# Patient Record
Sex: FEMALE | Race: WHITE | Employment: UNEMPLOYED | URBAN - METROPOLITAN AREA
[De-identification: names, ages, dates, MRNs, and addresses within clinical notes are randomized per-mention and may not be internally consistent; named-entity substitution may affect disease eponyms.]

---

## 2017-04-10 RX ORDER — TIOTROPIUM BROMIDE INHALATION SPRAY 1.56 UG/1
SPRAY, METERED RESPIRATORY (INHALATION)
Qty: 4 G | Refills: 3 | Status: SHIPPED | OUTPATIENT
Start: 2017-04-10 | End: 2017-08-06 | Stop reason: SDUPTHER

## 2017-05-08 DIAGNOSIS — N32.81 OAB (OVERACTIVE BLADDER): ICD-10-CM

## 2017-05-08 RX ORDER — OXYBUTYNIN CHLORIDE 10 MG/1
TABLET, EXTENDED RELEASE ORAL
Qty: 30 TABLET | Refills: 11 | Status: SHIPPED | OUTPATIENT
Start: 2017-05-08

## 2017-08-08 RX ORDER — TIOTROPIUM BROMIDE INHALATION SPRAY 1.56 UG/1
SPRAY, METERED RESPIRATORY (INHALATION)
Qty: 4 G | Refills: 2 | Status: SHIPPED | OUTPATIENT
Start: 2017-08-08

## 2017-09-06 ENCOUNTER — HOSPITAL ENCOUNTER (OUTPATIENT)
Dept: WOMENS IMAGING | Age: 62
Discharge: HOME | End: 2017-09-06
Payer: COMMERCIAL

## 2017-09-06 ENCOUNTER — HOSPITAL ENCOUNTER (OUTPATIENT)
Dept: GENERAL RADIOLOGY | Age: 62
Discharge: HOME | End: 2017-09-06
Payer: COMMERCIAL

## 2017-09-06 DIAGNOSIS — M25.552 LEFT HIP PAIN: ICD-10-CM

## 2017-09-06 DIAGNOSIS — M85.80 OSTEOPENIA, UNSPECIFIED LOCATION: ICD-10-CM

## 2017-09-06 PROCEDURE — 73502 X-RAY EXAM HIP UNI 2-3 VIEWS: CPT

## 2017-09-06 PROCEDURE — 77080 DXA BONE DENSITY AXIAL: CPT

## 2017-12-04 RX ORDER — TIOTROPIUM BROMIDE INHALATION SPRAY 1.56 UG/1
SPRAY, METERED RESPIRATORY (INHALATION)
Qty: 4 G | Refills: 1 | OUTPATIENT
Start: 2017-12-04

## 2018-06-18 ENCOUNTER — HOSPITAL ENCOUNTER (OUTPATIENT)
Dept: LAB | Age: 63
Discharge: HOME | End: 2018-06-18
Payer: COMMERCIAL

## 2018-06-18 LAB
ALBUMIN SERPL-MCNC: 4.3 G/DL (ref 3.9–4.9)
ALP BLD-CCNC: 89 U/L (ref 40–130)
ALT SERPL-CCNC: 16 U/L (ref 0–33)
ANION GAP SERPL CALCULATED.3IONS-SCNC: 17 MEQ/L (ref 7–13)
AST SERPL-CCNC: 14 U/L (ref 0–35)
BASOPHILS ABSOLUTE: 0.1 K/UL (ref 0–0.2)
BASOPHILS RELATIVE PERCENT: 0.4 %
BILIRUB SERPL-MCNC: <0.2 MG/DL (ref 0–1.2)
BUN BLDV-MCNC: 17 MG/DL (ref 8–23)
CALCIUM SERPL-MCNC: 9.1 MG/DL (ref 8.6–10.2)
CHLORIDE BLD-SCNC: 99 MEQ/L (ref 98–107)
CO2: 27 MEQ/L (ref 22–29)
CREAT SERPL-MCNC: 0.66 MG/DL (ref 0.5–0.9)
EOSINOPHILS ABSOLUTE: 0 K/UL (ref 0–0.7)
EOSINOPHILS RELATIVE PERCENT: 0.1 %
GFR AFRICAN AMERICAN: >60
GFR NON-AFRICAN AMERICAN: >60
GLOBULIN: 2.9 G/DL (ref 2.3–3.5)
GLUCOSE BLD-MCNC: 70 MG/DL (ref 74–109)
HCT VFR BLD CALC: 47.6 % (ref 37–47)
HEMOGLOBIN: 16 G/DL (ref 12–16)
LYMPHOCYTES ABSOLUTE: 2.3 K/UL (ref 1–4.8)
LYMPHOCYTES RELATIVE PERCENT: 17.7 %
MCH RBC QN AUTO: 33.8 PG (ref 27–31.3)
MCHC RBC AUTO-ENTMCNC: 33.5 % (ref 33–37)
MCV RBC AUTO: 100.8 FL (ref 82–100)
MONO TEST: NEGATIVE
MONOCYTES ABSOLUTE: 0.6 K/UL (ref 0.2–0.8)
MONOCYTES RELATIVE PERCENT: 5 %
NEUTROPHILS ABSOLUTE: 9.8 K/UL (ref 1.4–6.5)
NEUTROPHILS RELATIVE PERCENT: 76.8 %
PDW BLD-RTO: 13.7 % (ref 11.5–14.5)
PLATELET # BLD: 236 K/UL (ref 130–400)
POTASSIUM SERPL-SCNC: 3.5 MEQ/L (ref 3.5–5.1)
RBC # BLD: 4.72 M/UL (ref 4.2–5.4)
SODIUM BLD-SCNC: 143 MEQ/L (ref 132–144)
TOTAL PROTEIN: 7.2 G/DL (ref 6.4–8.1)
WBC # BLD: 12.8 K/UL (ref 4.8–10.8)

## 2018-06-18 PROCEDURE — 86308 HETEROPHILE ANTIBODY SCREEN: CPT

## 2018-06-18 PROCEDURE — 85025 COMPLETE CBC W/AUTO DIFF WBC: CPT

## 2018-06-18 PROCEDURE — 80053 COMPREHEN METABOLIC PANEL: CPT

## 2018-10-15 ENCOUNTER — HOSPITAL ENCOUNTER (OUTPATIENT)
Dept: LAB | Age: 63
Discharge: HOME | End: 2018-10-15
Payer: MEDICARE

## 2018-10-15 LAB
ALBUMIN SERPL-MCNC: 4 G/DL (ref 3.9–4.9)
ALP BLD-CCNC: 72 U/L (ref 40–130)
ALT SERPL-CCNC: 13 U/L (ref 0–33)
ANION GAP SERPL CALCULATED.3IONS-SCNC: 17 MEQ/L (ref 7–13)
AST SERPL-CCNC: 15 U/L (ref 0–35)
BILIRUB SERPL-MCNC: 0.4 MG/DL (ref 0–1.2)
BUN BLDV-MCNC: 15 MG/DL (ref 8–23)
CALCIUM SERPL-MCNC: 9.5 MG/DL (ref 8.6–10.2)
CHLORIDE BLD-SCNC: 98 MEQ/L (ref 98–107)
CHOLESTEROL, TOTAL: 155 MG/DL (ref 0–199)
CO2: 27 MEQ/L (ref 22–29)
CREAT SERPL-MCNC: 0.77 MG/DL (ref 0.5–0.9)
GFR AFRICAN AMERICAN: >60
GFR NON-AFRICAN AMERICAN: >60
GLOBULIN: 3 G/DL (ref 2.3–3.5)
GLUCOSE BLD-MCNC: 68 MG/DL (ref 74–109)
HDLC SERPL-MCNC: 49 MG/DL (ref 40–59)
LDL CHOLESTEROL CALCULATED: 79 MG/DL (ref 0–129)
MAGNESIUM: 2.3 MG/DL (ref 1.7–2.3)
POTASSIUM SERPL-SCNC: 3.1 MEQ/L (ref 3.5–5.1)
SODIUM BLD-SCNC: 142 MEQ/L (ref 132–144)
TOTAL PROTEIN: 7 G/DL (ref 6.4–8.1)
TRIGL SERPL-MCNC: 137 MG/DL (ref 0–200)
VITAMIN D 25-HYDROXY: 55.6 NG/ML (ref 30–100)

## 2018-10-15 PROCEDURE — 80061 LIPID PANEL: CPT

## 2018-10-15 PROCEDURE — 80053 COMPREHEN METABOLIC PANEL: CPT

## 2018-10-15 PROCEDURE — 83735 ASSAY OF MAGNESIUM: CPT

## 2018-10-15 PROCEDURE — 82306 VITAMIN D 25 HYDROXY: CPT

## 2019-07-31 ENCOUNTER — HOSPITAL ENCOUNTER (OUTPATIENT)
Dept: LAB | Age: 64
Discharge: HOME | End: 2019-07-31
Payer: MEDICAID

## 2019-07-31 LAB
ALBUMIN SERPL-MCNC: 4.1 G/DL (ref 3.5–4.6)
ALP BLD-CCNC: 67 U/L (ref 40–130)
ALT SERPL-CCNC: 16 U/L (ref 0–33)
ANION GAP SERPL CALCULATED.3IONS-SCNC: 19 MEQ/L (ref 9–15)
AST SERPL-CCNC: 16 U/L (ref 0–35)
BILIRUB SERPL-MCNC: 0.4 MG/DL (ref 0.2–0.7)
BUN BLDV-MCNC: 12 MG/DL (ref 8–23)
CALCIUM SERPL-MCNC: 9.4 MG/DL (ref 8.5–9.9)
CHLORIDE BLD-SCNC: 98 MEQ/L (ref 95–107)
CO2: 24 MEQ/L (ref 20–31)
CREAT SERPL-MCNC: 0.69 MG/DL (ref 0.5–0.9)
GFR AFRICAN AMERICAN: >60
GFR NON-AFRICAN AMERICAN: >60
GLOBULIN: 2.6 G/DL (ref 2.3–3.5)
GLUCOSE BLD-MCNC: 74 MG/DL (ref 70–99)
HCT VFR BLD CALC: 43.9 % (ref 37–47)
HEMOGLOBIN: 15.3 G/DL (ref 12–16)
MCH RBC QN AUTO: 35.7 PG (ref 27–31.3)
MCHC RBC AUTO-ENTMCNC: 34.9 % (ref 33–37)
MCV RBC AUTO: 102.1 FL (ref 82–100)
PDW BLD-RTO: 13.7 % (ref 11.5–14.5)
PLATELET # BLD: 214 K/UL (ref 130–400)
POTASSIUM SERPL-SCNC: 3.5 MEQ/L (ref 3.4–4.9)
RBC # BLD: 4.3 M/UL (ref 4.2–5.4)
SODIUM BLD-SCNC: 141 MEQ/L (ref 135–144)
TOTAL PROTEIN: 6.7 G/DL (ref 6.3–8)
TSH SERPL DL<=0.05 MIU/L-ACNC: 1.63 UIU/ML (ref 0.44–3.86)
WBC # BLD: 8.9 K/UL (ref 4.8–10.8)

## 2019-07-31 PROCEDURE — 84443 ASSAY THYROID STIM HORMONE: CPT

## 2019-07-31 PROCEDURE — 85027 COMPLETE CBC AUTOMATED: CPT

## 2019-07-31 PROCEDURE — 80053 COMPREHEN METABOLIC PANEL: CPT

## 2020-01-15 ENCOUNTER — HOSPITAL ENCOUNTER (OUTPATIENT)
Dept: LAB | Age: 65
Discharge: HOME | End: 2020-01-15
Payer: MEDICAID

## 2020-01-15 ENCOUNTER — HOSPITAL ENCOUNTER (OUTPATIENT)
Dept: WOMENS IMAGING | Age: 65
Discharge: HOME | End: 2020-01-17
Payer: MEDICAID

## 2020-01-15 LAB
ALBUMIN SERPL-MCNC: 4.2 G/DL (ref 3.5–4.6)
ALP BLD-CCNC: 63 U/L (ref 40–130)
ALT SERPL-CCNC: 12 U/L (ref 0–33)
ANION GAP SERPL CALCULATED.3IONS-SCNC: 14 MEQ/L (ref 9–15)
AST SERPL-CCNC: 14 U/L (ref 0–35)
BILIRUB SERPL-MCNC: 0.3 MG/DL (ref 0.2–0.7)
BUN BLDV-MCNC: 15 MG/DL (ref 8–23)
CALCIUM SERPL-MCNC: 9.4 MG/DL (ref 8.5–9.9)
CHLORIDE BLD-SCNC: 99 MEQ/L (ref 95–107)
CHOLESTEROL, TOTAL: 134 MG/DL (ref 0–199)
CO2: 25 MEQ/L (ref 20–31)
CREAT SERPL-MCNC: 0.91 MG/DL (ref 0.5–0.9)
GFR AFRICAN AMERICAN: >60
GFR NON-AFRICAN AMERICAN: >60
GLOBULIN: 2.5 G/DL (ref 2.3–3.5)
GLUCOSE BLD-MCNC: 94 MG/DL (ref 70–99)
HCT VFR BLD CALC: 42.7 % (ref 37–47)
HDLC SERPL-MCNC: 45 MG/DL (ref 40–59)
HEMOGLOBIN: 14.5 G/DL (ref 12–16)
LDL CHOLESTEROL CALCULATED: 62 MG/DL (ref 0–129)
MCH RBC QN AUTO: 33.6 PG (ref 27–31.3)
MCHC RBC AUTO-ENTMCNC: 33.9 % (ref 33–37)
MCV RBC AUTO: 98.9 FL (ref 82–100)
PDW BLD-RTO: 12.9 % (ref 11.5–14.5)
PLATELET # BLD: 185 K/UL (ref 130–400)
POTASSIUM SERPL-SCNC: 4 MEQ/L (ref 3.4–4.9)
PRO-BNP: 105 PG/ML
RBC # BLD: 4.32 M/UL (ref 4.2–5.4)
SODIUM BLD-SCNC: 138 MEQ/L (ref 135–144)
T4 TOTAL: 8.7 UG/DL (ref 4.5–11.7)
TOTAL PROTEIN: 6.7 G/DL (ref 6.3–8)
TRIGL SERPL-MCNC: 135 MG/DL (ref 0–150)
TSH SERPL DL<=0.05 MIU/L-ACNC: 2.33 UIU/ML (ref 0.44–3.86)
VITAMIN B-12: 320 PG/ML (ref 232–1245)
VITAMIN D 25-HYDROXY: 44.3 NG/ML (ref 30–100)
WBC # BLD: 5.9 K/UL (ref 4.8–10.8)

## 2020-01-15 PROCEDURE — 83880 ASSAY OF NATRIURETIC PEPTIDE: CPT

## 2020-01-15 PROCEDURE — 82306 VITAMIN D 25 HYDROXY: CPT

## 2020-01-15 PROCEDURE — 82607 VITAMIN B-12: CPT

## 2020-01-15 PROCEDURE — 84436 ASSAY OF TOTAL THYROXINE: CPT

## 2020-01-15 PROCEDURE — 85027 COMPLETE CBC AUTOMATED: CPT

## 2020-01-15 PROCEDURE — 77080 DXA BONE DENSITY AXIAL: CPT

## 2020-01-15 PROCEDURE — 80053 COMPREHEN METABOLIC PANEL: CPT

## 2020-01-15 PROCEDURE — 80061 LIPID PANEL: CPT

## 2020-01-15 PROCEDURE — 84443 ASSAY THYROID STIM HORMONE: CPT

## 2021-03-23 ENCOUNTER — HOSPITAL ENCOUNTER (OUTPATIENT)
Dept: LAB | Age: 66
Discharge: HOME | End: 2021-03-23
Payer: MEDICAID

## 2021-03-23 LAB
ANION GAP SERPL CALCULATED.3IONS-SCNC: 16 MEQ/L (ref 9–15)
BUN BLDV-MCNC: 15 MG/DL (ref 8–23)
CALCIUM SERPL-MCNC: 9.9 MG/DL (ref 8.5–9.9)
CHLORIDE BLD-SCNC: 99 MEQ/L (ref 95–107)
CO2: 27 MEQ/L (ref 20–31)
CREAT SERPL-MCNC: 0.81 MG/DL (ref 0.5–0.9)
GFR AFRICAN AMERICAN: >60
GFR NON-AFRICAN AMERICAN: >60
GLUCOSE BLD-MCNC: 63 MG/DL (ref 70–99)
POTASSIUM SERPL-SCNC: 3.1 MEQ/L (ref 3.4–4.9)
SODIUM BLD-SCNC: 142 MEQ/L (ref 135–144)

## 2021-03-23 PROCEDURE — 80048 BASIC METABOLIC PNL TOTAL CA: CPT

## 2021-04-05 ENCOUNTER — HOSPITAL ENCOUNTER (OUTPATIENT)
Dept: LAB | Age: 66
Discharge: HOME | End: 2021-04-05
Payer: MEDICAID

## 2021-04-05 LAB
ANION GAP SERPL CALCULATED.3IONS-SCNC: 11 MEQ/L (ref 9–15)
BUN BLDV-MCNC: 16 MG/DL (ref 8–23)
CALCIUM SERPL-MCNC: 9.9 MG/DL (ref 8.5–9.9)
CHLORIDE BLD-SCNC: 99 MEQ/L (ref 95–107)
CO2: 27 MEQ/L (ref 20–31)
CREAT SERPL-MCNC: 0.83 MG/DL (ref 0.5–0.9)
GFR AFRICAN AMERICAN: >60
GFR NON-AFRICAN AMERICAN: >60
GLUCOSE BLD-MCNC: 105 MG/DL (ref 70–99)
POTASSIUM SERPL-SCNC: 3.6 MEQ/L (ref 3.4–4.9)
SODIUM BLD-SCNC: 137 MEQ/L (ref 135–144)

## 2021-04-05 PROCEDURE — 80048 BASIC METABOLIC PNL TOTAL CA: CPT

## 2022-03-22 ENCOUNTER — HOSPITAL ENCOUNTER (OUTPATIENT)
Dept: LAB | Age: 67
Discharge: HOME | End: 2022-03-22
Payer: MEDICAID

## 2022-03-22 LAB
ALBUMIN SERPL-MCNC: 4.3 G/DL (ref 3.5–4.6)
ALP BLD-CCNC: 71 U/L (ref 40–130)
ALT SERPL-CCNC: 17 U/L (ref 0–33)
ANION GAP SERPL CALCULATED.3IONS-SCNC: 12 MEQ/L (ref 9–15)
AST SERPL-CCNC: 24 U/L (ref 0–35)
BILIRUB SERPL-MCNC: 0.3 MG/DL (ref 0.2–0.7)
BUN BLDV-MCNC: 14 MG/DL (ref 8–23)
CALCIUM SERPL-MCNC: 9.5 MG/DL (ref 8.5–9.9)
CHLORIDE BLD-SCNC: 100 MEQ/L (ref 95–107)
CO2: 27 MEQ/L (ref 20–31)
CREAT SERPL-MCNC: 0.89 MG/DL (ref 0.5–0.9)
GFR AFRICAN AMERICAN: >60
GFR NON-AFRICAN AMERICAN: >60
GLOBULIN: 2.3 G/DL (ref 2.3–3.5)
GLUCOSE BLD-MCNC: 104 MG/DL (ref 70–99)
HCT VFR BLD CALC: 43.8 % (ref 37–47)
HEMOGLOBIN: 14.7 G/DL (ref 12–16)
MCH RBC QN AUTO: 33.1 PG (ref 27–31.3)
MCHC RBC AUTO-ENTMCNC: 33.4 % (ref 33–37)
MCV RBC AUTO: 99.2 FL (ref 82–100)
MYOGLOBIN: 46 NG/ML (ref 25–58)
PDW BLD-RTO: 14.6 % (ref 11.5–14.5)
PLATELET # BLD: 194 K/UL (ref 130–400)
POTASSIUM SERPL-SCNC: 4.2 MEQ/L (ref 3.4–4.9)
RBC # BLD: 4.42 M/UL (ref 4.2–5.4)
SODIUM BLD-SCNC: 139 MEQ/L (ref 135–144)
T4 FREE: 0.96 NG/DL (ref 0.84–1.68)
TOTAL CK: 106 U/L (ref 0–170)
TOTAL PROTEIN: 6.6 G/DL (ref 6.3–8)
TSH SERPL DL<=0.05 MIU/L-ACNC: 1.39 UIU/ML (ref 0.44–3.86)
WBC # BLD: 6.8 K/UL (ref 4.8–10.8)

## 2022-03-22 PROCEDURE — 80053 COMPREHEN METABOLIC PANEL: CPT

## 2022-03-22 PROCEDURE — 85027 COMPLETE CBC AUTOMATED: CPT

## 2022-03-22 PROCEDURE — 83874 ASSAY OF MYOGLOBIN: CPT

## 2022-03-22 PROCEDURE — 82607 VITAMIN B-12: CPT

## 2022-03-22 PROCEDURE — 82550 ASSAY OF CK (CPK): CPT

## 2022-03-22 PROCEDURE — 82746 ASSAY OF FOLIC ACID SERUM: CPT

## 2022-03-22 PROCEDURE — 84439 ASSAY OF FREE THYROXINE: CPT

## 2022-03-22 PROCEDURE — 82306 VITAMIN D 25 HYDROXY: CPT

## 2022-03-22 PROCEDURE — 84443 ASSAY THYROID STIM HORMONE: CPT

## 2022-03-23 LAB
FOLATE: 9.1 NG/ML
VITAMIN B-12: 299 PG/ML (ref 232–1245)
VITAMIN D 25-HYDROXY: 46.7 NG/ML

## 2023-04-25 ENCOUNTER — HOSPITAL ENCOUNTER (OUTPATIENT)
Dept: LAB | Age: 68
Discharge: HOME | End: 2023-04-25
Payer: MEDICAID

## 2023-04-25 ENCOUNTER — HOSPITAL ENCOUNTER (OUTPATIENT)
Dept: WOMENS IMAGING | Age: 68
Discharge: HOME | End: 2023-04-27
Payer: MEDICAID

## 2023-04-25 ENCOUNTER — HOSPITAL ENCOUNTER (OUTPATIENT)
Dept: ULTRASOUND IMAGING | Age: 68
Discharge: HOME | End: 2023-04-27
Payer: MEDICAID

## 2023-04-25 DIAGNOSIS — R06.89 ACUTE RESPIRATORY INSUFFICIENCY: ICD-10-CM

## 2023-04-25 DIAGNOSIS — Z78.0 MENOPAUSE: ICD-10-CM

## 2023-04-25 LAB
ALBUMIN SERPL-MCNC: 4.3 G/DL (ref 3.5–4.6)
ALP SERPL-CCNC: 74 U/L (ref 40–130)
ALT SERPL-CCNC: 17 U/L (ref 0–33)
ANION GAP SERPL CALCULATED.3IONS-SCNC: 11 MEQ/L (ref 9–15)
AST SERPL-CCNC: 20 U/L (ref 0–35)
BASOPHILS # BLD: 0 K/UL (ref 0–0.2)
BASOPHILS NFR BLD: 0.2 %
BILIRUB SERPL-MCNC: 0.5 MG/DL (ref 0.2–0.7)
BUN SERPL-MCNC: 18 MG/DL (ref 8–23)
CALCIUM SERPL-MCNC: 9.8 MG/DL (ref 8.5–9.9)
CHLORIDE SERPL-SCNC: 101 MEQ/L (ref 95–107)
CHOLEST SERPL-MCNC: 155 MG/DL (ref 0–199)
CO2 SERPL-SCNC: 25 MEQ/L (ref 20–31)
CREAT SERPL-MCNC: 0.84 MG/DL (ref 0.5–0.9)
EOSINOPHIL # BLD: 0.2 K/UL (ref 0–0.7)
EOSINOPHIL NFR BLD: 2.9 %
ERYTHROCYTE [DISTWIDTH] IN BLOOD BY AUTOMATED COUNT: 13.8 % (ref 11.5–14.5)
GLOBULIN SER CALC-MCNC: 2.7 G/DL (ref 2.3–3.5)
GLUCOSE SERPL-MCNC: 108 MG/DL (ref 70–99)
HCT VFR BLD AUTO: 49.1 % (ref 37–47)
HDLC SERPL-MCNC: 49 MG/DL (ref 40–59)
HGB BLD-MCNC: 16.7 G/DL (ref 12–16)
LDL CHOLESTEROL CALCULATED: 83 MG/DL (ref 0–129)
LYMPHOCYTES # BLD: 1.8 K/UL (ref 1–4.8)
LYMPHOCYTES NFR BLD: 28.1 %
MAGNESIUM SERPL-MCNC: 2.4 MG/DL (ref 1.7–2.4)
MCH RBC QN AUTO: 33.5 PG (ref 27–31.3)
MCHC RBC AUTO-ENTMCNC: 33.9 % (ref 33–37)
MCV RBC AUTO: 98.8 FL (ref 79.4–94.8)
MONOCYTES # BLD: 0.6 K/UL (ref 0.2–0.8)
MONOCYTES NFR BLD: 8.7 %
NEUTROPHILS # BLD: 3.9 K/UL (ref 1.4–6.5)
NEUTS SEG NFR BLD: 60.1 %
PLATELET # BLD AUTO: 189 K/UL (ref 130–400)
POTASSIUM SERPL-SCNC: 4 MEQ/L (ref 3.4–4.9)
PROT SERPL-MCNC: 7 G/DL (ref 6.3–8)
RBC # BLD AUTO: 4.97 M/UL (ref 4.2–5.4)
SODIUM SERPL-SCNC: 137 MEQ/L (ref 135–144)
TRIGLYCERIDE, FASTING: 113 MG/DL (ref 0–150)
WBC # BLD AUTO: 6.5 K/UL (ref 4.8–10.8)

## 2023-04-25 PROCEDURE — 82306 VITAMIN D 25 HYDROXY: CPT

## 2023-04-25 PROCEDURE — 36415 COLL VENOUS BLD VENIPUNCTURE: CPT

## 2023-04-25 PROCEDURE — 83735 ASSAY OF MAGNESIUM: CPT

## 2023-04-25 PROCEDURE — 85025 COMPLETE CBC W/AUTO DIFF WBC: CPT

## 2023-04-25 PROCEDURE — 80061 LIPID PANEL: CPT

## 2023-04-25 PROCEDURE — 80053 COMPREHEN METABOLIC PANEL: CPT

## 2023-04-25 PROCEDURE — 77080 DXA BONE DENSITY AXIAL: CPT

## 2023-04-25 PROCEDURE — 93880 EXTRACRANIAL BILAT STUDY: CPT

## 2023-04-26 LAB — VITAMIN D 25-HYDROXY: 43.2 NG/ML

## 2023-11-02 PROBLEM — J44.89 CHRONIC ASTHMATIC BRONCHITIS (MULTI): Status: ACTIVE | Noted: 2023-11-02

## 2023-11-02 PROBLEM — M65.30 TRIGGER FINGER: Status: ACTIVE | Noted: 2023-11-02

## 2023-11-02 PROBLEM — I42.9 CARDIOMYOPATHY (MULTI): Status: ACTIVE | Noted: 2023-11-02

## 2023-11-02 PROBLEM — N39.0 URINARY TRACT INFECTION WITHOUT HEMATURIA: Status: ACTIVE | Noted: 2023-11-02

## 2023-11-02 PROBLEM — R06.89 DIFFICULTY BREATHING: Status: ACTIVE | Noted: 2023-11-02

## 2023-11-02 PROBLEM — I25.10 CAD IN NATIVE ARTERY: Status: ACTIVE | Noted: 2023-11-02

## 2023-11-02 PROBLEM — I77.1 CELIAC ARTERY STENOSIS (CMS-HCC): Status: ACTIVE | Noted: 2023-11-02

## 2023-11-02 PROBLEM — R94.31 ABNORMAL EKG: Status: ACTIVE | Noted: 2023-11-02

## 2023-11-02 PROBLEM — I73.9 INTERMITTENT CLAUDICATION (CMS-HCC): Status: ACTIVE | Noted: 2023-11-02

## 2023-11-02 PROBLEM — E87.6 HYPOKALEMIA: Status: ACTIVE | Noted: 2023-11-02

## 2023-11-02 PROBLEM — R22.40 LOCALIZED SWELLING, MASS, OR LUMP OF LOWER EXTREMITY: Status: ACTIVE | Noted: 2023-11-02

## 2023-11-02 PROBLEM — I73.9 PVD (PERIPHERAL VASCULAR DISEASE) (CMS-HCC): Status: ACTIVE | Noted: 2023-11-02

## 2023-11-02 PROBLEM — M25.569 KNEE PAIN: Status: ACTIVE | Noted: 2023-11-02

## 2023-11-02 PROBLEM — R60.9 SWELLING: Status: ACTIVE | Noted: 2023-11-02

## 2023-11-02 PROBLEM — I71.40 ABDOMINAL AORTIC ANEURYSM (CMS-HCC): Status: ACTIVE | Noted: 2023-11-02

## 2023-11-02 PROBLEM — E66.9 OBESE: Status: ACTIVE | Noted: 2023-11-02

## 2023-11-02 PROBLEM — E78.2 MIXED HYPERLIPIDEMIA: Status: ACTIVE | Noted: 2023-11-02

## 2023-11-02 PROBLEM — I65.23 BILATERAL CAROTID ARTERY STENOSIS: Status: ACTIVE | Noted: 2023-11-02

## 2023-11-02 PROBLEM — I10 ESSENTIAL HYPERTENSION: Status: ACTIVE | Noted: 2023-11-02

## 2023-11-02 PROBLEM — Z96.659 HISTORY OF TOTAL KNEE REPLACEMENT: Status: ACTIVE | Noted: 2023-11-02

## 2023-11-02 PROBLEM — R07.9 CHEST PAIN: Status: ACTIVE | Noted: 2023-11-02

## 2023-11-02 PROBLEM — R09.89 BRUIT OF RIGHT CAROTID ARTERY: Status: ACTIVE | Noted: 2023-11-02

## 2023-11-02 PROBLEM — M79.671 RIGHT FOOT PAIN: Status: ACTIVE | Noted: 2023-11-02

## 2023-11-02 PROBLEM — K55.1 MESENTERIC ARTERY STENOSIS (MULTI): Status: ACTIVE | Noted: 2023-11-02

## 2023-11-02 PROBLEM — M17.9 KNEE OSTEOARTHRITIS: Status: ACTIVE | Noted: 2023-11-02

## 2023-11-02 PROBLEM — I77.4 CELIAC ARTERY STENOSIS: Status: ACTIVE | Noted: 2023-11-02

## 2023-11-02 PROBLEM — S92.353A CLOSED DISPLACED FRACTURE OF FIFTH METATARSAL BONE: Status: ACTIVE | Noted: 2023-11-02

## 2023-11-02 PROBLEM — F17.200 CURRENT EVERY DAY SMOKER: Status: ACTIVE | Noted: 2023-11-02

## 2023-11-02 PROBLEM — M79.609 LIMB PAIN: Status: ACTIVE | Noted: 2023-11-02

## 2023-11-02 RX ORDER — ATENOLOL 50 MG/1
50 TABLET ORAL DAILY
COMMUNITY
End: 2023-11-03 | Stop reason: ALTCHOICE

## 2023-11-02 RX ORDER — PREGABALIN 75 MG/1
75 CAPSULE ORAL 2 TIMES DAILY
COMMUNITY
End: 2023-11-03 | Stop reason: ALTCHOICE

## 2023-11-02 RX ORDER — ALBUTEROL SULFATE 90 UG/1
2 AEROSOL, METERED RESPIRATORY (INHALATION)
COMMUNITY

## 2023-11-02 RX ORDER — POTASSIUM CHLORIDE 750 MG/1
2 CAPSULE, EXTENDED RELEASE ORAL DAILY
COMMUNITY
Start: 2022-03-15 | End: 2023-11-03 | Stop reason: ALTCHOICE

## 2023-11-02 RX ORDER — CLOPIDOGREL BISULFATE 75 MG/1
1 TABLET ORAL DAILY
COMMUNITY
End: 2023-11-03 | Stop reason: ALTCHOICE

## 2023-11-02 RX ORDER — ACETAMINOPHEN 500 MG
1 TABLET ORAL DAILY
COMMUNITY

## 2023-11-02 RX ORDER — ISOSORBIDE MONONITRATE 60 MG/1
60 TABLET, EXTENDED RELEASE ORAL DAILY
COMMUNITY
End: 2023-11-03 | Stop reason: ALTCHOICE

## 2023-11-02 RX ORDER — TIZANIDINE 4 MG/1
4 TABLET ORAL NIGHTLY
COMMUNITY
End: 2023-11-03 | Stop reason: ALTCHOICE

## 2023-11-02 RX ORDER — MIRTAZAPINE 15 MG/1
1 TABLET, FILM COATED ORAL NIGHTLY
COMMUNITY

## 2023-11-02 RX ORDER — NITROGLYCERIN 0.4 MG/1
1 TABLET SUBLINGUAL EVERY 5 MIN PRN
COMMUNITY

## 2023-11-02 RX ORDER — EZETIMIBE 10 MG/1
10 TABLET ORAL NIGHTLY
COMMUNITY
End: 2023-11-03 | Stop reason: ALTCHOICE

## 2023-11-02 RX ORDER — BRIMONIDINE TARTRATE 2 MG/ML
SOLUTION/ DROPS OPHTHALMIC
COMMUNITY
End: 2023-11-03 | Stop reason: ALTCHOICE

## 2023-11-02 RX ORDER — TOLTERODINE TARTRATE 1 MG/1
1 TABLET, EXTENDED RELEASE ORAL NIGHTLY
COMMUNITY
End: 2024-04-04 | Stop reason: WASHOUT

## 2023-11-02 RX ORDER — NAPROXEN SODIUM 220 MG/1
81 TABLET, FILM COATED ORAL
COMMUNITY
End: 2023-11-03 | Stop reason: ALTCHOICE

## 2023-11-02 RX ORDER — CELECOXIB 200 MG/1
200 CAPSULE ORAL DAILY
COMMUNITY
Start: 2022-05-18 | End: 2023-11-03 | Stop reason: ALTCHOICE

## 2023-11-02 RX ORDER — BUSPIRONE HYDROCHLORIDE 10 MG/1
10 TABLET ORAL NIGHTLY
COMMUNITY
End: 2023-11-03 | Stop reason: ALTCHOICE

## 2023-11-02 RX ORDER — SULFAMETHOXAZOLE AND TRIMETHOPRIM 800; 160 MG/1; MG/1
1 TABLET ORAL 2 TIMES DAILY
COMMUNITY
Start: 2022-06-16 | End: 2023-11-16 | Stop reason: SDUPTHER

## 2023-11-02 RX ORDER — PILOCARPINE HYDROCHLORIDE 5 MG/1
1 TABLET, FILM COATED ORAL
COMMUNITY

## 2023-11-02 RX ORDER — HYDRALAZINE HYDROCHLORIDE 50 MG/1
1 TABLET, FILM COATED ORAL 2 TIMES DAILY
COMMUNITY

## 2023-11-02 RX ORDER — ISOSORBIDE MONONITRATE 30 MG/1
30 TABLET, EXTENDED RELEASE ORAL DAILY
COMMUNITY
End: 2023-11-03 | Stop reason: ALTCHOICE

## 2023-11-02 RX ORDER — FUROSEMIDE 20 MG/1
1 TABLET ORAL DAILY
COMMUNITY
End: 2023-11-03 | Stop reason: ALTCHOICE

## 2023-11-02 RX ORDER — RALOXIFENE HYDROCHLORIDE 60 MG/1
1 TABLET, FILM COATED ORAL DAILY
COMMUNITY
End: 2023-12-18 | Stop reason: HOSPADM

## 2023-11-02 RX ORDER — VIT C/E/ZN/COPPR/LUTEIN/ZEAXAN 250MG-90MG
1 CAPSULE ORAL 2 TIMES DAILY
COMMUNITY

## 2023-11-02 RX ORDER — LORATADINE 10 MG/1
1 TABLET ORAL DAILY
COMMUNITY

## 2023-11-02 RX ORDER — SIMVASTATIN 40 MG/1
40 TABLET, FILM COATED ORAL NIGHTLY
COMMUNITY
End: 2024-01-18 | Stop reason: HOSPADM

## 2023-11-02 RX ORDER — MECLIZINE HYDROCHLORIDE 25 MG/1
25 TABLET ORAL EVERY 6 HOURS PRN
COMMUNITY
End: 2023-11-03 | Stop reason: ALTCHOICE

## 2023-11-02 RX ORDER — TRIAMTERENE AND HYDROCHLOROTHIAZIDE 37.5; 25 MG/1; MG/1
1 CAPSULE ORAL DAILY
COMMUNITY
End: 2023-11-03 | Stop reason: ALTCHOICE

## 2023-11-02 RX ORDER — POTASSIUM CHLORIDE 20 MEQ/1
1 TABLET, EXTENDED RELEASE ORAL DAILY
COMMUNITY
End: 2023-11-03 | Stop reason: ALTCHOICE

## 2023-11-02 RX ORDER — IPRATROPIUM BROMIDE 17 UG/1
2 AEROSOL, METERED RESPIRATORY (INHALATION) 2 TIMES DAILY PRN
COMMUNITY
End: 2023-11-03 | Stop reason: ALTCHOICE

## 2023-11-03 ENCOUNTER — PRE-ADMISSION TESTING (OUTPATIENT)
Dept: PREADMISSION TESTING | Facility: HOSPITAL | Age: 68
End: 2023-11-03
Payer: COMMERCIAL

## 2023-11-03 VITALS
SYSTOLIC BLOOD PRESSURE: 108 MMHG | HEART RATE: 75 BPM | DIASTOLIC BLOOD PRESSURE: 58 MMHG | BODY MASS INDEX: 38.53 KG/M2 | OXYGEN SATURATION: 95 % | RESPIRATION RATE: 18 BRPM | WEIGHT: 178.57 LBS | HEIGHT: 57 IN

## 2023-11-03 DIAGNOSIS — Z01.818 PREOPERATIVE TESTING: Primary | ICD-10-CM

## 2023-11-03 DIAGNOSIS — M48.061 SPINAL STENOSIS, LUMBAR REGION WITHOUT NEUROGENIC CLAUDICATION: ICD-10-CM

## 2023-11-03 DIAGNOSIS — Z01.818 PREOPERATIVE TESTING: ICD-10-CM

## 2023-11-03 LAB
ALBUMIN SERPL BCP-MCNC: 4.1 G/DL (ref 3.4–5)
ALP SERPL-CCNC: 73 U/L (ref 33–136)
ALT SERPL W P-5'-P-CCNC: 18 U/L (ref 7–45)
AMORPH CRY #/AREA UR COMP ASSIST: ABNORMAL /HPF
ANION GAP SERPL CALC-SCNC: 11 MMOL/L (ref 10–20)
APPEARANCE UR: ABNORMAL
AST SERPL W P-5'-P-CCNC: 17 U/L (ref 9–39)
BACTERIA #/AREA URNS AUTO: ABNORMAL /HPF
BASOPHILS # BLD AUTO: 0.02 X10*3/UL (ref 0–0.1)
BASOPHILS NFR BLD AUTO: 0.2 %
BILIRUB SERPL-MCNC: 0.5 MG/DL (ref 0–1.2)
BILIRUB UR STRIP.AUTO-MCNC: NEGATIVE MG/DL
BUN SERPL-MCNC: 19 MG/DL (ref 6–23)
CALCIUM SERPL-MCNC: 9.7 MG/DL (ref 8.6–10.3)
CHLORIDE SERPL-SCNC: 103 MMOL/L (ref 98–107)
CO2 SERPL-SCNC: 30 MMOL/L (ref 21–32)
COLOR UR: YELLOW
CREAT SERPL-MCNC: 1.22 MG/DL (ref 0.5–1.05)
EOSINOPHIL # BLD AUTO: 0.22 X10*3/UL (ref 0–0.7)
EOSINOPHIL NFR BLD AUTO: 2.3 %
ERYTHROCYTE [DISTWIDTH] IN BLOOD BY AUTOMATED COUNT: 13.2 % (ref 11.5–14.5)
GFR SERPL CREATININE-BSD FRML MDRD: 48 ML/MIN/1.73M*2
GLUCOSE SERPL-MCNC: 103 MG/DL (ref 74–99)
GLUCOSE UR STRIP.AUTO-MCNC: NEGATIVE MG/DL
HCT VFR BLD AUTO: 42.9 % (ref 36–46)
HGB BLD-MCNC: 14.2 G/DL (ref 12–16)
IMM GRANULOCYTES # BLD AUTO: 0.03 X10*3/UL (ref 0–0.7)
IMM GRANULOCYTES NFR BLD AUTO: 0.3 % (ref 0–0.9)
INR PPP: 0.9 (ref 0.9–1.1)
KETONES UR STRIP.AUTO-MCNC: NEGATIVE MG/DL
LEUKOCYTE ESTERASE UR QL STRIP.AUTO: ABNORMAL
LYMPHOCYTES # BLD AUTO: 2.21 X10*3/UL (ref 1.2–4.8)
LYMPHOCYTES NFR BLD AUTO: 23.1 %
MCH RBC QN AUTO: 32.8 PG (ref 26–34)
MCHC RBC AUTO-ENTMCNC: 33.1 G/DL (ref 32–36)
MCV RBC AUTO: 99 FL (ref 80–100)
MONOCYTES # BLD AUTO: 0.77 X10*3/UL (ref 0.1–1)
MONOCYTES NFR BLD AUTO: 8.1 %
MUCOUS THREADS #/AREA URNS AUTO: ABNORMAL /LPF
NEUTROPHILS # BLD AUTO: 6.31 X10*3/UL (ref 1.2–7.7)
NEUTROPHILS NFR BLD AUTO: 66 %
NITRITE UR QL STRIP.AUTO: POSITIVE
NRBC BLD-RTO: 0 /100 WBCS (ref 0–0)
PH UR STRIP.AUTO: 6 [PH]
PLATELET # BLD AUTO: 200 X10*3/UL (ref 150–450)
POTASSIUM SERPL-SCNC: 4.4 MMOL/L (ref 3.5–5.3)
PROT SERPL-MCNC: 6.5 G/DL (ref 6.4–8.2)
PROT UR STRIP.AUTO-MCNC: NEGATIVE MG/DL
PROTHROMBIN TIME: 10.6 SECONDS (ref 9.8–12.8)
RBC # BLD AUTO: 4.33 X10*6/UL (ref 4–5.2)
RBC # UR STRIP.AUTO: NEGATIVE /UL
RBC #/AREA URNS AUTO: ABNORMAL /HPF
SODIUM SERPL-SCNC: 140 MMOL/L (ref 136–145)
SP GR UR STRIP.AUTO: 1.01
SQUAMOUS #/AREA URNS AUTO: ABNORMAL /HPF
UROBILINOGEN UR STRIP.AUTO-MCNC: <2 MG/DL
WBC # BLD AUTO: 9.6 X10*3/UL (ref 4.4–11.3)
WBC #/AREA URNS AUTO: >50 /HPF

## 2023-11-03 PROCEDURE — 85610 PROTHROMBIN TIME: CPT

## 2023-11-03 PROCEDURE — 80053 COMPREHEN METABOLIC PANEL: CPT

## 2023-11-03 PROCEDURE — 81001 URINALYSIS AUTO W/SCOPE: CPT

## 2023-11-03 PROCEDURE — 87081 CULTURE SCREEN ONLY: CPT | Mod: ELYLAB

## 2023-11-03 PROCEDURE — 36415 COLL VENOUS BLD VENIPUNCTURE: CPT

## 2023-11-03 PROCEDURE — 85025 COMPLETE CBC W/AUTO DIFF WBC: CPT

## 2023-11-03 RX ORDER — ISOSORBIDE MONONITRATE 30 MG/1
30 TABLET, EXTENDED RELEASE ORAL DAILY
COMMUNITY
Start: 2022-06-06 | End: 2023-12-18 | Stop reason: HOSPADM

## 2023-11-03 RX ORDER — PREGABALIN 75 MG/1
1 CAPSULE ORAL 2 TIMES DAILY
COMMUNITY
Start: 2022-06-06

## 2023-11-03 RX ORDER — ASPIRIN 81 MG/1
1 TABLET ORAL DAILY
COMMUNITY

## 2023-11-03 RX ORDER — TRIAMTERENE AND HYDROCHLOROTHIAZIDE 37.5; 25 MG/1; MG/1
1 CAPSULE ORAL DAILY
COMMUNITY
Start: 2022-06-27 | End: 2023-12-18 | Stop reason: HOSPADM

## 2023-11-03 RX ORDER — IPRATROPIUM BROMIDE 17 UG/1
2 AEROSOL, METERED RESPIRATORY (INHALATION)
COMMUNITY
Start: 2022-06-24

## 2023-11-03 RX ORDER — IBANDRONATE SODIUM 150 MG/1
1 TABLET, FILM COATED ORAL
COMMUNITY
Start: 2023-08-22

## 2023-11-03 RX ORDER — TIZANIDINE 4 MG/1
4 TABLET ORAL EVERY 8 HOURS PRN
COMMUNITY
Start: 2022-06-06 | End: 2023-12-18 | Stop reason: HOSPADM

## 2023-11-03 RX ORDER — MECLIZINE HYDROCHLORIDE 25 MG/1
1 TABLET ORAL EVERY 8 HOURS PRN
COMMUNITY
Start: 2022-06-06

## 2023-11-03 RX ORDER — FUROSEMIDE 20 MG/1
1 TABLET ORAL DAILY
COMMUNITY
Start: 2022-06-06

## 2023-11-03 RX ORDER — ATENOLOL 50 MG/1
1 TABLET ORAL EVERY MORNING
COMMUNITY
Start: 2022-06-06

## 2023-11-03 RX ORDER — EZETIMIBE 10 MG/1
1 TABLET ORAL DAILY
COMMUNITY
Start: 2022-06-26

## 2023-11-03 RX ORDER — POTASSIUM CHLORIDE 750 MG/1
10 CAPSULE, EXTENDED RELEASE ORAL DAILY
COMMUNITY
Start: 2022-06-14 | End: 2024-03-28 | Stop reason: SDUPTHER

## 2023-11-03 RX ORDER — ISOSORBIDE MONONITRATE 60 MG/1
60 TABLET, EXTENDED RELEASE ORAL DAILY
COMMUNITY
Start: 2022-06-06 | End: 2023-12-18 | Stop reason: HOSPADM

## 2023-11-03 RX ORDER — BRIMONIDINE TARTRATE 2 MG/ML
1 SOLUTION/ DROPS OPHTHALMIC 2 TIMES DAILY
COMMUNITY
Start: 2022-06-02

## 2023-11-03 RX ORDER — BUSPIRONE HYDROCHLORIDE 10 MG/1
1 TABLET ORAL DAILY
COMMUNITY
Start: 2022-06-06

## 2023-11-03 RX ORDER — CELECOXIB 200 MG/1
200 CAPSULE ORAL DAILY PRN
COMMUNITY
Start: 2022-06-06 | End: 2023-12-18 | Stop reason: HOSPADM

## 2023-11-03 RX ORDER — CLOPIDOGREL BISULFATE 75 MG/1
75 TABLET ORAL DAILY
COMMUNITY
Start: 2022-06-04 | End: 2023-12-18 | Stop reason: HOSPADM

## 2023-11-03 RX ORDER — SIMVASTATIN 40 MG/1
40 TABLET, FILM COATED ORAL 2 TIMES DAILY
COMMUNITY
Start: 2022-06-26 | End: 2023-12-18 | Stop reason: HOSPADM

## 2023-11-07 ENCOUNTER — OFFICE VISIT (OUTPATIENT)
Dept: ORTHOPEDIC SURGERY | Facility: CLINIC | Age: 68
End: 2023-11-07
Payer: COMMERCIAL

## 2023-11-07 VITALS — HEIGHT: 57 IN | WEIGHT: 170 LBS | BODY MASS INDEX: 36.68 KG/M2

## 2023-11-07 DIAGNOSIS — M54.16 LUMBAR RADICULOPATHY: Primary | ICD-10-CM

## 2023-11-07 PROCEDURE — L0650 LSO SC R ANT/POS PNL PRE OTS: HCPCS | Performed by: ORTHOPAEDIC SURGERY

## 2023-11-07 ASSESSMENT — PAIN - FUNCTIONAL ASSESSMENT: PAIN_FUNCTIONAL_ASSESSMENT: 0-10

## 2023-11-07 ASSESSMENT — PAIN SCALES - GENERAL: PAINLEVEL_OUTOF10: 0 - NO PAIN

## 2023-11-07 NOTE — PROGRESS NOTES
Tara Tierney is a 68 y.o. female who presents for Follow-up of the Lower Back (Pre op sx 11/20/23 lumbar).    HPI:  68-year-old female here for preop visit.  She is scheduled to undergo an L3-S1 laminectomy with L4-5, L5-S1 TLIF.  She denies any fever chills nausea vomiting night sweats she has no bowel or bladder complaints.    Physical exam:  Well-nourished, well kept.  No lymphangitis or lymphadenopathy in the examined extremities.  Patient can rise from a seated position, can sit from a standing position. Can get up heels and toes.  Patient is mildly tender in the paraspinal musculature in the lumbar spine.  Her range of motion is intact no significant deficit no weakness no instability good muscle strength.  Examination of the lower extremities reveals no point tenderness, swelling, or deformity.  Range of motion of the hips, knees, and ankles are full without crepitance, instability, or exacerbation of pain. Strength is 5/5 throughout.  No redness, abrasions, or lesions on the lower extremities bilaterally.   Affect normal.  Alert and oriented X 3.  Coordination normal.    Assessment:  68-year-old female here for preop visit.  She is scheduled to undergo an L3-S1 laminectomy with L4-5, L5-S1 TLIF.  Pre and postoperative information instructions were given to the patient.  The brace was fitted today brace instructions were given.  Lifting twisting bending restrictions were discussed.  Wound care was discussed.  Risk and benefits were discussed with Dr. Coombs August 25, 2023.  All questions answered.  Patient ready to proceed with surgery.    Plan:  We will see her back in the office 2 weeks after her surgery.  This is going to be a two-level TLIF with instrumentation we may want to get AP lateral x-rays due to the size of the surgery.

## 2023-11-10 LAB — STAPHYLOCOCCUS SPEC CULT: ABNORMAL

## 2023-11-13 ENCOUNTER — HOSPITAL ENCOUNTER (OUTPATIENT)
Dept: RADIOLOGY | Facility: HOSPITAL | Age: 68
Discharge: HOME | End: 2023-11-13
Payer: COMMERCIAL

## 2023-11-13 DIAGNOSIS — Z12.31 ENCOUNTER FOR SCREENING MAMMOGRAM FOR MALIGNANT NEOPLASM OF BREAST: ICD-10-CM

## 2023-11-13 PROCEDURE — 77067 SCR MAMMO BI INCL CAD: CPT | Mod: BILATERAL PROCEDURE | Performed by: RADIOLOGY

## 2023-11-13 PROCEDURE — 77067 SCR MAMMO BI INCL CAD: CPT

## 2023-11-13 PROCEDURE — 77063 BREAST TOMOSYNTHESIS BI: CPT | Mod: BILATERAL PROCEDURE | Performed by: RADIOLOGY

## 2023-11-15 ENCOUNTER — TELEPHONE (OUTPATIENT)
Dept: ORTHOPEDIC SURGERY | Facility: CLINIC | Age: 68
End: 2023-11-15
Payer: COMMERCIAL

## 2023-11-15 NOTE — TELEPHONE ENCOUNTER
Pt lvm I returned call she stated had concerns as got letter for part of surgery denied.  I told pt no worries as we will appeal or proceed forward if part of surgery not covered and appeal later.  Pt will hear from us if it truly denied.

## 2023-11-16 DIAGNOSIS — N39.0 URINARY TRACT INFECTION WITHOUT HEMATURIA, SITE UNSPECIFIED: Primary | ICD-10-CM

## 2023-11-16 PROBLEM — M54.40 BACK PAIN OF LUMBAR REGION WITH SCIATICA: Status: ACTIVE | Noted: 2023-11-16

## 2023-11-16 RX ORDER — CIPROFLOXACIN 500 MG/1
500 TABLET ORAL 2 TIMES DAILY
Qty: 10 TABLET | Refills: 0 | Status: SHIPPED | OUTPATIENT
Start: 2023-11-16 | End: 2023-11-24 | Stop reason: HOSPADM

## 2023-11-16 RX ORDER — SULFAMETHOXAZOLE AND TRIMETHOPRIM 800; 160 MG/1; MG/1
1 TABLET ORAL 2 TIMES DAILY
Qty: 10 TABLET | Refills: 0 | Status: SHIPPED | OUTPATIENT
Start: 2023-11-16 | End: 2023-11-16 | Stop reason: SINTOL

## 2023-11-20 ENCOUNTER — HOSPITAL ENCOUNTER (OUTPATIENT)
Facility: HOSPITAL | Age: 68
Setting detail: OBSERVATION
Discharge: HOME | DRG: 455 | End: 2023-11-24
Attending: ORTHOPAEDIC SURGERY | Admitting: ORTHOPAEDIC SURGERY
Payer: COMMERCIAL

## 2023-11-20 ENCOUNTER — APPOINTMENT (OUTPATIENT)
Dept: CARDIOLOGY | Facility: HOSPITAL | Age: 68
DRG: 455 | End: 2023-11-20
Payer: COMMERCIAL

## 2023-11-20 ENCOUNTER — ANESTHESIA (OUTPATIENT)
Dept: OPERATING ROOM | Facility: HOSPITAL | Age: 68
DRG: 455 | End: 2023-11-20
Payer: COMMERCIAL

## 2023-11-20 ENCOUNTER — APPOINTMENT (OUTPATIENT)
Dept: RADIOLOGY | Facility: HOSPITAL | Age: 68
DRG: 455 | End: 2023-11-20
Payer: COMMERCIAL

## 2023-11-20 ENCOUNTER — ANESTHESIA EVENT (OUTPATIENT)
Dept: OPERATING ROOM | Facility: HOSPITAL | Age: 68
DRG: 455 | End: 2023-11-20
Payer: COMMERCIAL

## 2023-11-20 DIAGNOSIS — M54.40 BACK PAIN OF LUMBAR REGION WITH SCIATICA: Primary | ICD-10-CM

## 2023-11-20 PROBLEM — M54.10 BACK PAIN WITH RADICULOPATHY: Status: ACTIVE | Noted: 2023-11-20

## 2023-11-20 LAB
ALBUMIN SERPL BCP-MCNC: 3.2 G/DL (ref 3.4–5)
ALP SERPL-CCNC: 60 U/L (ref 33–136)
ALT SERPL W P-5'-P-CCNC: 28 U/L (ref 7–45)
ANION GAP SERPL CALC-SCNC: 10 MMOL/L (ref 10–20)
AST SERPL W P-5'-P-CCNC: 34 U/L (ref 9–39)
BASOPHILS # BLD AUTO: 0.02 X10*3/UL (ref 0–0.1)
BASOPHILS NFR BLD AUTO: 0.2 %
BILIRUB SERPL-MCNC: 0.4 MG/DL (ref 0–1.2)
BUN SERPL-MCNC: 17 MG/DL (ref 6–23)
CALCIUM SERPL-MCNC: 7.6 MG/DL (ref 8.6–10.3)
CHLORIDE SERPL-SCNC: 105 MMOL/L (ref 98–107)
CO2 SERPL-SCNC: 23 MMOL/L (ref 21–32)
CREAT SERPL-MCNC: 0.98 MG/DL (ref 0.5–1.05)
EOSINOPHIL # BLD AUTO: 0.01 X10*3/UL (ref 0–0.7)
EOSINOPHIL NFR BLD AUTO: 0.1 %
ERYTHROCYTE [DISTWIDTH] IN BLOOD BY AUTOMATED COUNT: 13.2 % (ref 11.5–14.5)
GFR SERPL CREATININE-BSD FRML MDRD: 62 ML/MIN/1.73M*2
GLUCOSE SERPL-MCNC: 149 MG/DL (ref 74–99)
HCT VFR BLD AUTO: 36.4 % (ref 36–46)
HGB BLD-MCNC: 12.2 G/DL (ref 12–16)
IMM GRANULOCYTES # BLD AUTO: 0.06 X10*3/UL (ref 0–0.7)
IMM GRANULOCYTES NFR BLD AUTO: 0.5 % (ref 0–0.9)
LYMPHOCYTES # BLD AUTO: 0.69 X10*3/UL (ref 1.2–4.8)
LYMPHOCYTES NFR BLD AUTO: 5.6 %
MAGNESIUM SERPL-MCNC: 1.66 MG/DL (ref 1.6–2.4)
MCH RBC QN AUTO: 33.2 PG (ref 26–34)
MCHC RBC AUTO-ENTMCNC: 33.5 G/DL (ref 32–36)
MCV RBC AUTO: 99 FL (ref 80–100)
MONOCYTES # BLD AUTO: 0.34 X10*3/UL (ref 0.1–1)
MONOCYTES NFR BLD AUTO: 2.8 %
NEUTROPHILS # BLD AUTO: 11.11 X10*3/UL (ref 1.2–7.7)
NEUTROPHILS NFR BLD AUTO: 90.8 %
NRBC BLD-RTO: 0 /100 WBCS (ref 0–0)
PHOSPHATE SERPL-MCNC: 3.2 MG/DL (ref 2.5–4.9)
PLATELET # BLD AUTO: 171 X10*3/UL (ref 150–450)
POTASSIUM SERPL-SCNC: 3.7 MMOL/L (ref 3.5–5.3)
PROT SERPL-MCNC: 5.3 G/DL (ref 6.4–8.2)
RBC # BLD AUTO: 3.68 X10*6/UL (ref 4–5.2)
SODIUM SERPL-SCNC: 134 MMOL/L (ref 136–145)
WBC # BLD AUTO: 12.2 X10*3/UL (ref 4.4–11.3)

## 2023-11-20 PROCEDURE — 3600000018 HC OR TIME - INITIAL BASE CHARGE - PROCEDURE LEVEL SIX: Performed by: ORTHOPAEDIC SURGERY

## 2023-11-20 PROCEDURE — 96376 TX/PRO/DX INJ SAME DRUG ADON: CPT | Mod: 59

## 2023-11-20 PROCEDURE — 64636 DESTROY L/S FACET JNT ADDL: CPT | Performed by: ORTHOPAEDIC SURGERY

## 2023-11-20 PROCEDURE — 63047 LAM FACETEC & FORAMOT LUMBAR: CPT | Performed by: ORTHOPAEDIC SURGERY

## 2023-11-20 PROCEDURE — 7100000001 HC RECOVERY ROOM TIME - INITIAL BASE CHARGE: Performed by: ORTHOPAEDIC SURGERY

## 2023-11-20 PROCEDURE — C1713 ANCHOR/SCREW BN/BN,TIS/BN: HCPCS | Performed by: ORTHOPAEDIC SURGERY

## 2023-11-20 PROCEDURE — 2500000005 HC RX 250 GENERAL PHARMACY W/O HCPCS: Performed by: ANESTHESIOLOGY

## 2023-11-20 PROCEDURE — 96365 THER/PROPH/DIAG IV INF INIT: CPT | Mod: 59

## 2023-11-20 PROCEDURE — 99291 CRITICAL CARE FIRST HOUR: CPT

## 2023-11-20 PROCEDURE — 93010 ELECTROCARDIOGRAM REPORT: CPT | Performed by: INTERNAL MEDICINE

## 2023-11-20 PROCEDURE — 3600000017 HC OR TIME - EACH INCREMENTAL 1 MINUTE - PROCEDURE LEVEL SIX: Performed by: ORTHOPAEDIC SURGERY

## 2023-11-20 PROCEDURE — 96375 TX/PRO/DX INJ NEW DRUG ADDON: CPT | Mod: 59

## 2023-11-20 PROCEDURE — A4217 STERILE WATER/SALINE, 500 ML: HCPCS | Mod: MUE | Performed by: ORTHOPAEDIC SURGERY

## 2023-11-20 PROCEDURE — 3700000002 HC GENERAL ANESTHESIA TIME - EACH INCREMENTAL 1 MINUTE: Performed by: ORTHOPAEDIC SURGERY

## 2023-11-20 PROCEDURE — 2500000005 HC RX 250 GENERAL PHARMACY W/O HCPCS: Performed by: ORTHOPAEDIC SURGERY

## 2023-11-20 PROCEDURE — 22630 ARTHRD PST TQ 1NTRSPC LUM: CPT | Performed by: ORTHOPAEDIC SURGERY

## 2023-11-20 PROCEDURE — 63053 LAM FACTC/FRMT ARTHRD LUM EA: CPT | Performed by: PHYSICIAN ASSISTANT

## 2023-11-20 PROCEDURE — 22842 INSERT SPINE FIXATION DEVICE: CPT | Performed by: PHYSICIAN ASSISTANT

## 2023-11-20 PROCEDURE — 2500000004 HC RX 250 GENERAL PHARMACY W/ HCPCS (ALT 636 FOR OP/ED)

## 2023-11-20 PROCEDURE — 63053 LAM FACTC/FRMT ARTHRD LUM EA: CPT | Performed by: ORTHOPAEDIC SURGERY

## 2023-11-20 PROCEDURE — 22632 ARTHRD PST TQ 1NTRSPC LM EA: CPT | Performed by: ORTHOPAEDIC SURGERY

## 2023-11-20 PROCEDURE — 2500000004 HC RX 250 GENERAL PHARMACY W/ HCPCS (ALT 636 FOR OP/ED): Mod: MUE | Performed by: ORTHOPAEDIC SURGERY

## 2023-11-20 PROCEDURE — 63052 LAM FACETC/FRMT ARTHRD LUM 1: CPT | Performed by: ORTHOPAEDIC SURGERY

## 2023-11-20 PROCEDURE — 80053 COMPREHEN METABOLIC PANEL: CPT

## 2023-11-20 PROCEDURE — 37799 UNLISTED PX VASCULAR SURGERY: CPT

## 2023-11-20 PROCEDURE — 93005 ELECTROCARDIOGRAM TRACING: CPT | Mod: 59

## 2023-11-20 PROCEDURE — 7100000002 HC RECOVERY ROOM TIME - EACH INCREMENTAL 1 MINUTE: Performed by: ORTHOPAEDIC SURGERY

## 2023-11-20 PROCEDURE — 22842 INSERT SPINE FIXATION DEVICE: CPT | Performed by: ORTHOPAEDIC SURGERY

## 2023-11-20 PROCEDURE — 2780000003 HC OR 278 NO HCPCS: Performed by: ORTHOPAEDIC SURGERY

## 2023-11-20 PROCEDURE — 22612 ARTHRD PST TQ 1NTRSPC LUMBAR: CPT | Performed by: PHYSICIAN ASSISTANT

## 2023-11-20 PROCEDURE — 2720000007 HC OR 272 NO HCPCS: Performed by: ORTHOPAEDIC SURGERY

## 2023-11-20 PROCEDURE — 2500000001 HC RX 250 WO HCPCS SELF ADMINISTERED DRUGS (ALT 637 FOR MEDICARE OP): Performed by: PHYSICIAN ASSISTANT

## 2023-11-20 PROCEDURE — 22630 ARTHRD PST TQ 1NTRSPC LUM: CPT | Performed by: PHYSICIAN ASSISTANT

## 2023-11-20 PROCEDURE — 83735 ASSAY OF MAGNESIUM: CPT

## 2023-11-20 PROCEDURE — 64635 DESTROY LUMB/SAC FACET JNT: CPT | Performed by: ORTHOPAEDIC SURGERY

## 2023-11-20 PROCEDURE — 2500000004 HC RX 250 GENERAL PHARMACY W/ HCPCS (ALT 636 FOR OP/ED): Performed by: ANESTHESIOLOGY

## 2023-11-20 PROCEDURE — 22612 ARTHRD PST TQ 1NTRSPC LUMBAR: CPT | Performed by: ORTHOPAEDIC SURGERY

## 2023-11-20 PROCEDURE — C1889 IMPLANT/INSERT DEVICE, NOC: HCPCS | Performed by: ORTHOPAEDIC SURGERY

## 2023-11-20 PROCEDURE — 2500000004 HC RX 250 GENERAL PHARMACY W/ HCPCS (ALT 636 FOR OP/ED): Performed by: PHYSICIAN ASSISTANT

## 2023-11-20 PROCEDURE — 2020000001 HC ICU ROOM DAILY

## 2023-11-20 PROCEDURE — 22853 INSJ BIOMECHANICAL DEVICE: CPT | Performed by: PHYSICIAN ASSISTANT

## 2023-11-20 PROCEDURE — 84100 ASSAY OF PHOSPHORUS: CPT

## 2023-11-20 PROCEDURE — 76000 FLUOROSCOPY <1 HR PHYS/QHP: CPT

## 2023-11-20 PROCEDURE — G0378 HOSPITAL OBSERVATION PER HR: HCPCS

## 2023-11-20 PROCEDURE — 3700000001 HC GENERAL ANESTHESIA TIME - INITIAL BASE CHARGE: Performed by: ORTHOPAEDIC SURGERY

## 2023-11-20 PROCEDURE — 63047 LAM FACETEC & FORAMOT LUMBAR: CPT | Performed by: PHYSICIAN ASSISTANT

## 2023-11-20 PROCEDURE — 63052 LAM FACETC/FRMT ARTHRD LUM 1: CPT | Performed by: PHYSICIAN ASSISTANT

## 2023-11-20 PROCEDURE — 2500000002 HC RX 250 W HCPCS SELF ADMINISTERED DRUGS (ALT 637 FOR MEDICARE OP, ALT 636 FOR OP/ED): Mod: MUE

## 2023-11-20 PROCEDURE — 22632 ARTHRD PST TQ 1NTRSPC LM EA: CPT | Performed by: PHYSICIAN ASSISTANT

## 2023-11-20 PROCEDURE — 94640 AIRWAY INHALATION TREATMENT: CPT

## 2023-11-20 PROCEDURE — 85025 COMPLETE CBC W/AUTO DIFF WBC: CPT

## 2023-11-20 PROCEDURE — 2500000001 HC RX 250 WO HCPCS SELF ADMINISTERED DRUGS (ALT 637 FOR MEDICARE OP)

## 2023-11-20 PROCEDURE — 22853 INSJ BIOMECHANICAL DEVICE: CPT | Performed by: ORTHOPAEDIC SURGERY

## 2023-11-20 DEVICE — IMPLANTABLE DEVICE: Type: IMPLANTABLE DEVICE | Site: SPINE LUMBAR | Status: FUNCTIONAL

## 2023-11-20 DEVICE — SCREW, MAS 6.5 X 45 CC SOLERA: Type: IMPLANTABLE DEVICE | Site: SPINE LUMBAR | Status: FUNCTIONAL

## 2023-11-20 DEVICE — SET SCREW, 5.5, TI NS BRK OFF: Type: IMPLANTABLE DEVICE | Site: SPINE LUMBAR | Status: FUNCTIONAL

## 2023-11-20 DEVICE — SPACER, CATALYFT PL, 7MM, LONG: Type: IMPLANTABLE DEVICE | Site: SPINE LUMBAR | Status: FUNCTIONAL

## 2023-11-20 DEVICE — ROD 55MM: Type: IMPLANTABLE DEVICE | Site: SPINE LUMBAR | Status: FUNCTIONAL

## 2023-11-20 DEVICE — BONE GRAFT SUBSTITUTE, ACTIFUSE MIS KIT, 7.5ML: Type: IMPLANTABLE DEVICE | Site: SPINE LUMBAR | Status: FUNCTIONAL

## 2023-11-20 DEVICE — SCREW, MAS 6.5 X 50 CC: Type: IMPLANTABLE DEVICE | Site: SPINE LUMBAR | Status: FUNCTIONAL

## 2023-11-20 DEVICE — ROD, 5.5 X 60 CVD TI SOLERA: Type: IMPLANTABLE DEVICE | Site: SPINE LUMBAR | Status: FUNCTIONAL

## 2023-11-20 RX ORDER — PHENYLEPHRINE HCL IN 0.9% NACL 0.4MG/10ML
SYRINGE (ML) INTRAVENOUS AS NEEDED
Status: DISCONTINUED | OUTPATIENT
Start: 2023-11-20 | End: 2023-11-20

## 2023-11-20 RX ORDER — PREGABALIN 25 MG/1
75 CAPSULE ORAL 2 TIMES DAILY
Status: DISCONTINUED | OUTPATIENT
Start: 2023-11-20 | End: 2023-11-24 | Stop reason: HOSPADM

## 2023-11-20 RX ORDER — POTASSIUM CHLORIDE 20 MEQ/1
40 TABLET, EXTENDED RELEASE ORAL ONCE
Status: COMPLETED | OUTPATIENT
Start: 2023-11-21 | End: 2023-11-21

## 2023-11-20 RX ORDER — ISOSORBIDE MONONITRATE 60 MG/1
60 TABLET, EXTENDED RELEASE ORAL DAILY
Status: DISCONTINUED | OUTPATIENT
Start: 2023-11-20 | End: 2023-11-24 | Stop reason: HOSPADM

## 2023-11-20 RX ORDER — PHENYLEPHRINE 10 MG/250 ML(40 MCG/ML)IN 0.9 % SOD.CHLORIDE INTRAVENOUS
CONTINUOUS PRN
Status: DISCONTINUED | OUTPATIENT
Start: 2023-11-20 | End: 2023-11-20

## 2023-11-20 RX ORDER — MIDAZOLAM HYDROCHLORIDE 1 MG/ML
1 INJECTION, SOLUTION INTRAMUSCULAR; INTRAVENOUS ONCE AS NEEDED
Status: DISCONTINUED | OUTPATIENT
Start: 2023-11-20 | End: 2023-11-20 | Stop reason: HOSPADM

## 2023-11-20 RX ORDER — ASPIRIN 81 MG/1
81 TABLET ORAL DAILY
Status: DISCONTINUED | OUTPATIENT
Start: 2023-11-20 | End: 2023-11-21

## 2023-11-20 RX ORDER — ALBUTEROL SULFATE 0.83 MG/ML
2.5 SOLUTION RESPIRATORY (INHALATION) ONCE
Status: DISCONTINUED | OUTPATIENT
Start: 2023-11-20 | End: 2023-11-20 | Stop reason: HOSPADM

## 2023-11-20 RX ORDER — ONDANSETRON HYDROCHLORIDE 2 MG/ML
INJECTION, SOLUTION INTRAVENOUS AS NEEDED
Status: DISCONTINUED | OUTPATIENT
Start: 2023-11-20 | End: 2023-11-20

## 2023-11-20 RX ORDER — VANCOMYCIN HYDROCHLORIDE 1 G/20ML
1 INJECTION, POWDER, LYOPHILIZED, FOR SOLUTION INTRAVENOUS ONCE
Status: DISCONTINUED | OUTPATIENT
Start: 2023-11-20 | End: 2023-11-20

## 2023-11-20 RX ORDER — LORATADINE 10 MG/1
10 TABLET ORAL DAILY
Status: DISCONTINUED | OUTPATIENT
Start: 2023-11-20 | End: 2023-11-24 | Stop reason: HOSPADM

## 2023-11-20 RX ORDER — MIRTAZAPINE 15 MG/1
15 TABLET, FILM COATED ORAL NIGHTLY
Status: DISCONTINUED | OUTPATIENT
Start: 2023-11-20 | End: 2023-11-24 | Stop reason: HOSPADM

## 2023-11-20 RX ORDER — PANTOPRAZOLE SODIUM 40 MG/1
40 TABLET, DELAYED RELEASE ORAL
Status: DISCONTINUED | OUTPATIENT
Start: 2023-11-21 | End: 2023-11-24 | Stop reason: HOSPADM

## 2023-11-20 RX ORDER — RALOXIFENE HYDROCHLORIDE 60 MG/1
60 TABLET, FILM COATED ORAL DAILY
Status: DISCONTINUED | OUTPATIENT
Start: 2023-11-20 | End: 2023-11-24 | Stop reason: HOSPADM

## 2023-11-20 RX ORDER — MORPHINE SULFATE 2 MG/ML
2 INJECTION, SOLUTION INTRAMUSCULAR; INTRAVENOUS EVERY 2 HOUR PRN
Status: DISCONTINUED | OUTPATIENT
Start: 2023-11-20 | End: 2023-11-24 | Stop reason: HOSPADM

## 2023-11-20 RX ORDER — SODIUM CHLORIDE 9 MG/ML
100 INJECTION, SOLUTION INTRAVENOUS CONTINUOUS
Status: DISCONTINUED | OUTPATIENT
Start: 2023-11-20 | End: 2023-11-24 | Stop reason: HOSPADM

## 2023-11-20 RX ORDER — PROPOFOL 10 MG/ML
INJECTION, EMULSION INTRAVENOUS CONTINUOUS PRN
Status: DISCONTINUED | OUTPATIENT
Start: 2023-11-20 | End: 2023-11-20

## 2023-11-20 RX ORDER — ENOXAPARIN SODIUM 100 MG/ML
40 INJECTION SUBCUTANEOUS EVERY 24 HOURS
Status: DISCONTINUED | OUTPATIENT
Start: 2023-11-20 | End: 2023-11-20

## 2023-11-20 RX ORDER — OXYCODONE HYDROCHLORIDE 5 MG/1
5 TABLET ORAL EVERY 4 HOURS PRN
Status: DISCONTINUED | OUTPATIENT
Start: 2023-11-20 | End: 2023-11-24 | Stop reason: HOSPADM

## 2023-11-20 RX ORDER — CEFAZOLIN 1 G/1
INJECTION, POWDER, FOR SOLUTION INTRAVENOUS AS NEEDED
Status: DISCONTINUED | OUTPATIENT
Start: 2023-11-20 | End: 2023-11-20

## 2023-11-20 RX ORDER — TRANEXAMIC ACID 650 MG/1
1950 TABLET ORAL ONCE
Status: COMPLETED | OUTPATIENT
Start: 2023-11-20 | End: 2023-11-20

## 2023-11-20 RX ORDER — CHOLECALCIFEROL (VITAMIN D3) 25 MCG
2000 TABLET ORAL DAILY
Status: DISCONTINUED | OUTPATIENT
Start: 2023-11-20 | End: 2023-11-24 | Stop reason: HOSPADM

## 2023-11-20 RX ORDER — FUROSEMIDE 40 MG/1
20 TABLET ORAL DAILY
Status: DISCONTINUED | OUTPATIENT
Start: 2023-11-20 | End: 2023-11-24 | Stop reason: HOSPADM

## 2023-11-20 RX ORDER — LIDOCAINE HYDROCHLORIDE 10 MG/ML
0.1 INJECTION, SOLUTION EPIDURAL; INFILTRATION; INTRACAUDAL; PERINEURAL ONCE
Status: DISCONTINUED | OUTPATIENT
Start: 2023-11-20 | End: 2023-11-20 | Stop reason: HOSPADM

## 2023-11-20 RX ORDER — NALOXONE HYDROCHLORIDE 1 MG/ML
0.2 INJECTION INTRAMUSCULAR; INTRAVENOUS; SUBCUTANEOUS EVERY 5 MIN PRN
Status: DISCONTINUED | OUTPATIENT
Start: 2023-11-20 | End: 2023-11-24 | Stop reason: HOSPADM

## 2023-11-20 RX ORDER — BUPIVACAINE HCL/EPINEPHRINE 0.5-1:200K
VIAL (ML) INJECTION AS NEEDED
Status: DISCONTINUED | OUTPATIENT
Start: 2023-11-20 | End: 2023-11-20 | Stop reason: HOSPADM

## 2023-11-20 RX ORDER — SODIUM CHLORIDE, SODIUM LACTATE, POTASSIUM CHLORIDE, CALCIUM CHLORIDE 600; 310; 30; 20 MG/100ML; MG/100ML; MG/100ML; MG/100ML
100 INJECTION, SOLUTION INTRAVENOUS CONTINUOUS
Status: DISCONTINUED | OUTPATIENT
Start: 2023-11-20 | End: 2023-11-20 | Stop reason: HOSPADM

## 2023-11-20 RX ORDER — FENTANYL 25 UG/1
1 PATCH TRANSDERMAL
Status: DISCONTINUED | OUTPATIENT
Start: 2023-11-20 | End: 2023-11-24 | Stop reason: HOSPADM

## 2023-11-20 RX ORDER — HYDRALAZINE HYDROCHLORIDE 50 MG/1
50 TABLET, FILM COATED ORAL 2 TIMES DAILY
Status: DISCONTINUED | OUTPATIENT
Start: 2023-11-20 | End: 2023-11-24 | Stop reason: HOSPADM

## 2023-11-20 RX ORDER — ISOSORBIDE MONONITRATE 30 MG/1
30 TABLET, EXTENDED RELEASE ORAL DAILY
Status: DISCONTINUED | OUTPATIENT
Start: 2023-11-20 | End: 2023-11-24 | Stop reason: HOSPADM

## 2023-11-20 RX ORDER — TIZANIDINE 4 MG/1
4 TABLET ORAL EVERY 8 HOURS PRN
Status: DISCONTINUED | OUTPATIENT
Start: 2023-11-20 | End: 2023-11-20

## 2023-11-20 RX ORDER — ATENOLOL 50 MG/1
50 TABLET ORAL EVERY MORNING
Status: DISCONTINUED | OUTPATIENT
Start: 2023-11-21 | End: 2023-11-24 | Stop reason: HOSPADM

## 2023-11-20 RX ORDER — FENTANYL CITRATE 50 UG/ML
INJECTION, SOLUTION INTRAMUSCULAR; INTRAVENOUS AS NEEDED
Status: DISCONTINUED | OUTPATIENT
Start: 2023-11-20 | End: 2023-11-20

## 2023-11-20 RX ORDER — CIPROFLOXACIN 250 MG/1
500 TABLET, FILM COATED ORAL 2 TIMES DAILY
Status: DISCONTINUED | OUTPATIENT
Start: 2023-11-20 | End: 2023-11-21

## 2023-11-20 RX ORDER — CELECOXIB 200 MG/1
200 CAPSULE ORAL ONCE
Status: COMPLETED | OUTPATIENT
Start: 2023-11-20 | End: 2023-11-20

## 2023-11-20 RX ORDER — MEPERIDINE HYDROCHLORIDE 25 MG/ML
12.5 INJECTION INTRAMUSCULAR; INTRAVENOUS; SUBCUTANEOUS EVERY 10 MIN PRN
Status: DISCONTINUED | OUTPATIENT
Start: 2023-11-20 | End: 2023-11-20 | Stop reason: HOSPADM

## 2023-11-20 RX ORDER — CLOPIDOGREL BISULFATE 75 MG/1
75 TABLET ORAL DAILY
Status: DISCONTINUED | OUTPATIENT
Start: 2023-11-20 | End: 2023-11-21

## 2023-11-20 RX ORDER — CEFAZOLIN SODIUM 2 G/100ML
2 INJECTION, SOLUTION INTRAVENOUS EVERY 8 HOURS
Status: COMPLETED | OUTPATIENT
Start: 2023-11-20 | End: 2023-11-20

## 2023-11-20 RX ORDER — PANTOPRAZOLE SODIUM 40 MG/1
1 TABLET, DELAYED RELEASE ORAL
COMMUNITY

## 2023-11-20 RX ORDER — ROCURONIUM BROMIDE 10 MG/ML
INJECTION, SOLUTION INTRAVENOUS AS NEEDED
Status: DISCONTINUED | OUTPATIENT
Start: 2023-11-20 | End: 2023-11-20

## 2023-11-20 RX ORDER — LABETALOL HYDROCHLORIDE 5 MG/ML
5 INJECTION, SOLUTION INTRAVENOUS ONCE AS NEEDED
Status: DISCONTINUED | OUTPATIENT
Start: 2023-11-20 | End: 2023-11-20 | Stop reason: HOSPADM

## 2023-11-20 RX ORDER — BUSPIRONE HYDROCHLORIDE 5 MG/1
10 TABLET ORAL DAILY
Status: DISCONTINUED | OUTPATIENT
Start: 2023-11-20 | End: 2023-11-24 | Stop reason: HOSPADM

## 2023-11-20 RX ORDER — TRANEXAMIC ACID 650 MG/1
1950 TABLET ORAL ONCE
Status: DISCONTINUED | OUTPATIENT
Start: 2023-11-20 | End: 2023-11-24 | Stop reason: HOSPADM

## 2023-11-20 RX ORDER — ETOMIDATE 2 MG/ML
INJECTION INTRAVENOUS AS NEEDED
Status: DISCONTINUED | OUTPATIENT
Start: 2023-11-20 | End: 2023-11-20

## 2023-11-20 RX ORDER — OXYCODONE HYDROCHLORIDE 5 MG/1
2.5 TABLET ORAL EVERY 4 HOURS PRN
Status: DISCONTINUED | OUTPATIENT
Start: 2023-11-20 | End: 2023-11-24 | Stop reason: HOSPADM

## 2023-11-20 RX ORDER — OXYCODONE HYDROCHLORIDE 5 MG/1
10 TABLET ORAL EVERY 4 HOURS PRN
Status: DISCONTINUED | OUTPATIENT
Start: 2023-11-20 | End: 2023-11-24 | Stop reason: HOSPADM

## 2023-11-20 RX ORDER — ACETAMINOPHEN 325 MG/1
650 TABLET ORAL ONCE
Status: COMPLETED | OUTPATIENT
Start: 2023-11-20 | End: 2023-11-20

## 2023-11-20 RX ORDER — PROPOFOL 10 MG/ML
INJECTION, EMULSION INTRAVENOUS AS NEEDED
Status: DISCONTINUED | OUTPATIENT
Start: 2023-11-20 | End: 2023-11-20

## 2023-11-20 RX ORDER — ACETAMINOPHEN 325 MG/1
650 TABLET ORAL EVERY 6 HOURS
Status: DISCONTINUED | OUTPATIENT
Start: 2023-11-20 | End: 2023-11-24 | Stop reason: HOSPADM

## 2023-11-20 RX ORDER — TRIAMTERENE/HYDROCHLOROTHIAZID 37.5-25 MG
1 TABLET ORAL DAILY
Status: DISCONTINUED | OUTPATIENT
Start: 2023-11-20 | End: 2023-11-24 | Stop reason: HOSPADM

## 2023-11-20 RX ORDER — OXYBUTYNIN CHLORIDE 5 MG/1
5 TABLET ORAL 2 TIMES DAILY
Status: DISCONTINUED | OUTPATIENT
Start: 2023-11-20 | End: 2023-11-24 | Stop reason: HOSPADM

## 2023-11-20 RX ORDER — GLYCOPYRROLATE 0.2 MG/ML
INJECTION INTRAMUSCULAR; INTRAVENOUS AS NEEDED
Status: DISCONTINUED | OUTPATIENT
Start: 2023-11-20 | End: 2023-11-20

## 2023-11-20 RX ORDER — VANCOMYCIN HYDROCHLORIDE 1 G/200ML
1 INJECTION, SOLUTION INTRAVENOUS ONCE
Status: COMPLETED | OUTPATIENT
Start: 2023-11-20 | End: 2023-11-20

## 2023-11-20 RX ORDER — CEFAZOLIN SODIUM 2 G/50ML
2 SOLUTION INTRAVENOUS EVERY 8 HOURS
Status: DISCONTINUED | OUTPATIENT
Start: 2023-11-20 | End: 2023-11-20

## 2023-11-20 RX ORDER — CEFAZOLIN SODIUM 2 G/100ML
2 INJECTION, SOLUTION INTRAVENOUS ONCE
Status: DISCONTINUED | OUTPATIENT
Start: 2023-11-20 | End: 2023-11-20 | Stop reason: HOSPADM

## 2023-11-20 RX ORDER — MIDAZOLAM HYDROCHLORIDE 1 MG/ML
INJECTION, SOLUTION INTRAMUSCULAR; INTRAVENOUS AS NEEDED
Status: DISCONTINUED | OUTPATIENT
Start: 2023-11-20 | End: 2023-11-20

## 2023-11-20 RX ORDER — POLYETHYLENE GLYCOL 3350 17 G/17G
17 POWDER, FOR SOLUTION ORAL DAILY
Status: DISCONTINUED | OUTPATIENT
Start: 2023-11-20 | End: 2023-11-24 | Stop reason: HOSPADM

## 2023-11-20 RX ORDER — DROPERIDOL 2.5 MG/ML
0.62 INJECTION, SOLUTION INTRAMUSCULAR; INTRAVENOUS ONCE AS NEEDED
Status: DISCONTINUED | OUTPATIENT
Start: 2023-11-20 | End: 2023-11-20 | Stop reason: HOSPADM

## 2023-11-20 RX ORDER — POTASSIUM CHLORIDE 750 MG/1
10 TABLET, FILM COATED, EXTENDED RELEASE ORAL DAILY
Status: DISCONTINUED | OUTPATIENT
Start: 2023-11-20 | End: 2023-11-24 | Stop reason: HOSPADM

## 2023-11-20 RX ORDER — CYCLOBENZAPRINE HCL 10 MG
10 TABLET ORAL 3 TIMES DAILY PRN
Status: DISCONTINUED | OUTPATIENT
Start: 2023-11-20 | End: 2023-11-24 | Stop reason: HOSPADM

## 2023-11-20 RX ORDER — ALBUTEROL SULFATE 90 UG/1
2 AEROSOL, METERED RESPIRATORY (INHALATION) EVERY 4 HOURS PRN
Status: DISCONTINUED | OUTPATIENT
Start: 2023-11-20 | End: 2023-11-24 | Stop reason: HOSPADM

## 2023-11-20 RX ORDER — EZETIMIBE 10 MG/1
10 TABLET ORAL DAILY
Status: DISCONTINUED | OUTPATIENT
Start: 2023-11-20 | End: 2023-11-24 | Stop reason: HOSPADM

## 2023-11-20 RX ORDER — SODIUM CHLORIDE, SODIUM LACTATE, POTASSIUM CHLORIDE, CALCIUM CHLORIDE 600; 310; 30; 20 MG/100ML; MG/100ML; MG/100ML; MG/100ML
100 INJECTION, SOLUTION INTRAVENOUS CONTINUOUS
Status: DISCONTINUED | OUTPATIENT
Start: 2023-11-21 | End: 2023-11-20

## 2023-11-20 RX ORDER — SIMVASTATIN 40 MG/1
40 TABLET, FILM COATED ORAL NIGHTLY
Status: DISCONTINUED | OUTPATIENT
Start: 2023-11-20 | End: 2023-11-24 | Stop reason: HOSPADM

## 2023-11-20 RX ORDER — SODIUM CHLORIDE 0.9 G/100ML
IRRIGANT IRRIGATION AS NEEDED
Status: DISCONTINUED | OUTPATIENT
Start: 2023-11-20 | End: 2023-11-20 | Stop reason: HOSPADM

## 2023-11-20 RX ORDER — MECLIZINE HCL 12.5 MG 12.5 MG/1
25 TABLET ORAL 3 TIMES DAILY PRN
Status: DISCONTINUED | OUTPATIENT
Start: 2023-11-20 | End: 2023-11-24 | Stop reason: HOSPADM

## 2023-11-20 RX ORDER — BRIMONIDINE TARTRATE 2 MG/ML
1 SOLUTION/ DROPS OPHTHALMIC 2 TIMES DAILY
Status: DISCONTINUED | OUTPATIENT
Start: 2023-11-20 | End: 2023-11-24 | Stop reason: HOSPADM

## 2023-11-20 RX ORDER — ONDANSETRON 4 MG/1
4 TABLET, FILM COATED ORAL EVERY 8 HOURS PRN
Status: DISCONTINUED | OUTPATIENT
Start: 2023-11-20 | End: 2023-11-24 | Stop reason: HOSPADM

## 2023-11-20 RX ORDER — DEXAMETHASONE SODIUM PHOSPHATE 4 MG/ML
INJECTION, SOLUTION INTRA-ARTICULAR; INTRALESIONAL; INTRAMUSCULAR; INTRAVENOUS; SOFT TISSUE AS NEEDED
Status: DISCONTINUED | OUTPATIENT
Start: 2023-11-20 | End: 2023-11-20

## 2023-11-20 RX ORDER — ONDANSETRON HYDROCHLORIDE 2 MG/ML
4 INJECTION, SOLUTION INTRAVENOUS EVERY 8 HOURS PRN
Status: DISCONTINUED | OUTPATIENT
Start: 2023-11-20 | End: 2023-11-24 | Stop reason: HOSPADM

## 2023-11-20 RX ORDER — MAGNESIUM SULFATE HEPTAHYDRATE 40 MG/ML
2 INJECTION, SOLUTION INTRAVENOUS ONCE
Status: COMPLETED | OUTPATIENT
Start: 2023-11-21 | End: 2023-11-21

## 2023-11-20 RX ADMIN — ROCURONIUM BROMIDE 40 MG: 10 SOLUTION INTRAVENOUS at 07:36

## 2023-11-20 RX ADMIN — HYDROMORPHONE HYDROCHLORIDE 0.5 MG: 1 INJECTION, SOLUTION INTRAMUSCULAR; INTRAVENOUS; SUBCUTANEOUS at 12:47

## 2023-11-20 RX ADMIN — Medication 0.3 MCG/KG/MIN: at 09:00

## 2023-11-20 RX ADMIN — PROPOFOL 40 MG: 10 INJECTION, EMULSION INTRAVENOUS at 11:25

## 2023-11-20 RX ADMIN — CEFAZOLIN SODIUM 2 G: 2 INJECTION, SOLUTION INTRAVENOUS at 15:25

## 2023-11-20 RX ADMIN — Medication 100 MCG: at 08:20

## 2023-11-20 RX ADMIN — ROCURONIUM BROMIDE 10 MG: 10 SOLUTION INTRAVENOUS at 08:20

## 2023-11-20 RX ADMIN — Medication 100 MCG: at 08:17

## 2023-11-20 RX ADMIN — ACETAMINOPHEN 650 MG: 325 TABLET ORAL at 06:17

## 2023-11-20 RX ADMIN — LORATADINE 10 MG: 10 TABLET ORAL at 15:25

## 2023-11-20 RX ADMIN — HYDROMORPHONE HYDROCHLORIDE 0.5 MG: 1 INJECTION, SOLUTION INTRAMUSCULAR; INTRAVENOUS; SUBCUTANEOUS at 13:22

## 2023-11-20 RX ADMIN — GLYCOPYRROLATE 0.2 MG: 0.2 INJECTION, SOLUTION INTRAMUSCULAR; INTRAVENOUS at 08:55

## 2023-11-20 RX ADMIN — VANCOMYCIN HYDROCHLORIDE 1 G: 1 INJECTION, SOLUTION INTRAVENOUS at 06:18

## 2023-11-20 RX ADMIN — PREGABALIN 75 MG: 25 CAPSULE ORAL at 20:07

## 2023-11-20 RX ADMIN — CEFAZOLIN SODIUM 2 G: 2 INJECTION, SOLUTION INTRAVENOUS at 23:05

## 2023-11-20 RX ADMIN — HYDROMORPHONE HYDROCHLORIDE 0.5 MG: 1 INJECTION, SOLUTION INTRAMUSCULAR; INTRAVENOUS; SUBCUTANEOUS at 12:14

## 2023-11-20 RX ADMIN — MIDAZOLAM 2 MG: 1 INJECTION INTRAMUSCULAR; INTRAVENOUS at 07:28

## 2023-11-20 RX ADMIN — REMIFENTANIL HYDROCHLORIDE 0.2 MCG/KG/MIN: 1 INJECTION, POWDER, LYOPHILIZED, FOR SOLUTION INTRAVENOUS at 08:32

## 2023-11-20 RX ADMIN — POLYETHYLENE GLYCOL 3350 17 G: 17 POWDER, FOR SOLUTION ORAL at 14:17

## 2023-11-20 RX ADMIN — ETOMIDATE 20 MG: 2 INJECTION INTRAVENOUS at 07:36

## 2023-11-20 RX ADMIN — MIDAZOLAM 2 MG: 1 INJECTION INTRAMUSCULAR; INTRAVENOUS at 08:51

## 2023-11-20 RX ADMIN — Medication 100 MCG: at 08:34

## 2023-11-20 RX ADMIN — Medication 100 MCG: at 08:39

## 2023-11-20 RX ADMIN — REMIFENTANIL HYDROCHLORIDE 0.4 MCG/KG/MIN: 1 INJECTION, POWDER, LYOPHILIZED, FOR SOLUTION INTRAVENOUS at 08:20

## 2023-11-20 RX ADMIN — PROPOFOL 50 MG: 10 INJECTION, EMULSION INTRAVENOUS at 09:40

## 2023-11-20 RX ADMIN — ACETAMINOPHEN 650 MG: 325 TABLET ORAL at 14:17

## 2023-11-20 RX ADMIN — ROCURONIUM BROMIDE 20 MG: 10 SOLUTION INTRAVENOUS at 09:14

## 2023-11-20 RX ADMIN — SODIUM CHLORIDE, POTASSIUM CHLORIDE, SODIUM LACTATE AND CALCIUM CHLORIDE 100 ML/HR: 600; 310; 30; 20 INJECTION, SOLUTION INTRAVENOUS at 05:57

## 2023-11-20 RX ADMIN — PROPOFOL 50 MCG/KG/MIN: 10 INJECTION, EMULSION INTRAVENOUS at 08:20

## 2023-11-20 RX ADMIN — PROPOFOL 30 MG: 10 INJECTION, EMULSION INTRAVENOUS at 09:34

## 2023-11-20 RX ADMIN — OXYBUTYNIN CHLORIDE 5 MG: 5 TABLET ORAL at 20:07

## 2023-11-20 RX ADMIN — ROCURONIUM BROMIDE 10 MG: 10 SOLUTION INTRAVENOUS at 10:50

## 2023-11-20 RX ADMIN — CIPROFLOXACIN 500 MG: 250 TABLET, COATED ORAL at 20:07

## 2023-11-20 RX ADMIN — BRIMONIDINE TARTRATE 1 DROP: 2 SOLUTION OPHTHALMIC at 20:08

## 2023-11-20 RX ADMIN — SUGAMMADEX 200 MG: 100 INJECTION, SOLUTION INTRAVENOUS at 11:42

## 2023-11-20 RX ADMIN — Medication 2000 UNITS: at 15:25

## 2023-11-20 RX ADMIN — SIMVASTATIN 40 MG: 40 TABLET, FILM COATED ORAL at 20:08

## 2023-11-20 RX ADMIN — TRANEXAMIC ACID 1950 MG: 650 TABLET ORAL at 06:17

## 2023-11-20 RX ADMIN — OXYCODONE HYDROCHLORIDE 10 MG: 5 TABLET ORAL at 14:52

## 2023-11-20 RX ADMIN — HYDROMORPHONE HYDROCHLORIDE 0.5 MG: 1 INJECTION, SOLUTION INTRAMUSCULAR; INTRAVENOUS; SUBCUTANEOUS at 12:19

## 2023-11-20 RX ADMIN — Medication 100 MCG: at 10:25

## 2023-11-20 RX ADMIN — HYDROMORPHONE HYDROCHLORIDE 0.5 MG: 1 INJECTION, SOLUTION INTRAMUSCULAR; INTRAVENOUS; SUBCUTANEOUS at 12:05

## 2023-11-20 RX ADMIN — HYDROMORPHONE HYDROCHLORIDE 0.5 MG: 1 INJECTION, SOLUTION INTRAMUSCULAR; INTRAVENOUS; SUBCUTANEOUS at 12:26

## 2023-11-20 RX ADMIN — HYDROMORPHONE HYDROCHLORIDE 0.5 MG: 1 INJECTION, SOLUTION INTRAMUSCULAR; INTRAVENOUS; SUBCUTANEOUS at 12:35

## 2023-11-20 RX ADMIN — ACETAMINOPHEN 650 MG: 325 TABLET ORAL at 20:07

## 2023-11-20 RX ADMIN — Medication 100 MCG: at 10:07

## 2023-11-20 RX ADMIN — DEXAMETHASONE SODIUM PHOSPHATE 8 MG: 4 INJECTION, SOLUTION INTRAMUSCULAR; INTRAVENOUS at 09:59

## 2023-11-20 RX ADMIN — ONDANSETRON 4 MG: 2 INJECTION INTRAMUSCULAR; INTRAVENOUS at 09:59

## 2023-11-20 RX ADMIN — PROMETHAZINE HYDROCHLORIDE 6.25 MG: 25 INJECTION INTRAMUSCULAR; INTRAVENOUS at 12:36

## 2023-11-20 RX ADMIN — Medication 100 MCG: at 08:55

## 2023-11-20 RX ADMIN — Medication 100 MCG: at 08:54

## 2023-11-20 RX ADMIN — PROPOFOL 50 MG: 10 INJECTION, EMULSION INTRAVENOUS at 11:31

## 2023-11-20 RX ADMIN — CYCLOBENZAPRINE HYDROCHLORIDE 10 MG: 10 TABLET, FILM COATED ORAL at 15:25

## 2023-11-20 RX ADMIN — MIRTAZAPINE 15 MG: 15 TABLET, FILM COATED ORAL at 20:07

## 2023-11-20 RX ADMIN — BUSPIRONE HYDROCHLORIDE 10 MG: 5 TABLET ORAL at 15:25

## 2023-11-20 RX ADMIN — CELECOXIB 200 MG: 200 CAPSULE ORAL at 06:17

## 2023-11-20 RX ADMIN — CEFAZOLIN SODIUM 2 G: 1 POWDER, FOR SOLUTION INTRAMUSCULAR; INTRAVENOUS at 08:10

## 2023-11-20 RX ADMIN — FENTANYL CITRATE 100 MCG: 50 INJECTION, SOLUTION INTRAMUSCULAR; INTRAVENOUS at 07:36

## 2023-11-20 RX ADMIN — EZETIMIBE 10 MG: 10 TABLET ORAL at 15:25

## 2023-11-20 RX ADMIN — FENTANYL CITRATE 100 MCG: 50 INJECTION, SOLUTION INTRAMUSCULAR; INTRAVENOUS at 09:35

## 2023-11-20 RX ADMIN — SODIUM CHLORIDE 100 ML/HR: 9 INJECTION, SOLUTION INTRAVENOUS at 14:07

## 2023-11-20 SDOH — SOCIAL STABILITY: SOCIAL INSECURITY: ARE THERE ANY APPARENT SIGNS OF INJURIES/BEHAVIORS THAT COULD BE RELATED TO ABUSE/NEGLECT?: NO

## 2023-11-20 SDOH — SOCIAL STABILITY: SOCIAL INSECURITY: HAS ANYONE EVER THREATENED TO HURT YOUR FAMILY OR YOUR PETS?: NO

## 2023-11-20 SDOH — SOCIAL STABILITY: SOCIAL INSECURITY: ARE YOU OR HAVE YOU BEEN THREATENED OR ABUSED PHYSICALLY, EMOTIONALLY, OR SEXUALLY BY ANYONE?: NO

## 2023-11-20 SDOH — SOCIAL STABILITY: SOCIAL INSECURITY: DOES ANYONE TRY TO KEEP YOU FROM HAVING/CONTACTING OTHER FRIENDS OR DOING THINGS OUTSIDE YOUR HOME?: NO

## 2023-11-20 SDOH — SOCIAL STABILITY: SOCIAL INSECURITY: DO YOU FEEL UNSAFE GOING BACK TO THE PLACE WHERE YOU ARE LIVING?: NO

## 2023-11-20 SDOH — SOCIAL STABILITY: SOCIAL INSECURITY: ABUSE: ADULT

## 2023-11-20 SDOH — SOCIAL STABILITY: SOCIAL INSECURITY: DO YOU FEEL ANYONE HAS EXPLOITED OR TAKEN ADVANTAGE OF YOU FINANCIALLY OR OF YOUR PERSONAL PROPERTY?: NO

## 2023-11-20 SDOH — SOCIAL STABILITY: SOCIAL INSECURITY: HAVE YOU HAD THOUGHTS OF HARMING ANYONE ELSE?: NO

## 2023-11-20 SDOH — SOCIAL STABILITY: SOCIAL INSECURITY: WERE YOU ABLE TO COMPLETE ALL THE BEHAVIORAL HEALTH SCREENINGS?: YES

## 2023-11-20 SDOH — HEALTH STABILITY: MENTAL HEALTH: CURRENT SMOKER: 0

## 2023-11-20 ASSESSMENT — COGNITIVE AND FUNCTIONAL STATUS - GENERAL
MOBILITY SCORE: 16
DAILY ACTIVITIY SCORE: 19
MOBILITY SCORE: 16
WALKING IN HOSPITAL ROOM: A LOT
TURNING FROM BACK TO SIDE WHILE IN FLAT BAD: A LITTLE
PATIENT BASELINE BEDBOUND: NO
MOVING TO AND FROM BED TO CHAIR: A LITTLE
DRESSING REGULAR UPPER BODY CLOTHING: A LITTLE
PERSONAL GROOMING: A LITTLE
DRESSING REGULAR UPPER BODY CLOTHING: A LITTLE
MOVING FROM LYING ON BACK TO SITTING ON SIDE OF FLAT BED WITH BEDRAILS: A LITTLE
STANDING UP FROM CHAIR USING ARMS: A LITTLE
CLIMB 3 TO 5 STEPS WITH RAILING: A LOT
MOVING TO AND FROM BED TO CHAIR: A LITTLE
HELP NEEDED FOR BATHING: A LITTLE
TOILETING: A LITTLE
DRESSING REGULAR LOWER BODY CLOTHING: A LITTLE
TOILETING: A LITTLE
CLIMB 3 TO 5 STEPS WITH RAILING: A LOT
TURNING FROM BACK TO SIDE WHILE IN FLAT BAD: A LITTLE
WALKING IN HOSPITAL ROOM: A LOT
DRESSING REGULAR LOWER BODY CLOTHING: A LITTLE
DAILY ACTIVITIY SCORE: 19
PERSONAL GROOMING: A LITTLE
HELP NEEDED FOR BATHING: A LITTLE
STANDING UP FROM CHAIR USING ARMS: A LITTLE
MOVING FROM LYING ON BACK TO SITTING ON SIDE OF FLAT BED WITH BEDRAILS: A LITTLE

## 2023-11-20 ASSESSMENT — LIFESTYLE VARIABLES
PRESCIPTION_ABUSE_PAST_12_MONTHS: NO
AUDIT-C TOTAL SCORE: 2
HOW MANY STANDARD DRINKS CONTAINING ALCOHOL DO YOU HAVE ON A TYPICAL DAY: PATIENT DOES NOT DRINK
HOW OFTEN DO YOU HAVE 6 OR MORE DRINKS ON ONE OCCASION: LESS THAN MONTHLY
SUBSTANCE_ABUSE_PAST_12_MONTHS: NO
SUBSTANCE_ABUSE_PAST_12_MONTHS: NO
HOW OFTEN DO YOU HAVE A DRINK CONTAINING ALCOHOL: MONTHLY OR LESS
AUDIT-C TOTAL SCORE: 2
SKIP TO QUESTIONS 9-10: 0
HOW MANY STANDARD DRINKS CONTAINING ALCOHOL DO YOU HAVE ON A TYPICAL DAY: PATIENT DOES NOT DRINK
SKIP TO QUESTIONS 9-10: 0
AUDIT-C TOTAL SCORE: 2
HOW OFTEN DO YOU HAVE 6 OR MORE DRINKS ON ONE OCCASION: LESS THAN MONTHLY
AUDIT-C TOTAL SCORE: 2
HOW OFTEN DO YOU HAVE A DRINK CONTAINING ALCOHOL: MONTHLY OR LESS
PRESCIPTION_ABUSE_PAST_12_MONTHS: NO

## 2023-11-20 ASSESSMENT — PAIN SCALES - GENERAL
PAINLEVEL_OUTOF10: 10 - WORST POSSIBLE PAIN
PAINLEVEL_OUTOF10: 10 - WORST POSSIBLE PAIN
PAINLEVEL_OUTOF10: 5 - MODERATE PAIN
PAINLEVEL_OUTOF10: 10 - WORST POSSIBLE PAIN
PAINLEVEL_OUTOF10: 0 - NO PAIN
PAINLEVEL_OUTOF10: 10 - WORST POSSIBLE PAIN
PAINLEVEL_OUTOF10: 7
PAINLEVEL_OUTOF10: 10 - WORST POSSIBLE PAIN
PAINLEVEL_OUTOF10: 10 - WORST POSSIBLE PAIN
PAIN_LEVEL: 1
PAINLEVEL_OUTOF10: 10 - WORST POSSIBLE PAIN
PAINLEVEL_OUTOF10: 0 - NO PAIN
PAINLEVEL_OUTOF10: 10 - WORST POSSIBLE PAIN

## 2023-11-20 ASSESSMENT — ACTIVITIES OF DAILY LIVING (ADL)
JUDGMENT_ADEQUATE_SAFELY_COMPLETE_DAILY_ACTIVITIES: YES
ADEQUATE_TO_COMPLETE_ADL: YES
WALKS IN HOME: INDEPENDENT
DRESSING YOURSELF: INDEPENDENT
GROOMING: INDEPENDENT
PATIENT'S MEMORY ADEQUATE TO SAFELY COMPLETE DAILY ACTIVITIES?: YES
HEARING - LEFT EAR: FUNCTIONAL
FEEDING YOURSELF: INDEPENDENT
BATHING: INDEPENDENT
TOILETING: INDEPENDENT
HEARING - RIGHT EAR: FUNCTIONAL
LACK_OF_TRANSPORTATION: NO

## 2023-11-20 ASSESSMENT — PATIENT HEALTH QUESTIONNAIRE - PHQ9
2. FEELING DOWN, DEPRESSED OR HOPELESS: NOT AT ALL
SUM OF ALL RESPONSES TO PHQ9 QUESTIONS 1 & 2: 0
2. FEELING DOWN, DEPRESSED OR HOPELESS: NOT AT ALL
SUM OF ALL RESPONSES TO PHQ9 QUESTIONS 1 & 2: 0
1. LITTLE INTEREST OR PLEASURE IN DOING THINGS: NOT AT ALL
1. LITTLE INTEREST OR PLEASURE IN DOING THINGS: NOT AT ALL

## 2023-11-20 ASSESSMENT — PAIN - FUNCTIONAL ASSESSMENT
PAIN_FUNCTIONAL_ASSESSMENT: 0-10

## 2023-11-20 ASSESSMENT — COLUMBIA-SUICIDE SEVERITY RATING SCALE - C-SSRS
2. HAVE YOU ACTUALLY HAD ANY THOUGHTS OF KILLING YOURSELF?: NO
1. IN THE PAST MONTH, HAVE YOU WISHED YOU WERE DEAD OR WISHED YOU COULD GO TO SLEEP AND NOT WAKE UP?: NO
6. HAVE YOU EVER DONE ANYTHING, STARTED TO DO ANYTHING, OR PREPARED TO DO ANYTHING TO END YOUR LIFE?: NO

## 2023-11-20 ASSESSMENT — PAIN DESCRIPTION - DESCRIPTORS: DESCRIPTORS: ACHING

## 2023-11-20 ASSESSMENT — PAIN DESCRIPTION - LOCATION: LOCATION: BACK

## 2023-11-20 NOTE — ANESTHESIA PROCEDURE NOTES
Arterial Line:    Date/Time: 11/20/2023 7:45 AM    Staffing  Performed: attending   Authorized by: Zoltan Escobar MD    Performed by: Zoltan Escobar MD    An arterial line was placed. Procedure performed using ultrasound guidance.in the OR for the following indication(s): continuous blood pressure monitoring and blood sampling needed.    A 20 gauge (size), 1 and 3/4 inch (length), Angiocath (type) catheter was placed into the Right radial artery, secured by Tegaderm,   Seldinger technique used.  Events:  patient tolerated procedure well with no complications.

## 2023-11-20 NOTE — OP NOTE
Preoperative diagnosis: L3-4, L4-5 and L5-S1 stenosis.  L5-S1 dynamic spondylolisthesis.  Lumbar spondylosis.    Postoperative diagnosis: L3-4, L4-5 and L5-S1 stenosis.  L5-S1 dynamic spondylolisthesis that was worsened with laminectomy.  L4-5 iatrogenic instability from laminectomy.  Lumbar spondylosis.    Procedure: L3-4, L4-5 and L5-S1 laminectomy decompressing the L3, 4, 5 and S1 vertebral segments.  L4-5 and L5-S1 posterior lumbar interbody fusion.  Instrumentation L4-5 and L5-S1.  Use of interbody cage L4-5 and L5-S1.  Posterolateral fusion L3-4.  Thermal ablation of the medial nerve branch supplying the facets bilaterally at L3-4, L4-5 and L5-S1.    Surgeon: Gerson Coombs M.D.    Asst.: Hussein ALEXThe physician assistant was present through the entire case.  Given the nature of the disease process and the procedure to be performed a skilled surgical assistant was necessary during the case.  The assistant was necessary in order to hold retractors and directly assist in the operation.  A certified scrub tech was at the back table managing instruments and supplies for the surgical case.    Anesthesia: General    Blood Loss: 300 cc    Complications: None    HPI: The patient had leg symptoms from the above described condition.  There were scheduled electively for the above-described surgery.  All of the risks, benefits, and potential complications for operative and nonoperative treatment were discussed at length with the patient.  Risks of operative intervention include, but are not limited to: Anesthesia complications, excessive blood loss, infection, damaged to uninjured structures including, but not limited to, the dural sac and nerve roots, blood clots, and lack of improvement or worsening of symptoms.  These complications could result in death, permanent disability, or need for reoperation.  The patient completely understood those risks and wished to proceed with operative intervention.    Operative  implants: Medtronic Catalyft PL cage, Medtronic screws and Actifuse gun and putty bone graft.      Operative procedure: The patient was wheeled from the preanesthesia care unit to the theater.  The patient was placed in supine position and all bony prominences were well-padded.  For the entire procedure anesthesia maintainined control of the patient's head neck.  General anesthesia was induced.  The patient was then placed prone on the Faisal table.  All bony prominences were well-padded.  The head and neck were in neutral position.  The back was cleansed with alcohol and spinal needles were inserted.  X-rays were taken to confirm appropriate levels.  The back was then marked and prepped and draped in normal sterile fashion.    Timeout was performed.  Site verification marking was verified.  Appropriate antibiotic administration was confirmed.  Next, a longitudinal incision in the midline was performed over the operative levels.  Dissection was carried down with a knife through the skin.  A Bovie was then used to dissect down to identify the spinous process of the operative levels.  Bovie was used to move the soft tissues off the spinous process lamina and facet bilaterally.  Clamps were then placed in the spinous processes and x-rays were taken confirm the appropriate levels.  Bovie and FloSeal was used to maintain hemostasis.  Bovie was then used to expose the lateral gutters bilaterally from the cephalad transverse process to the caudal level.  This dissection exposed the lateral facets as well as the transverse process.The Bovie was used to perform thermal ablation of the medial nerve branch at each operative level facet bilaterally using direct visualization in the anatomic area of the location of the medial nerve branch.   Ray-Zabrina's were tucked in the lateral gutters to be removed later.    A rongeur was then used to remove the spinous processes and debulk the lamina of the operative levels.  Microscope was  then brought in for use. Next, the bur was used to debulk the lamina at the operative levels. This exposed ligamentum flavum along the midline. 60 curved curette was then used to reach underneath the lamina in the most cephalad portion of the dissection. 2 and 3 Kerrisons were used to perform midline laminectomy heading from cephalad to caudal to the most caudal level. At this point there was complete central decompression lifting off the central lamina and ligamentum flavum. The ultrasonic scalpel was then used to widen the laminectomy, first on the patient's left side and then on the patient's right side. Bone was lifted off after the ultrasonic scalpel bone cuts were made. 2 and 3 Kerrisons were then used to clean up the lateral gutters and performed foraminotomies at all levels.  At this time is complete decompression centrally as well as bilateral foraminally from the L3 nerve root to the S1 nerve root bilaterally.  Hemostasis was maintained using bipolar cautery and FloSeal.  Laminectomy at the L5-S1 level showed significant fluid in the facets at L5-S1 and after partial facetectomy was performed bilaterally for the decompression that level was stressed and found to be significantly unstable with significant spondylolisthesis exposed visualized upon direct visualization and on x-ray.  It was decided interbody cage would be necessary at L5-S1 given the increased instability from a pre-existing spondylolisthesis after the laminectomy.    Next, the L5-S1 disc space was prepared for interbody cage placement.  A bur was used to widen the laminectomy on the patient's right side.  This provided nice exposure of the exiting cephalad nerve root and traversing caudal nerve root and the disc space.  Bipolar cautery was used to remove epidural veins to nicely exposed the disc.  A nerve root retractor was then placed to protect the exiting nerve root and traversing dural sac and nerve root.  The disc was box cut with a  knife.  Annulus was removed with the pituitary rongeur.  Pituitaries were then used to remove disc from the disc space.  Rotating darren were used until the appropriate amount of friction between the shaver and endplates was obtained.  Pituitary rongeurs were used again to remove disc.  Next curved curettes were used to completely remove disc off of the endplates down to the annulus anteriorly and across the contralateral side.  Up-biting and backbiting and straight biting pituitaries were used as well.  At this point there was complete removal of disc from the disc space.  Actifuse gun was then placed in the disc space and filled the entire disc space with bone graft.  Next the cage was inserted into the disc space.  AP and lateral x-rays were taken to confirm appropriate positioning of the cage.  The cage was then expanded to appropriate tension was obtained.  Final AP lateral x-rays of the construct confirmed appropriate cage positioning and disc height restoration.    Facetectomy on the right side at L5-S1 resulted in some instability of the L4-5 facet.  This level was an adjacent level to a fusion and had instability as visualized now on x-ray and direct stress intra operatively.  Therefore, the identical procedure that was performed above was performed at the L4-5 level coming in from the right side.  Disc preparation and interbody cage placement was performed as scribed above.  At this point the L3-4 level was stable without any evidence of instability.    Next pedicle screws were placed in the L4, L5 and S1 pedicles.  This was done by feeling the pedicle with a long ball, using a bur to to create a starting hole, using an awl to enter the pedicle, using the feeler probe to identify appropriate position of the awl, using Peddiguard to enter into the vertebral body through the center of the pedicle, using a feeler again, measuring the length of the screw off the feeler probe, tapping, using the feeler probe  to ensure there were medial lateral cephalad caudal and ventral walls, and finally placing the screw.  This was done the same way for all screws.  [facility]  After all screws were placed spinal cord monitoring was used to measure all screws obtaining appropriate readings.  X-rays in AP and lateral projections were taken confirm appropriate screw positioning as well.  Next, locking rods were placed bilaterally traversing the tulips of the pedicle screws.  They were locked into position using the locking caps.  Final x-rays taken confirm the appropriate positioning of the cage screws and rods.    Next, the Ray-Zabrina sponges in the lateral gutters were removed and Actifuse putty bone graft was placed from the L3 transverse process to the L4 transverse process in the lateral gutter bilaterally with bone graft contacting the lateral facets and transverse process at all levels.  Given the patient's predisposition for instability a noninstrumented fusion at L3-4 was necessary as it was the adjacent level to 2 levels of instrumented fusion.  Locally prepared autograft bone from the remove lamina was also placed in the lateral gutter to augment the fusion.    The entire wound was irrigated with copious amounts of normal saline.  All saline was suctioned dry.  Valsalva was performed and there was no evidence of any dural leak or excessive bleeding.  Retractors were removed.  The deep fascia was closed over a drain that was not resting on the neural elements.  The fascia was closed with a running #1 Stratafix PDS suture.  The fatty layer was closed with 0 Vicryl suture.  The skin was closed with 2-0 Vicryl and staples.  A sterile dressing was applied.  There were no abnormal spinal cord monitor readings for the entire case.  The patient tolerated the procedure well and had pink and warm toes at the conclusion of the case.        This dictation was created using voice  recognition software.  If there are any questions about inaccuracies please do not hesitate to contact me.

## 2023-11-20 NOTE — H&P
[facility] Critical Care Medicine       Date:  11/20/2023  Patient:  Tara Tierney  YOB: 1955  MRN:  [id number]   Admit Date:  11/20/2023  ========================================================================================================    No chief complaint on file.        History of Present Illness:  Tara Tierney is a 68 y.o. year old female patient with Past Medical History of  COPD, HTN, HLD, peripheral vascular disease, macular degeneration, osteoporosis, overactive bladder, and L3-4, L4-5 and L5-S1 stenosis with L5-S1 dynamic spondylolisthesis and lumbar spondylosis presented post elective laminectomy of L3-4, L4-5 and L5-S1. No abnormal spinal cord monitor readings were reported throughout the case and toes were perfusing following the conclusion. Pt was noted to have fluctuating BP readings throughout the OR and post op ranging from a SBP of .  An arterial line was placed in the OR by anesthesia.  Pt was closely monitored and is to be admitted to the ICU for BP monitoring at this time.       Interval ICU Events:  11/20: Pt was admitted to the ICU post laminectomy for fluctuating blood pressures requiring close monitoring. Pt has a line that was placed in the OR.     Medical History:  Past Medical History:   Diagnosis Date    Arthritis     Back pain     COPD (chronic obstructive pulmonary disease) (CMS/HCC)     COVID-19 vaccine administered     Eczema     History of COVID-19     2020    Hyperlipidemia     Hypertension     Hypoesthesia of skin     Hypesthesia    Macular degeneration     Meniere's disease     Osteoporosis     Overactive bladder     Pain in unspecified finger(s)     Finger pain    Pain in unspecified wrist     Pain in wrist joint    Peripheral vascular disease (CMS/HCC)     Personal history of other diseases of the circulatory system     History of coronary artery disease    Personal history of other diseases of the musculoskeletal system and connective  tissue     History of arthritis    Personal history of other specified conditions     History of chest pain    Personal history of other specified conditions     History of balance disorder    Personal history of other specified conditions     History of numbness    Postmenopausal     Seasonal allergies     Unspecified visual loss     Vision problems     Past Surgical History:   Procedure Laterality Date    ADENOIDECTOMY      AORTA - BILATERAL FEMORAL ARTERY BYPASS GRAFT      BLEPHAROPTOSIS REPAIR      CARDIAC CATHETERIZATION      with stent and balloon    CHOLECYSTECTOMY      COLONOSCOPY      CT ANGIO NECK  05/18/2022    CT NECK ANGIO W AND WO IV CONTRAST 5/18/2022 [facility]    CT AORTA AND BILATERAL ILIOFEMORAL RUNOFF ANGIOGRAM W AND/OR WO IV CONTRAST  03/10/2020    CT AORTA AND BILATERAL ILIOFEMORAL RUNOFF ANGIOGRAM W AND/OR WO IV CONTRAST 3/10/2020 [facility]    CT AORTA AND BILATERAL ILIOFEMORAL RUNOFF ANGIOGRAM W AND/OR WO IV CONTRAST  07/21/2023    CT AORTA AND BILATERAL ILIOFEMORAL RUNOFF ANGIOGRAM W AND/OR WO IV CONTRAST 7/21/2023 [facility]    FL TRANSLUMINAL ANGIO PERCUTANEOUS BHARAT      TONSILLECTOMY      Tonsillectomy    TOTAL KNEE ARTHROPLASTY Bilateral     TUBAL LIGATION       Medications Prior to Admission   Medication Sig Dispense Refill Last Dose    albuterol (ProAir HFA) 90 mcg/actuation inhaler Inhale 2 puffs.   11/19/2023 at 2200    aspirin 81 mg EC tablet Take 1 tablet (81 mg) by mouth once daily.   Past Month    atenolol (Tenormin) 50 mg tablet Take 1 tablet (50 mg) by mouth once daily in the morning.   11/20/2023 at 0400    brimonidine (AlphaGAN) 0.2 % ophthalmic solution Administer 1 drop into both eyes 2 times a day.   11/20/2023 at 0400    busPIRone (Buspar) 10 mg tablet Take 1 tablet (10 mg) by mouth once daily.   11/19/2023 at 2200    celecoxib (CeleBREX) 200 mg capsule Take 1 capsule (200 mg) by mouth once daily as needed.   Past Month    cholecalciferol (Vitamin D-3)  50 mcg (2,000 unit) capsule Take 1 capsule (50 mcg) by mouth once daily.   Past Month    ciprofloxacin (Cipro) 500 mg tablet Take 1 tablet (500 mg) by mouth 2 times a day for 5 days. 10 tablet 0 11/20/2023 at 0400    clopidogrel (Plavix) 75 mg tablet Take 1 tablet (75 mg) by mouth once daily.   Past Month    ezetimibe (Zetia) 10 mg tablet Take 1 tablet (10 mg) by mouth once daily.   11/19/2023 at 2200    furosemide (Lasix) 20 mg tablet Take 1 tablet (20 mg) by mouth once daily.   11/19/2023 at 0800    hydrALAZINE (Apresoline) 50 mg tablet Take 1 tablet (50 mg) by mouth 2 times a day.   11/19/2023 at 2200    ibandronate (Boniva) 150 mg tablet Take 1 tablet (150 mg) by mouth every 30 (thirty) days.   Past Month    ipratropium (Atrovent HFA) 17 mcg/actuation inhaler Inhale 2 puffs 2 times a day.   11/19/2023 at 2200    isosorbide mononitrate ER (Imdur) 30 mg 24 hr tablet Take 1 tablet (30 mg) by mouth once daily. With the 60 mg tablet   11/19/2023 at 0800    isosorbide mononitrate ER (Imdur) 60 mg 24 hr tablet Take 1 tablet (60 mg) by mouth once daily. With the 30mg tablet   11/19/2023 at 0800    loratadine (Claritin) 10 mg tablet Take 1 tablet (10 mg) by mouth once daily.   11/19/2023 at 0800    LUTEIN ORAL Take 20 mg by mouth once daily.   11/19/2023 at 0800    meclizine (Antivert) 25 mg tablet Take 1 tablet (25 mg) by mouth 3 times a day as needed for dizziness.   11/19/2023 at 2200    mirtazapine (Remeron) 15 mg tablet Take 1 tablet (15 mg) by mouth once daily at bedtime.   11/19/2023 at 2200    pantoprazole (ProtoNix) 40 mg EC tablet Take 1 tablet (40 mg) by mouth once daily in the morning. Take before meals. Do not crush, chew, or split.   11/19/2023 at 0800    pilocarpine (Salagen, pilocarpine,) 5 mg tablet Take 1 tablet (5 mg) by mouth once daily.   11/19/2023 at 0800    potassium chloride ER (Micro-K) 10 mEq ER capsule Take 1 capsule (10 mEq) by mouth once daily.   11/19/2023 at 0800    pregabalin (Lyrica) 75  mg capsule Take 1 capsule (75 mg) by mouth 2 times a day.   2023 at 0800    simvastatin (Zocor) 40 mg tablet Take 1 tablet (40 mg) by mouth 2 times a day.   2023 at 2200    tiZANidine (Zanaflex) 4 mg tablet Take 1 tablet (4 mg) by mouth every 8 hours if needed.   2023 at 2200    tolterodine (Detrol) 1 mg tablet Take 1 tablet (1 mg) by mouth once daily at bedtime.   2023 at 2200    triamterene-hydrochlorothiazid (Dyazide) 37.5-25 mg capsule Take 1 capsule by mouth once daily.   2023 at 0800    vit C,I-Vn-wfpje-lutein-zeaxan (PreserVision AREDS-2) 250-90-40-1 mg capsule Take 1 capsule by mouth twice a day.   2023 at 0800    nitroglycerin (Nitrostat) 0.4 mg SL tablet Place 1 tablet (0.4 mg) under the tongue every 5 minutes if needed for chest pain.  PLACE 1 TABLET UNDER THE TONGUE EVERY 5 MINUTES UP TO 3 DOSES AS NEEDED FOR CHEST PAIN.       raloxifene (Evista) 60 mg tablet Take 1 tablet (60 mg) by mouth once daily.   Not Taking    simvastatin (Zocor) 40 mg tablet Take 1 tablet (40 mg) by mouth once daily at bedtime.   Not Taking     Neomycin and Neomycin-polymyxin b-dexameth  Social History     Tobacco Use    Smoking status: Former     Packs/day: 0.50     Years: 45.00     Additional pack years: 0.00     Total pack years: 22.50     Types: Cigarettes     Quit date: 2023     Years since quittin.2   Vaping Use    Vaping Use: Never used   Substance Use Topics    Alcohol use: Never    Drug use: Never     Family History   Problem Relation Name Age of Onset    Breast cancer Mother      Other (arteriosclerotic cardiovascular disease) Father      Cancer Father         Hospital Medications:    [START ON 2023] lactated Ringer's, 100 mL/hr, Last Rate: 650 mL/hr (23 0845)  lactated Ringer's, 100 mL/hr          Current Facility-Administered Medications:     albuterol 2.5 mg /3 mL (0.083 %) nebulizer solution 2.5 mg, 2.5 mg, nebulization, Once, Zoltan Escobar MD    ceFAZolin in  "dextrose (iso-os) (Ancef) IVPB 2 g, 2 g, intravenous, Once, Rene Rivas PA-C    droperidol (Inapsine) injection 0.625 mg, 0.625 mg, intravenous, Once PRN, Zoltan Escobar MD    fentaNYL (Duragesic) 25 mcg/hr patch 1 patch, 1 patch, transdermal, q72h, Cesar Powers APRN-CNP    HYDROmorphone (Dilaudid) injection 0.5 mg, 0.5 mg, intravenous, q5 min PRN, Zoltan Escobar MD, 0.5 mg at 11/20/23 1247    labetaloL (Normodyne,Trandate) injection 5 mg, 5 mg, intravenous, Once PRN, Zoltan Escobar MD    [START ON 11/21/2023] lactated Ringer's infusion, 100 mL/hr, intravenous, Continuous, Rene Rivas PA-C, Last Rate: 650 mL/hr at 11/20/23 0845, Rate Change at 11/20/23 0845    lactated Ringer's infusion, 100 mL/hr, intravenous, Continuous, Zoltan Escobar MD    lidocaine PF (Xylocaine) 10 mg/mL (1 %) injection 1 mg, 0.1 mL, subcutaneous, Once, Zoltan Escobar MD    meperidine PF (Demerol) injection 12.5 mg, 12.5 mg, intravenous, q10 min PRN, Zoltan Escobar MD    midazolam (Versed) injection 1 mg, 1 mg, intravenous, Once PRN, Zoltan Escobar MD    tranexamic acid (Lysteda) tablet 1,950 mg, 1,950 mg, oral, Once, Hussein Monique PA-C    Review of Systems:  14 point review of systems was completed and negative except for those specially mention in my HPI    Physical Exam:    Heart Rate:  [66-78]   Temp:  [36 °C (96.8 °F)-36.3 °C (97.3 °F)]   Resp:  [11-32]   BP: (120-135)/(58-62)   Height:  [144.8 cm (4' 9\")]   Weight:  [79.1 kg (174 lb 6.1 oz)]   SpO2:  [86 %-100 %]     Physical Exam  Constitutional:       Appearance: Normal appearance.   HENT:      Head: Normocephalic and atraumatic.      Mouth/Throat:      Mouth: Mucous membranes are moist.      Pharynx: Oropharynx is clear.   Eyes:      Extraocular Movements: Extraocular movements intact.      Conjunctiva/sclera: Conjunctivae normal.      Pupils: Pupils are equal, round, and reactive to light.   Cardiovascular:      Rate and Rhythm: Normal rate and regular rhythm.      Pulses: " Normal pulses.      Heart sounds: Normal heart sounds.   Pulmonary:      Effort: Pulmonary effort is normal.      Breath sounds: Normal breath sounds.   Abdominal:      General: Abdomen is flat. Bowel sounds are normal.      Palpations: Abdomen is soft.      Tenderness: There is no abdominal tenderness.   Musculoskeletal:         General: Normal range of motion.      Cervical back: Normal range of motion.   Skin:     General: Skin is warm and dry.      Capillary Refill: Capillary refill takes less than 2 seconds.      Comments: Bilateral JOSE drains   Neurological:      General: No focal deficit present.      Mental Status: She is alert and oriented to person, place, and time. Mental status is at baseline.   Psychiatric:         Mood and Affect: Mood normal.         Behavior: Behavior normal.         Objective:    I have reviewed all medications, laboratory results, and imaging pertinent for today's encounter.           Intake/Output Summary (Last 24 hours) at 11/20/2023 1314  Last data filed at 11/20/2023 1300  Gross per 24 hour   Intake --   Output 500 ml   Net -500 ml         Assessment/Plan:  COPD, HTN, HLD, peripheral vascular disease, macular degeneration, osteoporosis, overactive bladder, and L3-4, L4-5 and L5-S1 stenosis with L5-S1 dynamic spondylolisthesis and lumbar spondylosis    I am currently managing this critically ill patient for the following problems:  #L5-S1 dynamic spondylolisthesis and lumbar spondylosis post laminectomy   #Hx of HTN  #Hx of HLD   #Hx of peripheral vascular disease   #Hx of COPD  #Hx of macular degeneration   #Hx of osteoporosis   #Hx of overactive bladder     Neuro/Psych/Pain Ctrl/Sedation:  #L5-S1 dynamic spondylolisthesis and lumbar spondylosis post laminectomy   -CAM ICU score q-shift  -Sleep/wake cycle hygiene  -Delirium precaution  -Pain control - tylenol and prn flexeril, morphine, oxycodone  -Continue home buspar, remeron, lyrica,     Respiratory:  #Hx of COPD  -Continue  home Spiriva, albuterol  -Maintain spo2 >92%  -Continuous pulse ox monitoring   -Oral Care per ICU Protocol    HEENT:  - continue home claritin, phenergan  #Hx of macular degeneration   -Continue home brimonidine     Cardiovascular:  #Hx of HTN  #Hx of HLD   #Hx of peripheral vascular disease   -Hold home antihypertensive meds, restart as tolerated   -Hold home ASA and plavix per ortho   -Continuous BP monitoring   -Continue home ezetimibe     GI:  -Pt on regular diet   -Bowel regiment     Renal/Volume Status (Intra & Extravascular):  -Daily BMP  -replace electrolytes per protocol   -Continue home potassium chloride   #Hx of overactive bladder   -continue home oxybutynin     Endocrine  -glucose monitoring per ICU protocol   -SSI if indicated     Infectious Disease:  -surgical prophylaxis ancef, vanc   -cipro for post-surgical prophylaxis    Heme/Onc:  -daily CBC monitoring   -trend hemoglobin   -transfuse for hemoglobin <7 or symptomatic     OBGYN/MSK:  #L5-S1 dynamic spondylolisthesis and lumbar spondylosis post laminectomy   #Hx of osteoporosis   -pain control per above   -continue home raloxifene   -PT/OT eval     Ethics/Code Status:  Full Code     :  DVT Prophylaxis: mechanical SCD, hold anticoagulation per ortho   GI Prophylaxis: protonix   Bowel Regimen: miralax   Diet: regular   CVC: n/a  [place]: left radial   [place]: post-surgical   Restraints: n/a  Dispo: Admit to the ICU    Critical Care Time:  40 minutes spent in preparing to see patient (I.e. review of medical records), evaluation of diagnostics (I.e. labs, imaging, etc.), documentation, discussing plan of care with patient/ family/ caregiver, and/ or coordination of care with multidisciplinary team. Time does not include completion of procedure time.        Denis Hopson PA-C  [facility] - Critical Care

## 2023-11-20 NOTE — ANESTHESIA PREPROCEDURE EVALUATION
Patient: Tara Tierney    Procedure Information       Date/Time: 11/20/23 0730    Procedure: L3-4 L4-5 L5-S1 LAMINECTOMYH L4-5 L5-S1 INSTRUMENTATION POST INTERBODY FUSION L3-4 POST LATERAL FUSION (Spine Lumbar) - AXIS TABLE ACTIFUSE CELL SAVER MICROSCOPE C-ARM MISONIX BONE SCALPEL PNEUMATIC KERRISONS SPINAL CORD MONITORING LUMBAR BRACE MEDTRONIC EQUIP OPEN TLIF    Location: [facility] / [facility]    Surgeons: Gerson Coombs MD            Relevant Problems   Anesthesia (within normal limits)      Cardiovascular   (+) Abdominal aortic aneurysm (CMS/HCC)   (+) Abnormal EKG   (+) Bilateral carotid artery stenosis   (+) CAD in native artery   (+) Chest pain   (+) Essential hypertension   (+) Mesenteric artery stenosis (CMS/HCC)   (+) Mixed hyperlipidemia   (+) PVD (peripheral vascular disease) (CMS/HCC)      Endocrine   (+) Obese      GI (within normal limits)      /Renal   (+) Urinary tract infection without hematuria      Neuro/Psych   (+) Back pain of lumbar region with sciatica   (+) Bilateral carotid artery stenosis      Pulmonary   (+) Chronic asthmatic bronchitis      GI/Hepatic (within normal limits)      Hematology (within normal limits)      Musculoskeletal   (+) Knee osteoarthritis      Eyes, Ears, Nose, and Throat (within normal limits)      Infectious Disease   (+) Urinary tract infection without hematuria       Clinical information reviewed:   Tobacco  Allergies  Meds   Med Hx  Surg Hx   Fam Hx  Soc Hx        NPO Detail:  NPO/Void Status  Carbonhydrate Drink Given Prior to Surgery? : N  Date of Last Liquid: 11/19/23  Time of Last Liquid: 2100  Date of Last Solid: 11/19/23  Time of Last Solid: 2100  Last Intake Type: Clear fluids  Time of Last Void: 0400         Physical Exam    Airway  Mallampati: III     Cardiovascular - normal exam     Dental   Comments: Partial lower.   Pulmonary    Abdominal            Anesthesia Plan    ASA 3     general     The patient is not a current  smoker.  Patient was not previously instructed to abstain from smoking on day of procedure.    intravenous induction   Anesthetic plan and risks discussed with patient.

## 2023-11-20 NOTE — H&P
Interval History and Physical    I have interviewed and examined the patient and reviewed the recent History and Physical.  There have been no changes to the recent H&P documentation.    The patient understands the planned operation and its associated risks and benefits and agrees to proceed.    The surgical consent form has been signed.    Visit Vitals  /58 Comment: right upper arm   Pulse 74   Temp 36.3 °C (97.3 °F) (Temporal)   Resp 18        Gerson Coombs MD

## 2023-11-20 NOTE — ANESTHESIA PROCEDURE NOTES
Airway  Date/Time: 11/20/2023 7:40 AM  Urgency: elective    Airway not difficult    Staffing  Performed: attending   Authorized by: Zoltan Escobar MD    Performed by: Zoltan Escobar MD  Patient location during procedure: OR    Indications and Patient Condition  Indications for airway management: anesthesia  Spontaneous ventilation: present  Sedation level: deep  Preoxygenated: yes  Mask difficulty assessment: 0 - not attempted    Final Airway Details  Final airway type: endotracheal airway      Successful airway: ETT  Cuffed: yes   Successful intubation technique: direct laryngoscopy  Facilitating devices/methods: intubating stylet and cricoid pressure  Endotracheal tube insertion site: oral  Blade: Abdon  Blade size: #3  ETT size (mm): 7.0  Cormack-Lehane Classification: grade IIa - partial view of glottis  Placement verified by: capnometry and palpation of cuff   Measured from: gums  ETT to gums (cm): 20  Number of attempts at approach: 1  Number of other approaches attempted: 0

## 2023-11-20 NOTE — SIGNIFICANT EVENT
MEDICINE TO SIGN OFF    Patient is in ICU. [facility] Medicine to sign off.   Re-consult when patient is transferred out of ICU. Thank you       11/20/23 at 2:08 PM - DARRYL Sahni-CNP

## 2023-11-20 NOTE — ANESTHESIA POSTPROCEDURE EVALUATION
Patient: Tara Tierney    Procedure Summary       Date: 11/20/23 Room / Location: [facility] / [facility]    Anesthesia Start: 0728 Anesthesia Stop: 1150    Procedure: L3-4 L4-5 L5-S1 LAMINECTOMYH L4-5 L5-S1 INSTRUMENTATION POST INTERBODY FUSION L3-4 POST LATERAL FUSION (Spine Lumbar) Diagnosis: (m48.061  m48.07   m43.17)    Surgeons: Gerson Coombs MD Responsible Provider: Zoltan Escobar MD    Anesthesia Type: general ASA Status: 3            Anesthesia Type: general    Vitals Value Taken Time   /62 11/20/23 1150   Temp 36 °C (96.8 °F) 11/20/23 1150   Pulse 72 11/20/23 1337   Resp 24 11/20/23 1337   SpO2 99 % 11/20/23 1337   Vitals shown include unvalidated device data.    Anesthesia Post Evaluation    Patient location during evaluation: PACU  Patient participation: complete - patient participated  Level of consciousness: awake and alert  Pain score: 1  Pain management: satisfactory to patient  Airway patency: patent  Cardiovascular status: stable  Respiratory status: acceptable  Hydration status: acceptable  Postoperative Nausea and Vomiting: none        No notable events documented.

## 2023-11-21 LAB
ANION GAP SERPL CALC-SCNC: 9 MMOL/L (ref 10–20)
BUN SERPL-MCNC: 16 MG/DL (ref 6–23)
CALCIUM SERPL-MCNC: 7.9 MG/DL (ref 8.6–10.3)
CHLORIDE SERPL-SCNC: 103 MMOL/L (ref 98–107)
CO2 SERPL-SCNC: 26 MMOL/L (ref 21–32)
CREAT SERPL-MCNC: 1.19 MG/DL (ref 0.5–1.05)
ERYTHROCYTE [DISTWIDTH] IN BLOOD BY AUTOMATED COUNT: 13.2 % (ref 11.5–14.5)
GFR SERPL CREATININE-BSD FRML MDRD: 49 ML/MIN/1.73M*2
GLUCOSE SERPL-MCNC: 144 MG/DL (ref 74–99)
HCT VFR BLD AUTO: 32 % (ref 36–46)
HGB BLD-MCNC: 10.7 G/DL (ref 12–16)
MAGNESIUM SERPL-MCNC: 2.37 MG/DL (ref 1.6–2.4)
MCH RBC QN AUTO: 32.9 PG (ref 26–34)
MCHC RBC AUTO-ENTMCNC: 33.4 G/DL (ref 32–36)
MCV RBC AUTO: 99 FL (ref 80–100)
NRBC BLD-RTO: 0 /100 WBCS (ref 0–0)
PHOSPHATE SERPL-MCNC: 2.4 MG/DL (ref 2.5–4.9)
PLATELET # BLD AUTO: 165 X10*3/UL (ref 150–450)
POTASSIUM SERPL-SCNC: 3.9 MMOL/L (ref 3.5–5.3)
RBC # BLD AUTO: 3.25 X10*6/UL (ref 4–5.2)
SODIUM SERPL-SCNC: 134 MMOL/L (ref 136–145)
WBC # BLD AUTO: 11.6 X10*3/UL (ref 4.4–11.3)

## 2023-11-21 PROCEDURE — 1200000002 HC GENERAL ROOM WITH TELEMETRY DAILY

## 2023-11-21 PROCEDURE — 96367 TX/PROPH/DG ADDL SEQ IV INF: CPT

## 2023-11-21 PROCEDURE — 2500000004 HC RX 250 GENERAL PHARMACY W/ HCPCS (ALT 636 FOR OP/ED)

## 2023-11-21 PROCEDURE — G0378 HOSPITAL OBSERVATION PER HR: HCPCS

## 2023-11-21 PROCEDURE — 97165 OT EVAL LOW COMPLEX 30 MIN: CPT | Mod: GO

## 2023-11-21 PROCEDURE — 2500000002 HC RX 250 W HCPCS SELF ADMINISTERED DRUGS (ALT 637 FOR MEDICARE OP, ALT 636 FOR OP/ED): Mod: MUE

## 2023-11-21 PROCEDURE — 96376 TX/PRO/DX INJ SAME DRUG ADON: CPT

## 2023-11-21 PROCEDURE — 84100 ASSAY OF PHOSPHORUS: CPT

## 2023-11-21 PROCEDURE — 99291 CRITICAL CARE FIRST HOUR: CPT

## 2023-11-21 PROCEDURE — 85027 COMPLETE CBC AUTOMATED: CPT

## 2023-11-21 PROCEDURE — 2500000001 HC RX 250 WO HCPCS SELF ADMINISTERED DRUGS (ALT 637 FOR MEDICARE OP)

## 2023-11-21 PROCEDURE — 2500000004 HC RX 250 GENERAL PHARMACY W/ HCPCS (ALT 636 FOR OP/ED): Performed by: PHYSICIAN ASSISTANT

## 2023-11-21 PROCEDURE — 83735 ASSAY OF MAGNESIUM: CPT

## 2023-11-21 PROCEDURE — 97161 PT EVAL LOW COMPLEX 20 MIN: CPT | Mod: GP | Performed by: PHYSICAL THERAPIST

## 2023-11-21 PROCEDURE — 97116 GAIT TRAINING THERAPY: CPT | Mod: GP,CQ

## 2023-11-21 PROCEDURE — 37799 UNLISTED PX VASCULAR SURGERY: CPT

## 2023-11-21 PROCEDURE — 2500000005 HC RX 250 GENERAL PHARMACY W/O HCPCS

## 2023-11-21 PROCEDURE — 80048 BASIC METABOLIC PNL TOTAL CA: CPT

## 2023-11-21 PROCEDURE — 97530 THERAPEUTIC ACTIVITIES: CPT | Mod: GP,CQ

## 2023-11-21 PROCEDURE — 2500000004 HC RX 250 GENERAL PHARMACY W/ HCPCS (ALT 636 FOR OP/ED): Mod: JZ

## 2023-11-21 RX ORDER — CEFAZOLIN SODIUM 1 G/50ML
1 SOLUTION INTRAVENOUS EVERY 8 HOURS
Status: DISCONTINUED | OUTPATIENT
Start: 2023-11-21 | End: 2023-11-22

## 2023-11-21 RX ORDER — CALCIUM GLUCONATE 20 MG/ML
2 INJECTION, SOLUTION INTRAVENOUS ONCE
Status: COMPLETED | OUTPATIENT
Start: 2023-11-21 | End: 2023-11-21

## 2023-11-21 RX ADMIN — TIOTROPIUM BROMIDE INHALATION SPRAY 2 PUFF: 3.12 SPRAY, METERED RESPIRATORY (INHALATION) at 06:12

## 2023-11-21 RX ADMIN — LORATADINE 10 MG: 10 TABLET ORAL at 09:31

## 2023-11-21 RX ADMIN — Medication 2000 UNITS: at 09:34

## 2023-11-21 RX ADMIN — ACETAMINOPHEN 650 MG: 325 TABLET ORAL at 14:00

## 2023-11-21 RX ADMIN — ACETAMINOPHEN 650 MG: 325 TABLET ORAL at 01:18

## 2023-11-21 RX ADMIN — POTASSIUM CHLORIDE 40 MEQ: 1500 TABLET, EXTENDED RELEASE ORAL at 00:09

## 2023-11-21 RX ADMIN — Medication 2 L/MIN: at 08:00

## 2023-11-21 RX ADMIN — OXYCODONE HYDROCHLORIDE 2.5 MG: 5 TABLET ORAL at 17:36

## 2023-11-21 RX ADMIN — POTASSIUM CHLORIDE 10 MEQ: 750 TABLET, EXTENDED RELEASE ORAL at 09:34

## 2023-11-21 RX ADMIN — SIMVASTATIN 40 MG: 40 TABLET, FILM COATED ORAL at 21:44

## 2023-11-21 RX ADMIN — OXYCODONE HYDROCHLORIDE 10 MG: 5 TABLET ORAL at 21:46

## 2023-11-21 RX ADMIN — BUSPIRONE HYDROCHLORIDE 10 MG: 5 TABLET ORAL at 09:29

## 2023-11-21 RX ADMIN — OXYCODONE HYDROCHLORIDE 5 MG: 5 TABLET ORAL at 05:51

## 2023-11-21 RX ADMIN — EZETIMIBE 10 MG: 10 TABLET ORAL at 09:33

## 2023-11-21 RX ADMIN — CIPROFLOXACIN 500 MG: 250 TABLET, COATED ORAL at 09:33

## 2023-11-21 RX ADMIN — PREGABALIN 75 MG: 25 CAPSULE ORAL at 09:35

## 2023-11-21 RX ADMIN — SODIUM CHLORIDE 100 ML/HR: 9 INJECTION, SOLUTION INTRAVENOUS at 02:54

## 2023-11-21 RX ADMIN — CEFAZOLIN SODIUM 1 G: 1 INJECTION, SOLUTION INTRAVENOUS at 13:14

## 2023-11-21 RX ADMIN — OXYBUTYNIN CHLORIDE 5 MG: 5 TABLET ORAL at 21:43

## 2023-11-21 RX ADMIN — MAGNESIUM SULFATE HEPTAHYDRATE 2 G: 40 INJECTION, SOLUTION INTRAVENOUS at 00:10

## 2023-11-21 RX ADMIN — ACETAMINOPHEN 650 MG: 325 TABLET ORAL at 09:28

## 2023-11-21 RX ADMIN — ACETAMINOPHEN 650 MG: 325 TABLET ORAL at 21:43

## 2023-11-21 RX ADMIN — BRIMONIDINE TARTRATE 1 DROP: 2 SOLUTION OPHTHALMIC at 09:30

## 2023-11-21 RX ADMIN — MIRTAZAPINE 15 MG: 15 TABLET, FILM COATED ORAL at 21:44

## 2023-11-21 RX ADMIN — CALCIUM GLUCONATE 2 G: 20 INJECTION, SOLUTION INTRAVENOUS at 06:41

## 2023-11-21 RX ADMIN — PREGABALIN 75 MG: 25 CAPSULE ORAL at 21:43

## 2023-11-21 RX ADMIN — CEFAZOLIN SODIUM 1 G: 1 INJECTION, SOLUTION INTRAVENOUS at 21:44

## 2023-11-21 RX ADMIN — POTASSIUM PHOSPHATE, MONOBASIC 500 MG: 500 TABLET, SOLUBLE ORAL at 09:30

## 2023-11-21 RX ADMIN — BRIMONIDINE TARTRATE 1 DROP: 2 SOLUTION OPHTHALMIC at 21:48

## 2023-11-21 RX ADMIN — OXYCODONE HYDROCHLORIDE 5 MG: 5 TABLET ORAL at 09:35

## 2023-11-21 RX ADMIN — OXYBUTYNIN CHLORIDE 5 MG: 5 TABLET ORAL at 09:34

## 2023-11-21 RX ADMIN — CYCLOBENZAPRINE HYDROCHLORIDE 10 MG: 10 TABLET, FILM COATED ORAL at 21:43

## 2023-11-21 RX ADMIN — POLYETHYLENE GLYCOL 3350 17 G: 17 POWDER, FOR SOLUTION ORAL at 09:29

## 2023-11-21 RX ADMIN — PANTOPRAZOLE SODIUM 40 MG: 40 TABLET, DELAYED RELEASE ORAL at 05:51

## 2023-11-21 ASSESSMENT — PAIN - FUNCTIONAL ASSESSMENT
PAIN_FUNCTIONAL_ASSESSMENT: 0-10

## 2023-11-21 ASSESSMENT — PAIN SCALES - GENERAL
PAINLEVEL_OUTOF10: 0 - NO PAIN
PAINLEVEL_OUTOF10: 3
PAINLEVEL_OUTOF10: 7
PAINLEVEL_OUTOF10: 8
PAINLEVEL_OUTOF10: 7
PAINLEVEL_OUTOF10: 0 - NO PAIN
PAINLEVEL_OUTOF10: 0 - NO PAIN
PAINLEVEL_OUTOF10: 10 - WORST POSSIBLE PAIN
PAINLEVEL_OUTOF10: 9
PAINLEVEL_OUTOF10: 10 - WORST POSSIBLE PAIN

## 2023-11-21 ASSESSMENT — COGNITIVE AND FUNCTIONAL STATUS - GENERAL
STANDING UP FROM CHAIR USING ARMS: A LITTLE
STANDING UP FROM CHAIR USING ARMS: A LITTLE
HELP NEEDED FOR BATHING: A LOT
DRESSING REGULAR LOWER BODY CLOTHING: A LOT
CLIMB 3 TO 5 STEPS WITH RAILING: A LITTLE
MOBILITY SCORE: 17
MOVING TO AND FROM BED TO CHAIR: A LITTLE
MOVING TO AND FROM BED TO CHAIR: A LITTLE
TURNING FROM BACK TO SIDE WHILE IN FLAT BAD: A LITTLE
WALKING IN HOSPITAL ROOM: A LITTLE
TURNING FROM BACK TO SIDE WHILE IN FLAT BAD: A LITTLE
TOILETING: A LITTLE
DAILY ACTIVITIY SCORE: 17
DRESSING REGULAR UPPER BODY CLOTHING: A LITTLE
MOBILITY SCORE: 18
WALKING IN HOSPITAL ROOM: A LITTLE
MOVING FROM LYING ON BACK TO SITTING ON SIDE OF FLAT BED WITH BEDRAILS: A LITTLE
CLIMB 3 TO 5 STEPS WITH RAILING: A LOT
MOVING FROM LYING ON BACK TO SITTING ON SIDE OF FLAT BED WITH BEDRAILS: A LITTLE
PERSONAL GROOMING: A LITTLE

## 2023-11-21 ASSESSMENT — PAIN DESCRIPTION - LOCATION
LOCATION: BACK
LOCATION: BACK

## 2023-11-21 ASSESSMENT — ACTIVITIES OF DAILY LIVING (ADL): BATHING_ASSISTANCE: MODERATE

## 2023-11-21 ASSESSMENT — PAIN DESCRIPTION - ORIENTATION
ORIENTATION: MID;LOWER
ORIENTATION: MID

## 2023-11-21 ASSESSMENT — PAIN DESCRIPTION - DESCRIPTORS: DESCRIPTORS: BURNING

## 2023-11-21 NOTE — PROGRESS NOTES
PROGRESS NOTE    Subjective   Patient sitting comfortably in chair with family at bedside. Patient states she is in pain as she just came from using the bathroom. Patient states she worked with physical therapy and felt she did well, and is hoping to go home tomorrow.      Objective   Patient is POD 1 of laminectomy of L3-4, L4-5 and L5-S1. After procedure patient was transferred to the ICU for fluctuating BP that required close monitoring. Overnight BP was stable so patient was transferred to ortho floor today. Patient currently has pain. Dressing dry clean and intact. Right JOSE drain 25 ml and left JOSE drain 25 ml.      Last Recorded Vitals    Visit Vitals  /57   Pulse 77   Temp 36.3 °C (97.3 °F)   Resp 18        3 Day Weight Change: Unable to Calculate       Intake/Output Summary (Last 24 hours) at 11/21/2023 1525  Last data filed at 11/21/2023 1500  Gross per 24 hour   Intake 1888.33 ml   Output 2912 ml   Net -1023.67 ml        Physical Exam  HENT:      Head: Normocephalic.      Nose: Nose normal.      Mouth/Throat:      Mouth: Mucous membranes are moist.   Eyes:      Conjunctiva/sclera: Conjunctivae normal.      Pupils: Pupils are equal, round, and reactive to light.   Cardiovascular:      Rate and Rhythm: Normal rate.   Pulmonary:      Effort: Pulmonary effort is normal.   Abdominal:      General: Bowel sounds are normal.   Musculoskeletal:         General: Normal range of motion.      Cervical back: Normal range of motion.      Lumbar back: Tenderness present.      Comments: Dressing dry clean intact. Right and Left JOSE drains   Skin:     General: Skin is warm.   Neurological:      General: No focal deficit present.      Mental Status: She is alert and oriented to person, place, and time.   Psychiatric:         Mood and Affect: Mood normal.         Behavior: Behavior normal.         Judgment: Judgment normal.          Scheduled medications  acetaminophen, 650 mg, oral, q6h  [Held by provider] atenolol, 50  mg, oral, q AM  brimonidine, 1 drop, Both Eyes, BID  busPIRone, 10 mg, oral, Daily  ceFAZolin, 1 g, intravenous, q8h  cholecalciferol, 2,000 Units, oral, Daily  ezetimibe, 10 mg, oral, Daily  fentaNYL, 1 patch, transdermal, q72h  [Held by provider] furosemide, 20 mg, oral, Daily  [Held by provider] hydrALAZINE, 50 mg, oral, BID  [Held by provider] isosorbide mononitrate ER, 30 mg, oral, Daily  [Held by provider] isosorbide mononitrate ER, 60 mg, oral, Daily  loratadine, 10 mg, oral, Daily  mirtazapine, 15 mg, oral, Nightly  oxybutynin, 5 mg, oral, BID  oxygen, , inhalation, Continuous - 02/gases  pantoprazole, 40 mg, oral, Daily before breakfast  polyethylene glycol, 17 g, oral, Daily  potassium chloride CR, 10 mEq, oral, Daily  pregabalin, 75 mg, oral, BID  raloxifene, 60 mg, oral, Daily  simvastatin, 40 mg, oral, Nightly  tiotropium, 2 Inhalation, inhalation, Daily  tranexamic acid, 1,950 mg, oral, Once  [Held by provider] triamterene-hydrochlorothiazid, 1 tablet, oral, Daily      Continuous medications  sodium chloride 0.9%, 100 mL/hr, Last Rate: 100 mL/hr (11/21/23 1358)      PRN medications  PRN medications: albuterol, cyclobenzaprine, meclizine, morphine, naloxone, ondansetron **OR** ondansetron, oxyCODONE, oxyCODONE, oxyCODONE, oxyCODONE       Relevant Results    Results for orders placed or performed during the hospital encounter of 11/20/23 (from the past 96 hour(s))   CBC and Auto Differential   Result Value Ref Range    WBC 12.2 (H) 4.4 - 11.3 x10*3/uL    nRBC 0.0 0.0 - 0.0 /100 WBCs    RBC 3.68 (L) 4.00 - 5.20 x10*6/uL    Hemoglobin 12.2 12.0 - 16.0 g/dL    Hematocrit 36.4 36.0 - 46.0 %    MCV 99 80 - 100 fL    MCH 33.2 26.0 - 34.0 pg    MCHC 33.5 32.0 - 36.0 g/dL    RDW 13.2 11.5 - 14.5 %    Platelets 171 150 - 450 x10*3/uL    Neutrophils % 90.8 40.0 - 80.0 %    Immature Granulocytes %, Automated 0.5 0.0 - 0.9 %    Lymphocytes % 5.6 13.0 - 44.0 %    Monocytes % 2.8 2.0 - 10.0 %    Eosinophils % 0.1 0.0  - 6.0 %    Basophils % 0.2 0.0 - 2.0 %    Neutrophils Absolute 11.11 (H) 1.20 - 7.70 x10*3/uL    Immature Granulocytes Absolute, Automated 0.06 0.00 - 0.70 x10*3/uL    Lymphocytes Absolute 0.69 (L) 1.20 - 4.80 x10*3/uL    Monocytes Absolute 0.34 0.10 - 1.00 x10*3/uL    Eosinophils Absolute 0.01 0.00 - 0.70 x10*3/uL    Basophils Absolute 0.02 0.00 - 0.10 x10*3/uL   Comprehensive metabolic panel   Result Value Ref Range    Glucose 149 (H) 74 - 99 mg/dL    Sodium 134 (L) 136 - 145 mmol/L    Potassium 3.7 3.5 - 5.3 mmol/L    Chloride 105 98 - 107 mmol/L    Bicarbonate 23 21 - 32 mmol/L    Anion Gap 10 10 - 20 mmol/L    Urea Nitrogen 17 6 - 23 mg/dL    Creatinine 0.98 0.50 - 1.05 mg/dL    eGFR 62 >60 mL/min/1.73m*2    Calcium 7.6 (L) 8.6 - 10.3 mg/dL    Albumin 3.2 (L) 3.4 - 5.0 g/dL    Alkaline Phosphatase 60 33 - 136 U/L    Total Protein 5.3 (L) 6.4 - 8.2 g/dL    AST 34 9 - 39 U/L    Bilirubin, Total 0.4 0.0 - 1.2 mg/dL    ALT 28 7 - 45 U/L   Magnesium   Result Value Ref Range    Magnesium 1.66 1.60 - 2.40 mg/dL   Phosphorus   Result Value Ref Range    Phosphorus 3.2 2.5 - 4.9 mg/dL   CBC   Result Value Ref Range    WBC 11.6 (H) 4.4 - 11.3 x10*3/uL    nRBC 0.0 0.0 - 0.0 /100 WBCs    RBC 3.25 (L) 4.00 - 5.20 x10*6/uL    Hemoglobin 10.7 (L) 12.0 - 16.0 g/dL    Hematocrit 32.0 (L) 36.0 - 46.0 %    MCV 99 80 - 100 fL    MCH 32.9 26.0 - 34.0 pg    MCHC 33.4 32.0 - 36.0 g/dL    RDW 13.2 11.5 - 14.5 %    Platelets 165 150 - 450 x10*3/uL   Basic metabolic panel   Result Value Ref Range    Glucose 144 (H) 74 - 99 mg/dL    Sodium 134 (L) 136 - 145 mmol/L    Potassium 3.9 3.5 - 5.3 mmol/L    Chloride 103 98 - 107 mmol/L    Bicarbonate 26 21 - 32 mmol/L    Anion Gap 9 (L) 10 - 20 mmol/L    Urea Nitrogen 16 6 - 23 mg/dL    Creatinine 1.19 (H) 0.50 - 1.05 mg/dL    eGFR 49 (L) >60 mL/min/1.73m*2    Calcium 7.9 (L) 8.6 - 10.3 mg/dL   Phosphorus   Result Value Ref Range    Phosphorus 2.4 (L) 2.5 - 4.9 mg/dL   Magnesium   Result Value  Ref Range    Magnesium 2.37 1.60 - 2.40 mg/dL      Assessment/Plan   Patient is POD 1 of laminectomy of L3-4, L4-5 and L5-S1.    Admit to Orthopedics Team   Hospitalist team Consulted   DVTp and Pain management per Ortho   PT/OT evaluation  and treatment   CBC/BMP as appropriate, review results  Pulmonary Toileting   Follow up as needed outpatient with Orthopedics    HTN/HLD  Tele monitoring for arrhythmia  ICU held atenolol, lasix, Imdur, hydralazine, Maxzide in setting of labile Bps, restart as tolerated.   Continue statin    COPD not in exacerbation   Continue home Spiriva, albuterol  Maintain spo2 >92%  Encourage ambulation   Pulmonary Toileting       DVT Prophylaxis  Mechanical, hold anticoagulation per ortho     Principal Problem:    Back pain of lumbar region with sciatica  Active Problems:    Back pain with radiculopathy     Time spent  56  minutes obtaining labs, imaging, recommendations, interview, assessment, examination, medication review/ordering, and EMR review.    Plan of care was discussed extensively with patient. Patient verbalized understanding through teach back method. All questions and concerns addressed upon examination.     Of note, this documentation is completed using the Dragon Dictation system (voice recognition software). There may be spelling and/or grammatical errors that were not corrected prior to final submission.

## 2023-11-21 NOTE — PROGRESS NOTES
Physical Therapy    Physical Therapy Evaluation    Patient Name: Tara Tierney  MRN: [id number]  Today's Date: 11/21/2023   Time Calculation  Start Time: 1108  Stop Time: 1120  Time Calculation (min): 12 min    Assessment/Plan   PT Assessment  PT Assessment Results: Impaired balance, Decreased mobility, Decreased safety awareness  Rehab Prognosis: Good  Evaluation/Treatment Tolerance: Patient tolerated treatment well  End of Session Communication: Bedside nurse  Assessment Comment: Patient will benfit from additional PT to reveiw spinal precautions, use of ww and TLSO  End of Session Patient Position: Up in chair, Alarm off, not on at start of session  IP OR SWING BED PT PLAN  Inpatient or Swing Bed: Inpatient  PT Plan  Treatment/Interventions: Transfer training, Bed mobility, Gait training, Therapeutic activity  PT Frequency: BID  PT Discharge Recommendations: No PT needed after discharge  PT Recommended Transfer Status: Assist x1  PT - OK to Discharge: once deemed medically appropriate)      Subjective   General Visit Information:  General  Reason for Referral: s/p L3,4, L4,5, L5S1 laminectomy; L3,4, PLIF, L3,4, posterior lateral fusion 11/20//23 [facility]  Referred By: PT/OT 11/2/0/23 [facility]  Past Medical History Relevant to Rehab: COPD, HLD, HTN, Arthritis, Macular degeneration, Chasidy, PCI, obese, PVD, osteoporosis, AAA, ICM, TKA, CAD, C19  Patient Position Received: Bed, 2 rail up, Alarm off, not on at start of session  General Comment: Elective surgery. Noted variable BPs during surgery. Discharged to ICU for A-line and closer BP monitorring.  Home Living:  Home Living  Home Living Comments: Per patient report resides [facility] spouse (works) 1 story 1 step to enter. Independent in mobility, ADLs and IADLS without AD. Denies any falls past 3months. Drives. Tub shower  can get seat, no grab bar. Hi-toilet. Owns ww  and cane.     Precautions:  Precautions  Medical Precautions: Fall precautions  Post-Surgical  Precautions: Spinal precautions  Prosthesis/Orthosis Used:  (TLSO when up ambulating)  Vital Signs:  Vital Signs  Heart Rate:  (Pre activity 86 Post activity 77)  SpO2:  (Pre actiivty 96%; Post activity 98% on RA)    Objective   Pain:  Pain Assessment  Pain Score: 10 - Worst possible pain  Pain Type: Surgical pain  Pain Location: Back  Cognition:  Cognition  Overall Cognitive Status: Within Functional Limits  Orientation Level: Oriented X4    General Assessments:  General Observation  General Observation: Patient lying in bed. Telemetry, James Joy exernal catheter discontinued. Agreeabel to PT/OT.   Activity Tolerance  Endurance: Endurance does not limit participation in activity    Static Sitting Balance  Static Sitting-Comment/Number of Minutes: Good  Dynamic Sitting Balance  Dynamic Sitting-Comments: Good    Static Standing Balance  Static Standing-Comment/Number of Minutes: Fair + with ww  Dynamic Standing Balance  Dynamic Standing-Comments: Fair + with ww  Functional Assessments:  Bed Mobility  Supine > .side lying > sit + 1 minimal assist. Increased pain with mobility and change in position. C/o increased pain with loading spine     Transfers  Sit > stand at bed level + 1 minimal assist, vc for hand placement. Increased pain with standing. TLSO donned. Patient has difficulty due to limited hand function    Ambulation/Gait Training  Patient ambulated with ww TLSO donned 3' + 1 minimal assist. Limited by A line.   Extremity/Trunk Assessments:  RUE   RUE : Within Functional Limits (trigger thumb makes manipulating TLSO challenging)  LUE   LUE: Within Functional Limits  RLE   RLE :  (AROM; Hip/knee/ankle WFL; MMT knee/ankle 4/5, hip > 3/5 no resistance attempted due to recent back surgery)  LLE   LLE :  (AROM; Hip/knee/ankle WFL; MMT knee/ankle 4/5, hip > 3/5 no resistance attempted due to recent back surgery)  Outcome Measures:  [facility] Basic Mobility  Turning from your back to your side while in a flat bed  without using bedrails: A little  Moving from lying on your back to sitting on the side of a flat bed without using bedrails: A little  Moving to and from bed to chair (including a wheelchair): A little  Standing up from a chair using your arms (e.g. wheelchair or bedside chair): A little  To walk in hospital room: A little  Climbing 3-5 steps with railing: A little  Basic Mobility - Total Score: 18    Encounter Problems       Encounter Problems (Active)       PT Problem       Bed mobility supine <> side lying <> sit modified independent  (Progressing)       Start:  11/21/23    Expected End:  12/05/23            Transfers sit <.> stand with ww modified independent  (Progressing)       Start:  11/21/23    Expected End:  12/05/23            Patient to ambulate with ww 50' modified independent  (Progressing)       Start:  11/21/23    Expected End:  12/05/23                   Education Documentation  Mobility Training, taught by Connie Mercado, PT at 11/21/2023 12:32 PM.  Learner: Patient  Readiness: Acceptance  Method: Explanation, Demonstration  Response: Needs Reinforcement, Demonstrated Understanding  Comment: Educated in spinal precautions and use of TLSO    Education Comments  No comments found.

## 2023-11-21 NOTE — PROGRESS NOTES
[facility] Critical Care Medicine       Date:  11/21/2023  Patient:  Tara Tierney  YOB: 1955  MRN:  [id number]   Admit Date:  11/20/2023  ========================================================================================================    Chief Complaint         History of Present Illness:  Tara Tierney is a 68 y.o. year old female patient with Past Medical History of  COPD, HTN, HLD, peripheral vascular disease, macular degeneration, osteoporosis, overactive bladder, and L3-4, L4-5 and L5-S1 stenosis with L5-S1 dynamic spondylolisthesis and lumbar spondylosis presented post elective laminectomy of L3-4, L4-5 and L5-S1. No abnormal spinal cord monitor readings were reported throughout the case and toes were perfusing following the conclusion. Pt was noted to have fluctuating BP readings throughout the OR and post op ranging from a SBP of .  An arterial line was placed in the OR by anesthesia.  Pt was closely monitored and is to be admitted to the ICU for BP monitoring at this time.       Interval ICU Events:  11/20: Pt was admitted to the ICU post laminectomy for fluctuating blood pressures requiring close monitoring. Pt has a line that was placed in the OR.    11/21: Pt had stable BP overnight. Continue hold on home antihypertensive medications. Continue pt on ancef q8h while JOSE drains are in place per ortho. Pt is stable to transfer to the floor.     Medical History:  Past Medical History:   Diagnosis Date    Arthritis     Back pain     COPD (chronic obstructive pulmonary disease) (CMS/HCC)     COVID-19 vaccine administered     Eczema     History of COVID-19     2020    Hyperlipidemia     Hypertension     Hypoesthesia of skin     Hypesthesia    Macular degeneration     Meniere's disease     Osteoporosis     Overactive bladder     Pain in unspecified finger(s)     Finger pain    Pain in unspecified wrist     Pain in wrist joint    Peripheral vascular disease (CMS/HCC)      Personal history of other diseases of the circulatory system     History of coronary artery disease    Personal history of other diseases of the musculoskeletal system and connective tissue     History of arthritis    Personal history of other specified conditions     History of chest pain    Personal history of other specified conditions     History of balance disorder    Personal history of other specified conditions     History of numbness    Postmenopausal     Seasonal allergies     Unspecified visual loss     Vision problems     Past Surgical History:   Procedure Laterality Date    ADENOIDECTOMY      AORTA - BILATERAL FEMORAL ARTERY BYPASS GRAFT      BLEPHAROPTOSIS REPAIR      CARDIAC CATHETERIZATION      with stent and balloon    CHOLECYSTECTOMY      COLONOSCOPY      CT ANGIO NECK  05/18/2022    CT NECK ANGIO W AND WO IV CONTRAST 5/18/2022 [facility]    CT AORTA AND BILATERAL ILIOFEMORAL RUNOFF ANGIOGRAM W AND/OR WO IV CONTRAST  03/10/2020    CT AORTA AND BILATERAL ILIOFEMORAL RUNOFF ANGIOGRAM W AND/OR WO IV CONTRAST 3/10/2020 [facility]    CT AORTA AND BILATERAL ILIOFEMORAL RUNOFF ANGIOGRAM W AND/OR WO IV CONTRAST  07/21/2023    CT AORTA AND BILATERAL ILIOFEMORAL RUNOFF ANGIOGRAM W AND/OR WO IV CONTRAST 7/21/2023 [facility]    FL TRANSLUMINAL ANGIO PERCUTANEOUS BHARAT      TONSILLECTOMY      Tonsillectomy    TOTAL KNEE ARTHROPLASTY Bilateral     TUBAL LIGATION       Medications Prior to Admission   Medication Sig Dispense Refill Last Dose    albuterol (ProAir HFA) 90 mcg/actuation inhaler Inhale 2 puffs.   11/19/2023 at 2200    aspirin 81 mg EC tablet Take 1 tablet (81 mg) by mouth once daily.   Past Month    atenolol (Tenormin) 50 mg tablet Take 1 tablet (50 mg) by mouth once daily in the morning.   11/20/2023 at 0400    brimonidine (AlphaGAN) 0.2 % ophthalmic solution Administer 1 drop into both eyes 2 times a day.   11/20/2023 at 0400    busPIRone (Buspar) 10 mg tablet Take 1 tablet (10 mg)  by mouth once daily.   11/19/2023 at 2200    celecoxib (CeleBREX) 200 mg capsule Take 1 capsule (200 mg) by mouth once daily as needed.   Past Month    cholecalciferol (Vitamin D-3) 50 mcg (2,000 unit) capsule Take 1 capsule (50 mcg) by mouth once daily.   Past Month    ciprofloxacin (Cipro) 500 mg tablet Take 1 tablet (500 mg) by mouth 2 times a day for 5 days. 10 tablet 0 11/20/2023 at 0400    clopidogrel (Plavix) 75 mg tablet Take 1 tablet (75 mg) by mouth once daily.   Past Month    ezetimibe (Zetia) 10 mg tablet Take 1 tablet (10 mg) by mouth once daily.   11/19/2023 at 2200    furosemide (Lasix) 20 mg tablet Take 1 tablet (20 mg) by mouth once daily.   11/19/2023 at 0800    hydrALAZINE (Apresoline) 50 mg tablet Take 1 tablet (50 mg) by mouth 2 times a day.   11/19/2023 at 2200    ibandronate (Boniva) 150 mg tablet Take 1 tablet (150 mg) by mouth every 30 (thirty) days.   Past Month    ipratropium (Atrovent HFA) 17 mcg/actuation inhaler Inhale 2 puffs 2 times a day.   11/19/2023 at 2200    isosorbide mononitrate ER (Imdur) 30 mg 24 hr tablet Take 1 tablet (30 mg) by mouth once daily. With the 60 mg tablet   11/19/2023 at 0800    isosorbide mononitrate ER (Imdur) 60 mg 24 hr tablet Take 1 tablet (60 mg) by mouth once daily. With the 30mg tablet   11/19/2023 at 0800    loratadine (Claritin) 10 mg tablet Take 1 tablet (10 mg) by mouth once daily.   11/19/2023 at 0800    LUTEIN ORAL Take 20 mg by mouth once daily.   11/19/2023 at 0800    meclizine (Antivert) 25 mg tablet Take 1 tablet (25 mg) by mouth 3 times a day as needed for dizziness.   11/19/2023 at 2200    mirtazapine (Remeron) 15 mg tablet Take 1 tablet (15 mg) by mouth once daily at bedtime.   11/19/2023 at 2200    pantoprazole (ProtoNix) 40 mg EC tablet Take 1 tablet (40 mg) by mouth once daily in the morning. Take before meals. Do not crush, chew, or split.   11/19/2023 at 0800    pilocarpine (Salagen, pilocarpine,) 5 mg tablet Take 1 tablet (5 mg) by  mouth once daily.   2023 at 0800    potassium chloride ER (Micro-K) 10 mEq ER capsule Take 1 capsule (10 mEq) by mouth once daily.   2023 at 0800    pregabalin (Lyrica) 75 mg capsule Take 1 capsule (75 mg) by mouth 2 times a day.   2023 at 0800    simvastatin (Zocor) 40 mg tablet Take 1 tablet (40 mg) by mouth 2 times a day.   2023 at 2200    tiZANidine (Zanaflex) 4 mg tablet Take 1 tablet (4 mg) by mouth every 8 hours if needed.   2023 at 2200    tolterodine (Detrol) 1 mg tablet Take 1 tablet (1 mg) by mouth once daily at bedtime.   2023 at 2200    triamterene-hydrochlorothiazid (Dyazide) 37.5-25 mg capsule Take 1 capsule by mouth once daily.   2023 at 0800    vit C,Y-Us-yncsl-lutein-zeaxan (PreserVision AREDS-2) 250-90-40-1 mg capsule Take 1 capsule by mouth twice a day.   2023 at 0800    nitroglycerin (Nitrostat) 0.4 mg SL tablet Place 1 tablet (0.4 mg) under the tongue every 5 minutes if needed for chest pain.  PLACE 1 TABLET UNDER THE TONGUE EVERY 5 MINUTES UP TO 3 DOSES AS NEEDED FOR CHEST PAIN.       raloxifene (Evista) 60 mg tablet Take 1 tablet (60 mg) by mouth once daily.   Not Taking    simvastatin (Zocor) 40 mg tablet Take 1 tablet (40 mg) by mouth once daily at bedtime.   Not Taking     Neomycin and Neomycin-polymyxin b-dexameth  Social History     Tobacco Use    Smoking status: Former     Packs/day: 0.50     Years: 45.00     Additional pack years: 0.00     Total pack years: 22.50     Types: Cigarettes     Quit date: 2023     Years since quittin.2   Vaping Use    Vaping Use: Never used   Substance Use Topics    Alcohol use: Never    Drug use: Never     Family History   Problem Relation Name Age of Onset    Breast cancer Mother      Other (arteriosclerotic cardiovascular disease) Father      Cancer Father         Hospital Medications:    sodium chloride 0.9%, 100 mL/hr, Last Rate: 100 mL/hr (23 1134)          Current Facility-Administered  Medications:     acetaminophen (Tylenol) tablet 650 mg, 650 mg, oral, q6h, Denis E Hipps, PA-C, 650 mg at 11/21/23 0928    albuterol 90 mcg/actuation inhaler 2 puff, 2 puff, inhalation, q4h PRN, Denis E Hipps, PA-C    [Held by provider] atenolol (Tenormin) tablet 50 mg, 50 mg, oral, q AM, Denis E Hipps, PA-C    brimonidine (AlphaGAN) 0.2 % ophthalmic solution 1 drop, 1 drop, Both Eyes, BID, Denis E Hipps, PA-C, 1 drop at 11/21/23 0930    busPIRone (Buspar) tablet 10 mg, 10 mg, oral, Daily, Denis E Hipps, PA-C, 10 mg at 11/21/23 0929    cholecalciferol (Vitamin D-3) tablet 2,000 Units, 2,000 Units, oral, Daily, Denis E Hipps, PA-C, 2,000 Units at 11/21/23 0934    ciprofloxacin (Cipro) tablet 500 mg, 500 mg, oral, BID, Denis E Hipps, PA-C, 500 mg at 11/21/23 0933    cyclobenzaprine (Flexeril) tablet 10 mg, 10 mg, oral, TID PRN, Denis E Hipps, PA-C, 10 mg at 11/20/23 1525    ezetimibe (Zetia) tablet 10 mg, 10 mg, oral, Daily, Denis E Hipps, PA-C, 10 mg at 11/21/23 0933    fentaNYL (Duragesic) 25 mcg/hr patch 1 patch, 1 patch, transdermal, q72h, Denis E Hipps, PA-C    [Held by provider] furosemide (Lasix) tablet 20 mg, 20 mg, oral, Daily, Denis E Hipps, PA-C    [Held by provider] hydrALAZINE (Apresoline) tablet 50 mg, 50 mg, oral, BID, Denis E Hipps, PA-C    [Held by provider] isosorbide mononitrate ER (Imdur) 24 hr tablet 30 mg, 30 mg, oral, Daily, Denis E Hipps, PA-C    [Held by provider] isosorbide mononitrate ER (Imdur) 24 hr tablet 60 mg, 60 mg, oral, Daily, Denis E Hipps, PA-C    loratadine (Claritin) tablet 10 mg, 10 mg, oral, Daily, Denis E Hipps, PA-C, 10 mg at 11/21/23 0931    meclizine (Antivert) tablet 25 mg, 25 mg, oral, TID PRN, Denis E Hipps, PA-C    mirtazapine (Remeron) tablet 15 mg, 15 mg, oral, Nightly, Denis E Hipps, PA-C, 15 mg at 11/20/23 2007    morphine injection 2 mg, 2 mg, intravenous, q2h PRN, Denis E Hipps, PA-C    naloxone (Narcan) injection 0.2 mg, 0.2 mg, intravenous, q5 min PRN, Denis E Hipps, PA-C     ondansetron (Zofran) tablet 4 mg, 4 mg, oral, q8h PRN **OR** ondansetron (Zofran) injection 4 mg, 4 mg, intravenous, q8h PRN, Denis E Hipps, PA-C    oxybutynin (Ditropan) tablet 5 mg, 5 mg, oral, BID, Denis E Hipps, PA-C, 5 mg at 11/21/23 0934    oxyCODONE (Roxicodone) immediate release tablet 10 mg, 10 mg, oral, q4h PRN, Denis E Hipps, PA-C, 10 mg at 11/20/23 1452    oxyCODONE (Roxicodone) immediate release tablet 2.5 mg, 2.5 mg, oral, q4h PRN, Denis E Hipps, PA-C    oxyCODONE (Roxicodone) immediate release tablet 5 mg, 5 mg, oral, q4h PRN, Denis E Hipps, PA-C    oxyCODONE (Roxicodone) immediate release tablet 5 mg, 5 mg, oral, q4h PRN, Denis E Hipps, PA-C, 5 mg at 11/21/23 0935    oxygen (O2) therapy, , inhalation, Continuous - 02/gases, Denis E Hipps, PA-C    pantoprazole (ProtoNix) EC tablet 40 mg, 40 mg, oral, Daily before breakfast, Denis E Hipps, PA-C, 40 mg at 11/21/23 0551    polyethylene glycol (Glycolax, Miralax) packet 17 g, 17 g, oral, Daily, Denis E Hipps, PA-C, 17 g at 11/21/23 0929    potassium chloride CR (Klor-Con) ER tablet 10 mEq, 10 mEq, oral, Daily, Denis E Hipps, PA-C, 10 mEq at 11/21/23 0934    pregabalin (Lyrica) capsule 75 mg, 75 mg, oral, BID, Denis E Hipps, PA-C, 75 mg at 11/21/23 0935    raloxifene (Evista) tablet 60 mg, 60 mg, oral, Daily, Denis E Hipps, PA-C    simvastatin (Zocor) tablet 40 mg, 40 mg, oral, Nightly, Denis E Hipps, PA-PRASHANTH, 40 mg at 11/20/23 2008    sodium chloride 0.9% infusion, 100 mL/hr, intravenous, Continuous, YASMANY Moise-C, Last Rate: 100 mL/hr at 11/21/23 0254, 100 mL/hr at 11/21/23 0254    tiotropium (Spiriva Respimat) 2.5 mcg/actuation inhaler 2 puff, 2 Inhalation, inhalation, Daily, YASMANY Moise-PRASHANTH, 2 puff at 11/21/23 0612    tranexamic acid (Lysteda) tablet 1,950 mg, 1,950 mg, oral, Once, Denis Hopson PA-C    [Held by provider] triamterene-hydrochlorothiazid (Maxzide-25) 37.5-25 mg per tablet 1 tablet, 1 tablet, oral, Daily, Denis Hopson PA-C    Review of  Systems:  14 point review of systems was completed and negative except for those specially mention in my HPI    Physical Exam:    Heart Rate:  [62-90]   Temp:  [36 °C (96.8 °F)-36.8 °C (98.2 °F)]   Resp:  [7-38]   BP: (135)/(62)   Weight:  [68.4 kg (150 lb 12.7 oz)]   SpO2:  [86 %-100 %]     Physical Exam  Constitutional:       Appearance: Normal appearance.   HENT:      Head: Normocephalic and atraumatic.      Mouth/Throat:      Mouth: Mucous membranes are moist.      Pharynx: Oropharynx is clear.   Eyes:      Extraocular Movements: Extraocular movements intact.      Conjunctiva/sclera: Conjunctivae normal.      Pupils: Pupils are equal, round, and reactive to light.   Cardiovascular:      Rate and Rhythm: Normal rate and regular rhythm.      Pulses: Normal pulses.      Heart sounds: Normal heart sounds.   Pulmonary:      Effort: Pulmonary effort is normal.      Breath sounds: Normal breath sounds.   Abdominal:      General: Bowel sounds are normal.      Palpations: Abdomen is soft.   Musculoskeletal:         General: Normal range of motion.      Cervical back: Normal range of motion.   Skin:     General: Skin is warm and dry.      Capillary Refill: Capillary refill takes less than 2 seconds.      Comments: Bilateral JOSE drains with sanguinous fluid    Neurological:      General: No focal deficit present.      Mental Status: She is alert and oriented to person, place, and time. Mental status is at baseline.   Psychiatric:         Mood and Affect: Mood normal.         Behavior: Behavior normal.         Objective:    I have reviewed all medications, laboratory results, and imaging pertinent for today's encounter.           Intake/Output Summary (Last 24 hours) at 11/21/2023 1052  Last data filed at 11/21/2023 1000  Gross per 24 hour   Intake 1738.33 ml   Output 2800 ml   Net -1061.67 ml         Assessment/Plan:    I am currently managing this critically ill patient for the following problems:  #L5-S1 dynamic  spondylolisthesis and lumbar spondylosis post laminectomy   #Hx of HTN  #Hx of HLD   #Hx of peripheral vascular disease   #Hx of COPD  #Hx of macular degeneration   #Hx of osteoporosis   #Hx of overactive bladder     Neuro/Psych/Pain Ctrl/Sedation:  #L5-S1 dynamic spondylolisthesis and lumbar spondylosis post laminectomy   -CAM ICU score q-shift  -Sleep/wake cycle hygiene  -Delirium precaution  -Pain control - tylenol and prn flexeril, morphine, oxycodone  -Continue home buspar, remeron, lyrica    Respiratory/ENT:  #Hx of COPD  -Continue home Spiriva, albuterol  -Maintain spo2 >92%  -Continuous pulse ox monitoring   -Oral Care per ICU Protocol    HEENT:  - continue home claritin, phenergan  #Hx of macular degeneration   -Continue home brimonidine    Cardiovascular:  #Hx of HTN  #Hx of HLD   #Hx of peripheral vascular disease   -Hold home antihypertensive meds in the setting of labile BPs, restart as tolerated   -Hold home ASA and plavix per ortho   -BP monitoring per ICU protocol   -Continue home ezetimibe     GI:  -Pt on regular diet   -Bowel regiment   -Continue home protonix    Renal/Volume Status (Intra & Extravascular):  -Daily BMP  -replace electrolytes per protocol   -Continue home potassium chloride   #Hx of overactive bladder   -continue home oxybutynin     Endocrine  -glucose monitoring per ICU protocol   -SSI if indicated     Infectious Disease:  -surgical prophylaxis ancef, vanc   -discontinue cipro  -start ancef q8h while JOSE drains are in place per ortho     Heme/Onc:  -daily CBC monitoring   -trend hemoglobin   -transfuse for hemoglobin <7 or symptomatic     OBGYN/MSK:  #L5-S1 dynamic spondylolisthesis and lumbar spondylosis post laminectomy   #Hx of osteoporosis   -pain control per above   -continue home raloxifene   -PT/OT eval     Ethics/Code Status:  Full code     :  DVT Prophylaxis: mechanical, hold anticoagulation per ortho   GI Prophylaxis: home ppi  Bowel Regimen: miralax  Diet:  regular  CVC: n/a  Benita: removed 11/21  Stauffer: removed 11/21  Restraints: n/a  Dispo: transfer to floor     Critical Care Time:  30 minutes spent in preparing to see patient (I.e. review of medical records), evaluation of diagnostics (I.e. labs, imaging, etc.), documentation, discussing plan of care with patient/ family/ caregiver, and/ or coordination of care with multidisciplinary team. Time does not include completion of procedure time.        Pt discussed with Dr. Emilia Hopson PA-C   [facility] - Critical Care

## 2023-11-21 NOTE — CARE PLAN
Problem: Skin  Goal: Decreased wound size/increased tissue granulation at next dressing change  Flowsheets (Taken 11/21/2023 1330)  Decreased wound size/increased tissue granulation at next dressing change: Promote sleep for wound healing

## 2023-11-21 NOTE — PROGRESS NOTES
Occupational Therapy    Evaluation    Patient Name: Tara Tierney  MRN: [id number]  Today's Date: 11/21/2023  Time Calculation  Start Time: 1107  Stop Time: 1125  Time Calculation (min): 18 min      Assessment:  End of Session Communication: Bedside nurse  End of Session Patient Position: Up in chair, Alarm off, not on at start of session  OT Assessment Results: Decreased ADL status, Decreased upper extremity range of motion, Decreased upper extremity strength, Decreased endurance, Decreased fine motor control, Decreased functional mobility, Decreased IADLs  Plan:  Treatment Interventions: ADL retraining, Functional transfer training, UE strengthening/ROM, Equipment evaluation/education, Compensatory technique education, Patient/family training  OT Frequency: 2 times per week  OT Discharge Recommendations: Low intensity level of continued care  OT - OK to Discharge:  (when deemed medically appropriate)    Subjective   Current Problem:  1. Back pain of lumbar region with sciatica          General:  General  Reason for Referral: Decline in self care performance; s/p L3,4, L4,5, L5S1 laminectomy; L3,4, PLIF, L3,4, posterior lateral fusion 11/20/23 [facility]  Referred By: PT/OT 11/2/0/23 Hipps PAC  Past Medical History Relevant to Rehab: COPD, HLD, HTN, Arthritis, Macular degeneration, Chasidy, PCI, obese, PVD, osteoporosis, AAA, ICM, TKA, CAD, C19  Patient Position Received: Bed, 2 rail up, Alarm off, not on at start of session  General Comment: Elective surgery. Noted variable BPs during surgery. Discharged to ICU for A-line and closer BP monitorring.    Precautions:  Medical Precautions: Fall precautions  Post-Surgical Precautions: Spinal precautions  Prosthesis/Orthosis Used: Other (comment)  Precautions Comment: TLSO when up ambulating  Vital Signs:  Heart Rate:  (Pre activity 86 Post activity 77)  SpO2:  (Pre actiivty 96%; Post activity 98% on RA)  BP: 130/54  Pain:  Pain Assessment  Pain Assessment: 0-10  Pain  Score: 7  Pain Type: Surgical pain, Acute pain  Pain Location: Back    Objective   Cognition:  Overall Cognitive Status: Within Functional Limits  Orientation Level: Oriented X4, Appropriate for developmental age  Attention: Within Functional Limits         Home Living:  Home Living Comments: Per patient report resides wtih spouse (works at night) 1 story 1 step to enter.  Tub shower,  can get seat from family, no grab bar; elevated commode.  Prior Function:  Hand Dominance: Right  Prior Function Comments: Independent in mobility, ADLs and shared IADLS without AD. Pt does report spouse will occ assist with donning socks. Has reacher at home and denies other AE use. Denies any falls past 3 months. Has cane and walker. Drives.     ADL:  Eating Assistance: Independent  Grooming Assistance: Minimal  Grooming Deficit: Standing with assistive device  Bathing Assistance: Moderate (sponge)  UE Dressing Assistance: Minimal (Min-mod A to don TLSO; pt reported difficulty managing at home due to decreased hand function.)  LE Dressing Assistance: Moderate  Toileting Assistance with Device: Minimal  Functional Deficit: Setup, Steadying, Verbal cueing, Increased time to complete, Supervision/safety  ADL Comments: Pt educated in spinal precations with v/c to apply.  Activity Tolerance:  Activity Tolerance Comments: increased time  Bed Mobility/Transfers: Bed Mobility  Bed Mobility: Yes  Bed Mobility 1  Bed Mobility 1: Supine to sitting  Level of Assistance 1: Minimum assistance, Minimal verbal cues  Bed Mobility Comments 1: edu in log roll technique;  cues to apply    Transfers  Transfer: Yes  Transfer 1  Transfer From 1: Bed to, Sit to, Stand to, Toilet to  Transfer Device 1: Walker  Transfer Level of Assistance 1: Minimum assistance, Minimal verbal cues, Minimal tactile cues    Sitting Balance:   Fair+/Fair  Standing Balance:    Fair/ Fair- stand supported FWW    Hand Function:  Gross Grasp: Impaired  Coordination:  Impaired  Extremities: RUE   RUE : Exceptions to WFL  RUE AROM Comment: R hand trigger finger/thumb with diminished  strength 3-/5 MMT; R shoulder, elbow, wrist WFL, MMT 4/5.   LUE: Within Functional Limits, MMT 4/5    Outcome Measures:  [facility] Daily Activity  Putting on and taking off regular lower body clothing: A lot  Bathing (including washing, rinsing, drying): A lot  Putting on and taking off regular upper body clothing: A little  Toileting, which includes using toilet, bedpan or urinal: A little  Taking care of personal grooming such as brushing teeth: A little  Eating Meals: None  Daily Activity - Total Score: 17    Education Documentation  Body Mechanics, taught by Tara Kirkpatrick OT at 11/21/2023  1:16 PM.  Learner: Patient  Readiness: Acceptance  Method: Explanation, Demonstration  Response: Needs Reinforcement    Precautions, taught by Tara Kirkpatrick OT at 11/21/2023  1:16 PM.  Learner: Patient  Readiness: Acceptance  Method: Explanation, Demonstration  Response: Needs Reinforcement    ADL Training, taught by Tara Kirkpatrick OT at 11/21/2023  1:16 PM.  Learner: Patient  Readiness: Acceptance  Method: Explanation, Demonstration  Response: Needs Reinforcement      Goals:  Encounter Problems       Encounter Problems (Active)       OT Goals       Pt demo LB dress /bathe with SBA (Progressing)       Start:  11/21/23    Expected End:  12/05/23            Pt demo UB dress / bathe and TLSO brace management SBA (Progressing)       Start:  11/21/23    Expected End:  12/05/23            Pt demo safe ADL transfers with SBA (Progressing)       Start:  11/21/23    Expected End:  12/05/23            Pt follow surgical precautions 100% tx session without cues for increased safety with ADLs and functional transfers.  (Progressing)       Start:  11/21/23    Expected End:  12/05/23            Pt demo F+/F stand balance for increased independence with toileting, hygiene, and self care tasks.  (Progressing)        Start:  11/21/23    Expected End:  12/05/23

## 2023-11-21 NOTE — PROGRESS NOTES
"Tara Tierney is a 68 y.o. female on day 1 of admission presenting with Back pain of lumbar region with sciatica.      Subjective   Patient is currently in ICU she is awake and alert she denies chest pain or shortness of breath she is well informed of plan of care no questions or concerns       Objective   Vital signs stable.  Moving all extremities.  JOSE drain in place    Last Recorded Vitals  Blood pressure 135/62, pulse 84, temperature 36.4 °C (97.5 °F), temperature source Temporal, resp. rate (!) 38, height 1.448 m (4' 9\"), weight 68.4 kg (150 lb 12.7 oz), SpO2 99 %.    Physical Exam  Constitutional:       Appearance: Normal appearance.   Cardiovascular:      Rate and Rhythm: Normal rate.   Pulmonary:      Effort: Pulmonary effort is normal.   Skin:     General: Skin is warm and dry.      Capillary Refill: Capillary refill takes less than 2 seconds.   Neurological:      Mental Status: She is alert.   Psychiatric:         Attention and Perception: Attention normal.         Behavior: Behavior is cooperative.     Back dressings clean and dry.  JOSE drain in place    Current Medications:  acetaminophen, 650 mg, oral, q6h  [Held by provider] atenolol, 50 mg, oral, q AM  brimonidine, 1 drop, Both Eyes, BID  busPIRone, 10 mg, oral, Daily  cholecalciferol, 2,000 Units, oral, Daily  ciprofloxacin, 500 mg, oral, BID  ezetimibe, 10 mg, oral, Daily  fentaNYL, 1 patch, transdermal, q72h  [Held by provider] furosemide, 20 mg, oral, Daily  [Held by provider] hydrALAZINE, 50 mg, oral, BID  [Held by provider] isosorbide mononitrate ER, 30 mg, oral, Daily  [Held by provider] isosorbide mononitrate ER, 60 mg, oral, Daily  loratadine, 10 mg, oral, Daily  mirtazapine, 15 mg, oral, Nightly  oxybutynin, 5 mg, oral, BID  oxygen, , inhalation, Continuous - 02/gases  pantoprazole, 40 mg, oral, Daily before breakfast  polyethylene glycol, 17 g, oral, Daily  potassium chloride CR, 10 mEq, oral, Daily  potassium phosphate (monobasic), 500 " mg, oral, Once  pregabalin, 75 mg, oral, BID  raloxifene, 60 mg, oral, Daily  simvastatin, 40 mg, oral, Nightly  tiotropium, 2 Inhalation, inhalation, Daily  tranexamic acid, 1,950 mg, oral, Once  [Held by provider] triamterene-hydrochlorothiazid, 1 tablet, oral, Daily           CBC:   Results from last 7 days   Lab Units 11/21/23  0323 11/20/23  1536   WBC AUTO x10*3/uL 11.6* 12.2*   RBC AUTO x10*6/uL 3.25* 3.68*   HEMOGLOBIN g/dL 10.7* 12.2   HEMATOCRIT % 32.0* 36.4   MCV fL 99 99   MCH pg 32.9 33.2   MCHC g/dL 33.4 33.5   RDW % 13.2 13.2   PLATELETS AUTO x10*3/uL 165 171     CMP:    Results from last 7 days   Lab Units 11/21/23  0323 11/20/23  1536   SODIUM mmol/L 134* 134*   POTASSIUM mmol/L 3.9 3.7   CHLORIDE mmol/L 103 105   CO2 mmol/L 26 23   BUN mg/dL 16 17   CREATININE mg/dL 1.19* 0.98   GLUCOSE mg/dL 144* 149*   PROTEIN TOTAL g/dL  --  5.3*   CALCIUM mg/dL 7.9* 7.6*   BILIRUBIN TOTAL mg/dL  --  0.4   ALK PHOS U/L  --  60   AST U/L  --  34   ALT U/L  --  28     BMP:    Results from last 7 days   Lab Units 11/21/23  0323 11/20/23  1536   SODIUM mmol/L 134* 134*   POTASSIUM mmol/L 3.9 3.7   CHLORIDE mmol/L 103 105   CO2 mmol/L 26 23   BUN mg/dL 16 17   CREATININE mg/dL 1.19* 0.98   CALCIUM mg/dL 7.9* 7.6*   GLUCOSE mg/dL 144* 149*                     Assessment/Plan    Status postlaminectomy fusion postop day 1    Plan  -Okay to transfer out of ICU from orthopedic standpoint  -Weightbearing as tolerated  -PT/OT evaluation and treatment         Cesar Powers, APRN-CNP

## 2023-11-22 LAB
ANION GAP SERPL CALC-SCNC: 7 MMOL/L (ref 10–20)
BUN SERPL-MCNC: 16 MG/DL (ref 6–23)
CALCIUM SERPL-MCNC: 8.2 MG/DL (ref 8.6–10.3)
CHLORIDE SERPL-SCNC: 106 MMOL/L (ref 98–107)
CO2 SERPL-SCNC: 29 MMOL/L (ref 21–32)
CREAT SERPL-MCNC: 0.94 MG/DL (ref 0.5–1.05)
ERYTHROCYTE [DISTWIDTH] IN BLOOD BY AUTOMATED COUNT: 13.9 % (ref 11.5–14.5)
GFR SERPL CREATININE-BSD FRML MDRD: 65 ML/MIN/1.73M*2
GLUCOSE SERPL-MCNC: 130 MG/DL (ref 74–99)
HCT VFR BLD AUTO: 32.1 % (ref 36–46)
HGB BLD-MCNC: 10.5 G/DL (ref 12–16)
HOLD SPECIMEN: NORMAL
MAGNESIUM SERPL-MCNC: 1.88 MG/DL (ref 1.6–2.4)
MCH RBC QN AUTO: 33.5 PG (ref 26–34)
MCHC RBC AUTO-ENTMCNC: 32.7 G/DL (ref 32–36)
MCV RBC AUTO: 103 FL (ref 80–100)
NRBC BLD-RTO: 0 /100 WBCS (ref 0–0)
PHOSPHATE SERPL-MCNC: 1.8 MG/DL (ref 2.5–4.9)
PLATELET # BLD AUTO: 160 X10*3/UL (ref 150–450)
POTASSIUM SERPL-SCNC: 4.3 MMOL/L (ref 3.5–5.3)
RBC # BLD AUTO: 3.13 X10*6/UL (ref 4–5.2)
SODIUM SERPL-SCNC: 138 MMOL/L (ref 136–145)
WBC # BLD AUTO: 9.6 X10*3/UL (ref 4.4–11.3)

## 2023-11-22 PROCEDURE — 2500000001 HC RX 250 WO HCPCS SELF ADMINISTERED DRUGS (ALT 637 FOR MEDICARE OP)

## 2023-11-22 PROCEDURE — 2500000002 HC RX 250 W HCPCS SELF ADMINISTERED DRUGS (ALT 637 FOR MEDICARE OP, ALT 636 FOR OP/ED)

## 2023-11-22 PROCEDURE — 97530 THERAPEUTIC ACTIVITIES: CPT | Mod: GO,CO

## 2023-11-22 PROCEDURE — 2500000004 HC RX 250 GENERAL PHARMACY W/ HCPCS (ALT 636 FOR OP/ED)

## 2023-11-22 PROCEDURE — 85027 COMPLETE CBC AUTOMATED: CPT

## 2023-11-22 PROCEDURE — 2500000005 HC RX 250 GENERAL PHARMACY W/O HCPCS: Performed by: REGISTERED NURSE

## 2023-11-22 PROCEDURE — 97535 SELF CARE MNGMENT TRAINING: CPT | Mod: GO,CO

## 2023-11-22 PROCEDURE — 83735 ASSAY OF MAGNESIUM: CPT

## 2023-11-22 PROCEDURE — 36415 COLL VENOUS BLD VENIPUNCTURE: CPT

## 2023-11-22 PROCEDURE — 96368 THER/DIAG CONCURRENT INF: CPT

## 2023-11-22 PROCEDURE — 96376 TX/PRO/DX INJ SAME DRUG ADON: CPT

## 2023-11-22 PROCEDURE — 99232 SBSQ HOSP IP/OBS MODERATE 35: CPT | Performed by: REGISTERED NURSE

## 2023-11-22 PROCEDURE — 80048 BASIC METABOLIC PNL TOTAL CA: CPT

## 2023-11-22 PROCEDURE — G0378 HOSPITAL OBSERVATION PER HR: HCPCS

## 2023-11-22 PROCEDURE — 1200000002 HC GENERAL ROOM WITH TELEMETRY DAILY

## 2023-11-22 PROCEDURE — 97116 GAIT TRAINING THERAPY: CPT | Mod: GP,CQ | Performed by: PHYSICAL THERAPY ASSISTANT

## 2023-11-22 PROCEDURE — 97530 THERAPEUTIC ACTIVITIES: CPT | Mod: GP,CQ | Performed by: PHYSICAL THERAPY ASSISTANT

## 2023-11-22 PROCEDURE — 84100 ASSAY OF PHOSPHORUS: CPT

## 2023-11-22 PROCEDURE — 96366 THER/PROPH/DIAG IV INF ADDON: CPT

## 2023-11-22 PROCEDURE — 2500000004 HC RX 250 GENERAL PHARMACY W/ HCPCS (ALT 636 FOR OP/ED): Performed by: REGISTERED NURSE

## 2023-11-22 RX ORDER — MAGNESIUM SULFATE HEPTAHYDRATE 40 MG/ML
2 INJECTION, SOLUTION INTRAVENOUS ONCE
Status: COMPLETED | OUTPATIENT
Start: 2023-11-22 | End: 2023-11-22

## 2023-11-22 RX ADMIN — OXYCODONE HYDROCHLORIDE 10 MG: 5 TABLET ORAL at 04:12

## 2023-11-22 RX ADMIN — SIMVASTATIN 40 MG: 40 TABLET, FILM COATED ORAL at 20:06

## 2023-11-22 RX ADMIN — ACETAMINOPHEN 650 MG: 325 TABLET ORAL at 10:11

## 2023-11-22 RX ADMIN — OXYBUTYNIN CHLORIDE 5 MG: 5 TABLET ORAL at 20:06

## 2023-11-22 RX ADMIN — OXYCODONE HYDROCHLORIDE 10 MG: 5 TABLET ORAL at 20:07

## 2023-11-22 RX ADMIN — PREGABALIN 75 MG: 25 CAPSULE ORAL at 20:07

## 2023-11-22 RX ADMIN — BRIMONIDINE TARTRATE 1 DROP: 2 SOLUTION OPHTHALMIC at 10:03

## 2023-11-22 RX ADMIN — OXYCODONE HYDROCHLORIDE 10 MG: 5 TABLET ORAL at 10:10

## 2023-11-22 RX ADMIN — PANTOPRAZOLE SODIUM 40 MG: 40 TABLET, DELAYED RELEASE ORAL at 06:23

## 2023-11-22 RX ADMIN — ACETAMINOPHEN 650 MG: 325 TABLET ORAL at 20:06

## 2023-11-22 RX ADMIN — OXYBUTYNIN CHLORIDE 5 MG: 5 TABLET ORAL at 10:01

## 2023-11-22 RX ADMIN — MAGNESIUM SULFATE HEPTAHYDRATE 2 G: 40 INJECTION, SOLUTION INTRAVENOUS at 10:13

## 2023-11-22 RX ADMIN — PREGABALIN 75 MG: 25 CAPSULE ORAL at 10:00

## 2023-11-22 RX ADMIN — POLYETHYLENE GLYCOL 3350 17 G: 17 POWDER, FOR SOLUTION ORAL at 10:00

## 2023-11-22 RX ADMIN — TIOTROPIUM BROMIDE INHALATION SPRAY 2 PUFF: 3.12 SPRAY, METERED RESPIRATORY (INHALATION) at 10:15

## 2023-11-22 RX ADMIN — MIRTAZAPINE 15 MG: 15 TABLET, FILM COATED ORAL at 20:06

## 2023-11-22 RX ADMIN — LORATADINE 10 MG: 10 TABLET ORAL at 10:00

## 2023-11-22 RX ADMIN — SODIUM PHOSPHATE, MONOBASIC, MONOHYDRATE AND SODIUM PHOSPHATE, DIBASIC, ANHYDROUS 21 MMOL: 142; 276 INJECTION, SOLUTION INTRAVENOUS at 13:39

## 2023-11-22 RX ADMIN — Medication 2000 UNITS: at 10:01

## 2023-11-22 RX ADMIN — EZETIMIBE 10 MG: 10 TABLET ORAL at 10:00

## 2023-11-22 RX ADMIN — POTASSIUM CHLORIDE 10 MEQ: 750 TABLET, EXTENDED RELEASE ORAL at 10:04

## 2023-11-22 RX ADMIN — CEFAZOLIN SODIUM 1 G: 1 INJECTION, SOLUTION INTRAVENOUS at 04:08

## 2023-11-22 RX ADMIN — ACETAMINOPHEN 650 MG: 325 TABLET ORAL at 13:52

## 2023-11-22 RX ADMIN — BUSPIRONE HYDROCHLORIDE 10 MG: 5 TABLET ORAL at 10:00

## 2023-11-22 RX ADMIN — CYCLOBENZAPRINE HYDROCHLORIDE 10 MG: 10 TABLET, FILM COATED ORAL at 20:08

## 2023-11-22 ASSESSMENT — PAIN - FUNCTIONAL ASSESSMENT
PAIN_FUNCTIONAL_ASSESSMENT: 0-10

## 2023-11-22 ASSESSMENT — COGNITIVE AND FUNCTIONAL STATUS - GENERAL
STANDING UP FROM CHAIR USING ARMS: A LITTLE
CLIMB 3 TO 5 STEPS WITH RAILING: A LOT
WALKING IN HOSPITAL ROOM: A LITTLE
DRESSING REGULAR LOWER BODY CLOTHING: A LOT
PERSONAL GROOMING: A LITTLE
MOVING FROM LYING ON BACK TO SITTING ON SIDE OF FLAT BED WITH BEDRAILS: A LOT
DRESSING REGULAR UPPER BODY CLOTHING: A LITTLE
EATING MEALS: A LITTLE
TOILETING: A LITTLE
HELP NEEDED FOR BATHING: A LITTLE
MOBILITY SCORE: 16
TURNING FROM BACK TO SIDE WHILE IN FLAT BAD: A LITTLE
DAILY ACTIVITIY SCORE: 17
MOVING TO AND FROM BED TO CHAIR: A LITTLE

## 2023-11-22 ASSESSMENT — PAIN SCALES - GENERAL
PAINLEVEL_OUTOF10: 8
PAINLEVEL_OUTOF10: 8
PAINLEVEL_OUTOF10: 5 - MODERATE PAIN
PAINLEVEL_OUTOF10: 7
PAINLEVEL_OUTOF10: 0 - NO PAIN

## 2023-11-22 ASSESSMENT — ACTIVITIES OF DAILY LIVING (ADL)
LACK_OF_TRANSPORTATION: NO
HOME_MANAGEMENT_TIME_ENTRY: 18

## 2023-11-22 NOTE — PROGRESS NOTES
"Physical Therapy    Physical Therapy Treatment    Patient Name: Tara Tierney  MRN: [id number]  Today's Date: 11/22/2023  Time Calculation  Start Time: 1040  Stop Time: 1113  Time Calculation (min): 33 min       Assessment/Plan   PT Assessment  PT Assessment Results: Decreased mobility, Orthopedic restrictions, Decreased range of motion  Evaluation/Treatment Tolerance: Patient limited by pain  Medical Staff Made Aware: Yes  End of Session Communication: Bedside nurse, Care Coordinator  End of Session Patient Position: Bed, 3 rail up      General Visit Information:   PT  Visit  PT Received On: 11/22/23  General  Prior to Session Communication: Bedside nurse  General Comment:  (pt. up in chair, sisterinlaw present, pt. stating \"I was sitting too long, I'm stiff\")      Precautions:  Precautions  Medical Precautions: Spinal precautions (good recall of spinal precautions but required verbal and tactile education to maintain secondary to observed twisting in sit position.)  Post-Surgical Precautions: Other (comment) (bilateral drain tubes intact at low back, iv in left arm.)  Braces Applied:  (Pt. wearing TLSO which was removed prior to obtaining supine position.)       Objective   Pain:  Pain Assessment  Pain Assessment: 0-10  Pain Score: 8  Pain Interventions: Other (Comment) (pt. positioned supine to unload spine for comfort with hob slightly elevated per request.)    Treatments:  Bed Mobility  Bed Mobility: Yes  Bed Mobility 1  Bed Mobility 1:  (cg/min for bridging and scooting to position in bed.)    Ambulation/Gait Training  Ambulation/Gait Training Performed: Yes (gait training with fww slow step to guarded taking standing increments with c/o back spasms/pain performed 12-28' trials with min x 1 to assist with negotiating of device with turning.)  Transfers  Transfer: Yes (sit to stand with cg and hand placement instructions, w/c to bed with walker, sit to supine with min x 1 pt. with c/o right hip pain during " transfer)    Outcome Measures:  [facility] Basic Mobility  Turning from your back to your side while in a flat bed without using bedrails: A lot  Moving from lying on your back to sitting on the side of a flat bed without using bedrails: A little  Moving to and from bed to chair (including a wheelchair): A little  Standing up from a chair using your arms (e.g. wheelchair or bedside chair): A little  To walk in hospital room: A little  Climbing 3-5 steps with railing: A lot  Basic Mobility - Total Score: 16        Encounter Problems       Encounter Problems (Active)       PT Problem       Bed mobility supine <> side lying <> sit modified independent  (Progressing)       Start:  11/21/23    Expected End:  11/23/23            Transfers sit <.> stand with ww modified independent  (Progressing)       Start:  11/21/23    Expected End:  11/23/23            Patient to ambulate with ww 50' modified independent  (Progressing)       Start:  11/21/23    Expected End:  11/23/23

## 2023-11-22 NOTE — PROGRESS NOTES
"Occupational Therapy    OT Treatment    Patient Name: Tara Tierney  MRN: [id number]  Today's Date: 11/22/2023  Time Calculation  Start Time: 1352  Stop Time: 1424  Time Calculation (min): 32 min         Assessment:  Prognosis: Good  End of Session Communication: Bedside nurse  End of Session Patient Position: Up in chair, Alarm on (Pt agreeable to sit up until 3:30 then return to bed with nsg assist.  Pt also agreeable to sit in chair for dinner.  RN and PCT aware of bed<>chair transfer times and assist level needed.)  OT Assessment Results: Decreased ADL status, Decreased endurance, Decreased functional mobility, Decreased safe judgment during ADL  Prognosis: Good  Plan:  Treatment Interventions: ADL retraining, Functional transfer training, UE strengthening/ROM, Equipment evaluation/education, Compensatory technique education, Patient/family training  OT Frequency: 2 times per week  OT Discharge Recommendations: Low intensity level of continued care    Treatment Interventions: ADL retraining, Functional transfer training, UE strengthening/ROM, Equipment evaluation/education, Compensatory technique education, Patient/family training    Subjective   Previous Visit Info:  OT Last Visit  OT Received On: 11/22/23  General:  General  Prior to Session Communication: Bedside nurse (pt cleared by RN for OT tx)  Patient Position Received: Up in bathroom  General Comment: Pt pleasant and cooperative, agreeable to OT.  Pt reports limited by R sided pain (drains removed prior to start of OT tx)  Precautions:  Medical Precautions: Spinal precautions, Fall precautions  Braces Applied: TLSO brace  Precautions Comment: Pt required min verbal cues for recall of spinal precautions.  NP also educated pt on brace wear schedule d/t pt having questions on when brace needs to be on.  Pt stating \" I was told that I should not sleep in it, don't need it in the chair, I don't need to wear it if I go to the kitchen to get coffee\"  Pt " verbalized understanding of wearing brace after discussing with NP  Vital Signs:     Pain:  Pain Assessment  Pain Assessment: 0-10  Pain Score: 5 - Moderate pain  Pain Type: Surgical pain, Acute pain  Pain Location: Back  Pain Orientation: Mid, Lower  Pain Radiating Towards: right side/flank pain    Objective    Cognition:  Cognition  Overall Cognitive Status: Within Functional Limits  Impulsive: Moderately  Coordination:     Activities of Daily Living:      Grooming  Grooming Level of Assistance: Contact guard  Grooming Where Assessed: Standing sinkside  Grooming Comments: Pt demo'd fair balance in stance with completion of G/H tasks        UE Dressing  UE Dressing Comments: Pt educated on doffing/donning TLSO brace. Pt required min assist and min verbal cues to complete    LE Dressing  LE Dressing: Yes  LE Dressing Adaptive Equipment: Reacher  Pants Level of Assistance: Minimum assistance  LE Dressing Where Assessed: Chair  LE Dressing Comments: Pt educated on use of reacher and adherence to surgical precauations with completion of LB dressing.  Pt preethio'cleve fair understanding via teachback method.  Pt demo'd fair balance in stance when pulling clothing over hips    Toileting  Toileting Level of Assistance: Contact guard  Where Assessed: Toilet  Toileting Comments: CGA with toilet hygiene and clothing management  Functional Standing Tolerance:  Functional Standing Tolerance Comments: Stand tolerance x 3 min with functional adl tasks  with fair balance.  R LE instability however no LOB  Bed Mobility/Transfers:      Transfers  Transfer: Yes  Transfer 1  Technique 1: Sit to stand, Stand to sit  Transfer Device 1:  (FWW)  Transfer Level of Assistance 1: Contact guard  Trials/Comments 1: Verbal cues for safety and hand placement         Sitting Balance:  Dynamic Sitting Balance  Dynamic Sitting-Comments: F+  Standing Balance:  Static Standing Balance  Static Standing-Comment/Number of Minutes: F+  Dynamic Standing  Balance  Dynamic Standing-Comments: F      Outcome Measures:[facility] Daily Activity  Putting on and taking off regular lower body clothing: A lot  Bathing (including washing, rinsing, drying): A little  Putting on and taking off regular upper body clothing: A little  Toileting, which includes using toilet, bedpan or urinal: A little  Taking care of personal grooming such as brushing teeth: A little  Eating Meals: A little  Daily Activity - Total Score: 17        Education Documentation  Precautions, taught by Jennifer Felty, OTA at 11/22/2023  2:44 PM.  Learner: Patient  Readiness: Acceptance  Method: Explanation, Teach-back  Response: Verbalizes Understanding, Needs Reinforcement  Comment: Pt educated on spinal/surgical precautions    Education Comments  No comments found.        OP EDUCATION:  Education  Individual(s) Educated: Patient  Education Provided: Ergonomics and postural realignment, Joint protection and energy conservation, Fall precautons, Risk and benefits of OT discussed with patient or other (spinal precautions)  Patient Response to Education: Patient/Caregiver Verbalized Understanding of Information    Goals:  Encounter Problems       Encounter Problems (Active)       OT Goals       Pt demo LB dress /bathe with SBA (Progressing)       Start:  11/21/23    Expected End:  11/23/23            Pt demo UB dress / bathe and TLSO brace management SBA (Progressing)       Start:  11/21/23    Expected End:  11/23/23            Pt demo safe ADL transfers with SBA (Progressing)       Start:  11/21/23    Expected End:  11/23/23            Pt follow surgical precautions 100% tx session without cues for increased safety with ADLs and functional transfers.  (Progressing)       Start:  11/21/23    Expected End:  11/23/23            Pt demo F+/F stand balance for increased independence with toileting, hygiene, and self care tasks.  (Progressing)       Start:  11/21/23    Expected End:  11/23/23

## 2023-11-22 NOTE — PROGRESS NOTES
"Tara Tierney is a 68 y.o. female on day 2 of admission presenting with Back pain of lumbar region with sciatica.      Subjective   Patient is currently in ICU she is awake and alert she denies chest pain or shortness of breath she is well informed of plan of care no questions or concerns     11/22/23  Patient sitting up in chair she states she feels really good that she is out of the ICU she does want to go home later today no questions or concerns    Objective   Vital signs stable.  Moving all extremities.  JOSE drain in place    Last Recorded Vitals  Blood pressure 106/52, pulse 94, temperature 36.7 °C (98.1 °F), resp. rate 14, height 1.448 m (4' 9\"), weight 68.4 kg (150 lb 12.7 oz), SpO2 93 %.    Physical Exam  Constitutional:       Appearance: Normal appearance.   Cardiovascular:      Rate and Rhythm: Normal rate.   Pulmonary:      Effort: Pulmonary effort is normal.   Skin:     General: Skin is warm and dry.      Capillary Refill: Capillary refill takes less than 2 seconds.   Neurological:      Mental Status: She is alert.   Psychiatric:         Attention and Perception: Attention normal.         Behavior: Behavior is cooperative.     Back dressings clean and dry.  JOSE drain in place    Current Medications:  acetaminophen, 650 mg, oral, q6h  [Held by provider] atenolol, 50 mg, oral, q AM  brimonidine, 1 drop, Both Eyes, BID  busPIRone, 10 mg, oral, Daily  ceFAZolin, 1 g, intravenous, q8h  cholecalciferol, 2,000 Units, oral, Daily  ezetimibe, 10 mg, oral, Daily  fentaNYL, 1 patch, transdermal, q72h  [Held by provider] furosemide, 20 mg, oral, Daily  [Held by provider] hydrALAZINE, 50 mg, oral, BID  [Held by provider] isosorbide mononitrate ER, 30 mg, oral, Daily  [Held by provider] isosorbide mononitrate ER, 60 mg, oral, Daily  loratadine, 10 mg, oral, Daily  magnesium sulfate, 2 g, intravenous, Once  mirtazapine, 15 mg, oral, Nightly  oxybutynin, 5 mg, oral, BID  oxygen, , inhalation, Continuous - " 02/gases  pantoprazole, 40 mg, oral, Daily before breakfast  polyethylene glycol, 17 g, oral, Daily  potassium chloride CR, 10 mEq, oral, Daily  pregabalin, 75 mg, oral, BID  raloxifene, 60 mg, oral, Daily  simvastatin, 40 mg, oral, Nightly  sodium phosphate, 21 mmol, intravenous, Once  tiotropium, 2 Inhalation, inhalation, Daily  tranexamic acid, 1,950 mg, oral, Once  [Held by provider] triamterene-hydrochlorothiazid, 1 tablet, oral, Daily           CBC:   Results from last 7 days   Lab Units 11/22/23 0446 11/21/23 0323 11/20/23  1536   WBC AUTO x10*3/uL 9.6 11.6* 12.2*   RBC AUTO x10*6/uL 3.13* 3.25* 3.68*   HEMOGLOBIN g/dL 10.5* 10.7* 12.2   HEMATOCRIT % 32.1* 32.0* 36.4   MCV fL 103* 99 99   MCH pg 33.5 32.9 33.2   MCHC g/dL 32.7 33.4 33.5   RDW % 13.9 13.2 13.2   PLATELETS AUTO x10*3/uL 160 165 171       CMP:    Results from last 7 days   Lab Units 11/22/23 0446 11/21/23 0323 11/20/23  1536   SODIUM mmol/L 138 134* 134*   POTASSIUM mmol/L 4.3 3.9 3.7   CHLORIDE mmol/L 106 103 105   CO2 mmol/L 29 26 23   BUN mg/dL 16 16 17   CREATININE mg/dL 0.94 1.19* 0.98   GLUCOSE mg/dL 130* 144* 149*   PROTEIN TOTAL g/dL  --   --  5.3*   CALCIUM mg/dL 8.2* 7.9* 7.6*   BILIRUBIN TOTAL mg/dL  --   --  0.4   ALK PHOS U/L  --   --  60   AST U/L  --   --  34   ALT U/L  --   --  28       BMP:    Results from last 7 days   Lab Units 11/22/23 0446 11/21/23 0323 11/20/23  1536   SODIUM mmol/L 138 134* 134*   POTASSIUM mmol/L 4.3 3.9 3.7   CHLORIDE mmol/L 106 103 105   CO2 mmol/L 29 26 23   BUN mg/dL 16 16 17   CREATININE mg/dL 0.94 1.19* 0.98   CALCIUM mg/dL 8.2* 7.9* 7.6*   GLUCOSE mg/dL 130* 144* 149*                       Assessment/Plan    Status postlaminectomy fusion postop day 2    Plan  -Weightbearing as tolerated  -PT/OT evaluation and treatment   -TLSO brace when ambulating  -remove both JOSE drains  -Possible discharge home later today      Cesar Powers, APRN-CNP

## 2023-11-22 NOTE — PROGRESS NOTES
PROGRESS NOTE    Subjective   Patient sitting comfortably in chair. Patient wearing back brace. Patient denies chest pain, SOB. States she is passing gas, no BM since Mon 11/20/2023.        Objective   Patient is POD 2 of laminectomy of L3-4, L4-5 and L5-S1. After procedure patient was transferred to the ICU for fluctuating BP that required close monitoring. BP has remained stable since transferring to Ortho floor. Patient currently has pain but is tolerable. Dressing dry clean and intact. Right JOSE drain 5 ml and left JOSE drain 25 ml. Sensations intact, 2+ pedal pulses.       Last Recorded Vitals    Visit Vitals  /52 (Patient Position: Sitting)   Pulse 94   Temp 36.7 °C (98.1 °F) (Temporal)   Resp 14        3 Day Weight Change: Unable to Calculate       Intake/Output Summary (Last 24 hours) at 11/22/2023 1003  Last data filed at 11/22/2023 0409  Gross per 24 hour   Intake 761.67 ml   Output 965 ml   Net -203.33 ml          Physical Exam  HENT:      Head: Normocephalic.      Nose: Nose normal.      Mouth/Throat:      Mouth: Mucous membranes are moist.   Eyes:      Conjunctiva/sclera: Conjunctivae normal.      Pupils: Pupils are equal, round, and reactive to light.   Cardiovascular:      Rate and Rhythm: Normal rate.   Pulmonary:      Effort: Pulmonary effort is normal.   Abdominal:      General: Bowel sounds are normal.   Musculoskeletal:         General: Normal range of motion.      Cervical back: Normal range of motion.      Lumbar back: Tenderness present.      Comments: Dressing dry clean intact. Right and Left JOSE drains   Skin:     General: Skin is warm.   Neurological:      General: No focal deficit present.      Mental Status: She is alert and oriented to person, place, and time.   Psychiatric:         Mood and Affect: Mood normal.         Behavior: Behavior normal.         Judgment: Judgment normal.          Scheduled medications  acetaminophen, 650 mg, oral, q6h  [Held by provider] atenolol, 50 mg,  oral, q AM  brimonidine, 1 drop, Both Eyes, BID  busPIRone, 10 mg, oral, Daily  ceFAZolin, 1 g, intravenous, q8h  cholecalciferol, 2,000 Units, oral, Daily  ezetimibe, 10 mg, oral, Daily  fentaNYL, 1 patch, transdermal, q72h  [Held by provider] furosemide, 20 mg, oral, Daily  [Held by provider] hydrALAZINE, 50 mg, oral, BID  [Held by provider] isosorbide mononitrate ER, 30 mg, oral, Daily  [Held by provider] isosorbide mononitrate ER, 60 mg, oral, Daily  loratadine, 10 mg, oral, Daily  magnesium sulfate, 2 g, intravenous, Once  mirtazapine, 15 mg, oral, Nightly  oxybutynin, 5 mg, oral, BID  oxygen, , inhalation, Continuous - 02/gases  pantoprazole, 40 mg, oral, Daily before breakfast  polyethylene glycol, 17 g, oral, Daily  potassium chloride CR, 10 mEq, oral, Daily  pregabalin, 75 mg, oral, BID  raloxifene, 60 mg, oral, Daily  simvastatin, 40 mg, oral, Nightly  sodium phosphate, 21 mmol, intravenous, Once  tiotropium, 2 Inhalation, inhalation, Daily  tranexamic acid, 1,950 mg, oral, Once  [Held by provider] triamterene-hydrochlorothiazid, 1 tablet, oral, Daily      Continuous medications  sodium chloride 0.9%, 100 mL/hr, Last Rate: 100 mL/hr (11/22/23 0133)      PRN medications  PRN medications: albuterol, cyclobenzaprine, meclizine, morphine, naloxone, ondansetron **OR** ondansetron, oxyCODONE, oxyCODONE, oxyCODONE, oxyCODONE       Relevant Results    Results for orders placed or performed during the hospital encounter of 11/20/23 (from the past 96 hour(s))   CBC and Auto Differential   Result Value Ref Range    WBC 12.2 (H) 4.4 - 11.3 x10*3/uL    nRBC 0.0 0.0 - 0.0 /100 WBCs    RBC 3.68 (L) 4.00 - 5.20 x10*6/uL    Hemoglobin 12.2 12.0 - 16.0 g/dL    Hematocrit 36.4 36.0 - 46.0 %    MCV 99 80 - 100 fL    MCH 33.2 26.0 - 34.0 pg    MCHC 33.5 32.0 - 36.0 g/dL    RDW 13.2 11.5 - 14.5 %    Platelets 171 150 - 450 x10*3/uL    Neutrophils % 90.8 40.0 - 80.0 %    Immature Granulocytes %, Automated 0.5 0.0 - 0.9 %     Lymphocytes % 5.6 13.0 - 44.0 %    Monocytes % 2.8 2.0 - 10.0 %    Eosinophils % 0.1 0.0 - 6.0 %    Basophils % 0.2 0.0 - 2.0 %    Neutrophils Absolute 11.11 (H) 1.20 - 7.70 x10*3/uL    Immature Granulocytes Absolute, Automated 0.06 0.00 - 0.70 x10*3/uL    Lymphocytes Absolute 0.69 (L) 1.20 - 4.80 x10*3/uL    Monocytes Absolute 0.34 0.10 - 1.00 x10*3/uL    Eosinophils Absolute 0.01 0.00 - 0.70 x10*3/uL    Basophils Absolute 0.02 0.00 - 0.10 x10*3/uL   Comprehensive metabolic panel   Result Value Ref Range    Glucose 149 (H) 74 - 99 mg/dL    Sodium 134 (L) 136 - 145 mmol/L    Potassium 3.7 3.5 - 5.3 mmol/L    Chloride 105 98 - 107 mmol/L    Bicarbonate 23 21 - 32 mmol/L    Anion Gap 10 10 - 20 mmol/L    Urea Nitrogen 17 6 - 23 mg/dL    Creatinine 0.98 0.50 - 1.05 mg/dL    eGFR 62 >60 mL/min/1.73m*2    Calcium 7.6 (L) 8.6 - 10.3 mg/dL    Albumin 3.2 (L) 3.4 - 5.0 g/dL    Alkaline Phosphatase 60 33 - 136 U/L    Total Protein 5.3 (L) 6.4 - 8.2 g/dL    AST 34 9 - 39 U/L    Bilirubin, Total 0.4 0.0 - 1.2 mg/dL    ALT 28 7 - 45 U/L   Magnesium   Result Value Ref Range    Magnesium 1.66 1.60 - 2.40 mg/dL   Phosphorus   Result Value Ref Range    Phosphorus 3.2 2.5 - 4.9 mg/dL   CBC   Result Value Ref Range    WBC 11.6 (H) 4.4 - 11.3 x10*3/uL    nRBC 0.0 0.0 - 0.0 /100 WBCs    RBC 3.25 (L) 4.00 - 5.20 x10*6/uL    Hemoglobin 10.7 (L) 12.0 - 16.0 g/dL    Hematocrit 32.0 (L) 36.0 - 46.0 %    MCV 99 80 - 100 fL    MCH 32.9 26.0 - 34.0 pg    MCHC 33.4 32.0 - 36.0 g/dL    RDW 13.2 11.5 - 14.5 %    Platelets 165 150 - 450 x10*3/uL   Basic metabolic panel   Result Value Ref Range    Glucose 144 (H) 74 - 99 mg/dL    Sodium 134 (L) 136 - 145 mmol/L    Potassium 3.9 3.5 - 5.3 mmol/L    Chloride 103 98 - 107 mmol/L    Bicarbonate 26 21 - 32 mmol/L    Anion Gap 9 (L) 10 - 20 mmol/L    Urea Nitrogen 16 6 - 23 mg/dL    Creatinine 1.19 (H) 0.50 - 1.05 mg/dL    eGFR 49 (L) >60 mL/min/1.73m*2    Calcium 7.9 (L) 8.6 - 10.3 mg/dL   Phosphorus    Result Value Ref Range    Phosphorus 2.4 (L) 2.5 - 4.9 mg/dL   Magnesium   Result Value Ref Range    Magnesium 2.37 1.60 - 2.40 mg/dL   CBC   Result Value Ref Range    WBC 9.6 4.4 - 11.3 x10*3/uL    nRBC 0.0 0.0 - 0.0 /100 WBCs    RBC 3.13 (L) 4.00 - 5.20 x10*6/uL    Hemoglobin 10.5 (L) 12.0 - 16.0 g/dL    Hematocrit 32.1 (L) 36.0 - 46.0 %     (H) 80 - 100 fL    MCH 33.5 26.0 - 34.0 pg    MCHC 32.7 32.0 - 36.0 g/dL    RDW 13.9 11.5 - 14.5 %    Platelets 160 150 - 450 x10*3/uL   Basic metabolic panel   Result Value Ref Range    Glucose 130 (H) 74 - 99 mg/dL    Sodium 138 136 - 145 mmol/L    Potassium 4.3 3.5 - 5.3 mmol/L    Chloride 106 98 - 107 mmol/L    Bicarbonate 29 21 - 32 mmol/L    Anion Gap 7 (L) 10 - 20 mmol/L    Urea Nitrogen 16 6 - 23 mg/dL    Creatinine 0.94 0.50 - 1.05 mg/dL    eGFR 65 >60 mL/min/1.73m*2    Calcium 8.2 (L) 8.6 - 10.3 mg/dL   Phosphorus   Result Value Ref Range    Phosphorus 1.8 (L) 2.5 - 4.9 mg/dL   Magnesium   Result Value Ref Range    Magnesium 1.88 1.60 - 2.40 mg/dL   SST TOP   Result Value Ref Range    Extra Tube Hold for add-ons.       Assessment/Plan   Patient is POD 1 of laminectomy of L3-4, L4-5 and L5-S1.    Admit to Orthopedics Team   Hospitalist team Consulted   DVTp and Pain management per Ortho   PT/OT evaluation  and treatment   CBC/BMP as appropriate, review results  Pulmonary Toileting   2 JOSE drains- per ortho remove drains  Weigh bearing as tolerated  TLSO brace when ambulating  Follow up as needed outpatient with Orthopedics    HTN/HLD  Tele monitoring for arrhythmia  ICU held atenolol, lasix, Imdur, hydralazine, Maxzide in setting of labile Bps, restart as tolerated.   Continue statin    COPD not in exacerbation   Continue home Spiriva, albuterol  Maintain spo2 >92%  Encourage ambulation   Pulmonary Toileting      Electrolyte replacement  Mag 1.88 replaced with 2g IV  Phos 1.8 replaced with 21 mmol     DVT Prophylaxis  Mechanical, hold anticoagulation per  ortho    Dispo  Per Ortho NP discharge Friday     Patient is hemodynamically stable no post op complications. Hospital medicine to sign off. Please reach back out if needed.      Principal Problem:    Back pain of lumbar region with sciatica  Active Problems:    Back pain with radiculopathy     Time spent  36  minutes obtaining labs, imaging, recommendations, interview, assessment, examination, medication review/ordering, and EMR review.    Plan of care was discussed extensively with patient. Patient verbalized understanding through teach back method. All questions and concerns addressed upon examination.     Of note, this documentation is completed using the Dragon Dictation system (voice recognition software). There may be spelling and/or grammatical errors that were not corrected prior to final submission.

## 2023-11-22 NOTE — PROGRESS NOTES
11/22/23 1409   Discharge Planning   Living Arrangements Spouse/significant other   Support Systems Spouse/significant other   Assistance Needed Ind ADLS and IADLS no AD, no falls   Type of Residence Private residence  (1 level home)   Number of Stairs to Enter Residence 1   Number of Stairs Within Residence 0   Do you have animals or pets at home? Yes   Type of Animals or Pets 1 dog   Home or Post Acute Services Post acute facilities (Rehab/SNF/etc)   Type of Post Acute Facility Services Rehab;Skilled nursing   Patient expects to be discharged to: SNF   Does the patient need discharge transport arranged? Yes   RoundTrip coordination needed? Yes   Financial Resource Strain   How hard is it for you to pay for the very basics like food, housing, medical care, and heating? Not hard   Housing Stability   In the last 12 months, was there a time when you were not able to pay the mortgage or rent on time? N   In the last 12 months, how many places have you lived? 1   In the last 12 months, was there a time when you did not have a steady place to sleep or slept in a shelter (including now)? N   Transportation Needs   In the past 12 months, has lack of transportation kept you from medical appointments or from getting medications? no   In the past 12 months, has lack of transportation kept you from meetings, work, or from getting things needed for daily living? No   Patient Choice   Provider Choice list and CMS website (https://medicare.gov/care-compare#search) for post-acute Quality and Resource Measure Data were provided and reviewed with: Patient   Patient / Family choosing to utilize agency / facility established prior to hospitalization Yes  ([facility])     Pt is s/p L3-4, L4-5, L5-S1 lami fusion on 11/20 with Dr. Gerson Coombs, has GUSTAVOO brace.  PT/OT now notifying [facility] that pt may need SNF upon DC. AMPAC scores currently PT 17 OT 17. Pt also states feels could benefit from additional therapy and skilled services  upon DC and FOC is [facility]. Referral sent to [facility], awaiting acceptance.

## 2023-11-23 LAB
ANION GAP SERPL CALC-SCNC: 9 MMOL/L (ref 10–20)
BUN SERPL-MCNC: 16 MG/DL (ref 6–23)
CALCIUM SERPL-MCNC: 8.6 MG/DL (ref 8.6–10.3)
CHLORIDE SERPL-SCNC: 103 MMOL/L (ref 98–107)
CO2 SERPL-SCNC: 27 MMOL/L (ref 21–32)
CREAT SERPL-MCNC: 0.72 MG/DL (ref 0.5–1.05)
ERYTHROCYTE [DISTWIDTH] IN BLOOD BY AUTOMATED COUNT: 13.8 % (ref 11.5–14.5)
GFR SERPL CREATININE-BSD FRML MDRD: 90 ML/MIN/1.73M*2
GLUCOSE SERPL-MCNC: 133 MG/DL (ref 74–99)
HCT VFR BLD AUTO: 33.5 % (ref 36–46)
HGB BLD-MCNC: 11 G/DL (ref 12–16)
HOLD SPECIMEN: NORMAL
MAGNESIUM SERPL-MCNC: 2.02 MG/DL (ref 1.6–2.4)
MCH RBC QN AUTO: 33.1 PG (ref 26–34)
MCHC RBC AUTO-ENTMCNC: 32.8 G/DL (ref 32–36)
MCV RBC AUTO: 101 FL (ref 80–100)
NRBC BLD-RTO: 0 /100 WBCS (ref 0–0)
PHOSPHATE SERPL-MCNC: 2.4 MG/DL (ref 2.5–4.9)
PLATELET # BLD AUTO: 177 X10*3/UL (ref 150–450)
POTASSIUM SERPL-SCNC: 4.2 MMOL/L (ref 3.5–5.3)
RBC # BLD AUTO: 3.32 X10*6/UL (ref 4–5.2)
SODIUM SERPL-SCNC: 135 MMOL/L (ref 136–145)
WBC # BLD AUTO: 9.5 X10*3/UL (ref 4.4–11.3)

## 2023-11-23 PROCEDURE — 80048 BASIC METABOLIC PNL TOTAL CA: CPT | Performed by: NURSE PRACTITIONER

## 2023-11-23 PROCEDURE — 84100 ASSAY OF PHOSPHORUS: CPT | Performed by: NURSE PRACTITIONER

## 2023-11-23 PROCEDURE — 97116 GAIT TRAINING THERAPY: CPT | Mod: GP,CQ | Performed by: PHYSICAL THERAPY ASSISTANT

## 2023-11-23 PROCEDURE — 2500000004 HC RX 250 GENERAL PHARMACY W/ HCPCS (ALT 636 FOR OP/ED)

## 2023-11-23 PROCEDURE — 36415 COLL VENOUS BLD VENIPUNCTURE: CPT | Performed by: NURSE PRACTITIONER

## 2023-11-23 PROCEDURE — 1200000002 HC GENERAL ROOM WITH TELEMETRY DAILY

## 2023-11-23 PROCEDURE — G0378 HOSPITAL OBSERVATION PER HR: HCPCS

## 2023-11-23 PROCEDURE — 83735 ASSAY OF MAGNESIUM: CPT | Performed by: NURSE PRACTITIONER

## 2023-11-23 PROCEDURE — 97530 THERAPEUTIC ACTIVITIES: CPT | Mod: GP,CQ | Performed by: PHYSICAL THERAPY ASSISTANT

## 2023-11-23 PROCEDURE — 85027 COMPLETE CBC AUTOMATED: CPT | Performed by: NURSE PRACTITIONER

## 2023-11-23 PROCEDURE — 2500000002 HC RX 250 W HCPCS SELF ADMINISTERED DRUGS (ALT 637 FOR MEDICARE OP, ALT 636 FOR OP/ED)

## 2023-11-23 PROCEDURE — 2500000001 HC RX 250 WO HCPCS SELF ADMINISTERED DRUGS (ALT 637 FOR MEDICARE OP)

## 2023-11-23 RX ADMIN — BUSPIRONE HYDROCHLORIDE 10 MG: 5 TABLET ORAL at 09:23

## 2023-11-23 RX ADMIN — CYCLOBENZAPRINE HYDROCHLORIDE 10 MG: 10 TABLET, FILM COATED ORAL at 20:15

## 2023-11-23 RX ADMIN — ACETAMINOPHEN 650 MG: 325 TABLET ORAL at 20:14

## 2023-11-23 RX ADMIN — Medication 2000 UNITS: at 09:22

## 2023-11-23 RX ADMIN — OXYCODONE HYDROCHLORIDE 10 MG: 5 TABLET ORAL at 20:17

## 2023-11-23 RX ADMIN — SIMVASTATIN 40 MG: 40 TABLET, FILM COATED ORAL at 20:19

## 2023-11-23 RX ADMIN — PREGABALIN 75 MG: 25 CAPSULE ORAL at 20:16

## 2023-11-23 RX ADMIN — PANTOPRAZOLE SODIUM 40 MG: 40 TABLET, DELAYED RELEASE ORAL at 05:56

## 2023-11-23 RX ADMIN — POTASSIUM CHLORIDE 10 MEQ: 750 TABLET, EXTENDED RELEASE ORAL at 09:22

## 2023-11-23 RX ADMIN — LORATADINE 10 MG: 10 TABLET ORAL at 09:23

## 2023-11-23 RX ADMIN — ACETAMINOPHEN 650 MG: 325 TABLET ORAL at 09:22

## 2023-11-23 RX ADMIN — OXYBUTYNIN CHLORIDE 5 MG: 5 TABLET ORAL at 20:18

## 2023-11-23 RX ADMIN — PREGABALIN 75 MG: 25 CAPSULE ORAL at 09:23

## 2023-11-23 RX ADMIN — MIRTAZAPINE 15 MG: 15 TABLET, FILM COATED ORAL at 20:15

## 2023-11-23 RX ADMIN — BRIMONIDINE TARTRATE 1 DROP: 2 SOLUTION OPHTHALMIC at 09:24

## 2023-11-23 RX ADMIN — OXYCODONE HYDROCHLORIDE 10 MG: 5 TABLET ORAL at 05:56

## 2023-11-23 RX ADMIN — EZETIMIBE 10 MG: 10 TABLET ORAL at 09:23

## 2023-11-23 RX ADMIN — FENTANYL 1 PATCH: 25 PATCH TRANSDERMAL at 13:53

## 2023-11-23 RX ADMIN — TIOTROPIUM BROMIDE INHALATION SPRAY 2 PUFF: 3.12 SPRAY, METERED RESPIRATORY (INHALATION) at 09:20

## 2023-11-23 RX ADMIN — ACETAMINOPHEN 650 MG: 325 TABLET ORAL at 13:53

## 2023-11-23 RX ADMIN — OXYCODONE HYDROCHLORIDE 10 MG: 5 TABLET ORAL at 12:42

## 2023-11-23 RX ADMIN — OXYBUTYNIN CHLORIDE 5 MG: 5 TABLET ORAL at 09:22

## 2023-11-23 ASSESSMENT — PAIN SCALES - GENERAL
PAINLEVEL_OUTOF10: 7

## 2023-11-23 ASSESSMENT — COGNITIVE AND FUNCTIONAL STATUS - GENERAL
MOVING TO AND FROM BED TO CHAIR: A LITTLE
HELP NEEDED FOR BATHING: A LITTLE
STANDING UP FROM CHAIR USING ARMS: A LOT
CLIMB 3 TO 5 STEPS WITH RAILING: A LOT
MOVING FROM LYING ON BACK TO SITTING ON SIDE OF FLAT BED WITH BEDRAILS: A LITTLE
DRESSING REGULAR UPPER BODY CLOTHING: A LITTLE
MOBILITY SCORE: 18
TURNING FROM BACK TO SIDE WHILE IN FLAT BAD: A LITTLE
DRESSING REGULAR LOWER BODY CLOTHING: A LOT
TURNING FROM BACK TO SIDE WHILE IN FLAT BAD: A LOT
TOILETING: A LITTLE
TURNING FROM BACK TO SIDE WHILE IN FLAT BAD: A LITTLE
WALKING IN HOSPITAL ROOM: A LITTLE
MOVING TO AND FROM BED TO CHAIR: A LITTLE
STANDING UP FROM CHAIR USING ARMS: A LITTLE
WALKING IN HOSPITAL ROOM: A LITTLE
MOBILITY SCORE: 16
STANDING UP FROM CHAIR USING ARMS: A LITTLE
DAILY ACTIVITIY SCORE: 18
PERSONAL GROOMING: A LITTLE
MOVING TO AND FROM BED TO CHAIR: A LITTLE
CLIMB 3 TO 5 STEPS WITH RAILING: A LITTLE
WALKING IN HOSPITAL ROOM: A LOT
MOVING FROM LYING ON BACK TO SITTING ON SIDE OF FLAT BED WITH BEDRAILS: A LITTLE
MOBILITY SCORE: 16
CLIMB 3 TO 5 STEPS WITH RAILING: A LOT

## 2023-11-23 ASSESSMENT — PAIN - FUNCTIONAL ASSESSMENT
PAIN_FUNCTIONAL_ASSESSMENT: 0-10
PAIN_FUNCTIONAL_ASSESSMENT: 0-10

## 2023-11-23 NOTE — PROGRESS NOTES
Physical Therapy    Physical Therapy Treatment    Patient Name: Tara Tierney  MRN: [id number]  Today's Date: 11/23/2023  Time Calculation  Start Time: 0843  Stop Time: 0910  Time Calculation (min): 27 min       Assessment/Plan   PT Assessment  Medical Staff Made Aware: Yes  End of Session Communication: Bedside nurse  Assessment Comment:  (continues to display difficulty and discomfort with transfer transitions from bed and bed mobility.)  End of Session Patient Position: Up in chair     General Visit Information:   PT  Visit  PT Received On: 11/23/23  General  Prior to Session Communication: Bedside nurse  Patient Position Received: Bed, 3 rail up  Preferred Learning Style:  (pt. with c/o right hip pain when attempting hip flexion in supine to mobilize in bed and left lower abdominal area ache, RN was informed.)  General Comment:  (pt. with c/o right hip pain when attempting hip flexion in supine to mobilize in bed and left lower abdominal area ache, RN was informed.)      Precautions:  Precautions  Medical Precautions:  (spinal precautions, falls)  Braces Applied:  (tlso on when up and ambulating)  Precautions Comment:  (falls, wbat tlso on with gait)      Objective   Pain:  Pain Assessment  Pain Assessment: 0-10  Pain Score: 7  Pain Type: Surgical pain    Treatments:    Bed Mobility  Bed Mobility: Yes (log rolling with pillow between knee's educated with cg and use of rails, c/o burning sensation left lower abdomen with activity. educated to bridge, scoot and position.)  Bed Mobility 1  Bed Mobility 1:  (cg/min for bridging and scooting to position in bed.)    Ambulation/Gait Training  Ambulation/Gait Training Performed: Yes (gait training trials with fww and close cg performing slow guarded step to pattern progressing to increase in stride distances of 40-55' today.)  Transfers  Transfer:  (supine to sit with min x 1, sit to stand trials with cg, bed to chair with walker and cg)    Outcome Measures:  [facility]  Basic Mobility  Turning from your back to your side while in a flat bed without using bedrails: A little  Moving from lying on your back to sitting on the side of a flat bed without using bedrails: A little  Moving to and from bed to chair (including a wheelchair): A little  Standing up from a chair using your arms (e.g. wheelchair or bedside chair): A little  To walk in hospital room: A little  Climbing 3-5 steps with railing: A little  Basic Mobility - Total Score: 18      Encounter Problems       Encounter Problems (Active)       PT Problem       Bed mobility supine <> side lying <> sit modified independent  (Progressing)       Start:  11/21/23    Expected End:  11/23/23            Transfers sit <.> stand with ww modified independent  (Progressing)       Start:  11/21/23    Expected End:  11/23/23            Patient to ambulate with ww 50' modified independent  (Progressing)       Start:  11/21/23    Expected End:  11/23/23

## 2023-11-23 NOTE — PROGRESS NOTES
DAILY PROGRESS NOTE      Hospital Day: 3    Patient doing fairly well  Visit Vitals  /57   Pulse 104   Temp 36.7 °C (98.1 °F)   Resp 16      Temp (24hrs), Av.7 °C (98 °F), Min:36.4 °C (97.5 °F), Max:37 °C (98.6 °F)       Pain Control: Adequate  No chest pain or shortness of breath.  No calf pain    Exam:   Patient up in chair  Extremity shows neuro vascular status intact. Flexion and extension intact on extremity.  Calves soft and non-tender without evidence of DVT.        Labs reviewed:  CBC: Status: Final result       Visible to patient: No (inaccessible in [facility])    0 Result Notes            Component  Ref Range & Units 06:19 1 d ago 2 d ago 3 d ago 2 wk ago 4 mo ago 1 yr ago   WBC  4.4 - 11.3 x10*3/uL 9.5 9.6 11.6 High  12.2 High  9.6 7.8 R 15.5 High  R   nRBC  0.0 - 0.0 /100 WBCs 0.0 0.0 0.0 0.0 0.0     RBC  4.00 - 5.20 x10*6/uL 3.32 Low  3.13 Low  3.25 Low  3.68 Low  4.33 4.81 R 3.47 Low  R   Hemoglobin  12.0 - 16.0 g/dL 11.0 Low  10.5 Low  10.7 Low  12.2 14.2 15.9 11.2 Low    Hematocrit  36.0 - 46.0 % 33.5 Low  32.1 Low  32.0 Low  36.4 42.9 46.7 High  34.7 Low    MCV  80 - 100 fL 101 High  103 High  99 99 99 97 100   MCH  26.0 - 34.0 pg 33.1 33.5 32.9 33.2 32.8     MCHC  32.0 - 36.0 g/dL 32.8 32.7 33.4 33.5 33.1 34.0 32.3   RDW  11.5 - 14.5 % 13.8 13.9 13.2 13.2 13.2 13.3 13.1   Platelets  150 - 450 x10*3/uL 177 160 165 171 200 197 R 182 R   [facility]            I&O  I/O last 3 completed shifts:  In: 611.7 (7.7 mL/kg) [P.O.:300; I.V.:311.7 (3.9 mL/kg)]  Out: 1330 (16.8 mL/kg) [Urine:1200 (0.4 mL/kg/hr); Drains:130]  Dosing Weight: 79.1 kg       Assessment: Status post lumbar laminectomy and fusion    Plan: Discharge planning  Continue current pain medication regimen  Mobilize

## 2023-11-23 NOTE — PROGRESS NOTES
Physical Therapy    Physical Therapy Treatment    Patient Name: Tara Tierney  MRN: [id number]  Today's Date: 11/23/2023  Time Calculation  Start Time: 0205  Stop Time: 0231  Time Calculation (min): 26 min       Assessment/Plan   PT Assessment  Medical Staff Made Aware: Yes  End of Session Communication: Bedside nurse  Assessment Comment:  (continues to display difficulty and discomfort with transfer transitions from bed and bed mobility.)  End of Session Patient Position: Up in chair     PT Plan  Treatment/Interventions: Bed mobility, Transfer training, Gait training, Therapeutic activity  PT Frequency: BID  PT Discharge Recommendations: No PT needed after discharge  PT Recommended Transfer Status: Assist x1, Assistive device  PT - OK to Discharge: Yes (once deemed medically appropriate)    General Visit Information:   PT  Visit  PT Received On: 11/23/23  General  Family/Caregiver Present:  (Pt. daughter present observing tx.)  Prior to Session Communication: Bedside nurse  Patient Position Received: Bed, 3 rail up  Preferred Learning Style:  (pt. with c/o right hip pain when attempting hip flexion in supine to mobilize in bed and left lower abdominal area ache, RN was informed.)  General Comment:  (pt. with c/o right hip pain when attempting hip flexion in supine to mobilize in bed and left lower abdominal area ache, RN was informed.)    Precautions:  Precautions  Medical Precautions:  (spinal precautions, falls)  Post-Surgical Precautions:  (spinal precautions with TLSO on when up ambulating)  Braces Applied:  (tlso on when up and ambulating)  Precautions Comment:  (falls, wbat tlso on with gait)      Objective   Pain:  Pain Assessment  Pain Assessment: 0-10  Pain Score: 7  Pain Type: Surgical pain  Pain Interventions:  (nursing medicated)    Treatments:    Bed Mobility  Bed Mobility: Yes (log rolling with pillow between knee's educated with cg and use of rails, c/o burning sensation left lower abdomen with  activity. educated to bridge, scoot and position.)  Bed Mobility 1  Bed Mobility 1:  (cg/min for bridging and scooting to position in bed.)    Ambulation/Gait Training  Ambulation/Gait Training Performed: Yes (gait training trials with fww and close cg performing slow guarded step to pattern progressing to increase in stride distances of 40-55' today.)  Transfers  Transfer:  (supine to sit with min x 1, sit to stand trials with cg, bed to chair with walker and cg)    Outcome Measures:  [facility] Basic Mobility  Turning from your back to your side while in a flat bed without using bedrails: A little  Moving from lying on your back to sitting on the side of a flat bed without using bedrails: A little  Moving to and from bed to chair (including a wheelchair): A little  Standing up from a chair using your arms (e.g. wheelchair or bedside chair): A little  To walk in hospital room: A little  Climbing 3-5 steps with railing: A little  Basic Mobility - Total Score: 18      Outpatient Education  Individual(s) Educated: Patient  Education Provided: Body Mechanics, Fall Risk, Post-Op Precautions, Posture  Diagnosis and Precautions:  (spinal precuautions with assurance during functional activities via verbal instructions.)  Patient/Caregiver Demonstrated Understanding: yes  Patient Response to Education: Patient/Caregiver Verbalized Understanding of Information, Patient/Caregiver Performed Return Demonstration of Exercises/Activities  Education Comment:  (daughter voicing to pt. that she would feel more comfortable with pt. going to snf secondary to does not have help at home and appears to require assist. pt. hesitant to accept.)    Encounter Problems       Encounter Problems (Active)       PT Problem       Bed mobility supine <> side lying <> sit modified independent  (Progressing)       Start:  11/21/23    Expected End:  11/23/23            Transfers sit <.> stand with ww modified independent  (Progressing)       Start:   11/21/23    Expected End:  11/23/23            Patient to ambulate with ww 50' modified independent  (Progressing)       Start:  11/21/23    Expected End:  11/23/23

## 2023-11-24 ENCOUNTER — PHARMACY VISIT (OUTPATIENT)
Dept: PHARMACY | Facility: CLINIC | Age: 68
End: 2023-11-24
Payer: COMMERCIAL

## 2023-11-24 VITALS
DIASTOLIC BLOOD PRESSURE: 56 MMHG | BODY MASS INDEX: 32.53 KG/M2 | SYSTOLIC BLOOD PRESSURE: 118 MMHG | OXYGEN SATURATION: 93 % | RESPIRATION RATE: 16 BRPM | HEIGHT: 57 IN | HEART RATE: 111 BPM | WEIGHT: 150.79 LBS | TEMPERATURE: 99.3 F

## 2023-11-24 LAB
ANION GAP SERPL CALC-SCNC: 8 MMOL/L (ref 10–20)
BUN SERPL-MCNC: 12 MG/DL (ref 6–23)
CALCIUM SERPL-MCNC: 8.6 MG/DL (ref 8.6–10.3)
CHLORIDE SERPL-SCNC: 101 MMOL/L (ref 98–107)
CO2 SERPL-SCNC: 29 MMOL/L (ref 21–32)
CREAT SERPL-MCNC: 0.85 MG/DL (ref 0.5–1.05)
ERYTHROCYTE [DISTWIDTH] IN BLOOD BY AUTOMATED COUNT: 13.8 % (ref 11.5–14.5)
GFR SERPL CREATININE-BSD FRML MDRD: 74 ML/MIN/1.73M*2
GLUCOSE SERPL-MCNC: 121 MG/DL (ref 74–99)
HCT VFR BLD AUTO: 32.9 % (ref 36–46)
HGB BLD-MCNC: 10.8 G/DL (ref 12–16)
MAGNESIUM SERPL-MCNC: 1.92 MG/DL (ref 1.6–2.4)
MCH RBC QN AUTO: 33.4 PG (ref 26–34)
MCHC RBC AUTO-ENTMCNC: 32.8 G/DL (ref 32–36)
MCV RBC AUTO: 102 FL (ref 80–100)
NRBC BLD-RTO: 0 /100 WBCS (ref 0–0)
PHOSPHATE SERPL-MCNC: 3.1 MG/DL (ref 2.5–4.9)
PLATELET # BLD AUTO: 193 X10*3/UL (ref 150–450)
POTASSIUM SERPL-SCNC: 4.1 MMOL/L (ref 3.5–5.3)
RBC # BLD AUTO: 3.23 X10*6/UL (ref 4–5.2)
SODIUM SERPL-SCNC: 134 MMOL/L (ref 136–145)
WBC # BLD AUTO: 8.3 X10*3/UL (ref 4.4–11.3)

## 2023-11-24 PROCEDURE — G0378 HOSPITAL OBSERVATION PER HR: HCPCS

## 2023-11-24 PROCEDURE — 97530 THERAPEUTIC ACTIVITIES: CPT | Mod: GP,CQ

## 2023-11-24 PROCEDURE — RXMED WILLOW AMBULATORY MEDICATION CHARGE

## 2023-11-24 PROCEDURE — 36415 COLL VENOUS BLD VENIPUNCTURE: CPT | Performed by: NURSE PRACTITIONER

## 2023-11-24 PROCEDURE — 84100 ASSAY OF PHOSPHORUS: CPT | Performed by: NURSE PRACTITIONER

## 2023-11-24 PROCEDURE — 83735 ASSAY OF MAGNESIUM: CPT | Performed by: NURSE PRACTITIONER

## 2023-11-24 PROCEDURE — 97530 THERAPEUTIC ACTIVITIES: CPT | Mod: GO,CO

## 2023-11-24 PROCEDURE — 2500000002 HC RX 250 W HCPCS SELF ADMINISTERED DRUGS (ALT 637 FOR MEDICARE OP, ALT 636 FOR OP/ED)

## 2023-11-24 PROCEDURE — 2500000004 HC RX 250 GENERAL PHARMACY W/ HCPCS (ALT 636 FOR OP/ED)

## 2023-11-24 PROCEDURE — 80048 BASIC METABOLIC PNL TOTAL CA: CPT | Performed by: NURSE PRACTITIONER

## 2023-11-24 PROCEDURE — 97116 GAIT TRAINING THERAPY: CPT | Mod: GP,CQ

## 2023-11-24 PROCEDURE — 2500000001 HC RX 250 WO HCPCS SELF ADMINISTERED DRUGS (ALT 637 FOR MEDICARE OP)

## 2023-11-24 PROCEDURE — 85027 COMPLETE CBC AUTOMATED: CPT | Performed by: NURSE PRACTITIONER

## 2023-11-24 PROCEDURE — 97535 SELF CARE MNGMENT TRAINING: CPT | Mod: GO,CO

## 2023-11-24 RX ORDER — DOCUSATE SODIUM 100 MG/1
100 CAPSULE, LIQUID FILLED ORAL 2 TIMES DAILY
Qty: 60 CAPSULE | Refills: 0 | Status: SHIPPED | OUTPATIENT
Start: 2023-11-24 | End: 2024-01-18 | Stop reason: HOSPADM

## 2023-11-24 RX ORDER — ONDANSETRON 4 MG/1
4 TABLET, FILM COATED ORAL EVERY 8 HOURS PRN
Qty: 20 TABLET | Refills: 0 | Status: SHIPPED | OUTPATIENT
Start: 2023-11-24 | End: 2023-11-27

## 2023-11-24 RX ORDER — CYCLOBENZAPRINE HCL 5 MG
5 TABLET ORAL 3 TIMES DAILY PRN
Qty: 21 TABLET | Refills: 0 | Status: SHIPPED | OUTPATIENT
Start: 2023-11-24

## 2023-11-24 RX ORDER — OXYCODONE HYDROCHLORIDE 5 MG/1
5 TABLET ORAL EVERY 4 HOURS PRN
Qty: 42 TABLET | Refills: 0 | Status: ON HOLD | OUTPATIENT
Start: 2023-11-24 | End: 2023-12-08

## 2023-11-24 RX ADMIN — Medication 2000 UNITS: at 09:05

## 2023-11-24 RX ADMIN — POTASSIUM CHLORIDE 10 MEQ: 750 TABLET, EXTENDED RELEASE ORAL at 09:05

## 2023-11-24 RX ADMIN — LORATADINE 10 MG: 10 TABLET ORAL at 09:05

## 2023-11-24 RX ADMIN — OXYCODONE HYDROCHLORIDE 10 MG: 5 TABLET ORAL at 04:03

## 2023-11-24 RX ADMIN — PREGABALIN 75 MG: 25 CAPSULE ORAL at 09:04

## 2023-11-24 RX ADMIN — BUSPIRONE HYDROCHLORIDE 10 MG: 5 TABLET ORAL at 09:05

## 2023-11-24 RX ADMIN — BRIMONIDINE TARTRATE 1 DROP: 2 SOLUTION OPHTHALMIC at 09:06

## 2023-11-24 RX ADMIN — EZETIMIBE 10 MG: 10 TABLET ORAL at 09:05

## 2023-11-24 RX ADMIN — ACETAMINOPHEN 650 MG: 325 TABLET ORAL at 09:05

## 2023-11-24 RX ADMIN — OXYBUTYNIN CHLORIDE 5 MG: 5 TABLET ORAL at 09:05

## 2023-11-24 RX ADMIN — TIOTROPIUM BROMIDE INHALATION SPRAY 2 PUFF: 3.12 SPRAY, METERED RESPIRATORY (INHALATION) at 09:06

## 2023-11-24 RX ADMIN — OXYCODONE HYDROCHLORIDE 10 MG: 5 TABLET ORAL at 12:03

## 2023-11-24 ASSESSMENT — COGNITIVE AND FUNCTIONAL STATUS - GENERAL
MOVING FROM LYING ON BACK TO SITTING ON SIDE OF FLAT BED WITH BEDRAILS: A LITTLE
STANDING UP FROM CHAIR USING ARMS: A LITTLE
MOVING FROM LYING ON BACK TO SITTING ON SIDE OF FLAT BED WITH BEDRAILS: A LITTLE
DAILY ACTIVITIY SCORE: 20
DRESSING REGULAR UPPER BODY CLOTHING: A LITTLE
STANDING UP FROM CHAIR USING ARMS: A LITTLE
WALKING IN HOSPITAL ROOM: A LITTLE
MOBILITY SCORE: 17
TOILETING: A LITTLE
MOVING TO AND FROM BED TO CHAIR: A LITTLE
TOILETING: A LITTLE
HELP NEEDED FOR BATHING: A LITTLE
MOBILITY SCORE: 18
WALKING IN HOSPITAL ROOM: A LITTLE
CLIMB 3 TO 5 STEPS WITH RAILING: A LOT
DRESSING REGULAR LOWER BODY CLOTHING: A LITTLE
TURNING FROM BACK TO SIDE WHILE IN FLAT BAD: A LITTLE
MOVING TO AND FROM BED TO CHAIR: A LITTLE
DRESSING REGULAR UPPER BODY CLOTHING: A LITTLE
CLIMB 3 TO 5 STEPS WITH RAILING: A LOT
DRESSING REGULAR LOWER BODY CLOTHING: A LITTLE
HELP NEEDED FOR BATHING: A LITTLE
DAILY ACTIVITIY SCORE: 20

## 2023-11-24 ASSESSMENT — PAIN - FUNCTIONAL ASSESSMENT
PAIN_FUNCTIONAL_ASSESSMENT: 0-10
PAIN_FUNCTIONAL_ASSESSMENT: 0-10

## 2023-11-24 ASSESSMENT — ACTIVITIES OF DAILY LIVING (ADL): HOME_MANAGEMENT_TIME_ENTRY: 15

## 2023-11-24 ASSESSMENT — PAIN SCALES - GENERAL
PAINLEVEL_OUTOF10: 7
PAINLEVEL_OUTOF10: 4
PAINLEVEL_OUTOF10: 4
PAINLEVEL_OUTOF10: 5 - MODERATE PAIN

## 2023-11-24 NOTE — NURSING NOTE
Follow up appointment noted. Reviewed limitations and brace use. Will continue IS use as instructed. Instructed incision care and monitoring for signs of infection.

## 2023-11-24 NOTE — PROGRESS NOTES
Orthopedics progress Note  Patient: Tara Tierney  Unit/Bed: 605/605-A  YOB: 1955  MRN: [id number]  Acct: [id number]   Admitting Diagnosis: Spinal stenosis, lumbar region without neurogenic claudication [M48.061]  Spinal stenosis, lumbosacral region [M48.07]  Spondylolisthesis, lumbosacral region [M43.17]  Back pain with radiculopathy [M54.10]  Back pain of lumbar region with sciatica [M54.40]  Date:  11/20/2023  Hospital Day: 4  Attending: Gerson Coombs MD     No chief complaint on file.       SUBJECTIVE    Patient seen and examined this morning.  No acute events overnight.        VITALS      Vitals:    11/23/23 0730 11/23/23 1435 11/23/23 1912 11/24/23 0403   BP: 147/57 124/71 132/59 139/65   BP Location:   Right arm    Patient Position:   Sitting    Pulse: 104 104 98 99   Resp: 16 16 16    Temp: 36.7 °C (98.1 °F) 36.8 °C (98.2 °F) 37 °C (98.6 °F) 37 °C (98.6 °F)   TempSrc:       SpO2: 93%  93% 92%   Weight:       Height:           Intake/Output Summary (Last 24 hours) at 11/24/2023 0818  Last data filed at 11/23/2023 1822  Gross per 24 hour   Intake 200 ml   Output 1900 ml   Net -1700 ml      Wt Readings from Last 4 Encounters:   11/21/23 68.4 kg (150 lb 12.7 oz)   11/07/23 77.1 kg (170 lb)   11/03/23 81 kg (178 lb 9.2 oz)   12/15/22 76.2 kg (167 lb 14.4 oz)        Allergies:  Allergies   Allergen Reactions    Neomycin Other     swelling, redness, burning post eye surgery    Neomycin-Polymyxin B-Dexameth Other and Rash     blisters, eye swelling, burning        PHYSICAL EXAM   Physical Exam  HENT:      Head: Normocephalic and atraumatic.      Mouth/Throat:      Mouth: Mucous membranes are dry.      Pharynx: Oropharynx is clear.   Eyes:      Extraocular Movements: Extraocular movements intact.      Pupils: Pupils are equal, round, and reactive to light.   Cardiovascular:      Rate and Rhythm: Normal rate and regular rhythm.      Pulses: Normal pulses.      Heart sounds: S1 normal and  S2 normal.   Pulmonary:      Effort: Pulmonary effort is normal.   Musculoskeletal:         General: No swelling. Normal range of motion.      Cervical back: Normal range of motion.      Lumbar back: Tenderness present.      Comments: Incision to the lumbar region is clean, dry, and intact.  Sensations are intact and bilateral pedal pulses are palpable.   Skin:     General: Skin is warm and dry.      Capillary Refill: Capillary refill takes less than 2 seconds.   Neurological:      General: No focal deficit present.      Mental Status: She is alert and oriented to person, place, and time.   Psychiatric:         Attention and Perception: Attention normal.         Mood and Affect: Mood normal.         Speech: Speech normal.         Behavior: Behavior normal. Behavior is cooperative.           LABS   CBC:   Results from last 7 days   Lab Units 11/24/23  0401 11/23/23  0619 11/22/23  0446   WBC AUTO x10*3/uL 8.3 9.5 9.6   RBC AUTO x10*6/uL 3.23* 3.32* 3.13*   HEMOGLOBIN g/dL 10.8* 11.0* 10.5*   HEMATOCRIT % 32.9* 33.5* 32.1*   MCV fL 102* 101* 103*   MCH pg 33.4 33.1 33.5   MCHC g/dL 32.8 32.8 32.7   RDW % 13.8 13.8 13.9   PLATELETS AUTO x10*3/uL 193 177 160     CMP:    Results from last 7 days   Lab Units 11/24/23  0401 11/23/23  0619 11/22/23  0446 11/21/23  0323 11/20/23  1536   SODIUM mmol/L 134* 135* 138   < > 134*   POTASSIUM mmol/L 4.1 4.2 4.3   < > 3.7   CHLORIDE mmol/L 101 103 106   < > 105   CO2 mmol/L 29 27 29   < > 23   BUN mg/dL 12 16 16   < > 17   CREATININE mg/dL 0.85 0.72 0.94   < > 0.98   GLUCOSE mg/dL 121* 133* 130*   < > 149*   PROTEIN TOTAL g/dL  --   --   --   --  5.3*   CALCIUM mg/dL 8.6 8.6 8.2*   < > 7.6*   BILIRUBIN TOTAL mg/dL  --   --   --   --  0.4   ALK PHOS U/L  --   --   --   --  60   AST U/L  --   --   --   --  34   ALT U/L  --   --   --   --  28    < > = values in this interval not displayed.     BMP:    Results from last 7 days   Lab Units 11/24/23  0401 11/23/23  0619 11/22/23  0446    SODIUM mmol/L 134* 135* 138   POTASSIUM mmol/L 4.1 4.2 4.3   CHLORIDE mmol/L 101 103 106   CO2 mmol/L 29 27 29   BUN mg/dL 12 16 16   CREATININE mg/dL 0.85 0.72 0.94   CALCIUM mg/dL 8.6 8.6 8.2*   GLUCOSE mg/dL 121* 133* 130*     Magnesium:  Results from last 7 days   Lab Units 11/24/23  0401 11/23/23  0619 11/22/23  0446   MAGNESIUM mg/dL 1.92 2.02 1.88       acetaminophen, 650 mg, oral, q6h  [Held by provider] atenolol, 50 mg, oral, q AM  brimonidine, 1 drop, Both Eyes, BID  busPIRone, 10 mg, oral, Daily  cholecalciferol, 2,000 Units, oral, Daily  ezetimibe, 10 mg, oral, Daily  fentaNYL, 1 patch, transdermal, q72h  [Held by provider] furosemide, 20 mg, oral, Daily  [Held by provider] hydrALAZINE, 50 mg, oral, BID  [Held by provider] isosorbide mononitrate ER, 30 mg, oral, Daily  [Held by provider] isosorbide mononitrate ER, 60 mg, oral, Daily  loratadine, 10 mg, oral, Daily  mirtazapine, 15 mg, oral, Nightly  oxybutynin, 5 mg, oral, BID  oxygen, , inhalation, Continuous - 02/gases  pantoprazole, 40 mg, oral, Daily before breakfast  polyethylene glycol, 17 g, oral, Daily  potassium chloride CR, 10 mEq, oral, Daily  pregabalin, 75 mg, oral, BID  raloxifene, 60 mg, oral, Daily  simvastatin, 40 mg, oral, Nightly  tiotropium, 2 Inhalation, inhalation, Daily  tranexamic acid, 1,950 mg, oral, Once  [Held by provider] triamterene-hydrochlorothiazid, 1 tablet, oral, Daily       sodium chloride 0.9%, 100 mL/hr, Last Rate: 100 mL/hr (11/22/23 0133)       Current Outpatient Medications   Medication Instructions    albuterol (ProAir HFA) 90 mcg/actuation inhaler 2 puffs, inhalation    aspirin 81 mg, oral, Daily    atenolol (TENORMIN) 50 mg, oral, Every morning    brimonidine (AlphaGAN) 0.2 % ophthalmic solution 1 drop, Both Eyes, 2 times daily    busPIRone (BUSPAR) 10 mg, oral, Daily    celecoxib (CELEBREX) 200 mg, oral, Daily PRN    cholecalciferol (Vitamin D-3) 50 mcg (2,000 unit) capsule 1 capsule, oral, Daily     clopidogrel (PLAVIX) 75 mg, oral, Daily    ezetimibe (ZETIA) 10 mg, oral, Daily    furosemide (LASIX) 20 mg, oral, Daily    hydrALAZINE (APRESOLINE) 50 mg, oral, 2 times daily    ibandronate (BONIVA) 150 mg, oral, Every 30 days    ipratropium (Atrovent HFA) 17 mcg/actuation inhaler 2 puffs, inhalation, 2 times daily RT    isosorbide mononitrate ER (IMDUR) 30 mg, oral, Daily, With the 60 mg tablet    isosorbide mononitrate ER (IMDUR) 60 mg, oral, Daily, With the 30mg tablet    loratadine (Claritin) 10 mg tablet 1 tablet, oral, Daily    LUTEIN ORAL 20 mg, oral, Daily RT    meclizine (ANTIVERT) 25 mg, oral, 3 times daily PRN    mirtazapine (Remeron) 15 mg tablet 1 tablet, oral, Nightly    nitroglycerin (NITROSTAT) 0.4 mg, sublingual, Every 5 min PRN,  PLACE 1 TABLET UNDER THE TONGUE EVERY 5 MINUTES UP TO 3 DOSES AS NEEDED FOR CHEST PAIN.<BR>    pantoprazole (PROTONIX) 40 mg, oral, Daily before breakfast, Do not crush, chew, or split.    pilocarpine (SALAGEN (PILOCARPINE)) 5 mg, oral, Daily RT    potassium chloride ER (Micro-K) 10 mEq ER capsule 10 mEq, oral, Daily    pregabalin (LYRICA) 75 mg, oral, 2 times daily    raloxifene (Evista) 60 mg tablet 1 tablet, oral, Daily    simvastatin (ZOCOR) 40 mg, oral, Nightly    simvastatin (ZOCOR) 40 mg, oral, 2 times daily    tiZANidine (ZANAFLEX) 4 mg, oral, Every 8 hours PRN    tolterodine (Detrol) 1 mg tablet 1 tablet, oral, Nightly    triamterene-hydrochlorothiazid (Dyazide) 37.5-25 mg capsule 1 capsule, oral, Daily    vit C,S-Sz-wxnzh-lutein-zeaxan (PreserVision AREDS-2) 250-90-40-1 mg capsule 1 capsule, oral, 2 times daily        FL more than 1 hour    Result Date: 11/20/2023  These images are not reportable by radiology and will not be interpreted by  Radiologists.    Assessment     Patient Active Problem List   Diagnosis    Abdominal aortic aneurysm (CMS/HCC)    Abnormal EKG    Bilateral carotid artery stenosis    Bruit of right carotid artery    CAD in native artery     Cardiomyopathy (CMS/HCC)    Chest pain    Chronic asthmatic bronchitis    Closed displaced fracture of fifth metatarsal bone    Current every day smoker    Difficulty breathing    Essential hypertension    History of total knee replacement    Hypokalemia    Intermittent claudication (CMS/HCC)    Knee osteoarthritis    Knee pain    Limb pain    Localized swelling, mass, or lump of lower extremity    Celiac artery stenosis (CMS/HCC)    Mesenteric artery stenosis (CMS/HCC)    Mixed hyperlipidemia    Obese    PVD (peripheral vascular disease) (CMS/HCC)    Right foot pain    Swelling    Trigger finger    Urinary tract infection without hematuria    Back pain of lumbar region with sciatica    Back pain with radiculopathy      Subjective  Patient resting comfortably in bed.  She reports expected postoperative pain.  On examination, sensations are intact to bilateral lower extremities and pedal pulses are palpable.  Plan is for her to work with therapy today, she has improved with her mobility significantly.  Plan is for her to go home.    Impression  POD 4 laminectomy and fusion    Plan:  Pain control  Nausea control  Weightbearing as tolerated bilateral lower extremities  PT/OT  Discharge planning to home          Electronically signed by ORLANDO Gutierrez on 11/24/2023 at 8:18 AM

## 2023-11-24 NOTE — PROGRESS NOTES
Physical Therapy    Physical Therapy    Physical Therapy Treatment    Patient Name: Tara Tierney  MRN: [id number]  Today's Date: 11/24/2023  Time Calculation  Start Time: 0934  Stop Time: 1000  Time Calculation (min): 26 min          PT Plan  Treatment/Interventions: Bed mobility, Transfer training, Gait training, Therapeutic activity  PT Frequency: BID  PT Discharge Recommendations: No PT needed after discharge  PT Recommended Transfer Status: Assist x1, Assistive device  PT - OK to Discharge: Yes (once deemed medically appropriate)    Current Problem:  1. Back pain of lumbar region with sciatica  oxyCODONE (Roxicodone) 5 mg immediate release tablet    docusate sodium (Colace) 100 mg capsule    ondansetron (Zofran) 4 mg tablet    cyclobenzaprine (Flexeril) 5 mg tablet          General Visit Information:   PT  Visit  PT Received On: 11/24/23  General  Reason for Referral: s/p L3,4, L4,5, L5S1 laminectomy; L3,4, PLIF, L3,4, posterior lateral fusion 11/20//23 [facility]  Prior to Session Communication: Bedside nurse  Patient Position Received: Up in chair, Alarm on  Preferred Learning Style: verbal  Subjective     Precautions:  Precautions  Medical Precautions:  (spinal precautions, falls)  Post-Surgical Precautions: Spinal precautions  Braces Applied: TLSO brace  Prosthesis/Orthosis Used:  (TLSO when up ambulating)  Precautions Comment:  (falls, wbat tlso on with gait)         Objective     Pain:  Pain Assessment  Pain Assessment: 0-10  Pain Score: 5 - Moderate pain  Pain Type: Surgical pain  Pain Location: Back  Pain Interventions: Cold applied                        Treatments:           Bed Mobility  Bed Mobility: Yes  Bed Mobility 1  Bed Mobility 1: Log roll  Level of Assistance 1: Distant supervision  Bed Mobility Comments 1:  (Pt log roll with bed  mobility with supervision.)  Ambulation/Gait Training  Ambulation/Gait Training Performed: Yes  Ambulation/Gait Training 1  Surface 1: Level tile  Device 1:  Rolling walker  Gait Support Devices:  (TLSO back brace)  Assistance 1: Close supervision  Quality of Gait 1: Inconsistent stride length  Comments/Distance (ft) 1:  (Pt ambulates with WW with TLSO back brace on with slow uneven strides with shoes on  daisy 50 ftx2 then 15 ftx2 with supervision.)  Transfers  Transfer: Yes  Transfer 1  Technique 1: Sit to stand, Stand to sit  Transfer Device 1: Walker  Transfer Level of Assistance 1: Close supervision  Trials/Comments 1:  (Pt require v/c for proper hand placement technique- transfers from bed and then recliner.)          Outcome Measures:  [facility] Basic Mobility  Turning from your back to your side while in a flat bed without using bedrails: A little  Moving from lying on your back to sitting on the side of a flat bed without using bedrails: None  Moving to and from bed to chair (including a wheelchair): A little  Standing up from a chair using your arms (e.g. wheelchair or bedside chair): A little  To walk in hospital room: A little  Climbing 3-5 steps with railing: A lot  Basic Mobility - Total Score: 18  Education Documentation  No documentation found.  Education Comments  No comments found.        EDUCATION:  Outpatient Education  Individual(s) Educated: Patient  Education Provided: Home Exercise Program, Post-Op Precautions  Diagnosis and Precautions:  (spinal precuautions with assurance during functional activities via verbal instructions.)  Patient/Caregiver Demonstrated Understanding: yes  Patient Response to Education: Patient/Caregiver Verbalized Understanding of Information, Patient/Caregiver Performed Return Demonstration of Exercises/Activities, Patient/Caregiver Asked Appropriate Questions  Education Comment:  (Reviewed/ discuss spinal precautions.)  Encounter Problems       Encounter Problems (Active)       PT Problem       Bed mobility supine <> side lying <> sit modified independent  (Progressing)       Start:  11/21/23    Expected End:  11/23/23             Transfers sit <.> stand with ww modified independent  (Progressing)       Start:  11/21/23    Expected End:  11/23/23            Patient to ambulate with ww 50' modified independent  (Progressing)       Start:  11/21/23    Expected End:  11/23/23

## 2023-11-24 NOTE — DISCHARGE SUMMARY
Discharge Diagnosis  Back pain of lumbar region with sciatica    Issues Requiring Follow-Up  Follow-up with Dr. Coombs in 2 weeks    Test Results Pending At Discharge  Pending Labs       No current pending labs.            Hospital Course   68-year-old female presents to the clinic with back pain.  After risks versus benefits were discussed it was decided to proceed with a laminectomy and fusion.  Patient's procedure and hospitalization were without complication.  She required a few more days of therapy.  She did well with therapy.  Her pain was well controlled.  She will go home in stable condition.      Home Medications     Medication List      START taking these medications     cyclobenzaprine 5 mg tablet; Commonly known as: Flexeril; Take 1 tablet   (5 mg) by mouth 3 times a day as needed for muscle spasms.   docusate sodium 100 mg capsule; Commonly known as: Colace; Take 1   capsule (100 mg) by mouth 2 times a day.   ondansetron ODT 4 mg disintegrating tablet; Commonly known as:   Zofran-ODT; Take 1 tablet (4 mg) by mouth every 8 hours if needed for   nausea or vomiting.   oxyCODONE 5 mg immediate release tablet; Commonly known as: Roxicodone;   Take 1 tablet (5 mg) by mouth every 4 hours if needed for moderate pain (4   - 6) for up to 7 days.     CONTINUE taking these medications     aspirin 81 mg EC tablet   atenolol 50 mg tablet; Commonly known as: Tenormin   Atrovent HFA 17 mcg/actuation inhaler; Generic drug: ipratropium   brimonidine 0.2 % ophthalmic solution; Commonly known as: AlphaGAN   busPIRone 10 mg tablet; Commonly known as: Buspar   celecoxib 200 mg capsule; Commonly known as: CeleBREX   cholecalciferol 50 mcg (2,000 unit) capsule; Commonly known as: Vitamin   D-3   Claritin 10 mg tablet; Generic drug: loratadine   clopidogrel 75 mg tablet; Commonly known as: Plavix   Evista 60 mg tablet; Generic drug: raloxifene   ezetimibe 10 mg tablet; Commonly known as: Zetia   furosemide 20 mg tablet;  Commonly known as: Lasix   hydrALAZINE 50 mg tablet; Commonly known as: Apresoline   ibandronate 150 mg tablet; Commonly known as: Boniva   * isosorbide mononitrate ER 30 mg 24 hr tablet; Commonly known as: Imdur   * isosorbide mononitrate ER 60 mg 24 hr tablet; Commonly known as: Imdur   LUTEIN ORAL   meclizine 25 mg tablet; Commonly known as: Antivert   nitroglycerin 0.4 mg SL tablet; Commonly known as: Nitrostat   pantoprazole 40 mg EC tablet; Commonly known as: ProtoNix   potassium chloride ER 10 mEq ER capsule; Commonly known as: Micro-K   pregabalin 75 mg capsule; Commonly known as: Lyrica   PreserVision AREDS-2 250-90-40-1 mg capsule; Generic drug: vit   C,R-Pc-hnxdv-lutein-zeaxan   ProAir HFA 90 mcg/actuation inhaler; Generic drug: albuterol   Remeron 15 mg tablet; Generic drug: mirtazapine   Salagen (pilocarpine) 5 mg tablet; Generic drug: pilocarpine   * simvastatin 40 mg tablet; Commonly known as: Zocor   * simvastatin 40 mg tablet; Commonly known as: Zocor   tiZANidine 4 mg tablet; Commonly known as: Zanaflex   tolterodine 1 mg tablet; Commonly known as: Detrol   triamterene-hydrochlorothiazid 37.5-25 mg capsule; Commonly known as:   Dyazide  * This list has 4 medication(s) that are the same as other medications   prescribed for you. Read the directions carefully, and ask your doctor or   other care provider to review them with you.     STOP taking these medications     ciprofloxacin 500 mg tablet; Commonly known as: Cipro       Outpatient Follow-Up  Future Appointments   Date Time Provider Department Center   12/5/2023  9:30 AM Gerson Coombs MD [id number] [place]   3/14/2024  9:30 AM Hugo Barron MD [id number] [place]       DARRYL Gutierrez-CNP

## 2023-11-24 NOTE — DISCHARGE INSTRUCTIONS
No heavy lifting or straining until patient is seen in the office  Ok to remove dressing in 48 hours and get wound wet in th shower. Do no scrub. Cover wound with dry dressing after shower. No baths, hot tubs, or pools.   Encourage ambulation at least 3 times per day.   No formal physical therapy until ordered by Dr. Coombs  Follow up in the office in two weeks. Appointment made prior to surgery  You must be accompanied by a responsible adult upon discharge and for 24 hours after surgery. Do no drive a motor vehicle, operate machinery, power tools or appliances, drink alcoholic beverages, or make critical decisions for 24 hours and while on narcotic pain medication.  Be aware of dizziness, which may cause a fall. Change positions slowly.   You may resume regular diet, but do so slowly.   Use your pain medication as prescribed by your physician/nurse practitioner/physician assistant. If possible, take your medication with food, and drink plenty of fluids.   Contact surgeon if you have questions, temperature of 101 degree or greater, increased bleeding, swelling, or pain, drainage from the incision or increased redness around the incision   Call [phone] with any questions or concerns

## 2023-11-24 NOTE — PROGRESS NOTES
Occupational Therapy    OT Treatment    Patient Name: Tara Tierney  MRN: [id number]  Today's Date: 11/24/2023  Time Calculation  Start Time: 0745  Stop Time: 0810  Time Calculation (min): 25 min         Assessment:  Prognosis: Good  Evaluation/Treatment Tolerance: Patient tolerated treatment well  Medical Staff Made Aware: Yes  End of Session Communication: Bedside nurse  End of Session Patient Position: Up in chair, Alarm on  OT Assessment Results: Decreased endurance, Decreased ADL status, Decreased functional mobility, Decreased IADLs  Prognosis: Good  Evaluation/Treatment Tolerance: Patient tolerated treatment well  Medical Staff Made Aware: Yes  Plan:  Treatment Interventions: ADL retraining, Functional transfer training, UE strengthening/ROM, Equipment evaluation/education, Compensatory technique education, Patient/family training  OT Frequency: 2 times per week  OT Discharge Recommendations: Low intensity level of continued care    Treatment Interventions: ADL retraining, Functional transfer training, UE strengthening/ROM, Equipment evaluation/education, Compensatory technique education, Patient/family training    Subjective   Previous Visit Info:  OT Last Visit  OT Received On: 11/24/23  General:  General  Reason for Referral: s/p L3,4, L4,5, L5S1 laminectomy; L3,4, PLIF, L3,4, posterior lateral fusion 11/20//23 [place]  Prior to Session Communication: Bedside nurse (Pt cleared by nsg for participation)  Patient Position Received: Bed, 3 rail up, Alarm on  General Comment: Pt pleasant and cooperative, agreeable to OT.  Pt reports feeling much better today.  Precautions:  Medical Precautions: Fall precautions  Post-Surgical Precautions: Spinal precautions  Braces Applied: TLSO brace  Vital Signs:     Pain:  Pain Assessment  Pain Assessment: 0-10  Pain Score: 4  Pain Type: Surgical pain, Acute pain  Pain Location: Back  Pain Interventions: Repositioned    Objective    Cognition:  Cognition  Orientation  Level: Oriented X4  Impulsive: Moderately  Coordination:     Activities of Daily Living:      Grooming  Grooming Level of Assistance:  (SBA)  Grooming Where Assessed: Standing sinkside  Grooming Comments: Pt gema fair+ balance with G/H tasks x 3 min              UE Dressing  UE Dressing Level of Assistance:  (SBA to hannah TLSO brace and UB clothing)  UE Dressing Where Assessed: Edge of bed  UE Dressing Comments: Pt educated on doffing/donning TLSO brace. Pt required min assist and min verbal cues to complete    LE Dressing  LE Dressing: Yes  LE Dressing Adaptive Equipment: Reacher  Pants Level of Assistance:  (SBA)  LE Dressing Where Assessed: Edge of bed  LE Dressing Comments: Pt gema good understanding of use of reacher from previous education.  Continued education provided on fall prevention and safety with LB adl's    Toileting  Toileting Level of Assistance: Contact guard  Where Assessed: Toilet  Toileting Comments: CGA with toilet hygiene and clothing management  Functional Standing Tolerance:     Bed Mobility/Transfers: Bed Mobility  Bed Mobility: Yes  Bed Mobility 1  Bed Mobility 1: Log roll (HOB ~15 degrees-pt reports she is ordering a wedge for elevation in bed at home)  Level of Assistance 1: Close supervision  Bed Mobility Comments 1: Min verbal cues for technique    Transfers  Transfer: Yes  Transfer 1  Technique 1: Sit to stand, Stand to sit  Transfer Device 1:  (FWW)  Transfer Level of Assistance 1:  (SBA)  Trials/Comments 1: Verbal cues for safety and hand placement.  Pt impulsive at times, receptive to education for fall prevention  Transfers 2  Transfer From 2: Bed to  Transfer to 2: Chair with arms  Transfer Device 2:  (FWW)  Transfer Level of Assistance 2:  (SBA)    Toilet Transfers  Toilet Transfer From: Chair  Toilet Transfer Type: To and from  Toilet Transfer to: Raised toilet seat with rails (simulated home s/u)  Toilet Transfers:  (SBA)    Ambulation/Gait Training:  Ambulation/Gait  Training  Ambulation/Gait Training Performed: Yes (Functional/ADL ambulation in room with FWW between ADL tasks (ambulation between bed, chair, toilet, sink) with SBA)  Sitting Balance:  Static Sitting Balance  Static Sitting-Comment/Number of Minutes: Good  Dynamic Sitting Balance  Dynamic Sitting-Comments: Good- with seated adl's  Standing Balance:  Static Standing Balance  Static Standing-Comment/Number of Minutes: Fair+  Dynamic Standing Balance  Dynamic Standing-Comments: Fair+ with standing adl tasks         Outcome Measures:[facility] Daily Activity  Putting on and taking off regular lower body clothing: A little  Bathing (including washing, rinsing, drying): A little  Putting on and taking off regular upper body clothing: A little  Toileting, which includes using toilet, bedpan or urinal: A little  Taking care of personal grooming such as brushing teeth: None  Eating Meals: None  Daily Activity - Total Score: 20        Education Documentation  Body Mechanics, taught by Jennifer Felty, OTA at 11/24/2023 11:53 AM.  Learner: Patient  Readiness: Acceptance  Method: Explanation, Demonstration, Teach-back  Response: Verbalizes Understanding  Comment: Education provided on safety with adl's and functional transfers, spinal precautions, and fall prevention    Precautions, taught by Jennifer Felty, OTA at 11/24/2023 11:53 AM.  Learner: Patient  Readiness: Acceptance  Method: Explanation, Demonstration, Teach-back  Response: Verbalizes Understanding  Comment: Education provided on safety with adl's and functional transfers, spinal precautions, and fall prevention    ADL Training, taught by Jennifer Felty, OTA at 11/24/2023 11:53 AM.  Learner: Patient  Readiness: Acceptance  Method: Explanation, Demonstration, Teach-back  Response: Verbalizes Understanding  Comment: Education provided on safety with adl's and functional transfers, spinal precautions, and fall prevention    Education Comments  No comments found.        OP  EDUCATION:  Education  Individual(s) Educated: Patient  Education Provided: Ergonomics and postural realignment, Joint protection and energy conservation, Fall precautons, Risk and benefits of OT discussed with patient or other (Spinal precautions and adherence with functional activities)  Patient/Caregiver Demonstrated Understanding: yes  Patient Response to Education: Patient/Caregiver Verbalized Understanding of Information, Patient/Caregiver Asked Appropriate Questions    Goals:  Encounter Problems       Encounter Problems (Active)       OT Goals       Pt demo LB dress /bathe with SBA (Progressing)       Start:  11/21/23    Expected End:  11/25/23            Pt demo UB dress / bathe and TLSO brace management SBA (Progressing)       Start:  11/21/23    Expected End:  11/25/23            Pt demo safe ADL transfers with SBA (Progressing)       Start:  11/21/23    Expected End:  11/25/23            Pt follow surgical precautions 100% tx session without cues for increased safety with ADLs and functional transfers.  (Progressing)       Start:  11/21/23    Expected End:  11/25/23            Pt demo F+/F stand balance for increased independence with toileting, hygiene, and self care tasks.  (Progressing)       Start:  11/21/23    Expected End:  11/25/23

## 2023-11-24 NOTE — PROGRESS NOTES
Rounded with NP,  spoke with therapy and patient.  DC plan has been changed to home.  Pt will have transportation by family and support of spouse at home.  SNF has been notified of change in plan.  Pt to be discharged home today.       Kala Greenwood RN

## 2023-12-01 ENCOUNTER — TELEPHONE (OUTPATIENT)
Dept: ORTHOPEDIC SURGERY | Facility: CLINIC | Age: 68
End: 2023-12-01
Payer: COMMERCIAL

## 2023-12-01 DIAGNOSIS — M54.16 LUMBAR RADICULOPATHY: ICD-10-CM

## 2023-12-01 LAB
ATRIAL RATE: 68 BPM
P AXIS: 53 DEGREES
P OFFSET: 191 MS
P ONSET: 139 MS
PR INTERVAL: 140 MS
Q ONSET: 209 MS
QRS COUNT: 12 BEATS
QRS DURATION: 140 MS
QT INTERVAL: 474 MS
QTC CALCULATION(BAZETT): 504 MS
QTC FREDERICIA: 494 MS
R AXIS: 0 DEGREES
T AXIS: -38 DEGREES
T OFFSET: 446 MS
VENTRICULAR RATE: 68 BPM

## 2023-12-01 RX ORDER — HYDROCODONE BITARTRATE AND ACETAMINOPHEN 5; 325 MG/1; MG/1
1 TABLET ORAL EVERY 6 HOURS PRN
Qty: 28 TABLET | Refills: 0 | Status: CANCELLED | OUTPATIENT
Start: 2023-12-01 | End: 2023-12-08

## 2023-12-01 NOTE — TELEPHONE ENCOUNTER
Pt called requesting some other med besides oxycodone because she says she cannot breathe with it.

## 2023-12-05 ENCOUNTER — APPOINTMENT (OUTPATIENT)
Dept: RADIOLOGY | Facility: HOSPITAL | Age: 68
DRG: 856 | End: 2023-12-05
Payer: COMMERCIAL

## 2023-12-05 ENCOUNTER — HOSPITAL ENCOUNTER (INPATIENT)
Facility: HOSPITAL | Age: 68
LOS: 11 days | Discharge: NURSING, REHAB OR LONG-TERM CARE | DRG: 856 | End: 2023-12-18
Attending: STUDENT IN AN ORGANIZED HEALTH CARE EDUCATION/TRAINING PROGRAM | Admitting: ORTHOPAEDIC SURGERY
Payer: COMMERCIAL

## 2023-12-05 ENCOUNTER — APPOINTMENT (OUTPATIENT)
Dept: ORTHOPEDIC SURGERY | Facility: CLINIC | Age: 68
End: 2023-12-05
Payer: COMMERCIAL

## 2023-12-05 ENCOUNTER — TELEPHONE (OUTPATIENT)
Dept: ORTHOPEDIC SURGERY | Facility: CLINIC | Age: 68
End: 2023-12-05
Payer: COMMERCIAL

## 2023-12-05 DIAGNOSIS — T81.89XA LUMBAR SURGICAL WOUND FLUID COLLECTION, INITIAL ENCOUNTER: ICD-10-CM

## 2023-12-05 DIAGNOSIS — E87.6 HYPOKALEMIA: ICD-10-CM

## 2023-12-05 DIAGNOSIS — I65.23 BILATERAL CAROTID ARTERY STENOSIS: ICD-10-CM

## 2023-12-05 DIAGNOSIS — A49.02 MRSA INFECTION: ICD-10-CM

## 2023-12-05 DIAGNOSIS — M54.9 INTRACTABLE BACK PAIN: ICD-10-CM

## 2023-12-05 DIAGNOSIS — I73.9 PVD (PERIPHERAL VASCULAR DISEASE) (CMS-HCC): ICD-10-CM

## 2023-12-05 DIAGNOSIS — I26.99 ACUTE PULMONARY EMBOLISM WITHOUT ACUTE COR PULMONALE, UNSPECIFIED PULMONARY EMBOLISM TYPE (MULTI): ICD-10-CM

## 2023-12-05 DIAGNOSIS — J44.89 CHRONIC ASTHMATIC BRONCHITIS (MULTI): ICD-10-CM

## 2023-12-05 DIAGNOSIS — L02.212 BACK ABSCESS: Primary | ICD-10-CM

## 2023-12-05 DIAGNOSIS — G96.198 PSEUDOMENINGOCELE: ICD-10-CM

## 2023-12-05 DIAGNOSIS — F17.200 CURRENT EVERY DAY SMOKER: ICD-10-CM

## 2023-12-05 DIAGNOSIS — I42.9 CARDIOMYOPATHY, UNSPECIFIED TYPE (MULTI): ICD-10-CM

## 2023-12-05 DIAGNOSIS — R60.9 SWELLING: ICD-10-CM

## 2023-12-05 DIAGNOSIS — M54.40 BACK PAIN OF LUMBAR REGION WITH SCIATICA: ICD-10-CM

## 2023-12-05 DIAGNOSIS — Z86.711 PERSONAL HISTORY OF PULMONARY EMBOLISM: ICD-10-CM

## 2023-12-05 DIAGNOSIS — I25.10 CAD IN NATIVE ARTERY: ICD-10-CM

## 2023-12-05 DIAGNOSIS — M54.10 BACK PAIN WITH RADICULOPATHY: ICD-10-CM

## 2023-12-05 DIAGNOSIS — I73.9 INTERMITTENT CLAUDICATION (CMS-HCC): ICD-10-CM

## 2023-12-05 DIAGNOSIS — E78.2 MIXED HYPERLIPIDEMIA: ICD-10-CM

## 2023-12-05 DIAGNOSIS — R94.31 ABNORMAL EKG: ICD-10-CM

## 2023-12-05 DIAGNOSIS — I10 ESSENTIAL HYPERTENSION: ICD-10-CM

## 2023-12-05 DIAGNOSIS — A49.02 MRSA (METHICILLIN RESISTANT STAPHYLOCOCCUS AUREUS) INFECTION: ICD-10-CM

## 2023-12-05 DIAGNOSIS — Z22.322 MRSA (METHICILLIN RESISTANT STAPH AUREUS) CULTURE POSITIVE: ICD-10-CM

## 2023-12-05 DIAGNOSIS — N39.0 URINARY TRACT INFECTION WITHOUT HEMATURIA, SITE UNSPECIFIED: ICD-10-CM

## 2023-12-05 DIAGNOSIS — M79.604 PAIN OF RIGHT LOWER EXTREMITY: ICD-10-CM

## 2023-12-05 DIAGNOSIS — R06.89 DIFFICULTY BREATHING: ICD-10-CM

## 2023-12-05 DIAGNOSIS — K55.1 MESENTERIC ARTERY STENOSIS (MULTI): ICD-10-CM

## 2023-12-05 DIAGNOSIS — I77.1 CELIAC ARTERY STENOSIS (CMS-HCC): ICD-10-CM

## 2023-12-05 DIAGNOSIS — M79.671 RIGHT FOOT PAIN: ICD-10-CM

## 2023-12-05 DIAGNOSIS — R09.89 BRUIT OF RIGHT CAROTID ARTERY: ICD-10-CM

## 2023-12-05 DIAGNOSIS — T79.2XXA: ICD-10-CM

## 2023-12-05 LAB
ALBUMIN SERPL BCP-MCNC: 4 G/DL (ref 3.4–5)
ALP SERPL-CCNC: 79 U/L (ref 33–136)
ALT SERPL W P-5'-P-CCNC: 12 U/L (ref 7–45)
ANION GAP SERPL CALC-SCNC: 16 MMOL/L (ref 10–20)
APPEARANCE UR: ABNORMAL
AST SERPL W P-5'-P-CCNC: 13 U/L (ref 9–39)
BACTERIA #/AREA URNS AUTO: ABNORMAL /HPF
BASOPHILS # BLD AUTO: 0.03 X10*3/UL (ref 0–0.1)
BASOPHILS NFR BLD AUTO: 0.3 %
BILIRUB SERPL-MCNC: 0.4 MG/DL (ref 0–1.2)
BILIRUB UR STRIP.AUTO-MCNC: NEGATIVE MG/DL
BUN SERPL-MCNC: 17 MG/DL (ref 6–23)
CALCIUM SERPL-MCNC: 9.3 MG/DL (ref 8.6–10.3)
CHLORIDE SERPL-SCNC: 101 MMOL/L (ref 98–107)
CO2 SERPL-SCNC: 22 MMOL/L (ref 21–32)
COLOR UR: YELLOW
CREAT SERPL-MCNC: 0.87 MG/DL (ref 0.5–1.05)
CRP SERPL-MCNC: 1.01 MG/DL
EOSINOPHIL # BLD AUTO: 0.21 X10*3/UL (ref 0–0.7)
EOSINOPHIL NFR BLD AUTO: 2.4 %
ERYTHROCYTE [DISTWIDTH] IN BLOOD BY AUTOMATED COUNT: 13.2 % (ref 11.5–14.5)
ERYTHROCYTE [SEDIMENTATION RATE] IN BLOOD BY WESTERGREN METHOD: 13 MM/H (ref 0–30)
GFR SERPL CREATININE-BSD FRML MDRD: 72 ML/MIN/1.73M*2
GLUCOSE SERPL-MCNC: 143 MG/DL (ref 74–99)
GLUCOSE UR STRIP.AUTO-MCNC: NEGATIVE MG/DL
HCT VFR BLD AUTO: 34.7 % (ref 36–46)
HGB BLD-MCNC: 11.7 G/DL (ref 12–16)
HOLD SPECIMEN: NORMAL
IMM GRANULOCYTES # BLD AUTO: 0.04 X10*3/UL (ref 0–0.7)
IMM GRANULOCYTES NFR BLD AUTO: 0.5 % (ref 0–0.9)
KETONES UR STRIP.AUTO-MCNC: NEGATIVE MG/DL
LACTATE SERPL-SCNC: 0.9 MMOL/L (ref 0.4–2)
LEUKOCYTE ESTERASE UR QL STRIP.AUTO: ABNORMAL
LYMPHOCYTES # BLD AUTO: 1.21 X10*3/UL (ref 1.2–4.8)
LYMPHOCYTES NFR BLD AUTO: 13.8 %
MCH RBC QN AUTO: 32.4 PG (ref 26–34)
MCHC RBC AUTO-ENTMCNC: 33.7 G/DL (ref 32–36)
MCV RBC AUTO: 96 FL (ref 80–100)
MONOCYTES # BLD AUTO: 0.5 X10*3/UL (ref 0.1–1)
MONOCYTES NFR BLD AUTO: 5.7 %
NEUTROPHILS # BLD AUTO: 6.77 X10*3/UL (ref 1.2–7.7)
NEUTROPHILS NFR BLD AUTO: 77.3 %
NITRITE UR QL STRIP.AUTO: POSITIVE
NRBC BLD-RTO: 0 /100 WBCS (ref 0–0)
PH UR STRIP.AUTO: 6 [PH]
PLATELET # BLD AUTO: 321 X10*3/UL (ref 150–450)
POTASSIUM SERPL-SCNC: 3.6 MMOL/L (ref 3.5–5.3)
PROT SERPL-MCNC: 6.6 G/DL (ref 6.4–8.2)
PROT UR STRIP.AUTO-MCNC: NEGATIVE MG/DL
RBC # BLD AUTO: 3.61 X10*6/UL (ref 4–5.2)
RBC # UR STRIP.AUTO: NEGATIVE /UL
RBC #/AREA URNS AUTO: ABNORMAL /HPF
SODIUM SERPL-SCNC: 135 MMOL/L (ref 136–145)
SP GR UR STRIP.AUTO: 1.01
UROBILINOGEN UR STRIP.AUTO-MCNC: <2 MG/DL
WBC # BLD AUTO: 8.8 X10*3/UL (ref 4.4–11.3)
WBC #/AREA URNS AUTO: ABNORMAL /HPF

## 2023-12-05 PROCEDURE — G0378 HOSPITAL OBSERVATION PER HR: HCPCS

## 2023-12-05 PROCEDURE — 72158 MRI LUMBAR SPINE W/O & W/DYE: CPT

## 2023-12-05 PROCEDURE — 2500000004 HC RX 250 GENERAL PHARMACY W/ HCPCS (ALT 636 FOR OP/ED): Performed by: PHYSICIAN ASSISTANT

## 2023-12-05 PROCEDURE — 2500000004 HC RX 250 GENERAL PHARMACY W/ HCPCS (ALT 636 FOR OP/ED)

## 2023-12-05 PROCEDURE — 99223 1ST HOSP IP/OBS HIGH 75: CPT | Performed by: STUDENT IN AN ORGANIZED HEALTH CARE EDUCATION/TRAINING PROGRAM

## 2023-12-05 PROCEDURE — 96375 TX/PRO/DX INJ NEW DRUG ADDON: CPT

## 2023-12-05 PROCEDURE — 71275 CT ANGIOGRAPHY CHEST: CPT

## 2023-12-05 PROCEDURE — 99285 EMERGENCY DEPT VISIT HI MDM: CPT | Performed by: STUDENT IN AN ORGANIZED HEALTH CARE EDUCATION/TRAINING PROGRAM

## 2023-12-05 PROCEDURE — 87086 URINE CULTURE/COLONY COUNT: CPT | Mod: ELYLAB | Performed by: PHYSICIAN ASSISTANT

## 2023-12-05 PROCEDURE — 2500000004 HC RX 250 GENERAL PHARMACY W/ HCPCS (ALT 636 FOR OP/ED): Performed by: STUDENT IN AN ORGANIZED HEALTH CARE EDUCATION/TRAINING PROGRAM

## 2023-12-05 PROCEDURE — 86140 C-REACTIVE PROTEIN: CPT | Performed by: PHYSICIAN ASSISTANT

## 2023-12-05 PROCEDURE — 96376 TX/PRO/DX INJ SAME DRUG ADON: CPT

## 2023-12-05 PROCEDURE — 2500000001 HC RX 250 WO HCPCS SELF ADMINISTERED DRUGS (ALT 637 FOR MEDICARE OP): Performed by: STUDENT IN AN ORGANIZED HEALTH CARE EDUCATION/TRAINING PROGRAM

## 2023-12-05 PROCEDURE — 72158 MRI LUMBAR SPINE W/O & W/DYE: CPT | Performed by: RADIOLOGY

## 2023-12-05 PROCEDURE — 2500000004 HC RX 250 GENERAL PHARMACY W/ HCPCS (ALT 636 FOR OP/ED): Performed by: NURSE PRACTITIONER

## 2023-12-05 PROCEDURE — 83605 ASSAY OF LACTIC ACID: CPT | Performed by: PHYSICIAN ASSISTANT

## 2023-12-05 PROCEDURE — 85652 RBC SED RATE AUTOMATED: CPT | Performed by: PHYSICIAN ASSISTANT

## 2023-12-05 PROCEDURE — 71275 CT ANGIOGRAPHY CHEST: CPT | Performed by: RADIOLOGY

## 2023-12-05 PROCEDURE — 81001 URINALYSIS AUTO W/SCOPE: CPT | Performed by: PHYSICIAN ASSISTANT

## 2023-12-05 PROCEDURE — 72131 CT LUMBAR SPINE W/O DYE: CPT

## 2023-12-05 PROCEDURE — 36415 COLL VENOUS BLD VENIPUNCTURE: CPT | Performed by: PHYSICIAN ASSISTANT

## 2023-12-05 PROCEDURE — 72131 CT LUMBAR SPINE W/O DYE: CPT | Performed by: RADIOLOGY

## 2023-12-05 PROCEDURE — 80053 COMPREHEN METABOLIC PANEL: CPT | Performed by: PHYSICIAN ASSISTANT

## 2023-12-05 PROCEDURE — 96365 THER/PROPH/DIAG IV INF INIT: CPT | Mod: 59

## 2023-12-05 PROCEDURE — 2550000001 HC RX 255 CONTRASTS: Performed by: STUDENT IN AN ORGANIZED HEALTH CARE EDUCATION/TRAINING PROGRAM

## 2023-12-05 PROCEDURE — 2500000002 HC RX 250 W HCPCS SELF ADMINISTERED DRUGS (ALT 637 FOR MEDICARE OP, ALT 636 FOR OP/ED): Performed by: STUDENT IN AN ORGANIZED HEALTH CARE EDUCATION/TRAINING PROGRAM

## 2023-12-05 PROCEDURE — 2550000001 HC RX 255 CONTRASTS: Performed by: PHYSICIAN ASSISTANT

## 2023-12-05 PROCEDURE — 2500000001 HC RX 250 WO HCPCS SELF ADMINISTERED DRUGS (ALT 637 FOR MEDICARE OP): Performed by: PHYSICIAN ASSISTANT

## 2023-12-05 PROCEDURE — 74174 CTA ABD&PLVS W/CONTRAST: CPT | Performed by: RADIOLOGY

## 2023-12-05 PROCEDURE — 96372 THER/PROPH/DIAG INJ SC/IM: CPT | Performed by: STUDENT IN AN ORGANIZED HEALTH CARE EDUCATION/TRAINING PROGRAM

## 2023-12-05 PROCEDURE — 85025 COMPLETE CBC W/AUTO DIFF WBC: CPT | Performed by: PHYSICIAN ASSISTANT

## 2023-12-05 PROCEDURE — 87040 BLOOD CULTURE FOR BACTERIA: CPT | Mod: ELYLAB | Performed by: PHYSICIAN ASSISTANT

## 2023-12-05 PROCEDURE — A9575 INJ GADOTERATE MEGLUMI 0.1ML: HCPCS | Performed by: STUDENT IN AN ORGANIZED HEALTH CARE EDUCATION/TRAINING PROGRAM

## 2023-12-05 RX ORDER — ONDANSETRON HYDROCHLORIDE 2 MG/ML
4 INJECTION, SOLUTION INTRAVENOUS EVERY 8 HOURS PRN
Status: DISCONTINUED | OUTPATIENT
Start: 2023-12-05 | End: 2023-12-06

## 2023-12-05 RX ORDER — ISOSORBIDE MONONITRATE 30 MG/1
30 TABLET, EXTENDED RELEASE ORAL DAILY
Status: DISCONTINUED | OUTPATIENT
Start: 2023-12-05 | End: 2023-12-18

## 2023-12-05 RX ORDER — ACETAMINOPHEN 650 MG/1
650 SUPPOSITORY RECTAL EVERY 4 HOURS PRN
Status: DISCONTINUED | OUTPATIENT
Start: 2023-12-05 | End: 2023-12-18 | Stop reason: HOSPADM

## 2023-12-05 RX ORDER — CYCLOBENZAPRINE HCL 10 MG
5 TABLET ORAL 3 TIMES DAILY PRN
Status: DISCONTINUED | OUTPATIENT
Start: 2023-12-05 | End: 2023-12-18 | Stop reason: HOSPADM

## 2023-12-05 RX ORDER — ACETAMINOPHEN 160 MG/5ML
650 SOLUTION ORAL EVERY 4 HOURS PRN
Status: DISCONTINUED | OUTPATIENT
Start: 2023-12-05 | End: 2023-12-18 | Stop reason: HOSPADM

## 2023-12-05 RX ORDER — DEXAMETHASONE 4 MG/1
4 TABLET ORAL EVERY 8 HOURS SCHEDULED
Status: DISCONTINUED | OUTPATIENT
Start: 2023-12-05 | End: 2023-12-07

## 2023-12-05 RX ORDER — SIMVASTATIN 40 MG/1
40 TABLET, FILM COATED ORAL 2 TIMES DAILY
Status: DISCONTINUED | OUTPATIENT
Start: 2023-12-05 | End: 2023-12-18 | Stop reason: HOSPADM

## 2023-12-05 RX ORDER — NITROGLYCERIN 0.4 MG/1
0.4 TABLET SUBLINGUAL EVERY 5 MIN PRN
Status: DISCONTINUED | OUTPATIENT
Start: 2023-12-05 | End: 2023-12-18 | Stop reason: HOSPADM

## 2023-12-05 RX ORDER — MIRTAZAPINE 15 MG/1
15 TABLET, FILM COATED ORAL NIGHTLY
Status: DISCONTINUED | OUTPATIENT
Start: 2023-12-05 | End: 2023-12-18 | Stop reason: HOSPADM

## 2023-12-05 RX ORDER — PANTOPRAZOLE SODIUM 40 MG/1
40 TABLET, DELAYED RELEASE ORAL
Status: DISCONTINUED | OUTPATIENT
Start: 2023-12-06 | End: 2023-12-18 | Stop reason: HOSPADM

## 2023-12-05 RX ORDER — GADOTERATE MEGLUMINE 376.9 MG/ML
0.2 INJECTION INTRAVENOUS
Status: COMPLETED | OUTPATIENT
Start: 2023-12-05 | End: 2023-12-05

## 2023-12-05 RX ORDER — DOCUSATE SODIUM 100 MG/1
100 CAPSULE, LIQUID FILLED ORAL 2 TIMES DAILY
Status: DISCONTINUED | OUTPATIENT
Start: 2023-12-05 | End: 2023-12-18 | Stop reason: HOSPADM

## 2023-12-05 RX ORDER — SIMVASTATIN 40 MG/1
40 TABLET, FILM COATED ORAL NIGHTLY
Status: DISCONTINUED | OUTPATIENT
Start: 2023-12-05 | End: 2023-12-05 | Stop reason: SDUPTHER

## 2023-12-05 RX ORDER — HYDRALAZINE HYDROCHLORIDE 50 MG/1
50 TABLET, FILM COATED ORAL 2 TIMES DAILY
Status: DISCONTINUED | OUTPATIENT
Start: 2023-12-05 | End: 2023-12-18 | Stop reason: HOSPADM

## 2023-12-05 RX ORDER — ONDANSETRON HYDROCHLORIDE 2 MG/ML
4 INJECTION, SOLUTION INTRAVENOUS ONCE
Status: COMPLETED | OUTPATIENT
Start: 2023-12-05 | End: 2023-12-05

## 2023-12-05 RX ORDER — ISOSORBIDE MONONITRATE 60 MG/1
1 TABLET, EXTENDED RELEASE ORAL
COMMUNITY

## 2023-12-05 RX ORDER — OXYCODONE AND ACETAMINOPHEN 5; 325 MG/1; MG/1
1 TABLET ORAL ONCE
Status: COMPLETED | OUTPATIENT
Start: 2023-12-05 | End: 2023-12-05

## 2023-12-05 RX ORDER — TRIAMTERENE/HYDROCHLOROTHIAZID 37.5-25 MG
1 TABLET ORAL DAILY
Status: DISCONTINUED | OUTPATIENT
Start: 2023-12-05 | End: 2023-12-17

## 2023-12-05 RX ORDER — DIAZEPAM 5 MG/ML
5 INJECTION, SOLUTION INTRAMUSCULAR; INTRAVENOUS ONCE
Status: COMPLETED | OUTPATIENT
Start: 2023-12-05 | End: 2023-12-05

## 2023-12-05 RX ORDER — ASPIRIN 81 MG
100 TABLET, DELAYED RELEASE (ENTERIC COATED) ORAL DAILY
COMMUNITY
Start: 2022-06-30 | End: 2023-12-18 | Stop reason: HOSPADM

## 2023-12-05 RX ORDER — ACETAMINOPHEN 325 MG/1
650 TABLET ORAL EVERY 4 HOURS PRN
Status: DISCONTINUED | OUTPATIENT
Start: 2023-12-05 | End: 2023-12-18 | Stop reason: HOSPADM

## 2023-12-05 RX ORDER — EZETIMIBE 10 MG/1
10 TABLET ORAL DAILY
Status: DISCONTINUED | OUTPATIENT
Start: 2023-12-05 | End: 2023-12-18 | Stop reason: HOSPADM

## 2023-12-05 RX ORDER — GABAPENTIN 300 MG/1
300 CAPSULE ORAL ONCE
Status: COMPLETED | OUTPATIENT
Start: 2023-12-05 | End: 2023-12-05

## 2023-12-05 RX ORDER — MORPHINE SULFATE 2 MG/ML
2 INJECTION, SOLUTION INTRAMUSCULAR; INTRAVENOUS ONCE
Status: COMPLETED | OUTPATIENT
Start: 2023-12-05 | End: 2023-12-05

## 2023-12-05 RX ORDER — POLYETHYLENE GLYCOL 3350 17 G/17G
17 POWDER, FOR SOLUTION ORAL DAILY
Status: DISCONTINUED | OUTPATIENT
Start: 2023-12-05 | End: 2023-12-18 | Stop reason: HOSPADM

## 2023-12-05 RX ORDER — BUSPIRONE HYDROCHLORIDE 5 MG/1
10 TABLET ORAL DAILY
Status: DISCONTINUED | OUTPATIENT
Start: 2023-12-05 | End: 2023-12-18 | Stop reason: HOSPADM

## 2023-12-05 RX ORDER — ONDANSETRON 4 MG/1
4 TABLET, FILM COATED ORAL EVERY 8 HOURS PRN
Status: DISCONTINUED | OUTPATIENT
Start: 2023-12-05 | End: 2023-12-06

## 2023-12-05 RX ORDER — CEFTRIAXONE 1 G/50ML
1 INJECTION, SOLUTION INTRAVENOUS EVERY 24 HOURS
Status: DISCONTINUED | OUTPATIENT
Start: 2023-12-06 | End: 2023-12-07

## 2023-12-05 RX ORDER — BRIMONIDINE TARTRATE 2 MG/ML
1 SOLUTION/ DROPS OPHTHALMIC 2 TIMES DAILY
Status: DISCONTINUED | OUTPATIENT
Start: 2023-12-05 | End: 2023-12-18 | Stop reason: HOSPADM

## 2023-12-05 RX ORDER — HYDROMORPHONE HYDROCHLORIDE 1 MG/ML
1 INJECTION, SOLUTION INTRAMUSCULAR; INTRAVENOUS; SUBCUTANEOUS ONCE
Status: COMPLETED | OUTPATIENT
Start: 2023-12-05 | End: 2023-12-05

## 2023-12-05 RX ORDER — ATENOLOL 50 MG/1
50 TABLET ORAL EVERY MORNING
Status: DISCONTINUED | OUTPATIENT
Start: 2023-12-06 | End: 2023-12-18 | Stop reason: HOSPADM

## 2023-12-05 RX ORDER — ASPIRIN 81 MG/1
81 TABLET ORAL DAILY
Status: DISCONTINUED | OUTPATIENT
Start: 2023-12-05 | End: 2023-12-18 | Stop reason: HOSPADM

## 2023-12-05 RX ORDER — OXYCODONE HYDROCHLORIDE 5 MG/1
10 TABLET ORAL EVERY 6 HOURS PRN
Status: DISCONTINUED | OUTPATIENT
Start: 2023-12-05 | End: 2023-12-18 | Stop reason: HOSPADM

## 2023-12-05 RX ORDER — LORATADINE 10 MG/1
10 TABLET ORAL DAILY
Status: DISCONTINUED | OUTPATIENT
Start: 2023-12-05 | End: 2023-12-18 | Stop reason: HOSPADM

## 2023-12-05 RX ORDER — ISOSORBIDE MONONITRATE 60 MG/1
60 TABLET, EXTENDED RELEASE ORAL DAILY
Status: DISCONTINUED | OUTPATIENT
Start: 2023-12-05 | End: 2023-12-18 | Stop reason: HOSPADM

## 2023-12-05 RX ORDER — HYDROMORPHONE HYDROCHLORIDE 1 MG/ML
INJECTION, SOLUTION INTRAMUSCULAR; INTRAVENOUS; SUBCUTANEOUS
Status: COMPLETED
Start: 2023-12-05 | End: 2023-12-05

## 2023-12-05 RX ORDER — OXYCODONE HYDROCHLORIDE 5 MG/1
5 TABLET ORAL EVERY 6 HOURS PRN
Status: DISCONTINUED | OUTPATIENT
Start: 2023-12-05 | End: 2023-12-10

## 2023-12-05 RX ORDER — HEPARIN SODIUM 5000 [USP'U]/ML
5000 INJECTION, SOLUTION INTRAVENOUS; SUBCUTANEOUS EVERY 8 HOURS
Status: DISCONTINUED | OUTPATIENT
Start: 2023-12-05 | End: 2023-12-17

## 2023-12-05 RX ORDER — ASPIRIN 81 MG/1
81 TABLET ORAL DAILY
COMMUNITY
Start: 2022-06-30 | End: 2023-12-18 | Stop reason: HOSPADM

## 2023-12-05 RX ORDER — CHOLECALCIFEROL (VITAMIN D3) 25 MCG
2000 TABLET ORAL DAILY
Status: DISCONTINUED | OUTPATIENT
Start: 2023-12-05 | End: 2023-12-18 | Stop reason: HOSPADM

## 2023-12-05 RX ADMIN — DEXAMETHASONE 4 MG: 4 TABLET ORAL at 22:34

## 2023-12-05 RX ADMIN — HYDROMORPHONE HYDROCHLORIDE 0.5 MG: 1 INJECTION, SOLUTION INTRAMUSCULAR; INTRAVENOUS; SUBCUTANEOUS at 08:03

## 2023-12-05 RX ADMIN — ISOSORBIDE MONONITRATE 60 MG: 60 TABLET, EXTENDED RELEASE ORAL at 22:37

## 2023-12-05 RX ADMIN — HYDROMORPHONE HYDROCHLORIDE 1 MG: 1 INJECTION, SOLUTION INTRAMUSCULAR; INTRAVENOUS; SUBCUTANEOUS at 15:55

## 2023-12-05 RX ADMIN — PREGABALIN 75 MG: 50 CAPSULE ORAL at 22:34

## 2023-12-05 RX ADMIN — EZETIMIBE 10 MG: 10 TABLET ORAL at 22:35

## 2023-12-05 RX ADMIN — ACETAMINOPHEN 650 MG: 325 TABLET ORAL at 20:21

## 2023-12-05 RX ADMIN — GADOTERATE MEGLUMINE 15 ML: 376.9 INJECTION INTRAVENOUS at 16:28

## 2023-12-05 RX ADMIN — GABAPENTIN 300 MG: 300 CAPSULE ORAL at 13:44

## 2023-12-05 RX ADMIN — IOHEXOL 100 ML: 350 INJECTION, SOLUTION INTRAVENOUS at 14:49

## 2023-12-05 RX ADMIN — BRIMONIDINE TARTRATE 1 DROP: 2 SOLUTION/ DROPS OPHTHALMIC at 22:38

## 2023-12-05 RX ADMIN — TRIAMTERENE AND HYDROCHLOROTHIAZIDE 1 TABLET: 37.5; 25 TABLET ORAL at 22:35

## 2023-12-05 RX ADMIN — OXYCODONE HYDROCHLORIDE 10 MG: 5 TABLET ORAL at 20:20

## 2023-12-05 RX ADMIN — MORPHINE SULFATE 2 MG: 2 INJECTION, SOLUTION INTRAMUSCULAR; INTRAVENOUS at 22:52

## 2023-12-05 RX ADMIN — MIRTAZAPINE 15 MG: 15 TABLET, FILM COATED ORAL at 22:34

## 2023-12-05 RX ADMIN — HEPARIN SODIUM 5000 UNITS: 5000 INJECTION INTRAVENOUS; SUBCUTANEOUS at 22:36

## 2023-12-05 RX ADMIN — LORATADINE 10 MG: 10 TABLET ORAL at 22:34

## 2023-12-05 RX ADMIN — ONDANSETRON 4 MG: 2 INJECTION INTRAMUSCULAR; INTRAVENOUS at 08:03

## 2023-12-05 RX ADMIN — OXYCODONE HYDROCHLORIDE AND ACETAMINOPHEN 1 TABLET: 5; 325 TABLET ORAL at 11:03

## 2023-12-05 RX ADMIN — DOCUSATE SODIUM 100 MG: 100 CAPSULE, LIQUID FILLED ORAL at 22:33

## 2023-12-05 RX ADMIN — Medication 2000 UNITS: at 22:33

## 2023-12-05 RX ADMIN — DIAZEPAM 5 MG: 10 INJECTION, SOLUTION INTRAMUSCULAR; INTRAVENOUS at 13:43

## 2023-12-05 RX ADMIN — HYDROMORPHONE HYDROCHLORIDE 1 MG: 1 INJECTION, SOLUTION INTRAMUSCULAR; INTRAVENOUS; SUBCUTANEOUS at 09:42

## 2023-12-05 RX ADMIN — HYDRALAZINE HYDROCHLORIDE 50 MG: 50 TABLET ORAL at 22:34

## 2023-12-05 RX ADMIN — CEFTRIAXONE 2 G: 2 INJECTION, POWDER, FOR SOLUTION INTRAMUSCULAR; INTRAVENOUS at 17:35

## 2023-12-05 RX ADMIN — ISOSORBIDE MONONITRATE 30 MG: 30 TABLET, EXTENDED RELEASE ORAL at 22:33

## 2023-12-05 RX ADMIN — HYDROMORPHONE HYDROCHLORIDE 1 MG: 1 INJECTION, SOLUTION INTRAMUSCULAR; INTRAVENOUS; SUBCUTANEOUS at 14:43

## 2023-12-05 RX ADMIN — SIMVASTATIN 40 MG: 40 TABLET, FILM COATED ORAL at 22:35

## 2023-12-05 RX ADMIN — ASPIRIN 81 MG: 81 TABLET, COATED ORAL at 22:32

## 2023-12-05 RX ADMIN — BUSPIRONE HYDROCHLORIDE 10 MG: 5 TABLET ORAL at 22:33

## 2023-12-05 SDOH — SOCIAL STABILITY: SOCIAL INSECURITY: DO YOU FEEL ANYONE HAS EXPLOITED OR TAKEN ADVANTAGE OF YOU FINANCIALLY OR OF YOUR PERSONAL PROPERTY?: NO

## 2023-12-05 SDOH — SOCIAL STABILITY: SOCIAL INSECURITY: ARE YOU OR HAVE YOU BEEN THREATENED OR ABUSED PHYSICALLY, EMOTIONALLY, OR SEXUALLY BY ANYONE?: NO

## 2023-12-05 SDOH — SOCIAL STABILITY: SOCIAL INSECURITY: WERE YOU ABLE TO COMPLETE ALL THE BEHAVIORAL HEALTH SCREENINGS?: YES

## 2023-12-05 SDOH — SOCIAL STABILITY: SOCIAL INSECURITY: DO YOU FEEL UNSAFE GOING BACK TO THE PLACE WHERE YOU ARE LIVING?: NO

## 2023-12-05 SDOH — SOCIAL STABILITY: SOCIAL INSECURITY: HAS ANYONE EVER THREATENED TO HURT YOUR FAMILY OR YOUR PETS?: NO

## 2023-12-05 SDOH — SOCIAL STABILITY: SOCIAL INSECURITY: HAVE YOU HAD THOUGHTS OF HARMING ANYONE ELSE?: NO

## 2023-12-05 SDOH — SOCIAL STABILITY: SOCIAL INSECURITY: DOES ANYONE TRY TO KEEP YOU FROM HAVING/CONTACTING OTHER FRIENDS OR DOING THINGS OUTSIDE YOUR HOME?: NO

## 2023-12-05 SDOH — SOCIAL STABILITY: SOCIAL INSECURITY: ABUSE: ADULT

## 2023-12-05 SDOH — SOCIAL STABILITY: SOCIAL INSECURITY: ARE THERE ANY APPARENT SIGNS OF INJURIES/BEHAVIORS THAT COULD BE RELATED TO ABUSE/NEGLECT?: NO

## 2023-12-05 ASSESSMENT — ACTIVITIES OF DAILY LIVING (ADL)
TOILETING: INDEPENDENT
GROOMING: INDEPENDENT
JUDGMENT_ADEQUATE_SAFELY_COMPLETE_DAILY_ACTIVITIES: YES
LACK_OF_TRANSPORTATION: NO
FEEDING YOURSELF: INDEPENDENT
BATHING: INDEPENDENT
DRESSING YOURSELF: INDEPENDENT
HEARING - RIGHT EAR: FUNCTIONAL
ADEQUATE_TO_COMPLETE_ADL: YES
PATIENT'S MEMORY ADEQUATE TO SAFELY COMPLETE DAILY ACTIVITIES?: YES
WALKS IN HOME: INDEPENDENT
ASSISTIVE_DEVICE: WALKER
HEARING - LEFT EAR: FUNCTIONAL

## 2023-12-05 ASSESSMENT — PAIN DESCRIPTION - ONSET: ONSET: ONGOING

## 2023-12-05 ASSESSMENT — PAIN DESCRIPTION - PAIN TYPE
TYPE: SURGICAL PAIN;ACUTE PAIN
TYPE: ACUTE PAIN

## 2023-12-05 ASSESSMENT — PAIN SCALES - GENERAL
PAINLEVEL_OUTOF10: 10 - WORST POSSIBLE PAIN
PAINLEVEL_OUTOF10: 9
PAINLEVEL_OUTOF10: 0 - NO PAIN
PAINLEVEL_OUTOF10: 10 - WORST POSSIBLE PAIN

## 2023-12-05 ASSESSMENT — LIFESTYLE VARIABLES
SKIP TO QUESTIONS 9-10: 1
HOW OFTEN DO YOU HAVE A DRINK CONTAINING ALCOHOL: NEVER
EVER FELT BAD OR GUILTY ABOUT YOUR DRINKING: NO
AUDIT-C TOTAL SCORE: 0
REASON UNABLE TO ASSESS: NO
HAVE PEOPLE ANNOYED YOU BY CRITICIZING YOUR DRINKING: NO
HAVE YOU EVER FELT YOU SHOULD CUT DOWN ON YOUR DRINKING: NO
PRESCIPTION_ABUSE_PAST_12_MONTHS: NO
HOW OFTEN DO YOU HAVE 6 OR MORE DRINKS ON ONE OCCASION: NEVER
HOW MANY STANDARD DRINKS CONTAINING ALCOHOL DO YOU HAVE ON A TYPICAL DAY: PATIENT DOES NOT DRINK
SUBSTANCE_ABUSE_PAST_12_MONTHS: NO
AUDIT-C TOTAL SCORE: 0
EVER HAD A DRINK FIRST THING IN THE MORNING TO STEADY YOUR NERVES TO GET RID OF A HANGOVER: NO

## 2023-12-05 ASSESSMENT — PAIN DESCRIPTION - ORIENTATION
ORIENTATION: LOWER
ORIENTATION: RIGHT

## 2023-12-05 ASSESSMENT — PAIN DESCRIPTION - LOCATION
LOCATION: ABDOMEN
LOCATION: BACK
LOCATION: BACK
LOCATION: ABDOMEN
LOCATION: BACK
LOCATION: BACK

## 2023-12-05 ASSESSMENT — PATIENT HEALTH QUESTIONNAIRE - PHQ9
1. LITTLE INTEREST OR PLEASURE IN DOING THINGS: NOT AT ALL
2. FEELING DOWN, DEPRESSED OR HOPELESS: NOT AT ALL
SUM OF ALL RESPONSES TO PHQ9 QUESTIONS 1 & 2: 0

## 2023-12-05 ASSESSMENT — PAIN - FUNCTIONAL ASSESSMENT
PAIN_FUNCTIONAL_ASSESSMENT: 0-10

## 2023-12-05 ASSESSMENT — COLUMBIA-SUICIDE SEVERITY RATING SCALE - C-SSRS
2. HAVE YOU ACTUALLY HAD ANY THOUGHTS OF KILLING YOURSELF?: NO
1. IN THE PAST MONTH, HAVE YOU WISHED YOU WERE DEAD OR WISHED YOU COULD GO TO SLEEP AND NOT WAKE UP?: NO
1. IN THE PAST MONTH, HAVE YOU WISHED YOU WERE DEAD OR WISHED YOU COULD GO TO SLEEP AND NOT WAKE UP?: NO
2. HAVE YOU ACTUALLY HAD ANY THOUGHTS OF KILLING YOURSELF?: NO
6. HAVE YOU EVER DONE ANYTHING, STARTED TO DO ANYTHING, OR PREPARED TO DO ANYTHING TO END YOUR LIFE?: NO
6. HAVE YOU EVER DONE ANYTHING, STARTED TO DO ANYTHING, OR PREPARED TO DO ANYTHING TO END YOUR LIFE?: NO

## 2023-12-05 ASSESSMENT — PAIN DESCRIPTION - FREQUENCY: FREQUENCY: CONSTANT/CONTINUOUS

## 2023-12-05 ASSESSMENT — PAIN DESCRIPTION - DESCRIPTORS: DESCRIPTORS: ACHING;SHARP;STABBING

## 2023-12-05 NOTE — TELEPHONE ENCOUNTER
Pt's spouse lvm stating pt will not make appt today 12/5 as pt is in the hospital.  He went on to state she was in so much pain never seen her like that before had to call 911.

## 2023-12-05 NOTE — ED PROVIDER NOTES
HPI   Chief Complaint   Patient presents with    Back Pain     Patient had back surgery 2 weeks ago. Patient states she is having increased lower back pain.        A 68-year-old female patient comes in the emergency department today by EMS secondary to back pain.  Describes she had a back surgery done approximately 2 weeks ago by Dr. Coombs.  States she was doing well up until roughly 2 nights ago.  She started have progressive pain in the low back that radiates to her pelvic region and down the front of her thighs.  She describes it as a sharp pain.  Rates it a 10 out of 10 on the pain scale.  Denies any associated saddle anesthesia, loss of bowel control, urinary retention.  Denies any associated fevers, chills, nausea, vomiting.  The pain is worse with movement of her legs.  Otherwise no other modifying factors.  This purpose she comes in the emergency department today for further evaluation.                          Lawler Coma Scale Score: 15                  Patient History   Past Medical History:   Diagnosis Date    Arthritis     Back pain     COPD (chronic obstructive pulmonary disease) (CMS/HCC)     COVID-19 vaccine administered     Eczema     History of COVID-19     2020    Hyperlipidemia     Hypertension     Hypoesthesia of skin     Hypesthesia    Macular degeneration     Meniere's disease     Osteoporosis     Overactive bladder     Pain in unspecified finger(s)     Finger pain    Pain in unspecified wrist     Pain in wrist joint    Peripheral vascular disease (CMS/HCC)     Personal history of other diseases of the circulatory system     History of coronary artery disease    Personal history of other diseases of the musculoskeletal system and connective tissue     History of arthritis    Personal history of other specified conditions     History of chest pain    Personal history of other specified conditions     History of balance disorder    Personal history of other specified conditions     History  of numbness    Postmenopausal     Seasonal allergies     Unspecified visual loss     Vision problems     Past Surgical History:   Procedure Laterality Date    ADENOIDECTOMY      AORTA - BILATERAL FEMORAL ARTERY BYPASS GRAFT      BLEPHAROPTOSIS REPAIR      CARDIAC CATHETERIZATION      with stent and balloon    CHOLECYSTECTOMY      COLONOSCOPY      CT ANGIO NECK  2022    CT NECK ANGIO W AND WO IV CONTRAST 2022 [facility]    CT AORTA AND BILATERAL ILIOFEMORAL RUNOFF ANGIOGRAM W AND/OR WO IV CONTRAST  03/10/2020    CT AORTA AND BILATERAL ILIOFEMORAL RUNOFF ANGIOGRAM W AND/OR WO IV CONTRAST 3/10/2020 [facility]    CT AORTA AND BILATERAL ILIOFEMORAL RUNOFF ANGIOGRAM W AND/OR WO IV CONTRAST  2023    CT AORTA AND BILATERAL ILIOFEMORAL RUNOFF ANGIOGRAM W AND/OR WO IV CONTRAST 2023 [facility]    FL TRANSLUMINAL ANGIO PERCUTANEOUS BHARAT      TONSILLECTOMY      Tonsillectomy    TOTAL KNEE ARTHROPLASTY Bilateral     TUBAL LIGATION       Family History   Problem Relation Name Age of Onset    Breast cancer Mother      Other (arteriosclerotic cardiovascular disease) Father      Cancer Father       Social History     Tobacco Use    Smoking status: Former     Packs/day: 0.50     Years: 45.00     Additional pack years: 0.00     Total pack years: 22.50     Types: Cigarettes     Quit date: 2023     Years since quittin.2    Smokeless tobacco: Not on file   Vaping Use    Vaping Use: Never used   Substance Use Topics    Alcohol use: Never    Drug use: Never       Physical Exam   ED Triage Vitals [23 0745]   Temp Heart Rate Resp BP   36.8 °C (98.2 °F) 80 22 151/51      SpO2 Temp src Heart Rate Source Patient Position   95 % -- Monitor --      BP Location FiO2 (%)     -- --       Physical Exam  Constitutional:       General: She is in acute distress (Moderate distress).      Appearance: Normal appearance. She is not ill-appearing or diaphoretic.   HENT:      Head: Normocephalic and  atraumatic.      Nose: Nose normal.   Eyes:      Extraocular Movements: Extraocular movements intact.      Conjunctiva/sclera: Conjunctivae normal.   Cardiovascular:      Rate and Rhythm: Normal rate and regular rhythm.   Pulmonary:      Effort: Pulmonary effort is normal. No respiratory distress.      Breath sounds: Normal breath sounds. No stridor. No wheezing.   Abdominal:      General: Abdomen is flat.      Palpations: Abdomen is soft.      Tenderness: There is no abdominal tenderness.   Musculoskeletal:         General: Normal range of motion.      Cervical back: Normal range of motion.      Comments: Equal strength bilaterally lower extremities   Skin:     General: Skin is warm and dry.   Neurological:      General: No focal deficit present.      Mental Status: She is alert and oriented to person, place, and time. Mental status is at baseline.   Psychiatric:         Mood and Affect: Mood normal.       ED Course & MDM   Diagnoses as of 12/05/23 1734   Intractable back pain       Medical Decision Making  A 68-year-old female patient comes in the emergency department today by EMS secondary to back pain.  Describes she had a back surgery done approximately 2 weeks ago by Dr. Coombs.  States she was doing well up until roughly 2 nights ago.  She started have progressive pain in the low back that radiates to her pelvic region and down the front of her thighs.  She describes it as a sharp pain.  Rates it a 10 out of 10 on the pain scale.  Denies any associated saddle anesthesia, loss of bowel control, urinary retention.  Denies any associated fevers, chills, nausea, vomiting.  The pain is worse with movement of her legs.  Otherwise no other modifying factors.  This purpose she comes in the emergency department today for further evaluation.    Urinalysis, laboratory studies ordered as well as inflammatory markers.  Ordered IV Dilaudid IV Zofran for pain and potential nausea.  Rule out UTI, significant leukocytosis  or electrolyte abnormalities.    Patient informed the nurse that she fell approximate 1 week ago and her back is progressively gotten worse since that point in time.  CT of the lumbar spine ordered to rule out any acute fracture or other bony abnormality secondary to this fall.    Patient has continued pain.  P.o. Percocet ordered in attempt to provide longer lasting relief    Patient has negative CT study of the lumbar spine.  I called and spoke to Dr. Gerson Navarrete with orthopedics who is recommending to get an MRI with and without contrast.  MRI of the lumbar spine ordered.    Patient continued to have chest pain rated 10 out of 10 on the pain scale.  IV Valium, p.o. gabapentin ordered    Patient continues to have out of proportion pain.  IV Dilaudid ordered again and CTA of the chest abdomen pelvis to rule out any acute dissection.    CTA of the chest abdomen pelvis is negative for any aortic aneurysm or dissection.  Consistent findings with recent lumbar procedure.    Patient given IV Dilaudid prior to MRI    Patient's urinalysis positive for UTI IV Rocephin ordered as well as blood cultures.    Handoff to Chivo John PA-C pending MRI results, discussion with orthopedics and admission to the hospital        Procedure  Procedures     Escobar Tineo PA-C  12/05/23 5103

## 2023-12-05 NOTE — H&P
History Of Present Illness  Tara Tierney is a 68 y.o. female presenting with back pain..  She recently had a back surgery about 2 weeks ago was doing well up until Friday when she had a fall, patient denies hitting her back and states that she did not injure herself.  Started having pain in her back, lower front abdomen radiating down to the leg both back and front since Sunday.  The pain is constant and is getting worse.  She denies any fevers, chills, urinary issues, shortness of breath does complain of some nausea with the pain.  She also says that her legs are getting weak.  Denies any saddle anesthesia, urinary or fecal incontinence.  CBC did not show any leukocytosis, CRP was only 1.01.  UA did test positive for nitrates. MRI lumbar spine showed postoperative changes and a large dorsal extraspinal fluid collection extending from the laminectomy bed to the deep muscular fascia into the subcutaneous fat that extends from the right lateral recess at the level of L4-5 and L5-S1 into the spinal canal and deforms the thecal sac resulting in moderate narrowing at L4-5 and to lesser degree at L3-4.     Past Medical History  Past Medical History:   Diagnosis Date    Arthritis     Back pain     COPD (chronic obstructive pulmonary disease) (CMS/HCC)     COVID-19 vaccine administered     Eczema     History of COVID-19     2020    Hyperlipidemia     Hypertension     Hypoesthesia of skin     Hypesthesia    Macular degeneration     Meniere's disease     Osteoporosis     Overactive bladder     Pain in unspecified finger(s)     Finger pain    Pain in unspecified wrist     Pain in wrist joint    Peripheral vascular disease (CMS/HCC)     Personal history of other diseases of the circulatory system     History of coronary artery disease    Personal history of other diseases of the musculoskeletal system and connective tissue     History of arthritis    Personal history of other specified conditions     History of chest pain     Personal history of other specified conditions     History of balance disorder    Personal history of other specified conditions     History of numbness    Postmenopausal     Seasonal allergies     Unspecified visual loss     Vision problems       Surgical History  Past Surgical History:   Procedure Laterality Date    ADENOIDECTOMY      AORTA - BILATERAL FEMORAL ARTERY BYPASS GRAFT      BLEPHAROPTOSIS REPAIR      CARDIAC CATHETERIZATION      with stent and balloon    CHOLECYSTECTOMY      COLONOSCOPY      CT ANGIO NECK  05/18/2022    CT NECK ANGIO W AND WO IV CONTRAST 5/18/2022 [facility]    CT AORTA AND BILATERAL ILIOFEMORAL RUNOFF ANGIOGRAM W AND/OR WO IV CONTRAST  03/10/2020    CT AORTA AND BILATERAL ILIOFEMORAL RUNOFF ANGIOGRAM W AND/OR WO IV CONTRAST 3/10/2020 [facility]    CT AORTA AND BILATERAL ILIOFEMORAL RUNOFF ANGIOGRAM W AND/OR WO IV CONTRAST  07/21/2023    CT AORTA AND BILATERAL ILIOFEMORAL RUNOFF ANGIOGRAM W AND/OR WO IV CONTRAST 7/21/2023 [facility]    FL TRANSLUMINAL ANGIO PERCUTANEOUS BHARAT      TONSILLECTOMY      Tonsillectomy    TOTAL KNEE ARTHROPLASTY Bilateral     TUBAL LIGATION          Social History  Continues to smoke about half a pack a, no regular alcohol use    Family History  Family History   Problem Relation Name Age of Onset    Breast cancer Mother      Other (arteriosclerotic cardiovascular disease) Father      Cancer Father          Allergies  Neomycin and Neomycin-polymyxin b-dexameth    Review of Systems  As per HPI, comprehensive review of systems performed    Physical Exam  Constitutional:       General: She is in acute distress.      Appearance: She is obese.   HENT:      Head: Normocephalic and atraumatic.   Eyes:      Extraocular Movements: Extraocular movements intact.      Conjunctiva/sclera: Conjunctivae normal.   Cardiovascular:      Rate and Rhythm: Normal rate and regular rhythm.      Heart sounds: No murmur heard.  Pulmonary:      Effort: Pulmonary  "effort is normal. No respiratory distress.   Abdominal:      Palpations: Abdomen is soft.      Tenderness: There is no abdominal tenderness.   Musculoskeletal:      Cervical back: Neck supple.      Comments: Complains of pain in the back, unable to lie in 1 position, power is 4/5 in bilateral lower limbs   Skin:     General: Skin is warm and dry.   Neurological:      Mental Status: She is alert. Mental status is at baseline.          Last Recorded Vitals  Blood pressure (!) 195/94, pulse 94, temperature 36.8 °C (98.2 °F), temperature source Temporal, resp. rate 20, height 1.448 m (4' 9\"), weight 77.1 kg (170 lb), SpO2 98 %.    Relevant Results          Assessment/Plan   Principal Problem:    Intractable back pain      68-year-old female with past medical history of hypertension, hyperlipidemia, TIA, anxiety admitted with intractable back pain.    I reviewed her MRI findings, I believe that fluid collection might be causing neuropathic pain  Will continue Lyrica, I will add Decadron to try to reduce edema/swelling  Consult orthopedics  Consult PT OT  Oxycodone 5 mg for moderate pain, 10 mg for severe pain  Supportive care, symptomatic treatment  Continue regular home meds  Start ceftriaxone for possible UTI, urine cultures were sent in the ER  Does not have  signs of active cord compression including saddle anesthesia, urinary or fecal incontinence   DVT prophylaxis  Was counseled regarding smoking, she refused a nicotine patch             Nitesh Ruiz MD    "

## 2023-12-05 NOTE — PROGRESS NOTES
Emergency Medicine Transition of Care Note.      I received Tara Tierney in signout from  physician assistant Escobar Tineo.  Please see the previous ED provider note for all HPI, PE and MDM up to the time of signout at  5 PM. This is in addition to the primary record. In brief Tara Tierney is an 68 y.o. female presenting for   Chief Complaint   Patient presents with    Back Pain     Patient had back surgery 2 weeks ago. Patient states she is having increased lower back pain.     .  At the time of signout we were awaiting:  results of MRI and admission.      Diagnoses as of 12/05/23 1825   Intractable back pain   Pseudomeningocele   Urinary tract infection without hematuria, site unspecified     Labs Reviewed   CBC WITH AUTO DIFFERENTIAL - Abnormal       Result Value    WBC 8.8      nRBC 0.0      RBC 3.61 (*)     Hemoglobin 11.7 (*)     Hematocrit 34.7 (*)     MCV 96      MCH 32.4      MCHC 33.7      RDW 13.2      Platelets 321      Neutrophils % 77.3      Immature Granulocytes %, Automated 0.5      Lymphocytes % 13.8      Monocytes % 5.7      Eosinophils % 2.4      Basophils % 0.3      Neutrophils Absolute 6.77      Immature Granulocytes Absolute, Automated 0.04      Lymphocytes Absolute 1.21      Monocytes Absolute 0.50      Eosinophils Absolute 0.21      Basophils Absolute 0.03     COMPREHENSIVE METABOLIC PANEL - Abnormal    Glucose 143 (*)     Sodium 135 (*)     Potassium 3.6      Chloride 101      Bicarbonate 22      Anion Gap 16      Urea Nitrogen 17      Creatinine 0.87      eGFR 72      Calcium 9.3      Albumin 4.0      Alkaline Phosphatase 79      Total Protein 6.6      AST 13      Bilirubin, Total 0.4      ALT 12     C-REACTIVE PROTEIN - Abnormal    C-Reactive Protein 1.01 (*)    URINALYSIS WITH REFLEX MICROSCOPIC AND CULTURE - Abnormal    Color, Urine Yellow      Appearance, Urine Hazy (*)     Specific Gravity, Urine 1.012      pH, Urine 6.0      Protein, Urine NEGATIVE      Glucose, Urine  NEGATIVE      Blood, Urine NEGATIVE      Ketones, Urine NEGATIVE      Bilirubin, Urine NEGATIVE      Urobilinogen, Urine <2.0      Nitrite, Urine POSITIVE (*)     Leukocyte Esterase, Urine TRACE (*)    MICROSCOPIC ONLY, URINE - Abnormal    WBC, Urine 6-10 (*)     RBC, Urine 1-2      Bacteria, Urine 1+ (*)    SEDIMENTATION RATE, AUTOMATED - Normal    Sedimentation Rate 13     LACTATE - Normal    Lactate 0.9      Narrative:     Venipuncture immediately after or during the administration of Metamizole may lead to falsely low results. Testing should be performed immediately  prior to Metamizole dosing.   URINE CULTURE   BLOOD CULTURE   BLOOD CULTURE   URINALYSIS WITH REFLEX MICROSCOPIC AND CULTURE    Narrative:     The following orders were created for panel order Urinalysis with Reflex Microscopic and Culture.  Procedure                               Abnormality         Status                     ---------                               -----------         ------                     Urinalysis with Reflex M...[[id number]]  Abnormal            Final result               Extra Urine Gray Tube[[id number]]                            Final result                 Please view results for these tests on the individual orders.   EXTRA URINE GRAY TUBE    Extra Tube Hold for add-ons.       MR lumbar spine w and wo IV contrast   Final Result   1.  Postoperative changes as above. A large dorsal extra-spinal fluid   collection is noted extending from the laminectomy bed through the   deep muscular fascia into the subcutaneous fat. This collection   extends from the right lateral recesses at the level of L4-5 as well   as L5-S1 into the spinal canal and deforms the thecal sac resulting   in moderate narrowing at L4-5 and to a lesser degree at L3-4. A focal   defect is noted within the thecal sac at the level of L5-S1. 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 No significant peripheral   enhancement is identified to suggest  abscess although the sterility   of this collection can not be ascertained on MRI alone.   2. Multilevel degenerative changes again noted with persistent marked   bilateral foraminal narrowing at L3-4 and at L5-S1 on the left.             I personally reviewed the images/study and I agree with the findings   as stated. This study was interpreted at [place], [place].        MACRO:   None        Signed by: Jh Benoit 12/5/2023 5:24 PM   Dictation workstation:   [id number]      CT angio chest abdomen pelvis   Final Result   No thoracic or abdominal aortic aneurysm or dissection.        Stable pre-existing vascular findings as reported.        Findings compatible with recent lumbar spine surgery with prominent   fluid collection overlying the operated site extending through the   superficial subcutaneous layer. The canal is not reliably assessed at   the operated site due to artifact. Recommend further characterization   with MRI lumbar spine with without contrast.        No acute abnormality of the chest, abdomen or pelvis.             Signed by: Samuel Brannon 12/5/2023 3:32 PM   Dictation workstation:   [id number]      CT lumbar spine wo IV contrast   Final Result   1. No acute fracture identified.   2. Postoperative and degenerative changes, as described above.        MACRO:   None.        Signed by: Jony Monae 12/5/2023 11:45 AM   Dictation workstation:   [id number]          Medical Decision Making  MDM:  I received patient from physician bakari Tineo at 5 PM.  On handoff it was discussed that patient should be admitted for intractable back pain.  Patient MRI was pending at this time.  I was to follow-up with orthopedics given the patient's recent back surgery prior to admission.  MRI resulted under my care.  Results of the MRI suggest findings concerning for pseudomeningocele but no signs of obvious abscess.  Degenerative changes also noted.  I discussed  these findings with patient and family which understand.  Also reassessed the patient which she was still endorsing pain.  Patient has received multiple dosages of Dilaudid, Percocet and gabapentin while in the ED for pain control.  Paged Dr. Navarrete and discussed the laboratory results and diagnostic imaging with him in which she advised the patient be admitted under general medicine with  orthopedics on consult.  Send at this time no obvious signs of infectious process and hold antibiotics in regards to concerns for infection in her back.  I paged hospitalist and spoke with Dr. Joseph Stratton regarding the symptoms of diagnostic work-up.  Dr. Kandace Stratton is excepted the patient for admission.  Patient will be admitted with a diagnosis of intractable back pain, pseudomeningocele, UTI without hematuria.    Diagnosis:  Intractable back pain, pseudomeningocele, UTI        Dictation Disclaimer: Due to voice recognition software, sound alike and misspelled words may be contained in documentation. If you have any questions please contact me.        Final diagnoses:   [M54.9] Intractable back pain   [G96.198] Pseudomeningocele   [N39.0] Urinary tract infection without hematuria, site unspecified         Procedure  Procedures        Chivo John PA-C  Emergency Medicine , PGY-2

## 2023-12-06 ENCOUNTER — ANESTHESIA (OUTPATIENT)
Dept: OPERATING ROOM | Facility: HOSPITAL | Age: 68
DRG: 856 | End: 2023-12-06
Payer: COMMERCIAL

## 2023-12-06 ENCOUNTER — ANESTHESIA EVENT (OUTPATIENT)
Dept: OPERATING ROOM | Facility: HOSPITAL | Age: 68
DRG: 856 | End: 2023-12-06
Payer: COMMERCIAL

## 2023-12-06 LAB
ANION GAP SERPL CALC-SCNC: 11 MMOL/L (ref 10–20)
BASOPHILS # BLD AUTO: 0.02 X10*3/UL (ref 0–0.1)
BASOPHILS NFR BLD AUTO: 0.3 %
BUN SERPL-MCNC: 9 MG/DL (ref 6–23)
CALCIUM SERPL-MCNC: 9.7 MG/DL (ref 8.6–10.3)
CHLORIDE SERPL-SCNC: 99 MMOL/L (ref 98–107)
CO2 SERPL-SCNC: 28 MMOL/L (ref 21–32)
CREAT SERPL-MCNC: 0.83 MG/DL (ref 0.5–1.05)
EOSINOPHIL # BLD AUTO: 0.01 X10*3/UL (ref 0–0.7)
EOSINOPHIL NFR BLD AUTO: 0.1 %
ERYTHROCYTE [DISTWIDTH] IN BLOOD BY AUTOMATED COUNT: 13.5 % (ref 11.5–14.5)
GFR SERPL CREATININE-BSD FRML MDRD: 76 ML/MIN/1.73M*2
GLUCOSE SERPL-MCNC: 149 MG/DL (ref 74–99)
HCT VFR BLD AUTO: 36.1 % (ref 36–46)
HGB BLD-MCNC: 11.9 G/DL (ref 12–16)
IMM GRANULOCYTES # BLD AUTO: 0.03 X10*3/UL (ref 0–0.7)
IMM GRANULOCYTES NFR BLD AUTO: 0.4 % (ref 0–0.9)
LYMPHOCYTES # BLD AUTO: 0.87 X10*3/UL (ref 1.2–4.8)
LYMPHOCYTES NFR BLD AUTO: 11.4 %
MCH RBC QN AUTO: 31.8 PG (ref 26–34)
MCHC RBC AUTO-ENTMCNC: 33 G/DL (ref 32–36)
MCV RBC AUTO: 97 FL (ref 80–100)
MONOCYTES # BLD AUTO: 0.32 X10*3/UL (ref 0.1–1)
MONOCYTES NFR BLD AUTO: 4.2 %
NEUTROPHILS # BLD AUTO: 6.4 X10*3/UL (ref 1.2–7.7)
NEUTROPHILS NFR BLD AUTO: 83.6 %
NRBC BLD-RTO: 0 /100 WBCS (ref 0–0)
PLATELET # BLD AUTO: 319 X10*3/UL (ref 150–450)
POTASSIUM SERPL-SCNC: 3.7 MMOL/L (ref 3.5–5.3)
RBC # BLD AUTO: 3.74 X10*6/UL (ref 4–5.2)
SODIUM SERPL-SCNC: 134 MMOL/L (ref 136–145)
WBC # BLD AUTO: 7.7 X10*3/UL (ref 4.4–11.3)

## 2023-12-06 PROCEDURE — 87070 CULTURE OTHR SPECIMN AEROBIC: CPT | Mod: ELYLAB | Performed by: ORTHOPAEDIC SURGERY

## 2023-12-06 PROCEDURE — 2500000004 HC RX 250 GENERAL PHARMACY W/ HCPCS (ALT 636 FOR OP/ED): Performed by: NURSE ANESTHETIST, CERTIFIED REGISTERED

## 2023-12-06 PROCEDURE — 2500000005 HC RX 250 GENERAL PHARMACY W/O HCPCS: Performed by: STUDENT IN AN ORGANIZED HEALTH CARE EDUCATION/TRAINING PROGRAM

## 2023-12-06 PROCEDURE — 2500000005 HC RX 250 GENERAL PHARMACY W/O HCPCS: Performed by: ORTHOPAEDIC SURGERY

## 2023-12-06 PROCEDURE — 05HY33Z INSERTION OF INFUSION DEVICE INTO UPPER VEIN, PERCUTANEOUS APPROACH: ICD-10-PCS | Performed by: STUDENT IN AN ORGANIZED HEALTH CARE EDUCATION/TRAINING PROGRAM

## 2023-12-06 PROCEDURE — 3700000002 HC GENERAL ANESTHESIA TIME - EACH INCREMENTAL 1 MINUTE: Performed by: ORTHOPAEDIC SURGERY

## 2023-12-06 PROCEDURE — 99232 SBSQ HOSP IP/OBS MODERATE 35: CPT | Performed by: STUDENT IN AN ORGANIZED HEALTH CARE EDUCATION/TRAINING PROGRAM

## 2023-12-06 PROCEDURE — 2500000004 HC RX 250 GENERAL PHARMACY W/ HCPCS (ALT 636 FOR OP/ED): Performed by: ORTHOPAEDIC SURGERY

## 2023-12-06 PROCEDURE — 22830 EXPLORATION OF SPINAL FUSION: CPT | Performed by: ORTHOPAEDIC SURGERY

## 2023-12-06 PROCEDURE — 2500000004 HC RX 250 GENERAL PHARMACY W/ HCPCS (ALT 636 FOR OP/ED): Performed by: STUDENT IN AN ORGANIZED HEALTH CARE EDUCATION/TRAINING PROGRAM

## 2023-12-06 PROCEDURE — 22015 I&D ABSCESS P-SPINE L/S/LS: CPT | Performed by: ORTHOPAEDIC SURGERY

## 2023-12-06 PROCEDURE — 87075 CULTR BACTERIA EXCEPT BLOOD: CPT | Mod: ELYLAB | Performed by: ORTHOPAEDIC SURGERY

## 2023-12-06 PROCEDURE — 99024 POSTOP FOLLOW-UP VISIT: CPT | Performed by: ORTHOPAEDIC SURGERY

## 2023-12-06 PROCEDURE — 7100000001 HC RECOVERY ROOM TIME - INITIAL BASE CHARGE: Performed by: ORTHOPAEDIC SURGERY

## 2023-12-06 PROCEDURE — 2780000003 HC OR 278 NO HCPCS: Performed by: ORTHOPAEDIC SURGERY

## 2023-12-06 PROCEDURE — A6213 FOAM DRG >16<=48 SQ IN W/BDR: HCPCS | Performed by: ORTHOPAEDIC SURGERY

## 2023-12-06 PROCEDURE — 3700000001 HC GENERAL ANESTHESIA TIME - INITIAL BASE CHARGE: Performed by: ORTHOPAEDIC SURGERY

## 2023-12-06 PROCEDURE — 2500000004 HC RX 250 GENERAL PHARMACY W/ HCPCS (ALT 636 FOR OP/ED): Performed by: FAMILY MEDICINE

## 2023-12-06 PROCEDURE — 2720000007 HC OR 272 NO HCPCS: Performed by: ORTHOPAEDIC SURGERY

## 2023-12-06 PROCEDURE — 96372 THER/PROPH/DIAG INJ SC/IM: CPT | Performed by: STUDENT IN AN ORGANIZED HEALTH CARE EDUCATION/TRAINING PROGRAM

## 2023-12-06 PROCEDURE — 2500000001 HC RX 250 WO HCPCS SELF ADMINISTERED DRUGS (ALT 637 FOR MEDICARE OP): Performed by: STUDENT IN AN ORGANIZED HEALTH CARE EDUCATION/TRAINING PROGRAM

## 2023-12-06 PROCEDURE — 36415 COLL VENOUS BLD VENIPUNCTURE: CPT | Performed by: STUDENT IN AN ORGANIZED HEALTH CARE EDUCATION/TRAINING PROGRAM

## 2023-12-06 PROCEDURE — 85025 COMPLETE CBC W/AUTO DIFF WBC: CPT | Performed by: STUDENT IN AN ORGANIZED HEALTH CARE EDUCATION/TRAINING PROGRAM

## 2023-12-06 PROCEDURE — 3600000003 HC OR TIME - INITIAL BASE CHARGE - PROCEDURE LEVEL THREE: Performed by: ORTHOPAEDIC SURGERY

## 2023-12-06 PROCEDURE — A4217 STERILE WATER/SALINE, 500 ML: HCPCS | Performed by: ORTHOPAEDIC SURGERY

## 2023-12-06 PROCEDURE — 22830 EXPLORATION OF SPINAL FUSION: CPT | Performed by: PHYSICIAN ASSISTANT

## 2023-12-06 PROCEDURE — 2500000002 HC RX 250 W HCPCS SELF ADMINISTERED DRUGS (ALT 637 FOR MEDICARE OP, ALT 636 FOR OP/ED): Performed by: STUDENT IN AN ORGANIZED HEALTH CARE EDUCATION/TRAINING PROGRAM

## 2023-12-06 PROCEDURE — 3600000008 HC OR TIME - EACH INCREMENTAL 1 MINUTE - PROCEDURE LEVEL THREE: Performed by: ORTHOPAEDIC SURGERY

## 2023-12-06 PROCEDURE — 00D20ZZ EXTRACTION OF DURA MATER, OPEN APPROACH: ICD-10-PCS | Performed by: ORTHOPAEDIC SURGERY

## 2023-12-06 PROCEDURE — 80048 BASIC METABOLIC PNL TOTAL CA: CPT | Performed by: STUDENT IN AN ORGANIZED HEALTH CARE EDUCATION/TRAINING PROGRAM

## 2023-12-06 PROCEDURE — 7100000002 HC RECOVERY ROOM TIME - EACH INCREMENTAL 1 MINUTE: Performed by: ORTHOPAEDIC SURGERY

## 2023-12-06 PROCEDURE — G0378 HOSPITAL OBSERVATION PER HR: HCPCS

## 2023-12-06 RX ORDER — OXYCODONE HYDROCHLORIDE 5 MG/1
5 TABLET ORAL EVERY 4 HOURS PRN
Status: DISCONTINUED | OUTPATIENT
Start: 2023-12-06 | End: 2023-12-06

## 2023-12-06 RX ORDER — ONDANSETRON HYDROCHLORIDE 2 MG/ML
4 INJECTION, SOLUTION INTRAVENOUS ONCE AS NEEDED
Status: DISCONTINUED | OUTPATIENT
Start: 2023-12-06 | End: 2023-12-06 | Stop reason: HOSPADM

## 2023-12-06 RX ORDER — POLYETHYLENE GLYCOL 3350 17 G/17G
17 POWDER, FOR SOLUTION ORAL DAILY
Status: CANCELLED | OUTPATIENT
Start: 2023-12-06

## 2023-12-06 RX ORDER — ACETAMINOPHEN 325 MG/1
650 TABLET ORAL EVERY 6 HOURS
Status: CANCELLED | OUTPATIENT
Start: 2023-12-06

## 2023-12-06 RX ORDER — FENTANYL CITRATE 50 UG/ML
25 INJECTION, SOLUTION INTRAMUSCULAR; INTRAVENOUS EVERY 5 MIN PRN
Status: DISCONTINUED | OUTPATIENT
Start: 2023-12-06 | End: 2023-12-06 | Stop reason: HOSPADM

## 2023-12-06 RX ORDER — LABETALOL HYDROCHLORIDE 5 MG/ML
5 INJECTION, SOLUTION INTRAVENOUS ONCE AS NEEDED
Status: DISCONTINUED | OUTPATIENT
Start: 2023-12-06 | End: 2023-12-06 | Stop reason: HOSPADM

## 2023-12-06 RX ORDER — SODIUM CHLORIDE, SODIUM LACTATE, POTASSIUM CHLORIDE, CALCIUM CHLORIDE 600; 310; 30; 20 MG/100ML; MG/100ML; MG/100ML; MG/100ML
100 INJECTION, SOLUTION INTRAVENOUS CONTINUOUS
Status: DISCONTINUED | OUTPATIENT
Start: 2023-12-06 | End: 2023-12-06 | Stop reason: HOSPADM

## 2023-12-06 RX ORDER — TRANEXAMIC ACID 650 MG/1
1950 TABLET ORAL ONCE
Status: CANCELLED | OUTPATIENT
Start: 2023-12-06 | End: 2023-12-06

## 2023-12-06 RX ORDER — DIAZEPAM 5 MG/1
5 TABLET ORAL EVERY 6 HOURS PRN
Status: CANCELLED | OUTPATIENT
Start: 2023-12-06

## 2023-12-06 RX ORDER — FENTANYL CITRATE 50 UG/ML
INJECTION, SOLUTION INTRAMUSCULAR; INTRAVENOUS AS NEEDED
Status: DISCONTINUED | OUTPATIENT
Start: 2023-12-06 | End: 2023-12-06

## 2023-12-06 RX ORDER — CEFAZOLIN SODIUM 2 G/100ML
2 INJECTION, SOLUTION INTRAVENOUS EVERY 8 HOURS
Status: DISCONTINUED | OUTPATIENT
Start: 2023-12-06 | End: 2023-12-07

## 2023-12-06 RX ORDER — OXYCODONE HYDROCHLORIDE 5 MG/1
2.5 TABLET ORAL EVERY 4 HOURS PRN
Status: CANCELLED | OUTPATIENT
Start: 2023-12-06

## 2023-12-06 RX ORDER — PROPOFOL 10 MG/ML
INJECTION, EMULSION INTRAVENOUS AS NEEDED
Status: DISCONTINUED | OUTPATIENT
Start: 2023-12-06 | End: 2023-12-06

## 2023-12-06 RX ORDER — SODIUM CHLORIDE 0.9 G/100ML
IRRIGANT IRRIGATION AS NEEDED
Status: DISCONTINUED | OUTPATIENT
Start: 2023-12-06 | End: 2023-12-06 | Stop reason: HOSPADM

## 2023-12-06 RX ORDER — ROCURONIUM BROMIDE 10 MG/ML
INJECTION, SOLUTION INTRAVENOUS AS NEEDED
Status: DISCONTINUED | OUTPATIENT
Start: 2023-12-06 | End: 2023-12-06

## 2023-12-06 RX ORDER — OXYCODONE HYDROCHLORIDE 5 MG/1
5 TABLET ORAL EVERY 4 HOURS PRN
Status: DISCONTINUED | OUTPATIENT
Start: 2023-12-06 | End: 2023-12-18 | Stop reason: HOSPADM

## 2023-12-06 RX ORDER — NALOXONE HYDROCHLORIDE 1 MG/ML
0.2 INJECTION INTRAMUSCULAR; INTRAVENOUS; SUBCUTANEOUS EVERY 5 MIN PRN
Status: CANCELLED | OUTPATIENT
Start: 2023-12-06

## 2023-12-06 RX ORDER — LIDOCAINE HYDROCHLORIDE 20 MG/ML
INJECTION, SOLUTION INFILTRATION; PERINEURAL AS NEEDED
Status: DISCONTINUED | OUTPATIENT
Start: 2023-12-06 | End: 2023-12-06

## 2023-12-06 RX ORDER — ONDANSETRON 4 MG/1
4 TABLET, FILM COATED ORAL EVERY 8 HOURS PRN
Status: CANCELLED | OUTPATIENT
Start: 2023-12-06

## 2023-12-06 RX ORDER — CEFAZOLIN SODIUM 2 G/50ML
2 SOLUTION INTRAVENOUS EVERY 8 HOURS
Status: CANCELLED | OUTPATIENT
Start: 2023-12-06 | End: 2023-12-07

## 2023-12-06 RX ORDER — BUPIVACAINE HCL/EPINEPHRINE 0.5-1:200K
VIAL (ML) INJECTION AS NEEDED
Status: DISCONTINUED | OUTPATIENT
Start: 2023-12-06 | End: 2023-12-06 | Stop reason: HOSPADM

## 2023-12-06 RX ORDER — PHENYLEPHRINE HCL IN 0.9% NACL 1 MG/10 ML
SYRINGE (ML) INTRAVENOUS AS NEEDED
Status: DISCONTINUED | OUTPATIENT
Start: 2023-12-06 | End: 2023-12-06

## 2023-12-06 RX ORDER — MIDAZOLAM HYDROCHLORIDE 1 MG/ML
INJECTION, SOLUTION INTRAMUSCULAR; INTRAVENOUS AS NEEDED
Status: DISCONTINUED | OUTPATIENT
Start: 2023-12-06 | End: 2023-12-06

## 2023-12-06 RX ORDER — SODIUM CHLORIDE 9 MG/ML
100 INJECTION, SOLUTION INTRAVENOUS CONTINUOUS
Status: CANCELLED | OUTPATIENT
Start: 2023-12-06

## 2023-12-06 RX ORDER — HYDROMORPHONE HYDROCHLORIDE 1 MG/ML
0.6 INJECTION, SOLUTION INTRAMUSCULAR; INTRAVENOUS; SUBCUTANEOUS ONCE
Status: COMPLETED | OUTPATIENT
Start: 2023-12-06 | End: 2023-12-06

## 2023-12-06 RX ORDER — ONDANSETRON HYDROCHLORIDE 2 MG/ML
4 INJECTION, SOLUTION INTRAVENOUS EVERY 8 HOURS PRN
Status: CANCELLED | OUTPATIENT
Start: 2023-12-06

## 2023-12-06 RX ADMIN — HYDROMORPHONE HYDROCHLORIDE 0.6 MG: 1 INJECTION, SOLUTION INTRAMUSCULAR; INTRAVENOUS; SUBCUTANEOUS at 20:46

## 2023-12-06 RX ADMIN — SIMVASTATIN 40 MG: 40 TABLET, FILM COATED ORAL at 08:59

## 2023-12-06 RX ADMIN — Medication 100 MCG: at 17:20

## 2023-12-06 RX ADMIN — BRIMONIDINE TARTRATE 1 DROP: 2 SOLUTION/ DROPS OPHTHALMIC at 08:13

## 2023-12-06 RX ADMIN — EZETIMIBE 10 MG: 10 TABLET ORAL at 08:58

## 2023-12-06 RX ADMIN — HYDROMORPHONE HYDROCHLORIDE 0.5 MG: 1 INJECTION, SOLUTION INTRAMUSCULAR; INTRAVENOUS; SUBCUTANEOUS at 18:11

## 2023-12-06 RX ADMIN — SIMVASTATIN 40 MG: 40 TABLET, FILM COATED ORAL at 20:46

## 2023-12-06 RX ADMIN — PREGABALIN 75 MG: 50 CAPSULE ORAL at 08:56

## 2023-12-06 RX ADMIN — FENTANYL CITRATE 100 MCG: 50 INJECTION, SOLUTION INTRAMUSCULAR; INTRAVENOUS at 15:53

## 2023-12-06 RX ADMIN — Medication 2000 UNITS: at 10:23

## 2023-12-06 RX ADMIN — SUGAMMADEX 200 MG: 100 INJECTION, SOLUTION INTRAVENOUS at 17:34

## 2023-12-06 RX ADMIN — PROPOFOL 200 MG: 10 INJECTION, EMULSION INTRAVENOUS at 15:53

## 2023-12-06 RX ADMIN — ONDANSETRON 4 MG: 2 INJECTION INTRAMUSCULAR; INTRAVENOUS at 16:53

## 2023-12-06 RX ADMIN — BUSPIRONE HYDROCHLORIDE 10 MG: 5 TABLET ORAL at 08:57

## 2023-12-06 RX ADMIN — CEFAZOLIN SODIUM 2 G: 2 INJECTION, SOLUTION INTRAVENOUS at 16:44

## 2023-12-06 RX ADMIN — ISOSORBIDE MONONITRATE 60 MG: 60 TABLET, EXTENDED RELEASE ORAL at 08:58

## 2023-12-06 RX ADMIN — SODIUM CHLORIDE, POTASSIUM CHLORIDE, SODIUM LACTATE AND CALCIUM CHLORIDE: 600; 310; 30; 20 INJECTION, SOLUTION INTRAVENOUS at 15:51

## 2023-12-06 RX ADMIN — TRIAMTERENE AND HYDROCHLOROTHIAZIDE 1 TABLET: 37.5; 25 TABLET ORAL at 08:57

## 2023-12-06 RX ADMIN — ATENOLOL 50 MG: 50 TABLET ORAL at 08:59

## 2023-12-06 RX ADMIN — DEXAMETHASONE 4 MG: 4 TABLET ORAL at 06:22

## 2023-12-06 RX ADMIN — MIRTAZAPINE 15 MG: 15 TABLET, FILM COATED ORAL at 20:46

## 2023-12-06 RX ADMIN — DEXAMETHASONE 4 MG: 4 TABLET ORAL at 21:25

## 2023-12-06 RX ADMIN — OXYCODONE HYDROCHLORIDE 10 MG: 5 TABLET ORAL at 08:56

## 2023-12-06 RX ADMIN — CEFTRIAXONE SODIUM 1 G: 1 INJECTION, SOLUTION INTRAVENOUS at 21:22

## 2023-12-06 RX ADMIN — BRIMONIDINE TARTRATE 1 DROP: 2 SOLUTION/ DROPS OPHTHALMIC at 20:45

## 2023-12-06 RX ADMIN — OXYCODONE HYDROCHLORIDE 5 MG: 5 TABLET ORAL at 18:35

## 2023-12-06 RX ADMIN — HEPARIN SODIUM 5000 UNITS: 5000 INJECTION INTRAVENOUS; SUBCUTANEOUS at 04:12

## 2023-12-06 RX ADMIN — PREGABALIN 75 MG: 50 CAPSULE ORAL at 20:46

## 2023-12-06 RX ADMIN — MIDAZOLAM 2 MG: 1 INJECTION INTRAMUSCULAR; INTRAVENOUS at 15:53

## 2023-12-06 RX ADMIN — ROCURONIUM BROMIDE 50 MG: 10 SOLUTION INTRAVENOUS at 15:53

## 2023-12-06 RX ADMIN — HEPARIN SODIUM 5000 UNITS: 5000 INJECTION INTRAVENOUS; SUBCUTANEOUS at 20:45

## 2023-12-06 RX ADMIN — ISOSORBIDE MONONITRATE 30 MG: 30 TABLET, EXTENDED RELEASE ORAL at 09:00

## 2023-12-06 RX ADMIN — PANTOPRAZOLE SODIUM 40 MG: 40 TABLET, DELAYED RELEASE ORAL at 06:23

## 2023-12-06 RX ADMIN — DOCUSATE SODIUM 100 MG: 100 CAPSULE, LIQUID FILLED ORAL at 20:46

## 2023-12-06 RX ADMIN — HYDRALAZINE HYDROCHLORIDE 50 MG: 50 TABLET ORAL at 20:46

## 2023-12-06 RX ADMIN — LORATADINE 10 MG: 10 TABLET ORAL at 08:57

## 2023-12-06 RX ADMIN — LIDOCAINE HYDROCHLORIDE 80 ML: 20 INJECTION, SOLUTION INFILTRATION; PERINEURAL at 15:53

## 2023-12-06 RX ADMIN — HYDRALAZINE HYDROCHLORIDE 50 MG: 50 TABLET ORAL at 08:57

## 2023-12-06 SDOH — HEALTH STABILITY: MENTAL HEALTH: CURRENT SMOKER: 1

## 2023-12-06 ASSESSMENT — COGNITIVE AND FUNCTIONAL STATUS - GENERAL
TURNING FROM BACK TO SIDE WHILE IN FLAT BAD: A LOT
PATIENT BASELINE BEDBOUND: NO
HELP NEEDED FOR BATHING: A LITTLE
WALKING IN HOSPITAL ROOM: A LITTLE
TOILETING: A LITTLE
TURNING FROM BACK TO SIDE WHILE IN FLAT BAD: A LITTLE
MOBILITY SCORE: 15
STANDING UP FROM CHAIR USING ARMS: A LITTLE
TOILETING: A LITTLE
DAILY ACTIVITIY SCORE: 19
MOVING FROM LYING ON BACK TO SITTING ON SIDE OF FLAT BED WITH BEDRAILS: A LITTLE
DRESSING REGULAR UPPER BODY CLOTHING: A LITTLE
CLIMB 3 TO 5 STEPS WITH RAILING: A LITTLE
CLIMB 3 TO 5 STEPS WITH RAILING: A LOT
DRESSING REGULAR LOWER BODY CLOTHING: A LOT
MOVING TO AND FROM BED TO CHAIR: A LITTLE
TOILETING: A LOT
MOVING FROM LYING ON BACK TO SITTING ON SIDE OF FLAT BED WITH BEDRAILS: A LITTLE
DAILY ACTIVITIY SCORE: 16
PERSONAL GROOMING: A LITTLE
DRESSING REGULAR UPPER BODY CLOTHING: A LITTLE
TOILETING: A LITTLE
EATING MEALS: A LOT
WALKING IN HOSPITAL ROOM: A LOT
MOBILITY SCORE: 18
CLIMB 3 TO 5 STEPS WITH RAILING: TOTAL
MOVING FROM LYING ON BACK TO SITTING ON SIDE OF FLAT BED WITH BEDRAILS: A LITTLE
MOVING TO AND FROM BED TO CHAIR: A LITTLE
DRESSING REGULAR UPPER BODY CLOTHING: A LITTLE
DRESSING REGULAR LOWER BODY CLOTHING: A LITTLE
MOBILITY SCORE: 17
DRESSING REGULAR LOWER BODY CLOTHING: A LOT
DAILY ACTIVITIY SCORE: 19
PERSONAL GROOMING: A LITTLE
TURNING FROM BACK TO SIDE WHILE IN FLAT BAD: A LITTLE
WALKING IN HOSPITAL ROOM: A LITTLE
PERSONAL GROOMING: A LITTLE
MOVING FROM LYING ON BACK TO SITTING ON SIDE OF FLAT BED WITH BEDRAILS: A LOT
CLIMB 3 TO 5 STEPS WITH RAILING: A LOT
TURNING FROM BACK TO SIDE WHILE IN FLAT BAD: A LITTLE
STANDING UP FROM CHAIR USING ARMS: A LITTLE
DRESSING REGULAR LOWER BODY CLOTHING: A LITTLE
HELP NEEDED FOR BATHING: A LITTLE
MOVING TO AND FROM BED TO CHAIR: A LITTLE
DAILY ACTIVITIY SCORE: 17
MOBILITY SCORE: 15
HELP NEEDED FOR BATHING: A LITTLE
HELP NEEDED FOR BATHING: A LOT
MOVING TO AND FROM BED TO CHAIR: A LITTLE
DRESSING REGULAR UPPER BODY CLOTHING: A LITTLE
WALKING IN HOSPITAL ROOM: A LITTLE
STANDING UP FROM CHAIR USING ARMS: A LITTLE
STANDING UP FROM CHAIR USING ARMS: A LITTLE

## 2023-12-06 ASSESSMENT — PAIN SCALES - GENERAL
PAINLEVEL_OUTOF10: 4
PAINLEVEL_OUTOF10: 0 - NO PAIN
PAINLEVEL_OUTOF10: 5 - MODERATE PAIN
PAINLEVEL_OUTOF10: 7
PAINLEVEL_OUTOF10: 5 - MODERATE PAIN
PAINLEVEL_OUTOF10: 6
PAINLEVEL_OUTOF10: 7
PAINLEVEL_OUTOF10: 5 - MODERATE PAIN
PAINLEVEL_OUTOF10: 0 - NO PAIN
PAINLEVEL_OUTOF10: 7
PAINLEVEL_OUTOF10: 5 - MODERATE PAIN
PAINLEVEL_OUTOF10: 0 - NO PAIN

## 2023-12-06 ASSESSMENT — PAIN - FUNCTIONAL ASSESSMENT
PAIN_FUNCTIONAL_ASSESSMENT: 0-10

## 2023-12-06 ASSESSMENT — ENCOUNTER SYMPTOMS
PSYCHIATRIC NEGATIVE: 1
ALLERGIC/IMMUNOLOGIC NEGATIVE: 1
BACK PAIN: 1
ACTIVITY CHANGE: 0
FEVER: 0
EYES NEGATIVE: 1
GASTROINTESTINAL NEGATIVE: 1
HEMATOLOGIC/LYMPHATIC NEGATIVE: 1
CHEST TIGHTNESS: 0
CARDIOVASCULAR NEGATIVE: 1
ENDOCRINE NEGATIVE: 1
NEUROLOGICAL NEGATIVE: 1
SHORTNESS OF BREATH: 0
CONSTITUTIONAL NEGATIVE: 1

## 2023-12-06 ASSESSMENT — PAIN DESCRIPTION - DESCRIPTORS: DESCRIPTORS: BURNING

## 2023-12-06 ASSESSMENT — PAIN DESCRIPTION - LOCATION: LOCATION: BACK

## 2023-12-06 NOTE — ANESTHESIA POSTPROCEDURE EVALUATION
Patient: Tara Tierney    Procedure Summary       Date: 12/06/23 Room / Location: [facility] / [facility]    Anesthesia Start: 1550 Anesthesia Stop: 1741    Procedure: Spine Lumbar I  and  D (Spine Lumbar) Diagnosis:     Surgeons: Gerson Coombs MD Responsible Provider: Yoav Arizmendi DO    Anesthesia Type: general ASA Status: 3            Anesthesia Type: general    Vitals Value Taken Time   /49 12/06/23 1742   Temp 36.6 °C (97.9 °F) 12/06/23 1741   Pulse 82 12/06/23 1750   Resp 25 12/06/23 1750   SpO2 97 % 12/06/23 1750   Vitals shown include unvalidated device data.    Anesthesia Post Evaluation    Patient location during evaluation: PACU  Patient participation: complete - patient participated  Level of consciousness: awake and alert  Pain management: adequate  Multimodal analgesia pain management approach  Airway patency: patent  Cardiovascular status: acceptable  Respiratory status: acceptable  Hydration status: acceptable  Postoperative Nausea and Vomiting: none        No notable events documented.

## 2023-12-06 NOTE — PROGRESS NOTES
Physical Therapy                 Therapy Communication Note    Patient Name: Tara Tierney  MRN: [id number]  Today's Date: 12/6/2023     Discipline: Physical Therapy  Patient to undergo I + D procedure. Will need new order post op

## 2023-12-06 NOTE — PROGRESS NOTES
"Tara Tierney is a 68 y.o. female on day 0 of admission presenting with Intractable back pain.    Subjective   Patient seen and examined.  She reports improvement in back pain says that it is much better.  No fevers or chills, no incontinence.        Objective     Physical Exam  Constitutional:       Appearance: She is obese.   HENT:      Head: Normocephalic.   Eyes:      Extraocular Movements: Extraocular movements intact.      Conjunctiva/sclera: Conjunctivae normal.   Cardiovascular:      Rate and Rhythm: Normal rate and regular rhythm.   Pulmonary:      Effort: Respiratory distress present.   Abdominal:      General: There is no distension.      Palpations: Abdomen is soft.   Skin:     General: Skin is warm.   Neurological:      Mental Status: She is alert. Mental status is at baseline.         Last Recorded Vitals  Blood pressure 132/64, pulse 102, temperature 37 °C (98.6 °F), resp. rate 15, height 1.448 m (4' 9\"), weight 84.7 kg (186 lb 11.7 oz), SpO2 92 %.  Intake/Output last 3 Shifts:  I/O last 3 completed shifts:  In: - (0 mL/kg)   Out: 1300 (15.3 mL/kg) [Urine:1300 (0.4 mL/kg/hr)]  Weight: 84.7 kg     Relevant Results                             Assessment/Plan   Principal Problem:    Intractable back pain    Patient was admitted yesterday for postop pain after spinal fusion surgery  I reviewed her MRI findings, fluid collection might be causing neuropathic pain  She was started on Decadron  Reports significant improvement in back pain  Will continue oxycodone 5 and 10 mg for moderate and severe pain  Continue Lyrica  Orthopedics has been consulted, possibly taking her to the OR later today  PT OT has been consulted  Continue supportive care  Continue regular home meds  On ceftriaxone for possible UTI, follow-up on urine cultures  No signs of active cord compression  DVT prophylaxis  Was counseled regarding smoking, refused nicotine patch           Nitesh Ruiz MD      "

## 2023-12-06 NOTE — OP NOTE
Preoperative diagnosis: Lumbar postoperative epidural seroma.  Prior lumbar fusion.    Postoperative diagnosis: Above    Procedure: Irrigation and debridement subfascial lumbar spine.  Exploration of spinal fusion lumbar spine.    Surgeon: Gerson Coombs M.D.    Asst.: Rene ALEXThe physician assistant was present through the entire case.  Given the nature of the disease process and the procedure to be performed a skilled surgical assistant was necessary during the case.  The assistant was necessary in order to hold retractors and directly assist in the operation.  A certified scrub tech was at the back table managing instruments and supplies for the surgical case.    Anesthesia: General    Blood Loss: 100 cc    Complications: None    HPI: 2 weeks out from an L3-S1 midline laminectomy with a TLIF at L4-5 and L5-S1.  Patient was doing very well up until 2 days ago when she fell.  She was admitted to the hospital last night after an MRI showed a large epidural fluid collection that extended superficially.  She was having weakness in her thighs and pain in her thighs and back and abdominal area.  All of the risks, benefits, and potential complications for operative and nonoperative treatment were discussed at length with the patient.  The patient understands that surgery is elective.  The patient understands that I do not have to do surgery.  The patient understands that surgery comes with more risks than not doing surgery.  Risks of operative intervention include, but are not limited to: Anesthesia complications, excessive blood loss, infection, damaged to uninjured structures including, but not limited to, the dural sac and nerve roots, blood clots, and lack of improvement or worsening of symptoms.  These complications could result in death, permanent disability, or need for reoperation.  The patient completely understood those risks and wished to proceed with operative intervention.    Operative implants:  None    Operative procedure: The patient was wheeled from the preanesthesia care unit to the theater.  The patient was placed in supine position and all bony prominences were well-padded.  For the entire procedure anesthesia maintainined control of the patient's head neck.  General anesthesia was induced.  The patient was then placed prone on the Faisal table.  All bony prominences were well-padded.  The head and neck were in neutral position.  The back was cleansed with alcohol and and a Betadine prep was performed.    Timeout was performed.  Site verification marking was verified.  Appropriate antibiotic administration was confirmed.  Next, a longitudinal incision in the midline was performed over the operative levels through the old incision.  Trino colored cerumen was immediately encountered.  Some was suctioned up into a syringe and sent to pathology.  Tissue samples in the epidural space were sent to pathology as well.  The suprafascial space was irrigated with copious months normal saline after all sutures were removed from the skin.  This was done with 3 L of pulse Avage irrigation and the fascia was also scraped with a curette.  At this point there was a completely clean wound bed and the suprafascial space.  Next, the STRATAFIX suture was removed from the fascia and the fascia was opened up to expose the deep epidural space.  Culture swabs were taken.  Same fluid collection that was superficial was seen deep.  There was a connection through the cephalad portion of the fascia.  A mild amount of fibrinous material was removed with the suction and the epidural space was irrigated with 3 L of Pulsavac irrigation without direct placement of irrigation out of the dural sac.  The dural sac was inspected and there was no evidence of any dural leak and had nice tension in the dural sac.  There was some bleeding that was maintained with the Bovie in the facet and muscle area.  At this time the wound was clean and  there was no evidence of any bleeding.  The fluid that was irrigated superficially and deep did not appear to be infected it appeared to be bloody seroma.  There was some bone graft that had spilled out into the epidural space.  An exploration of spinal fusion was performed in the lateral gutters and there appeared to be adequate bone graft still present so no more augmentation of posterolateral fusion was needed.      Retractors were removed.  The deep fascia was closed over a drain, exiting to the patient's right, that was not resting on the neural elements.  The fascia was closed with a running #1 Stratafix PDS suture.  A superficial drain was placed exiting to the left and the skin was closed with 2-0 Prolene sutures.  A sterile dressing was applied.  The patient tolerated the procedure well and had pink and warm toes at the conclusion of the case.        This dictation was created using voice recognition software.  If there are any questions about inaccuracies please do not hesitate to contact me.

## 2023-12-06 NOTE — PROGRESS NOTES
Met w/ pt and family. Assessment continued in their presence w/ pt's permission.  Pt to have I&D this afternoon. Pt Id's need for a FWW if indicated post -op. TCC follow post op for needs.

## 2023-12-06 NOTE — CONSULTS
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 Ruiz  Complaint:  Intractable low back pain    History of Present Illness:  Tara Tierney is a 68-year-old female patient for chart review with history of arthritis, back pain, COPD, hyperlipidemia, hypertension, ocular degeneration, peripheral vascular disease who presented to emergency room yesterday with back pain.  She recently underwent surgery 2 weeks ago  And had L3-4, L4-5, L5-S1 laminectomy with Dr. Coombs.  She reports that she was doing well until a few days ago when she had a fall.  She notes since the fall she has had severe low back pain.  She denies any saddle anesthesia or loss of bowel or bladder.  Denies any numbness or tingling to the lower extremities.  MAR I obtained showing fluid collection extending from the laminectomy bed to the deep muscular fascia in which orthopedics was consulted.    Patient notes gradual onset, moderate to severe constant.  As noted above denies any numbness or tingling to lower extremities.  She is not incontinent of bowel or bladder.  She denies any chest pain, shortness of breath, nausea, vomiting, diarrhea or abdominal.  PMHx:  Past Medical History:   Diagnosis Date    Arthritis     Back pain     COPD (chronic obstructive pulmonary disease) (CMS/[facility])     COVID-19 vaccine administered     Eczema     History of COVID-19     2020    Hyperlipidemia     Hypertension     Hypoesthesia of skin     Hypesthesia    Macular degeneration     Meniere's disease     Osteoporosis     Overactive bladder     Pain in unspecified finger(s)     Finger pain    Pain in unspecified wrist     Pain in wrist joint    Peripheral vascular disease  (CMS/[facility])     Personal history of other diseases of the circulatory system     History of coronary artery disease    Personal history of other diseases of the musculoskeletal system and connective tissue     History of arthritis    Personal history of other specified conditions     History of chest pain    Personal history of other specified conditions     History of balance disorder    Personal history of other specified conditions     History of numbness    Postmenopausal     Seasonal allergies     Unspecified visual loss     Vision problems       PSHx:  Past Surgical History:   Procedure Laterality Date    ADENOIDECTOMY      AORTA - BILATERAL FEMORAL ARTERY BYPASS GRAFT      BLEPHAROPTOSIS REPAIR      CARDIAC CATHETERIZATION      with stent and balloon    CHOLECYSTECTOMY      COLONOSCOPY      CT ANGIO NECK  2022    CT NECK ANGIO W AND WO IV CONTRAST 2022 [facility]    CT AORTA AND BILATERAL ILIOFEMORAL RUNOFF ANGIOGRAM W AND/OR WO IV CONTRAST  03/10/2020    CT AORTA AND BILATERAL ILIOFEMORAL RUNOFF ANGIOGRAM W AND/OR WO IV CONTRAST 3/10/2020 [facility]    CT AORTA AND BILATERAL ILIOFEMORAL RUNOFF ANGIOGRAM W AND/OR WO IV CONTRAST  2023    CT AORTA AND BILATERAL ILIOFEMORAL RUNOFF ANGIOGRAM W AND/OR WO IV CONTRAST 2023 [facility]    FL TRANSLUMINAL ANGIO PERCUTANEOUS BHARAT      TONSILLECTOMY      Tonsillectomy    TOTAL KNEE ARTHROPLASTY Bilateral     TUBAL LIGATION         Social Hx:  Social History     Socioeconomic History    Marital status:      Spouse name: Not on file    Number of children: Not on file    Years of education: Not on file    Highest education level: Not on file   Occupational History    Not on file   Tobacco Use    Smoking status: Former     Packs/day: 0.50     Years: 45.00     Additional pack years: 0.00     Total pack years: 22.50     Types: Cigarettes     Quit date: 2023     Years since quittin.2    Smokeless tobacco: Not on file   Vaping  Use    Vaping Use: Never used   Substance and Sexual Activity    Alcohol use: Never    Drug use: Never    Sexual activity: Defer   Other Topics Concern    Not on file   Social History Narrative    Not on file     Social Determinants of Health     Financial Resource Strain: Low Risk  (12/5/2023)    Overall Financial Resource Strain (CARDIA)     Difficulty of Paying Living Expenses: Not hard at all   Food Insecurity: Not on file   Transportation Needs: No Transportation Needs (12/5/2023)    PRAPARE - Transportation     Lack of Transportation (Medical): No     Lack of Transportation (Non-Medical): No   Physical Activity: Not on file   Stress: Not on file   Social Connections: Not on file   Intimate Partner Violence: Not on file   Housing Stability: Low Risk  (12/5/2023)    Housing Stability Vital Sign     Unable to Pay for Housing in the Last Year: No     Number of Places Lived in the Last Year: 1     Unstable Housing in the Last Year: No       Family Hx:  Family History   Problem Relation Name Age of Onset    Breast cancer Mother      Other (arteriosclerotic cardiovascular disease) Father      Cancer Father         Review of Systems:   Review of Systems   Constitutional: Negative.  Negative for activity change and fever.   HENT: Negative.     Eyes: Negative.    Respiratory:  Negative for chest tightness and shortness of breath.    Cardiovascular: Negative.    Gastrointestinal: Negative.    Endocrine: Negative.    Genitourinary: Negative.    Musculoskeletal:  Positive for back pain. Negative for gait problem.   Skin: Negative.    Allergic/Immunologic: Negative.    Neurological: Negative.    Hematological: Negative.    Psychiatric/Behavioral: Negative.     All other systems reviewed and are negative.        Physical Examination:    Visit Vitals  /64 (Patient Position: Sitting)   Pulse 102   Temp 37 °C (98.6 °F)   Resp 15      Physical Exam  Vitals and nursing note reviewed.   Constitutional:       General: She is  not in acute distress.     Appearance: Normal appearance.   HENT:      Head: Normocephalic.      Nose: Nose normal.      Mouth/Throat:      Mouth: Mucous membranes are moist.   Eyes:      Extraocular Movements: Extraocular movements intact.      Pupils: Pupils are equal, round, and reactive to light.   Cardiovascular:      Rate and Rhythm: Normal rate and regular rhythm.      Pulses: Normal pulses.      Heart sounds: Normal heart sounds.   Pulmonary:      Effort: Pulmonary effort is normal.      Breath sounds: Normal breath sounds.   Abdominal:      General: Bowel sounds are normal.      Palpations: Abdomen is soft.   Musculoskeletal:      Cervical back: Normal range of motion. No rigidity or tenderness.      Comments: Negative midline spinal process tenderness or step-off.   Skin:     General: Skin is warm.      Capillary Refill: Capillary refill takes less than 2 seconds.   Neurological:      General: No focal deficit present.      Mental Status: She is alert and oriented to person, place, and time.   Psychiatric:         Mood and Affect: Mood normal.         LABS:  CBC:   Lab Results   Component Value Date    WBC 7.7 12/06/2023    RBC 3.74 (L) 12/06/2023    HGB 11.9 (L) 12/06/2023    HCT 36.1 12/06/2023    MCV 97 12/06/2023    MCH 31.8 12/06/2023    MCHC 33.0 12/06/2023    RDW 13.5 12/06/2023     12/06/2023     CBC with Differential:    Lab Results   Component Value Date    WBC 7.7 12/06/2023    RBC 3.74 (L) 12/06/2023    HGB 11.9 (L) 12/06/2023    HCT 36.1 12/06/2023     12/06/2023    MCV 97 12/06/2023    MCH 31.8 12/06/2023    MCHC 33.0 12/06/2023    RDW 13.5 12/06/2023    NRBC 0.0 12/06/2023    LYMPHOPCT 11.4 12/06/2023    MONOPCT 4.2 12/06/2023    EOSPCT 0.1 12/06/2023    BASOPCT 0.3 12/06/2023    MONOSABS 0.32 12/06/2023    LYMPHSABS 0.87 (L) 12/06/2023    EOSABS 0.01 12/06/2023    BASOSABS 0.02 12/06/2023     CMP:    Lab Results   Component Value Date     (L) 12/06/2023    K 3.7  "12/06/2023    CL 99 12/06/2023    CO2 28 12/06/2023    BUN 9 12/06/2023    CREATININE 0.83 12/06/2023    GLUCOSE 149 (H) 12/06/2023    PROT 6.6 12/05/2023    CALCIUM 9.7 12/06/2023    BILITOT 0.4 12/05/2023    ALKPHOS 79 12/05/2023    AST 13 12/05/2023    ALT 12 12/05/2023     BMP:    Lab Results   Component Value Date     (L) 12/06/2023    K 3.7 12/06/2023    CL 99 12/06/2023    CO2 28 12/06/2023    BUN 9 12/06/2023    CREATININE 0.83 12/06/2023    CALCIUM 9.7 12/06/2023    GLUCOSE 149 (H) 12/06/2023     Magnesium:No results found for: \"MG\"  Troponin:  No results found for: \"TROPONINI\"        Assessment:    [unfilled]  68-year-old female patient presented to the emergency room yesterday and admitted to the hospital for severe intractable low back pain.  She underwent laminectomy 2 weeks ago with Dr. Coombs.  She was doing well until she had a fall about a week ago.  Reports gradual onset of severe pain that is constant with no relieving factors.  She has not had any fever or chills.  MRI was reviewed showing a fluid collection which most likely is a seroma.         Plan:  Incision and drainage lumbar spine with intraoperative cultures pending.  Consent  Hold all antibiotics until postop  Continue pain control            Electronically signed by ORLANDO Kline on 12/6/2023 at 11:20 AMInpatient consult to Orthopaedic Surgery  Consult performed by: ORLANDO Kline  Consult ordered by: Nitesh Ruiz MD      In a face-to-face encounter, I performed a history and physical examination, discussed pertinent diagnostic studies if indicated, and discussed diagnosis and management strategies with both the patient and the midlevel provider.  I reviewed the midlevel's note and agree with the documented findings and plan of care.    2 weeks out from an L3-S1 midline laminectomy with a TLIF at L4-5 and L5-S1.  Patient was doing great.  She fell a couple days ago and now she had acute onset of " leg heaviness and weakness in her thighs abdominal pain and back pain.  MRI in the ER shows a large fluid collection.  There is some pressure on the neural elements but not severe.  Only moderate.  She is having no bowel or bladder changes.  On exam she has no weakness in her ankles but a little bit of weakness with hip flexion and knee extension bilaterally.  Back incision is well-healed.  No redness warmth swelling puffiness or signs of infection.  Her inflammatory markers are low without any evidence of infection with a normal white count, normal CRP and normal sed rate.    Assessment/plan: Patient with heaviness and Jell-O feeling legs and an MRI described above.  This is likely hematoma versus seroma and possibly infection but less likely.  I explained to her that is probably best to go in and wash this out given the symptoms she is having.  This all came on after a fall a couple days ago.    All of the risks benefits and potential complications of operative versus nonoperative treatment were discussed with the patient.  Operative risks discussed included but were not limited to anesthesia complications, infections, excessive bleeding, damaged to uninjured healthy structures, and failure of surgery requiring the need for reoperation resulting in chronic pain and disability.  The patient completely understands those risks and wished to proceed with operative intervention.    The patient is severely inhibited with bodily function.  We are going to perform an irrigation and debridement of the lumbar spine and address any other issues that we identified intraoperatively.

## 2023-12-06 NOTE — PROGRESS NOTES
Occupational Therapy                 Therapy Communication Note    Patient Name: Tara Tierney  MRN: [id number]  Today's Date: 12/6/2023     Discipline: Occupational Therapy    Missed Visit Reason:  Pt to have I and D of L spine. OT lawrence held this date, will need new orders post op

## 2023-12-06 NOTE — ANESTHESIA PREPROCEDURE EVALUATION
Tara Tierney is a 68 y.o. female here for:    Spine Lumbar I  and  D  With Gerson Coombs MD  * No Diagnosis Codes entered *    Relevant Problems   Cardiovascular   (+) Abdominal aortic aneurysm (CMS/HCC)   (+) Bilateral carotid artery stenosis   (+) CAD in native artery   (+) Essential hypertension   (+) Mesenteric artery stenosis (CMS/HCC)   (+) Mixed hyperlipidemia   (+) PVD (peripheral vascular disease) (CMS/HCC)      Endocrine   (+) Obese      Neuro/Psych   (+) Bilateral carotid artery stenosis      Pulmonary   (+) Chronic asthmatic bronchitis      Circulatory   (+) Cardiomyopathy (CMS/HCC)      Tobacco   (+) Current every day smoker      Musculoskeletal and Injuries   (+) Intractable back pain       Lab Results   Component Value Date    HGB 11.9 (L) 2023    HCT 36.1 2023    WBC 7.7 2023     2023     (L) 2023    K 3.7 2023    CL 99 2023    CREATININE 0.83 2023    BUN 9 2023     EKG 2023:  Normal sinus rhythm  Left bundle branch block  Abnormal ECG  When compared with ECG of 18-MAY-2022 14:15,  No significant change was found    NM Stress :  IMPRESSION:  Negative Lexiscan Myoview cardiac perfusion stress test.  No evidence of ischemia or myocardial infarction by perfusion imaging.  Normal left ventricular systolic function, ejection fraction 48%.  Abnormal resting electrocardiogram.      Social History     Tobacco Use   Smoking Status Former   • Packs/day: 0.50   • Years: 45.00   • Additional pack years: 0.00   • Total pack years: 22.50   • Types: Cigarettes   • Quit date: 2023   • Years since quittin.2   Smokeless Tobacco Not on file       Allergies   Allergen Reactions   • Neomycin Other     swelling, redness, burning post eye surgery   • Neomycin-Polymyxin B-Dexameth Other and Rash     blisters, eye swelling, burning       Current Outpatient Medications   Medication Instructions   • albuterol (ProAir HFA) 90  mcg/actuation inhaler 2 puffs, inhalation   • aspirin (ADULT LOW DOSE ASPIRIN) 81 mg, oral, Daily   • aspirin 81 mg, oral, Daily   • atenolol (TENORMIN) 50 mg, oral, Every morning   • ATENOLOL ORAL 50 mg, oral, Daily RT   • brimonidine (AlphaGAN) 0.2 % ophthalmic solution 1 drop, Both Eyes, 2 times daily   • busPIRone (BUSPAR) 10 mg, oral, Daily   • celecoxib (CELEBREX) 200 mg, oral, Daily PRN   • cholecalciferol (Vitamin D-3) 50 mcg (2,000 unit) capsule 1 capsule, oral, Daily   • clopidogrel (PLAVIX) 75 mg, oral, Daily   • cyclobenzaprine (FLEXERIL) 5 mg, oral, 3 times daily PRN   • docusate sodium (COLACE) 100 mg, oral, 2 times daily   • docusate sodium (COLACE) 100 mg, oral, Daily   • ezetimibe (ZETIA) 10 mg, oral, Daily   • furosemide (LASIX) 20 mg, oral, Daily   • hydrALAZINE (APRESOLINE) 50 mg, oral, 2 times daily   • ibandronate (BONIVA) 150 mg, oral, Every 30 days   • ipratropium (Atrovent HFA) 17 mcg/actuation inhaler 2 puffs, inhalation, 2 times daily RT   • isosorbide mononitrate ER (IMDUR) 30 mg, oral, Daily, With the 60 mg tablet   • isosorbide mononitrate ER (IMDUR) 60 mg, oral, Daily, With the 30mg tablet   • ISOSORBIDE MONONITRATE ORAL 30 mg, oral, Daily RT   • ISOSORBIDE MONONITRATE ORAL 60 mg, oral, Daily RT   • loratadine (Claritin) 10 mg tablet 1 tablet, oral, Daily   • LUTEIN ORAL 20 mg, oral, Daily RT   • meclizine (ANTIVERT) 25 mg, oral, 3 times daily PRN   • mirtazapine (Remeron) 15 mg tablet 1 tablet, oral, Nightly   • nitroglycerin (NITROSTAT) 0.4 mg, sublingual, Every 5 min PRN,  PLACE 1 TABLET UNDER THE TONGUE EVERY 5 MINUTES UP TO 3 DOSES AS NEEDED FOR CHEST PAIN.<BR>   • pantoprazole (PROTONIX) 40 mg, oral, Daily before breakfast, Do not crush, chew, or split.   • pilocarpine (SALAGEN (PILOCARPINE)) 5 mg, oral, Daily RT   • potassium chloride ER (Micro-K) 10 mEq ER capsule 10 mEq, oral, Daily   • pregabalin (LYRICA) 75 mg, oral, 2 times daily   • raloxifene (Evista) 60 mg tablet 1  tablet, oral, Daily   • simvastatin (ZOCOR) 40 mg, oral, Nightly   • simvastatin (ZOCOR) 40 mg, oral, 2 times daily   • tiZANidine (ZANAFLEX) 4 mg, oral, Every 8 hours PRN   • tolterodine (Detrol) 1 mg tablet 1 tablet, oral, Nightly   • triamterene-hydrochlorothiazid (Dyazide) 37.5-25 mg capsule 1 capsule, oral, Daily   • vit C,X-Si-wzhjr-lutein-zeaxan (PreserVision AREDS-2) 250-90-40-1 mg capsule 1 capsule, oral, 2 times daily       Past Surgical History:   Procedure Laterality Date   • ADENOIDECTOMY     • AORTA - BILATERAL FEMORAL ARTERY BYPASS GRAFT     • BLEPHAROPTOSIS REPAIR     • CARDIAC CATHETERIZATION      with stent and balloon   • CHOLECYSTECTOMY     • COLONOSCOPY     • CT ANGIO NECK  05/18/2022    CT NECK ANGIO W AND WO IV CONTRAST [facility]   • CT AORTA AND BILATERAL ILIOFEMORAL RUNOFF ANGIOGRAM W AND/OR WO IV CONTRAST  03/10/2020    CT AORTA AND BILATERAL ILIOFEMORAL RUNOFF ANGIOGRAM W AND/OR WO IV CONTRAST 3/10/2020 [facility]   • CT AORTA AND BILATERAL ILIOFEMORAL RUNOFF ANGIOGRAM W AND/OR WO IV CONTRAST  07/21/2023    CT AORTA AND BILATERAL ILIOFEMORAL RUNOFF ANGIOGRAM W AND/OR WO IV CONTRAST 7/21/2023 [facility]   • FL TRANSLUMINAL ANGIO PERCUTANEOUS BHARAT     • TONSILLECTOMY      Tonsillectomy   • TOTAL KNEE ARTHROPLASTY Bilateral    • TUBAL LIGATION         Family History   Problem Relation Name Age of Onset   • Breast cancer Mother     • Other (arteriosclerotic cardiovascular disease) Father     • Cancer Father         NPO Details:  No data recorded    Physical Exam    Airway  Mallampati: III  TM distance: >3 FB  Neck ROM: full     Cardiovascular    Dental        Pulmonary    Abdominal          Anesthesia Plan    ASA 3     general     The patient is a current smoker.    intravenous induction   Postoperative administration of opioids is intended.  Trial extubation is planned.  Anesthetic plan and risks discussed with patient.    Plan discussed with CRNA.

## 2023-12-06 NOTE — CARE PLAN
The patient's goals for the shift include      The clinical goals for the shift include decreased pain

## 2023-12-06 NOTE — ANESTHESIA PROCEDURE NOTES
Airway  Date/Time: 12/6/2023 3:58 PM  Urgency: elective    Airway not difficult    Staffing  Performed: attending   Authorized by: Yoav Arizmendi DO    Performed by: Yoav Arizmendi DO  Patient location during procedure: OR    Indications and Patient Condition  Indications for airway management: anesthesia  Sedation level: deep  Preoxygenated: yes      Final Airway Details  Final airway type: endotracheal airway      Successful airway: ETT     Successful intubation technique: direct laryngoscopy  Blade: Abdon  Blade size: #3  ETT size (mm): 7.0  Cormack-Lehane Classification: grade I - full view of glottis  Measured from: lips  ETT to lips (cm): 22  Number of attempts at approach: 1

## 2023-12-07 ENCOUNTER — APPOINTMENT (OUTPATIENT)
Dept: RADIOLOGY | Facility: HOSPITAL | Age: 68
DRG: 856 | End: 2023-12-07
Payer: COMMERCIAL

## 2023-12-07 LAB
ANION GAP SERPL CALC-SCNC: 10 MMOL/L (ref 10–20)
BACTERIA UR CULT: ABNORMAL
BASOPHILS # BLD AUTO: 0.02 X10*3/UL (ref 0–0.1)
BASOPHILS NFR BLD AUTO: 0.2 %
BUN SERPL-MCNC: 23 MG/DL (ref 6–23)
CALCIUM SERPL-MCNC: 9 MG/DL (ref 8.6–10.3)
CHLORIDE SERPL-SCNC: 100 MMOL/L (ref 98–107)
CO2 SERPL-SCNC: 28 MMOL/L (ref 21–32)
CREAT SERPL-MCNC: 1.31 MG/DL (ref 0.5–1.05)
EOSINOPHIL # BLD AUTO: 0.01 X10*3/UL (ref 0–0.7)
EOSINOPHIL NFR BLD AUTO: 0.1 %
ERYTHROCYTE [DISTWIDTH] IN BLOOD BY AUTOMATED COUNT: 13.4 % (ref 11.5–14.5)
GFR SERPL CREATININE-BSD FRML MDRD: 44 ML/MIN/1.73M*2
GLUCOSE SERPL-MCNC: 158 MG/DL (ref 74–99)
HCT VFR BLD AUTO: 28 % (ref 36–46)
HGB BLD-MCNC: 9.1 G/DL (ref 12–16)
HOLD SPECIMEN: NORMAL
IMM GRANULOCYTES # BLD AUTO: 0.05 X10*3/UL (ref 0–0.7)
IMM GRANULOCYTES NFR BLD AUTO: 0.5 % (ref 0–0.9)
LYMPHOCYTES # BLD AUTO: 0.95 X10*3/UL (ref 1.2–4.8)
LYMPHOCYTES NFR BLD AUTO: 9.2 %
MCH RBC QN AUTO: 32.3 PG (ref 26–34)
MCHC RBC AUTO-ENTMCNC: 32.5 G/DL (ref 32–36)
MCV RBC AUTO: 99 FL (ref 80–100)
MONOCYTES # BLD AUTO: 1.16 X10*3/UL (ref 0.1–1)
MONOCYTES NFR BLD AUTO: 11.3 %
NEUTROPHILS # BLD AUTO: 8.12 X10*3/UL (ref 1.2–7.7)
NEUTROPHILS NFR BLD AUTO: 78.7 %
NRBC BLD-RTO: 0 /100 WBCS (ref 0–0)
PLATELET # BLD AUTO: 271 X10*3/UL (ref 150–450)
POTASSIUM SERPL-SCNC: 3.4 MMOL/L (ref 3.5–5.3)
RBC # BLD AUTO: 2.82 X10*6/UL (ref 4–5.2)
SODIUM SERPL-SCNC: 135 MMOL/L (ref 136–145)
WBC # BLD AUTO: 10.3 X10*3/UL (ref 4.4–11.3)

## 2023-12-07 PROCEDURE — 2500000004 HC RX 250 GENERAL PHARMACY W/ HCPCS (ALT 636 FOR OP/ED): Performed by: ORTHOPAEDIC SURGERY

## 2023-12-07 PROCEDURE — 36415 COLL VENOUS BLD VENIPUNCTURE: CPT | Performed by: STUDENT IN AN ORGANIZED HEALTH CARE EDUCATION/TRAINING PROGRAM

## 2023-12-07 PROCEDURE — 2500000004 HC RX 250 GENERAL PHARMACY W/ HCPCS (ALT 636 FOR OP/ED): Performed by: STUDENT IN AN ORGANIZED HEALTH CARE EDUCATION/TRAINING PROGRAM

## 2023-12-07 PROCEDURE — 97165 OT EVAL LOW COMPLEX 30 MIN: CPT | Mod: GO

## 2023-12-07 PROCEDURE — C1751 CATH, INF, PER/CENT/MIDLINE: HCPCS

## 2023-12-07 PROCEDURE — 96372 THER/PROPH/DIAG INJ SC/IM: CPT | Performed by: STUDENT IN AN ORGANIZED HEALTH CARE EDUCATION/TRAINING PROGRAM

## 2023-12-07 PROCEDURE — 2720000007 HC OR 272 NO HCPCS

## 2023-12-07 PROCEDURE — 2500000005 HC RX 250 GENERAL PHARMACY W/O HCPCS: Performed by: NURSE PRACTITIONER

## 2023-12-07 PROCEDURE — 97161 PT EVAL LOW COMPLEX 20 MIN: CPT | Mod: GP

## 2023-12-07 PROCEDURE — 99232 SBSQ HOSP IP/OBS MODERATE 35: CPT | Performed by: STUDENT IN AN ORGANIZED HEALTH CARE EDUCATION/TRAINING PROGRAM

## 2023-12-07 PROCEDURE — 2500000001 HC RX 250 WO HCPCS SELF ADMINISTERED DRUGS (ALT 637 FOR MEDICARE OP): Performed by: STUDENT IN AN ORGANIZED HEALTH CARE EDUCATION/TRAINING PROGRAM

## 2023-12-07 PROCEDURE — 85025 COMPLETE CBC W/AUTO DIFF WBC: CPT | Performed by: STUDENT IN AN ORGANIZED HEALTH CARE EDUCATION/TRAINING PROGRAM

## 2023-12-07 PROCEDURE — 1210000001 HC SEMI-PRIVATE ROOM DAILY

## 2023-12-07 PROCEDURE — 36569 INSJ PICC 5 YR+ W/O IMAGING: CPT

## 2023-12-07 PROCEDURE — 2500000004 HC RX 250 GENERAL PHARMACY W/ HCPCS (ALT 636 FOR OP/ED): Performed by: NURSE PRACTITIONER

## 2023-12-07 PROCEDURE — 2500000002 HC RX 250 W HCPCS SELF ADMINISTERED DRUGS (ALT 637 FOR MEDICARE OP, ALT 636 FOR OP/ED): Performed by: STUDENT IN AN ORGANIZED HEALTH CARE EDUCATION/TRAINING PROGRAM

## 2023-12-07 PROCEDURE — 80048 BASIC METABOLIC PNL TOTAL CA: CPT | Performed by: STUDENT IN AN ORGANIZED HEALTH CARE EDUCATION/TRAINING PROGRAM

## 2023-12-07 RX ORDER — LIDOCAINE HYDROCHLORIDE 10 MG/ML
5 INJECTION INFILTRATION; PERINEURAL ONCE
Status: COMPLETED | OUTPATIENT
Start: 2023-12-07 | End: 2023-12-07

## 2023-12-07 RX ORDER — VANCOMYCIN HYDROCHLORIDE 1 G/20ML
INJECTION, POWDER, LYOPHILIZED, FOR SOLUTION INTRAVENOUS DAILY PRN
Status: DISCONTINUED | OUTPATIENT
Start: 2023-12-07 | End: 2023-12-08

## 2023-12-07 RX ADMIN — Medication 2000 UNITS: at 09:27

## 2023-12-07 RX ADMIN — SIMVASTATIN 40 MG: 40 TABLET, FILM COATED ORAL at 20:43

## 2023-12-07 RX ADMIN — DEXAMETHASONE 4 MG: 4 TABLET ORAL at 05:05

## 2023-12-07 RX ADMIN — HEPARIN SODIUM 5000 UNITS: 5000 INJECTION INTRAVENOUS; SUBCUTANEOUS at 20:44

## 2023-12-07 RX ADMIN — ATENOLOL 50 MG: 50 TABLET ORAL at 09:18

## 2023-12-07 RX ADMIN — DOCUSATE SODIUM 100 MG: 100 CAPSULE, LIQUID FILLED ORAL at 09:18

## 2023-12-07 RX ADMIN — CEFAZOLIN SODIUM 2 G: 2 INJECTION, SOLUTION INTRAVENOUS at 00:05

## 2023-12-07 RX ADMIN — ISOSORBIDE MONONITRATE 60 MG: 60 TABLET, EXTENDED RELEASE ORAL at 09:21

## 2023-12-07 RX ADMIN — DOCUSATE SODIUM 100 MG: 100 CAPSULE, LIQUID FILLED ORAL at 20:43

## 2023-12-07 RX ADMIN — PANTOPRAZOLE SODIUM 40 MG: 40 TABLET, DELAYED RELEASE ORAL at 06:00

## 2023-12-07 RX ADMIN — HEPARIN SODIUM 5000 UNITS: 5000 INJECTION INTRAVENOUS; SUBCUTANEOUS at 05:05

## 2023-12-07 RX ADMIN — PREGABALIN 75 MG: 50 CAPSULE ORAL at 20:43

## 2023-12-07 RX ADMIN — EZETIMIBE 10 MG: 10 TABLET ORAL at 09:19

## 2023-12-07 RX ADMIN — MIRTAZAPINE 15 MG: 15 TABLET, FILM COATED ORAL at 20:43

## 2023-12-07 RX ADMIN — LORATADINE 10 MG: 10 TABLET ORAL at 09:19

## 2023-12-07 RX ADMIN — CEFAZOLIN SODIUM 2 G: 2 INJECTION, SOLUTION INTRAVENOUS at 11:51

## 2023-12-07 RX ADMIN — VANCOMYCIN HYDROCHLORIDE 1250 MG: 1.25 INJECTION, POWDER, LYOPHILIZED, FOR SOLUTION INTRAVENOUS at 13:59

## 2023-12-07 RX ADMIN — CYCLOBENZAPRINE HYDROCHLORIDE 5 MG: 10 TABLET, FILM COATED ORAL at 13:59

## 2023-12-07 RX ADMIN — BRIMONIDINE TARTRATE 1 DROP: 2 SOLUTION/ DROPS OPHTHALMIC at 20:44

## 2023-12-07 RX ADMIN — BRIMONIDINE TARTRATE 1 DROP: 2 SOLUTION/ DROPS OPHTHALMIC at 09:27

## 2023-12-07 RX ADMIN — HYDRALAZINE HYDROCHLORIDE 50 MG: 50 TABLET ORAL at 09:20

## 2023-12-07 RX ADMIN — LIDOCAINE HYDROCHLORIDE 40 MG: 10 INJECTION, SOLUTION INFILTRATION; PERINEURAL at 14:15

## 2023-12-07 RX ADMIN — BUSPIRONE HYDROCHLORIDE 10 MG: 5 TABLET ORAL at 09:19

## 2023-12-07 RX ADMIN — TRIAMTERENE AND HYDROCHLOROTHIAZIDE 1 TABLET: 37.5; 25 TABLET ORAL at 09:27

## 2023-12-07 RX ADMIN — PREGABALIN 75 MG: 50 CAPSULE ORAL at 09:19

## 2023-12-07 RX ADMIN — SIMVASTATIN 40 MG: 40 TABLET, FILM COATED ORAL at 09:18

## 2023-12-07 RX ADMIN — OXYCODONE HYDROCHLORIDE 10 MG: 5 TABLET ORAL at 15:56

## 2023-12-07 RX ADMIN — HEPARIN SODIUM 5000 UNITS: 5000 INJECTION INTRAVENOUS; SUBCUTANEOUS at 14:06

## 2023-12-07 RX ADMIN — OXYCODONE HYDROCHLORIDE 5 MG: 5 TABLET ORAL at 09:17

## 2023-12-07 RX ADMIN — ISOSORBIDE MONONITRATE 30 MG: 30 TABLET, EXTENDED RELEASE ORAL at 09:18

## 2023-12-07 ASSESSMENT — COGNITIVE AND FUNCTIONAL STATUS - GENERAL
WALKING IN HOSPITAL ROOM: A LITTLE
DRESSING REGULAR LOWER BODY CLOTHING: A LOT
MOVING TO AND FROM BED TO CHAIR: A LITTLE
STANDING UP FROM CHAIR USING ARMS: A LITTLE
TURNING FROM BACK TO SIDE WHILE IN FLAT BAD: A LITTLE
DRESSING REGULAR LOWER BODY CLOTHING: A LITTLE
STANDING UP FROM CHAIR USING ARMS: A LITTLE
CLIMB 3 TO 5 STEPS WITH RAILING: TOTAL
DRESSING REGULAR UPPER BODY CLOTHING: A LITTLE
DRESSING REGULAR UPPER BODY CLOTHING: A LITTLE
MOBILITY SCORE: 19
TOILETING: A LOT
HELP NEEDED FOR BATHING: A LITTLE
TURNING FROM BACK TO SIDE WHILE IN FLAT BAD: A LITTLE
MOVING TO AND FROM BED TO CHAIR: A LITTLE
MOVING FROM LYING ON BACK TO SITTING ON SIDE OF FLAT BED WITH BEDRAILS: A LITTLE
DAILY ACTIVITIY SCORE: 18
TOILETING: A LITTLE
HELP NEEDED FOR BATHING: A LOT
MOBILITY SCORE: 16
MOVING FROM LYING ON BACK TO SITTING ON SIDE OF FLAT BED WITH BEDRAILS: A LITTLE
WALKING IN HOSPITAL ROOM: A LITTLE
PERSONAL GROOMING: A LITTLE
PERSONAL GROOMING: A LITTLE
DAILY ACTIVITIY SCORE: 16
EATING MEALS: A LITTLE

## 2023-12-07 ASSESSMENT — PAIN - FUNCTIONAL ASSESSMENT
PAIN_FUNCTIONAL_ASSESSMENT: 0-10

## 2023-12-07 ASSESSMENT — PAIN DESCRIPTION - LOCATION: LOCATION: BACK

## 2023-12-07 ASSESSMENT — PAIN DESCRIPTION - DESCRIPTORS: DESCRIPTORS: BURNING

## 2023-12-07 ASSESSMENT — PAIN SCALES - GENERAL
PAINLEVEL_OUTOF10: 6
PAINLEVEL_OUTOF10: 6
PAINLEVEL_OUTOF10: 7
PAINLEVEL_OUTOF10: 9
PAINLEVEL_OUTOF10: 7

## 2023-12-07 ASSESSMENT — ACTIVITIES OF DAILY LIVING (ADL)
ADL_ASSISTANCE: INDEPENDENT
ADL_ASSISTANCE: INDEPENDENT
BATHING_ASSISTANCE: MODERATE

## 2023-12-07 NOTE — NURSING NOTE
"Spoke with Edi Olguin CNP about the PICC line ordered and she is aware we need to know final antibiotic pt will dc on, length of treatment and if it was OK that we place a central line on a pt that doesn't have final blood culture reports back at time of placing PICC.  Per Dr. Reyna \"She asked that patient have picc line placed now and she will make final antibiotic choice once final culture reports are back. But wants a picc and not midline \". I instructed Edi how to order a PICC line  "

## 2023-12-07 NOTE — PROGRESS NOTES
Occupational Therapy    Evaluation    Patient Name: Tara Tierney  MRN: [id number]  Today's Date: 12/7/2023  Time Calculation  Start Time: 1254  Stop Time: 1317  Time Calculation (min): 23 min        Assessment:  End of Session Communication: Bedside nurse  End of Session Patient Position: Alarm on, Up in chair  OT Assessment Results: Decreased ADL status, Decreased endurance  Plan:  Treatment Interventions: ADL retraining, Functional transfer training, Endurance training  OT Frequency: 2 times per week  OT Discharge Recommendations: Moderate intensity level of continued care, High intensity level of continued care  OT - OK to Discharge: Yes (when medically stable to mod/high intensity OT)    Subjective   Current Problem:  1. Intractable back pain        2. Pseudomeningocele        3. Urinary tract infection without hematuria, site unspecified        4. Seroma, post-traumatic (CMS/HCC)  Tissue/Wound Culture/Smear    Tissue/Wound Culture/Smear    Tissue/Wound Culture/Smear    Tissue/Wound Culture/Smear    Tissue/Wound Culture/Smear    Tissue/Wound Culture/Smear        General:  General  Reason for Referral: ADL impairment  Referred By: Born PT/OT  Past Medical History Relevant to Rehab: COPD, HLD, HTN, PVD, arthritis, smoker  Family/Caregiver Present: No  Caregiver Feedback: \  Prior to Session Communication: Bedside nurse  Patient Position Received: Bed, 3 rail up, Alarm on  General Comment: Pt to ED 12/5 with complaints of increasing back pain. Recent L3-4, 4-5, and 5-S1 laminectomy with Dr. Coombs. Pt reported a fall on 12/1. 12/5 MRI L spine large dorsal extra spinal fluid, deforms thecal sac; CT abdomen/pelvis prominent fluid collection at surgical site. Underwent I&D 12/6,  Precautions:  Post-Surgical Precautions: Spinal precautions  Braces Applied: TLSO brace when OOB  Vital Signs:   93-93% on RA throughout evaluation. 2L 02 removed.     Pain:  Pain Assessment  Pain Assessment: 0-10  Pain Score: 7  Pain  Type: Surgical pain  Pain Location: Back    Objective   Cognition:  Overall Cognitive Status: Within Functional Limits           Home Living:  Type of Home: House  Lives With: Spouse  Home Adaptive Equipment: Walker rolling or standard, Cane  Home Layout: One level  Home Access: Stairs to enter without rails  Entrance Stairs-Rails: None  Entrance Stairs-Number of Steps: 1  Bathroom Shower/Tub: Tub/shower unit  Bathroom Toilet: Handicapped height  Bathroom Equipment:  (no shower chair, but states she can get one)  Home Living Comments:  works nights  Prior Function:  Level of [place]: Independent with ADLs and functional transfers, Independent with homemaking with ambulation  ADL Assistance: Independent  Homemaking Assistance: Independent  Ambulatory Assistance: Independent (been using WW since back sx)  Prior Function Comments: 1 fall PTA; reports knees buckled and pt. went down. Drives       ADL:  Eating Assistance: Independent  Grooming Assistance: Stand by  Bathing Assistance: Moderate  UE Dressing Assistance: Minimal  LE Dressing Assistance: Moderate  Toileting Assistance with Device: Moderate  Activity Tolerance:  Endurance: Decreased tolerance for upright activites  Activity Tolerance Comments: pain limiting  Bed Mobility/Transfers: Bed Mobility  Bed Mobility: Yes  Bed Mobility 1  Bed Mobility 1: Supine to sitting, Log roll  Level of Assistance 1: Minimum assistance  Bed Mobility Comments 1: VCs for log roll and sequencing, Min A    Transfers  Transfer: Yes  Transfer 1  Technique 1: Sit to stand, Stand to sit  Transfer Device 1: Walker  Transfer Level of Assistance 1: Minimum assistance  Trials/Comments 1: Assist to lift and steady; TLSO donned when standing due to fitting  Transfers 2  Technique 2: Stand pivot  Transfer Device 2: Walker  Transfer Level of Assistance 2: Contact guard  Trials/Comments 2: CGA for safety      Ambulation/Gait Training:  Ambulation/Gait Training  Ambulation/Gait  Training Performed:  (~50 feet with WW; CGA for safety; TLSO donned)       Standing Balance:  Dynamic Standing Balance  Dynamic Standing-Comments: Fair to Fair +     Strength:  Strength Comments: BHASKAR 4/5 MMT        Hand Function:  Gross Grasp: Functional  Extremities: RUE   RUE : Within Functional Limits and LUE   LUE: Within Functional Limits      Outcome Measures:[facility] Daily Activity  Putting on and taking off regular lower body clothing: A lot  Bathing (including washing, rinsing, drying): A lot  Putting on and taking off regular upper body clothing: A little  Toileting, which includes using toilet, bedpan or urinal: A lot  Taking care of personal grooming such as brushing teeth: A little  Eating Meals: None  Daily Activity - Total Score: 16        Education Documentation  Precautions, taught by Johanne Saunders OT at 12/7/2023  3:19 PM.  Learner: Patient  Readiness: Acceptance  Method: Explanation  Response: Verbalizes Understanding, Needs Reinforcement    ADL Training, taught by Johanne Saunders OT at 12/7/2023  3:19 PM.  Learner: Patient  Readiness: Acceptance  Method: Explanation  Response: Verbalizes Understanding, Needs Reinforcement    Education Comments  No comments found.        IP EDUCATION:  Education  Individual(s) Educated: Patient  Education Provided: POC discussed and agreed upon, Risk and benefits of OT discussed with patient or other, Fall precautons, Diagnosis & Precautions  Education Comment: educated on rec for discharge    Goals:  Encounter Problems       Encounter Problems (Active)       OT Goals       Mod I for all functional transfers  (Progressing)       Start:  12/07/23    Expected End:  12/21/23            Mod I LB dressing with reacher/sock aid  (Progressing)       Start:  12/07/23    Expected End:  12/21/23            Good dyn standing balance during ADLs, standing sinkside grooming, and functional tasks  (Progressing)       Start:  12/07/23    Expected End:   12/21/23

## 2023-12-07 NOTE — NURSING NOTE
Pre-Procedure Checklist:  Emergent Line Insertion: No  Type of Line to be Placed: PICC  Consent Obtained: Yes  Emergency Medication Necessary: No  Patient Identified with 2 Independent Identifiers: Yes  Review of Allergies, Anticoagulation, Relevant Labs, ECG/Telemetry: Yes  Risks/Benefits/Alternatives Discussed with Patient/POA/Legal Representative: Yes  Stop Sign on Door: Yes  Time Out Performed: Yes  Catheter Exchange: No    Positioning Checklist:  All People, Including Patient, in the Room with Cap and Mask: Yes   Fluoroscopy Used to Identify Vessel and Guide Insertion: Yes   Sterile Cover Used: Yes   Full Barrier Precautions Followed (Mask, Cap, Gown, Gloves): Yes   Hands Washed: Yes   Monitors Attached with Sound Alarms On: Yes  Full Body Sterile Drape (Head-to-Toe) Used to Cover Patient: Yes   Trendelenburg Position (For IJ and Subclavian): No   CHG Skin Prep Used and Allowed to Air Dry to Skin Procedure: Yes     Procedure Checklist:  Blood Aspirated From All Lumens, All Ports Subsequently Flushed: Yes   Catheter Caps Placed on All Lumens; Lumens Clamped: Yes   Maintain Guidewire Control Throughout, Ensuring Guidewire Removal: Yes   Maintain Sterile Field Throughout Insertion: Yes   Catheter Secured: Yes   Confirmatory Test of Venous Placement: Non-Pulsatile Blood     Post Procedure Checklist:  Date and Time Written on Dressing: Yes   Sharp and Wire Count and Safe Disposal of all Sharps/Wires: Yes   Sterile Dressing Applied Per Protocol: Yes   X-ray Ordered or ECG Image: Yes     PICC Insertion Details:  Size (Fr): 4 Fr  Lumen Type: single  Catheter to Vein Ratio Less Than 50%: Yes   Total Length (cm): 42  External Length (cm): 1  Orientation: right  Location: basilic  Site Prep: Chlorohexidine; Usual sterile procedure followed  Local Anesthetic: Injectable/Subcutaneous  Indication: iv  antibiotics  Insertion Team Members in the Room: Nurse, Tech   Initial Extremity Circumference (cm): 34  Insertion  Attempts: 3  Patient Tolerance: Tolerated Well, Age Appropriate  Comfort Measures: Subcutaneous anesthetic; Verbal  Procedure Location: IR   Safety Measures: Patient specific safety measures addressed with RN  Estimated Blood Loss (mL): 1  Vessel Fully Compressible Proximally and Distally to Insertion Site: Yes  Brisk Blood Return Obtained and Line Draws Easily: Yes  Tip Location: caj  Line Confirmation: 3CG and Sherlock   Lot #: [id number]  : Bard  PICC Line Exp Date: 1/31/25  Securement: Stat Lock  Post Procedure Checklist: Bed at lowest level and wheels locked; Line discharge information at bedside.  Additional Details: Line was inserted using Modified Seldinger's Technique.   Placed by: Virginie Hensley, RN  PICC ready for immediate use.

## 2023-12-07 NOTE — PROGRESS NOTES
Physical Therapy    Physical Therapy Evaluation    Patient Name: Tara Tierney  MRN: [id number]  Today's Date: 12/7/2023   Time Calculation  Start Time: 1253  Stop Time: 1317  Time Calculation (min): 24 min    Assessment/Plan   PT Assessment  PT Assessment Results: Decreased strength, Decreased range of motion, Decreased endurance, Impaired balance, Decreased mobility, Orthopedic restrictions, Pain  Rehab Prognosis: Good  End of Session Communication: Bedside nurse  Assessment Comment: Pt displays decreased functional mobility following lumbar surgery and fall as displayed by decreased balance, weakness, pain, and decreased trunk control.  End of Session Patient Position: Up in chair, Alarm on (feet down, call light in reach)  IP OR SWING BED PT PLAN  Inpatient or Swing Bed: Inpatient  PT Plan  Treatment/Interventions: Bed mobility, Transfer training, Gait training, Stair training, Balance training, Neuromuscular re-education, Strengthening, Endurance training, Range of motion, Therapeutic exercise, Therapeutic activity, Home exercise program  PT Plan: Skilled PT  PT Frequency: Daily  PT Discharge Recommendations: Moderate intensity level of continued care, High intensity level of continued care  PT Recommended Transfer Status: Assist x1  PT - OK to Discharge: Yes (PT eval/POC complete, ok to d/c to next level of care pending medical clearance.)    Subjective     Current Problem:  Patient Active Problem List   Diagnosis    Abdominal aortic aneurysm (CMS/HCC)    Abnormal EKG    Bilateral carotid artery stenosis    Bruit of right carotid artery    CAD in native artery    Cardiomyopathy (CMS/HCC)    Chest pain    Chronic asthmatic bronchitis    Closed displaced fracture of fifth metatarsal bone    Current every day smoker    Difficulty breathing    Essential hypertension    History of total knee replacement    Hypokalemia    Intermittent claudication (CMS/HCC)    Knee osteoarthritis    Knee pain    Limb pain     Localized swelling, mass, or lump of lower extremity    Celiac artery stenosis (CMS/HCC)    Mesenteric artery stenosis (CMS/HCC)    Mixed hyperlipidemia    Obese    PVD (peripheral vascular disease) (CMS/HCC)    Right foot pain    Swelling    Trigger finger    Urinary tract infection without hematuria    Back pain of lumbar region with sciatica    Back pain with radiculopathy    Intractable back pain       General Visit Information:  General  Reason for Referral: impaired mobility  Referred By: Born PT/OT  Past Medical History Relevant to Rehab: COPD, HLD, HTN, PVD, arthritis, smoker  Family/Caregiver Present: No  Co-Treatment: OT  Co-Treatment Reason: Maximize pt safety while assessing discipline specific goals  Prior to Session Communication: Bedside nurse  Patient Position Received: Bed, 3 rail up, Alarm on  General Comment: Pt to ED 12/5 with complaints of increasing back pain. Recent L3-4, 4-5, and 5-S1 laminectomy with Dr. Coombs. Pt reported a fall on 12/1. 12/5 MRI L spine large dorsal extra spinal fluid, deforms thecal sac; CT abdomen/pelvis prominent fluid collection at surgical site. Underwent I&D 12/6,    Home Living:  Home Living  Type of Home: House  Lives With: Spouse (dog)  Home Adaptive Equipment: Walker rolling or standard, Cane  Home Layout: One level  Home Access: Stairs to enter without rails  Entrance Stairs-Rails: None  Entrance Stairs-Number of Steps: 1  Bathroom Shower/Tub: Tub/shower unit  Bathroom Toilet: Handicapped height  Bathroom Equipment:  (does not have shower chair but is able to get one)  Home Living Comments:  works nights    Prior Level of Function:  Prior Function Per Pt/Caregiver Report  Level of [place]: Independent with ADLs and functional transfers, Independent with homemaking with ambulation  ADL Assistance: Independent  Homemaking Assistance: Independent  Ambulatory Assistance: Independent (using ww since lumbar surgery)  Prior Function Comments: Denies  other falls. Drives.    Precautions:  Precautions  Post-Surgical Precautions: Spinal precautions  Braces Applied: TLSO brace  Precautions Comment: brace when ambulating    Vital Signs:     Objective     Pain:  Pain Assessment  Pain Assessment: 0-10  Pain Score: 7  Pain Type: Surgical pain, Acute pain  Pain Location: Back    Cognition:  Cognition  Overall Cognitive Status: Within Functional Limits    General Assessments:  General Observation  General Observation: 2 JOSE drolivia   Activity Tolerance  Endurance: Decreased tolerance for upright activites (limited 2/2 pain)     Strength  Strength Comments: BLE MMT 4/5 overall        Postural Control  Postural Control: Within Functional Limits  Static Sitting Balance  Static Sitting-Balance Support: Bilateral upper extremity supported, Feet supported  Static Sitting-Level of Assistance: Distant supervision  Dynamic Sitting Balance  Dynamic Sitting-Balance Support: Bilateral upper extremity supported  Dynamic Sitting-Balance: Trunk control activities  Dynamic Sitting-Comments: supervision  Static Standing Balance  Static Standing-Balance Support: Bilateral upper extremity supported  Static Standing-Level of Assistance: Contact guard  Dynamic Standing Balance  Dynamic Standing-Balance Support: Bilateral upper extremity supported  Dynamic Standing-Balance: Turning  Dynamic Standing-Comments: CGA    Functional Assessments:     Bed Mobility  Bed Mobility: Yes  Bed Mobility 1  Bed Mobility 1: Supine to sitting, Log roll  Level of Assistance 1: Minimum assistance  Bed Mobility Comments 1: assist for maintaining precautions; for trunk control, HOB flat to use log roll technique  Transfers  Transfer: Yes  Transfer 1  Transfer From 1: Bed to  Transfer to 1: Chair with arms  Technique 1: Sit to stand, Stand to sit  Transfer Device 1: Walker  Transfer Level of Assistance 1: Minimum assistance  Trials/Comments 1: brace donned upon stand; doffed upon sit; demos correct hand placement with  ww; assist for balance  Ambulation/Gait Training  Ambulation/Gait Training Performed: Yes  Ambulation/Gait Training 1  Surface 1: Level tile  Device 1: Rolling walker  Gait Support Devices:  (brace donned)  Assistance 1: Contact guard  Quality of Gait 1: Decreased step length  Comments/Distance (ft) 1: Pt ambulates 50'          Extremity/Trunk Assessments:        RLE   RLE :  (ROM WFL)  LLE   LLE :  (ROM WFL)    Outcome Measures:  [facility] Basic Mobility  Turning from your back to your side while in a flat bed without using bedrails: A little  Moving from lying on your back to sitting on the side of a flat bed without using bedrails: A little  Moving to and from bed to chair (including a wheelchair): A little  Standing up from a chair using your arms (e.g. wheelchair or bedside chair): A little  To walk in hospital room: A little  Climbing 3-5 steps with railing: Total  Basic Mobility - Total Score: 16                            Goals:  Encounter Problems       Encounter Problems (Active)       PT Problem       Pt will demonstrate sup > sit and sit > sup bed mobility mod I with log roll technique (Progressing)       Start:  12/07/23    Expected End:  12/21/23            Pt will demo sit > stand and stand > sit transfer with ww and mod I  (Progressing)       Start:  12/07/23    Expected End:  12/21/23            Pt will ambulate 100' with ww and mod I, without LOB  (Progressing)       Start:  12/07/23    Expected End:  12/21/23            Pt will demo up/down 1 steps with handrail mod I (Progressing)       Start:  12/07/23    Expected End:  12/21/23               Pain - Adult            Education Documentation  Precautions, taught by Rosanna Maolne PT at 12/7/2023  2:18 PM.  Learner: Patient  Readiness: Acceptance  Method: Explanation  Response: Verbalizes Understanding    Body Mechanics, taught by Rosanna Malone PT at 12/7/2023  2:18 PM.  Learner: Patient  Readiness: Acceptance  Method: Explanation  Response:  Verbalizes Understanding    Mobility Training, taught by Rosanna Malone PT at 12/7/2023  2:18 PM.  Learner: Patient  Readiness: Acceptance  Method: Explanation  Response: Verbalizes Understanding    Education Comments  No comments found.

## 2023-12-07 NOTE — PROGRESS NOTES
Per david pt. Will need snf placement and is requesting [facility]. [facility]. [facility]. [facility]  referral sent to all three, awaiting response.  Pt. With reinier zuñiga dual.  Will require ins. Precert.    [facility] will need [id number] in order to obtain ins. Precert.  Physician and david aware signed and completed gold form needed.

## 2023-12-07 NOTE — PROGRESS NOTES
Pt/ot evaluated pt. evaluation is indicative of need for skilled tx . Pt give [facility] list fr ins. Prefers 1. [facility] 2. [facility]  3. [facility].  Pt requires a precert. Marlin asked to place referral. Pt will need iv atb for 4-6 weeks per id note. Pt to have a picc line today .  ROBIN Ruiz were updated on plan.

## 2023-12-07 NOTE — PROGRESS NOTES
DAILY PROGRESS NOTE      Hospital Day: 1  POD1 incision and drainage lumbar spine       Patient doing well  Visit Vitals  /52 (Patient Position: Sitting)   Pulse 81   Temp 37 °C (98.6 °F)   Resp 18      Temp (24hrs), Av.7 °C (98.1 °F), Min:36.4 °C (97.5 °F), Max:37 °C (98.6 °F)       Pain Control fair however has significantly improved postoperatively   No chest pain or shortness of breath.  No calf pain    Exam:   Denies any numbness or tingling to lower extremities   Extremity shows neuro vascular status intact. Flexion and extension intact on extremity.  Calves soft and non-tender without evidence of DVT.        Labs reviewed:  CBC: @LABRCNT(WBC:3,HGB:3,PLT:3)@  BMP:  @LABRCNT(Na:3,K:3,CL:3,CO2:3,BUN:3,Creatinine:3,Glucose:3)@    I&O  I/O last 3 completed shifts:  In: 2050 (24.2 mL/kg) [P.O.:200; I.V.:800 (9.4 mL/kg); IV Piggyback:1050]  Out: 2945 (34.8 mL/kg) [Urine:2700 (0.9 mL/kg/hr); Drains:245]  Weight: 84.7 kg       Assessment:    Doing well postoperatively. Patient may require rehab at discharge for strength training. PT/OT evaluation pending       Plan:    PT/OT evaluation pending  ID consult ordered   Patient will need picc line placement   Continue pain control  Continue drains until otherwise directed to remove by surgeon or NP    ORLANDO Kline   2023 12:16 PM

## 2023-12-07 NOTE — CONSULTS
Consults  Referred by FADIA Olguin CNP    Primary MD: John Justice MD    Reason For Consult  Antibiotics management for postop back infection with past medical history of COPD coronary artery disease hypertension hyperlipidemia macular degeneration peripheral vascular disease degenerative disc disease status post L3-S1 laminectomy done by Dr. Coombs 2 weeks ago.  Patient fell 1 week ago on her knees and afterwards she started having severe low back pain radiating to bilateral legs.  Patient denies any fevers or chills.  No numbness or weakness.  No drainage from the incision.  No wound dehiscence reported.  Upon further evaluation patient was found to have deep fluid collection for which she had I&D of subcu fascia lumbar spine done yesterday.  Gram stain with gram-positive cocci.  Culture is pending.   Patient is currently in severe pain and is unable to get comfortable.  She has a JOSE drain draining bloody drainage.   Patient is on chronic anticoagulation and takes aspirin and Plavix on a daily basis.  Patient denies any history of infection.  It was noted in the chart that patient had positive nares swab for MRSA  History Of Present Illness  Tara Tierney is a 68 y.o. female .     Past Medical History  She has a past medical history of Arthritis, Back pain, COPD (chronic obstructive pulmonary disease) (CMS/HCC), COVID-19 vaccine administered, Eczema, History of COVID-19, Hyperlipidemia, Hypertension, Hypoesthesia of skin, Macular degeneration, Meniere's disease, Osteoporosis, Overactive bladder, Pain in unspecified finger(s), Pain in unspecified wrist, Peripheral vascular disease (CMS/HCC), Personal history of other diseases of the circulatory system, Personal history of other diseases of the musculoskeletal system and connective tissue, Personal history of other specified conditions, Personal history of other specified conditions, Personal history of other specified conditions, Postmenopausal, Seasonal  allergies, and Unspecified visual loss.    Surgical History  She has a past surgical history that includes Tonsillectomy; Cardiac catheterization; Colonoscopy; FL transluminal angio percutaneous venus; Total knee arthroplasty (Bilateral); Cholecystectomy; Aorta - bilateral femoral artery bypass graft; CT angio aorta and bilateral iliofemoral runoff w and or wo IV contrast (03/10/2020); CT angio neck (2022); CT angio aorta and bilateral iliofemoral runoff w and or wo IV contrast (2023); Adenoidectomy; Tubal ligation; and Blepharoptosis repair.     Social History     Occupational History    Not on file   Tobacco Use    Smoking status: Former     Packs/day: 0.50     Years: 45.00     Additional pack years: 0.00     Total pack years: 22.50     Types: Cigarettes     Quit date: 2023     Years since quittin.2    Smokeless tobacco: Not on file   Vaping Use    Vaping Use: Never used   Substance and Sexual Activity    Alcohol use: Never    Drug use: Never    Sexual activity: Defer     Travel History   Travel since 23    No documented travel since 23          Family History  Family History   Problem Relation Name Age of Onset    Breast cancer Mother      Other (arteriosclerotic cardiovascular disease) Father      Cancer Father       Allergies  Neomycin and Neomycin-polymyxin b-dexameth     Immunization History   Administered Date(s) Administered    Moderna SARS-CoV-2 Vaccination 2021, 2021     Medications  Home medications:  Medications Prior to Admission   Medication Sig Dispense Refill Last Dose    aspirin (Adult Low Dose Aspirin) 81 mg EC tablet Take 1 tablet (81 mg) by mouth once daily.       docusate sodium (Colace) 100 mg tablet Take 1 tablet (100 mg) by mouth once daily.       albuterol (ProAir HFA) 90 mcg/actuation inhaler Inhale 2 puffs.       aspirin 81 mg EC tablet Take 1 tablet (81 mg) by mouth once daily.       atenolol (Tenormin) 50 mg tablet Take 1 tablet (50 mg) by  mouth once daily in the morning.       ATENOLOL ORAL Take 50 mg by mouth once daily.       brimonidine (AlphaGAN) 0.2 % ophthalmic solution Administer 1 drop into both eyes 2 times a day.       busPIRone (Buspar) 10 mg tablet Take 1 tablet (10 mg) by mouth once daily.       celecoxib (CeleBREX) 200 mg capsule Take 1 capsule (200 mg) by mouth once daily as needed.       cholecalciferol (Vitamin D-3) 50 mcg (2,000 unit) capsule Take 1 capsule (50 mcg) by mouth once daily.       clopidogrel (Plavix) 75 mg tablet Take 1 tablet (75 mg) by mouth once daily.       cyclobenzaprine (Flexeril) 5 mg tablet Take 1 tablet (5 mg) by mouth 3 times a day as needed for muscle spasms. 21 tablet 0     docusate sodium (Colace) 100 mg capsule Take 1 capsule (100 mg) by mouth 2 times a day. 60 capsule 0     ezetimibe (Zetia) 10 mg tablet Take 1 tablet (10 mg) by mouth once daily.       furosemide (Lasix) 20 mg tablet Take 1 tablet (20 mg) by mouth once daily.       hydrALAZINE (Apresoline) 50 mg tablet Take 1 tablet (50 mg) by mouth 2 times a day.       ibandronate (Boniva) 150 mg tablet Take 1 tablet (150 mg) by mouth every 30 (thirty) days.       ipratropium (Atrovent HFA) 17 mcg/actuation inhaler Inhale 2 puffs 2 times a day.       isosorbide mononitrate ER (Imdur) 30 mg 24 hr tablet Take 1 tablet (30 mg) by mouth once daily. With the 60 mg tablet       isosorbide mononitrate ER (Imdur) 60 mg 24 hr tablet Take 1 tablet (60 mg) by mouth once daily. With the 30mg tablet       ISOSORBIDE MONONITRATE ORAL Take 30 mg by mouth once daily.       ISOSORBIDE MONONITRATE ORAL Take 60 mg by mouth once daily.       loratadine (Claritin) 10 mg tablet Take 1 tablet (10 mg) by mouth once daily.       LUTEIN ORAL Take 20 mg by mouth once daily.       meclizine (Antivert) 25 mg tablet Take 1 tablet (25 mg) by mouth 3 times a day as needed for dizziness.       mirtazapine (Remeron) 15 mg tablet Take 1 tablet (15 mg) by mouth once daily at bedtime.        nitroglycerin (Nitrostat) 0.4 mg SL tablet Place 1 tablet (0.4 mg) under the tongue every 5 minutes if needed for chest pain.  PLACE 1 TABLET UNDER THE TONGUE EVERY 5 MINUTES UP TO 3 DOSES AS NEEDED FOR CHEST PAIN.       [] oxyCODONE (Roxicodone) 5 mg immediate release tablet Take 1 tablet (5 mg) by mouth every 4 hours if needed for moderate pain (4 - 6) for up to 7 days. 42 tablet 0     pantoprazole (ProtoNix) 40 mg EC tablet Take 1 tablet (40 mg) by mouth once daily in the morning. Take before meals. Do not crush, chew, or split.       pilocarpine (Salagen, pilocarpine,) 5 mg tablet Take 1 tablet (5 mg) by mouth once daily.       potassium chloride ER (Micro-K) 10 mEq ER capsule Take 1 capsule (10 mEq) by mouth once daily.       pregabalin (Lyrica) 75 mg capsule Take 1 capsule (75 mg) by mouth 2 times a day.       raloxifene (Evista) 60 mg tablet Take 1 tablet (60 mg) by mouth once daily.       simvastatin (Zocor) 40 mg tablet Take 1 tablet (40 mg) by mouth once daily at bedtime.       simvastatin (Zocor) 40 mg tablet Take 1 tablet (40 mg) by mouth 2 times a day.       tiZANidine (Zanaflex) 4 mg tablet Take 1 tablet (4 mg) by mouth every 8 hours if needed.       tolterodine (Detrol) 1 mg tablet Take 1 tablet (1 mg) by mouth once daily at bedtime.       triamterene-hydrochlorothiazid (Dyazide) 37.5-25 mg capsule Take 1 capsule by mouth once daily.       vit C,V-Am-rgejn-lutein-zeaxan (PreserVision AREDS-2) 250-90-40-1 mg capsule Take 1 capsule by mouth twice a day.        Current medications:  Scheduled medications  [Held by provider] aspirin, 81 mg, oral, Daily  atenolol, 50 mg, oral, q AM  brimonidine, 1 drop, Both Eyes, BID  busPIRone, 10 mg, oral, Daily  ceFAZolin in dextrose (iso-os), 2 g, intravenous, q8h  cefTRIAXone, 1 g, intravenous, q24h  cholecalciferol, 2,000 Units, oral, Daily  docusate sodium, 100 mg, oral, BID  ezetimibe, 10 mg, oral, Daily  heparin (porcine), 5,000 Units, subcutaneous,  q8h  hydrALAZINE, 50 mg, oral, BID  isosorbide mononitrate ER, 30 mg, oral, Daily  isosorbide mononitrate ER, 60 mg, oral, Daily  loratadine, 10 mg, oral, Daily  mirtazapine, 15 mg, oral, Nightly  pantoprazole, 40 mg, oral, Daily before breakfast  polyethylene glycol, 17 g, oral, Daily  pregabalin, 75 mg, oral, BID  simvastatin, 40 mg, oral, BID  [Held by provider] triamterene-hydrochlorothiazid, 1 tablet, oral, Daily  vancomycin, 1,250 mg, intravenous, Once      Continuous medications     PRN medications  PRN medications: acetaminophen **OR** acetaminophen **OR** acetaminophen, acetaminophen **OR** acetaminophen **OR** acetaminophen, benzocaine-menthol, cyclobenzaprine, nitroglycerin, oxyCODONE, oxyCODONE, oxyCODONE, vancomycin    Review of Systems  14 system review otherwise is negative  Objective  Range of Vitals (last 24 hours)  Heart Rate:  [76-90]   Temp:  [36.4 °C (97.5 °F)-37 °C (98.6 °F)]   Resp:  [15-26]   BP: ()/(37-55)   SpO2:  [91 %-100 %]   Daily Weight  12/05/23 : 84.7 kg (186 lb 11.7 oz)    Body mass index is 40.41 kg/m².     Physical Exam  Vitals:    12/06/23 1922 12/06/23 2108 12/07/23 0039 12/07/23 0808   BP: 104/55 (!) 90/46 109/55 108/52   Patient Position:    Sitting   Pulse: 85 82 90 81   Resp: 16 16 17 18   Temp: 36.9 °C (98.4 °F) 36.6 °C (97.9 °F) 37 °C (98.6 °F) 37 °C (98.6 °F)   TempSrc:       SpO2: 97% 95% 93% 94%   Weight:       Height:         General Appearance: alert and oriented to person, place and time, well-developed and well-nourished, in no acute distress  On nasal cannula  Skin: warm and dry, no rash.   Head: normocephalic and atraumatic  Eyes: anicteric sclerae  ENT:  normal mucous membranes. No oral thrush  Lungs: normal respiratory effort, clear lungs  Heart normal S1-S2 distant heart sounds  Abdomen: soft, no tenderness  Trace leg edema  No erythema, no tenderness  Back incision with intact dressing and JOSE drain   Labs  Results from last 72 hours   Lab Units  "12/07/23 0623 12/06/23  0645 12/05/23  0759   WBC AUTO x10*3/uL 10.3 7.7 8.8   HEMOGLOBIN g/dL 9.1* 11.9* 11.7*   HEMATOCRIT % 28.0* 36.1 34.7*   PLATELETS AUTO x10*3/uL 271 319 321   NEUTROS PCT AUTO % 78.7 83.6 77.3   LYMPHS PCT AUTO % 9.2 11.4 13.8   MONOS PCT AUTO % 11.3 4.2 5.7   EOS PCT AUTO % 0.1 0.1 2.4     Results from last 72 hours   Lab Units 12/07/23 0623 12/06/23  0645 12/05/23  0759   SODIUM mmol/L 135* 134* 135*   POTASSIUM mmol/L 3.4* 3.7 3.6   CHLORIDE mmol/L 100 99 101   CO2 mmol/L 28 28 22   BUN mg/dL 23 9 17   CREATININE mg/dL 1.31* 0.83 0.87   GLUCOSE mg/dL 158* 149* 143*   CALCIUM mg/dL 9.0 9.7 9.3   ANION GAP mmol/L 10 11 16   EGFR mL/min/1.73m*2 44* 76 72     Results from last 72 hours   Lab Units 12/05/23  0759   ALK PHOS U/L 79   BILIRUBIN TOTAL mg/dL 0.4   PROTEIN TOTAL g/dL 6.6   ALT U/L 12   AST U/L 13   ALBUMIN g/dL 4.0     Estimated Creatinine Clearance: 34.8 mL/min (A) (by C-G formula based on SCr of 1.31 mg/dL (H)).  C-Reactive Protein   Date Value Ref Range Status   12/05/2023 1.01 (H) <1.00 mg/dL Final     Sedimentation Rate   Date Value Ref Range Status   12/05/2023 13 0 - 30 mm/h Final     No results found for: \"HIV1X2\", \"HIVCONF\", \"WXSGWT4BB\"  No results found for: \"HEPCABINIT\", \"HEPCAB\", \"HCVPCRQUANT\"  Microbiology  Susceptibility data from last 90 days.  Collected Specimen Info Organism   12/05/23 Urine from Clean Catch/Voided Gram Negative Bacilli   11/03/23 Swab from Nares/Axilla/Groin Methicillin Resistant Staphylococcus aureus (MRSA)   Imaging  MR lumbar spine w and wo IV contrast    Result Date: 12/5/2023  Interpreted By:  Jh Benoit  and Dev Hogue STUDY: MR LUMBAR SPINE W AND WO IV CONTRAST;  12/5/2023 4:39 pm   INDICATION: Signs/Symptoms:Recent laminectomy with fusion, intractable back pain radiates legs.   COMPARISON: MRI 08/20/2023, CT 12/05/2023   ACCESSION NUMBER(S): [id number]   ORDERING CLINICIAN: SHAWN REDDY   TECHNIQUE: Sagittal T1, T2, STIR, axial " T1 and T2 weighted images of the lumbar spine were acquired without and following administration of 15 cc Dotarem gadolinium based IV contrast.   FINDINGS: Motion degraded examination.   Alignment: The vertebral alignment is maintained.   Vertebrae/Intervertebral Discs: Postsurgical changes of L4 through S1 posterior fusion noted with bilateral transpedicular screws and interbody graft placement at L4-5 and L5-S1. There has been bilateral laminectomies at L3-4, L4-5, and L5-S1. The vertebral bodies demonstrate expected height. There is mild osseous edema involving L5 and S1 vertebral bodies. The marrow signal is within normal limits. Multilevel intervertebral disc height loss and disc desiccation noted.   A large dorsal extra-spinal fluid collection is noted extending from the laminectomy bed through the deep muscular fascia into the subcutaneous fat. This collection extends from the right lateral recess at the level of L4-5 as well as L5-S1 into the spinal canal and deforms the thecal sac. A focal defect is noted within the thecal sac at the level of L5-S1 (series 9, image 20). 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 The collections do not demonstrate significant peripheral enhancement to suggest abscess.   Conus: The lower thoracic cord appears unremarkable. The conus terminates at L1.   T12-L1: Disc bulge. There is no significant central canal or neural foraminal stenosis.   L1-2: Disc bulge, ligamentum flavum thickening and facet hypertrophy contribute to minimal central canal narrowing.   L2-3: Disc bulge and mild prominence of the posterior epidural fat resulting in minimal narrowing of the central canal. There is moderate right and minimal left foraminal narrowing due to intraforaminal disc herniation and facet hypertrophy, similar to preoperative examination.   L3-4: Laminectomies. There is moderate narrowing of the thecal sac due to disc bulge and dorsal spinal collection as detailed above.  There is unchanged marked bilateral foraminal narrowing due to intraforaminal disc herniation and facet hypertrophic changes with possible impingement of bilateral exiting L3 nerve roots.   L4-5: Bilateral laminectomies. There is moderate narrowing of the central canal with indentation of the thecal sac posteriorly due to the dorsal spinal fluid collection as detailed above. Moderate right and mild left foraminal narrowing is overall similar to MRI dated 08/20/2020 3D to intraforaminal disc herniations and facet hypertrophic changes.   L5-S1: Bilateral laminectomies. The dorsal spinal fluid collection indents the thecal sac as detailed above. Evaluation of the foramina is limited due to susceptibility artifact from bilateral transpedicular screws.  There is persistent marked left and moderate right foraminal narrowing due to intraforaminal disc herniation and facet hypertrophy with possible impingement of the exiting left L5 nerve root.       1.  Postoperative changes as above. A large dorsal extra-spinal fluid collection is noted extending from the laminectomy bed through the deep muscular fascia into the subcutaneous fat. This collection extends from the right lateral recesses at the level of L4-5 as well as L5-S1 into the spinal canal and deforms the thecal sac resulting in moderate narrowing at L4-5 and to a lesser degree at L3-4. A focal defect is noted within the thecal sac at the level of L5-S1. 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 No significant peripheral enhancement is identified to suggest abscess although the sterility of this collection can not be ascertained on MRI alone. 2. Multilevel degenerative changes again noted with persistent marked bilateral foraminal narrowing at L3-4 and at L5-S1 on the left.     I personally reviewed the images/study and I agree with the findings as stated. This study was interpreted at [facility], [place], [place].   MACRO: None    Signed by: Jh Benoit 12/5/2023 5:24 PM Dictation workstation:   [id number]    CT angio chest abdomen pelvis    Result Date: 12/5/2023  Interpreted By:  Samuel Brannon, STUDY: CT ANGIO CHEST ABDOMEN PELVIS;  12/5/2023 2:48 pm   INDICATION: Signs/Symptoms:Severe lower abdomen pain, groin pain, radiates to back, intractable pain.   COMPARISON: May 18, 2022 CTA chest abdomen and pelvis. July 21, 2023 CTA abdomen and pelvis with runoff. August 20, 2023 MRI lumbar spine and December 5, 2023 CT lumbar spine   ACCESSION NUMBER(S): [id number]   ORDERING CLINICIAN: SHAWN REDDY   TECHNIQUE: Axial non-contrast images of the chest, abdomen, and pelvis  with coronal and sagittal reformatted images. Axial CT images of the chest, abdomen and pelvis after the intravenous administration of 100 mL Omnipaque 350 contrast using CT angiographic technique with coronal and sagittal reformatted images.  MIP images were provided and reviewed. 3D reconstructions were performed on a separate independent workstation.   FINDINGS: VASCULAR:   PULMONARY ARTERIES:   No acute pulmonary embolism.   THORACIC AORTA:  Non-contrast images show no evidence of acute intramural hematoma. No thoracic aortic aneurysm or dissection. Moderate scattered atherosclerosis thoracic aorta and branch vessels.   ABDOMINAL AORTA: No abdominal aortic aneurysm or dissection. There is scattered atherosclerosis. 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   CHEST:   MEDIASTINUM AND LYMPH NODES: No enlarged intrathoracic or axillary lymph nodes.   HEART: Normal size.  Moderate coronary artery calcifications. No pericardial effusion.   LUNG, PLEURA, LARGE AIRWAYS: No consolidation, pulmonary edema, pleural effusion, or pneumothorax.   OSSEOUS  STRUCTURES/CHEST WALL:    No acute osseous abnormality.     ABDOMEN/PELVIS:   Arterial phase imaging limits evaluation of the solid organs.   ABDOMINAL WALL: Within normal limits.   LIVER: Stable without detected mass. BILE DUCTS: No significant abnormality. GALLBLADDER: Absent   PANCREAS: No significant abnormality.   SPLEEN: No significant abnormality.   ADRENALS: No significant abnormality.   KIDNEYS, URETERS, BLADDER: No significant abnormality.   VESSELS: See above.  No additional significant abnormality. LYMPH NODES: No enlarged lymph nodes. RETROPERITONEUM:  No significant abnormality.   BOWEL: The stomach and bowel are normal caliber without evident inflammatory change.   PERITONEUM:   No significant ascites, free air, or fluid collection.   REPRODUCTIVE ORGANS: No significant abnormality.   OSSEOUS STRUCTURES:  Refer to same day CT scan lumbar spine report for details regarding that portion. No separate acute osseous abnormalities are identified. There are skin staples dorsal lumbar region. The canal is not reliably evaluated at the operated levels due to artifact from metallic hardware. There is minimal gas within the soft tissues at the operated levels and there is lobulated fluid density extending from vertebral to overlapping paravertebral region to the level of the superficial subcutaneous layer including portion sagittal series 506, image 109 and axial series 502, image 114 measuring 14 x 6 x 6 cm  with internal density measurement of 6 compatible with simple fluid density       No thoracic or abdominal aortic aneurysm or dissection.   Stable pre-existing vascular findings as reported.   Findings compatible with recent lumbar spine surgery with prominent fluid collection overlying the operated site extending through the superficial subcutaneous layer. The canal is not reliably assessed at the operated site due to artifact. Recommend further characterization with MRI lumbar spine with without contrast.    No acute abnormality of the chest, abdomen or pelvis.     Signed by: Samuel Brannon 12/5/2023 3:32 PM Dictation workstation:   [id number]    CT lumbar spine wo IV contrast    Result Date: 12/5/2023  Interpreted By:  Jony Monae, STUDY: CT LUMBAR SPINE WO IV CONTRAST; 12/5/2023 11:28 am   INDICATION: Signs/Symptoms:Low back pain, recent laminectomy and fusion, fall 1 week ago.   COMPARISON: None.   ACCESSION NUMBER(S): [id number]   ORDERING CLINICIAN: SHAWN REDDY   TECHNIQUE: Contiguous axial CT images were obtained through the lumbar spine at 2 mm slice thickness without contrast administration. The images were then reconstructed in the coronal and sagittal planes.   FINDINGS: OSSEOUS STRUCTURES: The patient is status post L3 through L5 laminectomy, L4 through S1 pedicle screw and fabrizio fusion and L4-5 and L5-S1 disc space implant placement. There is no evidence of acute fracture identified. The vertebral bodies are well aligned without evidence of subluxation.   Mild discogenic degenerative changes are seen at the remaining disc space levels. Mild-to-moderate facet degenerative changes are seen throughout the lumbar spine.   LOWER THORACIC SPINE: No gross central canal or neural foraminal narrowing is seen.   T12-L1: No gross central canal or neural foraminal narrowing is seen.   L1-2: No gross central canal or neural foraminal narrowing is seen.   L2-3: No gross central canal or neural foraminal narrowing is seen.   L3-4: Mild right-sided neural foraminal narrowing is seen. No significant left foraminal or central canal narrowing is seen.   L4-5: No gross central canal or neural foraminal narrowing is seen.   L5-S1: Mild left-sided neural foraminal narrowing is seen. No significant right foraminal or central canal narrowing is seen.   ASSOCIATED STRUCTURES: Evaluation of the visualized soft tissues of the abdomen is limited by the lack of intravenous contrast. Within this limitation, no gross mass or  lymphadenopathy is identified.  A fluid collection is seen along the posterior midportion the lower back, likely postoperative in nature.       1. No acute fracture identified. 2. Postoperative and degenerative changes, as described above.   MACRO: None.   Signed by: Jony Monae 12/5/2023 11:45 AM Dictation workstation:   [id number]       IMPRESSION:    Postop deep incisional wound infection of lumbar spine with probable infected hematoma  Gram-positive cocci infection, possible MRSA in a patient with positive preop nares screen  Status post lumbar laminectomy    PLAN:  Redose IV vancomycin  DC Ancef and Rocephin  Check IntraOp cultures and accordingly adjust antibiotic therapy  Recommend PICC line and 4-6 weeks of IV antibiotics on discharge  Consult pharmacy for Vanco dosing  Follow-up CBC BMP  Local wound care per orthopedics  Evaluate for inpatient rehab versus home health care on discharge  Case was discussed with patient and A Sharif, CNP/Ortho team  Gem Reyna MD

## 2023-12-07 NOTE — PROGRESS NOTES
"Tara Tierney is a 68 y.o. female on day 0 of admission presenting with Intractable back pain.    Subjective   Patient seen and examined.  She reports improvement in back pain but says that she has some tenderness.  Able to move her legs and toes.  No fevers or chills, no shortness of breath.  She is status post irrigation and debridement of subfascial lumbar spine and exploration of spinal fusion lumbar spine, postop day 1.       Objective     Physical Exam  Constitutional:       Appearance: She is obese.   HENT:      Head: Normocephalic.   Eyes:      Extraocular Movements: Extraocular movements intact.      Conjunctiva/sclera: Conjunctivae normal.   Cardiovascular:      Rate and Rhythm: Normal rate and regular rhythm.   Pulmonary:      Effort: No dyspnea, no distress present.   Abdominal:      General: There is no distension.      Palpations: Abdomen is soft.   Last Recorded Vitals  Blood pressure 108/52, pulse 81, temperature 37 °C (98.6 °F), resp. rate 18, height 1.448 m (4' 9\"), weight 84.7 kg (186 lb 11.7 oz), SpO2 94 %.  Intake/Output last 3 Shifts:  I/O last 3 completed shifts:  In: 2050 (24.2 mL/kg) [P.O.:200; I.V.:800 (9.4 mL/kg); IV Piggyback:1050]  Out: 2945 (34.8 mL/kg) [Urine:2700 (0.9 mL/kg/hr); Drains:245]  Weight: 84.7 kg     Relevant Results                             Assessment/Plan   Principal Problem:    Intractable back pain    She is status post irrigation and debridement of spinal fusion surgery  Pain has improved  Will discontinue Decadron  Continue pain control  PT OT  Mild drop in hemoglobin, continue to monitor, no indication for transfusion  Mild elevation in creatinine is most likely secondary to surgical stress  Will hold HCTZ  Patient will need rehab placement, TCC updated  Continue rest of the medical management as she is  DVT prophylaxis   Repeat labs tomorrow  She most likely had infection postop, cultures have been sent, showing gram-positive cocci, ID has been consulted " will need PICC line for 6 weeks of IV antibiotics, Ancef and Rocephin has been discontinued.  Patient was started on Rocephin      Nitesh Ruiz MD

## 2023-12-08 LAB
ANION GAP SERPL CALC-SCNC: 10 MMOL/L (ref 10–20)
BASOPHILS # BLD AUTO: 0.03 X10*3/UL (ref 0–0.1)
BASOPHILS NFR BLD AUTO: 0.4 %
BUN SERPL-MCNC: 22 MG/DL (ref 6–23)
CALCIUM SERPL-MCNC: 8.9 MG/DL (ref 8.6–10.3)
CHLORIDE SERPL-SCNC: 100 MMOL/L (ref 98–107)
CO2 SERPL-SCNC: 28 MMOL/L (ref 21–32)
CREAT SERPL-MCNC: 0.99 MG/DL (ref 0.5–1.05)
EOSINOPHIL # BLD AUTO: 0.3 X10*3/UL (ref 0–0.7)
EOSINOPHIL NFR BLD AUTO: 3.5 %
ERYTHROCYTE [DISTWIDTH] IN BLOOD BY AUTOMATED COUNT: 13.4 % (ref 11.5–14.5)
GFR SERPL CREATININE-BSD FRML MDRD: 61 ML/MIN/1.73M*2
GLUCOSE SERPL-MCNC: 103 MG/DL (ref 74–99)
HCT VFR BLD AUTO: 25.6 % (ref 36–46)
HGB BLD-MCNC: 8.4 G/DL (ref 12–16)
HOLD SPECIMEN: NORMAL
HOLD SPECIMEN: NORMAL
IMM GRANULOCYTES # BLD AUTO: 0.02 X10*3/UL (ref 0–0.7)
IMM GRANULOCYTES NFR BLD AUTO: 0.2 % (ref 0–0.9)
LYMPHOCYTES # BLD AUTO: 2.03 X10*3/UL (ref 1.2–4.8)
LYMPHOCYTES NFR BLD AUTO: 23.9 %
MCH RBC QN AUTO: 32.2 PG (ref 26–34)
MCHC RBC AUTO-ENTMCNC: 32.8 G/DL (ref 32–36)
MCV RBC AUTO: 98 FL (ref 80–100)
MONOCYTES # BLD AUTO: 0.82 X10*3/UL (ref 0.1–1)
MONOCYTES NFR BLD AUTO: 9.7 %
NEUTROPHILS # BLD AUTO: 5.29 X10*3/UL (ref 1.2–7.7)
NEUTROPHILS NFR BLD AUTO: 62.3 %
NRBC BLD-RTO: 0 /100 WBCS (ref 0–0)
PLATELET # BLD AUTO: 228 X10*3/UL (ref 150–450)
POTASSIUM SERPL-SCNC: 3.2 MMOL/L (ref 3.5–5.3)
POTASSIUM SERPL-SCNC: 3.3 MMOL/L (ref 3.5–5.3)
RBC # BLD AUTO: 2.61 X10*6/UL (ref 4–5.2)
SODIUM SERPL-SCNC: 135 MMOL/L (ref 136–145)
VANCOMYCIN SERPL-MCNC: 6.5 UG/ML (ref 5–20)
WBC # BLD AUTO: 8.5 X10*3/UL (ref 4.4–11.3)

## 2023-12-08 PROCEDURE — 99239 HOSP IP/OBS DSCHRG MGMT >30: CPT | Performed by: STUDENT IN AN ORGANIZED HEALTH CARE EDUCATION/TRAINING PROGRAM

## 2023-12-08 PROCEDURE — 2500000001 HC RX 250 WO HCPCS SELF ADMINISTERED DRUGS (ALT 637 FOR MEDICARE OP): Performed by: STUDENT IN AN ORGANIZED HEALTH CARE EDUCATION/TRAINING PROGRAM

## 2023-12-08 PROCEDURE — 80202 ASSAY OF VANCOMYCIN: CPT | Performed by: NURSE PRACTITIONER

## 2023-12-08 PROCEDURE — 1210000001 HC SEMI-PRIVATE ROOM DAILY

## 2023-12-08 PROCEDURE — 2500000004 HC RX 250 GENERAL PHARMACY W/ HCPCS (ALT 636 FOR OP/ED): Performed by: STUDENT IN AN ORGANIZED HEALTH CARE EDUCATION/TRAINING PROGRAM

## 2023-12-08 PROCEDURE — 84132 ASSAY OF SERUM POTASSIUM: CPT | Performed by: STUDENT IN AN ORGANIZED HEALTH CARE EDUCATION/TRAINING PROGRAM

## 2023-12-08 PROCEDURE — 2500000001 HC RX 250 WO HCPCS SELF ADMINISTERED DRUGS (ALT 637 FOR MEDICARE OP): Performed by: FAMILY MEDICINE

## 2023-12-08 PROCEDURE — 2500000004 HC RX 250 GENERAL PHARMACY W/ HCPCS (ALT 636 FOR OP/ED): Performed by: NURSE PRACTITIONER

## 2023-12-08 PROCEDURE — 97116 GAIT TRAINING THERAPY: CPT | Mod: GP,CQ

## 2023-12-08 PROCEDURE — 2500000002 HC RX 250 W HCPCS SELF ADMINISTERED DRUGS (ALT 637 FOR MEDICARE OP, ALT 636 FOR OP/ED): Performed by: STUDENT IN AN ORGANIZED HEALTH CARE EDUCATION/TRAINING PROGRAM

## 2023-12-08 PROCEDURE — 96372 THER/PROPH/DIAG INJ SC/IM: CPT | Performed by: STUDENT IN AN ORGANIZED HEALTH CARE EDUCATION/TRAINING PROGRAM

## 2023-12-08 PROCEDURE — 80048 BASIC METABOLIC PNL TOTAL CA: CPT | Performed by: STUDENT IN AN ORGANIZED HEALTH CARE EDUCATION/TRAINING PROGRAM

## 2023-12-08 PROCEDURE — 85025 COMPLETE CBC W/AUTO DIFF WBC: CPT | Performed by: STUDENT IN AN ORGANIZED HEALTH CARE EDUCATION/TRAINING PROGRAM

## 2023-12-08 RX ORDER — POTASSIUM CHLORIDE 20 MEQ/1
40 TABLET, EXTENDED RELEASE ORAL ONCE
Status: COMPLETED | OUTPATIENT
Start: 2023-12-08 | End: 2023-12-08

## 2023-12-08 RX ORDER — PHENAZOPYRIDINE HYDROCHLORIDE 100 MG/1
95 TABLET, FILM COATED ORAL
Status: DISCONTINUED | OUTPATIENT
Start: 2023-12-08 | End: 2023-12-17

## 2023-12-08 RX ORDER — NITROFURANTOIN 25; 75 MG/1; MG/1
100 CAPSULE ORAL 2 TIMES DAILY
Status: COMPLETED | OUTPATIENT
Start: 2023-12-08 | End: 2023-12-14

## 2023-12-08 RX ORDER — OXYCODONE HYDROCHLORIDE 5 MG/1
5 TABLET ORAL EVERY 4 HOURS PRN
Qty: 42 TABLET | Refills: 0 | Status: SHIPPED | OUTPATIENT
Start: 2023-12-08 | End: 2023-12-18 | Stop reason: HOSPADM

## 2023-12-08 RX ORDER — VANCOMYCIN HYDROCHLORIDE 1 G/200ML
1000 INJECTION, SOLUTION INTRAVENOUS EVERY 24 HOURS
Status: DISCONTINUED | OUTPATIENT
Start: 2023-12-08 | End: 2023-12-11

## 2023-12-08 RX ADMIN — PREGABALIN 75 MG: 50 CAPSULE ORAL at 21:21

## 2023-12-08 RX ADMIN — ISOSORBIDE MONONITRATE 30 MG: 30 TABLET, EXTENDED RELEASE ORAL at 09:35

## 2023-12-08 RX ADMIN — VANCOMYCIN HYDROCHLORIDE 1000 MG: 1 INJECTION, SOLUTION INTRAVENOUS at 14:42

## 2023-12-08 RX ADMIN — SIMVASTATIN 40 MG: 40 TABLET, FILM COATED ORAL at 21:21

## 2023-12-08 RX ADMIN — POTASSIUM CHLORIDE 40 MEQ: 1500 TABLET, EXTENDED RELEASE ORAL at 09:40

## 2023-12-08 RX ADMIN — BRIMONIDINE TARTRATE 1 DROP: 2 SOLUTION/ DROPS OPHTHALMIC at 09:00

## 2023-12-08 RX ADMIN — NITROFURANTOIN (MONOHYDRATE/MACROCRYSTALS) 100 MG: 75; 25 CAPSULE ORAL at 21:21

## 2023-12-08 RX ADMIN — PHENAZOPYRIDINE HYDROCHLORIDE 100 MG: 100 TABLET ORAL at 17:39

## 2023-12-08 RX ADMIN — ATENOLOL 50 MG: 50 TABLET ORAL at 09:35

## 2023-12-08 RX ADMIN — HYDRALAZINE HYDROCHLORIDE 50 MG: 50 TABLET ORAL at 21:21

## 2023-12-08 RX ADMIN — HEPARIN SODIUM 5000 UNITS: 5000 INJECTION INTRAVENOUS; SUBCUTANEOUS at 21:21

## 2023-12-08 RX ADMIN — EZETIMIBE 10 MG: 10 TABLET ORAL at 09:36

## 2023-12-08 RX ADMIN — PREGABALIN 75 MG: 50 CAPSULE ORAL at 09:34

## 2023-12-08 RX ADMIN — SIMVASTATIN 40 MG: 40 TABLET, FILM COATED ORAL at 09:35

## 2023-12-08 RX ADMIN — PANTOPRAZOLE SODIUM 40 MG: 40 TABLET, DELAYED RELEASE ORAL at 06:24

## 2023-12-08 RX ADMIN — DOCUSATE SODIUM 100 MG: 100 CAPSULE, LIQUID FILLED ORAL at 09:35

## 2023-12-08 RX ADMIN — BUSPIRONE HYDROCHLORIDE 10 MG: 5 TABLET ORAL at 09:35

## 2023-12-08 RX ADMIN — OXYCODONE HYDROCHLORIDE 10 MG: 5 TABLET ORAL at 21:21

## 2023-12-08 RX ADMIN — LORATADINE 10 MG: 10 TABLET ORAL at 09:36

## 2023-12-08 RX ADMIN — HEPARIN SODIUM 5000 UNITS: 5000 INJECTION INTRAVENOUS; SUBCUTANEOUS at 14:05

## 2023-12-08 RX ADMIN — MIRTAZAPINE 15 MG: 15 TABLET, FILM COATED ORAL at 21:21

## 2023-12-08 RX ADMIN — ISOSORBIDE MONONITRATE 60 MG: 60 TABLET, EXTENDED RELEASE ORAL at 09:38

## 2023-12-08 RX ADMIN — HEPARIN SODIUM 5000 UNITS: 5000 INJECTION INTRAVENOUS; SUBCUTANEOUS at 03:56

## 2023-12-08 RX ADMIN — Medication 2000 UNITS: at 09:35

## 2023-12-08 RX ADMIN — BRIMONIDINE TARTRATE 1 DROP: 2 SOLUTION/ DROPS OPHTHALMIC at 21:20

## 2023-12-08 RX ADMIN — OXYCODONE HYDROCHLORIDE 5 MG: 5 TABLET ORAL at 09:37

## 2023-12-08 ASSESSMENT — COGNITIVE AND FUNCTIONAL STATUS - GENERAL
DRESSING REGULAR LOWER BODY CLOTHING: A LITTLE
TURNING FROM BACK TO SIDE WHILE IN FLAT BAD: A LITTLE
MOVING FROM LYING ON BACK TO SITTING ON SIDE OF FLAT BED WITH BEDRAILS: A LITTLE
STANDING UP FROM CHAIR USING ARMS: A LITTLE
WALKING IN HOSPITAL ROOM: A LITTLE
MOVING TO AND FROM BED TO CHAIR: A LITTLE
MOBILITY SCORE: 18
MOVING TO AND FROM BED TO CHAIR: A LITTLE
MOBILITY SCORE: 16
HELP NEEDED FOR BATHING: A LITTLE
DRESSING REGULAR UPPER BODY CLOTHING: A LITTLE
TOILETING: A LITTLE
WALKING IN HOSPITAL ROOM: A LITTLE
DAILY ACTIVITIY SCORE: 18
HELP NEEDED FOR BATHING: A LITTLE
EATING MEALS: A LITTLE
CLIMB 3 TO 5 STEPS WITH RAILING: A LITTLE
TURNING FROM BACK TO SIDE WHILE IN FLAT BAD: A LITTLE
DRESSING REGULAR LOWER BODY CLOTHING: A LITTLE
TURNING FROM BACK TO SIDE WHILE IN FLAT BAD: A LITTLE
DRESSING REGULAR UPPER BODY CLOTHING: A LITTLE
EATING MEALS: A LITTLE
CLIMB 3 TO 5 STEPS WITH RAILING: TOTAL
STANDING UP FROM CHAIR USING ARMS: A LITTLE
PERSONAL GROOMING: A LITTLE
MOVING FROM LYING ON BACK TO SITTING ON SIDE OF FLAT BED WITH BEDRAILS: A LITTLE
STANDING UP FROM CHAIR USING ARMS: A LITTLE
PERSONAL GROOMING: A LITTLE
WALKING IN HOSPITAL ROOM: A LITTLE
MOVING TO AND FROM BED TO CHAIR: A LITTLE
MOVING FROM LYING ON BACK TO SITTING ON SIDE OF FLAT BED WITH BEDRAILS: A LITTLE
DAILY ACTIVITIY SCORE: 18
TOILETING: A LITTLE
MOBILITY SCORE: 19

## 2023-12-08 ASSESSMENT — PAIN SCALES - GENERAL
PAINLEVEL_OUTOF10: 9
PAINLEVEL_OUTOF10: 0 - NO PAIN
PAINLEVEL_OUTOF10: 6
PAINLEVEL_OUTOF10: 9
PAINLEVEL_OUTOF10: 6

## 2023-12-08 ASSESSMENT — PAIN - FUNCTIONAL ASSESSMENT
PAIN_FUNCTIONAL_ASSESSMENT: 0-10

## 2023-12-08 ASSESSMENT — PAIN DESCRIPTION - DESCRIPTORS: DESCRIPTORS: BURNING

## 2023-12-08 NOTE — PROGRESS NOTES
"Vancomycin Dosing by Pharmacy- FOLLOW UP    Tara Tierney is a 68 y.o. year old female who Pharmacy has been consulted for vancomycin dosing for osteomyelitis/septic arthritis. Based on the patient's indication and renal status this patient is being dosed based on a goal trough/random level of 15-20.     Renal function is currently improving.    Current vancomycin dose: 1250mg once time dose 12/7 mg.    Most recent trough level: 6.5 mcg/mL    Visit Vitals  /66 (Patient Position: Sitting)   Pulse 91   Temp 36.9 °C (98.4 °F)   Resp 16        Lab Results   Component Value Date    CREATININE 0.99 12/08/2023    CREATININE 1.31 (H) 12/07/2023    CREATININE 0.83 12/06/2023    CREATININE 0.87 12/05/2023        Patient weight is No results found for: \"PTWEIGHT\"    No results found for: \"CULTURE\"     I/O last 3 completed shifts:  In: 700 (8.3 mL/kg) [P.O.:200; IV Piggyback:500]  Out: 3110 (36.7 mL/kg) [Urine:2850 (0.9 mL/kg/hr); Drains:260]  Weight: 84.7 kg   [unfilled]    No results found for: \"PATIENTTEMP\"     Assessment/Plan    Below goal random/trough level. Orders placed for new vancomycin regimen of 1000mg every 24 hours to begin at 12/8 14:00 .    This dosing regimen is predicted by [employer] to result in the following pharmacokinetic parameters:  <<<<<PASTE [employer] DATA HERE>>>>>  Loading dose: N/A  Regimen: 1000 mg IV every 24 hours.  Start time: 13:18 on 12/08/2023  Exposure target: AUC24 (range)400-600 mg/L.hr   AUC24,ss: 460 mg/L.hr  Probability of AUC24 > 400: 74 %  Ctrough,ss: 16.1 mg/L  Probability of Ctrough,ss > 20: 22 %  Probability of nephrotoxicity (Lodise ASHLEY 2009): 11 %    The next level will be obtained on 12/10 at 13:00. May be obtained sooner if clinically indicated.   Will continue to monitor renal function daily while on vancomycin and order serum creatinine at least every 48 hours if not already ordered.  Follow for continued vancomycin needs, clinical response, and signs/symptoms " of toxicity.       Christin Bahena, [facility]

## 2023-12-08 NOTE — DISCHARGE SUMMARY
Discharge Diagnosis  Intractable back pain    Issues Requiring Follow-Up      Test Results Pending At Discharge  Pending Labs       Order Current Status    Extra Tubes In process    Lavender Top In process    SST TOP In process    Blood Culture Preliminary result    Blood Culture Preliminary result    Extra Tubes Preliminary result    SST TOP Preliminary result    Tissue/Wound Culture/Smear Preliminary result    Tissue/Wound Culture/Smear Preliminary result    Tissue/Wound Culture/Smear Preliminary result            Hospital Course   68-year-old female with spine surgery with postop abscess status post irrigation and debridement.  A PICC line was placed and she will need long-term antibiotics 4 to 6 weeks of vancomycin.  Afebrile, no fevers or chills, back pain has improved.  She has been working with physical therapy.  Drop in hemoglobin is postsurgical, continue to monitor.  SHAHIDA has resolved.  Potassium has been repleted.  She will be discharged to rehab facility where she will continue antibiotics.  Follow-up with primary care, ID and orthopedic surgery.  She will get weekly CBC and BMP while she is on antibiotics.    Pertinent Physical Exam At Time of Discharge  Physical Exam  Constitutional:       Appearance: She is obese.   HENT:      Head: Normocephalic.   Eyes:      Extraocular Movements: Extraocular movements intact.      Conjunctiva/sclera: Conjunctivae normal.   Cardiovascular:      Rate and Rhythm: Normal rate and regular rhythm.   Pulmonary:      Effort: No dyspnea, no distress present.   Abdominal:      General: There is no distension.      Palpations: Abdomen is soft.   Home Medications     Medication List      ASK your doctor about these medications     * aspirin 81 mg EC tablet   * Adult Low Dose Aspirin 81 mg EC tablet; Generic drug: aspirin   * ATENOLOL ORAL   * atenolol 50 mg tablet; Commonly known as: Tenormin   Atrovent HFA 17 mcg/actuation inhaler; Generic drug: ipratropium   brimonidine 0.2 %  ophthalmic solution; Commonly known as: AlphaGAN   busPIRone 10 mg tablet; Commonly known as: Buspar   celecoxib 200 mg capsule; Commonly known as: CeleBREX   cholecalciferol 50 mcg (2,000 unit) capsule; Commonly known as: Vitamin   D-3   Claritin 10 mg tablet; Generic drug: loratadine   clopidogrel 75 mg tablet; Commonly known as: Plavix   cyclobenzaprine 5 mg tablet; Commonly known as: Flexeril; Take 1 tablet   (5 mg) by mouth 3 times a day as needed for muscle spasms.   * docusate sodium 100 mg tablet; Commonly known as: Colace   * docusate sodium 100 mg capsule; Commonly known as: Colace; Take 1   capsule (100 mg) by mouth 2 times a day.   Evista 60 mg tablet; Generic drug: raloxifene   ezetimibe 10 mg tablet; Commonly known as: Zetia   furosemide 20 mg tablet; Commonly known as: Lasix   hydrALAZINE 50 mg tablet; Commonly known as: Apresoline   ibandronate 150 mg tablet; Commonly known as: Boniva   * ISOSORBIDE MONONITRATE ORAL   * ISOSORBIDE MONONITRATE ORAL   * isosorbide mononitrate ER 30 mg 24 hr tablet; Commonly known as: Imdur   * isosorbide mononitrate ER 60 mg 24 hr tablet; Commonly known as: Imdur   LUTEIN ORAL   meclizine 25 mg tablet; Commonly known as: Antivert   nitroglycerin 0.4 mg SL tablet; Commonly known as: Nitrostat   oxyCODONE 5 mg immediate release tablet; Commonly known as: Roxicodone;   Take 1 tablet (5 mg) by mouth every 4 hours if needed for moderate pain (4   - 6) for up to 7 days.; Ask about: Should I take this medication?   pantoprazole 40 mg EC tablet; Commonly known as: ProtoNix   potassium chloride ER 10 mEq ER capsule; Commonly known as: Micro-K   pregabalin 75 mg capsule; Commonly known as: Lyrica   PreserVision AREDS-2 250-90-40-1 mg capsule; Generic drug: vit   C,V-Eu-dvljk-lutein-zeaxan   ProAir HFA 90 mcg/actuation inhaler; Generic drug: albuterol   Remeron 15 mg tablet; Generic drug: mirtazapine   Salagen (pilocarpine) 5 mg tablet; Generic drug: pilocarpine   * simvastatin  40 mg tablet; Commonly known as: Zocor   * simvastatin 40 mg tablet; Commonly known as: Zocor   tiZANidine 4 mg tablet; Commonly known as: Zanaflex   tolterodine 1 mg tablet; Commonly known as: Detrol   triamterene-hydrochlorothiazid 37.5-25 mg capsule; Commonly known as:   Dyazide  * This list has 12 medication(s) that are the same as other medications   prescribed for you. Read the directions carefully, and ask your doctor or   other care provider to review them with you.       Outpatient Follow-Up  Future Appointments   Date Time Provider Department Center   3/14/2024  9:30 AM Hugo Barron MD [id number] [place]       Nitesh Ruiz MD

## 2023-12-08 NOTE — DISCHARGE INSTRUCTIONS
No heavy lifting or straining until patient is seen in the office  Ok to remove dressing in 48 hours and get wound wet in th shower. Do no scrub. Cover wound with dry dressing after shower. No baths, hot tubs, or pools.   Encourage ambulation at least 3 times per day.   No formal physical therapy until ordered by Dr. Coombs  Follow up in the office in two weeks. Appointment made prior to surgery  You must be accompanied by a responsible adult upon discharge and for 24 hours after surgery. Do no drive a motor vehicle, operate machinery, power tools or appliances, drink alcoholic beverages, or make critical decisions for 24 hours and while on narcotic pain medication.  Be aware of dizziness, which may cause a fall. Change positions slowly.   You may resume regular diet, but do so slowly.   Use your pain medication as prescribed by your physician/nurse practitioner/physician assistant. If possible, take your medication with food, and drink plenty of fluids.   Contact surgeon if you have questions, temperature of 101 degree or greater, increased bleeding, swelling, or pain, drainage from the incision or increased redness around the incision   Call [phone] with any questions or concerns

## 2023-12-08 NOTE — PROGRESS NOTES
Occupational Therapy                 Therapy Communication Note    Patient Name: Tara Tierney  MRN: [id number]  Today's Date: 12/8/2023     Discipline: Occupational Therapy    Missed Visit Reason: Missed Visit Reason: Patient refused (1420: patient declined, reports that she is scheduled for  around 330p for SNF. daughter present)

## 2023-12-08 NOTE — PROGRESS NOTES
Physical Therapy    Physical Therapy Treatment    Patient Name: Tara Tierney  MRN: [id number]  Today's Date: 12/8/2023  Time Calculation  Start Time: 1008  Stop Time: 1025  Time Calculation (min): 17 min       Assessment/Plan   End of Session Patient Position:  Patient up in chair after treatment.  TLSO off. Call light and tray in reach.    PT Plan  Inpatient/Swing Bed or Outpatient: Inpatient  Treatment/Interventions: Bed mobility, Transfer training, Gait training, Stair training, Balance training, Neuromuscular re-education, Strengthening, Endurance training, Range of motion, Therapeutic exercise, Therapeutic activity, Home exercise program  PT Plan: Skilled PT  PT Frequency: Daily  PT Discharge Recommendations: Moderate intensity level of continued care, High intensity level of continued care    PT Recommended Transfer Status: Assist x1    General Visit Information:   PT  Visit  PT Received On: 12/08/23    General  Prior to Session Communication:  (Cleared by RN for PT)  Patient Position Received:  (Patient presents sitting in chair, agreeable to PT)  General Comment: Pt to ED 12/5 with complaints of increasing back pain. Recent L3-4, 4-5, and 5-S1 laminectomy with Dr. Coombs. Pt reported a fall on 12/1. 12/5 MRI L spine large dorsal extra spinal fluid, deforms thecal sac; CT abdomen/pelvis prominent fluid collection at surgical site. Underwent I&D 12/6,    General Observations:   General Observation:  (JOSE drain, IV, alarm)    Precautions:  Precautions  Medical Precautions: Fall precautions, Spinal precautions  Post-Surgical Precautions: Spinal precautions  Braces Applied:  (TLSO brace on when amb)    Pain:  Pain Assessment: 0-10  Pain Score: 9  Pain Location:  (low back)  Pain Descriptors: Burning    Cognition:  Cognition  Overall Cognitive Status: Within Functional Limits  Orientation Level: Oriented X4      Ambulation/Gait Training  Ambulation/Gait Training Performed:  TLSO donned prior to ambulation.   "Brace appears small, does not fully fasten in the front.     She amb 80' with ww and CGAx1. Step through gait, fair cathi.    Transfers  Transfer:  sit-->stand: CGAx1  One stand performed from chair level.  Effortful for patient to stand.  She reports significant back pain.      Outcome Measures:  [facility] Basic Mobility  Turning from your back to your side while in a flat bed without using bedrails: A little  Moving from lying on your back to sitting on the side of a flat bed without using bedrails: A little  Moving to and from bed to chair (including a wheelchair): A little  Standing up from a chair using your arms (e.g. wheelchair or bedside chair): A little  To walk in hospital room: A little  Climbing 3-5 steps with railing: Total  Basic Mobility - Total Score: 16    Education Documentation  Precautions, taught by Tessie Merritt PTA at 12/8/2023  1:19 PM.  Learner: Patient  Readiness: Acceptance  Method: Explanation  Response: Verbalizes Understanding, Demonstrated Understanding    Body Mechanics, taught by Tessie Merritt PTA at 12/8/2023  1:19 PM.  Learner: Patient  Readiness: Acceptance  Method: Explanation  Response: Verbalizes Understanding, Demonstrated Understanding    Mobility Training, taught by Tessie Merritt PTA at 12/8/2023  1:19 PM.  Learner: Patient  Readiness: Acceptance  Method: Explanation  Response: Verbalizes Understanding, Demonstrated Understanding    Education Comments  Education Provided:  Patient able to recall spinal precautions using acyronm \"BLT\"    Encounter Problems       Encounter Problems (Active)       PT Problem       Pt will demonstrate sup > sit and sit > sup bed mobility mod I with log roll technique (Progressing)       Start:  12/07/23    Expected End:  12/21/23            Pt will demo sit > stand and stand > sit transfer with ww and mod I  (Progressing)       Start:  12/07/23    Expected End:  12/21/23            Pt will ambulate 100' with ww and mod I, without LOB  " (Progressing)       Start:  12/07/23    Expected End:  12/21/23            Pt will demo up/down 1 steps with handrail mod I (Not Progressing)       Start:  12/07/23    Expected End:  12/21/23

## 2023-12-08 NOTE — PROGRESS NOTES
Pas-r complete, [employer] aware and has started ins. Precert/[employer] mycare.    Update:  [facility] has obtained tcc-denis aware, ortho has not cleared pt., awaiting final orders

## 2023-12-08 NOTE — PROGRESS NOTES
Pt has auth rec'd. Discharge order was placed. RN contacted ortho re: drain status and ortho does not want the pt discharged today. Transport cancelled by unit secDuane clements notified to cancel transfer. She will notify the facility.

## 2023-12-08 NOTE — PROGRESS NOTES
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  Patient persist to have severe back pain.   Low Vanco dosing and level as well as improving renal function were discussed with the pharmacy department.     Current Medications:    [Held by provider] aspirin, 81 mg, oral, Daily  atenolol, 50 mg, oral, q AM  brimonidine, 1 drop, Both Eyes, BID  busPIRone, 10 mg, oral, Daily  cholecalciferol, 2,000 Units, oral, Daily  docusate sodium, 100 mg, oral, BID  ezetimibe, 10 mg, oral, Daily  heparin (porcine), 5,000 Units, subcutaneous, q8h  hydrALAZINE, 50 mg, oral, BID  isosorbide mononitrate ER, 30 mg, oral, Daily  isosorbide mononitrate ER, 60 mg, oral, Daily  loratadine, 10 mg, oral, Daily  mirtazapine, 15 mg, oral, Nightly  pantoprazole, 40 mg, oral, Daily before breakfast  polyethylene glycol, 17 g, oral, Daily  pregabalin, 75 mg, oral, BID  simvastatin, 40 mg, oral, BID  [Held by provider] triamterene-hydrochlorothiazid, 1 tablet, oral, Daily  vancomycin, 1,000 mg, intravenous, q24h        Allergies:  Neomycin and Neomycin-polymyxin b-dexameth      Review of Systems  14 system review is negative other than HPI  Severe low back pain  No leg weakness or numbness  No rash or diarrhea  No shortness of breath  Physical Exam    Heart Rate:  [82-91]   Temp:  [36.9 °C (98.4 °F)-37.7 °C (99.9 °F)]   Resp:  [16-18]   BP: ()/(40-66)   SpO2:  [83 %-94 %]    Vitals:    12/07/23 2034 12/07/23 2053 12/08/23 0043 12/08/23 0917   BP: (!) 86/40 111/54 121/56 114/66   Patient Position:    Sitting   Pulse: 83 82 90 91   Resp: 18  18 16   Temp: 37.5 °C (99.5 °F)  37.7 °C (99.9 °F) 36.9 °C (98.4 °F)   TempSrc:       SpO2: 92%  (!) 83% 94%   Weight:       Height:         General Appearance: alert and oriented to person, place  "and time, well-developed and well-nourished, in no acute distress  Skin: warm and dry, no rash.   Head: normocephalic and atraumatic  Eyes: anicteric sclerae  ENT:  normal mucous membranes. No oral thrush  Lungs: normal respiratory effort, clear lungs  Heart normal S1-S2 no murmur  Abdomen: soft, no tenderness  No leg edema  Back incision with intact dressing and JOSE drain with bloody drainage  No erythema, no tenderness    DATA:    Lab Results   Component Value Date    WBC 8.5 12/08/2023    HGB 8.4 (L) 12/08/2023    HCT 25.6 (L) 12/08/2023    MCV 98 12/08/2023     12/08/2023     Lab Results   Component Value Date    CREATININE 0.99 12/08/2023    BUN 22 12/08/2023     (L) 12/08/2023    K 3.3 (L) 12/08/2023     12/08/2023    CO2 28 12/08/2023       Hepatic Function Panel:No results found for: \"ALKPHOS\", \"ALT\", \"AST\", \"PROT\", \"BILITOT\", \"BILIDIR\"    Microbiology:   Susceptibility data from last 90 days.  Collected Specimen Info Organism Amoxicillin/Clavulanate Ampicillin Ampicillin/Sulbactam Aztreonam Cefazolin Cefazolin (uncomplicated UTIs only) Cefepime Cefotaxime Ceftazidime Ceftriaxone Ciprofloxacin Ertapenem   12/06/23 Swab from SPINE Staphylococcus aureus               12/06/23 Tissue from SPINE Staphylococcus aureus               12/06/23 Tissue from SPINE Staphylococcus aureus               12/05/23 Urine from Clean Catch/Voided Escherichia coli S R S R R R R R R R R S   11/03/23 Swab from Nares/Axilla/Groin Methicillin Resistant Staphylococcus aureus (MRSA)                 Collected Specimen Info Organism Gentamicin Meropenem Nitrofurantoin Piperacillin/Tazobactam Tobramycin Trimethoprim/Sulfamethoxazole   12/06/23 Swab from SPINE Staphylococcus aureus         12/06/23 Tissue from SPINE Staphylococcus aureus         12/06/23 Tissue from SPINE Staphylococcus aureus         12/05/23 Urine from Clean Catch/Voided Escherichia coli R S S S I S   11/03/23 Swab from Nares/Axilla/Groin Methicillin " "Resistant Staphylococcus aureus (MRSA)            No lab exists for component: \"BC\"  No lab exists for component: \"BLOODCULT2\"  No lab exists for component: \"LABURIN\"  No lab exists for component: \"WNDABS\"  No lab exists for component: \"CULTRESP\"      Imaging:   MR lumbar spine w and wo IV contrast    Result Date: 12/5/2023  Interpreted By:  Jh Benoit,  and Dev Hogue STUDY: MR LUMBAR SPINE W AND WO IV CONTRAST;  12/5/2023 4:39 pm   INDICATION: Signs/Symptoms:Recent laminectomy with fusion, intractable back pain radiates legs.   COMPARISON: MRI 08/20/2023, CT 12/05/2023   ACCESSION NUMBER(S): [id number]   ORDERING CLINICIAN: SHAWN REDDY   TECHNIQUE: Sagittal T1, T2, STIR, axial T1 and T2 weighted images of the lumbar spine were acquired without and following administration of 15 cc Dotarem gadolinium based IV contrast.   FINDINGS: Motion degraded examination.   Alignment: The vertebral alignment is maintained.   Vertebrae/Intervertebral Discs: Postsurgical changes of L4 through S1 posterior fusion noted with bilateral transpedicular screws and interbody graft placement at L4-5 and L5-S1. There has been bilateral laminectomies at L3-4, L4-5, and L5-S1. The vertebral bodies demonstrate expected height. There is mild osseous edema involving L5 and S1 vertebral bodies. The marrow signal is within normal limits. Multilevel intervertebral disc height loss and disc desiccation noted.   A large dorsal extra-spinal fluid collection is noted extending from the laminectomy bed through the deep muscular fascia into the subcutaneous fat. This collection extends from the right lateral recess at the level of L4-5 as well as L5-S1 into the spinal canal and deforms the thecal sac. A focal defect is noted within the thecal sac at the level of L5-S1 (series 9, image 20). 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 The collections do not demonstrate significant peripheral enhancement to suggest abscess.   Conus: The " lower thoracic cord appears unremarkable. The conus terminates at L1.   T12-L1: Disc bulge. There is no significant central canal or neural foraminal stenosis.   L1-2: Disc bulge, ligamentum flavum thickening and facet hypertrophy contribute to minimal central canal narrowing.   L2-3: Disc bulge and mild prominence of the posterior epidural fat resulting in minimal narrowing of the central canal. There is moderate right and minimal left foraminal narrowing due to intraforaminal disc herniation and facet hypertrophy, similar to preoperative examination.   L3-4: Laminectomies. There is moderate narrowing of the thecal sac due to disc bulge and dorsal spinal collection as detailed above. There is unchanged marked bilateral foraminal narrowing due to intraforaminal disc herniation and facet hypertrophic changes with possible impingement of bilateral exiting L3 nerve roots.   L4-5: Bilateral laminectomies. There is moderate narrowing of the central canal with indentation of the thecal sac posteriorly due to the dorsal spinal fluid collection as detailed above. Moderate right and mild left foraminal narrowing is overall similar to MRI dated 08/20/2020 3D to intraforaminal disc herniations and facet hypertrophic changes.   L5-S1: Bilateral laminectomies. The dorsal spinal fluid collection indents the thecal sac as detailed above. Evaluation of the foramina is limited due to susceptibility artifact from bilateral transpedicular screws.  There is persistent marked left and moderate right foraminal narrowing due to intraforaminal disc herniation and facet hypertrophy with possible impingement of the exiting left L5 nerve root.       1.  Postoperative changes as above. A large dorsal extra-spinal fluid collection is noted extending from the laminectomy bed through the deep muscular fascia into the subcutaneous fat. This collection extends from the right lateral recesses at the level of L4-5 as well as L5-S1 into the spinal  canal and deforms the thecal sac resulting in moderate narrowing at L4-5 and to a lesser degree at L3-4. A focal defect is noted within the thecal sac at the level of L5-S1. 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 No significant peripheral enhancement is identified to suggest abscess although the sterility of this collection can not be ascertained on MRI alone. 2. Multilevel degenerative changes again noted with persistent marked bilateral foraminal narrowing at L3-4 and at L5-S1 on the left.     I personally reviewed the images/study and I agree with the findings as stated. This study was interpreted at [place], [place].   MACRO: None   Signed by: hJ Benoit 12/5/2023 5:24 PM Dictation workstation:   [id number]    CT angio chest abdomen pelvis    Result Date: 12/5/2023  Interpreted By:  Samuel Brannon, STUDY: CT ANGIO CHEST ABDOMEN PELVIS;  12/5/2023 2:48 pm   INDICATION: Signs/Symptoms:Severe lower abdomen pain, groin pain, radiates to back, intractable pain.   COMPARISON: May 18, 2022 CTA chest abdomen and pelvis. July 21, 2023 CTA abdomen and pelvis with runoff. August 20, 2023 MRI lumbar spine and December 5, 2023 CT lumbar spine   ACCESSION NUMBER(S): [id number]   ORDERING CLINICIAN: SHAWN REDDY   TECHNIQUE: Axial non-contrast images of the chest, abdomen, and pelvis  with coronal and sagittal reformatted images. Axial CT images of the chest, abdomen and pelvis after the intravenous administration of 100 mL Omnipaque 350 contrast using CT angiographic technique with coronal and sagittal reformatted images.  MIP images were provided and reviewed. 3D reconstructions were performed on a separate independent workstation.   FINDINGS: VASCULAR:   PULMONARY ARTERIES:   No acute pulmonary embolism.   THORACIC AORTA:  Non-contrast images show no evidence of acute intramural hematoma. No thoracic aortic aneurysm or dissection. Moderate scattered atherosclerosis  thoracic aorta and branch vessels.   ABDOMINAL AORTA: No abdominal aortic aneurysm or dissection. There is scattered atherosclerosis. 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   CHEST:   MEDIASTINUM AND LYMPH NODES: No enlarged intrathoracic or axillary lymph nodes.   HEART: Normal size.  Moderate coronary artery calcifications. No pericardial effusion.   LUNG, PLEURA, LARGE AIRWAYS: No consolidation, pulmonary edema, pleural effusion, or pneumothorax.   OSSEOUS STRUCTURES/CHEST WALL:    No acute osseous abnormality.     ABDOMEN/PELVIS:   Arterial phase imaging limits evaluation of the solid organs.   ABDOMINAL WALL: Within normal limits.   LIVER: Stable without detected mass. BILE DUCTS: No significant abnormality. GALLBLADDER: Absent   PANCREAS: No significant abnormality.   SPLEEN: No significant abnormality.   ADRENALS: No significant abnormality.   KIDNEYS, URETERS, BLADDER: No significant abnormality.   VESSELS: See above.  No additional significant abnormality. LYMPH NODES: No enlarged lymph nodes. RETROPERITONEUM:  No significant abnormality.   BOWEL: The stomach and bowel are normal caliber without evident inflammatory change.   PERITONEUM:   No significant ascites, free air, or fluid collection.   REPRODUCTIVE ORGANS: No significant abnormality.   OSSEOUS STRUCTURES:  Refer to same day CT scan lumbar spine report for details regarding that portion. No separate acute osseous abnormalities are identified. There are skin staples dorsal lumbar region. The canal is not reliably evaluated at the operated levels due to artifact from metallic hardware. There is minimal gas within the soft tissues at the operated levels and there is lobulated fluid density extending from vertebral to  overlapping paravertebral region to the level of the superficial subcutaneous layer including portion sagittal series 506, image 109 and axial series 502, image 114 measuring 14 x 6 x 6 cm  with internal density measurement of 6 compatible with simple fluid density       No thoracic or abdominal aortic aneurysm or dissection.   Stable pre-existing vascular findings as reported.   Findings compatible with recent lumbar spine surgery with prominent fluid collection overlying the operated site extending through the superficial subcutaneous layer. The canal is not reliably assessed at the operated site due to artifact. Recommend further characterization with MRI lumbar spine with without contrast.   No acute abnormality of the chest, abdomen or pelvis.     Signed by: Samuel Brannon 12/5/2023 3:32 PM Dictation workstation:   [id number]    CT lumbar spine wo IV contrast    Result Date: 12/5/2023  Interpreted By:  Jony Monae, STUDY: CT LUMBAR SPINE WO IV CONTRAST; 12/5/2023 11:28 am   INDICATION: Signs/Symptoms:Low back pain, recent laminectomy and fusion, fall 1 week ago.   COMPARISON: None.   ACCESSION NUMBER(S): [id number]   ORDERING CLINICIAN: SHAWN REDDY   TECHNIQUE: Contiguous axial CT images were obtained through the lumbar spine at 2 mm slice thickness without contrast administration. The images were then reconstructed in the coronal and sagittal planes.   FINDINGS: OSSEOUS STRUCTURES: The patient is status post L3 through L5 laminectomy, L4 through S1 pedicle screw and fabrizio fusion and L4-5 and L5-S1 disc space implant placement. There is no evidence of acute fracture identified. The vertebral bodies are well aligned without evidence of subluxation.   Mild discogenic degenerative changes are seen at the remaining disc space levels. Mild-to-moderate facet degenerative changes are seen throughout the lumbar spine.   LOWER THORACIC SPINE: No gross central canal or neural foraminal narrowing is seen.   T12-L1:  No gross central canal or neural foraminal narrowing is seen.   L1-2: No gross central canal or neural foraminal narrowing is seen.   L2-3: No gross central canal or neural foraminal narrowing is seen.   L3-4: Mild right-sided neural foraminal narrowing is seen. No significant left foraminal or central canal narrowing is seen.   L4-5: No gross central canal or neural foraminal narrowing is seen.   L5-S1: Mild left-sided neural foraminal narrowing is seen. No significant right foraminal or central canal narrowing is seen.   ASSOCIATED STRUCTURES: Evaluation of the visualized soft tissues of the abdomen is limited by the lack of intravenous contrast. Within this limitation, no gross mass or lymphadenopathy is identified.  A fluid collection is seen along the posterior midportion the lower back, likely postoperative in nature.       1. No acute fracture identified. 2. Postoperative and degenerative changes, as described above.   MACRO: None.   Signed by: Jony Monae 12/5/2023 11:45 AM Dictation workstation:   [id number]       IMPRESSION:    Postop deep incisional wound infection of lumbar spine with probable infected hematoma/abscess  Staphylococcus aureus infection, probable MRSA in a patient with positive preop nares screen  Status post lumbar laminectomy    Patient Active Problem List   Diagnosis    Abdominal aortic aneurysm (CMS/HCC)    Abnormal EKG    Bilateral carotid artery stenosis    Bruit of right carotid artery    CAD in native artery    Cardiomyopathy (CMS/HCC)    Chest pain    Chronic asthmatic bronchitis    Closed displaced fracture of fifth metatarsal bone    Current every day smoker    Difficulty breathing    Essential hypertension    History of total knee replacement    Hypokalemia    Intermittent claudication (CMS/HCC)    Knee osteoarthritis    Knee pain    Limb pain    Localized swelling, mass, or lump of lower extremity    Celiac artery stenosis (CMS/HCC)    Mesenteric artery stenosis (CMS/HCC)     Mixed hyperlipidemia    Obese    PVD (peripheral vascular disease) (CMS/HCC)    Right foot pain    Swelling    Trigger finger    Urinary tract infection without hematuria    Back pain of lumbar region with sciatica    Back pain with radiculopathy    Intractable back pain       PLAN:  Continue IV vancomycin, aim at a trough level of 15-20 as discussed with pharmacy who is adjusting the dose  Check wound cultures  Hold discharge until final Vanco dose is available and wound culture results are available  Patient will require 4 to 6 weeks of IV antibiotics postdischarge    Discussed with patient and other family and pharmacist    Gem Reyna MD

## 2023-12-08 NOTE — PROGRESS NOTES
DAILY PROGRESS NOTE      Hospital Day: 2  POD2 incision and drainage lumbar spine       Patient doing well  Visit Vitals  /66 (Patient Position: Sitting)   Pulse 91   Temp 36.9 °C (98.4 °F)   Resp 16      Temp (24hrs), Av.3 °C (99.2 °F), Min:36.9 °C (98.4 °F), Max:37.7 °C (99.9 °F)       Pain continues to improve  No chest pain or shortness of breath.  No calf pain    Exam:   Denies any numbness or tingling to lower extremities   Extremity shows neuro vascular status intact. Flexion and extension intact on extremity.  Calves soft and non-tender without evidence of DVT.        Labs reviewed:  CBC: @LABRCNT(WBC:3,HGB:3,PLT:3)@  BMP:  @LABRCNT(Na:3,K:3,CL:3,CO2:3,BUN:3,Creatinine:3,Glucose:3)@    I&O  I/O last 3 completed shifts:  In: 700 (8.3 mL/kg) [P.O.:200; IV Piggyback:500]  Out: 3110 (36.7 mL/kg) [Urine:2850 (0.9 mL/kg/hr); Drains:260]  Weight: 84.7 kg       Assessment:    Doing well postoperatively. Patient may require rehab at discharge for strength training. PT/OT evaluation pending   Dressing CDI  Two JOSE drains remain in place: drainage has significantly decreased. Keep drains to suction and remove once advised by surgeon or ortho NP.       Plan:    PT/OT   Appreciate ID recommendations on antibiotics   PICC line to right upper extremity   Continue pain control  Continue drains until otherwise directed to remove by surgeon or NP      DARRYL Kline-CNP   2023 10:17 AM

## 2023-12-09 LAB
BACTERIA BLD CULT: NORMAL
BACTERIA BLD CULT: NORMAL
BACTERIA SPEC CULT: ABNORMAL
BACTERIA SPEC CULT: ABNORMAL
GRAM STN SPEC: ABNORMAL

## 2023-12-09 PROCEDURE — 2500000001 HC RX 250 WO HCPCS SELF ADMINISTERED DRUGS (ALT 637 FOR MEDICARE OP): Performed by: FAMILY MEDICINE

## 2023-12-09 PROCEDURE — 2500000004 HC RX 250 GENERAL PHARMACY W/ HCPCS (ALT 636 FOR OP/ED): Performed by: FAMILY MEDICINE

## 2023-12-09 PROCEDURE — 2500000001 HC RX 250 WO HCPCS SELF ADMINISTERED DRUGS (ALT 637 FOR MEDICARE OP): Performed by: STUDENT IN AN ORGANIZED HEALTH CARE EDUCATION/TRAINING PROGRAM

## 2023-12-09 PROCEDURE — 97116 GAIT TRAINING THERAPY: CPT | Mod: GP,CQ

## 2023-12-09 PROCEDURE — 2500000002 HC RX 250 W HCPCS SELF ADMINISTERED DRUGS (ALT 637 FOR MEDICARE OP, ALT 636 FOR OP/ED): Performed by: STUDENT IN AN ORGANIZED HEALTH CARE EDUCATION/TRAINING PROGRAM

## 2023-12-09 PROCEDURE — 96372 THER/PROPH/DIAG INJ SC/IM: CPT | Performed by: STUDENT IN AN ORGANIZED HEALTH CARE EDUCATION/TRAINING PROGRAM

## 2023-12-09 PROCEDURE — 1210000001 HC SEMI-PRIVATE ROOM DAILY

## 2023-12-09 PROCEDURE — 99232 SBSQ HOSP IP/OBS MODERATE 35: CPT | Performed by: STUDENT IN AN ORGANIZED HEALTH CARE EDUCATION/TRAINING PROGRAM

## 2023-12-09 PROCEDURE — 2500000004 HC RX 250 GENERAL PHARMACY W/ HCPCS (ALT 636 FOR OP/ED): Performed by: NURSE PRACTITIONER

## 2023-12-09 PROCEDURE — 2500000004 HC RX 250 GENERAL PHARMACY W/ HCPCS (ALT 636 FOR OP/ED): Performed by: STUDENT IN AN ORGANIZED HEALTH CARE EDUCATION/TRAINING PROGRAM

## 2023-12-09 RX ADMIN — PANTOPRAZOLE SODIUM 40 MG: 40 TABLET, DELAYED RELEASE ORAL at 05:49

## 2023-12-09 RX ADMIN — HYDRALAZINE HYDROCHLORIDE 50 MG: 50 TABLET ORAL at 21:43

## 2023-12-09 RX ADMIN — NITROFURANTOIN (MONOHYDRATE/MACROCRYSTALS) 100 MG: 75; 25 CAPSULE ORAL at 21:42

## 2023-12-09 RX ADMIN — EZETIMIBE 10 MG: 10 TABLET ORAL at 08:34

## 2023-12-09 RX ADMIN — VANCOMYCIN HYDROCHLORIDE 1000 MG: 1 INJECTION, SOLUTION INTRAVENOUS at 14:00

## 2023-12-09 RX ADMIN — BUSPIRONE HYDROCHLORIDE 10 MG: 5 TABLET ORAL at 08:33

## 2023-12-09 RX ADMIN — HEPARIN SODIUM 5000 UNITS: 5000 INJECTION INTRAVENOUS; SUBCUTANEOUS at 12:51

## 2023-12-09 RX ADMIN — ATENOLOL 50 MG: 50 TABLET ORAL at 08:33

## 2023-12-09 RX ADMIN — LORATADINE 10 MG: 10 TABLET ORAL at 08:33

## 2023-12-09 RX ADMIN — MIRTAZAPINE 15 MG: 15 TABLET, FILM COATED ORAL at 21:43

## 2023-12-09 RX ADMIN — HYDROMORPHONE HYDROCHLORIDE 0.4 MG: 0.5 INJECTION, SOLUTION INTRAMUSCULAR; INTRAVENOUS; SUBCUTANEOUS at 22:56

## 2023-12-09 RX ADMIN — Medication 2000 UNITS: at 08:34

## 2023-12-09 RX ADMIN — PREGABALIN 75 MG: 50 CAPSULE ORAL at 21:43

## 2023-12-09 RX ADMIN — OXYCODONE HYDROCHLORIDE 5 MG: 5 TABLET ORAL at 21:43

## 2023-12-09 RX ADMIN — OXYCODONE HYDROCHLORIDE 5 MG: 5 TABLET ORAL at 12:51

## 2023-12-09 RX ADMIN — PHENAZOPYRIDINE HYDROCHLORIDE 100 MG: 100 TABLET ORAL at 12:51

## 2023-12-09 RX ADMIN — SIMVASTATIN 40 MG: 40 TABLET, FILM COATED ORAL at 21:43

## 2023-12-09 RX ADMIN — ISOSORBIDE MONONITRATE 60 MG: 60 TABLET, EXTENDED RELEASE ORAL at 08:35

## 2023-12-09 RX ADMIN — NITROFURANTOIN (MONOHYDRATE/MACROCRYSTALS) 100 MG: 75; 25 CAPSULE ORAL at 08:33

## 2023-12-09 RX ADMIN — ISOSORBIDE MONONITRATE 30 MG: 30 TABLET, EXTENDED RELEASE ORAL at 08:34

## 2023-12-09 RX ADMIN — PREGABALIN 75 MG: 50 CAPSULE ORAL at 08:33

## 2023-12-09 RX ADMIN — PHENAZOPYRIDINE HYDROCHLORIDE 100 MG: 100 TABLET ORAL at 17:33

## 2023-12-09 RX ADMIN — BRIMONIDINE TARTRATE 1 DROP: 2 SOLUTION/ DROPS OPHTHALMIC at 09:00

## 2023-12-09 RX ADMIN — HEPARIN SODIUM 5000 UNITS: 5000 INJECTION INTRAVENOUS; SUBCUTANEOUS at 03:52

## 2023-12-09 RX ADMIN — SIMVASTATIN 40 MG: 40 TABLET, FILM COATED ORAL at 08:33

## 2023-12-09 RX ADMIN — OXYCODONE HYDROCHLORIDE 10 MG: 5 TABLET ORAL at 08:43

## 2023-12-09 RX ADMIN — PHENAZOPYRIDINE HYDROCHLORIDE 100 MG: 100 TABLET ORAL at 08:33

## 2023-12-09 RX ADMIN — DOCUSATE SODIUM 100 MG: 100 CAPSULE, LIQUID FILLED ORAL at 21:43

## 2023-12-09 ASSESSMENT — COGNITIVE AND FUNCTIONAL STATUS - GENERAL
MOVING FROM LYING ON BACK TO SITTING ON SIDE OF FLAT BED WITH BEDRAILS: A LITTLE
CLIMB 3 TO 5 STEPS WITH RAILING: TOTAL
STANDING UP FROM CHAIR USING ARMS: A LITTLE
TURNING FROM BACK TO SIDE WHILE IN FLAT BAD: A LITTLE
DRESSING REGULAR UPPER BODY CLOTHING: A LITTLE
PERSONAL GROOMING: A LITTLE
MOBILITY SCORE: 16
TOILETING: A LITTLE
WALKING IN HOSPITAL ROOM: A LITTLE
EATING MEALS: A LITTLE
MOVING TO AND FROM BED TO CHAIR: A LITTLE
CLIMB 3 TO 5 STEPS WITH RAILING: TOTAL
DRESSING REGULAR LOWER BODY CLOTHING: A LITTLE
MOVING FROM LYING ON BACK TO SITTING ON SIDE OF FLAT BED WITH BEDRAILS: A LITTLE
DAILY ACTIVITIY SCORE: 18
STANDING UP FROM CHAIR USING ARMS: A LITTLE
MOVING TO AND FROM BED TO CHAIR: A LITTLE
MOBILITY SCORE: 16
WALKING IN HOSPITAL ROOM: A LITTLE
HELP NEEDED FOR BATHING: A LITTLE
TURNING FROM BACK TO SIDE WHILE IN FLAT BAD: A LITTLE

## 2023-12-09 ASSESSMENT — PAIN DESCRIPTION - ORIENTATION: ORIENTATION: LOWER

## 2023-12-09 ASSESSMENT — PAIN SCALES - GENERAL
PAINLEVEL_OUTOF10: 6
PAINLEVEL_OUTOF10: 10 - WORST POSSIBLE PAIN
PAINLEVEL_OUTOF10: 6
PAINLEVEL_OUTOF10: 10 - WORST POSSIBLE PAIN

## 2023-12-09 ASSESSMENT — PAIN - FUNCTIONAL ASSESSMENT
PAIN_FUNCTIONAL_ASSESSMENT: 0-10

## 2023-12-09 ASSESSMENT — PAIN DESCRIPTION - LOCATION
LOCATION: BACK
LOCATION: BACK

## 2023-12-09 NOTE — PROGRESS NOTES
Physical Therapy    Physical Therapy Treatment    Patient Name: Tara Tierney  MRN: [id number]  Today's Date: 12/9/2023  Time Calculation  Start Time: 1006  Stop Time: 1016  Time Calculation (min): 10 min       Assessment/Plan   PT Assessment  PT Assessment Results: Decreased strength, Decreased range of motion, Decreased endurance, Impaired balance, Decreased mobility, Orthopedic restrictions, Pain  Rehab Prognosis: Good  End of Session Communication: Bedside nurse  Assessment Comment: Pt limited by pain but agreeable to ambulation trial as she states she knows the importance of movement. Nursing in room upon return for dressing change.  End of Session Patient Position:  (Standing at WW with nursing and PCA for dressing change.)  PT Plan  Inpatient/Swing Bed or Outpatient: Inpatient  PT Plan  Treatment/Interventions: Transfer training, Gait training  PT Plan: Skilled PT  PT Frequency: Daily  PT Discharge Recommendations: Moderate intensity level of continued care, High intensity level of continued care  Equipment Recommended upon Discharge: Wheeled walker  PT Recommended Transfer Status: Assist x1    General Visit Information:   PT  Visit  PT Received On: 12/09/23  General  Reason for Referral: Impaired mobility  Referred By: Born PT/OT  Past Medical History Relevant to Rehab: COPD, HLD, HTN, PVD, arthritis, smoker  Prior to Session Communication: Bedside nurse  Patient Position Received: Up in chair, Alarm on  General Comment: Pt is seated in bedside chair and c/o increased pain this session but agreeable to ambulation. Per nursing drains to remain and pt will not be DC until Monday. Inc drainage at dressing site nursing aware and entering to change at end of session. (Pt has 2 JOSE drains and purewick)    Subjective   Precautions:  Precautions  Medical Precautions: Fall precautions, Spinal precautions  Post-Surgical Precautions: Spinal precautions  Braces Applied: TLSO brace when OOB  Precautions Comment: brace  with ambulation but appears to be to small barely able to overlap to secure. Ortho team made aware. Cue throughout to maintain spinal precautions.  Vital Signs:       Objective   Pain:  Pain Assessment  Pain Assessment: 0-10  Pain Score: 10 - Worst possible pain  Pain Type: Surgical pain  Pain Location: Back  Cognition:  Cognition  Orientation Level: Oriented X4  Postural Control:     Extremity/Trunk Assessments:     Activity Tolerance:  Activity Tolerance  Endurance: Decreased tolerance for upright activites  Treatments:  Ambulation/Gait Training  Ambulation/Gait Training Performed: Yes (Pt ambulating with a slow, step through gait pattern with fair step height/length. Mild FF posture with cues for correction.)  Transfers  Transfer: Yes (Sit<>stand transfers with CGA and WW.)    Outcome Measures:  [facility] Basic Mobility  Turning from your back to your side while in a flat bed without using bedrails: A little  Moving from lying on your back to sitting on the side of a flat bed without using bedrails: A little  Moving to and from bed to chair (including a wheelchair): A little  Standing up from a chair using your arms (e.g. wheelchair or bedside chair): A little  To walk in hospital room: A little  Climbing 3-5 steps with railing: Total  Basic Mobility - Total Score: 16    Education Documentation  Precautions, taught by Chanel Ledesma PTA at 12/9/2023  1:28 PM.  Learner: Patient  Readiness: Acceptance  Method: Explanation  Response: Verbalizes Understanding, Needs Reinforcement    Body Mechanics, taught by Chanel Ledesma PTA at 12/9/2023  1:28 PM.  Learner: Patient  Readiness: Acceptance  Method: Explanation  Response: Verbalizes Understanding, Needs Reinforcement    Mobility Training, taught by Chanel Ledesma PTA at 12/9/2023  1:28 PM.  Learner: Patient  Readiness: Acceptance  Method: Explanation  Response: Verbalizes Understanding, Needs Reinforcement    Education Comments  No comments  found.        EDUCATION:  Outpatient Education  Individual(s) Educated: Patient  Education Provided: Body Mechanics, Home Safety, POC  Patient/Caregiver Demonstrated Understanding: yes  Plan of Care Discussed and Agreed Upon: yes  Patient Response to Education: Patient/Caregiver Verbalized Understanding of Information  Education Comment: Pt educated in gait and transfers. Pt needing reinforcement of spinal precautions as she is noted to reach and twist.    Encounter Problems       Encounter Problems (Active)       PT Problem       Pt will demonstrate sup > sit and sit > sup bed mobility mod I with log roll technique (Progressing)       Start:  12/07/23    Expected End:  12/21/23            Pt will demo sit > stand and stand > sit transfer with ww and mod I  (Progressing)       Start:  12/07/23    Expected End:  12/21/23            Pt will ambulate 100' with ww and mod I, without LOB  (Progressing)       Start:  12/07/23    Expected End:  12/21/23            Pt will demo up/down 1 steps with handrail mod I (Progressing)       Start:  12/07/23    Expected End:  12/21/23               Pain - Adult

## 2023-12-09 NOTE — PROGRESS NOTES
"Tara Tierney is a 68 y.o. female on day 2 of admission presenting with Intractable back pain.    Subjective   Patient seen and examined.  No fevers, chills, shortness of breath but continues to complain of back pain, says that it is much worse today compared to yesterday.       Objective     Physical Exam  Constitutional:       Appearance: She is obese.   HENT:      Head: Normocephalic.   Eyes:      Extraocular Movements: Extraocular movements intact.      Conjunctiva/sclera: Conjunctivae normal.   Cardiovascular:      Rate and Rhythm: Normal rate and regular rhythm.   Pulmonary:      Effort: No dyspnea, no distress present.   Abdominal:      General: There is no distension.      Palpations: Abdomen is soft.   Home Medications  Last Recorded Vitals  Blood pressure 114/72, pulse 97, temperature 36.2 °C (97.2 °F), resp. rate 18, height 1.448 m (4' 9\"), weight 84.7 kg (186 lb 11.7 oz), SpO2 95 %.  Intake/Output last 3 Shifts:  I/O last 3 completed shifts:  In: 200 (2.4 mL/kg) [IV Piggyback:200]  Out: 1664 (19.6 mL/kg) [Urine:1600 (0.5 mL/kg/hr); Drains:62; Stool:2]  Weight: 84.7 kg     Relevant Results              This patient has a central line   Reason for the central line remaining today? Parenteral medication                 Assessment/Plan   Principal Problem:    Intractable back pain    Patient was discharged yesterday by ID and orthopedics wants to keep her  Today her pain is worse  Drains only drained about 5 mL overnight  If her pain continues to worsen will order repeat MRI to make sure fluid is not collected again and drain is in the right place  On IV vancomycin  Wound cultures are growing MRSA  She will need 4 to 6 weeks of IV antibiotic, pharmacy to decide dose  Continue pain control  Continue PT OT  Creatinine has normalized  Will repeat labs tomorrow  Plan to discharge on Monday as per Ortho  Continue Macrobid for urinary issues, not complaining of burning anymore  Continue rest of medical " management as she is on  DVT prophylaxis           Nitesh Ruiz MD

## 2023-12-09 NOTE — PROGRESS NOTES
DAILY PROGRESS NOTE      POD: 3    Patient doingfairly well    Visit Vitals  /72   Pulse 97   Temp 36.2 °C (97.2 °F)   Resp 18        Pain Control   moderate    No chest pain or shortness of breath.  No calf pain    Exam:   Incision(s):  Mepilex is in place clean and dry both drains are sutured in place slight bloody drainage around the drain insertion site  Dressing mild sanguinous soilage  Operative extremity shows neuro vascular status intact. Flexion and extension intact on operative extremity  Calves soft and non-tender without evidence of DVT.    I&O  I/O last 3 completed shifts:  In: 235 [P.O.:230; I.V.:5]  Out: 1620 [Urine:1620]  Both drains put out 5 cc/shift    Assessment:  Good Post Operative Course.    Plan: Discussed with Dr. Coombs  Cultures growing MRSA.  Infectious diseases on the case.  Patient with some increased pain today.  Plan is to continue to mobilize with therapy continue to monitor if her symptoms get worse we will consider repeat MRI.  Continue to titrate antibiotic dosages per infectious disease.  Likely discharge to rehab on Monday if no changes.    This note was prepared using voice recognition software.  The details of this note are correct and have been reviewed, and corrected to the best of my ability.  Some grammatical areas may persist related to the Dragon software    Chivo Richard MD  Senior Attending Physician  [facility]    [phone]

## 2023-12-09 NOTE — PROGRESS NOTES
Ortho plans to discharge Monday to SNF if remains stable and no further changes. Care Transition team to continue to monitor progression and address needs/ concerns as identified.  ISHMAEL Cedillo

## 2023-12-10 ENCOUNTER — OUTSIDE SERVICES (OUTPATIENT)
Dept: INFECTIOUS DISEASES | Age: 68
End: 2023-12-10

## 2023-12-10 ENCOUNTER — APPOINTMENT (OUTPATIENT)
Dept: RADIOLOGY | Facility: HOSPITAL | Age: 68
DRG: 856 | End: 2023-12-10
Payer: COMMERCIAL

## 2023-12-10 DIAGNOSIS — T81.42XA DEEP INCISIONAL SURGICAL SITE INFECTION: Primary | ICD-10-CM

## 2023-12-10 DIAGNOSIS — M54.50 LUMBAR SPINE PAIN: ICD-10-CM

## 2023-12-10 DIAGNOSIS — L08.9 INFECTED HEMATOMA: ICD-10-CM

## 2023-12-10 DIAGNOSIS — M46.20 SPINAL ABSCESS (HCC): ICD-10-CM

## 2023-12-10 DIAGNOSIS — T14.8XXA INFECTED HEMATOMA: ICD-10-CM

## 2023-12-10 DIAGNOSIS — A49.01 STAPHYLOCOCCUS AUREUS INFECTION: ICD-10-CM

## 2023-12-10 LAB
ANION GAP SERPL CALC-SCNC: 11 MMOL/L (ref 10–20)
BUN SERPL-MCNC: 13 MG/DL (ref 6–23)
CALCIUM SERPL-MCNC: 9 MG/DL (ref 8.6–10.3)
CHLORIDE SERPL-SCNC: 99 MMOL/L (ref 98–107)
CO2 SERPL-SCNC: 27 MMOL/L (ref 21–32)
CREAT SERPL-MCNC: 0.76 MG/DL (ref 0.5–1.05)
ERYTHROCYTE [DISTWIDTH] IN BLOOD BY AUTOMATED COUNT: 13.4 % (ref 11.5–14.5)
GFR SERPL CREATININE-BSD FRML MDRD: 84 ML/MIN/1.73M*2
GLUCOSE SERPL-MCNC: 129 MG/DL (ref 74–99)
HCT VFR BLD AUTO: 24.8 % (ref 36–46)
HGB BLD-MCNC: 8 G/DL (ref 12–16)
MCH RBC QN AUTO: 31.6 PG (ref 26–34)
MCHC RBC AUTO-ENTMCNC: 32.3 G/DL (ref 32–36)
MCV RBC AUTO: 98 FL (ref 80–100)
NRBC BLD-RTO: 0 /100 WBCS (ref 0–0)
PLATELET # BLD AUTO: 229 X10*3/UL (ref 150–450)
POTASSIUM SERPL-SCNC: 3.6 MMOL/L (ref 3.5–5.3)
RBC # BLD AUTO: 2.53 X10*6/UL (ref 4–5.2)
SODIUM SERPL-SCNC: 133 MMOL/L (ref 136–145)
VANCOMYCIN TROUGH SERPL-MCNC: 34 UG/ML (ref 5–20)
WBC # BLD AUTO: 8.7 X10*3/UL (ref 4.4–11.3)

## 2023-12-10 PROCEDURE — 72158 MRI LUMBAR SPINE W/O & W/DYE: CPT

## 2023-12-10 PROCEDURE — 2500000004 HC RX 250 GENERAL PHARMACY W/ HCPCS (ALT 636 FOR OP/ED): Performed by: STUDENT IN AN ORGANIZED HEALTH CARE EDUCATION/TRAINING PROGRAM

## 2023-12-10 PROCEDURE — 85027 COMPLETE CBC AUTOMATED: CPT | Performed by: STUDENT IN AN ORGANIZED HEALTH CARE EDUCATION/TRAINING PROGRAM

## 2023-12-10 PROCEDURE — 2500000001 HC RX 250 WO HCPCS SELF ADMINISTERED DRUGS (ALT 637 FOR MEDICARE OP): Performed by: STUDENT IN AN ORGANIZED HEALTH CARE EDUCATION/TRAINING PROGRAM

## 2023-12-10 PROCEDURE — 2550000001 HC RX 255 CONTRASTS: Performed by: STUDENT IN AN ORGANIZED HEALTH CARE EDUCATION/TRAINING PROGRAM

## 2023-12-10 PROCEDURE — 2500000001 HC RX 250 WO HCPCS SELF ADMINISTERED DRUGS (ALT 637 FOR MEDICARE OP): Performed by: FAMILY MEDICINE

## 2023-12-10 PROCEDURE — 80048 BASIC METABOLIC PNL TOTAL CA: CPT | Performed by: STUDENT IN AN ORGANIZED HEALTH CARE EDUCATION/TRAINING PROGRAM

## 2023-12-10 PROCEDURE — 72148 MRI LUMBAR SPINE W/O DYE: CPT | Performed by: RADIOLOGY

## 2023-12-10 PROCEDURE — 1210000001 HC SEMI-PRIVATE ROOM DAILY

## 2023-12-10 PROCEDURE — 99232 SBSQ HOSP IP/OBS MODERATE 35: CPT | Performed by: STUDENT IN AN ORGANIZED HEALTH CARE EDUCATION/TRAINING PROGRAM

## 2023-12-10 PROCEDURE — 80202 ASSAY OF VANCOMYCIN: CPT | Performed by: NURSE PRACTITIONER

## 2023-12-10 PROCEDURE — 97530 THERAPEUTIC ACTIVITIES: CPT | Mod: GP,CQ

## 2023-12-10 PROCEDURE — 96372 THER/PROPH/DIAG INJ SC/IM: CPT | Performed by: STUDENT IN AN ORGANIZED HEALTH CARE EDUCATION/TRAINING PROGRAM

## 2023-12-10 PROCEDURE — 2500000002 HC RX 250 W HCPCS SELF ADMINISTERED DRUGS (ALT 637 FOR MEDICARE OP, ALT 636 FOR OP/ED): Performed by: STUDENT IN AN ORGANIZED HEALTH CARE EDUCATION/TRAINING PROGRAM

## 2023-12-10 PROCEDURE — 2500000004 HC RX 250 GENERAL PHARMACY W/ HCPCS (ALT 636 FOR OP/ED): Performed by: NURSE PRACTITIONER

## 2023-12-10 PROCEDURE — A9575 INJ GADOTERATE MEGLUMI 0.1ML: HCPCS | Performed by: STUDENT IN AN ORGANIZED HEALTH CARE EDUCATION/TRAINING PROGRAM

## 2023-12-10 RX ORDER — GADOTERATE MEGLUMINE 376.9 MG/ML
0.2 INJECTION INTRAVENOUS
Status: COMPLETED | OUTPATIENT
Start: 2023-12-10 | End: 2023-12-10

## 2023-12-10 RX ADMIN — HEPARIN SODIUM 5000 UNITS: 5000 INJECTION INTRAVENOUS; SUBCUTANEOUS at 11:24

## 2023-12-10 RX ADMIN — HYDRALAZINE HYDROCHLORIDE 50 MG: 50 TABLET ORAL at 21:15

## 2023-12-10 RX ADMIN — OXYCODONE HYDROCHLORIDE 10 MG: 5 TABLET ORAL at 11:24

## 2023-12-10 RX ADMIN — BRIMONIDINE TARTRATE 1 DROP: 2 SOLUTION/ DROPS OPHTHALMIC at 08:52

## 2023-12-10 RX ADMIN — SIMVASTATIN 40 MG: 40 TABLET, FILM COATED ORAL at 21:15

## 2023-12-10 RX ADMIN — MIRTAZAPINE 15 MG: 15 TABLET, FILM COATED ORAL at 21:16

## 2023-12-10 RX ADMIN — PHENAZOPYRIDINE HYDROCHLORIDE 100 MG: 100 TABLET ORAL at 17:00

## 2023-12-10 RX ADMIN — HEPARIN SODIUM 5000 UNITS: 5000 INJECTION INTRAVENOUS; SUBCUTANEOUS at 04:34

## 2023-12-10 RX ADMIN — ISOSORBIDE MONONITRATE 30 MG: 30 TABLET, EXTENDED RELEASE ORAL at 08:49

## 2023-12-10 RX ADMIN — PANTOPRAZOLE SODIUM 40 MG: 40 TABLET, DELAYED RELEASE ORAL at 06:19

## 2023-12-10 RX ADMIN — ATENOLOL 50 MG: 50 TABLET ORAL at 08:51

## 2023-12-10 RX ADMIN — OXYCODONE HYDROCHLORIDE 10 MG: 5 TABLET ORAL at 06:19

## 2023-12-10 RX ADMIN — NITROFURANTOIN (MONOHYDRATE/MACROCRYSTALS) 100 MG: 75; 25 CAPSULE ORAL at 08:49

## 2023-12-10 RX ADMIN — VANCOMYCIN HYDROCHLORIDE 1000 MG: 1 INJECTION, SOLUTION INTRAVENOUS at 14:31

## 2023-12-10 RX ADMIN — PREGABALIN 75 MG: 50 CAPSULE ORAL at 21:15

## 2023-12-10 RX ADMIN — BUSPIRONE HYDROCHLORIDE 10 MG: 5 TABLET ORAL at 08:50

## 2023-12-10 RX ADMIN — ISOSORBIDE MONONITRATE 60 MG: 60 TABLET, EXTENDED RELEASE ORAL at 08:51

## 2023-12-10 RX ADMIN — PHENAZOPYRIDINE HYDROCHLORIDE 100 MG: 100 TABLET ORAL at 11:24

## 2023-12-10 RX ADMIN — SIMVASTATIN 40 MG: 40 TABLET, FILM COATED ORAL at 08:51

## 2023-12-10 RX ADMIN — PHENAZOPYRIDINE HYDROCHLORIDE 100 MG: 100 TABLET ORAL at 08:50

## 2023-12-10 RX ADMIN — BRIMONIDINE TARTRATE 1 DROP: 2 SOLUTION/ DROPS OPHTHALMIC at 21:15

## 2023-12-10 RX ADMIN — HEPARIN SODIUM 5000 UNITS: 5000 INJECTION INTRAVENOUS; SUBCUTANEOUS at 19:43

## 2023-12-10 RX ADMIN — OXYCODONE HYDROCHLORIDE 10 MG: 5 TABLET ORAL at 19:42

## 2023-12-10 RX ADMIN — EZETIMIBE 10 MG: 10 TABLET ORAL at 08:50

## 2023-12-10 RX ADMIN — Medication 2000 UNITS: at 08:50

## 2023-12-10 RX ADMIN — NITROFURANTOIN (MONOHYDRATE/MACROCRYSTALS) 100 MG: 75; 25 CAPSULE ORAL at 21:16

## 2023-12-10 RX ADMIN — GADOTERATE MEGLUMINE 17 ML: 376.9 INJECTION INTRAVENOUS at 13:52

## 2023-12-10 RX ADMIN — LORATADINE 10 MG: 10 TABLET ORAL at 08:50

## 2023-12-10 RX ADMIN — PREGABALIN 75 MG: 50 CAPSULE ORAL at 08:50

## 2023-12-10 ASSESSMENT — COGNITIVE AND FUNCTIONAL STATUS - GENERAL
CLIMB 3 TO 5 STEPS WITH RAILING: A LOT
MOBILITY SCORE: 15
STANDING UP FROM CHAIR USING ARMS: A LITTLE
PERSONAL GROOMING: A LITTLE
DRESSING REGULAR UPPER BODY CLOTHING: A LITTLE
WALKING IN HOSPITAL ROOM: A LITTLE
MOVING TO AND FROM BED TO CHAIR: A LITTLE
MOVING FROM LYING ON BACK TO SITTING ON SIDE OF FLAT BED WITH BEDRAILS: A LOT
DAILY ACTIVITIY SCORE: 19
HELP NEEDED FOR BATHING: A LITTLE
TOILETING: A LITTLE
TURNING FROM BACK TO SIDE WHILE IN FLAT BAD: A LOT
MOVING TO AND FROM BED TO CHAIR: A LITTLE
DRESSING REGULAR LOWER BODY CLOTHING: A LITTLE
TURNING FROM BACK TO SIDE WHILE IN FLAT BAD: A LOT
WALKING IN HOSPITAL ROOM: A LITTLE
MOBILITY SCORE: 15
MOVING FROM LYING ON BACK TO SITTING ON SIDE OF FLAT BED WITH BEDRAILS: A LITTLE
STANDING UP FROM CHAIR USING ARMS: A LITTLE
CLIMB 3 TO 5 STEPS WITH RAILING: TOTAL

## 2023-12-10 ASSESSMENT — PAIN SCALES - GENERAL
PAINLEVEL_OUTOF10: 7
PAINLEVEL_OUTOF10: 7
PAINLEVEL_OUTOF10: 8

## 2023-12-10 ASSESSMENT — PAIN DESCRIPTION - LOCATION: LOCATION: BACK

## 2023-12-10 ASSESSMENT — PAIN - FUNCTIONAL ASSESSMENT
PAIN_FUNCTIONAL_ASSESSMENT: 0-10

## 2023-12-10 NOTE — PROGRESS NOTES
Physical Therapy    Physical Therapy Treatment    Patient Name: Tara Tierney  MRN: [id number]  Today's Date: 12/10/2023  Time Calculation  Start Time: 1046  Stop Time: 1111  Time Calculation (min): 25 min       Assessment/Plan   End of Session Communication: Bedside nurse, PCT/NA/CTA  Assessment Comment: Call-light, phone, and traytable within reach.  End of Session Patient Position: Up in chair, Alarm on (Set up with lunch that arrived during session.)  PT Plan  Inpatient/Swing Bed or Outpatient: Inpatient  Treatment/Interventions: Transfer training, Gait training  PT Plan: Skilled PT  PT Frequency: Daily  PT Discharge Recommendations: Moderate intensity level of continued care, High intensity level of continued care  Equipment Recommended upon Discharge: Wheeled walker   PT Recommended Transfer Status: Assist x1    General Visit Information:   PT  Visit  PT Received On: 12/10/23  General  Family/Caregiver Present: No  Prior to Session Communication: Bedside nurse  Patient Position Received: Bed, 3 rail up, Alarm on  General Comment: Patient reports rough time with pain last night. Agreeable to participate in therapy.    General Observations:   General Observation: x2 JOSE drains; Purewick(removed for mobilization); TLSO brace donned(in standing 2° unable to secure it while sitting).    Subjective     Precautions:  Precautions  Medical Precautions: Fall precautions  Post-Surgical Precautions: Spinal precautions  Braces Applied: TLSO brace when OOB  Precautions Comment: Recent L3-4, L4-5, L5-S1 Laminectomy([facility]). Had I&D (12/06/23)    Objective     Pain:  Pain Assessment  Pain Assessment: 0-10  Pain Score:  (5-6/10 at start of session. Increased to 7-8/10 at end of session. Nursing informed. Ice pack at end of session.)    Treatments:           Bed Mobility  Bed Mobility: Yes  Bed Mobility 1  Bed Mobility 1: Supine to sitting  Level of Assistance 1: Moderate assistance  Bed Mobility Comments 1: HOB ~45°. Hand  over hand (A) to reach across body to use the bed rail for support. v/c and (A) for moving LEs over EOB. Educated on spinal precautions with bed mobility/transfers. Slow with transition to EOB.  Ambulation/Gait Training  Ambulation/Gait Training Performed: Yes  Ambulation/Gait Training 1  Surface 1: Level tile  Device 1: Rolling walker  Assistance 1:  (CGA/Min(A))  Comments/Distance (ft) 1: ~50ft x2. TLSO fastened securely prior to amb. NBOS. Slow cathi. Slightly forward flexed posture. Step through gait pattern. Decreased step height/length B. v/c and (A) for safer maneuvering of ww with 90/180° turns to avoid twisting. x1 episode of slight knee buckling.  Transfers  Transfer: Yes  Transfer 1  Technique 1: Sit to stand, Stand to sit  Transfer Device 1:  (ww)  Transfer Level of Assistance 1: Minimum assistance  Trials/Comments 1: (3x). v/c for safe hand placement and technique. Slow transition of hands to/from ww. Benefits from elevated surfaces. No able to don TLSO brace while sitting; may need an extender piece/panel.          Outcome Measures:  [facility] Basic Mobility  Turning from your back to your side while in a flat bed without using bedrails: A lot  Moving from lying on your back to sitting on the side of a flat bed without using bedrails: A lot  Moving to and from bed to chair (including a wheelchair): A little  Standing up from a chair using your arms (e.g. wheelchair or bedside chair): A little  To walk in hospital room: A little  Climbing 3-5 steps with railing: A lot  Basic Mobility - Total Score: 15    Education Documentation  Precautions, taught by Jeanine Bryan PTA at 12/10/2023 12:51 PM.  Learner: Patient  Readiness: Acceptance  Method: Explanation, Demonstration  Response: Needs Reinforcement, Verbalizes Understanding  Comment: See therapy note.    Body Mechanics, taught by Jeanine Bryan PTA at 12/10/2023 12:51 PM.  Learner: Patient  Readiness: Acceptance  Method: Explanation,  Demonstration  Response: Needs Reinforcement, Verbalizes Understanding  Comment: See therapy note.    Mobility Training, taught by Jeanine Bryan PTA at 12/10/2023 12:51 PM.  Learner: Patient  Readiness: Acceptance  Method: Explanation, Demonstration  Response: Needs Reinforcement, Verbalizes Understanding  Comment: See therapy note.      EDUCATION:  Individual(s) Educated: Patient  Education Provided: Body Mechanics, Fall Risk, POC, Post-Op Precautions, Posture (Safety with bed mobility/transfers/amb while keeping aware of spinal precautions; Donning/doffing TLSO brace for OOB activities.)      Encounter Problems       Encounter Problems (Active)       PT Problem       Pt will demonstrate sup > sit and sit > sup bed mobility mod I with log roll technique (Progressing)       Start:  12/07/23    Expected End:  12/21/23            Pt will demo sit > stand and stand > sit transfer with ww and mod I  (Progressing)       Start:  12/07/23    Expected End:  12/21/23            Pt will ambulate 100' with ww and mod I, without LOB  (Progressing)       Start:  12/07/23    Expected End:  12/21/23            Pt will demo up/down 1 steps with handrail mod I (Progressing)       Start:  12/07/23    Expected End:  12/21/23

## 2023-12-10 NOTE — PROGRESS NOTES
DAILY PROGRESS NOTE      POD: 4    Patient doing complaining of more pain today than yesterday denies any numbness or tingling just feeling pretty miserable      Visit Vitals  /65   Pulse 88   Temp 37.2 °C (99 °F)   Resp 18        Pain Control   moderate    No chest pain or shortness of breath.  No calf pain    Exam:   Incision(s): CDI  Dressing mild sanguinous soilage  Operative extremity shows neuro vascular status intact. Flexion and extension intact on operative extremity  Calves soft and non-tender without evidence of DVT.  Some drainage around drain sites bloody  I&O  I/O last 3 completed shifts:  In: 235 [P.O.:230; I.V.:5]  Out: 1620 [Urine:1620]  Drain output not well-documented appears to be 10 cc in the last shift    Assessment:  Good Post Operative Course.  With increasing pain and decreasing drainage  Plan: Will discuss with Dr. Herzog likely repeat MRI today.

## 2023-12-10 NOTE — NURSING NOTE
Patient complaining of severe pain in back. States oxy prescribed is not effective. Dr Chandler notified. New orders received. Patient medicated now in bed resting comfortably.

## 2023-12-10 NOTE — PROGRESS NOTES
"Tara Tierney is a 68 y.o. female on day 3 of admission presenting with Intractable back pain.    Subjective   Patient seen and examined.  No fevers, chills, nausea, vomiting, continues to have back pain she told me it is slightly better than yesterday.       Objective     Physical Exam  Constitutional:       Appearance: She is obese.   HENT:      Head: Normocephalic.   Eyes:      Extraocular Movements: Extraocular movements intact.      Conjunctiva/sclera: Conjunctivae normal.   Cardiovascular:      Rate and Rhythm: Normal rate and regular rhythm.   Pulmonary:      Effort: No dyspnea, no distress present.   Abdominal:      General: There is no distension.      Palpations: Abdomen is soft.   Last Recorded Vitals  Blood pressure 114/52, pulse 86, temperature 37.1 °C (98.8 °F), resp. rate 18, height 1.448 m (4' 9\"), weight 84.7 kg (186 lb 11.7 oz), SpO2 94 %.  Intake/Output last 3 Shifts:  I/O last 3 completed shifts:  In: 450 (5.3 mL/kg) [P.O.:450]  Out: 2520 (29.8 mL/kg) [Urine:2500 (0.8 mL/kg/hr); Drains:20]  Weight: 84.7 kg     Relevant Results                             Assessment/Plan   Principal Problem:    Intractable back pain    Continues to report pain although she told me it slightly better than yesterday  She is not moving a lot due to the pain  Orthopedics following  We can consider repeating an MRI if pain does not get better by tomorrow  Drains are not draining too much  Continue IV vancomycin  Wound culture growing MRSA  4 to 6 weeks of IV antibiotics as per ID  Continue pain control  Continue rehab   Sodium is slightly low at 133, continue to monitor  Potassium has normalized  Hemoglobin is 8 today, will repeat tomorrow  On Macrobid for urinary burning  Continue rest of the medical management as she is on  DVT prophylaxis        Nitesh Ruiz MD      "

## 2023-12-10 NOTE — PROGRESS NOTES
"Vancomycin Dosing by Pharmacy- FOLLOW UP    Tara Tierney is a 68 y.o. year old female who Pharmacy has been consulted for vancomycin dosing for cellulitis, skin and soft tissue. Based on the patient's indication and renal status this patient is being dosed based on a goal trough/random level of 15-20.     Renal function is currently improving.    Current vancomycin dose: 1000 mg given every 24 hours    Visit Vitals  /59   Pulse 87   Temp 37.5 °C (99.5 °F)   Resp 18        Lab Results   Component Value Date    CREATININE 0.76 12/10/2023    CREATININE 0.99 12/08/2023    CREATININE 1.31 (H) 12/07/2023    CREATININE 0.83 12/06/2023        Patient weight is No results found for: \"PTWEIGHT\"    No results found for: \"CULTURE\"     I/O last 3 completed shifts:  In: 450 (5.3 mL/kg) [P.O.:450]  Out: 2520 (29.8 mL/kg) [Urine:2500 (0.8 mL/kg/hr); Drains:20]  Weight: 84.7 kg   [unfilled]    No results found for: \"PATIENTTEMP\"     Assessment/Plan    Recent lab = 34, Bag  hung on 12/10 at 1431, lab draw was at 1501, unfortunate timing, will redraw        The next level will be obtained on 12/11 at 1200. May be obtained sooner if clinically indicated.   Will continue to monitor renal function daily while on vancomycin and order serum creatinine at least every 48 hours if not already ordered.  Follow for continued vancomycin needs, clinical response, and signs/symptoms of toxicity.       Jeanine Walker, PharmD           "

## 2023-12-11 ENCOUNTER — ANESTHESIA EVENT (OUTPATIENT)
Dept: OPERATING ROOM | Facility: HOSPITAL | Age: 68
DRG: 856 | End: 2023-12-11
Payer: COMMERCIAL

## 2023-12-11 ENCOUNTER — ANESTHESIA (OUTPATIENT)
Dept: OPERATING ROOM | Facility: HOSPITAL | Age: 68
DRG: 856 | End: 2023-12-11
Payer: COMMERCIAL

## 2023-12-11 PROBLEM — T81.89XA LUMBAR SURGICAL WOUND FLUID COLLECTION: Status: ACTIVE | Noted: 2023-12-05

## 2023-12-11 PROBLEM — T79.2XXA SEROMA, POST-TRAUMATIC (CMS-HCC): Status: ACTIVE | Noted: 2023-12-05

## 2023-12-11 LAB
ANION GAP SERPL CALC-SCNC: 8 MMOL/L (ref 10–20)
BUN SERPL-MCNC: 13 MG/DL (ref 6–23)
CALCIUM SERPL-MCNC: 8.9 MG/DL (ref 8.6–10.3)
CHLORIDE SERPL-SCNC: 100 MMOL/L (ref 98–107)
CO2 SERPL-SCNC: 28 MMOL/L (ref 21–32)
CREAT SERPL-MCNC: 0.8 MG/DL (ref 0.5–1.05)
ERYTHROCYTE [DISTWIDTH] IN BLOOD BY AUTOMATED COUNT: 13.4 % (ref 11.5–14.5)
GFR SERPL CREATININE-BSD FRML MDRD: 79 ML/MIN/1.73M*2
GLUCOSE SERPL-MCNC: 122 MG/DL (ref 74–99)
HCT VFR BLD AUTO: 24.7 % (ref 36–46)
HGB BLD-MCNC: 8 G/DL (ref 12–16)
MCH RBC QN AUTO: 32.1 PG (ref 26–34)
MCHC RBC AUTO-ENTMCNC: 32.4 G/DL (ref 32–36)
MCV RBC AUTO: 99 FL (ref 80–100)
NRBC BLD-RTO: 0 /100 WBCS (ref 0–0)
PLATELET # BLD AUTO: 213 X10*3/UL (ref 150–450)
POTASSIUM SERPL-SCNC: 3.8 MMOL/L (ref 3.5–5.3)
RBC # BLD AUTO: 2.49 X10*6/UL (ref 4–5.2)
SODIUM SERPL-SCNC: 132 MMOL/L (ref 136–145)
VANCOMYCIN TROUGH SERPL-MCNC: 6.4 UG/ML (ref 5–20)
WBC # BLD AUTO: 6.9 X10*3/UL (ref 4.4–11.3)

## 2023-12-11 PROCEDURE — 80048 BASIC METABOLIC PNL TOTAL CA: CPT | Performed by: STUDENT IN AN ORGANIZED HEALTH CARE EDUCATION/TRAINING PROGRAM

## 2023-12-11 PROCEDURE — 00D20ZZ EXTRACTION OF DURA MATER, OPEN APPROACH: ICD-10-PCS | Performed by: ORTHOPAEDIC SURGERY

## 2023-12-11 PROCEDURE — 3700000001 HC GENERAL ANESTHESIA TIME - INITIAL BASE CHARGE: Performed by: ORTHOPAEDIC SURGERY

## 2023-12-11 PROCEDURE — 1210000001 HC SEMI-PRIVATE ROOM DAILY

## 2023-12-11 PROCEDURE — 96372 THER/PROPH/DIAG INJ SC/IM: CPT | Performed by: STUDENT IN AN ORGANIZED HEALTH CARE EDUCATION/TRAINING PROGRAM

## 2023-12-11 PROCEDURE — 2500000004 HC RX 250 GENERAL PHARMACY W/ HCPCS (ALT 636 FOR OP/ED): Performed by: NURSE ANESTHETIST, CERTIFIED REGISTERED

## 2023-12-11 PROCEDURE — 22830 EXPLORATION OF SPINAL FUSION: CPT | Performed by: PHYSICIAN ASSISTANT

## 2023-12-11 PROCEDURE — 2500000004 HC RX 250 GENERAL PHARMACY W/ HCPCS (ALT 636 FOR OP/ED): Performed by: FAMILY MEDICINE

## 2023-12-11 PROCEDURE — 7100000001 HC RECOVERY ROOM TIME - INITIAL BASE CHARGE: Performed by: ORTHOPAEDIC SURGERY

## 2023-12-11 PROCEDURE — 87070 CULTURE OTHR SPECIMN AEROBIC: CPT | Mod: ELYLAB | Performed by: NURSE PRACTITIONER

## 2023-12-11 PROCEDURE — 99232 SBSQ HOSP IP/OBS MODERATE 35: CPT | Performed by: STUDENT IN AN ORGANIZED HEALTH CARE EDUCATION/TRAINING PROGRAM

## 2023-12-11 PROCEDURE — 2500000004 HC RX 250 GENERAL PHARMACY W/ HCPCS (ALT 636 FOR OP/ED): Performed by: STUDENT IN AN ORGANIZED HEALTH CARE EDUCATION/TRAINING PROGRAM

## 2023-12-11 PROCEDURE — 3600000003 HC OR TIME - INITIAL BASE CHARGE - PROCEDURE LEVEL THREE: Performed by: ORTHOPAEDIC SURGERY

## 2023-12-11 PROCEDURE — 2500000002 HC RX 250 W HCPCS SELF ADMINISTERED DRUGS (ALT 637 FOR MEDICARE OP, ALT 636 FOR OP/ED): Performed by: STUDENT IN AN ORGANIZED HEALTH CARE EDUCATION/TRAINING PROGRAM

## 2023-12-11 PROCEDURE — 2500000001 HC RX 250 WO HCPCS SELF ADMINISTERED DRUGS (ALT 637 FOR MEDICARE OP): Performed by: FAMILY MEDICINE

## 2023-12-11 PROCEDURE — 22015 I&D ABSCESS P-SPINE L/S/LS: CPT | Performed by: PHYSICIAN ASSISTANT

## 2023-12-11 PROCEDURE — 3700000002 HC GENERAL ANESTHESIA TIME - EACH INCREMENTAL 1 MINUTE: Performed by: ORTHOPAEDIC SURGERY

## 2023-12-11 PROCEDURE — 2500000004 HC RX 250 GENERAL PHARMACY W/ HCPCS (ALT 636 FOR OP/ED): Performed by: NURSE PRACTITIONER

## 2023-12-11 PROCEDURE — 2720000007 HC OR 272 NO HCPCS: Performed by: ORTHOPAEDIC SURGERY

## 2023-12-11 PROCEDURE — 80202 ASSAY OF VANCOMYCIN: CPT | Performed by: NURSE PRACTITIONER

## 2023-12-11 PROCEDURE — 7100000002 HC RECOVERY ROOM TIME - EACH INCREMENTAL 1 MINUTE: Performed by: ORTHOPAEDIC SURGERY

## 2023-12-11 PROCEDURE — 85027 COMPLETE CBC AUTOMATED: CPT | Performed by: STUDENT IN AN ORGANIZED HEALTH CARE EDUCATION/TRAINING PROGRAM

## 2023-12-11 PROCEDURE — 3600000008 HC OR TIME - EACH INCREMENTAL 1 MINUTE - PROCEDURE LEVEL THREE: Performed by: ORTHOPAEDIC SURGERY

## 2023-12-11 PROCEDURE — G0378 HOSPITAL OBSERVATION PER HR: HCPCS

## 2023-12-11 PROCEDURE — 2500000001 HC RX 250 WO HCPCS SELF ADMINISTERED DRUGS (ALT 637 FOR MEDICARE OP): Performed by: STUDENT IN AN ORGANIZED HEALTH CARE EDUCATION/TRAINING PROGRAM

## 2023-12-11 PROCEDURE — 87075 CULTR BACTERIA EXCEPT BLOOD: CPT | Mod: ELYLAB | Performed by: NURSE PRACTITIONER

## 2023-12-11 PROCEDURE — 2500000005 HC RX 250 GENERAL PHARMACY W/O HCPCS: Performed by: NURSE ANESTHETIST, CERTIFIED REGISTERED

## 2023-12-11 RX ORDER — FENTANYL CITRATE 50 UG/ML
INJECTION, SOLUTION INTRAMUSCULAR; INTRAVENOUS AS NEEDED
Status: DISCONTINUED | OUTPATIENT
Start: 2023-12-11 | End: 2023-12-11

## 2023-12-11 RX ORDER — ONDANSETRON HYDROCHLORIDE 2 MG/ML
4 INJECTION, SOLUTION INTRAVENOUS EVERY 8 HOURS PRN
Status: CANCELLED | OUTPATIENT
Start: 2023-12-11

## 2023-12-11 RX ORDER — SODIUM CHLORIDE, SODIUM LACTATE, POTASSIUM CHLORIDE, CALCIUM CHLORIDE 600; 310; 30; 20 MG/100ML; MG/100ML; MG/100ML; MG/100ML
100 INJECTION, SOLUTION INTRAVENOUS CONTINUOUS
Status: DISCONTINUED | OUTPATIENT
Start: 2023-12-11 | End: 2023-12-11 | Stop reason: HOSPADM

## 2023-12-11 RX ORDER — OXYCODONE HYDROCHLORIDE 5 MG/1
10 TABLET ORAL EVERY 4 HOURS PRN
Status: CANCELLED | OUTPATIENT
Start: 2023-12-11

## 2023-12-11 RX ORDER — LABETALOL HYDROCHLORIDE 5 MG/ML
5 INJECTION, SOLUTION INTRAVENOUS ONCE AS NEEDED
Status: DISCONTINUED | OUTPATIENT
Start: 2023-12-11 | End: 2023-12-11 | Stop reason: HOSPADM

## 2023-12-11 RX ORDER — FENTANYL CITRATE 50 UG/ML
25 INJECTION, SOLUTION INTRAMUSCULAR; INTRAVENOUS EVERY 5 MIN PRN
Status: DISCONTINUED | OUTPATIENT
Start: 2023-12-11 | End: 2023-12-11 | Stop reason: HOSPADM

## 2023-12-11 RX ORDER — ONDANSETRON 4 MG/1
4 TABLET, FILM COATED ORAL EVERY 8 HOURS PRN
Status: CANCELLED | OUTPATIENT
Start: 2023-12-11

## 2023-12-11 RX ORDER — MORPHINE SULFATE 2 MG/ML
2 INJECTION, SOLUTION INTRAMUSCULAR; INTRAVENOUS EVERY 2 HOUR PRN
Status: CANCELLED | OUTPATIENT
Start: 2023-12-11

## 2023-12-11 RX ORDER — CEFAZOLIN SODIUM 2 G/50ML
SOLUTION INTRAVENOUS AS NEEDED
Status: DISCONTINUED | OUTPATIENT
Start: 2023-12-11 | End: 2023-12-11

## 2023-12-11 RX ORDER — BUPIVACAINE HCL/EPINEPHRINE 0.5-1:200K
VIAL (ML) INJECTION AS NEEDED
Status: DISCONTINUED | OUTPATIENT
Start: 2023-12-11 | End: 2023-12-11 | Stop reason: HOSPADM

## 2023-12-11 RX ORDER — ONDANSETRON HYDROCHLORIDE 2 MG/ML
4 INJECTION, SOLUTION INTRAVENOUS ONCE AS NEEDED
Status: DISCONTINUED | OUTPATIENT
Start: 2023-12-11 | End: 2023-12-11 | Stop reason: HOSPADM

## 2023-12-11 RX ORDER — LIDOCAINE HYDROCHLORIDE 10 MG/ML
0.1 INJECTION, SOLUTION EPIDURAL; INFILTRATION; INTRACAUDAL; PERINEURAL ONCE
Status: DISCONTINUED | OUTPATIENT
Start: 2023-12-11 | End: 2023-12-11 | Stop reason: HOSPADM

## 2023-12-11 RX ORDER — TRANEXAMIC ACID 650 MG/1
1950 TABLET ORAL ONCE
Status: DISCONTINUED | OUTPATIENT
Start: 2023-12-11 | End: 2023-12-17

## 2023-12-11 RX ORDER — ALBUTEROL SULFATE 0.83 MG/ML
2.5 SOLUTION RESPIRATORY (INHALATION) ONCE
Status: DISCONTINUED | OUTPATIENT
Start: 2023-12-11 | End: 2023-12-11 | Stop reason: HOSPADM

## 2023-12-11 RX ORDER — ACETAMINOPHEN 325 MG/1
650 TABLET ORAL EVERY 6 HOURS
Status: CANCELLED | OUTPATIENT
Start: 2023-12-11

## 2023-12-11 RX ORDER — ROCURONIUM BROMIDE 10 MG/ML
INJECTION, SOLUTION INTRAVENOUS AS NEEDED
Status: DISCONTINUED | OUTPATIENT
Start: 2023-12-11 | End: 2023-12-11

## 2023-12-11 RX ORDER — CYCLOBENZAPRINE HCL 10 MG
10 TABLET ORAL 3 TIMES DAILY PRN
Status: CANCELLED | OUTPATIENT
Start: 2023-12-11

## 2023-12-11 RX ORDER — OXYCODONE HYDROCHLORIDE 5 MG/1
5 TABLET ORAL EVERY 4 HOURS PRN
Status: CANCELLED | OUTPATIENT
Start: 2023-12-11

## 2023-12-11 RX ORDER — DROPERIDOL 2.5 MG/ML
0.62 INJECTION, SOLUTION INTRAMUSCULAR; INTRAVENOUS ONCE AS NEEDED
Status: DISCONTINUED | OUTPATIENT
Start: 2023-12-11 | End: 2023-12-11 | Stop reason: HOSPADM

## 2023-12-11 RX ORDER — MEPERIDINE HYDROCHLORIDE 25 MG/ML
12.5 INJECTION INTRAMUSCULAR; INTRAVENOUS; SUBCUTANEOUS EVERY 10 MIN PRN
Status: DISCONTINUED | OUTPATIENT
Start: 2023-12-11 | End: 2023-12-11 | Stop reason: HOSPADM

## 2023-12-11 RX ORDER — SODIUM CHLORIDE 9 MG/ML
100 INJECTION, SOLUTION INTRAVENOUS CONTINUOUS
Status: CANCELLED | OUTPATIENT
Start: 2023-12-11

## 2023-12-11 RX ORDER — ONDANSETRON HYDROCHLORIDE 2 MG/ML
INJECTION, SOLUTION INTRAVENOUS AS NEEDED
Status: DISCONTINUED | OUTPATIENT
Start: 2023-12-11 | End: 2023-12-11

## 2023-12-11 RX ORDER — NALOXONE HYDROCHLORIDE 1 MG/ML
0.2 INJECTION INTRAMUSCULAR; INTRAVENOUS; SUBCUTANEOUS EVERY 5 MIN PRN
Status: CANCELLED | OUTPATIENT
Start: 2023-12-11

## 2023-12-11 RX ORDER — CEFAZOLIN SODIUM 2 G/50ML
2 SOLUTION INTRAVENOUS EVERY 8 HOURS
Status: CANCELLED | OUTPATIENT
Start: 2023-12-11 | End: 2023-12-12

## 2023-12-11 RX ORDER — OXYCODONE HYDROCHLORIDE 5 MG/1
2.5 TABLET ORAL EVERY 4 HOURS PRN
Status: CANCELLED | OUTPATIENT
Start: 2023-12-11

## 2023-12-11 RX ORDER — PROPOFOL 10 MG/ML
INJECTION, EMULSION INTRAVENOUS AS NEEDED
Status: DISCONTINUED | OUTPATIENT
Start: 2023-12-11 | End: 2023-12-11

## 2023-12-11 RX ORDER — MIDAZOLAM HYDROCHLORIDE 1 MG/ML
1 INJECTION, SOLUTION INTRAMUSCULAR; INTRAVENOUS ONCE AS NEEDED
Status: DISCONTINUED | OUTPATIENT
Start: 2023-12-11 | End: 2023-12-11 | Stop reason: HOSPADM

## 2023-12-11 RX ORDER — POLYETHYLENE GLYCOL 3350 17 G/17G
17 POWDER, FOR SOLUTION ORAL DAILY
Status: CANCELLED | OUTPATIENT
Start: 2023-12-11

## 2023-12-11 RX ADMIN — ROCURONIUM BROMIDE 100 MG: 10 SOLUTION INTRAVENOUS at 14:34

## 2023-12-11 RX ADMIN — HEPARIN SODIUM 5000 UNITS: 5000 INJECTION INTRAVENOUS; SUBCUTANEOUS at 22:41

## 2023-12-11 RX ADMIN — VANCOMYCIN HYDROCHLORIDE 1.25 G: 1.25 INJECTION, POWDER, LYOPHILIZED, FOR SOLUTION INTRAVENOUS at 14:31

## 2023-12-11 RX ADMIN — PANTOPRAZOLE SODIUM 40 MG: 40 TABLET, DELAYED RELEASE ORAL at 06:27

## 2023-12-11 RX ADMIN — PREGABALIN 75 MG: 50 CAPSULE ORAL at 09:24

## 2023-12-11 RX ADMIN — BRIMONIDINE TARTRATE 1 DROP: 2 SOLUTION/ DROPS OPHTHALMIC at 22:43

## 2023-12-11 RX ADMIN — NITROFURANTOIN (MONOHYDRATE/MACROCRYSTALS) 100 MG: 75; 25 CAPSULE ORAL at 22:42

## 2023-12-11 RX ADMIN — NITROFURANTOIN (MONOHYDRATE/MACROCRYSTALS) 100 MG: 75; 25 CAPSULE ORAL at 09:13

## 2023-12-11 RX ADMIN — BUSPIRONE HYDROCHLORIDE 10 MG: 5 TABLET ORAL at 09:14

## 2023-12-11 RX ADMIN — FENTANYL CITRATE 50 MCG: 50 INJECTION, SOLUTION INTRAMUSCULAR; INTRAVENOUS at 15:59

## 2023-12-11 RX ADMIN — ISOSORBIDE MONONITRATE 30 MG: 30 TABLET, EXTENDED RELEASE ORAL at 09:11

## 2023-12-11 RX ADMIN — HYDROMORPHONE HYDROCHLORIDE 0.5 MG: 1 INJECTION, SOLUTION INTRAMUSCULAR; INTRAVENOUS; SUBCUTANEOUS at 16:51

## 2023-12-11 RX ADMIN — HYDROMORPHONE HYDROCHLORIDE 0.5 MG: 1 INJECTION, SOLUTION INTRAMUSCULAR; INTRAVENOUS; SUBCUTANEOUS at 16:37

## 2023-12-11 RX ADMIN — ACETAMINOPHEN 650 MG: 325 TABLET ORAL at 01:47

## 2023-12-11 RX ADMIN — PREGABALIN 75 MG: 50 CAPSULE ORAL at 22:43

## 2023-12-11 RX ADMIN — ATENOLOL 50 MG: 50 TABLET ORAL at 09:14

## 2023-12-11 RX ADMIN — HYDROMORPHONE HYDROCHLORIDE 0.5 MG: 1 INJECTION, SOLUTION INTRAMUSCULAR; INTRAVENOUS; SUBCUTANEOUS at 16:24

## 2023-12-11 RX ADMIN — ONDANSETRON 4 MG: 2 INJECTION, SOLUTION INTRAMUSCULAR; INTRAVENOUS at 15:47

## 2023-12-11 RX ADMIN — PROPOFOL 200 MG: 10 INJECTION, EMULSION INTRAVENOUS at 14:34

## 2023-12-11 RX ADMIN — PHENAZOPYRIDINE HYDROCHLORIDE 100 MG: 100 TABLET ORAL at 18:47

## 2023-12-11 RX ADMIN — SODIUM CHLORIDE, SODIUM LACTATE, POTASSIUM CHLORIDE, AND CALCIUM CHLORIDE: .6; .31; .03; .02 INJECTION, SOLUTION INTRAVENOUS at 14:31

## 2023-12-11 RX ADMIN — SUGAMMADEX 200 MG: 100 INJECTION, SOLUTION INTRAVENOUS at 15:55

## 2023-12-11 RX ADMIN — SIMVASTATIN 40 MG: 40 TABLET, FILM COATED ORAL at 09:14

## 2023-12-11 RX ADMIN — HYDRALAZINE HYDROCHLORIDE 50 MG: 50 TABLET ORAL at 22:42

## 2023-12-11 RX ADMIN — OXYCODONE HYDROCHLORIDE 10 MG: 5 TABLET ORAL at 09:11

## 2023-12-11 RX ADMIN — LORATADINE 10 MG: 10 TABLET ORAL at 09:14

## 2023-12-11 RX ADMIN — HYDROMORPHONE HYDROCHLORIDE 0.4 MG: 0.5 INJECTION, SOLUTION INTRAMUSCULAR; INTRAVENOUS; SUBCUTANEOUS at 18:49

## 2023-12-11 RX ADMIN — FENTANYL CITRATE 50 MCG: 50 INJECTION, SOLUTION INTRAMUSCULAR; INTRAVENOUS at 16:04

## 2023-12-11 RX ADMIN — PHENAZOPYRIDINE HYDROCHLORIDE 100 MG: 100 TABLET ORAL at 09:12

## 2023-12-11 RX ADMIN — EZETIMIBE 10 MG: 10 TABLET ORAL at 09:11

## 2023-12-11 RX ADMIN — FENTANYL CITRATE 50 MCG: 50 INJECTION, SOLUTION INTRAMUSCULAR; INTRAVENOUS at 15:32

## 2023-12-11 RX ADMIN — HYDRALAZINE HYDROCHLORIDE 50 MG: 50 TABLET ORAL at 09:14

## 2023-12-11 RX ADMIN — Medication 2000 UNITS: at 09:14

## 2023-12-11 RX ADMIN — HYDROMORPHONE HYDROCHLORIDE 0.4 MG: 0.5 INJECTION, SOLUTION INTRAMUSCULAR; INTRAVENOUS; SUBCUTANEOUS at 11:16

## 2023-12-11 RX ADMIN — CEFAZOLIN SODIUM 2 G: 2 SOLUTION INTRAVENOUS at 15:15

## 2023-12-11 RX ADMIN — SIMVASTATIN 40 MG: 40 TABLET, FILM COATED ORAL at 22:42

## 2023-12-11 RX ADMIN — FENTANYL CITRATE 50 MCG: 50 INJECTION, SOLUTION INTRAMUSCULAR; INTRAVENOUS at 15:06

## 2023-12-11 RX ADMIN — BRIMONIDINE TARTRATE 1 DROP: 2 SOLUTION/ DROPS OPHTHALMIC at 09:16

## 2023-12-11 RX ADMIN — ACETAMINOPHEN 650 MG: 325 TABLET ORAL at 22:47

## 2023-12-11 RX ADMIN — MIRTAZAPINE 15 MG: 15 TABLET, FILM COATED ORAL at 22:43

## 2023-12-11 RX ADMIN — DOCUSATE SODIUM 100 MG: 100 CAPSULE, LIQUID FILLED ORAL at 22:42

## 2023-12-11 RX ADMIN — FENTANYL CITRATE 50 MCG: 50 INJECTION, SOLUTION INTRAMUSCULAR; INTRAVENOUS at 14:34

## 2023-12-11 RX ADMIN — ISOSORBIDE MONONITRATE 60 MG: 60 TABLET, EXTENDED RELEASE ORAL at 09:00

## 2023-12-11 RX ADMIN — HEPARIN SODIUM 5000 UNITS: 5000 INJECTION INTRAVENOUS; SUBCUTANEOUS at 04:58

## 2023-12-11 SDOH — HEALTH STABILITY: MENTAL HEALTH: CURRENT SMOKER: 1

## 2023-12-11 ASSESSMENT — COGNITIVE AND FUNCTIONAL STATUS - GENERAL
WALKING IN HOSPITAL ROOM: A LITTLE
PERSONAL GROOMING: A LITTLE
MOBILITY SCORE: 15
STANDING UP FROM CHAIR USING ARMS: A LITTLE
WALKING IN HOSPITAL ROOM: A LOT
DAILY ACTIVITIY SCORE: 19
CLIMB 3 TO 5 STEPS WITH RAILING: TOTAL
MOVING FROM LYING ON BACK TO SITTING ON SIDE OF FLAT BED WITH BEDRAILS: A LITTLE
PERSONAL GROOMING: A LITTLE
DRESSING REGULAR LOWER BODY CLOTHING: A LITTLE
STANDING UP FROM CHAIR USING ARMS: A LITTLE
MOVING TO AND FROM BED TO CHAIR: A LITTLE
HELP NEEDED FOR BATHING: A LITTLE
MOBILITY SCORE: 14
CLIMB 3 TO 5 STEPS WITH RAILING: TOTAL
TOILETING: A LITTLE
DRESSING REGULAR UPPER BODY CLOTHING: A LITTLE
DRESSING REGULAR LOWER BODY CLOTHING: A LITTLE
DRESSING REGULAR UPPER BODY CLOTHING: A LITTLE
TURNING FROM BACK TO SIDE WHILE IN FLAT BAD: A LOT
DAILY ACTIVITIY SCORE: 19
TOILETING: A LITTLE
HELP NEEDED FOR BATHING: A LITTLE
MOVING FROM LYING ON BACK TO SITTING ON SIDE OF FLAT BED WITH BEDRAILS: A LITTLE
MOVING TO AND FROM BED TO CHAIR: A LITTLE
TURNING FROM BACK TO SIDE WHILE IN FLAT BAD: A LOT

## 2023-12-11 ASSESSMENT — PAIN DESCRIPTION - DESCRIPTORS
DESCRIPTORS: BURNING;SORE
DESCRIPTORS: BURNING;SORE
DESCRIPTORS: SORE
DESCRIPTORS: ACHING
DESCRIPTORS: SORE;BURNING
DESCRIPTORS: SORE
DESCRIPTORS: BURNING;SORE

## 2023-12-11 ASSESSMENT — PAIN SCALES - GENERAL
PAINLEVEL_OUTOF10: 10 - WORST POSSIBLE PAIN
PAINLEVEL_OUTOF10: 8
PAINLEVEL_OUTOF10: 10 - WORST POSSIBLE PAIN
PAINLEVEL_OUTOF10: 10 - WORST POSSIBLE PAIN
PAINLEVEL_OUTOF10: 8
PAINLEVEL_OUTOF10: 3
PAINLEVEL_OUTOF10: 10 - WORST POSSIBLE PAIN
PAINLEVEL_OUTOF10: 8
PAINLEVEL_OUTOF10: 6
PAINLEVEL_OUTOF10: 6

## 2023-12-11 ASSESSMENT — PAIN - FUNCTIONAL ASSESSMENT
PAIN_FUNCTIONAL_ASSESSMENT: 0-10

## 2023-12-11 ASSESSMENT — PAIN DESCRIPTION - ORIENTATION
ORIENTATION: LOWER
ORIENTATION: LOWER

## 2023-12-11 ASSESSMENT — PAIN DESCRIPTION - LOCATION
LOCATION: BACK
LOCATION: BACK
LOCATION: OTHER (COMMENT)
LOCATION: BACK

## 2023-12-11 NOTE — ANESTHESIA PROCEDURE NOTES
Airway  Date/Time: 12/11/2023 2:36 PM  Urgency: elective    Airway not difficult    Staffing  Performed: CRNA   Authorized by: Zoltan Escobar MD    Performed by: DARRYL Morrison-ADIS  Patient location during procedure: OR    Indications and Patient Condition  Indications for airway management: anesthesia  Spontaneous ventilation: present  Sedation level: deep  Preoxygenated: yes  Patient position: sniffing  MILS maintained throughout  Mask difficulty assessment: 0 - not attempted    Final Airway Details  Final airway type: endotracheal airway      Successful airway: ETT  Cuffed: yes   Successful intubation technique: video laryngoscopy  Facilitating devices/methods: intubating stylet  Endotracheal tube insertion site: oral  Blade: Abdon  Blade size: #3  ETT size (mm): 7.0  Cormack-Lehane Classification: grade I - full view of glottis  Placement verified by: capnometry   Measured from: lips  ETT to lips (cm): 21  Number of attempts at approach: 1

## 2023-12-11 NOTE — PROGRESS NOTES
"Vancomycin Dosing by Pharmacy- FOLLOW UP    Tara Tierney is a 68 y.o. year old female who Pharmacy has been consulted for vancomycin dosing for osteomyelitis/septic arthritis. Based on the patient's indication and renal status this patient is being dosed based on a goal AUC of 400-600.     Renal function is currently improving.    Current vancomycin dose: 1000 mg given every 24 hours    Estimated vancomycin AUC on current dose: 281 mg/L.hr     Visit Vitals  BP (!) 115/47   Pulse 80   Temp 37.1 °C (98.8 °F) (Temporal)   Resp 18        Lab Results   Component Value Date    CREATININE 0.80 12/11/2023    CREATININE 0.76 12/10/2023    CREATININE 0.99 12/08/2023    CREATININE 1.31 (H) 12/07/2023        Patient weight is No results found for: \"PTWEIGHT\"    No results found for: \"CULTURE\"     I/O last 3 completed shifts:  In: - (0 mL/kg)   Out: 3235 (38.2 mL/kg) [Urine:3200 (1 mL/kg/hr); Drains:35]  Weight: 84.7 kg   [unfilled]    No results found for: \"PATIENTTEMP\"     Assessment/Plan    Below goal AUC. Orders placed for new vancomcyin regimen of 1250 mg every 12 hours to begin at 1400 today 12/11/2023.     This dosing regimen is predicted by [employer] to result in the following pharmacokinetic parameters:  <<Regimen: 1000 mg IV every 12 hours.  Start time: 14:00 on 12/11/2023  Exposure target: AUC24 (range)400-600 mg/L.hr   AUC24,ss: 558 mg/L.hr  Probability of AUC24 > 400: 100 %  Ctrough,ss: 15.4 mg/L  Probability of Ctrough,ss > 20: 0 %  Probability of nephrotoxicity (Lodise ASHLEY 2009): 11 %      The next level will be obtained on 12/12/2023 at 1000. May be obtained sooner if clinically indicated.   Will continue to monitor renal function daily while on vancomycin and order serum creatinine at least every 48 hours if not already ordered.  Follow for continued vancomycin needs, clinical response, and signs/symptoms of toxicity.       ELAN Payne Ph.            "

## 2023-12-11 NOTE — OP NOTE
Preoperative diagnosis: Lumbar postoperative epidural abscess.  Prior lumbar fusion.     Postoperative diagnosis: Above     Procedure: Irrigation and debridement subfascial lumbar spine.  Exploration of spinal fusion lumbar spine.     Surgeon: Gerson Coombs M.D.     Asst.: Hussein ALEXThe physician assistant was present through the entire case.  Given the nature of the disease process and the procedure to be performed a skilled surgical assistant was necessary during the case.  The assistant was necessary in order to hold retractors and directly assist in the operation.  A certified scrub tech was at the back table managing instruments and supplies for the surgical case.     Anesthesia: General     Blood Loss: 50 cc     Complications: None     HPI: A bit more than 2 weeks out from an L3-S1 midline laminectomy with a TLIF at L4-5 and L5-S1.  Patient's leg symptoms had improved but the back pain started to worsen.  Repeat MRI done yesterday showed some fluid still in the epidural space and blood in the suprafascial space.  Drains were not putting much out.  It appears as if the deep drain was clotted off.  She was growing MRSA.  For those reasons a repeat washout was recommended and accepted by the patient.  Risk benefits and potential complications of operative versus nonoperative treatment were discussed at length with the patient.      Operative implants: None     Operative procedure: The patient was wheeled from the preanesthesia care unit to the theater.  The patient was placed in supine position and all bony prominences were well-padded.  For the entire procedure anesthesia maintainined control of the patient's head neck.  General anesthesia was induced.  The patient was then placed prone on the Neumann frame.  All bony prominences were well-padded.  The head and neck were in neutral position.  The back was cleansed with alcohol and and a Betadine prep was performed.     Timeout was performed.  Site  verification marking was verified.  Appropriate antibiotic administration was confirmed.  Next, a longitudinal incision in the midline was performed over the operative levels through the old incision.  Organized hematoma was identified that was voluminous in the suprafascial space.  Some was placed into a specimen cup and sent to pathology.  Culture swabs were also taken in the suprafascial space.  The suprafascial space was irrigated with copious months normal saline after all sutures were removed from the skin.  This was done with 3 L of pulse Avage irrigation and the fascia was also scraped with a curette.  At this point there was a completely clean wound bed and the suprafascial space.  Next, the STRATAFIX suture was removed from the fascia and the fascia was opened up to expose the deep epidural space.  Dark red-colored liquid that had a cloudy appearance was encountered and was sucked with a syringe to be sent as a deep fluid culture.  Culture swabs were also taken.   A mild amount of fibrinous material was removed with the suction and the epidural space was irrigated with 4 L of Pulsavac irrigation without direct placement of irrigation out of the dural sac.  The dural sac was inspected and there was no evidence of any dural leak and had nice tension in the dural sac.  There was some bleeding that was maintained with the Bovie in the facet and muscle area.  At this time the wound was clean and there was no evidence of any bleeding.   There was some bone graft that had spilled out into the epidural space.  An exploration of spinal fusion was performed in the lateral gutters and there still appeared to be adequate bone graft still present so no more augmentation of posterolateral fusion was needed.       Retractors were removed.  The deep fascia was closed over a drain, exiting to the patient's left, that was not resting on the neural elements.  The fascia was closed with a running #1 Stratafix PDS suture.  A  superficial drain was placed exiting to the right and the skin was closed with 2-0 Prolene sutures.  A sterile dressing was applied.  The patient tolerated the procedure well and had pink and warm toes at the conclusion of the case.           This dictation was created using voice recognition software.  If there are any questions about inaccuracies please do not hesitate to contact me.

## 2023-12-11 NOTE — CARE PLAN
The patient's goals for the shift include  pain control    The clinical goals for the shift include Pt will have drains pulled in am- anticipated discharge    Over the shift, the patient did make progress toward the following goals. Barriers to progression include none. Recommendations to address these barriers include none.

## 2023-12-11 NOTE — PROGRESS NOTES
"Tara Tierney is a 68 y.o. female on day 4 of admission presenting with Intractable back pain.    Subjective   Patient seen and examined.  Continues to have significant back pain, no fevers, chills, nausea, vomiting.  She is unable to move much because of persistent back pain.       Objective     Physical Exam  Constitutional:       Appearance: She is obese.   HENT:      Head: Normocephalic.   Eyes:      Extraocular Movements: Extraocular movements intact.      Conjunctiva/sclera: Conjunctivae normal.   Cardiovascular:      Rate and Rhythm: Normal rate and regular rhythm.   Pulmonary:      Effort: No dyspnea, no distress present.   Abdominal:      General: There is no distension.      Palpations: Abdomen is soft.   Last Recorded Vitals  Blood pressure 119/53, pulse 92, temperature 37.1 °C (98.8 °F), temperature source Temporal, resp. rate 18, height 1.448 m (4' 9\"), weight 84.7 kg (186 lb 11.7 oz), SpO2 93 %.  Intake/Output last 3 Shifts:  I/O last 3 completed shifts:  In: - (0 mL/kg)   Out: 3235 (38.2 mL/kg) [Urine:3200 (1 mL/kg/hr); Drains:35]  Weight: 84.7 kg     Relevant Results              This patient has a central line   Reason for the central line remaining today? Parenteral medication                 Assessment/Plan   Principal Problem:    Intractable back pain  Active Problems:    Seroma, post-traumatic (CMS/HCC)    Lumbar surgical wound fluid collection    She continues to have pain  Repeat MRI showed fluid collection deep had significantly decreased superficial collection was replaced with more blood products.  Her drains were not draining too much.  Plan for second washout later today, patient is agreeable  We will continue IV vancomycin   She will need right for 6 weeks postdischarge  Will need weekly CBC and BMP  Pharmacy to come up with a dose  Continue pain control, supportive care  Continue PT OT  Sodium has been stable  Hemoglobin remained elevated as well  On Macrobid for urinary burning, " today is day 3, can be stopped tomorrow  Continue rest of the medical management as she is  DVT prophylaxis          Nitesh Ruiz MD

## 2023-12-11 NOTE — PERIOPERATIVE NURSING NOTE
Patient able to close her eyes and rest quietly. When she awakens she states her pain is still bad but getting better.

## 2023-12-11 NOTE — PROGRESS NOTES
Infectious Disease Progress Note       12/10/2023      IMPRESSION:    Postop deep incisional wound infection of lumbar spine with probable infected hematoma/abscess  Staphylococcus aureus infection, probable MRSA in a patient with positive preop nares screen  Status post lumbar laminectomy      Subjectively having a lot of pain  Final cultures MRSA    Shifting around in the bed, trying to get comfortable  Neck supple  Awake alert interactive with me  Heart S1-S2  Lungs bilaterally clear with poor respiratory effort since it hurts her back  Abdomen soft nondistended  Extremities no pain  No new rashes  Cranial nerves appear to be okay    Lab Results   Component Value Date    WBC 8.7 12/10/2023    HGB 8.0 (L) 12/10/2023    HCT 24.8 (L) 12/10/2023    MCV 98 12/10/2023     12/10/2023     Lab Results   Component Value Date    GLUCOSE 129 (H) 12/10/2023    CALCIUM 9.0 12/10/2023     (L) 12/10/2023    K 3.6 12/10/2023    CO2 27 12/10/2023    CL 99 12/10/2023    BUN 13 12/10/2023    CREATININE 0.76 12/10/2023       WBC trends are being monitored. Antibiotic doses are being adjusted per most recent renal labs.     Vitals:    12/10/23 1934   BP: 141/65   Pulse: 95   Resp:    Temp: 37.7 °C (99.9 °F)   SpO2: 91%           Patient Active Problem List   Diagnosis    Abdominal aortic aneurysm (CMS/HCC)    Abnormal EKG    Bilateral carotid artery stenosis    Bruit of right carotid artery    CAD in native artery    Cardiomyopathy (CMS/HCC)    Chest pain    Chronic asthmatic bronchitis    Closed displaced fracture of fifth metatarsal bone    Current every day smoker    Difficulty breathing    Essential hypertension    History of total knee replacement    Hypokalemia    Intermittent claudication (CMS/HCC)    Knee osteoarthritis    Knee pain    Limb pain    Localized swelling, mass, or lump of lower extremity    Celiac artery stenosis (CMS/HCC)    Mesenteric artery stenosis (CMS/HCC)    Mixed hyperlipidemia    Obese    PVD  (peripheral vascular disease) (CMS/HCC)    Right foot pain    Swelling    Trigger finger    Urinary tract infection without hematuria    Back pain of lumbar region with sciatica    Back pain with radiculopathy    Intractable back pain       PLAN:  IV vancomycin  Keep Vanco trough level between 15 and 20 due to MRSA  Planning on 4 to 6 weeks of IV antibiotics  Baseline CRP, then weekly labs including Vanco trough CBC BMP CRP    Imaging and labs were reviewed per medical records and any ID pertinent labs were also addressed        Amy Celestin DO

## 2023-12-11 NOTE — PROGRESS NOTES
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  Patient persists to have severe back pain radiating to bilateral thighs.   Patient is going for surgery for additional washout today.  Repeat MRI was decreased fluid collection and inflammation.   Patient is tired and she reports that she will not be able to handle any additional surgeries.   No bowel movements for 2 days.  Has been refusing Colace  Currently on nitrofurantoin that was started on December 8 for E. coli UTI  No urinary symptoms  Current Medications:    [Held by provider] aspirin, 81 mg, oral, Daily  atenolol, 50 mg, oral, q AM  brimonidine, 1 drop, Both Eyes, BID  busPIRone, 10 mg, oral, Daily  cholecalciferol, 2,000 Units, oral, Daily  docusate sodium, 100 mg, oral, BID  ezetimibe, 10 mg, oral, Daily  heparin (porcine), 5,000 Units, subcutaneous, q8h  hydrALAZINE, 50 mg, oral, BID  isosorbide mononitrate ER, 30 mg, oral, Daily  isosorbide mononitrate ER, 60 mg, oral, Daily  loratadine, 10 mg, oral, Daily  mirtazapine, 15 mg, oral, Nightly  nitrofurantoin (macrocrystal-monohydrate), 100 mg, oral, BID  pantoprazole, 40 mg, oral, Daily before breakfast  phenazopyridine, 100 mg, oral, TID with meals  polyethylene glycol, 17 g, oral, Daily  pregabalin, 75 mg, oral, BID  simvastatin, 40 mg, oral, BID  [Held by provider] triamterene-hydrochlorothiazid, 1 tablet, oral, Daily  vancomycin, 1,000 mg, intravenous, q24h        Allergies:  Neomycin and Neomycin-polymyxin b-dexameth      Review of Systems  14 system review is negative other than HPI  Severe low back pain  +ve B leg weakness   no numbness  No rash or diarrhea  No shortness of breath  Reports poor appetite  Physical Exam    Heart Rate:  [87-98]   Temp:  [37.1 °C (98.8 °F)-38.2 °C (100.8 °F)]   Resp:  [18]  "  BP: (108-141)/(51-65)   SpO2:  [91 %-93 %]    Vitals:    12/10/23 1457 12/10/23 1934 12/11/23 0125 12/11/23 0738   BP: 122/59 141/65 108/51 119/53   BP Location:    Left arm   Patient Position:    Lying   Pulse: 87 95 98 92   Resp:   18 18   Temp: 37.5 °C (99.5 °F) 37.7 °C (99.9 °F) 38.2 °C (100.8 °F) 37.1 °C (98.8 °F)   TempSrc:    Temporal   SpO2: 92% 91% 93% 93%   Weight:       Height:         General Appearance: alert and oriented to person, place and time, well-developed and well-nourished, in no acute distress  Skin: warm and dry, no rash.   Head: normocephalic and atraumatic  Eyes: anicteric sclerae  ENT:  normal mucous membranes. No oral thrush  Lungs: normal respiratory effort, clear lungs  Heart normal S1-S2 no murmur  Abdomen: soft, no tenderness  No leg edema  Back incision with intact dressing and JOSE drain with bloody drainage  No erythema, no tenderness  Positive pure wick with yellow urine  DATA:    Lab Results   Component Value Date    WBC 6.9 12/11/2023    HGB 8.0 (L) 12/11/2023    HCT 24.7 (L) 12/11/2023    MCV 99 12/11/2023     12/11/2023     Lab Results   Component Value Date    CREATININE 0.80 12/11/2023    BUN 13 12/11/2023     (L) 12/11/2023    K 3.8 12/11/2023     12/11/2023    CO2 28 12/11/2023       Hepatic Function Panel:No results found for: \"ALKPHOS\", \"ALT\", \"AST\", \"PROT\", \"BILITOT\", \"BILIDIR\"    Microbiology:   Susceptibility data from last 90 days.  Collected Specimen Info Organism Amoxicillin/Clavulanate Ampicillin Ampicillin/Sulbactam Aztreonam Cefazolin Cefazolin (uncomplicated UTIs only) Cefepime Cefotaxime Ceftazidime Ceftriaxone Ciprofloxacin Ertapenem   12/06/23 Swab from SPINE Methicillin Resistant Staphylococcus aureus (MRSA)               12/06/23 Tissue from SPINE Staphylococcus aureus               12/06/23 Tissue from SPINE Staphylococcus aureus               12/05/23 Urine from Clean Catch/Voided Escherichia coli S R S R R R R R R R R S   11/03/23 " "Swab from Nares/Axilla/Groin Methicillin Resistant Staphylococcus aureus (MRSA)                 Collected Specimen Info Organism Gentamicin Meropenem Nitrofurantoin Piperacillin/Tazobactam Tobramycin Trimethoprim/Sulfamethoxazole   12/06/23 Swab from SPINE Methicillin Resistant Staphylococcus aureus (MRSA)         12/06/23 Tissue from SPINE Staphylococcus aureus         12/06/23 Tissue from SPINE Staphylococcus aureus         12/05/23 Urine from Clean Catch/Voided Escherichia coli R S S S I S   11/03/23 Swab from Nares/Axilla/Groin Methicillin Resistant Staphylococcus aureus (MRSA)              No lab exists for component: \"BC\"  No lab exists for component: \"BLOODCULT2\"  No lab exists for component: \"LABURIN\"  No lab exists for component: \"WNDABS\"  No lab exists for component: \"CULTRESP\"      Imaging:   MR lumbar spine w and wo IV contrast    Result Date: 12/5/2023  Interpreted By:  Jh Benoit,  and Dev Hogue STUDY: MR LUMBAR SPINE W AND WO IV CONTRAST;  12/5/2023 4:39 pm   INDICATION: Signs/Symptoms:Recent laminectomy with fusion, intractable back pain radiates legs.   COMPARISON: MRI 08/20/2023, CT 12/05/2023   ACCESSION NUMBER(S): [id number]   ORDERING CLINICIAN: SHAWN REDDY   TECHNIQUE: Sagittal T1, T2, STIR, axial T1 and T2 weighted images of the lumbar spine were acquired without and following administration of 15 cc Dotarem gadolinium based IV contrast.   FINDINGS: Motion degraded examination.   Alignment: The vertebral alignment is maintained.   Vertebrae/Intervertebral Discs: Postsurgical changes of L4 through S1 posterior fusion noted with bilateral transpedicular screws and interbody graft placement at L4-5 and L5-S1. There has been bilateral laminectomies at L3-4, L4-5, and L5-S1. The vertebral bodies demonstrate expected height. There is mild osseous edema involving L5 and S1 vertebral bodies. The marrow signal is within normal limits. Multilevel intervertebral disc height loss and disc " desiccation noted.   A large dorsal extra-spinal fluid collection is noted extending from the laminectomy bed through the deep muscular fascia into the subcutaneous fat. This collection extends from the right lateral recess at the level of L4-5 as well as L5-S1 into the spinal canal and deforms the thecal sac. A focal defect is noted within the thecal sac at the level of L5-S1 (series 9, image 20). 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 The collections do not demonstrate significant peripheral enhancement to suggest abscess.   Conus: The lower thoracic cord appears unremarkable. The conus terminates at L1.   T12-L1: Disc bulge. There is no significant central canal or neural foraminal stenosis.   L1-2: Disc bulge, ligamentum flavum thickening and facet hypertrophy contribute to minimal central canal narrowing.   L2-3: Disc bulge and mild prominence of the posterior epidural fat resulting in minimal narrowing of the central canal. There is moderate right and minimal left foraminal narrowing due to intraforaminal disc herniation and facet hypertrophy, similar to preoperative examination.   L3-4: Laminectomies. There is moderate narrowing of the thecal sac due to disc bulge and dorsal spinal collection as detailed above. There is unchanged marked bilateral foraminal narrowing due to intraforaminal disc herniation and facet hypertrophic changes with possible impingement of bilateral exiting L3 nerve roots.   L4-5: Bilateral laminectomies. There is moderate narrowing of the central canal with indentation of the thecal sac posteriorly due to the dorsal spinal fluid collection as detailed above. Moderate right and mild left foraminal narrowing is overall similar to MRI dated 08/20/2020 3D to intraforaminal disc herniations and facet hypertrophic changes.   L5-S1: Bilateral laminectomies. The dorsal spinal fluid collection indents the thecal sac as detailed above. Evaluation of the foramina is limited due to  susceptibility artifact from bilateral transpedicular screws.  There is persistent marked left and moderate right foraminal narrowing due to intraforaminal disc herniation and facet hypertrophy with possible impingement of the exiting left L5 nerve root.       1.  Postoperative changes as above. A large dorsal extra-spinal fluid collection is noted extending from the laminectomy bed through the deep muscular fascia into the subcutaneous fat. This collection extends from the right lateral recesses at the level of L4-5 as well as L5-S1 into the spinal canal and deforms the thecal sac resulting in moderate narrowing at L4-5 and to a lesser degree at L3-4. A focal defect is noted within the thecal sac at the level of L5-S1. 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 No significant peripheral enhancement is identified to suggest abscess although the sterility of this collection can not be ascertained on MRI alone. 2. Multilevel degenerative changes again noted with persistent marked bilateral foraminal narrowing at L3-4 and at L5-S1 on the left.     I personally reviewed the images/study and I agree with the findings as stated. This study was interpreted at [place], [place].   MACRO: None   Signed by: Jh Benoit 12/5/2023 5:24 PM Dictation workstation:   [id number]    CT angio chest abdomen pelvis    Result Date: 12/5/2023  Interpreted By:  Samuel Brannon, STUDY: CT ANGIO CHEST ABDOMEN PELVIS;  12/5/2023 2:48 pm   INDICATION: Signs/Symptoms:Severe lower abdomen pain, groin pain, radiates to back, intractable pain.   COMPARISON: May 18, 2022 CTA chest abdomen and pelvis. July 21, 2023 CTA abdomen and pelvis with runoff. August 20, 2023 MRI lumbar spine and December 5, 2023 CT lumbar spine   ACCESSION NUMBER(S): [id number]   ORDERING CLINICIAN: SHAWN REDDY   TECHNIQUE: Axial non-contrast images of the chest, abdomen, and pelvis  with coronal and sagittal reformatted  images. Axial CT images of the chest, abdomen and pelvis after the intravenous administration of 100 mL Omnipaque 350 contrast using CT angiographic technique with coronal and sagittal reformatted images.  MIP images were provided and reviewed. 3D reconstructions were performed on a separate independent workstation.   FINDINGS: VASCULAR:   PULMONARY ARTERIES:   No acute pulmonary embolism.   THORACIC AORTA:  Non-contrast images show no evidence of acute intramural hematoma. No thoracic aortic aneurysm or dissection. Moderate scattered atherosclerosis thoracic aorta and branch vessels.   ABDOMINAL AORTA: No abdominal aortic aneurysm or dissection. There is scattered atherosclerosis. 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   CHEST:   MEDIASTINUM AND LYMPH NODES: No enlarged intrathoracic or axillary lymph nodes.   HEART: Normal size.  Moderate coronary artery calcifications. No pericardial effusion.   LUNG, PLEURA, LARGE AIRWAYS: No consolidation, pulmonary edema, pleural effusion, or pneumothorax.   OSSEOUS STRUCTURES/CHEST WALL:    No acute osseous abnormality.     ABDOMEN/PELVIS:   Arterial phase imaging limits evaluation of the solid organs.   ABDOMINAL WALL: Within normal limits.   LIVER: Stable without detected mass. BILE DUCTS: No significant abnormality. GALLBLADDER: Absent   PANCREAS: No significant abnormality.   SPLEEN: No significant abnormality.   ADRENALS: No significant abnormality.   KIDNEYS, URETERS, BLADDER: No significant abnormality.   VESSELS: See above.  No additional significant abnormality. LYMPH NODES: No enlarged lymph nodes. RETROPERITONEUM:  No significant abnormality.   BOWEL: The stomach and bowel are normal caliber without evident inflammatory change.    PERITONEUM:   No significant ascites, free air, or fluid collection.   REPRODUCTIVE ORGANS: No significant abnormality.   OSSEOUS STRUCTURES:  Refer to same day CT scan lumbar spine report for details regarding that portion. No separate acute osseous abnormalities are identified. There are skin staples dorsal lumbar region. The canal is not reliably evaluated at the operated levels due to artifact from metallic hardware. There is minimal gas within the soft tissues at the operated levels and there is lobulated fluid density extending from vertebral to overlapping paravertebral region to the level of the superficial subcutaneous layer including portion sagittal series 506, image 109 and axial series 502, image 114 measuring 14 x 6 x 6 cm  with internal density measurement of 6 compatible with simple fluid density       No thoracic or abdominal aortic aneurysm or dissection.   Stable pre-existing vascular findings as reported.   Findings compatible with recent lumbar spine surgery with prominent fluid collection overlying the operated site extending through the superficial subcutaneous layer. The canal is not reliably assessed at the operated site due to artifact. Recommend further characterization with MRI lumbar spine with without contrast.   No acute abnormality of the chest, abdomen or pelvis.     Signed by: Samuel Brannon 12/5/2023 3:32 PM Dictation workstation:   [id number]    CT lumbar spine wo IV contrast    Result Date: 12/5/2023  Interpreted By:  Jony Monae, STUDY: CT LUMBAR SPINE WO IV CONTRAST; 12/5/2023 11:28 am   INDICATION: Signs/Symptoms:Low back pain, recent laminectomy and fusion, fall 1 week ago.   COMPARISON: None.   ACCESSION NUMBER(S): [id number]   ORDERING CLINICIAN: SHAWN REDDY   TECHNIQUE: Contiguous axial CT images were obtained through the lumbar spine at 2 mm slice thickness without contrast administration. The images were then reconstructed in the coronal and sagittal planes.    FINDINGS: OSSEOUS STRUCTURES: The patient is status post L3 through L5 laminectomy, L4 through S1 pedicle screw and fabrizio fusion and L4-5 and L5-S1 disc space implant placement. There is no evidence of acute fracture identified. The vertebral bodies are well aligned without evidence of subluxation.   Mild discogenic degenerative changes are seen at the remaining disc space levels. Mild-to-moderate facet degenerative changes are seen throughout the lumbar spine.   LOWER THORACIC SPINE: No gross central canal or neural foraminal narrowing is seen.   T12-L1: No gross central canal or neural foraminal narrowing is seen.   L1-2: No gross central canal or neural foraminal narrowing is seen.   L2-3: No gross central canal or neural foraminal narrowing is seen.   L3-4: Mild right-sided neural foraminal narrowing is seen. No significant left foraminal or central canal narrowing is seen.   L4-5: No gross central canal or neural foraminal narrowing is seen.   L5-S1: Mild left-sided neural foraminal narrowing is seen. No significant right foraminal or central canal narrowing is seen.   ASSOCIATED STRUCTURES: Evaluation of the visualized soft tissues of the abdomen is limited by the lack of intravenous contrast. Within this limitation, no gross mass or lymphadenopathy is identified.  A fluid collection is seen along the posterior midportion the lower back, likely postoperative in nature.       1. No acute fracture identified. 2. Postoperative and degenerative changes, as described above.   MACRO: None.   Signed by: Jony Monae 12/5/2023 11:45 AM Dictation workstation:   [id number]       IMPRESSION:    Postop deep incisional wound infection of lumbar spine with probable infected hematoma/abscess.  Who is going for a second washout today  MRSA infection in a patient who is a chronic carrier  E. coli UTI  Status post lumbar laminectomy    Patient Active Problem List   Diagnosis    Abdominal aortic aneurysm (CMS/HCC)    Abnormal  EKG    Bilateral carotid artery stenosis    Bruit of right carotid artery    CAD in native artery    Cardiomyopathy (CMS/HCC)    Chest pain    Chronic asthmatic bronchitis    Closed displaced fracture of fifth metatarsal bone    Current every day smoker    Difficulty breathing    Essential hypertension    History of total knee replacement    Hypokalemia    Intermittent claudication (CMS/HCC)    Knee osteoarthritis    Knee pain    Limb pain    Localized swelling, mass, or lump of lower extremity    Celiac artery stenosis (CMS/HCC)    Mesenteric artery stenosis (CMS/HCC)    Mixed hyperlipidemia    Obese    PVD (peripheral vascular disease) (CMS/HCC)    Right foot pain    Swelling    Trigger finger    Urinary tract infection without hematuria    Back pain of lumbar region with sciatica    Back pain with radiculopathy    Intractable back pain    Seroma, post-traumatic (CMS/HCC)    Lumbar surgical wound fluid collection       PLAN:  I had a lengthy discussion with the patient explaining MRSA infection and colonization and necessary precautions   continue IV vancomycin, aim at a trough level of 15-20 as discussed with pharmacy who is adjusting the dose  Arrange for 6 weeks of IV vancomycin postdischarge  Follow-up CBC BMP and Vanco levels    Discussed with patient and family     Gem Reyna MD

## 2023-12-11 NOTE — PROGRESS NOTES
Chief complaint: Back pain    HPI: Status post irrigation and debridement on December 6.  Patient had relief afterwards and now she is having increased back pain.  Her legs still feel pretty much better compared to before surgery she is just weak because she has been bedridden.  Her deep drain has clotted off.  Her superficial drain is still putting out.  She has no fevers chills nausea vomiting.  The cultures show MRSA.  There is no fevers chills nausea vomiting.  No bowel or bladder changes.  All in all she feels much better compared to before the first washout but is taking a little bit of a turn for the worse.    Physical exam: Dressing and wound are clean dry and intact.  No redness warmth no swelling.  No puffiness or active drainage at this time.  Legs have normal sensation and 5/5 motor.    MRI shows that fluid collection deep has significantly decreased.  Superficial collection appears to have decreased to be replaced with more blood product.  However there is still fluid deep in the epidural space.    Assessment/plan: Patient improved after her washout.  However, she is growing MRSA which is an aggressive bug and I think a second washout would not be a bad idea.  She has fluid in the epidural space and the deep drain has clotted off.  I told her we could sit and watch this and see how it evolves her disc and 1 for a second washout which I would prefer to do.  She is amenable to another washout.  All of the risks benefits and potential complications of operative versus nonoperative treatment were discussed with the patient.  Operative risks discussed included but were not limited to anesthesia complications, infections, excessive bleeding, damaged to uninjured healthy structures, and failure of surgery requiring the need for reoperation resulting in chronic pain and disability.  The patient completely understands those risks and wished to proceed with operative intervention.    We are going to perform a  lumbar irrigation and debridement.

## 2023-12-11 NOTE — PROGRESS NOTES
Pt had an I&D  today. Following for discharge planning of [facility]. Pt has a PICC line and will need 6 wks of IV Vancomycin, currently on 1000 mg daily. Pt has been accepted at [facility] and aut is good thru 12/7-12/18.

## 2023-12-11 NOTE — ANESTHESIA PREPROCEDURE EVALUATION
Tara Tierney is a 68 y.o. female here for:    Debridement Back  With Gerson Coombs MD  Pre-Op Diagnosis Codes:     * Intractable back pain [M54.9]     * Fluid collection following injury [T79.2XXA]     * Lumbar surgical wound fluid collection, initial encounter [T81.89XA]    Relevant Problems   Anesthesia (within normal limits)      Cardiovascular   (+) Abdominal aortic aneurysm (CMS/HCC)   (+) Abnormal EKG   (+) Bilateral carotid artery stenosis   (+) CAD in native artery   (+) Chest pain   (+) Essential hypertension   (+) Mesenteric artery stenosis (CMS/HCC)   (+) Mixed hyperlipidemia   (+) PVD (peripheral vascular disease) (CMS/HCC)      Endocrine   (+) Obese      GI (within normal limits)      /Renal   (+) Urinary tract infection without hematuria      Neuro/Psych   (+) Back pain of lumbar region with sciatica   (+) Bilateral carotid artery stenosis      Pulmonary   (+) Chronic asthmatic bronchitis      GI/Hepatic (within normal limits)      Hematology (within normal limits)      Musculoskeletal   (+) Knee osteoarthritis      Eyes, Ears, Nose, and Throat (within normal limits)      Infectious Disease   (+) Urinary tract infection without hematuria       Lab Results   Component Value Date    HGB 8.0 (L) 12/11/2023    HCT 24.7 (L) 12/11/2023    WBC 6.9 12/11/2023     12/11/2023     (L) 12/11/2023    K 3.8 12/11/2023     12/11/2023    CREATININE 0.80 12/11/2023    BUN 13 12/11/2023     EKG 11/2023:  Normal sinus rhythm  Left bundle branch block  Abnormal ECG  When compared with ECG of 18-MAY-2022 14:15,  No significant change was found    NM Stress 2022:  IMPRESSION:  Negative Lexiscan Myoview cardiac perfusion stress test.  No evidence of ischemia or myocardial infarction by perfusion imaging.  Normal left ventricular systolic function, ejection fraction 48%.  Abnormal resting electrocardiogram.      Social History     Tobacco Use   Smoking Status Former    Packs/day: 0.50     Years: 45.00    Additional pack years: 0.00    Total pack years: 22.50    Types: Cigarettes    Quit date: 2023    Years since quittin.2   Smokeless Tobacco Not on file       Allergies   Allergen Reactions    Neomycin Other     swelling, redness, burning post eye surgery    Neomycin-Polymyxin B-Dexameth Other and Rash     blisters, eye swelling, burning       Current Outpatient Medications   Medication Instructions    albuterol (ProAir HFA) 90 mcg/actuation inhaler 2 puffs, inhalation    aspirin (ADULT LOW DOSE ASPIRIN) 81 mg, oral, Daily    aspirin 81 mg, oral, Daily    atenolol (TENORMIN) 50 mg, oral, Every morning    ATENOLOL ORAL 50 mg, oral, Daily RT    brimonidine (AlphaGAN) 0.2 % ophthalmic solution 1 drop, Both Eyes, 2 times daily    busPIRone (BUSPAR) 10 mg, oral, Daily    celecoxib (CELEBREX) 200 mg, oral, Daily PRN    cholecalciferol (Vitamin D-3) 50 mcg (2,000 unit) capsule 1 capsule, oral, Daily    clopidogrel (PLAVIX) 75 mg, oral, Daily    cyclobenzaprine (FLEXERIL) 5 mg, oral, 3 times daily PRN    docusate sodium (COLACE) 100 mg, oral, 2 times daily    docusate sodium (COLACE) 100 mg, oral, Daily    ezetimibe (ZETIA) 10 mg, oral, Daily    furosemide (LASIX) 20 mg, oral, Daily    hydrALAZINE (APRESOLINE) 50 mg, oral, 2 times daily    ibandronate (BONIVA) 150 mg, oral, Every 30 days    ipratropium (Atrovent HFA) 17 mcg/actuation inhaler 2 puffs, inhalation, 2 times daily RT    isosorbide mononitrate ER (IMDUR) 30 mg, oral, Daily, With the 60 mg tablet    isosorbide mononitrate ER (IMDUR) 60 mg, oral, Daily, With the 30mg tablet    ISOSORBIDE MONONITRATE ORAL 30 mg, oral, Daily RT    ISOSORBIDE MONONITRATE ORAL 60 mg, oral, Daily RT    loratadine (Claritin) 10 mg tablet 1 tablet, oral, Daily    LUTEIN ORAL 20 mg, oral, Daily RT    meclizine (ANTIVERT) 25 mg, oral, 3 times daily PRN    mirtazapine (Remeron) 15 mg tablet 1 tablet, oral, Nightly    nitroglycerin (NITROSTAT) 0.4 mg, sublingual, Every  5 min PRN,  PLACE 1 TABLET UNDER THE TONGUE EVERY 5 MINUTES UP TO 3 DOSES AS NEEDED FOR CHEST PAIN.<BR>    oxyCODONE (ROXICODONE) 5 mg, oral, Every 4 hours PRN    pantoprazole (PROTONIX) 40 mg, oral, Daily before breakfast, Do not crush, chew, or split.    pilocarpine (SALAGEN (PILOCARPINE)) 5 mg, oral, Daily RT    potassium chloride ER (Micro-K) 10 mEq ER capsule 10 mEq, oral, Daily    pregabalin (LYRICA) 75 mg, oral, 2 times daily    raloxifene (Evista) 60 mg tablet 1 tablet, oral, Daily    simvastatin (ZOCOR) 40 mg, oral, Nightly    simvastatin (ZOCOR) 40 mg, oral, 2 times daily    tiZANidine (ZANAFLEX) 4 mg, oral, Every 8 hours PRN    tolterodine (Detrol) 1 mg tablet 1 tablet, oral, Nightly    triamterene-hydrochlorothiazid (Dyazide) 37.5-25 mg capsule 1 capsule, oral, Daily    vit C,G-Ph-fzflt-lutein-zeaxan (PreserVision AREDS-2) 250-90-40-1 mg capsule 1 capsule, oral, 2 times daily       Past Surgical History:   Procedure Laterality Date    ADENOIDECTOMY      AORTA - BILATERAL FEMORAL ARTERY BYPASS GRAFT      BLEPHAROPTOSIS REPAIR      CARDIAC CATHETERIZATION      with stent and balloon    CHOLECYSTECTOMY      COLONOSCOPY      CT ANGIO NECK  05/18/2022    CT NECK ANGIO W AND WO IV CONTRAST 5/18/2022 [facility]    CT AORTA AND BILATERAL ILIOFEMORAL RUNOFF ANGIOGRAM W AND/OR WO IV CONTRAST  03/10/2020    CT AORTA AND BILATERAL ILIOFEMORAL RUNOFF ANGIOGRAM W AND/OR WO IV CONTRAST 3/10/2020 [facility]    CT AORTA AND BILATERAL ILIOFEMORAL RUNOFF ANGIOGRAM W AND/OR WO IV CONTRAST  07/21/2023    CT AORTA AND BILATERAL ILIOFEMORAL RUNOFF ANGIOGRAM W AND/OR WO IV CONTRAST 7/21/2023 [facility]    FL TRANSLUMINAL ANGIO PERCUTANEOUS BHARAT      TONSILLECTOMY      Tonsillectomy    TOTAL KNEE ARTHROPLASTY Bilateral     TUBAL LIGATION         Family History   Problem Relation Name Age of Onset    Breast cancer Mother      Other (arteriosclerotic cardiovascular disease) Father      Cancer Father         NPO  Details:  No data recorded    Physical Exam    Airway  Mallampati: III  TM distance: >3 FB  Neck ROM: full     Cardiovascular - normal exam     Dental        Pulmonary - normal exam     Abdominal            Anesthesia Plan    ASA 3     general     The patient is a current smoker.    intravenous induction   Postoperative administration of opioids is intended.  Trial extubation is planned.  Anesthetic plan and risks discussed with patient.    Plan discussed with CRNA.

## 2023-12-11 NOTE — PROGRESS NOTES
Occupational Therapy                 Therapy Communication Note    Patient Name: Tara Tierney  MRN: [id number]  Today's Date: 12/11/2023     Discipline: Occupational Therapy    Missed Visit Reason: Missed Visit Reason: Patient in a medical procedure    Missed Time: Attempt    Comment: Pt. Going for lumbar I&D. Will hold therapy treatment this date.

## 2023-12-11 NOTE — PROGRESS NOTES
Physical Therapy                 Therapy Communication Note    Patient Name: Tara Tierney  MRN: [id number]  Today's Date: 12/11/2023     Discipline: Physical Therapy    Missed Visit Reason: Held PT treatment 2° patient going for a second Lumbar I&D procedure at noon(per nursing).     Missed Time: 11:05

## 2023-12-11 NOTE — ANESTHESIA POSTPROCEDURE EVALUATION
Patient: Traa Tierney    Procedure Summary       Date: 12/11/23 Room / Location: [facility] 12 / [facility]    Anesthesia Start: 1431 Anesthesia Stop: 1605    Procedure: Debridement Back (Spine Lumbar) Diagnosis:       Intractable back pain      Seroma, post-traumatic (CMS/HCC)      Lumbar surgical wound fluid collection, initial encounter      (Intractable back pain [M54.9])      (Seroma, post-traumatic (CMS/HCC) [T79.2XXA])      (Lumbar surgical wound fluid collection, initial encounter [T81.89XA])    Surgeons: Gerson Coombs MD Responsible Provider: Ja Devine MD    Anesthesia Type: general ASA Status: 3            Anesthesia Type: general    Vitals Value Taken Time   /57 12/11/23 1602   Temp 36.3 12/11/23 1606   Pulse 88 12/11/23 1604   Resp 13 12/11/23 1604   SpO2 99 % 12/11/23 1604   Vitals shown include unvalidated device data.    Anesthesia Post Evaluation    Patient location during evaluation: PACU  Patient participation: complete - patient participated  Level of consciousness: awake and alert  Pain management: adequate  Airway patency: patent  Cardiovascular status: acceptable  Respiratory status: acceptable  Hydration status: acceptable  Postoperative Nausea and Vomiting: none        No notable events documented.

## 2023-12-12 LAB
ANION GAP SERPL CALC-SCNC: 9 MMOL/L (ref 10–20)
BUN SERPL-MCNC: 12 MG/DL (ref 6–23)
CALCIUM SERPL-MCNC: 8.5 MG/DL (ref 8.6–10.3)
CHLORIDE SERPL-SCNC: 99 MMOL/L (ref 98–107)
CO2 SERPL-SCNC: 28 MMOL/L (ref 21–32)
CREAT SERPL-MCNC: 0.82 MG/DL (ref 0.5–1.05)
ERYTHROCYTE [DISTWIDTH] IN BLOOD BY AUTOMATED COUNT: 13.5 % (ref 11.5–14.5)
GFR SERPL CREATININE-BSD FRML MDRD: 77 ML/MIN/1.73M*2
GLUCOSE SERPL-MCNC: 116 MG/DL (ref 74–99)
HCT VFR BLD AUTO: 23 % (ref 36–46)
HGB BLD-MCNC: 7.4 G/DL (ref 12–16)
MCH RBC QN AUTO: 32 PG (ref 26–34)
MCHC RBC AUTO-ENTMCNC: 32.2 G/DL (ref 32–36)
MCV RBC AUTO: 100 FL (ref 80–100)
NRBC BLD-RTO: 0 /100 WBCS (ref 0–0)
PLATELET # BLD AUTO: 194 X10*3/UL (ref 150–450)
POTASSIUM SERPL-SCNC: 3.6 MMOL/L (ref 3.5–5.3)
RBC # BLD AUTO: 2.31 X10*6/UL (ref 4–5.2)
SODIUM SERPL-SCNC: 132 MMOL/L (ref 136–145)
VANCOMYCIN SERPL-MCNC: 17.5 UG/ML (ref 5–20)
WBC # BLD AUTO: 7.1 X10*3/UL (ref 4.4–11.3)

## 2023-12-12 PROCEDURE — 2500000001 HC RX 250 WO HCPCS SELF ADMINISTERED DRUGS (ALT 637 FOR MEDICARE OP): Performed by: STUDENT IN AN ORGANIZED HEALTH CARE EDUCATION/TRAINING PROGRAM

## 2023-12-12 PROCEDURE — 97530 THERAPEUTIC ACTIVITIES: CPT | Mod: GP,CQ

## 2023-12-12 PROCEDURE — 2500000002 HC RX 250 W HCPCS SELF ADMINISTERED DRUGS (ALT 637 FOR MEDICARE OP, ALT 636 FOR OP/ED): Performed by: STUDENT IN AN ORGANIZED HEALTH CARE EDUCATION/TRAINING PROGRAM

## 2023-12-12 PROCEDURE — 2500000001 HC RX 250 WO HCPCS SELF ADMINISTERED DRUGS (ALT 637 FOR MEDICARE OP): Performed by: FAMILY MEDICINE

## 2023-12-12 PROCEDURE — 2500000004 HC RX 250 GENERAL PHARMACY W/ HCPCS (ALT 636 FOR OP/ED): Performed by: FAMILY MEDICINE

## 2023-12-12 PROCEDURE — 99233 SBSQ HOSP IP/OBS HIGH 50: CPT | Performed by: INTERNAL MEDICINE

## 2023-12-12 PROCEDURE — 80202 ASSAY OF VANCOMYCIN: CPT | Performed by: NURSE PRACTITIONER

## 2023-12-12 PROCEDURE — 85027 COMPLETE CBC AUTOMATED: CPT | Performed by: STUDENT IN AN ORGANIZED HEALTH CARE EDUCATION/TRAINING PROGRAM

## 2023-12-12 PROCEDURE — 2500000004 HC RX 250 GENERAL PHARMACY W/ HCPCS (ALT 636 FOR OP/ED): Performed by: STUDENT IN AN ORGANIZED HEALTH CARE EDUCATION/TRAINING PROGRAM

## 2023-12-12 PROCEDURE — 80048 BASIC METABOLIC PNL TOTAL CA: CPT | Performed by: STUDENT IN AN ORGANIZED HEALTH CARE EDUCATION/TRAINING PROGRAM

## 2023-12-12 PROCEDURE — 1210000001 HC SEMI-PRIVATE ROOM DAILY

## 2023-12-12 PROCEDURE — 2500000004 HC RX 250 GENERAL PHARMACY W/ HCPCS (ALT 636 FOR OP/ED): Performed by: INTERNAL MEDICINE

## 2023-12-12 PROCEDURE — 2500000001 HC RX 250 WO HCPCS SELF ADMINISTERED DRUGS (ALT 637 FOR MEDICARE OP): Performed by: NURSE PRACTITIONER

## 2023-12-12 PROCEDURE — 96372 THER/PROPH/DIAG INJ SC/IM: CPT | Performed by: STUDENT IN AN ORGANIZED HEALTH CARE EDUCATION/TRAINING PROGRAM

## 2023-12-12 PROCEDURE — 2500000004 HC RX 250 GENERAL PHARMACY W/ HCPCS (ALT 636 FOR OP/ED): Performed by: NURSE PRACTITIONER

## 2023-12-12 RX ORDER — VANCOMYCIN HYDROCHLORIDE 1 G/200ML
1 INJECTION, SOLUTION INTRAVENOUS EVERY 12 HOURS
Qty: 12000 ML | Refills: 1
Start: 2023-12-12 | End: 2024-02-07 | Stop reason: HOSPADM

## 2023-12-12 RX ORDER — FENTANYL 12.5 UG/1
1 PATCH TRANSDERMAL
Status: DISCONTINUED | OUTPATIENT
Start: 2023-12-12 | End: 2023-12-13

## 2023-12-12 RX ORDER — VANCOMYCIN HYDROCHLORIDE 1 G/200ML
1000 INJECTION, SOLUTION INTRAVENOUS EVERY 12 HOURS
Status: DISCONTINUED | OUTPATIENT
Start: 2023-12-12 | End: 2023-12-16

## 2023-12-12 RX ADMIN — PANTOPRAZOLE SODIUM 40 MG: 40 TABLET, DELAYED RELEASE ORAL at 06:11

## 2023-12-12 RX ADMIN — DOCUSATE SODIUM 100 MG: 100 CAPSULE, LIQUID FILLED ORAL at 22:29

## 2023-12-12 RX ADMIN — OXYCODONE HYDROCHLORIDE 10 MG: 5 TABLET ORAL at 18:50

## 2023-12-12 RX ADMIN — ISOSORBIDE MONONITRATE 60 MG: 60 TABLET, EXTENDED RELEASE ORAL at 09:19

## 2023-12-12 RX ADMIN — ATENOLOL 50 MG: 50 TABLET ORAL at 09:19

## 2023-12-12 RX ADMIN — NITROFURANTOIN (MONOHYDRATE/MACROCRYSTALS) 100 MG: 75; 25 CAPSULE ORAL at 09:19

## 2023-12-12 RX ADMIN — OXYCODONE HYDROCHLORIDE 10 MG: 5 TABLET ORAL at 09:22

## 2023-12-12 RX ADMIN — ISOSORBIDE MONONITRATE 30 MG: 30 TABLET, EXTENDED RELEASE ORAL at 09:00

## 2023-12-12 RX ADMIN — DOCUSATE SODIUM 100 MG: 100 CAPSULE, LIQUID FILLED ORAL at 09:18

## 2023-12-12 RX ADMIN — HYDROMORPHONE HYDROCHLORIDE 0.4 MG: 0.5 INJECTION, SOLUTION INTRAMUSCULAR; INTRAVENOUS; SUBCUTANEOUS at 22:20

## 2023-12-12 RX ADMIN — NITROFURANTOIN (MONOHYDRATE/MACROCRYSTALS) 100 MG: 75; 25 CAPSULE ORAL at 22:29

## 2023-12-12 RX ADMIN — HEPARIN SODIUM 5000 UNITS: 5000 INJECTION INTRAVENOUS; SUBCUTANEOUS at 22:28

## 2023-12-12 RX ADMIN — VANCOMYCIN HYDROCHLORIDE 1250 MG: 1.25 INJECTION, POWDER, LYOPHILIZED, FOR SOLUTION INTRAVENOUS at 02:19

## 2023-12-12 RX ADMIN — SIMVASTATIN 40 MG: 40 TABLET, FILM COATED ORAL at 22:28

## 2023-12-12 RX ADMIN — PHENAZOPYRIDINE HYDROCHLORIDE 100 MG: 100 TABLET ORAL at 18:48

## 2023-12-12 RX ADMIN — PHENAZOPYRIDINE HYDROCHLORIDE 100 MG: 100 TABLET ORAL at 09:19

## 2023-12-12 RX ADMIN — BRIMONIDINE TARTRATE 1 DROP: 2 SOLUTION/ DROPS OPHTHALMIC at 09:23

## 2023-12-12 RX ADMIN — Medication 2000 UNITS: at 09:19

## 2023-12-12 RX ADMIN — LORATADINE 10 MG: 10 TABLET ORAL at 09:18

## 2023-12-12 RX ADMIN — PREGABALIN 75 MG: 50 CAPSULE ORAL at 22:29

## 2023-12-12 RX ADMIN — HEPARIN SODIUM 5000 UNITS: 5000 INJECTION INTRAVENOUS; SUBCUTANEOUS at 12:00

## 2023-12-12 RX ADMIN — EZETIMIBE 10 MG: 10 TABLET ORAL at 09:18

## 2023-12-12 RX ADMIN — PHENAZOPYRIDINE HYDROCHLORIDE 100 MG: 100 TABLET ORAL at 13:30

## 2023-12-12 RX ADMIN — BUSPIRONE HYDROCHLORIDE 10 MG: 5 TABLET ORAL at 09:25

## 2023-12-12 RX ADMIN — HEPARIN SODIUM 5000 UNITS: 5000 INJECTION INTRAVENOUS; SUBCUTANEOUS at 04:04

## 2023-12-12 RX ADMIN — HYDRALAZINE HYDROCHLORIDE 50 MG: 50 TABLET ORAL at 09:19

## 2023-12-12 RX ADMIN — MIRTAZAPINE 15 MG: 15 TABLET, FILM COATED ORAL at 22:28

## 2023-12-12 RX ADMIN — SIMVASTATIN 40 MG: 40 TABLET, FILM COATED ORAL at 09:19

## 2023-12-12 RX ADMIN — PREGABALIN 75 MG: 50 CAPSULE ORAL at 09:19

## 2023-12-12 RX ADMIN — FENTANYL 1 PATCH: 12.5 PATCH TRANSDERMAL at 10:52

## 2023-12-12 RX ADMIN — BRIMONIDINE TARTRATE 1 DROP: 2 SOLUTION/ DROPS OPHTHALMIC at 22:32

## 2023-12-12 RX ADMIN — VANCOMYCIN HYDROCHLORIDE 1000 MG: 1 INJECTION, SOLUTION INTRAVENOUS at 14:15

## 2023-12-12 RX ADMIN — HYDRALAZINE HYDROCHLORIDE 50 MG: 50 TABLET ORAL at 22:29

## 2023-12-12 ASSESSMENT — COGNITIVE AND FUNCTIONAL STATUS - GENERAL
MOVING FROM LYING ON BACK TO SITTING ON SIDE OF FLAT BED WITH BEDRAILS: A LITTLE
DAILY ACTIVITIY SCORE: 19
WALKING IN HOSPITAL ROOM: A LITTLE
MOBILITY SCORE: 15
TURNING FROM BACK TO SIDE WHILE IN FLAT BAD: A LITTLE
DRESSING REGULAR LOWER BODY CLOTHING: A LITTLE
DRESSING REGULAR LOWER BODY CLOTHING: A LITTLE
STANDING UP FROM CHAIR USING ARMS: A LITTLE
STANDING UP FROM CHAIR USING ARMS: A LITTLE
MOVING TO AND FROM BED TO CHAIR: A LITTLE
WALKING IN HOSPITAL ROOM: A LITTLE
CLIMB 3 TO 5 STEPS WITH RAILING: A LOT
TOILETING: A LITTLE
MOVING TO AND FROM BED TO CHAIR: A LITTLE
DRESSING REGULAR UPPER BODY CLOTHING: A LITTLE
TURNING FROM BACK TO SIDE WHILE IN FLAT BAD: A LITTLE
MOVING FROM LYING ON BACK TO SITTING ON SIDE OF FLAT BED WITH BEDRAILS: A LITTLE
PERSONAL GROOMING: A LITTLE
TOILETING: A LITTLE
DAILY ACTIVITIY SCORE: 19
TURNING FROM BACK TO SIDE WHILE IN FLAT BAD: A LOT
STANDING UP FROM CHAIR USING ARMS: A LITTLE
HELP NEEDED FOR BATHING: A LITTLE
MOVING TO AND FROM BED TO CHAIR: A LITTLE
MOBILITY SCORE: 18
CLIMB 3 TO 5 STEPS WITH RAILING: A LITTLE
HELP NEEDED FOR BATHING: A LITTLE
CLIMB 3 TO 5 STEPS WITH RAILING: A LITTLE
PERSONAL GROOMING: A LITTLE
WALKING IN HOSPITAL ROOM: A LITTLE
DRESSING REGULAR UPPER BODY CLOTHING: A LITTLE
MOVING FROM LYING ON BACK TO SITTING ON SIDE OF FLAT BED WITH BEDRAILS: A LOT
MOBILITY SCORE: 18

## 2023-12-12 ASSESSMENT — PAIN DESCRIPTION - LOCATION
LOCATION: BACK

## 2023-12-12 ASSESSMENT — PAIN DESCRIPTION - ORIENTATION
ORIENTATION: LOWER

## 2023-12-12 ASSESSMENT — PAIN SCALES - GENERAL
PAINLEVEL_OUTOF10: 8
PAINLEVEL_OUTOF10: 8
PAINLEVEL_OUTOF10: 9
PAINLEVEL_OUTOF10: 8

## 2023-12-12 ASSESSMENT — PAIN - FUNCTIONAL ASSESSMENT
PAIN_FUNCTIONAL_ASSESSMENT: 0-10

## 2023-12-12 NOTE — PROGRESS NOTES
DAILY PROGRESS NOTE      POD 1 irrigation and debridement superficial lumbar spine. Exploration of spinal fusion lumbar spine.     Patient doing well  Visit Vitals  /55 (Patient Position: Sitting)   Pulse 89   Temp 36.4 °C (97.5 °F)   Resp 18      Temp (24hrs), Av.8 °C (98.2 °F), Min:36.2 °C (97.2 °F), Max:37.6 °C (99.7 °F)       Pain Control Good  No chest pain or shortness of breath.  No calf pain    Exam:   Extremity shows neuro vascular status intact. Flexion and extension intact on extremity.  Calves soft and non-tender without evidence of DVT.  Two JOSE drains in place to posterior back. 10cc of outout from the drain on the right and 15 ml of output from the drain on the left. Continue drains until directed to remove by surgeon or NP.         Labs reviewed:  CBC: @LABRCNT(WBC:3,HGB:3,PLT:3)@  BMP:  @LABRCNT(Na:3,K:3,CL:3,CO2:3,BUN:3,Creatinine:3,Glucose:3)@  Preliminary tissue/wound culture from yesterday shows no organisms.   Pr  I&O  I/O last 3 completed shifts:  In: 1250 (14.8 mL/kg) [I.V.:500 (5.9 mL/kg); IV Piggyback:750]  Out: 2525 (29.8 mL/kg) [Urine:2275 (0.7 mL/kg/hr); Drains:250]  Weight: 84.7 kg       Assessment:    Patient doing good postoperatively. She continues to have some pain issues will add 12.5 mcg fentanyl patch to help  optimize pain control.       Plan:    Continue PT/OT  Continue drains until directed to remove  Continue IV atb per ID  Continue pain control will add fentanyl patch to help optimize pain control     Edi Olguin, APRN-CNP   2023 10:18 AM

## 2023-12-12 NOTE — PROGRESS NOTES
"Vancomycin Dosing by Pharmacy- FOLLOW UP    Tara Tierney is a 68 y.o. year old female who Pharmacy has been consulted for vancomycin dosing for osteomyelitis/septic arthritis. Based on the patient's indication and renal status this patient is being dosed based on a goal AUC of 500-600.     Renal function is currently stable.    Current vancomycin dose: 1250 mg given every 12 hours    Estimated vancomycin AUC on current dose: 690 mg/L.hr     Visit Vitals  /55 (Patient Position: Sitting)   Pulse 89   Temp 36.4 °C (97.5 °F)   Resp 18        Lab Results   Component Value Date    CREATININE 0.82 12/12/2023    CREATININE 0.80 12/11/2023    CREATININE 0.76 12/10/2023    CREATININE 0.99 12/08/2023        Patient weight is 84.7 kg    No results found for: \"CULTURE\"     I/O last 3 completed shifts:  In: 1250 (14.8 mL/kg) [I.V.:500 (5.9 mL/kg); IV Piggyback:750]  Out: 2525 (29.8 mL/kg) [Urine:2275 (0.7 mL/kg/hr); Drains:250]  Weight: 84.7 kg   [unfilled]    No results found for: \"PATIENTTEMP\"     Assessment/Plan    Above goal AUC. Orders placed for new vancomcyin regimen of 1000 every 12 hours to begin at 1400 on 12/12/23.    This dosing regimen is predicted by [employer] to result in the following pharmacokinetic parameters:    Regimen: 1000 mg IV every 12 hours.  Start time: 14:00 on 12/12/2023  Exposure target: AUC24 (range)400-600 mg/L.hr   AUC24,ss: 553 mg/L.hr  Probability of AUC24 > 400: 100 %  Ctrough,ss: 15.6 mg/L  Probability of Ctrough,ss > 20: 0 %  Probability of nephrotoxicity (Lodise ASHLEY 2009): 11 %    The next level will be obtained on 12/13/23 at 10 am. May be obtained sooner if clinically indicated.   Will continue to monitor renal function daily while on vancomycin and order serum creatinine at least every 48 hours if not already ordered.  Follow for continued vancomycin needs, clinical response, and signs/symptoms of toxicity.       Kristine E Steinert, [facility]           "

## 2023-12-12 NOTE — PROGRESS NOTES
"Tara Tierney is a 68 y.o. female on day 4 of admission presenting with Intractable back pain.    Subjective   Patient seen and examined.  Continues to have significant back pain, no fevers, chills, nausea, vomiting.  She is unable to move much because of persistent back pain.       Objective     Physical Exam  Constitutional:       Appearance: She is obese.   HENT:      Head: Normocephalic.   Eyes:      Extraocular Movements: Extraocular movements intact.      Conjunctiva/sclera: Conjunctivae normal.   Cardiovascular:      Rate and Rhythm: Normal rate and regular rhythm.   Pulmonary:      Effort: No dyspnea, no distress present.   Abdominal:      General: There is no distension.      Palpations: Abdomen is soft.   Last Recorded Vitals  Blood pressure 121/55, pulse 89, temperature 36.4 °C (97.5 °F), resp. rate 18, height 1.448 m (4' 9\"), weight 84.7 kg (186 lb 11.7 oz), SpO2 92 %.  Intake/Output last 3 Shifts:  I/O last 3 completed shifts:  In: 1250 (14.8 mL/kg) [I.V.:500 (5.9 mL/kg); IV Piggyback:750]  Out: 2525 (29.8 mL/kg) [Urine:2275 (0.7 mL/kg/hr); Drains:250]  Weight: 84.7 kg     Relevant Results              This patient has a central line   Reason for the central line remaining today? Parenteral medication                 Assessment/Plan   Principal Problem:    Intractable back pain  Active Problems:    Back pain with radiculopathy    Seroma, post-traumatic (CMS/HCC)    Lumbar surgical wound fluid collection    She continues to have pain  Repeat MRI showed fluid collection deep had significantly decreased superficial collection was replaced with more blood products.  S/p second washout 12/11/2023  We will continue IV vancomycin  She will need right for 6 weeks postdischarge  Will need weekly CBC and BMP  Pharmacy to come up with a dose  Continue pain control, supportive care  Continue PT OT  Sodium has been stable  On Macrobid for UTI  Continue rest of the medical management as she is  DVT " prophylaxis          Osvaldo Diaz MD

## 2023-12-12 NOTE — PROGRESS NOTES
Plan is for [facility], precert obtained and is good through 12/18.  Will await final discharge orders.

## 2023-12-12 NOTE — PROGRESS NOTES
Met w/ pt. No PD today per ortho. Need final iv orders and labs, drsg/wound care tx for narcotics are needed, plus alvarenga fabrizio prior to discharge. Pt would qualify for ambulance transfer at discharge d/t dx, back surgery. Pt updated on ins approval for snf and [facility]. Spoke w/ dr pérez.

## 2023-12-12 NOTE — PROGRESS NOTES
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    Patient went  for additional washout yesterday.  Repeat IntraOp cultures are pending  Repeat MRI was decreased fluid collection and inflammation.   Patient has controlled pain.  Has weakness in bilateral legs.   Complaining of right thigh pain.   Positive constipation  Decreased appetite  No bowel movements for 3 days.  Has been refusing Colace  Currently on nitrofurantoin that was started on December 8 for E. coli UTI  No urinary symptoms  No fevers or chills  No rash  Current Medications:    [Held by provider] aspirin, 81 mg, oral, Daily  atenolol, 50 mg, oral, q AM  brimonidine, 1 drop, Both Eyes, BID  busPIRone, 10 mg, oral, Daily  cholecalciferol, 2,000 Units, oral, Daily  docusate sodium, 100 mg, oral, BID  ezetimibe, 10 mg, oral, Daily  fentaNYL, 1 patch, transdermal, q72h  heparin (porcine), 5,000 Units, subcutaneous, q8h  hydrALAZINE, 50 mg, oral, BID  isosorbide mononitrate ER, 30 mg, oral, Daily  isosorbide mononitrate ER, 60 mg, oral, Daily  loratadine, 10 mg, oral, Daily  mirtazapine, 15 mg, oral, Nightly  nitrofurantoin (macrocrystal-monohydrate), 100 mg, oral, BID  pantoprazole, 40 mg, oral, Daily before breakfast  phenazopyridine, 100 mg, oral, TID with meals  polyethylene glycol, 17 g, oral, Daily  pregabalin, 75 mg, oral, BID  simvastatin, 40 mg, oral, BID  tranexamic acid, 1,950 mg, oral, Once  [Held by provider] triamterene-hydrochlorothiazid, 1 tablet, oral, Daily  vancomycin, 1,250 mg, intravenous, q12h        Allergies:  Neomycin and Neomycin-polymyxin b-dexameth      Review of Systems  14 system review is negative other than HPI  Severe low back pain  +ve B leg weakness   no numbness  No rash or diarrhea  No shortness of breath  Reports poor  "appetite  Physical Exam    Heart Rate:  [78-90]   Temp:  [36.2 °C (97.2 °F)-37.6 °C (99.7 °F)]   Resp:  [11-26]   BP: (106-159)/(47-79)   SpO2:  [91 %-100 %]    Vitals:    12/11/23 2002 12/11/23 2238 12/12/23 0026 12/12/23 0821   BP: 112/53 133/64 123/54 121/55   BP Location:       Patient Position:    Sitting   Pulse: 84 84 87 89   Resp: 16 18 12 18   Temp: 37.4 °C (99.3 °F) 37.6 °C (99.7 °F) 36.6 °C (97.9 °F) 36.4 °C (97.5 °F)   TempSrc:       SpO2: 91% 96% 91% 92%   Weight:       Height:         General Appearance: alert and oriented to person, place and time, well-developed and well-nourished, in no acute distress  Skin: warm and dry, no rash.   Head: normocephalic and atraumatic  Eyes: anicteric sclerae  ENT:  normal mucous membranes. No oral thrush  Lungs: normal respiratory effort, clear lungs  Heart normal S1-S2 no murmur  Abdomen: soft, no tenderness  No leg edema  Back incision with intact dressing and JOSE drain with bloody drainage  No erythema, no tenderness  Intact right upper extremity PICC line    DATA:    Lab Results   Component Value Date    WBC 7.1 12/12/2023    HGB 7.4 (L) 12/12/2023    HCT 23.0 (L) 12/12/2023     12/12/2023     12/12/2023     Lab Results   Component Value Date    CREATININE 0.82 12/12/2023    BUN 12 12/12/2023     (L) 12/12/2023    K 3.6 12/12/2023    CL 99 12/12/2023    CO2 28 12/12/2023       Hepatic Function Panel:No results found for: \"ALKPHOS\", \"ALT\", \"AST\", \"PROT\", \"BILITOT\", \"BILIDIR\"    Microbiology:   Susceptibility data from last 90 days.  Collected Specimen Info Organism Amoxicillin/Clavulanate Ampicillin Ampicillin/Sulbactam Aztreonam Cefazolin Cefazolin (uncomplicated UTIs only) Cefepime Cefotaxime Ceftazidime Ceftriaxone Ciprofloxacin Ertapenem   12/06/23 Swab from SPINE Methicillin Resistant Staphylococcus aureus (MRSA)               12/06/23 Tissue from SPINE Staphylococcus aureus               12/06/23 Tissue from SPINE Staphylococcus aureus    " "           12/05/23 Urine from Clean Catch/Voided Escherichia coli S R S R R R R R R R R S   11/03/23 Swab from Nares/Axilla/Groin Methicillin Resistant Staphylococcus aureus (MRSA)                 Collected Specimen Info Organism Gentamicin Meropenem Nitrofurantoin Piperacillin/Tazobactam Tobramycin Trimethoprim/Sulfamethoxazole   12/06/23 Swab from SPINE Methicillin Resistant Staphylococcus aureus (MRSA)         12/06/23 Tissue from SPINE Staphylococcus aureus         12/06/23 Tissue from SPINE Staphylococcus aureus         12/05/23 Urine from Clean Catch/Voided Escherichia coli R S S S I S   11/03/23 Swab from Nares/Axilla/Groin Methicillin Resistant Staphylococcus aureus (MRSA)              No lab exists for component: \"BC\"  No lab exists for component: \"BLOODCULT2\"  No lab exists for component: \"LABURIN\"  No lab exists for component: \"WNDABS\"  No lab exists for component: \"CULTRESP\"      Imaging:   MR lumbar spine w and wo IV contrast    Result Date: 12/5/2023  Interpreted By:  Jh Benoit,  and Dev Hogue STUDY: MR LUMBAR SPINE W AND WO IV CONTRAST;  12/5/2023 4:39 pm   INDICATION: Signs/Symptoms:Recent laminectomy with fusion, intractable back pain radiates legs.   COMPARISON: MRI 08/20/2023, CT 12/05/2023   ACCESSION NUMBER(S): [id number]   ORDERING CLINICIAN: SHAWN REDDY   TECHNIQUE: Sagittal T1, T2, STIR, axial T1 and T2 weighted images of the lumbar spine were acquired without and following administration of 15 cc Dotarem gadolinium based IV contrast.   FINDINGS: Motion degraded examination.   Alignment: The vertebral alignment is maintained.   Vertebrae/Intervertebral Discs: Postsurgical changes of L4 through S1 posterior fusion noted with bilateral transpedicular screws and interbody graft placement at L4-5 and L5-S1. There has been bilateral laminectomies at L3-4, L4-5, and L5-S1. The vertebral bodies demonstrate expected height. There is mild osseous edema involving L5 and S1 vertebral bodies. " The marrow signal is within normal limits. Multilevel intervertebral disc height loss and disc desiccation noted.   A large dorsal extra-spinal fluid collection is noted extending from the laminectomy bed through the deep muscular fascia into the subcutaneous fat. This collection extends from the right lateral recess at the level of L4-5 as well as L5-S1 into the spinal canal and deforms the thecal sac. A focal defect is noted within the thecal sac at the level of L5-S1 (series 9, image 20). 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 The collections do not demonstrate significant peripheral enhancement to suggest abscess.   Conus: The lower thoracic cord appears unremarkable. The conus terminates at L1.   T12-L1: Disc bulge. There is no significant central canal or neural foraminal stenosis.   L1-2: Disc bulge, ligamentum flavum thickening and facet hypertrophy contribute to minimal central canal narrowing.   L2-3: Disc bulge and mild prominence of the posterior epidural fat resulting in minimal narrowing of the central canal. There is moderate right and minimal left foraminal narrowing due to intraforaminal disc herniation and facet hypertrophy, similar to preoperative examination.   L3-4: Laminectomies. There is moderate narrowing of the thecal sac due to disc bulge and dorsal spinal collection as detailed above. There is unchanged marked bilateral foraminal narrowing due to intraforaminal disc herniation and facet hypertrophic changes with possible impingement of bilateral exiting L3 nerve roots.   L4-5: Bilateral laminectomies. There is moderate narrowing of the central canal with indentation of the thecal sac posteriorly due to the dorsal spinal fluid collection as detailed above. Moderate right and mild left foraminal narrowing is overall similar to MRI dated 08/20/2020 3D to intraforaminal disc herniations and facet hypertrophic changes.   L5-S1: Bilateral laminectomies. The dorsal spinal fluid  collection indents the thecal sac as detailed above. Evaluation of the foramina is limited due to susceptibility artifact from bilateral transpedicular screws.  There is persistent marked left and moderate right foraminal narrowing due to intraforaminal disc herniation and facet hypertrophy with possible impingement of the exiting left L5 nerve root.       1.  Postoperative changes as above. A large dorsal extra-spinal fluid collection is noted extending from the laminectomy bed through the deep muscular fascia into the subcutaneous fat. This collection extends from the right lateral recesses at the level of L4-5 as well as L5-S1 into the spinal canal and deforms the thecal sac resulting in moderate narrowing at L4-5 and to a lesser degree at L3-4. A focal defect is noted within the thecal sac at the level of L5-S1. 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 No significant peripheral enhancement is identified to suggest abscess although the sterility of this collection can not be ascertained on MRI alone. 2. Multilevel degenerative changes again noted with persistent marked bilateral foraminal narrowing at L3-4 and at L5-S1 on the left.     I personally reviewed the images/study and I agree with the findings as stated. This study was interpreted at [place], [place].   MACRO: None   Signed by: Jh Benoit 12/5/2023 5:24 PM Dictation workstation:   [id number]    CT angio chest abdomen pelvis    Result Date: 12/5/2023  Interpreted By:  Samuel Brannon, STUDY: CT ANGIO CHEST ABDOMEN PELVIS;  12/5/2023 2:48 pm   INDICATION: Signs/Symptoms:Severe lower abdomen pain, groin pain, radiates to back, intractable pain.   COMPARISON: May 18, 2022 CTA chest abdomen and pelvis. July 21, 2023 CTA abdomen and pelvis with runoff. August 20, 2023 MRI lumbar spine and December 5, 2023 CT lumbar spine   ACCESSION NUMBER(S): [id number]   ORDERING CLINICIAN: SHAWN REDDY   TECHNIQUE:  Axial non-contrast images of the chest, abdomen, and pelvis  with coronal and sagittal reformatted images. Axial CT images of the chest, abdomen and pelvis after the intravenous administration of 100 mL Omnipaque 350 contrast using CT angiographic technique with coronal and sagittal reformatted images.  MIP images were provided and reviewed. 3D reconstructions were performed on a separate independent workstation.   FINDINGS: VASCULAR:   PULMONARY ARTERIES:   No acute pulmonary embolism.   THORACIC AORTA:  Non-contrast images show no evidence of acute intramural hematoma. No thoracic aortic aneurysm or dissection. Moderate scattered atherosclerosis thoracic aorta and branch vessels.   ABDOMINAL AORTA: No abdominal aortic aneurysm or dissection. There is scattered atherosclerosis. 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   CHEST:   MEDIASTINUM AND LYMPH NODES: No enlarged intrathoracic or axillary lymph nodes.   HEART: Normal size.  Moderate coronary artery calcifications. No pericardial effusion.   LUNG, PLEURA, LARGE AIRWAYS: No consolidation, pulmonary edema, pleural effusion, or pneumothorax.   OSSEOUS STRUCTURES/CHEST WALL:    No acute osseous abnormality.     ABDOMEN/PELVIS:   Arterial phase imaging limits evaluation of the solid organs.   ABDOMINAL WALL: Within normal limits.   LIVER: Stable without detected mass. BILE DUCTS: No significant abnormality. GALLBLADDER: Absent   PANCREAS: No significant abnormality.   SPLEEN: No significant abnormality.   ADRENALS: No significant abnormality.   KIDNEYS, URETERS, BLADDER: No significant abnormality.   VESSELS: See above.  No additional significant abnormality. LYMPH NODES: No enlarged lymph nodes. RETROPERITONEUM:  No significant  abnormality.   BOWEL: The stomach and bowel are normal caliber without evident inflammatory change.   PERITONEUM:   No significant ascites, free air, or fluid collection.   REPRODUCTIVE ORGANS: No significant abnormality.   OSSEOUS STRUCTURES:  Refer to same day CT scan lumbar spine report for details regarding that portion. No separate acute osseous abnormalities are identified. There are skin staples dorsal lumbar region. The canal is not reliably evaluated at the operated levels due to artifact from metallic hardware. There is minimal gas within the soft tissues at the operated levels and there is lobulated fluid density extending from vertebral to overlapping paravertebral region to the level of the superficial subcutaneous layer including portion sagittal series 506, image 109 and axial series 502, image 114 measuring 14 x 6 x 6 cm  with internal density measurement of 6 compatible with simple fluid density       No thoracic or abdominal aortic aneurysm or dissection.   Stable pre-existing vascular findings as reported.   Findings compatible with recent lumbar spine surgery with prominent fluid collection overlying the operated site extending through the superficial subcutaneous layer. The canal is not reliably assessed at the operated site due to artifact. Recommend further characterization with MRI lumbar spine with without contrast.   No acute abnormality of the chest, abdomen or pelvis.     Signed by: Samuel Brannon 12/5/2023 3:32 PM Dictation workstation:   [id number]    CT lumbar spine wo IV contrast    Result Date: 12/5/2023  Interpreted By:  Jony Monae, STUDY: CT LUMBAR SPINE WO IV CONTRAST; 12/5/2023 11:28 am   INDICATION: Signs/Symptoms:Low back pain, recent laminectomy and fusion, fall 1 week ago.   COMPARISON: None.   ACCESSION NUMBER(S): [id number]   ORDERING CLINICIAN: SHAWN REDDY   TECHNIQUE: Contiguous axial CT images were obtained through the lumbar spine at 2 mm slice thickness  without contrast administration. The images were then reconstructed in the coronal and sagittal planes.   FINDINGS: OSSEOUS STRUCTURES: The patient is status post L3 through L5 laminectomy, L4 through S1 pedicle screw and fabrizio fusion and L4-5 and L5-S1 disc space implant placement. There is no evidence of acute fracture identified. The vertebral bodies are well aligned without evidence of subluxation.   Mild discogenic degenerative changes are seen at the remaining disc space levels. Mild-to-moderate facet degenerative changes are seen throughout the lumbar spine.   LOWER THORACIC SPINE: No gross central canal or neural foraminal narrowing is seen.   T12-L1: No gross central canal or neural foraminal narrowing is seen.   L1-2: No gross central canal or neural foraminal narrowing is seen.   L2-3: No gross central canal or neural foraminal narrowing is seen.   L3-4: Mild right-sided neural foraminal narrowing is seen. No significant left foraminal or central canal narrowing is seen.   L4-5: No gross central canal or neural foraminal narrowing is seen.   L5-S1: Mild left-sided neural foraminal narrowing is seen. No significant right foraminal or central canal narrowing is seen.   ASSOCIATED STRUCTURES: Evaluation of the visualized soft tissues of the abdomen is limited by the lack of intravenous contrast. Within this limitation, no gross mass or lymphadenopathy is identified.  A fluid collection is seen along the posterior midportion the lower back, likely postoperative in nature.       1. No acute fracture identified. 2. Postoperative and degenerative changes, as described above.   MACRO: None.   Signed by: Jony Monae 12/5/2023 11:45 AM Dictation workstation:   [id number]       IMPRESSION:    Postop deep incisional wound infection of lumbar spine with probable infected hematoma/abscess.  Who is going for a second washout today  MRSA infection in a patient who is a chronic carrier  E. coli UTI  Status post lumbar  laminectomy    Patient Active Problem List   Diagnosis    Abdominal aortic aneurysm (CMS/HCC)    Abnormal EKG    Bilateral carotid artery stenosis    Bruit of right carotid artery    CAD in native artery    Cardiomyopathy (CMS/HCC)    Chest pain    Chronic asthmatic bronchitis    Closed displaced fracture of fifth metatarsal bone    Current every day smoker    Difficulty breathing    Essential hypertension    History of total knee replacement    Hypokalemia    Intermittent claudication (CMS/HCC)    Knee osteoarthritis    Knee pain    Limb pain    Localized swelling, mass, or lump of lower extremity    Celiac artery stenosis (CMS/HCC)    Mesenteric artery stenosis (CMS/HCC)    Mixed hyperlipidemia    Obese    PVD (peripheral vascular disease) (CMS/HCC)    Right foot pain    Swelling    Trigger finger    Urinary tract infection without hematuria    Back pain of lumbar region with sciatica    Back pain with radiculopathy    Intractable back pain    Seroma, post-traumatic (CMS/HCC)    Lumbar surgical wound fluid collection       PLAN:  2 more days of nitrofurantoin to finish 1 week course for UTI  continue IV vancomycin, aim at a trough level of 15-20 as discussed with pharmacy who is adjusting the dose  Arrange for 6 weeks of IV vancomycin postdischarge  Follow-up CBC BMP and Vanco levels  I instructed the patient that she will need weekly blood work postdischarge and follow-up in my office in 4 weeks.  She is agreeable to current plan    Discussed with patient      Gem Reyna MD

## 2023-12-13 LAB
ANION GAP SERPL CALC-SCNC: 9 MMOL/L (ref 10–20)
BUN SERPL-MCNC: 14 MG/DL (ref 6–23)
CALCIUM SERPL-MCNC: 8.6 MG/DL (ref 8.6–10.3)
CHLORIDE SERPL-SCNC: 98 MMOL/L (ref 98–107)
CO2 SERPL-SCNC: 28 MMOL/L (ref 21–32)
CREAT SERPL-MCNC: 0.8 MG/DL (ref 0.5–1.05)
ERYTHROCYTE [DISTWIDTH] IN BLOOD BY AUTOMATED COUNT: 13.5 % (ref 11.5–14.5)
GFR SERPL CREATININE-BSD FRML MDRD: 79 ML/MIN/1.73M*2
GLUCOSE SERPL-MCNC: 113 MG/DL (ref 74–99)
HCT VFR BLD AUTO: 21.7 % (ref 36–46)
HGB BLD-MCNC: 7.1 G/DL (ref 12–16)
HOLD SPECIMEN: NORMAL
MCH RBC QN AUTO: 32 PG (ref 26–34)
MCHC RBC AUTO-ENTMCNC: 32.7 G/DL (ref 32–36)
MCV RBC AUTO: 98 FL (ref 80–100)
NRBC BLD-RTO: 0 /100 WBCS (ref 0–0)
PLATELET # BLD AUTO: 201 X10*3/UL (ref 150–450)
POTASSIUM SERPL-SCNC: 3.5 MMOL/L (ref 3.5–5.3)
RBC # BLD AUTO: 2.22 X10*6/UL (ref 4–5.2)
SODIUM SERPL-SCNC: 131 MMOL/L (ref 136–145)
VANCOMYCIN SERPL-MCNC: 18.8 UG/ML (ref 5–20)
WBC # BLD AUTO: 6.2 X10*3/UL (ref 4.4–11.3)

## 2023-12-13 PROCEDURE — 82374 ASSAY BLOOD CARBON DIOXIDE: CPT | Performed by: INTERNAL MEDICINE

## 2023-12-13 PROCEDURE — 99233 SBSQ HOSP IP/OBS HIGH 50: CPT | Performed by: INTERNAL MEDICINE

## 2023-12-13 PROCEDURE — 97530 THERAPEUTIC ACTIVITIES: CPT | Mod: GP,CQ

## 2023-12-13 PROCEDURE — 2500000004 HC RX 250 GENERAL PHARMACY W/ HCPCS (ALT 636 FOR OP/ED): Performed by: INTERNAL MEDICINE

## 2023-12-13 PROCEDURE — 2500000004 HC RX 250 GENERAL PHARMACY W/ HCPCS (ALT 636 FOR OP/ED): Performed by: FAMILY MEDICINE

## 2023-12-13 PROCEDURE — 2500000004 HC RX 250 GENERAL PHARMACY W/ HCPCS (ALT 636 FOR OP/ED): Performed by: STUDENT IN AN ORGANIZED HEALTH CARE EDUCATION/TRAINING PROGRAM

## 2023-12-13 PROCEDURE — 2500000001 HC RX 250 WO HCPCS SELF ADMINISTERED DRUGS (ALT 637 FOR MEDICARE OP): Performed by: STUDENT IN AN ORGANIZED HEALTH CARE EDUCATION/TRAINING PROGRAM

## 2023-12-13 PROCEDURE — 97530 THERAPEUTIC ACTIVITIES: CPT | Mod: GO,CO

## 2023-12-13 PROCEDURE — 85027 COMPLETE CBC AUTOMATED: CPT | Performed by: INTERNAL MEDICINE

## 2023-12-13 PROCEDURE — 96372 THER/PROPH/DIAG INJ SC/IM: CPT | Performed by: STUDENT IN AN ORGANIZED HEALTH CARE EDUCATION/TRAINING PROGRAM

## 2023-12-13 PROCEDURE — 2500000002 HC RX 250 W HCPCS SELF ADMINISTERED DRUGS (ALT 637 FOR MEDICARE OP, ALT 636 FOR OP/ED): Performed by: STUDENT IN AN ORGANIZED HEALTH CARE EDUCATION/TRAINING PROGRAM

## 2023-12-13 PROCEDURE — 2500000001 HC RX 250 WO HCPCS SELF ADMINISTERED DRUGS (ALT 637 FOR MEDICARE OP): Performed by: ANESTHESIOLOGY

## 2023-12-13 PROCEDURE — 1210000001 HC SEMI-PRIVATE ROOM DAILY

## 2023-12-13 PROCEDURE — 2500000001 HC RX 250 WO HCPCS SELF ADMINISTERED DRUGS (ALT 637 FOR MEDICARE OP): Performed by: FAMILY MEDICINE

## 2023-12-13 PROCEDURE — 97535 SELF CARE MNGMENT TRAINING: CPT | Mod: GO,CO

## 2023-12-13 PROCEDURE — 80202 ASSAY OF VANCOMYCIN: CPT | Performed by: INTERNAL MEDICINE

## 2023-12-13 RX ORDER — MORPHINE SULFATE 15 MG/1
15 TABLET, FILM COATED, EXTENDED RELEASE ORAL EVERY 12 HOURS SCHEDULED
Status: DISCONTINUED | OUTPATIENT
Start: 2023-12-13 | End: 2023-12-18

## 2023-12-13 RX ORDER — OXYCODONE HYDROCHLORIDE 5 MG/1
5 TABLET ORAL EVERY 4 HOURS PRN
Qty: 28 TABLET | Refills: 0 | Status: SHIPPED | OUTPATIENT
Start: 2023-12-13 | End: 2023-12-20

## 2023-12-13 RX ORDER — NITROFURANTOIN 25; 75 MG/1; MG/1
100 CAPSULE ORAL 2 TIMES DAILY
Qty: 14 CAPSULE | Refills: 0
Start: 2023-12-13 | End: 2023-12-14 | Stop reason: HOSPADM

## 2023-12-13 RX ORDER — PHENAZOPYRIDINE HYDROCHLORIDE 95 MG/1
95 TABLET ORAL
Qty: 9 TABLET | Refills: 0
Start: 2023-12-13 | End: 2023-12-22 | Stop reason: HOSPADM

## 2023-12-13 RX ADMIN — BRIMONIDINE TARTRATE 1 DROP: 2 SOLUTION/ DROPS OPHTHALMIC at 08:24

## 2023-12-13 RX ADMIN — DOCUSATE SODIUM 100 MG: 100 CAPSULE, LIQUID FILLED ORAL at 08:24

## 2023-12-13 RX ADMIN — HEPARIN SODIUM 5000 UNITS: 5000 INJECTION INTRAVENOUS; SUBCUTANEOUS at 04:39

## 2023-12-13 RX ADMIN — NITROFURANTOIN (MONOHYDRATE/MACROCRYSTALS) 100 MG: 75; 25 CAPSULE ORAL at 21:12

## 2023-12-13 RX ADMIN — PANTOPRAZOLE SODIUM 40 MG: 40 TABLET, DELAYED RELEASE ORAL at 06:02

## 2023-12-13 RX ADMIN — PREGABALIN 75 MG: 50 CAPSULE ORAL at 21:12

## 2023-12-13 RX ADMIN — HYDRALAZINE HYDROCHLORIDE 50 MG: 50 TABLET ORAL at 21:12

## 2023-12-13 RX ADMIN — HEPARIN SODIUM 5000 UNITS: 5000 INJECTION INTRAVENOUS; SUBCUTANEOUS at 21:12

## 2023-12-13 RX ADMIN — ATENOLOL 50 MG: 50 TABLET ORAL at 08:23

## 2023-12-13 RX ADMIN — PHENAZOPYRIDINE HYDROCHLORIDE 100 MG: 100 TABLET ORAL at 17:00

## 2023-12-13 RX ADMIN — PHENAZOPYRIDINE HYDROCHLORIDE 100 MG: 100 TABLET ORAL at 08:24

## 2023-12-13 RX ADMIN — Medication 2000 UNITS: at 08:24

## 2023-12-13 RX ADMIN — CYCLOBENZAPRINE HYDROCHLORIDE 5 MG: 10 TABLET, FILM COATED ORAL at 08:24

## 2023-12-13 RX ADMIN — PHENAZOPYRIDINE HYDROCHLORIDE 100 MG: 100 TABLET ORAL at 12:24

## 2023-12-13 RX ADMIN — ISOSORBIDE MONONITRATE 60 MG: 60 TABLET, EXTENDED RELEASE ORAL at 09:00

## 2023-12-13 RX ADMIN — HYDRALAZINE HYDROCHLORIDE 50 MG: 50 TABLET ORAL at 08:23

## 2023-12-13 RX ADMIN — ISOSORBIDE MONONITRATE 30 MG: 30 TABLET, EXTENDED RELEASE ORAL at 08:23

## 2023-12-13 RX ADMIN — LORATADINE 10 MG: 10 TABLET ORAL at 08:24

## 2023-12-13 RX ADMIN — MORPHINE SULFATE 15 MG: 15 TABLET, EXTENDED RELEASE ORAL at 13:49

## 2023-12-13 RX ADMIN — BUSPIRONE HYDROCHLORIDE 10 MG: 5 TABLET ORAL at 08:23

## 2023-12-13 RX ADMIN — PREGABALIN 75 MG: 50 CAPSULE ORAL at 08:23

## 2023-12-13 RX ADMIN — OXYCODONE HYDROCHLORIDE 10 MG: 5 TABLET ORAL at 08:25

## 2023-12-13 RX ADMIN — NITROFURANTOIN (MONOHYDRATE/MACROCRYSTALS) 100 MG: 75; 25 CAPSULE ORAL at 08:23

## 2023-12-13 RX ADMIN — HYDROMORPHONE HYDROCHLORIDE 0.4 MG: 0.5 INJECTION, SOLUTION INTRAMUSCULAR; INTRAVENOUS; SUBCUTANEOUS at 21:21

## 2023-12-13 RX ADMIN — MIRTAZAPINE 15 MG: 15 TABLET, FILM COATED ORAL at 21:12

## 2023-12-13 RX ADMIN — VANCOMYCIN HYDROCHLORIDE 1000 MG: 1 INJECTION, SOLUTION INTRAVENOUS at 15:45

## 2023-12-13 RX ADMIN — SIMVASTATIN 40 MG: 40 TABLET, FILM COATED ORAL at 08:23

## 2023-12-13 RX ADMIN — OXYCODONE HYDROCHLORIDE 10 MG: 5 TABLET ORAL at 15:51

## 2023-12-13 RX ADMIN — DOCUSATE SODIUM 100 MG: 100 CAPSULE, LIQUID FILLED ORAL at 21:12

## 2023-12-13 RX ADMIN — SIMVASTATIN 40 MG: 40 TABLET, FILM COATED ORAL at 21:12

## 2023-12-13 RX ADMIN — EZETIMIBE 10 MG: 10 TABLET ORAL at 08:24

## 2023-12-13 RX ADMIN — VANCOMYCIN HYDROCHLORIDE 1000 MG: 1 INJECTION, SOLUTION INTRAVENOUS at 02:40

## 2023-12-13 RX ADMIN — BRIMONIDINE TARTRATE 1 DROP: 2 SOLUTION/ DROPS OPHTHALMIC at 21:39

## 2023-12-13 ASSESSMENT — COGNITIVE AND FUNCTIONAL STATUS - GENERAL
MOBILITY SCORE: 15
MOVING TO AND FROM BED TO CHAIR: A LITTLE
MOVING FROM LYING ON BACK TO SITTING ON SIDE OF FLAT BED WITH BEDRAILS: A LITTLE
DAILY ACTIVITIY SCORE: 19
HELP NEEDED FOR BATHING: A LITTLE
MOVING TO AND FROM BED TO CHAIR: A LITTLE
WALKING IN HOSPITAL ROOM: A LITTLE
DRESSING REGULAR LOWER BODY CLOTHING: A LITTLE
TURNING FROM BACK TO SIDE WHILE IN FLAT BAD: A LITTLE
DRESSING REGULAR UPPER BODY CLOTHING: A LITTLE
EATING MEALS: A LITTLE
WALKING IN HOSPITAL ROOM: A LITTLE
PERSONAL GROOMING: A LITTLE
STANDING UP FROM CHAIR USING ARMS: A LITTLE
TOILETING: A LITTLE
DRESSING REGULAR UPPER BODY CLOTHING: A LITTLE
DAILY ACTIVITIY SCORE: 16
MOBILITY SCORE: 17
CLIMB 3 TO 5 STEPS WITH RAILING: A LOT
CLIMB 3 TO 5 STEPS WITH RAILING: A LOT
DRESSING REGULAR LOWER BODY CLOTHING: A LOT
MOVING FROM LYING ON BACK TO SITTING ON SIDE OF FLAT BED WITH BEDRAILS: A LOT
PERSONAL GROOMING: A LITTLE
TURNING FROM BACK TO SIDE WHILE IN FLAT BAD: A LOT
STANDING UP FROM CHAIR USING ARMS: A LITTLE
HELP NEEDED FOR BATHING: A LOT
TOILETING: A LITTLE

## 2023-12-13 ASSESSMENT — PAIN - FUNCTIONAL ASSESSMENT
PAIN_FUNCTIONAL_ASSESSMENT: 0-10

## 2023-12-13 ASSESSMENT — PAIN SCALES - GENERAL
PAINLEVEL_OUTOF10: 7
PAINLEVEL_OUTOF10: 8
PAINLEVEL_OUTOF10: 3
PAINLEVEL_OUTOF10: 10 - WORST POSSIBLE PAIN
PAINLEVEL_OUTOF10: 8
PAINLEVEL_OUTOF10: 7

## 2023-12-13 ASSESSMENT — PAIN DESCRIPTION - ORIENTATION
ORIENTATION: LOWER
ORIENTATION: LOWER
ORIENTATION: RIGHT;LOWER

## 2023-12-13 ASSESSMENT — ACTIVITIES OF DAILY LIVING (ADL): HOME_MANAGEMENT_TIME_ENTRY: 12

## 2023-12-13 ASSESSMENT — PAIN DESCRIPTION - LOCATION
LOCATION: BACK
LOCATION: GROIN
LOCATION: OTHER (COMMENT)

## 2023-12-13 ASSESSMENT — PAIN DESCRIPTION - DESCRIPTORS: DESCRIPTORS: SHARP

## 2023-12-13 NOTE — PROGRESS NOTES
Physical Therapy    Physical Therapy Treatment    Patient Name: Tara Tierney  MRN: [id number]  Today's Date: 12/13/2023  Time Calculation  Start Time: 1048  Stop Time: 1112  Time Calculation (min): 24 min       Assessment/Plan   End of Session Communication: Bedside nurse, PCT/NA/CTA  Assessment Comment: Call-light, phone, and traytable within reach.  End of Session Patient Position: Up in chair, Alarm off, not on at start of session  PT Plan  Inpatient/Swing Bed or Outpatient: Inpatient  Treatment/Interventions: Transfer training, Gait training  PT Plan: Skilled PT  PT Frequency: Daily  PT Discharge Recommendations: Moderate intensity level of continued care, High intensity level of continued care  Equipment Recommended upon Discharge: Wheeled walker   PT Recommended Transfer Status: Assist x1    General Visit Information:   PT  Visit  PT Received On: 12/13/23  Family/Caregiver Present: No  Prior to Session Communication: Bedside nurse  Patient Position Received: Up in chair (No alarm.)  General Comment: Patient states she doesn't feel ready to go to rehab; still dealing with a lot of discomfort in back and LEs. Nursing aware.    General Observations:   General Observation: x2 JOSE drains; TLSO brace donned(in standing 2° unable to secure it while sitting).    Subjective     Precautions:  Precautions  Medical Precautions: Fall precautions  Post-Surgical Precautions: Spinal precautions  Braces Applied: TLSO brace when OOB  Precautions Comment: Recent L3-4, L4-5, L5-S1 Laminectomy([facility]). Had I&D (12/06/23) and second I&D(12/11/23)    Objective     Pain:  Pain Assessment  Pain Assessment: 0-10 (c/o back pain and (B)groin pain that goes down to her knees(R worse than L). Patient did not rate pain numerically. Ice packs provided at end of session. Nursing aware.)    Treatments:           Ambulation/Gait Training  Ambulation/Gait Training Performed: Yes  Ambulation/Gait Training 1  Surface 1: Level tile  Device 1:  Rolling walker  Assistance 1: Minimum assistance  Comments/Distance (ft) 1: ~50ft x2. Slow cathi. Step through gait pattern. Decreased step height/length B. v/c and (A) for safer maneuvering of ww with 90/180° turns. v/c to avoid twisting with directional changes. No LOB. No buckling of knees. c/o (B)groin pain(unrated) that goes down to knees; (R) worse than (L).  Transfers  Transfer: Yes  Transfer 1  Technique 1: Sit to stand, Stand to sit  Transfer Device 1:  (ww)  Transfer Level of Assistance 1: Contact guard  Trials/Comments 1: (2x). v/c for safe hand placement and technique. Slow transition of hands to/from ww. TLSO brace donned/doffed in standing position 2° unable to fasten it while sitting.          Outcome Measures:  [facility] Basic Mobility  Turning from your back to your side while in a flat bed without using bedrails: A lot  Moving from lying on your back to sitting on the side of a flat bed without using bedrails: A lot  Moving to and from bed to chair (including a wheelchair): A little  Standing up from a chair using your arms (e.g. wheelchair or bedside chair): A little  To walk in hospital room: A little  Climbing 3-5 steps with railing: A lot  Basic Mobility - Total Score: 15    Education Documentation  Precautions, taught by Jeanine Bryan PTA at 12/13/2023 12:32 PM.  Learner: Patient  Readiness: Acceptance  Method: Explanation, Demonstration  Response: Verbalizes Understanding, Needs Reinforcement  Comment: See therapy note.    Body Mechanics, taught by Jeanine Bryan PTA at 12/13/2023 12:32 PM.  Learner: Patient  Readiness: Acceptance  Method: Explanation, Demonstration  Response: Verbalizes Understanding, Needs Reinforcement  Comment: See therapy note.    Mobility Training, taught by Jeanine Bryan PTA at 12/13/2023 12:32 PM.  Learner: Patient  Readiness: Acceptance  Method: Explanation, Demonstration  Response: Verbalizes Understanding, Needs Reinforcement  Comment: See therapy  note.    EDUCATION:  Individual(s) Educated: Patient  Education Provided: Body Mechanics, Fall Risk, Home Safety, POC, Post-Op Precautions, Posture      Encounter Problems       Encounter Problems (Active)       PT Problem       Pt will demonstrate sup > sit and sit > sup bed mobility mod I with log roll technique (Progressing)       Start:  12/07/23    Expected End:  12/21/23            Pt will demo sit > stand and stand > sit transfer with ww and mod I  (Progressing)       Start:  12/07/23    Expected End:  12/21/23            Pt will ambulate 100' with ww and mod I, without LOB  (Progressing)       Start:  12/07/23    Expected End:  12/21/23            Pt will demo up/down 1 steps with handrail mod I (Progressing)       Start:  12/07/23    Expected End:  12/21/23

## 2023-12-13 NOTE — PROGRESS NOTES
DAILY PROGRESS NOTE      POD 2 irrigation and debridement superficial lumbar spine. Exploration of spinal fusion lumbar spine.     Patient doing well  Visit Vitals  /53 (BP Location: Left arm, Patient Position: Lying)   Pulse 85   Temp 36.8 °C (98.2 °F) (Temporal)   Resp 17      Temp (24hrs), Av.7 °C (98.1 °F), Min:36.4 °C (97.5 °F), Max:36.9 °C (98.4 °F)       Pain Control Good  No chest pain or shortness of breath.  No calf pain    Exam:   Extremity shows neuro vascular status intact. Flexion and extension intact on extremity.  Calves soft and non-tender without evidence of DVT.  Two JOSE drains in place to posterior back. 85 total output from drains. Continue these as directed     Labs reviewed:  CBC: @LABRCNT(WBC:3,HGB:3,PLT:3)@  BMP:  @LABRCNT(Na:3,K:3,CL:3,CO2:3,BUN:3,Creatinine:3,Glucose:3)@  Preliminary tissue/wound culture from yesterday shows no organisms.   Pr  I&O  I/O last 3 completed shifts:  In: 850 (10 mL/kg) [IV Piggyback:850]  Out: 2100 (24.8 mL/kg) [Urine:1800 (0.6 mL/kg/hr); Drains:300]  Weight: 84.7 kg       Assessment:    Patient doing good postoperatively. She continues to have some pain issues will add 12.5 mcg fentanyl patch to help  optimize pain control. Continues to have significant postopeative pain with addition of fentanyl patch. Will consult pain management.       Plan:    Continue PT/OT  Continue drains until directed to remove  Continue IV atb per ID  Continue pain control will add fentanyl patch to help optimize pain control   Consult pain management.     Edi Olguin, APRN-CNP   2023 10:07 AM

## 2023-12-13 NOTE — PROGRESS NOTES
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    Patient went  for additional washout yesterday.  Repeat IntraOp cultures are +ve for MRSA  Patient has persistent severe pain of B thighs.  Has weakness in bilateral legs.   Complaining of right thigh pain.   Positive constipation  Decreased appetite  No bowel movements for 3 days.  Has been refusing Colace  Currently on nitrofurantoin that was started on December 8 for E. coli UTI  No urinary symptoms  No fevers or chills  No rash  Current Medications:    [Held by provider] aspirin, 81 mg, oral, Daily  atenolol, 50 mg, oral, q AM  brimonidine, 1 drop, Both Eyes, BID  busPIRone, 10 mg, oral, Daily  cholecalciferol, 2,000 Units, oral, Daily  docusate sodium, 100 mg, oral, BID  ezetimibe, 10 mg, oral, Daily  fentaNYL, 1 patch, transdermal, q72h  heparin (porcine), 5,000 Units, subcutaneous, q8h  hydrALAZINE, 50 mg, oral, BID  isosorbide mononitrate ER, 30 mg, oral, Daily  isosorbide mononitrate ER, 60 mg, oral, Daily  loratadine, 10 mg, oral, Daily  mirtazapine, 15 mg, oral, Nightly  nitrofurantoin (macrocrystal-monohydrate), 100 mg, oral, BID  pantoprazole, 40 mg, oral, Daily before breakfast  phenazopyridine, 100 mg, oral, TID with meals  polyethylene glycol, 17 g, oral, Daily  pregabalin, 75 mg, oral, BID  simvastatin, 40 mg, oral, BID  tranexamic acid, 1,950 mg, oral, Once  [Held by provider] triamterene-hydrochlorothiazid, 1 tablet, oral, Daily  vancomycin, 1,000 mg, intravenous, q12h        Allergies:  Neomycin and Neomycin-polymyxin b-dexameth      Review of Systems  14 system review is negative other than HPI  Severe low back pain  +ve B leg weakness   no numbness  No rash or diarrhea  No shortness of breath  Reports poor appetite  Physical Exam    Heart Rate:   "[80-89]   Temp:  [36.4 °C (97.5 °F)-36.9 °C (98.4 °F)]   Resp:  [17-18]   BP: (114-129)/(53-58)   SpO2:  [90 %-94 %]    Vitals:    12/12/23 1448 12/1953 12/12/23 2355 12/13/23 0757   BP: 116/55 129/58 115/55 114/53   BP Location:    Left arm   Patient Position: Sitting   Lying   Pulse: 80 89 81 85   Resp: 18   17   Temp: 36.4 °C (97.5 °F) 36.7 °C (98.1 °F) 36.9 °C (98.4 °F) 36.8 °C (98.2 °F)   TempSrc:    Temporal   SpO2: 94% 93% 90% 94%   Weight:       Height:         General Appearance: alert and oriented to person, place and time, well-developed and well-nourished, in no acute distress  Skin: warm and dry, no rash.   Head: normocephalic and atraumatic  Eyes: anicteric sclerae  ENT:  normal mucous membranes. No oral thrush  Lungs: normal respiratory effort, clear lungs  Heart normal S1-S2 no murmur  Abdomen: soft, no tenderness  No leg edema  Back incision with intact dressing and JOSE drain with bloody drainage  No erythema, no tenderness  Intact right upper extremity PICC line    DATA:    Lab Results   Component Value Date    WBC 6.2 12/13/2023    HGB 7.1 (L) 12/13/2023    HCT 21.7 (L) 12/13/2023    MCV 98 12/13/2023     12/13/2023     Lab Results   Component Value Date    CREATININE 0.80 12/13/2023    BUN 14 12/13/2023     (L) 12/13/2023    K 3.5 12/13/2023    CL 98 12/13/2023    CO2 28 12/13/2023       Hepatic Function Panel:No results found for: \"ALKPHOS\", \"ALT\", \"AST\", \"PROT\", \"BILITOT\", \"BILIDIR\"    Microbiology:   Susceptibility data from last 90 days.  Collected Specimen Info Organism Amoxicillin/Clavulanate Ampicillin Ampicillin/Sulbactam Aztreonam Cefazolin Cefazolin (uncomplicated UTIs only) Cefepime Cefotaxime Ceftazidime Ceftriaxone Ciprofloxacin Clindamycin   12/11/23 Fluid from ABSCESS Methicillin Resistant Staphylococcus aureus (MRSA)            S   12/06/23 Swab from SPINE Methicillin Resistant Staphylococcus aureus (MRSA)               12/06/23 Tissue from SPINE Staphylococcus " "aureus               12/06/23 Tissue from SPINE Staphylococcus aureus               12/05/23 Urine from Clean Catch/Voided Escherichia coli S R S R R R R R R R R    11/03/23 Swab from Nares/Axilla/Groin Methicillin Resistant Staphylococcus aureus (MRSA)                 Collected Specimen Info Organism Ertapenem Erythromycin Gentamicin Meropenem Nitrofurantoin Oxacillin Piperacillin/Tazobactam Tetracycline Tobramycin Trimethoprim/Sulfamethoxazole Vancomycin   12/11/23 Fluid from ABSCESS Methicillin Resistant Staphylococcus aureus (MRSA)  R    R  S  S S   12/06/23 Swab from SPINE Methicillin Resistant Staphylococcus aureus (MRSA)              12/06/23 Tissue from SPINE Staphylococcus aureus              12/06/23 Tissue from SPINE Staphylococcus aureus              12/05/23 Urine from Clean Catch/Voided Escherichia coli S  R S S  S  I S    11/03/23 Swab from Nares/Axilla/Groin Methicillin Resistant Staphylococcus aureus (MRSA)                   No lab exists for component: \"BC\"  No lab exists for component: \"BLOODCULT2\"  No lab exists for component: \"LABURIN\"  No lab exists for component: \"WNDABS\"  No lab exists for component: \"CULTRESP\"      Imaging:   MR lumbar spine w and wo IV contrast    Result Date: 12/5/2023  Interpreted By:  Jh Benoit,  and Dev Hogue STUDY: MR LUMBAR SPINE W AND WO IV CONTRAST;  12/5/2023 4:39 pm   INDICATION: Signs/Symptoms:Recent laminectomy with fusion, intractable back pain radiates legs.   COMPARISON: MRI 08/20/2023, CT 12/05/2023   ACCESSION NUMBER(S): [id number]   ORDERING CLINICIAN: SHAWN REDDY   TECHNIQUE: Sagittal T1, T2, STIR, axial T1 and T2 weighted images of the lumbar spine were acquired without and following administration of 15 cc Dotarem gadolinium based IV contrast.   FINDINGS: Motion degraded examination.   Alignment: The vertebral alignment is maintained.   Vertebrae/Intervertebral Discs: Postsurgical changes of L4 through S1 posterior fusion noted with bilateral " transpedicular screws and interbody graft placement at L4-5 and L5-S1. There has been bilateral laminectomies at L3-4, L4-5, and L5-S1. The vertebral bodies demonstrate expected height. There is mild osseous edema involving L5 and S1 vertebral bodies. The marrow signal is within normal limits. Multilevel intervertebral disc height loss and disc desiccation noted.   A large dorsal extra-spinal fluid collection is noted extending from the laminectomy bed through the deep muscular fascia into the subcutaneous fat. This collection extends from the right lateral recess at the level of L4-5 as well as L5-S1 into the spinal canal and deforms the thecal sac. A focal defect is noted within the thecal sac at the level of L5-S1 (series 9, image 20). 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 The collections do not demonstrate significant peripheral enhancement to suggest abscess.   Conus: The lower thoracic cord appears unremarkable. The conus terminates at L1.   T12-L1: Disc bulge. There is no significant central canal or neural foraminal stenosis.   L1-2: Disc bulge, ligamentum flavum thickening and facet hypertrophy contribute to minimal central canal narrowing.   L2-3: Disc bulge and mild prominence of the posterior epidural fat resulting in minimal narrowing of the central canal. There is moderate right and minimal left foraminal narrowing due to intraforaminal disc herniation and facet hypertrophy, similar to preoperative examination.   L3-4: Laminectomies. There is moderate narrowing of the thecal sac due to disc bulge and dorsal spinal collection as detailed above. There is unchanged marked bilateral foraminal narrowing due to intraforaminal disc herniation and facet hypertrophic changes with possible impingement of bilateral exiting L3 nerve roots.   L4-5: Bilateral laminectomies. There is moderate narrowing of the central canal with indentation of the thecal sac posteriorly due to the dorsal spinal fluid  collection as detailed above. Moderate right and mild left foraminal narrowing is overall similar to MRI dated 08/20/2020 3D to intraforaminal disc herniations and facet hypertrophic changes.   L5-S1: Bilateral laminectomies. The dorsal spinal fluid collection indents the thecal sac as detailed above. Evaluation of the foramina is limited due to susceptibility artifact from bilateral transpedicular screws.  There is persistent marked left and moderate right foraminal narrowing due to intraforaminal disc herniation and facet hypertrophy with possible impingement of the exiting left L5 nerve root.       1.  Postoperative changes as above. A large dorsal extra-spinal fluid collection is noted extending from the laminectomy bed through the deep muscular fascia into the subcutaneous fat. This collection extends from the right lateral recesses at the level of L4-5 as well as L5-S1 into the spinal canal and deforms the thecal sac resulting in moderate narrowing at L4-5 and to a lesser degree at L3-4. A focal defect is noted within the thecal sac at the level of L5-S1. 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 No significant peripheral enhancement is identified to suggest abscess although the sterility of this collection can not be ascertained on MRI alone. 2. Multilevel degenerative changes again noted with persistent marked bilateral foraminal narrowing at L3-4 and at L5-S1 on the left.     I personally reviewed the images/study and I agree with the findings as stated. This study was interpreted at [place], [place].   MACRO: None   Signed by: Jh Benoit 12/5/2023 5:24 PM Dictation workstation:   [id number]    CT angio chest abdomen pelvis    Result Date: 12/5/2023  Interpreted By:  Samuel Brannon, STUDY: CT ANGIO CHEST ABDOMEN PELVIS;  12/5/2023 2:48 pm   INDICATION: Signs/Symptoms:Severe lower abdomen pain, groin pain, radiates to back, intractable pain.    COMPARISON: May 18, 2022 CTA chest abdomen and pelvis. July 21, 2023 CTA abdomen and pelvis with runoff. August 20, 2023 MRI lumbar spine and December 5, 2023 CT lumbar spine   ACCESSION NUMBER(S): [id number]   ORDERING CLINICIAN: SHAWN REDDY   TECHNIQUE: Axial non-contrast images of the chest, abdomen, and pelvis  with coronal and sagittal reformatted images. Axial CT images of the chest, abdomen and pelvis after the intravenous administration of 100 mL Omnipaque 350 contrast using CT angiographic technique with coronal and sagittal reformatted images.  MIP images were provided and reviewed. 3D reconstructions were performed on a separate independent workstation.   FINDINGS: VASCULAR:   PULMONARY ARTERIES:   No acute pulmonary embolism.   THORACIC AORTA:  Non-contrast images show no evidence of acute intramural hematoma. No thoracic aortic aneurysm or dissection. Moderate scattered atherosclerosis thoracic aorta and branch vessels.   ABDOMINAL AORTA: No abdominal aortic aneurysm or dissection. There is scattered atherosclerosis. 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   CHEST:   MEDIASTINUM AND LYMPH NODES: No enlarged intrathoracic or axillary lymph nodes.   HEART: Normal size.  Moderate coronary artery calcifications. No pericardial effusion.   LUNG, PLEURA, LARGE AIRWAYS: No consolidation, pulmonary edema, pleural effusion, or pneumothorax.   OSSEOUS STRUCTURES/CHEST WALL:    No acute osseous abnormality.     ABDOMEN/PELVIS:   Arterial phase imaging limits evaluation of the solid organs.   ABDOMINAL WALL: Within normal limits.   LIVER: Stable without detected mass. BILE DUCTS: No significant abnormality. GALLBLADDER: Absent   PANCREAS: No significant abnormality.   SPLEEN: No  significant abnormality.   ADRENALS: No significant abnormality.   KIDNEYS, URETERS, BLADDER: No significant abnormality.   VESSELS: See above.  No additional significant abnormality. LYMPH NODES: No enlarged lymph nodes. RETROPERITONEUM:  No significant abnormality.   BOWEL: The stomach and bowel are normal caliber without evident inflammatory change.   PERITONEUM:   No significant ascites, free air, or fluid collection.   REPRODUCTIVE ORGANS: No significant abnormality.   OSSEOUS STRUCTURES:  Refer to same day CT scan lumbar spine report for details regarding that portion. No separate acute osseous abnormalities are identified. There are skin staples dorsal lumbar region. The canal is not reliably evaluated at the operated levels due to artifact from metallic hardware. There is minimal gas within the soft tissues at the operated levels and there is lobulated fluid density extending from vertebral to overlapping paravertebral region to the level of the superficial subcutaneous layer including portion sagittal series 506, image 109 and axial series 502, image 114 measuring 14 x 6 x 6 cm  with internal density measurement of 6 compatible with simple fluid density       No thoracic or abdominal aortic aneurysm or dissection.   Stable pre-existing vascular findings as reported.   Findings compatible with recent lumbar spine surgery with prominent fluid collection overlying the operated site extending through the superficial subcutaneous layer. The canal is not reliably assessed at the operated site due to artifact. Recommend further characterization with MRI lumbar spine with without contrast.   No acute abnormality of the chest, abdomen or pelvis.     Signed by: Samuel Brannon 12/5/2023 3:32 PM Dictation workstation:   [id number]    CT lumbar spine wo IV contrast    Result Date: 12/5/2023  Interpreted By:  Jony Monae, STUDY: CT LUMBAR SPINE WO IV CONTRAST; 12/5/2023 11:28 am   INDICATION: Signs/Symptoms:Low back  pain, recent laminectomy and fusion, fall 1 week ago.   COMPARISON: None.   ACCESSION NUMBER(S): [id number]   ORDERING CLINICIAN: SHAWN REDDY   TECHNIQUE: Contiguous axial CT images were obtained through the lumbar spine at 2 mm slice thickness without contrast administration. The images were then reconstructed in the coronal and sagittal planes.   FINDINGS: OSSEOUS STRUCTURES: The patient is status post L3 through L5 laminectomy, L4 through S1 pedicle screw and fabrizio fusion and L4-5 and L5-S1 disc space implant placement. There is no evidence of acute fracture identified. The vertebral bodies are well aligned without evidence of subluxation.   Mild discogenic degenerative changes are seen at the remaining disc space levels. Mild-to-moderate facet degenerative changes are seen throughout the lumbar spine.   LOWER THORACIC SPINE: No gross central canal or neural foraminal narrowing is seen.   T12-L1: No gross central canal or neural foraminal narrowing is seen.   L1-2: No gross central canal or neural foraminal narrowing is seen.   L2-3: No gross central canal or neural foraminal narrowing is seen.   L3-4: Mild right-sided neural foraminal narrowing is seen. No significant left foraminal or central canal narrowing is seen.   L4-5: No gross central canal or neural foraminal narrowing is seen.   L5-S1: Mild left-sided neural foraminal narrowing is seen. No significant right foraminal or central canal narrowing is seen.   ASSOCIATED STRUCTURES: Evaluation of the visualized soft tissues of the abdomen is limited by the lack of intravenous contrast. Within this limitation, no gross mass or lymphadenopathy is identified.  A fluid collection is seen along the posterior midportion the lower back, likely postoperative in nature.       1. No acute fracture identified. 2. Postoperative and degenerative changes, as described above.   MACRO: None.   Signed by: Jony Monae 12/5/2023 11:45 AM Dictation workstation:    [id number]       IMPRESSION:    Postop deep incisional wound infection of lumbar spine with probable infected hematoma/abscess. Persistent, stable    MRSA infection in a patient who is a chronic carrier  E. coli UTI, resolved  Status post lumbar laminectomy  Anemia 2ry to blood loss    Patient Active Problem List   Diagnosis    Abdominal aortic aneurysm (CMS/HCC)    Abnormal EKG    Bilateral carotid artery stenosis    Bruit of right carotid artery    CAD in native artery    Cardiomyopathy (CMS/HCC)    Chest pain    Chronic asthmatic bronchitis    Closed displaced fracture of fifth metatarsal bone    Current every day smoker    Difficulty breathing    Essential hypertension    History of total knee replacement    Hypokalemia    Intermittent claudication (CMS/HCC)    Knee osteoarthritis    Knee pain    Limb pain    Localized swelling, mass, or lump of lower extremity    Celiac artery stenosis (CMS/HCC)    Mesenteric artery stenosis (CMS/HCC)    Mixed hyperlipidemia    Obese    PVD (peripheral vascular disease) (CMS/HCC)    Right foot pain    Swelling    Trigger finger    Urinary tract infection without hematuria    Back pain of lumbar region with sciatica    Back pain with radiculopathy    Intractable back pain    Seroma, post-traumatic (CMS/HCC)    Lumbar surgical wound fluid collection       PLAN:  1 more day of nitrofurantoin to finish 1 week course for UTI  continue IV vancomycin, aim at a trough level of 15-20 as discussed with pharmacy who is adjusting the dose  Arrange for 6 weeks of IV vancomycin postdischarge  Follow-up CBC BMP and Vanco levels  local wound care per orthopedics      Discussed with patient and     Gem Reyna MD

## 2023-12-13 NOTE — PROGRESS NOTES
Occupational Therapy    OT Treatment    Patient Name: Tara Tierney  MRN: [id number]  Today's Date: 12/13/2023  Time Calculation  Start Time: 1410  Stop Time: 1433  Time Calculation (min): 23 min         Assessment:  Medical Staff Made Aware: Yes  End of Session Communication: Bedside nurse  End of Session Patient Position: Up in chair, Alarm off, not on at start of session  Medical Staff Made Aware: Yes  Plan:  Treatment Interventions: ADL retraining, Functional transfer training, Endurance training  OT Frequency: 2 times per week  Treatment Interventions: ADL retraining, Functional transfer training, Endurance training    Subjective   Previous Visit Info:  OT Last Visit  OT Received On: 12/13/23  General:  General  Past Medical History Relevant to Rehab: 12/11 additional I&D performed  Prior to Session Communication: Bedside nurse  Patient Position Received: Up in chair, Alarm off, not on at start of session  General Comment: patient reports doing okay frustrated with pain and LOS  Precautions:  Medical Precautions: Fall precautions  Post-Surgical Precautions: Spinal precautions  Braces Applied: TLSO brace when OOB  Precautions Comment: Recent L3-4, L4-5, L5-S1 Laminectomy([facility]). Had I&D (12/06/23) and second I&D(12/11/23)     Pain:  Pain Assessment  Pain Assessment: 0-10  Pain Score: 7  Pain Type: Surgical pain  Pain Location: Back    Objective    Cognition:  Cognition  Overall Cognitive Status: Within Functional Limits     Activities of Daily Living: Grooming  Grooming Level of Assistance:  (oral care and washed face after set up and CGA in stance at sink.  patient used rinse free shampoo cap and brushed hair seated in recliner with SBA)    UE Dressing  UE Dressing Level of Assistance:  (donned brace with min shannon rico- CGA)  UE Dressing Where Assessed: Recliner  Functional Standing Tolerance:  Functional Standing Tolerance Comments: patient stood for a total of 4 mins this date with fair balance with  2-0 ue support as needed during functional ambulation/transfers and ADL tasks  Bed Mobility/Transfers: Transfers  Transfer: Yes  Transfer 1  Technique 1: Sit to stand  Transfer Device 1: Walker  Transfer Level of Assistance 1: Contact guard  Transfers 2  Transfer Device 2: Walker  Transfer Level of Assistance 2: Contact guard  Trials/Comments 2: functional ambulation    Sitting Balance:  Dynamic Sitting Balance  Dynamic Sitting-Balance:  (fair+)  Standing Balance:  Dynamic Standing Balance  Dynamic Standing-Balance:  (fair balance)    Outcome Measures:[facility] Daily Activity  Putting on and taking off regular lower body clothing: A lot  Bathing (including washing, rinsing, drying): A lot  Putting on and taking off regular upper body clothing: A little  Toileting, which includes using toilet, bedpan or urinal: A little  Taking care of personal grooming such as brushing teeth: A little  Eating Meals: A little  Daily Activity - Total Score: 16    Education Documentation  Precautions, taught by CHRISTOPHER Hill at 12/13/2023  3:49 PM.  Learner: Patient  Readiness: Acceptance  Method: Explanation  Response: Needs Reinforcement    ADL Training, taught by CHRISTOPHER Hill at 12/13/2023  3:49 PM.  Learner: Patient  Readiness: Acceptance  Method: Explanation  Response: Needs Reinforcement    Education Comments  No comments found.    IP EDUCATION:  Education  Individual(s) Educated: Patient  Education Provided: POC discussed and agreed upon, Risk and benefits of OT discussed with patient or other, Fall precautons, Diagnosis & Precautions, Symptom management, Joint protection and energy conservation, Ergonomics and postural realignment  Equipment:  (AE, home DME and EC tech)  Patient/Caregiver Demonstrated Understanding: yes  Plan of Care Discussed and Agreed Upon: yes  Patient Response to Education: Patient/Caregiver Verbalized Understanding of Information    Goals:  Encounter Problems       Encounter Problems (Active)        OT Goals       Mod I for all functional transfers  (Progressing)       Start:  12/07/23    Expected End:  12/21/23            Mod I LB dressing with reacher/sock aid  (Progressing)       Start:  12/07/23    Expected End:  12/21/23            Good dyn standing balance during ADLs, standing sinkside grooming, and functional tasks  (Progressing)       Start:  12/07/23    Expected End:  12/21/23

## 2023-12-13 NOTE — CONSULTS
Consults    Reason For Consult  Severe back pain and bilateral lower extremity pain.    History Of Present Illness  Tara Tierney is a 68 y.o. female status post back surgery x 3.  The first surgery the patient had L3-4 laminectomy with L4-5 TLIF.  The patient developed some collection and the patient went back for washout and the leave of growing MRSA of the fluid collected.  The patient described her pain to be severe 9 out of 10 on the numeric scale increased with movement minimally decreased with rest and the pain medications.  Patient denied any paresthesia, tingling or numbness sensation and she denied any urinary or bowel changes.  No fever.     Past Medical History  She  has a past medical history of Arthritis, Back pain, COPD (chronic obstructive pulmonary disease) (CMS/[facility]), COVID-19 vaccine administered, Eczema, History of COVID-19, Hyperlipidemia, Hypertension, Hypoesthesia of skin, Macular degeneration, Meniere's disease, Osteoporosis, Overactive bladder, Pain in unspecified finger(s), Pain in unspecified wrist, Peripheral vascular disease (CMS/[facility]), Personal history of other diseases of the circulatory system, Personal history of other diseases of the musculoskeletal system and connective tissue, Personal history of other specified conditions, Personal history of other specified conditions, Personal history of other specified conditions, Postmenopausal, Seasonal allergies, and Unspecified visual loss.    Surgical History  She has a past surgical history that includes Tonsillectomy; Cardiac catheterization; Colonoscopy; FL transluminal angio percutaneous venus; Total knee arthroplasty (Bilateral); Cholecystectomy; Aorta - bilateral femoral artery bypass graft; CT angio aorta and bilateral iliofemoral runoff w and or wo IV contrast (03/10/2020); CT angio neck (05/18/2022); CT angio aorta and bilateral iliofemoral runoff w and or wo IV contrast (07/21/2023); Adenoidectomy; Tubal ligation; and  Blepharoptosis repair.     Social History  She reports that she quit smoking about 3 months ago. Her smoking use included cigarettes. She has a 22.50 pack-year smoking history. She does not have any smokeless tobacco history on file. She reports that she does not drink alcohol and does not use drugs.    Family History  Family History   Problem Relation Name Age of Onset    Breast cancer Mother      Other (arteriosclerotic cardiovascular disease) Father      Cancer Father          Allergies  Neomycin and Neomycin-polymyxin b-dexameth    Review of Systems  14 systems symptoms were inquired about and what was positive was placed in the history of present illness.     Physical Exam  Patient is alert conscious oriented x 3.  HEENT and within normal limit.  Bilateral breath sounds  Heart S1-S2 no S3  Abdominal exam normal  Neurological examination cranial nerves II through XII within normal limit  Motor and sensory of upper and lower extremity within normal limits       Last Recorded Vitals  /53 (BP Location: Left arm, Patient Position: Lying)   Pulse 85   Temp 36.8 °C (98.2 °F) (Temporal)   Resp 17   Wt 84.7 kg (186 lb 11.7 oz)   SpO2 94%     Relevant Results  Imaging:   MR lumbar spine w and wo IV contrast     Result Date: 12/5/2023  Interpreted By:  Jh Benoit and Tavana Shahrzad STUDY: MR LUMBAR SPINE W AND WO IV CONTRAST;  12/5/2023 4:39 pm   INDICATION: Signs/Symptoms:Recent laminectomy with fusion, intractable back pain radiates legs.   COMPARISON: MRI 08/20/2023, CT 12/05/2023   ACCESSION NUMBER(S): [id number]   ORDERING CLINICIAN: SHAWN REDDY   TECHNIQUE: Sagittal T1, T2, STIR, axial T1 and T2 weighted images of the lumbar spine were acquired without and following administration of 15 cc Dotarem gadolinium based IV contrast.   FINDINGS: Motion degraded examination.   Alignment: The vertebral alignment is maintained.   Vertebrae/Intervertebral Discs: Postsurgical changes of L4 through S1 posterior  fusion noted with bilateral transpedicular screws and interbody graft placement at L4-5 and L5-S1. There has been bilateral laminectomies at L3-4, L4-5, and L5-S1. The vertebral bodies demonstrate expected height. There is mild osseous edema involving L5 and S1 vertebral bodies. The marrow signal is within normal limits. Multilevel intervertebral disc height loss and disc desiccation noted.   A large dorsal extra-spinal fluid collection is noted extending from the laminectomy bed through the deep muscular fascia into the subcutaneous fat. This collection extends from the right lateral recess at the level of L4-5 as well as L5-S1 into the spinal canal and deforms the thecal sac. A focal defect is noted within the thecal sac at the level of L5-S1 (series 9, image 20). 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 The collections do not demonstrate significant peripheral enhancement to suggest abscess.   Conus: The lower thoracic cord appears unremarkable. The conus terminates at L1.   T12-L1: Disc bulge. There is no significant central canal or neural foraminal stenosis.   L1-2: Disc bulge, ligamentum flavum thickening and facet hypertrophy contribute to minimal central canal narrowing.   L2-3: Disc bulge and mild prominence of the posterior epidural fat resulting in minimal narrowing of the central canal. There is moderate right and minimal left foraminal narrowing due to intraforaminal disc herniation and facet hypertrophy, similar to preoperative examination.   L3-4: Laminectomies. There is moderate narrowing of the thecal sac due to disc bulge and dorsal spinal collection as detailed above. There is unchanged marked bilateral foraminal narrowing due to intraforaminal disc herniation and facet hypertrophic changes with possible impingement of bilateral exiting L3 nerve roots.   L4-5: Bilateral laminectomies. There is moderate narrowing of the central canal with indentation of the thecal sac posteriorly due to  the dorsal spinal fluid collection as detailed above. Moderate right and mild left foraminal narrowing is overall similar to MRI dated 08/20/2020 3D to intraforaminal disc herniations and facet hypertrophic changes.   L5-S1: Bilateral laminectomies. The dorsal spinal fluid collection indents the thecal sac as detailed above. Evaluation of the foramina is limited due to susceptibility artifact from bilateral transpedicular screws.  There is persistent marked left and moderate right foraminal narrowing due to intraforaminal disc herniation and facet hypertrophy with possible impingement of the exiting left L5 nerve root.        1.  Postoperative changes as above. A large dorsal extra-spinal fluid collection is noted extending from the laminectomy bed through the deep muscular fascia into the subcutaneous fat. This collection extends from the right lateral recesses at the level of L4-5 as well as L5-S1 into the spinal canal and deforms the thecal sac resulting in moderate narrowing at L4-5 and to a lesser degree at L3-4. A focal defect is noted within the thecal sac at the level of L5-S1. 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 No significant peripheral enhancement is identified to suggest abscess although the sterility of this collection can not be ascertained on MRI alone. 2. Multilevel degenerative changes again noted with persistent marked bilateral foraminal narrowing at L3-4 and at L5-S1 on the left.     I personally reviewed the images/study and I agree with the findings as stated. This study was interpreted at [facility], [place], [place].   MACRO: None   Signed by: Jh Benoit 12/5/2023 5:24 PM Dictation workstation:   [id number]        Assessment/Plan     68 years old lady status post laminectomy status post washout x 3.  Patient has severe back pain with bilateral lower extremity pain as well.  Its constant and increasing with movement.  It appears that fentanyl  patch did not help her so I would like to replace it with MS Contin 15 mg twice daily with the immediate release oxycodone for breakthrough pain.    Panchito Barnes MD

## 2023-12-13 NOTE — PROGRESS NOTES
"Vancomycin Dosing by Pharmacy- FOLLOW UP    Tara Tierney is a 68 y.o. year old female who Pharmacy has been consulted for vancomycin dosing for osteomyelitis/septic arthritis. Based on the patient's indication and renal status this patient is being dosed based on a goal AUC of 400-600.     Renal function is currently stable.    Current vancomycin dose: 1000 mg given every 12 hours    Estimated vancomycin AUC on current dose: 545 mg/L.hr     Visit Vitals  /53 (BP Location: Left arm, Patient Position: Lying)   Pulse 85   Temp 36.8 °C (98.2 °F) (Temporal)   Resp 17        Lab Results   Component Value Date    CREATININE 0.80 12/13/2023    CREATININE 0.82 12/12/2023    CREATININE 0.80 12/11/2023    CREATININE 0.76 12/10/2023        Patient weight is No results found for: \"PTWEIGHT\"    No results found for: \"CULTURE\"     I/O last 3 completed shifts:  In: 850 (10 mL/kg) [IV Piggyback:850]  Out: 2100 (24.8 mL/kg) [Urine:1800 (0.6 mL/kg/hr); Drains:300]  Weight: 84.7 kg   [unfilled]    No results found for: \"PATIENTTEMP\"     Assessment/Plan    Within goal AUC range. Continue current vancomycin regimen.  This dosing regimen is predicted by [employer] to result in the following pharmacokinetic parameters:  <<Regimen: 1000 mg IV every 12 hours.  Start time: 14:00 on 12/13/2023  Exposure target: AUC24 (range)400-600 mg/L.hr   AUC24,ss: 545 mg/L.hr  Probability of AUC24 > 400: 100 %  Ctrough,ss: 15.3 mg/L  Probability of Ctrough,ss > 20: 0 %  Probability of nephrotoxicity (Lodise ASHLEY 2009): 11 %  The next level will be obtained on 12/15/2023 at 1300. May be obtained sooner if clinically indicated.   Will continue to monitor renal function daily while on vancomycin and order serum creatinine at least every 48 hours if not already ordered.  Follow for continued vancomycin needs, clinical response, and signs/symptoms of toxicity.       ELAN Payne Ph.            "

## 2023-12-13 NOTE — PROGRESS NOTES
"Tara Tierney is a 68 y.o. female on day 5 of admission presenting with Intractable back pain.    Subjective   Patient seen and examined.  Continues to have significant back pain, no fevers, chills, nausea, vomiting.  She is unable to move much because of persistent back pain.       Objective     Physical Exam  Constitutional:       Appearance: She is obese.   HENT:      Head: Normocephalic.   Eyes:      Extraocular Movements: Extraocular movements intact.      Conjunctiva/sclera: Conjunctivae normal.   Cardiovascular:      Rate and Rhythm: Normal rate and regular rhythm.   Pulmonary:      Effort: No dyspnea, no distress present.   Abdominal:      General: There is no distension.      Palpations: Abdomen is soft.   Last Recorded Vitals  Blood pressure 114/53, pulse 85, temperature 36.8 °C (98.2 °F), temperature source Temporal, resp. rate 17, height 1.448 m (4' 9\"), weight 84.7 kg (186 lb 11.7 oz), SpO2 94 %.  Intake/Output last 3 Shifts:  I/O last 3 completed shifts:  In: 850 (10 mL/kg) [IV Piggyback:850]  Out: 2100 (24.8 mL/kg) [Urine:1800 (0.6 mL/kg/hr); Drains:300]  Weight: 84.7 kg     Relevant Results              This patient has a central line   Reason for the central line remaining today? Parenteral medication                 Assessment/Plan   Principal Problem:    Intractable back pain  Active Problems:    Back pain with radiculopathy    Seroma, post-traumatic (CMS/HCC)    Lumbar surgical wound fluid collection    She continues to have pain  Repeat MRI showed fluid collection deep had significantly decreased superficial collection was replaced with more blood products.  S/p second washout 12/11/2023  We will continue IV vancomycin  She will need right for 6 weeks postdischarge  Will need weekly CBC and BMP  Pharmacy to come up with a dose  Continue pain control, supportive care  Continue PT OT  Sodium has been stable  On Macrobid for UTI  Pain management consulted  Hb 7.1; will transfuse if drops below " 7  DVT prophylaxis          Osvaldo Diaz MD

## 2023-12-14 LAB
ANION GAP SERPL CALC-SCNC: 12 MMOL/L (ref 10–20)
BACTERIA FLD CULT: ABNORMAL
BACTERIA SPEC CULT: ABNORMAL
BUN SERPL-MCNC: 13 MG/DL (ref 6–23)
CALCIUM SERPL-MCNC: 8.6 MG/DL (ref 8.6–10.3)
CHLORIDE SERPL-SCNC: 98 MMOL/L (ref 98–107)
CO2 SERPL-SCNC: 26 MMOL/L (ref 21–32)
CREAT SERPL-MCNC: 0.74 MG/DL (ref 0.5–1.05)
ERYTHROCYTE [DISTWIDTH] IN BLOOD BY AUTOMATED COUNT: 13.3 % (ref 11.5–14.5)
GFR SERPL CREATININE-BSD FRML MDRD: 87 ML/MIN/1.73M*2
GLUCOSE SERPL-MCNC: 125 MG/DL (ref 74–99)
GRAM STN SPEC: ABNORMAL
HCT VFR BLD AUTO: 22.9 % (ref 36–46)
HGB BLD-MCNC: 7.5 G/DL (ref 12–16)
MCH RBC QN AUTO: 31.8 PG (ref 26–34)
MCHC RBC AUTO-ENTMCNC: 32.8 G/DL (ref 32–36)
MCV RBC AUTO: 97 FL (ref 80–100)
NRBC BLD-RTO: 0 /100 WBCS (ref 0–0)
PLATELET # BLD AUTO: 217 X10*3/UL (ref 150–450)
POTASSIUM SERPL-SCNC: 3.8 MMOL/L (ref 3.5–5.3)
RBC # BLD AUTO: 2.36 X10*6/UL (ref 4–5.2)
SODIUM SERPL-SCNC: 132 MMOL/L (ref 136–145)
WBC # BLD AUTO: 7.4 X10*3/UL (ref 4.4–11.3)

## 2023-12-14 PROCEDURE — 2500000001 HC RX 250 WO HCPCS SELF ADMINISTERED DRUGS (ALT 637 FOR MEDICARE OP): Performed by: FAMILY MEDICINE

## 2023-12-14 PROCEDURE — 1210000001 HC SEMI-PRIVATE ROOM DAILY

## 2023-12-14 PROCEDURE — 2500000004 HC RX 250 GENERAL PHARMACY W/ HCPCS (ALT 636 FOR OP/ED): Performed by: INTERNAL MEDICINE

## 2023-12-14 PROCEDURE — 82374 ASSAY BLOOD CARBON DIOXIDE: CPT | Performed by: INTERNAL MEDICINE

## 2023-12-14 PROCEDURE — 2500000001 HC RX 250 WO HCPCS SELF ADMINISTERED DRUGS (ALT 637 FOR MEDICARE OP): Performed by: ANESTHESIOLOGY

## 2023-12-14 PROCEDURE — 99232 SBSQ HOSP IP/OBS MODERATE 35: CPT | Performed by: INTERNAL MEDICINE

## 2023-12-14 PROCEDURE — 2500000001 HC RX 250 WO HCPCS SELF ADMINISTERED DRUGS (ALT 637 FOR MEDICARE OP): Performed by: STUDENT IN AN ORGANIZED HEALTH CARE EDUCATION/TRAINING PROGRAM

## 2023-12-14 PROCEDURE — 2500000004 HC RX 250 GENERAL PHARMACY W/ HCPCS (ALT 636 FOR OP/ED): Performed by: STUDENT IN AN ORGANIZED HEALTH CARE EDUCATION/TRAINING PROGRAM

## 2023-12-14 PROCEDURE — 96372 THER/PROPH/DIAG INJ SC/IM: CPT | Performed by: STUDENT IN AN ORGANIZED HEALTH CARE EDUCATION/TRAINING PROGRAM

## 2023-12-14 PROCEDURE — 85027 COMPLETE CBC AUTOMATED: CPT | Performed by: INTERNAL MEDICINE

## 2023-12-14 PROCEDURE — 97530 THERAPEUTIC ACTIVITIES: CPT | Mod: GP,CQ

## 2023-12-14 PROCEDURE — 2500000002 HC RX 250 W HCPCS SELF ADMINISTERED DRUGS (ALT 637 FOR MEDICARE OP, ALT 636 FOR OP/ED): Performed by: STUDENT IN AN ORGANIZED HEALTH CARE EDUCATION/TRAINING PROGRAM

## 2023-12-14 RX ADMIN — PREGABALIN 75 MG: 50 CAPSULE ORAL at 21:52

## 2023-12-14 RX ADMIN — MORPHINE SULFATE 15 MG: 15 TABLET, EXTENDED RELEASE ORAL at 00:30

## 2023-12-14 RX ADMIN — DOCUSATE SODIUM 100 MG: 100 CAPSULE, LIQUID FILLED ORAL at 08:31

## 2023-12-14 RX ADMIN — PHENAZOPYRIDINE HYDROCHLORIDE 100 MG: 100 TABLET ORAL at 17:26

## 2023-12-14 RX ADMIN — HEPARIN SODIUM 5000 UNITS: 5000 INJECTION INTRAVENOUS; SUBCUTANEOUS at 11:59

## 2023-12-14 RX ADMIN — PHENAZOPYRIDINE HYDROCHLORIDE 100 MG: 100 TABLET ORAL at 11:56

## 2023-12-14 RX ADMIN — ISOSORBIDE MONONITRATE 60 MG: 60 TABLET, EXTENDED RELEASE ORAL at 08:43

## 2023-12-14 RX ADMIN — MORPHINE SULFATE 15 MG: 15 TABLET, EXTENDED RELEASE ORAL at 08:35

## 2023-12-14 RX ADMIN — PHENAZOPYRIDINE HYDROCHLORIDE 100 MG: 100 TABLET ORAL at 08:36

## 2023-12-14 RX ADMIN — BRIMONIDINE TARTRATE 1 DROP: 2 SOLUTION/ DROPS OPHTHALMIC at 08:43

## 2023-12-14 RX ADMIN — Medication 2000 UNITS: at 08:42

## 2023-12-14 RX ADMIN — OXYCODONE HYDROCHLORIDE 5 MG: 5 TABLET ORAL at 18:59

## 2023-12-14 RX ADMIN — LORATADINE 10 MG: 10 TABLET ORAL at 08:32

## 2023-12-14 RX ADMIN — HEPARIN SODIUM 5000 UNITS: 5000 INJECTION INTRAVENOUS; SUBCUTANEOUS at 21:52

## 2023-12-14 RX ADMIN — CYCLOBENZAPRINE HYDROCHLORIDE 5 MG: 10 TABLET, FILM COATED ORAL at 13:44

## 2023-12-14 RX ADMIN — SIMVASTATIN 40 MG: 40 TABLET, FILM COATED ORAL at 08:34

## 2023-12-14 RX ADMIN — HEPARIN SODIUM 5000 UNITS: 5000 INJECTION INTRAVENOUS; SUBCUTANEOUS at 04:20

## 2023-12-14 RX ADMIN — OXYCODONE HYDROCHLORIDE 10 MG: 5 TABLET ORAL at 11:56

## 2023-12-14 RX ADMIN — EZETIMIBE 10 MG: 10 TABLET ORAL at 08:38

## 2023-12-14 RX ADMIN — MIRTAZAPINE 15 MG: 15 TABLET, FILM COATED ORAL at 21:53

## 2023-12-14 RX ADMIN — MORPHINE SULFATE 15 MG: 15 TABLET, EXTENDED RELEASE ORAL at 21:52

## 2023-12-14 RX ADMIN — VANCOMYCIN HYDROCHLORIDE 1000 MG: 1 INJECTION, SOLUTION INTRAVENOUS at 05:42

## 2023-12-14 RX ADMIN — VANCOMYCIN HYDROCHLORIDE 1000 MG: 1 INJECTION, SOLUTION INTRAVENOUS at 17:26

## 2023-12-14 RX ADMIN — HYDRALAZINE HYDROCHLORIDE 50 MG: 50 TABLET ORAL at 08:36

## 2023-12-14 RX ADMIN — SIMVASTATIN 40 MG: 40 TABLET, FILM COATED ORAL at 21:52

## 2023-12-14 RX ADMIN — BRIMONIDINE TARTRATE 1 DROP: 2 SOLUTION/ DROPS OPHTHALMIC at 21:53

## 2023-12-14 RX ADMIN — ISOSORBIDE MONONITRATE 30 MG: 30 TABLET, EXTENDED RELEASE ORAL at 08:33

## 2023-12-14 RX ADMIN — PREGABALIN 75 MG: 50 CAPSULE ORAL at 08:37

## 2023-12-14 RX ADMIN — PANTOPRAZOLE SODIUM 40 MG: 40 TABLET, DELAYED RELEASE ORAL at 06:52

## 2023-12-14 RX ADMIN — NITROFURANTOIN (MONOHYDRATE/MACROCRYSTALS) 100 MG: 75; 25 CAPSULE ORAL at 21:52

## 2023-12-14 RX ADMIN — HYDRALAZINE HYDROCHLORIDE 50 MG: 50 TABLET ORAL at 21:52

## 2023-12-14 RX ADMIN — NITROFURANTOIN (MONOHYDRATE/MACROCRYSTALS) 100 MG: 75; 25 CAPSULE ORAL at 08:37

## 2023-12-14 RX ADMIN — ATENOLOL 50 MG: 50 TABLET ORAL at 08:35

## 2023-12-14 RX ADMIN — DOCUSATE SODIUM 100 MG: 100 CAPSULE, LIQUID FILLED ORAL at 21:52

## 2023-12-14 RX ADMIN — BUSPIRONE HYDROCHLORIDE 10 MG: 5 TABLET ORAL at 08:32

## 2023-12-14 ASSESSMENT — PAIN - FUNCTIONAL ASSESSMENT
PAIN_FUNCTIONAL_ASSESSMENT: 0-10

## 2023-12-14 ASSESSMENT — COGNITIVE AND FUNCTIONAL STATUS - GENERAL
MOVING FROM LYING ON BACK TO SITTING ON SIDE OF FLAT BED WITH BEDRAILS: A LOT
CLIMB 3 TO 5 STEPS WITH RAILING: A LOT
WALKING IN HOSPITAL ROOM: A LITTLE
MOVING TO AND FROM BED TO CHAIR: A LITTLE
TURNING FROM BACK TO SIDE WHILE IN FLAT BAD: A LOT
STANDING UP FROM CHAIR USING ARMS: A LITTLE
MOBILITY SCORE: 15

## 2023-12-14 ASSESSMENT — PAIN DESCRIPTION - DESCRIPTORS
DESCRIPTORS: SHARP
DESCRIPTORS: SHARP

## 2023-12-14 ASSESSMENT — PAIN SCALES - GENERAL
PAINLEVEL_OUTOF10: 5 - MODERATE PAIN
PAINLEVEL_OUTOF10: 6
PAINLEVEL_OUTOF10: 7
PAINLEVEL_OUTOF10: 3

## 2023-12-14 NOTE — PROGRESS NOTES
ADOD tomorrow w/ IV Vancomycin 1 gm every 12 hrs /PICC line to [facility]. Auth good thru 12/18.  I returned the call to Sabina Sevilla , reaching her  Vm. Left my contact info.

## 2023-12-14 NOTE — PROGRESS NOTES
"Tara Tierney is a 68 y.o. female on day 6 of admission presenting with Intractable back pain.    Subjective   Patient seen and examined.  Continues to have significant back pain, no fevers, chills, nausea, vomiting.  She is unable to move much because of persistent back pain.       Objective     Physical Exam  Constitutional:       Appearance: She is obese.   HENT:      Head: Normocephalic.   Eyes:      Extraocular Movements: Extraocular movements intact.      Conjunctiva/sclera: Conjunctivae normal.   Cardiovascular:      Rate and Rhythm: Normal rate and regular rhythm.   Pulmonary:      Effort: No dyspnea, no distress present.   Abdominal:      General: There is no distension.      Palpations: Abdomen is soft.   Last Recorded Vitals  Blood pressure 127/56, pulse 84, temperature 36.9 °C (98.4 °F), resp. rate 17, height 1.448 m (4' 9\"), weight 84.7 kg (186 lb 11.7 oz), SpO2 99 %.  Intake/Output last 3 Shifts:  I/O last 3 completed shifts:  In: 600 (7.1 mL/kg) [IV Piggyback:600]  Out: 2120 (25 mL/kg) [Urine:1950 (0.6 mL/kg/hr); Drains:170]  Weight: 84.7 kg     Relevant Results              This patient has a central line   Reason for the central line remaining today? Parenteral medication                 Assessment/Plan   Principal Problem:    Intractable back pain  Active Problems:    Back pain with radiculopathy    Seroma, post-traumatic (CMS/HCC)    Lumbar surgical wound fluid collection    She continues to have pain  Repeat MRI showed fluid collection deep had significantly decreased superficial collection was replaced with more blood products.  S/p second washout 12/11/2023  We will continue IV vancomycin  She will need right for 6 weeks postdischarge  Will need weekly CBC and BMP  Pharmacy to come up with a dose  Continue pain control, supportive care  Continue PT OT  Sodium has been stable  On Macrobid for UTI  Pain management consulted  Hb 7.5; will transfuse if drops below 7  DVT " prophylaxis          Osvaldo Diaz MD

## 2023-12-14 NOTE — PROGRESS NOTES
DAILY PROGRESS NOTE      POD 3 irrigation and debridement superficial lumbar spine. Exploration of spinal fusion lumbar spine.     Patient doing well  Visit Vitals  /56   Pulse 84   Temp 36.9 °C (98.4 °F)   Resp 17      Temp (24hrs), Av.9 °C (98.5 °F), Min:36.9 °C (98.4 °F), Max:37.1 °C (98.8 °F)       Pain Control Good  No chest pain or shortness of breath.  No calf pain    Exam:   Extremity shows neuro vascular status intact. Flexion and extension intact on extremity.  Calves soft and non-tender without evidence of DVT.  Two JOSE drains in place working appropriately.     Labs reviewed:  CBC: @LABRCNT(WBC:3,HGB:3,PLT:3)@  BMP:  @LABRCNT(Na:3,K:3,CL:3,CO2:3,BUN:3,Creatinine:3,Glucose:3)@  Preliminary tissue/wound culture from yesterday shows no organisms.   Pr  I&O  I/O last 3 completed shifts:  In: 600 (7.1 mL/kg) [IV Piggyback:600]  Out: 2120 (25 mL/kg) [Urine:1950 (0.6 mL/kg/hr); Drains:170]  Weight: 84.7 kg       Assessment:    Patient doing good postoperatively. She continues to have some pain issues will add 12.5 mcg fentanyl patch to help  optimize pain control. Continues to have significant postopeative pain with addition of fentanyl patch. Will consult pain management.     Appreciate pain management recommendations to help optimize patient's pain control.   She continues to have lower extremity pain right greater than left.       Plan:    Continue PT/OT  Continue drains until directed to remove  Continue IV atb per ID  Continue pain control per pain management recommendations     Edi Olguin, DARRYL-CNP   2023 10:44 AM

## 2023-12-14 NOTE — PROGRESS NOTES
Physical Therapy    Physical Therapy Treatment    Patient Name: Tara Tierney  MRN: [id number]  Today's Date: 12/14/2023  Time Calculation  Start Time: 1057  Stop Time: 1125  Time Calculation (min): 28 min       Assessment/Plan   End of Session Communication: Bedside nurse, PCT/NA/CTA  Assessment Comment: Call-light, phone, and traytable within reach.  End of Session Patient Position: Up in chair, Alarm off, not on at start of session (Ice packs provided at end of session; nursing aware.)  PT Plan  Inpatient/Swing Bed or Outpatient: Inpatient  Treatment/Interventions: Transfer training, Gait training  PT Plan: Skilled PT  PT Frequency: Daily  PT Discharge Recommendations: Moderate intensity level of continued care, High intensity level of continued care  Equipment Recommended upon Discharge: Wheeled walker   PT Recommended Transfer Status: Assist x1    General Visit Information:   PT  Visit  PT Received On: 12/14/23  General  Missed Visit: Yes  Missed Visit Reason: Other (Comment)  Family/Caregiver Present: No  Prior to Session Communication: Bedside nurse  Patient Position Received: Bed, 3 rail up, Alarm on  General Comment: Pleasant and cooperative. Feeling frustrated with situation, but motivated to participate in therapy.    General Observations:   General Observation: x2 JOSE drains; TLSO brace donned(in standing 2° unable to secure it while sitting).    Subjective     Precautions:  Precautions  Medical Precautions: Fall precautions  Post-Surgical Precautions: Spinal precautions  Braces Applied: TLSO brace when OOB  Precautions Comment: Recent L3-4, L4-5, L5-S1 Laminectomy([facility]). Had I&D (12/06/23) and second I&D(12/11/23)    Objective     Pain:  Pain Assessment  Pain Assessment: 0-10 (8/10 pain back and (B)groins(that goes down thighs and beyond knees today; (R)LE continues to be worse than (L)LE per patient). Patient states ice does help to ease pain a little in combination with pain medicine. Nursing  aware.)    Treatments:        Balance/Neuromuscular Re-Education  Balance/Neuromuscular Re-Education Activity Performed: Yes  Balance/Neuromuscular Re-Education Activity 1: Tolerated standing trials ~2-3min(2x). Dynamic reaching within arms length and x0-1UE support to maintain balance. v/c to keep knees extended with wt shifting to prevent the risk of buckling.  Bed Mobility  Bed Mobility: Yes  Bed Mobility 1  Bed Mobility 1: Supine to sitting  Level of Assistance 1: Minimum assistance  Bed Mobility Comments 1: HOB ~35degrees. (A) to move LEs over the EOB. Slow with transition to EOB. v/c for logroll technique to avoid twisting. Grimacing, but state it felt good once LEs were over the EOB.  Ambulation/Gait Training  Ambulation/Gait Training Performed: Yes  Ambulation/Gait Training 1  Surface 1: Level tile  Device 1: Rolling walker  Assistance 1: Minimum assistance  Comments/Distance (ft) 1: ~50ft x2. NBOS. Slow cathi. Step through gait pattern. Decreased step height/length B. v/c and (A) for safer maneuvering of ww with 90/180° turns to avoid twisting. No LOB.  Transfers  Transfer: Yes  Transfer 1  Technique 1: Sit to stand, Stand to sit  Transfer Device 1:  (ww)  Transfer Level of Assistance 1: Minimum assistance  Trials/Comments 1: (3x). v/c for safe hand placement and technique. Slow transition of hands to/from ww.          Outcome Measures:  [facility] Basic Mobility  Turning from your back to your side while in a flat bed without using bedrails: A lot  Moving from lying on your back to sitting on the side of a flat bed without using bedrails: A lot  Moving to and from bed to chair (including a wheelchair): A little  Standing up from a chair using your arms (e.g. wheelchair or bedside chair): A little  To walk in hospital room: A little  Climbing 3-5 steps with railing: A lot  Basic Mobility - Total Score: 15    Education Documentation  Precautions, taught by Jeanine Bryan PTA at 12/14/2023  1:32 PM.  Learner:  Patient  Readiness: Acceptance  Method: Explanation, Demonstration  Response: Verbalizes Understanding, Needs Reinforcement  Comment: See therapy note.    Body Mechanics, taught by Jeanine Bryan PTA at 12/14/2023  1:32 PM.  Learner: Patient  Readiness: Acceptance  Method: Explanation, Demonstration  Response: Verbalizes Understanding, Needs Reinforcement  Comment: See therapy note.    Mobility Training, taught by Jeanine Bryan PTA at 12/14/2023  1:32 PM.  Learner: Patient  Readiness: Acceptance  Method: Explanation, Demonstration  Response: Verbalizes Understanding, Needs Reinforcement  Comment: See therapy note.    EDUCATION:  Individual(s) Educated: Patient  Education Provided: Body Mechanics, Fall Risk, POC, Post-Op Precautions (Safety with bed mobility/transfers/amb with awareness of spinal precautions.)      Encounter Problems       Encounter Problems (Active)       PT Problem       Pt will demonstrate sup > sit and sit > sup bed mobility mod I with log roll technique (Progressing)       Start:  12/07/23    Expected End:  12/21/23            Pt will demo sit > stand and stand > sit transfer with ww and mod I  (Progressing)       Start:  12/07/23    Expected End:  12/21/23            Pt will ambulate 100' with ww and mod I, without LOB  (Progressing)       Start:  12/07/23    Expected End:  12/21/23            Pt will demo up/down 1 steps with handrail mod I (Progressing)       Start:  12/07/23    Expected End:  12/21/23

## 2023-12-14 NOTE — PROGRESS NOTES
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    Patient went  for additional washout yesterday.  Repeat IntraOp cultures are +ve for MRSA  Patient has persistent severe pain of B thighs.  Has weakness in bilateral legs.   Positive constipation  Decreased appetite  No bowel movements   Currently on nitrofurantoin that was started on December 8 for E. coli UTI  No urinary symptoms  No fevers or chills  No rash  Current Medications:    [Held by provider] aspirin, 81 mg, oral, Daily  atenolol, 50 mg, oral, q AM  brimonidine, 1 drop, Both Eyes, BID  busPIRone, 10 mg, oral, Daily  cholecalciferol, 2,000 Units, oral, Daily  docusate sodium, 100 mg, oral, BID  ezetimibe, 10 mg, oral, Daily  heparin (porcine), 5,000 Units, subcutaneous, q8h  hydrALAZINE, 50 mg, oral, BID  isosorbide mononitrate ER, 30 mg, oral, Daily  isosorbide mononitrate ER, 60 mg, oral, Daily  loratadine, 10 mg, oral, Daily  mirtazapine, 15 mg, oral, Nightly  morphine CR, 15 mg, oral, q12h EDE  nitrofurantoin (macrocrystal-monohydrate), 100 mg, oral, BID  pantoprazole, 40 mg, oral, Daily before breakfast  phenazopyridine, 100 mg, oral, TID with meals  polyethylene glycol, 17 g, oral, Daily  pregabalin, 75 mg, oral, BID  simvastatin, 40 mg, oral, BID  tranexamic acid, 1,950 mg, oral, Once  [Held by provider] triamterene-hydrochlorothiazid, 1 tablet, oral, Daily  vancomycin, 1,000 mg, intravenous, q12h        Allergies:  Neomycin and Neomycin-polymyxin b-dexameth      Review of Systems  14 system review is negative other than HPI  Severe bilateral thigh pain  +ve B leg weakness   Positive constipation  no numbness  No rash or diarrhea  No shortness of breath  Reports poor appetite  Physical Exam    Heart Rate:  [80-88]   Temp:  [36.9 °C (98.4 °F)-37.1 °C (98.8  "°F)]   BP: (116-127)/(53-62)   SpO2:  [89 %-99 %]    Vitals:    12/13/23 0757 12/13/23 1915 12/14/23 0020 12/14/23 0753   BP: 114/53 116/53 123/62 127/56   BP Location: Left arm      Patient Position: Lying      Pulse: 85 80 88 84   Resp: 17      Temp: 36.8 °C (98.2 °F) 37.1 °C (98.8 °F) 36.9 °C (98.4 °F) 36.9 °C (98.4 °F)   TempSrc: Temporal      SpO2: 94% 93% (!) 89% 99%   Weight:       Height:         General Appearance: alert and oriented to person, place and time, well-developed and well-nourished, in no acute distress  Skin: warm and dry, no rash.   Head: normocephalic and atraumatic  Eyes: anicteric sclerae  ENT:  normal mucous membranes. No oral thrush  Lungs: normal respiratory effort, clear lungs  Heart normal S1-S2 no murmur  Abdomen: soft, no tenderness  No leg edema  Back incision with intact dressing and JOSE drain with decreased drainage  No erythema, no tenderness  Intact right upper extremity PICC line    DATA:    Lab Results   Component Value Date    WBC 7.4 12/14/2023    HGB 7.5 (L) 12/14/2023    HCT 22.9 (L) 12/14/2023    MCV 97 12/14/2023     12/14/2023     Lab Results   Component Value Date    CREATININE 0.74 12/14/2023    BUN 13 12/14/2023     (L) 12/14/2023    K 3.8 12/14/2023    CL 98 12/14/2023    CO2 26 12/14/2023       Hepatic Function Panel:No results found for: \"ALKPHOS\", \"ALT\", \"AST\", \"PROT\", \"BILITOT\", \"BILIDIR\"    Microbiology:   Susceptibility data from last 90 days.  Collected Specimen Info Organism Amoxicillin/Clavulanate Ampicillin Ampicillin/Sulbactam Aztreonam Cefazolin Cefazolin (uncomplicated UTIs only) Cefepime Cefotaxime Ceftazidime Ceftriaxone Ciprofloxacin Clindamycin   12/11/23 Fluid from ABSCESS Methicillin Resistant Staphylococcus aureus (MRSA)            S   12/11/23 Tissue from ABSCESS Staphylococcus aureus               12/11/23 Swab from ABSCESS Methicillin Resistant Staphylococcus aureus (MRSA)            S   12/06/23 Swab from SPINE Methicillin " "Resistant Staphylococcus aureus (MRSA)               12/06/23 Tissue from SPINE Staphylococcus aureus               12/06/23 Tissue from SPINE Staphylococcus aureus               12/05/23 Urine from Clean Catch/Voided Escherichia coli S R S R R R R R R R R    11/03/23 Swab from Nares/Axilla/Groin Methicillin Resistant Staphylococcus aureus (MRSA)                 Collected Specimen Info Organism Ertapenem Erythromycin Gentamicin Meropenem Nitrofurantoin Oxacillin Piperacillin/Tazobactam Tetracycline Tobramycin Trimethoprim/Sulfamethoxazole Vancomycin   12/11/23 Fluid from ABSCESS Methicillin Resistant Staphylococcus aureus (MRSA)  R    R  S  S S   12/11/23 Tissue from ABSCESS Staphylococcus aureus              12/11/23 Swab from ABSCESS Methicillin Resistant Staphylococcus aureus (MRSA)  R    R  S  S S   12/06/23 Swab from SPINE Methicillin Resistant Staphylococcus aureus (MRSA)              12/06/23 Tissue from SPINE Staphylococcus aureus              12/06/23 Tissue from SPINE Staphylococcus aureus              12/05/23 Urine from Clean Catch/Voided Escherichia coli S  R S S  S  I S    11/03/23 Swab from Nares/Axilla/Groin Methicillin Resistant Staphylococcus aureus (MRSA)                   No lab exists for component: \"BC\"  No lab exists for component: \"BLOODCULT2\"  No lab exists for component: \"LABURIN\"  No lab exists for component: \"WNDABS\"  No lab exists for component: \"CULTRESP\"      Imaging:   MR lumbar spine w and wo IV contrast    Result Date: 12/5/2023  Interpreted By:  Jh Benoit,  and Dev Hogue STUDY: MR LUMBAR SPINE W AND WO IV CONTRAST;  12/5/2023 4:39 pm   INDICATION: Signs/Symptoms:Recent laminectomy with fusion, intractable back pain radiates legs.   COMPARISON: MRI 08/20/2023, CT 12/05/2023   ACCESSION NUMBER(S): [id number]   ORDERING CLINICIAN: SHAWN REDDY   TECHNIQUE: Sagittal T1, T2, STIR, axial T1 and T2 weighted images of the lumbar spine were acquired without and following " administration of 15 cc Dotarem gadolinium based IV contrast.   FINDINGS: Motion degraded examination.   Alignment: The vertebral alignment is maintained.   Vertebrae/Intervertebral Discs: Postsurgical changes of L4 through S1 posterior fusion noted with bilateral transpedicular screws and interbody graft placement at L4-5 and L5-S1. There has been bilateral laminectomies at L3-4, L4-5, and L5-S1. The vertebral bodies demonstrate expected height. There is mild osseous edema involving L5 and S1 vertebral bodies. The marrow signal is within normal limits. Multilevel intervertebral disc height loss and disc desiccation noted.   A large dorsal extra-spinal fluid collection is noted extending from the laminectomy bed through the deep muscular fascia into the subcutaneous fat. This collection extends from the right lateral recess at the level of L4-5 as well as L5-S1 into the spinal canal and deforms the thecal sac. A focal defect is noted within the thecal sac at the level of L5-S1 (series 9, image 20). 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 The collections do not demonstrate significant peripheral enhancement to suggest abscess.   Conus: The lower thoracic cord appears unremarkable. The conus terminates at L1.   T12-L1: Disc bulge. There is no significant central canal or neural foraminal stenosis.   L1-2: Disc bulge, ligamentum flavum thickening and facet hypertrophy contribute to minimal central canal narrowing.   L2-3: Disc bulge and mild prominence of the posterior epidural fat resulting in minimal narrowing of the central canal. There is moderate right and minimal left foraminal narrowing due to intraforaminal disc herniation and facet hypertrophy, similar to preoperative examination.   L3-4: Laminectomies. There is moderate narrowing of the thecal sac due to disc bulge and dorsal spinal collection as detailed above. There is unchanged marked bilateral foraminal narrowing due to intraforaminal disc  herniation and facet hypertrophic changes with possible impingement of bilateral exiting L3 nerve roots.   L4-5: Bilateral laminectomies. There is moderate narrowing of the central canal with indentation of the thecal sac posteriorly due to the dorsal spinal fluid collection as detailed above. Moderate right and mild left foraminal narrowing is overall similar to MRI dated 08/20/2020 3D to intraforaminal disc herniations and facet hypertrophic changes.   L5-S1: Bilateral laminectomies. The dorsal spinal fluid collection indents the thecal sac as detailed above. Evaluation of the foramina is limited due to susceptibility artifact from bilateral transpedicular screws.  There is persistent marked left and moderate right foraminal narrowing due to intraforaminal disc herniation and facet hypertrophy with possible impingement of the exiting left L5 nerve root.       1.  Postoperative changes as above. A large dorsal extra-spinal fluid collection is noted extending from the laminectomy bed through the deep muscular fascia into the subcutaneous fat. This collection extends from the right lateral recesses at the level of L4-5 as well as L5-S1 into the spinal canal and deforms the thecal sac resulting in moderate narrowing at L4-5 and to a lesser degree at L3-4. A focal defect is noted within the thecal sac at the level of L5-S1. 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 No significant peripheral enhancement is identified to suggest abscess although the sterility of this collection can not be ascertained on MRI alone. 2. Multilevel degenerative changes again noted with persistent marked bilateral foraminal narrowing at L3-4 and at L5-S1 on the left.     I personally reviewed the images/study and I agree with the findings as stated. This study was interpreted at [facility], [place], [place].   MACRO: None   Signed by: Jh Benoit 12/5/2023 5:24 PM Dictation workstation:    [id number]    CT angio chest abdomen pelvis    Result Date: 12/5/2023  Interpreted By:  Samuel Brannon, STUDY: CT ANGIO CHEST ABDOMEN PELVIS;  12/5/2023 2:48 pm   INDICATION: Signs/Symptoms:Severe lower abdomen pain, groin pain, radiates to back, intractable pain.   COMPARISON: May 18, 2022 CTA chest abdomen and pelvis. July 21, 2023 CTA abdomen and pelvis with runoff. August 20, 2023 MRI lumbar spine and December 5, 2023 CT lumbar spine   ACCESSION NUMBER(S): [id number]   ORDERING CLINICIAN: SHAWN REDDY   TECHNIQUE: Axial non-contrast images of the chest, abdomen, and pelvis  with coronal and sagittal reformatted images. Axial CT images of the chest, abdomen and pelvis after the intravenous administration of 100 mL Omnipaque 350 contrast using CT angiographic technique with coronal and sagittal reformatted images.  MIP images were provided and reviewed. 3D reconstructions were performed on a separate independent workstation.   FINDINGS: VASCULAR:   PULMONARY ARTERIES:   No acute pulmonary embolism.   THORACIC AORTA:  Non-contrast images show no evidence of acute intramural hematoma. No thoracic aortic aneurysm or dissection. Moderate scattered atherosclerosis thoracic aorta and branch vessels.   ABDOMINAL AORTA: No abdominal aortic aneurysm or dissection. There is scattered atherosclerosis. 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   CHEST:   MEDIASTINUM AND LYMPH NODES: No enlarged intrathoracic or axillary lymph nodes.   HEART: Normal size.  Moderate coronary artery calcifications. No pericardial effusion.   LUNG, PLEURA, LARGE AIRWAYS: No consolidation, pulmonary edema, pleural effusion, or pneumothorax.   OSSEOUS STRUCTURES/CHEST WALL:    No acute osseous abnormality.      ABDOMEN/PELVIS:   Arterial phase imaging limits evaluation of the solid organs.   ABDOMINAL WALL: Within normal limits.   LIVER: Stable without detected mass. BILE DUCTS: No significant abnormality. GALLBLADDER: Absent   PANCREAS: No significant abnormality.   SPLEEN: No significant abnormality.   ADRENALS: No significant abnormality.   KIDNEYS, URETERS, BLADDER: No significant abnormality.   VESSELS: See above.  No additional significant abnormality. LYMPH NODES: No enlarged lymph nodes. RETROPERITONEUM:  No significant abnormality.   BOWEL: The stomach and bowel are normal caliber without evident inflammatory change.   PERITONEUM:   No significant ascites, free air, or fluid collection.   REPRODUCTIVE ORGANS: No significant abnormality.   OSSEOUS STRUCTURES:  Refer to same day CT scan lumbar spine report for details regarding that portion. No separate acute osseous abnormalities are identified. There are skin staples dorsal lumbar region. The canal is not reliably evaluated at the operated levels due to artifact from metallic hardware. There is minimal gas within the soft tissues at the operated levels and there is lobulated fluid density extending from vertebral to overlapping paravertebral region to the level of the superficial subcutaneous layer including portion sagittal series 506, image 109 and axial series 502, image 114 measuring 14 x 6 x 6 cm  with internal density measurement of 6 compatible with simple fluid density       No thoracic or abdominal aortic aneurysm or dissection.   Stable pre-existing vascular findings as reported.   Findings compatible with recent lumbar spine surgery with prominent fluid collection overlying the operated site extending through the superficial subcutaneous layer. The canal is not reliably assessed at the operated site due to artifact. Recommend further characterization with MRI lumbar spine with without contrast.   No acute abnormality of the chest, abdomen or pelvis.      Signed by: Samuel Brannon 12/5/2023 3:32 PM Dictation workstation:   [id number]    CT lumbar spine wo IV contrast    Result Date: 12/5/2023  Interpreted By:  Jony Monae, STUDY: CT LUMBAR SPINE WO IV CONTRAST; 12/5/2023 11:28 am   INDICATION: Signs/Symptoms:Low back pain, recent laminectomy and fusion, fall 1 week ago.   COMPARISON: None.   ACCESSION NUMBER(S): [id number]   ORDERING CLINICIAN: SHAWN REDDY   TECHNIQUE: Contiguous axial CT images were obtained through the lumbar spine at 2 mm slice thickness without contrast administration. The images were then reconstructed in the coronal and sagittal planes.   FINDINGS: OSSEOUS STRUCTURES: The patient is status post L3 through L5 laminectomy, L4 through S1 pedicle screw and fabrizio fusion and L4-5 and L5-S1 disc space implant placement. There is no evidence of acute fracture identified. The vertebral bodies are well aligned without evidence of subluxation.   Mild discogenic degenerative changes are seen at the remaining disc space levels. Mild-to-moderate facet degenerative changes are seen throughout the lumbar spine.   LOWER THORACIC SPINE: No gross central canal or neural foraminal narrowing is seen.   T12-L1: No gross central canal or neural foraminal narrowing is seen.   L1-2: No gross central canal or neural foraminal narrowing is seen.   L2-3: No gross central canal or neural foraminal narrowing is seen.   L3-4: Mild right-sided neural foraminal narrowing is seen. No significant left foraminal or central canal narrowing is seen.   L4-5: No gross central canal or neural foraminal narrowing is seen.   L5-S1: Mild left-sided neural foraminal narrowing is seen. No significant right foraminal or central canal narrowing is seen.   ASSOCIATED STRUCTURES: Evaluation of the visualized soft tissues of the abdomen is limited by the lack of intravenous contrast. Within this limitation, no gross mass or lymphadenopathy is identified.  A fluid collection is seen along  the posterior midportion the lower back, likely postoperative in nature.       1. No acute fracture identified. 2. Postoperative and degenerative changes, as described above.   MACRO: None.   Signed by: Jony Monae 12/5/2023 11:45 AM Dictation workstation:   [id number]       IMPRESSION:    Postop deep incisional wound infection of lumbar spine with probable infected hematoma/abscess.  stable    MRSA infection in a patient who is a chronic carrier  E. coli UTI, resolved  Status post lumbar laminectomy  Anemia 2ry to blood loss    Patient Active Problem List   Diagnosis    Abdominal aortic aneurysm (CMS/HCC)    Abnormal EKG    Bilateral carotid artery stenosis    Bruit of right carotid artery    CAD in native artery    Cardiomyopathy (CMS/HCC)    Chest pain    Chronic asthmatic bronchitis    Closed displaced fracture of fifth metatarsal bone    Current every day smoker    Difficulty breathing    Essential hypertension    History of total knee replacement    Hypokalemia    Intermittent claudication (CMS/HCC)    Knee osteoarthritis    Knee pain    Limb pain    Localized swelling, mass, or lump of lower extremity    Celiac artery stenosis (CMS/HCC)    Mesenteric artery stenosis (CMS/HCC)    Mixed hyperlipidemia    Obese    PVD (peripheral vascular disease) (CMS/HCC)    Right foot pain    Swelling    Trigger finger    Urinary tract infection without hematuria    Back pain of lumbar region with sciatica    Back pain with radiculopathy    Intractable back pain    Seroma, post-traumatic (CMS/HCC)    Lumbar surgical wound fluid collection       PLAN:  DC nitrofurantoin   May discharge on 6 weeks of IV vancomycin   Follow-up CBC BMP and Vanco levels weekly  Follow-up in 4 weeks  local wound care per orthopedics      Discussed with patient and     Gem Reyna MD

## 2023-12-15 ENCOUNTER — APPOINTMENT (OUTPATIENT)
Dept: RADIOLOGY | Facility: HOSPITAL | Age: 68
DRG: 856 | End: 2023-12-15
Payer: COMMERCIAL

## 2023-12-15 LAB
ABO GROUP (TYPE) IN BLOOD: NORMAL
ABO GROUP (TYPE) IN BLOOD: NORMAL
ANION GAP SERPL CALC-SCNC: 11 MMOL/L (ref 10–20)
ANTIBODY SCREEN: NORMAL
APTT PPP: 26 SECONDS (ref 27–38)
BACTERIA SPEC CULT: ABNORMAL
BLOOD EXPIRATION DATE: NORMAL
BUN SERPL-MCNC: 12 MG/DL (ref 6–23)
CALCIUM SERPL-MCNC: 8.6 MG/DL (ref 8.6–10.3)
CHLORIDE SERPL-SCNC: 97 MMOL/L (ref 98–107)
CO2 SERPL-SCNC: 28 MMOL/L (ref 21–32)
CREAT SERPL-MCNC: 0.76 MG/DL (ref 0.5–1.05)
DISPENSE STATUS: NORMAL
ERYTHROCYTE [DISTWIDTH] IN BLOOD BY AUTOMATED COUNT: 13.7 % (ref 11.5–14.5)
GFR SERPL CREATININE-BSD FRML MDRD: 84 ML/MIN/1.73M*2
GLUCOSE SERPL-MCNC: 99 MG/DL (ref 74–99)
GRAM STN SPEC: ABNORMAL
GRAM STN SPEC: ABNORMAL
HCT VFR BLD AUTO: 21.8 % (ref 36–46)
HGB BLD-MCNC: 7 G/DL (ref 12–16)
INR PPP: 1.2 (ref 0.9–1.1)
MCH RBC QN AUTO: 31.3 PG (ref 26–34)
MCHC RBC AUTO-ENTMCNC: 32.1 G/DL (ref 32–36)
MCV RBC AUTO: 97 FL (ref 80–100)
NRBC BLD-RTO: 0.5 /100 WBCS (ref 0–0)
PLATELET # BLD AUTO: 228 X10*3/UL (ref 150–450)
POTASSIUM SERPL-SCNC: 4.1 MMOL/L (ref 3.5–5.3)
PRODUCT BLOOD TYPE: 6200
PRODUCT CODE: NORMAL
PROTHROMBIN TIME: 13 SECONDS (ref 9.8–12.8)
RBC # BLD AUTO: 2.24 X10*6/UL (ref 4–5.2)
RH FACTOR (ANTIGEN D): NORMAL
RH FACTOR (ANTIGEN D): NORMAL
SODIUM SERPL-SCNC: 132 MMOL/L (ref 136–145)
UNIT ABO: NORMAL
UNIT NUMBER: NORMAL
UNIT RH: NORMAL
UNIT VOLUME: 350
WBC # BLD AUTO: 8.4 X10*3/UL (ref 4.4–11.3)
XM INTEP: NORMAL

## 2023-12-15 PROCEDURE — 2500000004 HC RX 250 GENERAL PHARMACY W/ HCPCS (ALT 636 FOR OP/ED): Performed by: INTERNAL MEDICINE

## 2023-12-15 PROCEDURE — 1210000001 HC SEMI-PRIVATE ROOM DAILY

## 2023-12-15 PROCEDURE — 97530 THERAPEUTIC ACTIVITIES: CPT | Mod: GP

## 2023-12-15 PROCEDURE — 86901 BLOOD TYPING SEROLOGIC RH(D): CPT | Performed by: INTERNAL MEDICINE

## 2023-12-15 PROCEDURE — 85610 PROTHROMBIN TIME: CPT | Performed by: INTERNAL MEDICINE

## 2023-12-15 PROCEDURE — 74177 CT ABD & PELVIS W/CONTRAST: CPT

## 2023-12-15 PROCEDURE — 99233 SBSQ HOSP IP/OBS HIGH 50: CPT | Performed by: INTERNAL MEDICINE

## 2023-12-15 PROCEDURE — 2500000001 HC RX 250 WO HCPCS SELF ADMINISTERED DRUGS (ALT 637 FOR MEDICARE OP): Performed by: STUDENT IN AN ORGANIZED HEALTH CARE EDUCATION/TRAINING PROGRAM

## 2023-12-15 PROCEDURE — 97535 SELF CARE MNGMENT TRAINING: CPT | Mod: GO,CO

## 2023-12-15 PROCEDURE — 85027 COMPLETE CBC AUTOMATED: CPT | Performed by: INTERNAL MEDICINE

## 2023-12-15 PROCEDURE — 74177 CT ABD & PELVIS W/CONTRAST: CPT | Performed by: RADIOLOGY

## 2023-12-15 PROCEDURE — 85730 THROMBOPLASTIN TIME PARTIAL: CPT | Performed by: INTERNAL MEDICINE

## 2023-12-15 PROCEDURE — 36430 TRANSFUSION BLD/BLD COMPNT: CPT

## 2023-12-15 PROCEDURE — 80048 BASIC METABOLIC PNL TOTAL CA: CPT | Performed by: INTERNAL MEDICINE

## 2023-12-15 PROCEDURE — 93970 EXTREMITY STUDY: CPT

## 2023-12-15 PROCEDURE — 2500000004 HC RX 250 GENERAL PHARMACY W/ HCPCS (ALT 636 FOR OP/ED): Performed by: FAMILY MEDICINE

## 2023-12-15 PROCEDURE — 97116 GAIT TRAINING THERAPY: CPT | Mod: GP

## 2023-12-15 PROCEDURE — P9016 RBC LEUKOCYTES REDUCED: HCPCS

## 2023-12-15 PROCEDURE — 71275 CT ANGIOGRAPHY CHEST: CPT

## 2023-12-15 PROCEDURE — 86920 COMPATIBILITY TEST SPIN: CPT

## 2023-12-15 PROCEDURE — 2500000001 HC RX 250 WO HCPCS SELF ADMINISTERED DRUGS (ALT 637 FOR MEDICARE OP): Performed by: FAMILY MEDICINE

## 2023-12-15 PROCEDURE — 2550000001 HC RX 255 CONTRASTS: Performed by: INTERNAL MEDICINE

## 2023-12-15 PROCEDURE — 36591 DRAW BLOOD OFF VENOUS DEVICE: CPT | Performed by: INTERNAL MEDICINE

## 2023-12-15 PROCEDURE — 96372 THER/PROPH/DIAG INJ SC/IM: CPT | Performed by: STUDENT IN AN ORGANIZED HEALTH CARE EDUCATION/TRAINING PROGRAM

## 2023-12-15 PROCEDURE — 93971 EXTREMITY STUDY: CPT | Performed by: RADIOLOGY

## 2023-12-15 PROCEDURE — 2500000002 HC RX 250 W HCPCS SELF ADMINISTERED DRUGS (ALT 637 FOR MEDICARE OP, ALT 636 FOR OP/ED): Performed by: STUDENT IN AN ORGANIZED HEALTH CARE EDUCATION/TRAINING PROGRAM

## 2023-12-15 PROCEDURE — 2500000001 HC RX 250 WO HCPCS SELF ADMINISTERED DRUGS (ALT 637 FOR MEDICARE OP): Performed by: ANESTHESIOLOGY

## 2023-12-15 PROCEDURE — 2500000004 HC RX 250 GENERAL PHARMACY W/ HCPCS (ALT 636 FOR OP/ED): Performed by: STUDENT IN AN ORGANIZED HEALTH CARE EDUCATION/TRAINING PROGRAM

## 2023-12-15 RX ORDER — HEPARIN SODIUM 5000 [USP'U]/ML
80 INJECTION, SOLUTION INTRAVENOUS; SUBCUTANEOUS ONCE
Status: COMPLETED | OUTPATIENT
Start: 2023-12-15 | End: 2023-12-15

## 2023-12-15 RX ORDER — HEPARIN SODIUM 5000 [USP'U]/ML
3000-6000 INJECTION, SOLUTION INTRAVENOUS; SUBCUTANEOUS EVERY 4 HOURS PRN
Status: DISCONTINUED | OUTPATIENT
Start: 2023-12-15 | End: 2023-12-18

## 2023-12-15 RX ORDER — HEPARIN SODIUM 10000 [USP'U]/100ML
0-4500 INJECTION, SOLUTION INTRAVENOUS CONTINUOUS
Status: DISCONTINUED | OUTPATIENT
Start: 2023-12-15 | End: 2023-12-18

## 2023-12-15 RX ADMIN — PHENAZOPYRIDINE HYDROCHLORIDE 100 MG: 100 TABLET ORAL at 10:04

## 2023-12-15 RX ADMIN — PHENAZOPYRIDINE HYDROCHLORIDE 100 MG: 100 TABLET ORAL at 12:18

## 2023-12-15 RX ADMIN — IOHEXOL 75 ML: 350 INJECTION, SOLUTION INTRAVENOUS at 19:43

## 2023-12-15 RX ADMIN — OXYCODONE HYDROCHLORIDE 10 MG: 5 TABLET ORAL at 10:58

## 2023-12-15 RX ADMIN — HYDROMORPHONE HYDROCHLORIDE 0.4 MG: 0.5 INJECTION, SOLUTION INTRAMUSCULAR; INTRAVENOUS; SUBCUTANEOUS at 11:22

## 2023-12-15 RX ADMIN — LORATADINE 10 MG: 10 TABLET ORAL at 09:59

## 2023-12-15 RX ADMIN — ATENOLOL 50 MG: 50 TABLET ORAL at 09:59

## 2023-12-15 RX ADMIN — HEPARIN SODIUM 5000 UNITS: 5000 INJECTION INTRAVENOUS; SUBCUTANEOUS at 04:57

## 2023-12-15 RX ADMIN — DOCUSATE SODIUM 100 MG: 100 CAPSULE, LIQUID FILLED ORAL at 09:59

## 2023-12-15 RX ADMIN — BUSPIRONE HYDROCHLORIDE 10 MG: 5 TABLET ORAL at 10:00

## 2023-12-15 RX ADMIN — BRIMONIDINE TARTRATE 1 DROP: 2 SOLUTION/ DROPS OPHTHALMIC at 10:07

## 2023-12-15 RX ADMIN — ISOSORBIDE MONONITRATE 60 MG: 60 TABLET, EXTENDED RELEASE ORAL at 10:05

## 2023-12-15 RX ADMIN — EZETIMIBE 10 MG: 10 TABLET ORAL at 09:59

## 2023-12-15 RX ADMIN — MIRTAZAPINE 15 MG: 15 TABLET, FILM COATED ORAL at 20:37

## 2023-12-15 RX ADMIN — BRIMONIDINE TARTRATE 1 DROP: 2 SOLUTION/ DROPS OPHTHALMIC at 20:39

## 2023-12-15 RX ADMIN — HEPARIN SODIUM 1486.8 UNITS/HR: 10000 INJECTION, SOLUTION INTRAVENOUS at 23:28

## 2023-12-15 RX ADMIN — PHENAZOPYRIDINE HYDROCHLORIDE 100 MG: 100 TABLET ORAL at 16:20

## 2023-12-15 RX ADMIN — ISOSORBIDE MONONITRATE 30 MG: 30 TABLET, EXTENDED RELEASE ORAL at 10:00

## 2023-12-15 RX ADMIN — SIMVASTATIN 40 MG: 40 TABLET, FILM COATED ORAL at 09:59

## 2023-12-15 RX ADMIN — HEPARIN SODIUM 6750 UNITS: 5000 INJECTION INTRAVENOUS; SUBCUTANEOUS at 23:25

## 2023-12-15 RX ADMIN — HYDROMORPHONE HYDROCHLORIDE 0.4 MG: 0.5 INJECTION, SOLUTION INTRAMUSCULAR; INTRAVENOUS; SUBCUTANEOUS at 21:34

## 2023-12-15 RX ADMIN — POLYETHYLENE GLYCOL 3350 17 G: 17 POWDER, FOR SOLUTION ORAL at 09:58

## 2023-12-15 RX ADMIN — PREGABALIN 75 MG: 50 CAPSULE ORAL at 20:37

## 2023-12-15 RX ADMIN — MORPHINE SULFATE 15 MG: 15 TABLET, EXTENDED RELEASE ORAL at 20:37

## 2023-12-15 RX ADMIN — IOHEXOL 75 ML: 350 INJECTION, SOLUTION INTRAVENOUS at 14:25

## 2023-12-15 RX ADMIN — MORPHINE SULFATE 15 MG: 15 TABLET, EXTENDED RELEASE ORAL at 09:59

## 2023-12-15 RX ADMIN — HYDRALAZINE HYDROCHLORIDE 50 MG: 50 TABLET ORAL at 09:59

## 2023-12-15 RX ADMIN — HYDROMORPHONE HYDROCHLORIDE 0.4 MG: 0.5 INJECTION, SOLUTION INTRAMUSCULAR; INTRAVENOUS; SUBCUTANEOUS at 16:21

## 2023-12-15 RX ADMIN — PREGABALIN 75 MG: 50 CAPSULE ORAL at 10:00

## 2023-12-15 RX ADMIN — Medication 2000 UNITS: at 09:59

## 2023-12-15 RX ADMIN — DOCUSATE SODIUM 100 MG: 100 CAPSULE, LIQUID FILLED ORAL at 20:37

## 2023-12-15 RX ADMIN — HYDRALAZINE HYDROCHLORIDE 50 MG: 50 TABLET ORAL at 20:37

## 2023-12-15 RX ADMIN — VANCOMYCIN HYDROCHLORIDE 1000 MG: 1 INJECTION, SOLUTION INTRAVENOUS at 04:58

## 2023-12-15 RX ADMIN — SIMVASTATIN 40 MG: 40 TABLET, FILM COATED ORAL at 20:37

## 2023-12-15 RX ADMIN — VANCOMYCIN HYDROCHLORIDE 1000 MG: 1 INJECTION, SOLUTION INTRAVENOUS at 18:17

## 2023-12-15 RX ADMIN — PANTOPRAZOLE SODIUM 40 MG: 40 TABLET, DELAYED RELEASE ORAL at 06:49

## 2023-12-15 ASSESSMENT — COGNITIVE AND FUNCTIONAL STATUS - GENERAL
EATING MEALS: A LITTLE
TURNING FROM BACK TO SIDE WHILE IN FLAT BAD: A LITTLE
HELP NEEDED FOR BATHING: A LOT
STANDING UP FROM CHAIR USING ARMS: A LITTLE
DRESSING REGULAR UPPER BODY CLOTHING: A LITTLE
PERSONAL GROOMING: A LITTLE
CLIMB 3 TO 5 STEPS WITH RAILING: A LOT
WALKING IN HOSPITAL ROOM: A LITTLE
DAILY ACTIVITIY SCORE: 16
DRESSING REGULAR LOWER BODY CLOTHING: A LOT
DAILY ACTIVITIY SCORE: 16
TOILETING: A LITTLE
CLIMB 3 TO 5 STEPS WITH RAILING: TOTAL
MOVING FROM LYING ON BACK TO SITTING ON SIDE OF FLAT BED WITH BEDRAILS: A LOT
WALKING IN HOSPITAL ROOM: A LITTLE
DRESSING REGULAR LOWER BODY CLOTHING: A LOT
MOBILITY SCORE: 16
MOVING TO AND FROM BED TO CHAIR: A LITTLE
TURNING FROM BACK TO SIDE WHILE IN FLAT BAD: A LOT
HELP NEEDED FOR BATHING: A LOT
MOVING FROM LYING ON BACK TO SITTING ON SIDE OF FLAT BED WITH BEDRAILS: A LITTLE
MOBILITY SCORE: 15
MOVING TO AND FROM BED TO CHAIR: A LITTLE
TOILETING: A LITTLE
STANDING UP FROM CHAIR USING ARMS: A LITTLE
PERSONAL GROOMING: A LITTLE
DRESSING REGULAR UPPER BODY CLOTHING: A LITTLE
EATING MEALS: A LITTLE

## 2023-12-15 ASSESSMENT — PAIN DESCRIPTION - DESCRIPTORS: DESCRIPTORS: SHARP

## 2023-12-15 ASSESSMENT — PAIN SCALES - GENERAL
PAINLEVEL_OUTOF10: 4
PAINLEVEL_OUTOF10: 9
PAINLEVEL_OUTOF10: 0 - NO PAIN
PAINLEVEL_OUTOF10: 9

## 2023-12-15 ASSESSMENT — PAIN - FUNCTIONAL ASSESSMENT
PAIN_FUNCTIONAL_ASSESSMENT: 0-10

## 2023-12-15 ASSESSMENT — ACTIVITIES OF DAILY LIVING (ADL): HOME_MANAGEMENT_TIME_ENTRY: 15

## 2023-12-15 NOTE — PROGRESS NOTES
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    Patient went  for additional washout yesterday.  Repeat IntraOp cultures are +ve for MRSA  Patient has persistent severe pain of B thighs.  Has weakness in bilateral legs.   Positive constipation  Decreased appetite  No bowel movements   no urinary symptoms  No fevers or chills  No rash  Current Medications:    [Held by provider] aspirin, 81 mg, oral, Daily  atenolol, 50 mg, oral, q AM  brimonidine, 1 drop, Both Eyes, BID  busPIRone, 10 mg, oral, Daily  cholecalciferol, 2,000 Units, oral, Daily  docusate sodium, 100 mg, oral, BID  ezetimibe, 10 mg, oral, Daily  [Held by provider] heparin (porcine), 5,000 Units, subcutaneous, q8h  hydrALAZINE, 50 mg, oral, BID  isosorbide mononitrate ER, 30 mg, oral, Daily  isosorbide mononitrate ER, 60 mg, oral, Daily  loratadine, 10 mg, oral, Daily  mirtazapine, 15 mg, oral, Nightly  morphine CR, 15 mg, oral, q12h EDE  pantoprazole, 40 mg, oral, Daily before breakfast  phenazopyridine, 100 mg, oral, TID with meals  polyethylene glycol, 17 g, oral, Daily  pregabalin, 75 mg, oral, BID  simvastatin, 40 mg, oral, BID  tranexamic acid, 1,950 mg, oral, Once  [Held by provider] triamterene-hydrochlorothiazid, 1 tablet, oral, Daily  vancomycin, 1,000 mg, intravenous, q12h        Allergies:  Neomycin and Neomycin-polymyxin b-dexameth      Review of Systems  14 system review is negative other than HPI  Severe bilateral thigh pain  +ve B leg weakness   Positive constipation  no numbness  No rash or diarrhea  No shortness of breath  Reports poor appetite  Physical Exam    Heart Rate:  [78-93]   Temp:  [36.5 °C (97.7 °F)-37.5 °C (99.5 °F)]   Resp:  [12-17]   BP: (103-164)/(51-72)   SpO2:  [90 %-96 %]    Vitals:    12/15/23 1505 12/15/23 1510 12/15/23 1513  "12/15/23 1626   BP: 116/56 116/59 121/60 103/51   Pulse: 81 83 78 78   Resp: 17 17 17 17   Temp: 36.5 °C (97.7 °F) 36.7 °C (98.1 °F) 36.7 °C (98.1 °F) 36.7 °C (98.1 °F)   TempSrc: Temporal Temporal Temporal    SpO2: 96% 96% 95% 95%   Weight:       Height:         General Appearance: alert and oriented to person, place and time, well-developed and well-nourished, in no acute distress  Skin: warm and dry, no rash.   Head: normocephalic and atraumatic  Eyes: anicteric sclerae  ENT:  normal mucous membranes. No oral thrush  Lungs: normal respiratory effort  Abdomen: soft, no tenderness  No leg edema  Back incision with intact dressing and JOSE drain with decreased drainage  No erythema, no tenderness  Intact right upper extremity PICC line  Mild bilateral thigh swelling  DATA:    Lab Results   Component Value Date    WBC 8.4 12/15/2023    HGB 7.0 (L) 12/15/2023    HCT 21.8 (L) 12/15/2023    MCV 97 12/15/2023     12/15/2023     Lab Results   Component Value Date    CREATININE 0.76 12/15/2023    BUN 12 12/15/2023     (L) 12/15/2023    K 4.1 12/15/2023    CL 97 (L) 12/15/2023    CO2 28 12/15/2023       Hepatic Function Panel:No results found for: \"ALKPHOS\", \"ALT\", \"AST\", \"PROT\", \"BILITOT\", \"BILIDIR\"    Microbiology:   Susceptibility data from last 90 days.  Collected Specimen Info Organism Amoxicillin/Clavulanate Ampicillin Ampicillin/Sulbactam Aztreonam Cefazolin Cefazolin (uncomplicated UTIs only) Cefepime Cefotaxime Ceftazidime Ceftriaxone Ciprofloxacin Clindamycin   12/11/23 Fluid from ABSCESS Methicillin Resistant Staphylococcus aureus (MRSA)            S   12/11/23 Tissue from ABSCESS Staphylococcus aureus               12/11/23 Swab from ABSCESS Methicillin Resistant Staphylococcus aureus (MRSA)            S   12/06/23 Swab from SPINE Methicillin Resistant Staphylococcus aureus (MRSA)               12/06/23 Tissue from SPINE Staphylococcus aureus               12/06/23 Tissue from SPINE Staphylococcus " "aureus               12/05/23 Urine from Clean Catch/Voided Escherichia coli S R S R R R R R R R R    11/03/23 Swab from Nares/Axilla/Groin Methicillin Resistant Staphylococcus aureus (MRSA)                 Collected Specimen Info Organism Ertapenem Erythromycin Gentamicin Meropenem Nitrofurantoin Oxacillin Piperacillin/Tazobactam Tetracycline Tobramycin Trimethoprim/Sulfamethoxazole Vancomycin   12/11/23 Fluid from ABSCESS Methicillin Resistant Staphylococcus aureus (MRSA)  R    R  S  S S   12/11/23 Tissue from ABSCESS Staphylococcus aureus              12/11/23 Swab from ABSCESS Methicillin Resistant Staphylococcus aureus (MRSA)  R    R  S  S S   12/06/23 Swab from SPINE Methicillin Resistant Staphylococcus aureus (MRSA)              12/06/23 Tissue from SPINE Staphylococcus aureus              12/06/23 Tissue from SPINE Staphylococcus aureus              12/05/23 Urine from Clean Catch/Voided Escherichia coli S  R S S  S  I S    11/03/23 Swab from Nares/Axilla/Groin Methicillin Resistant Staphylococcus aureus (MRSA)                   No lab exists for component: \"BC\"  No lab exists for component: \"BLOODCULT2\"  No lab exists for component: \"LABURIN\"  No lab exists for component: \"WNDABS\"  No lab exists for component: \"CULTRESP\"      Imaging:   MR lumbar spine w and wo IV contrast    Result Date: 12/5/2023  Interpreted By:  Jh Benoit,  and Dev Hogue STUDY: MR LUMBAR SPINE W AND WO IV CONTRAST;  12/5/2023 4:39 pm   INDICATION: Signs/Symptoms:Recent laminectomy with fusion, intractable back pain radiates legs.   COMPARISON: MRI 08/20/2023, CT 12/05/2023   ACCESSION NUMBER(S): [id number]   ORDERING CLINICIAN: SHAWN REDDY   TECHNIQUE: Sagittal T1, T2, STIR, axial T1 and T2 weighted images of the lumbar spine were acquired without and following administration of 15 cc Dotarem gadolinium based IV contrast.   FINDINGS: Motion degraded examination.   Alignment: The vertebral alignment is maintained.   " Vertebrae/Intervertebral Discs: Postsurgical changes of L4 through S1 posterior fusion noted with bilateral transpedicular screws and interbody graft placement at L4-5 and L5-S1. There has been bilateral laminectomies at L3-4, L4-5, and L5-S1. The vertebral bodies demonstrate expected height. There is mild osseous edema involving L5 and S1 vertebral bodies. The marrow signal is within normal limits. Multilevel intervertebral disc height loss and disc desiccation noted.   A large dorsal extra-spinal fluid collection is noted extending from the laminectomy bed through the deep muscular fascia into the subcutaneous fat. This collection extends from the right lateral recess at the level of L4-5 as well as L5-S1 into the spinal canal and deforms the thecal sac. A focal defect is noted within the thecal sac at the level of L5-S1 (series 9, image 20). 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 The collections do not demonstrate significant peripheral enhancement to suggest abscess.   Conus: The lower thoracic cord appears unremarkable. The conus terminates at L1.   T12-L1: Disc bulge. There is no significant central canal or neural foraminal stenosis.   L1-2: Disc bulge, ligamentum flavum thickening and facet hypertrophy contribute to minimal central canal narrowing.   L2-3: Disc bulge and mild prominence of the posterior epidural fat resulting in minimal narrowing of the central canal. There is moderate right and minimal left foraminal narrowing due to intraforaminal disc herniation and facet hypertrophy, similar to preoperative examination.   L3-4: Laminectomies. There is moderate narrowing of the thecal sac due to disc bulge and dorsal spinal collection as detailed above. There is unchanged marked bilateral foraminal narrowing due to intraforaminal disc herniation and facet hypertrophic changes with possible impingement of bilateral exiting L3 nerve roots.   L4-5: Bilateral laminectomies. There is moderate  narrowing of the central canal with indentation of the thecal sac posteriorly due to the dorsal spinal fluid collection as detailed above. Moderate right and mild left foraminal narrowing is overall similar to MRI dated 08/20/2020 3D to intraforaminal disc herniations and facet hypertrophic changes.   L5-S1: Bilateral laminectomies. The dorsal spinal fluid collection indents the thecal sac as detailed above. Evaluation of the foramina is limited due to susceptibility artifact from bilateral transpedicular screws.  There is persistent marked left and moderate right foraminal narrowing due to intraforaminal disc herniation and facet hypertrophy with possible impingement of the exiting left L5 nerve root.       1.  Postoperative changes as above. A large dorsal extra-spinal fluid collection is noted extending from the laminectomy bed through the deep muscular fascia into the subcutaneous fat. This collection extends from the right lateral recesses at the level of L4-5 as well as L5-S1 into the spinal canal and deforms the thecal sac resulting in moderate narrowing at L4-5 and to a lesser degree at L3-4. A focal defect is noted within the thecal sac at the level of L5-S1. 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 No significant peripheral enhancement is identified to suggest abscess although the sterility of this collection can not be ascertained on MRI alone. 2. Multilevel degenerative changes again noted with persistent marked bilateral foraminal narrowing at L3-4 and at L5-S1 on the left.     I personally reviewed the images/study and I agree with the findings as stated. This study was interpreted at [place], [place].   MACRO: None   Signed by: Jh Benoit 12/5/2023 5:24 PM Dictation workstation:   [id number]    CT angio chest abdomen pelvis    Result Date: 12/5/2023  Interpreted By:  Samuel Brannon, STUDY: CT ANGIO CHEST ABDOMEN PELVIS;  12/5/2023 2:48 pm    INDICATION: Signs/Symptoms:Severe lower abdomen pain, groin pain, radiates to back, intractable pain.   COMPARISON: May 18, 2022 CTA chest abdomen and pelvis. July 21, 2023 CTA abdomen and pelvis with runoff. August 20, 2023 MRI lumbar spine and December 5, 2023 CT lumbar spine   ACCESSION NUMBER(S): [id number]   ORDERING CLINICIAN: SHAWN REDDY   TECHNIQUE: Axial non-contrast images of the chest, abdomen, and pelvis  with coronal and sagittal reformatted images. Axial CT images of the chest, abdomen and pelvis after the intravenous administration of 100 mL Omnipaque 350 contrast using CT angiographic technique with coronal and sagittal reformatted images.  MIP images were provided and reviewed. 3D reconstructions were performed on a separate independent workstation.   FINDINGS: VASCULAR:   PULMONARY ARTERIES:   No acute pulmonary embolism.   THORACIC AORTA:  Non-contrast images show no evidence of acute intramural hematoma. No thoracic aortic aneurysm or dissection. Moderate scattered atherosclerosis thoracic aorta and branch vessels.   ABDOMINAL AORTA: No abdominal aortic aneurysm or dissection. There is scattered atherosclerosis. 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   CHEST:   MEDIASTINUM AND LYMPH NODES: No enlarged intrathoracic or axillary lymph nodes.   HEART: Normal size.  Moderate coronary artery calcifications. No pericardial effusion.   LUNG, PLEURA, LARGE AIRWAYS: No consolidation, pulmonary edema, pleural effusion, or pneumothorax.   OSSEOUS STRUCTURES/CHEST WALL:    No acute osseous abnormality.     ABDOMEN/PELVIS:   Arterial phase imaging limits evaluation of the solid organs.   ABDOMINAL WALL: Within normal limits.   LIVER: Stable without detected mass. BILE DUCTS:  No significant abnormality. GALLBLADDER: Absent   PANCREAS: No significant abnormality.   SPLEEN: No significant abnormality.   ADRENALS: No significant abnormality.   KIDNEYS, URETERS, BLADDER: No significant abnormality.   VESSELS: See above.  No additional significant abnormality. LYMPH NODES: No enlarged lymph nodes. RETROPERITONEUM:  No significant abnormality.   BOWEL: The stomach and bowel are normal caliber without evident inflammatory change.   PERITONEUM:   No significant ascites, free air, or fluid collection.   REPRODUCTIVE ORGANS: No significant abnormality.   OSSEOUS STRUCTURES:  Refer to same day CT scan lumbar spine report for details regarding that portion. No separate acute osseous abnormalities are identified. There are skin staples dorsal lumbar region. The canal is not reliably evaluated at the operated levels due to artifact from metallic hardware. There is minimal gas within the soft tissues at the operated levels and there is lobulated fluid density extending from vertebral to overlapping paravertebral region to the level of the superficial subcutaneous layer including portion sagittal series 506, image 109 and axial series 502, image 114 measuring 14 x 6 x 6 cm  with internal density measurement of 6 compatible with simple fluid density       No thoracic or abdominal aortic aneurysm or dissection.   Stable pre-existing vascular findings as reported.   Findings compatible with recent lumbar spine surgery with prominent fluid collection overlying the operated site extending through the superficial subcutaneous layer. The canal is not reliably assessed at the operated site due to artifact. Recommend further characterization with MRI lumbar spine with without contrast.   No acute abnormality of the chest, abdomen or pelvis.     Signed by: Samuel Brannon 12/5/2023 3:32 PM Dictation workstation:   [id number]    CT lumbar spine wo IV contrast    Result Date: 12/5/2023  Interpreted By:  Dov  Jony, STUDY: CT LUMBAR SPINE WO IV CONTRAST; 12/5/2023 11:28 am   INDICATION: Signs/Symptoms:Low back pain, recent laminectomy and fusion, fall 1 week ago.   COMPARISON: None.   ACCESSION NUMBER(S): [id number]   ORDERING CLINICIAN: SHAWN REDDY   TECHNIQUE: Contiguous axial CT images were obtained through the lumbar spine at 2 mm slice thickness without contrast administration. The images were then reconstructed in the coronal and sagittal planes.   FINDINGS: OSSEOUS STRUCTURES: The patient is status post L3 through L5 laminectomy, L4 through S1 pedicle screw and fabrizio fusion and L4-5 and L5-S1 disc space implant placement. There is no evidence of acute fracture identified. The vertebral bodies are well aligned without evidence of subluxation.   Mild discogenic degenerative changes are seen at the remaining disc space levels. Mild-to-moderate facet degenerative changes are seen throughout the lumbar spine.   LOWER THORACIC SPINE: No gross central canal or neural foraminal narrowing is seen.   T12-L1: No gross central canal or neural foraminal narrowing is seen.   L1-2: No gross central canal or neural foraminal narrowing is seen.   L2-3: No gross central canal or neural foraminal narrowing is seen.   L3-4: Mild right-sided neural foraminal narrowing is seen. No significant left foraminal or central canal narrowing is seen.   L4-5: No gross central canal or neural foraminal narrowing is seen.   L5-S1: Mild left-sided neural foraminal narrowing is seen. No significant right foraminal or central canal narrowing is seen.   ASSOCIATED STRUCTURES: Evaluation of the visualized soft tissues of the abdomen is limited by the lack of intravenous contrast. Within this limitation, no gross mass or lymphadenopathy is identified.  A fluid collection is seen along the posterior midportion the lower back, likely postoperative in nature.       1. No acute fracture identified. 2. Postoperative and degenerative changes, as described  above.   MACRO: None.   Signed by: Jony Monae 12/5/2023 11:45 AM Dictation workstation:   [id number]       IMPRESSION:    Postop deep incisional wound infection of lumbar spine with probable infected hematoma/abscess.  stable    MRSA infection in a patient who is a chronic carrier  E. coli UTI, resolved  Status post lumbar laminectomy  Anemia 2ry to blood loss    Patient Active Problem List   Diagnosis    Abdominal aortic aneurysm (CMS/HCC)    Abnormal EKG    Bilateral carotid artery stenosis    Bruit of right carotid artery    CAD in native artery    Cardiomyopathy (CMS/HCC)    Chest pain    Chronic asthmatic bronchitis    Closed displaced fracture of fifth metatarsal bone    Current every day smoker    Difficulty breathing    Essential hypertension    History of total knee replacement    Hypokalemia    Intermittent claudication (CMS/HCC)    Knee osteoarthritis    Knee pain    Limb pain    Localized swelling, mass, or lump of lower extremity    Celiac artery stenosis (CMS/HCC)    Mesenteric artery stenosis (CMS/HCC)    Mixed hyperlipidemia    Obese    PVD (peripheral vascular disease) (CMS/HCC)    Right foot pain    Swelling    Trigger finger    Urinary tract infection without hematuria    Back pain of lumbar region with sciatica    Back pain with radiculopathy    Intractable back pain    Seroma, post-traumatic (CMS/HCC)    Lumbar surgical wound fluid collection       PLAN:  Recheck lower extremity venous duplex and CT angio of the chest  Hold discharge   6 weeks of IV vancomycin   Follow-up CBC BMP and Vanco levels weekly  Follow-up in 4 weeks  local wound care per orthopedics      Discussed with patient and     Gem Reyna MD

## 2023-12-15 NOTE — PROGRESS NOTES
DAILY PROGRESS NOTE      POD 4 irrigation and debridement superficial lumbar spine. Exploration of spinal fusion lumbar spine.     Patient doing well  Visit Vitals  /53   Pulse 90   Temp 36.8 °C (98.2 °F)   Resp 15      Temp (24hrs), Av.9 °C (98.5 °F), Min:36.8 °C (98.2 °F), Max:37.1 °C (98.8 °F)       Pain Control Good  No chest pain or shortness of breath.  No calf pain    Exam:   Extremity shows neuro vascular status intact. Flexion and extension intact on extremity.  Calves soft and non-tender without evidence of DVT.  Two JOSE drains in place working appropriately.     Labs reviewed:  CBC: @LABRCNT(WBC:3,HGB:3,PLT:3)@  BMP:  @LABRCNT(Na:3,K:3,CL:3,CO2:3,BUN:3,Creatinine:3,Glucose:3)@  Preliminary tissue/wound culture from yesterday shows no organisms.   Pr  I&O  I/O last 3 completed shifts:  In: 400 (4.7 mL/kg) [IV Piggyback:400]  Out: 4310 (50.9 mL/kg) [Urine:4250 (1.4 mL/kg/hr); Drains:60]  Weight: 84.7 kg       Assessment:    Patient doing well postoperatively.   Reports improved pain to bilateral lower extremities.   JOSE drain with 60 total of output.     Plan:    Continue PT/OT  Continue drains until directed to remove  Continue IV atb per ID  Continue pain control per pain management recommendations     Edi Olguin, DARRYL-CNP   12/15/2023 8:22 AM

## 2023-12-15 NOTE — PROGRESS NOTES
Physical Therapy    Physical Therapy Treatment    Patient Name: Tara Tierney  MRN: [id number]  Today's Date: 12/15/2023  Time Calculation  Start Time: 0949  Stop Time: 1012  Time Calculation (min): 23 min       Assessment/Plan   PT Assessment  PT Assessment Results: Decreased strength, Decreased range of motion, Decreased endurance, Impaired balance, Decreased mobility, Orthopedic restrictions, Pain  Rehab Prognosis: Good  End of Session Communication: Bedside nurse  Assessment Comment: Call-light, phone, and traytable within reach. Pt tolerates increasing ambulation however demos fatigue afterwards with complaints of feeling SOB. Pt reports mild increase in pain with transfers.  End of Session Patient Position: Up in chair, Alarm off, caregiver present  PT Plan  Inpatient/Swing Bed or Outpatient: Inpatient  PT Plan  Treatment/Interventions: Transfer training, Gait training  PT Plan: Skilled PT  PT Frequency: Daily  PT Discharge Recommendations: Moderate intensity level of continued care, High intensity level of continued care  Equipment Recommended upon Discharge: Wheeled walker  PT Recommended Transfer Status: Assist x1  PT - OK to Discharge: Yes (PT eval/POC complete, ok to d/c to next level of care pending medical clearance.)    Current Problem:  Patient Active Problem List   Diagnosis    Abdominal aortic aneurysm (CMS/HCC)    Abnormal EKG    Bilateral carotid artery stenosis    Bruit of right carotid artery    CAD in native artery    Cardiomyopathy (CMS/HCC)    Chest pain    Chronic asthmatic bronchitis    Closed displaced fracture of fifth metatarsal bone    Current every day smoker    Difficulty breathing    Essential hypertension    History of total knee replacement    Hypokalemia    Intermittent claudication (CMS/HCC)    Knee osteoarthritis    Knee pain    Limb pain    Localized swelling, mass, or lump of lower extremity    Celiac artery stenosis (CMS/HCC)    Mesenteric artery stenosis (CMS/HCC)     Mixed hyperlipidemia    Obese    PVD (peripheral vascular disease) (CMS/HCC)    Right foot pain    Swelling    Trigger finger    Urinary tract infection without hematuria    Back pain of lumbar region with sciatica    Back pain with radiculopathy    Intractable back pain    Seroma, post-traumatic (CMS/HCC)    Lumbar surgical wound fluid collection       General Visit Information:   PT  Visit  PT Received On: 12/15/23  General  Reason for Referral: impaired mobility  Referred By: Born PT/OT  Past Medical History Relevant to Rehab: COPD, HLD, HTN, PVD, arthritis, smoker  Patient Position Received: Bed, 3 rail up, Alarm off, not on at start of session (family present)  General Comment: Pt pleasant and cooperative, stating she believes she is leaving today.  Subjective     Precautions:  Precautions  Medical Precautions: Fall precautions  Post-Surgical Precautions: Spinal precautions  Braces Applied: TLSO brace when OOB  Precautions Comment: Recent L3-4, L4-5, L5-S1 Laminectomy([facility]). Had I&D (12/06/23) and second I&D(12/11/23)    Vital Signs:     Objective     Pain:  Pain Assessment  Pain Assessment: 0-10  Pain Score: 0 - No pain  Pain Type: Surgical pain, Acute pain  Pain Location: Back    Cognition:  Cognition  Overall Cognitive Status: Within Functional Limits    Postural Control:  Postural Control  Postural Control: Within Functional Limits    Extremity/Trunk Assessments:        RLE   RLE :  (ROM WFL)  LLE   LLE :  (ROM WFL)    Activity Tolerance:  Activity Tolerance  Endurance: Tolerates 10 - 20 min exercise with multiple rests    Treatments:           Bed Mobility  Bed Mobility: Yes  Bed Mobility 1  Bed Mobility 1: Supine to sitting  Level of Assistance 1:  (SBA/cues for spinal precautions)  Bed Mobility Comments 1: HOB elevated, uses grab bars to assist with scooting towards EOB  Ambulation/Gait Training  Ambulation/Gait Training Performed: Yes  Ambulation/Gait Training 1  Surface 1: Level tile  Device 1:  Rolling walker  Gait Support Devices:  (TLSO)  Assistance 1: Contact guard  Quality of Gait 1: Forward flexed posture  Comments/Distance (ft) 1: Pt ambulates ~10' bed > toilet, followed by 150' toilet > chair with arms, followed by 50' chair with arms > recliner.  Transfers  Transfer: Yes  Transfer 1  Transfer From 1: Bed to  Transfer to 1: Toilet  Technique 1: Sit to stand, Stand to sit  Transfer Device 1: Walker  Transfer Level of Assistance 1: Contact guard  Trials/Comments 1: TLSO donned upon stand  Transfers 2  Transfer From 2: Toilet to  Transfer to 2: Chair with arms  Technique 2: Sit to stand, Stand to sit  Transfer Device 2: Walker  Transfer Level of Assistance 2: Contact guard  Trials/Comments 2: Decreased eccentric control with lowering; correct hand placement with ww without cues  Transfers 3  Transfer From 3: Chair with arms to  Transfer to 3: Chair with arms  Technique 3: Sit to stand, Stand to sit  Transfer Device 3: Walker  Transfer Level of Assistance 3: Contact guard     Other Activity  Other Activity Performed: Yes  Other Activity 1: Pt stands at sink [facility] for handwashing, washes face, and brushes teeth. Assist for maintaining balance.    Outcome Measures:  [facility] Basic Mobility  Turning from your back to your side while in a flat bed without using bedrails: A little  Moving from lying on your back to sitting on the side of a flat bed without using bedrails: A little  Moving to and from bed to chair (including a wheelchair): A little  Standing up from a chair using your arms (e.g. wheelchair or bedside chair): A little  To walk in hospital room: A little  Climbing 3-5 steps with railing: Total  Basic Mobility - Total Score: 16  Education Documentation  Precautions, taught by Rosanna Malone PT at 12/15/2023 11:36 AM.  Learner: Patient  Readiness: Acceptance  Method: Explanation  Response: Verbalizes Understanding    Body Mechanics, taught by Rosanna Malone PT at 12/15/2023 11:36 AM.  Learner:  Patient  Readiness: Acceptance  Method: Explanation  Response: Verbalizes Understanding    Mobility Training, taught by Rosanna Malone, PT at 12/15/2023 11:36 AM.  Learner: Patient  Readiness: Acceptance  Method: Explanation  Response: Verbalizes Understanding    Education Comments  No comments found.        EDUCATION:  Outpatient Education  Individual(s) Educated: Patient  Education Provided: Body Mechanics, Fall Risk, POC, Post-Op Precautions (energy conservation)  Patient/Caregiver Demonstrated Understanding: yes  Plan of Care Discussed and Agreed Upon: yes  Patient Response to Education: Patient/Caregiver Verbalized Understanding of Information  Education Comment: Discussed energy conservation techniques and pursed lip breathing. Reinforced spinal precautions as pt observed twisting to flush toilet.  Encounter Problems       Encounter Problems (Active)       PT Problem       Pt will demonstrate sup > sit and sit > sup bed mobility mod I with log roll technique (Progressing)       Start:  12/07/23    Expected End:  12/21/23            Pt will demo sit > stand and stand > sit transfer with ww and mod I  (Progressing)       Start:  12/07/23    Expected End:  12/21/23            Pt will ambulate 100' with ww and mod I, without LOB  (Progressing)       Start:  12/07/23    Expected End:  12/21/23            Pt will demo up/down 1 steps with handrail mod I (Progressing)       Start:  12/07/23    Expected End:  12/21/23               Pain - Adult

## 2023-12-15 NOTE — PROGRESS NOTES
Occupational Therapy    OT Treatment    Patient Name: Tara Tierney  MRN: [id number]  Today's Date: 12/15/2023  Time Calculation  Start Time: 1020  Stop Time: 1035  Time Calculation (min): 15 min       Assessment:  End of Session Communication: Bedside nurse  End of Session Patient Position: Up in chair, Alarm off, not on at start of session     Plan:  Treatment Interventions: ADL retraining, Functional transfer training, Endurance training  OT Frequency: 2 times per week  Treatment Interventions: ADL retraining, Functional transfer training, Endurance training    Subjective   Previous Visit Info:  OT Last Visit  OT Received On: 12/15/23  General:  General  Past Medical History Relevant to Rehab: 12/11 additional I&D performed  Prior to Session Communication: Bedside nurse  Patient Position Received: Up in chair, Alarm off, not on at start of session  General Comment: patient reports that she just returned to chair, nursing cleared for therapy.  will be getting unit of blood this date  Precautions:  Medical Precautions: Fall precautions  Post-Surgical Precautions: Spinal precautions  Braces Applied: TLSO brace when OOB     Pain:  Pain Assessment  Pain Assessment: 0-10  Pain Score:  (not rated)  Pain Type: Surgical pain  Pain Location:  (bak and bilateral thighs)    Objective    Cognition:  Cognition  Overall Cognitive Status: Within Functional Limits  Orientation Level: Oriented X4     Activities of Daily Living: LE Dressing  LE Dressing: Yes  Adult Briefs Level of Assistance:  (doffed underwear with comp tech and CGA.  donned with ae and min a.  pants mgmt while in stance with CGA)  LE Dressing Where Assessed: Recliner     Functional Standing Tolerance:  Functional Standing Tolerance Comments: patient stood for a total of 1 mins this date with fair balance with 2-0 ue support as needed during funtional transfers and ADL tasks  Bed Mobility/Transfers: Transfer 1  Technique 1: Sit to stand, Stand to sit  Transfer  Device 1: Walker  Transfer Level of Assistance 1: Contact guard    Sitting Balance:  Dynamic Sitting Balance  Dynamic Sitting-Balance:  (good/good-)  Standing Balance:  Dynamic Standing Balance  Dynamic Standing-Balance:  (fair)    Outcome Measures:[facility] Daily Activity  Putting on and taking off regular lower body clothing: A lot  Bathing (including washing, rinsing, drying): A lot  Putting on and taking off regular upper body clothing: A little  Toileting, which includes using toilet, bedpan or urinal: A little  Taking care of personal grooming such as brushing teeth: A little  Eating Meals: A little  Daily Activity - Total Score: 16    Education Documentation  Precautions, taught by CHRISTOPHER Hill at 12/15/2023  1:18 PM.  Learner: Patient  Readiness: Acceptance  Method: Explanation  Response: Needs Reinforcement    ADL Training, taught by CHRISTOPHER Hill at 12/15/2023  1:18 PM.  Learner: Patient  Readiness: Acceptance  Method: Explanation  Response: Needs Reinforcement    Education Comments  No comments found.      OP EDUCATION:  Education  Individual(s) Educated: Patient  Education Provided: POC discussed and agreed upon, Risk and benefits of OT discussed with patient or other, Fall precautons, Diagnosis & Precautions, Symptom management, Joint protection and energy conservation, Ergonomics and postural realignment  Diagnosis and Precautions: spinal precautions  Equipment:  (AE, home DME and EC tech)  Patient/Caregiver Demonstrated Understanding: yes  Plan of Care Discussed and Agreed Upon: yes  Patient Response to Education: Patient/Caregiver Verbalized Understanding of Information, Patient/Caregiver Performed Return Demonstration of Exercises/Activities, Patient/Caregiver Asked Appropriate Questions    Goals:  Encounter Problems       Encounter Problems (Active)       OT Goals       Mod I for all functional transfers  (Progressing)       Start:  12/07/23    Expected End:  12/21/23            Mod I LB  dressing with reacher/sock aid  (Progressing)       Start:  12/07/23    Expected End:  12/21/23            Good dyn standing balance during ADLs, standing sinkside grooming, and functional tasks  (Progressing)       Start:  12/07/23    Expected End:  12/21/23

## 2023-12-15 NOTE — CARE PLAN
The patient's goals for the shift include Good night's sleep    The clinical goals for the shift include Comfort and rest    Over the shift, the patient did not make progress toward the following goals. Barriers to progression include post op complications. Recommendations to address these barriers include pain management  and quiet environment.

## 2023-12-15 NOTE — PROGRESS NOTES
"Tara Tierney is a 68 y.o. female on day 7 of admission presenting with Intractable back pain.    Subjective   Patient seen and examined.  Continues to have significant back pain, no fevers, chills, nausea, vomiting.  She is unable to move much because of persistent back pain.       Objective     Physical Exam  Constitutional:       Appearance: She is obese.   HENT:      Head: Normocephalic.   Eyes:      Extraocular Movements: Extraocular movements intact.      Conjunctiva/sclera: Conjunctivae normal.   Cardiovascular:      Rate and Rhythm: Normal rate and regular rhythm.   Pulmonary:      Effort: No dyspnea, no distress present.   Abdominal:      General: There is no distension.      Palpations: Abdomen is soft.   Last Recorded Vitals  Blood pressure 121/53, pulse 90, temperature 36.8 °C (98.2 °F), resp. rate 15, height 1.448 m (4' 9\"), weight 84.7 kg (186 lb 11.7 oz), SpO2 93 %.  Intake/Output last 3 Shifts:  I/O last 3 completed shifts:  In: 400 (4.7 mL/kg) [IV Piggyback:400]  Out: 4310 (50.9 mL/kg) [Urine:4250 (1.4 mL/kg/hr); Drains:60]  Weight: 84.7 kg     Relevant Results              This patient has a central line   Reason for the central line remaining today? Parenteral medication                 Assessment/Plan   Principal Problem:    Intractable back pain  Active Problems:    Back pain with radiculopathy    Seroma, post-traumatic (CMS/HCC)    Lumbar surgical wound fluid collection    She continues to have pain  Repeat MRI showed fluid collection deep had significantly decreased superficial collection was replaced with more blood products.  S/p second washout 12/11/2023  We will continue IV vancomycin  She will need right for 6 weeks postdischarge  Will need weekly CBC and BMP  Continue pain control, supportive care  Continue PT OT  Sodium has been stable  S/p Macrobid for UTI  Pain management consulted  Hb 7.0; will transfuse 1 unit blood today  c/o abd pain; check CT abd/pel  DVT prophylaxis on hold " due to borderline Hb.          Osvaldo Diaz MD

## 2023-12-15 NOTE — CONSULTS
"Nutrition Initial Assessment:   Nutrition Assessment    Reason for Assessment: Length of stay    Patient is a 68 y.o. female presenting with: intractable back pain. Family at bedside.       Nutrition History:  Energy Intake: Poor < 50 %  Food and Nutrient History: Pt reports appetite has been decreased since admission. Reports it is a combination of not liking the food and just not feeling hungry. States she is eating <50% of her meals. Does not regularly drink any ONS at home but used to drink [place] instant breakfast. Agreeable to trying CIB until appetite improves. Denies N/V/D but reports constipation - states she ahs been getting colace the last several days but still not BM. Denies issues chewing/swallowing.  Vitamin/Herbal Supplement Use: vitamin D3, preservision per home med list  Food Allergies/Intolerances:  None  GI Symptoms: Constipation  Oral Problems: None       Anthropometrics:  Height: 144.8 cm (4' 9\")   Weight: 84.7 kg (186 lb 11.7 oz)   BMI (Calculated): 40.4  IBW/kg (Dietitian Calculated): 38.6 kg          Weight History:     Weight Change %:  Weight History / % Weight Change: Pt reports UBW is 165#. States she has had some fluid wt gain. Wt Readings from Last 10 Encounters:  12/05/23 84.7 kg (186 lb 11.7 oz)  11/21/23 68.4 kg (150 lb 12.7 oz)  11/07/23 77.1 kg (170 lb)  11/03/23 81 kg (178 lb 9.2 oz)  12/15/22 76.2 kg (167 lb 14.4 oz). Pt with large fluctuations in wt measures ranging from 68-85 kg x 1 year. Unable to assess actual wt changes due to changes in fluid status.  Significant Weight Gain: Fluid related    Nutrition Focused Physical Exam Findings:    Subcutaneous Fat Loss:   Orbital Fat Pads: Well nourshed (slightly bulging fat pads)  Buccal Fat Pads: Well nourished (full, rounded cheeks)  Triceps: Well nourished (ample fat tissue)  Muscle Wasting:  Temporalis: Well nourished (well-defined muscle)  Pectoralis (Clavicular Region): Well nourished (clavicle not " visible)  Deltoid/Trapezius: Well nourished (rounded appearance at arm, shoulder, neck)  Interosseous: Well nourished (muscle bulges)  Edema:  Edema: +1 trace  Edema Location: BLE per nursing assessment 12/15  Physical Findings:  Mouth: Negative  Nails: Negative  Skin: Positive (lower back incision per nursing assessment)    Nutrition Significant Labs:    Reviewed   Nutrition Specific Medications:  Reviewed     I/O:   Last BM Date: 12/09/23;          Dietary Orders (From admission, onward)       Start     Ordered    12/15/23 1503  Oral nutritional supplements  Until discontinued        Comments: chocolate   Question Answer Comment   Deliver with Breakfast    Deliver with Dinner    Select supplement: [place] Breakfast Essentials        12/15/23 1502    12/11/23 1838  Adult diet Regular  Diet effective now        Question:  Diet type  Answer:  Regular    12/11/23 1837                     Estimated Needs:   Total Energy Estimated Needs (kCal): 1694 kCal  Method for Estimating Needs: 20 kcal/kg ABW  Total Protein Estimated Needs (g): 68 g  Method for Estimating Needs: 0.8 g/kg ABW  Total Fluid Estimated Needs (mL): 1694 mL  Method for Estimating Needs: 20 ml/kg ABW        Nutrition Diagnosis   Malnutrition Diagnosis  Patient has Malnutrition Diagnosis: No    Nutrition Diagnosis  Patient has Nutrition Diagnosis: Yes  Diagnosis Status (1): New  Nutrition Diagnosis 1: Inadequate oral intake  Related to (1): decreased appetite  As Evidenced by (1): reports of eating an average of 50% of meals during this admission       Nutrition Interventions/Recommendations         Nutrition Prescription:  Individualized Nutrition Prescription Provided for : Regular diet        Nutrition Interventions:   Food and/or Nutrient Delivery Interventions  Interventions: Meals and snacks, Medical food supplement  Meals and Snacks: General healthful diet  Goal: Consumes 3 meals per day  Medical Food Supplement: Commercial beverage  Goal:  [place] instant breakfast in milk BID (provides 130 kcal and 5 g protein per packet + calories and protein from milk) chocolate         Nutrition Education:   Discussed ONS options for pt to consume until appetite improves.        Nutrition Monitoring and Evaluation   Food/Nutrient Related History Monitoring  Monitoring and Evaluation Plan: Energy intake, Amount of food  Energy Intake: Estimated energy intake  Criteria: Pt meets >75% of estimated energy needs  Amount of Food: Estimated amout of food, Medical food intake  Criteria: Pt consumes >75% of meals and supplements    Body Composition/Growth/Weight History  Monitoring and Evaluation Plan: Weight  Weight: Measured weight  Criteria: Reduces wt from edema    Biochemical Data, Medical Tests and Procedures  Monitoring and Evaluation Plan: Electrolyte/renal panel  Electrolyte and Renal Panel: Sodium  Criteria: Sodium WNL    Nutrition Focused Physical Findings  Monitoring and Evaluation Plan: Digestive System  Digestive System: Constipation  Criteria: Resolution and prevention of constipation       Time Spent/Follow-up Reminder:   Time Spent (min): 60 minutes  Last Date of Nutrition Visit: 12/15/23  Nutrition Follow-Up Needed?: 3-5 days  Follow up Comment: [place] instant breakfast BID

## 2023-12-16 ENCOUNTER — APPOINTMENT (OUTPATIENT)
Dept: CARDIOLOGY | Facility: HOSPITAL | Age: 68
DRG: 856 | End: 2023-12-16
Payer: COMMERCIAL

## 2023-12-16 LAB
ANION GAP SERPL CALC-SCNC: 8 MMOL/L (ref 10–20)
AORTIC VALVE MEAN GRADIENT: 8
AORTIC VALVE PEAK VELOCITY: 1.84
AV PEAK GRADIENT: 13.5
AVA (PEAK VEL): 2.13
AVA (VTI): 2.06
BUN SERPL-MCNC: 13 MG/DL (ref 6–23)
CALCIUM SERPL-MCNC: 8.7 MG/DL (ref 8.6–10.3)
CHLORIDE SERPL-SCNC: 96 MMOL/L (ref 98–107)
CO2 SERPL-SCNC: 28 MMOL/L (ref 21–32)
CREAT SERPL-MCNC: 0.9 MG/DL (ref 0.5–1.05)
EJECTION FRACTION APICAL 4 CHAMBER: 50.3
EJECTION FRACTION: 55
ERYTHROCYTE [DISTWIDTH] IN BLOOD BY AUTOMATED COUNT: 14.7 % (ref 11.5–14.5)
GFR SERPL CREATININE-BSD FRML MDRD: 69 ML/MIN/1.73M*2
GLUCOSE SERPL-MCNC: 120 MG/DL (ref 74–99)
HCT VFR BLD AUTO: 23.9 % (ref 36–46)
HGB BLD-MCNC: 7.9 G/DL (ref 12–16)
HOLD SPECIMEN: NORMAL
LEFT ATRIUM VOLUME AREA LENGTH INDEX BSA: 21.5
LEFT VENTRICLE INTERNAL DIMENSION DIASTOLE: 4.11 (ref 3.5–6)
LEFT VENTRICULAR OUTFLOW TRACT DIAMETER: 1.8
MCH RBC QN AUTO: 31.1 PG (ref 26–34)
MCHC RBC AUTO-ENTMCNC: 33.1 G/DL (ref 32–36)
MCV RBC AUTO: 94 FL (ref 80–100)
MITRAL VALVE E/A RATIO: 0.93
MITRAL VALVE E/E' RATIO: 9.33
NRBC BLD-RTO: 0.4 /100 WBCS (ref 0–0)
PLATELET # BLD AUTO: 218 X10*3/UL (ref 150–450)
POTASSIUM SERPL-SCNC: 3.8 MMOL/L (ref 3.5–5.3)
RBC # BLD AUTO: 2.54 X10*6/UL (ref 4–5.2)
RIGHT VENTRICLE FREE WALL PEAK S': 13.3
RIGHT VENTRICLE PEAK SYSTOLIC PRESSURE: 40.9
SODIUM SERPL-SCNC: 128 MMOL/L (ref 136–145)
TRICUSPID ANNULAR PLANE SYSTOLIC EXCURSION: 2.8
UFH PPP CHRO-ACNC: 0.4 IU/ML
UFH PPP CHRO-ACNC: 0.7 IU/ML
UFH PPP CHRO-ACNC: 0.8 IU/ML
VANCOMYCIN SERPL-MCNC: 20.9 UG/ML (ref 5–20)
WBC # BLD AUTO: 10.1 X10*3/UL (ref 4.4–11.3)

## 2023-12-16 PROCEDURE — 2500000001 HC RX 250 WO HCPCS SELF ADMINISTERED DRUGS (ALT 637 FOR MEDICARE OP): Performed by: STUDENT IN AN ORGANIZED HEALTH CARE EDUCATION/TRAINING PROGRAM

## 2023-12-16 PROCEDURE — 2500000004 HC RX 250 GENERAL PHARMACY W/ HCPCS (ALT 636 FOR OP/ED): Performed by: INTERNAL MEDICINE

## 2023-12-16 PROCEDURE — 85027 COMPLETE CBC AUTOMATED: CPT | Performed by: INTERNAL MEDICINE

## 2023-12-16 PROCEDURE — 2500000004 HC RX 250 GENERAL PHARMACY W/ HCPCS (ALT 636 FOR OP/ED): Performed by: STUDENT IN AN ORGANIZED HEALTH CARE EDUCATION/TRAINING PROGRAM

## 2023-12-16 PROCEDURE — 2500000001 HC RX 250 WO HCPCS SELF ADMINISTERED DRUGS (ALT 637 FOR MEDICARE OP): Performed by: ANESTHESIOLOGY

## 2023-12-16 PROCEDURE — 1200000002 HC GENERAL ROOM WITH TELEMETRY DAILY

## 2023-12-16 PROCEDURE — 85520 HEPARIN ASSAY: CPT | Performed by: INTERNAL MEDICINE

## 2023-12-16 PROCEDURE — 80048 BASIC METABOLIC PNL TOTAL CA: CPT | Performed by: INTERNAL MEDICINE

## 2023-12-16 PROCEDURE — 99223 1ST HOSP IP/OBS HIGH 75: CPT

## 2023-12-16 PROCEDURE — 2500000002 HC RX 250 W HCPCS SELF ADMINISTERED DRUGS (ALT 637 FOR MEDICARE OP, ALT 636 FOR OP/ED): Performed by: STUDENT IN AN ORGANIZED HEALTH CARE EDUCATION/TRAINING PROGRAM

## 2023-12-16 PROCEDURE — 2500000004 HC RX 250 GENERAL PHARMACY W/ HCPCS (ALT 636 FOR OP/ED): Performed by: FAMILY MEDICINE

## 2023-12-16 PROCEDURE — 97116 GAIT TRAINING THERAPY: CPT | Mod: GP,CQ | Performed by: PHYSICAL THERAPY ASSISTANT

## 2023-12-16 PROCEDURE — 99233 SBSQ HOSP IP/OBS HIGH 50: CPT | Performed by: INTERNAL MEDICINE

## 2023-12-16 PROCEDURE — 2500000004 HC RX 250 GENERAL PHARMACY W/ HCPCS (ALT 636 FOR OP/ED): Performed by: NURSE PRACTITIONER

## 2023-12-16 PROCEDURE — 93306 TTE W/DOPPLER COMPLETE: CPT | Performed by: INTERNAL MEDICINE

## 2023-12-16 PROCEDURE — 2500000001 HC RX 250 WO HCPCS SELF ADMINISTERED DRUGS (ALT 637 FOR MEDICARE OP): Performed by: FAMILY MEDICINE

## 2023-12-16 PROCEDURE — 93306 TTE W/DOPPLER COMPLETE: CPT

## 2023-12-16 PROCEDURE — 36415 COLL VENOUS BLD VENIPUNCTURE: CPT | Performed by: INTERNAL MEDICINE

## 2023-12-16 PROCEDURE — 80202 ASSAY OF VANCOMYCIN: CPT | Performed by: NURSE PRACTITIONER

## 2023-12-16 RX ORDER — VANCOMYCIN HYDROCHLORIDE 750 MG/150ML
750 INJECTION, SOLUTION INTRAVENOUS EVERY 12 HOURS
Status: DISCONTINUED | OUTPATIENT
Start: 2023-12-16 | End: 2023-12-17 | Stop reason: DRUGHIGH

## 2023-12-16 RX ADMIN — EZETIMIBE 10 MG: 10 TABLET ORAL at 08:55

## 2023-12-16 RX ADMIN — VANCOMYCIN HYDROCHLORIDE 750 MG: 750 INJECTION, SOLUTION INTRAVENOUS at 20:48

## 2023-12-16 RX ADMIN — IRON SUCROSE 200 MG: 20 INJECTION, SOLUTION INTRAVENOUS at 23:41

## 2023-12-16 RX ADMIN — LORATADINE 10 MG: 10 TABLET ORAL at 08:55

## 2023-12-16 RX ADMIN — PANTOPRAZOLE SODIUM 40 MG: 40 TABLET, DELAYED RELEASE ORAL at 06:40

## 2023-12-16 RX ADMIN — ISOSORBIDE MONONITRATE 30 MG: 30 TABLET, EXTENDED RELEASE ORAL at 08:59

## 2023-12-16 RX ADMIN — PHENAZOPYRIDINE HYDROCHLORIDE 100 MG: 100 TABLET ORAL at 12:16

## 2023-12-16 RX ADMIN — PHENAZOPYRIDINE HYDROCHLORIDE 100 MG: 100 TABLET ORAL at 08:52

## 2023-12-16 RX ADMIN — MIRTAZAPINE 15 MG: 15 TABLET, FILM COATED ORAL at 20:48

## 2023-12-16 RX ADMIN — Medication 2000 UNITS: at 08:54

## 2023-12-16 RX ADMIN — PHENAZOPYRIDINE HYDROCHLORIDE 100 MG: 100 TABLET ORAL at 16:27

## 2023-12-16 RX ADMIN — DOCUSATE SODIUM 100 MG: 100 CAPSULE, LIQUID FILLED ORAL at 20:48

## 2023-12-16 RX ADMIN — SIMVASTATIN 40 MG: 40 TABLET, FILM COATED ORAL at 08:55

## 2023-12-16 RX ADMIN — MORPHINE SULFATE 15 MG: 15 TABLET, EXTENDED RELEASE ORAL at 08:54

## 2023-12-16 RX ADMIN — BRIMONIDINE TARTRATE 1 DROP: 2 SOLUTION/ DROPS OPHTHALMIC at 09:07

## 2023-12-16 RX ADMIN — HYDROMORPHONE HYDROCHLORIDE 0.4 MG: 0.5 INJECTION, SOLUTION INTRAMUSCULAR; INTRAVENOUS; SUBCUTANEOUS at 19:09

## 2023-12-16 RX ADMIN — PREGABALIN 75 MG: 50 CAPSULE ORAL at 08:56

## 2023-12-16 RX ADMIN — OXYCODONE HYDROCHLORIDE 10 MG: 5 TABLET ORAL at 17:49

## 2023-12-16 RX ADMIN — HEPARIN SODIUM 12.9 UNITS/HR: 10000 INJECTION, SOLUTION INTRAVENOUS at 17:46

## 2023-12-16 RX ADMIN — OXYCODONE HYDROCHLORIDE 5 MG: 5 TABLET ORAL at 10:04

## 2023-12-16 RX ADMIN — PREGABALIN 75 MG: 50 CAPSULE ORAL at 20:48

## 2023-12-16 RX ADMIN — DOCUSATE SODIUM 100 MG: 100 CAPSULE, LIQUID FILLED ORAL at 08:53

## 2023-12-16 RX ADMIN — BRIMONIDINE TARTRATE 1 DROP: 2 SOLUTION/ DROPS OPHTHALMIC at 20:51

## 2023-12-16 RX ADMIN — PERFLUTREN 10 ML OF DILUTION: 6.52 INJECTION, SUSPENSION INTRAVENOUS at 09:57

## 2023-12-16 RX ADMIN — ISOSORBIDE MONONITRATE 60 MG: 60 TABLET, EXTENDED RELEASE ORAL at 08:53

## 2023-12-16 RX ADMIN — VANCOMYCIN HYDROCHLORIDE 750 MG: 750 INJECTION, SOLUTION INTRAVENOUS at 08:51

## 2023-12-16 RX ADMIN — HYDRALAZINE HYDROCHLORIDE 50 MG: 50 TABLET ORAL at 20:48

## 2023-12-16 RX ADMIN — ATENOLOL 50 MG: 50 TABLET ORAL at 08:54

## 2023-12-16 RX ADMIN — HYDRALAZINE HYDROCHLORIDE 50 MG: 50 TABLET ORAL at 08:55

## 2023-12-16 RX ADMIN — BUSPIRONE HYDROCHLORIDE 10 MG: 5 TABLET ORAL at 08:54

## 2023-12-16 RX ADMIN — MORPHINE SULFATE 15 MG: 15 TABLET, EXTENDED RELEASE ORAL at 20:48

## 2023-12-16 RX ADMIN — SIMVASTATIN 40 MG: 40 TABLET, FILM COATED ORAL at 20:48

## 2023-12-16 ASSESSMENT — PAIN SCALES - GENERAL
PAINLEVEL_OUTOF10: 10 - WORST POSSIBLE PAIN
PAINLEVEL_OUTOF10: 8
PAINLEVEL_OUTOF10: 6
PAINLEVEL_OUTOF10: 9

## 2023-12-16 ASSESSMENT — COGNITIVE AND FUNCTIONAL STATUS - GENERAL
MOVING FROM LYING ON BACK TO SITTING ON SIDE OF FLAT BED WITH BEDRAILS: A LOT
STANDING UP FROM CHAIR USING ARMS: A LITTLE
CLIMB 3 TO 5 STEPS WITH RAILING: A LOT
MOVING TO AND FROM BED TO CHAIR: A LITTLE
WALKING IN HOSPITAL ROOM: A LITTLE
MOBILITY SCORE: 15
TURNING FROM BACK TO SIDE WHILE IN FLAT BAD: A LOT

## 2023-12-16 NOTE — PROGRESS NOTES
"Physical Therapy    Physical Therapy Treatment    Patient Name: Tara Tierney  MRN: [id number]  Today's Date: 12/16/2023  Time Calculation  Start Time: 1107  Stop Time: 1128  Time Calculation (min): 21 min       Assessment/Plan   PT Assessment  End of Session Communication: Bedside nurse  End of Session Patient Position: Up in chair  PT Plan  Inpatient/Swing Bed or Outpatient: Inpatient  PT Plan  Treatment/Interventions: Transfer training, Gait training  PT Plan: Skilled PT  PT Frequency: Daily    General Visit Information:   PT  Visit  PT Received On: 12/16/23  General  Prior to Session Communication: Bedside nurse  Patient Position Received: Up in chair  General Comment:  (RN stating pt. with PE and DVT therapeutic at 9  on heparin drip OK to see for therapy for gait.)    Precautions:  Precautions  Precautions Comment:  (Fall, spinal precautions with TLSO when up, riht side drainage tubes intact.)  Vital Signs:  Vital Signs  Heart Rate: 81  SpO2: 94 %  Patient Position: Sitting    Ambulation/Gait Training  Ambulation/Gait Training Performed:  (gait training trials with fww and  min/cg x 1 with gait belt, TLSO intact 60-85' trials slow decreased proximity to ad with education on safe use. seated rest breaks as needed)  Transfers  Transfer:  (sit to stand trials with hand placement and positioning education with emphasis on gait training and pt. with c/o \"I'm so weak\")    Outcome Measures:  [facility] Basic Mobility  Turning from your back to your side while in a flat bed without using bedrails: A lot  Moving from lying on your back to sitting on the side of a flat bed without using bedrails: A lot  Moving to and from bed to chair (including a wheelchair): A little  Standing up from a chair using your arms (e.g. wheelchair or bedside chair): A little  To walk in hospital room: A little  Climbing 3-5 steps with railing: A lot  Basic Mobility - Total Score: 15    EDUCATION:  Outpatient Education  Individual(s) Educated: " Patient  Education Provided: Body Mechanics, Fall Risk, Posture  Patient Response to Education: Patient/Caregiver Verbalized Understanding of Information, Patient/Caregiver Performed Return Demonstration of Exercises/Activities    Encounter Problems       Encounter Problems (Active)       PT Problem       Pt will demonstrate sup > sit and sit > sup bed mobility mod I with log roll technique (Progressing)       Start:  12/07/23    Expected End:  12/21/23            Pt will demo sit > stand and stand > sit transfer with ww and mod I  (Progressing)       Start:  12/07/23    Expected End:  12/21/23            Pt will ambulate 100' with ww and mod I, without LOB  (Progressing)       Start:  12/07/23    Expected End:  12/21/23            Pt will demo up/down 1 steps with handrail mod I (Progressing)       Start:  12/07/23    Expected End:  12/21/23               Pain - Adult

## 2023-12-16 NOTE — PROGRESS NOTES
ADMISSION DATE: 12/5/2023  HOSPITAL DAY: 8    SUBJECTIVE:  Patient was seen at bedside.    Uneventful night.    OBJECTIVE:  Visit Vitals  /53 (BP Location: Left arm, Patient Position: Lying)   Pulse 81   Temp 37 °C (98.6 °F) (Temporal)   Resp 18        Intake/Output Summary (Last 24 hours) at 12/16/2023 1454  Last data filed at 12/16/2023 1149  Gross per 24 hour   Intake 550 ml   Output 1285 ml   Net -735 ml        PHYSICAL EXAM:  HEENT:  Atraumatic, PERRL.  Conjunctivae clear.   Moist nasal mucous membranes. oropharynx non erythematous,   Neck:  Supple without thyromegaly or lymphadenopathy.  Lungs:  Clear to auscultation without rales, rhonchi, or rub.  Heart:  RRR, S1, S2, without M.  Abdomen:  Soft, non tender, no organ enlargement, bruit. Bowel sounds present . No CVA tenderness.  Extremities:  No edema. No calf swelling or tenderness.    Skin:  No rash, ecchymosis or erythema.    CURRENT ACTIVE MEDS:  [Held by provider] aspirin, 81 mg, oral, Daily  atenolol, 50 mg, oral, q AM  brimonidine, 1 drop, Both Eyes, BID  busPIRone, 10 mg, oral, Daily  cholecalciferol, 2,000 Units, oral, Daily  docusate sodium, 100 mg, oral, BID  ezetimibe, 10 mg, oral, Daily  [Held by provider] heparin (porcine), 5,000 Units, subcutaneous, q8h  hydrALAZINE, 50 mg, oral, BID  isosorbide mononitrate ER, 30 mg, oral, Daily  isosorbide mononitrate ER, 60 mg, oral, Daily  loratadine, 10 mg, oral, Daily  mirtazapine, 15 mg, oral, Nightly  morphine CR, 15 mg, oral, q12h EDE  pantoprazole, 40 mg, oral, Daily before breakfast  perflutren protein A microsphere, 0.5 mL, intravenous, Once in imaging  phenazopyridine, 100 mg, oral, TID with meals  polyethylene glycol, 17 g, oral, Daily  pregabalin, 75 mg, oral, BID  simvastatin, 40 mg, oral, BID  sulfur hexafluoride microsphr, 2 mL, intravenous, Once in imaging  tranexamic acid, 1,950 mg, oral, Once  [Held by provider] triamterene-hydrochlorothiazid, 1 tablet, oral, Daily  vancomycin, 750  mg, intravenous, q12h          LAB RESULTS:   CBC:   Results from last 7 days   Lab Units 12/16/23  0414 12/15/23  0451 12/14/23  0729   WBC AUTO x10*3/uL 10.1 8.4 7.4   RBC AUTO x10*6/uL 2.54* 2.24* 2.36*   HEMOGLOBIN g/dL 7.9* 7.0* 7.5*   HEMATOCRIT % 23.9* 21.8* 22.9*   MCV fL 94 97 97   MCH pg 31.1 31.3 31.8   MCHC g/dL 33.1 32.1 32.8   RDW % 14.7* 13.7 13.3   PLATELETS AUTO x10*3/uL 218 228 217     CMP:    Results from last 7 days   Lab Units 12/16/23  0414 12/15/23  0451 12/14/23  0729   SODIUM mmol/L 128* 132* 132*   POTASSIUM mmol/L 3.8 4.1 3.8   CHLORIDE mmol/L 96* 97* 98   CO2 mmol/L 28 28 26   BUN mg/dL 13 12 13   CREATININE mg/dL 0.90 0.76 0.74   GLUCOSE mg/dL 120* 99 125*   CALCIUM mg/dL 8.7 8.6 8.6     BMP:    Results from last 7 days   Lab Units 12/16/23  0414 12/15/23  0451 12/14/23  0729   SODIUM mmol/L 128* 132* 132*   POTASSIUM mmol/L 3.8 4.1 3.8   CHLORIDE mmol/L 96* 97* 98   CO2 mmol/L 28 28 26   BUN mg/dL 13 12 13   CREATININE mg/dL 0.90 0.76 0.74   CALCIUM mg/dL 8.7 8.6 8.6   GLUCOSE mg/dL 120* 99 125*          IMAGING STUDIES:  MR lumbar spine w and wo IV contrast  Result Date: 12/5/2023    1.  Postoperative changes as above. A large dorsal extra-spinal fluid collection is noted extending from the laminectomy bed through the deep muscular fascia into the subcutaneous fat. This collection extends from the right lateral recesses at the level of L4-5 as well as L5-S1 into the spinal canal and deforms the thecal sac resulting in moderate narrowing at L4-5 and to a lesser degree at L3-4. A focal defect is noted within the thecal sac at the level of L5-S1. 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 No significant peripheral enhancement is identified to suggest abscess although the sterility of this collection can not be ascertained on MRI alone.     2. Multilevel degenerative changes again noted with persistent marked bilateral foraminal narrowing at L3-4 and at L5-S1 on the left.           CT  angio chest abdomen pelvis  Result Date: 12/5/2023  No thoracic or abdominal aortic aneurysm or dissection.   Stable pre-existing vascular findings as reported.   Findings compatible with recent lumbar spine surgery with prominent fluid collection overlying the operated site extending through the superficial subcutaneous layer. The canal is not reliably assessed at the operated site due to artifact.     CT lumbar spine wo IV contrast  Result Date: 12/5/2023  1. No acute fracture identified.   2.. Postoperative and degenerative changes.        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  She is also walking in the hallway.  Her heparin drip is running but is not therapeutic yet.  She may be converted to oral anticoagulant.  She had echocardiogram to assess the right heart strain.  We are waiting for the result.  Meanwhile we will continue with her heparin drip and IV antibiotic.  Will keep a closer eye on her H&H.  She received 1 unit of PRBC yesterday and hemoglobin incremented somewhat appropriately.  She may benefit from IV Venofer.

## 2023-12-16 NOTE — SIGNIFICANT EVENT
CT scan did show acute lobar and segmental pulmonary emboli in the right lower lobe and middle lobe.  RV to LV ratio is 1:1 concerning for right heart strain.  -Will obtain echocardiogram  -Patient will be on heparin drip.  -Continuous telemonitoring  -Will monitor hemodynamic status closely.  So far no tachycardia, and blood pressure is a stable and O2 saturation is very well-maintained.

## 2023-12-16 NOTE — NURSING NOTE
Johnny Oliver called from lab regarding a cancelled Vancomycin trough. I spoke with Jeanine Walker in pharmacy who already placed a vancomycin lab order for tomorrow morning before patient receives morning vancomycin dose.

## 2023-12-16 NOTE — PROGRESS NOTES
"Vancomycin Dosing by Pharmacy- FOLLOW UP    Tara Tierney is a 68 y.o. year old female who Pharmacy has been consulted for vancomycin dosing for osteomyelitis/septic arthritis. Based on the patient's indication and renal status this patient is being dosed based on a goal trough/random level of 15-20.     Renal function is currently declining.    Current vancomycin dose: 1000 mg given every 12 hours    Most recent trough level: 20.9 mcg/mL    Visit Vitals  /62   Pulse 83   Temp 37.4 °C (99.3 °F)   Resp 18        Lab Results   Component Value Date    CREATININE 0.90 12/16/2023    CREATININE 0.76 12/15/2023    CREATININE 0.74 12/14/2023    CREATININE 0.80 12/13/2023        Patient weight is No results found for: \"PTWEIGHT\"    No results found for: \"CULTURE\"     I/O last 3 completed shifts:  In: 550 (6.7 mL/kg) [Blood:350; IV Piggyback:200]  Out: 4950 (59.9 mL/kg) [Urine:3600 (1.2 mL/kg/hr); Drains:1350]  Weight: 82.6 kg   [unfilled]    No results found for: \"PATIENTTEMP\"     Assessment/Plan    Above goal random/trough level. Orders placed for new vancomycin regimen of 750 mg every 12 hours to begin at 12-16-23 0800 .    This dosing regimen is predicted by [facility] to result in the following pharmacokinetic parameters:  <<<<<PASTE [employer] DATA HERE>>>>>    The next level will be obtained on 12-17-23 at 0500. May be obtained sooner if clinically indicated.   Will continue to monitor renal function daily while on vancomycin and order serum creatinine at least every 48 hours if not already ordered.  Follow for continued vancomycin needs, clinical response, and signs/symptoms of toxicity.       Jose Dawn, PharmD           "

## 2023-12-16 NOTE — NURSING NOTE
Discussed patient's order for weight based heparin bolus with Dr. Lange. Per armand Hedrick to administer ordered bolus.

## 2023-12-16 NOTE — PROGRESS NOTES
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 Patient states she is having increased lower back pain.         SUBJECTIVE    Patient seen and examined this morning. No acute events overnight.      VITALS      Vitals:    12/15/23 2027 12/15/23 2310 12/15/23 2345 12/16/23 0849   BP: 154/63  128/62 125/53   BP Location:    Left arm   Patient Position:    Lying   Pulse: 88  83 82   Resp:   18 18   Temp: 37.4 °C (99.3 °F)  37.4 °C (99.3 °F) 37 °C (98.6 °F)   TempSrc:    Temporal   SpO2: 94%  93% 94%   Weight:  82.6 kg (182 lb 1.6 oz)     Height:           Intake/Output Summary (Last 24 hours) at 12/16/2023 0932  Last data filed at 12/16/2023 0438  Gross per 24 hour   Intake 550 ml   Output 2150 ml   Net -1600 ml      Wt Readings from Last 4 Encounters:   12/15/23 82.6 kg (182 lb 1.6 oz)   11/21/23 68.4 kg (150 lb 12.7 oz)   11/07/23 77.1 kg (170 lb)   11/03/23 81 kg (178 lb 9.2 oz)        Allergies:  Allergies   Allergen Reactions    Neomycin Other     swelling, redness, burning post eye surgery    Neomycin-Polymyxin B-Dexameth Other and Rash     blisters, eye swelling, burning        PHYSICAL EXAM   Physical Exam  HENT:      Mouth/Throat:      Mouth: Mucous membranes are moist.      Pharynx: Oropharynx is clear.   Cardiovascular:      Rate and Rhythm: Normal rate.      Pulses: Normal pulses.      Heart sounds: S1 normal and S2 normal.   Pulmonary:      Effort: Pulmonary effort is normal.   Musculoskeletal:         General: No swelling. Normal range of motion.      Cervical  back: Normal range of motion.      Lumbar back: Tenderness present.      Comments: JOSE drains with approximately 20 mL of bright red output. Dressing to the lumbar region is clean, dry, and intact.   Skin:     General: Skin is warm and dry.      Capillary Refill: Capillary refill takes less than 2 seconds.   Neurological:      General: No focal deficit present.      Mental Status: She is alert and oriented to person, place, and time.   Psychiatric:         Attention and Perception: Attention normal.         Mood and Affect: Mood normal.         Speech: Speech normal.         Behavior: Behavior normal. Behavior is cooperative.         LABS   CBC:   Results from last 7 days   Lab Units 12/16/23  0414 12/15/23  0451 12/14/23  0729   WBC AUTO x10*3/uL 10.1 8.4 7.4   RBC AUTO x10*6/uL 2.54* 2.24* 2.36*   HEMOGLOBIN g/dL 7.9* 7.0* 7.5*   HEMATOCRIT % 23.9* 21.8* 22.9*   MCV fL 94 97 97   MCH pg 31.1 31.3 31.8   MCHC g/dL 33.1 32.1 32.8   RDW % 14.7* 13.7 13.3   PLATELETS AUTO x10*3/uL 218 228 217     CMP:    Results from last 7 days   Lab Units 12/16/23  0414 12/15/23  0451 12/14/23  0729   SODIUM mmol/L 128* 132* 132*   POTASSIUM mmol/L 3.8 4.1 3.8   CHLORIDE mmol/L 96* 97* 98   CO2 mmol/L 28 28 26   BUN mg/dL 13 12 13   CREATININE mg/dL 0.90 0.76 0.74   GLUCOSE mg/dL 120* 99 125*   CALCIUM mg/dL 8.7 8.6 8.6     BMP:    Results from last 7 days   Lab Units 12/16/23  0414 12/15/23  0451 12/14/23  0729   SODIUM mmol/L 128* 132* 132*   POTASSIUM mmol/L 3.8 4.1 3.8   CHLORIDE mmol/L 96* 97* 98   CO2 mmol/L 28 28 26   BUN mg/dL 13 12 13   CREATININE mg/dL 0.90 0.76 0.74   CALCIUM mg/dL 8.7 8.6 8.6   GLUCOSE mg/dL 120* 99 125*       [Held by provider] aspirin, 81 mg, oral, Daily  atenolol, 50 mg, oral, q AM  brimonidine, 1 drop, Both Eyes, BID  busPIRone, 10 mg, oral, Daily  cholecalciferol, 2,000 Units, oral, Daily  docusate sodium, 100 mg, oral, BID  ezetimibe, 10 mg, oral, Daily  [Held by provider] heparin (porcine), 5,000  Units, subcutaneous, q8h  hydrALAZINE, 50 mg, oral, BID  isosorbide mononitrate ER, 30 mg, oral, Daily  isosorbide mononitrate ER, 60 mg, oral, Daily  loratadine, 10 mg, oral, Daily  mirtazapine, 15 mg, oral, Nightly  morphine CR, 15 mg, oral, q12h EDE  pantoprazole, 40 mg, oral, Daily before breakfast  phenazopyridine, 100 mg, oral, TID with meals  polyethylene glycol, 17 g, oral, Daily  pregabalin, 75 mg, oral, BID  simvastatin, 40 mg, oral, BID  tranexamic acid, 1,950 mg, oral, Once  [Held by provider] triamterene-hydrochlorothiazid, 1 tablet, oral, Daily  vancomycin, 750 mg, intravenous, q12h       heparin, 0-4,500 Units/hr, Last Rate: 1,286.8 Units/hr (12/16/23 0502)       Current Outpatient Medications   Medication Instructions    albuterol (ProAir HFA) 90 mcg/actuation inhaler 2 puffs, inhalation    aspirin (ADULT LOW DOSE ASPIRIN) 81 mg, oral, Daily    aspirin 81 mg, oral, Daily    atenolol (TENORMIN) 50 mg, oral, Every morning    ATENOLOL ORAL 50 mg, oral, Daily RT    brimonidine (AlphaGAN) 0.2 % ophthalmic solution 1 drop, Both Eyes, 2 times daily    busPIRone (BUSPAR) 10 mg, oral, Daily    celecoxib (CELEBREX) 200 mg, oral, Daily PRN    cholecalciferol (Vitamin D-3) 50 mcg (2,000 unit) capsule 1 capsule, oral, Daily    clopidogrel (PLAVIX) 75 mg, oral, Daily    cyclobenzaprine (FLEXERIL) 5 mg, oral, 3 times daily PRN    docusate sodium (COLACE) 100 mg, oral, 2 times daily    docusate sodium (COLACE) 100 mg, oral, Daily    ezetimibe (ZETIA) 10 mg, oral, Daily    furosemide (LASIX) 20 mg, oral, Daily    hydrALAZINE (APRESOLINE) 50 mg, oral, 2 times daily    ibandronate (BONIVA) 150 mg, oral, Every 30 days    ipratropium (Atrovent HFA) 17 mcg/actuation inhaler 2 puffs, inhalation, 2 times daily RT    isosorbide mononitrate ER (IMDUR) 30 mg, oral, Daily, With the 60 mg tablet    isosorbide mononitrate ER (IMDUR) 60 mg, oral, Daily, With the 30mg tablet    ISOSORBIDE MONONITRATE ORAL 30 mg, oral, Daily RT     ISOSORBIDE MONONITRATE ORAL 60 mg, oral, Daily RT    loratadine (Claritin) 10 mg tablet 1 tablet, oral, Daily    LUTEIN ORAL 20 mg, oral, Daily RT    meclizine (ANTIVERT) 25 mg, oral, 3 times daily PRN    mirtazapine (Remeron) 15 mg tablet 1 tablet, oral, Nightly    nitroglycerin (NITROSTAT) 0.4 mg, sublingual, Every 5 min PRN,  PLACE 1 TABLET UNDER THE TONGUE EVERY 5 MINUTES UP TO 3 DOSES AS NEEDED FOR CHEST PAIN.<BR>    oxyCODONE (ROXICODONE) 5 mg, oral, Every 4 hours PRN    oxyCODONE (ROXICODONE) 5 mg, oral, Every 4 hours PRN    pantoprazole (PROTONIX) 40 mg, oral, Daily before breakfast, Do not crush, chew, or split.    phenazopyridine (URINARY PAIN RELIEF) 95 mg, oral, 3 times daily with meals    pilocarpine (SALAGEN (PILOCARPINE)) 5 mg, oral, Daily RT    potassium chloride ER (Micro-K) 10 mEq ER capsule 10 mEq, oral, Daily    pregabalin (LYRICA) 75 mg, oral, 2 times daily    raloxifene (Evista) 60 mg tablet 1 tablet, oral, Daily    simvastatin (ZOCOR) 40 mg, oral, Nightly    simvastatin (ZOCOR) 40 mg, oral, 2 times daily    tiZANidine (ZANAFLEX) 4 mg, oral, Every 8 hours PRN    tolterodine (Detrol) 1 mg tablet 1 tablet, oral, Nightly    triamterene-hydrochlorothiazid (Dyazide) 37.5-25 mg capsule 1 capsule, oral, Daily    vancomycin in dextrose 5 % (Vancocin) IVPB 1 g, intravenous, Every 12 hours, CBC BMP weekly<BR>Vanco level weekly<BR>CRP sed rate in 3 and 6 weeks<BR>Fax results to [id number]    vit C,W-Gg-mcxlt-lutein-zeaxan (PreserVision AREDS-2) 250-90-40-1 mg capsule 1 capsule, oral, 2 times daily        MR lumbar spine w and wo IV contrast    Result Date: 12/5/2023  Interpreted By:  Jh Benoit,  and Dev Hogue STUDY: MR LUMBAR SPINE W AND WO IV CONTRAST;  12/5/2023 4:39 pm   INDICATION: Signs/Symptoms:Recent laminectomy with fusion, intractable back pain radiates legs.   COMPARISON: MRI 08/20/2023, CT 12/05/2023   ACCESSION NUMBER(S): [id number]   ORDERING CLINICIAN: SHANW REDDY   TECHNIQUE:  Sagittal T1, T2, STIR, axial T1 and T2 weighted images of the lumbar spine were acquired without and following administration of 15 cc Dotarem gadolinium based IV contrast.   FINDINGS: Motion degraded examination.   Alignment: The vertebral alignment is maintained.   Vertebrae/Intervertebral Discs: Postsurgical changes of L4 through S1 posterior fusion noted with bilateral transpedicular screws and interbody graft placement at L4-5 and L5-S1. There has been bilateral laminectomies at L3-4, L4-5, and L5-S1. The vertebral bodies demonstrate expected height. There is mild osseous edema involving L5 and S1 vertebral bodies. The marrow signal is within normal limits. Multilevel intervertebral disc height loss and disc desiccation noted.   A large dorsal extra-spinal fluid collection is noted extending from the laminectomy bed through the deep muscular fascia into the subcutaneous fat. This collection extends from the right lateral recess at the level of L4-5 as well as L5-S1 into the spinal canal and deforms the thecal sac. A focal defect is noted within the thecal sac at the level of L5-S1 (series 9, image 20). 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 The collections do not demonstrate significant peripheral enhancement to suggest abscess.   Conus: The lower thoracic cord appears unremarkable. The conus terminates at L1.   T12-L1: Disc bulge. There is no significant central canal or neural foraminal stenosis.   L1-2: Disc bulge, ligamentum flavum thickening and facet hypertrophy contribute to minimal central canal narrowing.   L2-3: Disc bulge and mild prominence of the posterior epidural fat resulting in minimal narrowing of the central canal. There is moderate right and minimal left foraminal narrowing due to intraforaminal disc herniation and facet hypertrophy, similar to preoperative examination.   L3-4: Laminectomies. There is moderate narrowing of the thecal sac due to disc bulge and dorsal spinal  collection as detailed above. There is unchanged marked bilateral foraminal narrowing due to intraforaminal disc herniation and facet hypertrophic changes with possible impingement of bilateral exiting L3 nerve roots.   L4-5: Bilateral laminectomies. There is moderate narrowing of the central canal with indentation of the thecal sac posteriorly due to the dorsal spinal fluid collection as detailed above. Moderate right and mild left foraminal narrowing is overall similar to MRI dated 08/20/2020 3D to intraforaminal disc herniations and facet hypertrophic changes.   L5-S1: Bilateral laminectomies. The dorsal spinal fluid collection indents the thecal sac as detailed above. Evaluation of the foramina is limited due to susceptibility artifact from bilateral transpedicular screws.  There is persistent marked left and moderate right foraminal narrowing due to intraforaminal disc herniation and facet hypertrophy with possible impingement of the exiting left L5 nerve root.       1.  Postoperative changes as above. A large dorsal extra-spinal fluid collection is noted extending from the laminectomy bed through the deep muscular fascia into the subcutaneous fat. This collection extends from the right lateral recesses at the level of L4-5 as well as L5-S1 into the spinal canal and deforms the thecal sac resulting in moderate narrowing at L4-5 and to a lesser degree at L3-4. A focal defect is noted within the thecal sac at the level of L5-S1. 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 No significant peripheral enhancement is identified to suggest abscess although the sterility of this collection can not be ascertained on MRI alone. 2. Multilevel degenerative changes again noted with persistent marked bilateral foraminal narrowing at L3-4 and at L5-S1 on the left.     I personally reviewed the images/study and I agree with the findings as stated. This study was interpreted at [facility],  [place], [place].   MACRO: None   Signed by: Jh Benoit 12/5/2023 5:24 PM Dictation workstation:   [id number]    CT angio chest abdomen pelvis    Result Date: 12/5/2023  Interpreted By:  Samuel Brannon, STUDY: CT ANGIO CHEST ABDOMEN PELVIS;  12/5/2023 2:48 pm   INDICATION: Signs/Symptoms:Severe lower abdomen pain, groin pain, radiates to back, intractable pain.   COMPARISON: May 18, 2022 CTA chest abdomen and pelvis. July 21, 2023 CTA abdomen and pelvis with runoff. August 20, 2023 MRI lumbar spine and December 5, 2023 CT lumbar spine   ACCESSION NUMBER(S): [id number]   ORDERING CLINICIAN: SHAWN REDDY   TECHNIQUE: Axial non-contrast images of the chest, abdomen, and pelvis  with coronal and sagittal reformatted images. Axial CT images of the chest, abdomen and pelvis after the intravenous administration of 100 mL Omnipaque 350 contrast using CT angiographic technique with coronal and sagittal reformatted images.  MIP images were provided and reviewed. 3D reconstructions were performed on a separate independent workstation.   FINDINGS: VASCULAR:   PULMONARY ARTERIES:   No acute pulmonary embolism.   THORACIC AORTA:  Non-contrast images show no evidence of acute intramural hematoma. No thoracic aortic aneurysm or dissection. Moderate scattered atherosclerosis thoracic aorta and branch vessels.   ABDOMINAL AORTA: No abdominal aortic aneurysm or dissection. There is scattered atherosclerosis. 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   CHEST:   MEDIASTINUM AND LYMPH NODES: No enlarged intrathoracic or axillary lymph nodes.   HEART: Normal size.  Moderate coronary artery calcifications. No pericardial effusion.   LUNG, PLEURA, LARGE AIRWAYS: No consolidation, pulmonary edema, pleural  effusion, or pneumothorax.   OSSEOUS STRUCTURES/CHEST WALL:    No acute osseous abnormality.     ABDOMEN/PELVIS:   Arterial phase imaging limits evaluation of the solid organs.   ABDOMINAL WALL: Within normal limits.   LIVER: Stable without detected mass. BILE DUCTS: No significant abnormality. GALLBLADDER: Absent   PANCREAS: No significant abnormality.   SPLEEN: No significant abnormality.   ADRENALS: No significant abnormality.   KIDNEYS, URETERS, BLADDER: No significant abnormality.   VESSELS: See above.  No additional significant abnormality. LYMPH NODES: No enlarged lymph nodes. RETROPERITONEUM:  No significant abnormality.   BOWEL: The stomach and bowel are normal caliber without evident inflammatory change.   PERITONEUM:   No significant ascites, free air, or fluid collection.   REPRODUCTIVE ORGANS: No significant abnormality.   OSSEOUS STRUCTURES:  Refer to same day CT scan lumbar spine report for details regarding that portion. No separate acute osseous abnormalities are identified. There are skin staples dorsal lumbar region. The canal is not reliably evaluated at the operated levels due to artifact from metallic hardware. There is minimal gas within the soft tissues at the operated levels and there is lobulated fluid density extending from vertebral to overlapping paravertebral region to the level of the superficial subcutaneous layer including portion sagittal series 506, image 109 and axial series 502, image 114 measuring 14 x 6 x 6 cm  with internal density measurement of 6 compatible with simple fluid density       No thoracic or abdominal aortic aneurysm or dissection.   Stable pre-existing vascular findings as reported.   Findings compatible with recent lumbar spine surgery with prominent fluid collection overlying the operated site extending through the superficial subcutaneous layer. The canal is not reliably assessed at the operated site due to artifact. Recommend further characterization with  MRI lumbar spine with without contrast.   No acute abnormality of the chest, abdomen or pelvis.     Signed by: Samuel Brannon 12/5/2023 3:32 PM Dictation workstation:   [id number]    CT lumbar spine wo IV contrast    Result Date: 12/5/2023  Interpreted By:  Jony Monae, STUDY: CT LUMBAR SPINE WO IV CONTRAST; 12/5/2023 11:28 am   INDICATION: Signs/Symptoms:Low back pain, recent laminectomy and fusion, fall 1 week ago.   COMPARISON: None.   ACCESSION NUMBER(S): [id number]   ORDERING CLINICIAN: SHAWN REDDY   TECHNIQUE: Contiguous axial CT images were obtained through the lumbar spine at 2 mm slice thickness without contrast administration. The images were then reconstructed in the coronal and sagittal planes.   FINDINGS: OSSEOUS STRUCTURES: The patient is status post L3 through L5 laminectomy, L4 through S1 pedicle screw and fabrizio fusion and L4-5 and L5-S1 disc space implant placement. There is no evidence of acute fracture identified. The vertebral bodies are well aligned without evidence of subluxation.   Mild discogenic degenerative changes are seen at the remaining disc space levels. Mild-to-moderate facet degenerative changes are seen throughout the lumbar spine.   LOWER THORACIC SPINE: No gross central canal or neural foraminal narrowing is seen.   T12-L1: No gross central canal or neural foraminal narrowing is seen.   L1-2: No gross central canal or neural foraminal narrowing is seen.   L2-3: No gross central canal or neural foraminal narrowing is seen.   L3-4: Mild right-sided neural foraminal narrowing is seen. No significant left foraminal or central canal narrowing is seen.   L4-5: No gross central canal or neural foraminal narrowing is seen.   L5-S1: Mild left-sided neural foraminal narrowing is seen. No significant right foraminal or central canal narrowing is seen.   ASSOCIATED STRUCTURES: Evaluation of the visualized soft tissues of the abdomen is limited by the lack of intravenous contrast. Within  this limitation, no gross mass or lymphadenopathy is identified.  A fluid collection is seen along the posterior midportion the lower back, likely postoperative in nature.       1. No acute fracture identified. 2. Postoperative and degenerative changes, as described above.   MACRO: None.   Signed by: Jony Monae 12/5/2023 11:45 AM Dictation workstation:   [id number]      Assessment     Patient Active Problem List   Diagnosis    Abdominal aortic aneurysm (CMS/HCC)    Abnormal EKG    Bilateral carotid artery stenosis    Bruit of right carotid artery    CAD in native artery    Cardiomyopathy (CMS/HCC)    Chest pain    Chronic asthmatic bronchitis    Closed displaced fracture of fifth metatarsal bone    Current every day smoker    Difficulty breathing    Essential hypertension    History of total knee replacement    Hypokalemia    Intermittent claudication (CMS/HCC)    Knee osteoarthritis    Knee pain    Limb pain    Localized swelling, mass, or lump of lower extremity    Celiac artery stenosis (CMS/HCC)    Mesenteric artery stenosis (CMS/HCC)    Mixed hyperlipidemia    Obese    PVD (peripheral vascular disease) (CMS/HCC)    Right foot pain    Swelling    Trigger finger    Urinary tract infection without hematuria    Back pain of lumbar region with sciatica    Back pain with radiculopathy    Intractable back pain    Seroma, post-traumatic (CMS/HCC)    Lumbar surgical wound fluid collection      Subjective  Patient resting comfortably in bed and is in no acute distress. She reports her pain is well controlled. On examination, the dressing to her lumbar region is clean, dry, and intact, her sensations to her legs are intact, and her pedal pulses are palpable. JOSE drains had approximately 20 mL of bright red output. She had a CT scan of her chest yesterday that showed pulmonary embolisms and she is currently on anticoagulation. Orthopedics will continue to follow.    Impression  POD 5 I&D lumbar     Plan:  Continue pain  control  Continue drains until directed to remove  Appreciate recommendations from ID - IV Vanco for 6 wks  PT/OT       Electronically signed by ORLANDO Gutierrez on 12/16/2023 at 9:32 AM

## 2023-12-17 LAB
ALBUMIN SERPL BCP-MCNC: 2.7 G/DL (ref 3.4–5)
ALP SERPL-CCNC: 103 U/L (ref 33–136)
ALT SERPL W P-5'-P-CCNC: 28 U/L (ref 7–45)
ANION GAP SERPL CALC-SCNC: 9 MMOL/L (ref 10–20)
AST SERPL W P-5'-P-CCNC: 17 U/L (ref 9–39)
BASOPHILS # BLD AUTO: 0.02 X10*3/UL (ref 0–0.1)
BASOPHILS NFR BLD AUTO: 0.2 %
BILIRUB SERPL-MCNC: 0.5 MG/DL (ref 0–1.2)
BUN SERPL-MCNC: 13 MG/DL (ref 6–23)
CALCIUM SERPL-MCNC: 8.5 MG/DL (ref 8.6–10.3)
CHLORIDE SERPL-SCNC: 97 MMOL/L (ref 98–107)
CO2 SERPL-SCNC: 27 MMOL/L (ref 21–32)
CREAT SERPL-MCNC: 0.94 MG/DL (ref 0.5–1.05)
EOSINOPHIL # BLD AUTO: 0.4 X10*3/UL (ref 0–0.7)
EOSINOPHIL NFR BLD AUTO: 3.5 %
ERYTHROCYTE [DISTWIDTH] IN BLOOD BY AUTOMATED COUNT: 14.6 % (ref 11.5–14.5)
ERYTHROCYTE [DISTWIDTH] IN BLOOD BY AUTOMATED COUNT: 14.8 % (ref 11.5–14.5)
GFR SERPL CREATININE-BSD FRML MDRD: 65 ML/MIN/1.73M*2
GLUCOSE SERPL-MCNC: 133 MG/DL (ref 74–99)
HCT VFR BLD AUTO: 22.9 % (ref 36–46)
HCT VFR BLD AUTO: 24.5 % (ref 36–46)
HGB BLD-MCNC: 7.5 G/DL (ref 12–16)
HGB BLD-MCNC: 7.8 G/DL (ref 12–16)
IMM GRANULOCYTES # BLD AUTO: 0.47 X10*3/UL (ref 0–0.7)
IMM GRANULOCYTES NFR BLD AUTO: 4.2 % (ref 0–0.9)
LYMPHOCYTES # BLD AUTO: 1.65 X10*3/UL (ref 1.2–4.8)
LYMPHOCYTES NFR BLD AUTO: 14.6 %
MCH RBC QN AUTO: 30.1 PG (ref 26–34)
MCH RBC QN AUTO: 30.7 PG (ref 26–34)
MCHC RBC AUTO-ENTMCNC: 31.8 G/DL (ref 32–36)
MCHC RBC AUTO-ENTMCNC: 32.8 G/DL (ref 32–36)
MCV RBC AUTO: 94 FL (ref 80–100)
MCV RBC AUTO: 95 FL (ref 80–100)
MONOCYTES # BLD AUTO: 0.99 X10*3/UL (ref 0.1–1)
MONOCYTES NFR BLD AUTO: 8.8 %
NEUTROPHILS # BLD AUTO: 7.75 X10*3/UL (ref 1.2–7.7)
NEUTROPHILS NFR BLD AUTO: 68.7 %
NRBC BLD-RTO: 0.3 /100 WBCS (ref 0–0)
NRBC BLD-RTO: 0.3 /100 WBCS (ref 0–0)
PLATELET # BLD AUTO: 244 X10*3/UL (ref 150–450)
PLATELET # BLD AUTO: 245 X10*3/UL (ref 150–450)
POTASSIUM SERPL-SCNC: 3.6 MMOL/L (ref 3.5–5.3)
PROT SERPL-MCNC: 5.5 G/DL (ref 6.4–8.2)
RBC # BLD AUTO: 2.44 X10*6/UL (ref 4–5.2)
RBC # BLD AUTO: 2.59 X10*6/UL (ref 4–5.2)
SODIUM SERPL-SCNC: 129 MMOL/L (ref 136–145)
UFH PPP CHRO-ACNC: 0.3 IU/ML
UFH PPP CHRO-ACNC: 0.3 IU/ML
VANCOMYCIN SERPL-MCNC: 20.6 UG/ML (ref 5–20)
WBC # BLD AUTO: 11.3 X10*3/UL (ref 4.4–11.3)
WBC # BLD AUTO: 13.1 X10*3/UL (ref 4.4–11.3)

## 2023-12-17 PROCEDURE — 80053 COMPREHEN METABOLIC PANEL: CPT | Performed by: INTERNAL MEDICINE

## 2023-12-17 PROCEDURE — 2500000002 HC RX 250 W HCPCS SELF ADMINISTERED DRUGS (ALT 637 FOR MEDICARE OP, ALT 636 FOR OP/ED): Performed by: STUDENT IN AN ORGANIZED HEALTH CARE EDUCATION/TRAINING PROGRAM

## 2023-12-17 PROCEDURE — 85025 COMPLETE CBC W/AUTO DIFF WBC: CPT | Performed by: INTERNAL MEDICINE

## 2023-12-17 PROCEDURE — 80202 ASSAY OF VANCOMYCIN: CPT | Performed by: NURSE PRACTITIONER

## 2023-12-17 PROCEDURE — 2500000004 HC RX 250 GENERAL PHARMACY W/ HCPCS (ALT 636 FOR OP/ED): Performed by: INTERNAL MEDICINE

## 2023-12-17 PROCEDURE — 2500000004 HC RX 250 GENERAL PHARMACY W/ HCPCS (ALT 636 FOR OP/ED): Performed by: STUDENT IN AN ORGANIZED HEALTH CARE EDUCATION/TRAINING PROGRAM

## 2023-12-17 PROCEDURE — 2500000004 HC RX 250 GENERAL PHARMACY W/ HCPCS (ALT 636 FOR OP/ED): Performed by: NURSE PRACTITIONER

## 2023-12-17 PROCEDURE — 2500000001 HC RX 250 WO HCPCS SELF ADMINISTERED DRUGS (ALT 637 FOR MEDICARE OP): Performed by: ANESTHESIOLOGY

## 2023-12-17 PROCEDURE — 2500000001 HC RX 250 WO HCPCS SELF ADMINISTERED DRUGS (ALT 637 FOR MEDICARE OP): Performed by: STUDENT IN AN ORGANIZED HEALTH CARE EDUCATION/TRAINING PROGRAM

## 2023-12-17 PROCEDURE — 97530 THERAPEUTIC ACTIVITIES: CPT | Mod: GP,CQ

## 2023-12-17 PROCEDURE — 97116 GAIT TRAINING THERAPY: CPT | Mod: GP,CQ

## 2023-12-17 PROCEDURE — 1200000002 HC GENERAL ROOM WITH TELEMETRY DAILY

## 2023-12-17 PROCEDURE — 85520 HEPARIN ASSAY: CPT | Performed by: INTERNAL MEDICINE

## 2023-12-17 PROCEDURE — 99233 SBSQ HOSP IP/OBS HIGH 50: CPT | Performed by: INTERNAL MEDICINE

## 2023-12-17 PROCEDURE — 85027 COMPLETE CBC AUTOMATED: CPT | Performed by: INTERNAL MEDICINE

## 2023-12-17 PROCEDURE — 85520 HEPARIN ASSAY: CPT | Performed by: STUDENT IN AN ORGANIZED HEALTH CARE EDUCATION/TRAINING PROGRAM

## 2023-12-17 RX ADMIN — MORPHINE SULFATE 15 MG: 15 TABLET, EXTENDED RELEASE ORAL at 20:10

## 2023-12-17 RX ADMIN — LORATADINE 10 MG: 10 TABLET ORAL at 08:10

## 2023-12-17 RX ADMIN — Medication 2000 UNITS: at 08:10

## 2023-12-17 RX ADMIN — EZETIMIBE 10 MG: 10 TABLET ORAL at 08:09

## 2023-12-17 RX ADMIN — ISOSORBIDE MONONITRATE 60 MG: 60 TABLET, EXTENDED RELEASE ORAL at 08:17

## 2023-12-17 RX ADMIN — PHENAZOPYRIDINE HYDROCHLORIDE 100 MG: 100 TABLET ORAL at 08:10

## 2023-12-17 RX ADMIN — PHENAZOPYRIDINE HYDROCHLORIDE 100 MG: 100 TABLET ORAL at 17:32

## 2023-12-17 RX ADMIN — OXYCODONE HYDROCHLORIDE 10 MG: 5 TABLET ORAL at 07:08

## 2023-12-17 RX ADMIN — OXYCODONE HYDROCHLORIDE 10 MG: 5 TABLET ORAL at 16:03

## 2023-12-17 RX ADMIN — HEPARIN SODIUM 1290 UNITS/HR: 10000 INJECTION, SOLUTION INTRAVENOUS at 14:16

## 2023-12-17 RX ADMIN — DOCUSATE SODIUM 100 MG: 100 CAPSULE, LIQUID FILLED ORAL at 08:16

## 2023-12-17 RX ADMIN — IRON SUCROSE 200 MG: 20 INJECTION, SOLUTION INTRAVENOUS at 20:13

## 2023-12-17 RX ADMIN — PANTOPRAZOLE SODIUM 40 MG: 40 TABLET, DELAYED RELEASE ORAL at 06:27

## 2023-12-17 RX ADMIN — VANCOMYCIN HYDROCHLORIDE 750 MG: 750 INJECTION, SOLUTION INTRAVENOUS at 09:08

## 2023-12-17 RX ADMIN — BUSPIRONE HYDROCHLORIDE 10 MG: 5 TABLET ORAL at 08:10

## 2023-12-17 RX ADMIN — MIRTAZAPINE 15 MG: 15 TABLET, FILM COATED ORAL at 20:11

## 2023-12-17 RX ADMIN — ISOSORBIDE MONONITRATE 30 MG: 30 TABLET, EXTENDED RELEASE ORAL at 08:11

## 2023-12-17 RX ADMIN — DOCUSATE SODIUM 100 MG: 100 CAPSULE, LIQUID FILLED ORAL at 20:11

## 2023-12-17 RX ADMIN — SIMVASTATIN 40 MG: 40 TABLET, FILM COATED ORAL at 20:11

## 2023-12-17 RX ADMIN — PREGABALIN 75 MG: 50 CAPSULE ORAL at 20:10

## 2023-12-17 RX ADMIN — ATENOLOL 50 MG: 50 TABLET ORAL at 08:16

## 2023-12-17 RX ADMIN — MORPHINE SULFATE 15 MG: 15 TABLET, EXTENDED RELEASE ORAL at 08:16

## 2023-12-17 RX ADMIN — PREGABALIN 75 MG: 50 CAPSULE ORAL at 08:16

## 2023-12-17 RX ADMIN — SIMVASTATIN 40 MG: 40 TABLET, FILM COATED ORAL at 08:16

## 2023-12-17 RX ADMIN — BRIMONIDINE TARTRATE 1 DROP: 2 SOLUTION/ DROPS OPHTHALMIC at 08:19

## 2023-12-17 RX ADMIN — PHENAZOPYRIDINE HYDROCHLORIDE 100 MG: 100 TABLET ORAL at 12:18

## 2023-12-17 ASSESSMENT — COGNITIVE AND FUNCTIONAL STATUS - GENERAL
TURNING FROM BACK TO SIDE WHILE IN FLAT BAD: A LITTLE
TURNING FROM BACK TO SIDE WHILE IN FLAT BAD: A LITTLE
STANDING UP FROM CHAIR USING ARMS: A LITTLE
STANDING UP FROM CHAIR USING ARMS: A LITTLE
DRESSING REGULAR UPPER BODY CLOTHING: A LITTLE
MOBILITY SCORE: 17
CLIMB 3 TO 5 STEPS WITH RAILING: A LOT
MOVING TO AND FROM BED TO CHAIR: A LITTLE
TOILETING: A LITTLE
MOVING FROM LYING ON BACK TO SITTING ON SIDE OF FLAT BED WITH BEDRAILS: A LITTLE
MOBILITY SCORE: 17
DAILY ACTIVITIY SCORE: 19
MOVING FROM LYING ON BACK TO SITTING ON SIDE OF FLAT BED WITH BEDRAILS: A LITTLE
WALKING IN HOSPITAL ROOM: A LITTLE
PERSONAL GROOMING: A LITTLE
WALKING IN HOSPITAL ROOM: A LITTLE
CLIMB 3 TO 5 STEPS WITH RAILING: A LOT
HELP NEEDED FOR BATHING: A LITTLE
MOVING TO AND FROM BED TO CHAIR: A LITTLE
DRESSING REGULAR LOWER BODY CLOTHING: A LITTLE

## 2023-12-17 ASSESSMENT — PAIN - FUNCTIONAL ASSESSMENT
PAIN_FUNCTIONAL_ASSESSMENT: 0-10
PAIN_FUNCTIONAL_ASSESSMENT: 0-10

## 2023-12-17 ASSESSMENT — PAIN SCALES - GENERAL
PAINLEVEL_OUTOF10: 8
PAINLEVEL_OUTOF10: 0 - NO PAIN
PAINLEVEL_OUTOF10: 7
PAINLEVEL_OUTOF10: 7

## 2023-12-17 NOTE — PROGRESS NOTES
"Vancomycin Dosing by Pharmacy- FOLLOW UP    Tara Tierney is a 68 y.o. year old female who Pharmacy has been consulted for vancomycin dosing for osteomyelitis/septic arthritis. Based on the patient's indication and renal status this patient is being dosed based on a goal AUC of 400-600.     Patient's estimated creatinine clearance is 47.8 mL/min    Current vancomycin dose: 750 mg given every 12 hours    Estimated vancomycin AUC on current dose: 519 mg/L.hr ; toxicity: 13%    Visit Vitals  /56 (BP Location: Left arm, Patient Position: Sitting)   Pulse 85   Temp 37.4 °C (99.3 °F) (Temporal)   Resp 18        Lab Results   Component Value Date    CREATININE 0.94 12/17/2023    CREATININE 0.90 12/16/2023    CREATININE 0.76 12/15/2023    CREATININE 0.74 12/14/2023        Patient weight is 82.6 kg    No results found for: \"CULTURE\"     I/O last 3 completed shifts:  In: 596.6 (7.2 mL/kg) [I.V.:246.6 (3 mL/kg); IV Piggyback:350]  Out: 3485 (42.2 mL/kg) [Urine:2200 (0.7 mL/kg/hr); Drains:1285]  Weight: 82.6 kg   [unfilled]    No results found for: \"PATIENTTEMP\"     Assessment/Plan    Above goal toxicity. Dose changed to 1250 mg IV every 24 hours to begin on 12/18/23 at 0300.    This dosing regimen is predicted by [employer] to result in the following pharmacokinetic parameters:    Regimen: 1250 mg IV every 24 hours.  Start time: 0300 on 12/18/2023  Exposure target: AUC24 (range)400-600 mg/L.hr   AUC24,ss: 444 mg/L.hr  Probability of AUC24 > 400: 84 %  Ctrough,ss: 12.1 mg/L  Probability of Ctrough,ss > 20: 0 %  Probability of nephrotoxicity (Lodise ASHLEY 2009): 7 %    The next level will be obtained on 12/18/23 at 11am. May be obtained sooner if clinically indicated.   Will continue to monitor renal function daily while on vancomycin and order serum creatinine at least every 48 hours if not already ordered.  Follow for continued vancomycin needs, clinical response, and signs/symptoms of toxicity.       Lindsey HESS" [place], [facility]

## 2023-12-17 NOTE — PROGRESS NOTES
Physical Therapy    Physical Therapy Treatment    Patient Name: Tara Tierney  MRN: [id number]  Today's Date: 12/17/2023  Time Calculation  Start Time: 0832  Stop Time: 0855  Time Calculation (min): 23 min       Assessment/Plan   PT Assessment  PT Assessment Results: Decreased strength, Decreased range of motion, Decreased endurance, Impaired balance, Decreased mobility, Orthopedic restrictions, Pain  Rehab Prognosis: Good  End of Session Communication: Bedside nurse  Assessment Comment: Pt tolerates session well but c/o increased fatigue at end of session. Pt often needing reminders to maintain spinal precautions primarily to avoid fwd flexion.  End of Session Patient Position: Up in chair  PT Plan  Inpatient/Swing Bed or Outpatient: Inpatient  PT Plan  Treatment/Interventions: Bed mobility, Transfer training, Gait training  PT Plan: Skilled PT  PT Frequency: Daily  PT Discharge Recommendations: Moderate intensity level of continued care, High intensity level of continued care  Equipment Recommended upon Discharge: Wheeled walker  PT Recommended Transfer Status: Assist x1    General Visit Information:   PT  Visit  PT Received On: 12/17/23  General  Reason for Referral: Impaired mobility  Referred By: Born PT/OT  Prior to Session Communication: Bedside nurse  Patient Position Received: Bed, 3 rail up, Alarm off, not on at start of session  General Comment: Pt states increased pain but agreeable to treatment.    Subjective   Precautions:  Precautions  Medical Precautions: Fall precautions  Post-Surgical Precautions: Spinal precautions (Pt needs continued reminder to maintain spinal precautions.)  Vital Signs:       Objective   Pain:  Pain Assessment  Pain Assessment: 0-10  Pain Score: 7  Pain Type: Surgical pain  Pain Location: Back  Pain Interventions:  (Per pt meds taken)  Cognition:  Cognition  Orientation Level: Oriented X4  Postural Control:     Extremity/Trunk Assessments:     Activity Tolerance:  Activity  Tolerance  Endurance: Endurance does not limit participation in activity  Treatments:       Ambulation/Gait Training  Ambulation/Gait Training Performed: Yes (Pt ambulating with a slow, recip gait with WW and min/CGA. Pt demos a FF posture and requires cues to maintain precautions. Pt ambulating 10' and 80'. Pt wear TLSO for ambulation.)  Transfers  Transfer: Yes (Pt continues to require cues for log roll technique and for hand placement with sit<>stand.)    Outcome Measures:  [facility] Basic Mobility  Turning from your back to your side while in a flat bed without using bedrails: A little  Moving from lying on your back to sitting on the side of a flat bed without using bedrails: A little  Moving to and from bed to chair (including a wheelchair): A little  Standing up from a chair using your arms (e.g. wheelchair or bedside chair): A little  To walk in hospital room: A little  Climbing 3-5 steps with railing: A lot  Basic Mobility - Total Score: 17    Education Documentation  Precautions, taught by Chanel Ledesma PTA at 12/17/2023 12:36 PM.  Learner: Patient  Readiness: Acceptance  Method: Explanation  Response: Verbalizes Understanding, Needs Reinforcement    Body Mechanics, taught by Chanel Ledesma PTA at 12/17/2023 12:36 PM.  Learner: Patient  Readiness: Acceptance  Method: Explanation  Response: Verbalizes Understanding, Needs Reinforcement    Mobility Training, taught by Chanel Ledesma PTA at 12/17/2023 12:36 PM.  Learner: Patient  Readiness: Acceptance  Method: Explanation  Response: Verbalizes Understanding, Needs Reinforcement    Education Comments  No comments found.        EDUCATION:  Outpatient Education  Individual(s) Educated: Patient  Education Provided: Body Mechanics, Fall Risk, Posture  Patient/Caregiver Demonstrated Understanding: yes  Plan of Care Discussed and Agreed Upon: yes  Patient Response to Education: Patient/Caregiver Verbalized Understanding of Information, Patient/Caregiver Performed  Return Demonstration of Exercises/Activities  Education Comment: Pt educated in gait, transfers, ther ex and spinal precautions.    Encounter Problems       Encounter Problems (Active)       PT Problem       Pt will demonstrate sup > sit and sit > sup bed mobility mod I with log roll technique (Progressing)       Start:  12/07/23    Expected End:  12/21/23            Pt will demo sit > stand and stand > sit transfer with ww and mod I  (Progressing)       Start:  12/07/23    Expected End:  12/21/23            Pt will ambulate 100' with ww and mod I, without LOB  (Progressing)       Start:  12/07/23    Expected End:  12/21/23            Pt will demo up/down 1 steps with handrail mod I (Progressing)       Start:  12/07/23    Expected End:  12/21/23               Pain - Adult

## 2023-12-17 NOTE — PROGRESS NOTES
"Tara Tierney is a 68 y.o. female on day 9 of admission presenting with Intractable back pain.    Subjective   Patient doing much better now on anticoagulation heparin drip for pulmonary embolus.  Patient still has her drains and from her washout of her lumbar spine.  We are leaving the drains and to monitor output to make sure that she does not get a hematoma    Drain output is down considerably to 20 cc    She is on antibiotics will continue to monitor drain output and then assess for possible drain removal over the next 3 days we anticipate her being converted from the heparin to Eliquis       Objective     Physical Exam    Last Recorded Vitals  Blood pressure 116/56, pulse 85, temperature 37.4 °C (99.3 °F), temperature source Temporal, resp. rate 18, height 1.448 m (4' 9\"), weight 82.6 kg (182 lb 1.6 oz), SpO2 93 %.  Intake/Output last 3 Shifts:  I/O last 3 completed shifts:  In: 596.6 (7.2 mL/kg) [I.V.:246.6 (3 mL/kg); IV Piggyback:350]  Out: 3485 (42.2 mL/kg) [Urine:2200 (0.7 mL/kg/hr); Drains:1285]  Weight: 82.6 kg     Relevant Results      Scheduled medications  [Held by provider] aspirin, 81 mg, oral, Daily  atenolol, 50 mg, oral, q AM  brimonidine, 1 drop, Both Eyes, BID  busPIRone, 10 mg, oral, Daily  cholecalciferol, 2,000 Units, oral, Daily  docusate sodium, 100 mg, oral, BID  ezetimibe, 10 mg, oral, Daily  [Held by provider] heparin (porcine), 5,000 Units, subcutaneous, q8h  hydrALAZINE, 50 mg, oral, BID  iron sucrose, 200 mg, intravenous, Daily  isosorbide mononitrate ER, 30 mg, oral, Daily  isosorbide mononitrate ER, 60 mg, oral, Daily  loratadine, 10 mg, oral, Daily  mirtazapine, 15 mg, oral, Nightly  morphine CR, 15 mg, oral, q12h EDE  pantoprazole, 40 mg, oral, Daily before breakfast  perflutren protein A microsphere, 0.5 mL, intravenous, Once in imaging  phenazopyridine, 100 mg, oral, TID with meals  polyethylene glycol, 17 g, oral, Daily  pregabalin, 75 mg, oral, BID  simvastatin, 40 mg, " oral, BID  sulfur hexafluoride microsphr, 2 mL, intravenous, Once in imaging  tranexamic acid, 1,950 mg, oral, Once  [Held by provider] triamterene-hydrochlorothiazid, 1 tablet, oral, Daily  [START ON 12/18/2023] vancomycin, 1,250 mg, intravenous, q24h      Continuous medications  heparin, 0-4,500 Units/hr, Last Rate: 12.9 Units/hr (12/16/23 1746)      PRN medications  PRN medications: acetaminophen **OR** acetaminophen **OR** acetaminophen, acetaminophen **OR** acetaminophen **OR** acetaminophen, alteplase, benzocaine-menthol, cyclobenzaprine, heparin, HYDROmorphone, nitroglycerin, oxyCODONE, oxyCODONE  Results for orders placed or performed during the hospital encounter of 12/05/23 (from the past 24 hour(s))   Heparin Assay, UFH   Result Value Ref Range    Heparin Unfractionated 0.4 See Comment Below for Therapeutic Ranges IU/mL   CBC   Result Value Ref Range    WBC 13.1 (H) 4.4 - 11.3 x10*3/uL    nRBC 0.3 (H) 0.0 - 0.0 /100 WBCs    RBC 2.59 (L) 4.00 - 5.20 x10*6/uL    Hemoglobin 7.8 (L) 12.0 - 16.0 g/dL    Hematocrit 24.5 (L) 36.0 - 46.0 %    MCV 95 80 - 100 fL    MCH 30.1 26.0 - 34.0 pg    MCHC 31.8 (L) 32.0 - 36.0 g/dL    RDW 14.8 (H) 11.5 - 14.5 %    Platelets 244 150 - 450 x10*3/uL   Heparin Assay, UFH   Result Value Ref Range    Heparin Unfractionated 0.3 See Comment Below for Therapeutic Ranges IU/mL   Vancomycin   Result Value Ref Range    Vancomycin 20.6 (H) 5.0 - 20.0 ug/mL   CBC and Auto Differential   Result Value Ref Range    WBC 11.3 4.4 - 11.3 x10*3/uL    nRBC 0.3 (H) 0.0 - 0.0 /100 WBCs    RBC 2.44 (L) 4.00 - 5.20 x10*6/uL    Hemoglobin 7.5 (L) 12.0 - 16.0 g/dL    Hematocrit 22.9 (L) 36.0 - 46.0 %    MCV 94 80 - 100 fL    MCH 30.7 26.0 - 34.0 pg    MCHC 32.8 32.0 - 36.0 g/dL    RDW 14.6 (H) 11.5 - 14.5 %    Platelets 245 150 - 450 x10*3/uL    Neutrophils % 68.7 40.0 - 80.0 %    Immature Granulocytes %, Automated 4.2 (H) 0.0 - 0.9 %    Lymphocytes % 14.6 13.0 - 44.0 %    Monocytes % 8.8 2.0 - 10.0 %     Eosinophils % 3.5 0.0 - 6.0 %    Basophils % 0.2 0.0 - 2.0 %    Neutrophils Absolute 7.75 (H) 1.20 - 7.70 x10*3/uL    Immature Granulocytes Absolute, Automated 0.47 0.00 - 0.70 x10*3/uL    Lymphocytes Absolute 1.65 1.20 - 4.80 x10*3/uL    Monocytes Absolute 0.99 0.10 - 1.00 x10*3/uL    Eosinophils Absolute 0.40 0.00 - 0.70 x10*3/uL    Basophils Absolute 0.02 0.00 - 0.10 x10*3/uL   Comprehensive Metabolic Panel   Result Value Ref Range    Glucose 133 (H) 74 - 99 mg/dL    Sodium 129 (L) 136 - 145 mmol/L    Potassium 3.6 3.5 - 5.3 mmol/L    Chloride 97 (L) 98 - 107 mmol/L    Bicarbonate 27 21 - 32 mmol/L    Anion Gap 9 (L) 10 - 20 mmol/L    Urea Nitrogen 13 6 - 23 mg/dL    Creatinine 0.94 0.50 - 1.05 mg/dL    eGFR 65 >60 mL/min/1.73m*2    Calcium 8.5 (L) 8.6 - 10.3 mg/dL    Albumin 2.7 (L) 3.4 - 5.0 g/dL    Alkaline Phosphatase 103 33 - 136 U/L    Total Protein 5.5 (L) 6.4 - 8.2 g/dL    AST 17 9 - 39 U/L    Bilirubin, Total 0.5 0.0 - 1.2 mg/dL    ALT 28 7 - 45 U/L   Heparin Assay, UFH   Result Value Ref Range    Heparin Unfractionated 0.3 See Comment Below for Therapeutic Ranges IU/mL                         Assessment/Plan   Principal Problem:    Intractable back pain  Active Problems:    Back pain with radiculopathy    Seroma, post-traumatic (CMS/HCC)    Lumbar surgical wound fluid collection    Continue to monitor drain output     Gerson Navarrete MD

## 2023-12-18 VITALS
SYSTOLIC BLOOD PRESSURE: 117 MMHG | HEIGHT: 57 IN | OXYGEN SATURATION: 92 % | BODY MASS INDEX: 39.29 KG/M2 | DIASTOLIC BLOOD PRESSURE: 53 MMHG | HEART RATE: 84 BPM | TEMPERATURE: 98.8 F | WEIGHT: 182.1 LBS | RESPIRATION RATE: 16 BRPM

## 2023-12-18 LAB
ALBUMIN SERPL BCP-MCNC: 2.8 G/DL (ref 3.4–5)
ALP SERPL-CCNC: 126 U/L (ref 33–136)
ALT SERPL W P-5'-P-CCNC: 30 U/L (ref 7–45)
ANION GAP SERPL CALC-SCNC: 10 MMOL/L (ref 10–20)
AST SERPL W P-5'-P-CCNC: 23 U/L (ref 9–39)
BASOPHILS # BLD MANUAL: 0 X10*3/UL (ref 0–0.1)
BASOPHILS NFR BLD MANUAL: 0 %
BILIRUB SERPL-MCNC: 0.4 MG/DL (ref 0–1.2)
BUN SERPL-MCNC: 12 MG/DL (ref 6–23)
CALCIUM SERPL-MCNC: 8.7 MG/DL (ref 8.6–10.3)
CHLORIDE SERPL-SCNC: 98 MMOL/L (ref 98–107)
CO2 SERPL-SCNC: 28 MMOL/L (ref 21–32)
CREAT SERPL-MCNC: 0.95 MG/DL (ref 0.5–1.05)
EOSINOPHIL # BLD MANUAL: 0.13 X10*3/UL (ref 0–0.7)
EOSINOPHIL NFR BLD MANUAL: 1 %
ERYTHROCYTE [DISTWIDTH] IN BLOOD BY AUTOMATED COUNT: 14.4 % (ref 11.5–14.5)
GFR SERPL CREATININE-BSD FRML MDRD: 65 ML/MIN/1.73M*2
GLUCOSE SERPL-MCNC: 111 MG/DL (ref 74–99)
HCT VFR BLD AUTO: 25 % (ref 36–46)
HGB BLD-MCNC: 8.1 G/DL (ref 12–16)
IMM GRANULOCYTES # BLD AUTO: 0.71 X10*3/UL (ref 0–0.7)
IMM GRANULOCYTES NFR BLD AUTO: 5.5 % (ref 0–0.9)
LYMPHOCYTES # BLD MANUAL: 1.29 X10*3/UL (ref 1.2–4.8)
LYMPHOCYTES NFR BLD MANUAL: 10 %
MAGNESIUM SERPL-MCNC: 1.88 MG/DL (ref 1.6–2.4)
MCH RBC QN AUTO: 31 PG (ref 26–34)
MCHC RBC AUTO-ENTMCNC: 32.4 G/DL (ref 32–36)
MCV RBC AUTO: 96 FL (ref 80–100)
METAMYELOCYTES # BLD MANUAL: 0.13 X10*3/UL
METAMYELOCYTES NFR BLD MANUAL: 1 %
MONOCYTES # BLD MANUAL: 0.77 X10*3/UL (ref 0.1–1)
MONOCYTES NFR BLD MANUAL: 6 %
NEUTROPHILS # BLD MANUAL: 10.58 X10*3/UL (ref 1.2–7.7)
NEUTS BAND # BLD MANUAL: 0.26 X10*3/UL (ref 0–0.7)
NEUTS BAND NFR BLD MANUAL: 2 %
NEUTS SEG # BLD MANUAL: 10.32 X10*3/UL (ref 1.2–7)
NEUTS SEG NFR BLD MANUAL: 80 %
NRBC BLD-RTO: 0.4 /100 WBCS (ref 0–0)
PLATELET # BLD AUTO: 282 X10*3/UL (ref 150–450)
POTASSIUM SERPL-SCNC: 4 MMOL/L (ref 3.5–5.3)
PROT SERPL-MCNC: 5.9 G/DL (ref 6.4–8.2)
RBC # BLD AUTO: 2.61 X10*6/UL (ref 4–5.2)
RBC MORPH BLD: ABNORMAL
SODIUM SERPL-SCNC: 132 MMOL/L (ref 136–145)
TOTAL CELLS COUNTED BLD: 100
WBC # BLD AUTO: 12.9 X10*3/UL (ref 4.4–11.3)

## 2023-12-18 PROCEDURE — 2500000004 HC RX 250 GENERAL PHARMACY W/ HCPCS (ALT 636 FOR OP/ED): Performed by: INTERNAL MEDICINE

## 2023-12-18 PROCEDURE — 83735 ASSAY OF MAGNESIUM: CPT | Performed by: INTERNAL MEDICINE

## 2023-12-18 PROCEDURE — 2500000002 HC RX 250 W HCPCS SELF ADMINISTERED DRUGS (ALT 637 FOR MEDICARE OP, ALT 636 FOR OP/ED): Performed by: STUDENT IN AN ORGANIZED HEALTH CARE EDUCATION/TRAINING PROGRAM

## 2023-12-18 PROCEDURE — 2500000001 HC RX 250 WO HCPCS SELF ADMINISTERED DRUGS (ALT 637 FOR MEDICARE OP): Performed by: INTERNAL MEDICINE

## 2023-12-18 PROCEDURE — 99239 HOSP IP/OBS DSCHRG MGMT >30: CPT | Performed by: INTERNAL MEDICINE

## 2023-12-18 PROCEDURE — 2500000001 HC RX 250 WO HCPCS SELF ADMINISTERED DRUGS (ALT 637 FOR MEDICARE OP): Performed by: FAMILY MEDICINE

## 2023-12-18 PROCEDURE — 85007 BL SMEAR W/DIFF WBC COUNT: CPT | Performed by: INTERNAL MEDICINE

## 2023-12-18 PROCEDURE — 2500000001 HC RX 250 WO HCPCS SELF ADMINISTERED DRUGS (ALT 637 FOR MEDICARE OP): Performed by: STUDENT IN AN ORGANIZED HEALTH CARE EDUCATION/TRAINING PROGRAM

## 2023-12-18 PROCEDURE — 84075 ASSAY ALKALINE PHOSPHATASE: CPT | Performed by: INTERNAL MEDICINE

## 2023-12-18 PROCEDURE — 2500000004 HC RX 250 GENERAL PHARMACY W/ HCPCS (ALT 636 FOR OP/ED): Performed by: STUDENT IN AN ORGANIZED HEALTH CARE EDUCATION/TRAINING PROGRAM

## 2023-12-18 PROCEDURE — 2500000001 HC RX 250 WO HCPCS SELF ADMINISTERED DRUGS (ALT 637 FOR MEDICARE OP): Performed by: ANESTHESIOLOGY

## 2023-12-18 PROCEDURE — 97530 THERAPEUTIC ACTIVITIES: CPT | Mod: GP,CQ

## 2023-12-18 PROCEDURE — 85027 COMPLETE CBC AUTOMATED: CPT | Performed by: INTERNAL MEDICINE

## 2023-12-18 RX ADMIN — LORATADINE 10 MG: 10 TABLET ORAL at 09:59

## 2023-12-18 RX ADMIN — VANCOMYCIN HYDROCHLORIDE 1250 MG: 1.25 INJECTION, POWDER, LYOPHILIZED, FOR SOLUTION INTRAVENOUS at 02:37

## 2023-12-18 RX ADMIN — MORPHINE SULFATE 15 MG: 15 TABLET, EXTENDED RELEASE ORAL at 09:59

## 2023-12-18 RX ADMIN — PREGABALIN 75 MG: 50 CAPSULE ORAL at 09:58

## 2023-12-18 RX ADMIN — ATENOLOL 50 MG: 50 TABLET ORAL at 09:58

## 2023-12-18 RX ADMIN — APIXABAN 10 MG: 5 TABLET, FILM COATED ORAL at 09:59

## 2023-12-18 RX ADMIN — ASPIRIN 81 MG: 81 TABLET, COATED ORAL at 09:58

## 2023-12-18 RX ADMIN — BUSPIRONE HYDROCHLORIDE 10 MG: 5 TABLET ORAL at 09:59

## 2023-12-18 RX ADMIN — SIMVASTATIN 40 MG: 40 TABLET, FILM COATED ORAL at 09:58

## 2023-12-18 RX ADMIN — OXYCODONE HYDROCHLORIDE 10 MG: 5 TABLET ORAL at 14:36

## 2023-12-18 RX ADMIN — DOCUSATE SODIUM 100 MG: 100 CAPSULE, LIQUID FILLED ORAL at 09:58

## 2023-12-18 RX ADMIN — ISOSORBIDE MONONITRATE 90 MG: 30 TABLET, EXTENDED RELEASE ORAL at 09:59

## 2023-12-18 RX ADMIN — OXYCODONE HYDROCHLORIDE 10 MG: 5 TABLET ORAL at 06:23

## 2023-12-18 RX ADMIN — ISOSORBIDE MONONITRATE 60 MG: 60 TABLET, EXTENDED RELEASE ORAL at 09:00

## 2023-12-18 RX ADMIN — Medication 2000 UNITS: at 09:58

## 2023-12-18 RX ADMIN — PANTOPRAZOLE SODIUM 40 MG: 40 TABLET, DELAYED RELEASE ORAL at 06:26

## 2023-12-18 RX ADMIN — EZETIMIBE 10 MG: 10 TABLET ORAL at 09:59

## 2023-12-18 RX ADMIN — HYDRALAZINE HYDROCHLORIDE 50 MG: 50 TABLET ORAL at 09:58

## 2023-12-18 RX ADMIN — BRIMONIDINE TARTRATE 1 DROP: 2 SOLUTION/ DROPS OPHTHALMIC at 09:00

## 2023-12-18 ASSESSMENT — PAIN - FUNCTIONAL ASSESSMENT
PAIN_FUNCTIONAL_ASSESSMENT: 0-10
PAIN_FUNCTIONAL_ASSESSMENT: 0-10

## 2023-12-18 ASSESSMENT — COGNITIVE AND FUNCTIONAL STATUS - GENERAL
HELP NEEDED FOR BATHING: A LITTLE
DRESSING REGULAR LOWER BODY CLOTHING: A LITTLE
TOILETING: A LITTLE
MOVING TO AND FROM BED TO CHAIR: A LITTLE
DAILY ACTIVITIY SCORE: 19
PERSONAL GROOMING: A LITTLE
MOBILITY SCORE: 16
TURNING FROM BACK TO SIDE WHILE IN FLAT BAD: A LOT
STANDING UP FROM CHAIR USING ARMS: A LITTLE
MOVING FROM LYING ON BACK TO SITTING ON SIDE OF FLAT BED WITH BEDRAILS: A LOT
DRESSING REGULAR UPPER BODY CLOTHING: A LITTLE
CLIMB 3 TO 5 STEPS WITH RAILING: A LITTLE
WALKING IN HOSPITAL ROOM: A LITTLE

## 2023-12-18 ASSESSMENT — PAIN DESCRIPTION - LOCATION
LOCATION: BACK
LOCATION: BACK

## 2023-12-18 ASSESSMENT — PAIN SCALES - PAIN ASSESSMENT IN ADVANCED DEMENTIA (PAINAD)
CONSOLABILITY: DISTRACTED OR REASSURED BY VOICE/TOUCH
BODYLANGUAGE: TENSE, DISTRESSED PACING, FIDGETING
TOTALSCORE: 3
BREATHING: NORMAL
FACIALEXPRESSION: SAD, FRIGHTENED, FROWN

## 2023-12-18 ASSESSMENT — PAIN DESCRIPTION - ORIENTATION: ORIENTATION: MID;LOWER

## 2023-12-18 ASSESSMENT — PAIN SCALES - GENERAL
PAINLEVEL_OUTOF10: 9
PAINLEVEL_OUTOF10: 7

## 2023-12-18 ASSESSMENT — PAIN SCALES - WONG BAKER: WONGBAKER_NUMERICALRESPONSE: HURTS EVEN MORE

## 2023-12-18 NOTE — PROGRESS NOTES
Social Work Note    SW notified that pt is medically cleared for discharge today. SW spoke with pt, pt remains in agreement with plan to discharge to [facility]. Facility updated on medical clearance. Transportation requested via roundtrip. Pt nurse aware. SW offered to contact pt family and update, pt declined and states she will update family on her own.     1:20pm- Roundtrip confirmed transport for 2:45 pickup. Pt, nurse, and facility updated on pickup time.

## 2023-12-18 NOTE — PROGRESS NOTES
DAILY PROGRESS NOTE      POD 7 irrigation and debridement superficial lumbar spine. Exploration of spinal fusion lumbar spine.     Patient doing well  Visit Vitals  /60   Pulse 86   Temp 37.2 °C (99 °F)   Resp 16      Temp (24hrs), Av.3 °C (99.1 °F), Min:36.9 °C (98.4 °F), Max:37.5 °C (99.5 °F)       Pain Control Good  No chest pain or shortness of breath.  No calf pain    Exam:   Extremity shows neuro vascular status intact. Flexion and extension intact on extremity.  Calves soft and non-tender without evidence of DVT.  Two JOSE drains in place with no output. Will remove those toady.     Labs reviewed:  CBC: @LABRCNT(WBC:3,HGB:3,PLT:3)@  BMP:  @LABRCNT(Na:3,K:3,CL:3,CO2:3,BUN:3,Creatinine:3,Glucose:3)@  Preliminary tissue/wound culture from yesterday shows no organisms.   Pr  I&O  I/O last 3 completed shifts:  In: 646.6 (7.8 mL/kg) [I.V.:496.6 (6 mL/kg); IV Piggyback:150]  Out: 2225 (26.9 mL/kg) [Urine:2200 (0.7 mL/kg/hr); Drains:25]  Weight: 82.6 kg       Assessment:    Patient doing well postoperatively.   Reports improved pain to bilateral lower extremities.       Plan:    Continue PT/OT  Continue drains until directed to remove  Continue IV atb per ID  Continue pain control per pain management recommendations   Patient can discharge from orthopedic standpoint today.     Edi Olguin, DARRYL-CNP   2023 6:59 AM

## 2023-12-18 NOTE — CARE PLAN
The patient's goals for the shift include Good night's sleep    The clinical goals for the shift include Pt will have reduction in abdominal pain    Over the shift, the patient did make progress toward the following goals. Barriers to progression include none. Recommendations to address these barriers include none.

## 2023-12-18 NOTE — DISCHARGE SUMMARY
Discharge summary  This patient Tara Tierney was admitted to the hospital on 12/5/2023  after undergoing Procedure(s) (LRB):  Debridement Back (N/A) without complications that morning.    During the postoperative period,while in hospital, patient was medically managed by the hospitalist. Please see medial notes and H&P for patients additional diagnoses.  Ortho agrees with all medical diagnoses and treatments while patient in hospital.  No significant or unexpected findings or abnormal ortho imaging were noted during the hospital stay    Hospital course      Patient tolerated surgical procedure well and there was no complications. Patient progressed adequately through their recovery during hospital stay including PT and rehabilitation.    Patient was then D/C on  to skilled nursing facility  in stable condition.  Patient was instructed on the use of pain medications, the signs and symptoms of infection, and was given our number to call should they have any questions or concerns following discharge.    Based on my clinical judgment, the patient was provided with a 7-day prescription for opioid medication at 30 MED, indicated for treatment of acute pain in the setting of recent surgery. OARRS report was run and has demonstrated an appropriate time course.  The patient has been provided with counseling pertaining to safe use of opioid medication.        Aquacel dressing to be removed pod7 and incision left open to air

## 2023-12-18 NOTE — PROGRESS NOTES
ADMISSION DATE: 12/5/2023  HOSPITAL DAY: 9    SUBJECTIVE:  Patient was seen at bedside.    Uneventful night.    OBJECTIVE:  Visit Vitals  /60   Pulse 78   Temp 37.5 °C (99.5 °F)   Resp 16        Intake/Output Summary (Last 24 hours) at 12/17/2023 2240  Last data filed at 12/17/2023 1416  Gross per 24 hour   Intake 250 ml   Output 1400 ml   Net -1150 ml        PHYSICAL EXAM:  HEENT:  Atraumatic, PERRL.  Conjunctivae clear.   Moist nasal mucous membranes. oropharynx non erythematous,   Neck:  Supple without thyromegaly or lymphadenopathy.  Lungs:  Clear to auscultation without rales, rhonchi, or rub.  Heart:  RRR, S1, S2, without M.  Abdomen:  Soft, non tender, no organ enlargement, bruit. Bowel sounds present . No CVA tenderness.  Extremities:  No edema. No calf swelling or tenderness.    Skin:  No rash, ecchymosis or erythema.    CURRENT ACTIVE MEDS:  [Held by provider] aspirin, 81 mg, oral, Daily  atenolol, 50 mg, oral, q AM  brimonidine, 1 drop, Both Eyes, BID  busPIRone, 10 mg, oral, Daily  cholecalciferol, 2,000 Units, oral, Daily  docusate sodium, 100 mg, oral, BID  ezetimibe, 10 mg, oral, Daily  hydrALAZINE, 50 mg, oral, BID  isosorbide mononitrate ER, 30 mg, oral, Daily  isosorbide mononitrate ER, 60 mg, oral, Daily  loratadine, 10 mg, oral, Daily  mirtazapine, 15 mg, oral, Nightly  morphine CR, 15 mg, oral, q12h EDE  pantoprazole, 40 mg, oral, Daily before breakfast  polyethylene glycol, 17 g, oral, Daily  pregabalin, 75 mg, oral, BID  simvastatin, 40 mg, oral, BID  [START ON 12/18/2023] vancomycin, 1,250 mg, intravenous, q24h          LAB RESULTS:   CBC:   Results from last 7 days   Lab Units 12/17/23  0552 12/17/23  0013 12/16/23  0414   WBC AUTO x10*3/uL 11.3 13.1* 10.1   RBC AUTO x10*6/uL 2.44* 2.59* 2.54*   HEMOGLOBIN g/dL 7.5* 7.8* 7.9*   HEMATOCRIT % 22.9* 24.5* 23.9*   MCV fL 94 95 94   MCH pg 30.7 30.1 31.1   MCHC g/dL 32.8 31.8* 33.1   RDW % 14.6* 14.8* 14.7*   PLATELETS AUTO x10*3/uL 245 244  218     CMP:    Results from last 7 days   Lab Units 12/17/23  0552 12/16/23 0414 12/15/23  0451   SODIUM mmol/L 129* 128* 132*   POTASSIUM mmol/L 3.6 3.8 4.1   CHLORIDE mmol/L 97* 96* 97*   CO2 mmol/L 27 28 28   BUN mg/dL 13 13 12   CREATININE mg/dL 0.94 0.90 0.76   GLUCOSE mg/dL 133* 120* 99   PROTEIN TOTAL g/dL 5.5*  --   --    CALCIUM mg/dL 8.5* 8.7 8.6   BILIRUBIN TOTAL mg/dL 0.5  --   --    ALK PHOS U/L 103  --   --    AST U/L 17  --   --    ALT U/L 28  --   --      BMP:    Results from last 7 days   Lab Units 12/17/23  0552 12/16/23 0414 12/15/23  0451   SODIUM mmol/L 129* 128* 132*   POTASSIUM mmol/L 3.6 3.8 4.1   CHLORIDE mmol/L 97* 96* 97*   CO2 mmol/L 27 28 28   BUN mg/dL 13 13 12   CREATININE mg/dL 0.94 0.90 0.76   CALCIUM mg/dL 8.5* 8.7 8.6   GLUCOSE mg/dL 133* 120* 99          IMAGING STUDIES:  MR lumbar spine w and wo IV contrast  Result Date: 12/5/2023    1.  Postoperative changes as above. A large dorsal extra-spinal fluid collection is noted extending from the laminectomy bed through the deep muscular fascia into the subcutaneous fat. This collection extends from the right lateral recesses at the level of L4-5 as well as L5-S1 into the spinal canal and deforms the thecal sac resulting in moderate narrowing at L4-5 and to a lesser degree at L3-4. A focal defect is noted within the thecal sac at the level of L5-S1. 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 No significant peripheral enhancement is identified to suggest abscess although the sterility of this collection can not be ascertained on MRI alone.     2. Multilevel degenerative changes again noted with persistent marked bilateral foraminal narrowing at L3-4 and at L5-S1 on the left.           CT angio chest abdomen pelvis  Result Date: 12/5/2023  No thoracic or abdominal aortic aneurysm or dissection.   Stable pre-existing vascular findings as reported.   Findings compatible with recent lumbar spine surgery with prominent fluid  collection overlying the operated site extending through the superficial subcutaneous layer. The canal is not reliably assessed at the operated site due to artifact.     CT lumbar spine wo IV contrast  Result Date: 12/5/2023  1. No acute fracture identified.   2.. Postoperative and degenerative changes.        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  She is on heparin drip now for multilobar PE.  She will go to skilled nursing facility with long time antibiotic for spinal abscess.  She would need switching her heparin possibly to Eliquis since it is therapeutic right now.  She will be able to be transferred to skilled nursing facility tomorrow with antibiotics set up and orders.Otherwise we will continue with current medical therapy and plan for today..         PS: This note was completed using Dragon voice recognition technology and may include unintended errors with respect to translation of words, typographical errors or grammar errors which may not have been identified while finalizing the chart..

## 2023-12-18 NOTE — DISCHARGE SUMMARY
DISCHARGE DIAGNOSIS     Acute RLE peroneal vein DVT and acute lobar and segmental PE in RLL/RML (dx this admission (12/15), no RV strain on TTE, likely provoked  Post operative epidural abscess d/t MRSA after lumbosacral laminectomy and spinal fusion s/p I&D (12/6) and repeat washout (12/11)   Hyponatremia  Acute blood loss anemia, stable  E. Coli UTI s/p x5d Macrobid  CAD  HTN  DLD  COPD, not in acute exacerbation    HOSPITAL COURSE AND DETAILS     - p/w back pain, imaging showed epidural spinal fluid collection, ortho following, s/p 2 washout as above, okay to dc from their standpoint, Rx for pain meds written by them, fu as outpt  - ID following, cx from epidural abscess were growing MRSA, on IV vancomycin, plan for x6w total course via PICC with labs as ordered, fu as outpt  - change hep gtt to Eliquis for AC for DVT/PE diagnosed this admission as above, likely provoked after post op and prolonged hospitalization, will need at least 3 months AC, fu with PCP, will dc raloxifene on discharge  - was on DAPT PTA for hx of remote PCI, will dc Plavix, cont ASA 81 for antiplatelet monotherapy with initiation of Eliquis as above  - Hgb 8.1 today, s/p 1u pRBC this admission, stable now, getting weekly labs as outpt  - Na 132 today, improved, dc hydrochlorothiazide on discharge  - cont other home meds  - PT/OT evaluated, dispo is SNF  - stable for dc today, total time spent on discharge services 41 minutes.     DISCHARGE PHYSICAL EXAM     Last Recorded Vitals:  Vitals:    12/17/23 1514 12/17/23 1943 12/18/23 0019 12/18/23 0806   BP: 143/64 118/60 144/60 117/53   BP Location:    Left arm   Patient Position: Sitting   Lying   Pulse: 85 78 86 84   Resp: 18 16 16 16   Temp: 36.9 °C (98.4 °F) 37.5 °C (99.5 °F) 37.2 °C (99 °F) 37.1 °C (98.8 °F)   TempSrc:    Temporal   SpO2: 93% 92% 93% 92%   Weight:       Height:           Physical Exam:  GEN: appears stated age, NAD  CV: RRR, no m/r/g, no LE edema  LUNGS: CTAB, no  w/r/c  ABD: soft, NT  SKIN: back dressing in place  NEURO: A+Ox3, no FND  PSYCH: appropriate mood, affect      PERTINENT LABS AND IMAGING     Results for orders placed or performed during the hospital encounter of 12/05/23 (from the past 96 hour(s))   CBC   Result Value Ref Range    WBC 8.4 4.4 - 11.3 x10*3/uL    nRBC 0.5 (H) 0.0 - 0.0 /100 WBCs    RBC 2.24 (L) 4.00 - 5.20 x10*6/uL    Hemoglobin 7.0 (L) 12.0 - 16.0 g/dL    Hematocrit 21.8 (L) 36.0 - 46.0 %    MCV 97 80 - 100 fL    MCH 31.3 26.0 - 34.0 pg    MCHC 32.1 32.0 - 36.0 g/dL    RDW 13.7 11.5 - 14.5 %    Platelets 228 150 - 450 x10*3/uL   Basic Metabolic Panel   Result Value Ref Range    Glucose 99 74 - 99 mg/dL    Sodium 132 (L) 136 - 145 mmol/L    Potassium 4.1 3.5 - 5.3 mmol/L    Chloride 97 (L) 98 - 107 mmol/L    Bicarbonate 28 21 - 32 mmol/L    Anion Gap 11 10 - 20 mmol/L    Urea Nitrogen 12 6 - 23 mg/dL    Creatinine 0.76 0.50 - 1.05 mg/dL    eGFR 84 >60 mL/min/1.73m*2    Calcium 8.6 8.6 - 10.3 mg/dL   Prepare RBC: 1 Units   Result Value Ref Range    PRODUCT CODE F1926T56     Unit Number [id number]     Unit ABO A     Unit RH POS     XM INTEP COMP     Dispense Status TR     Blood Expiration Date December 28, 2023 23:59 EST     PRODUCT BLOOD TYPE 6200     UNIT VOLUME 350    Type and screen   Result Value Ref Range    ABO TYPE A     Rh TYPE POS     ANTIBODY SCREEN NEG    VERIFY ABO/Rh Group Test   Result Value Ref Range    ABO TYPE A     Rh TYPE POS    aPTT - baseline   Result Value Ref Range    aPTT 26 (L) 27 - 38 seconds   Protime-INR   Result Value Ref Range    Protime 13.0 (H) 9.8 - 12.8 seconds    INR 1.2 (H) 0.9 - 1.1   CBC   Result Value Ref Range    WBC 10.1 4.4 - 11.3 x10*3/uL    nRBC 0.4 (H) 0.0 - 0.0 /100 WBCs    RBC 2.54 (L) 4.00 - 5.20 x10*6/uL    Hemoglobin 7.9 (L) 12.0 - 16.0 g/dL    Hematocrit 23.9 (L) 36.0 - 46.0 %    MCV 94 80 - 100 fL    MCH 31.1 26.0 - 34.0 pg    MCHC 33.1 32.0 - 36.0 g/dL    RDW 14.7 (H) 11.5 - 14.5 %     Platelets 218 150 - 450 x10*3/uL   Basic Metabolic Panel   Result Value Ref Range    Glucose 120 (H) 74 - 99 mg/dL    Sodium 128 (L) 136 - 145 mmol/L    Potassium 3.8 3.5 - 5.3 mmol/L    Chloride 96 (L) 98 - 107 mmol/L    Bicarbonate 28 21 - 32 mmol/L    Anion Gap 8 (L) 10 - 20 mmol/L    Urea Nitrogen 13 6 - 23 mg/dL    Creatinine 0.90 0.50 - 1.05 mg/dL    eGFR 69 >60 mL/min/1.73m*2    Calcium 8.7 8.6 - 10.3 mg/dL   Vancomycin   Result Value Ref Range    Vancomycin 20.9 (H) 5.0 - 20.0 ug/mL   Heparin Assay, UFH   Result Value Ref Range    Heparin Unfractionated 0.8 See Comment Below for Therapeutic Ranges IU/mL   Heparin Assay, UFH   Result Value Ref Range    Heparin Unfractionated 0.7 See Comment Below for Therapeutic Ranges IU/mL   PST Top   Result Value Ref Range    Extra Tube Hold for add-ons.    Transthoracic Echo (TTE) Complete   Result Value Ref Range    AV mn grad 8.0     AV pk sola 1.84     LV biplane EF 55     LVOT diam 1.80     MV E/A ratio 0.93     Tricuspid annular plane systolic excursion 2.8     MV avg E/e' ratio 9.33     LA vol index A/L 21.5     RV free wall pk S' 13.30     RVSP 40.9     LVIDd 4.11     Aortic Valve Area by Continuity of Peak Velocity 2.13     AV pk grad 13.5     Aortic Valve Area by Continuity of VTI 2.06     LV A4C EF 50.3    Heparin Assay, UFH   Result Value Ref Range    Heparin Unfractionated 0.4 See Comment Below for Therapeutic Ranges IU/mL   CBC   Result Value Ref Range    WBC 13.1 (H) 4.4 - 11.3 x10*3/uL    nRBC 0.3 (H) 0.0 - 0.0 /100 WBCs    RBC 2.59 (L) 4.00 - 5.20 x10*6/uL    Hemoglobin 7.8 (L) 12.0 - 16.0 g/dL    Hematocrit 24.5 (L) 36.0 - 46.0 %    MCV 95 80 - 100 fL    MCH 30.1 26.0 - 34.0 pg    MCHC 31.8 (L) 32.0 - 36.0 g/dL    RDW 14.8 (H) 11.5 - 14.5 %    Platelets 244 150 - 450 x10*3/uL   Heparin Assay, UFH   Result Value Ref Range    Heparin Unfractionated 0.3 See Comment Below for Therapeutic Ranges IU/mL   Vancomycin   Result Value Ref Range    Vancomycin 20.6 (H)  5.0 - 20.0 ug/mL   CBC and Auto Differential   Result Value Ref Range    WBC 11.3 4.4 - 11.3 x10*3/uL    nRBC 0.3 (H) 0.0 - 0.0 /100 WBCs    RBC 2.44 (L) 4.00 - 5.20 x10*6/uL    Hemoglobin 7.5 (L) 12.0 - 16.0 g/dL    Hematocrit 22.9 (L) 36.0 - 46.0 %    MCV 94 80 - 100 fL    MCH 30.7 26.0 - 34.0 pg    MCHC 32.8 32.0 - 36.0 g/dL    RDW 14.6 (H) 11.5 - 14.5 %    Platelets 245 150 - 450 x10*3/uL    Neutrophils % 68.7 40.0 - 80.0 %    Immature Granulocytes %, Automated 4.2 (H) 0.0 - 0.9 %    Lymphocytes % 14.6 13.0 - 44.0 %    Monocytes % 8.8 2.0 - 10.0 %    Eosinophils % 3.5 0.0 - 6.0 %    Basophils % 0.2 0.0 - 2.0 %    Neutrophils Absolute 7.75 (H) 1.20 - 7.70 x10*3/uL    Immature Granulocytes Absolute, Automated 0.47 0.00 - 0.70 x10*3/uL    Lymphocytes Absolute 1.65 1.20 - 4.80 x10*3/uL    Monocytes Absolute 0.99 0.10 - 1.00 x10*3/uL    Eosinophils Absolute 0.40 0.00 - 0.70 x10*3/uL    Basophils Absolute 0.02 0.00 - 0.10 x10*3/uL   Comprehensive Metabolic Panel   Result Value Ref Range    Glucose 133 (H) 74 - 99 mg/dL    Sodium 129 (L) 136 - 145 mmol/L    Potassium 3.6 3.5 - 5.3 mmol/L    Chloride 97 (L) 98 - 107 mmol/L    Bicarbonate 27 21 - 32 mmol/L    Anion Gap 9 (L) 10 - 20 mmol/L    Urea Nitrogen 13 6 - 23 mg/dL    Creatinine 0.94 0.50 - 1.05 mg/dL    eGFR 65 >60 mL/min/1.73m*2    Calcium 8.5 (L) 8.6 - 10.3 mg/dL    Albumin 2.7 (L) 3.4 - 5.0 g/dL    Alkaline Phosphatase 103 33 - 136 U/L    Total Protein 5.5 (L) 6.4 - 8.2 g/dL    AST 17 9 - 39 U/L    Bilirubin, Total 0.5 0.0 - 1.2 mg/dL    ALT 28 7 - 45 U/L   Heparin Assay, UFH   Result Value Ref Range    Heparin Unfractionated 0.3 See Comment Below for Therapeutic Ranges IU/mL   CBC and Auto Differential   Result Value Ref Range    WBC 12.9 (H) 4.4 - 11.3 x10*3/uL    nRBC 0.4 (H) 0.0 - 0.0 /100 WBCs    RBC 2.61 (L) 4.00 - 5.20 x10*6/uL    Hemoglobin 8.1 (L) 12.0 - 16.0 g/dL    Hematocrit 25.0 (L) 36.0 - 46.0 %    MCV 96 80 - 100 fL    MCH 31.0 26.0 - 34.0 pg     MCHC 32.4 32.0 - 36.0 g/dL    RDW 14.4 11.5 - 14.5 %    Platelets 282 150 - 450 x10*3/uL    Immature Granulocytes %, Automated 5.5 (H) 0.0 - 0.9 %    Immature Granulocytes Absolute, Automated 0.71 (H) 0.00 - 0.70 x10*3/uL   Comprehensive Metabolic Panel   Result Value Ref Range    Glucose 111 (H) 74 - 99 mg/dL    Sodium 132 (L) 136 - 145 mmol/L    Potassium 4.0 3.5 - 5.3 mmol/L    Chloride 98 98 - 107 mmol/L    Bicarbonate 28 21 - 32 mmol/L    Anion Gap 10 10 - 20 mmol/L    Urea Nitrogen 12 6 - 23 mg/dL    Creatinine 0.95 0.50 - 1.05 mg/dL    eGFR 65 >60 mL/min/1.73m*2    Calcium 8.7 8.6 - 10.3 mg/dL    Albumin 2.8 (L) 3.4 - 5.0 g/dL    Alkaline Phosphatase 126 33 - 136 U/L    Total Protein 5.9 (L) 6.4 - 8.2 g/dL    AST 23 9 - 39 U/L    Bilirubin, Total 0.4 0.0 - 1.2 mg/dL    ALT 30 7 - 45 U/L   Magnesium   Result Value Ref Range    Magnesium 1.88 1.60 - 2.40 mg/dL   Manual Differential   Result Value Ref Range    Neutrophils %, Manual 80.0 40.0 - 80.0 %    Bands %, Manual 2.0 0.0 - 5.0 %    Lymphocytes %, Manual 10.0 13.0 - 44.0 %    Monocytes %, Manual 6.0 2.0 - 10.0 %    Eosinophils %, Manual 1.0 0.0 - 6.0 %    Basophils %, Manual 0.0 0.0 - 2.0 %    Metamyelocytes %, Manual 1.0 0.0 - 0.0 %    Seg Neutrophils Absolute, Manual 10.32 (H) 1.20 - 7.00 x10*3/uL    Bands Absolute, Manual 0.26 0.00 - 0.70 x10*3/uL    Lymphocytes Absolute, Manual 1.29 1.20 - 4.80 x10*3/uL    Monocytes Absolute, Manual 0.77 0.10 - 1.00 x10*3/uL    Eosinophils Absolute, Manual 0.13 0.00 - 0.70 x10*3/uL    Basophils Absolute, Manual 0.00 0.00 - 0.10 x10*3/uL    Metamyelocytes Absolute, Manual 0.13 0.00 - 0.00 x10*3/uL    Total Cells Counted 100     Neutrophils Absolute, Manual 10.58 (H) 1.20 - 7.70 x10*3/uL    RBC Morphology No significant RBC morphology present         Transthoracic Echo (TTE) Complete   Final Result      Lower extremity venous duplex bilateral   Final Result   Thrombus of right peroneal veins.        MACRO:        Keagan  Tommy discussed the significance and urgency of this critical   finding by telephone with  LILI AVILES on 12/15/2023 at 8:41 pm.   (**-RCF-**) Findings:  See findings.             Signed by: Keagan Sanders 12/15/2023 8:41 PM   Dictation workstation:   [id number]      CT angio chest for pulmonary embolism   Final Result   Acute lobar and segmental pulmonary emboli in the right lower lobe   and middle lobe. RV to LV ratio 1.1; please correlate for component   of right heart strain        MACRO:   Keagan Sanders discussed the significance and urgency of this critical   finding by telephone with  LILI AVILES on 12/15/2023 at 8:41 pm.   (**-RCF-**) Findings:  See findings.        Signed by: Keagan Sanders 12/15/2023 8:41 PM   Dictation workstation:   [id number]      CT abdomen pelvis w IV contrast   Final Result   1.  Somewhat subtle but possible acute pulmonary emboli in the right   lower lobe. Recommend further evaluation with CT pulmonary angiogram.   2. Successful drainage of fluid collections in the lumbar operative   site when compared to the previous exam.        The possibility of an acute pulmonary embolus and recommendation for   CT a chest was communicated using the secure messaging system through   the medical record to the referring physician at the time of this   exam.        Signed by: Joseph Schoenberger 12/15/2023 3:09 PM   Dictation workstation:   [id number]      MR lumbar spine w and wo IV contrast   Final Result   The epidural and subcutaneous fluid collections have decreased in   size as noted above following placement of a drain in the   subcutaneous tissue.        The overall degree of thecal sac stenosis has improved as well.        I personally reviewed the images/study and I agree with the findings   as stated. This study was interpreted at [facility], [place], [place].        MACRO:   None        Signed by: Cyrus Disla 12/10/2023 2:52 PM    Dictation workstation:   [id number]      Bedside PICC Imaging   Final Result      MR lumbar spine w and wo IV contrast   Final Result   1.  Postoperative changes as above. A large dorsal extra-spinal fluid   collection is noted extending from the laminectomy bed through the   deep muscular fascia into the subcutaneous fat. This collection   extends from the right lateral recesses at the level of L4-5 as well   as L5-S1 into the spinal canal and deforms the thecal sac resulting   in moderate narrowing at L4-5 and to a lesser degree at L3-4. A focal   defect is noted within the thecal sac at the level of L5-S1. 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 No significant peripheral   enhancement is identified to suggest abscess although the sterility   of this collection can not be ascertained on MRI alone.   2. Multilevel degenerative changes again noted with persistent marked   bilateral foraminal narrowing at L3-4 and at L5-S1 on the left.             I personally reviewed the images/study and I agree with the findings   as stated. This study was interpreted at [place], [place].        MACRO:   None        Signed by: Jh Benoit 12/5/2023 5:24 PM   Dictation workstation:   [id number]      CT angio chest abdomen pelvis   Final Result   No thoracic or abdominal aortic aneurysm or dissection.        Stable pre-existing vascular findings as reported.        Findings compatible with recent lumbar spine surgery with prominent   fluid collection overlying the operated site extending through the   superficial subcutaneous layer. The canal is not reliably assessed at   the operated site due to artifact. Recommend further characterization   with MRI lumbar spine with without contrast.        No acute abnormality of the chest, abdomen or pelvis.             Signed by: Samuel Brannon 12/5/2023 3:32 PM   Dictation workstation:   [id number]      CT lumbar spine wo IV  contrast   Final Result   1. No acute fracture identified.   2. Postoperative and degenerative changes, as described above.        MACRO:   None.        Signed by: Jony Monae 12/5/2023 11:45 AM   Dictation workstation:   [id number]          Transthoracic Echo (TTE) Complete    Result Date: 12/16/2023          [place] [address]  Tel [phone] Fax [phone] TRANSTHORACIC ECHOCARDIOGRAM REPORT  Patient Name:      KILEY LOJA    Reading Physician:    [id number] Yoav Neville DO Study Date:        12/16/2023            Ordering Provider:    [id number] FOREIGN HUBER MRN/PID:           [id number]              Fellow: Accession#:        [id number]          Nurse: Date of Birth/Age: 1955 / 68 years Sonographer:          Gertrudis Squires                                                                RDCS Gender:            F                     Additional Staff: Height:            144.78 cm             Admit Date:           12/5/2023 Weight:            82.56 kg              Admission Status:     Inpatient -                                                                Routine BSA:               1.73 m2               Department Location:  [address] [place] [address] Blood Pressure: 128 /62 mmHg Study Type:    TRANSTHORACIC ECHO (TTE) COMPLETE Diagnosis/ICD: Personal history of pulmonary embolism-Z86.711 Indication:    PE CPT Codes:     Echo Complete w Full Doppler-[id number] Patient History: Smoker:            Current. Pertinent History: HTN, Hyperlipidemia, Dyspnea, PVD, CAD and AAA. Study Detail: The following Echo studies were performed: 2D, M-Mode, Doppler and               color flow. Definity used as a contrast agent for endocardial               border definition. Total contrast used for this procedure was 2 mL               via IV push. The patient was awake.   PHYSICIAN INTERPRETATION: Left Ventricle: Left ventricular systolic function is normal, with an estimated ejection fraction of 55%. There are no regional wall motion abnormalities. The left ventricular cavity size is normal. The left ventricular septal wall thickness is mildly increased. Spectral Doppler shows an impaired relaxation pattern of left ventricular diastolic filling. LV Wall Scoring: All segments are normal. Left Atrium: The left atrium is upper limits of normal in size. Right Ventricle: The right ventricle is normal in size. There is normal right ventricular global systolic function. Right Atrium: The right atrium is normal in size. Aortic Valve: The aortic valve appears structurally normal. The aortic valve appears tricuspid. There is mild aortic valve cusp calcification. There is no evidence of aortic valve stenosis. There is no evidence of aortic valve regurgitation. The peak instantaneous gradient of the aortic valve is 13.5 mmHg. The mean gradient of the aortic valve is 8.0 mmHg. Mitral Valve: The mitral valve is normal in structure. There is no evidence of mitral valve stenosis. There is normal mitral valve leaflet mobility. There is trace mitral valve regurgitation. Tricuspid Valve: The tricuspid valve is structurally normal. There is normal tricuspid valve leaflet mobility. There is trace tricuspid regurgitation. Pulmonic Valve: The pulmonic valve is structurally normal. There is no indication of pulmonic valve regurgitation. Pericardium: There is no pericardial effusion noted. Aorta: The aortic root is normal. Pulmonary Artery: The tricuspid regurgitant velocity is 3.08 m/s, and with an estimated right atrial pressure of 3 mmHg, the estimated pulmonary artery pressure is moderately elevated with the RVSP at 40.9 mmHg. Systemic Veins: The inferior vena cava appears to be of normal size. In comparison to the previous echocardiogram(s): Prior examinations are available and were reviewed for  comparison purposes. Study relatively unchanged from my study done here in June 2022.  CONCLUSIONS:  1. Left ventricular systolic function is normal with a 55% estimated ejection fraction.  2. Spectral Doppler shows an impaired relaxation pattern of left ventricular diastolic filling.  3. There is no evidence of mitral valve stenosis.  4. Trace mitral valve regurgitation.  5. Trace tricuspid regurgitation is visualized.  6. Aortic valve stenosis is not present.  7. Moderately elevated pulmonary artery pressure. QUANTITATIVE DATA SUMMARY: 2D MEASUREMENTS:                          Normal Ranges: Ao Root d:     2.30 cm   (2.0-3.7cm) LAs:           3.90 cm   (2.7-4.0cm) IVSd:          1.23 cm   (0.6-1.1cm) LVPWd:         1.00 cm   (0.6-1.1cm) LVIDd:         4.11 cm   (3.9-5.9cm) LVIDs:         2.97 cm LV Mass Index: 89.5 g/m2 LV % FS        27.7 % LA VOLUME:                               Normal Ranges: LA Vol A4C:        36.7 ml    (22+/-6mL/m2) LA Vol A2C:        35.2 ml LA Vol BP:         37.2 ml LA Vol Index A4C:  21.2ml/m2 LA Vol Index A2C:  20.4 ml/m2 LA Vol Index BP:   21.5 ml/m2 LA Area A4C:       15.3 cm2 LA Area A2C:       14.5 cm2 LA Major Axis A4C: 5.4 cm LA Major Axis A2C: 5.1 cm LA Volume Index:   20.6 ml/m2 RA VOLUME BY A/L METHOD:                               Normal Ranges: RA Vol A4C:        46.8 ml    (8.3-19.5ml) RA Vol Index A4C:  27.1 ml/m2 RA Area A4C:       17.4 cm2 RA Major Axis A4C: 5.5 cm AORTA MEASUREMENTS:                    Normal Ranges: Asc Ao, d: 2.20 cm (2.1-3.4cm) LV SYSTOLIC FUNCTION BY 2D PLANIMETRY (MOD):                     Normal Ranges: EF-A4C View: 50.3 % (>=55%) EF-A2C View: 56.0 % EF-Biplane:  55.4 % LV DIASTOLIC FUNCTION:                        Normal Ranges: MV Peak E:    1.12 m/s (0.7-1.2 m/s) MV Peak A:    1.21 m/s (0.42-0.7 m/s) E/A Ratio:    0.93     (1.0-2.2) MV e'         0.12 m/s (>8.0) MV lateral e' 0.12 m/s MV medial e'  0.12 m/s E/e' Ratio:   9.33     (<8.0) MITRAL  VALVE:                 Normal Ranges: MV DT: 250 msec (150-240msec) AORTIC VALVE:                                    Normal Ranges: AoV Vmax:                1.84 m/s  (<=1.7m/s) AoV Peak P.5 mmHg (<20mmHg) AoV Mean P.0 mmHg  (1.7-11.5mmHg) LVOT Max Anam:            1.54 m/s  (<=1.1m/s) AoV VTI:                 44.90 cm  (18-25cm) LVOT VTI:                36.40 cm LVOT Diameter:           1.80 cm   (1.8-2.4cm) AoV Area, VTI:           2.06 cm2  (2.5-5.5cm2) AoV Area,Vmax:           2.13 cm2  (2.5-4.5cm2) AoV Dimensionless Index: 0.81  RIGHT VENTRICLE: RV Basal 3.57 cm RV Mid   2.77 cm RV Major 6.9 cm TAPSE:   28.2 mm RV s'    0.13 m/s TRICUSPID VALVE/RVSP:                             Normal Ranges: Peak TR Velocity: 3.08 m/s RV Syst Pressure: 40.9 mmHg (< 30mmHg) IVC Diam:         1.79 cm PULMONIC VALVE:                         Normal Ranges: PV Accel Time: 85 msec  (>120ms) PV Max Anam:    1.3 m/s  (0.6-0.9m/s) PV Max P.0 mmHg  Sari Neville DO Electronically signed on 2023 at 12:29:49 PM  Wall Scoring  ** Final **      DISCHARGE MEDICATIONS        Your medication list        START taking these medications        Instructions Last Dose Given Next Dose Due   apixaban 5 mg tablet  Commonly known as: Eliquis      Take 2 tablets (10 mg) by mouth 2 times a day for 13 doses.       apixaban 5 mg tablet  Commonly known as: Eliquis  Start taking on: 2023      Take 1 tablet (5 mg) by mouth 2 times a day. Do not start before 2023.       oxyCODONE 5 mg immediate release tablet  Commonly known as: Roxicodone      Take 1 tablet (5 mg) by mouth every 4 hours if needed for moderate pain (4 - 6) or severe pain (7 - 10) for up to 7 days.       vancomycin IVPB  Commonly known as: Vancocin      Infuse 200 mL (1 g) at 200 mL/hr over 60 minutes into a venous catheter every 12 hours. CBC BMP weekly  Vanco level weekly  CRP sed rate in 3 and 6 weeks  Fax results  to [phone]              CONTINUE taking these medications        Instructions Last Dose Given Next Dose Due   aspirin 81 mg EC tablet           atenolol 50 mg tablet  Commonly known as: Tenormin           Atrovent HFA 17 mcg/actuation inhaler  Generic drug: ipratropium           brimonidine 0.2 % ophthalmic solution  Commonly known as: AlphaGAN           busPIRone 10 mg tablet  Commonly known as: Buspar           cholecalciferol 50 mcg (2,000 unit) capsule  Commonly known as: Vitamin D-3           Claritin 10 mg tablet  Generic drug: loratadine           cyclobenzaprine 5 mg tablet  Commonly known as: Flexeril      Take 1 tablet (5 mg) by mouth 3 times a day as needed for muscle spasms.       docusate sodium 100 mg capsule  Commonly known as: Colace      Take 1 capsule (100 mg) by mouth 2 times a day.       ezetimibe 10 mg tablet  Commonly known as: Zetia           furosemide 20 mg tablet  Commonly known as: Lasix           hydrALAZINE 50 mg tablet  Commonly known as: Apresoline           ibandronate 150 mg tablet  Commonly known as: Boniva           ISOSORBIDE MONONITRATE ORAL           LUTEIN ORAL           meclizine 25 mg tablet  Commonly known as: Antivert           nitroglycerin 0.4 mg SL tablet  Commonly known as: Nitrostat           pantoprazole 40 mg EC tablet  Commonly known as: ProtoNix           potassium chloride ER 10 mEq ER capsule  Commonly known as: Micro-K           pregabalin 75 mg capsule  Commonly known as: Lyrica           PreserVision AREDS-2 250-90-40-1 mg capsule  Generic drug: vit C,P-Zs-cbdyn-lutein-zeaxan           ProAir HFA 90 mcg/actuation inhaler  Generic drug: albuterol           Remeron 15 mg tablet  Generic drug: mirtazapine           Salagen (pilocarpine) 5 mg tablet  Generic drug: pilocarpine           simvastatin 40 mg tablet  Commonly known as: Zocor           tolterodine 1 mg tablet  Commonly known as: Detrol                  STOP taking these medications      celecoxib 200  mg capsule  Commonly known as: CeleBREX        clopidogrel 75 mg tablet  Commonly known as: Plavix        Evista 60 mg tablet  Generic drug: raloxifene        tiZANidine 4 mg tablet  Commonly known as: Zanaflex        triamterene-hydrochlorothiazid 37.5-25 mg capsule  Commonly known as: Dyazide               ASK your doctor about these medications        Instructions Last Dose Given Next Dose Due   phenazopyridine 95 mg tablet  Commonly known as: Urinary Pain Relief  Ask about: Should I take this medication?      Take 1 tablet (95 mg) by mouth 3 times a day with meals for 3 days.                 Where to Get Your Medications        You can get these medications from any pharmacy    Bring a paper prescription for each of these medications  oxyCODONE 5 mg immediate release tablet       Information about where to get these medications is not yet available    Ask your nurse or doctor about these medications  apixaban 5 mg tablet  apixaban 5 mg tablet  phenazopyridine 95 mg tablet  vancomycin IVPB         OUTPATIENT FOLLOW-UP     Future Appointments   Date Time Provider Department Center   3/14/2024  9:30 AM Hugo Barron MD [id number] [place]

## 2023-12-18 NOTE — PROGRESS NOTES
Physical Therapy    Physical Therapy Treatment    Patient Name: Tara Tierney  MRN: [id number]  Today's Date: 12/18/2023  Time Calculation  Start Time: 0821  Stop Time: 0847  Time Calculation (min): 26 min       Assessment/Plan   End of Session Communication: Bedside nurse, PCT/NA/CTA  Assessment Comment: Call-light, phone, and traytable within reach.  End of Session Patient Position: Up in chair, Alarm off, not on at start of session (Ice pack on back. Nursing aware.)  PT Plan  Inpatient/Swing Bed or Outpatient: Inpatient  Treatment/Interventions: Bed mobility, Transfer training, Gait training  PT Plan: Skilled PT  PT Frequency: Daily  PT Discharge Recommendations: Moderate intensity level of continued care, High intensity level of continued care  Equipment Recommended upon Discharge: Wheeled walker   PT Recommended Transfer Status: Assist x1    General Visit Information:   PT  Visit  PT Received On: 12/18/23  General  Family/Caregiver Present: No  Prior to Session Communication: Bedside nurse  Patient Position Received: Bed, 3 rail up, Alarm on  General Comment: Drains removed this morning.    General Observations:   General Observation: IV; Purewick(removed for mobility).    Subjective     Precautions:  Precautions  Medical Precautions: Fall precautions  Post-Surgical Precautions: Spinal precautions  Braces Applied: TLSO brace when OOB  Precautions Comment: Recent L3-4, L4-5, L5-S1 Laminectomy([facility]). Had I&D (12/06/23) and second I&D(12/11/23)    Vital Signs:  Vital Signs  Heart Rate:  (116bpm with activities.)  SpO2:  (Patient on RA. SPO2 92% with activities. (+)CORNELL. SPO2 94% after ~2-3min seated rest break.)    Objective     Pain:  Pain Assessment: 0-10 (5/10 LBP and itchy skin. Ice pack for back at end of session. Nursing informed and aware.)    Treatments:        Bed Mobility  Bed Mobility: Yes  Bed Mobility 1  Bed Mobility 1: Supine to sitting  Level of Assistance 1: Minimum assistance  Bed Mobility  Comments 1: HOB ~40°. Patient uses bed rail well for support. No c/o dizziness with positional changes.  Ambulation/Gait Training  Ambulation/Gait Training Performed: Yes  Ambulation/Gait Training 1  Surface 1: Level tile  Device 1: Rolling walker  Assistance 1: Minimum assistance  Comments/Distance (ft) 1: ~100ft x2. (A) to don TLSO brace in standing position prior to amb. Slow cathi. Step through gait pattern. Decreased step height/length B. v/c and (A) for safer maneuvering of ww with 90/180° turns. No LOB. (+)CORNELL, but SPO2 well maintained.  Transfers  Transfer: Yes  Transfer 1  Technique 1: Sit to stand, Stand to sit  Transfer Device 1:  (ww)  Transfer Level of Assistance 1: Contact guard  Trials/Comments 1: (2x). v/c for safe hand placement and technique. Slow transition of hands to/from ww.          Outcome Measures:  [facility] Basic Mobility  Turning from your back to your side while in a flat bed without using bedrails: A lot  Moving from lying on your back to sitting on the side of a flat bed without using bedrails: A lot  Moving to and from bed to chair (including a wheelchair): A little  Standing up from a chair using your arms (e.g. wheelchair or bedside chair): A little  To walk in hospital room: A little  Climbing 3-5 steps with railing: A little  Basic Mobility - Total Score: 16    Education Documentation  Precautions, taught by Jeanine Bryan PTA at 12/18/2023  9:36 AM.  Learner: Patient  Readiness: Acceptance  Method: Explanation, Demonstration  Response: Verbalizes Understanding, Needs Reinforcement  Comment: See therapy note.    Body Mechanics, taught by Jeanine Bryan PTA at 12/18/2023  9:36 AM.  Learner: Patient  Readiness: Acceptance  Method: Explanation, Demonstration  Response: Verbalizes Understanding, Needs Reinforcement  Comment: See therapy note.    Mobility Training, taught by Jeanine Bryan PTA at 12/18/2023  9:36 AM.  Learner: Patient  Readiness: Acceptance  Method: Explanation,  Demonstration  Response: Verbalizes Understanding, Needs Reinforcement  Comment: See therapy note.    EDUCATION:  Individual(s) Educated: Patient  Education Provided: Body Mechanics, Fall Risk, POC, Post-Op Precautions (Safety with bed mobility/transfers/amb while keeping aware of spinal precautions.)      Encounter Problems       Encounter Problems (Active)       PT Problem       Pt will demonstrate sup > sit and sit > sup bed mobility mod I with log roll technique (Progressing)       Start:  12/07/23    Expected End:  12/21/23            Pt will demo sit > stand and stand > sit transfer with ww and mod I  (Progressing)       Start:  12/07/23    Expected End:  12/21/23            Pt will ambulate 100' with ww and mod I, without LOB  (Progressing)       Start:  12/07/23    Expected End:  12/21/23            Pt will demo up/down 1 steps with handrail mod I (Progressing)       Start:  12/07/23    Expected End:  12/21/23

## 2023-12-19 ENCOUNTER — HOSPITAL ENCOUNTER (OUTPATIENT)
Facility: HOSPITAL | Age: 68
Setting detail: OBSERVATION
Discharge: HOME HEALTH | End: 2023-12-22
Attending: EMERGENCY MEDICINE | Admitting: STUDENT IN AN ORGANIZED HEALTH CARE EDUCATION/TRAINING PROGRAM
Payer: COMMERCIAL

## 2023-12-19 ENCOUNTER — APPOINTMENT (OUTPATIENT)
Dept: CARDIOLOGY | Facility: HOSPITAL | Age: 68
End: 2023-12-19
Payer: COMMERCIAL

## 2023-12-19 ENCOUNTER — APPOINTMENT (OUTPATIENT)
Dept: RADIOLOGY | Facility: HOSPITAL | Age: 68
End: 2023-12-19
Payer: COMMERCIAL

## 2023-12-19 DIAGNOSIS — N30.00 ACUTE CYSTITIS WITHOUT HEMATURIA: ICD-10-CM

## 2023-12-19 DIAGNOSIS — R53.1 GENERALIZED WEAKNESS: ICD-10-CM

## 2023-12-19 DIAGNOSIS — T81.89XS LUMBAR SURGICAL WOUND FLUID COLLECTION, SEQUELA: ICD-10-CM

## 2023-12-19 DIAGNOSIS — M46.20 SPINAL ABSCESS (MULTI): ICD-10-CM

## 2023-12-19 DIAGNOSIS — M54.50 ACUTE LOW BACK PAIN WITHOUT SCIATICA, UNSPECIFIED BACK PAIN LATERALITY: Primary | ICD-10-CM

## 2023-12-19 LAB
ANION GAP SERPL CALC-SCNC: 14 MMOL/L (ref 10–20)
APPEARANCE UR: CLEAR
BACTERIA #/AREA URNS AUTO: ABNORMAL /HPF
BASOPHILS # BLD AUTO: 0.03 X10*3/UL (ref 0–0.1)
BASOPHILS NFR BLD AUTO: 0.2 %
BILIRUB UR STRIP.AUTO-MCNC: NEGATIVE MG/DL
BUN SERPL-MCNC: 12 MG/DL (ref 6–23)
CALCIUM SERPL-MCNC: 8.8 MG/DL (ref 8.6–10.3)
CHLORIDE SERPL-SCNC: 95 MMOL/L (ref 98–107)
CO2 SERPL-SCNC: 23 MMOL/L (ref 21–32)
COLOR UR: YELLOW
CREAT SERPL-MCNC: 0.98 MG/DL (ref 0.5–1.05)
CRP SERPL-MCNC: 17.58 MG/DL
EOSINOPHIL # BLD AUTO: 0.4 X10*3/UL (ref 0–0.7)
EOSINOPHIL NFR BLD AUTO: 2.9 %
ERYTHROCYTE [DISTWIDTH] IN BLOOD BY AUTOMATED COUNT: 14.6 % (ref 11.5–14.5)
ERYTHROCYTE [SEDIMENTATION RATE] IN BLOOD BY WESTERGREN METHOD: 23 MM/H (ref 0–30)
GFR SERPL CREATININE-BSD FRML MDRD: 62 ML/MIN/1.73M*2
GLUCOSE SERPL-MCNC: 128 MG/DL (ref 74–99)
GLUCOSE UR STRIP.AUTO-MCNC: NEGATIVE MG/DL
HCT VFR BLD AUTO: 26.1 % (ref 36–46)
HGB BLD-MCNC: 8.3 G/DL (ref 12–16)
HOLD SPECIMEN: NORMAL
IMM GRANULOCYTES # BLD AUTO: 0.69 X10*3/UL (ref 0–0.7)
IMM GRANULOCYTES NFR BLD AUTO: 5 % (ref 0–0.9)
KETONES UR STRIP.AUTO-MCNC: NEGATIVE MG/DL
LEUKOCYTE ESTERASE UR QL STRIP.AUTO: NEGATIVE
LYMPHOCYTES # BLD AUTO: 1.68 X10*3/UL (ref 1.2–4.8)
LYMPHOCYTES NFR BLD AUTO: 12.2 %
MCH RBC QN AUTO: 31 PG (ref 26–34)
MCHC RBC AUTO-ENTMCNC: 31.8 G/DL (ref 32–36)
MCV RBC AUTO: 97 FL (ref 80–100)
MONOCYTES # BLD AUTO: 1.15 X10*3/UL (ref 0.1–1)
MONOCYTES NFR BLD AUTO: 8.3 %
NEUTROPHILS # BLD AUTO: 9.86 X10*3/UL (ref 1.2–7.7)
NEUTROPHILS NFR BLD AUTO: 71.4 %
NITRITE UR QL STRIP.AUTO: NEGATIVE
NRBC BLD-RTO: 0.2 /100 WBCS (ref 0–0)
PH UR STRIP.AUTO: 7 [PH]
PLATELET # BLD AUTO: 289 X10*3/UL (ref 150–450)
POTASSIUM SERPL-SCNC: 4.3 MMOL/L (ref 3.5–5.3)
PROT UR STRIP.AUTO-MCNC: ABNORMAL MG/DL
RBC # BLD AUTO: 2.68 X10*6/UL (ref 4–5.2)
RBC # UR STRIP.AUTO: ABNORMAL /UL
RBC #/AREA URNS AUTO: ABNORMAL /HPF
SODIUM SERPL-SCNC: 128 MMOL/L (ref 136–145)
SP GR UR STRIP.AUTO: 1.01
SQUAMOUS #/AREA URNS AUTO: ABNORMAL /HPF
UROBILINOGEN UR STRIP.AUTO-MCNC: <2 MG/DL
VANCOMYCIN SERPL-MCNC: 28.9 UG/ML (ref 5–20)
VANCOMYCIN SERPL-MCNC: 36 UG/ML (ref 5–20)
WBC # BLD AUTO: 13.8 X10*3/UL (ref 4.4–11.3)
WBC #/AREA URNS AUTO: ABNORMAL /HPF

## 2023-12-19 PROCEDURE — 2500000001 HC RX 250 WO HCPCS SELF ADMINISTERED DRUGS (ALT 637 FOR MEDICARE OP): Performed by: NURSE PRACTITIONER

## 2023-12-19 PROCEDURE — 2500000002 HC RX 250 W HCPCS SELF ADMINISTERED DRUGS (ALT 637 FOR MEDICARE OP, ALT 636 FOR OP/ED): Mod: MUE | Performed by: NURSE PRACTITIONER

## 2023-12-19 PROCEDURE — 85025 COMPLETE CBC W/AUTO DIFF WBC: CPT | Performed by: EMERGENCY MEDICINE

## 2023-12-19 PROCEDURE — 2500000004 HC RX 250 GENERAL PHARMACY W/ HCPCS (ALT 636 FOR OP/ED): Performed by: EMERGENCY MEDICINE

## 2023-12-19 PROCEDURE — G0378 HOSPITAL OBSERVATION PER HR: HCPCS

## 2023-12-19 PROCEDURE — 2500000004 HC RX 250 GENERAL PHARMACY W/ HCPCS (ALT 636 FOR OP/ED): Performed by: NURSE PRACTITIONER

## 2023-12-19 PROCEDURE — 96375 TX/PRO/DX INJ NEW DRUG ADDON: CPT

## 2023-12-19 PROCEDURE — 93005 ELECTROCARDIOGRAM TRACING: CPT

## 2023-12-19 PROCEDURE — 99285 EMERGENCY DEPT VISIT HI MDM: CPT | Mod: 25 | Performed by: EMERGENCY MEDICINE

## 2023-12-19 PROCEDURE — 80202 ASSAY OF VANCOMYCIN: CPT | Performed by: NURSE PRACTITIONER

## 2023-12-19 PROCEDURE — 80048 BASIC METABOLIC PNL TOTAL CA: CPT | Performed by: EMERGENCY MEDICINE

## 2023-12-19 PROCEDURE — 72100 X-RAY EXAM L-S SPINE 2/3 VWS: CPT | Performed by: RADIOLOGY

## 2023-12-19 PROCEDURE — 96376 TX/PRO/DX INJ SAME DRUG ADON: CPT

## 2023-12-19 PROCEDURE — 72131 CT LUMBAR SPINE W/O DYE: CPT | Performed by: RADIOLOGY

## 2023-12-19 PROCEDURE — 2500000002 HC RX 250 W HCPCS SELF ADMINISTERED DRUGS (ALT 637 FOR MEDICARE OP, ALT 636 FOR OP/ED): Performed by: EMERGENCY MEDICINE

## 2023-12-19 PROCEDURE — 86140 C-REACTIVE PROTEIN: CPT | Performed by: EMERGENCY MEDICINE

## 2023-12-19 PROCEDURE — 96365 THER/PROPH/DIAG IV INF INIT: CPT | Mod: 59

## 2023-12-19 PROCEDURE — 72131 CT LUMBAR SPINE W/O DYE: CPT

## 2023-12-19 PROCEDURE — 94640 AIRWAY INHALATION TREATMENT: CPT

## 2023-12-19 PROCEDURE — 36415 COLL VENOUS BLD VENIPUNCTURE: CPT | Performed by: NURSE PRACTITIONER

## 2023-12-19 PROCEDURE — 74177 CT ABD & PELVIS W/CONTRAST: CPT

## 2023-12-19 PROCEDURE — 85652 RBC SED RATE AUTOMATED: CPT | Performed by: EMERGENCY MEDICINE

## 2023-12-19 PROCEDURE — 99223 1ST HOSP IP/OBS HIGH 75: CPT | Performed by: NURSE PRACTITIONER

## 2023-12-19 PROCEDURE — 72100 X-RAY EXAM L-S SPINE 2/3 VWS: CPT

## 2023-12-19 PROCEDURE — 81001 URINALYSIS AUTO W/SCOPE: CPT | Performed by: EMERGENCY MEDICINE

## 2023-12-19 PROCEDURE — 2500000001 HC RX 250 WO HCPCS SELF ADMINISTERED DRUGS (ALT 637 FOR MEDICARE OP): Performed by: ANESTHESIOLOGY

## 2023-12-19 PROCEDURE — 74177 CT ABD & PELVIS W/CONTRAST: CPT | Mod: FOREIGN READ | Performed by: RADIOLOGY

## 2023-12-19 PROCEDURE — 2550000001 HC RX 255 CONTRASTS: Performed by: NURSE PRACTITIONER

## 2023-12-19 RX ORDER — PANTOPRAZOLE SODIUM 40 MG/1
40 TABLET, DELAYED RELEASE ORAL
Status: DISCONTINUED | OUTPATIENT
Start: 2023-12-20 | End: 2023-12-22 | Stop reason: HOSPADM

## 2023-12-19 RX ORDER — FUROSEMIDE 40 MG/1
20 TABLET ORAL DAILY
Status: DISCONTINUED | OUTPATIENT
Start: 2023-12-19 | End: 2023-12-22 | Stop reason: HOSPADM

## 2023-12-19 RX ORDER — IPRATROPIUM BROMIDE AND ALBUTEROL SULFATE 2.5; .5 MG/3ML; MG/3ML
3 SOLUTION RESPIRATORY (INHALATION) EVERY 6 HOURS PRN
Status: DISCONTINUED | OUTPATIENT
Start: 2023-12-19 | End: 2023-12-22 | Stop reason: HOSPADM

## 2023-12-19 RX ORDER — L. ACIDOPHILUS/L.BULGARICUS 1MM CELL
1 TABLET ORAL DAILY
Status: DISCONTINUED | OUTPATIENT
Start: 2023-12-19 | End: 2023-12-22 | Stop reason: HOSPADM

## 2023-12-19 RX ORDER — HYDROMORPHONE HYDROCHLORIDE 1 MG/ML
1 INJECTION, SOLUTION INTRAMUSCULAR; INTRAVENOUS; SUBCUTANEOUS ONCE
Status: COMPLETED | OUTPATIENT
Start: 2023-12-19 | End: 2023-12-19

## 2023-12-19 RX ORDER — OXYCODONE AND ACETAMINOPHEN 5; 325 MG/1; MG/1
1 TABLET ORAL EVERY 6 HOURS PRN
Status: DISCONTINUED | OUTPATIENT
Start: 2023-12-19 | End: 2023-12-22 | Stop reason: HOSPADM

## 2023-12-19 RX ORDER — SODIUM CHLORIDE 0.9 % (FLUSH) 0.9 %
10 SYRINGE (ML) INJECTION EVERY 12 HOURS
Status: DISCONTINUED | OUTPATIENT
Start: 2023-12-19 | End: 2023-12-22 | Stop reason: HOSPADM

## 2023-12-19 RX ORDER — OXYCODONE AND ACETAMINOPHEN 5; 325 MG/1; MG/1
1 TABLET ORAL EVERY 6 HOURS PRN
Status: DISCONTINUED | OUTPATIENT
Start: 2023-12-19 | End: 2023-12-19

## 2023-12-19 RX ORDER — BUSPIRONE HYDROCHLORIDE 5 MG/1
10 TABLET ORAL DAILY
Status: DISCONTINUED | OUTPATIENT
Start: 2023-12-19 | End: 2023-12-22 | Stop reason: HOSPADM

## 2023-12-19 RX ORDER — TALC
3 POWDER (GRAM) TOPICAL NIGHTLY PRN
Status: DISCONTINUED | OUTPATIENT
Start: 2023-12-19 | End: 2023-12-22 | Stop reason: HOSPADM

## 2023-12-19 RX ORDER — POLYETHYLENE GLYCOL 3350 17 G/17G
17 POWDER, FOR SOLUTION ORAL DAILY
Status: DISCONTINUED | OUTPATIENT
Start: 2023-12-19 | End: 2023-12-19

## 2023-12-19 RX ORDER — ACETAMINOPHEN 325 MG/1
650 TABLET ORAL EVERY 4 HOURS PRN
Status: DISCONTINUED | OUTPATIENT
Start: 2023-12-19 | End: 2023-12-22 | Stop reason: HOSPADM

## 2023-12-19 RX ORDER — PROMETHAZINE HYDROCHLORIDE 25 MG/1
25 TABLET ORAL EVERY 6 HOURS PRN
Status: DISCONTINUED | OUTPATIENT
Start: 2023-12-19 | End: 2023-12-22 | Stop reason: HOSPADM

## 2023-12-19 RX ORDER — ACETAMINOPHEN 160 MG/5ML
650 SOLUTION ORAL EVERY 4 HOURS PRN
Status: DISCONTINUED | OUTPATIENT
Start: 2023-12-19 | End: 2023-12-22 | Stop reason: HOSPADM

## 2023-12-19 RX ORDER — NITROGLYCERIN 0.4 MG/1
0.4 TABLET SUBLINGUAL EVERY 5 MIN PRN
Status: DISCONTINUED | OUTPATIENT
Start: 2023-12-19 | End: 2023-12-22 | Stop reason: HOSPADM

## 2023-12-19 RX ORDER — MORPHINE SULFATE 15 MG/1
15 TABLET, FILM COATED, EXTENDED RELEASE ORAL EVERY 12 HOURS SCHEDULED
Status: DISCONTINUED | OUTPATIENT
Start: 2023-12-19 | End: 2023-12-22 | Stop reason: HOSPADM

## 2023-12-19 RX ORDER — PROMETHAZINE HYDROCHLORIDE 25 MG/1
25 SUPPOSITORY RECTAL EVERY 12 HOURS PRN
Status: DISCONTINUED | OUTPATIENT
Start: 2023-12-19 | End: 2023-12-22 | Stop reason: HOSPADM

## 2023-12-19 RX ORDER — CYCLOBENZAPRINE HCL 10 MG
5 TABLET ORAL 3 TIMES DAILY PRN
Status: DISCONTINUED | OUTPATIENT
Start: 2023-12-19 | End: 2023-12-22 | Stop reason: HOSPADM

## 2023-12-19 RX ORDER — MECLIZINE HCL 12.5 MG 12.5 MG/1
25 TABLET ORAL 3 TIMES DAILY PRN
Status: DISCONTINUED | OUTPATIENT
Start: 2023-12-19 | End: 2023-12-22 | Stop reason: HOSPADM

## 2023-12-19 RX ORDER — CALCIUM CARBONATE 200(500)MG
500 TABLET,CHEWABLE ORAL 4 TIMES DAILY PRN
Status: DISCONTINUED | OUTPATIENT
Start: 2023-12-19 | End: 2023-12-22 | Stop reason: HOSPADM

## 2023-12-19 RX ORDER — SODIUM CHLORIDE 0.9 % (FLUSH) 0.9 %
10 SYRINGE (ML) INJECTION AS NEEDED
Status: DISCONTINUED | OUTPATIENT
Start: 2023-12-19 | End: 2023-12-22 | Stop reason: HOSPADM

## 2023-12-19 RX ORDER — CHOLECALCIFEROL (VITAMIN D3) 25 MCG
2000 TABLET ORAL DAILY
Status: DISCONTINUED | OUTPATIENT
Start: 2023-12-19 | End: 2023-12-22 | Stop reason: HOSPADM

## 2023-12-19 RX ORDER — GUAIFENESIN/DEXTROMETHORPHAN 100-10MG/5
5 SYRUP ORAL EVERY 4 HOURS PRN
Status: DISCONTINUED | OUTPATIENT
Start: 2023-12-19 | End: 2023-12-22 | Stop reason: HOSPADM

## 2023-12-19 RX ORDER — ACETAMINOPHEN 650 MG/1
650 SUPPOSITORY RECTAL EVERY 4 HOURS PRN
Status: DISCONTINUED | OUTPATIENT
Start: 2023-12-19 | End: 2023-12-22 | Stop reason: HOSPADM

## 2023-12-19 RX ORDER — ATENOLOL 50 MG/1
50 TABLET ORAL EVERY MORNING
Status: DISCONTINUED | OUTPATIENT
Start: 2023-12-19 | End: 2023-12-22 | Stop reason: HOSPADM

## 2023-12-19 RX ORDER — LORATADINE 10 MG/1
10 TABLET ORAL DAILY
Status: DISCONTINUED | OUTPATIENT
Start: 2023-12-19 | End: 2023-12-22 | Stop reason: HOSPADM

## 2023-12-19 RX ORDER — PANTOPRAZOLE SODIUM 40 MG/10ML
40 INJECTION, POWDER, LYOPHILIZED, FOR SOLUTION INTRAVENOUS
Status: DISCONTINUED | OUTPATIENT
Start: 2023-12-20 | End: 2023-12-22 | Stop reason: HOSPADM

## 2023-12-19 RX ORDER — PILOCARPINE HYDROCHLORIDE 5 MG/1
5 TABLET, FILM COATED ORAL
Status: DISCONTINUED | OUTPATIENT
Start: 2023-12-19 | End: 2023-12-22 | Stop reason: HOSPADM

## 2023-12-19 RX ORDER — GUAIFENESIN 600 MG/1
600 TABLET, EXTENDED RELEASE ORAL EVERY 12 HOURS PRN
Status: DISCONTINUED | OUTPATIENT
Start: 2023-12-19 | End: 2023-12-22 | Stop reason: HOSPADM

## 2023-12-19 RX ORDER — DIAZEPAM 5 MG/1
10 TABLET ORAL ONCE
Status: COMPLETED | OUTPATIENT
Start: 2023-12-19 | End: 2023-12-19

## 2023-12-19 RX ORDER — OXYBUTYNIN CHLORIDE 5 MG/1
5 TABLET ORAL 2 TIMES DAILY
Status: DISCONTINUED | OUTPATIENT
Start: 2023-12-19 | End: 2023-12-22 | Stop reason: HOSPADM

## 2023-12-19 RX ORDER — POLYETHYLENE GLYCOL 3350 17 G/17G
17 POWDER, FOR SOLUTION ORAL 2 TIMES DAILY
Status: DISCONTINUED | OUTPATIENT
Start: 2023-12-19 | End: 2023-12-20

## 2023-12-19 RX ORDER — BRIMONIDINE TARTRATE 2 MG/ML
1 SOLUTION/ DROPS OPHTHALMIC 2 TIMES DAILY
Status: DISCONTINUED | OUTPATIENT
Start: 2023-12-19 | End: 2023-12-22 | Stop reason: HOSPADM

## 2023-12-19 RX ORDER — EZETIMIBE 10 MG/1
10 TABLET ORAL DAILY
Status: DISCONTINUED | OUTPATIENT
Start: 2023-12-19 | End: 2023-12-22 | Stop reason: HOSPADM

## 2023-12-19 RX ORDER — HYDRALAZINE HYDROCHLORIDE 20 MG/ML
10 INJECTION INTRAMUSCULAR; INTRAVENOUS EVERY 8 HOURS PRN
Status: DISCONTINUED | OUTPATIENT
Start: 2023-12-19 | End: 2023-12-22 | Stop reason: HOSPADM

## 2023-12-19 RX ORDER — BISACODYL 10 MG/1
10 SUPPOSITORY RECTAL DAILY
Status: DISCONTINUED | OUTPATIENT
Start: 2023-12-19 | End: 2023-12-20

## 2023-12-19 RX ORDER — SIMVASTATIN 40 MG/1
40 TABLET, FILM COATED ORAL NIGHTLY
Status: DISCONTINUED | OUTPATIENT
Start: 2023-12-19 | End: 2023-12-22 | Stop reason: HOSPADM

## 2023-12-19 RX ORDER — KETOROLAC TROMETHAMINE 30 MG/ML
15 INJECTION, SOLUTION INTRAMUSCULAR; INTRAVENOUS ONCE
Status: COMPLETED | OUTPATIENT
Start: 2023-12-19 | End: 2023-12-19

## 2023-12-19 RX ORDER — MIRTAZAPINE 15 MG/1
15 TABLET, FILM COATED ORAL NIGHTLY
Status: DISCONTINUED | OUTPATIENT
Start: 2023-12-19 | End: 2023-12-22 | Stop reason: HOSPADM

## 2023-12-19 RX ORDER — BISACODYL 5 MG
10 TABLET, DELAYED RELEASE (ENTERIC COATED) ORAL DAILY PRN
Status: DISCONTINUED | OUTPATIENT
Start: 2023-12-19 | End: 2023-12-22 | Stop reason: HOSPADM

## 2023-12-19 RX ORDER — ISOSORBIDE MONONITRATE 60 MG/1
60 TABLET, EXTENDED RELEASE ORAL
Status: DISCONTINUED | OUTPATIENT
Start: 2023-12-19 | End: 2023-12-22 | Stop reason: HOSPADM

## 2023-12-19 RX ORDER — POTASSIUM CHLORIDE 750 MG/1
10 TABLET, FILM COATED, EXTENDED RELEASE ORAL DAILY
Status: DISCONTINUED | OUTPATIENT
Start: 2023-12-19 | End: 2023-12-22 | Stop reason: HOSPADM

## 2023-12-19 RX ADMIN — VANCOMYCIN HYDROCHLORIDE 1500 MG: 1.5 INJECTION, POWDER, LYOPHILIZED, FOR SOLUTION INTRAVENOUS at 08:15

## 2023-12-19 RX ADMIN — FUROSEMIDE 20 MG: 40 TABLET ORAL at 13:53

## 2023-12-19 RX ADMIN — BRIMONIDINE TARTRATE 1 DROP: 2 SOLUTION/ DROPS OPHTHALMIC at 21:16

## 2023-12-19 RX ADMIN — MIRTAZAPINE 15 MG: 15 TABLET, FILM COATED ORAL at 21:16

## 2023-12-19 RX ADMIN — APIXABAN 10 MG: 5 TABLET, FILM COATED ORAL at 21:16

## 2023-12-19 RX ADMIN — OXYBUTYNIN CHLORIDE 5 MG: 5 TABLET ORAL at 21:16

## 2023-12-19 RX ADMIN — OXYCODONE HYDROCHLORIDE AND ACETAMINOPHEN 1 TABLET: 5; 325 TABLET ORAL at 12:55

## 2023-12-19 RX ADMIN — EZETIMIBE 10 MG: 10 TABLET ORAL at 13:53

## 2023-12-19 RX ADMIN — CYCLOBENZAPRINE HYDROCHLORIDE 5 MG: 10 TABLET, FILM COATED ORAL at 12:54

## 2023-12-19 RX ADMIN — HYDROMORPHONE HYDROCHLORIDE 0.2 MG: 0.2 INJECTION, SOLUTION INTRAMUSCULAR; INTRAVENOUS; SUBCUTANEOUS at 16:56

## 2023-12-19 RX ADMIN — HYDROMORPHONE HYDROCHLORIDE 1 MG: 1 INJECTION, SOLUTION INTRAMUSCULAR; INTRAVENOUS; SUBCUTANEOUS at 02:53

## 2023-12-19 RX ADMIN — LORATADINE 10 MG: 10 TABLET ORAL at 13:53

## 2023-12-19 RX ADMIN — Medication 2000 UNITS: at 13:52

## 2023-12-19 RX ADMIN — BUSPIRONE HYDROCHLORIDE 10 MG: 5 TABLET ORAL at 13:52

## 2023-12-19 RX ADMIN — PREGABALIN 75 MG: 50 CAPSULE ORAL at 13:52

## 2023-12-19 RX ADMIN — HYDROMORPHONE HYDROCHLORIDE 1 MG: 1 INJECTION, SOLUTION INTRAMUSCULAR; INTRAVENOUS; SUBCUTANEOUS at 07:55

## 2023-12-19 RX ADMIN — POLYETHYLENE GLYCOL 3350 17 G: 17 POWDER, FOR SOLUTION ORAL at 13:52

## 2023-12-19 RX ADMIN — ATENOLOL 50 MG: 50 TABLET ORAL at 13:53

## 2023-12-19 RX ADMIN — DIAZEPAM 10 MG: 5 TABLET ORAL at 02:53

## 2023-12-19 RX ADMIN — POTASSIUM CHLORIDE 10 MEQ: 750 TABLET, EXTENDED RELEASE ORAL at 13:52

## 2023-12-19 RX ADMIN — IOHEXOL 75 ML: 350 INJECTION, SOLUTION INTRAVENOUS at 15:55

## 2023-12-19 RX ADMIN — MORPHINE SULFATE 15 MG: 15 TABLET, EXTENDED RELEASE ORAL at 21:16

## 2023-12-19 RX ADMIN — PREGABALIN 75 MG: 50 CAPSULE ORAL at 21:15

## 2023-12-19 RX ADMIN — Medication 1 TABLET: at 13:53

## 2023-12-19 RX ADMIN — HYDROMORPHONE HYDROCHLORIDE 1 MG: 1 INJECTION, SOLUTION INTRAMUSCULAR; INTRAVENOUS; SUBCUTANEOUS at 04:58

## 2023-12-19 RX ADMIN — SIMVASTATIN 40 MG: 40 TABLET, FILM COATED ORAL at 21:15

## 2023-12-19 RX ADMIN — KETOROLAC TROMETHAMINE 15 MG: 30 INJECTION, SOLUTION INTRAMUSCULAR at 02:52

## 2023-12-19 RX ADMIN — ISOSORBIDE MONONITRATE 60 MG: 60 TABLET, EXTENDED RELEASE ORAL at 13:53

## 2023-12-19 RX ADMIN — OXYBUTYNIN CHLORIDE 5 MG: 5 TABLET ORAL at 13:52

## 2023-12-19 SDOH — SOCIAL STABILITY: SOCIAL INSECURITY: WERE YOU ABLE TO COMPLETE ALL THE BEHAVIORAL HEALTH SCREENINGS?: NO

## 2023-12-19 SDOH — SOCIAL STABILITY: SOCIAL INSECURITY: ARE YOU OR HAVE YOU BEEN THREATENED OR ABUSED PHYSICALLY, EMOTIONALLY, OR SEXUALLY BY ANYONE?: NO

## 2023-12-19 SDOH — SOCIAL STABILITY: SOCIAL INSECURITY: HAVE YOU HAD THOUGHTS OF HARMING ANYONE ELSE?: NO

## 2023-12-19 SDOH — ECONOMIC STABILITY: INCOME INSECURITY: HOW HARD IS IT FOR YOU TO PAY FOR THE VERY BASICS LIKE FOOD, HOUSING, MEDICAL CARE, AND HEATING?: PATIENT DECLINED

## 2023-12-19 SDOH — ECONOMIC STABILITY: TRANSPORTATION INSECURITY
IN THE PAST 12 MONTHS, HAS THE LACK OF TRANSPORTATION KEPT YOU FROM MEDICAL APPOINTMENTS OR FROM GETTING MEDICATIONS?: PATIENT DECLINED

## 2023-12-19 SDOH — SOCIAL STABILITY: SOCIAL INSECURITY: DO YOU FEEL UNSAFE GOING BACK TO THE PLACE WHERE YOU ARE LIVING?: NO

## 2023-12-19 SDOH — ECONOMIC STABILITY: HOUSING INSECURITY
IN THE LAST 12 MONTHS, WAS THERE A TIME WHEN YOU DID NOT HAVE A STEADY PLACE TO SLEEP OR SLEPT IN A SHELTER (INCLUDING NOW)?: PATIENT DECLINED

## 2023-12-19 SDOH — SOCIAL STABILITY: SOCIAL INSECURITY: DO YOU FEEL ANYONE HAS EXPLOITED OR TAKEN ADVANTAGE OF YOU FINANCIALLY OR OF YOUR PERSONAL PROPERTY?: NO

## 2023-12-19 SDOH — SOCIAL STABILITY: SOCIAL INSECURITY: ARE THERE ANY APPARENT SIGNS OF INJURIES/BEHAVIORS THAT COULD BE RELATED TO ABUSE/NEGLECT?: NO

## 2023-12-19 SDOH — SOCIAL STABILITY: SOCIAL INSECURITY: ABUSE: ADULT

## 2023-12-19 SDOH — HEALTH STABILITY: PHYSICAL HEALTH: ON AVERAGE, HOW MANY DAYS PER WEEK DO YOU ENGAGE IN MODERATE TO STRENUOUS EXERCISE (LIKE A BRISK WALK)?: 0 DAYS

## 2023-12-19 SDOH — ECONOMIC STABILITY: TRANSPORTATION INSECURITY
IN THE PAST 12 MONTHS, HAS LACK OF TRANSPORTATION KEPT YOU FROM MEETINGS, WORK, OR FROM GETTING THINGS NEEDED FOR DAILY LIVING?: PATIENT DECLINED

## 2023-12-19 SDOH — HEALTH STABILITY: PHYSICAL HEALTH: ON AVERAGE, HOW MANY MINUTES DO YOU ENGAGE IN EXERCISE AT THIS LEVEL?: 0 MIN

## 2023-12-19 SDOH — ECONOMIC STABILITY: INCOME INSECURITY: IN THE LAST 12 MONTHS, WAS THERE A TIME WHEN YOU WERE NOT ABLE TO PAY THE MORTGAGE OR RENT ON TIME?: PATIENT DECLINED

## 2023-12-19 SDOH — SOCIAL STABILITY: SOCIAL INSECURITY: HAS ANYONE EVER THREATENED TO HURT YOUR FAMILY OR YOUR PETS?: NO

## 2023-12-19 SDOH — SOCIAL STABILITY: SOCIAL INSECURITY: DOES ANYONE TRY TO KEEP YOU FROM HAVING/CONTACTING OTHER FRIENDS OR DOING THINGS OUTSIDE YOUR HOME?: NO

## 2023-12-19 ASSESSMENT — COGNITIVE AND FUNCTIONAL STATUS - GENERAL
DRESSING REGULAR LOWER BODY CLOTHING: A LOT
HELP NEEDED FOR BATHING: A LOT
EATING MEALS: A LITTLE
PERSONAL GROOMING: A LOT
DAILY ACTIVITIY SCORE: 13
STANDING UP FROM CHAIR USING ARMS: TOTAL
DRESSING REGULAR UPPER BODY CLOTHING: A LOT
PATIENT BASELINE BEDBOUND: NO
MOBILITY SCORE: 11
TURNING FROM BACK TO SIDE WHILE IN FLAT BAD: A LITTLE
WALKING IN HOSPITAL ROOM: TOTAL
CLIMB 3 TO 5 STEPS WITH RAILING: TOTAL
MOVING FROM LYING ON BACK TO SITTING ON SIDE OF FLAT BED WITH BEDRAILS: A LITTLE
TOILETING: A LOT
MOVING TO AND FROM BED TO CHAIR: A LOT

## 2023-12-19 ASSESSMENT — LIFESTYLE VARIABLES
AUDIT-C TOTAL SCORE: 0
HOW OFTEN DO YOU HAVE A DRINK CONTAINING ALCOHOL: NEVER
PRESCIPTION_ABUSE_PAST_12_MONTHS: NO
SKIP TO QUESTIONS 9-10: 1
HOW MANY STANDARD DRINKS CONTAINING ALCOHOL DO YOU HAVE ON A TYPICAL DAY: PATIENT DOES NOT DRINK
HAVE YOU EVER FELT YOU SHOULD CUT DOWN ON YOUR DRINKING: NO
EVER HAD A DRINK FIRST THING IN THE MORNING TO STEADY YOUR NERVES TO GET RID OF A HANGOVER: NO
HOW OFTEN DO YOU HAVE 6 OR MORE DRINKS ON ONE OCCASION: NEVER
HAVE PEOPLE ANNOYED YOU BY CRITICIZING YOUR DRINKING: NO
AUDIT-C TOTAL SCORE: 0
SUBSTANCE_ABUSE_PAST_12_MONTHS: NO
REASON UNABLE TO ASSESS: NO
EVER FELT BAD OR GUILTY ABOUT YOUR DRINKING: NO

## 2023-12-19 ASSESSMENT — PAIN DESCRIPTION - ORIENTATION: ORIENTATION: LEFT

## 2023-12-19 ASSESSMENT — PATIENT HEALTH QUESTIONNAIRE - PHQ9
SUM OF ALL RESPONSES TO PHQ9 QUESTIONS 1 & 2: 0
1. LITTLE INTEREST OR PLEASURE IN DOING THINGS: NOT AT ALL
2. FEELING DOWN, DEPRESSED OR HOPELESS: NOT AT ALL

## 2023-12-19 ASSESSMENT — ENCOUNTER SYMPTOMS
NEUROLOGICAL NEGATIVE: 1
PSYCHIATRIC NEGATIVE: 1
GASTROINTESTINAL NEGATIVE: 1
ACTIVITY CHANGE: 0
FEVER: 1
CARDIOVASCULAR NEGATIVE: 1
CHEST TIGHTNESS: 0
HEMATOLOGIC/LYMPHATIC NEGATIVE: 1
SHORTNESS OF BREATH: 0
EYES NEGATIVE: 1
ALLERGIC/IMMUNOLOGIC NEGATIVE: 1
BACK PAIN: 1
FEVER: 0
ENDOCRINE NEGATIVE: 1
CONSTITUTIONAL NEGATIVE: 1

## 2023-12-19 ASSESSMENT — COLUMBIA-SUICIDE SEVERITY RATING SCALE - C-SSRS
1. IN THE PAST MONTH, HAVE YOU WISHED YOU WERE DEAD OR WISHED YOU COULD GO TO SLEEP AND NOT WAKE UP?: NO
6. HAVE YOU EVER DONE ANYTHING, STARTED TO DO ANYTHING, OR PREPARED TO DO ANYTHING TO END YOUR LIFE?: NO
6. HAVE YOU EVER DONE ANYTHING, STARTED TO DO ANYTHING, OR PREPARED TO DO ANYTHING TO END YOUR LIFE?: NO
2. HAVE YOU ACTUALLY HAD ANY THOUGHTS OF KILLING YOURSELF?: NO
2. HAVE YOU ACTUALLY HAD ANY THOUGHTS OF KILLING YOURSELF?: NO
1. IN THE PAST MONTH, HAVE YOU WISHED YOU WERE DEAD OR WISHED YOU COULD GO TO SLEEP AND NOT WAKE UP?: NO

## 2023-12-19 ASSESSMENT — PAIN SCALES - GENERAL
PAINLEVEL_OUTOF10: 8
PAINLEVEL_OUTOF10: 10 - WORST POSSIBLE PAIN
PAINLEVEL_OUTOF10: 0 - NO PAIN
PAINLEVEL_OUTOF10: 1

## 2023-12-19 ASSESSMENT — PAIN SCALES - PAIN ASSESSMENT IN ADVANCED DEMENTIA (PAINAD): NEGVOCALIZATION: OCCASIONAL MOAN/GROAN, LOW SPEECH, NEGATIVE/DISAPPROVING QUALITY

## 2023-12-19 ASSESSMENT — ACTIVITIES OF DAILY LIVING (ADL)
ADEQUATE_TO_COMPLETE_ADL: YES
HEARING - RIGHT EAR: FUNCTIONAL
FEEDING YOURSELF: INDEPENDENT
GROOMING: NEEDS ASSISTANCE
PATIENT'S MEMORY ADEQUATE TO SAFELY COMPLETE DAILY ACTIVITIES?: YES
JUDGMENT_ADEQUATE_SAFELY_COMPLETE_DAILY_ACTIVITIES: YES
HEARING - LEFT EAR: FUNCTIONAL
DRESSING YOURSELF: NEEDS ASSISTANCE
BATHING: NEEDS ASSISTANCE
LACK_OF_TRANSPORTATION: PATIENT DECLINED
WALKS IN HOME: NEEDS ASSISTANCE
ASSISTIVE_DEVICE: WALKER
TOILETING: NEEDS ASSISTANCE

## 2023-12-19 ASSESSMENT — PAIN DESCRIPTION - PAIN TYPE
TYPE: ACUTE PAIN
TYPE: ACUTE PAIN

## 2023-12-19 ASSESSMENT — PAIN - FUNCTIONAL ASSESSMENT
PAIN_FUNCTIONAL_ASSESSMENT: 0-10

## 2023-12-19 ASSESSMENT — PAIN DESCRIPTION - LOCATION
LOCATION: BACK
LOCATION: LEG

## 2023-12-19 NOTE — H&P
History Of Present Illness    68 year old female presenting from [facility] with reported intractable back pain and left lower extremity pain.  On exam patient states that what she feels is a stabbing pain that originates from the left lower quadrant and left suprapubic region.  This pain was elicited during examination on palpation with guarding rated 10 out of 10 radiating down the left thigh.  Patient without any pain with range of motion of the left lower extremity.  She denies any dysuria or hematuria.  Denies any constipation symptoms.  She denies any fevers or chills.  No nausea or emesis.  She states that the pain started yesterday over at the rehab facility. Patient was discharged to SNF yesterday from [facility]. 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 She is currently on Vancomycin via PICC for 6 weeks per infectious disease recommendations. During the recent admission she was also found to have PE and DVT likely provoked by severely reduced mobility during illness with spinal abscess with pain and UTI. She is currently on Eliquis dosing for PE/DVT protocol. She also is noted to have hyponatremia which was present on recent admission also.     Orthopedic surgery evaluated patient in the ED and recommended continued PT/OT with IV antibiotic therapy managed by infectious disease.     Review of systems: 10 system were reviewed and were negative except what was mentioned in history of present illness    Past Medical History  Past Medical History:   Diagnosis Date    Arthritis     Back pain     COPD (chronic obstructive pulmonary disease) (CMS/[facility])     COVID-19 vaccine administered     Eczema     History of COVID-19     2020    Hyperlipidemia     Hypertension     Hypoesthesia of skin     Hypesthesia    Macular degeneration     Meniere's disease     Osteoporosis     Overactive bladder     Pain in unspecified finger(s)     Finger pain    Pain in  unspecified wrist     Pain in wrist joint    Peripheral vascular disease (CMS/HCC)     Personal history of other diseases of the circulatory system     History of coronary artery disease    Personal history of other diseases of the musculoskeletal system and connective tissue     History of arthritis    Personal history of other specified conditions     History of chest pain    Personal history of other specified conditions     History of balance disorder    Personal history of other specified conditions     History of numbness    Postmenopausal     Seasonal allergies     Unspecified visual loss     Vision problems       Surgical History  Past Surgical History:   Procedure Laterality Date    ADENOIDECTOMY      AORTA - BILATERAL FEMORAL ARTERY BYPASS GRAFT      BLEPHAROPTOSIS REPAIR      CARDIAC CATHETERIZATION      with stent and balloon    CHOLECYSTECTOMY      COLONOSCOPY      CT ANGIO NECK  05/18/2022    CT NECK ANGIO W AND WO IV CONTRAST 5/18/2022 [facility]    CT AORTA AND BILATERAL ILIOFEMORAL RUNOFF ANGIOGRAM W AND/OR WO IV CONTRAST  03/10/2020    CT AORTA AND BILATERAL ILIOFEMORAL RUNOFF ANGIOGRAM W AND/OR WO IV CONTRAST 3/10/2020 [facility]    CT AORTA AND BILATERAL ILIOFEMORAL RUNOFF ANGIOGRAM W AND/OR WO IV CONTRAST  07/21/2023    CT AORTA AND BILATERAL ILIOFEMORAL RUNOFF ANGIOGRAM W AND/OR WO IV CONTRAST 7/21/2023 [facility] CT    FL TRANSLUMINAL ANGIO PERCUTANEOUS BHARAT      TONSILLECTOMY      Tonsillectomy    TOTAL KNEE ARTHROPLASTY Bilateral     TUBAL LIGATION           Social History  Social History     Socioeconomic History    Marital status:      Spouse name: Not on file    Number of children: Not on file    Years of education: Not on file    Highest education level: Not on file   Occupational History    Not on file   Tobacco Use    Smoking status: Former     Packs/day: 0.50     Years: 45.00     Additional pack years: 0.00     Total pack years: 22.50     Types: Cigarettes      Quit date: 2023     Years since quittin.2    Smokeless tobacco: Not on file   Vaping Use    Vaping Use: Never used   Substance and Sexual Activity    Alcohol use: Never    Drug use: Never    Sexual activity: Defer   Other Topics Concern    Not on file   Social History Narrative    Not on file     Social Determinants of Health     Financial Resource Strain: Low Risk  (2023)    Overall Financial Resource Strain (CARDIA)     Difficulty of Paying Living Expenses: Not hard at all   Food Insecurity: Not on file   Transportation Needs: No Transportation Needs (2023)    PRAPARE - Transportation     Lack of Transportation (Medical): No     Lack of Transportation (Non-Medical): No   Physical Activity: Not on file   Stress: Not on file   Social Connections: Not on file   Intimate Partner Violence: Not on file   Housing Stability: Low Risk  (2023)    Housing Stability Vital Sign     Unable to Pay for Housing in the Last Year: No     Number of Places Lived in the Last Year: 1     Unstable Housing in the Last Year: No        Family History  Reviewed and not pertinent to patient presentation      Allergies  Allergies   Allergen Reactions    Neomycin Other     swelling, redness, burning post eye surgery    Neomycin-Polymyxin B-Dexameth Other and Rash     blisters, eye swelling, burning          Physical Exam  Physical Exam  Constitutional:       Appearance: Normal appearance.   HENT:      Head: Normocephalic and atraumatic.      Mouth/Throat:      Mouth: Mucous membranes are moist.   Eyes:      Extraocular Movements: Extraocular movements intact.      Pupils: Pupils are equal, round, and reactive to light.   Cardiovascular:      Rate and Rhythm: Normal rate and regular rhythm.      Pulses: Normal pulses.      Heart sounds: Normal heart sounds. No murmur heard.     No gallop.   Pulmonary:      Effort: Pulmonary effort is normal. No respiratory distress.      Breath sounds: Normal breath sounds. No wheezing,  rhonchi or rales.   Abdominal:      General: Abdomen is flat. Bowel sounds are normal. There is no distension.      Palpations: Abdomen is soft. There is no mass.      Tenderness: There is no abdominal tenderness. There is no rebound.      Hernia: No hernia is present.      Comments: Left lower quadrant and left suprapubic pain 10 out of 10 elicited by palpation with radiation down the left thigh.  Patient with guarding with palpation.  States that is the origin of pain she is complaining about.   Musculoskeletal:         General: No swelling, tenderness or signs of injury. Normal range of motion.      Cervical back: Normal range of motion and neck supple.      Right lower leg: No edema.      Left lower leg: No edema.      Comments: Back pain and Left thigh pain originating from LLQ and Left suprapubic region. No pain with ROM    Skin:     General: Skin is warm and dry.      Capillary Refill: Capillary refill takes less than 2 seconds.      Findings: No bruising, erythema or rash.   Neurological:      General: No focal deficit present.      Mental Status: She is alert and oriented to person, place, and time.      Cranial Nerves: No cranial nerve deficit.      Sensory: No sensory deficit.      Motor: No weakness.   Psychiatric:         Mood and Affect: Mood normal.         Behavior: Behavior normal.        Last Recorded Vitals  Visit Vitals  /76   Pulse 78   Temp 36.7 °C (98.1 °F) (Temporal)   Resp 17        Scheduled medications  vancomycin, 1,500 mg, intravenous, Once      Continuous medications     PRN medications       Relevant Results  Results for orders placed or performed during the hospital encounter of 12/19/23 (from the past 96 hour(s))   CBC and Auto Differential   Result Value Ref Range    WBC 13.8 (H) 4.4 - 11.3 x10*3/uL    nRBC 0.2 (H) 0.0 - 0.0 /100 WBCs    RBC 2.68 (L) 4.00 - 5.20 x10*6/uL    Hemoglobin 8.3 (L) 12.0 - 16.0 g/dL    Hematocrit 26.1 (L) 36.0 - 46.0 %    MCV 97 80 - 100 fL    MCH  31.0 26.0 - 34.0 pg    MCHC 31.8 (L) 32.0 - 36.0 g/dL    RDW 14.6 (H) 11.5 - 14.5 %    Platelets 289 150 - 450 x10*3/uL    Neutrophils % 71.4 40.0 - 80.0 %    Immature Granulocytes %, Automated 5.0 (H) 0.0 - 0.9 %    Lymphocytes % 12.2 13.0 - 44.0 %    Monocytes % 8.3 2.0 - 10.0 %    Eosinophils % 2.9 0.0 - 6.0 %    Basophils % 0.2 0.0 - 2.0 %    Neutrophils Absolute 9.86 (H) 1.20 - 7.70 x10*3/uL    Immature Granulocytes Absolute, Automated 0.69 0.00 - 0.70 x10*3/uL    Lymphocytes Absolute 1.68 1.20 - 4.80 x10*3/uL    Monocytes Absolute 1.15 (H) 0.10 - 1.00 x10*3/uL    Eosinophils Absolute 0.40 0.00 - 0.70 x10*3/uL    Basophils Absolute 0.03 0.00 - 0.10 x10*3/uL   Basic metabolic panel   Result Value Ref Range    Glucose 128 (H) 74 - 99 mg/dL    Sodium 128 (L) 136 - 145 mmol/L    Potassium 4.3 3.5 - 5.3 mmol/L    Chloride 95 (L) 98 - 107 mmol/L    Bicarbonate 23 21 - 32 mmol/L    Anion Gap 14 10 - 20 mmol/L    Urea Nitrogen 12 6 - 23 mg/dL    Creatinine 0.98 0.50 - 1.05 mg/dL    eGFR 62 >60 mL/min/1.73m*2    Calcium 8.8 8.6 - 10.3 mg/dL   Urinalysis with Reflex Microscopic and Culture   Result Value Ref Range    Color, Urine Yellow Straw, Yellow    Appearance, Urine Clear Clear    Specific Gravity, Urine 1.006 1.005 - 1.035    pH, Urine 7.0 5.0, 5.5, 6.0, 6.5, 7.0, 7.5, 8.0    Protein, Urine 30 (1+) (N) NEGATIVE mg/dL    Glucose, Urine NEGATIVE NEGATIVE mg/dL    Blood, Urine SMALL (1+) (A) NEGATIVE    Ketones, Urine NEGATIVE NEGATIVE mg/dL    Bilirubin, Urine NEGATIVE NEGATIVE    Urobilinogen, Urine <2.0 <2.0 mg/dL    Nitrite, Urine NEGATIVE NEGATIVE    Leukocyte Esterase, Urine NEGATIVE NEGATIVE   Urinalysis Microscopic   Result Value Ref Range    WBC, Urine 1-5 1-5, NONE /HPF    RBC, Urine NONE NONE, 1-2, 3-5 /HPF    Squamous Epithelial Cells, Urine 1-9 (SPARSE) Reference range not established. /HPF    Bacteria, Urine 1+ (A) NONE SEEN /HPF   Sedimentation Rate   Result Value Ref Range    Sedimentation Rate 23 0 -  30 mm/h   C-Reactive Protein   Result Value Ref Range    C-Reactive Protein 17.58 (H) <1.00 mg/dL   ECG 12 lead   Result Value Ref Range    Ventricular Rate 86 BPM    Atrial Rate 86 BPM    AR Interval 92 ms    QRS Duration 138 ms    QT Interval 400 ms    QTC Calculation(Bazett) 478 ms    P Axis 65 degrees    R Axis 10 degrees    T Axis 69 degrees    QRS Count 15 beats    Q Onset 211 ms    P Onset 165 ms    P Offset 201 ms    T Offset 411 ms    QTC Fredericia 451 ms        ECG 12 lead    Result Date: 12/19/2023  Sinus rhythm with short AR Left bundle branch block Abnormal ECG When compared with ECG of 18-DEC-2023 12:36, (unconfirmed) AR interval has decreased Left bundle branch block has replaced Incomplete right bundle branch block    CT lumbar spine wo IV contrast    Result Date: 12/19/2023  Interpreted By:  Finkelstein, Evan, STUDY: CT LUMBAR SPINE WO IV CONTRAST;  12/19/2023 4:15 am   INDICATION: Signs/Symptoms:Status postlaminectomy history of epidural abscess.   COMPARISON: None.   ACCESSION NUMBER(S): [id number]   ORDERING CLINICIAN: HEATHER ARRINGTON   TECHNIQUE: Axial noncontrast CT images of the lumbar spine with coronal and sagittal reconstructed images.   FINDINGS: Postsurgical changes of posterior instrumented fusion L4 through S1. There are bilateral transpedicular screws with vertical connecting rods and interbody spacers at L4/L5 and L5/S1. ALIGNMENT: No traumatic malalignment VERTEBRAE: No acute loss of vertebral body height. DISC SPACES: Interbody spacers at L4/L5 and L5/S1. Otherwise, the disc spaces are preserved without significant narrowing. SPINAL CANAL: No critical spinal canal stenosis above the level of surgery. There has been posterior decompression of L3 through L5, however, the canal at this level is limited due to extensive streak artifact from hardware.. PARAVERTEBRAL SOFT TISSUES: Within the superficial back soft tissues, there is a collection of fluid measuring up to approximately 3.4  x 3.4 x 12.8 cm (maximum AP by transverse by craniocaudal dimension), extending from the level of L1 through L5. There are several punctate lucencies within this collection of fluid. Deep to the superficial collection, there is also fluid and/or soft tissue thickening near the canal at the level of prior surgery L4 through S1 which extends posteriorly surrounding the posterior elements. Evaluation, however, is severely limited due to streak artifact from lumbar spine hardware.         Postsurgical changes of posterior instrumented fusion L4 through S1 as above with posterior decompression L3 through L5. 3.4 x 3.4 x 12.8 cm collection of fluid in the superficial posterior back soft tissues extending from the level of L1 through L5. There are punctate lucencies within the collection concerning for infection given the timing of surgery greater than 1 week ago. There is also fluid and/or soft tissue thickening deep to the superficial collection extending from the level of the spinal canal into the paraspinal soft tissues and surrounding the posterior elements. No definite fat plane is seen between this superficial collection and the deeper situated fluid. Evaluation of this fluid and/or soft tissue thickening, however, is severely limited due to extensive streak artifact from surgical hardware.     MACRO: None.   Signed by: Evan Finkelstein 12/19/2023 4:27 AM Dictation workstation:   [id number]    XR lumbar spine 2-3 views    Result Date: 12/19/2023  Interpreted By:  Paul Melvin, STUDY: XR LUMBAR SPINE 2-3 VIEWS; ;  12/19/2023 2:34 am   INDICATION: Signs/Symptoms:Status post L3 S1 laminectomy, L4 L5-S1 TLIF.   COMPARISON: 08/23/2023   ACCESSION NUMBER(S): [id number]   ORDERING CLINICIAN: HEATHER ARRINGTON   FINDINGS: AP, lateral, and L5-S1 spot views of the lumbar spine: Status post L4-S1 posterior spinal fusion and laminectomy as well as L4-L5 and L5-S1 interbody spacer placement. Hardware is intact. There  is mild grade 1 anterolisthesis of L5 on S1. There is mild multilevel endplate osteophyte formation. Mild disc space narrowing, most pronounced at L3-L4. Right upper quadrant surgical clips. Moderate colonic stool volume. Vascular calcifications.       1. No acute osseous abnormality. 2. Postsurgical changes from L4-S1 with intact hardware. 3. Mild multilevel spinal degenerative change. 4. Moderate colonic stool volume.   Signed by: Paul Melvin 12/19/2023 2:45 AM Dictation workstation:   [id number]    Transthoracic Echo (TTE) Complete    Result Date: 12/16/2023          [place] [id number]  Tel [phone] Fax [phone] TRANSTHORACIC ECHOCARDIOGRAM REPORT  Patient Name:      KILEY Garcia Physician:    [id number] Yoav Neville DO Study Date:        12/16/2023            Ordering Provider:    [id number] FOREIGN HUBER MRN/PID:           [id number]              Fellow: Accession#:        [id number]          Nurse: Date of Birth/Age: 1955 / 68 years Sonographer:          Gertrudis Squires                                                                [facility] Gender:            F                     Additional Staff: Height:            144.78 cm             Admit Date:           12/5/2023 Weight:            82.56 kg              Admission Status:     Inpatient -                                                                Routine BSA:               1.73 m2               Department Location:  [address] [place] [address] Blood Pressure: 128 /62 mmHg Study Type:    TRANSTHORACIC ECHO (TTE) COMPLETE Diagnosis/ICD: Personal history of pulmonary embolism-Z86.711 Indication:    PE CPT Codes:     Echo Complete w Full Doppler-[id number] Patient History: Smoker:            Current. Pertinent History: HTN, Hyperlipidemia, Dyspnea, PVD, CAD and AAA. Study Detail: The  following Echo studies were performed: 2D, M-Mode, Doppler and               color flow. Definity used as a contrast agent for endocardial               border definition. Total contrast used for this procedure was 2 mL               via IV push. The patient was awake.  PHYSICIAN INTERPRETATION: Left Ventricle: Left ventricular systolic function is normal, with an estimated ejection fraction of 55%. There are no regional wall motion abnormalities. The left ventricular cavity size is normal. The left ventricular septal wall thickness is mildly increased. Spectral Doppler shows an impaired relaxation pattern of left ventricular diastolic filling. LV Wall Scoring: All segments are normal. Left Atrium: The left atrium is upper limits of normal in size. Right Ventricle: The right ventricle is normal in size. There is normal right ventricular global systolic function. Right Atrium: The right atrium is normal in size. Aortic Valve: The aortic valve appears structurally normal. The aortic valve appears tricuspid. There is mild aortic valve cusp calcification. There is no evidence of aortic valve stenosis. There is no evidence of aortic valve regurgitation. The peak instantaneous gradient of the aortic valve is 13.5 mmHg. The mean gradient of the aortic valve is 8.0 mmHg. Mitral Valve: The mitral valve is normal in structure. There is no evidence of mitral valve stenosis. There is normal mitral valve leaflet mobility. There is trace mitral valve regurgitation. Tricuspid Valve: The tricuspid valve is structurally normal. There is normal tricuspid valve leaflet mobility. There is trace tricuspid regurgitation. Pulmonic Valve: The pulmonic valve is structurally normal. There is no indication of pulmonic valve regurgitation. Pericardium: There is no pericardial effusion noted. Aorta: The aortic root is normal. Pulmonary Artery: The tricuspid regurgitant velocity is 3.08 m/s, and with an estimated right atrial pressure of 3 mmHg,  the estimated pulmonary artery pressure is moderately elevated with the RVSP at 40.9 mmHg. Systemic Veins: The inferior vena cava appears to be of normal size. In comparison to the previous echocardiogram(s): Prior examinations are available and were reviewed for comparison purposes. Study relatively unchanged from my study done here in June 2022.  CONCLUSIONS:  1. Left ventricular systolic function is normal with a 55% estimated ejection fraction.  2. Spectral Doppler shows an impaired relaxation pattern of left ventricular diastolic filling.  3. There is no evidence of mitral valve stenosis.  4. Trace mitral valve regurgitation.  5. Trace tricuspid regurgitation is visualized.  6. Aortic valve stenosis is not present.  7. Moderately elevated pulmonary artery pressure. QUANTITATIVE DATA SUMMARY: 2D MEASUREMENTS:                          Normal Ranges: Ao Root d:     2.30 cm   (2.0-3.7cm) LAs:           3.90 cm   (2.7-4.0cm) IVSd:          1.23 cm   (0.6-1.1cm) LVPWd:         1.00 cm   (0.6-1.1cm) LVIDd:         4.11 cm   (3.9-5.9cm) LVIDs:         2.97 cm LV Mass Index: 89.5 g/m2 LV % FS        27.7 % LA VOLUME:                               Normal Ranges: LA Vol A4C:        36.7 ml    (22+/-6mL/m2) LA Vol A2C:        35.2 ml LA Vol BP:         37.2 ml LA Vol Index A4C:  21.2ml/m2 LA Vol Index A2C:  20.4 ml/m2 LA Vol Index BP:   21.5 ml/m2 LA Area A4C:       15.3 cm2 LA Area A2C:       14.5 cm2 LA Major Axis A4C: 5.4 cm LA Major Axis A2C: 5.1 cm LA Volume Index:   20.6 ml/m2 RA VOLUME BY A/L METHOD:                               Normal Ranges: RA Vol A4C:        46.8 ml    (8.3-19.5ml) RA Vol Index A4C:  27.1 ml/m2 RA Area A4C:       17.4 cm2 RA Major Axis A4C: 5.5 cm AORTA MEASUREMENTS:                    Normal Ranges: Asc Ao, d: 2.20 cm (2.1-3.4cm) LV SYSTOLIC FUNCTION BY 2D PLANIMETRY (MOD):                     Normal Ranges: EF-A4C View: 50.3 % (>=55%) EF-A2C View: 56.0 % EF-Biplane:  55.4 % LV DIASTOLIC  FUNCTION:                        Normal Ranges: MV Peak E:    1.12 m/s (0.7-1.2 m/s) MV Peak A:    1.21 m/s (0.42-0.7 m/s) E/A Ratio:    0.93     (1.0-2.2) MV e'         0.12 m/s (>8.0) MV lateral e' 0.12 m/s MV medial e'  0.12 m/s E/e' Ratio:   9.33     (<8.0) MITRAL VALVE:                 Normal Ranges: MV DT: 250 msec (150-240msec) AORTIC VALVE:                                    Normal Ranges: AoV Vmax:                1.84 m/s  (<=1.7m/s) AoV Peak P.5 mmHg (<20mmHg) AoV Mean P.0 mmHg  (1.7-11.5mmHg) LVOT Max Anam:            1.54 m/s  (<=1.1m/s) AoV VTI:                 44.90 cm  (18-25cm) LVOT VTI:                36.40 cm LVOT Diameter:           1.80 cm   (1.8-2.4cm) AoV Area, VTI:           2.06 cm2  (2.5-5.5cm2) AoV Area,Vmax:           2.13 cm2  (2.5-4.5cm2) AoV Dimensionless Index: 0.81  RIGHT VENTRICLE: RV Basal 3.57 cm RV Mid   2.77 cm RV Major 6.9 cm TAPSE:   28.2 mm RV s'    0.13 m/s TRICUSPID VALVE/RVSP:                             Normal Ranges: Peak TR Velocity: 3.08 m/s RV Syst Pressure: 40.9 mmHg (< 30mmHg) IVC Diam:         1.79 cm PULMONIC VALVE:                         Normal Ranges: PV Accel Time: 85 msec  (>120ms) PV Max Anam:    1.3 m/s  (0.6-0.9m/s) PV Max P.0 mmHg  [id number] Neville DO Electronically signed on 2023 at 12:29:49 PM  Wall Scoring  ** Final **     Lower extremity venous duplex bilateral    Result Date: 12/15/2023  Interpreted By:  Keagan Sanders, STUDY: [facility] LOWER EXTREMITY VENOUS DUPLEX BILATERAL;  12/15/2023 8:14 pm   INDICATION: Signs/Symptoms:DVT.   COMPARISON: None.   ACCESSION NUMBER(S): [id number]   ORDERING CLINICIAN: LILI AVILES   TECHNIQUE: Vascular ultrasound of the bilateral lower extremities was performed. Real-time compression views as well as Gray scale, color Doppler and spectral Doppler waveform analysis was performed.   FINDINGS: Evaluation of the visualized portions of the bilateral common femoral  vein, proximal, mid, and distal femoral vein, and popliteal vein were performed.  Evaluation of the visualized portions of the  posterior tibial and peroneal veins were also performed.   The right common femoral vein, femoral vein, popliteal vein and posterior tibial veins are patent. There is thrombus in the right peroneal veins.   The left common femoral vein, femoral vein, popliteal vein and the left posterior tibial and peroneal veins are patent.       Thrombus of right peroneal veins.   MACRO:   Keagan Sanders discussed the significance and urgency of this critical finding by telephone with  LILI AVILES on 12/15/2023 at 8:41 pm. (**-RCF-**) Findings:  See findings.     Signed by: Keagan Sanders 12/15/2023 8:41 PM Dictation workstation:   [id number]    CT angio chest for pulmonary embolism    Result Date: 12/15/2023  Interpreted By:  Keagan Sanders, STUDY: CT ANGIO CHEST FOR PULMONARY EMBOLISM;  12/15/2023 7:39 pm   INDICATION: Signs/Symptoms:PE?.   COMPARISON: None.   ACCESSION NUMBER(S): [id number]   ORDERING CLINICIAN: LILI AVILES   TECHNIQUE: Contiguous axial images of the chest, abdomen and pelvis were obtained after the intravenous administration of  contrast. Coronal and sagittal reformatted images were obtained from the axial images. MIPS and 3D reformatted images were also performed and reviewed.   FINDINGS: No axillary, mediastinal, or hilar lymphadenopathy.   The heart is normal in size. Coronary artery atherosclerotic calcifications. RV to LV ratio 1.1. No significant pericardial effusion. There are acute pulmonary emboli in lobar and segmental branches in the right lower lobe and middle lobe.   Mild bibasilar subsegment atelectasis. No significant pleural effusion. No pneumothorax.   Limited evaluation of the upper abdomen.   Multilevel degenerative change of the thoracic spine.       Acute lobar and segmental pulmonary emboli in the right lower lobe and middle lobe. RV to LV ratio 1.1;  please correlate for component of right heart strain   MACRO: Keagan Sanders discussed the significance and urgency of this critical finding by telephone with  LILI AVILES on 12/15/2023 at 8:41 pm. (**-RCF-**) Findings:  See findings.   Signed by: Keagan Sanders 12/15/2023 8:41 PM Dictation workstation:   [id number]    CT abdomen pelvis w IV contrast    Result Date: 12/15/2023  Interpreted By:  Schoenberger, Joseph, STUDY: CT ABDOMEN PELVIS W IV CONTRAST;  12/15/2023 2:23 pm   INDICATION: Signs/Symptoms:abd pain.   COMPARISON: 12/05/2023   ACCESSION NUMBER(S): [id number]   ORDERING CLINICIAN: LILI AVILES   TECHNIQUE: CT of the abdomen and pelvis was performed.  Standard contiguous axial images were obtained at 3 mm slice thickness through the abdomen and pelvis. Coronal and sagittal reconstructions at 3 mm slice thickness were performed.   75 ml of contrast Omnipaque 350 were administered intravenously without immediate complication.   FINDINGS: LOWER CHEST: In a left lower lobe vessel there appears to be a vascular structures that bifurcates and likely represents an artery. The possibility of a filling defect is a consideration. Rec the possibility of an acute pulmonary embolus should be considered. Reference coronal reconstructions images numbered 70 through 73 of 130 series 202. It is recommended this patient undergo a CT pulmonary angiogram to evaluate further. There are areas platelike atelectasis in both lung bases. Otherwise unremarkable.   ABDOMEN:   LIVER: Within normal limits.   BILE DUCTS: Normal caliber.   GALLBLADDER: Surgically absent   PANCREAS: Within normal limits.   SPLEEN: Within normal limits.   ADRENAL GLANDS: Bilateral adrenal glands appear normal.   KIDNEYS AND URETERS: The kidneys are normal in size and enhance symmetrically.  No hydroureteronephrosis or nephroureterolithiasis is identified.   PELVIS:   BLADDER: Within normal limits.   REPRODUCTIVE ORGANS: 1 unremarkable   BOWEL:  The stomach is unremarkable. The small and large bowel are normal in caliber and demonstrate no wall thickening.   Normal appendix.   VESSELS: There is no aneurysmal dilatation of the abdominal aorta. The IVC appears normal.   PERITONEUM/RETROPERITONEUM/LYMPH NODES: No ascites or free air, no fluid collection.  No abdominopelvic lymphadenopathy is present.   BONES AND ABDOMINAL WALL: No suspicious osseous lesions are identified. Degenerative discogenic disease is noted in the lower thoracic and lumbar spine.   In the interval since the prior exam 2 drains have been placed in the spinal operative site. 1 from the left enters at the mid sacral level traveling cranially to the epidural region. The 2nd enters more superiorly to the right traveling cranially in the subcutaneous is tissues. These appear to have drained the fluid collections successfully. Again postoperative findings are unchanged from the prior with extensive laminectomy and posterior fusion hardware placement.       1.  Somewhat subtle but possible acute pulmonary emboli in the right lower lobe. Recommend further evaluation with CT pulmonary angiogram. 2. Successful drainage of fluid collections in the lumbar operative site when compared to the previous exam.   The possibility of an acute pulmonary embolus and recommendation for CT a chest was communicated using the secure messaging system through the medical record to the referring physician at the time of this exam.   Signed by: Joseph Schoenberger 12/15/2023 3:09 PM Dictation workstation:   [id number]    MR lumbar spine w and wo IV contrast    Result Date: 12/10/2023  Interpreted By:  Cyrus Disla, STUDY: MRI of the lumbar spine without IV contrast;  12/10/2023 2:07 pm   INDICATION: Signs/Symptoms:increasing back pain wiht previous fluid collection. Wound culture growing MRSA intractable back pain although slightly better than yesterday.   COMPARISON: 12/05/2023 MR   ACCESSION NUMBER(S):  [id number]   ORDERING CLINICIAN: ALMITA THOMPSON   TECHNIQUE: Sagittal and axial STIR and T1-weighted MRI images of the lumbar spine were acquired using a spondylolysis protocol.  Enhanced images were obtained using 17 mL gadoterate (Dotarem).   FINDINGS: There are 5 lumbar type vertebrae.   The previously noted epidural collection along the posterior thecal sac in the region of the L3-4 to L5 laminectomy has decreased in size to 1.6 x 3 x 5.8 cm (previously 3 x 3.9 x 5.6 cm. The fluid has a small amount layering dependent hemorrhage. The overall compression of the thecal sac has decreased.   A drainage catheter has been inserted into the previously noted subcutaneous fluid collection extending from L1-L5. The catheter enters the fluid collection from the left at S1, coursing superiorly along the right side of the collection then curving to the left at L2 with inferior extension to L4-5.   The overall size of the collection has decreased to 2.9 x 4.6 cm transverse dimension, 13.4 cm length (previously 3.9 x 6.9 x 13.1 cm).   Bilateral pedicle screw/fabrizio fusion with interbody spacing devices is again noted from L4-S1.   L5 has grade 1 anterolisthesis on S1.   The thecal sac is mildly to moderately stenosed at L3-4, less so than noted on the prior exam secondary to the fluid collection along the posterior thecal sac.   The thecal sac is also mildly to moderately stenosed at L4, improved from the prior exam.   No other significant stenoses of the spinal canal are noted.   The midline posterior musculature in the region of the laminectomy has edema and enhancement similar to the prior exam. No localized collections within the muscles themselves are noted.       The epidural and subcutaneous fluid collections have decreased in size as noted above following placement of a drain in the subcutaneous tissue.   The overall degree of thecal sac stenosis has improved as well.   I personally reviewed the images/study and I  agree with the findings as stated. This study was interpreted at [facility], [place], [place].   MACRO: None   Signed by: Cyrus Disla 12/10/2023 2:52 PM Dictation workstation:   [id number]    Bedside PICC Imaging    Result Date: 12/7/2023  These images are not reportable by radiology and will not be interpreted by  Radiologists.    MR lumbar spine w and wo IV contrast    Result Date: 12/5/2023  Interpreted By:  Jh Benoit,  and Dev Hogue STUDY: MR LUMBAR SPINE W AND WO IV CONTRAST;  12/5/2023 4:39 pm   INDICATION: Signs/Symptoms:Recent laminectomy with fusion, intractable back pain radiates legs.   COMPARISON: MRI 08/20/2023, CT 12/05/2023   ACCESSION NUMBER(S): [id number]   ORDERING CLINICIAN: SHAWN REDDY   TECHNIQUE: Sagittal T1, T2, STIR, axial T1 and T2 weighted images of the lumbar spine were acquired without and following administration of 15 cc Dotarem gadolinium based IV contrast.   FINDINGS: Motion degraded examination.   Alignment: The vertebral alignment is maintained.   Vertebrae/Intervertebral Discs: Postsurgical changes of L4 through S1 posterior fusion noted with bilateral transpedicular screws and interbody graft placement at L4-5 and L5-S1. There has been bilateral laminectomies at L3-4, L4-5, and L5-S1. The vertebral bodies demonstrate expected height. There is mild osseous edema involving L5 and S1 vertebral bodies. The marrow signal is within normal limits. Multilevel intervertebral disc height loss and disc desiccation noted.   A large dorsal extra-spinal fluid collection is noted extending from the laminectomy bed through the deep muscular fascia into the subcutaneous fat. This collection extends from the right lateral recess at the level of L4-5 as well as L5-S1 into the spinal canal and deforms the thecal sac. A focal defect is noted within the thecal sac at the level of L5-S1 (series 9, image 20). 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 The collections do not demonstrate significant peripheral enhancement to suggest abscess.   Conus: The lower thoracic cord appears unremarkable. The conus terminates at L1.   T12-L1: Disc bulge. There is no significant central canal or neural foraminal stenosis.   L1-2: Disc bulge, ligamentum flavum thickening and facet hypertrophy contribute to minimal central canal narrowing.   L2-3: Disc bulge and mild prominence of the posterior epidural fat resulting in minimal narrowing of the central canal. There is moderate right and minimal left foraminal narrowing due to intraforaminal disc herniation and facet hypertrophy, similar to preoperative examination.   L3-4: Laminectomies. There is moderate narrowing of the thecal sac due to disc bulge and dorsal spinal collection as detailed above. There is unchanged marked bilateral foraminal narrowing due to intraforaminal disc herniation and facet hypertrophic changes with possible impingement of bilateral exiting L3 nerve roots.   L4-5: Bilateral laminectomies. There is moderate narrowing of the central canal with indentation of the thecal sac posteriorly due to the dorsal spinal fluid collection as detailed above. Moderate right and mild left foraminal narrowing is overall similar to MRI dated 08/20/2020 3D to intraforaminal disc herniations and facet hypertrophic changes.   L5-S1: Bilateral laminectomies. The dorsal spinal fluid collection indents the thecal sac as detailed above. Evaluation of the foramina is limited due to susceptibility artifact from bilateral transpedicular screws.  There is persistent marked left and moderate right foraminal narrowing due to intraforaminal disc herniation and facet hypertrophy with possible impingement of the exiting left L5 nerve root.       1.  Postoperative changes as above. A large dorsal extra-spinal fluid collection is noted extending from the laminectomy bed through the deep muscular fascia into the  subcutaneous fat. This collection extends from the right lateral recesses at the level of L4-5 as well as L5-S1 into the spinal canal and deforms the thecal sac resulting in moderate narrowing at L4-5 and to a lesser degree at L3-4. A focal defect is noted within the thecal sac at the level of L5-S1. 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 No significant peripheral enhancement is identified to suggest abscess although the sterility of this collection can not be ascertained on MRI alone. 2. Multilevel degenerative changes again noted with persistent marked bilateral foraminal narrowing at L3-4 and at L5-S1 on the left.     I personally reviewed the images/study and I agree with the findings as stated. This study was interpreted at [place], [place].   MACRO: None   Signed by: Jh Benoit 12/5/2023 5:24 PM Dictation workstation:   [id number]    CT angio chest abdomen pelvis    Result Date: 12/5/2023  Interpreted By:  Samuel Brannon, STUDY: CT ANGIO CHEST ABDOMEN PELVIS;  12/5/2023 2:48 pm   INDICATION: Signs/Symptoms:Severe lower abdomen pain, groin pain, radiates to back, intractable pain.   COMPARISON: May 18, 2022 CTA chest abdomen and pelvis. July 21, 2023 CTA abdomen and pelvis with runoff. August 20, 2023 MRI lumbar spine and December 5, 2023 CT lumbar spine   ACCESSION NUMBER(S): [id number]   ORDERING CLINICIAN: SHAWN REDDY   TECHNIQUE: Axial non-contrast images of the chest, abdomen, and pelvis  with coronal and sagittal reformatted images. Axial CT images of the chest, abdomen and pelvis after the intravenous administration of 100 mL Omnipaque 350 contrast using CT angiographic technique with coronal and sagittal reformatted images.  MIP images were provided and reviewed. 3D reconstructions were performed on a separate independent workstation.   FINDINGS: VASCULAR:   PULMONARY ARTERIES:   No acute pulmonary embolism.   THORACIC AORTA:  Non-contrast  images show no evidence of acute intramural hematoma. No thoracic aortic aneurysm or dissection. Moderate scattered atherosclerosis thoracic aorta and branch vessels.   ABDOMINAL AORTA: No abdominal aortic aneurysm or dissection. There is scattered atherosclerosis. 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   CHEST:   MEDIASTINUM AND LYMPH NODES: No enlarged intrathoracic or axillary lymph nodes.   HEART: Normal size.  Moderate coronary artery calcifications. No pericardial effusion.   LUNG, PLEURA, LARGE AIRWAYS: No consolidation, pulmonary edema, pleural effusion, or pneumothorax.   OSSEOUS STRUCTURES/CHEST WALL:    No acute osseous abnormality.     ABDOMEN/PELVIS:   Arterial phase imaging limits evaluation of the solid organs.   ABDOMINAL WALL: Within normal limits.   LIVER: Stable without detected mass. BILE DUCTS: No significant abnormality. GALLBLADDER: Absent   PANCREAS: No significant abnormality.   SPLEEN: No significant abnormality.   ADRENALS: No significant abnormality.   KIDNEYS, URETERS, BLADDER: No significant abnormality.   VESSELS: See above.  No additional significant abnormality. LYMPH NODES: No enlarged lymph nodes. RETROPERITONEUM:  No significant abnormality.   BOWEL: The stomach and bowel are normal caliber without evident inflammatory change.   PERITONEUM:   No significant ascites, free air, or fluid collection.   REPRODUCTIVE ORGANS: No significant abnormality.   OSSEOUS STRUCTURES:  Refer to same day CT scan lumbar spine report for details regarding that portion. No separate acute osseous abnormalities are identified. There are skin staples dorsal lumbar region. The canal is not reliably evaluated at the operated levels due to artifact from metallic hardware. There  is minimal gas within the soft tissues at the operated levels and there is lobulated fluid density extending from vertebral to overlapping paravertebral region to the level of the superficial subcutaneous layer including portion sagittal series 506, image 109 and axial series 502, image 114 measuring 14 x 6 x 6 cm  with internal density measurement of 6 compatible with simple fluid density       No thoracic or abdominal aortic aneurysm or dissection.   Stable pre-existing vascular findings as reported.   Findings compatible with recent lumbar spine surgery with prominent fluid collection overlying the operated site extending through the superficial subcutaneous layer. The canal is not reliably assessed at the operated site due to artifact. Recommend further characterization with MRI lumbar spine with without contrast.   No acute abnormality of the chest, abdomen or pelvis.     Signed by: Samuel Brannon 12/5/2023 3:32 PM Dictation workstation:   [id number]    CT lumbar spine wo IV contrast    Result Date: 12/5/2023  Interpreted By:  Jony Monae, STUDY: CT LUMBAR SPINE WO IV CONTRAST; 12/5/2023 11:28 am   INDICATION: Signs/Symptoms:Low back pain, recent laminectomy and fusion, fall 1 week ago.   COMPARISON: None.   ACCESSION NUMBER(S): [id number]   ORDERING CLINICIAN: SHAWN REDDY   TECHNIQUE: Contiguous axial CT images were obtained through the lumbar spine at 2 mm slice thickness without contrast administration. The images were then reconstructed in the coronal and sagittal planes.   FINDINGS: OSSEOUS STRUCTURES: The patient is status post L3 through L5 laminectomy, L4 through S1 pedicle screw and fabrizio fusion and L4-5 and L5-S1 disc space implant placement. There is no evidence of acute fracture identified. The vertebral bodies are well aligned without evidence of subluxation.   Mild discogenic degenerative changes are seen at the remaining disc space levels. Mild-to-moderate facet degenerative changes are  seen throughout the lumbar spine.   LOWER THORACIC SPINE: No gross central canal or neural foraminal narrowing is seen.   T12-L1: No gross central canal or neural foraminal narrowing is seen.   L1-2: No gross central canal or neural foraminal narrowing is seen.   L2-3: No gross central canal or neural foraminal narrowing is seen.   L3-4: Mild right-sided neural foraminal narrowing is seen. No significant left foraminal or central canal narrowing is seen.   L4-5: No gross central canal or neural foraminal narrowing is seen.   L5-S1: Mild left-sided neural foraminal narrowing is seen. No significant right foraminal or central canal narrowing is seen.   ASSOCIATED STRUCTURES: Evaluation of the visualized soft tissues of the abdomen is limited by the lack of intravenous contrast. Within this limitation, no gross mass or lymphadenopathy is identified.  A fluid collection is seen along the posterior midportion the lower back, likely postoperative in nature.       1. No acute fracture identified. 2. Postoperative and degenerative changes, as described above.   MACRO: None.   Signed by: Jony Monae 12/5/2023 11:45 AM Dictation workstation:   [id number]    ECG 12 Lead    Result Date: 12/1/2023  Normal sinus rhythm Left bundle branch block Abnormal ECG When compared with ECG of 18-MAY-2022 14:15, No significant change was found Confirmed by Tigre Soto ([id number]) on 12/1/2023 7:00:23 AM    FL more than 1 hour    Result Date: 11/20/2023  These images are not reportable by radiology and will not be interpreted by  Radiologists.        Assessment and Plan    Principal Problem:    Generalized weakness     Left upper quadrant/left suprapubic pain  Radiating down the left upper thigh  Intractable back pain  Post operative epidural abscess d/t MRSA   after lumbosacral laminectomy and spinal fusion s/p I&D (12/6) and repeat washout (12/11)   Acute RLE peroneal vein DVT and acute lobar and segmental PE in RLL/RML   (dx (12/15),  no RV strain on TTE  Hyponatremia  E. Coli UTI s/p x5d Macrobid   CAD  hx of remote PCI   COPD  HTN  DLD    PLAN  Admit patient  Labs, prior records and radiological studies reviewed and noted  CBC and BMP in the AM.  Monitor and replace electrolytes per protocol  Monitor on telemetry for arrhythmia  Fall precautions.  PT and OT evaluation  As needed pain control.  Bowel regimen  Due to hyponatremia we will trend sodium levels daily.  Fluid restrictions for now.  Okay to use PICC line for lab draws and medication administration  Resume patient home medications as appropriate  Resume Eliquis at 10 mg then taper to 5 mg per Dosepak recommendations  Resume vancomycin with pharmacy to dose.  Patient was recommended to receive 6 weeks of IV vancomycin from infectious diseases standpoint status post I&D of spinal abscess  Obtain UA with reflex C&S and CT of abdomen and pelvis with contrast to evaluate left lower quadrant and left suprapubic pain  Hold Eliquis for now until CT abd/P done to evaluate pain. Resume if no findings of bleeding noted   Consult orthopedic surgery to follow patient postoperatively status post laminectomy with epidural abscess  Consults infectious disease for antibiotic management  TCC for discharge planning  Anticipated length of stay 48 to 72 hours pending consultant recommendations and diagnostic study findings.      On discussion regarding discharge planning patient states that she does not want to go to skilled rehab.  Reviewed with patient the recommendations from therapy team for skilled rehab on her recent stay and that with home health PT/OT frequency of therapy is reduced as compared to what she would receive in the SNF setting.  She states clear understanding and states that once she is medically optimized she still would like to go home with home health care.    A total of 80 minutes was spent on this visit     Plan of care was discussed extensively with patient. Patient verbalized  understanding through teach back method. All questions and concerns addressed upon examination.     Of note, this documentation is completed using the Dragon Dictation system (voice recognition software). There may be spelling and/or grammatical errors that were not corrected prior to final submission

## 2023-12-19 NOTE — PROGRESS NOTES
"Vancomycin Dosing by Pharmacy- INITIAL    Tara Tierney is a 68 y.o. year old female who Pharmacy has been consulted for vancomycin dosing for cellulitis, skin and soft tissue. Based on the patient's indication and renal status this patient will be dosed based on a goal AUC of 400-600.     Renal function is currently stable.    Visit Vitals  /63 (BP Location: Left arm, Patient Position: Lying)   Pulse 77   Temp 36.7 °C (98.1 °F) (Temporal)   Resp 16        Lab Results   Component Value Date    CREATININE 0.98 12/19/2023    CREATININE 0.95 12/18/2023    CREATININE 0.94 12/17/2023    CREATININE 0.90 12/16/2023        Patient weight is No results found for: \"PTWEIGHT\"    No results found for: \"CULTURE\"     No intake/output data recorded.  [unfilled]    No results found for: \"PATIENTTEMP\"       Assessment/Plan     Patient has already been given a loading dose of 1500 mg.  Will initiate vancomycin maintenance,  1250 mg every 24 hours.    This dosing regimen is predicted by [employer] to result in the following pharmacokinetic parameters:  Loading dose: 1500 mg  Regimen: 1250 mg IV every 24 hours.  Start time: 08:15 on 12/20/2023  Exposure target: AUC24 (range)400-600 mg/L.hr   AUC24,ss: 505 mg/L.hr  Probability of AUC24 > 400: 99 %  Ctrough,ss: 14 mg/L  Probability of Ctrough,ss > 20: 1 %  Probability of nephrotoxicity (Lodise ASHLEY 2009): 9 %      Follow-up level will be ordered on 12/19  at 1300 and 1700 unless clinically indicated sooner.  Will continue to monitor renal function daily while on vancomycin and order serum creatinine at least every 48 hours if not already ordered.  Follow for continued vancomycin needs, clinical response, and signs/symptoms of toxicity.       Monae Jay, PharmD       "

## 2023-12-19 NOTE — CONSULTS
Consults      Consult Note  Patient: Tara Tierney  Unit/Bed: AC05/AC05  YOB: 1955  MRN: [id number]  Acct: [id number]   Admitting Diagnosis: No admission diagnoses are documented for this encounter.  Date:  12/19/2023  Hospital Day: 0  Requesting Physician: Dr. Manley  Complaint:  Intractable back pain concerned for infection     History of Present Illness:  Tara Tierney is a 68 year old female patient per chart review with history of arthritis, back pain, COPD, current daily smoker, hyperlipidemia, HTN, macular degeneration, PVD who presented to  emergency room via EMS from [facility] with complaints of intractable back pain. She recently had L3-S1 laminectomy and L4-L5 TLIP. She was recently discharged from this hospital yesterday to skilled nursing. She experienced intractable back pain which led her to return to the emergency room in which orthopedics was consulted.     Patient reports that she was going extremely well with pain control and had significant improvement in her bilateral lower extremity pain until after some time at the SNF. She reports no right lower extremity pain at this time but is having some left lower extremity pain with movement that radiates into her groin. She denies any numbness or tingling to these extremities. She denies any chest pain, shortness of breath, N/V/D.     PMHx:  Past Medical History:   Diagnosis Date    Arthritis     Back pain     COPD (chronic obstructive pulmonary disease) (CMS/HCC)     COVID-19 vaccine administered     Eczema     History of COVID-19     2020    Hyperlipidemia     Hypertension     Hypoesthesia of skin     Hypesthesia    Macular degeneration     Meniere's disease     Osteoporosis     Overactive bladder     Pain in unspecified finger(s)     Finger pain    Pain in unspecified wrist     Pain in wrist joint    Peripheral vascular disease (CMS/HCC)     Personal history of other diseases of the circulatory system     History of coronary  artery disease    Personal history of other diseases of the musculoskeletal system and connective tissue     History of arthritis    Personal history of other specified conditions     History of chest pain    Personal history of other specified conditions     History of balance disorder    Personal history of other specified conditions     History of numbness    Postmenopausal     Seasonal allergies     Unspecified visual loss     Vision problems       PSHx:  Past Surgical History:   Procedure Laterality Date    ADENOIDECTOMY      AORTA - BILATERAL FEMORAL ARTERY BYPASS GRAFT      BLEPHAROPTOSIS REPAIR      CARDIAC CATHETERIZATION      with stent and balloon    CHOLECYSTECTOMY      COLONOSCOPY      CT ANGIO NECK  2022    CT NECK ANGIO W AND WO IV CONTRAST 2022 [facility]    CT AORTA AND BILATERAL ILIOFEMORAL RUNOFF ANGIOGRAM W AND/OR WO IV CONTRAST  03/10/2020    CT AORTA AND BILATERAL ILIOFEMORAL RUNOFF ANGIOGRAM W AND/OR WO IV CONTRAST 3/10/2020 [facility]    CT AORTA AND BILATERAL ILIOFEMORAL RUNOFF ANGIOGRAM W AND/OR WO IV CONTRAST  2023    CT AORTA AND BILATERAL ILIOFEMORAL RUNOFF ANGIOGRAM W AND/OR WO IV CONTRAST 2023 [facility]    FL TRANSLUMINAL ANGIO PERCUTANEOUS BHARAT      TONSILLECTOMY      Tonsillectomy    TOTAL KNEE ARTHROPLASTY Bilateral     TUBAL LIGATION         Social Hx:  Social History     Socioeconomic History    Marital status:      Spouse name: None    Number of children: None    Years of education: None    Highest education level: None   Occupational History    None   Tobacco Use    Smoking status: Former     Packs/day: 0.50     Years: 45.00     Additional pack years: 0.00     Total pack years: 22.50     Types: Cigarettes     Quit date: 2023     Years since quittin.2    Smokeless tobacco: None   Vaping Use    Vaping Use: Never used   Substance and Sexual Activity    Alcohol use: Never    Drug use: Never    Sexual activity: Defer   Other  Topics Concern    None   Social History Narrative    None     Social Determinants of Health     Financial Resource Strain: Low Risk  (12/5/2023)    Overall Financial Resource Strain (CARDIA)     Difficulty of Paying Living Expenses: Not hard at all   Food Insecurity: Not on file   Transportation Needs: No Transportation Needs (12/5/2023)    PRAPARE - Transportation     Lack of Transportation (Medical): No     Lack of Transportation (Non-Medical): No   Physical Activity: Not on file   Stress: Not on file   Social Connections: Not on file   Intimate Partner Violence: Not on file   Housing Stability: Low Risk  (12/5/2023)    Housing Stability Vital Sign     Unable to Pay for Housing in the Last Year: No     Number of Places Lived in the Last Year: 1     Unstable Housing in the Last Year: No       Family Hx:  Family History   Problem Relation Name Age of Onset    Breast cancer Mother      Other (arteriosclerotic cardiovascular disease) Father      Cancer Father         Review of Systems:   Review of Systems   Constitutional: Negative.  Negative for activity change and fever.   HENT: Negative.     Eyes: Negative.    Respiratory:  Negative for chest tightness and shortness of breath.    Cardiovascular: Negative.    Gastrointestinal: Negative.    Endocrine: Negative.    Genitourinary: Negative.    Musculoskeletal:  Positive for back pain.   Skin: Negative.    Allergic/Immunologic: Negative.    Neurological: Negative.    Hematological: Negative.    Psychiatric/Behavioral: Negative.     All other systems reviewed and are negative.        Physical Examination:    Visit Vitals  /76   Pulse 78   Temp 36.7 °C (98.1 °F) (Temporal)   Resp 17      Physical Exam  Vitals and nursing note reviewed.   Constitutional:       General: She is not in acute distress.     Appearance: Normal appearance.   HENT:      Head: Normocephalic.      Nose: Nose normal.      Mouth/Throat:      Mouth: Mucous membranes are moist.   Eyes:       Extraocular Movements: Extraocular movements intact.      Pupils: Pupils are equal, round, and reactive to light.   Cardiovascular:      Rate and Rhythm: Normal rate and regular rhythm.      Pulses: Normal pulses.      Heart sounds: Normal heart sounds.   Pulmonary:      Effort: Pulmonary effort is normal.      Breath sounds: Normal breath sounds.   Abdominal:      General: Bowel sounds are normal.      Palpations: Abdomen is soft.   Musculoskeletal:      Cervical back: Normal range of motion. No rigidity or tenderness.   Skin:     General: Skin is warm.      Capillary Refill: Capillary refill takes less than 2 seconds.          Neurological:      General: No focal deficit present.      Mental Status: She is alert and oriented to person, place, and time.   Psychiatric:         Mood and Affect: Mood normal.         LABS:  CBC:   Lab Results   Component Value Date    WBC 13.8 (H) 12/19/2023    RBC 2.68 (L) 12/19/2023    HGB 8.3 (L) 12/19/2023    HCT 26.1 (L) 12/19/2023    MCV 97 12/19/2023    MCH 31.0 12/19/2023    MCHC 31.8 (L) 12/19/2023    RDW 14.6 (H) 12/19/2023     12/19/2023     CBC with Differential:    Lab Results   Component Value Date    WBC 13.8 (H) 12/19/2023    RBC 2.68 (L) 12/19/2023    HGB 8.3 (L) 12/19/2023    HCT 26.1 (L) 12/19/2023     12/19/2023    MCV 97 12/19/2023    MCH 31.0 12/19/2023    MCHC 31.8 (L) 12/19/2023    RDW 14.6 (H) 12/19/2023    NRBC 0.2 (H) 12/19/2023    LYMPHOPCT 12.2 12/19/2023    MONOPCT 8.3 12/19/2023    EOSPCT 2.9 12/19/2023    BASOPCT 0.2 12/19/2023    MONOSABS 1.15 (H) 12/19/2023    LYMPHSABS 1.68 12/19/2023    EOSABS 0.40 12/19/2023    BASOSABS 0.03 12/19/2023     CMP:    Lab Results   Component Value Date     (L) 12/19/2023    K 4.3 12/19/2023    CL 95 (L) 12/19/2023    CO2 23 12/19/2023    BUN 12 12/19/2023    CREATININE 0.98 12/19/2023    GLUCOSE 128 (H) 12/19/2023    CALCIUM 8.8 12/19/2023     BMP:    Lab Results   Component Value Date     (L)  "12/19/2023    K 4.3 12/19/2023    CL 95 (L) 12/19/2023    CO2 23 12/19/2023    BUN 12 12/19/2023    CREATININE 0.98 12/19/2023    CALCIUM 8.8 12/19/2023    GLUCOSE 128 (H) 12/19/2023     Magnesium:No results found for: \"MG\"  Troponin:  No results found for: \"TROPONINI\"        Assessment:    [unfilled]  68 year old female patient presented to emergency room with complaints of intractable back pain from SNF. Patient reports that she was only receiving oxycodone at the SNF which was not providing her with   much pain relief. She was taking MS contin and oxycodone during hospitalization priro to going to SNF. Images were reviewed and show normal postoperative changes. There is no indication of reinfection at this time. She is neurologically intact.     Recommend admission to hospital medicine with consults to pain management, PT/OT, social work for placement, and ID to manage antibiotics.     Than you for this consultation and allowing us to be a part of this patient's care.     Plan:  PT/OT  Recommend pain management consult   SW and TCC consultation for discharge planning   Consult ID for antibiotic management      In a face to face encounter, I evaluated the patient and performed a physical examination, discussed pertinent diagnostic studies if indicated and discussed diagnosis and management strategies with both the patient and physician assistant / nurse practitioner.  I reviewed the PA/NP's note and agree with the documented findings and plan of care.     Patient seen and evaluated this morning.  Patient with complex postoperative course status post L3-S1 laminectomy.  She has required 2 irrigation debridements.  Currently on IV antibiotics per infectious disease.  Was just discharged yesterday and presents this morning to emergency department for repeat evaluation.  CT was collected which did show postoperative fluid collection.  Patient has no change in neurovascular symptoms however does have low back " pain.  This does seem to be emanating as patient was not given her OxyContin which was providing her significant relief while she was inpatient per pain medicine team.  As patient has no changes in neurovascular status and has already had 2 irrigation debridement I do think his postsurgical and likely benign in nature.  She is currently on blood thinners due to which is likely leading to hematoma finding.  I had a close discussion with patient and family as to signs and symptoms to monitor for as she has no new neurologic changes no bowel or bladder changes and no worsening shooting pains down her either leg recommend that we continue with conservative treatment.  Patient also presents today as she was not happy with her SNF.  I do think this is also a component here.  Will consult social work for appropriate care and treatment.  Will continue to monitor while in house.      Hakan Queen III, MD          Electronically signed by ORLANDO Kline on 12/19/2023 at 8:23 AM

## 2023-12-19 NOTE — CONSULTS
Consults    Reason For Consult  Severe back pain    History Of Present Illness  Tara Tierney is a 68 y.o.  femalepresenting with severe back pain radiating to both thighs left more than right.  The patient is status post back surgery x 3 with the latest status post wash out.  The patient stated that she does not getting pain medications where she is at right now.  Her claim has not been confirmed with since she was talking about IV pain medications.  The patient stated that she does not notice weakness of both of lower extremities and no change in her urinary or bowel habits.  The  patient denied fever or chills.     Past Medical History  She has a past medical history of Arthritis, Back pain, COPD (chronic obstructive pulmonary disease) (CMS/HCC), COVID-19 vaccine administered, Eczema, History of COVID-19, Hyperlipidemia, Hypertension, Hypoesthesia of skin, Macular degeneration, Meniere's disease, Osteoporosis, Overactive bladder, Pain in unspecified finger(s), Pain in unspecified wrist, Peripheral vascular disease (CMS/[facility]), Personal history of other diseases of the circulatory system, Personal history of other diseases of the musculoskeletal system and connective tissue, Personal history of other specified conditions, Personal history of other specified conditions, Personal history of other specified conditions, Postmenopausal, Seasonal allergies, and Unspecified visual loss.    Surgical History   Shehas a past surgical history that includes Tonsillectomy; Cardiac catheterization; Colonoscopy; FL transluminal angio percutaneous venus; Total knee arthroplasty (Bilateral); Cholecystectomy; Aorta - bilateral femoral artery bypass graft; CT angio aorta and bilateral iliofemoral runoff w and or wo IV contrast (03/10/2020); CT angio neck (05/18/2022); CT angio aorta and bilateral iliofemoral runoff w and or wo IV contrast (07/21/2023); Adenoidectomy; Tubal ligation; and Blepharoptosis repair.     Social  History  She reports that she quit smoking about 3 months ago. Her smoking use included cigarettes. She has a 22.50 pack-year smoking history. She does not have any smokeless tobacco history on file. She reports that she does not drink alcohol and does not use drugs.    Family History       Family History   Problem Relation Name Age of Onset    Breast cancer Mother      Other (arteriosclerotic cardiovascular disease) Father      Cancer Father       Allergies  Neomycin and Neomycin-polymyxin b-dexameth    Review of Systems  12 system symptoms were inquired about and what was positive was placed on the history of present illness     Physical Exam  The patient is alert ,conscious ,oriented x 3  HEENT within normal limits  Lungs bilateral breath sounds  Heart S1-S2 no S3  Abdominal exam unremarkable  Neurological examination cranial nerves II through XII within normal limit  Motor and sensory of upper and lower extremities within normal limits.       Last Recorded Vitals  /63 (BP Location: Left arm, Patient Position: Lying)   Pulse 77   Temp 36.7 °C (98.1 °F) (Temporal)   Resp 16   Wt 80.8 kg (178 lb 2.1 oz)   SpO2 94%     Relevant Results   Latest Reference Range & Units 12/05/23 07:59 12/19/23 05:39   C-Reactive Protein <1.00 mg/dL 1.01 (H) 17.58 (H)   (H): Data is abnormally high     Assessment/Plan     68 years old lady status post back surgery x 3 with severe back pain.  She is on the path to be chronic pain from transitional pain.  Since her pain is constant I would like to add MS Contin 15 mg p.o. twice daily with Percocet 5 325 mg every 6 hours for breakthrough pain.    Panchito Barnes MD

## 2023-12-19 NOTE — PROGRESS NOTES
Emergency Medicine Transition of Care Note.    I received Tara Tierney in signout from Dr. Manley.  Please see the previous ED provider note for all HPI, PE and MDM up to the time of signout at 0700. This is in addition to the primary record.    In brief Tara Tierney is an 68 y.o. female presenting for   Chief Complaint   Patient presents with    Back Pain     At the time of signout we were awaiting: call back from medicine    Diagnoses as of 12/19/23 0809   Acute low back pain without sciatica, unspecified back pain laterality       Medical Decision Making      Final diagnoses:   [M54.50] Acute low back pain without sciatica, unspecified back pain laterality           Procedure  Procedures    Christin Rojas MD Tara Tierney is a 68 y.o. female on day 0 of admission presenting with No Principal Problem: There is no principal problem currently on the Problem List. Please update the Problem List and refresh..    Subjective   68-year-old female states that she was discharged yesterday to nursing home from here.  She states that she was continue to have worsening pain so she requested that they bring her back to the hospital.  She states that she was not there to get medications through her PICC line.  It appears that the patient had been receiving vancomycin through PICC line.  I did look at the incision site I did take the dressing down the incision is clean dry and intact.  The sutures are in place.  There is no redness or drainage.  She is not tender over the incision site exquisitely.  She is moving her extremities.  She has no saddle anesthesia.  Orthopedics had seen the patient at bedside.  At this time I was instructed that they do not feel that this needed a repeat washout emergently.  They requested the patient be admitted to medicine.  I did speak to Dr. Vel Stratton with medicine and the patient will be admitted.  I had ordered a dose of vancomycin as well.       Objective     Physical  "Exam    Last Recorded Vitals  Blood pressure 139/76, pulse 78, temperature 36.7 °C (98.1 °F), temperature source Temporal, resp. rate 17, height 1.499 m (4' 11\"), weight 82.6 kg (182 lb 1.6 oz), SpO2 98 %.  Intake/Output last 3 Shifts:  No intake/output data recorded.    Relevant Results              This patient has a central line   Reason for the central line remaining today? Parenteral medication                 Assessment/Plan   Active Problems:  There are no active Hospital Problems.           I spent 15 minutes in the professional and overall care of this patient.      Christin Rojas MD      "

## 2023-12-19 NOTE — ED PROVIDER NOTES
HPI   Chief Complaint   Patient presents with    Back Pain       Chief complaint pain  history of present this is a 68-year-old  female who has a recent back surgery and placed in room 5  Recently had a L3 S1 laminectomy and a L4-L5 TLIF.  She had no subsequent washouts as there was thought to be infection she has recently been placed in [facility] for rehabilitation there today is having back pain and is here triaged to room 5 she denies weakness in the legs or loss of control of the legs but she is having severe lumbosacral pain patient has a PICC line she is on IV antibiotics.  And is here with a temp of 36 7 pulse is 78 respirate 16 O2 saturation 99%                          [place] Coma Scale Score: 15                  Patient History   Past Medical History:   Diagnosis Date    Arthritis     Back pain     COPD (chronic obstructive pulmonary disease) (CMS/HCC)     COVID-19 vaccine administered     Eczema     History of COVID-19     2020    Hyperlipidemia     Hypertension     Hypoesthesia of skin     Hypesthesia    Macular degeneration     Meniere's disease     Osteoporosis     Overactive bladder     Pain in unspecified finger(s)     Finger pain    Pain in unspecified wrist     Pain in wrist joint    Peripheral vascular disease (CMS/HCC)     Personal history of other diseases of the circulatory system     History of coronary artery disease    Personal history of other diseases of the musculoskeletal system and connective tissue     History of arthritis    Personal history of other specified conditions     History of chest pain    Personal history of other specified conditions     History of balance disorder    Personal history of other specified conditions     History of numbness    Postmenopausal     Seasonal allergies     Unspecified visual loss     Vision problems     Past Surgical History:   Procedure Laterality Date    ADENOIDECTOMY      AORTA - BILATERAL FEMORAL ARTERY BYPASS GRAFT       BLEPHAROPTOSIS REPAIR      CARDIAC CATHETERIZATION      with stent and balloon    CARDIAC CATHETERIZATION N/A 2024    Procedure: IVC Filter Insertion;  Surgeon: Star Negro MD;  Location: [facility];  Service: Cardiovascular;  Laterality: N/A;    CHOLECYSTECTOMY      COLONOSCOPY      CT ANGIO NECK  2022    CT NECK ANGIO W AND WO IV CONTRAST 2022 [facility]    CT AORTA AND BILATERAL ILIOFEMORAL RUNOFF ANGIOGRAM W AND/OR WO IV CONTRAST  03/10/2020    CT AORTA AND BILATERAL ILIOFEMORAL RUNOFF ANGIOGRAM W AND/OR WO IV CONTRAST 3/10/2020 [facility]    CT AORTA AND BILATERAL ILIOFEMORAL RUNOFF ANGIOGRAM W AND/OR WO IV CONTRAST  2023    CT AORTA AND BILATERAL ILIOFEMORAL RUNOFF ANGIOGRAM W AND/OR WO IV CONTRAST 2023 [facility] CT    FL TRANSLUMINAL ANGIO PERCUTANEOUS BHARAT      TONSILLECTOMY      Tonsillectomy    TOTAL KNEE ARTHROPLASTY Bilateral     TUBAL LIGATION       Family History   Problem Relation Name Age of Onset    Breast cancer Mother      Other (arteriosclerotic cardiovascular disease) Father      Cancer Father       Social History     Tobacco Use    Smoking status: Former     Packs/day: 0.50     Years: 45.00     Additional pack years: 0.00     Total pack years: 22.50     Types: Cigarettes     Quit date: 2023     Years since quittin.5    Smokeless tobacco: Not on file   Vaping Use    Vaping Use: Never used   Substance Use Topics    Alcohol use: Never    Drug use: Never       Physical Exam   ED Triage Vitals [23 0145]   Temp Heart Rate Resp BP   36.7 °C (98.1 °F) 78 16 --      SpO2 Temp Source Heart Rate Source Patient Position   99 % Temporal Monitor Sitting      BP Location FiO2 (%)     Left arm 21 %       Physical Exam  Vitals and nursing note reviewed. Exam conducted with a chaperone present.   Constitutional:       General: She is in acute distress.      Appearance: She is ill-appearing.   HENT:      Head: Normocephalic and  atraumatic.      Right Ear: Tympanic membrane normal.      Left Ear: Tympanic membrane normal.      Nose: Nose normal.      Mouth/Throat:      Mouth: Mucous membranes are dry.   Eyes:      Extraocular Movements: Extraocular movements intact.      Pupils: Pupils are equal, round, and reactive to light.   Cardiovascular:      Rate and Rhythm: Normal rate and regular rhythm.   Pulmonary:      Effort: Pulmonary effort is normal.      Breath sounds: Normal breath sounds.   Musculoskeletal:      Comments: Lumbosacral tenderness    Motor functions are 5 out of 5 lower extremity sensation intact reflexes 2+ for   Neurological:      Mental Status: She is alert.         ED Course & MDM   Diagnoses as of 03/20/24 0346   Acute low back pain without sciatica, unspecified back pain laterality       Medical Decision Making  Differential diagnosis    Hardware displacement of lumbar spine  Lumbar fracture  Recurrent epidural abscess  Inadequate pain control  Considering the above differential diagnosis gnosis the following tests and treatments were considered in order with shared decision making EKG x-ray lumbar spine CT lumbar spine CBC electrolytes sed rate C-reactive protein IV Dilaudid x 2 Valium and Toradol IV    Amount and/or Complexity of Data Reviewed  Radiology: ordered and independent interpretation performed.     Details: Considering CT showing fluid collection  ECG/medicine tests:      Details: No EKG was ordered  Discussion of management or test interpretation with external provider(s): Dr. Nails was contacted for consult, in regards to this patient's presentation      Labs Reviewed   CBC WITH AUTO DIFFERENTIAL - Abnormal       Result Value    WBC 13.8 (*)     nRBC 0.2 (*)     RBC 2.68 (*)     Hemoglobin 8.3 (*)     Hematocrit 26.1 (*)     MCV 97      MCH 31.0      MCHC 31.8 (*)     RDW 14.6 (*)     Platelets 289      Neutrophils % 71.4      Immature Granulocytes %, Automated 5.0 (*)     Lymphocytes % 12.2       Monocytes % 8.3      Eosinophils % 2.9      Basophils % 0.2      Neutrophils Absolute 9.86 (*)     Immature Granulocytes Absolute, Automated 0.69      Lymphocytes Absolute 1.68      Monocytes Absolute 1.15 (*)     Eosinophils Absolute 0.40      Basophils Absolute 0.03     BASIC METABOLIC PANEL - Abnormal    Glucose 128 (*)     Sodium 128 (*)     Potassium 4.3      Chloride 95 (*)     Bicarbonate 23      Anion Gap 14      Urea Nitrogen 12      Creatinine 0.98      eGFR 62      Calcium 8.8     URINALYSIS WITH REFLEX MICROSCOPIC AND CULTURE - Abnormal    Color, Urine Yellow      Appearance, Urine Clear      Specific Gravity, Urine 1.006      pH, Urine 7.0      Protein, Urine 30 (1+) (*)     Glucose, Urine NEGATIVE      Blood, Urine SMALL (1+) (*)     Ketones, Urine NEGATIVE      Bilirubin, Urine NEGATIVE      Urobilinogen, Urine <2.0      Nitrite, Urine NEGATIVE      Leukocyte Esterase, Urine NEGATIVE     C-REACTIVE PROTEIN - Abnormal    C-Reactive Protein 17.58 (*)    VANCOMYCIN - Abnormal    Vancomycin 36.0 (*)     Narrative:     Vancomycin levels can be monitored according to area under the curve (AUC) or concentration (ug/mL). The preferred monitoring strategy is determined by the patient's renal function and indication for therapy.     For AUC monitoring, a random vancomycin level should be interpreted in the context of AUC rather than the concentration at a single point in time.    For concentration monitoring, a trough concentration drawn immediately prior to the next dose is preferred.       Therapeutic ranges using concentration-guided results:  Peak (all ages):                  30.0-40.0 ug/mL  Trough (all ages):                10.0-20.0 ug/mL              VANCOMYCIN - Abnormal    Vancomycin 28.9 (*)     Narrative:     Vancomycin levels can be monitored according to area under the curve (AUC) or concentration (ug/mL). The preferred monitoring strategy is determined by the patient's renal function and  indication for therapy.     For AUC monitoring, a random vancomycin level should be interpreted in the context of AUC rather than the concentration at a single point in time.    For concentration monitoring, a trough concentration drawn immediately prior to the next dose is preferred.       Therapeutic ranges using concentration-guided results:  Peak (all ages):                  30.0-40.0 ug/mL  Trough (all ages):                10.0-20.0 ug/mL              URINALYSIS WITH REFLEX MICROSCOPIC - Abnormal    Color, Urine Yellow      Appearance, Urine Clear      Specific Gravity, Urine 1.024      pH, Urine 7.0      Protein, Urine NEGATIVE      Glucose, Urine NEGATIVE      Blood, Urine SMALL (1+) (*)     Ketones, Urine NEGATIVE      Bilirubin, Urine NEGATIVE      Urobilinogen, Urine <2.0      Nitrite, Urine NEGATIVE      Leukocyte Esterase, Urine NEGATIVE     CBC - Abnormal    WBC 10.7      nRBC 0.0      RBC 2.60 (*)     Hemoglobin 8.0 (*)     Hematocrit 25.2 (*)     MCV 97      MCH 30.8      MCHC 31.7 (*)     RDW 14.6 (*)     Platelets 293     BASIC METABOLIC PANEL - Abnormal    Glucose 122 (*)     Sodium 133 (*)     Potassium 4.1      Chloride 97 (*)     Bicarbonate 28      Anion Gap 12      Urea Nitrogen 10      Creatinine 1.05      eGFR 57 (*)     Calcium 8.7     CBC - Abnormal    WBC 9.6      nRBC 0.0      RBC 2.51 (*)     Hemoglobin 7.7 (*)     Hematocrit 24.4 (*)     MCV 97      MCH 30.7      MCHC 31.6 (*)     RDW 14.8 (*)     Platelets 289     BASIC METABOLIC PANEL - Abnormal    Glucose 132 (*)     Sodium 131 (*)     Potassium 4.1      Chloride 98      Bicarbonate 26      Anion Gap 11      Urea Nitrogen 14      Creatinine 1.06 (*)     eGFR 57 (*)     Calcium 8.5 (*)    CBC - Abnormal    WBC 10.0      nRBC 0.0      RBC 2.67 (*)     Hemoglobin 8.1 (*)     Hematocrit 25.9 (*)     MCV 97      MCH 30.3      MCHC 31.3 (*)     RDW 15.1 (*)     Platelets 308     BASIC METABOLIC PANEL - Abnormal    Glucose 118 (*)      Sodium 132 (*)     Potassium 4.3      Chloride 96 (*)     Bicarbonate 28      Anion Gap 12      Urea Nitrogen 15      Creatinine 1.13 (*)     eGFR 52 (*)     Calcium 9.0     URINALYSIS MICROSCOPIC WITH REFLEX CULTURE - Abnormal    WBC, Urine 1-5      RBC, Urine NONE      Squamous Epithelial Cells, Urine 1-9 (SPARSE)      Bacteria, Urine 1+ (*)    SEDIMENTATION RATE, AUTOMATED - Normal    Sedimentation Rate 23     VANCOMYCIN - Normal    Vancomycin 16.6      Narrative:     Vancomycin levels can be monitored according to area under the curve (AUC) or concentration (ug/mL). The preferred monitoring strategy is determined by the patient's renal function and indication for therapy.     For AUC monitoring, a random vancomycin level should be interpreted in the context of AUC rather than the concentration at a single point in time.    For concentration monitoring, a trough concentration drawn immediately prior to the next dose is preferred.       Therapeutic ranges using concentration-guided results:  Peak (all ages):                  30.0-40.0 ug/mL  Trough (all ages):                10.0-20.0 ug/mL              URINE CULTURE   URINALYSIS WITH REFLEX MICROSCOPIC AND CULTURE    Narrative:     The following orders were created for panel order Urinalysis with Reflex Microscopic and Culture.  Procedure                               Abnormality         Status                     ---------                               -----------         ------                     Urinalysis with Reflex M...[[id number]]  Abnormal            Final result               Extra Urine Gray Tube[[id number]]                            Final result                 Please view results for these tests on the individual orders.   EXTRA URINE GRAY TUBE    Extra Tube Hold for add-ons.     MICROSCOPIC ONLY, URINE    WBC, Urine NONE      RBC, Urine 1-2      Mucus, Urine 1+          CT abdomen pelvis w IV contrast   Final Result   Status post laminectomy with  posterior fusion.  There is a 3.3 x 3.0 x   12 cm thick-walled fluid collection posteriorly in the subcutaneous   tissues.  This may represent a postoperative seroma however, CSF leak   or abscess cannot be excluded.  This is new from December 2023.   Diverticulosis without diverticulitis.   Signed by Andre Mckee MD      CT lumbar spine wo IV contrast   Final Result   Postsurgical changes of posterior instrumented fusion L4 through S1   as above with posterior decompression L3 through L5.   3.4 x 3.4 x 12.8 cm collection of fluid in the superficial posterior   back soft tissues extending from the level of L1 through L5. There   are punctate lucencies within the collection concerning for infection   given the timing of surgery greater than 1 week ago. There is also   fluid and/or soft tissue thickening deep to the superficial   collection extending from the level of the spinal canal into the   paraspinal soft tissues and surrounding the posterior elements. No   definite fat plane is seen between this superficial collection and   the deeper situated fluid. Evaluation of this fluid and/or soft   tissue thickening, however, is severely limited due to extensive   streak artifact from surgical hardware.             MACRO:   None.        Signed by: Evan Finkelstein 12/19/2023 4:27 AM   Dictation workstation:   [id number]      XR lumbar spine 2-3 views   Final Result   1. No acute osseous abnormality.   2. Postsurgical changes from L4-S1 with intact hardware.   3. Mild multilevel spinal degenerative change.   4. Moderate colonic stool volume.        Signed by: Paul Melvin 12/19/2023 2:45 AM   Dictation workstation:   [id number]         I am on the presenting visit here for    I have  Procedure  Procedures     Emilie Manley MD  12/27/23 0404       Emilie Manley MD  03/20/24 5464

## 2023-12-20 ENCOUNTER — HOME HEALTH ADMISSION (OUTPATIENT)
Dept: HOME HEALTH SERVICES | Facility: HOME HEALTH | Age: 68
End: 2023-12-20
Payer: COMMERCIAL

## 2023-12-20 LAB
ANION GAP SERPL CALC-SCNC: 12 MMOL/L (ref 10–20)
APPEARANCE UR: CLEAR
BILIRUB UR STRIP.AUTO-MCNC: NEGATIVE MG/DL
BUN SERPL-MCNC: 10 MG/DL (ref 6–23)
CALCIUM SERPL-MCNC: 8.7 MG/DL (ref 8.6–10.3)
CHLORIDE SERPL-SCNC: 97 MMOL/L (ref 98–107)
CO2 SERPL-SCNC: 28 MMOL/L (ref 21–32)
COLOR UR: YELLOW
CREAT SERPL-MCNC: 1.05 MG/DL (ref 0.5–1.05)
ERYTHROCYTE [DISTWIDTH] IN BLOOD BY AUTOMATED COUNT: 14.6 % (ref 11.5–14.5)
GFR SERPL CREATININE-BSD FRML MDRD: 57 ML/MIN/1.73M*2
GLUCOSE SERPL-MCNC: 122 MG/DL (ref 74–99)
GLUCOSE UR STRIP.AUTO-MCNC: NEGATIVE MG/DL
HCT VFR BLD AUTO: 25.2 % (ref 36–46)
HGB BLD-MCNC: 8 G/DL (ref 12–16)
HOLD SPECIMEN: NORMAL
KETONES UR STRIP.AUTO-MCNC: NEGATIVE MG/DL
LEUKOCYTE ESTERASE UR QL STRIP.AUTO: NEGATIVE
MCH RBC QN AUTO: 30.8 PG (ref 26–34)
MCHC RBC AUTO-ENTMCNC: 31.7 G/DL (ref 32–36)
MCV RBC AUTO: 97 FL (ref 80–100)
MUCOUS THREADS #/AREA URNS AUTO: NORMAL /LPF
NITRITE UR QL STRIP.AUTO: NEGATIVE
NRBC BLD-RTO: 0 /100 WBCS (ref 0–0)
PH UR STRIP.AUTO: 7 [PH]
PLATELET # BLD AUTO: 293 X10*3/UL (ref 150–450)
POTASSIUM SERPL-SCNC: 4.1 MMOL/L (ref 3.5–5.3)
PROT UR STRIP.AUTO-MCNC: NEGATIVE MG/DL
RBC # BLD AUTO: 2.6 X10*6/UL (ref 4–5.2)
RBC # UR STRIP.AUTO: ABNORMAL /UL
RBC #/AREA URNS AUTO: NORMAL /HPF
SODIUM SERPL-SCNC: 133 MMOL/L (ref 136–145)
SP GR UR STRIP.AUTO: 1.02
UROBILINOGEN UR STRIP.AUTO-MCNC: <2 MG/DL
WBC # BLD AUTO: 10.7 X10*3/UL (ref 4.4–11.3)
WBC #/AREA URNS AUTO: NORMAL /HPF

## 2023-12-20 PROCEDURE — 2500000002 HC RX 250 W HCPCS SELF ADMINISTERED DRUGS (ALT 637 FOR MEDICARE OP, ALT 636 FOR OP/ED): Performed by: NURSE PRACTITIONER

## 2023-12-20 PROCEDURE — 2500000004 HC RX 250 GENERAL PHARMACY W/ HCPCS (ALT 636 FOR OP/ED): Performed by: NURSE PRACTITIONER

## 2023-12-20 PROCEDURE — 97165 OT EVAL LOW COMPLEX 30 MIN: CPT | Mod: GO

## 2023-12-20 PROCEDURE — 81001 URINALYSIS AUTO W/SCOPE: CPT | Performed by: NURSE PRACTITIONER

## 2023-12-20 PROCEDURE — 2500000001 HC RX 250 WO HCPCS SELF ADMINISTERED DRUGS (ALT 637 FOR MEDICARE OP): Performed by: ANESTHESIOLOGY

## 2023-12-20 PROCEDURE — G0378 HOSPITAL OBSERVATION PER HR: HCPCS

## 2023-12-20 PROCEDURE — 85027 COMPLETE CBC AUTOMATED: CPT | Performed by: NURSE PRACTITIONER

## 2023-12-20 PROCEDURE — 80048 BASIC METABOLIC PNL TOTAL CA: CPT | Performed by: NURSE PRACTITIONER

## 2023-12-20 PROCEDURE — 99232 SBSQ HOSP IP/OBS MODERATE 35: CPT | Performed by: NURSE PRACTITIONER

## 2023-12-20 PROCEDURE — 96366 THER/PROPH/DIAG IV INF ADDON: CPT

## 2023-12-20 PROCEDURE — 97161 PT EVAL LOW COMPLEX 20 MIN: CPT | Mod: GP

## 2023-12-20 PROCEDURE — 2500000001 HC RX 250 WO HCPCS SELF ADMINISTERED DRUGS (ALT 637 FOR MEDICARE OP): Performed by: NURSE PRACTITIONER

## 2023-12-20 RX ORDER — L. ACIDOPHILUS/L.BULGARICUS 1MM CELL
1 TABLET ORAL DAILY
Qty: 41 TABLET | Refills: 0 | Status: SHIPPED | OUTPATIENT
Start: 2023-12-21 | End: 2024-02-07 | Stop reason: HOSPADM

## 2023-12-20 RX ORDER — SYRING-NEEDL,DISP,INSUL,0.3 ML 29 G X1/2"
296 SYRINGE, EMPTY DISPOSABLE MISCELLANEOUS DAILY
Qty: 592 ML | Refills: 0 | Status: SHIPPED | OUTPATIENT
Start: 2023-12-20 | End: 2023-12-22

## 2023-12-20 RX ORDER — MORPHINE SULFATE 15 MG/1
15 TABLET, FILM COATED, EXTENDED RELEASE ORAL EVERY 12 HOURS SCHEDULED
Qty: 14 TABLET | Refills: 0 | Status: SHIPPED | OUTPATIENT
Start: 2023-12-20 | End: 2024-01-18 | Stop reason: HOSPADM

## 2023-12-20 RX ORDER — TAMSULOSIN HYDROCHLORIDE 0.4 MG/1
0.4 CAPSULE ORAL DAILY
Status: DISCONTINUED | OUTPATIENT
Start: 2023-12-20 | End: 2023-12-22 | Stop reason: HOSPADM

## 2023-12-20 RX ORDER — SYRING-NEEDL,DISP,INSUL,0.3 ML 29 G X1/2"
296 SYRINGE, EMPTY DISPOSABLE MISCELLANEOUS ONCE
Status: COMPLETED | OUTPATIENT
Start: 2023-12-20 | End: 2023-12-20

## 2023-12-20 RX ORDER — VANCOMYCIN/0.9 % SOD CHLORIDE 1 G/100 ML
1 PLASTIC BAG, INJECTION (ML) INTRAVENOUS EVERY 12 HOURS
Qty: 21000 ML | Refills: 0 | Status: SHIPPED | OUTPATIENT
Start: 2023-12-20 | End: 2023-12-21 | Stop reason: SDUPTHER

## 2023-12-20 RX ADMIN — SIMVASTATIN 40 MG: 40 TABLET, FILM COATED ORAL at 21:09

## 2023-12-20 RX ADMIN — PANTOPRAZOLE SODIUM 40 MG: 40 TABLET, DELAYED RELEASE ORAL at 06:01

## 2023-12-20 RX ADMIN — MORPHINE SULFATE 15 MG: 15 TABLET, EXTENDED RELEASE ORAL at 08:56

## 2023-12-20 RX ADMIN — APIXABAN 10 MG: 5 TABLET, FILM COATED ORAL at 21:09

## 2023-12-20 RX ADMIN — TAMSULOSIN HYDROCHLORIDE 0.4 MG: 0.4 CAPSULE ORAL at 11:24

## 2023-12-20 RX ADMIN — TIOTROPIUM BROMIDE INHALATION SPRAY 2 PUFF: 3.12 SPRAY, METERED RESPIRATORY (INHALATION) at 06:01

## 2023-12-20 RX ADMIN — PREGABALIN 75 MG: 50 CAPSULE ORAL at 08:53

## 2023-12-20 RX ADMIN — BE HEALTH MAGNESIUM CITRATE ORAL SOLUTION - CHERRY 296 ML: 1.75 LIQUID ORAL at 11:24

## 2023-12-20 RX ADMIN — BUSPIRONE HYDROCHLORIDE 10 MG: 5 TABLET ORAL at 08:54

## 2023-12-20 RX ADMIN — Medication 2000 UNITS: at 08:55

## 2023-12-20 RX ADMIN — Medication 1 TABLET: at 08:55

## 2023-12-20 RX ADMIN — EZETIMIBE 10 MG: 10 TABLET ORAL at 08:52

## 2023-12-20 RX ADMIN — FUROSEMIDE 20 MG: 40 TABLET ORAL at 08:55

## 2023-12-20 RX ADMIN — ATENOLOL 50 MG: 50 TABLET ORAL at 08:54

## 2023-12-20 RX ADMIN — OXYCODONE HYDROCHLORIDE AND ACETAMINOPHEN 1 TABLET: 5; 325 TABLET ORAL at 17:52

## 2023-12-20 RX ADMIN — Medication 10 ML: at 11:53

## 2023-12-20 RX ADMIN — PILOCARPINE HYDROCHLORIDE 5 MG: 5 TABLET, FILM COATED ORAL at 06:01

## 2023-12-20 RX ADMIN — BRIMONIDINE TARTRATE 1 DROP: 2 SOLUTION/ DROPS OPHTHALMIC at 21:13

## 2023-12-20 RX ADMIN — PREGABALIN 75 MG: 50 CAPSULE ORAL at 21:10

## 2023-12-20 RX ADMIN — BRIMONIDINE TARTRATE 1 DROP: 2 SOLUTION/ DROPS OPHTHALMIC at 08:57

## 2023-12-20 RX ADMIN — ISOSORBIDE MONONITRATE 60 MG: 60 TABLET, EXTENDED RELEASE ORAL at 06:01

## 2023-12-20 RX ADMIN — OXYBUTYNIN CHLORIDE 5 MG: 5 TABLET ORAL at 21:12

## 2023-12-20 RX ADMIN — OXYBUTYNIN CHLORIDE 5 MG: 5 TABLET ORAL at 08:54

## 2023-12-20 RX ADMIN — VANCOMYCIN HYDROCHLORIDE 1250 MG: 1.25 INJECTION, POWDER, LYOPHILIZED, FOR SOLUTION INTRAVENOUS at 08:56

## 2023-12-20 RX ADMIN — MIRTAZAPINE 15 MG: 15 TABLET, FILM COATED ORAL at 21:09

## 2023-12-20 RX ADMIN — APIXABAN 10 MG: 5 TABLET, FILM COATED ORAL at 08:53

## 2023-12-20 RX ADMIN — Medication 10 ML: at 00:13

## 2023-12-20 RX ADMIN — POTASSIUM CHLORIDE 10 MEQ: 750 TABLET, EXTENDED RELEASE ORAL at 08:54

## 2023-12-20 RX ADMIN — MORPHINE SULFATE 15 MG: 15 TABLET, EXTENDED RELEASE ORAL at 21:10

## 2023-12-20 ASSESSMENT — COGNITIVE AND FUNCTIONAL STATUS - GENERAL
MOBILITY SCORE: 17
MOBILITY SCORE: 19
HELP NEEDED FOR BATHING: A LITTLE
STANDING UP FROM CHAIR USING ARMS: A LITTLE
DRESSING REGULAR LOWER BODY CLOTHING: TOTAL
MOVING TO AND FROM BED TO CHAIR: A LITTLE
WALKING IN HOSPITAL ROOM: A LITTLE
CLIMB 3 TO 5 STEPS WITH RAILING: A LITTLE
EATING MEALS: A LITTLE
EATING MEALS: A LITTLE
DRESSING REGULAR LOWER BODY CLOTHING: A LOT
DAILY ACTIVITIY SCORE: 24
MOVING FROM LYING ON BACK TO SITTING ON SIDE OF FLAT BED WITH BEDRAILS: A LITTLE
HELP NEEDED FOR BATHING: TOTAL
PERSONAL GROOMING: A LITTLE
PERSONAL GROOMING: A LITTLE
CLIMB 3 TO 5 STEPS WITH RAILING: A LITTLE
MOBILITY SCORE: 23
CLIMB 3 TO 5 STEPS WITH RAILING: TOTAL
DRESSING REGULAR LOWER BODY CLOTHING: A LITTLE
DRESSING REGULAR UPPER BODY CLOTHING: A LOT
WALKING IN HOSPITAL ROOM: A LOT
MOVING TO AND FROM BED TO CHAIR: A LITTLE
TOILETING: TOTAL
STANDING UP FROM CHAIR USING ARMS: A LITTLE
TOILETING: A LITTLE
DAILY ACTIVITIY SCORE: 19
HELP NEEDED FOR BATHING: A LITTLE
MOVING TO AND FROM BED TO CHAIR: A LITTLE
CLIMB 3 TO 5 STEPS WITH RAILING: A LOT
WALKING IN HOSPITAL ROOM: A LITTLE
MOBILITY SCORE: 18
DRESSING REGULAR UPPER BODY CLOTHING: A LITTLE
DAILY ACTIVITIY SCORE: 11
TOILETING: A LITTLE
TURNING FROM BACK TO SIDE WHILE IN FLAT BAD: A LITTLE
DAILY ACTIVITIY SCORE: 18
DRESSING REGULAR UPPER BODY CLOTHING: A LITTLE
STANDING UP FROM CHAIR USING ARMS: A LITTLE

## 2023-12-20 ASSESSMENT — PAIN SCALES - GENERAL
PAINLEVEL_OUTOF10: 7
PAINLEVEL_OUTOF10: 7
PAINLEVEL_OUTOF10: 8
PAINLEVEL_OUTOF10: 0 - NO PAIN
PAINLEVEL_OUTOF10: 0 - NO PAIN

## 2023-12-20 ASSESSMENT — PAIN - FUNCTIONAL ASSESSMENT
PAIN_FUNCTIONAL_ASSESSMENT: 0-10
PAIN_FUNCTIONAL_ASSESSMENT: 0-10

## 2023-12-20 ASSESSMENT — PAIN DESCRIPTION - LOCATION: LOCATION: BACK

## 2023-12-20 ASSESSMENT — ACTIVITIES OF DAILY LIVING (ADL)
ADL_ASSISTANCE: INDEPENDENT
BATHING_ASSISTANCE: STAND BY

## 2023-12-20 ASSESSMENT — PAIN SCALES - WONG BAKER: WONGBAKER_NUMERICALRESPONSE: NO HURT

## 2023-12-20 ASSESSMENT — PAIN DESCRIPTION - ORIENTATION: ORIENTATION: DISTAL

## 2023-12-20 NOTE — CONSULTS
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  Admitted and discharged last week.   Patient went  for additional washout   Repeat IntraOp cultures are +ve for MRSA  Patient has persistent severe pain of B thighs.  Has weakness in bilateral legs.   Discharged a couple days ago and presents with worsening pain. patient states that what she feels is a stabbing pain that originates from the left lower quadrant and left suprapubic region. Recent treatment uti as well e coli    Review of Systems: All other ROS reviewed and are negative other than as stated in HPI            Social History     Tobacco Use    Smoking status: Former     Packs/day: 0.50     Years: 45.00     Additional pack years: 0.00     Total pack years: 22.50     Types: Cigarettes     Quit date: 2023     Years since quittin.3   Vaping Use    Vaping Use: Never used   Substance Use Topics    Alcohol use: Never    Drug use: Never         Past Medical History:   Diagnosis Date    Arthritis     Back pain     COPD (chronic obstructive pulmonary disease) (CMS/HCC)     COVID-19 vaccine administered     Eczema     History of COVID-19         Hyperlipidemia     Hypertension     Hypoesthesia of skin     Hypesthesia    Macular degeneration     Meniere's disease     Osteoporosis     Overactive bladder     Pain in unspecified finger(s)     Finger pain    Pain in unspecified wrist     Pain in wrist joint    Peripheral vascular disease (CMS/HCC)     Personal history of other diseases of the circulatory system     History of coronary artery disease    Personal history of other diseases of the musculoskeletal system  and connective tissue     History of arthritis    Personal history of other specified conditions     History of chest pain    Personal history of other specified conditions     History of balance disorder    Personal history of other specified conditions     History of numbness    Postmenopausal     Seasonal allergies     Unspecified visual loss     Vision problems           Past Surgical History:   Procedure Laterality Date    ADENOIDECTOMY      AORTA - BILATERAL FEMORAL ARTERY BYPASS GRAFT      BLEPHAROPTOSIS REPAIR      CARDIAC CATHETERIZATION      with stent and balloon    CHOLECYSTECTOMY      COLONOSCOPY      CT ANGIO NECK  05/18/2022    CT NECK ANGIO W AND WO IV CONTRAST 5/18/2022 [facility]    CT AORTA AND BILATERAL ILIOFEMORAL RUNOFF ANGIOGRAM W AND/OR WO IV CONTRAST  03/10/2020    CT AORTA AND BILATERAL ILIOFEMORAL RUNOFF ANGIOGRAM W AND/OR WO IV CONTRAST 3/10/2020 [facility]    CT AORTA AND BILATERAL ILIOFEMORAL RUNOFF ANGIOGRAM W AND/OR WO IV CONTRAST  07/21/2023    CT AORTA AND BILATERAL ILIOFEMORAL RUNOFF ANGIOGRAM W AND/OR WO IV CONTRAST 7/21/2023 [facility] CT    FL TRANSLUMINAL ANGIO PERCUTANEOUS BHARAT      TONSILLECTOMY      Tonsillectomy    TOTAL KNEE ARTHROPLASTY Bilateral     TUBAL LIGATION             Current Facility-Administered Medications:     acetaminophen (Tylenol) tablet 650 mg, 650 mg, oral, q4h PRN **OR** acetaminophen (Tylenol) oral liquid 650 mg, 650 mg, nasogastric tube, q4h PRN **OR** acetaminophen (Tylenol) suppository 650 mg, 650 mg, rectal, q4h PRN, DARRYL Trevizo-CNP    acetaminophen (Tylenol) tablet 650 mg, 650 mg, oral, q4h PRN **OR** acetaminophen (Tylenol) oral liquid 650 mg, 650 mg, oral, q4h PRN **OR** acetaminophen (Tylenol) suppository 650 mg, 650 mg, rectal, q4h PRN, [place]O Trevizo    apixaban (Eliquis) tablet 10 mg, 10 mg, oral, BID, [place] Trevizo, 10 mg at 12/19/23 2116    [START ON 12/25/2023] apixaban (Eliquis) tablet 5  mg, 5 mg, oral, BID, DARRYL Trevizo-CNP    atenolol (Tenormin) tablet 50 mg, 50 mg, oral, q AM, DARRYL Trevizo-CNP, 50 mg at 12/19/23 1353    benzocaine-menthol (Cepastat Sore Throat) 15-3.6 mg lozenge 1 lozenge, 1 lozenge, Mouth/Throat, q2h PRN, DARRYL Trevizo-CNP    bisacodyl (Dulcolax) EC tablet 10 mg, 10 mg, oral, Daily PRN, DARRYL Trevizo-CNP    bisacodyl (Dulcolax) suppository 10 mg, 10 mg, rectal, Daily, [place] Trevizo    brimonidine (AlphaGAN) 0.2 % ophthalmic solution 1 drop, 1 drop, Both Eyes, BID, [place] Trevizo, 1 drop at 12/19/23 2116    busPIRone (Buspar) tablet 10 mg, 10 mg, oral, Daily, DARRYL Trevizo-CNP, 10 mg at 12/19/23 1352    calcium carbonate (Tums) chewable tablet 500 mg, 500 mg, oral, 4x daily PRN, DARRYL Trevizo-ALBAN    cholecalciferol (Vitamin D-3) tablet 2,000 Units, 2,000 Units, oral, Daily, DARRYL Trevizo-CNP, 2,000 Units at 12/19/23 1352    cyclobenzaprine (Flexeril) tablet 5 mg, 5 mg, oral, TID PRN, DARRYL Trevizo-CNP, 5 mg at 12/19/23 1254    dextromethorphan-guaifenesin (Robitussin DM)  mg/5 mL oral liquid 5 mL, 5 mL, oral, q4h PRN, DARRYL Trevizo-CNP    ezetimibe (Zetia) tablet 10 mg, 10 mg, oral, Daily, Mary Michaud APRN-CNP, 10 mg at 12/19/23 1353    furosemide (Lasix) tablet 20 mg, 20 mg, oral, Daily, Mary Michaud, APRN-CNP, 20 mg at 12/19/23 1353    guaiFENesin (Mucinex) 12 hr tablet 600 mg, 600 mg, oral, q12h PRN, Mary Michaud, APRN-CNP    hydrALAZINE (Apresoline) injection 10 mg, 10 mg, intravenous, q8h PRN, Mary Michaud, APRN-CNP    ipratropium-albuteroL (Duo-Neb) 0.5-2.5 mg/3 mL nebulizer solution 3 mL, 3 mL, nebulization, q6h PRN, Mary Michaud, APRN-CNP    isosorbide mononitrate ER (Imdur) 24 hr tablet 60 mg, 60 mg, oral, Daily, Mary Michaud, APRN-CNP, 60 mg at 12/19/23 1353    lactobacillus acidophilus tablet 1 tablet, 1 tablet, oral, Daily, Mary  DARRYL Austin-CNP, 1 tablet at 12/19/23 1353    [Held by provider] loratadine (Claritin) tablet 10 mg, 10 mg, oral, Daily, DARRYL Trevizo-CNP, 10 mg at 12/19/23 1353    meclizine (Antivert) tablet 25 mg, 25 mg, oral, TID PRN, DARRYL Trevizo-CNP    melatonin tablet 3 mg, 3 mg, oral, Nightly PRN, DARRYL rTevizo-CNP    mirtazapine (Remeron) tablet 15 mg, 15 mg, oral, Nightly, DARRYL Trevizo-CNP, 15 mg at 12/19/23 2116    morphine CR (MS Contin) 12 hr tablet 15 mg, 15 mg, oral, q12h EDE, Pnachito Barnes MD, 15 mg at 12/19/23 2116    nitroglycerin (Nitrostat) SL tablet 0.4 mg, 0.4 mg, sublingual, q5 min PRN, DARRYL Trevizo-CNP    oxybutynin (Ditropan) tablet 5 mg, 5 mg, oral, BID, DARRYL Trevizo-CNP, 5 mg at 12/19/23 2116    oxyCODONE-acetaminophen (Percocet) 5-325 mg per tablet 1 tablet, 1 tablet, oral, q6h PRN, DARRYL Trevizo-CNP    oxygen (O2) therapy, , inhalation, Continuous PRN - O2/gases, DARRYL Trevizo-CNP    pantoprazole (ProtoNix) EC tablet 40 mg, 40 mg, oral, Daily before breakfast **OR** pantoprazole (ProtoNix) injection 40 mg, 40 mg, intravenous, Daily before breakfast, [place] Trevizo    pilocarpine (Salagen) tablet 5 mg, 5 mg, oral, Daily, DARRYL Trevizo-CNP    polyethylene glycol (Glycolax, Miralax) packet 17 g, 17 g, oral, BID, DARRYL Trevizo-CNP    potassium chloride CR (Klor-Con) ER tablet 10 mEq, 10 mEq, oral, Daily, DARRYL Trevizo-CNP, 10 mEq at 12/19/23 1352    pregabalin (Lyrica) capsule 75 mg, 75 mg, oral, BID, OR[place] Trevizo, 75 mg at 12/19/23 2115    promethazine (Phenergan) tablet 25 mg, 25 mg, oral, q6h PRN **OR** promethazine (Phenergan) suppository 25 mg, 25 mg, rectal, q12h PRN, OR[place] Trevizo    simvastatin (Zocor) tablet 40 mg, 40 mg, oral, Nightly, OR[place] Trevizo, 40 mg at 12/19/23 2115    sodium chloride 0.9% flush 10 mL, 10 mL, intra-catheter, q12h,  "[place] Trevizo    sodium chloride 0.9% flush 10 mL, 10 mL, intra-catheter, PRN, OR[place] Trevizo    tiotropium (Spiriva Respimat) 2.5 mcg/actuation inhaler 2 puff, 2 Inhalation, inhalation, Daily, [place] Trevizo    vancomycin (Vancocin) in dextrose 5 % water (D5W) 250 mL IV 1,250 mg, 1,250 mg, intravenous, q24h, OR[place]O Trevizo     Allergies   Allergen Reactions    Neomycin Other     swelling, redness, burning post eye surgery    Neomycin-Polymyxin B-Dexameth Other and Rash     blisters, eye swelling, burning          Family History   Problem Relation Name Age of Onset    Breast cancer Mother      Other (arteriosclerotic cardiovascular disease) Father      Cancer Father           Physical Exam:    Blood pressure 130/60, pulse 78, temperature 37 °C (98.6 °F), temperature source Temporal, resp. rate 16, height 1.499 m (4' 11\"), weight 80.8 kg (178 lb 2.1 oz), SpO2 93 %.  General: Patient appears ok at the present time. NAD  Skin:site packed  HEENT:  Neck is supple, No subconjunctival hemorrhages, no oral exudates  Heart: S1 S2  Lungs: clear bilaterally  Abdomen: soft, ND, NTTP,  Back :no CVA tenderness  Extrem: No edema, non tender  Neuro exam: CN II-XII intact  Psych: cooperative    Labs:  I have reviewed all lab results by electronic record, including most recent CBC, metabolic panel, and pertinent abnormalities were addressed from an infectious disease perspective.  Trends are being monitored over time.    Lab Results   Component Value Date    WBC 13.8 (H) 12/19/2023    HGB 8.3 (L) 12/19/2023    HCT 26.1 (L) 12/19/2023    MCV 97 12/19/2023     12/19/2023     Lab Results   Component Value Date    GLUCOSE 128 (H) 12/19/2023    CALCIUM 8.8 12/19/2023     (L) 12/19/2023    K 4.3 12/19/2023    CO2 23 12/19/2023    CL 95 (L) 12/19/2023    BUN 12 12/19/2023    CREATININE 0.98 12/19/2023       Radiology:  I have reviewed imaging results per electronic record and " most pertinent abnormalities are being addressed from an infectious disease standpoint.            ASSESSMENT:  Problem List Items Addressed This Visit    None  Visit Diagnoses       Acute low back pain without sciatica, unspecified back pain laterality    -  Primary           IMPRESSION:    Postop deep incisional wound infection of lumbar spine with probable previous  infected hematoma/abscess.    MRSA infection in a patient who is a chronic carrier  Recent E. coli UTI, resolved  Status post lumbar laminectomy  Anemia 2ry to blood loss     Abd pain    PLAN:  Continue vancomycin  Await repeat imaging

## 2023-12-20 NOTE — PROGRESS NOTES
CT abdomen/pelvis showing significant stool burden worsened as compared to CT abdomen/pelvis done on 12/15/2023. Also noted significant bladder distension. Post laminectomy changes noted with a hick-walled fluid collection posteriorly measuring 3.3 x 3.0 and extending over distance of at least 12 cm. Miralax and dulcolax suppository ordered. Bladder scan q 6 hours with prn straight catheterization also ordered. Nursing informed JALYN Purwik canister emptied for shift with 1600 ml of urine and bladder scan shows 386 ml of retained urine. Patient refused straight catheterization and also bowel regimen. IV dilaudid stopped for now. Pain management saw patient and recommeded percocet for breakthrough pain and scheduled MS contin. Urology to be consulted for evaluation and recommendations regarding urine retention. Patient was recently treated for E.Coli UTI. UA C&S was ordered for this admission.

## 2023-12-20 NOTE — PROGRESS NOTES
PROGRESS NOTE    Subjective     Patient seen and examined this morning.  Patient resting in bed with no acute distress on observation.  Still describes left lower quadrant pain.  No significant abdominal distention or generalized pain.  No nausea or emesis.      Objective     Last Recorded Vitals    Visit Vitals  /61 (BP Location: Left arm, Patient Position: Lying)   Pulse 87   Temp 37.4 °C (99.3 °F) (Temporal)   Resp 18        3 Day Weight Change: Unable to Calculate       Intake/Output Summary (Last 24 hours) at 12/20/2023 0835  Last data filed at 12/20/2023 0626  Gross per 24 hour   Intake 490 ml   Output 2850 ml   Net -2360 ml        Physical Exam  Vitals reviewed.   Constitutional:       Appearance: Normal appearance. She is normal weight.   HENT:      Head: Normocephalic and atraumatic.      Mouth/Throat:      Mouth: Mucous membranes are moist.   Eyes:      Extraocular Movements: Extraocular movements intact.      Pupils: Pupils are equal, round, and reactive to light.   Cardiovascular:      Rate and Rhythm: Normal rate and regular rhythm.      Pulses: Normal pulses.      Heart sounds: Normal heart sounds. No murmur heard.     No gallop.   Pulmonary:      Effort: Pulmonary effort is normal. No respiratory distress.      Breath sounds: Normal breath sounds. No wheezing, rhonchi or rales.   Abdominal:      General: Abdomen is flat. Bowel sounds are normal. There is no distension.      Palpations: Abdomen is soft. There is no mass.      Tenderness: There is abdominal tenderness. There is no rebound.      Hernia: No hernia is present.      Comments: LLQ tender to touch    Musculoskeletal:         General: No swelling, tenderness or signs of injury. Normal range of motion.      Cervical back: Normal range of motion and neck supple.      Right lower leg: No edema.      Left lower leg: No edema.   Skin:     General: Skin is warm and dry.      Capillary Refill: Capillary refill takes less than 2 seconds.       Findings: No bruising, erythema or rash.   Neurological:      General: No focal deficit present.      Mental Status: She is alert and oriented to person, place, and time.      Cranial Nerves: No cranial nerve deficit.      Sensory: No sensory deficit.      Motor: No weakness.   Psychiatric:         Mood and Affect: Mood normal.         Behavior: Behavior normal.          Scheduled medications  apixaban, 10 mg, oral, BID  [START ON 12/25/2023] apixaban, 5 mg, oral, BID  atenolol, 50 mg, oral, q AM  bisacodyl, 10 mg, rectal, Daily  brimonidine, 1 drop, Both Eyes, BID  busPIRone, 10 mg, oral, Daily  cholecalciferol, 2,000 Units, oral, Daily  ezetimibe, 10 mg, oral, Daily  furosemide, 20 mg, oral, Daily  isosorbide mononitrate ER, 60 mg, oral, Daily  lactobacillus acidophilus, 1 tablet, oral, Daily  [Held by provider] loratadine, 10 mg, oral, Daily  mirtazapine, 15 mg, oral, Nightly  morphine CR, 15 mg, oral, q12h EDE  oxybutynin, 5 mg, oral, BID  pantoprazole, 40 mg, oral, Daily before breakfast   Or  pantoprazole, 40 mg, intravenous, Daily before breakfast  pilocarpine, 5 mg, oral, Daily  polyethylene glycol, 17 g, oral, BID  potassium chloride CR, 10 mEq, oral, Daily  pregabalin, 75 mg, oral, BID  simvastatin, 40 mg, oral, Nightly  sodium chloride 0.9%, 10 mL, intra-catheter, q12h  tiotropium, 2 Inhalation, inhalation, Daily  vancomycin, 1,250 mg, intravenous, q24h      Continuous medications     PRN medications  PRN medications: acetaminophen **OR** acetaminophen **OR** acetaminophen, acetaminophen **OR** acetaminophen **OR** acetaminophen, benzocaine-menthol, bisacodyl, calcium carbonate, cyclobenzaprine, dextromethorphan-guaifenesin, guaiFENesin, hydrALAZINE, ipratropium-albuteroL, meclizine, melatonin, nitroglycerin, oxyCODONE-acetaminophen, oxygen, promethazine **OR** promethazine, sodium chloride 0.9%       Relevant Results    Results for orders placed or performed during the hospital encounter of 12/19/23  (from the past 96 hour(s))   CBC and Auto Differential   Result Value Ref Range    WBC 13.8 (H) 4.4 - 11.3 x10*3/uL    nRBC 0.2 (H) 0.0 - 0.0 /100 WBCs    RBC 2.68 (L) 4.00 - 5.20 x10*6/uL    Hemoglobin 8.3 (L) 12.0 - 16.0 g/dL    Hematocrit 26.1 (L) 36.0 - 46.0 %    MCV 97 80 - 100 fL    MCH 31.0 26.0 - 34.0 pg    MCHC 31.8 (L) 32.0 - 36.0 g/dL    RDW 14.6 (H) 11.5 - 14.5 %    Platelets 289 150 - 450 x10*3/uL    Neutrophils % 71.4 40.0 - 80.0 %    Immature Granulocytes %, Automated 5.0 (H) 0.0 - 0.9 %    Lymphocytes % 12.2 13.0 - 44.0 %    Monocytes % 8.3 2.0 - 10.0 %    Eosinophils % 2.9 0.0 - 6.0 %    Basophils % 0.2 0.0 - 2.0 %    Neutrophils Absolute 9.86 (H) 1.20 - 7.70 x10*3/uL    Immature Granulocytes Absolute, Automated 0.69 0.00 - 0.70 x10*3/uL    Lymphocytes Absolute 1.68 1.20 - 4.80 x10*3/uL    Monocytes Absolute 1.15 (H) 0.10 - 1.00 x10*3/uL    Eosinophils Absolute 0.40 0.00 - 0.70 x10*3/uL    Basophils Absolute 0.03 0.00 - 0.10 x10*3/uL   Basic metabolic panel   Result Value Ref Range    Glucose 128 (H) 74 - 99 mg/dL    Sodium 128 (L) 136 - 145 mmol/L    Potassium 4.3 3.5 - 5.3 mmol/L    Chloride 95 (L) 98 - 107 mmol/L    Bicarbonate 23 21 - 32 mmol/L    Anion Gap 14 10 - 20 mmol/L    Urea Nitrogen 12 6 - 23 mg/dL    Creatinine 0.98 0.50 - 1.05 mg/dL    eGFR 62 >60 mL/min/1.73m*2    Calcium 8.8 8.6 - 10.3 mg/dL   Urinalysis with Reflex Microscopic and Culture   Result Value Ref Range    Color, Urine Yellow Straw, Yellow    Appearance, Urine Clear Clear    Specific Gravity, Urine 1.006 1.005 - 1.035    pH, Urine 7.0 5.0, 5.5, 6.0, 6.5, 7.0, 7.5, 8.0    Protein, Urine 30 (1+) (N) NEGATIVE mg/dL    Glucose, Urine NEGATIVE NEGATIVE mg/dL    Blood, Urine SMALL (1+) (A) NEGATIVE    Ketones, Urine NEGATIVE NEGATIVE mg/dL    Bilirubin, Urine NEGATIVE NEGATIVE    Urobilinogen, Urine <2.0 <2.0 mg/dL    Nitrite, Urine NEGATIVE NEGATIVE    Leukocyte Esterase, Urine NEGATIVE NEGATIVE   Extra Urine Gray Tube    Result Value Ref Range    Extra Tube Hold for add-ons.    Urinalysis Microscopic   Result Value Ref Range    WBC, Urine 1-5 1-5, NONE /HPF    RBC, Urine NONE NONE, 1-2, 3-5 /HPF    Squamous Epithelial Cells, Urine 1-9 (SPARSE) Reference range not established. /HPF    Bacteria, Urine 1+ (A) NONE SEEN /HPF   Sedimentation Rate   Result Value Ref Range    Sedimentation Rate 23 0 - 30 mm/h   C-Reactive Protein   Result Value Ref Range    C-Reactive Protein 17.58 (H) <1.00 mg/dL   ECG 12 lead   Result Value Ref Range    Ventricular Rate 86 BPM    Atrial Rate 86 BPM    WV Interval 92 ms    QRS Duration 138 ms    QT Interval 400 ms    QTC Calculation(Bazett) 478 ms    P Axis 65 degrees    R Axis 10 degrees    T Axis 69 degrees    QRS Count 15 beats    Q Onset 211 ms    P Onset 165 ms    P Offset 201 ms    T Offset 411 ms    QTC Fredericia 451 ms   Vancomycin   Result Value Ref Range    Vancomycin 36.0 (H) 5.0 - 20.0 ug/mL   Vancomycin   Result Value Ref Range    Vancomycin 28.9 (H) 5.0 - 20.0 ug/mL   SST TOP   Result Value Ref Range    Extra Tube Hold for add-ons.    CBC   Result Value Ref Range    WBC 10.7 4.4 - 11.3 x10*3/uL    nRBC 0.0 0.0 - 0.0 /100 WBCs    RBC 2.60 (L) 4.00 - 5.20 x10*6/uL    Hemoglobin 8.0 (L) 12.0 - 16.0 g/dL    Hematocrit 25.2 (L) 36.0 - 46.0 %    MCV 97 80 - 100 fL    MCH 30.8 26.0 - 34.0 pg    MCHC 31.7 (L) 32.0 - 36.0 g/dL    RDW 14.6 (H) 11.5 - 14.5 %    Platelets 293 150 - 450 x10*3/uL   Basic metabolic panel   Result Value Ref Range    Glucose 122 (H) 74 - 99 mg/dL    Sodium 133 (L) 136 - 145 mmol/L    Potassium 4.1 3.5 - 5.3 mmol/L    Chloride 97 (L) 98 - 107 mmol/L    Bicarbonate 28 21 - 32 mmol/L    Anion Gap 12 10 - 20 mmol/L    Urea Nitrogen 10 6 - 23 mg/dL    Creatinine 1.05 0.50 - 1.05 mg/dL    eGFR 57 (L) >60 mL/min/1.73m*2    Calcium 8.7 8.6 - 10.3 mg/dL   Urinalysis with Reflex Microscopic   Result Value Ref Range    Color, Urine Yellow Straw, Yellow    Appearance, Urine Clear  Clear    Specific Gravity, Urine 1.024 1.005 - 1.035    pH, Urine 7.0 5.0, 5.5, 6.0, 6.5, 7.0, 7.5, 8.0    Protein, Urine NEGATIVE NEGATIVE mg/dL    Glucose, Urine NEGATIVE NEGATIVE mg/dL    Blood, Urine SMALL (1+) (A) NEGATIVE    Ketones, Urine NEGATIVE NEGATIVE mg/dL    Bilirubin, Urine NEGATIVE NEGATIVE    Urobilinogen, Urine <2.0 <2.0 mg/dL    Nitrite, Urine NEGATIVE NEGATIVE    Leukocyte Esterase, Urine NEGATIVE NEGATIVE   Microscopic Only, Urine   Result Value Ref Range    WBC, Urine NONE 1-5, NONE /HPF    RBC, Urine 1-2 NONE, 1-2, 3-5 /HPF    Mucus, Urine 1+ Reference range not established. /LPF        Assessment and Plan     Principal Problem:    Generalized weakness     Left upper quadrant/left suprapubic pain  Radiating down the left upper thigh  Intractable back pain  Post operative epidural abscess d/t MRSA   after lumbosacral laminectomy and spinal fusion s/p I&D (12/6) and repeat washout (12/11)   Acute RLE peroneal vein DVT and acute lobar and segmental PE in RLL/RML   (dx (12/15), no RV strain on TTE  Hyponatremia  E. Coli UTI s/p x5d Macrobid   CAD  hx of remote PCI   COPD  HTN  DLD     PLAN  Labs reviewed . CBC and BMP in the AM.  Monitor and replace electrolytes per protocol  Monitor on telemetry for arrhythmia  Fall precautions.  PT and OT evaluation  As needed pain control.  Bowel regimen  Due to hyponatremia we will trend sodium levels daily.  Fluid restrictions for now.  Okay to use PICC line for lab draws and medication administration  Resumed patient home medications as appropriate  Resumed Eliquis at 10 mg then taper to 5 mg per Dosepak recommendations  Resume vancomycin with pharmacy to dose.  Patient was recommended to receive 6 weeks of IV vancomycin from infectious diseases standpoint status post I&D of spinal abscess  UA without hematuria or UTI. CT abd/pelvis reviewed. See 12/19/2023 progress note for details regarding findings and new orders   Consulted orthopedic surgery to follow  patient postoperatively status post laminectomy with epidural abscess  Consulted infectious disease for antibiotic management  TCC for discharge planning  DVTp: Eliquis     12/20/2023  Reviewed patient CT of the abdomen and pelvis findings along with options to address the significant stool burden/constipation.  Patient opting to try magnesium citrate at this time.  Also discussed bladder distention with recent UTI will start Flomax.  Patient does not want bladder scans or straight catheterizations, will acknowledge these wishes until urology evaluates and provides recommendations.  Discharge planning also discussed with patient.  She understands the differences between frequency of physical therapy/Occupational Therapy at home versus a skilled rehab facility along with the need for her and her family to manage the IV antibiotic administration.  At this time she is agreeable along with her  who she states will be at home with her on FMLA to assist and also states that she has a family member that is a registered nurse.  Discussed that we would like to have her have adequate bowel movements and be seen by urology in addition to helping arrange home health care with IV antibiotic therapy after which she will be okay for discharge.  Anticipate discharge over the next 24 to 48 hours pending the aforementioned.    Plan of care was discussed extensively with patient. Patient verbalized understanding through teach back method. All questions and concerns addressed upon examination.     Of note, this documentation is completed using the Dragon Dictation system (voice recognition software). There may be spelling and/or grammatical errors that were not corrected prior to final submission

## 2023-12-20 NOTE — PROGRESS NOTES
12/20/23 1154   Discharge Planning   Home or Post Acute Services In home services   Type of Home Care Services Home nursing visits;Home OT;Home PT   Patient expects to be discharged to: HOME with [facility]   Does the patient need discharge transport arranged? No     PT does not want to return to [facility] and wants [facility] instead. Therapy AMPACs have improved. Final HCO and script in EPIC. Secure chat sent to Anali, with  home infusion, for benefit check and confirmation of SOC for tomorrow or Friday. PICC already placed.

## 2023-12-20 NOTE — CONSULTS
UROLOGY CONSULT NOTE FOR WEDNESDAY, 12/20/2023,    SUBJECTIVE: Pleasant 68-year-old white female recently status post lumbar surgery with postoperative infection currently readmitted with pain and fluid swelling around the operative site for management  In the course of admission nursing and patient report that she is voiding in a variable fashion sometimes very frequent with good amounts related to diuretics other times straining to void, constipated, diffuse low back and pelvic pain from recent surgery  Verbally she tells me she usually voids well at home only having variable degrees of urinary urgency frequency and occasional mild urge incontinence  Approximately 5 to 6 years ago she saw urology with Dr. Pérez at [facility] who treated the patient for a couple of years for overactive bladder and recurrent UTIs, no follow-up since then  Negative family history for any significant urinary tract disease  Numerous comorbidities as listed and reviewed     OBJECTIVE:   On today's visit the patient is sitting at the bedside seems reasonably comfortable although she does have some lower abdominal bloating soft no specific areas of tenderness no flank tenderness  Current lab work shows normal renal function with serum creatinine of 1.05, hemoglobin 8.0 with normal WBC count  Recent urine from 12/5/2023 positive E. coli treated with Macrobid  Current medications include Eliquis anticoagulation  Currently on Ditropan 5 mg twice daily and tamsulosin 0.4 mg daily  On vancomycin IV antibiotic coverage  CT scan current reviewed unremarkable kidneys and bladder    ASSESSMENT/PLAN  Initial urological assessment to include a repeat follow-up urine culture today, kidney and bladder ultrasounds with measured postvoid residual  Continue current medications as listed  Review ultrasound results and advise further    Thank you for allowing us to participate in the patient's care and management and will follow along  urologically    Additional medical and historical objective data reviewed and listed below      Current Facility-Administered Medications:     acetaminophen (Tylenol) tablet 650 mg, 650 mg, oral, q4h PRN **OR** acetaminophen (Tylenol) oral liquid 650 mg, 650 mg, nasogastric tube, q4h PRN **OR** acetaminophen (Tylenol) suppository 650 mg, 650 mg, rectal, q4h PRN, ORLANDO Trevizo    acetaminophen (Tylenol) tablet 650 mg, 650 mg, oral, q4h PRN **OR** acetaminophen (Tylenol) oral liquid 650 mg, 650 mg, oral, q4h PRN **OR** acetaminophen (Tylenol) suppository 650 mg, 650 mg, rectal, q4h PRN, OR[place]O Trevizo    apixaban (Eliquis) tablet 10 mg, 10 mg, oral, BID, ORLANDO Trevizo, 10 mg at 12/20/23 0853    [START ON 12/25/2023] apixaban (Eliquis) tablet 5 mg, 5 mg, oral, BID, DARRYL Trevizo-CNP    atenolol (Tenormin) tablet 50 mg, 50 mg, oral, q AM, DARRYL Trevizo-CNP, 50 mg at 12/20/23 0854    benzocaine-menthol (Cepastat Sore Throat) 15-3.6 mg lozenge 1 lozenge, 1 lozenge, Mouth/Throat, q2h PRN, DARRYL Trevizo-CNP    bisacodyl (Dulcolax) EC tablet 10 mg, 10 mg, oral, Daily PRN, DARRYL Trevizo-CNP    brimonidine (AlphaGAN) 0.2 % ophthalmic solution 1 drop, 1 drop, Both Eyes, BID, DARRYL Trevizo-CNP, 1 drop at 12/20/23 0857    busPIRone (Buspar) tablet 10 mg, 10 mg, oral, Daily, DARRYL Trevizo-CNP, 10 mg at 12/20/23 0854    calcium carbonate (Tums) chewable tablet 500 mg, 500 mg, oral, 4x daily PRN, ORLANDO Trevizo    cholecalciferol (Vitamin D-3) tablet 2,000 Units, 2,000 Units, oral, Daily, DARRYL Trevizo-CNP, 2,000 Units at 12/20/23 0855    cyclobenzaprine (Flexeril) tablet 5 mg, 5 mg, oral, TID PRN, ORLANDO Trevizo, 5 mg at 12/19/23 1254    dextromethorphan-guaifenesin (Robitussin DM)  mg/5 mL oral liquid 5 mL, 5 mL, oral, q4h PRN, DARRYL Trevizo-CNP    ezetimibe (Zetia) tablet 10 mg, 10 mg, oral,  Daily, Mary PRASHANTH Michaud, APRN-CNP, 10 mg at 12/20/23 0852    furosemide (Lasix) tablet 20 mg, 20 mg, oral, Daily, Mary C Marcelaamed, APRN-CNP, 20 mg at 12/20/23 0855    guaiFENesin (Mucinex) 12 hr tablet 600 mg, 600 mg, oral, q12h PRN, Mary C Jaswant, APRN-CNP    hydrALAZINE (Apresoline) injection 10 mg, 10 mg, intravenous, q8h PRN, Mary PRASHANTH Medranoamed, APRN-CNP    ipratropium-albuteroL (Duo-Neb) 0.5-2.5 mg/3 mL nebulizer solution 3 mL, 3 mL, nebulization, q6h PRN, Mary PRASHANTH Michaud, APRN-CNP    isosorbide mononitrate ER (Imdur) 24 hr tablet 60 mg, 60 mg, oral, Daily, Marysaray Michaud, APRN-CNP, 60 mg at 12/20/23 0601    lactobacillus acidophilus tablet 1 tablet, 1 tablet, oral, Daily, Mary C Marcelaamed, APRN-CNP, 1 tablet at 12/20/23 0855    [Held by provider] loratadine (Claritin) tablet 10 mg, 10 mg, oral, Daily, Mary C Jaswant, APRN-CNP, 10 mg at 12/19/23 1353    meclizine (Antivert) tablet 25 mg, 25 mg, oral, TID PRN, Mary Michaud, APRN-CNP    melatonin tablet 3 mg, 3 mg, oral, Nightly PRN, Mary PRASHANTH Medranoamed, APRN-CNP    mirtazapine (Remeron) tablet 15 mg, 15 mg, oral, Nightly, Mary C Marcelaamed, APRN-CNP, 15 mg at 12/19/23 2116    morphine CR (MS Contin) 12 hr tablet 15 mg, 15 mg, oral, q12h EDE, Panchito Barnes MD, 15 mg at 12/20/23 0856    nitroglycerin (Nitrostat) SL tablet 0.4 mg, 0.4 mg, sublingual, q5 min PRN, Mary PRASHANTH Michaud, APRN-CNP    oxybutynin (Ditropan) tablet 5 mg, 5 mg, oral, BID, OR[place]O Trevizo, 5 mg at 12/20/23 0854    oxyCODONE-acetaminophen (Percocet) 5-325 mg per tablet 1 tablet, 1 tablet, oral, q6h PRN, ORLANDO Trevizo    oxygen (O2) therapy, , inhalation, Continuous PRN - O2/gases, ORLANDO Trevizo    pantoprazole (ProtoNix) EC tablet 40 mg, 40 mg, oral, Daily before breakfast, 40 mg at 12/20/23 0601 **OR** pantoprazole (ProtoNix) injection 40 mg, 40 mg, intravenous, Daily before breakfast, OR[place]O Trevizo    pilocarpine (Salagen) tablet 5 mg, 5  mg, oral, Daily, Mary C Jaswant, APRN-CNP, 5 mg at 12/20/23 0601    potassium chloride CR (Klor-Con) ER tablet 10 mEq, 10 mEq, oral, Daily, Mary C Mohamed, APRN-CNP, 10 mEq at 12/20/23 0854    pregabalin (Lyrica) capsule 75 mg, 75 mg, oral, BID, Mary C Marcelaamed, APRN-CNP, 75 mg at 12/20/23 0853    promethazine (Phenergan) tablet 25 mg, 25 mg, oral, q6h PRN **OR** promethazine (Phenergan) suppository 25 mg, 25 mg, rectal, q12h PRN, Mary C Jaswant, APRN-CNP    simvastatin (Zocor) tablet 40 mg, 40 mg, oral, Nightly, Mary C Marcelaamed, APRN-CNP, 40 mg at 12/19/23 2115    sodium chloride 0.9% flush 10 mL, 10 mL, intra-catheter, q12h, Mary C Marcelaamed, APRN-CNP, 10 mL at 12/20/23 1153    sodium chloride 0.9% flush 10 mL, 10 mL, intra-catheter, PRN, Mary PRASHANTH Michaud, APRN-CNP    tamsulosin (Flomax) 24 hr capsule 0.4 mg, 0.4 mg, oral, Daily, Mary C Marcelaamed, APRN-CNP, 0.4 mg at 12/20/23 1124    tiotropium (Spiriva Respimat) 2.5 mcg/actuation inhaler 2 puff, 2 Inhalation, inhalation, Daily, Mary C Jaswant, APRN-CNP, 2 puff at 12/20/23 0601    vancomycin (Vancocin) in dextrose 5 % water (D5W) 250 mL IV 1,250 mg, 1,250 mg, intravenous, q24h, Mary C Marcealamed, APRN-CNP, Stopped at 12/20/23 1011   Results for orders placed or performed during the hospital encounter of 12/19/23 (from the past 96 hour(s))   CBC and Auto Differential   Result Value Ref Range    WBC 13.8 (H) 4.4 - 11.3 x10*3/uL    nRBC 0.2 (H) 0.0 - 0.0 /100 WBCs    RBC 2.68 (L) 4.00 - 5.20 x10*6/uL    Hemoglobin 8.3 (L) 12.0 - 16.0 g/dL    Hematocrit 26.1 (L) 36.0 - 46.0 %    MCV 97 80 - 100 fL    MCH 31.0 26.0 - 34.0 pg    MCHC 31.8 (L) 32.0 - 36.0 g/dL    RDW 14.6 (H) 11.5 - 14.5 %    Platelets 289 150 - 450 x10*3/uL    Neutrophils % 71.4 40.0 - 80.0 %    Immature Granulocytes %, Automated 5.0 (H) 0.0 - 0.9 %    Lymphocytes % 12.2 13.0 - 44.0 %    Monocytes % 8.3 2.0 - 10.0 %    Eosinophils % 2.9 0.0 - 6.0 %    Basophils % 0.2 0.0 - 2.0 %    Neutrophils  Absolute 9.86 (H) 1.20 - 7.70 x10*3/uL    Immature Granulocytes Absolute, Automated 0.69 0.00 - 0.70 x10*3/uL    Lymphocytes Absolute 1.68 1.20 - 4.80 x10*3/uL    Monocytes Absolute 1.15 (H) 0.10 - 1.00 x10*3/uL    Eosinophils Absolute 0.40 0.00 - 0.70 x10*3/uL    Basophils Absolute 0.03 0.00 - 0.10 x10*3/uL   Basic metabolic panel   Result Value Ref Range    Glucose 128 (H) 74 - 99 mg/dL    Sodium 128 (L) 136 - 145 mmol/L    Potassium 4.3 3.5 - 5.3 mmol/L    Chloride 95 (L) 98 - 107 mmol/L    Bicarbonate 23 21 - 32 mmol/L    Anion Gap 14 10 - 20 mmol/L    Urea Nitrogen 12 6 - 23 mg/dL    Creatinine 0.98 0.50 - 1.05 mg/dL    eGFR 62 >60 mL/min/1.73m*2    Calcium 8.8 8.6 - 10.3 mg/dL   Urinalysis with Reflex Microscopic and Culture   Result Value Ref Range    Color, Urine Yellow Straw, Yellow    Appearance, Urine Clear Clear    Specific Gravity, Urine 1.006 1.005 - 1.035    pH, Urine 7.0 5.0, 5.5, 6.0, 6.5, 7.0, 7.5, 8.0    Protein, Urine 30 (1+) (N) NEGATIVE mg/dL    Glucose, Urine NEGATIVE NEGATIVE mg/dL    Blood, Urine SMALL (1+) (A) NEGATIVE    Ketones, Urine NEGATIVE NEGATIVE mg/dL    Bilirubin, Urine NEGATIVE NEGATIVE    Urobilinogen, Urine <2.0 <2.0 mg/dL    Nitrite, Urine NEGATIVE NEGATIVE    Leukocyte Esterase, Urine NEGATIVE NEGATIVE   Extra Urine Gray Tube   Result Value Ref Range    Extra Tube Hold for add-ons.    Urinalysis Microscopic   Result Value Ref Range    WBC, Urine 1-5 1-5, NONE /HPF    RBC, Urine NONE NONE, 1-2, 3-5 /HPF    Squamous Epithelial Cells, Urine 1-9 (SPARSE) Reference range not established. /HPF    Bacteria, Urine 1+ (A) NONE SEEN /HPF   Sedimentation Rate   Result Value Ref Range    Sedimentation Rate 23 0 - 30 mm/h   C-Reactive Protein   Result Value Ref Range    C-Reactive Protein 17.58 (H) <1.00 mg/dL   ECG 12 lead   Result Value Ref Range    Ventricular Rate 86 BPM    Atrial Rate 86 BPM    SD Interval 92 ms    QRS Duration 138 ms    QT Interval 400 ms    QTC Calculation(Bazett)  478 ms    P Axis 65 degrees    R Axis 10 degrees    T Axis 69 degrees    QRS Count 15 beats    Q Onset 211 ms    P Onset 165 ms    P Offset 201 ms    T Offset 411 ms    QTC Fredericia 451 ms   Vancomycin   Result Value Ref Range    Vancomycin 36.0 (H) 5.0 - 20.0 ug/mL   Vancomycin   Result Value Ref Range    Vancomycin 28.9 (H) 5.0 - 20.0 ug/mL   SST TOP   Result Value Ref Range    Extra Tube Hold for add-ons.    CBC   Result Value Ref Range    WBC 10.7 4.4 - 11.3 x10*3/uL    nRBC 0.0 0.0 - 0.0 /100 WBCs    RBC 2.60 (L) 4.00 - 5.20 x10*6/uL    Hemoglobin 8.0 (L) 12.0 - 16.0 g/dL    Hematocrit 25.2 (L) 36.0 - 46.0 %    MCV 97 80 - 100 fL    MCH 30.8 26.0 - 34.0 pg    MCHC 31.7 (L) 32.0 - 36.0 g/dL    RDW 14.6 (H) 11.5 - 14.5 %    Platelets 293 150 - 450 x10*3/uL   Basic metabolic panel   Result Value Ref Range    Glucose 122 (H) 74 - 99 mg/dL    Sodium 133 (L) 136 - 145 mmol/L    Potassium 4.1 3.5 - 5.3 mmol/L    Chloride 97 (L) 98 - 107 mmol/L    Bicarbonate 28 21 - 32 mmol/L    Anion Gap 12 10 - 20 mmol/L    Urea Nitrogen 10 6 - 23 mg/dL    Creatinine 1.05 0.50 - 1.05 mg/dL    eGFR 57 (L) >60 mL/min/1.73m*2    Calcium 8.7 8.6 - 10.3 mg/dL   Urinalysis with Reflex Microscopic   Result Value Ref Range    Color, Urine Yellow Straw, Yellow    Appearance, Urine Clear Clear    Specific Gravity, Urine 1.024 1.005 - 1.035    pH, Urine 7.0 5.0, 5.5, 6.0, 6.5, 7.0, 7.5, 8.0    Protein, Urine NEGATIVE NEGATIVE mg/dL    Glucose, Urine NEGATIVE NEGATIVE mg/dL    Blood, Urine SMALL (1+) (A) NEGATIVE    Ketones, Urine NEGATIVE NEGATIVE mg/dL    Bilirubin, Urine NEGATIVE NEGATIVE    Urobilinogen, Urine <2.0 <2.0 mg/dL    Nitrite, Urine NEGATIVE NEGATIVE    Leukocyte Esterase, Urine NEGATIVE NEGATIVE   Microscopic Only, Urine   Result Value Ref Range    WBC, Urine NONE 1-5, NONE /HPF    RBC, Urine 1-2 NONE, 1-2, 3-5 /HPF    Mucus, Urine 1+ Reference range not established. /LPF     CT abdomen pelvis w IV contrast    Result Date:  12/19/2023  STUDY: CT Abdomen and Pelvis with IV Contrast; 12/19/2023 at 4:01 PM. INDICATION: Left lower quadrant and left suprapubic pain post menopause. COMPARISON: CT AP 12/15/2023; CTA AP 12/5/2023. ACCESSION NUMBER(S): [id number] ORDERING CLINICIAN: CHEKO CONNOLLY TECHNIQUE: CT of the abdomen and pelvis was performed.  Contiguous axial images were obtained at 3 mm slice thickness through the abdomen and pelvis. Coronal and sagittal reconstructions at 3 mm slice thickness were performed.  Omnipaque 350 75 mL was administered intravenously.  FINDINGS: LOWER CHEST: No cardiomegaly.  No pericardial effusion.  There is subsegmental atelectasis.  Small pleural effusions in the lung bases.  ABDOMEN:  LIVER: No hepatomegaly.  Smooth surface contour.  Normal attenuation.  BILE DUCTS: No intrahepatic or extrahepatic biliary ductal dilatation.  GALLBLADDER: The gallbladder is absent. STOMACH: No abnormalities identified.  PANCREAS: No masses or ductal dilatation.  SPLEEN: No splenomegaly or focal splenic lesion.  ADRENAL GLANDS: No thickening or nodules.  KIDNEYS AND URETERS: Kidneys are normal in size and location.  No renal or ureteral calculi.  PELVIS:  BLADDER: No abnormalities identified.  REPRODUCTIVE ORGANS: No abnormalities identified.  BOWEL: There is diverticulosis.  VESSELS: No abnormalities identified.  Abdominal aorta is normal in caliber. There is a femoral-femoral crossover graft.  PERITONEUM/RETROPERITONEUM/LYMPH NODES: No free fluid.  No pneumoperitoneum. No lymphadenopathy.  ABDOMINAL WALL: There is left inguinal hernia containing fat. SOFT TISSUES: No abnormalities identified.  BONES: The patient status post laminectomy with posterior fusion from L4 to S1.  When compared to December 2023, surgical drain posteriorly has been removed.  There is now a thick-walled fluid collection posteriorly measuring 3.3 x 3.0 and extending over distance of at least 12 cm.    Status post laminectomy with posterior  fusion.  There is a 3.3 x 3.0 x 12 cm thick-walled fluid collection posteriorly in the subcutaneous tissues.  This may represent a postoperative seroma however, CSF leak or abscess cannot be excluded.  This is new from December 2023. Diverticulosis without diverticulitis. Signed by Andre Mckee MD    ECG 12 lead    Result Date: 12/19/2023  Sinus rhythm with short TX Left bundle branch block Abnormal ECG When compared with ECG of 18-DEC-2023 12:36, (unconfirmed) TX interval has decreased Left bundle branch block has replaced Incomplete right bundle branch block    CT lumbar spine wo IV contrast    Result Date: 12/19/2023  Interpreted By:  Finkelstein, Evan, STUDY: CT LUMBAR SPINE WO IV CONTRAST;  12/19/2023 4:15 am   INDICATION: Signs/Symptoms:Status postlaminectomy history of epidural abscess.   COMPARISON: None.   ACCESSION NUMBER(S): [id number]   ORDERING CLINICIAN: HEATHER ARRINGTON   TECHNIQUE: Axial noncontrast CT images of the lumbar spine with coronal and sagittal reconstructed images.   FINDINGS: Postsurgical changes of posterior instrumented fusion L4 through S1. There are bilateral transpedicular screws with vertical connecting rods and interbody spacers at L4/L5 and L5/S1. ALIGNMENT: No traumatic malalignment VERTEBRAE: No acute loss of vertebral body height. DISC SPACES: Interbody spacers at L4/L5 and L5/S1. Otherwise, the disc spaces are preserved without significant narrowing. SPINAL CANAL: No critical spinal canal stenosis above the level of surgery. There has been posterior decompression of L3 through L5, however, the canal at this level is limited due to extensive streak artifact from hardware.. PARAVERTEBRAL SOFT TISSUES: Within the superficial back soft tissues, there is a collection of fluid measuring up to approximately 3.4 x 3.4 x 12.8 cm (maximum AP by transverse by craniocaudal dimension), extending from the level of L1 through L5. There are several punctate lucencies within this collection of  fluid. Deep to the superficial collection, there is also fluid and/or soft tissue thickening near the canal at the level of prior surgery L4 through S1 which extends posteriorly surrounding the posterior elements. Evaluation, however, is severely limited due to streak artifact from lumbar spine hardware.         Postsurgical changes of posterior instrumented fusion L4 through S1 as above with posterior decompression L3 through L5. 3.4 x 3.4 x 12.8 cm collection of fluid in the superficial posterior back soft tissues extending from the level of L1 through L5. There are punctate lucencies within the collection concerning for infection given the timing of surgery greater than 1 week ago. There is also fluid and/or soft tissue thickening deep to the superficial collection extending from the level of the spinal canal into the paraspinal soft tissues and surrounding the posterior elements. No definite fat plane is seen between this superficial collection and the deeper situated fluid. Evaluation of this fluid and/or soft tissue thickening, however, is severely limited due to extensive streak artifact from surgical hardware.     MACRO: None.   Signed by: Evan Finkelstein 12/19/2023 4:27 AM Dictation workstation:   [id number]    XR lumbar spine 2-3 views    Result Date: 12/19/2023  Interpreted By:  Paul Melvin, STUDY: XR LUMBAR SPINE 2-3 VIEWS; ;  12/19/2023 2:34 am   INDICATION: Signs/Symptoms:Status post L3 S1 laminectomy, L4 L5-S1 TLIF.   COMPARISON: 08/23/2023   ACCESSION NUMBER(S): [id number]   ORDERING CLINICIAN: HEATHER ARRINGTON   FINDINGS: AP, lateral, and L5-S1 spot views of the lumbar spine: Status post L4-S1 posterior spinal fusion and laminectomy as well as L4-L5 and L5-S1 interbody spacer placement. Hardware is intact. There is mild grade 1 anterolisthesis of L5 on S1. There is mild multilevel endplate osteophyte formation. Mild disc space narrowing, most pronounced at L3-L4. Right upper quadrant  surgical clips. Moderate colonic stool volume. Vascular calcifications.       1. No acute osseous abnormality. 2. Postsurgical changes from L4-S1 with intact hardware. 3. Mild multilevel spinal degenerative change. 4. Moderate colonic stool volume.   Signed by: Paul Melvin 12/19/2023 2:45 AM Dictation workstation:   [id number]    Transthoracic Echo (TTE) Complete    Result Date: 12/16/2023          [place] [id number]  Tel [phone] Fax [phone] TRANSTHORACIC ECHOCARDIOGRAM REPORT  Patient Name:      KILEY TERRY PURVI    Reading Physician:    [id number] Yoav Neville DO Study Date:        12/16/2023            Ordering Provider:    [id number] FOREIGN HUBER MRN/PID:           [id number]              Fellow: Accession#:        [id number]          Nurse: Date of Birth/Age: 1955 / 68 years Sonographer:          Gertrudis Squires                                                                [facility] [[email or web address]] Gender:            F                     Additional Staff: Height:            144.78 cm             Admit Date:           12/5/2023 Weight:            82.56 kg              Admission Status:     Inpatient -                                                                Routine BSA:               1.73 m2               Department Location:  [address] [place] [address] Blood Pressure: 128 /62 mmHg Study Type:    TRANSTHORACIC ECHO (TTE) COMPLETE Diagnosis/ICD: Personal history of pulmonary embolism-Z86.711 Indication:    PE CPT Codes:     Echo Complete w Full Doppler-[id number] Patient History: Smoker:            Current. Pertinent History: HTN, Hyperlipidemia, Dyspnea, PVD, CAD and AAA. Study Detail: The following Echo studies were performed: 2D, M-Mode, Doppler and               color flow. Definity used as a contrast agent for endocardial               border definition.  Total contrast used for this procedure was 2 mL               via IV push. The patient was awake.  PHYSICIAN INTERPRETATION: Left Ventricle: Left ventricular systolic function is normal, with an estimated ejection fraction of 55%. There are no regional wall motion abnormalities. The left ventricular cavity size is normal. The left ventricular septal wall thickness is mildly increased. Spectral Doppler shows an impaired relaxation pattern of left ventricular diastolic filling. LV Wall Scoring: All segments are normal. Left Atrium: The left atrium is upper limits of normal in size. Right Ventricle: The right ventricle is normal in size. There is normal right ventricular global systolic function. Right Atrium: The right atrium is normal in size. Aortic Valve: The aortic valve appears structurally normal. The aortic valve appears tricuspid. There is mild aortic valve cusp calcification. There is no evidence of aortic valve stenosis. There is no evidence of aortic valve regurgitation. The peak instantaneous gradient of the aortic valve is 13.5 mmHg. The mean gradient of the aortic valve is 8.0 mmHg. Mitral Valve: The mitral valve is normal in structure. There is no evidence of mitral valve stenosis. There is normal mitral valve leaflet mobility. There is trace mitral valve regurgitation. Tricuspid Valve: The tricuspid valve is structurally normal. There is normal tricuspid valve leaflet mobility. There is trace tricuspid regurgitation. Pulmonic Valve: The pulmonic valve is structurally normal. There is no indication of pulmonic valve regurgitation. Pericardium: There is no pericardial effusion noted. Aorta: The aortic root is normal. Pulmonary Artery: The tricuspid regurgitant velocity is 3.08 m/s, and with an estimated right atrial pressure of 3 mmHg, the estimated pulmonary artery pressure is moderately elevated with the RVSP at 40.9 mmHg. Systemic Veins: The inferior vena cava appears to be of normal size. In  comparison to the previous echocardiogram(s): Prior examinations are available and were reviewed for comparison purposes. Study relatively unchanged from my study done here in June 2022.  CONCLUSIONS:  1. Left ventricular systolic function is normal with a 55% estimated ejection fraction.  2. Spectral Doppler shows an impaired relaxation pattern of left ventricular diastolic filling.  3. There is no evidence of mitral valve stenosis.  4. Trace mitral valve regurgitation.  5. Trace tricuspid regurgitation is visualized.  6. Aortic valve stenosis is not present.  7. Moderately elevated pulmonary artery pressure. QUANTITATIVE DATA SUMMARY: 2D MEASUREMENTS:                          Normal Ranges: Ao Root d:     2.30 cm   (2.0-3.7cm) LAs:           3.90 cm   (2.7-4.0cm) IVSd:          1.23 cm   (0.6-1.1cm) LVPWd:         1.00 cm   (0.6-1.1cm) LVIDd:         4.11 cm   (3.9-5.9cm) LVIDs:         2.97 cm LV Mass Index: 89.5 g/m2 LV % FS        27.7 % LA VOLUME:                               Normal Ranges: LA Vol A4C:        36.7 ml    (22+/-6mL/m2) LA Vol A2C:        35.2 ml LA Vol BP:         37.2 ml LA Vol Index A4C:  21.2ml/m2 LA Vol Index A2C:  20.4 ml/m2 LA Vol Index BP:   21.5 ml/m2 LA Area A4C:       15.3 cm2 LA Area A2C:       14.5 cm2 LA Major Axis A4C: 5.4 cm LA Major Axis A2C: 5.1 cm LA Volume Index:   20.6 ml/m2 RA VOLUME BY A/L METHOD:                               Normal Ranges: RA Vol A4C:        46.8 ml    (8.3-19.5ml) RA Vol Index A4C:  27.1 ml/m2 RA Area A4C:       17.4 cm2 RA Major Axis A4C: 5.5 cm AORTA MEASUREMENTS:                    Normal Ranges: Asc Ao, d: 2.20 cm (2.1-3.4cm) LV SYSTOLIC FUNCTION BY 2D PLANIMETRY (MOD):                     Normal Ranges: EF-A4C View: 50.3 % (>=55%) EF-A2C View: 56.0 % EF-Biplane:  55.4 % LV DIASTOLIC FUNCTION:                        Normal Ranges: MV Peak E:    1.12 m/s (0.7-1.2 m/s) MV Peak A:    1.21 m/s (0.42-0.7 m/s) E/A Ratio:    0.93     (1.0-2.2) MV e'          0.12 m/s (>8.0) MV lateral e' 0.12 m/s MV medial e'  0.12 m/s E/e' Ratio:   9.33     (<8.0) MITRAL VALVE:                 Normal Ranges: MV DT: 250 msec (150-240msec) AORTIC VALVE:                                    Normal Ranges: AoV Vmax:                1.84 m/s  (<=1.7m/s) AoV Peak P.5 mmHg (<20mmHg) AoV Mean P.0 mmHg  (1.7-11.5mmHg) LVOT Max Anam:            1.54 m/s  (<=1.1m/s) AoV VTI:                 44.90 cm  (18-25cm) LVOT VTI:                36.40 cm LVOT Diameter:           1.80 cm   (1.8-2.4cm) AoV Area, VTI:           2.06 cm2  (2.5-5.5cm2) AoV Area,Vmax:           2.13 cm2  (2.5-4.5cm2) AoV Dimensionless Index: 0.81  RIGHT VENTRICLE: RV Basal 3.57 cm RV Mid   2.77 cm RV Major 6.9 cm TAPSE:   28.2 mm RV s'    0.13 m/s TRICUSPID VALVE/RVSP:                             Normal Ranges: Peak TR Velocity: 3.08 m/s RV Syst Pressure: 40.9 mmHg (< 30mmHg) IVC Diam:         1.79 cm PULMONIC VALVE:                         Normal Ranges: PV Accel Time: 85 msec  (>120ms) PV Max Anam:    1.3 m/s  (0.6-0.9m/s) PV Max P.0 mmHg  Sari Neville DO Electronically signed on 2023 at 12:29:49 PM  Wall Scoring  ** Final **     Lower extremity venous duplex bilateral    Result Date: 12/15/2023  Interpreted By:  Keagan Sanders, STUDY: [facility] LOWER EXTREMITY VENOUS DUPLEX BILATERAL;  12/15/2023 8:14 pm   INDICATION: Signs/Symptoms:DVT.   COMPARISON: None.   ACCESSION NUMBER(S): [id number]   ORDERING CLINICIAN: LILI AVILES   TECHNIQUE: Vascular ultrasound of the bilateral lower extremities was performed. Real-time compression views as well as Gray scale, color Doppler and spectral Doppler waveform analysis was performed.   FINDINGS: Evaluation of the visualized portions of the bilateral common femoral vein, proximal, mid, and distal femoral vein, and popliteal vein were performed.  Evaluation of the visualized portions of the  posterior tibial and peroneal veins were  also performed.   The right common femoral vein, femoral vein, popliteal vein and posterior tibial veins are patent. There is thrombus in the right peroneal veins.   The left common femoral vein, femoral vein, popliteal vein and the left posterior tibial and peroneal veins are patent.       Thrombus of right peroneal veins.   MACRO:   Keagan Sanders discussed the significance and urgency of this critical finding by telephone with  LILI AVILES on 12/15/2023 at 8:41 pm. (**-RCF-**) Findings:  See findings.     Signed by: Keagan Sanders 12/15/2023 8:41 PM Dictation workstation:   [id number]    CT angio chest for pulmonary embolism    Result Date: 12/15/2023  Interpreted By:  Keagan Sanders, STUDY: CT ANGIO CHEST FOR PULMONARY EMBOLISM;  12/15/2023 7:39 pm   INDICATION: Signs/Symptoms:PE?.   COMPARISON: None.   ACCESSION NUMBER(S): [id number]   ORDERING CLINICIAN: LILI AVILES   TECHNIQUE: Contiguous axial images of the chest, abdomen and pelvis were obtained after the intravenous administration of  contrast. Coronal and sagittal reformatted images were obtained from the axial images. MIPS and 3D reformatted images were also performed and reviewed.   FINDINGS: No axillary, mediastinal, or hilar lymphadenopathy.   The heart is normal in size. Coronary artery atherosclerotic calcifications. RV to LV ratio 1.1. No significant pericardial effusion. There are acute pulmonary emboli in lobar and segmental branches in the right lower lobe and middle lobe.   Mild bibasilar subsegment atelectasis. No significant pleural effusion. No pneumothorax.   Limited evaluation of the upper abdomen.   Multilevel degenerative change of the thoracic spine.       Acute lobar and segmental pulmonary emboli in the right lower lobe and middle lobe. RV to LV ratio 1.1; please correlate for component of right heart strain   MACRO: Keagan Sanders discussed the significance and urgency of this critical finding by telephone with  LILI  TRACI on 12/15/2023 at 8:41 pm. (**-RCF-**) Findings:  See findings.   Signed by: Keagan Sanders 12/15/2023 8:41 PM Dictation workstation:   [id number]    CT abdomen pelvis w IV contrast    Result Date: 12/15/2023  Interpreted By:  Schoenberger, Joseph, STUDY: CT ABDOMEN PELVIS W IV CONTRAST;  12/15/2023 2:23 pm   INDICATION: Signs/Symptoms:abd pain.   COMPARISON: 12/05/2023   ACCESSION NUMBER(S): [id number]   ORDERING CLINICIAN: LILI AVILES   TECHNIQUE: CT of the abdomen and pelvis was performed.  Standard contiguous axial images were obtained at 3 mm slice thickness through the abdomen and pelvis. Coronal and sagittal reconstructions at 3 mm slice thickness were performed.   75 ml of contrast Omnipaque 350 were administered intravenously without immediate complication.   FINDINGS: LOWER CHEST: In a left lower lobe vessel there appears to be a vascular structures that bifurcates and likely represents an artery. The possibility of a filling defect is a consideration. Rec the possibility of an acute pulmonary embolus should be considered. Reference coronal reconstructions images numbered 70 through 73 of 130 series 202. It is recommended this patient undergo a CT pulmonary angiogram to evaluate further. There are areas platelike atelectasis in both lung bases. Otherwise unremarkable.   ABDOMEN:   LIVER: Within normal limits.   BILE DUCTS: Normal caliber.   GALLBLADDER: Surgically absent   PANCREAS: Within normal limits.   SPLEEN: Within normal limits.   ADRENAL GLANDS: Bilateral adrenal glands appear normal.   KIDNEYS AND URETERS: The kidneys are normal in size and enhance symmetrically.  No hydroureteronephrosis or nephroureterolithiasis is identified.   PELVIS:   BLADDER: Within normal limits.   REPRODUCTIVE ORGANS: 1 unremarkable   BOWEL: The stomach is unremarkable. The small and large bowel are normal in caliber and demonstrate no wall thickening.   Normal appendix.   VESSELS: There is no aneurysmal  dilatation of the abdominal aorta. The IVC appears normal.   PERITONEUM/RETROPERITONEUM/LYMPH NODES: No ascites or free air, no fluid collection.  No abdominopelvic lymphadenopathy is present.   BONES AND ABDOMINAL WALL: No suspicious osseous lesions are identified. Degenerative discogenic disease is noted in the lower thoracic and lumbar spine.   In the interval since the prior exam 2 drains have been placed in the spinal operative site. 1 from the left enters at the mid sacral level traveling cranially to the epidural region. The 2nd enters more superiorly to the right traveling cranially in the subcutaneous is tissues. These appear to have drained the fluid collections successfully. Again postoperative findings are unchanged from the prior with extensive laminectomy and posterior fusion hardware placement.       1.  Somewhat subtle but possible acute pulmonary emboli in the right lower lobe. Recommend further evaluation with CT pulmonary angiogram. 2. Successful drainage of fluid collections in the lumbar operative site when compared to the previous exam.   The possibility of an acute pulmonary embolus and recommendation for CT a chest was communicated using the secure messaging system through the medical record to the referring physician at the time of this exam.   Signed by: Joseph Schoenberger 12/15/2023 3:09 PM Dictation workstation:   [id number]    MR lumbar spine w and wo IV contrast    Result Date: 12/10/2023  Interpreted By:  Cyrus Disla, STUDY: MRI of the lumbar spine without IV contrast;  12/10/2023 2:07 pm   INDICATION: Signs/Symptoms:increasing back pain wiht previous fluid collection. Wound culture growing MRSA intractable back pain although slightly better than yesterday.   COMPARISON: 12/05/2023 MR   ACCESSION NUMBER(S): [id number]   ORDERING CLINICIAN: ALMITA THOMPSON   TECHNIQUE: Sagittal and axial STIR and T1-weighted MRI images of the lumbar spine were acquired using a spondylolysis  protocol.  Enhanced images were obtained using 17 mL gadoterate (Dotarem).   FINDINGS: There are 5 lumbar type vertebrae.   The previously noted epidural collection along the posterior thecal sac in the region of the L3-4 to L5 laminectomy has decreased in size to 1.6 x 3 x 5.8 cm (previously 3 x 3.9 x 5.6 cm. The fluid has a small amount layering dependent hemorrhage. The overall compression of the thecal sac has decreased.   A drainage catheter has been inserted into the previously noted subcutaneous fluid collection extending from L1-L5. The catheter enters the fluid collection from the left at S1, coursing superiorly along the right side of the collection then curving to the left at L2 with inferior extension to L4-5.   The overall size of the collection has decreased to 2.9 x 4.6 cm transverse dimension, 13.4 cm length (previously 3.9 x 6.9 x 13.1 cm).   Bilateral pedicle screw/fabrizio fusion with interbody spacing devices is again noted from L4-S1.   L5 has grade 1 anterolisthesis on S1.   The thecal sac is mildly to moderately stenosed at L3-4, less so than noted on the prior exam secondary to the fluid collection along the posterior thecal sac.   The thecal sac is also mildly to moderately stenosed at L4, improved from the prior exam.   No other significant stenoses of the spinal canal are noted.   The midline posterior musculature in the region of the laminectomy has edema and enhancement similar to the prior exam. No localized collections within the muscles themselves are noted.       The epidural and subcutaneous fluid collections have decreased in size as noted above following placement of a drain in the subcutaneous tissue.   The overall degree of thecal sac stenosis has improved as well.   I personally reviewed the images/study and I agree with the findings as stated. This study was interpreted at [facility], [place], [place].   MACRO: None   Signed by: Cyrus Disla  12/10/2023 2:52 PM Dictation workstation:   [id number]    Bedside PICC Imaging    Result Date: 12/7/2023  These images are not reportable by radiology and will not be interpreted by  Radiologists.    MR lumbar spine w and wo IV contrast    Result Date: 12/5/2023  Interpreted By:  Jh Benoit and Tavana Shahrzad STUDY: MR LUMBAR SPINE W AND WO IV CONTRAST;  12/5/2023 4:39 pm   INDICATION: Signs/Symptoms:Recent laminectomy with fusion, intractable back pain radiates legs.   COMPARISON: MRI 08/20/2023, CT 12/05/2023   ACCESSION NUMBER(S): [id number]   ORDERING CLINICIAN: SHAWN REDDY   TECHNIQUE: Sagittal T1, T2, STIR, axial T1 and T2 weighted images of the lumbar spine were acquired without and following administration of 15 cc Dotarem gadolinium based IV contrast.   FINDINGS: Motion degraded examination.   Alignment: The vertebral alignment is maintained.   Vertebrae/Intervertebral Discs: Postsurgical changes of L4 through S1 posterior fusion noted with bilateral transpedicular screws and interbody graft placement at L4-5 and L5-S1. There has been bilateral laminectomies at L3-4, L4-5, and L5-S1. The vertebral bodies demonstrate expected height. There is mild osseous edema involving L5 and S1 vertebral bodies. The marrow signal is within normal limits. Multilevel intervertebral disc height loss and disc desiccation noted.   A large dorsal extra-spinal fluid collection is noted extending from the laminectomy bed through the deep muscular fascia into the subcutaneous fat. This collection extends from the right lateral recess at the level of L4-5 as well as L5-S1 into the spinal canal and deforms the thecal sac. A focal defect is noted within the thecal sac at the level of L5-S1 (series 9, image 20). 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 The collections do not demonstrate significant peripheral enhancement to suggest abscess.   Conus: The lower thoracic cord appears unremarkable. The conus  terminates at L1.   T12-L1: Disc bulge. There is no significant central canal or neural foraminal stenosis.   L1-2: Disc bulge, ligamentum flavum thickening and facet hypertrophy contribute to minimal central canal narrowing.   L2-3: Disc bulge and mild prominence of the posterior epidural fat resulting in minimal narrowing of the central canal. There is moderate right and minimal left foraminal narrowing due to intraforaminal disc herniation and facet hypertrophy, similar to preoperative examination.   L3-4: Laminectomies. There is moderate narrowing of the thecal sac due to disc bulge and dorsal spinal collection as detailed above. There is unchanged marked bilateral foraminal narrowing due to intraforaminal disc herniation and facet hypertrophic changes with possible impingement of bilateral exiting L3 nerve roots.   L4-5: Bilateral laminectomies. There is moderate narrowing of the central canal with indentation of the thecal sac posteriorly due to the dorsal spinal fluid collection as detailed above. Moderate right and mild left foraminal narrowing is overall similar to MRI dated 08/20/2020 3D to intraforaminal disc herniations and facet hypertrophic changes.   L5-S1: Bilateral laminectomies. The dorsal spinal fluid collection indents the thecal sac as detailed above. Evaluation of the foramina is limited due to susceptibility artifact from bilateral transpedicular screws.  There is persistent marked left and moderate right foraminal narrowing due to intraforaminal disc herniation and facet hypertrophy with possible impingement of the exiting left L5 nerve root.       1.  Postoperative changes as above. A large dorsal extra-spinal fluid collection is noted extending from the laminectomy bed through the deep muscular fascia into the subcutaneous fat. This collection extends from the right lateral recesses at the level of L4-5 as well as L5-S1 into the spinal canal and deforms the thecal sac resulting in moderate  narrowing at L4-5 and to a lesser degree at L3-4. A focal defect is noted within the thecal sac at the level of L5-S1. 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 No significant peripheral enhancement is identified to suggest abscess although the sterility of this collection can not be ascertained on MRI alone. 2. Multilevel degenerative changes again noted with persistent marked bilateral foraminal narrowing at L3-4 and at L5-S1 on the left.     I personally reviewed the images/study and I agree with the findings as stated. This study was interpreted at [place], [place].   MACRO: None   Signed by: Jh Benoit 12/5/2023 5:24 PM Dictation workstation:   [id number]    CT angio chest abdomen pelvis    Result Date: 12/5/2023  Interpreted By:  Samuel Brannon, STUDY: CT ANGIO CHEST ABDOMEN PELVIS;  12/5/2023 2:48 pm   INDICATION: Signs/Symptoms:Severe lower abdomen pain, groin pain, radiates to back, intractable pain.   COMPARISON: May 18, 2022 CTA chest abdomen and pelvis. July 21, 2023 CTA abdomen and pelvis with runoff. August 20, 2023 MRI lumbar spine and December 5, 2023 CT lumbar spine   ACCESSION NUMBER(S): [id number]   ORDERING CLINICIAN: SHAWN REDDY   TECHNIQUE: Axial non-contrast images of the chest, abdomen, and pelvis  with coronal and sagittal reformatted images. Axial CT images of the chest, abdomen and pelvis after the intravenous administration of 100 mL Omnipaque 350 contrast using CT angiographic technique with coronal and sagittal reformatted images.  MIP images were provided and reviewed. 3D reconstructions were performed on a separate independent workstation.   FINDINGS: VASCULAR:   PULMONARY ARTERIES:   No acute pulmonary embolism.   THORACIC AORTA:  Non-contrast images show no evidence of acute intramural hematoma. No thoracic aortic aneurysm or dissection. Moderate scattered atherosclerosis thoracic aorta and branch vessels.   ABDOMINAL AORTA:  No abdominal aortic aneurysm or dissection. There is scattered atherosclerosis. 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   CHEST:   MEDIASTINUM AND LYMPH NODES: No enlarged intrathoracic or axillary lymph nodes.   HEART: Normal size.  Moderate coronary artery calcifications. No pericardial effusion.   LUNG, PLEURA, LARGE AIRWAYS: No consolidation, pulmonary edema, pleural effusion, or pneumothorax.   OSSEOUS STRUCTURES/CHEST WALL:    No acute osseous abnormality.     ABDOMEN/PELVIS:   Arterial phase imaging limits evaluation of the solid organs.   ABDOMINAL WALL: Within normal limits.   LIVER: Stable without detected mass. BILE DUCTS: No significant abnormality. GALLBLADDER: Absent   PANCREAS: No significant abnormality.   SPLEEN: No significant abnormality.   ADRENALS: No significant abnormality.   KIDNEYS, URETERS, BLADDER: No significant abnormality.   VESSELS: See above.  No additional significant abnormality. LYMPH NODES: No enlarged lymph nodes. RETROPERITONEUM:  No significant abnormality.   BOWEL: The stomach and bowel are normal caliber without evident inflammatory change.   PERITONEUM:   No significant ascites, free air, or fluid collection.   REPRODUCTIVE ORGANS: No significant abnormality.   OSSEOUS STRUCTURES:  Refer to same day CT scan lumbar spine report for details regarding that portion. No separate acute osseous abnormalities are identified. There are skin staples dorsal lumbar region. The canal is not reliably evaluated at the operated levels due to artifact from metallic hardware. There is minimal gas within the soft tissues at the operated levels and there is lobulated fluid density extending from vertebral to overlapping paravertebral region to the level of the  superficial subcutaneous layer including portion sagittal series 506, image 109 and axial series 502, image 114 measuring 14 x 6 x 6 cm  with internal density measurement of 6 compatible with simple fluid density       No thoracic or abdominal aortic aneurysm or dissection.   Stable pre-existing vascular findings as reported.   Findings compatible with recent lumbar spine surgery with prominent fluid collection overlying the operated site extending through the superficial subcutaneous layer. The canal is not reliably assessed at the operated site due to artifact. Recommend further characterization with MRI lumbar spine with without contrast.   No acute abnormality of the chest, abdomen or pelvis.     Signed by: Samuel Brannon 12/5/2023 3:32 PM Dictation workstation:   [id number]    CT lumbar spine wo IV contrast    Result Date: 12/5/2023  Interpreted By:  Jony Monae, STUDY: CT LUMBAR SPINE WO IV CONTRAST; 12/5/2023 11:28 am   INDICATION: Signs/Symptoms:Low back pain, recent laminectomy and fusion, fall 1 week ago.   COMPARISON: None.   ACCESSION NUMBER(S): [id number]   ORDERING CLINICIAN: SHAWN REDDY   TECHNIQUE: Contiguous axial CT images were obtained through the lumbar spine at 2 mm slice thickness without contrast administration. The images were then reconstructed in the coronal and sagittal planes.   FINDINGS: OSSEOUS STRUCTURES: The patient is status post L3 through L5 laminectomy, L4 through S1 pedicle screw and fabrizio fusion and L4-5 and L5-S1 disc space implant placement. There is no evidence of acute fracture identified. The vertebral bodies are well aligned without evidence of subluxation.   Mild discogenic degenerative changes are seen at the remaining disc space levels. Mild-to-moderate facet degenerative changes are seen throughout the lumbar spine.   LOWER THORACIC SPINE: No gross central canal or neural foraminal narrowing is seen.   T12-L1: No gross central canal or neural foraminal narrowing  is seen.   L1-2: No gross central canal or neural foraminal narrowing is seen.   L2-3: No gross central canal or neural foraminal narrowing is seen.   L3-4: Mild right-sided neural foraminal narrowing is seen. No significant left foraminal or central canal narrowing is seen.   L4-5: No gross central canal or neural foraminal narrowing is seen.   L5-S1: Mild left-sided neural foraminal narrowing is seen. No significant right foraminal or central canal narrowing is seen.   ASSOCIATED STRUCTURES: Evaluation of the visualized soft tissues of the abdomen is limited by the lack of intravenous contrast. Within this limitation, no gross mass or lymphadenopathy is identified.  A fluid collection is seen along the posterior midportion the lower back, likely postoperative in nature.       1. No acute fracture identified. 2. Postoperative and degenerative changes, as described above.   MACRO: None.   Signed by: Jony Monae 12/5/2023 11:45 AM Dictation workstation:   [id number]

## 2023-12-20 NOTE — PROGRESS NOTES
Physical Therapy    Physical Therapy Evaluation    Patient Name: Tara Tierney  MRN: [id number]  Today's Date: 12/20/2023   Time Calculation  Start Time: 1220  Stop Time: 1243  Time Calculation (min): 23 min    Assessment/Plan   PT Assessment  PT Assessment Results: Decreased strength, Decreased endurance, Impaired balance, Decreased mobility, Pain, Orthopedic restrictions  Rehab Prognosis: Good  Evaluation/Treatment Tolerance: Patient limited by fatigue  End of Session Communication: Bedside nurse, Care Coordinator  Assessment Comment: pt with decreased mobility/gait , strength balance endurance pt to benefit from skilled PT to address deficits and improve functional mobility .  End of Session Patient Position: Up in chair, Alarm on  IP OR SWING BED PT PLAN  Inpatient or Swing Bed: Inpatient  PT Plan  Treatment/Interventions: Bed mobility, Transfer training, Gait training, Stair training, Balance training, Strengthening, Endurance training, Therapeutic exercise, Therapeutic activity, Home exercise program  PT Plan: Skilled PT  PT Frequency: 3 times per week  PT Discharge Recommendations: Low intensity level of continued care  PT Recommended Transfer Status: Stand by assist, Assistive device  PT - OK to Discharge: Yes (once medically cleared for discharge to next level of care.)    Subjective     Current Problem:  Patient Active Problem List   Diagnosis    Abdominal aortic aneurysm (CMS/HCC)    Abnormal EKG    Bilateral carotid artery stenosis    Bruit of right carotid artery    CAD in native artery    Cardiomyopathy (CMS/HCC)    Chest pain    Chronic asthmatic bronchitis    Closed displaced fracture of fifth metatarsal bone    Current every day smoker    Difficulty breathing    Essential hypertension    History of total knee replacement    Hypokalemia    Intermittent claudication (CMS/HCC)    Knee osteoarthritis    Knee pain    Limb pain    Localized swelling, mass, or lump of lower extremity    Celiac artery  stenosis (CMS/HCC)    Mesenteric artery stenosis (CMS/HCC)    Mixed hyperlipidemia    Obese    PVD (peripheral vascular disease) (CMS/HCC)    Right foot pain    Swelling    Trigger finger    Urinary tract infection without hematuria    Back pain of lumbar region with sciatica    Back pain with radiculopathy    Intractable back pain    Seroma, post-traumatic (CMS/HCC)    Lumbar surgical wound fluid collection    Generalized weakness       General Visit Information:  General  Reason for Referral: impaired mobility  Referred By: OT/PT 12/19 Jaswant  Past Medical History Relevant to Rehab: HTN, PVD,  CAD,  OA,  HLD, TKA, PE DVT, AAA, COPD , smoker Recent L3-4, L4-5, L5-S1 Laminectomy([facility]). Had I&D (12/06/23) and second I&D(12/11/23)  Prior to Session Communication: Bedside nurse (cleared to see pt for therapy evals.)  Patient Position Received: Bed, 3 rail up, Alarm on  General Comment: pt is a 69 yo female came [facility] the hospital [facility] 12/19 from [facility] with  Pain L LE and back / reports of weakness.  pt with recent L3-4, L4-5, L5-S1 Laminectomy([facility]). Had I&D (12/06/23) and second I&D(12/11/23).  test/ labs :  hgb 8.0 ,  XRay Lumbar spine  neg for acute osseous abnormality . post sx changes of L4-S1.  CT L spine :  punctated lucencious concerning for infection  at post sx area L4-S1.    Home Living:  Home Living  Type of Home: House  Lives With: Spouse  Home Adaptive Equipment: Walker rolling or standard, Cane  Home Layout: One level  Home Access: Stairs to enter without rails  Entrance Stairs-Rails: None  Entrance Stairs-Number of Steps: 1  Bathroom Shower/Tub: Tub/shower unit  Bathroom Toilet: Handicapped height  Bathroom Equipment: None    Prior Level of Function:  Prior Function Per Pt/Caregiver Report  Level of [place]: Independent with ADLs and functional transfers, Independent with homemaking with ambulation  ADL Assistance: Independent  Homemaking Assistance: Independent  Ambulatory Assistance:  Independent  Prior Function Comments: pt reports ind with mobility adls and iadls drove before sx , uses FWW since sx,  1 recent fall    Precautions:  Precautions  Medical Precautions: Spinal precautions, Fall precautions  Post-Surgical Precautions: Spinal precautions  Braces Applied: TLSO brace when OOB    Objective     Pain:  Pain Assessment  Pain Assessment: 0-10  Pain Score: 7  Pain Type: Acute pain  Pain Location: Back (B LEs)    Cognition:  Cognition  Overall Cognitive Status: Within Functional Limits  Orientation Level: Oriented X4    General Assessments:    Strength  Strength Comments: BLE 4+/5     Dynamic Sitting Balance  Dynamic Sitting-Comments: good-  Dynamic Standing Balance  Dynamic Standing-Comments: fair    Functional Assessments:     Bed Mobility  Bed Mobility: Yes  Bed Mobility 1  Bed Mobility 1: Supine to sitting, Sitting to supine, Log roll, Scooting  Level of Assistance 1: Close supervision  Transfers  Transfer: Yes  Transfer 1  Technique 1: Sit to stand, Stand to sit  Transfer Device 1: Walker (FWW)  Transfer Level of Assistance 1: Close supervision  Trials/Comments 1: bace brace on for transfer pt able to done brace with Supervision  Ambulation/Gait Training  Ambulation/Gait Training Performed: Yes  Ambulation/Gait Training 1  Surface 1: Level tile  Device 1: Rolling walker  Gait Support Devices:  (back brace)  Assistance 1: Close supervision  Quality of Gait 1: Antalgic  Comments/Distance (ft) 1: 200ft with FWW SBA     Extremity/Trunk Assessments:  RLE   RLE : Within Functional Limits  LLE   LLE : Within Functional Limits    Outcome Measures:  [facility] Basic Mobility  Turning from your back to your side while in a flat bed without using bedrails: A little  Moving from lying on your back to sitting on the side of a flat bed without using bedrails: A little  Moving to and from bed to chair (including a wheelchair): A little  Standing up from a chair using your arms (e.g. wheelchair or bedside  chair): A little  To walk in hospital room: A little  Climbing 3-5 steps with railing: A little  Basic Mobility - Total Score: 18  Goals:  Encounter Problems       Encounter Problems (Active)       PT Problem       Pt will demonstrate mod I with bed mobility to edge of bed.         Start:  12/20/23    Expected End:  01/03/24            Pt will demonstrate mod I  with sit to stand/chair transfers with FWW.         Start:  12/20/23    Expected End:  01/03/24            Pt will ambulate 200 feet with FWW mod I .         Start:  12/20/23    Expected End:  01/03/24            Pt to demo improved BLE strength by being able to complete supine/seated thera ex 2x20 BLEs with 4 or less rest breaks .         Start:  12/20/23    Expected End:  01/03/24                 Education Documentation  Precautions, taught by Geo Hagen PT at 12/20/2023  2:54 PM.  Learner: Patient  Readiness: Acceptance  Method: Explanation  Response: Verbalizes Understanding    Mobility Training, taught by Geo Hagen, PT at 12/20/2023  2:54 PM.  Learner: Patient  Readiness: Acceptance  Method: Explanation  Response: Verbalizes Understanding    Education Comments  No comments found.

## 2023-12-20 NOTE — PROGRESS NOTES
?  HISTORY OF PRESENT ILLNESS:  Still some pain, no fevers    Current Medications:    Current Facility-Administered Medications   Medication Dose Route Frequency Provider Last Rate Last Admin    acetaminophen (Tylenol) tablet 650 mg  650 mg oral q4h PRN ORLANDO Trevizo        Or    acetaminophen (Tylenol) oral liquid 650 mg  650 mg nasogastric tube q4h PRN ORLANDO Trevizo        Or    acetaminophen (Tylenol) suppository 650 mg  650 mg rectal q4h PRN ORLANDO Trevizo        acetaminophen (Tylenol) tablet 650 mg  650 mg oral q4h PRN ORLANDO Trevizo        Or    acetaminophen (Tylenol) oral liquid 650 mg  650 mg oral q4h PRN ORLANDO Trevizo        Or    acetaminophen (Tylenol) suppository 650 mg  650 mg rectal q4h PRN ORLANDO Trevizo        apixaban (Eliquis) tablet 10 mg  10 mg oral BID ORLANDO Trevizo   10 mg at 12/20/23 0853    [START ON 12/25/2023] apixaban (Eliquis) tablet 5 mg  5 mg oral BID ORLANDO Trevizo        atenolol (Tenormin) tablet 50 mg  50 mg oral q AM ORLANDO Trevizo   50 mg at 12/20/23 0854    benzocaine-menthol (Cepastat Sore Throat) 15-3.6 mg lozenge 1 lozenge  1 lozenge Mouth/Throat q2h PRN ORLANDO Trevizo        bisacodyl (Dulcolax) EC tablet 10 mg  10 mg oral Daily PRN ORLANDO Trevizo        brimonidine (AlphaGAN) 0.2 % ophthalmic solution 1 drop  1 drop Both Eyes BID ORLANDO Trevizo   1 drop at 12/20/23 0857    busPIRone (Buspar) tablet 10 mg  10 mg oral Daily ORLANDO Trevizo   10 mg at 12/20/23 0854    calcium carbonate (Tums) chewable tablet 500 mg  500 mg oral 4x daily PRN Mary C Mohamed, APRN-CNP        cholecalciferol (Vitamin D-3) tablet 2,000 Units  2,000 Units oral Daily DARRYL Trevizo-CNP   2,000 Units at 12/20/23 0855    cyclobenzaprine (Flexeril) tablet 5 mg  5 mg oral TID PRN DARRYL Trevizo-CNP   5 mg at 12/19/23 1254     dextromethorphan-guaifenesin (Robitussin DM)  mg/5 mL oral liquid 5 mL  5 mL oral q4h PRN Mary Michaud APRN-CNP        ezetimibe (Zetia) tablet 10 mg  10 mg oral Daily Mary Michaud, APRN-CNP   10 mg at 12/20/23 0852    furosemide (Lasix) tablet 20 mg  20 mg oral Daily Mary Michaud APRN-CNP   20 mg at 12/20/23 0855    guaiFENesin (Mucinex) 12 hr tablet 600 mg  600 mg oral q12h PRN Mary Michaud APRN-ALBAN        hydrALAZINE (Apresoline) injection 10 mg  10 mg intravenous q8h PRN DARRYL Trevizo-CNP        ipratropium-albuteroL (Duo-Neb) 0.5-2.5 mg/3 mL nebulizer solution 3 mL  3 mL nebulization q6h PRN DARRYL Trevizo-CNP        isosorbide mononitrate ER (Imdur) 24 hr tablet 60 mg  60 mg oral Daily Mary Michaud APRN-CNP   60 mg at 12/20/23 0601    lactobacillus acidophilus tablet 1 tablet  1 tablet oral Daily DARRYL Trevizo-CNP   1 tablet at 12/20/23 0855    [Held by provider] loratadine (Claritin) tablet 10 mg  10 mg oral Daily DARRYL Trevizo-CNP   10 mg at 12/19/23 1353    meclizine (Antivert) tablet 25 mg  25 mg oral TID PRN Mary Michaud APRN-CNP        melatonin tablet 3 mg  3 mg oral Nightly PRN Mary Michaud APRN-ALBAN        mirtazapine (Remeron) tablet 15 mg  15 mg oral Nightly DARRYL Trevizo-CNP   15 mg at 12/19/23 2116    morphine CR (MS Contin) 12 hr tablet 15 mg  15 mg oral q12h [facility] Panchito Barnes MD   15 mg at 12/20/23 0856    nitroglycerin (Nitrostat) SL tablet 0.4 mg  0.4 mg sublingual q5 min PRN Mary Michaud APRN-CNP        oxybutynin (Ditropan) tablet 5 mg  5 mg oral BID DARRYL Trevizo-CNP   5 mg at 12/20/23 0854    oxyCODONE-acetaminophen (Percocet) 5-325 mg per tablet 1 tablet  1 tablet oral q6h PRN DARRYL Trevizo-CNP        oxygen (O2) therapy   inhalation Continuous PRN - O2/gases Mary Michaud APRN-ALBAN        pantoprazole (ProtoNix) EC tablet 40 mg  40 mg oral Daily before breakfast Mary Michaud,  "APRN-CNP   40 mg at 12/20/23 0601    Or    pantoprazole (ProtoNix) injection 40 mg  40 mg intravenous Daily before breakfast ORLANDO Trevizo        pilocarpine (Salagen) tablet 5 mg  5 mg oral Daily Mary Michaud APRN-CNP   5 mg at 12/20/23 0601    potassium chloride CR (Klor-Con) ER tablet 10 mEq  10 mEq oral Daily DARRYL Trevizo-CNP   10 mEq at 12/20/23 0854    pregabalin (Lyrica) capsule 75 mg  75 mg oral BID Mary Michaud APRN-CNP   75 mg at 12/20/23 0853    promethazine (Phenergan) tablet 25 mg  25 mg oral q6h PRN ORLANDO Trevizo        Or    promethazine (Phenergan) suppository 25 mg  25 mg rectal q12h PRN DARRYL Trevizo-ALBAN        simvastatin (Zocor) tablet 40 mg  40 mg oral Nightly DARRYL Trevizo-CNP   40 mg at 12/19/23 2115    sodium chloride 0.9% flush 10 mL  10 mL intra-catheter q12h Mary Michaud APRN-CNP   10 mL at 12/20/23 1153    sodium chloride 0.9% flush 10 mL  10 mL intra-catheter PRN Mary Michaud APRN-CNP        tamsulosin (Flomax) 24 hr capsule 0.4 mg  0.4 mg oral Daily DARRYL Trevizo-CNP   0.4 mg at 12/20/23 1124    tiotropium (Spiriva Respimat) 2.5 mcg/actuation inhaler 2 puff  2 Inhalation inhalation Daily Mary Michaud APRN-CNP   2 puff at 12/20/23 0601    vancomycin (Vancocin) in dextrose 5 % water (D5W) 250 mL IV 1,250 mg  1,250 mg intravenous q24h Mary Michaud APRN-CNP   Stopped at 12/20/23 1011        Allergies:    Allergies   Allergen Reactions    Neomycin Other     swelling, redness, burning post eye surgery    Neomycin-Polymyxin B-Dexameth Other and Rash     blisters, eye swelling, burning          Review of Systems  14 system review is negative other than HPI     /64 (BP Location: Left arm, Patient Position: Sitting)   Pulse 81   Temp 36.4 °C (97.5 °F) (Temporal)   Resp 18   Ht 1.499 m (4' 11\")   Wt 80.5 kg (177 lb 7.5 oz)   SpO2 93%   BMI 35.84 kg/m²    Physical Exam:  General: Patient " appears ok at the present time. NAD  Skin: no new rashes  HEENT:  Neck is supple, No subconjunctival hemorrhages, no oral exudates  Heart: S1 S2  Lungs: diminished bases  Abdomen: soft, ND, NTTP,  Extrem: No edema, non tender           DATA:    .  Lab Results   Component Value Date    WBC 10.7 12/20/2023    HGB 8.0 (L) 12/20/2023    HCT 25.2 (L) 12/20/2023    MCV 97 12/20/2023     12/20/2023     Results from last 7 days   Lab Units 12/20/23  0411 12/19/23  0157 12/18/23  0602   SODIUM mmol/L 133*   < > 132*   POTASSIUM mmol/L 4.1   < > 4.0   CHLORIDE mmol/L 97*   < > 98   CO2 mmol/L 28   < > 28   BUN mg/dL 10   < > 12   CREATININE mg/dL 1.05   < > 0.95   CALCIUM mg/dL 8.7   < > 8.7   PROTEIN TOTAL g/dL  --   --  5.9*   BILIRUBIN TOTAL mg/dL  --   --  0.4   ALK PHOS U/L  --   --  126   ALT U/L  --   --  30   AST U/L  --   --  23   GLUCOSE mg/dL 122*   < > 111*    < > = values in this interval not displayed.   Susceptibility data from last 90 days.  Collected Specimen Info Organism Amoxicillin/Clavulanate Ampicillin Ampicillin/Sulbactam Aztreonam Cefazolin Cefazolin (uncomplicated UTIs only) Cefepime Cefotaxime Ceftazidime Ceftriaxone Ciprofloxacin Clindamycin   12/11/23 Fluid from ABSCESS Methicillin Resistant Staphylococcus aureus (MRSA)            S   12/11/23 Tissue from ABSCESS Staphylococcus aureus               12/11/23 Swab from ABSCESS Methicillin Resistant Staphylococcus aureus (MRSA)            S   12/06/23 Swab from SPINE Methicillin Resistant Staphylococcus aureus (MRSA)               12/06/23 Tissue from SPINE Staphylococcus aureus               12/06/23 Tissue from SPINE Staphylococcus aureus               12/05/23 Urine from Clean Catch/Voided Escherichia coli S R S R R R R R R R R    11/03/23 Swab from Nares/Axilla/Groin Methicillin Resistant Staphylococcus aureus (MRSA)                 Collected Specimen Info Organism Ertapenem Erythromycin Gentamicin Meropenem Nitrofurantoin Oxacillin  Piperacillin/Tazobactam Tetracycline Tobramycin Trimethoprim/Sulfamethoxazole Vancomycin   12/11/23 Fluid from ABSCESS Methicillin Resistant Staphylococcus aureus (MRSA)  R    R  S  S S   12/11/23 Tissue from ABSCESS Staphylococcus aureus              12/11/23 Swab from ABSCESS Methicillin Resistant Staphylococcus aureus (MRSA)  R    R  S  S S   12/06/23 Swab from SPINE Methicillin Resistant Staphylococcus aureus (MRSA)              12/06/23 Tissue from SPINE Staphylococcus aureus              12/06/23 Tissue from SPINE Staphylococcus aureus              12/05/23 Urine from Clean Catch/Voided Escherichia coli S  R S S  S  I S    11/03/23 Swab from Nares/Axilla/Groin Methicillin Resistant Staphylococcus aureus (MRSA)              Susceptibility data from last 90 days.  Collected Specimen Info Organism Amoxicillin/Clavulanate Ampicillin Ampicillin/Sulbactam Aztreonam Cefazolin Cefazolin (uncomplicated UTIs only) Cefepime Cefotaxime Ceftazidime Ceftriaxone Ciprofloxacin Clindamycin   12/11/23 Fluid from ABSCESS Methicillin Resistant Staphylococcus aureus (MRSA)            S   12/11/23 Tissue from ABSCESS Staphylococcus aureus               12/11/23 Swab from ABSCESS Methicillin Resistant Staphylococcus aureus (MRSA)            S   12/06/23 Swab from SPINE Methicillin Resistant Staphylococcus aureus (MRSA)               12/06/23 Tissue from SPINE Staphylococcus aureus               12/06/23 Tissue from SPINE Staphylococcus aureus               12/05/23 Urine from Clean Catch/Voided Escherichia coli S R S R R R R R R R R    11/03/23 Swab from Nares/Axilla/Groin Methicillin Resistant Staphylococcus aureus (MRSA)                 Collected Specimen Info Organism Ertapenem Erythromycin Gentamicin Meropenem Nitrofurantoin Oxacillin Piperacillin/Tazobactam Tetracycline Tobramycin Trimethoprim/Sulfamethoxazole Vancomycin   12/11/23 Fluid from ABSCESS Methicillin Resistant Staphylococcus aureus (MRSA)  R    R  S  S S   12/11/23  Tissue from ABSCESS Staphylococcus aureus              12/11/23 Swab from ABSCESS Methicillin Resistant Staphylococcus aureus (MRSA)  R    R  S  S S   12/06/23 Swab from SPINE Methicillin Resistant Staphylococcus aureus (MRSA)              12/06/23 Tissue from SPINE Staphylococcus aureus              12/06/23 Tissue from SPINE Staphylococcus aureus              12/05/23 Urine from Clean Catch/Voided Escherichia coli S  R S S  S  I S    11/03/23 Swab from Nares/Axilla/Groin Methicillin Resistant Staphylococcus aureus (MRSA)                      IMPRESSION:        Problem List Items Addressed This Visit          Neuro    Lumbar surgical wound fluid collection    Relevant Medications    vancomycin/0.9 % sod chloride (vancomycin in 0.9 % sodium chl) 1 gram/250 mL solution    morphine CR (MS Contin) 15 mg 12 hr tablet    lactobacillus acidophilus tablet tablet (Start on 12/21/2023)    Other Relevant Orders    Referral to Home Health    Vancomycin level, trough    CBC and Auto Differential    Comprehensive metabolic panel    C-reactive protein    Sedimentation rate, automated       Symptoms and Signs    * (Principal) Generalized weakness    Relevant Orders    Referral to Home Health     Other Visit Diagnoses       Acute low back pain without sciatica, unspecified back pain laterality    -  Primary    Relevant Medications    magnesium citrate solution    morphine CR (MS Contin) 15 mg 12 hr tablet    Spinal abscess (CMS/HCC)        Relevant Orders    CBC and Auto Differential           PLAN:    Already on treatment for deep operative infection, collection on CT has had recent washout I believe will defer to surgery if they think this needs to be aspirated again or will recollect    Pete Echeverria MD

## 2023-12-20 NOTE — CARE PLAN
The patient's goals for the shift include LABS WNL    The clinical goals for the shift include Labs WNR, pain control

## 2023-12-20 NOTE — CARE PLAN
The patient's goals for the shift include LABS WNL    The clinical goals for the shift include LABS WNL      Problem: Fall/Injury  Goal: Not fall by end of shift  Outcome: Progressing  Goal: Be free from injury by end of the shift  Outcome: Progressing  Goal: Verbalize understanding of personal risk factors for fall in the hospital  Outcome: Progressing  Goal: Verbalize understanding of risk factor reduction measures to prevent injury from fall in the home  Outcome: Progressing  Goal: Use assistive devices by end of the shift  Outcome: Progressing  Goal: Pace activities to prevent fatigue by end of the shift  Outcome: Progressing     Problem: Pain  Goal: Takes deep breaths with improved pain control throughout the shift  Outcome: Progressing  Goal: Turns in bed with improved pain control throughout the shift  Outcome: Progressing  Goal: Walks with improved pain control throughout the shift  Outcome: Progressing  Goal: Performs ADL's with improved pain control throughout shift  Outcome: Progressing  Goal: Participates in PT with improved pain control throughout the shift  Outcome: Progressing  Goal: Free from opioid side effects throughout the shift  Outcome: Progressing  Goal: Free from acute confusion related to pain meds throughout the shift  Outcome: Progressing

## 2023-12-20 NOTE — PROGRESS NOTES
Occupational Therapy    Evaluation    Patient Name: Tara Tierney  MRN: [id number]  Today's Date: 12/20/2023  Time Calculation  Start Time: 1221  Stop Time: 1244  Time Calculation (min): 23 min    Assessment  IP OT Assessment  End of Session Communication: Bedside nurse, Care Coordinator  End of Session Patient Position: Up in chair, Alarm on (All needs in reach)  Plan:  Treatment Interventions: ADL retraining, Functional transfer training, Patient/family training, Compensatory technique education  OT Frequency: 3 times per week  OT Discharge Recommendations: Low intensity level of continued care  OT - OK to Discharge: Yes (When deemed medically appropriate.)    Subjective   Current Problem:  1. Acute low back pain without sciatica, unspecified back pain laterality  magnesium citrate solution    morphine CR (MS Contin) 15 mg 12 hr tablet      2. Generalized weakness  Referral to Home Health      3. Lumbar surgical wound fluid collection, sequela  Referral to Home Health    Vancomycin level, trough    CBC and Auto Differential    Comprehensive metabolic panel    vancomycin/0.9 % sod chloride (vancomycin in 0.9 % sodium chl) 1 gram/250 mL solution    C-reactive protein    Sedimentation rate, automated    morphine CR (MS Contin) 15 mg 12 hr tablet    lactobacillus acidophilus tablet tablet      4. Spinal abscess (CMS/HCC)  CBC and Auto Differential        General:  General  Referred By: OT/PT 12/19 Jaswant  Past Medical History Relevant to Rehab: HTN, PVD,  CAD,  OA,  HLD, TKA, PE DVT, AAA, COPD, smoker, Recent L3-4, L4-5, L5-S1 Laminectomy on 11/20/23 by Dr. Coombs.  Post-surgical epidural abscess and MRSA s/p I&D 12/06/23 and I&D 12/11/23 both by Dr. Coombs.  Prior to Session Communication: Bedside nurse  Patient Position Received: Bed, 3 rail up, Alarm on (TLSO in room)  General Comment: To ED on 12/19 from [facility] with pain in LEs and back and reports of weakness.  Pt. with recent L3-4, L4-5, L5-S1  Laminectomy([facility]). Had I&D (12/06/23) and second I&D(12/11/23).  test/ labs :  hgb 8.0 ,  XRay Lumbar spine  neg for acute osseous abnormality . post sx changes of L4-S1.  CT L spine :  punctated lucencious concerning for infection  at post sx area L4-S1.  Precautions:  Medical Precautions: Fall precautions  Post-Surgical Precautions: Spinal precautions  Braces Applied: TLSO when up ambulating  Vital Signs:     Pain:  Pain Assessment  Pain Assessment: 0-10  Pain Score: 7  Pain Location:  (back, bilat. groin areas, bilat. anterior thighs)    Objective   Cognition:  Overall Cognitive Status: Within Functional Limits  Orientation Level: Oriented X4           Home Living:  Home Living Comments: Lives at home with her .  Home is on a slab.  1 step to enter.  Tub/shower, no seat or safety bars.  No AD use.  Owns FWW.  Independent with ADL and IADLs.  Drives.  Slid off of chair onto knees post-operatively.   Prior Function:     IADL History:     ADL:  Eating Assistance: Independent  Grooming Assistance: Stand by  Bathing Assistance: Stand by  UE Dressing Assistance: Stand by  LE Dressing Assistance: Stand by  Toileting Assistance with Device: Stand by  Activity Tolerance:     Bed Mobility/Transfers: Bed Mobility  Bed Mobility: Yes  Bed Mobility 1  Bed Mobility 1: Supine to sitting, Log roll  Level of Assistance 1: Close supervision, Minimal verbal cues    Transfers  Transfer: Yes  Transfer 1  Transfer From 1: Sit to  Transfer to 1: Stand  Technique 1: Sit to stand, Stand to sit (EOB and chair with arms)  Transfer Device 1: Walker  Transfer Level of Assistance 1: Close supervision      Ambulation/Gait Training:  Ambulation/Gait Training  Ambulation/Gait Training Performed: Yes (~ 200' in room and hallway with FWW, supervision level with cues for proximity to walker and turning in small bits to stay within walker and not twist.  TLSO on when up (donned while seated EOB).)  Sitting Balance:  Static Sitting  Balance  Static Sitting-Comment/Number of Minutes: Good  Dynamic Sitting Balance  Dynamic Sitting-Comments: Good  Standing Balance:  Static Standing Balance  Static Standing-Comment/Number of Minutes: Good (-)  Dynamic Standing Balance  Dynamic Standing-Comments: Good (-)/fair (+)  Modalities:       Extremities: RUE   RUE :  (AROM WFL, strength not tested 2nd to spinal precautions) and LUE   LUE:  (AROM WFL, strength not tested 2nd to spinal precautions)    Outcome Measures: [facility] Daily Activity  Putting on and taking off regular lower body clothing: A little  Bathing (including washing, rinsing, drying): A little  Putting on and taking off regular upper body clothing: A little  Toileting, which includes using toilet, bedpan or urinal: A little  Taking care of personal grooming such as brushing teeth: A little  Eating Meals: A little  Daily Activity - Total Score: 18      Education Documentation  Precautions, taught by Regi Amaro OT at 12/20/2023  4:49 PM.  Learner: Patient  Readiness: Acceptance  Method: Explanation  Response: Verbalizes Understanding    Education Comments  No comments found.      Goals:   Encounter Problems       Encounter Problems (Active)       OT Goals       Mod I bed mobility.        Start:  12/20/23    Expected End:  01/03/24            Mod I sit/stand, bed/chair/commode with FWW.        Start:  12/20/23    Expected End:  01/03/24            Mod I ADL/IADL functional mobility.        Start:  12/20/23    Expected End:  01/03/24            Mod I LB dressing.        Start:  12/20/23    Expected End:  01/03/24            Good dynamic standing balance for ADL.        Start:  12/20/23    Expected End:  01/03/24

## 2023-12-21 ENCOUNTER — HOME INFUSION (OUTPATIENT)
Dept: INFUSION THERAPY | Age: 68
End: 2023-12-21
Payer: COMMERCIAL

## 2023-12-21 ENCOUNTER — DOCUMENTATION (OUTPATIENT)
Dept: HOME HEALTH SERVICES | Facility: HOME HEALTH | Age: 68
End: 2023-12-21
Payer: COMMERCIAL

## 2023-12-21 DIAGNOSIS — T81.89XA LUMBAR SURGICAL WOUND FLUID COLLECTION, INITIAL ENCOUNTER: Primary | ICD-10-CM

## 2023-12-21 DIAGNOSIS — T81.89XS LUMBAR SURGICAL WOUND FLUID COLLECTION, SEQUELA: ICD-10-CM

## 2023-12-21 LAB
ANION GAP SERPL CALC-SCNC: 11 MMOL/L (ref 10–20)
BUN SERPL-MCNC: 14 MG/DL (ref 6–23)
CALCIUM SERPL-MCNC: 8.5 MG/DL (ref 8.6–10.3)
CHLORIDE SERPL-SCNC: 98 MMOL/L (ref 98–107)
CO2 SERPL-SCNC: 26 MMOL/L (ref 21–32)
CREAT SERPL-MCNC: 1.06 MG/DL (ref 0.5–1.05)
ERYTHROCYTE [DISTWIDTH] IN BLOOD BY AUTOMATED COUNT: 14.8 % (ref 11.5–14.5)
GFR SERPL CREATININE-BSD FRML MDRD: 57 ML/MIN/1.73M*2
GLUCOSE SERPL-MCNC: 132 MG/DL (ref 74–99)
HCT VFR BLD AUTO: 24.4 % (ref 36–46)
HGB BLD-MCNC: 7.7 G/DL (ref 12–16)
HOLD SPECIMEN: NORMAL
MCH RBC QN AUTO: 30.7 PG (ref 26–34)
MCHC RBC AUTO-ENTMCNC: 31.6 G/DL (ref 32–36)
MCV RBC AUTO: 97 FL (ref 80–100)
NRBC BLD-RTO: 0 /100 WBCS (ref 0–0)
PLATELET # BLD AUTO: 289 X10*3/UL (ref 150–450)
POTASSIUM SERPL-SCNC: 4.1 MMOL/L (ref 3.5–5.3)
RBC # BLD AUTO: 2.51 X10*6/UL (ref 4–5.2)
SODIUM SERPL-SCNC: 131 MMOL/L (ref 136–145)
WBC # BLD AUTO: 9.6 X10*3/UL (ref 4.4–11.3)

## 2023-12-21 PROCEDURE — 96366 THER/PROPH/DIAG IV INF ADDON: CPT

## 2023-12-21 PROCEDURE — 97535 SELF CARE MNGMENT TRAINING: CPT | Mod: GO

## 2023-12-21 PROCEDURE — G0378 HOSPITAL OBSERVATION PER HR: HCPCS

## 2023-12-21 PROCEDURE — 97116 GAIT TRAINING THERAPY: CPT | Mod: GP,CQ

## 2023-12-21 PROCEDURE — 2500000002 HC RX 250 W HCPCS SELF ADMINISTERED DRUGS (ALT 637 FOR MEDICARE OP, ALT 636 FOR OP/ED): Performed by: NURSE PRACTITIONER

## 2023-12-21 PROCEDURE — 2500000001 HC RX 250 WO HCPCS SELF ADMINISTERED DRUGS (ALT 637 FOR MEDICARE OP): Performed by: ANESTHESIOLOGY

## 2023-12-21 PROCEDURE — 85027 COMPLETE CBC AUTOMATED: CPT | Performed by: NURSE PRACTITIONER

## 2023-12-21 PROCEDURE — 99232 SBSQ HOSP IP/OBS MODERATE 35: CPT | Performed by: NURSE PRACTITIONER

## 2023-12-21 PROCEDURE — 2500000004 HC RX 250 GENERAL PHARMACY W/ HCPCS (ALT 636 FOR OP/ED): Performed by: NURSE PRACTITIONER

## 2023-12-21 PROCEDURE — 2500000001 HC RX 250 WO HCPCS SELF ADMINISTERED DRUGS (ALT 637 FOR MEDICARE OP): Performed by: NURSE PRACTITIONER

## 2023-12-21 PROCEDURE — 80048 BASIC METABOLIC PNL TOTAL CA: CPT | Performed by: NURSE PRACTITIONER

## 2023-12-21 RX ORDER — AMOXICILLIN AND CLAVULANATE POTASSIUM 875; 125 MG/1; MG/1
1 TABLET, FILM COATED ORAL 2 TIMES DAILY
Qty: 14 TABLET | Refills: 0 | Status: SHIPPED | OUTPATIENT
Start: 2023-12-21 | End: 2024-01-18 | Stop reason: HOSPADM

## 2023-12-21 RX ORDER — VANCOMYCIN/0.9 % SOD CHLORIDE 1 G/100 ML
1.25 PLASTIC BAG, INJECTION (ML) INTRAVENOUS EVERY 24 HOURS
Qty: 21000 ML | Refills: 3
Start: 2023-12-21 | End: 2023-12-22 | Stop reason: HOSPADM

## 2023-12-21 RX ADMIN — Medication 1 TABLET: at 08:43

## 2023-12-21 RX ADMIN — PANTOPRAZOLE SODIUM 40 MG: 40 TABLET, DELAYED RELEASE ORAL at 08:47

## 2023-12-21 RX ADMIN — CYCLOBENZAPRINE HYDROCHLORIDE 5 MG: 10 TABLET, FILM COATED ORAL at 18:25

## 2023-12-21 RX ADMIN — ATENOLOL 50 MG: 50 TABLET ORAL at 08:34

## 2023-12-21 RX ADMIN — BRIMONIDINE TARTRATE 1 DROP: 2 SOLUTION/ DROPS OPHTHALMIC at 09:00

## 2023-12-21 RX ADMIN — BUSPIRONE HYDROCHLORIDE 10 MG: 5 TABLET ORAL at 08:34

## 2023-12-21 RX ADMIN — VANCOMYCIN HYDROCHLORIDE 1250 MG: 1.25 INJECTION, POWDER, LYOPHILIZED, FOR SOLUTION INTRAVENOUS at 08:58

## 2023-12-21 RX ADMIN — APIXABAN 10 MG: 5 TABLET, FILM COATED ORAL at 20:40

## 2023-12-21 RX ADMIN — PILOCARPINE HYDROCHLORIDE 5 MG: 5 TABLET, FILM COATED ORAL at 08:44

## 2023-12-21 RX ADMIN — Medication 2000 UNITS: at 08:32

## 2023-12-21 RX ADMIN — FUROSEMIDE 20 MG: 40 TABLET ORAL at 08:42

## 2023-12-21 RX ADMIN — APIXABAN 10 MG: 5 TABLET, FILM COATED ORAL at 08:43

## 2023-12-21 RX ADMIN — MIRTAZAPINE 15 MG: 15 TABLET, FILM COATED ORAL at 20:41

## 2023-12-21 RX ADMIN — TAMSULOSIN HYDROCHLORIDE 0.4 MG: 0.4 CAPSULE ORAL at 08:33

## 2023-12-21 RX ADMIN — OXYCODONE HYDROCHLORIDE AND ACETAMINOPHEN 1 TABLET: 5; 325 TABLET ORAL at 18:25

## 2023-12-21 RX ADMIN — Medication 10 ML: at 12:15

## 2023-12-21 RX ADMIN — SIMVASTATIN 40 MG: 40 TABLET, FILM COATED ORAL at 20:40

## 2023-12-21 RX ADMIN — PREGABALIN 75 MG: 50 CAPSULE ORAL at 08:34

## 2023-12-21 RX ADMIN — BRIMONIDINE TARTRATE 1 DROP: 2 SOLUTION/ DROPS OPHTHALMIC at 20:43

## 2023-12-21 RX ADMIN — PREGABALIN 75 MG: 50 CAPSULE ORAL at 20:40

## 2023-12-21 RX ADMIN — MORPHINE SULFATE 15 MG: 15 TABLET, EXTENDED RELEASE ORAL at 20:41

## 2023-12-21 RX ADMIN — Medication 10 ML: at 04:13

## 2023-12-21 RX ADMIN — EZETIMIBE 10 MG: 10 TABLET ORAL at 08:43

## 2023-12-21 RX ADMIN — OXYBUTYNIN CHLORIDE 5 MG: 5 TABLET ORAL at 08:42

## 2023-12-21 RX ADMIN — MORPHINE SULFATE 15 MG: 15 TABLET, EXTENDED RELEASE ORAL at 08:43

## 2023-12-21 RX ADMIN — ISOSORBIDE MONONITRATE 60 MG: 60 TABLET, EXTENDED RELEASE ORAL at 08:51

## 2023-12-21 RX ADMIN — POTASSIUM CHLORIDE 10 MEQ: 750 TABLET, EXTENDED RELEASE ORAL at 08:42

## 2023-12-21 RX ADMIN — Medication 10 ML: at 21:13

## 2023-12-21 RX ADMIN — TIOTROPIUM BROMIDE INHALATION SPRAY 2 PUFF: 3.12 SPRAY, METERED RESPIRATORY (INHALATION) at 08:33

## 2023-12-21 RX ADMIN — OXYBUTYNIN CHLORIDE 5 MG: 5 TABLET ORAL at 20:40

## 2023-12-21 ASSESSMENT — PAIN SCALES - GENERAL
PAINLEVEL_OUTOF10: 6
PAINLEVEL_OUTOF10: 1

## 2023-12-21 ASSESSMENT — COGNITIVE AND FUNCTIONAL STATUS - GENERAL
MOBILITY SCORE: 23
MOBILITY SCORE: 24
MOVING FROM LYING ON BACK TO SITTING ON SIDE OF FLAT BED WITH BEDRAILS: A LITTLE
DAILY ACTIVITIY SCORE: 24
DAILY ACTIVITIY SCORE: 24
STANDING UP FROM CHAIR USING ARMS: A LITTLE
TURNING FROM BACK TO SIDE WHILE IN FLAT BAD: A LITTLE
MOVING TO AND FROM BED TO CHAIR: A LITTLE
DAILY ACTIVITIY SCORE: 24
CLIMB 3 TO 5 STEPS WITH RAILING: A LITTLE
MOBILITY SCORE: 18
CLIMB 3 TO 5 STEPS WITH RAILING: A LITTLE
WALKING IN HOSPITAL ROOM: A LITTLE

## 2023-12-21 ASSESSMENT — PAIN - FUNCTIONAL ASSESSMENT
PAIN_FUNCTIONAL_ASSESSMENT: 0-10
PAIN_FUNCTIONAL_ASSESSMENT: 0-10

## 2023-12-21 ASSESSMENT — ACTIVITIES OF DAILY LIVING (ADL)
HOME_MANAGEMENT_TIME_ENTRY: 19
LACK_OF_TRANSPORTATION: PATIENT DECLINED

## 2023-12-21 ASSESSMENT — PAIN DESCRIPTION - LOCATION: LOCATION: BACK

## 2023-12-21 ASSESSMENT — PAIN SCALES - WONG BAKER: WONGBAKER_NUMERICALRESPONSE: NO HURT

## 2023-12-21 NOTE — PROGRESS NOTES
Occupational Therapy    OT Treatment    Patient Name: Tara Tierney  MRN: [id number]  Today's Date: 12/21/2023  Time Calculation  Start Time: 1056  Stop Time: 1115  Time Calculation (min): 19 min         Assessment:  Evaluation/Treatment Tolerance: Patient tolerated treatment well  Medical Staff Made Aware: Yes  End of Session Communication: Bedside nurse  End of Session Patient Position: Up in chair, Alarm off, not on at start of session (RN aware; all current needs met)  Evaluation/Treatment Tolerance: Patient tolerated treatment well  Medical Staff Made Aware: Yes  Plan:  Treatment Interventions: ADL retraining, Functional transfer training, Patient/family training, Compensatory technique education  OT Frequency: 3 times per week  OT Discharge Recommendations: Low intensity level of continued care  OT - OK to Discharge: Yes (When deemed medically appropriate.)  Treatment Interventions: ADL retraining, Functional transfer training, Patient/family training, Compensatory technique education    Subjective   Previous Visit Info:  OT Last Visit  OT Received On: 12/21/23  General:  General  Prior to Session Communication: Bedside nurse (cleared pt for therapy)  Patient Position Received: Bed, 3 rail up, Alarm off, not on at start of session (agreeable to OT)  Precautions:  Medical Precautions: Fall precautions  Post-Surgical Precautions: Spinal precautions  Braces Applied: TLSO when OOB  Precautions Comment: Recent L3-4, L4-5, L5-S1 Laminectomy([facility]). Had I&D (12/06/23) and second I&D(12/11/23)    Pain:  Pain Assessment  Pain Assessment: 0-10  Pain Score: 1  Pain Location: Back    Objective    Cognition:  Cognition  Overall Cognitive Status: Within Functional Limits  Impulsive: Mildly  Coordination:     Activities of Daily Living: Grooming  Grooming Level of Assistance: Close supervision, Setup  Grooming Where Assessed: Standing sinkside  Grooming Comments: Pt stands at sink to complete oral cares and wash face  using FWW and SBA for safety    UE Dressing  UE Dressing Level of Assistance: Contact guard  UE Dressing Where Assessed:  (standing at toilet)  UE Dressing Comments: CGA to don TLSO brace    LE Dressing  LE Dressing: Yes  Adult Briefs Level of Assistance: Close supervision (to don socks)  LE Dressing Where Assessed: Bed level    Toileting  Toileting Level of Assistance: Distant supervision  Where Assessed: Toilet  Toileting Comments: for all aspects  Functional Standing Tolerance:  Functional Standing Tolerance Comments: Pt stands at sink for 5+ minutes to complete grooming tasks with unilat UE support  Bed Mobility/Transfers: Bed Mobility  Bed Mobility: Yes  Bed Mobility 1  Bed Mobility 1: Supine to sitting  Level of Assistance 1: Close supervision  Bed Mobility Comments 1: HOB elevated, use of bed rails    Transfers  Transfer: Yes (STS from bed and toilet leve w/ CGA and use of FWW)      Ambulation/Gait Training:  Ambulation/Gait Training  Ambulation/Gait Training Performed:  (Pt completes functional mobility in hospital room bed>toilet>sink>chair using FWW and TLSO donned w/ CGA for safety)    Outcome Measures:[facility] Daily Activity  Putting on and taking off regular lower body clothing: None  Bathing (including washing, rinsing, drying): None  Putting on and taking off regular upper body clothing: None  Toileting, which includes using toilet, bedpan or urinal: None  Taking care of personal grooming such as brushing teeth: None  Eating Meals: None  Daily Activity - Total Score: 24        Education Documentation  Body Mechanics, taught by Gi Botello OT at 12/21/2023 12:30 PM.  Learner: Patient  Readiness: Acceptance  Method: Explanation  Response: Verbalizes Understanding    Precautions, taught by Gi Botello OT at 12/21/2023 12:30 PM.  Learner: Patient  Readiness: Acceptance  Method: Explanation  Response: Verbalizes Understanding    ADL Training, taught by Gi Botello OT at 12/21/2023 12:30  PM.  Learner: Patient  Readiness: Acceptance  Method: Explanation  Response: Verbalizes Understanding    Education Comments  No comments found.      Goals:  Encounter Problems       Encounter Problems (Active)       OT Goals       Mod I bed mobility.  (Progressing)       Start:  12/20/23    Expected End:  01/03/24            Mod I sit/stand, bed/chair/commode with FWW.  (Progressing)       Start:  12/20/23    Expected End:  01/03/24            Mod I ADL/IADL functional mobility.  (Progressing)       Start:  12/20/23    Expected End:  01/03/24            Mod I LB dressing.  (Progressing)       Start:  12/20/23    Expected End:  01/03/24            Good dynamic standing balance for ADL.  (Progressing)       Start:  12/20/23    Expected End:  01/03/24

## 2023-12-21 NOTE — PROGRESS NOTES
Urology progress note for Thursday, 12/21/2023  Continues on external wick catheter drainage, urine concentrated yellow clear no hematuria no mucus  So far seems to be emptying bladder reasonably well  Recent CT scan showed urinary tract to be unremarkable  On Eliquis chronic anticoagulation  On tamsulosin  Continues on antibiotic coverage as noted  Discharge planning in progress okay to discharge urologically on the tamsulosin and we will plan a follow-up outpatient visit in about 4 to 6 weeks to include a renal bladder ultrasound with PVR measurement to be sure patient is emptying well and recheck blood work and urine culture    Additional medical and historical objective data reviewed and listed below      Current Facility-Administered Medications:     acetaminophen (Tylenol) tablet 650 mg, 650 mg, oral, q4h PRN **OR** acetaminophen (Tylenol) oral liquid 650 mg, 650 mg, nasogastric tube, q4h PRN **OR** acetaminophen (Tylenol) suppository 650 mg, 650 mg, rectal, q4h PRN, Mary Michaud APRN-CNP    acetaminophen (Tylenol) tablet 650 mg, 650 mg, oral, q4h PRN **OR** acetaminophen (Tylenol) oral liquid 650 mg, 650 mg, oral, q4h PRN **OR** acetaminophen (Tylenol) suppository 650 mg, 650 mg, rectal, q4h PRN, Mary Michaud APRN-CNP    apixaban (Eliquis) tablet 10 mg, 10 mg, oral, BID, Mary Michaud, APRN-CNP, 10 mg at 12/21/23 0843    [START ON 12/25/2023] apixaban (Eliquis) tablet 5 mg, 5 mg, oral, BID, Mary Michaud, APRN-CNP    atenolol (Tenormin) tablet 50 mg, 50 mg, oral, q AM, Mary Michaud, APRN-CNP, 50 mg at 12/21/23 0834    benzocaine-menthol (Cepastat Sore Throat) 15-3.6 mg lozenge 1 lozenge, 1 lozenge, Mouth/Throat, q2h PRN, Mary Michaud, APRN-CNP    bisacodyl (Dulcolax) EC tablet 10 mg, 10 mg, oral, Daily PRN, Mary Michaud, DARRYL-CNP    brimonidine (AlphaGAN) 0.2 % ophthalmic solution 1 drop, 1 drop, Both Eyes, BID, ORLANDO Trevizo, 1 drop at 12/21/23 0900    busPIRone  (Buspar) tablet 10 mg, 10 mg, oral, Daily, Mary C Jaswant, APRN-CNP, 10 mg at 12/21/23 0834    calcium carbonate (Tums) chewable tablet 500 mg, 500 mg, oral, 4x daily PRN, Mary C Marcelaamed, APRN-CNP    cholecalciferol (Vitamin D-3) tablet 2,000 Units, 2,000 Units, oral, Daily, Mary C Marcelaamed, APRN-CNP, 2,000 Units at 12/21/23 0832    cyclobenzaprine (Flexeril) tablet 5 mg, 5 mg, oral, TID PRN, Mary C Marcelaamed, APRN-CNP, 5 mg at 12/19/23 1254    dextromethorphan-guaifenesin (Robitussin DM)  mg/5 mL oral liquid 5 mL, 5 mL, oral, q4h PRN, Mary PRASHANTH Michaud, APRN-CNP    ezetimibe (Zetia) tablet 10 mg, 10 mg, oral, Daily, Mary C Marcelaamed, APRN-CNP, 10 mg at 12/21/23 0843    furosemide (Lasix) tablet 20 mg, 20 mg, oral, Daily, Mary C Marcelaamed, APRN-CNP, 20 mg at 12/21/23 0842    guaiFENesin (Mucinex) 12 hr tablet 600 mg, 600 mg, oral, q12h PRN, Mary C Jaswant, APRN-CNP    hydrALAZINE (Apresoline) injection 10 mg, 10 mg, intravenous, q8h PRN, Mary C Marcelaamed, APRN-CNP    ipratropium-albuteroL (Duo-Neb) 0.5-2.5 mg/3 mL nebulizer solution 3 mL, 3 mL, nebulization, q6h PRN, Mary C Jaswant, APRN-CNP    isosorbide mononitrate ER (Imdur) 24 hr tablet 60 mg, 60 mg, oral, Daily, Mary C Marcelaamed, APRN-CNP, 60 mg at 12/21/23 0851    lactobacillus acidophilus tablet 1 tablet, 1 tablet, oral, Daily, Mary C Marcelaamed, APRN-CNP, 1 tablet at 12/21/23 0843    [Held by provider] loratadine (Claritin) tablet 10 mg, 10 mg, oral, Daily, DARRYL Trevizo-CNP, 10 mg at 12/19/23 1353    meclizine (Antivert) tablet 25 mg, 25 mg, oral, TID PRN, ORLANDO Trevizo    melatonin tablet 3 mg, 3 mg, oral, Nightly PRN, DARRYL Trevizo-CNP    mirtazapine (Remeron) tablet 15 mg, 15 mg, oral, Nightly, DARRYL Trevizo-CNP, 15 mg at 12/20/23 2104    morphine CR (MS Contin) 12 hr tablet 15 mg, 15 mg, oral, q12h [facility], Panchito Barnes MD, 15 mg at 12/21/23 7317    nitroglycerin (Nitrostat) SL tablet 0.4 mg, 0.4 mg,  sublingual, q5 min PRN, Mary Michaud, APRN-CNP    oxybutynin (Ditropan) tablet 5 mg, 5 mg, oral, BID, Mary Michaud, APRN-CNP, 5 mg at 12/21/23 0842    oxyCODONE-acetaminophen (Percocet) 5-325 mg per tablet 1 tablet, 1 tablet, oral, q6h PRN, Mary Michaud, APRN-CNP, 1 tablet at 12/20/23 1752    oxygen (O2) therapy, , inhalation, Continuous PRN - O2/gases, Mary Michaud, APRN-CNP    pantoprazole (ProtoNix) EC tablet 40 mg, 40 mg, oral, Daily before breakfast, 40 mg at 12/21/23 0847 **OR** pantoprazole (ProtoNix) injection 40 mg, 40 mg, intravenous, Daily before breakfast, Mary Michaud, APRN-CNP    pilocarpine (Salagen) tablet 5 mg, 5 mg, oral, Daily, Mary Michaud, APRN-CNP, 5 mg at 12/21/23 0844    potassium chloride CR (Klor-Con) ER tablet 10 mEq, 10 mEq, oral, Daily, Mary Michaud, APRN-CNP, 10 mEq at 12/21/23 0842    pregabalin (Lyrica) capsule 75 mg, 75 mg, oral, BID, Mary Michaud, APRN-CNP, 75 mg at 12/21/23 0834    promethazine (Phenergan) tablet 25 mg, 25 mg, oral, q6h PRN **OR** promethazine (Phenergan) suppository 25 mg, 25 mg, rectal, q12h PRN, Mary Michaud, APRN-CNP    simvastatin (Zocor) tablet 40 mg, 40 mg, oral, Nightly, Mary Michaud, APRN-CNP, 40 mg at 12/20/23 2109    sodium chloride 0.9% flush 10 mL, 10 mL, intra-catheter, q12h, Mary Michaud, APRN-CNP, 10 mL at 12/21/23 0413    sodium chloride 0.9% flush 10 mL, 10 mL, intra-catheter, PRN, ORLANDO Trevizo    tamsulosin (Flomax) 24 hr capsule 0.4 mg, 0.4 mg, oral, Daily, ORLANDO Trevizo, 0.4 mg at 12/21/23 0833    tiotropium (Spiriva Respimat) 2.5 mcg/actuation inhaler 2 puff, 2 Inhalation, inhalation, Daily, ORLANDO Trevizo, 2 puff at 12/21/23 0833    vancomycin (Vancocin) in dextrose 5 % water (D5W) 250 mL IV 1,250 mg, 1,250 mg, intravenous, q24h, ORLANDO Trevizo, Stopped at 12/21/23 1013   Results for orders placed or performed during the hospital encounter of  12/19/23 (from the past 96 hour(s))   CBC and Auto Differential   Result Value Ref Range    WBC 13.8 (H) 4.4 - 11.3 x10*3/uL    nRBC 0.2 (H) 0.0 - 0.0 /100 WBCs    RBC 2.68 (L) 4.00 - 5.20 x10*6/uL    Hemoglobin 8.3 (L) 12.0 - 16.0 g/dL    Hematocrit 26.1 (L) 36.0 - 46.0 %    MCV 97 80 - 100 fL    MCH 31.0 26.0 - 34.0 pg    MCHC 31.8 (L) 32.0 - 36.0 g/dL    RDW 14.6 (H) 11.5 - 14.5 %    Platelets 289 150 - 450 x10*3/uL    Neutrophils % 71.4 40.0 - 80.0 %    Immature Granulocytes %, Automated 5.0 (H) 0.0 - 0.9 %    Lymphocytes % 12.2 13.0 - 44.0 %    Monocytes % 8.3 2.0 - 10.0 %    Eosinophils % 2.9 0.0 - 6.0 %    Basophils % 0.2 0.0 - 2.0 %    Neutrophils Absolute 9.86 (H) 1.20 - 7.70 x10*3/uL    Immature Granulocytes Absolute, Automated 0.69 0.00 - 0.70 x10*3/uL    Lymphocytes Absolute 1.68 1.20 - 4.80 x10*3/uL    Monocytes Absolute 1.15 (H) 0.10 - 1.00 x10*3/uL    Eosinophils Absolute 0.40 0.00 - 0.70 x10*3/uL    Basophils Absolute 0.03 0.00 - 0.10 x10*3/uL   Basic metabolic panel   Result Value Ref Range    Glucose 128 (H) 74 - 99 mg/dL    Sodium 128 (L) 136 - 145 mmol/L    Potassium 4.3 3.5 - 5.3 mmol/L    Chloride 95 (L) 98 - 107 mmol/L    Bicarbonate 23 21 - 32 mmol/L    Anion Gap 14 10 - 20 mmol/L    Urea Nitrogen 12 6 - 23 mg/dL    Creatinine 0.98 0.50 - 1.05 mg/dL    eGFR 62 >60 mL/min/1.73m*2    Calcium 8.8 8.6 - 10.3 mg/dL   Urinalysis with Reflex Microscopic and Culture   Result Value Ref Range    Color, Urine Yellow Straw, Yellow    Appearance, Urine Clear Clear    Specific Gravity, Urine 1.006 1.005 - 1.035    pH, Urine 7.0 5.0, 5.5, 6.0, 6.5, 7.0, 7.5, 8.0    Protein, Urine 30 (1+) (N) NEGATIVE mg/dL    Glucose, Urine NEGATIVE NEGATIVE mg/dL    Blood, Urine SMALL (1+) (A) NEGATIVE    Ketones, Urine NEGATIVE NEGATIVE mg/dL    Bilirubin, Urine NEGATIVE NEGATIVE    Urobilinogen, Urine <2.0 <2.0 mg/dL    Nitrite, Urine NEGATIVE NEGATIVE    Leukocyte Esterase, Urine NEGATIVE NEGATIVE   Extra Urine Davidson  Tube   Result Value Ref Range    Extra Tube Hold for add-ons.    Urinalysis Microscopic   Result Value Ref Range    WBC, Urine 1-5 1-5, NONE /HPF    RBC, Urine NONE NONE, 1-2, 3-5 /HPF    Squamous Epithelial Cells, Urine 1-9 (SPARSE) Reference range not established. /HPF    Bacteria, Urine 1+ (A) NONE SEEN /HPF   Sedimentation Rate   Result Value Ref Range    Sedimentation Rate 23 0 - 30 mm/h   C-Reactive Protein   Result Value Ref Range    C-Reactive Protein 17.58 (H) <1.00 mg/dL   ECG 12 lead   Result Value Ref Range    Ventricular Rate 86 BPM    Atrial Rate 86 BPM    SC Interval 92 ms    QRS Duration 138 ms    QT Interval 400 ms    QTC Calculation(Bazett) 478 ms    P Axis 65 degrees    R Axis 10 degrees    T Axis 69 degrees    QRS Count 15 beats    Q Onset 211 ms    P Onset 165 ms    P Offset 201 ms    T Offset 411 ms    QTC Fredericia 451 ms   Vancomycin   Result Value Ref Range    Vancomycin 36.0 (H) 5.0 - 20.0 ug/mL   Vancomycin   Result Value Ref Range    Vancomycin 28.9 (H) 5.0 - 20.0 ug/mL   SST TOP   Result Value Ref Range    Extra Tube Hold for add-ons.    CBC   Result Value Ref Range    WBC 10.7 4.4 - 11.3 x10*3/uL    nRBC 0.0 0.0 - 0.0 /100 WBCs    RBC 2.60 (L) 4.00 - 5.20 x10*6/uL    Hemoglobin 8.0 (L) 12.0 - 16.0 g/dL    Hematocrit 25.2 (L) 36.0 - 46.0 %    MCV 97 80 - 100 fL    MCH 30.8 26.0 - 34.0 pg    MCHC 31.7 (L) 32.0 - 36.0 g/dL    RDW 14.6 (H) 11.5 - 14.5 %    Platelets 293 150 - 450 x10*3/uL   Basic metabolic panel   Result Value Ref Range    Glucose 122 (H) 74 - 99 mg/dL    Sodium 133 (L) 136 - 145 mmol/L    Potassium 4.1 3.5 - 5.3 mmol/L    Chloride 97 (L) 98 - 107 mmol/L    Bicarbonate 28 21 - 32 mmol/L    Anion Gap 12 10 - 20 mmol/L    Urea Nitrogen 10 6 - 23 mg/dL    Creatinine 1.05 0.50 - 1.05 mg/dL    eGFR 57 (L) >60 mL/min/1.73m*2    Calcium 8.7 8.6 - 10.3 mg/dL   Urinalysis with Reflex Microscopic   Result Value Ref Range    Color, Urine Yellow Straw, Yellow    Appearance, Urine  Clear Clear    Specific Gravity, Urine 1.024 1.005 - 1.035    pH, Urine 7.0 5.0, 5.5, 6.0, 6.5, 7.0, 7.5, 8.0    Protein, Urine NEGATIVE NEGATIVE mg/dL    Glucose, Urine NEGATIVE NEGATIVE mg/dL    Blood, Urine SMALL (1+) (A) NEGATIVE    Ketones, Urine NEGATIVE NEGATIVE mg/dL    Bilirubin, Urine NEGATIVE NEGATIVE    Urobilinogen, Urine <2.0 <2.0 mg/dL    Nitrite, Urine NEGATIVE NEGATIVE    Leukocyte Esterase, Urine NEGATIVE NEGATIVE   Microscopic Only, Urine   Result Value Ref Range    WBC, Urine NONE 1-5, NONE /HPF    RBC, Urine 1-2 NONE, 1-2, 3-5 /HPF    Mucus, Urine 1+ Reference range not established. /LPF   SST TOP   Result Value Ref Range    Extra Tube Hold for add-ons.    CBC   Result Value Ref Range    WBC 9.6 4.4 - 11.3 x10*3/uL    nRBC 0.0 0.0 - 0.0 /100 WBCs    RBC 2.51 (L) 4.00 - 5.20 x10*6/uL    Hemoglobin 7.7 (L) 12.0 - 16.0 g/dL    Hematocrit 24.4 (L) 36.0 - 46.0 %    MCV 97 80 - 100 fL    MCH 30.7 26.0 - 34.0 pg    MCHC 31.6 (L) 32.0 - 36.0 g/dL    RDW 14.8 (H) 11.5 - 14.5 %    Platelets 289 150 - 450 x10*3/uL   Basic metabolic panel   Result Value Ref Range    Glucose 132 (H) 74 - 99 mg/dL    Sodium 131 (L) 136 - 145 mmol/L    Potassium 4.1 3.5 - 5.3 mmol/L    Chloride 98 98 - 107 mmol/L    Bicarbonate 26 21 - 32 mmol/L    Anion Gap 11 10 - 20 mmol/L    Urea Nitrogen 14 6 - 23 mg/dL    Creatinine 1.06 (H) 0.50 - 1.05 mg/dL    eGFR 57 (L) >60 mL/min/1.73m*2    Calcium 8.5 (L) 8.6 - 10.3 mg/dL     CT abdomen pelvis w IV contrast    Result Date: 12/19/2023  STUDY: CT Abdomen and Pelvis with IV Contrast; 12/19/2023 at 4:01 PM. INDICATION: Left lower quadrant and left suprapubic pain post menopause. COMPARISON: CT AP 12/15/2023; CTA AP 12/5/2023. ACCESSION NUMBER(S): [id number] ORDERING CLINICIAN: CHEKO CONNOLLY TECHNIQUE: CT of the abdomen and pelvis was performed.  Contiguous axial images were obtained at 3 mm slice thickness through the abdomen and pelvis. Coronal and sagittal reconstructions at 3  mm slice thickness were performed.  Omnipaque 350 75 mL was administered intravenously.  FINDINGS: LOWER CHEST: No cardiomegaly.  No pericardial effusion.  There is subsegmental atelectasis.  Small pleural effusions in the lung bases.  ABDOMEN:  LIVER: No hepatomegaly.  Smooth surface contour.  Normal attenuation.  BILE DUCTS: No intrahepatic or extrahepatic biliary ductal dilatation.  GALLBLADDER: The gallbladder is absent. STOMACH: No abnormalities identified.  PANCREAS: No masses or ductal dilatation.  SPLEEN: No splenomegaly or focal splenic lesion.  ADRENAL GLANDS: No thickening or nodules.  KIDNEYS AND URETERS: Kidneys are normal in size and location.  No renal or ureteral calculi.  PELVIS:  BLADDER: No abnormalities identified.  REPRODUCTIVE ORGANS: No abnormalities identified.  BOWEL: There is diverticulosis.  VESSELS: No abnormalities identified.  Abdominal aorta is normal in caliber. There is a femoral-femoral crossover graft.  PERITONEUM/RETROPERITONEUM/LYMPH NODES: No free fluid.  No pneumoperitoneum. No lymphadenopathy.  ABDOMINAL WALL: There is left inguinal hernia containing fat. SOFT TISSUES: No abnormalities identified.  BONES: The patient status post laminectomy with posterior fusion from L4 to S1.  When compared to December 2023, surgical drain posteriorly has been removed.  There is now a thick-walled fluid collection posteriorly measuring 3.3 x 3.0 and extending over distance of at least 12 cm.    Status post laminectomy with posterior fusion.  There is a 3.3 x 3.0 x 12 cm thick-walled fluid collection posteriorly in the subcutaneous tissues.  This may represent a postoperative seroma however, CSF leak or abscess cannot be excluded.  This is new from December 2023. Diverticulosis without diverticulitis. Signed by Andre Mckee MD    ECG 12 lead    Result Date: 12/19/2023  Sinus rhythm with short IL Left bundle branch block Abnormal ECG When compared with ECG of 18-DEC-2023 12:36, (unconfirmed) IL  interval has decreased Left bundle branch block has replaced Incomplete right bundle branch block    CT lumbar spine wo IV contrast    Result Date: 12/19/2023  Interpreted By:  Finkelstein, Evan, STUDY: CT LUMBAR SPINE WO IV CONTRAST;  12/19/2023 4:15 am   INDICATION: Signs/Symptoms:Status postlaminectomy history of epidural abscess.   COMPARISON: None.   ACCESSION NUMBER(S): [id number]   ORDERING CLINICIAN: HEATHER ARRINGTON   TECHNIQUE: Axial noncontrast CT images of the lumbar spine with coronal and sagittal reconstructed images.   FINDINGS: Postsurgical changes of posterior instrumented fusion L4 through S1. There are bilateral transpedicular screws with vertical connecting rods and interbody spacers at L4/L5 and L5/S1. ALIGNMENT: No traumatic malalignment VERTEBRAE: No acute loss of vertebral body height. DISC SPACES: Interbody spacers at L4/L5 and L5/S1. Otherwise, the disc spaces are preserved without significant narrowing. SPINAL CANAL: No critical spinal canal stenosis above the level of surgery. There has been posterior decompression of L3 through L5, however, the canal at this level is limited due to extensive streak artifact from hardware.. PARAVERTEBRAL SOFT TISSUES: Within the superficial back soft tissues, there is a collection of fluid measuring up to approximately 3.4 x 3.4 x 12.8 cm (maximum AP by transverse by craniocaudal dimension), extending from the level of L1 through L5. There are several punctate lucencies within this collection of fluid. Deep to the superficial collection, there is also fluid and/or soft tissue thickening near the canal at the level of prior surgery L4 through S1 which extends posteriorly surrounding the posterior elements. Evaluation, however, is severely limited due to streak artifact from lumbar spine hardware.         Postsurgical changes of posterior instrumented fusion L4 through S1 as above with posterior decompression L3 through L5. 3.4 x 3.4 x 12.8 cm collection  of fluid in the superficial posterior back soft tissues extending from the level of L1 through L5. There are punctate lucencies within the collection concerning for infection given the timing of surgery greater than 1 week ago. There is also fluid and/or soft tissue thickening deep to the superficial collection extending from the level of the spinal canal into the paraspinal soft tissues and surrounding the posterior elements. No definite fat plane is seen between this superficial collection and the deeper situated fluid. Evaluation of this fluid and/or soft tissue thickening, however, is severely limited due to extensive streak artifact from surgical hardware.     MACRO: None.   Signed by: Evan Finkelstein 12/19/2023 4:27 AM Dictation workstation:   [id number]    XR lumbar spine 2-3 views    Result Date: 12/19/2023  Interpreted By:  Paul Melvin, STUDY: XR LUMBAR SPINE 2-3 VIEWS; ;  12/19/2023 2:34 am   INDICATION: Signs/Symptoms:Status post L3 S1 laminectomy, L4 L5-S1 TLIF.   COMPARISON: 08/23/2023   ACCESSION NUMBER(S): [id number]   ORDERING CLINICIAN: HEATHER ARRINGTON   FINDINGS: AP, lateral, and L5-S1 spot views of the lumbar spine: Status post L4-S1 posterior spinal fusion and laminectomy as well as L4-L5 and L5-S1 interbody spacer placement. Hardware is intact. There is mild grade 1 anterolisthesis of L5 on S1. There is mild multilevel endplate osteophyte formation. Mild disc space narrowing, most pronounced at L3-L4. Right upper quadrant surgical clips. Moderate colonic stool volume. Vascular calcifications.       1. No acute osseous abnormality. 2. Postsurgical changes from L4-S1 with intact hardware. 3. Mild multilevel spinal degenerative change. 4. Moderate colonic stool volume.   Signed by: Paul Melvin 12/19/2023 2:45 AM Dictation workstation:   [id number]    Transthoracic Echo (TTE) Complete    Result Date: 12/16/2023          [address]  [address]  Tel [phone] Fax [phone] TRANSTHORACIC ECHOCARDIOGRAM REPORT  Patient Name:      KILEY LOJA    Reading Physician:    [id number] Yoav Neville DO Study Date:        12/16/2023            Ordering Provider:    [id number] FOREIGN HUBER MRN/PID:           [id number]              Fellow: Accession#:        [id number]          Nurse: Date of Birth/Age: 1955 / 68 years Sonographer:          Gertrudis Squires                                                                RDCS Gender:            F                     Additional Staff: Height:            144.78 cm             Admit Date:           12/5/2023 Weight:            82.56 kg              Admission Status:     Inpatient -                                                                Routine BSA:               1.73 m2               Department Location:  [address] [place] [address] Blood Pressure: 128 /62 mmHg Study Type:    TRANSTHORACIC ECHO (TTE) COMPLETE Diagnosis/ICD: Personal history of pulmonary embolism-Z86.711 Indication:    PE CPT Codes:     Echo Complete w Full [id number] Patient History: Smoker:            Current. Pertinent History: HTN, Hyperlipidemia, Dyspnea, PVD, CAD and AAA. Study Detail: The following Echo studies were performed: 2D, M-Mode, Doppler and               color flow. Definity used as a contrast agent for endocardial               border definition. Total contrast used for this procedure was 2 mL               via IV push. The patient was awake.  PHYSICIAN INTERPRETATION: Left Ventricle: Left ventricular systolic function is normal, with an estimated ejection fraction of 55%. There are no regional wall motion abnormalities. The left ventricular cavity size is normal. The left ventricular septal wall thickness is mildly increased. Spectral Doppler shows an impaired relaxation pattern of left ventricular  diastolic filling. LV Wall Scoring: All segments are normal. Left Atrium: The left atrium is upper limits of normal in size. Right Ventricle: The right ventricle is normal in size. There is normal right ventricular global systolic function. Right Atrium: The right atrium is normal in size. Aortic Valve: The aortic valve appears structurally normal. The aortic valve appears tricuspid. There is mild aortic valve cusp calcification. There is no evidence of aortic valve stenosis. There is no evidence of aortic valve regurgitation. The peak instantaneous gradient of the aortic valve is 13.5 mmHg. The mean gradient of the aortic valve is 8.0 mmHg. Mitral Valve: The mitral valve is normal in structure. There is no evidence of mitral valve stenosis. There is normal mitral valve leaflet mobility. There is trace mitral valve regurgitation. Tricuspid Valve: The tricuspid valve is structurally normal. There is normal tricuspid valve leaflet mobility. There is trace tricuspid regurgitation. Pulmonic Valve: The pulmonic valve is structurally normal. There is no indication of pulmonic valve regurgitation. Pericardium: There is no pericardial effusion noted. Aorta: The aortic root is normal. Pulmonary Artery: The tricuspid regurgitant velocity is 3.08 m/s, and with an estimated right atrial pressure of 3 mmHg, the estimated pulmonary artery pressure is moderately elevated with the RVSP at 40.9 mmHg. Systemic Veins: The inferior vena cava appears to be of normal size. In comparison to the previous echocardiogram(s): Prior examinations are available and were reviewed for comparison purposes. Study relatively unchanged from my study done here in June 2022.  CONCLUSIONS:  1. Left ventricular systolic function is normal with a 55% estimated ejection fraction.  2. Spectral Doppler shows an impaired relaxation pattern of left ventricular diastolic filling.  3. There is no evidence of mitral valve stenosis.  4. Trace mitral valve  regurgitation.  5. Trace tricuspid regurgitation is visualized.  6. Aortic valve stenosis is not present.  7. Moderately elevated pulmonary artery pressure. QUANTITATIVE DATA SUMMARY: 2D MEASUREMENTS:                          Normal Ranges: Ao Root d:     2.30 cm   (2.0-3.7cm) LAs:           3.90 cm   (2.7-4.0cm) IVSd:          1.23 cm   (0.6-1.1cm) LVPWd:         1.00 cm   (0.6-1.1cm) LVIDd:         4.11 cm   (3.9-5.9cm) LVIDs:         2.97 cm LV Mass Index: 89.5 g/m2 LV % FS        27.7 % LA VOLUME:                               Normal Ranges: LA Vol A4C:        36.7 ml    (22+/-6mL/m2) LA Vol A2C:        35.2 ml LA Vol BP:         37.2 ml LA Vol Index A4C:  21.2ml/m2 LA Vol Index A2C:  20.4 ml/m2 LA Vol Index BP:   21.5 ml/m2 LA Area A4C:       15.3 cm2 LA Area A2C:       14.5 cm2 LA Major Axis A4C: 5.4 cm LA Major Axis A2C: 5.1 cm LA Volume Index:   20.6 ml/m2 RA VOLUME BY A/L METHOD:                               Normal Ranges: RA Vol A4C:        46.8 ml    (8.3-19.5ml) RA Vol Index A4C:  27.1 ml/m2 RA Area A4C:       17.4 cm2 RA Major Axis A4C: 5.5 cm AORTA MEASUREMENTS:                    Normal Ranges: Asc Ao, d: 2.20 cm (2.1-3.4cm) LV SYSTOLIC FUNCTION BY 2D PLANIMETRY (MOD):                     Normal Ranges: EF-A4C View: 50.3 % (>=55%) EF-A2C View: 56.0 % EF-Biplane:  55.4 % LV DIASTOLIC FUNCTION:                        Normal Ranges: MV Peak E:    1.12 m/s (0.7-1.2 m/s) MV Peak A:    1.21 m/s (0.42-0.7 m/s) E/A Ratio:    0.93     (1.0-2.2) MV e'         0.12 m/s (>8.0) MV lateral e' 0.12 m/s MV medial e'  0.12 m/s E/e' Ratio:   9.33     (<8.0) MITRAL VALVE:                 Normal Ranges: MV DT: 250 msec (150-240msec) AORTIC VALVE:                                    Normal Ranges: AoV Vmax:                1.84 m/s  (<=1.7m/s) AoV Peak P.5 mmHg (<20mmHg) AoV Mean P.0 mmHg  (1.7-11.5mmHg) LVOT Max Anam:            1.54 m/s  (<=1.1m/s) AoV VTI:                 44.90 cm   (18-25cm) LVOT VTI:                36.40 cm LVOT Diameter:           1.80 cm   (1.8-2.4cm) AoV Area, VTI:           2.06 cm2  (2.5-5.5cm2) AoV Area,Vmax:           2.13 cm2  (2.5-4.5cm2) AoV Dimensionless Index: 0.81  RIGHT VENTRICLE: RV Basal 3.57 cm RV Mid   2.77 cm RV Major 6.9 cm TAPSE:   28.2 mm RV s'    0.13 m/s TRICUSPID VALVE/RVSP:                             Normal Ranges: Peak TR Velocity: 3.08 m/s RV Syst Pressure: 40.9 mmHg (< 30mmHg) IVC Diam:         1.79 cm PULMONIC VALVE:                         Normal Ranges: PV Accel Time: 85 msec  (>120ms) PV Max Anam:    1.3 m/s  (0.6-0.9m/s) PV Max P.0 mmHg  [id number] Yoav Neville DO Electronically signed on 2023 at 12:29:49 PM  Wall Scoring  ** Final **     Lower extremity venous duplex bilateral    Result Date: 12/15/2023  Interpreted By:  Keagan Sanders, STUDY: [facility] US LOWER EXTREMITY VENOUS DUPLEX BILATERAL;  12/15/2023 8:14 pm   INDICATION: Signs/Symptoms:DVT.   COMPARISON: None.   ACCESSION NUMBER(S): [id number]   ORDERING CLINICIAN: LILI AVILES   TECHNIQUE: Vascular ultrasound of the bilateral lower extremities was performed. Real-time compression views as well as Gray scale, color Doppler and spectral Doppler waveform analysis was performed.   FINDINGS: Evaluation of the visualized portions of the bilateral common femoral vein, proximal, mid, and distal femoral vein, and popliteal vein were performed.  Evaluation of the visualized portions of the  posterior tibial and peroneal veins were also performed.   The right common femoral vein, femoral vein, popliteal vein and posterior tibial veins are patent. There is thrombus in the right peroneal veins.   The left common femoral vein, femoral vein, popliteal vein and the left posterior tibial and peroneal veins are patent.       Thrombus of right peroneal veins.   MACRO:   Keagan Sanders discussed the significance and urgency of this critical finding by telephone with  LILI AVILES on  12/15/2023 at 8:41 pm. (**-RCF-**) Findings:  See findings.     Signed by: Keagan Sanders 12/15/2023 8:41 PM Dictation workstation:   [id number]    CT angio chest for pulmonary embolism    Result Date: 12/15/2023  Interpreted By:  Keagan Sanders, STUDY: CT ANGIO CHEST FOR PULMONARY EMBOLISM;  12/15/2023 7:39 pm   INDICATION: Signs/Symptoms:PE?.   COMPARISON: None.   ACCESSION NUMBER(S): [id number]   ORDERING CLINICIAN: LILI AVILES   TECHNIQUE: Contiguous axial images of the chest, abdomen and pelvis were obtained after the intravenous administration of  contrast. Coronal and sagittal reformatted images were obtained from the axial images. MIPS and 3D reformatted images were also performed and reviewed.   FINDINGS: No axillary, mediastinal, or hilar lymphadenopathy.   The heart is normal in size. Coronary artery atherosclerotic calcifications. RV to LV ratio 1.1. No significant pericardial effusion. There are acute pulmonary emboli in lobar and segmental branches in the right lower lobe and middle lobe.   Mild bibasilar subsegment atelectasis. No significant pleural effusion. No pneumothorax.   Limited evaluation of the upper abdomen.   Multilevel degenerative change of the thoracic spine.       Acute lobar and segmental pulmonary emboli in the right lower lobe and middle lobe. RV to LV ratio 1.1; please correlate for component of right heart strain   MACRO: Keagan Sanders discussed the significance and urgency of this critical finding by telephone with  LILI AVILES on 12/15/2023 at 8:41 pm. (**-RCF-**) Findings:  See findings.   Signed by: Keagan Sanders 12/15/2023 8:41 PM Dictation workstation:   [id number]    CT abdomen pelvis w IV contrast    Result Date: 12/15/2023  Interpreted By:  Schoenberger, Joseph, STUDY: CT ABDOMEN PELVIS W IV CONTRAST;  12/15/2023 2:23 pm   INDICATION: Signs/Symptoms:abd pain.   COMPARISON: 12/05/2023   ACCESSION NUMBER(S): [id number]   ORDERING CLINICIAN: LILI AVILES    TECHNIQUE: CT of the abdomen and pelvis was performed.  Standard contiguous axial images were obtained at 3 mm slice thickness through the abdomen and pelvis. Coronal and sagittal reconstructions at 3 mm slice thickness were performed.   75 ml of contrast Omnipaque 350 were administered intravenously without immediate complication.   FINDINGS: LOWER CHEST: In a left lower lobe vessel there appears to be a vascular structures that bifurcates and likely represents an artery. The possibility of a filling defect is a consideration. Rec the possibility of an acute pulmonary embolus should be considered. Reference coronal reconstructions images numbered 70 through 73 of 130 series 202. It is recommended this patient undergo a CT pulmonary angiogram to evaluate further. There are areas platelike atelectasis in both lung bases. Otherwise unremarkable.   ABDOMEN:   LIVER: Within normal limits.   BILE DUCTS: Normal caliber.   GALLBLADDER: Surgically absent   PANCREAS: Within normal limits.   SPLEEN: Within normal limits.   ADRENAL GLANDS: Bilateral adrenal glands appear normal.   KIDNEYS AND URETERS: The kidneys are normal in size and enhance symmetrically.  No hydroureteronephrosis or nephroureterolithiasis is identified.   PELVIS:   BLADDER: Within normal limits.   REPRODUCTIVE ORGANS: 1 unremarkable   BOWEL: The stomach is unremarkable. The small and large bowel are normal in caliber and demonstrate no wall thickening.   Normal appendix.   VESSELS: There is no aneurysmal dilatation of the abdominal aorta. The IVC appears normal.   PERITONEUM/RETROPERITONEUM/LYMPH NODES: No ascites or free air, no fluid collection.  No abdominopelvic lymphadenopathy is present.   BONES AND ABDOMINAL WALL: No suspicious osseous lesions are identified. Degenerative discogenic disease is noted in the lower thoracic and lumbar spine.   In the interval since the prior exam 2 drains have been placed in the spinal operative site. 1 from the left  enters at the mid sacral level traveling cranially to the epidural region. The 2nd enters more superiorly to the right traveling cranially in the subcutaneous is tissues. These appear to have drained the fluid collections successfully. Again postoperative findings are unchanged from the prior with extensive laminectomy and posterior fusion hardware placement.       1.  Somewhat subtle but possible acute pulmonary emboli in the right lower lobe. Recommend further evaluation with CT pulmonary angiogram. 2. Successful drainage of fluid collections in the lumbar operative site when compared to the previous exam.   The possibility of an acute pulmonary embolus and recommendation for CT a chest was communicated using the secure messaging system through the medical record to the referring physician at the time of this exam.   Signed by: Joseph Schoenberger 12/15/2023 3:09 PM Dictation workstation:   [id number]    MR lumbar spine w and wo IV contrast    Result Date: 12/10/2023  Interpreted By:  Cyrus Disla, STUDY: MRI of the lumbar spine without IV contrast;  12/10/2023 2:07 pm   INDICATION: Signs/Symptoms:increasing back pain wiht previous fluid collection. Wound culture growing MRSA intractable back pain although slightly better than yesterday.   COMPARISON: 12/05/2023 MR   ACCESSION NUMBER(S): [id number]   ORDERING CLINICIAN: ALMITA THOMPSON   TECHNIQUE: Sagittal and axial STIR and T1-weighted MRI images of the lumbar spine were acquired using a spondylolysis protocol.  Enhanced images were obtained using 17 mL gadoterate (Dotarem).   FINDINGS: There are 5 lumbar type vertebrae.   The previously noted epidural collection along the posterior thecal sac in the region of the L3-4 to L5 laminectomy has decreased in size to 1.6 x 3 x 5.8 cm (previously 3 x 3.9 x 5.6 cm. The fluid has a small amount layering dependent hemorrhage. The overall compression of the thecal sac has decreased.   A drainage catheter has been  inserted into the previously noted subcutaneous fluid collection extending from L1-L5. The catheter enters the fluid collection from the left at S1, coursing superiorly along the right side of the collection then curving to the left at L2 with inferior extension to L4-5.   The overall size of the collection has decreased to 2.9 x 4.6 cm transverse dimension, 13.4 cm length (previously 3.9 x 6.9 x 13.1 cm).   Bilateral pedicle screw/fabrizio fusion with interbody spacing devices is again noted from L4-S1.   L5 has grade 1 anterolisthesis on S1.   The thecal sac is mildly to moderately stenosed at L3-4, less so than noted on the prior exam secondary to the fluid collection along the posterior thecal sac.   The thecal sac is also mildly to moderately stenosed at L4, improved from the prior exam.   No other significant stenoses of the spinal canal are noted.   The midline posterior musculature in the region of the laminectomy has edema and enhancement similar to the prior exam. No localized collections within the muscles themselves are noted.       The epidural and subcutaneous fluid collections have decreased in size as noted above following placement of a drain in the subcutaneous tissue.   The overall degree of thecal sac stenosis has improved as well.   I personally reviewed the images/study and I agree with the findings as stated. This study was interpreted at [facility], [place], [place].   MACRO: None   Signed by: Cyrus Disla 12/10/2023 2:52 PM Dictation workstation:   [id number]    Bedside PICC Imaging    Result Date: 12/7/2023  These images are not reportable by radiology and will not be interpreted by  Radiologists.    MR lumbar spine w and wo IV contrast    Result Date: 12/5/2023  Interpreted By:  Jh Benoit,  and Dev Hogue STUDY: MR LUMBAR SPINE W AND WO IV CONTRAST;  12/5/2023 4:39 pm   INDICATION: Signs/Symptoms:Recent laminectomy with fusion, intractable back pain  radiates legs.   COMPARISON: MRI 08/20/2023, CT 12/05/2023   ACCESSION NUMBER(S): [id number]   ORDERING CLINICIAN: SHAWN REDDY   TECHNIQUE: Sagittal T1, T2, STIR, axial T1 and T2 weighted images of the lumbar spine were acquired without and following administration of 15 cc Dotarem gadolinium based IV contrast.   FINDINGS: Motion degraded examination.   Alignment: The vertebral alignment is maintained.   Vertebrae/Intervertebral Discs: Postsurgical changes of L4 through S1 posterior fusion noted with bilateral transpedicular screws and interbody graft placement at L4-5 and L5-S1. There has been bilateral laminectomies at L3-4, L4-5, and L5-S1. The vertebral bodies demonstrate expected height. There is mild osseous edema involving L5 and S1 vertebral bodies. The marrow signal is within normal limits. Multilevel intervertebral disc height loss and disc desiccation noted.   A large dorsal extra-spinal fluid collection is noted extending from the laminectomy bed through the deep muscular fascia into the subcutaneous fat. This collection extends from the right lateral recess at the level of L4-5 as well as L5-S1 into the spinal canal and deforms the thecal sac. A focal defect is noted within the thecal sac at the level of L5-S1 (series 9, image 20). 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 The collections do not demonstrate significant peripheral enhancement to suggest abscess.   Conus: The lower thoracic cord appears unremarkable. The conus terminates at L1.   T12-L1: Disc bulge. There is no significant central canal or neural foraminal stenosis.   L1-2: Disc bulge, ligamentum flavum thickening and facet hypertrophy contribute to minimal central canal narrowing.   L2-3: Disc bulge and mild prominence of the posterior epidural fat resulting in minimal narrowing of the central canal. There is moderate right and minimal left foraminal narrowing due to intraforaminal disc herniation and facet hypertrophy,  similar to preoperative examination.   L3-4: Laminectomies. There is moderate narrowing of the thecal sac due to disc bulge and dorsal spinal collection as detailed above. There is unchanged marked bilateral foraminal narrowing due to intraforaminal disc herniation and facet hypertrophic changes with possible impingement of bilateral exiting L3 nerve roots.   L4-5: Bilateral laminectomies. There is moderate narrowing of the central canal with indentation of the thecal sac posteriorly due to the dorsal spinal fluid collection as detailed above. Moderate right and mild left foraminal narrowing is overall similar to MRI dated 08/20/2020 3D to intraforaminal disc herniations and facet hypertrophic changes.   L5-S1: Bilateral laminectomies. The dorsal spinal fluid collection indents the thecal sac as detailed above. Evaluation of the foramina is limited due to susceptibility artifact from bilateral transpedicular screws.  There is persistent marked left and moderate right foraminal narrowing due to intraforaminal disc herniation and facet hypertrophy with possible impingement of the exiting left L5 nerve root.       1.  Postoperative changes as above. A large dorsal extra-spinal fluid collection is noted extending from the laminectomy bed through the deep muscular fascia into the subcutaneous fat. This collection extends from the right lateral recesses at the level of L4-5 as well as L5-S1 into the spinal canal and deforms the thecal sac resulting in moderate narrowing at L4-5 and to a lesser degree at L3-4. A focal defect is noted within the thecal sac at the level of L5-S1. 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 No significant peripheral enhancement is identified to suggest abscess although the sterility of this collection can not be ascertained on MRI alone. 2. Multilevel degenerative changes again noted with persistent marked bilateral foraminal narrowing at L3-4 and at L5-S1 on the left.     I personally  reviewed the images/study and I agree with the findings as stated. This study was interpreted at [facility], [place], [place].   MACRO: None   Signed by: Jh Benoit 12/5/2023 5:24 PM Dictation workstation:   [id number]    CT angio chest abdomen pelvis    Result Date: 12/5/2023  Interpreted By:  Samuel Brannon, STUDY: CT ANGIO CHEST ABDOMEN PELVIS;  12/5/2023 2:48 pm   INDICATION: Signs/Symptoms:Severe lower abdomen pain, groin pain, radiates to back, intractable pain.   COMPARISON: May 18, 2022 CTA chest abdomen and pelvis. July 21, 2023 CTA abdomen and pelvis with runoff. August 20, 2023 MRI lumbar spine and December 5, 2023 CT lumbar spine   ACCESSION NUMBER(S): [id number]   ORDERING CLINICIAN: SHAWN REDDY   TECHNIQUE: Axial non-contrast images of the chest, abdomen, and pelvis  with coronal and sagittal reformatted images. Axial CT images of the chest, abdomen and pelvis after the intravenous administration of 100 mL Omnipaque 350 contrast using CT angiographic technique with coronal and sagittal reformatted images.  MIP images were provided and reviewed. 3D reconstructions were performed on a separate independent workstation.   FINDINGS: VASCULAR:   PULMONARY ARTERIES:   No acute pulmonary embolism.   THORACIC AORTA:  Non-contrast images show no evidence of acute intramural hematoma. No thoracic aortic aneurysm or dissection. Moderate scattered atherosclerosis thoracic aorta and branch vessels.   ABDOMINAL AORTA: No abdominal aortic aneurysm or dissection. There is scattered atherosclerosis. 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   CHEST:   MEDIASTINUM AND LYMPH NODES: No enlarged intrathoracic or axillary lymph nodes.   HEART: Normal size.   Moderate coronary artery calcifications. No pericardial effusion.   LUNG, PLEURA, LARGE AIRWAYS: No consolidation, pulmonary edema, pleural effusion, or pneumothorax.   OSSEOUS STRUCTURES/CHEST WALL:    No acute osseous abnormality.     ABDOMEN/PELVIS:   Arterial phase imaging limits evaluation of the solid organs.   ABDOMINAL WALL: Within normal limits.   LIVER: Stable without detected mass. BILE DUCTS: No significant abnormality. GALLBLADDER: Absent   PANCREAS: No significant abnormality.   SPLEEN: No significant abnormality.   ADRENALS: No significant abnormality.   KIDNEYS, URETERS, BLADDER: No significant abnormality.   VESSELS: See above.  No additional significant abnormality. LYMPH NODES: No enlarged lymph nodes. RETROPERITONEUM:  No significant abnormality.   BOWEL: The stomach and bowel are normal caliber without evident inflammatory change.   PERITONEUM:   No significant ascites, free air, or fluid collection.   REPRODUCTIVE ORGANS: No significant abnormality.   OSSEOUS STRUCTURES:  Refer to same day CT scan lumbar spine report for details regarding that portion. No separate acute osseous abnormalities are identified. There are skin staples dorsal lumbar region. The canal is not reliably evaluated at the operated levels due to artifact from metallic hardware. There is minimal gas within the soft tissues at the operated levels and there is lobulated fluid density extending from vertebral to overlapping paravertebral region to the level of the superficial subcutaneous layer including portion sagittal series 506, image 109 and axial series 502, image 114 measuring 14 x 6 x 6 cm  with internal density measurement of 6 compatible with simple fluid density       No thoracic or abdominal aortic aneurysm or dissection.   Stable pre-existing vascular findings as reported.   Findings compatible with recent lumbar spine surgery with prominent fluid collection overlying the operated site extending through the  superficial subcutaneous layer. The canal is not reliably assessed at the operated site due to artifact. Recommend further characterization with MRI lumbar spine with without contrast.   No acute abnormality of the chest, abdomen or pelvis.     Signed by: Samuel Brannon 12/5/2023 3:32 PM Dictation workstation:   [id number]    CT lumbar spine wo IV contrast    Result Date: 12/5/2023  Interpreted By:  Jony Monae, STUDY: CT LUMBAR SPINE WO IV CONTRAST; 12/5/2023 11:28 am   INDICATION: Signs/Symptoms:Low back pain, recent laminectomy and fusion, fall 1 week ago.   COMPARISON: None.   ACCESSION NUMBER(S): [id number]   ORDERING CLINICIAN: SHAWN REDDY   TECHNIQUE: Contiguous axial CT images were obtained through the lumbar spine at 2 mm slice thickness without contrast administration. The images were then reconstructed in the coronal and sagittal planes.   FINDINGS: OSSEOUS STRUCTURES: The patient is status post L3 through L5 laminectomy, L4 through S1 pedicle screw and fabrizio fusion and L4-5 and L5-S1 disc space implant placement. There is no evidence of acute fracture identified. The vertebral bodies are well aligned without evidence of subluxation.   Mild discogenic degenerative changes are seen at the remaining disc space levels. Mild-to-moderate facet degenerative changes are seen throughout the lumbar spine.   LOWER THORACIC SPINE: No gross central canal or neural foraminal narrowing is seen.   T12-L1: No gross central canal or neural foraminal narrowing is seen.   L1-2: No gross central canal or neural foraminal narrowing is seen.   L2-3: No gross central canal or neural foraminal narrowing is seen.   L3-4: Mild right-sided neural foraminal narrowing is seen. No significant left foraminal or central canal narrowing is seen.   L4-5: No gross central canal or neural foraminal narrowing is seen.   L5-S1: Mild left-sided neural foraminal narrowing is seen. No significant right foraminal or central canal narrowing  is seen.   ASSOCIATED STRUCTURES: Evaluation of the visualized soft tissues of the abdomen is limited by the lack of intravenous contrast. Within this limitation, no gross mass or lymphadenopathy is identified.  A fluid collection is seen along the posterior midportion the lower back, likely postoperative in nature.       1. No acute fracture identified. 2. Postoperative and degenerative changes, as described above.   MACRO: None.   Signed by: Jony Monae 12/5/2023 11:45 AM Dictation workstation:   [id number]

## 2023-12-21 NOTE — CARE PLAN
The patient's goals for the shift include LABS WNL    The clinical goals for the shift include safety  Problem: Fall/Injury  Goal: Not fall by end of shift  Outcome: Progressing  Goal: Be free from injury by end of the shift  Outcome: Progressing  Goal: Verbalize understanding of personal risk factors for fall in the hospital  Outcome: Progressing  Goal: Verbalize understanding of risk factor reduction measures to prevent injury from fall in the home  Outcome: Progressing  Goal: Use assistive devices by end of the shift  Outcome: Progressing  Goal: Pace activities to prevent fatigue by end of the shift  Outcome: Progressing     Problem: Pain  Goal: Takes deep breaths with improved pain control throughout the shift  Outcome: Progressing  Goal: Turns in bed with improved pain control throughout the shift  Outcome: Progressing  Goal: Walks with improved pain control throughout the shift  Outcome: Progressing  Goal: Performs ADL's with improved pain control throughout shift  Outcome: Progressing  Goal: Participates in PT with improved pain control throughout the shift  Outcome: Progressing  Goal: Free from opioid side effects throughout the shift  Outcome: Progressing  Goal: Free from acute confusion related to pain meds throughout the shift  Outcome: Progressing

## 2023-12-21 NOTE — HH CARE COORDINATION
Home Care received a Referral for Infusion, Nursing, Physical Therapy, and Occupational Therapy. We have processed the referral for a Start of Care on 12/23 in am for IV Infusion teaching.     If you have any questions or concerns, please feel free to contact us at [phone]. Follow the prompts, enter your five digit zip code, and you will be directed to your care team on [facility] 1.

## 2023-12-21 NOTE — PROGRESS NOTES
PROGRESS NOTE    Subjective     Patient seen and examined this morning.  Patient resting in bed with no acute distress on observation. LLQ and left thigh pain much improved to soreness description. Sitting up in recliner. Eating and drinking well. Had large BM with mag citrate. Afebrile. A/O x 4.     Objective     Last Recorded Vitals    Visit Vitals  /72   Pulse 68   Temp 37.1 °C (98.8 °F)   Resp 16        3 Day Weight Change: Unable to Calculate       Intake/Output Summary (Last 24 hours) at 12/21/2023 1409  Last data filed at 12/21/2023 1013  Gross per 24 hour   Intake 875 ml   Output --   Net 875 ml          Physical Exam  Vitals reviewed.   Constitutional:       Appearance: Normal appearance. She is normal weight.   HENT:      Head: Normocephalic and atraumatic.      Mouth/Throat:      Mouth: Mucous membranes are moist.   Eyes:      Extraocular Movements: Extraocular movements intact.      Pupils: Pupils are equal, round, and reactive to light.   Cardiovascular:      Rate and Rhythm: Normal rate and regular rhythm.      Pulses: Normal pulses.      Heart sounds: Normal heart sounds. No murmur heard.     No gallop.   Pulmonary:      Effort: Pulmonary effort is normal. No respiratory distress.      Breath sounds: Normal breath sounds. No wheezing, rhonchi or rales.   Abdominal:      General: Abdomen is flat. Bowel sounds are normal. There is no distension.      Palpations: Abdomen is soft. There is no mass.      Tenderness: There is no abdominal tenderness. There is no rebound.      Hernia: No hernia is present.      Comments: LLQ no longer tender to touch    Musculoskeletal:         General: No swelling, tenderness or signs of injury. Normal range of motion.      Cervical back: Normal range of motion and neck supple.      Right lower leg: No edema.      Left lower leg: No edema.   Skin:     General: Skin is warm and dry.      Capillary Refill: Capillary refill takes less than 2 seconds.      Findings: No  bruising, erythema or rash.   Neurological:      General: No focal deficit present.      Mental Status: She is alert and oriented to person, place, and time.      Cranial Nerves: No cranial nerve deficit.      Sensory: No sensory deficit.      Motor: No weakness.   Psychiatric:         Mood and Affect: Mood normal.         Behavior: Behavior normal.          Scheduled medications  apixaban, 10 mg, oral, BID  [START ON 12/25/2023] apixaban, 5 mg, oral, BID  atenolol, 50 mg, oral, q AM  brimonidine, 1 drop, Both Eyes, BID  busPIRone, 10 mg, oral, Daily  cholecalciferol, 2,000 Units, oral, Daily  ezetimibe, 10 mg, oral, Daily  furosemide, 20 mg, oral, Daily  isosorbide mononitrate ER, 60 mg, oral, Daily  lactobacillus acidophilus, 1 tablet, oral, Daily  [Held by provider] loratadine, 10 mg, oral, Daily  mirtazapine, 15 mg, oral, Nightly  morphine CR, 15 mg, oral, q12h EDE  oxybutynin, 5 mg, oral, BID  pantoprazole, 40 mg, oral, Daily before breakfast   Or  pantoprazole, 40 mg, intravenous, Daily before breakfast  pilocarpine, 5 mg, oral, Daily  potassium chloride CR, 10 mEq, oral, Daily  pregabalin, 75 mg, oral, BID  simvastatin, 40 mg, oral, Nightly  sodium chloride 0.9%, 10 mL, intra-catheter, q12h  tamsulosin, 0.4 mg, oral, Daily  tiotropium, 2 Inhalation, inhalation, Daily  vancomycin, 1,250 mg, intravenous, q24h      Continuous medications     PRN medications  PRN medications: acetaminophen **OR** acetaminophen **OR** acetaminophen, acetaminophen **OR** acetaminophen **OR** acetaminophen, benzocaine-menthol, bisacodyl, calcium carbonate, cyclobenzaprine, dextromethorphan-guaifenesin, guaiFENesin, hydrALAZINE, ipratropium-albuteroL, meclizine, melatonin, nitroglycerin, oxyCODONE-acetaminophen, oxygen, promethazine **OR** promethazine, sodium chloride 0.9%       Relevant Results    Results for orders placed or performed during the hospital encounter of 12/19/23 (from the past 96 hour(s))   CBC and Auto Differential    Result Value Ref Range    WBC 13.8 (H) 4.4 - 11.3 x10*3/uL    nRBC 0.2 (H) 0.0 - 0.0 /100 WBCs    RBC 2.68 (L) 4.00 - 5.20 x10*6/uL    Hemoglobin 8.3 (L) 12.0 - 16.0 g/dL    Hematocrit 26.1 (L) 36.0 - 46.0 %    MCV 97 80 - 100 fL    MCH 31.0 26.0 - 34.0 pg    MCHC 31.8 (L) 32.0 - 36.0 g/dL    RDW 14.6 (H) 11.5 - 14.5 %    Platelets 289 150 - 450 x10*3/uL    Neutrophils % 71.4 40.0 - 80.0 %    Immature Granulocytes %, Automated 5.0 (H) 0.0 - 0.9 %    Lymphocytes % 12.2 13.0 - 44.0 %    Monocytes % 8.3 2.0 - 10.0 %    Eosinophils % 2.9 0.0 - 6.0 %    Basophils % 0.2 0.0 - 2.0 %    Neutrophils Absolute 9.86 (H) 1.20 - 7.70 x10*3/uL    Immature Granulocytes Absolute, Automated 0.69 0.00 - 0.70 x10*3/uL    Lymphocytes Absolute 1.68 1.20 - 4.80 x10*3/uL    Monocytes Absolute 1.15 (H) 0.10 - 1.00 x10*3/uL    Eosinophils Absolute 0.40 0.00 - 0.70 x10*3/uL    Basophils Absolute 0.03 0.00 - 0.10 x10*3/uL   Basic metabolic panel   Result Value Ref Range    Glucose 128 (H) 74 - 99 mg/dL    Sodium 128 (L) 136 - 145 mmol/L    Potassium 4.3 3.5 - 5.3 mmol/L    Chloride 95 (L) 98 - 107 mmol/L    Bicarbonate 23 21 - 32 mmol/L    Anion Gap 14 10 - 20 mmol/L    Urea Nitrogen 12 6 - 23 mg/dL    Creatinine 0.98 0.50 - 1.05 mg/dL    eGFR 62 >60 mL/min/1.73m*2    Calcium 8.8 8.6 - 10.3 mg/dL   Urinalysis with Reflex Microscopic and Culture   Result Value Ref Range    Color, Urine Yellow Straw, Yellow    Appearance, Urine Clear Clear    Specific Gravity, Urine 1.006 1.005 - 1.035    pH, Urine 7.0 5.0, 5.5, 6.0, 6.5, 7.0, 7.5, 8.0    Protein, Urine 30 (1+) (N) NEGATIVE mg/dL    Glucose, Urine NEGATIVE NEGATIVE mg/dL    Blood, Urine SMALL (1+) (A) NEGATIVE    Ketones, Urine NEGATIVE NEGATIVE mg/dL    Bilirubin, Urine NEGATIVE NEGATIVE    Urobilinogen, Urine <2.0 <2.0 mg/dL    Nitrite, Urine NEGATIVE NEGATIVE    Leukocyte Esterase, Urine NEGATIVE NEGATIVE   Extra Urine Gray Tube   Result Value Ref Range    Extra Tube Hold for add-ons.     Urinalysis Microscopic   Result Value Ref Range    WBC, Urine 1-5 1-5, NONE /HPF    RBC, Urine NONE NONE, 1-2, 3-5 /HPF    Squamous Epithelial Cells, Urine 1-9 (SPARSE) Reference range not established. /HPF    Bacteria, Urine 1+ (A) NONE SEEN /HPF   Sedimentation Rate   Result Value Ref Range    Sedimentation Rate 23 0 - 30 mm/h   C-Reactive Protein   Result Value Ref Range    C-Reactive Protein 17.58 (H) <1.00 mg/dL   ECG 12 lead   Result Value Ref Range    Ventricular Rate 86 BPM    Atrial Rate 86 BPM    WA Interval 92 ms    QRS Duration 138 ms    QT Interval 400 ms    QTC Calculation(Bazett) 478 ms    P Axis 65 degrees    R Axis 10 degrees    T Axis 69 degrees    QRS Count 15 beats    Q Onset 211 ms    P Onset 165 ms    P Offset 201 ms    T Offset 411 ms    QTC Fredericia 451 ms   Vancomycin   Result Value Ref Range    Vancomycin 36.0 (H) 5.0 - 20.0 ug/mL   Vancomycin   Result Value Ref Range    Vancomycin 28.9 (H) 5.0 - 20.0 ug/mL   SST TOP   Result Value Ref Range    Extra Tube Hold for add-ons.    CBC   Result Value Ref Range    WBC 10.7 4.4 - 11.3 x10*3/uL    nRBC 0.0 0.0 - 0.0 /100 WBCs    RBC 2.60 (L) 4.00 - 5.20 x10*6/uL    Hemoglobin 8.0 (L) 12.0 - 16.0 g/dL    Hematocrit 25.2 (L) 36.0 - 46.0 %    MCV 97 80 - 100 fL    MCH 30.8 26.0 - 34.0 pg    MCHC 31.7 (L) 32.0 - 36.0 g/dL    RDW 14.6 (H) 11.5 - 14.5 %    Platelets 293 150 - 450 x10*3/uL   Basic metabolic panel   Result Value Ref Range    Glucose 122 (H) 74 - 99 mg/dL    Sodium 133 (L) 136 - 145 mmol/L    Potassium 4.1 3.5 - 5.3 mmol/L    Chloride 97 (L) 98 - 107 mmol/L    Bicarbonate 28 21 - 32 mmol/L    Anion Gap 12 10 - 20 mmol/L    Urea Nitrogen 10 6 - 23 mg/dL    Creatinine 1.05 0.50 - 1.05 mg/dL    eGFR 57 (L) >60 mL/min/1.73m*2    Calcium 8.7 8.6 - 10.3 mg/dL   Urinalysis with Reflex Microscopic   Result Value Ref Range    Color, Urine Yellow Straw, Yellow    Appearance, Urine Clear Clear    Specific Gravity, Urine 1.024 1.005 - 1.035    pH,  Urine 7.0 5.0, 5.5, 6.0, 6.5, 7.0, 7.5, 8.0    Protein, Urine NEGATIVE NEGATIVE mg/dL    Glucose, Urine NEGATIVE NEGATIVE mg/dL    Blood, Urine SMALL (1+) (A) NEGATIVE    Ketones, Urine NEGATIVE NEGATIVE mg/dL    Bilirubin, Urine NEGATIVE NEGATIVE    Urobilinogen, Urine <2.0 <2.0 mg/dL    Nitrite, Urine NEGATIVE NEGATIVE    Leukocyte Esterase, Urine NEGATIVE NEGATIVE   Microscopic Only, Urine   Result Value Ref Range    WBC, Urine NONE 1-5, NONE /HPF    RBC, Urine 1-2 NONE, 1-2, 3-5 /HPF    Mucus, Urine 1+ Reference range not established. /LPF   SST TOP   Result Value Ref Range    Extra Tube Hold for add-ons.    CBC   Result Value Ref Range    WBC 9.6 4.4 - 11.3 x10*3/uL    nRBC 0.0 0.0 - 0.0 /100 WBCs    RBC 2.51 (L) 4.00 - 5.20 x10*6/uL    Hemoglobin 7.7 (L) 12.0 - 16.0 g/dL    Hematocrit 24.4 (L) 36.0 - 46.0 %    MCV 97 80 - 100 fL    MCH 30.7 26.0 - 34.0 pg    MCHC 31.6 (L) 32.0 - 36.0 g/dL    RDW 14.8 (H) 11.5 - 14.5 %    Platelets 289 150 - 450 x10*3/uL   Basic metabolic panel   Result Value Ref Range    Glucose 132 (H) 74 - 99 mg/dL    Sodium 131 (L) 136 - 145 mmol/L    Potassium 4.1 3.5 - 5.3 mmol/L    Chloride 98 98 - 107 mmol/L    Bicarbonate 26 21 - 32 mmol/L    Anion Gap 11 10 - 20 mmol/L    Urea Nitrogen 14 6 - 23 mg/dL    Creatinine 1.06 (H) 0.50 - 1.05 mg/dL    eGFR 57 (L) >60 mL/min/1.73m*2    Calcium 8.5 (L) 8.6 - 10.3 mg/dL        Assessment and Plan     Principal Problem:    Generalized weakness     Left upper quadrant/left suprapubic pain  Radiating down the left upper thigh  Intractable back pain  Post operative epidural abscess d/t MRSA   after lumbosacral laminectomy and spinal fusion s/p I&D (12/6) and repeat washout (12/11)   Acute RLE peroneal vein DVT and acute lobar and segmental PE in RLL/RML   (dx (12/15), no RV strain on TTE  Hyponatremia  E. Coli UTI s/p x5d Macrobid   CAD  hx of remote PCI   COPD  HTN  DLD     PLAN  Labs reviewed . CBC and BMP in the AM.  Monitor and replace  electrolytes per protocol  Monitor on telemetry for arrhythmia  Fall precautions.  PT and OT evaluation  As needed pain control.  Bowel regimen  Due to hyponatremia we will trend sodium levels daily.  Fluid restrictions for now.  Okay to use PICC line for lab draws and medication administration  Resumed patient home medications as appropriate  Resumed Eliquis at 10 mg then taper to 5 mg per Dosepak recommendations  Resume vancomycin with pharmacy to dose.  Patient was recommended to receive 6 weeks of IV vancomycin from infectious diseases standpoint status post I&D of spinal abscess  UA without hematuria or UTI. CT abd/pelvis reviewed. See 12/19/2023 progress note for details regarding findings and new orders   Consulted orthopedic surgery to follow patient postoperatively status post laminectomy with epidural abscess  Consulted infectious disease for antibiotic management  TCC for discharge planning  DVTp: Eliquis     12/20/2023  Reviewed patient CT of the abdomen and pelvis findings along with options to address the significant stool burden/constipation.  Patient opting to try magnesium citrate at this time.  Also discussed bladder distention with recent UTI will start Flomax.  Patient does not want bladder scans or straight catheterizations, will acknowledge these wishes until urology evaluates and provides recommendations.  Discharge planning also discussed with patient.  She understands the differences between frequency of physical therapy/Occupational Therapy at home versus a skilled rehab facility along with the need for her and her family to manage the IV antibiotic administration.  At this time she is agreeable along with her  who she states will be at home with her on FMLA to assist and also states that she has a family member that is a registered nurse.  Discussed that we would like to have her have adequate bowel movements and be seen by urology in addition to helping arrange home health care  with IV antibiotic therapy after which she will be okay for discharge.      12/21/2023  Labs reviewed.  CBC and CMP in am   Continue IV vanco per ID recommendations  PO abx on DC for UTI treatment per urology given bladder distension with int urinary frequency/urgency. Augmentin prescribed per last urine cultures. Patient to follow up OP with urology.   ID recommends Vanco dosing for home at 1.25g iv q24 as per inpatient pharmacy note on 12/21 with weekly labs: vanco trough, crp , bmp, cbc.   Continue as needed pain medications  TCC working on DC with [facility]. SOC received for tomorrow after am dose of vanco. Patient at this time is medically stable to do so.     Plan of care was discussed extensively with patient. Patient verbalized understanding through teach back method. All questions and concerns addressed upon examination.     Of note, this documentation is completed using the Dragon Dictation system (voice recognition software). There may be spelling and/or grammatical errors that were not corrected prior to final submission

## 2023-12-21 NOTE — RESEARCH NOTES
No new changes.  Patient resting comfortably    Wounds are clear.  No complaints of numbness or tingling.    Home health care and process for home antibiotics and therapy.  Okay to discharge home when arrangements are solidified.  Can continue with physical therapy while in the hospital.

## 2023-12-21 NOTE — PROGRESS NOTES
Confirmed with Dr Jacki Cain dosing for home is 1.25g iv q24 as per inpatient pharmacy note on 12/21.  Weekly labs vanco trough, crp , bmp, cbc/diff

## 2023-12-21 NOTE — PROGRESS NOTES
"Vancomycin Dosing by Pharmacy- FOLLOW UP    Tara Tierney is a 68 y.o. year old female who Pharmacy has been consulted for vancomycin dosing for cellulitis, skin and soft tissue. Based on the patient's indication and renal status this patient is being dosed based on a goal AUC of 400-600.     Renal function is currently stable.    Current vancomycin dose: 1250 mg given every 24 hours    Estimated vancomycin AUC on current dose: 559 mg/L.hr per insight    Visit Vitals  /72   Pulse 68   Temp 37.1 °C (98.8 °F)   Resp 16        Lab Results   Component Value Date    CREATININE 1.06 (H) 12/21/2023    CREATININE 1.05 12/20/2023    CREATININE 0.98 12/19/2023    CREATININE 0.95 12/18/2023        Patient weight is No results found for: \"PTWEIGHT\"    No results found for: \"CULTURE\"     I/O last 3 completed shifts:  In: 890 (11.1 mL/kg) [P.O.:880; I.V.:10 (0.1 mL/kg)]  Out: 2250 (28 mL/kg) [Urine:2250 (0.8 mL/kg/hr)]  Weight: 80.5 kg   [unfilled]    No results found for: \"PATIENTTEMP\"     Assessment/Plan        This dosing regimen is predicted by [employer] to result in the following pharmacokinetic parameters:  <<<<<PASTE [employer] DATA HERE>>>>>    The Loading dose: 1500mg  Regimen: 1250 mg IV every 24 hours.  Start time: 08:56 on 12/21/2023  Exposure target: AUC24 (range)400-600 mg/L.hr   AUC24,ss: 559 mg/L.hr  Probability of AUC24 > 400: 100 %  Ctrough,ss: 16.2 mg/L  Probability of Ctrough,ss > 20: 7 %  Probability of nephrotoxicity (Lodise ASHLEY 2009): 12 %    The next level will be obtained on 12/22 at 05:00. May be obtained sooner if clinically indicated.   Will continue to monitor renal function daily while on vancomycin and order serum creatinine at least every 48 hours if not already ordered.  Follow for continued vancomycin needs, clinical response, and signs/symptoms of toxicity.       Christin Bahena, [facility]           "

## 2023-12-21 NOTE — PROGRESS NOTES
Physical Therapy    Physical Therapy Treatment    Patient Name: Tara Tierney  MRN: [id number]  Today's Date: 12/21/2023  Time Calculation  Start Time: 1407  Stop Time: 1416  Time Calculation (min): 9 min       Assessment/Plan   End of Session Patient Position:  (Up in chair after treatment.  No alarm. TLSO off)    PT Plan  Inpatient/Swing Bed or Outpatient: Inpatient  Treatment/Interventions: Bed mobility, Transfer training, Gait training, Stair training, Balance training, Strengthening, Endurance training, Therapeutic exercise, Therapeutic activity, Home exercise program  PT Plan: Skilled PT  PT Frequency: 3 times per week  PT Discharge Recommendations: Low intensity level of continued care    PT Recommended Transfer Status: Stand by assist, Assistive device    General Visit Information:   PT  Visit  PT Received On: 12/21/23    General  Prior to Session Communication:  Cleared by RN for PT  Patient Position Received:  Observed patient standing at bedside without AD and not wearing TLSO.  Patient bending down to pick object up from floor.  Agreeable to PT.  She reports probable d/c home today    General Comment: To ED on 12/19 from SNF with pain in LEs and back and reports of weakness. Pt. with recent L3-4, L4-5, L5-S1 Laminectomy([facility]). Had I&D (12/06/23) and second I&D(12/11/23). test/ labs : hgb 8.0 , XRay Lumbar spine neg for acute osseous abnormality . post sx changes of L4-S1. CT L spine : punctated lucencious concerning for infection at post sx area L4-S1.      Precautions:  Medical Precautions: Fall precautions  Post-Surgical Precautions: Spinal precautions  Braces Applied:  (TLSO on when OOB)    Pain:  Pain Assessment: 0-10  Pain Score:  (Patient reports a 2/10 ache in her back)    Cognition:  Overall Cognitive Status: Within Functional Limits        Ambulation/Gait Training  Ambulation/Gait Training Performed:  TLSO donned prior to amb with assist from PTA.  She amb 100' with ww and SBAx1. Steady,  "step through gait. No LOB.    Transfers  Transfer:  stand-->sit: CGAx1  Patient \"nagel\" ww before getting all the way to chair and furniture walked back.  Instructed her to remain with ww for safety.      Outcome Measures:  [facility] Basic Mobility  Turning from your back to your side while in a flat bed without using bedrails: A little  Moving from lying on your back to sitting on the side of a flat bed without using bedrails: A little  Moving to and from bed to chair (including a wheelchair): A little  Standing up from a chair using your arms (e.g. wheelchair or bedside chair): A little  To walk in hospital room: A little  Climbing 3-5 steps with railing: A little  Basic Mobility - Total Score: 18    Education Documentation  Precautions, taught by Tessie Merritt PTA at 12/21/2023  2:41 PM.  Learner: Patient  Readiness: Acceptance  Method: Explanation  Response: Needs Reinforcement    Body Mechanics, taught by Tessie Merritt PTA at 12/21/2023  2:41 PM.  Learner: Patient  Readiness: Acceptance  Method: Explanation  Response: Needs Reinforcement    Mobility Training, taught by Tessie Merritt PTA at 12/21/2023  2:41 PM.  Learner: Patient  Readiness: Acceptance  Method: Explanation  Response: Needs Reinforcement    Education Comments  Education Provided:  (Patient able to independently recall spinal precautions, but requires cueing to maintain)    Encounter Problems       Encounter Problems (Active)       PT Problem       Pt will demonstrate mod I with bed mobility to edge of bed.   (Not Progressing)       Start:  12/20/23    Expected End:  01/03/24            Pt will demonstrate mod I  with sit to stand/chair transfers with FWW.   (Progressing)       Start:  12/20/23    Expected End:  01/03/24            Pt will ambulate 200 feet with FWW mod I .   (Progressing)       Start:  12/20/23    Expected End:  01/03/24            Pt to demo improved BLE strength by being able to complete supine/seated thera ex 2x20 BLEs with 4 or " less rest breaks .   (Not Progressing)       Start:  12/20/23    Expected End:  01/03/24

## 2023-12-22 ENCOUNTER — DOCUMENTATION (OUTPATIENT)
Dept: PHARMACY | Facility: CLINIC | Age: 68
End: 2023-12-22

## 2023-12-22 VITALS
OXYGEN SATURATION: 94 % | WEIGHT: 177.47 LBS | DIASTOLIC BLOOD PRESSURE: 83 MMHG | RESPIRATION RATE: 18 BRPM | SYSTOLIC BLOOD PRESSURE: 113 MMHG | BODY MASS INDEX: 35.78 KG/M2 | HEIGHT: 59 IN | TEMPERATURE: 97.7 F | HEART RATE: 74 BPM

## 2023-12-22 LAB
ANION GAP SERPL CALC-SCNC: 12 MMOL/L (ref 10–20)
BUN SERPL-MCNC: 15 MG/DL (ref 6–23)
CALCIUM SERPL-MCNC: 9 MG/DL (ref 8.6–10.3)
CHLORIDE SERPL-SCNC: 96 MMOL/L (ref 98–107)
CO2 SERPL-SCNC: 28 MMOL/L (ref 21–32)
CREAT SERPL-MCNC: 1.13 MG/DL (ref 0.5–1.05)
ERYTHROCYTE [DISTWIDTH] IN BLOOD BY AUTOMATED COUNT: 15.1 % (ref 11.5–14.5)
GFR SERPL CREATININE-BSD FRML MDRD: 52 ML/MIN/1.73M*2
GLUCOSE SERPL-MCNC: 118 MG/DL (ref 74–99)
HCT VFR BLD AUTO: 25.9 % (ref 36–46)
HGB BLD-MCNC: 8.1 G/DL (ref 12–16)
HOLD SPECIMEN: NORMAL
MCH RBC QN AUTO: 30.3 PG (ref 26–34)
MCHC RBC AUTO-ENTMCNC: 31.3 G/DL (ref 32–36)
MCV RBC AUTO: 97 FL (ref 80–100)
NRBC BLD-RTO: 0 /100 WBCS (ref 0–0)
PLATELET # BLD AUTO: 308 X10*3/UL (ref 150–450)
POTASSIUM SERPL-SCNC: 4.3 MMOL/L (ref 3.5–5.3)
RBC # BLD AUTO: 2.67 X10*6/UL (ref 4–5.2)
SODIUM SERPL-SCNC: 132 MMOL/L (ref 136–145)
VANCOMYCIN SERPL-MCNC: 16.6 UG/ML (ref 5–20)
WBC # BLD AUTO: 10 X10*3/UL (ref 4.4–11.3)

## 2023-12-22 PROCEDURE — 2500000001 HC RX 250 WO HCPCS SELF ADMINISTERED DRUGS (ALT 637 FOR MEDICARE OP): Performed by: ANESTHESIOLOGY

## 2023-12-22 PROCEDURE — 80048 BASIC METABOLIC PNL TOTAL CA: CPT | Performed by: NURSE PRACTITIONER

## 2023-12-22 PROCEDURE — 2500000001 HC RX 250 WO HCPCS SELF ADMINISTERED DRUGS (ALT 637 FOR MEDICARE OP): Performed by: NURSE PRACTITIONER

## 2023-12-22 PROCEDURE — 96375 TX/PRO/DX INJ NEW DRUG ADDON: CPT

## 2023-12-22 PROCEDURE — 96366 THER/PROPH/DIAG IV INF ADDON: CPT

## 2023-12-22 PROCEDURE — 2500000004 HC RX 250 GENERAL PHARMACY W/ HCPCS (ALT 636 FOR OP/ED)

## 2023-12-22 PROCEDURE — 99239 HOSP IP/OBS DSCHRG MGMT >30: CPT | Performed by: STUDENT IN AN ORGANIZED HEALTH CARE EDUCATION/TRAINING PROGRAM

## 2023-12-22 PROCEDURE — 85027 COMPLETE CBC AUTOMATED: CPT | Performed by: NURSE PRACTITIONER

## 2023-12-22 PROCEDURE — 80202 ASSAY OF VANCOMYCIN: CPT | Performed by: NURSE PRACTITIONER

## 2023-12-22 PROCEDURE — G0378 HOSPITAL OBSERVATION PER HR: HCPCS

## 2023-12-22 PROCEDURE — 2500000002 HC RX 250 W HCPCS SELF ADMINISTERED DRUGS (ALT 637 FOR MEDICARE OP, ALT 636 FOR OP/ED): Performed by: NURSE PRACTITIONER

## 2023-12-22 PROCEDURE — 2500000004 HC RX 250 GENERAL PHARMACY W/ HCPCS (ALT 636 FOR OP/ED): Performed by: NURSE PRACTITIONER

## 2023-12-22 PROCEDURE — 99232 SBSQ HOSP IP/OBS MODERATE 35: CPT

## 2023-12-22 RX ORDER — VANCOMYCIN HYDROCHLORIDE 1 G/200ML
1000 INJECTION, SOLUTION INTRAVENOUS EVERY 24 HOURS
Status: DISCONTINUED | OUTPATIENT
Start: 2023-12-22 | End: 2023-12-22 | Stop reason: HOSPADM

## 2023-12-22 RX ADMIN — OXYCODONE HYDROCHLORIDE AND ACETAMINOPHEN 1 TABLET: 5; 325 TABLET ORAL at 02:21

## 2023-12-22 RX ADMIN — FUROSEMIDE 20 MG: 40 TABLET ORAL at 08:43

## 2023-12-22 RX ADMIN — Medication 2000 UNITS: at 08:42

## 2023-12-22 RX ADMIN — TAMSULOSIN HYDROCHLORIDE 0.4 MG: 0.4 CAPSULE ORAL at 08:43

## 2023-12-22 RX ADMIN — EZETIMIBE 10 MG: 10 TABLET ORAL at 08:43

## 2023-12-22 RX ADMIN — ATENOLOL 50 MG: 50 TABLET ORAL at 08:43

## 2023-12-22 RX ADMIN — PANTOPRAZOLE SODIUM 40 MG: 40 TABLET, DELAYED RELEASE ORAL at 06:23

## 2023-12-22 RX ADMIN — Medication 1 TABLET: at 08:43

## 2023-12-22 RX ADMIN — MORPHINE SULFATE 15 MG: 15 TABLET, EXTENDED RELEASE ORAL at 08:42

## 2023-12-22 RX ADMIN — BRIMONIDINE TARTRATE 1 DROP: 2 SOLUTION/ DROPS OPHTHALMIC at 08:43

## 2023-12-22 RX ADMIN — BUSPIRONE HYDROCHLORIDE 10 MG: 5 TABLET ORAL at 08:43

## 2023-12-22 RX ADMIN — VANCOMYCIN HYDROCHLORIDE 1000 MG: 1 INJECTION, SOLUTION INTRAVENOUS at 08:42

## 2023-12-22 RX ADMIN — PREGABALIN 75 MG: 50 CAPSULE ORAL at 08:43

## 2023-12-22 RX ADMIN — APIXABAN 10 MG: 5 TABLET, FILM COATED ORAL at 08:43

## 2023-12-22 RX ADMIN — CYCLOBENZAPRINE HYDROCHLORIDE 5 MG: 10 TABLET, FILM COATED ORAL at 02:21

## 2023-12-22 RX ADMIN — POTASSIUM CHLORIDE 10 MEQ: 750 TABLET, EXTENDED RELEASE ORAL at 08:43

## 2023-12-22 RX ADMIN — OXYBUTYNIN CHLORIDE 5 MG: 5 TABLET ORAL at 08:43

## 2023-12-22 NOTE — PROGRESS NOTES
SPOKE W/ PT - DELIVERY IS SCHEDULED FOR TODAY BY 9 PM.  ASKED PT IF THERE ARE ANY QUESTIONS TO A PHARMACIST.

## 2023-12-22 NOTE — PROGRESS NOTES
?  HISTORY OF PRESENT ILLNESS:  Still some pain, no fevers    Current Medications:    Current Facility-Administered Medications   Medication Dose Route Frequency Provider Last Rate Last Admin    acetaminophen (Tylenol) tablet 650 mg  650 mg oral q4h PRN ORLANDO Trevizo        Or    acetaminophen (Tylenol) oral liquid 650 mg  650 mg nasogastric tube q4h PRN ORLANDO Trevizo        Or    acetaminophen (Tylenol) suppository 650 mg  650 mg rectal q4h PRN ORLANDO Trevizo        acetaminophen (Tylenol) tablet 650 mg  650 mg oral q4h PRN ORLANDO Trevizo        Or    acetaminophen (Tylenol) oral liquid 650 mg  650 mg oral q4h PRN ORLANDO Trevizo        Or    acetaminophen (Tylenol) suppository 650 mg  650 mg rectal q4h PRN ORLANDO Trevizo        apixaban (Eliquis) tablet 10 mg  10 mg oral BID ORLANDO Trevizo   10 mg at 12/22/23 0843    [START ON 12/25/2023] apixaban (Eliquis) tablet 5 mg  5 mg oral BID ORLANDO Treivzo        atenolol (Tenormin) tablet 50 mg  50 mg oral q AM ORLANDO Trevizo   50 mg at 12/22/23 0843    benzocaine-menthol (Cepastat Sore Throat) 15-3.6 mg lozenge 1 lozenge  1 lozenge Mouth/Throat q2h PRN ORLANDO Trevizo        bisacodyl (Dulcolax) EC tablet 10 mg  10 mg oral Daily PRN ORLANDO Trevizo        brimonidine (AlphaGAN) 0.2 % ophthalmic solution 1 drop  1 drop Both Eyes BID ORLANDO Trevizo   1 drop at 12/22/23 0843    busPIRone (Buspar) tablet 10 mg  10 mg oral Daily ORLANDO Trevizo   10 mg at 12/22/23 0843    calcium carbonate (Tums) chewable tablet 500 mg  500 mg oral 4x daily PRN Mary C Mohamed, APRN-CNP        cholecalciferol (Vitamin D-3) tablet 2,000 Units  2,000 Units oral Daily DARRYL Trevizo-CNP   2,000 Units at 12/22/23 0842    cyclobenzaprine (Flexeril) tablet 5 mg  5 mg oral TID PRN DARRYL Trevizo-CNP   5 mg at 12/22/23 0226     dextromethorphan-guaifenesin (Robitussin DM)  mg/5 mL oral liquid 5 mL  5 mL oral q4h PRN Mary Michaud APRN-CNP        ezetimibe (Zetia) tablet 10 mg  10 mg oral Daily Mary Michaud APRN-CNP   10 mg at 12/22/23 0843    furosemide (Lasix) tablet 20 mg  20 mg oral Daily Mary Michaud APRN-CNP   20 mg at 12/22/23 0843    guaiFENesin (Mucinex) 12 hr tablet 600 mg  600 mg oral q12h PRN Mary Michaud APRN-ALBAN        hydrALAZINE (Apresoline) injection 10 mg  10 mg intravenous q8h PRN DARRYL Trevizo-CNP        ipratropium-albuteroL (Duo-Neb) 0.5-2.5 mg/3 mL nebulizer solution 3 mL  3 mL nebulization q6h PRN DARRYL Trevizo-CNP        isosorbide mononitrate ER (Imdur) 24 hr tablet 60 mg  60 mg oral Daily Mary Michaud APRN-CNP   60 mg at 12/21/23 0851    lactobacillus acidophilus tablet 1 tablet  1 tablet oral Daily DARRYL Trevizo-CNP   1 tablet at 12/22/23 0843    [Held by provider] loratadine (Claritin) tablet 10 mg  10 mg oral Daily DARRYL Trevizo-CNP   10 mg at 12/19/23 1353    meclizine (Antivert) tablet 25 mg  25 mg oral TID PRN Mary Michaud APRN-CNP        melatonin tablet 3 mg  3 mg oral Nightly PRN Mary Michaud APRN-ALBAN        mirtazapine (Remeron) tablet 15 mg  15 mg oral Nightly DARRYL Trevizo-CNP   15 mg at 12/21/23 2041    morphine CR (MS Contin) 12 hr tablet 15 mg  15 mg oral q12h [facility] Panchito Barnes MD   15 mg at 12/22/23 0842    nitroglycerin (Nitrostat) SL tablet 0.4 mg  0.4 mg sublingual q5 min PRN Mary Michaud APRN-CNP        oxybutynin (Ditropan) tablet 5 mg  5 mg oral BID SUKHDEEP TrevizoCNP   5 mg at 12/22/23 0843    oxyCODONE-acetaminophen (Percocet) 5-325 mg per tablet 1 tablet  1 tablet oral q6h PRN SUKHDEEP TrevizoCNP   1 tablet at 12/22/23 0221    oxygen (O2) therapy   inhalation Continuous PRN - O2/gases ORLANDO Trevizo        pantoprazole (ProtoNix) EC tablet 40 mg  40 mg oral Daily before  "breakfast Mary Michaud APRN-CNP   40 mg at 12/22/23 0623    Or    pantoprazole (ProtoNix) injection 40 mg  40 mg intravenous Daily before breakfast DARRYL Trevizo-ALBAN        pilocarpine (Salagen) tablet 5 mg  5 mg oral Daily Mary Michaud APRN-CNP   5 mg at 12/21/23 0844    potassium chloride CR (Klor-Con) ER tablet 10 mEq  10 mEq oral Daily DARRYL Trevizo-CNP   10 mEq at 12/22/23 0843    pregabalin (Lyrica) capsule 75 mg  75 mg oral BID DARRYL Trevizo-CNP   75 mg at 12/22/23 0843    promethazine (Phenergan) tablet 25 mg  25 mg oral q6h PRN ORLANDO Trevizo        Or    promethazine (Phenergan) suppository 25 mg  25 mg rectal q12h PRN ORLANDO Trevizo        simvastatin (Zocor) tablet 40 mg  40 mg oral Nightly DARRYL Trevizo-CNP   40 mg at 12/21/23 2040    sodium chloride 0.9% flush 10 mL  10 mL intra-catheter q12h Mary Michaud APRN-CNP   10 mL at 12/21/23 2113    sodium chloride 0.9% flush 10 mL  10 mL intra-catheter PRN DARRYL Trevizo-CNP        tamsulosin (Flomax) 24 hr capsule 0.4 mg  0.4 mg oral Daily DARRYL Trevizo-CNP   0.4 mg at 12/22/23 0843    tiotropium (Spiriva Respimat) 2.5 mcg/actuation inhaler 2 puff  2 Inhalation inhalation Daily Mary Michaud APRN-CNP   2 puff at 12/21/23 0833    vancomycin (Vancocin) 1,000 mg in dextrose 5% water 200 mL  1,000 mg intravenous q24h Monae Jay PharmD   Stopped at 12/22/23 0942        Allergies:    Allergies   Allergen Reactions    Neomycin Other     swelling, redness, burning post eye surgery    Neomycin-Polymyxin B-Dexameth Other and Rash     blisters, eye swelling, burning          Review of Systems  14 system review is negative other than HPI     /83   Pulse 74   Temp 36.5 °C (97.7 °F) (Temporal)   Resp 18   Ht 1.499 m (4' 11\")   Wt 80.5 kg (177 lb 7.5 oz)   SpO2 94%   BMI 35.84 kg/m²    Physical Exam:  General: Patient appears ok at the present time. NAD  Skin: " no new rashes  HEENT:  Neck is supple, No subconjunctival hemorrhages, no oral exudates  Heart: S1 S2  Lungs: diminished bases  Abdomen: soft, ND, NTTP,  Extrem: No edema, non tender           DATA:    .  Lab Results   Component Value Date    WBC 10.0 12/22/2023    HGB 8.1 (L) 12/22/2023    HCT 25.9 (L) 12/22/2023    MCV 97 12/22/2023     12/22/2023     Results from last 7 days   Lab Units 12/22/23  0403 12/19/23  0157 12/18/23  0602   SODIUM mmol/L 132*   < > 132*   POTASSIUM mmol/L 4.3   < > 4.0   CHLORIDE mmol/L 96*   < > 98   CO2 mmol/L 28   < > 28   BUN mg/dL 15   < > 12   CREATININE mg/dL 1.13*   < > 0.95   CALCIUM mg/dL 9.0   < > 8.7   PROTEIN TOTAL g/dL  --   --  5.9*   BILIRUBIN TOTAL mg/dL  --   --  0.4   ALK PHOS U/L  --   --  126   ALT U/L  --   --  30   AST U/L  --   --  23   GLUCOSE mg/dL 118*   < > 111*    < > = values in this interval not displayed.     Susceptibility data from last 90 days.  Collected Specimen Info Organism Amoxicillin/Clavulanate Ampicillin Ampicillin/Sulbactam Aztreonam Cefazolin Cefazolin (uncomplicated UTIs only) Cefepime Cefotaxime Ceftazidime Ceftriaxone Ciprofloxacin Clindamycin   12/11/23 Fluid from ABSCESS Methicillin Resistant Staphylococcus aureus (MRSA)            S   12/11/23 Tissue from ABSCESS Staphylococcus aureus               12/11/23 Swab from ABSCESS Methicillin Resistant Staphylococcus aureus (MRSA)            S   12/06/23 Swab from SPINE Methicillin Resistant Staphylococcus aureus (MRSA)               12/06/23 Tissue from SPINE Staphylococcus aureus               12/06/23 Tissue from SPINE Staphylococcus aureus               12/05/23 Urine from Clean Catch/Voided Escherichia coli S R S R R R R R R R R    11/03/23 Swab from Nares/Axilla/Groin Methicillin Resistant Staphylococcus aureus (MRSA)                 Collected Specimen Info Organism Ertapenem Erythromycin Gentamicin Meropenem Nitrofurantoin Oxacillin Piperacillin/Tazobactam Tetracycline Tobramycin  Trimethoprim/Sulfamethoxazole Vancomycin   12/11/23 Fluid from ABSCESS Methicillin Resistant Staphylococcus aureus (MRSA)  R    R  S  S S   12/11/23 Tissue from ABSCESS Staphylococcus aureus              12/11/23 Swab from ABSCESS Methicillin Resistant Staphylococcus aureus (MRSA)  R    R  S  S S   12/06/23 Swab from SPINE Methicillin Resistant Staphylococcus aureus (MRSA)              12/06/23 Tissue from SPINE Staphylococcus aureus              12/06/23 Tissue from SPINE Staphylococcus aureus              12/05/23 Urine from Clean Catch/Voided Escherichia coli S  R S S  S  I S    11/03/23 Swab from Nares/Axilla/Groin Methicillin Resistant Staphylococcus aureus (MRSA)                Susceptibility data from last 90 days.  Collected Specimen Info Organism Amoxicillin/Clavulanate Ampicillin Ampicillin/Sulbactam Aztreonam Cefazolin Cefazolin (uncomplicated UTIs only) Cefepime Cefotaxime Ceftazidime Ceftriaxone Ciprofloxacin Clindamycin   12/11/23 Fluid from ABSCESS Methicillin Resistant Staphylococcus aureus (MRSA)            S   12/11/23 Tissue from ABSCESS Staphylococcus aureus               12/11/23 Swab from ABSCESS Methicillin Resistant Staphylococcus aureus (MRSA)            S   12/06/23 Swab from SPINE Methicillin Resistant Staphylococcus aureus (MRSA)               12/06/23 Tissue from SPINE Staphylococcus aureus               12/06/23 Tissue from SPINE Staphylococcus aureus               12/05/23 Urine from Clean Catch/Voided Escherichia coli S R S R R R R R R R R    11/03/23 Swab from Nares/Axilla/Groin Methicillin Resistant Staphylococcus aureus (MRSA)                 Collected Specimen Info Organism Ertapenem Erythromycin Gentamicin Meropenem Nitrofurantoin Oxacillin Piperacillin/Tazobactam Tetracycline Tobramycin Trimethoprim/Sulfamethoxazole Vancomycin   12/11/23 Fluid from ABSCESS Methicillin Resistant Staphylococcus aureus (MRSA)  R    R  S  S S   12/11/23 Tissue from ABSCESS Staphylococcus aureus               12/11/23 Swab from ABSCESS Methicillin Resistant Staphylococcus aureus (MRSA)  R    R  S  S S   12/06/23 Swab from SPINE Methicillin Resistant Staphylococcus aureus (MRSA)              12/06/23 Tissue from SPINE Staphylococcus aureus              12/06/23 Tissue from SPINE Staphylococcus aureus              12/05/23 Urine from Clean Catch/Voided Escherichia coli S  R S S  S  I S    11/03/23 Swab from Nares/Axilla/Groin Methicillin Resistant Staphylococcus aureus (MRSA)                        IMPRESSION:        Problem List Items Addressed This Visit          Genitourinary and Reproductive    Urinary tract infection without hematuria    Relevant Medications    amoxicillin-pot clavulanate (Augmentin) 875-125 mg tablet       Neuro    Lumbar surgical wound fluid collection    Relevant Medications    morphine CR (MS Contin) 15 mg 12 hr tablet    lactobacillus acidophilus tablet tablet    vancomycin (Vancocin) 1250 mg/250 mL IV    Other Relevant Orders    Vancomycin level, trough    CBC and Auto Differential    Comprehensive metabolic panel    C-reactive protein    Sedimentation rate, automated    Referral to Home Health       Symptoms and Signs    * (Principal) Generalized weakness    Relevant Orders    Referral to Home Health     Other Visit Diagnoses       Acute low back pain without sciatica, unspecified back pain laterality    -  Primary    Relevant Medications    magnesium citrate solution    morphine CR (MS Contin) 15 mg 12 hr tablet    Spinal abscess (CMS/HCC)        Relevant Orders    CBC and Auto Differential           PLAN:  No fevers, no leukocytosis  Already on treatment for deep operative infection, collection on CT has had recent washout ,surgery not recommending further procedures.Continue vancomycin as outpatient  Pete Echeverria MD

## 2023-12-22 NOTE — PROGRESS NOTES
Urology progress note for Friday, 12/22/2023,  Sitting at the bedside  Comfortable  So far patient has been voiding on her own or via external catheter and seems to be doing well  Reviewed with nursing  Apparently discharge planning is in progress possibly for later today  Renal function stable with serum creatinine 1.13, hemoglobin 8.1  Currently on vancomycin  Urologically should be okay for discharge  Renotify us as needed for any further urinary issues  Thank you    Additional medical and historical objective data reviewed and listed below      Current Facility-Administered Medications:     acetaminophen (Tylenol) tablet 650 mg, 650 mg, oral, q4h PRN **OR** acetaminophen (Tylenol) oral liquid 650 mg, 650 mg, nasogastric tube, q4h PRN **OR** acetaminophen (Tylenol) suppository 650 mg, 650 mg, rectal, q4h PRN, DARRYL Trevizo-CNP    acetaminophen (Tylenol) tablet 650 mg, 650 mg, oral, q4h PRN **OR** acetaminophen (Tylenol) oral liquid 650 mg, 650 mg, oral, q4h PRN **OR** acetaminophen (Tylenol) suppository 650 mg, 650 mg, rectal, q4h PRN, DARRYL Trevizo-CNP    apixaban (Eliquis) tablet 10 mg, 10 mg, oral, BID, DARRYL Trevizo-CNP, 10 mg at 12/22/23 0843    [START ON 12/25/2023] apixaban (Eliquis) tablet 5 mg, 5 mg, oral, BID, Mary Michaud APRN-CNP    atenolol (Tenormin) tablet 50 mg, 50 mg, oral, q AM, Mary Michaud APRN-CNP, 50 mg at 12/22/23 0843    benzocaine-menthol (Cepastat Sore Throat) 15-3.6 mg lozenge 1 lozenge, 1 lozenge, Mouth/Throat, q2h PRN, Mary Michaud APRN-CNP    bisacodyl (Dulcolax) EC tablet 10 mg, 10 mg, oral, Daily PRN, Mary Michaud APRN-CNP    brimonidine (AlphaGAN) 0.2 % ophthalmic solution 1 drop, 1 drop, Both Eyes, BID, [place] Trevizo, 1 drop at 12/22/23 0843    busPIRone (Buspar) tablet 10 mg, 10 mg, oral, Daily, [place] Trevizo, 10 mg at 12/22/23 0843    calcium carbonate (Tums) chewable tablet 500 mg, 500 mg, oral, 4x daily  PRN, Marysaray Michaud, APRN-CNP    cholecalciferol (Vitamin D-3) tablet 2,000 Units, 2,000 Units, oral, Daily, Mary PRASHANTH Michaud, APRN-CNP, 2,000 Units at 12/22/23 0842    cyclobenzaprine (Flexeril) tablet 5 mg, 5 mg, oral, TID PRN, Mary PRASHANTH Michaud, APRN-CNP, 5 mg at 12/22/23 0221    dextromethorphan-guaifenesin (Robitussin DM)  mg/5 mL oral liquid 5 mL, 5 mL, oral, q4h PRN, Marysaray Michaud, APRN-CNP    ezetimibe (Zetia) tablet 10 mg, 10 mg, oral, Daily, Mary PRASHANTH Michaud, APRN-CNP, 10 mg at 12/22/23 0843    furosemide (Lasix) tablet 20 mg, 20 mg, oral, Daily, Mary PRASHANTH Michaud, APRN-CNP, 20 mg at 12/22/23 0843    guaiFENesin (Mucinex) 12 hr tablet 600 mg, 600 mg, oral, q12h PRN, Mary Michaud, APRN-CNP    hydrALAZINE (Apresoline) injection 10 mg, 10 mg, intravenous, q8h PRN, Mary PRASHANTH Michaud, APRN-CNP    ipratropium-albuteroL (Duo-Neb) 0.5-2.5 mg/3 mL nebulizer solution 3 mL, 3 mL, nebulization, q6h PRN, Mary Michaud, APRN-CNP    isosorbide mononitrate ER (Imdur) 24 hr tablet 60 mg, 60 mg, oral, Daily, Mary PRASHANTH Michaud, APRN-CNP, 60 mg at 12/21/23 0851    lactobacillus acidophilus tablet 1 tablet, 1 tablet, oral, Daily, Mary C Jaswant, APRN-CNP, 1 tablet at 12/22/23 0843    [Held by provider] loratadine (Claritin) tablet 10 mg, 10 mg, oral, Daily, Mary C Jaswant, APRN-CNP, 10 mg at 12/19/23 1353    meclizine (Antivert) tablet 25 mg, 25 mg, oral, TID PRN, DARRYL Trevizo-CNP    melatonin tablet 3 mg, 3 mg, oral, Nightly PRN, DARRYL Trevizo-CNP    mirtazapine (Remeron) tablet 15 mg, 15 mg, oral, Nightly, DARRYL Trevizo-CNP, 15 mg at 12/21/23 2041    morphine CR (MS Contin) 12 hr tablet 15 mg, 15 mg, oral, q12h EDE, Panchito Barnes MD, 15 mg at 12/22/23 0842    nitroglycerin (Nitrostat) SL tablet 0.4 mg, 0.4 mg, sublingual, q5 min PRN, DARRYL Trevizo-CNP    oxybutynin (Ditropan) tablet 5 mg, 5 mg, oral, BID, DARRYL Trevizo-CNP, 5 mg at 12/22/23 0843     oxyCODONE-acetaminophen (Percocet) 5-325 mg per tablet 1 tablet, 1 tablet, oral, q6h PRN, DARRYL Trevizo-CNP, 1 tablet at 12/22/23 0221    oxygen (O2) therapy, , inhalation, Continuous PRN - O2/gases, DARRYL Trevizo-CNP    pantoprazole (ProtoNix) EC tablet 40 mg, 40 mg, oral, Daily before breakfast, 40 mg at 12/22/23 0623 **OR** pantoprazole (ProtoNix) injection 40 mg, 40 mg, intravenous, Daily before breakfast, Mary Michaud APRN-CNP    pilocarpine (Salagen) tablet 5 mg, 5 mg, oral, Daily, DARRYL Trevizo-CNP, 5 mg at 12/21/23 0844    potassium chloride CR (Klor-Con) ER tablet 10 mEq, 10 mEq, oral, Daily, DARRYL Trevizo-CNP, 10 mEq at 12/22/23 0843    pregabalin (Lyrica) capsule 75 mg, 75 mg, oral, BID, Mary Michaud, APRN-CNP, 75 mg at 12/22/23 0843    promethazine (Phenergan) tablet 25 mg, 25 mg, oral, q6h PRN **OR** promethazine (Phenergan) suppository 25 mg, 25 mg, rectal, q12h PRN, Mary Michaud APRN-CNP    simvastatin (Zocor) tablet 40 mg, 40 mg, oral, Nightly, Mary Michaud APRN-CNP, 40 mg at 12/21/23 2040    sodium chloride 0.9% flush 10 mL, 10 mL, intra-catheter, q12h, Mary Michaud, APRN-CNP, 10 mL at 12/21/23 2113    sodium chloride 0.9% flush 10 mL, 10 mL, intra-catheter, PRN, Mary Michaud, APRN-CNP    tamsulosin (Flomax) 24 hr capsule 0.4 mg, 0.4 mg, oral, Daily, DARRYL Trevizo-CNP, 0.4 mg at 12/22/23 0843    tiotropium (Spiriva Respimat) 2.5 mcg/actuation inhaler 2 puff, 2 Inhalation, inhalation, Daily, Mary Michaud, APRN-CNP, 2 puff at 12/21/23 0833    vancomycin (Vancocin) 1,000 mg in dextrose 5% water 200 mL, 1,000 mg, intravenous, q24h, Monae Jay, PharmD, Stopped at 12/22/23 0942   Results for orders placed or performed during the hospital encounter of 12/19/23 (from the past 96 hour(s))   CBC and Auto Differential   Result Value Ref Range    WBC 13.8 (H) 4.4 - 11.3 x10*3/uL    nRBC 0.2 (H) 0.0 - 0.0 /100 WBCs    RBC 2.68 (L)  4.00 - 5.20 x10*6/uL    Hemoglobin 8.3 (L) 12.0 - 16.0 g/dL    Hematocrit 26.1 (L) 36.0 - 46.0 %    MCV 97 80 - 100 fL    MCH 31.0 26.0 - 34.0 pg    MCHC 31.8 (L) 32.0 - 36.0 g/dL    RDW 14.6 (H) 11.5 - 14.5 %    Platelets 289 150 - 450 x10*3/uL    Neutrophils % 71.4 40.0 - 80.0 %    Immature Granulocytes %, Automated 5.0 (H) 0.0 - 0.9 %    Lymphocytes % 12.2 13.0 - 44.0 %    Monocytes % 8.3 2.0 - 10.0 %    Eosinophils % 2.9 0.0 - 6.0 %    Basophils % 0.2 0.0 - 2.0 %    Neutrophils Absolute 9.86 (H) 1.20 - 7.70 x10*3/uL    Immature Granulocytes Absolute, Automated 0.69 0.00 - 0.70 x10*3/uL    Lymphocytes Absolute 1.68 1.20 - 4.80 x10*3/uL    Monocytes Absolute 1.15 (H) 0.10 - 1.00 x10*3/uL    Eosinophils Absolute 0.40 0.00 - 0.70 x10*3/uL    Basophils Absolute 0.03 0.00 - 0.10 x10*3/uL   Basic metabolic panel   Result Value Ref Range    Glucose 128 (H) 74 - 99 mg/dL    Sodium 128 (L) 136 - 145 mmol/L    Potassium 4.3 3.5 - 5.3 mmol/L    Chloride 95 (L) 98 - 107 mmol/L    Bicarbonate 23 21 - 32 mmol/L    Anion Gap 14 10 - 20 mmol/L    Urea Nitrogen 12 6 - 23 mg/dL    Creatinine 0.98 0.50 - 1.05 mg/dL    eGFR 62 >60 mL/min/1.73m*2    Calcium 8.8 8.6 - 10.3 mg/dL   Urinalysis with Reflex Microscopic and Culture   Result Value Ref Range    Color, Urine Yellow Straw, Yellow    Appearance, Urine Clear Clear    Specific Gravity, Urine 1.006 1.005 - 1.035    pH, Urine 7.0 5.0, 5.5, 6.0, 6.5, 7.0, 7.5, 8.0    Protein, Urine 30 (1+) (N) NEGATIVE mg/dL    Glucose, Urine NEGATIVE NEGATIVE mg/dL    Blood, Urine SMALL (1+) (A) NEGATIVE    Ketones, Urine NEGATIVE NEGATIVE mg/dL    Bilirubin, Urine NEGATIVE NEGATIVE    Urobilinogen, Urine <2.0 <2.0 mg/dL    Nitrite, Urine NEGATIVE NEGATIVE    Leukocyte Esterase, Urine NEGATIVE NEGATIVE   Extra Urine Gray Tube   Result Value Ref Range    Extra Tube Hold for add-ons.    Urinalysis Microscopic   Result Value Ref Range    WBC, Urine 1-5 1-5, NONE /HPF    RBC, Urine NONE NONE, 1-2, 3-5  /HPF    Squamous Epithelial Cells, Urine 1-9 (SPARSE) Reference range not established. /HPF    Bacteria, Urine 1+ (A) NONE SEEN /HPF   Sedimentation Rate   Result Value Ref Range    Sedimentation Rate 23 0 - 30 mm/h   C-Reactive Protein   Result Value Ref Range    C-Reactive Protein 17.58 (H) <1.00 mg/dL   ECG 12 lead   Result Value Ref Range    Ventricular Rate 86 BPM    Atrial Rate 86 BPM    WI Interval 92 ms    QRS Duration 138 ms    QT Interval 400 ms    QTC Calculation(Bazett) 478 ms    P Axis 65 degrees    R Axis 10 degrees    T Axis 69 degrees    QRS Count 15 beats    Q Onset 211 ms    P Onset 165 ms    P Offset 201 ms    T Offset 411 ms    QTC Fredericia 451 ms   Vancomycin   Result Value Ref Range    Vancomycin 36.0 (H) 5.0 - 20.0 ug/mL   Vancomycin   Result Value Ref Range    Vancomycin 28.9 (H) 5.0 - 20.0 ug/mL   SST TOP   Result Value Ref Range    Extra Tube Hold for add-ons.    CBC   Result Value Ref Range    WBC 10.7 4.4 - 11.3 x10*3/uL    nRBC 0.0 0.0 - 0.0 /100 WBCs    RBC 2.60 (L) 4.00 - 5.20 x10*6/uL    Hemoglobin 8.0 (L) 12.0 - 16.0 g/dL    Hematocrit 25.2 (L) 36.0 - 46.0 %    MCV 97 80 - 100 fL    MCH 30.8 26.0 - 34.0 pg    MCHC 31.7 (L) 32.0 - 36.0 g/dL    RDW 14.6 (H) 11.5 - 14.5 %    Platelets 293 150 - 450 x10*3/uL   Basic metabolic panel   Result Value Ref Range    Glucose 122 (H) 74 - 99 mg/dL    Sodium 133 (L) 136 - 145 mmol/L    Potassium 4.1 3.5 - 5.3 mmol/L    Chloride 97 (L) 98 - 107 mmol/L    Bicarbonate 28 21 - 32 mmol/L    Anion Gap 12 10 - 20 mmol/L    Urea Nitrogen 10 6 - 23 mg/dL    Creatinine 1.05 0.50 - 1.05 mg/dL    eGFR 57 (L) >60 mL/min/1.73m*2    Calcium 8.7 8.6 - 10.3 mg/dL   Urinalysis with Reflex Microscopic   Result Value Ref Range    Color, Urine Yellow Straw, Yellow    Appearance, Urine Clear Clear    Specific Gravity, Urine 1.024 1.005 - 1.035    pH, Urine 7.0 5.0, 5.5, 6.0, 6.5, 7.0, 7.5, 8.0    Protein, Urine NEGATIVE NEGATIVE mg/dL    Glucose, Urine NEGATIVE  NEGATIVE mg/dL    Blood, Urine SMALL (1+) (A) NEGATIVE    Ketones, Urine NEGATIVE NEGATIVE mg/dL    Bilirubin, Urine NEGATIVE NEGATIVE    Urobilinogen, Urine <2.0 <2.0 mg/dL    Nitrite, Urine NEGATIVE NEGATIVE    Leukocyte Esterase, Urine NEGATIVE NEGATIVE   Microscopic Only, Urine   Result Value Ref Range    WBC, Urine NONE 1-5, NONE /HPF    RBC, Urine 1-2 NONE, 1-2, 3-5 /HPF    Mucus, Urine 1+ Reference range not established. /LPF   SST TOP   Result Value Ref Range    Extra Tube Hold for add-ons.    CBC   Result Value Ref Range    WBC 9.6 4.4 - 11.3 x10*3/uL    nRBC 0.0 0.0 - 0.0 /100 WBCs    RBC 2.51 (L) 4.00 - 5.20 x10*6/uL    Hemoglobin 7.7 (L) 12.0 - 16.0 g/dL    Hematocrit 24.4 (L) 36.0 - 46.0 %    MCV 97 80 - 100 fL    MCH 30.7 26.0 - 34.0 pg    MCHC 31.6 (L) 32.0 - 36.0 g/dL    RDW 14.8 (H) 11.5 - 14.5 %    Platelets 289 150 - 450 x10*3/uL   Basic metabolic panel   Result Value Ref Range    Glucose 132 (H) 74 - 99 mg/dL    Sodium 131 (L) 136 - 145 mmol/L    Potassium 4.1 3.5 - 5.3 mmol/L    Chloride 98 98 - 107 mmol/L    Bicarbonate 26 21 - 32 mmol/L    Anion Gap 11 10 - 20 mmol/L    Urea Nitrogen 14 6 - 23 mg/dL    Creatinine 1.06 (H) 0.50 - 1.05 mg/dL    eGFR 57 (L) >60 mL/min/1.73m*2    Calcium 8.5 (L) 8.6 - 10.3 mg/dL   SST TOP   Result Value Ref Range    Extra Tube Hold for add-ons.    Vancomycin   Result Value Ref Range    Vancomycin 16.6 5.0 - 20.0 ug/mL   CBC   Result Value Ref Range    WBC 10.0 4.4 - 11.3 x10*3/uL    nRBC 0.0 0.0 - 0.0 /100 WBCs    RBC 2.67 (L) 4.00 - 5.20 x10*6/uL    Hemoglobin 8.1 (L) 12.0 - 16.0 g/dL    Hematocrit 25.9 (L) 36.0 - 46.0 %    MCV 97 80 - 100 fL    MCH 30.3 26.0 - 34.0 pg    MCHC 31.3 (L) 32.0 - 36.0 g/dL    RDW 15.1 (H) 11.5 - 14.5 %    Platelets 308 150 - 450 x10*3/uL   Basic metabolic panel   Result Value Ref Range    Glucose 118 (H) 74 - 99 mg/dL    Sodium 132 (L) 136 - 145 mmol/L    Potassium 4.3 3.5 - 5.3 mmol/L    Chloride 96 (L) 98 - 107 mmol/L     Bicarbonate 28 21 - 32 mmol/L    Anion Gap 12 10 - 20 mmol/L    Urea Nitrogen 15 6 - 23 mg/dL    Creatinine 1.13 (H) 0.50 - 1.05 mg/dL    eGFR 52 (L) >60 mL/min/1.73m*2    Calcium 9.0 8.6 - 10.3 mg/dL     CT abdomen pelvis w IV contrast    Result Date: 12/19/2023  STUDY: CT Abdomen and Pelvis with IV Contrast; 12/19/2023 at 4:01 PM. INDICATION: Left lower quadrant and left suprapubic pain post menopause. COMPARISON: CT AP 12/15/2023; CTA AP 12/5/2023. ACCESSION NUMBER(S): [id number] ORDERING CLINICIAN: CHEKO CONNOLLY TECHNIQUE: CT of the abdomen and pelvis was performed.  Contiguous axial images were obtained at 3 mm slice thickness through the abdomen and pelvis. Coronal and sagittal reconstructions at 3 mm slice thickness were performed.  Omnipaque 350 75 mL was administered intravenously.  FINDINGS: LOWER CHEST: No cardiomegaly.  No pericardial effusion.  There is subsegmental atelectasis.  Small pleural effusions in the lung bases.  ABDOMEN:  LIVER: No hepatomegaly.  Smooth surface contour.  Normal attenuation.  BILE DUCTS: No intrahepatic or extrahepatic biliary ductal dilatation.  GALLBLADDER: The gallbladder is absent. STOMACH: No abnormalities identified.  PANCREAS: No masses or ductal dilatation.  SPLEEN: No splenomegaly or focal splenic lesion.  ADRENAL GLANDS: No thickening or nodules.  KIDNEYS AND URETERS: Kidneys are normal in size and location.  No renal or ureteral calculi.  PELVIS:  BLADDER: No abnormalities identified.  REPRODUCTIVE ORGANS: No abnormalities identified.  BOWEL: There is diverticulosis.  VESSELS: No abnormalities identified.  Abdominal aorta is normal in caliber. There is a femoral-femoral crossover graft.  PERITONEUM/RETROPERITONEUM/LYMPH NODES: No free fluid.  No pneumoperitoneum. No lymphadenopathy.  ABDOMINAL WALL: There is left inguinal hernia containing fat. SOFT TISSUES: No abnormalities identified.  BONES: The patient status post laminectomy with posterior fusion from L4  to S1.  When compared to December 2023, surgical drain posteriorly has been removed.  There is now a thick-walled fluid collection posteriorly measuring 3.3 x 3.0 and extending over distance of at least 12 cm.    Status post laminectomy with posterior fusion.  There is a 3.3 x 3.0 x 12 cm thick-walled fluid collection posteriorly in the subcutaneous tissues.  This may represent a postoperative seroma however, CSF leak or abscess cannot be excluded.  This is new from December 2023. Diverticulosis without diverticulitis. Signed by Andre Mckee MD    ECG 12 lead    Result Date: 12/19/2023  Sinus rhythm with short AK Left bundle branch block Abnormal ECG When compared with ECG of 18-DEC-2023 12:36, (unconfirmed) AK interval has decreased Left bundle branch block has replaced Incomplete right bundle branch block    CT lumbar spine wo IV contrast    Result Date: 12/19/2023  Interpreted By:  Finkelstein, Evan, STUDY: CT LUMBAR SPINE WO IV CONTRAST;  12/19/2023 4:15 am   INDICATION: Signs/Symptoms:Status postlaminectomy history of epidural abscess.   COMPARISON: None.   ACCESSION NUMBER(S): [id number]   ORDERING CLINICIAN: HEATHER ARRINGTON   TECHNIQUE: Axial noncontrast CT images of the lumbar spine with coronal and sagittal reconstructed images.   FINDINGS: Postsurgical changes of posterior instrumented fusion L4 through S1. There are bilateral transpedicular screws with vertical connecting rods and interbody spacers at L4/L5 and L5/S1. ALIGNMENT: No traumatic malalignment VERTEBRAE: No acute loss of vertebral body height. DISC SPACES: Interbody spacers at L4/L5 and L5/S1. Otherwise, the disc spaces are preserved without significant narrowing. SPINAL CANAL: No critical spinal canal stenosis above the level of surgery. There has been posterior decompression of L3 through L5, however, the canal at this level is limited due to extensive streak artifact from hardware.. PARAVERTEBRAL SOFT TISSUES: Within the superficial back  soft tissues, there is a collection of fluid measuring up to approximately 3.4 x 3.4 x 12.8 cm (maximum AP by transverse by craniocaudal dimension), extending from the level of L1 through L5. There are several punctate lucencies within this collection of fluid. Deep to the superficial collection, there is also fluid and/or soft tissue thickening near the canal at the level of prior surgery L4 through S1 which extends posteriorly surrounding the posterior elements. Evaluation, however, is severely limited due to streak artifact from lumbar spine hardware.         Postsurgical changes of posterior instrumented fusion L4 through S1 as above with posterior decompression L3 through L5. 3.4 x 3.4 x 12.8 cm collection of fluid in the superficial posterior back soft tissues extending from the level of L1 through L5. There are punctate lucencies within the collection concerning for infection given the timing of surgery greater than 1 week ago. There is also fluid and/or soft tissue thickening deep to the superficial collection extending from the level of the spinal canal into the paraspinal soft tissues and surrounding the posterior elements. No definite fat plane is seen between this superficial collection and the deeper situated fluid. Evaluation of this fluid and/or soft tissue thickening, however, is severely limited due to extensive streak artifact from surgical hardware.     MACRO: None.   Signed by: Evan Finkelstein 12/19/2023 4:27 AM Dictation workstation:   [id number]    XR lumbar spine 2-3 views    Result Date: 12/19/2023  Interpreted By:  Paul Melvin, STUDY: XR LUMBAR SPINE 2-3 VIEWS; ;  12/19/2023 2:34 am   INDICATION: Signs/Symptoms:Status post L3 S1 laminectomy, L4 L5-S1 TLIF.   COMPARISON: 08/23/2023   ACCESSION NUMBER(S): [id number]   ORDERING CLINICIAN: HEATHER ARRINGTON   FINDINGS: AP, lateral, and L5-S1 spot views of the lumbar spine: Status post L4-S1 posterior spinal fusion and laminectomy as  well as L4-L5 and L5-S1 interbody spacer placement. Hardware is intact. There is mild grade 1 anterolisthesis of L5 on S1. There is mild multilevel endplate osteophyte formation. Mild disc space narrowing, most pronounced at L3-L4. Right upper quadrant surgical clips. Moderate colonic stool volume. Vascular calcifications.       1. No acute osseous abnormality. 2. Postsurgical changes from L4-S1 with intact hardware. 3. Mild multilevel spinal degenerative change. 4. Moderate colonic stool volume.   Signed by: Paul Melvin 12/19/2023 2:45 AM Dictation workstation:   [id number]    Transthoracic Echo (TTE) Complete    Result Date: 12/16/2023          [place] [address]  Tel [phone] Fax [phone] TRANSTHORACIC ECHOCARDIOGRAM REPORT  Patient Name:      KILEY TERRY PURVI Garcia Physician:    [id number] Yoav Neville DO Study Date:        12/16/2023            Ordering Provider:    [id number] FOREIGN HUBER MRN/PID:           [id number]              Fellow: Accession#:        [id number]          Nurse: Date of Birth/Age: 1955 / 68 years Sonographer:          Gertrudis Squires                                                                BESS Gender:            F                     Additional Staff: Height:            144.78 cm             Admit Date:           12/5/2023 Weight:            82.56 kg              Admission Status:     Inpatient -                                                                Routine BSA:               1.73 m2               Department Location:  [address] [place] [address] Blood Pressure: 128 /62 mmHg Study Type:    TRANSTHORACIC ECHO (TTE) COMPLETE Diagnosis/ICD: Personal history of pulmonary embolism-Z86.711 Indication:    PE CPT Codes:     Echo Complete w Full Doppler-19804 Patient History: Smoker:            Current.  Pertinent History: HTN, Hyperlipidemia, Dyspnea, PVD, CAD and AAA. Study Detail: The following Echo studies were performed: 2D, M-Mode, Doppler and               color flow. Definity used as a contrast agent for endocardial               border definition. Total contrast used for this procedure was 2 mL               via IV push. The patient was awake.  PHYSICIAN INTERPRETATION: Left Ventricle: Left ventricular systolic function is normal, with an estimated ejection fraction of 55%. There are no regional wall motion abnormalities. The left ventricular cavity size is normal. The left ventricular septal wall thickness is mildly increased. Spectral Doppler shows an impaired relaxation pattern of left ventricular diastolic filling. LV Wall Scoring: All segments are normal. Left Atrium: The left atrium is upper limits of normal in size. Right Ventricle: The right ventricle is normal in size. There is normal right ventricular global systolic function. Right Atrium: The right atrium is normal in size. Aortic Valve: The aortic valve appears structurally normal. The aortic valve appears tricuspid. There is mild aortic valve cusp calcification. There is no evidence of aortic valve stenosis. There is no evidence of aortic valve regurgitation. The peak instantaneous gradient of the aortic valve is 13.5 mmHg. The mean gradient of the aortic valve is 8.0 mmHg. Mitral Valve: The mitral valve is normal in structure. There is no evidence of mitral valve stenosis. There is normal mitral valve leaflet mobility. There is trace mitral valve regurgitation. Tricuspid Valve: The tricuspid valve is structurally normal. There is normal tricuspid valve leaflet mobility. There is trace tricuspid regurgitation. Pulmonic Valve: The pulmonic valve is structurally normal. There is no indication of pulmonic valve regurgitation. Pericardium: There is no pericardial effusion noted. Aorta: The aortic root is normal. Pulmonary Artery: The tricuspid  regurgitant velocity is 3.08 m/s, and with an estimated right atrial pressure of 3 mmHg, the estimated pulmonary artery pressure is moderately elevated with the RVSP at 40.9 mmHg. Systemic Veins: The inferior vena cava appears to be of normal size. In comparison to the previous echocardiogram(s): Prior examinations are available and were reviewed for comparison purposes. Study relatively unchanged from my study done here in June 2022.  CONCLUSIONS:  1. Left ventricular systolic function is normal with a 55% estimated ejection fraction.  2. Spectral Doppler shows an impaired relaxation pattern of left ventricular diastolic filling.  3. There is no evidence of mitral valve stenosis.  4. Trace mitral valve regurgitation.  5. Trace tricuspid regurgitation is visualized.  6. Aortic valve stenosis is not present.  7. Moderately elevated pulmonary artery pressure. QUANTITATIVE DATA SUMMARY: 2D MEASUREMENTS:                          Normal Ranges: Ao Root d:     2.30 cm   (2.0-3.7cm) LAs:           3.90 cm   (2.7-4.0cm) IVSd:          1.23 cm   (0.6-1.1cm) LVPWd:         1.00 cm   (0.6-1.1cm) LVIDd:         4.11 cm   (3.9-5.9cm) LVIDs:         2.97 cm LV Mass Index: 89.5 g/m2 LV % FS        27.7 % LA VOLUME:                               Normal Ranges: LA Vol A4C:        36.7 ml    (22+/-6mL/m2) LA Vol A2C:        35.2 ml LA Vol BP:         37.2 ml LA Vol Index A4C:  21.2ml/m2 LA Vol Index A2C:  20.4 ml/m2 LA Vol Index BP:   21.5 ml/m2 LA Area A4C:       15.3 cm2 LA Area A2C:       14.5 cm2 LA Major Axis A4C: 5.4 cm LA Major Axis A2C: 5.1 cm LA Volume Index:   20.6 ml/m2 RA VOLUME BY A/L METHOD:                               Normal Ranges: RA Vol A4C:        46.8 ml    (8.3-19.5ml) RA Vol Index A4C:  27.1 ml/m2 RA Area A4C:       17.4 cm2 RA Major Axis A4C: 5.5 cm AORTA MEASUREMENTS:                    Normal Ranges: Asc Ao, d: 2.20 cm (2.1-3.4cm) LV SYSTOLIC FUNCTION BY 2D PLANIMETRY (MOD):                     Normal  Ranges: EF-A4C View: 50.3 % (>=55%) EF-A2C View: 56.0 % EF-Biplane:  55.4 % LV DIASTOLIC FUNCTION:                        Normal Ranges: MV Peak E:    1.12 m/s (0.7-1.2 m/s) MV Peak A:    1.21 m/s (0.42-0.7 m/s) E/A Ratio:    0.93     (1.0-2.2) MV e'         0.12 m/s (>8.0) MV lateral e' 0.12 m/s MV medial e'  0.12 m/s E/e' Ratio:   9.33     (<8.0) MITRAL VALVE:                 Normal Ranges: MV DT: 250 msec (150-240msec) AORTIC VALVE:                                    Normal Ranges: AoV Vmax:                1.84 m/s  (<=1.7m/s) AoV Peak P.5 mmHg (<20mmHg) AoV Mean P.0 mmHg  (1.7-11.5mmHg) LVOT Max Anam:            1.54 m/s  (<=1.1m/s) AoV VTI:                 44.90 cm  (18-25cm) LVOT VTI:                36.40 cm LVOT Diameter:           1.80 cm   (1.8-2.4cm) AoV Area, VTI:           2.06 cm2  (2.5-5.5cm2) AoV Area,Vmax:           2.13 cm2  (2.5-4.5cm2) AoV Dimensionless Index: 0.81  RIGHT VENTRICLE: RV Basal 3.57 cm RV Mid   2.77 cm RV Major 6.9 cm TAPSE:   28.2 mm RV s'    0.13 m/s TRICUSPID VALVE/RVSP:                             Normal Ranges: Peak TR Velocity: 3.08 m/s RV Syst Pressure: 40.9 mmHg (< 30mmHg) IVC Diam:         1.79 cm PULMONIC VALVE:                         Normal Ranges: PV Accel Time: 85 msec  (>120ms) PV Max Anam:    1.3 m/s  (0.6-0.9m/s) PV Max P.0 mmHg  Sari Neville DO Electronically signed on 2023 at 12:29:49 PM  Wall Scoring  ** Final **     Lower extremity venous duplex bilateral    Result Date: 12/15/2023  Interpreted By:  Keagan Sanders, STUDY: [facility] US LOWER EXTREMITY VENOUS DUPLEX BILATERAL;  12/15/2023 8:14 pm   INDICATION: Signs/Symptoms:DVT.   COMPARISON: None.   ACCESSION NUMBER(S): [id number]   ORDERING CLINICIAN: LILI AVILES   TECHNIQUE: Vascular ultrasound of the bilateral lower extremities was performed. Real-time compression views as well as Gray scale, color Doppler and spectral Doppler waveform analysis was  performed.   FINDINGS: Evaluation of the visualized portions of the bilateral common femoral vein, proximal, mid, and distal femoral vein, and popliteal vein were performed.  Evaluation of the visualized portions of the  posterior tibial and peroneal veins were also performed.   The right common femoral vein, femoral vein, popliteal vein and posterior tibial veins are patent. There is thrombus in the right peroneal veins.   The left common femoral vein, femoral vein, popliteal vein and the left posterior tibial and peroneal veins are patent.       Thrombus of right peroneal veins.   MACRO:   Keagan Sanders discussed the significance and urgency of this critical finding by telephone with  LILI AVILES on 12/15/2023 at 8:41 pm. (**-RCF-**) Findings:  See findings.     Signed by: Keagan Sanders 12/15/2023 8:41 PM Dictation workstation:   [id number]    CT angio chest for pulmonary embolism    Result Date: 12/15/2023  Interpreted By:  Keagan Sanders, STUDY: CT ANGIO CHEST FOR PULMONARY EMBOLISM;  12/15/2023 7:39 pm   INDICATION: Signs/Symptoms:PE?.   COMPARISON: None.   ACCESSION NUMBER(S): [id number]   ORDERING CLINICIAN: LILI AVILES   TECHNIQUE: Contiguous axial images of the chest, abdomen and pelvis were obtained after the intravenous administration of  contrast. Coronal and sagittal reformatted images were obtained from the axial images. MIPS and 3D reformatted images were also performed and reviewed.   FINDINGS: No axillary, mediastinal, or hilar lymphadenopathy.   The heart is normal in size. Coronary artery atherosclerotic calcifications. RV to LV ratio 1.1. No significant pericardial effusion. There are acute pulmonary emboli in lobar and segmental branches in the right lower lobe and middle lobe.   Mild bibasilar subsegment atelectasis. No significant pleural effusion. No pneumothorax.   Limited evaluation of the upper abdomen.   Multilevel degenerative change of the thoracic spine.       Acute lobar  and segmental pulmonary emboli in the right lower lobe and middle lobe. RV to LV ratio 1.1; please correlate for component of right heart strain   MACRO: Keagan Sanders discussed the significance and urgency of this critical finding by telephone with  LILI AVILES on 12/15/2023 at 8:41 pm. (**-RCF-**) Findings:  See findings.   Signed by: Keagan Sanders 12/15/2023 8:41 PM Dictation workstation:   [id number]    CT abdomen pelvis w IV contrast    Result Date: 12/15/2023  Interpreted By:  Schoenberger, Joseph, STUDY: CT ABDOMEN PELVIS W IV CONTRAST;  12/15/2023 2:23 pm   INDICATION: Signs/Symptoms:abd pain.   COMPARISON: 12/05/2023   ACCESSION NUMBER(S): [id number]   ORDERING CLINICIAN: LILI AVILES   TECHNIQUE: CT of the abdomen and pelvis was performed.  Standard contiguous axial images were obtained at 3 mm slice thickness through the abdomen and pelvis. Coronal and sagittal reconstructions at 3 mm slice thickness were performed.   75 ml of contrast Omnipaque 350 were administered intravenously without immediate complication.   FINDINGS: LOWER CHEST: In a left lower lobe vessel there appears to be a vascular structures that bifurcates and likely represents an artery. The possibility of a filling defect is a consideration. Rec the possibility of an acute pulmonary embolus should be considered. Reference coronal reconstructions images numbered 70 through 73 of 130 series 202. It is recommended this patient undergo a CT pulmonary angiogram to evaluate further. There are areas platelike atelectasis in both lung bases. Otherwise unremarkable.   ABDOMEN:   LIVER: Within normal limits.   BILE DUCTS: Normal caliber.   GALLBLADDER: Surgically absent   PANCREAS: Within normal limits.   SPLEEN: Within normal limits.   ADRENAL GLANDS: Bilateral adrenal glands appear normal.   KIDNEYS AND URETERS: The kidneys are normal in size and enhance symmetrically.  No hydroureteronephrosis or nephroureterolithiasis is identified.    PELVIS:   BLADDER: Within normal limits.   REPRODUCTIVE ORGANS: 1 unremarkable   BOWEL: The stomach is unremarkable. The small and large bowel are normal in caliber and demonstrate no wall thickening.   Normal appendix.   VESSELS: There is no aneurysmal dilatation of the abdominal aorta. The IVC appears normal.   PERITONEUM/RETROPERITONEUM/LYMPH NODES: No ascites or free air, no fluid collection.  No abdominopelvic lymphadenopathy is present.   BONES AND ABDOMINAL WALL: No suspicious osseous lesions are identified. Degenerative discogenic disease is noted in the lower thoracic and lumbar spine.   In the interval since the prior exam 2 drains have been placed in the spinal operative site. 1 from the left enters at the mid sacral level traveling cranially to the epidural region. The 2nd enters more superiorly to the right traveling cranially in the subcutaneous is tissues. These appear to have drained the fluid collections successfully. Again postoperative findings are unchanged from the prior with extensive laminectomy and posterior fusion hardware placement.       1.  Somewhat subtle but possible acute pulmonary emboli in the right lower lobe. Recommend further evaluation with CT pulmonary angiogram. 2. Successful drainage of fluid collections in the lumbar operative site when compared to the previous exam.   The possibility of an acute pulmonary embolus and recommendation for CT a chest was communicated using the secure messaging system through the medical record to the referring physician at the time of this exam.   Signed by: Joseph Schoenberger 12/15/2023 3:09 PM Dictation workstation:   [id number]    MR lumbar spine w and wo IV contrast    Result Date: 12/10/2023  Interpreted By:  Cyrus Disla, STUDY: MRI of the lumbar spine without IV contrast;  12/10/2023 2:07 pm   INDICATION: Signs/Symptoms:increasing back pain wiht previous fluid collection. Wound culture growing MRSA intractable back pain although  slightly better than yesterday.   COMPARISON: 12/05/2023 MR   ACCESSION NUMBER(S): [id number]   ORDERING CLINICIAN: ALMITA THOMPSON   TECHNIQUE: Sagittal and axial STIR and T1-weighted MRI images of the lumbar spine were acquired using a spondylolysis protocol.  Enhanced images were obtained using 17 mL gadoterate (Dotarem).   FINDINGS: There are 5 lumbar type vertebrae.   The previously noted epidural collection along the posterior thecal sac in the region of the L3-4 to L5 laminectomy has decreased in size to 1.6 x 3 x 5.8 cm (previously 3 x 3.9 x 5.6 cm. The fluid has a small amount layering dependent hemorrhage. The overall compression of the thecal sac has decreased.   A drainage catheter has been inserted into the previously noted subcutaneous fluid collection extending from L1-L5. The catheter enters the fluid collection from the left at S1, coursing superiorly along the right side of the collection then curving to the left at L2 with inferior extension to L4-5.   The overall size of the collection has decreased to 2.9 x 4.6 cm transverse dimension, 13.4 cm length (previously 3.9 x 6.9 x 13.1 cm).   Bilateral pedicle screw/fabrizio fusion with interbody spacing devices is again noted from L4-S1.   L5 has grade 1 anterolisthesis on S1.   The thecal sac is mildly to moderately stenosed at L3-4, less so than noted on the prior exam secondary to the fluid collection along the posterior thecal sac.   The thecal sac is also mildly to moderately stenosed at L4, improved from the prior exam.   No other significant stenoses of the spinal canal are noted.   The midline posterior musculature in the region of the laminectomy has edema and enhancement similar to the prior exam. No localized collections within the muscles themselves are noted.       The epidural and subcutaneous fluid collections have decreased in size as noted above following placement of a drain in the subcutaneous tissue.   The overall degree of thecal  sac stenosis has improved as well.   I personally reviewed the images/study and I agree with the findings as stated. This study was interpreted at [facility], [place], [place].   MACRO: None   Signed by: Cyrus Disla 12/10/2023 2:52 PM Dictation workstation:   [id number]    Bedside PICC Imaging    Result Date: 12/7/2023  These images are not reportable by radiology and will not be interpreted by  Radiologists.    MR lumbar spine w and wo IV contrast    Result Date: 12/5/2023  Interpreted By:  Jh Benoit  and Dev Hogue STUDY: MR LUMBAR SPINE W AND WO IV CONTRAST;  12/5/2023 4:39 pm   INDICATION: Signs/Symptoms:Recent laminectomy with fusion, intractable back pain radiates legs.   COMPARISON: MRI 08/20/2023, CT 12/05/2023   ACCESSION NUMBER(S): [id number]   ORDERING CLINICIAN: SHAWN REDDY   TECHNIQUE: Sagittal T1, T2, STIR, axial T1 and T2 weighted images of the lumbar spine were acquired without and following administration of 15 cc Dotarem gadolinium based IV contrast.   FINDINGS: Motion degraded examination.   Alignment: The vertebral alignment is maintained.   Vertebrae/Intervertebral Discs: Postsurgical changes of L4 through S1 posterior fusion noted with bilateral transpedicular screws and interbody graft placement at L4-5 and L5-S1. There has been bilateral laminectomies at L3-4, L4-5, and L5-S1. The vertebral bodies demonstrate expected height. There is mild osseous edema involving L5 and S1 vertebral bodies. The marrow signal is within normal limits. Multilevel intervertebral disc height loss and disc desiccation noted.   A large dorsal extra-spinal fluid collection is noted extending from the laminectomy bed through the deep muscular fascia into the subcutaneous fat. This collection extends from the right lateral recess at the level of L4-5 as well as L5-S1 into the spinal canal and deforms the thecal sac. A focal defect is noted within the thecal sac at the  level of L5-S1 (series 9, image 20). 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 The collections do not demonstrate significant peripheral enhancement to suggest abscess.   Conus: The lower thoracic cord appears unremarkable. The conus terminates at L1.   T12-L1: Disc bulge. There is no significant central canal or neural foraminal stenosis.   L1-2: Disc bulge, ligamentum flavum thickening and facet hypertrophy contribute to minimal central canal narrowing.   L2-3: Disc bulge and mild prominence of the posterior epidural fat resulting in minimal narrowing of the central canal. There is moderate right and minimal left foraminal narrowing due to intraforaminal disc herniation and facet hypertrophy, similar to preoperative examination.   L3-4: Laminectomies. There is moderate narrowing of the thecal sac due to disc bulge and dorsal spinal collection as detailed above. There is unchanged marked bilateral foraminal narrowing due to intraforaminal disc herniation and facet hypertrophic changes with possible impingement of bilateral exiting L3 nerve roots.   L4-5: Bilateral laminectomies. There is moderate narrowing of the central canal with indentation of the thecal sac posteriorly due to the dorsal spinal fluid collection as detailed above. Moderate right and mild left foraminal narrowing is overall similar to MRI dated 08/20/2020 3D to intraforaminal disc herniations and facet hypertrophic changes.   L5-S1: Bilateral laminectomies. The dorsal spinal fluid collection indents the thecal sac as detailed above. Evaluation of the foramina is limited due to susceptibility artifact from bilateral transpedicular screws.  There is persistent marked left and moderate right foraminal narrowing due to intraforaminal disc herniation and facet hypertrophy with possible impingement of the exiting left L5 nerve root.       1.  Postoperative changes as above. A large dorsal extra-spinal fluid collection is noted extending from  the laminectomy bed through the deep muscular fascia into the subcutaneous fat. This collection extends from the right lateral recesses at the level of L4-5 as well as L5-S1 into the spinal canal and deforms the thecal sac resulting in moderate narrowing at L4-5 and to a lesser degree at L3-4. A focal defect is noted within the thecal sac at the level of L5-S1. 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 No significant peripheral enhancement is identified to suggest abscess although the sterility of this collection can not be ascertained on MRI alone. 2. Multilevel degenerative changes again noted with persistent marked bilateral foraminal narrowing at L3-4 and at L5-S1 on the left.     I personally reviewed the images/study and I agree with the findings as stated. This study was interpreted at [place], [place].   MACRO: None   Signed by: Jh Benoit 12/5/2023 5:24 PM Dictation workstation:   [id number]    CT angio chest abdomen pelvis    Result Date: 12/5/2023  Interpreted By:  Samuel Brannon, STUDY: CT ANGIO CHEST ABDOMEN PELVIS;  12/5/2023 2:48 pm   INDICATION: Signs/Symptoms:Severe lower abdomen pain, groin pain, radiates to back, intractable pain.   COMPARISON: May 18, 2022 CTA chest abdomen and pelvis. July 21, 2023 CTA abdomen and pelvis with runoff. August 20, 2023 MRI lumbar spine and December 5, 2023 CT lumbar spine   ACCESSION NUMBER(S): [id number]   ORDERING CLINICIAN: SHAWN REDDY   TECHNIQUE: Axial non-contrast images of the chest, abdomen, and pelvis  with coronal and sagittal reformatted images. Axial CT images of the chest, abdomen and pelvis after the intravenous administration of 100 mL Omnipaque 350 contrast using CT angiographic technique with coronal and sagittal reformatted images.  MIP images were provided and reviewed. 3D reconstructions were performed on a separate independent workstation.   FINDINGS: VASCULAR:   PULMONARY ARTERIES:   No  acute pulmonary embolism.   THORACIC AORTA:  Non-contrast images show no evidence of acute intramural hematoma. No thoracic aortic aneurysm or dissection. Moderate scattered atherosclerosis thoracic aorta and branch vessels.   ABDOMINAL AORTA: No abdominal aortic aneurysm or dissection. There is scattered atherosclerosis. 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   CHEST:   MEDIASTINUM AND LYMPH NODES: No enlarged intrathoracic or axillary lymph nodes.   HEART: Normal size.  Moderate coronary artery calcifications. No pericardial effusion.   LUNG, PLEURA, LARGE AIRWAYS: No consolidation, pulmonary edema, pleural effusion, or pneumothorax.   OSSEOUS STRUCTURES/CHEST WALL:    No acute osseous abnormality.     ABDOMEN/PELVIS:   Arterial phase imaging limits evaluation of the solid organs.   ABDOMINAL WALL: Within normal limits.   LIVER: Stable without detected mass. BILE DUCTS: No significant abnormality. GALLBLADDER: Absent   PANCREAS: No significant abnormality.   SPLEEN: No significant abnormality.   ADRENALS: No significant abnormality.   KIDNEYS, URETERS, BLADDER: No significant abnormality.   VESSELS: See above.  No additional significant abnormality. LYMPH NODES: No enlarged lymph nodes. RETROPERITONEUM:  No significant abnormality.   BOWEL: The stomach and bowel are normal caliber without evident inflammatory change.   PERITONEUM:   No significant ascites, free air, or fluid collection.   REPRODUCTIVE ORGANS: No significant abnormality.   OSSEOUS STRUCTURES:  Refer to same day CT scan lumbar spine report for details regarding that portion. No separate acute osseous abnormalities are identified. There are skin staples dorsal lumbar region. The canal is not reliably evaluated at the  operated levels due to artifact from metallic hardware. There is minimal gas within the soft tissues at the operated levels and there is lobulated fluid density extending from vertebral to overlapping paravertebral region to the level of the superficial subcutaneous layer including portion sagittal series 506, image 109 and axial series 502, image 114 measuring 14 x 6 x 6 cm  with internal density measurement of 6 compatible with simple fluid density       No thoracic or abdominal aortic aneurysm or dissection.   Stable pre-existing vascular findings as reported.   Findings compatible with recent lumbar spine surgery with prominent fluid collection overlying the operated site extending through the superficial subcutaneous layer. The canal is not reliably assessed at the operated site due to artifact. Recommend further characterization with MRI lumbar spine with without contrast.   No acute abnormality of the chest, abdomen or pelvis.     Signed by: Samuel Brannon 12/5/2023 3:32 PM Dictation workstation:   [id number]    CT lumbar spine wo IV contrast    Result Date: 12/5/2023  Interpreted By:  Jony Monae, STUDY: CT LUMBAR SPINE WO IV CONTRAST; 12/5/2023 11:28 am   INDICATION: Signs/Symptoms:Low back pain, recent laminectomy and fusion, fall 1 week ago.   COMPARISON: None.   ACCESSION NUMBER(S): [id number]   ORDERING CLINICIAN: SHAWN REDDY   TECHNIQUE: Contiguous axial CT images were obtained through the lumbar spine at 2 mm slice thickness without contrast administration. The images were then reconstructed in the coronal and sagittal planes.   FINDINGS: OSSEOUS STRUCTURES: The patient is status post L3 through L5 laminectomy, L4 through S1 pedicle screw and fabrizio fusion and L4-5 and L5-S1 disc space implant placement. There is no evidence of acute fracture identified. The vertebral bodies are well aligned without evidence of subluxation.   Mild discogenic degenerative changes are seen at the remaining disc  space levels. Mild-to-moderate facet degenerative changes are seen throughout the lumbar spine.   LOWER THORACIC SPINE: No gross central canal or neural foraminal narrowing is seen.   T12-L1: No gross central canal or neural foraminal narrowing is seen.   L1-2: No gross central canal or neural foraminal narrowing is seen.   L2-3: No gross central canal or neural foraminal narrowing is seen.   L3-4: Mild right-sided neural foraminal narrowing is seen. No significant left foraminal or central canal narrowing is seen.   L4-5: No gross central canal or neural foraminal narrowing is seen.   L5-S1: Mild left-sided neural foraminal narrowing is seen. No significant right foraminal or central canal narrowing is seen.   ASSOCIATED STRUCTURES: Evaluation of the visualized soft tissues of the abdomen is limited by the lack of intravenous contrast. Within this limitation, no gross mass or lymphadenopathy is identified.  A fluid collection is seen along the posterior midportion the lower back, likely postoperative in nature.       1. No acute fracture identified. 2. Postoperative and degenerative changes, as described above.   MACRO: None.   Signed by: Jony Monae 12/5/2023 11:45 AM Dictation workstation:   [id number]

## 2023-12-22 NOTE — PROGRESS NOTES
Orthopedics Progress Note    Patient: Tara Tierney  Unit/Bed: 1025/1025-A  YOB: 1955  MRN: [id number]  Acct: [id number]   Admitting Diagnosis: Generalized weakness [R53.1]  Acute low back pain without sciatica, unspecified back pain laterality [M54.50]  Date:  12/19/2023  Hospital Day: 0  Attending: Byron Jarquin MD     Chief Complaint   Patient presents with    Back Pain        SUBJECTIVE    Patient seen and examined this morning. No acute events overnight.      VITALS      Vitals:    12/21/23 0817 12/21/23 1417 12/21/23 1900 12/21/23 2151   BP: 137/72 139/62 123/57 116/61   BP Location:  Left arm     Patient Position:  Sitting  Lying   Pulse: 68 81 74 73   Resp: 16 16 16 16   Temp: 37.1 °C (98.8 °F) 36.6 °C (97.9 °F) 36.8 °C (98.2 °F) 36.9 °C (98.4 °F)   TempSrc:  Temporal Temporal Temporal   SpO2:  94% 92% 91%   Weight:       Height:           Intake/Output Summary (Last 24 hours) at 12/22/2023 0810  Last data filed at 12/21/2023 1013  Gross per 24 hour   Intake 875 ml   Output --   Net 875 ml      Wt Readings from Last 4 Encounters:   12/20/23 80.5 kg (177 lb 7.5 oz)   12/15/23 82.6 kg (182 lb 1.6 oz)   11/21/23 68.4 kg (150 lb 12.7 oz)   11/07/23 77.1 kg (170 lb)        Allergies:  Allergies   Allergen Reactions    Neomycin Other     swelling, redness, burning post eye surgery    Neomycin-Polymyxin B-Dexameth Other and Rash     blisters, eye swelling, burning        PHYSICAL EXAM   Physical Exam  Constitutional:       General: She is not in acute distress.  Cardiovascular:      Rate and Rhythm: Normal rate and regular rhythm.      Pulses: Normal pulses.   Pulmonary:      Effort: Pulmonary effort is normal.      Breath sounds: Normal breath sounds.   Skin:     General: Skin is warm and dry.      Capillary Refill: Capillary refill takes less than 2 seconds.   Neurological:      Mental Status: She is alert and oriented to person, place, and time.   Psychiatric:         Mood and Affect:  Mood normal.         LABS   CBC:   Results from last 7 days   Lab Units 12/22/23  0403 12/21/23  0408 12/20/23 0411   WBC AUTO x10*3/uL 10.0 9.6 10.7   RBC AUTO x10*6/uL 2.67* 2.51* 2.60*   HEMOGLOBIN g/dL 8.1* 7.7* 8.0*   HEMATOCRIT % 25.9* 24.4* 25.2*   MCV fL 97 97 97   MCH pg 30.3 30.7 30.8   MCHC g/dL 31.3* 31.6* 31.7*   RDW % 15.1* 14.8* 14.6*   PLATELETS AUTO x10*3/uL 308 289 293     CMP:    Results from last 7 days   Lab Units 12/22/23  0403 12/21/23  0408 12/20/23  0411 12/19/23  0157 12/18/23  0602 12/17/23  0552   SODIUM mmol/L 132* 131* 133*   < > 132* 129*   POTASSIUM mmol/L 4.3 4.1 4.1   < > 4.0 3.6   CHLORIDE mmol/L 96* 98 97*   < > 98 97*   CO2 mmol/L 28 26 28   < > 28 27   BUN mg/dL 15 14 10   < > 12 13   CREATININE mg/dL 1.13* 1.06* 1.05   < > 0.95 0.94   GLUCOSE mg/dL 118* 132* 122*   < > 111* 133*   PROTEIN TOTAL g/dL  --   --   --   --  5.9* 5.5*   CALCIUM mg/dL 9.0 8.5* 8.7   < > 8.7 8.5*   BILIRUBIN TOTAL mg/dL  --   --   --   --  0.4 0.5   ALK PHOS U/L  --   --   --   --  126 103   AST U/L  --   --   --   --  23 17   ALT U/L  --   --   --   --  30 28    < > = values in this interval not displayed.     BMP:    Results from last 7 days   Lab Units 12/22/23  0403 12/21/23  0408 12/20/23  0411   SODIUM mmol/L 132* 131* 133*   POTASSIUM mmol/L 4.3 4.1 4.1   CHLORIDE mmol/L 96* 98 97*   CO2 mmol/L 28 26 28   BUN mg/dL 15 14 10   CREATININE mg/dL 1.13* 1.06* 1.05   CALCIUM mg/dL 9.0 8.5* 8.7   GLUCOSE mg/dL 118* 132* 122*     Magnesium:  Results from last 7 days   Lab Units 12/18/23  0602   MAGNESIUM mg/dL 1.88     Troponin:      Lipase:   Lab Results   Component Value Date    LIPASE 19 07/21/2023        apixaban, 10 mg, oral, BID  [START ON 12/25/2023] apixaban, 5 mg, oral, BID  atenolol, 50 mg, oral, q AM  brimonidine, 1 drop, Both Eyes, BID  busPIRone, 10 mg, oral, Daily  cholecalciferol, 2,000 Units, oral, Daily  ezetimibe, 10 mg, oral, Daily  furosemide, 20 mg, oral, Daily  isosorbide  mononitrate ER, 60 mg, oral, Daily  lactobacillus acidophilus, 1 tablet, oral, Daily  [Held by provider] loratadine, 10 mg, oral, Daily  mirtazapine, 15 mg, oral, Nightly  morphine CR, 15 mg, oral, q12h EDE  oxybutynin, 5 mg, oral, BID  pantoprazole, 40 mg, oral, Daily before breakfast   Or  pantoprazole, 40 mg, intravenous, Daily before breakfast  pilocarpine, 5 mg, oral, Daily  potassium chloride CR, 10 mEq, oral, Daily  pregabalin, 75 mg, oral, BID  simvastatin, 40 mg, oral, Nightly  sodium chloride 0.9%, 10 mL, intra-catheter, q12h  tamsulosin, 0.4 mg, oral, Daily  tiotropium, 2 Inhalation, inhalation, Daily  vancomycin, 1,000 mg, intravenous, q24h           Current Outpatient Medications   Medication Instructions    albuterol (ProAir HFA) 90 mcg/actuation inhaler 2 puffs, inhalation    amoxicillin-pot clavulanate (Augmentin) 875-125 mg tablet 875 mg, oral, 2 times daily    apixaban (ELIQUIS) 10 mg, oral, 2 times daily    [START ON 12/25/2023] apixaban (ELIQUIS) 5 mg, oral, 2 times daily    aspirin 81 mg, oral, Daily    atenolol (TENORMIN) 50 mg, oral, Every morning    brimonidine (AlphaGAN) 0.2 % ophthalmic solution 1 drop, Both Eyes, 2 times daily    busPIRone (BUSPAR) 10 mg, oral, Daily    cholecalciferol (Vitamin D-3) 50 mcg (2,000 unit) capsule 1 capsule, oral, Daily    cyclobenzaprine (FLEXERIL) 5 mg, oral, 3 times daily PRN    docusate sodium (COLACE) 100 mg, oral, 2 times daily    ezetimibe (ZETIA) 10 mg, oral, Daily    furosemide (LASIX) 20 mg, oral, Daily    hydrALAZINE (APRESOLINE) 50 mg, oral, 2 times daily    ibandronate (BONIVA) 150 mg, oral, Every 30 days    ipratropium (Atrovent HFA) 17 mcg/actuation inhaler 2 puffs, inhalation, 2 times daily RT    isosorbide mononitrate ER (IMDUR) 60 mg, oral, Daily RT    lactobacillus acidophilus tablet tablet 1 tablet, oral, Daily    loratadine (Claritin) 10 mg tablet 1 tablet, oral, Daily    LUTEIN ORAL 20 mg, oral, Daily RT    magnesium citrate solution 296  mL, oral, Daily    meclizine (ANTIVERT) 25 mg, oral, Every 8 hours PRN    mirtazapine (Remeron) 15 mg tablet 1 tablet, oral, Nightly    morphine CR (MS CONTIN) 15 mg, oral, Every 12 hours scheduled, Do not crush, chew, or split.    nitroglycerin (NITROSTAT) 0.4 mg, sublingual, Every 5 min PRN,  PLACE 1 TABLET UNDER THE TONGUE EVERY 5 MINUTES UP TO 3 DOSES AS NEEDED FOR CHEST PAIN.<BR>    pantoprazole (PROTONIX) 40 mg, oral, Daily before breakfast, Do not crush, chew, or split.    pilocarpine (SALAGEN (PILOCARPINE)) 5 mg, oral, Daily RT    potassium chloride ER (Micro-K) 10 mEq ER capsule 10 mEq, oral, Daily    pregabalin (LYRICA) 75 mg, oral, 2 times daily    simvastatin (ZOCOR) 40 mg, oral, Nightly    tolterodine (Detrol) 1 mg tablet 1 tablet, oral, Nightly    vancomycin (VANCOCIN) 1,250 mg, intravenous, Every 24 hours    vancomycin in dextrose 5 % (Vancocin) IVPB 1 g, intravenous, Every 12 hours, CBC BMP weekly<BR>Vanco level weekly<BR>CRP sed rate in 3 and 6 weeks<BR>Fax results to [id number]    vancomycin/0.9 % sod chloride (vancomycin in 0.9 % sodium chl) 1 gram/250 mL solution 1.25 g, intravenous, Every 24 hours, For 6 weeks    vit C,S-Ir-rnkbp-lutein-zeaxan (PreserVision AREDS-2) 250-90-40-1 mg capsule 1 capsule, oral, 2 times daily        CT abdomen pelvis w IV contrast    Result Date: 12/19/2023  STUDY: CT Abdomen and Pelvis with IV Contrast; 12/19/2023 at 4:01 PM. INDICATION: Left lower quadrant and left suprapubic pain post menopause. COMPARISON: CT AP 12/15/2023; CTA AP 12/5/2023. ACCESSION NUMBER(S): [id number] ORDERING CLINICIAN: CHEKO CONNOLLY TECHNIQUE: CT of the abdomen and pelvis was performed.  Contiguous axial images were obtained at 3 mm slice thickness through the abdomen and pelvis. Coronal and sagittal reconstructions at 3 mm slice thickness were performed.  Omnipaque 350 75 mL was administered intravenously.  FINDINGS: LOWER CHEST: No cardiomegaly.  No pericardial effusion.  There is  subsegmental atelectasis.  Small pleural effusions in the lung bases.  ABDOMEN:  LIVER: No hepatomegaly.  Smooth surface contour.  Normal attenuation.  BILE DUCTS: No intrahepatic or extrahepatic biliary ductal dilatation.  GALLBLADDER: The gallbladder is absent. STOMACH: No abnormalities identified.  PANCREAS: No masses or ductal dilatation.  SPLEEN: No splenomegaly or focal splenic lesion.  ADRENAL GLANDS: No thickening or nodules.  KIDNEYS AND URETERS: Kidneys are normal in size and location.  No renal or ureteral calculi.  PELVIS:  BLADDER: No abnormalities identified.  REPRODUCTIVE ORGANS: No abnormalities identified.  BOWEL: There is diverticulosis.  VESSELS: No abnormalities identified.  Abdominal aorta is normal in caliber. There is a femoral-femoral crossover graft.  PERITONEUM/RETROPERITONEUM/LYMPH NODES: No free fluid.  No pneumoperitoneum. No lymphadenopathy.  ABDOMINAL WALL: There is left inguinal hernia containing fat. SOFT TISSUES: No abnormalities identified.  BONES: The patient status post laminectomy with posterior fusion from L4 to S1.  When compared to December 2023, surgical drain posteriorly has been removed.  There is now a thick-walled fluid collection posteriorly measuring 3.3 x 3.0 and extending over distance of at least 12 cm.    Status post laminectomy with posterior fusion.  There is a 3.3 x 3.0 x 12 cm thick-walled fluid collection posteriorly in the subcutaneous tissues.  This may represent a postoperative seroma however, CSF leak or abscess cannot be excluded.  This is new from December 2023. Diverticulosis without diverticulitis. Signed by Andre Mckee MD    ECG 12 lead    Result Date: 12/19/2023  Sinus rhythm with short MD Left bundle branch block Abnormal ECG When compared with ECG of 18-DEC-2023 12:36, (unconfirmed) MD interval has decreased Left bundle branch block has replaced Incomplete right bundle branch block    CT lumbar spine wo IV contrast    Result Date:  12/19/2023  Interpreted By:  Finkelstein, Evan, STUDY: CT LUMBAR SPINE WO IV CONTRAST;  12/19/2023 4:15 am   INDICATION: Signs/Symptoms:Status postlaminectomy history of epidural abscess.   COMPARISON: None.   ACCESSION NUMBER(S): [id number]   ORDERING CLINICIAN: HEATHER ARRINGTON   TECHNIQUE: Axial noncontrast CT images of the lumbar spine with coronal and sagittal reconstructed images.   FINDINGS: Postsurgical changes of posterior instrumented fusion L4 through S1. There are bilateral transpedicular screws with vertical connecting rods and interbody spacers at L4/L5 and L5/S1. ALIGNMENT: No traumatic malalignment VERTEBRAE: No acute loss of vertebral body height. DISC SPACES: Interbody spacers at L4/L5 and L5/S1. Otherwise, the disc spaces are preserved without significant narrowing. SPINAL CANAL: No critical spinal canal stenosis above the level of surgery. There has been posterior decompression of L3 through L5, however, the canal at this level is limited due to extensive streak artifact from hardware.. PARAVERTEBRAL SOFT TISSUES: Within the superficial back soft tissues, there is a collection of fluid measuring up to approximately 3.4 x 3.4 x 12.8 cm (maximum AP by transverse by craniocaudal dimension), extending from the level of L1 through L5. There are several punctate lucencies within this collection of fluid. Deep to the superficial collection, there is also fluid and/or soft tissue thickening near the canal at the level of prior surgery L4 through S1 which extends posteriorly surrounding the posterior elements. Evaluation, however, is severely limited due to streak artifact from lumbar spine hardware.         Postsurgical changes of posterior instrumented fusion L4 through S1 as above with posterior decompression L3 through L5. 3.4 x 3.4 x 12.8 cm collection of fluid in the superficial posterior back soft tissues extending from the level of L1 through L5. There are punctate lucencies within the collection  concerning for infection given the timing of surgery greater than 1 week ago. There is also fluid and/or soft tissue thickening deep to the superficial collection extending from the level of the spinal canal into the paraspinal soft tissues and surrounding the posterior elements. No definite fat plane is seen between this superficial collection and the deeper situated fluid. Evaluation of this fluid and/or soft tissue thickening, however, is severely limited due to extensive streak artifact from surgical hardware.     MACRO: None.   Signed by: Evan Finkelstein 12/19/2023 4:27 AM Dictation workstation:   [id number]    XR lumbar spine 2-3 views    Result Date: 12/19/2023  Interpreted By:  Paul Melvin, STUDY: XR LUMBAR SPINE 2-3 VIEWS; ;  12/19/2023 2:34 am   INDICATION: Signs/Symptoms:Status post L3 S1 laminectomy, L4 L5-S1 TLIF.   COMPARISON: 08/23/2023   ACCESSION NUMBER(S): [id number]   ORDERING CLINICIAN: HEATHER ARRINGTON   FINDINGS: AP, lateral, and L5-S1 spot views of the lumbar spine: Status post L4-S1 posterior spinal fusion and laminectomy as well as L4-L5 and L5-S1 interbody spacer placement. Hardware is intact. There is mild grade 1 anterolisthesis of L5 on S1. There is mild multilevel endplate osteophyte formation. Mild disc space narrowing, most pronounced at L3-L4. Right upper quadrant surgical clips. Moderate colonic stool volume. Vascular calcifications.       1. No acute osseous abnormality. 2. Postsurgical changes from L4-S1 with intact hardware. 3. Mild multilevel spinal degenerative change. 4. Moderate colonic stool volume.   Signed by: Paul Melvin 12/19/2023 2:45 AM Dictation workstation:   [id number]    Assessment     Patient Active Problem List   Diagnosis    Abdominal aortic aneurysm (CMS/HCC)    Abnormal EKG    Bilateral carotid artery stenosis    Bruit of right carotid artery    CAD in native artery    Cardiomyopathy (CMS/HCC)    Chest pain    Chronic asthmatic  bronchitis    Closed displaced fracture of fifth metatarsal bone    Current every day smoker    Difficulty breathing    Essential hypertension    History of total knee replacement    Hypokalemia    Intermittent claudication (CMS/HCC)    Knee osteoarthritis    Knee pain    Limb pain    Localized swelling, mass, or lump of lower extremity    Celiac artery stenosis (CMS/HCC)    Mesenteric artery stenosis (CMS/HCC)    Mixed hyperlipidemia    Obese    PVD (peripheral vascular disease) (CMS/HCC)    Right foot pain    Swelling    Trigger finger    Urinary tract infection without hematuria    Back pain of lumbar region with sciatica    Back pain with radiculopathy    Intractable back pain    Seroma, post-traumatic (CMS/HCC)    Lumbar surgical wound fluid collection    Generalized weakness      Subjective  Patient resting comfortably in bed. She reports her pain is well controlled. On examination, her bilateral lower extremity sensations are intact and her pedal pulses are palpable. She is stable for discharge from an orthopedic standpoint.    Plan:  Continue PT/OT while inpatient  Orthopedics to sign off. Please don't hesitate to reach out with any questions or concerns          Electronically signed by ORLANDO Gutierrez on 12/22/2023 at 8:10 AM

## 2023-12-22 NOTE — PROGRESS NOTES
"Vancomycin Dosing by Pharmacy- FOLLOW UP    Tara Tierney is a 68 y.o. year old female who Pharmacy has been consulted for vancomycin dosing for cellulitis, skin and soft tissue. Based on the patient's indication and renal status this patient is being dosed based on a goal AUC of 400-600.     Renal function is currently declining.    Current vancomycin dose: 1250 mg given every 24 hours    Estimated vancomycin AUC on current dose: 583 mg/L.hr     Visit Vitals  /61 (Patient Position: Lying)   Pulse 73   Temp 36.9 °C (98.4 °F) (Temporal)   Resp 16        Lab Results   Component Value Date    CREATININE 1.13 (H) 12/22/2023    CREATININE 1.06 (H) 12/21/2023    CREATININE 1.05 12/20/2023    CREATININE 0.98 12/19/2023        Patient weight is No results found for: \"PTWEIGHT\"    No results found for: \"CULTURE\"     I/O last 3 completed shifts:  In: 1275 (15.8 mL/kg) [P.O.:775; IV Piggyback:500]  Out: 1000 (12.4 mL/kg) [Urine:1000 (0.3 mL/kg/hr)]  Weight: 80.5 kg   [unfilled]    No results found for: \"PATIENTTEMP\"     Assessment/Plan    Above goal AUC. Orders placed for new vancomcyin regimen of 1000 mg every 24 hours to begin at 0800.    This dosing regimen is predicted by [employer] to result in the following pharmacokinetic parameters:  Regimen: 1000 mg IV every 24 hours.  Start time: 08:58 on 12/22/2023  Exposure target: AUC24 (range)400-600 mg/L.hr   AUC24,ss: 477 mg/L.hr  Probability of AUC24 > 400: 97 %  Ctrough,ss: 14 mg/L  Probability of Ctrough,ss > 20: 1 %  Probability of nephrotoxicity (Lodise ASHLEY 2009): 9 %      The next level will be obtained on 12/24 at 1st AM. May be obtained sooner if clinically indicated.   Will continue to monitor renal function daily while on vancomycin and order serum creatinine at least every 48 hours if not already ordered.  Follow for continued vancomycin needs, clinical response, and signs/symptoms of toxicity.       Monae Jay, PharmD           "

## 2023-12-23 ENCOUNTER — HOME CARE VISIT (OUTPATIENT)
Dept: HOME HEALTH SERVICES | Facility: HOME HEALTH | Age: 68
End: 2023-12-23
Payer: COMMERCIAL

## 2023-12-23 VITALS — SYSTOLIC BLOOD PRESSURE: 120 MMHG | DIASTOLIC BLOOD PRESSURE: 58 MMHG | TEMPERATURE: 97.7 F

## 2023-12-23 DIAGNOSIS — I26.99 ACUTE PULMONARY EMBOLISM WITHOUT ACUTE COR PULMONALE, UNSPECIFIED PULMONARY EMBOLISM TYPE (MULTI): ICD-10-CM

## 2023-12-23 PROCEDURE — 0023 HH SOC

## 2023-12-23 PROCEDURE — G0299 HHS/HOSPICE OF RN EA 15 MIN: HCPCS | Mod: HHH

## 2023-12-23 PROCEDURE — 169592 NO-PAY CLAIM PROCEDURE

## 2023-12-23 PROCEDURE — 1090000002 HH PPS REVENUE DEBIT

## 2023-12-23 PROCEDURE — 1090000001 HH PPS REVENUE CREDIT

## 2023-12-23 ASSESSMENT — ENCOUNTER SYMPTOMS
LOWEST PAIN SEVERITY IN PAST 24 HOURS: 3/10
PAIN SEVERITY GOAL: 1/10
SUBJECTIVE PAIN PROGRESSION: UNCHANGED
FORGETFULNESS: 1
CHANGE IN APPETITE: UNCHANGED
PAIN LOCATION: BACK
MUSCLE WEAKNESS: 1
APPETITE LEVEL: GOOD
PAIN: 1
HIGHEST PAIN SEVERITY IN PAST 24 HOURS: 10/10
PAIN LOCATION - PAIN SEVERITY: 8/10
PAIN LOCATION - PAIN QUALITY: ACHING

## 2023-12-23 ASSESSMENT — ACTIVITIES OF DAILY LIVING (ADL)
ENTERING_EXITING_HOME: NEEDS ASSISTANCE
OASIS_M1830: 04

## 2023-12-24 ENCOUNTER — APPOINTMENT (OUTPATIENT)
Dept: HOME HEALTH SERVICES | Facility: HOME HEALTH | Age: 68
End: 2023-12-24
Payer: COMMERCIAL

## 2023-12-24 PROCEDURE — 1090000001 HH PPS REVENUE CREDIT

## 2023-12-24 PROCEDURE — 1090000002 HH PPS REVENUE DEBIT

## 2023-12-25 PROCEDURE — 1090000001 HH PPS REVENUE CREDIT

## 2023-12-25 PROCEDURE — 1090000002 HH PPS REVENUE DEBIT

## 2023-12-26 ENCOUNTER — HOME CARE VISIT (OUTPATIENT)
Dept: HOME HEALTH SERVICES | Facility: HOME HEALTH | Age: 68
End: 2023-12-26
Payer: COMMERCIAL

## 2023-12-26 ENCOUNTER — TELEPHONE (OUTPATIENT)
Dept: ORTHOPEDIC SURGERY | Facility: CLINIC | Age: 68
End: 2023-12-26
Payer: COMMERCIAL

## 2023-12-26 PROCEDURE — 1090000001 HH PPS REVENUE CREDIT

## 2023-12-26 PROCEDURE — 1090000002 HH PPS REVENUE DEBIT

## 2023-12-26 PROCEDURE — G0152 HHCP-SERV OF OT,EA 15 MIN: HCPCS | Mod: HHH

## 2023-12-26 NOTE — TELEPHONE ENCOUNTER
Pt's spouse lvm asking if were you able to fax back to employer [employer].  I lvm for [employer] stating pw was dropped off 12/15 and is NOT completed.  We have 7 to 10 business days so will be 10 days on 12/29 if haven't gotten taken care of by then recommend talking to my supervisor.

## 2023-12-27 ENCOUNTER — HOME CARE VISIT (OUTPATIENT)
Dept: HOME HEALTH SERVICES | Facility: HOME HEALTH | Age: 68
End: 2023-12-27
Payer: COMMERCIAL

## 2023-12-27 VITALS
DIASTOLIC BLOOD PRESSURE: 55 MMHG | SYSTOLIC BLOOD PRESSURE: 110 MMHG | HEART RATE: 76 BPM | TEMPERATURE: 97.6 F | OXYGEN SATURATION: 97 %

## 2023-12-27 PROCEDURE — 1090000002 HH PPS REVENUE DEBIT

## 2023-12-27 PROCEDURE — G0151 HHCP-SERV OF PT,EA 15 MIN: HCPCS | Mod: HHH

## 2023-12-27 PROCEDURE — 1090000001 HH PPS REVENUE CREDIT

## 2023-12-27 SDOH — ECONOMIC STABILITY: HOUSING INSECURITY: HOME SAFETY: LIVES WITH HUSBAND.

## 2023-12-27 ASSESSMENT — ACTIVITIES OF DAILY LIVING (ADL)
BATHING ASSESSED: 1
GROOMING_CURRENT_FUNCTION: SUPERVISION
AMBULATION ASSISTANCE: 1
LAUNDRY: DEPENDENT
TOILETING: SUPERVISION
PREPARING MEALS: DEPENDENT
BATHING_CURRENT_FUNCTION: SUPERVISION
DRESSING_UB_CURRENT_FUNCTION: SUPERVISION
LIGHT HOUSEKEEPING: DEPENDENT
LAUNDRY ASSESSED: 1
TOILETING: 1
GROOMING ASSESSED: 1
AMBULATION ASSISTANCE: SUPERVISION
HOUSEKEEPING ASSESSED: 1
BATHING EQUIPMENT USED: SPONGEBATHING ONLY
DRESSING_LB_CURRENT_FUNCTION: SUPERVISION
AMBULATION ASSISTANCE ON FLAT SURFACES: 1

## 2023-12-27 ASSESSMENT — ENCOUNTER SYMPTOMS
PAIN LOCATION: BACK
SUBJECTIVE PAIN PROGRESSION: UNCHANGED
PAIN LOCATION - PAIN FREQUENCY: CONSTANT
SUBJECTIVE PAIN PROGRESSION: UNCHANGED
LOWEST PAIN SEVERITY IN PAST 24 HOURS: 10/10
PAIN LOCATION: BACK
PAIN LOCATION - PAIN SEVERITY: 10/10
PAIN LOCATION - PAIN SEVERITY: 10/10
HIGHEST PAIN SEVERITY IN PAST 24 HOURS: 10/10
PAIN: 1
LOWEST PAIN SEVERITY IN PAST 24 HOURS: 10/10
PERSON REPORTING PAIN: PATIENT
PAIN LOCATION - PAIN QUALITY: STABBING
PAIN: 1
HIGHEST PAIN SEVERITY IN PAST 24 HOURS: 10/10
PERSON REPORTING PAIN: PATIENT
PAIN LOCATION - EXACERBATING FACTORS: SURGERY
PAIN SEVERITY GOAL: 0/10

## 2023-12-27 NOTE — HOME HEALTH
Pt is 69 y/o female s/p L3-S1 laminectomy and L4-L5 TLIF by Dr. Coombs on 11/20/23. Post-op course severely complex and complicated by MRSA+ spinal abscess, which required I&D x2, and PE/DVT. Pt currently on IV antibiotics and anticoagulants. Pt lives with  and 1 small dog.  works nights, and thus sleeps during the day. Daughter lives 5 minutes away, and assists daily. Pt reports no falls in past year, no walker/cane at baseline, completely independent at [facility]. 1 MIKE enter/exit home then 1st floor set-up. This date, pt reports >10/10 low back pain, frequently grimacing and repositioning self, demonstrating visual signs/symptoms of severe pain. Despite high pain level, pt demonstrates independence for functional mobility (bed mobility, transfers, short ambulation distances) with FWW while properly maintaining spinal precautions. Pt reports she is moving around as much as possible/to tolerance, such as standing/walking/marching place. This clinician, with agreement from patient, determines pt is not appropriate for PT services at this time, limited by high levels of pain, as well as pt demonstrating independence for functional mobility throughout home. This clinician established gentle supine home exercise routine and provided handout to patient. No further PT services warranted/appropriate. No questions/concerns unanswered at end of visit. Pt in agreement. 1w1 for PT POC - no further visits.

## 2023-12-28 ENCOUNTER — LAB REQUISITION (OUTPATIENT)
Dept: LAB | Facility: LAB | Age: 68
End: 2023-12-28
Payer: COMMERCIAL

## 2023-12-28 ENCOUNTER — HOME CARE VISIT (OUTPATIENT)
Dept: HOME HEALTH SERVICES | Facility: HOME HEALTH | Age: 68
End: 2023-12-28
Payer: COMMERCIAL

## 2023-12-28 VITALS
TEMPERATURE: 97.8 F | RESPIRATION RATE: 18 BRPM | HEART RATE: 89 BPM | SYSTOLIC BLOOD PRESSURE: 154 MMHG | DIASTOLIC BLOOD PRESSURE: 64 MMHG

## 2023-12-28 DIAGNOSIS — T81.41XA INFECTION FOLLOWING A PROCEDURE, SUPERFICIAL INCISIONAL SURGICAL SITE, INITIAL ENCOUNTER: ICD-10-CM

## 2023-12-28 LAB
ANION GAP SERPL CALC-SCNC: 12 MMOL/L (ref 10–20)
BASOPHILS # BLD AUTO: 0.03 X10*3/UL (ref 0–0.1)
BASOPHILS NFR BLD AUTO: 0.5 %
BUN SERPL-MCNC: 10 MG/DL (ref 6–23)
CALCIUM SERPL-MCNC: 8.9 MG/DL (ref 8.6–10.3)
CHLORIDE SERPL-SCNC: 92 MMOL/L (ref 98–107)
CO2 SERPL-SCNC: 30 MMOL/L (ref 21–32)
CREAT SERPL-MCNC: 0.96 MG/DL (ref 0.5–1.05)
CRP SERPL-MCNC: 12.33 MG/DL
EOSINOPHIL # BLD AUTO: 0.22 X10*3/UL (ref 0–0.7)
EOSINOPHIL NFR BLD AUTO: 3.7 %
ERYTHROCYTE [DISTWIDTH] IN BLOOD BY AUTOMATED COUNT: 15.1 % (ref 11.5–14.5)
GFR SERPL CREATININE-BSD FRML MDRD: 64 ML/MIN/1.73M*2
GLUCOSE SERPL-MCNC: 103 MG/DL (ref 74–99)
HCT VFR BLD AUTO: 30.6 % (ref 36–46)
HGB BLD-MCNC: 9.4 G/DL (ref 12–16)
IMM GRANULOCYTES # BLD AUTO: 0.02 X10*3/UL (ref 0–0.7)
IMM GRANULOCYTES NFR BLD AUTO: 0.3 % (ref 0–0.9)
LYMPHOCYTES # BLD AUTO: 1.28 X10*3/UL (ref 1.2–4.8)
LYMPHOCYTES NFR BLD AUTO: 21.3 %
MCH RBC QN AUTO: 30.6 PG (ref 26–34)
MCHC RBC AUTO-ENTMCNC: 30.7 G/DL (ref 32–36)
MCV RBC AUTO: 100 FL (ref 80–100)
MONOCYTES # BLD AUTO: 0.72 X10*3/UL (ref 0.1–1)
MONOCYTES NFR BLD AUTO: 12 %
NEUTROPHILS # BLD AUTO: 3.74 X10*3/UL (ref 1.2–7.7)
NEUTROPHILS NFR BLD AUTO: 62.2 %
NRBC BLD-RTO: 0 /100 WBCS (ref 0–0)
PLATELET # BLD AUTO: 302 X10*3/UL (ref 150–450)
POTASSIUM SERPL-SCNC: 3.4 MMOL/L (ref 3.5–5.3)
RBC # BLD AUTO: 3.07 X10*6/UL (ref 4–5.2)
SODIUM SERPL-SCNC: 131 MMOL/L (ref 136–145)
VANCOMYCIN TROUGH SERPL-MCNC: 13.3 UG/ML (ref 5–20)
WBC # BLD AUTO: 6 X10*3/UL (ref 4.4–11.3)

## 2023-12-28 PROCEDURE — 86140 C-REACTIVE PROTEIN: CPT

## 2023-12-28 PROCEDURE — 80048 BASIC METABOLIC PNL TOTAL CA: CPT

## 2023-12-28 PROCEDURE — G0299 HHS/HOSPICE OF RN EA 15 MIN: HCPCS | Mod: HHH

## 2023-12-28 PROCEDURE — 1090000001 HH PPS REVENUE CREDIT

## 2023-12-28 PROCEDURE — 85025 COMPLETE CBC W/AUTO DIFF WBC: CPT

## 2023-12-28 PROCEDURE — 80202 ASSAY OF VANCOMYCIN: CPT

## 2023-12-28 PROCEDURE — 1090000002 HH PPS REVENUE DEBIT

## 2023-12-28 ASSESSMENT — PAIN SCALES - PAIN ASSESSMENT IN ADVANCED DEMENTIA (PAINAD)
BODYLANGUAGE: 0 - RELAXED.
NEGVOCALIZATION: 0
TOTALSCORE: 0
BODYLANGUAGE: 0
FACIALEXPRESSION: 0 - SMILING OR INEXPRESSIVE.
FACIALEXPRESSION: 0
CONSOLABILITY: 0 - NO NEED TO CONSOLE.
NEGVOCALIZATION: 0 - NONE.
BREATHING: 0
CONSOLABILITY: 0

## 2023-12-28 ASSESSMENT — ENCOUNTER SYMPTOMS
SUBJECTIVE PAIN PROGRESSION: GRADUALLY WORSENING
PAIN LOCATION: BACK
HIGHEST PAIN SEVERITY IN PAST 24 HOURS: 10/10
CHANGE IN APPETITE: DECREASED
PAIN SEVERITY GOAL: 4/10
PERSON REPORTING PAIN: PATIENT
LOWEST PAIN SEVERITY IN PAST 24 HOURS: 9/10
APPETITE LEVEL: FAIR
PAIN: 1

## 2023-12-29 ENCOUNTER — DOCUMENTATION (OUTPATIENT)
Dept: PHARMACY | Facility: CLINIC | Age: 68
End: 2023-12-29

## 2023-12-29 ENCOUNTER — OFFICE VISIT (OUTPATIENT)
Dept: ORTHOPEDIC SURGERY | Facility: CLINIC | Age: 68
DRG: 856 | End: 2023-12-29
Payer: COMMERCIAL

## 2023-12-29 ENCOUNTER — ANCILLARY PROCEDURE (OUTPATIENT)
Dept: RADIOLOGY | Facility: CLINIC | Age: 68
DRG: 856 | End: 2023-12-29
Payer: COMMERCIAL

## 2023-12-29 ENCOUNTER — HOME INFUSION (OUTPATIENT)
Dept: INFUSION THERAPY | Age: 68
End: 2023-12-29
Payer: COMMERCIAL

## 2023-12-29 DIAGNOSIS — M54.16 LUMBAR RADICULOPATHY: ICD-10-CM

## 2023-12-29 PROCEDURE — 99024 POSTOP FOLLOW-UP VISIT: CPT | Performed by: ORTHOPAEDIC SURGERY

## 2023-12-29 PROCEDURE — 72100 X-RAY EXAM L-S SPINE 2/3 VWS: CPT | Performed by: ORTHOPAEDIC SURGERY

## 2023-12-29 PROCEDURE — 1159F MED LIST DOCD IN RCRD: CPT | Performed by: ORTHOPAEDIC SURGERY

## 2023-12-29 PROCEDURE — 1090000001 HH PPS REVENUE CREDIT

## 2023-12-29 PROCEDURE — 72100 X-RAY EXAM L-S SPINE 2/3 VWS: CPT

## 2023-12-29 PROCEDURE — 1125F AMNT PAIN NOTED PAIN PRSNT: CPT | Performed by: ORTHOPAEDIC SURGERY

## 2023-12-29 PROCEDURE — 1090000002 HH PPS REVENUE DEBIT

## 2023-12-29 PROCEDURE — 1111F DSCHRG MED/CURRENT MED MERGE: CPT | Performed by: ORTHOPAEDIC SURGERY

## 2023-12-29 RX ORDER — HYDROMORPHONE HYDROCHLORIDE 2 MG/1
2 TABLET ORAL EVERY 4 HOURS PRN
Qty: 30 TABLET | Refills: 0 | Status: SHIPPED | OUTPATIENT
Start: 2023-12-29 | End: 2024-01-18 | Stop reason: HOSPADM

## 2023-12-29 NOTE — PROGRESS NOTES
REVIEWED CHART AND PATIENT RECENTLY ADMITTED TO [facility] FROM SNF S/P I AND D WITH CULTURES POSITIVE FOR MRSA - SHE IS TO COMPLETE COURSE OF VANCO 1.25G V Q24 AT HOME THROUGH 01/31/2024     LABS REVIEWED AND VANCO TROUGH THERAPEUTIC AT 13.6    PATIENT REP SPOKE WITH PATIENT AND AGREEABLE TO DELIVERY TONIGHT BY 9PM     PROCESSED REFILL TO DISPENSE X7 DOSES OF VANCO FOR CMPD AND DELIVERY ON 12/29/23  INFUSIONS 12/30/23 THORUGH 1/05/24     FOLLOWUP 1/5 - STRAIGHT LAB AND PROGRESS CHECK

## 2023-12-29 NOTE — PROGRESS NOTES
Established patient postop who had an LSO surgery on 11/20/2023.  She had 2 washouts after that.  For MRSA wound infection.  She is home now she came on about a week ago.  She is on vancomycin infusions at home.  She is complaining of just severe back pain and says it has been going on for the whole time.  This is not just come on but it severe pains and she can barely take it.  She will get pain down both the legs.  She has no bowel or bladder complaints.  But it the pain however does not radiate around to her groin areas.  No saddle anesthesia.    Physical exam: Well-nourished, well kept.  No lymphangitis or lymphadenopathy in the examined extremities.  Good perfusion to the lower extremities bilaterally.  Dorsalis pedis pulses 2+.  Capillary refill to the digits brisk.  No distal edema.  There is decreased range of motion flexion-extension rotation lumbar spine tender lumbar spinal musculature all wounds are clean dry and intact with sutures in place.    Patient patient in a wheelchair but able to stand and walk with assistance with difficulty and pain.  Gross sensation intact to the lower extremity is bilaterally.  Affect normal.    X-ray: X-rays taken today show good placement of instrumentation no loosening or migration of any screws or hardware.  Good placement of interbody graft    Assessment: This a patient postoperative day 6 lumbar fusion with 2 I&D's of the lumbar wound due to infection.  On home antibiotics.  Please refer to Dr. Coombs's note for final plan    In a face-to-face encounter, I performed a history and physical examination, discussed pertinent diagnostic studies if indicated, and discussed diagnosis and management strategies with both the patient and the midlevel provider.  I reviewed the midlevel's note and agree with the documented findings and plan of care.    Patient here for follow-up.  She had a midline laminectomy with interbody cages at L4-5 and L5-S1 November 2023.  She did great  for about a week and then ended up needing a washout which was done on December 6.  She felt somewhat better after that washout but then her drains clotted off and she reaccumulated fluid from her epidural MRSA infection and was washed out again on December 11.  Her drain stayed in at that point until drainage was down to 0 and they were discharged.  She was discharged home a week ago and was having some back pain but is worse now compared to a week ago.  Her back pain has increased.  She has some groin pain.  Maybe a little bit of thigh pain but nothing really going down her legs.  She had a normal bowel movement today with normal sensation.  She is having normal urinary shin with normal sensation.  She has no numbness in her saddle area.  She is walking around the house without a lot of difficulty but she uses a walker for stability because her back hurts.  She does not feel like she is having any neurologic symptoms.  She only feels like she is having back pain.    X-rays today do not show any significant abnormalities.  The hardware appears to be in good position and there is no evidence of any failure.    Assessment/plan: Patient with increased back pain.  She is on Eliquis.  It is unclear if she is developed an epidural hematoma or reaccumulation of fluid from her infection.  She has been on her antibiotics which she has been getting regularly.  I recommended she go to the ER for an MRI and admission if needed.  She is not interested in doing that.  She just wants to try to get some better pain relief as she otherwise feels good except for her back pain.  We will give her some Dilaudid for pain as she says that worked well when she got it once in the ER IV.  Will order a stat MRI of her lumbar spine with and without contrast and if she is not doing well before she gets her MRI she will go to the ER.  She knows to go to the ER if her pain increases or she develops bowel or bladder symptoms or leg symptoms or  any other concerns.  We will see her back after her MRI.

## 2023-12-29 NOTE — PROGRESS NOTES
SPOKE W/ PT - DELIVERY IS SCHEDULED FOR TODAY BY 9 PM.  ASKED PT IF THERE ARE ANY QUESTIONS TO A PHARMACIST. PT SAID: NO QUESTIONS.

## 2023-12-30 ENCOUNTER — APPOINTMENT (OUTPATIENT)
Dept: RADIOLOGY | Facility: HOSPITAL | Age: 68
DRG: 856 | End: 2023-12-30
Payer: COMMERCIAL

## 2023-12-30 ENCOUNTER — HOSPITAL ENCOUNTER (INPATIENT)
Facility: HOSPITAL | Age: 68
LOS: 18 days | Discharge: HOME | DRG: 856 | End: 2024-01-18
Attending: EMERGENCY MEDICINE | Admitting: HOSPITALIST
Payer: COMMERCIAL

## 2023-12-30 DIAGNOSIS — T81.89XA LUMBAR SURGICAL WOUND FLUID COLLECTION, INITIAL ENCOUNTER: ICD-10-CM

## 2023-12-30 DIAGNOSIS — I73.9 PVD (PERIPHERAL VASCULAR DISEASE) (CMS-HCC): ICD-10-CM

## 2023-12-30 DIAGNOSIS — A49.02 MRSA (METHICILLIN RESISTANT STAPHYLOCOCCUS AUREUS) INFECTION: ICD-10-CM

## 2023-12-30 DIAGNOSIS — D64.9 ANEMIA, UNSPECIFIED TYPE: ICD-10-CM

## 2023-12-30 DIAGNOSIS — M54.50 BACK PAIN, LUMBOSACRAL: ICD-10-CM

## 2023-12-30 DIAGNOSIS — I82.401 DEEP VEIN THROMBOSIS (DVT) OF RIGHT LOWER EXTREMITY, UNSPECIFIED CHRONICITY, UNSPECIFIED VEIN (MULTI): ICD-10-CM

## 2023-12-30 DIAGNOSIS — I82.4Y9 DEEP VEIN THROMBOSIS (DVT) OF PROXIMAL LOWER EXTREMITY, UNSPECIFIED CHRONICITY, UNSPECIFIED LATERALITY (MULTI): ICD-10-CM

## 2023-12-30 DIAGNOSIS — M96.89 POSTOPERATIVE SURGICAL COMPLICATION INVOLVING MUSCULOSKELETAL SYSTEM ASSOCIATED WITH MUSCULOSKELETAL PROCEDURE, UNSPECIFIED COMPLICATION: ICD-10-CM

## 2023-12-30 DIAGNOSIS — I82.453 ACUTE DEEP VEIN THROMBOSIS (DVT) OF BOTH PERONEAL VEINS (MULTI): ICD-10-CM

## 2023-12-30 DIAGNOSIS — M54.9 INTRACTABLE BACK PAIN: ICD-10-CM

## 2023-12-30 DIAGNOSIS — L02.212 BACK ABSCESS: Primary | ICD-10-CM

## 2023-12-30 DIAGNOSIS — M54.10 BACK PAIN WITH RADICULOPATHY: ICD-10-CM

## 2023-12-30 DIAGNOSIS — T81.89XD LUMBAR SURGICAL WOUND FLUID COLLECTION, SUBSEQUENT ENCOUNTER: ICD-10-CM

## 2023-12-30 DIAGNOSIS — I10 ESSENTIAL HYPERTENSION: ICD-10-CM

## 2023-12-30 DIAGNOSIS — I26.93 SINGLE SUBSEGMENTAL PULMONARY EMBOLISM WITHOUT ACUTE COR PULMONALE (MULTI): ICD-10-CM

## 2023-12-30 DIAGNOSIS — I82.811 EMBOLISM AND THROMBOSIS OF SUPERFICIAL VEINS OF RIGHT LOWER EXTREMITY: ICD-10-CM

## 2023-12-30 DIAGNOSIS — I82.413 DVT OF DEEP FEMORAL VEIN, BILATERAL (MULTI): ICD-10-CM

## 2023-12-30 DIAGNOSIS — M54.50 BACK PAIN AT L4-L5 LEVEL: ICD-10-CM

## 2023-12-30 DIAGNOSIS — I74.8 EMBOLISM AND THROMBOSIS OF OTHER ARTERIES (MULTI): ICD-10-CM

## 2023-12-30 LAB
ANION GAP SERPL CALC-SCNC: 11 MMOL/L (ref 10–20)
BASOPHILS # BLD AUTO: 0.02 X10*3/UL (ref 0–0.1)
BASOPHILS NFR BLD AUTO: 0.3 %
BUN SERPL-MCNC: 8 MG/DL (ref 6–23)
CALCIUM SERPL-MCNC: 8.8 MG/DL (ref 8.6–10.3)
CHLORIDE SERPL-SCNC: 97 MMOL/L (ref 98–107)
CO2 SERPL-SCNC: 25 MMOL/L (ref 21–32)
CREAT SERPL-MCNC: 0.78 MG/DL (ref 0.5–1.05)
EOSINOPHIL # BLD AUTO: 0.05 X10*3/UL (ref 0–0.7)
EOSINOPHIL NFR BLD AUTO: 0.7 %
ERYTHROCYTE [DISTWIDTH] IN BLOOD BY AUTOMATED COUNT: 14.6 % (ref 11.5–14.5)
GFR SERPL CREATININE-BSD FRML MDRD: 82 ML/MIN/1.73M*2
GLUCOSE SERPL-MCNC: 124 MG/DL (ref 74–99)
HCT VFR BLD AUTO: 30.1 % (ref 36–46)
HGB BLD-MCNC: 9.8 G/DL (ref 12–16)
HOLD SPECIMEN: NORMAL
HOLD SPECIMEN: NORMAL
IMM GRANULOCYTES # BLD AUTO: 0.04 X10*3/UL (ref 0–0.7)
IMM GRANULOCYTES NFR BLD AUTO: 0.6 % (ref 0–0.9)
LYMPHOCYTES # BLD AUTO: 0.85 X10*3/UL (ref 1.2–4.8)
LYMPHOCYTES NFR BLD AUTO: 11.9 %
MCH RBC QN AUTO: 30.2 PG (ref 26–34)
MCHC RBC AUTO-ENTMCNC: 32.6 G/DL (ref 32–36)
MCV RBC AUTO: 93 FL (ref 80–100)
MONOCYTES # BLD AUTO: 0.53 X10*3/UL (ref 0.1–1)
MONOCYTES NFR BLD AUTO: 7.4 %
NEUTROPHILS # BLD AUTO: 5.63 X10*3/UL (ref 1.2–7.7)
NEUTROPHILS NFR BLD AUTO: 79.1 %
NRBC BLD-RTO: 0 /100 WBCS (ref 0–0)
PLATELET # BLD AUTO: 286 X10*3/UL (ref 150–450)
POTASSIUM SERPL-SCNC: 3.3 MMOL/L (ref 3.5–5.3)
RBC # BLD AUTO: 3.24 X10*6/UL (ref 4–5.2)
SODIUM SERPL-SCNC: 130 MMOL/L (ref 136–145)
WBC # BLD AUTO: 7.1 X10*3/UL (ref 4.4–11.3)

## 2023-12-30 PROCEDURE — 2500000004 HC RX 250 GENERAL PHARMACY W/ HCPCS (ALT 636 FOR OP/ED): Performed by: PHYSICIAN ASSISTANT

## 2023-12-30 PROCEDURE — 36415 COLL VENOUS BLD VENIPUNCTURE: CPT | Performed by: PHYSICIAN ASSISTANT

## 2023-12-30 PROCEDURE — 72158 MRI LUMBAR SPINE W/O & W/DYE: CPT

## 2023-12-30 PROCEDURE — 85025 COMPLETE CBC W/AUTO DIFF WBC: CPT | Performed by: PHYSICIAN ASSISTANT

## 2023-12-30 PROCEDURE — 80048 BASIC METABOLIC PNL TOTAL CA: CPT | Performed by: PHYSICIAN ASSISTANT

## 2023-12-30 PROCEDURE — 1090000002 HH PPS REVENUE DEBIT

## 2023-12-30 PROCEDURE — 87040 BLOOD CULTURE FOR BACTERIA: CPT | Mod: ELYLAB | Performed by: PHYSICIAN ASSISTANT

## 2023-12-30 PROCEDURE — 51798 US URINE CAPACITY MEASURE: CPT

## 2023-12-30 PROCEDURE — 96374 THER/PROPH/DIAG INJ IV PUSH: CPT | Mod: 59

## 2023-12-30 PROCEDURE — 96375 TX/PRO/DX INJ NEW DRUG ADDON: CPT

## 2023-12-30 PROCEDURE — 2500000004 HC RX 250 GENERAL PHARMACY W/ HCPCS (ALT 636 FOR OP/ED): Performed by: EMERGENCY MEDICINE

## 2023-12-30 PROCEDURE — 96376 TX/PRO/DX INJ SAME DRUG ADON: CPT

## 2023-12-30 PROCEDURE — 1090000001 HH PPS REVENUE CREDIT

## 2023-12-30 PROCEDURE — 96360 HYDRATION IV INFUSION INIT: CPT

## 2023-12-30 PROCEDURE — 99285 EMERGENCY DEPT VISIT HI MDM: CPT | Performed by: EMERGENCY MEDICINE

## 2023-12-30 RX ORDER — POTASSIUM CHLORIDE 20 MEQ/1
40 TABLET, EXTENDED RELEASE ORAL ONCE
Status: COMPLETED | OUTPATIENT
Start: 2023-12-30 | End: 2023-12-30

## 2023-12-30 RX ORDER — LORAZEPAM 2 MG/ML
0.5 INJECTION INTRAMUSCULAR ONCE
Status: COMPLETED | OUTPATIENT
Start: 2023-12-30 | End: 2023-12-30

## 2023-12-30 RX ORDER — HYDROMORPHONE HYDROCHLORIDE 1 MG/ML
1 INJECTION, SOLUTION INTRAMUSCULAR; INTRAVENOUS; SUBCUTANEOUS ONCE
Status: COMPLETED | OUTPATIENT
Start: 2023-12-30 | End: 2023-12-30

## 2023-12-30 RX ORDER — ONDANSETRON HYDROCHLORIDE 2 MG/ML
4 INJECTION, SOLUTION INTRAVENOUS ONCE
Status: COMPLETED | OUTPATIENT
Start: 2023-12-30 | End: 2023-12-30

## 2023-12-30 RX ORDER — FENTANYL CITRATE 50 UG/ML
50 INJECTION, SOLUTION INTRAMUSCULAR; INTRAVENOUS ONCE
Status: COMPLETED | OUTPATIENT
Start: 2023-12-30 | End: 2023-12-30

## 2023-12-30 RX ADMIN — HYDROMORPHONE HYDROCHLORIDE 0.5 MG: 1 INJECTION, SOLUTION INTRAMUSCULAR; INTRAVENOUS; SUBCUTANEOUS at 20:05

## 2023-12-30 RX ADMIN — SODIUM CHLORIDE 500 ML: 9 INJECTION, SOLUTION INTRAVENOUS at 20:06

## 2023-12-30 RX ADMIN — LORAZEPAM 0.5 MG: 2 INJECTION INTRAMUSCULAR; INTRAVENOUS at 23:29

## 2023-12-30 RX ADMIN — HYDROMORPHONE HYDROCHLORIDE 1 MG: 1 INJECTION, SOLUTION INTRAMUSCULAR; INTRAVENOUS; SUBCUTANEOUS at 23:43

## 2023-12-30 RX ADMIN — FENTANYL CITRATE 50 MCG: 50 INJECTION, SOLUTION INTRAMUSCULAR; INTRAVENOUS at 21:05

## 2023-12-30 RX ADMIN — ONDANSETRON 4 MG: 2 INJECTION INTRAMUSCULAR; INTRAVENOUS at 20:05

## 2023-12-30 RX ADMIN — POTASSIUM CHLORIDE 40 MEQ: 1500 TABLET, EXTENDED RELEASE ORAL at 23:16

## 2023-12-30 ASSESSMENT — LIFESTYLE VARIABLES
EVER HAD A DRINK FIRST THING IN THE MORNING TO STEADY YOUR NERVES TO GET RID OF A HANGOVER: NO
REASON UNABLE TO ASSESS: NO
HAVE YOU EVER FELT YOU SHOULD CUT DOWN ON YOUR DRINKING: NO
HAVE PEOPLE ANNOYED YOU BY CRITICIZING YOUR DRINKING: NO
EVER FELT BAD OR GUILTY ABOUT YOUR DRINKING: NO

## 2023-12-30 ASSESSMENT — COLUMBIA-SUICIDE SEVERITY RATING SCALE - C-SSRS
6. HAVE YOU EVER DONE ANYTHING, STARTED TO DO ANYTHING, OR PREPARED TO DO ANYTHING TO END YOUR LIFE?: NO
1. IN THE PAST MONTH, HAVE YOU WISHED YOU WERE DEAD OR WISHED YOU COULD GO TO SLEEP AND NOT WAKE UP?: NO
2. HAVE YOU ACTUALLY HAD ANY THOUGHTS OF KILLING YOURSELF?: NO

## 2023-12-30 ASSESSMENT — PAIN SCALES - GENERAL
PAINLEVEL_OUTOF10: 10 - WORST POSSIBLE PAIN
PAINLEVEL_OUTOF10: 8
PAINLEVEL_OUTOF10: 10 - WORST POSSIBLE PAIN

## 2023-12-30 ASSESSMENT — PAIN - FUNCTIONAL ASSESSMENT: PAIN_FUNCTIONAL_ASSESSMENT: 0-10

## 2023-12-31 ENCOUNTER — ANESTHESIA EVENT (OUTPATIENT)
Dept: OPERATING ROOM | Facility: HOSPITAL | Age: 68
DRG: 856 | End: 2023-12-31
Payer: COMMERCIAL

## 2023-12-31 ENCOUNTER — APPOINTMENT (OUTPATIENT)
Dept: RADIOLOGY | Facility: HOSPITAL | Age: 68
DRG: 856 | End: 2023-12-31
Payer: COMMERCIAL

## 2023-12-31 ENCOUNTER — ANESTHESIA (OUTPATIENT)
Dept: OPERATING ROOM | Facility: HOSPITAL | Age: 68
DRG: 856 | End: 2023-12-31
Payer: COMMERCIAL

## 2023-12-31 PROBLEM — M96.89: Status: ACTIVE | Noted: 2023-12-31

## 2023-12-31 LAB
ABO GROUP (TYPE) IN BLOOD: NORMAL
ANION GAP SERPL CALC-SCNC: 12 MMOL/L (ref 10–20)
ANTIBODY SCREEN: NORMAL
APPEARANCE UR: ABNORMAL
BACTERIA #/AREA URNS AUTO: ABNORMAL /HPF
BILIRUB UR STRIP.AUTO-MCNC: NEGATIVE MG/DL
BUN SERPL-MCNC: 6 MG/DL (ref 6–23)
CALCIUM SERPL-MCNC: 8.7 MG/DL (ref 8.6–10.3)
CHLORIDE SERPL-SCNC: 99 MMOL/L (ref 98–107)
CO2 SERPL-SCNC: 24 MMOL/L (ref 21–32)
COLOR UR: YELLOW
CREAT SERPL-MCNC: 0.72 MG/DL (ref 0.5–1.05)
ERYTHROCYTE [DISTWIDTH] IN BLOOD BY AUTOMATED COUNT: 14.9 % (ref 11.5–14.5)
GFR SERPL CREATININE-BSD FRML MDRD: 90 ML/MIN/1.73M*2
GLUCOSE SERPL-MCNC: 117 MG/DL (ref 74–99)
GLUCOSE UR STRIP.AUTO-MCNC: NEGATIVE MG/DL
HCT VFR BLD AUTO: 29.4 % (ref 36–46)
HGB BLD-MCNC: 9.6 G/DL (ref 12–16)
HOLD SPECIMEN: NORMAL
HOLD SPECIMEN: NORMAL
KETONES UR STRIP.AUTO-MCNC: NEGATIVE MG/DL
LEUKOCYTE ESTERASE UR QL STRIP.AUTO: NEGATIVE
MCH RBC QN AUTO: 30.7 PG (ref 26–34)
MCHC RBC AUTO-ENTMCNC: 32.7 G/DL (ref 32–36)
MCV RBC AUTO: 94 FL (ref 80–100)
NITRITE UR QL STRIP.AUTO: NEGATIVE
NRBC BLD-RTO: 0 /100 WBCS (ref 0–0)
PH UR STRIP.AUTO: 7 [PH]
PLATELET # BLD AUTO: 265 X10*3/UL (ref 150–450)
POTASSIUM SERPL-SCNC: 3.5 MMOL/L (ref 3.5–5.3)
PROT UR STRIP.AUTO-MCNC: ABNORMAL MG/DL
RBC # BLD AUTO: 3.13 X10*6/UL (ref 4–5.2)
RBC # UR STRIP.AUTO: NEGATIVE /UL
RBC #/AREA URNS AUTO: ABNORMAL /HPF
RH FACTOR (ANTIGEN D): NORMAL
SODIUM SERPL-SCNC: 131 MMOL/L (ref 136–145)
SP GR UR STRIP.AUTO: 1.01
SQUAMOUS #/AREA URNS AUTO: ABNORMAL /HPF
UROBILINOGEN UR STRIP.AUTO-MCNC: 2 MG/DL
VANCOMYCIN SERPL-MCNC: 15.4 UG/ML (ref 5–20)
VANCOMYCIN SERPL-MCNC: 19.2 UG/ML (ref 5–20)
WBC # BLD AUTO: 5.3 X10*3/UL (ref 4.4–11.3)
WBC #/AREA URNS AUTO: ABNORMAL /HPF

## 2023-12-31 PROCEDURE — 2500000004 HC RX 250 GENERAL PHARMACY W/ HCPCS (ALT 636 FOR OP/ED)

## 2023-12-31 PROCEDURE — 80202 ASSAY OF VANCOMYCIN: CPT

## 2023-12-31 PROCEDURE — 87205 SMEAR GRAM STAIN: CPT | Mod: ELYLAB | Performed by: ORTHOPAEDIC SURGERY

## 2023-12-31 PROCEDURE — 22830 EXPLORATION OF SPINAL FUSION: CPT | Performed by: ORTHOPAEDIC SURGERY

## 2023-12-31 PROCEDURE — 85027 COMPLETE CBC AUTOMATED: CPT | Performed by: NURSE PRACTITIONER

## 2023-12-31 PROCEDURE — 87186 SC STD MICRODIL/AGAR DIL: CPT | Mod: ELYLAB | Performed by: ORTHOPAEDIC SURGERY

## 2023-12-31 PROCEDURE — 37799 UNLISTED PX VASCULAR SURGERY: CPT

## 2023-12-31 PROCEDURE — 2500000005 HC RX 250 GENERAL PHARMACY W/O HCPCS: Performed by: HOSPITALIST

## 2023-12-31 PROCEDURE — A4217 STERILE WATER/SALINE, 500 ML: HCPCS | Performed by: ORTHOPAEDIC SURGERY

## 2023-12-31 PROCEDURE — 22614 ARTHRD PST TQ 1NTRSPC EA ADD: CPT | Performed by: ORTHOPAEDIC SURGERY

## 2023-12-31 PROCEDURE — 99222 1ST HOSP IP/OBS MODERATE 55: CPT | Performed by: NURSE PRACTITIONER

## 2023-12-31 PROCEDURE — 3600000003 HC OR TIME - INITIAL BASE CHARGE - PROCEDURE LEVEL THREE: Performed by: ORTHOPAEDIC SURGERY

## 2023-12-31 PROCEDURE — 2550000001 HC RX 255 CONTRASTS: Performed by: STUDENT IN AN ORGANIZED HEALTH CARE EDUCATION/TRAINING PROGRAM

## 2023-12-31 PROCEDURE — 0SG10J1 FUSION OF 2 OR MORE LUMBAR VERTEBRAL JOINTS WITH SYNTHETIC SUBSTITUTE, POSTERIOR APPROACH, POSTERIOR COLUMN, OPEN APPROACH: ICD-10-PCS | Performed by: ORTHOPAEDIC SURGERY

## 2023-12-31 PROCEDURE — 2500000001 HC RX 250 WO HCPCS SELF ADMINISTERED DRUGS (ALT 637 FOR MEDICARE OP)

## 2023-12-31 PROCEDURE — 2500000004 HC RX 250 GENERAL PHARMACY W/ HCPCS (ALT 636 FOR OP/ED): Performed by: HOSPITALIST

## 2023-12-31 PROCEDURE — C1713 ANCHOR/SCREW BN/BN,TIS/BN: HCPCS | Performed by: ORTHOPAEDIC SURGERY

## 2023-12-31 PROCEDURE — 0SP30KZ REMOVAL OF NONAUTOLOGOUS TISSUE SUBSTITUTE FROM LUMBOSACRAL JOINT, OPEN APPROACH: ICD-10-PCS | Performed by: ORTHOPAEDIC SURGERY

## 2023-12-31 PROCEDURE — 99024 POSTOP FOLLOW-UP VISIT: CPT | Performed by: ORTHOPAEDIC SURGERY

## 2023-12-31 PROCEDURE — 7100000001 HC RECOVERY ROOM TIME - INITIAL BASE CHARGE: Performed by: ORTHOPAEDIC SURGERY

## 2023-12-31 PROCEDURE — A9575 INJ GADOTERATE MEGLUMI 0.1ML: HCPCS | Performed by: STUDENT IN AN ORGANIZED HEALTH CARE EDUCATION/TRAINING PROGRAM

## 2023-12-31 PROCEDURE — 2500000004 HC RX 250 GENERAL PHARMACY W/ HCPCS (ALT 636 FOR OP/ED): Performed by: NURSE PRACTITIONER

## 2023-12-31 PROCEDURE — 22614 ARTHRD PST TQ 1NTRSPC EA ADD: CPT | Performed by: PHYSICIAN ASSISTANT

## 2023-12-31 PROCEDURE — 2780000003 HC OR 278 NO HCPCS: Performed by: ORTHOPAEDIC SURGERY

## 2023-12-31 PROCEDURE — 0SP304Z REMOVAL OF INTERNAL FIXATION DEVICE FROM LUMBOSACRAL JOINT, OPEN APPROACH: ICD-10-PCS | Performed by: ORTHOPAEDIC SURGERY

## 2023-12-31 PROCEDURE — 99232 SBSQ HOSP IP/OBS MODERATE 35: CPT | Performed by: HOSPITALIST

## 2023-12-31 PROCEDURE — 86901 BLOOD TYPING SEROLOGIC RH(D): CPT | Performed by: ORTHOPAEDIC SURGERY

## 2023-12-31 PROCEDURE — 22612 ARTHRD PST TQ 1NTRSPC LUMBAR: CPT | Performed by: ORTHOPAEDIC SURGERY

## 2023-12-31 PROCEDURE — 81001 URINALYSIS AUTO W/SCOPE: CPT | Performed by: EMERGENCY MEDICINE

## 2023-12-31 PROCEDURE — 3700000002 HC GENERAL ANESTHESIA TIME - EACH INCREMENTAL 1 MINUTE: Performed by: ORTHOPAEDIC SURGERY

## 2023-12-31 PROCEDURE — 22852 REMOVE SPINE FIXATION DEVICE: CPT | Performed by: PHYSICIAN ASSISTANT

## 2023-12-31 PROCEDURE — 2500000001 HC RX 250 WO HCPCS SELF ADMINISTERED DRUGS (ALT 637 FOR MEDICARE OP): Performed by: HOSPITALIST

## 2023-12-31 PROCEDURE — 7100000002 HC RECOVERY ROOM TIME - EACH INCREMENTAL 1 MINUTE: Performed by: ORTHOPAEDIC SURGERY

## 2023-12-31 PROCEDURE — 22852 REMOVE SPINE FIXATION DEVICE: CPT | Performed by: ORTHOPAEDIC SURGERY

## 2023-12-31 PROCEDURE — 87075 CULTR BACTERIA EXCEPT BLOOD: CPT | Mod: ELYLAB | Performed by: ORTHOPAEDIC SURGERY

## 2023-12-31 PROCEDURE — 37799 UNLISTED PX VASCULAR SURGERY: CPT | Performed by: NURSE PRACTITIONER

## 2023-12-31 PROCEDURE — 74176 CT ABD & PELVIS W/O CONTRAST: CPT | Mod: FOREIGN READ | Performed by: RADIOLOGY

## 2023-12-31 PROCEDURE — 2500000004 HC RX 250 GENERAL PHARMACY W/ HCPCS (ALT 636 FOR OP/ED): Performed by: STUDENT IN AN ORGANIZED HEALTH CARE EDUCATION/TRAINING PROGRAM

## 2023-12-31 PROCEDURE — 72158 MRI LUMBAR SPINE W/O & W/DYE: CPT | Performed by: STUDENT IN AN ORGANIZED HEALTH CARE EDUCATION/TRAINING PROGRAM

## 2023-12-31 PROCEDURE — 2500000004 HC RX 250 GENERAL PHARMACY W/ HCPCS (ALT 636 FOR OP/ED): Performed by: INTERNAL MEDICINE

## 2023-12-31 PROCEDURE — 74176 CT ABD & PELVIS W/O CONTRAST: CPT | Mod: FR

## 2023-12-31 PROCEDURE — 80048 BASIC METABOLIC PNL TOTAL CA: CPT | Performed by: NURSE PRACTITIONER

## 2023-12-31 PROCEDURE — 2500000005 HC RX 250 GENERAL PHARMACY W/O HCPCS: Performed by: NURSE PRACTITIONER

## 2023-12-31 PROCEDURE — 2500000005 HC RX 250 GENERAL PHARMACY W/O HCPCS: Performed by: STUDENT IN AN ORGANIZED HEALTH CARE EDUCATION/TRAINING PROGRAM

## 2023-12-31 PROCEDURE — 0SG30J1 FUSION OF LUMBOSACRAL JOINT WITH SYNTHETIC SUBSTITUTE, POSTERIOR APPROACH, POSTERIOR COLUMN, OPEN APPROACH: ICD-10-PCS | Performed by: ORTHOPAEDIC SURGERY

## 2023-12-31 PROCEDURE — 3700000001 HC GENERAL ANESTHESIA TIME - INITIAL BASE CHARGE: Performed by: ORTHOPAEDIC SURGERY

## 2023-12-31 PROCEDURE — G0378 HOSPITAL OBSERVATION PER HR: HCPCS

## 2023-12-31 PROCEDURE — 2720000007 HC OR 272 NO HCPCS: Performed by: ORTHOPAEDIC SURGERY

## 2023-12-31 PROCEDURE — 0SP00KZ REMOVAL OF NONAUTOLOGOUS TISSUE SUBSTITUTE FROM LUMBAR VERTEBRAL JOINT, OPEN APPROACH: ICD-10-PCS | Performed by: ORTHOPAEDIC SURGERY

## 2023-12-31 PROCEDURE — 86920 COMPATIBILITY TEST SPIN: CPT

## 2023-12-31 PROCEDURE — 3600000008 HC OR TIME - EACH INCREMENTAL 1 MINUTE - PROCEDURE LEVEL THREE: Performed by: ORTHOPAEDIC SURGERY

## 2023-12-31 PROCEDURE — 1090000002 HH PPS REVENUE DEBIT

## 2023-12-31 PROCEDURE — 22612 ARTHRD PST TQ 1NTRSPC LUMBAR: CPT | Performed by: PHYSICIAN ASSISTANT

## 2023-12-31 PROCEDURE — 2500000004 HC RX 250 GENERAL PHARMACY W/ HCPCS (ALT 636 FOR OP/ED): Performed by: ORTHOPAEDIC SURGERY

## 2023-12-31 PROCEDURE — 0SP004Z REMOVAL OF INTERNAL FIXATION DEVICE FROM LUMBAR VERTEBRAL JOINT, OPEN APPROACH: ICD-10-PCS | Performed by: ORTHOPAEDIC SURGERY

## 2023-12-31 PROCEDURE — 1090000001 HH PPS REVENUE CREDIT

## 2023-12-31 PROCEDURE — 36415 COLL VENOUS BLD VENIPUNCTURE: CPT

## 2023-12-31 PROCEDURE — 1200000002 HC GENERAL ROOM WITH TELEMETRY DAILY

## 2023-12-31 DEVICE — IMPLANTABLE DEVICE: Type: IMPLANTABLE DEVICE | Site: VERTEBRAE | Status: FUNCTIONAL

## 2023-12-31 RX ORDER — HYDROMORPHONE HYDROCHLORIDE 1 MG/ML
INJECTION, SOLUTION INTRAMUSCULAR; INTRAVENOUS; SUBCUTANEOUS
Status: COMPLETED
Start: 2023-12-31 | End: 2023-12-31

## 2023-12-31 RX ORDER — ONDANSETRON 4 MG/1
4 TABLET, FILM COATED ORAL EVERY 8 HOURS PRN
Status: DISCONTINUED | OUTPATIENT
Start: 2023-12-31 | End: 2023-12-31

## 2023-12-31 RX ORDER — OXYCODONE HYDROCHLORIDE 5 MG/1
2.5 TABLET ORAL EVERY 4 HOURS PRN
Status: DISCONTINUED | OUTPATIENT
Start: 2023-12-31 | End: 2023-12-31

## 2023-12-31 RX ORDER — ONDANSETRON HYDROCHLORIDE 2 MG/ML
4 INJECTION, SOLUTION INTRAVENOUS EVERY 8 HOURS PRN
Status: DISCONTINUED | OUTPATIENT
Start: 2023-12-31 | End: 2024-01-18 | Stop reason: HOSPADM

## 2023-12-31 RX ORDER — TALC
3 POWDER (GRAM) TOPICAL DAILY
Status: DISCONTINUED | OUTPATIENT
Start: 2023-12-31 | End: 2024-01-18 | Stop reason: HOSPADM

## 2023-12-31 RX ORDER — POLYETHYLENE GLYCOL 3350 17 G/17G
17 POWDER, FOR SOLUTION ORAL DAILY
Status: DISCONTINUED | OUTPATIENT
Start: 2023-12-31 | End: 2024-01-18 | Stop reason: HOSPADM

## 2023-12-31 RX ORDER — ONDANSETRON HYDROCHLORIDE 2 MG/ML
4 INJECTION, SOLUTION INTRAVENOUS ONCE AS NEEDED
Status: COMPLETED | OUTPATIENT
Start: 2023-12-31 | End: 2023-12-31

## 2023-12-31 RX ORDER — SODIUM CHLORIDE 9 MG/ML
100 INJECTION, SOLUTION INTRAVENOUS CONTINUOUS
Status: DISCONTINUED | OUTPATIENT
Start: 2023-12-31 | End: 2024-01-02

## 2023-12-31 RX ORDER — VANCOMYCIN HYDROCHLORIDE 1 G/200ML
1 INJECTION, SOLUTION INTRAVENOUS EVERY 12 HOURS
Status: DISCONTINUED | OUTPATIENT
Start: 2023-12-31 | End: 2023-12-31

## 2023-12-31 RX ORDER — OXYCODONE HYDROCHLORIDE 5 MG/1
5 TABLET ORAL EVERY 6 HOURS PRN
Status: DISCONTINUED | OUTPATIENT
Start: 2023-12-31 | End: 2023-12-31

## 2023-12-31 RX ORDER — ONDANSETRON HYDROCHLORIDE 2 MG/ML
4 INJECTION, SOLUTION INTRAVENOUS EVERY 8 HOURS PRN
Status: DISCONTINUED | OUTPATIENT
Start: 2023-12-31 | End: 2023-12-31

## 2023-12-31 RX ORDER — SODIUM CHLORIDE, SODIUM LACTATE, POTASSIUM CHLORIDE, CALCIUM CHLORIDE 600; 310; 30; 20 MG/100ML; MG/100ML; MG/100ML; MG/100ML
100 INJECTION, SOLUTION INTRAVENOUS CONTINUOUS
Status: DISCONTINUED | OUTPATIENT
Start: 2023-12-31 | End: 2023-12-31 | Stop reason: HOSPADM

## 2023-12-31 RX ORDER — ACETAMINOPHEN 160 MG/5ML
650 SOLUTION ORAL EVERY 4 HOURS PRN
Status: DISCONTINUED | OUTPATIENT
Start: 2023-12-31 | End: 2024-01-18 | Stop reason: HOSPADM

## 2023-12-31 RX ORDER — NALOXONE HYDROCHLORIDE 1 MG/ML
0.2 INJECTION INTRAMUSCULAR; INTRAVENOUS; SUBCUTANEOUS EVERY 5 MIN PRN
Status: DISCONTINUED | OUTPATIENT
Start: 2023-12-31 | End: 2024-01-18 | Stop reason: HOSPADM

## 2023-12-31 RX ORDER — HYDROMORPHONE HYDROCHLORIDE 1 MG/ML
INJECTION, SOLUTION INTRAMUSCULAR; INTRAVENOUS; SUBCUTANEOUS AS NEEDED
Status: DISCONTINUED | OUTPATIENT
Start: 2023-12-31 | End: 2023-12-31

## 2023-12-31 RX ORDER — SODIUM CHLORIDE 0.9 G/100ML
IRRIGANT IRRIGATION AS NEEDED
Status: DISCONTINUED | OUTPATIENT
Start: 2023-12-31 | End: 2023-12-31 | Stop reason: HOSPADM

## 2023-12-31 RX ORDER — ACETAMINOPHEN 650 MG/1
650 SUPPOSITORY RECTAL EVERY 4 HOURS PRN
Status: DISCONTINUED | OUTPATIENT
Start: 2023-12-31 | End: 2024-01-18 | Stop reason: HOSPADM

## 2023-12-31 RX ORDER — OXYCODONE AND ACETAMINOPHEN 5; 325 MG/1; MG/1
1 TABLET ORAL EVERY 6 HOURS PRN
Status: DISCONTINUED | OUTPATIENT
Start: 2023-12-31 | End: 2023-12-31

## 2023-12-31 RX ORDER — MORPHINE SULFATE 4 MG/ML
4 INJECTION, SOLUTION INTRAMUSCULAR; INTRAVENOUS ONCE
Status: COMPLETED | OUTPATIENT
Start: 2023-12-31 | End: 2023-12-31

## 2023-12-31 RX ORDER — OXYCODONE HYDROCHLORIDE 5 MG/1
10 TABLET ORAL EVERY 4 HOURS PRN
Status: DISCONTINUED | OUTPATIENT
Start: 2023-12-31 | End: 2024-01-06

## 2023-12-31 RX ORDER — FENTANYL CITRATE 50 UG/ML
INJECTION, SOLUTION INTRAMUSCULAR; INTRAVENOUS AS NEEDED
Status: DISCONTINUED | OUTPATIENT
Start: 2023-12-31 | End: 2023-12-31

## 2023-12-31 RX ORDER — OXYCODONE HYDROCHLORIDE 5 MG/1
5 TABLET ORAL EVERY 4 HOURS PRN
Status: DISCONTINUED | OUTPATIENT
Start: 2023-12-31 | End: 2023-12-31

## 2023-12-31 RX ORDER — ROCURONIUM BROMIDE 10 MG/ML
INJECTION, SOLUTION INTRAVENOUS AS NEEDED
Status: DISCONTINUED | OUTPATIENT
Start: 2023-12-31 | End: 2023-12-31

## 2023-12-31 RX ORDER — VANCOMYCIN HYDROCHLORIDE 750 MG/150ML
750 INJECTION, SOLUTION INTRAVENOUS EVERY 12 HOURS
Status: DISCONTINUED | OUTPATIENT
Start: 2024-01-01 | End: 2024-01-01

## 2023-12-31 RX ORDER — TRANEXAMIC ACID 650 MG/1
1950 TABLET ORAL ONCE
Status: COMPLETED | OUTPATIENT
Start: 2023-12-31 | End: 2023-12-31

## 2023-12-31 RX ORDER — GADOTERATE MEGLUMINE 376.9 MG/ML
0.2 INJECTION INTRAVENOUS
Status: COMPLETED | OUTPATIENT
Start: 2023-12-31 | End: 2023-12-31

## 2023-12-31 RX ORDER — DEXAMETHASONE SODIUM PHOSPHATE 4 MG/ML
INJECTION, SOLUTION INTRA-ARTICULAR; INTRALESIONAL; INTRAMUSCULAR; INTRAVENOUS; SOFT TISSUE AS NEEDED
Status: DISCONTINUED | OUTPATIENT
Start: 2023-12-31 | End: 2023-12-31

## 2023-12-31 RX ORDER — PROPOFOL 10 MG/ML
INJECTION, EMULSION INTRAVENOUS AS NEEDED
Status: DISCONTINUED | OUTPATIENT
Start: 2023-12-31 | End: 2023-12-31

## 2023-12-31 RX ORDER — LIDOCAINE 560 MG/1
1 PATCH PERCUTANEOUS; TOPICAL; TRANSDERMAL DAILY
Status: DISCONTINUED | OUTPATIENT
Start: 2023-12-31 | End: 2024-01-18 | Stop reason: HOSPADM

## 2023-12-31 RX ORDER — ACETAMINOPHEN 325 MG/1
650 TABLET ORAL EVERY 6 HOURS
Status: DISCONTINUED | OUTPATIENT
Start: 2023-12-31 | End: 2024-01-18 | Stop reason: HOSPADM

## 2023-12-31 RX ORDER — LABETALOL HYDROCHLORIDE 5 MG/ML
5 INJECTION, SOLUTION INTRAVENOUS ONCE AS NEEDED
Status: DISCONTINUED | OUTPATIENT
Start: 2023-12-31 | End: 2023-12-31 | Stop reason: HOSPADM

## 2023-12-31 RX ORDER — CYCLOBENZAPRINE HCL 10 MG
10 TABLET ORAL 3 TIMES DAILY PRN
Status: DISCONTINUED | OUTPATIENT
Start: 2023-12-31 | End: 2024-01-18 | Stop reason: HOSPADM

## 2023-12-31 RX ORDER — POLYETHYLENE GLYCOL 3350 17 G/17G
17 POWDER, FOR SOLUTION ORAL DAILY
Status: DISCONTINUED | OUTPATIENT
Start: 2023-12-31 | End: 2023-12-31

## 2023-12-31 RX ORDER — ONDANSETRON 4 MG/1
4 TABLET, FILM COATED ORAL EVERY 8 HOURS PRN
Status: DISCONTINUED | OUTPATIENT
Start: 2023-12-31 | End: 2024-01-18 | Stop reason: HOSPADM

## 2023-12-31 RX ORDER — ACETAMINOPHEN 325 MG/1
650 TABLET ORAL EVERY 4 HOURS PRN
Status: DISCONTINUED | OUTPATIENT
Start: 2023-12-31 | End: 2024-01-18 | Stop reason: HOSPADM

## 2023-12-31 RX ORDER — LIDOCAINE HYDROCHLORIDE 20 MG/ML
INJECTION, SOLUTION INFILTRATION; PERINEURAL AS NEEDED
Status: DISCONTINUED | OUTPATIENT
Start: 2023-12-31 | End: 2023-12-31

## 2023-12-31 RX ORDER — MORPHINE SULFATE 2 MG/ML
2 INJECTION, SOLUTION INTRAMUSCULAR; INTRAVENOUS EVERY 2 HOUR PRN
Status: DISCONTINUED | OUTPATIENT
Start: 2023-12-31 | End: 2024-01-18 | Stop reason: HOSPADM

## 2023-12-31 RX ORDER — OXYCODONE HYDROCHLORIDE 5 MG/1
5 TABLET ORAL EVERY 4 HOURS PRN
Status: DISCONTINUED | OUTPATIENT
Start: 2023-12-31 | End: 2024-01-06

## 2023-12-31 RX ORDER — FENTANYL CITRATE 50 UG/ML
25 INJECTION, SOLUTION INTRAMUSCULAR; INTRAVENOUS EVERY 5 MIN PRN
Status: DISCONTINUED | OUTPATIENT
Start: 2023-12-31 | End: 2023-12-31 | Stop reason: HOSPADM

## 2023-12-31 RX ADMIN — ROCURONIUM BROMIDE 10 MG: 10 SOLUTION INTRAVENOUS at 12:37

## 2023-12-31 RX ADMIN — ROCURONIUM BROMIDE 50 MG: 10 SOLUTION INTRAVENOUS at 11:47

## 2023-12-31 RX ADMIN — OXYCODONE HYDROCHLORIDE 10 MG: 5 TABLET ORAL at 18:40

## 2023-12-31 RX ADMIN — ROCURONIUM BROMIDE 30 MG: 10 SOLUTION INTRAVENOUS at 12:18

## 2023-12-31 RX ADMIN — FENTANYL CITRATE 50 MCG: 50 INJECTION, SOLUTION INTRAMUSCULAR; INTRAVENOUS at 11:47

## 2023-12-31 RX ADMIN — MORPHINE SULFATE 4 MG: 4 INJECTION, SOLUTION INTRAMUSCULAR; INTRAVENOUS at 09:01

## 2023-12-31 RX ADMIN — LIDOCAINE 1 PATCH: 4 PATCH TOPICAL at 09:01

## 2023-12-31 RX ADMIN — SUGAMMADEX 200 MG: 100 INJECTION, SOLUTION INTRAVENOUS at 13:26

## 2023-12-31 RX ADMIN — PROPOFOL 50 MG: 10 INJECTION, EMULSION INTRAVENOUS at 13:28

## 2023-12-31 RX ADMIN — HYDROMORPHONE HYDROCHLORIDE 0.5 MG: 1 INJECTION, SOLUTION INTRAMUSCULAR; INTRAVENOUS; SUBCUTANEOUS at 14:36

## 2023-12-31 RX ADMIN — PROPOFOL 150 MG: 10 INJECTION, EMULSION INTRAVENOUS at 11:47

## 2023-12-31 RX ADMIN — ONDANSETRON 4 MG: 2 INJECTION INTRAMUSCULAR; INTRAVENOUS at 14:18

## 2023-12-31 RX ADMIN — DEXAMETHASONE SODIUM PHOSPHATE 8 MG: 4 INJECTION, SOLUTION INTRAMUSCULAR; INTRAVENOUS at 12:20

## 2023-12-31 RX ADMIN — GADOTERATE MEGLUMINE 15.5 ML: 376.9 INJECTION INTRAVENOUS at 00:29

## 2023-12-31 RX ADMIN — FENTANYL CITRATE 25 MCG: 50 INJECTION, SOLUTION INTRAMUSCULAR; INTRAVENOUS at 14:21

## 2023-12-31 RX ADMIN — POLYETHYLENE GLYCOL 3350 17 G: 17 POWDER, FOR SOLUTION ORAL at 08:01

## 2023-12-31 RX ADMIN — HYDROMORPHONE HYDROCHLORIDE 0.2 MG: 0.2 INJECTION, SOLUTION INTRAMUSCULAR; INTRAVENOUS; SUBCUTANEOUS at 07:59

## 2023-12-31 RX ADMIN — HYDROMORPHONE HYDROCHLORIDE 0.5 MG: 1 INJECTION, SOLUTION INTRAMUSCULAR; INTRAVENOUS; SUBCUTANEOUS at 13:58

## 2023-12-31 RX ADMIN — FENTANYL CITRATE 25 MCG: 50 INJECTION, SOLUTION INTRAMUSCULAR; INTRAVENOUS at 14:31

## 2023-12-31 RX ADMIN — TRANEXAMIC ACID 1950 MG: 650 TABLET ORAL at 16:00

## 2023-12-31 RX ADMIN — FENTANYL CITRATE 100 MCG: 50 INJECTION, SOLUTION INTRAMUSCULAR; INTRAVENOUS at 12:37

## 2023-12-31 RX ADMIN — SODIUM CHLORIDE 100 ML/HR: 9 INJECTION, SOLUTION INTRAVENOUS at 16:00

## 2023-12-31 RX ADMIN — HYDROMORPHONE HYDROCHLORIDE 0.5 MG: 1 INJECTION, SOLUTION INTRAMUSCULAR; INTRAVENOUS; SUBCUTANEOUS at 14:14

## 2023-12-31 RX ADMIN — HYDROMORPHONE HYDROCHLORIDE 0.2 MG: 0.2 INJECTION, SOLUTION INTRAMUSCULAR; INTRAVENOUS; SUBCUTANEOUS at 04:25

## 2023-12-31 RX ADMIN — HYDROMORPHONE HYDROCHLORIDE 0.5 MG: 1 INJECTION, SOLUTION INTRAMUSCULAR; INTRAVENOUS; SUBCUTANEOUS at 14:03

## 2023-12-31 RX ADMIN — HYDROMORPHONE HYDROCHLORIDE 1 MG: 1 INJECTION, SOLUTION INTRAMUSCULAR; INTRAVENOUS; SUBCUTANEOUS at 12:16

## 2023-12-31 RX ADMIN — LIDOCAINE HYDROCHLORIDE 80 MG: 20 INJECTION, SOLUTION INFILTRATION; PERINEURAL at 11:47

## 2023-12-31 RX ADMIN — FENTANYL CITRATE 25 MCG: 50 INJECTION, SOLUTION INTRAMUSCULAR; INTRAVENOUS at 14:27

## 2023-12-31 RX ADMIN — HYDROMORPHONE HYDROCHLORIDE 0.5 MG: 1 INJECTION, SOLUTION INTRAMUSCULAR; INTRAVENOUS; SUBCUTANEOUS at 14:48

## 2023-12-31 RX ADMIN — PROMETHAZINE HYDROCHLORIDE 6.25 MG: 25 INJECTION INTRAMUSCULAR; INTRAVENOUS at 14:40

## 2023-12-31 RX ADMIN — ONDANSETRON 4 MG: 2 INJECTION INTRAMUSCULAR; INTRAVENOUS at 13:17

## 2023-12-31 RX ADMIN — FENTANYL CITRATE 50 MCG: 50 INJECTION, SOLUTION INTRAMUSCULAR; INTRAVENOUS at 13:19

## 2023-12-31 RX ADMIN — ONDANSETRON 4 MG: 4 TABLET, FILM COATED ORAL at 09:04

## 2023-12-31 RX ADMIN — ACETAMINOPHEN 650 MG: 325 TABLET ORAL at 16:00

## 2023-12-31 RX ADMIN — FENTANYL CITRATE 50 MCG: 50 INJECTION, SOLUTION INTRAMUSCULAR; INTRAVENOUS at 12:15

## 2023-12-31 RX ADMIN — HYDROMORPHONE HYDROCHLORIDE 0.5 MG: 1 INJECTION, SOLUTION INTRAMUSCULAR; INTRAVENOUS; SUBCUTANEOUS at 14:08

## 2023-12-31 RX ADMIN — HYDROMORPHONE HYDROCHLORIDE 0.2 MG: 0.2 INJECTION, SOLUTION INTRAMUSCULAR; INTRAVENOUS; SUBCUTANEOUS at 21:03

## 2023-12-31 RX ADMIN — SODIUM CHLORIDE, POTASSIUM CHLORIDE, SODIUM LACTATE AND CALCIUM CHLORIDE: 600; 310; 30; 20 INJECTION, SOLUTION INTRAVENOUS at 13:07

## 2023-12-31 RX ADMIN — FENTANYL CITRATE 25 MCG: 50 INJECTION, SOLUTION INTRAMUSCULAR; INTRAVENOUS at 14:16

## 2023-12-31 RX ADMIN — VANCOMYCIN HYDROCHLORIDE 1250 MG: 1.25 INJECTION, POWDER, LYOPHILIZED, FOR SOLUTION INTRAVENOUS at 11:54

## 2023-12-31 RX ADMIN — OXYCODONE HYDROCHLORIDE 5 MG: 5 TABLET ORAL at 10:27

## 2023-12-31 RX ADMIN — SODIUM CHLORIDE, POTASSIUM CHLORIDE, SODIUM LACTATE AND CALCIUM CHLORIDE: 600; 310; 30; 20 INJECTION, SOLUTION INTRAVENOUS at 11:39

## 2023-12-31 RX ADMIN — Medication 3 MG: at 19:00

## 2023-12-31 RX ADMIN — CEFEPIME 2 G: 2 INJECTION, POWDER, FOR SOLUTION INTRAVENOUS at 20:59

## 2023-12-31 SDOH — SOCIAL STABILITY: SOCIAL NETWORK: IN A TYPICAL WEEK, HOW MANY TIMES DO YOU TALK ON THE PHONE WITH FAMILY, FRIENDS, OR NEIGHBORS?: PATIENT DECLINED

## 2023-12-31 SDOH — HEALTH STABILITY: PHYSICAL HEALTH: ON AVERAGE, HOW MANY DAYS PER WEEK DO YOU ENGAGE IN MODERATE TO STRENUOUS EXERCISE (LIKE A BRISK WALK)?: 0 DAYS

## 2023-12-31 SDOH — HEALTH STABILITY: PHYSICAL HEALTH: ON AVERAGE, HOW MANY MINUTES DO YOU ENGAGE IN EXERCISE AT THIS LEVEL?: 0 MIN

## 2023-12-31 SDOH — SOCIAL STABILITY: SOCIAL INSECURITY: WERE YOU ABLE TO COMPLETE ALL THE BEHAVIORAL HEALTH SCREENINGS?: YES

## 2023-12-31 SDOH — SOCIAL STABILITY: SOCIAL NETWORK: ARE YOU MARRIED, WIDOWED, DIVORCED, SEPARATED, NEVER MARRIED, OR LIVING WITH A PARTNER?: MARRIED

## 2023-12-31 SDOH — SOCIAL STABILITY: SOCIAL INSECURITY
WITHIN THE LAST YEAR, HAVE YOU BEEN RAPED OR FORCED TO HAVE ANY KIND OF SEXUAL ACTIVITY BY YOUR PARTNER OR EX-PARTNER?: NO

## 2023-12-31 SDOH — SOCIAL STABILITY: SOCIAL INSECURITY: WITHIN THE LAST YEAR, HAVE YOU BEEN HUMILIATED OR EMOTIONALLY ABUSED IN OTHER WAYS BY YOUR PARTNER OR EX-PARTNER?: NO

## 2023-12-31 SDOH — SOCIAL STABILITY: SOCIAL NETWORK
DO YOU BELONG TO ANY CLUBS OR ORGANIZATIONS SUCH AS CHURCH GROUPS UNIONS, FRATERNAL OR ATHLETIC GROUPS, OR SCHOOL GROUPS?: PATIENT DECLINED

## 2023-12-31 SDOH — SOCIAL STABILITY: SOCIAL NETWORK
DO YOU BELONG TO ANY CLUBS OR ORGANIZATIONS SUCH AS CHURCH GROUPS, UNIONS, FRATERNAL OR ATHLETIC GROUPS, OR SCHOOL GROUPS?: PATIENT DECLINED

## 2023-12-31 SDOH — SOCIAL STABILITY: SOCIAL INSECURITY
WITHIN THE LAST YEAR, HAVE TO BEEN RAPED OR FORCED TO HAVE ANY KIND OF SEXUAL ACTIVITY BY YOUR PARTNER OR EX-PARTNER?: NO

## 2023-12-31 SDOH — SOCIAL STABILITY: SOCIAL NETWORK: HOW OFTEN DO YOU ATTEND CHURCH OR RELIGIOUS SERVICES?: PATIENT DECLINED

## 2023-12-31 SDOH — SOCIAL STABILITY: SOCIAL INSECURITY: ARE YOU MARRIED, WIDOWED, DIVORCED, SEPARATED, NEVER MARRIED, OR LIVING WITH A PARTNER?: MARRIED

## 2023-12-31 SDOH — HEALTH STABILITY: MENTAL HEALTH: CURRENT SMOKER: 1

## 2023-12-31 SDOH — SOCIAL STABILITY: SOCIAL INSECURITY
WITHIN THE LAST YEAR, HAVE YOU BEEN KICKED, HIT, SLAPPED, OR OTHERWISE PHYSICALLY HURT BY YOUR PARTNER OR EX-PARTNER?: NO

## 2023-12-31 SDOH — SOCIAL STABILITY: SOCIAL INSECURITY: ABUSE: ADULT

## 2023-12-31 SDOH — SOCIAL STABILITY: SOCIAL NETWORK: HOW OFTEN DO YOU ATTEND MEETINGS OF THE CLUBS OR ORGANIZATIONS YOU BELONG TO?: PATIENT DECLINED

## 2023-12-31 SDOH — SOCIAL STABILITY: SOCIAL INSECURITY: WITHIN THE LAST YEAR, HAVE YOU BEEN AFRAID OF YOUR PARTNER OR EX-PARTNER?: NO

## 2023-12-31 SDOH — SOCIAL STABILITY: SOCIAL INSECURITY: DOES ANYONE TRY TO KEEP YOU FROM HAVING/CONTACTING OTHER FRIENDS OR DOING THINGS OUTSIDE YOUR HOME?: NO

## 2023-12-31 SDOH — SOCIAL STABILITY: SOCIAL NETWORK: HOW OFTEN DO YOU GET TOGETHER WITH FRIENDS OR RELATIVES?: PATIENT DECLINED

## 2023-12-31 SDOH — ECONOMIC STABILITY: FOOD INSECURITY: WITHIN THE PAST 12 MONTHS, THE FOOD YOU BOUGHT JUST DIDN'T LAST AND YOU DIDN'T HAVE MONEY TO GET MORE.: PATIENT DECLINED

## 2023-12-31 SDOH — ECONOMIC STABILITY: FOOD INSECURITY: WITHIN THE PAST 12 MONTHS, YOU WORRIED THAT YOUR FOOD WOULD RUN OUT BEFORE YOU GOT MONEY TO BUY MORE.: PATIENT DECLINED

## 2023-12-31 SDOH — HEALTH STABILITY: MENTAL HEALTH
DO YOU FEEL STRESS - TENSE, RESTLESS, NERVOUS, OR ANXIOUS, OR UNABLE TO SLEEP AT NIGHT BECAUSE YOUR MIND IS TROUBLED ALL THE TIME - THESE DAYS?: PATIENT DECLINED

## 2023-12-31 SDOH — SOCIAL STABILITY: SOCIAL INSECURITY: DO YOU FEEL UNSAFE GOING BACK TO THE PLACE WHERE YOU ARE LIVING?: NO

## 2023-12-31 SDOH — SOCIAL STABILITY: SOCIAL INSECURITY: HAVE YOU HAD THOUGHTS OF HARMING ANYONE ELSE?: NO

## 2023-12-31 SDOH — SOCIAL STABILITY: SOCIAL INSECURITY: ARE THERE ANY APPARENT SIGNS OF INJURIES/BEHAVIORS THAT COULD BE RELATED TO ABUSE/NEGLECT?: NO

## 2023-12-31 SDOH — SOCIAL STABILITY: SOCIAL INSECURITY: ARE YOU OR HAVE YOU BEEN THREATENED OR ABUSED PHYSICALLY, EMOTIONALLY, OR SEXUALLY BY ANYONE?: NO

## 2023-12-31 SDOH — SOCIAL STABILITY: SOCIAL INSECURITY: DO YOU FEEL ANYONE HAS EXPLOITED OR TAKEN ADVANTAGE OF YOU FINANCIALLY OR OF YOUR PERSONAL PROPERTY?: NO

## 2023-12-31 SDOH — ECONOMIC STABILITY: FOOD INSECURITY
WITHIN THE PAST 12 MONTHS, YOU WORRIED THAT YOUR FOOD WOULD RUN OUT BEFORE YOU GOT THE MONEY TO BUY MORE.: PATIENT DECLINED

## 2023-12-31 SDOH — SOCIAL STABILITY: SOCIAL INSECURITY: HAS ANYONE EVER THREATENED TO HURT YOUR FAMILY OR YOUR PETS?: NO

## 2023-12-31 SDOH — HEALTH STABILITY: MENTAL HEALTH
STRESS IS WHEN SOMEONE FEELS TENSE, NERVOUS, ANXIOUS, OR CAN'T SLEEP AT NIGHT BECAUSE THEIR MIND IS TROUBLED. HOW STRESSED ARE YOU?: PATIENT DECLINED

## 2023-12-31 SDOH — SOCIAL STABILITY: SOCIAL NETWORK: HOW OFTEN DO YOU ATTENT MEETINGS OF THE CLUB OR ORGANIZATION YOU BELONG TO?: PATIENT DECLINED

## 2023-12-31 ASSESSMENT — PAIN SCALES - GENERAL
PAINLEVEL_OUTOF10: 5 - MODERATE PAIN
PAINLEVEL_OUTOF10: 6
PAINLEVEL_OUTOF10: 7
PAINLEVEL_OUTOF10: 8
PAINLEVEL_OUTOF10: 10 - WORST POSSIBLE PAIN
PAINLEVEL_OUTOF10: 8

## 2023-12-31 ASSESSMENT — PAIN DESCRIPTION - LOCATION
LOCATION: BACK
LOCATION: BACK

## 2023-12-31 ASSESSMENT — PAIN - FUNCTIONAL ASSESSMENT
PAIN_FUNCTIONAL_ASSESSMENT: 0-10

## 2023-12-31 ASSESSMENT — PAIN SCALES - PAIN ASSESSMENT IN ADVANCED DEMENTIA (PAINAD)
BODYLANGUAGE: RIGID, FISTS CLENCHED, KNEES UP, PUSHING/PULLING AWAY, STRIKES OUT
NEGVOCALIZATION: OCCASIONAL MOAN/GROAN, LOW SPEECH, NEGATIVE/DISAPPROVING QUALITY
CONSOLABILITY: UNABLE TO CONSOLE, DISTRACT OR REASSURE
BODYLANGUAGE: TENSE, DISTRESSED PACING, FIDGETING
BREATHING: NOISY LABORED BREATHING, LONG PERIODS OF HYPERVENTILATION, CHEYNE-STOKES RESPIRATIONS
FACIALEXPRESSION: FACIAL GRIMACING
NEGVOCALIZATION: REPEATED TROUBLED CALLING OUT, LOUD MOANING/GROANING, CRYING
FACIALEXPRESSION: FACIAL GRIMACING
TOTALSCORE: 10
BREATHING: NOISY LABORED BREATHING, LONG PERIODS OF HYPERVENTILATION, CHEYNE-STOKES RESPIRATIONS
CONSOLABILITY: UNABLE TO CONSOLE, DISTRACT OR REASSURE
TOTALSCORE: 8

## 2023-12-31 ASSESSMENT — ACTIVITIES OF DAILY LIVING (ADL)
ASSISTIVE_DEVICE: WALKER
HEARING - RIGHT EAR: FUNCTIONAL
JUDGMENT_ADEQUATE_SAFELY_COMPLETE_DAILY_ACTIVITIES: YES
ADEQUATE_TO_COMPLETE_ADL: YES
TOILETING: INDEPENDENT
HEARING - LEFT EAR: FUNCTIONAL
DRESSING YOURSELF: INDEPENDENT
GROOMING: INDEPENDENT
FEEDING YOURSELF: INDEPENDENT
PATIENT'S MEMORY ADEQUATE TO SAFELY COMPLETE DAILY ACTIVITIES?: YES
WALKS IN HOME: INDEPENDENT
BATHING: INDEPENDENT
LACK_OF_TRANSPORTATION: PATIENT DECLINED

## 2023-12-31 ASSESSMENT — PAIN SCALES - WONG BAKER
WONGBAKER_NUMERICALRESPONSE: HURTS WHOLE LOT
WONGBAKER_NUMERICALRESPONSE: HURTS WORST

## 2023-12-31 ASSESSMENT — PATIENT HEALTH QUESTIONNAIRE - PHQ9
2. FEELING DOWN, DEPRESSED OR HOPELESS: NOT AT ALL
SUM OF ALL RESPONSES TO PHQ9 QUESTIONS 1 & 2: 0
1. LITTLE INTEREST OR PLEASURE IN DOING THINGS: NOT AT ALL

## 2023-12-31 ASSESSMENT — ENCOUNTER SYMPTOMS: BACK PAIN: 1

## 2023-12-31 ASSESSMENT — LIFESTYLE VARIABLES
HOW OFTEN DO YOU HAVE 6 OR MORE DRINKS ON ONE OCCASION: NEVER
SKIP TO QUESTIONS 9-10: 1
SUBSTANCE_ABUSE_PAST_12_MONTHS: NO
HOW MANY STANDARD DRINKS CONTAINING ALCOHOL DO YOU HAVE ON A TYPICAL DAY: PATIENT DOES NOT DRINK
HOW OFTEN DO YOU HAVE A DRINK CONTAINING ALCOHOL: NEVER
AUDIT-C TOTAL SCORE: 0
AUDIT-C TOTAL SCORE: 0
PRESCIPTION_ABUSE_PAST_12_MONTHS: NO

## 2023-12-31 ASSESSMENT — PAIN DESCRIPTION - ORIENTATION
ORIENTATION: RIGHT;POSTERIOR;ANTERIOR
ORIENTATION: MID

## 2023-12-31 NOTE — H&P
Chief complaint: Back pain    History of present illness: See recent office note for details.  Patient status post midline laminectomy with instrumented interbody fusion the third week of November.  Did excellent for 1 week and then developed acute onset of back pain.  There was a fluid collection that appeared to be seroma.  She was washed out and it grew MRSA.  Drains stopped draining but appeared to have clotted off.  A second washout was performed and drains were reapplied.  At this point the drains continue to drain and then stopped a week or so after the washout.  She was discharged home.  She continues to have back pain that is gotten worse.  I saw her on Friday in the office and told her to be admitted to the hospital but she did not want to.  She came to the ER last night.  Her pain is mainly back pain that does radiate around the abdomen and groin area bilaterally.  There is no bowel or bladder changes.  No fevers chills nausea vomiting.  Just pain.  No radicular symptoms.    Past medical history:   Past Medical History:   Diagnosis Date    Arthritis     Back pain     COPD (chronic obstructive pulmonary disease) (CMS/HCC)     COVID-19 vaccine administered     Eczema     History of COVID-19     2020    Hyperlipidemia     Hypertension     Hypoesthesia of skin     Hypesthesia    Macular degeneration     Meniere's disease     Osteoporosis     Overactive bladder     Pain in unspecified finger(s)     Finger pain    Pain in unspecified wrist     Pain in wrist joint    Peripheral vascular disease (CMS/HCC)     Personal history of other diseases of the circulatory system     History of coronary artery disease    Personal history of other diseases of the musculoskeletal system and connective tissue     History of arthritis    Personal history of other specified conditions     History of chest pain    Personal history of other specified conditions     History of balance disorder    Personal history of other specified  conditions     History of numbness    Postmenopausal     Seasonal allergies     Unspecified visual loss     Vision problems       Social history:   Social History     Occupational History    Not on file   Tobacco Use    Smoking status: Former     Packs/day: 0.50     Years: 45.00     Additional pack years: 0.00     Total pack years: 22.50     Types: Cigarettes     Quit date: 2023     Years since quittin.3    Smokeless tobacco: Not on file   Vaping Use    Vaping Use: Never used   Substance and Sexual Activity    Alcohol use: Never    Drug use: Never    Sexual activity: Defer          Family history:   Family History   Problem Relation Name Age of Onset    Breast cancer Mother      Other (arteriosclerotic cardiovascular disease) Father      Cancer Father          Physical exam: Back incision is perfectly well-healed.  There is no redness warmth swelling or puffiness.  She has decreased back range of motion due to pain.  Passive range of motion of bilateral hips does not reproduce a lot of pain but it does give her quite a bit of back pain but no real groin or thigh pain.  Hard to check strength examination of her bilateral extremities with the patient is not cooperative due to her back pain.  But she seems to have good strength of knee flexion extension and ankle dorsiflexion plantarflexion.  Sensation is tact bilateral lower extremities.  The patient has had a recent bowel movement and is urinating normally with normal sensation.    Imaging/special tests: MRI done last night shows some subtle reaccumulation of fluid in the epidural space and in the suprafascial space.  However, there is no significant compression or impingement of the neural elements and no significant stenosis from this fluid collection.  CT scan done shows that the L4 left screw is lateral.    Assessment: It is unclear why the patient is having such significant back pain.  It is possible that the fluid collection that she has reaccumulated  is causing irritation of the neural elements because of the MRSA infection.  She has been on antibiotics chronically since her first washout.  It is possible that that left screw at L4 could be contributing but it would make sense why she would be getting bilateral symptoms from that and it is not anywhere near impinging on any neural elements of any significance.  The patient is not having any radicular symptoms consistent with irritation from an screw on the nerve root.  I had a long discussion with the patient and her daughter and her grandson.  I explained to them that I think we should go back in and wash her out again given the fact that she does have an MRSA infection and she has some subtle fluid collection.  While we are in there we will take that L4 screw out.  We talked about taking all of the hardware out because that may be contributing to the persistent infection.  However she is only about 6 weeks out from her initial placement of screws and it is unclear to me if that would create a much larger complication than what we already currently have and I think the risks of taking all the hardware out this soon is greater than leaving it in.  We will do an exploration of spinal fusion in the lateral gutters and remove all lateral bone graft in the lateral gutters as well and rely only on the interbody fusion at this point.  Her last Eliquis dose was over 24 hours ago.  We did discuss risks of that but since she will have a drain in postoperatively we should be able to mitigate any postoperative hematoma complications.    Plan: The patient understands that surgery is elective.  They understand that this surgery, identical to her prior surgeries, comes with more risks than not doing surgery.  T she understand she does not have to do surgery.  However, she wishes to proceed with surgery.  All of the risks, benefits, and potential complications for operative and nonoperative treatment were discussed at length  with the patient.  Risks of operative intervention include, but are not limited to: 1) anesthesia complications, 2) extensive blood loss, 3) infection, 4) damage to uninjured structures including, but not limited to: The dural sac and nerve roots, 5) blood clots, and 6) lack of improvement or worsening of symptoms.  These complications could result in death, permanent disability, or need for reoperation.  Upon leaving the office today the patient completely understood these risks and wishes to proceed with operative intervention.    Regarding perform an irrigation debridement of the lumbar spine and removal of hardware lumbar spine.  We are going to make sure she gets a thorough workup by general surgery to ensure that there is no abdominal issues that could be causing some of the symptoms.  Will consult infectious disease and continue to have the medical team on board.      Gerson Coombs M.D.  Cell: [phone]    This dictation was created using voice recognition software.  If there are any questions about inaccuracies please do not hesitate to contact me.

## 2023-12-31 NOTE — PROGRESS NOTES
"Tara Tierney is a 68 y.o. female on day 0 of admission presenting with back pain      Subjective : In a lot of pain this morning. Morphine ordered, plan on OR today        Objective     Last Recorded Vitals  /70   Pulse 98   Temp 36.6 °C (97.9 °F)   Resp 18   Ht 1.448 m (4' 9.01\")   Wt 78.2 kg (172 lb 6.4 oz)   SpO2 96%   BMI 37.30 kg/m²      Intake/Output last 3 Shifts:    Intake/Output Summary (Last 24 hours) at 12/31/2023 1106  Last data filed at 12/31/2023 0900  Gross per 24 hour   Intake 30 ml   Output --   Net 30 ml       Physical Exam   Gen: appears uncomfortable   HEENT: EOM, MMM  CV: RRR, no murmurs rubs or gallops  Resp: Clear to auscultation bilaterally  Abdomen: soft, NT,+BS  Back: tender over lower back   LE: No edema    Relevant Results  Lab Results   Component Value Date    WBC 5.3 12/31/2023    HGB 9.6 (L) 12/31/2023    HCT 29.4 (L) 12/31/2023    MCV 94 12/31/2023     12/31/2023     Lab Results   Component Value Date    GLUCOSE 117 (H) 12/31/2023    CALCIUM 8.7 12/31/2023     (L) 12/31/2023    K 3.5 12/31/2023    CO2 24 12/31/2023    CL 99 12/31/2023    BUN 6 12/31/2023    CREATININE 0.72 12/31/2023       Assessment/Plan  68 year old female admitted with intractable back pain     -recently had LSO surgery on 11/20 and with two washouts for MRSA wound infection  -MRI done showed increase fluid collection in L3  -severe pain, ortho following, plan for OR today  -PT/OT    Hx of MRSA wound infection s/p LSO  -was on vancomycin q24hrs until 1/31, will resume  -given new fluid collection will consult ID    HTN: continue home meds    Recent DVT/PE: on eliquis recently diagnosed here few weeks ago, will dicsuss with ortho AC options after surgery today       John Salazar MD      "

## 2023-12-31 NOTE — PROGRESS NOTES
"Vancomycin Dosing by Pharmacy- INITIAL    Tara Tierney is a 68 y.o. year old female who Pharmacy has been consulted for vancomycin dosing for cellulitis, skin and soft tissue. Based on the patient's indication and renal status this patient will be dosed based on a goal AUC of 400-600.     Renal function is currently stable.    Visit Vitals  /70   Pulse 98   Temp 36.6 °C (97.9 °F)   Resp 18        Lab Results   Component Value Date    CREATININE 0.72 12/31/2023    CREATININE 0.78 12/30/2023    CREATININE 0.96 12/28/2023    CREATININE 1.13 (H) 12/22/2023        Patient weight is No results found for: \"PTWEIGHT\"    No results found for: \"CULTURE\"     No intake/output data recorded.  [unfilled]    No results found for: \"PATIENTTEMP\"       Assessment/Plan     Patient has already been given a loading dose of 1250 mg.  Will initiate vancomycin maintenance,  750 mg every 12 hours.    This dosing regimen is predicted by [employer] to result in the following pharmacokinetic parameters:  Loading dose: N/A  Regimen: 750 mg IV every 12 hours.  Start time: 23:54 on 12/31/2023  Exposure target: AUC24 (range)400-600 mg/L.hr   AUC24,ss: 414 mg/L.hr  Probability of AUC24 > 400: 54 %  Ctrough,ss: 13.3 mg/L  Probability of Ctrough,ss > 20: 17 %  Probability of nephrotoxicity (Lodise ASHLEY 2009): 8 %      Follow-up level will be ordered on 12/31/23 at 1600 and 12/31/23 2100 unless clinically indicated sooner.  Will continue to monitor renal function daily while on vancomycin and order serum creatinine at least every 48 hours if not already ordered.  Follow for continued vancomycin needs, clinical response, and signs/symptoms of toxicity.       Jose Dawn, PharmD       "

## 2023-12-31 NOTE — OP NOTE
Preoperative diagnosis: Prior L3-4, L4-5 and L5-S1 midline laminectomy with instrumented interbody fusion at L4-5 and L5-S1.  Postoperative lumbar epidural abscess.  Lumbar screw malpositioning    Postoperative diagnosis: Above    Procedure: Irrigation and debridement subfascial lumbar spine.  Removal of hardware lumbar spine.  Exploration of spinal fusion lumbar spine.  Posterior lateral fusion L3-4, L4-5 and L5-S1.    Surgeon: Gerson Coombs M.D.    Asst.: Hussein ALEXThe physician assistant was present through the entire case.  Given the nature of the disease process and the procedure to be performed a skilled surgical assistant was necessary during the case.  The assistant was necessary in order to hold retractors and directly assist in the operation.  A certified scrub tech was at the back table managing instruments and supplies for the surgical case.    Anesthesia: General    Blood Loss: 250 cc    Complications: None    HPI: The patient had a prior surgery.  Had an epidural abscess.  She is growing MRSA.  She was scheduled electively for a washout, removal of hardware and exploration of spinal fusion.  All of the risks benefits and potential complications of operative versus nonoperative treatment were discussed with the patient.  Operative risks discussed included but were not limited to anesthesia complications, infections, excessive bleeding, damaged to uninjured healthy structures, and failure of surgery requiring the need for reoperation resulting in chronic pain and disability.  The patient completely understands those risks and wished to proceed with operative intervention.    Operative implants: Actifuse putty bone graft    Operative procedure: The patient was wheeled from the preanesthesia care unit to the theater.  The patient was placed in supine position and all bony prominences were well-padded.  For the entire procedure anesthesia maintainined control of the patient's head neck.  General  anesthesia was induced.  The patient was then placed prone on the Faisal table.  All bony prominences were well-padded.  The head and neck were in neutral position.  The back was cleansed with alcohol.  The back was then prepped and draped in normal sterile fashion.    Timeout was performed.  Site verification marking was verified.  Appropriate antibiotic administration was confirmed.  Next, a longitudinal incision in the midline was performed over the operative levels.  Dissection was carried down with a knife through the skin.  Serous looking fluid was encountered.  This was sucked up with a syringe and cultured with a culture swab.  These were both sent to pathology as a superficial culture.  The suprafascial layer appeared to be intact and was irrigated with copious amounts of normal saline with a pulse Avage for 3 L.    Next, the running STRATAFIX suture was removed.  The deep fascia was opened up with a knife and blunt dissection.  A small amount of deep fluid was encountered and this was sucked with a syringe and a culture swab was taken and these were sent as deep cultures.  Retractors were placed to expose the screws.  The deep layer was irrigated with copious months of pulse Avage irrigation.  The right screw caps were removed and the right L3 screw was found to be loose and removed.  The other 2 screws were somewhat loose as well and therefore removed.  Exploration of spinal fusion in the right lateral gutter showed some persistent bone graft which was completely removed with irrigation curette and suction.  This created a nice posterior lateral gutter bed on the right side.  Next, the screw caps on the left side were removed and no screws were examined and found to be somewhat loose as well.  Therefore, due to the loose screws and the fact that the screws could be harboring MRSA bacteria all screws on the left were also removed.  Exploration of spinal fusion on the left side confirms some persistent  bone graft and this also was removed with a curette irrigation and suction.  At this point there was a clean posterolateral bed on the left side from the L3 transverse process down to the sacrum as there was on the right.  The screw holes were filled with Floseal to prevent bony bleeding.  There was a mild ooze throughout the case and Bovie and aqua Susi were used to control soft tissue bleeding.  Reinstrumentation was not performed as reinserting screws into a wound bed that potentially had MRSA would drive the infection down into the vertebral bodies and increase the risk of osteomyelitis.  The dural sac had significantly scarred and adhered to the medial portion of the facets and the epidural space throughout the entire laminectomy window.  There was absolutely no way to get lateral or anterior to the dural sac for irrigation and debridement without completely violating the entire lateral elements and/or performing an osteotomy.    At this point there was a completely clean superficial and deep wound bed. The entire wound was irrigated with copious amounts of normal saline after a 0.3% Betadine solution soak was performed for 3 minutes.  This ensure there was no remaining Betadine solution in the operative field.  Fresh/sterile Actifuse putty bone graft was placed into the posterior lateral gutters from L3 transverse process to the sacrum for a posterolateral fusion at the 3 levels of L3-4, L4-5 and L5-S1 bilaterally.    Valsalva was performed and there was no evidence of any dural leak or excessive bleeding.  Retractors were removed.  The deep fascia was closed over a drain that was not resting on the neural elements and was exiting to the right of the patient.  The fascia was closed with a running #1 Stratafix PDS suture.  The skin was closed over a drain exiting to the left with nylon sutures.  A sterile dressing was applied. The patient tolerated the procedure well and had pink and warm toes at the  conclusion of the case.        This dictation was created using voice recognition software.  If there are any questions about inaccuracies please do not hesitate to contact me.

## 2023-12-31 NOTE — ANESTHESIA PROCEDURE NOTES
Peripheral IV  Date/Time: 12/31/2023 12:47 PM      Placement  Needle size: 16 G  Laterality: left  Location: wrist  Site prep: alcohol  Technique: anatomical landmarks  Attempts: 1

## 2023-12-31 NOTE — PROGRESS NOTES
Emergency Medicine Transition of Care Note.    I received Tara Tierney in signout from Hakan Quevedo PA-C.  Please see the previous ED provider note for all HPI, PE and MDM up to the time of signout at 2200. This is in addition to the primary record.    In brief Tara Tierney is an 68 y.o. female presenting for   Chief Complaint   Patient presents with    Back Pain     Patient complains of lower back pain since November, has been worsening since Friday. Hx of back surgery in November 2023. 10/10 'undescribable pain'. Took Dilaudid 2mg at 4pm.     At the time of signout we were awaiting: MRI results    Diagnoses as of 03/08/24 1205   Postoperative surgical complication involving musculoskeletal system associated with musculoskeletal procedure, unspecified complication   Intractable back pain   Back pain at L4-L5 level       Medical Decision Making  Patient was seen by the previous ED team.  Please see the previous ED note for all HPI, PE, MDM to the time of signout at 2200 hrs.  Patient is 68-year-old female signed out to me by Dr. Winters while awaiting admission and obtaining MRI.  Patient remained stable in the emergency department had no further interventions required by myself.  Patient was admitted to the hospital service without incident.    Mauro Randle DO    The MRI report was reviewed, the report reads:  New surgical changes of irrigation and debridement in the  laminectomy surgical bed evident, with previously seen geographic  area of hypointense T2 and STIR signal in the cutaneous fat of the  lumbar spine resolved, and new T2 hyperintense collection measuring  3.7 x 3.6 x 13.6 cm present in the cutaneous fat, likely representing  a postsurgical seroma, although sterility can not be ascertained on  imaging alone. This collection reaches of the dermis, and may drain  at the skin.  2. T2 hyperintense collection within the laminectomy bed itself along  the dorsal aspect of the thecal sac  described on previous MRI in  12/10/2023 has mildly decreased in size from prior imaging, with  interval removal of previously seen drain, although there are  increased inflammatory/reactive soft tissue changes present in the  laminectomy surgical bed, focus of fluid in the anterior epidural  space at the level of L3 contributes to increased effacement of the  thecal sac at the level of L3 compared to prior MRI.  I reached out to the hospitalist Dr. Hernandez          Final diagnoses:   None           Procedure  Procedures    Gerson Boudreaux PA-C

## 2023-12-31 NOTE — H&P
History Of Present Illness  Tara Tierney is a 68 y.o. female who underwent LSO surgery on 11/20/2023 and since then has had 2 washouts after that for MRSA wound infection.  She is currently on vancomycin infusions at home.  She presents to [facility] emergency department with complaints of severe back pain she describes as intense dull and constant which radiates to both legs.  She denies any numbness or tingling of the extremities.  Denies any saddle paresthesia, loss of bowel or bladder function.  She is still able to ambulate with a walker.  She denies any recent injury or trauma.  Denies any drainage from the wound.     Upon presentation emergency department afebrile 36 0.0, HR 98, RR 20, /68, 97% on room air. Sodium 130, potassium 3.3, chloride 97, bicarb 25, anion gap 11, BUN 8, creatinine 0.78 with GFR 82.  WBC 7.1, hemoglobin 9.8, hematocrit 30.1, platelet count 286.  Urinalysis negative for infection.  Blood cultures are pending.  CRP 12.33.      Past Medical History  She has a past medical history of Arthritis, Back pain, COPD (chronic obstructive pulmonary disease) (CMS/HCC), COVID-19 vaccine administered, Eczema, History of COVID-19, Hyperlipidemia, Hypertension, Hypoesthesia of skin, Macular degeneration, Meniere's disease, Osteoporosis, Overactive bladder, Pain in unspecified finger(s), Pain in unspecified wrist, Peripheral vascular disease (CMS/HCC), Personal history of other diseases of the circulatory system, Personal history of other diseases of the musculoskeletal system and connective tissue, Personal history of other specified conditions, Personal history of other specified conditions, Personal history of other specified conditions, Postmenopausal, Seasonal allergies, and Unspecified visual loss.    Surgical History  She has a past surgical history that includes Tonsillectomy; Cardiac catheterization; Colonoscopy; FL transluminal angio percutaneous venus; Total knee arthroplasty  (Bilateral); Cholecystectomy; Aorta - bilateral femoral artery bypass graft; CT angio aorta and bilateral iliofemoral runoff w and or wo IV contrast (03/10/2020); CT angio neck (05/18/2022); CT angio aorta and bilateral iliofemoral runoff w and or wo IV contrast (07/21/2023); Adenoidectomy; Tubal ligation; and Blepharoptosis repair.     Social History  She reports that she quit smoking about 3 months ago. Her smoking use included cigarettes. She has a 22.50 pack-year smoking history. She does not have any smokeless tobacco history on file. She reports that she does not drink alcohol and does not use drugs.    Family History  Family History   Problem Relation Name Age of Onset    Breast cancer Mother      Other (arteriosclerotic cardiovascular disease) Father      Cancer Father          Allergies  Neomycin and Neomycin-polymyxin b-dexameth    Medications    Current Facility-Administered Medications:     oxyCODONE-acetaminophen (Percocet) 5-325 mg per tablet 1 tablet, 1 tablet, oral, q6h PRN, Caitlin Li, APRN-CNP    Current Outpatient Medications:     albuterol (ProAir HFA) 90 mcg/actuation inhaler, Inhale 2 puffs., Disp: , Rfl:     apixaban (Eliquis) 5 mg tablet, Take 2 tablets (10 mg) by mouth 2 times a day for 2 days., Disp: 8 tablet, Rfl: 0    apixaban (Eliquis) 5 mg tablet, Take 1 tablet (5 mg) by mouth 2 times a day. Do not start before December 25, 2023., Disp: 60 tablet, Rfl: 11    aspirin 81 mg EC tablet, Take 1 tablet (81 mg) by mouth once daily., Disp: , Rfl:     atenolol (Tenormin) 50 mg tablet, Take 1 tablet (50 mg) by mouth once daily in the morning., Disp: , Rfl:     brimonidine (AlphaGAN) 0.2 % ophthalmic solution, Administer 1 drop into both eyes 2 times a day., Disp: , Rfl:     busPIRone (Buspar) 10 mg tablet, Take 1 tablet (10 mg) by mouth once daily., Disp: , Rfl:     cholecalciferol (Vitamin D-3) 50 mcg (2,000 unit) capsule, Take 1 capsule (50 mcg) by mouth once daily., Disp: , Rfl:      cyclobenzaprine (Flexeril) 5 mg tablet, Take 1 tablet (5 mg) by mouth 3 times a day as needed for muscle spasms. (Patient taking differently: Take 1 tablet (5 mg) by mouth every 8 hours if needed for muscle spasms.), Disp: 21 tablet, Rfl: 0    ezetimibe (Zetia) 10 mg tablet, Take 1 tablet (10 mg) by mouth once daily., Disp: , Rfl:     furosemide (Lasix) 20 mg tablet, Take 1 tablet (20 mg) by mouth once daily., Disp: , Rfl:     heparin sodium,porcine/PF (HEPARIN, PORCINE, LOCK FLUSH IV), Infuse 5 mL into a venous catheter once daily. Indications: heparin lock, Disp: , Rfl:     hydrALAZINE (Apresoline) 50 mg tablet, Take 1 tablet (50 mg) by mouth 2 times a day., Disp: , Rfl:     HYDROmorphone (Dilaudid) 2 mg tablet, Take 1 tablet (2 mg) by mouth every 4 hours if needed for severe pain (7 - 10) for up to 5 days., Disp: 30 tablet, Rfl: 0    ibandronate (Boniva) 150 mg tablet, Take 1 tablet (150 mg) by mouth every 30 (thirty) days., Disp: , Rfl:     ipratropium (Atrovent HFA) 17 mcg/actuation inhaler, Inhale 2 puffs 2 times a day., Disp: , Rfl:     isosorbide mononitrate ER (Imdur) 60 mg 24 hr tablet, Take 1 tablet (60 mg) by mouth once daily., Disp: , Rfl:     lactobacillus acidophilus tablet tablet, Take 1 tablet by mouth once daily. Do not start before December 21, 2023., Disp: 41 tablet, Rfl: 0    loratadine (Claritin) 10 mg tablet, Take 1 tablet (10 mg) by mouth once daily., Disp: , Rfl:     LUTEIN ORAL, Take 20 mg by mouth once daily., Disp: , Rfl:     meclizine (Antivert) 25 mg tablet, Take 1 tablet (25 mg) by mouth every 8 hours if needed for dizziness., Disp: , Rfl:     mirtazapine (Remeron) 15 mg tablet, Take 1 tablet (15 mg) by mouth once daily at bedtime., Disp: , Rfl:     nitroglycerin (Nitrostat) 0.4 mg SL tablet, Place 1 tablet (0.4 mg) under the tongue every 5 minutes if needed for chest pain.  PLACE 1 TABLET UNDER THE TONGUE EVERY 5 MINUTES UP TO 3 DOSES AS NEEDED FOR CHEST PAIN., Disp: , Rfl:      pantoprazole (ProtoNix) 40 mg EC tablet, Take 1 tablet (40 mg) by mouth once daily in the morning. Take before meals. Do not crush, chew, or split., Disp: , Rfl:     pilocarpine (Salagen, pilocarpine,) 5 mg tablet, Take 1 tablet (5 mg) by mouth once daily., Disp: , Rfl:     potassium chloride ER (Micro-K) 10 mEq ER capsule, Take 1 capsule (10 mEq) by mouth once daily., Disp: , Rfl:     pregabalin (Lyrica) 75 mg capsule, Take 1 capsule (75 mg) by mouth 2 times a day., Disp: , Rfl:     simvastatin (Zocor) 40 mg tablet, Take 1 tablet (40 mg) by mouth once daily at bedtime., Disp: , Rfl:     sodium chloride 0.9% (ClearShield Sodium Chlor Flush) flush, Infuse 10 mL into a venous catheter once daily. Indications: maintain line patency, Disp: , Rfl:     tolterodine (Detrol) 1 mg tablet, Take 1 tablet (1 mg) by mouth once daily at bedtime., Disp: , Rfl:     vancomycin (Vancocin) 1250 mg/250 mL IV, Infuse 250 mL (1,250 mg) at 200 mL/hr over 75 minutes into a venous catheter once every 24 hours., Disp: 34236 mL, Rfl: 0    vancomycin in dextrose 5 % (Vancocin) IVPB, Infuse 200 mL (1 g) at 200 mL/hr over 60 minutes into a venous catheter every 12 hours. CBC BMP weekly Vanco level weekly CRP sed rate in 3 and 6 weeks Fax results to [id number], Disp: 14714 mL, Rfl: 1    vit C,T-Xv-rdqgi-lutein-zeaxan (PreserVision AREDS-2) 250-90-40-1 mg capsule, Take 1 capsule by mouth twice a day., Disp: , Rfl:     Review of Systems   Musculoskeletal:  Positive for back pain.   All other systems reviewed and are negative.       Physical Exam  Vitals reviewed.   Constitutional:       General: She is not in acute distress.     Appearance: Normal appearance. She is not toxic-appearing.   HENT:      Head: Normocephalic and atraumatic.      Nose: Nose normal.      Mouth/Throat:      Mouth: Mucous membranes are dry.      Pharynx: Oropharynx is clear.   Eyes:      Extraocular Movements: Extraocular movements intact.      Pupils: Pupils are equal,  round, and reactive to light.   Cardiovascular:      Rate and Rhythm: Normal rate and regular rhythm.      Pulses: Normal pulses.      Heart sounds: Normal heart sounds.   Pulmonary:      Effort: Pulmonary effort is normal.      Breath sounds: Normal breath sounds.   Abdominal:      General: Abdomen is flat. Bowel sounds are normal.      Palpations: Abdomen is soft.   Musculoskeletal:         General: Tenderness present.      Cervical back: Normal range of motion and neck supple.   Skin:     General: Skin is warm and dry.      Capillary Refill: Capillary refill takes less than 2 seconds.      Comments: Lumbar back incision open to air, well approximated, steri strips intact.    Neurological:      General: No focal deficit present.      Mental Status: She is alert and oriented to person, place, and time. Mental status is at baseline.   Psychiatric:         Attention and Perception: Attention and perception normal.         Mood and Affect: Mood normal.         Speech: Speech normal.         Behavior: Behavior normal. Behavior is cooperative.         Cognition and Memory: Cognition and memory normal.     Last Recorded Vitals  /57   Pulse 88   Temp 36 °C (96.8 °F) (Temporal)   Resp 18   Wt 77.1 kg (170 lb)   SpO2 93%     Relevant Results  Results for orders placed or performed during the hospital encounter of 12/30/23 (from the past 24 hour(s))   Blood Culture    Specimen: Peripheral Venipuncture; Blood culture   Result Value Ref Range    Blood Culture Loaded on Instrument - Culture in progress    Blood Culture    Specimen: Peripheral Venipuncture; Blood culture   Result Value Ref Range    Blood Culture Loaded on Instrument - Culture in progress    CBC and Auto Differential   Result Value Ref Range    WBC 7.1 4.4 - 11.3 x10*3/uL    nRBC 0.0 0.0 - 0.0 /100 WBCs    RBC 3.24 (L) 4.00 - 5.20 x10*6/uL    Hemoglobin 9.8 (L) 12.0 - 16.0 g/dL    Hematocrit 30.1 (L) 36.0 - 46.0 %    MCV 93 80 - 100 fL    MCH 30.2 26.0  - 34.0 pg    MCHC 32.6 32.0 - 36.0 g/dL    RDW 14.6 (H) 11.5 - 14.5 %    Platelets 286 150 - 450 x10*3/uL    Neutrophils % 79.1 40.0 - 80.0 %    Immature Granulocytes %, Automated 0.6 0.0 - 0.9 %    Lymphocytes % 11.9 13.0 - 44.0 %    Monocytes % 7.4 2.0 - 10.0 %    Eosinophils % 0.7 0.0 - 6.0 %    Basophils % 0.3 0.0 - 2.0 %    Neutrophils Absolute 5.63 1.20 - 7.70 x10*3/uL    Immature Granulocytes Absolute, Automated 0.04 0.00 - 0.70 x10*3/uL    Lymphocytes Absolute 0.85 (L) 1.20 - 4.80 x10*3/uL    Monocytes Absolute 0.53 0.10 - 1.00 x10*3/uL    Eosinophils Absolute 0.05 0.00 - 0.70 x10*3/uL    Basophils Absolute 0.02 0.00 - 0.10 x10*3/uL   Basic metabolic panel   Result Value Ref Range    Glucose 124 (H) 74 - 99 mg/dL    Sodium 130 (L) 136 - 145 mmol/L    Potassium 3.3 (L) 3.5 - 5.3 mmol/L    Chloride 97 (L) 98 - 107 mmol/L    Bicarbonate 25 21 - 32 mmol/L    Anion Gap 11 10 - 20 mmol/L    Urea Nitrogen 8 6 - 23 mg/dL    Creatinine 0.78 0.50 - 1.05 mg/dL    eGFR 82 >60 mL/min/1.73m*2    Calcium 8.8 8.6 - 10.3 mg/dL   SST TOP   Result Value Ref Range    Extra Tube Hold for add-ons.    Gray Top   Result Value Ref Range    Extra Tube Hold for add-ons.    Urinalysis with Reflex Culture and Microscopic   Result Value Ref Range    Color, Urine Yellow Straw, Yellow    Appearance, Urine Hazy (N) Clear    Specific Gravity, Urine 1.014 1.005 - 1.035    pH, Urine 7.0 5.0, 5.5, 6.0, 6.5, 7.0, 7.5, 8.0    Protein, Urine 100 (2+) (N) NEGATIVE mg/dL    Glucose, Urine NEGATIVE NEGATIVE mg/dL    Blood, Urine NEGATIVE NEGATIVE    Ketones, Urine NEGATIVE NEGATIVE mg/dL    Bilirubin, Urine NEGATIVE NEGATIVE    Urobilinogen, Urine 2.0 (N) <2.0 mg/dL    Nitrite, Urine NEGATIVE NEGATIVE    Leukocyte Esterase, Urine NEGATIVE NEGATIVE   Urinalysis Microscopic   Result Value Ref Range    WBC, Urine 1-5 1-5, NONE /HPF    RBC, Urine 3-5 NONE, 1-2, 3-5 /HPF    Squamous Epithelial Cells, Urine 10-25 (FEW) Reference range not established. /HPF     Bacteria, Urine 1+ (A) NONE SEEN /HPF     MR lumbar spine w and wo IV contrast    Result Date: 12/31/2023  Interpreted By:  Jonas Sanchez, STUDY: MR LUMBAR SPINE W AND WO IV CONTRAST;  12/31/2023 12:33 am   INDICATION: Signs/Symptoms:Pain.   COMPARISON: MRI of the lumbar spine dated 12/10/2023   ACCESSION NUMBER(S): [id number]   ORDERING CLINICIAN: YVETTE LEE      1.  New surgical changes of irrigation and debridement in the laminectomy surgical bed evident, with previously seen geographic area of hypointense T2 and STIR signal in the cutaneous fat of the lumbar spine resolved, and new T2 hyperintense collection measuring 3.7 x 3.6 x 13.6 cm present in the cutaneous fat, likely representing a postsurgical seroma, although sterility can not be ascertained on imaging alone. This collection reaches of the dermis, and may drain at the skin. 2. T2 hyperintense collection within the laminectomy bed itself along the dorsal aspect of the thecal sac described on previous MRI in 12/10/2023 has mildly decreased in size from prior imaging, with interval removal of previously seen drain, although there are increased inflammatory/reactive soft tissue changes present in the laminectomy surgical bed, focus of fluid in the anterior epidural space at the level of L3 contributes to increased effacement of the thecal sac at the level of L3 compared to prior MRI.   MACRO: None   Signed by: Jnoas Sanchez 12/31/2023 1:13 AM Dictation workstation:   [id number]       Assessment/Plan   Principal Problem:    Postoperative surgical complication involving musculoskeletal system associated with musculoskeletal procedure, unspecified complication    #Intractable back pain  MRI reviewed. Focus of fluid in the anterior epidural space at L3 which appears new from prior MRI  No fevers, chills or leukocytosis  Incision well approximated with steri strips intact   No neuro deficits appreciated   Dilaudid PRN for now   Consult  Dr. Coombs and pain management   PT/OT evaluation  SS for discharge planning     #Recent DVT/PE  Continue Eliquis     #COPD  Not in acute exacerbation    #HTN  Resume home meds     #DVTp  On Eliquis     Caitlin Li, APRN-CNP

## 2023-12-31 NOTE — CARE PLAN
Problem: Pain  Goal: My pain/discomfort is manageable  Outcome: Progressing     Problem: Safety  Goal: Patient will be injury free during hospitalization  Outcome: Progressing  Goal: I will remain free of falls  Outcome: Progressing     Problem: Daily Care  Goal: Daily care needs are met  Outcome: Progressing     Problem: Psychosocial Needs  Goal: Demonstrates ability to cope with hospitalization/illness  Outcome: Progressing  Goal: Collaborate with me, my family, and caregiver to identify my specific goals  Outcome: Progressing  Flowsheets (Taken 12/31/2023 1611)  Cultural Requests During Hospitalization: none  Spiritual Requests During Hospitalization: none     Problem: Discharge Barriers  Goal: My discharge needs are met  Outcome: Progressing     Problem: Fall/Injury  Goal: Not fall by end of shift  Outcome: Progressing  Goal: Be free from injury by end of the shift  Outcome: Progressing  Goal: Verbalize understanding of personal risk factors for fall in the hospital  Outcome: Progressing  Goal: Verbalize understanding of risk factor reduction measures to prevent injury from fall in the home  Outcome: Progressing  Goal: Use assistive devices by end of the shift  Outcome: Progressing  Goal: Pace activities to prevent fatigue by end of the shift  Outcome: Progressing     Problem: Pain  Goal: Takes deep breaths with improved pain control throughout the shift  Outcome: Progressing  Goal: Turns in bed with improved pain control throughout the shift  Outcome: Progressing  Goal: Walks with improved pain control throughout the shift  Outcome: Progressing  Goal: Performs ADL's with improved pain control throughout shift  Outcome: Progressing  Goal: Participates in PT with improved pain control throughout the shift  Outcome: Progressing  Goal: Free from opioid side effects throughout the shift  Outcome: Progressing  Goal: Free from acute confusion related to pain meds throughout the shift  Outcome: Progressing

## 2023-12-31 NOTE — ANESTHESIA PREPROCEDURE EVALUATION
Tara Tierney is a 68 y.o. female here for:    Spine Lumbar I  and  D  With Gerson Coombs MD  * No Diagnosis Codes entered *    Relevant Problems   Cardiovascular   (+) Abdominal aortic aneurysm (CMS/HCC)   (+) Abnormal EKG   (+) Bilateral carotid artery stenosis   (+) CAD in native artery   (+) Chest pain   (+) Essential hypertension   (+) Mesenteric artery stenosis (CMS/HCC)   (+) Mixed hyperlipidemia   (+) PVD (peripheral vascular disease) (CMS/HCC)      Endocrine   (+) Obese      /Renal   (+) Urinary tract infection without hematuria      Neuro/Psych   (+) Back pain of lumbar region with sciatica   (+) Bilateral carotid artery stenosis      Pulmonary   (+) Chronic asthmatic bronchitis      Musculoskeletal   (+) Knee osteoarthritis      Infectious Disease   (+) Urinary tract infection without hematuria           Lab Results   Component Value Date    HGB 9.6 (L) 12/31/2023    HCT 29.4 (L) 12/31/2023    WBC 5.3 12/31/2023     12/31/2023     (L) 12/31/2023    K 3.5 12/31/2023    CL 99 12/31/2023    CREATININE 0.72 12/31/2023    BUN 6 12/31/2023     EKG 12/2023:  Sinus rhythm with short VA  Left bundle branch block  Abnormal ECG  When compared with ECG of 18-DEC-2023 12:36, (unconfirmed)  VA interval has decreased    TTE 12/2023:  CONCLUSIONS:   1. Left ventricular systolic function is normal with a 55% estimated ejection fraction.   2. Spectral Doppler shows an impaired relaxation pattern of left ventricular diastolic filling.   3. There is no evidence of mitral valve stenosis.   4. Trace mitral valve regurgitation.   5. Trace tricuspid regurgitation is visualized.   6. Aortic valve stenosis is not present.   7. Moderately elevated pulmonary artery pressure.    NM Stress 2022:  IMPRESSION:  Negative Lexiscan Myoview cardiac perfusion stress test.  No evidence of ischemia or myocardial infarction by perfusion imaging.  Normal left ventricular systolic function, ejection fraction  48%.  Abnormal resting electrocardiogram.      Social History     Tobacco Use   Smoking Status Former    Packs/day: 0.50    Years: 45.00    Additional pack years: 0.00    Total pack years: 22.50    Types: Cigarettes    Quit date: 2023    Years since quittin.3   Smokeless Tobacco Not on file       Allergies   Allergen Reactions    Neomycin Other     swelling, redness, burning post eye surgery    Neomycin-Polymyxin B-Dexameth Other and Rash     blisters, eye swelling, burning       Current Outpatient Medications   Medication Instructions    albuterol (ProAir HFA) 90 mcg/actuation inhaler 2 puffs, inhalation    apixaban (ELIQUIS) 10 mg, oral, 2 times daily    apixaban (ELIQUIS) 5 mg, oral, 2 times daily    aspirin 81 mg, oral, Daily    atenolol (TENORMIN) 50 mg, oral, Every morning    brimonidine (AlphaGAN) 0.2 % ophthalmic solution 1 drop, Both Eyes, 2 times daily    busPIRone (BUSPAR) 10 mg, oral, Daily    cholecalciferol (Vitamin D-3) 50 mcg (2,000 unit) capsule 1 capsule, oral, Daily    cyclobenzaprine (FLEXERIL) 5 mg, oral, 3 times daily PRN    ezetimibe (ZETIA) 10 mg, oral, Daily    furosemide (LASIX) 20 mg, oral, Daily    heparin sodium,porcine/PF (HEPARIN, PORCINE, LOCK FLUSH IV) 5 mL, intravenous, Daily    hydrALAZINE (APRESOLINE) 50 mg, oral, 2 times daily    HYDROmorphone (DILAUDID) 2 mg, oral, Every 4 hours PRN    ibandronate (BONIVA) 150 mg, oral, Every 30 days    ipratropium (Atrovent HFA) 17 mcg/actuation inhaler 2 puffs, inhalation, 2 times daily RT    isosorbide mononitrate ER (IMDUR) 60 mg, oral, Daily RT    lactobacillus acidophilus tablet tablet 1 tablet, oral, Daily    loratadine (Claritin) 10 mg tablet 1 tablet, oral, Daily    LUTEIN ORAL 20 mg, oral, Daily RT    meclizine (ANTIVERT) 25 mg, oral, Every 8 hours PRN    mirtazapine (Remeron) 15 mg tablet 1 tablet, oral, Nightly    nitroglycerin (NITROSTAT) 0.4 mg, sublingual, Every 5 min PRN,  PLACE 1 TABLET UNDER THE TONGUE EVERY 5 MINUTES  UP TO 3 DOSES AS NEEDED FOR CHEST PAIN.<BR>    pantoprazole (PROTONIX) 40 mg, oral, Daily before breakfast, Do not crush, chew, or split.    pilocarpine (SALAGEN (PILOCARPINE)) 5 mg, oral, Daily RT    potassium chloride ER (Micro-K) 10 mEq ER capsule 10 mEq, oral, Daily    pregabalin (LYRICA) 75 mg, oral, 2 times daily    simvastatin (ZOCOR) 40 mg, oral, Nightly    sodium chloride 0.9% (ClearShield Sodium Chlor Flush) flush 10 mL, intravenous, Daily    tolterodine (Detrol) 1 mg tablet 1 tablet, oral, Nightly    vancomycin (VANCOCIN) 1,250 mg, intravenous, Every 24 hours    vancomycin in dextrose 5 % (Vancocin) IVPB 1 g, intravenous, Every 12 hours, CBC BMP weekly<BR>Vanco level weekly<BR>CRP sed rate in 3 and 6 weeks<BR>Fax results to [id number]    vit C,K-Bw-wzlmf-lutein-zeaxan (PreserVision AREDS-2) 250-90-40-1 mg capsule 1 capsule, oral, 2 times daily       Past Surgical History:   Procedure Laterality Date    ADENOIDECTOMY      AORTA - BILATERAL FEMORAL ARTERY BYPASS GRAFT      BLEPHAROPTOSIS REPAIR      CARDIAC CATHETERIZATION      with stent and balloon    CHOLECYSTECTOMY      COLONOSCOPY      CT ANGIO NECK  05/18/2022    CT NECK ANGIO W AND WO IV CONTRAST 5/18/2022 [facility]    CT AORTA AND BILATERAL ILIOFEMORAL RUNOFF ANGIOGRAM W AND/OR WO IV CONTRAST  03/10/2020    CT AORTA AND BILATERAL ILIOFEMORAL RUNOFF ANGIOGRAM W AND/OR WO IV CONTRAST 3/10/2020 [facility]    CT AORTA AND BILATERAL ILIOFEMORAL RUNOFF ANGIOGRAM W AND/OR WO IV CONTRAST  07/21/2023    CT AORTA AND BILATERAL ILIOFEMORAL RUNOFF ANGIOGRAM W AND/OR WO IV CONTRAST 7/21/2023 [facility]    FL TRANSLUMINAL ANGIO PERCUTANEOUS BHARAT      TONSILLECTOMY      Tonsillectomy    TOTAL KNEE ARTHROPLASTY Bilateral     TUBAL LIGATION         Family History   Problem Relation Name Age of Onset    Breast cancer Mother      Other (arteriosclerotic cardiovascular disease) Father      Cancer Father         NPO Details:  No data  recorded    Physical Exam    Airway  Mallampati: III  TM distance: >3 FB  Neck ROM: full     Cardiovascular    Dental        Pulmonary    Abdominal            Anesthesia Plan    ASA 3     general     The patient is a current smoker.    intravenous induction   Postoperative administration of opioids is intended.  Trial extubation is planned.  Anesthetic plan and risks discussed with patient.

## 2023-12-31 NOTE — ANESTHESIA PROCEDURE NOTES
Peripheral IV  Date/Time: 12/31/2023 12:21 PM      Placement  Needle size: 20 G  Laterality: left  Location: hand  Site prep: alcohol  Technique: anatomical landmarks  Attempts: 1

## 2023-12-31 NOTE — PROGRESS NOTES
Physical Therapy                 Therapy Communication Note    Patient Name: Tara Tierney  MRN: [id number]  Today's Date: 12/31/2023     Discipline: Physical Therapy    Missed Visit Reason: Missed Visit Reason: Other (Comment) (Pt going for surgery with Dr. Coombs.)    Missed Time: Attempt    Comment: Pt going to OR today for washout and L4 screw removal. Will reattempt PT eval once medically appropriate.

## 2023-12-31 NOTE — PROGRESS NOTES
Occupational Therapy                 Therapy Communication Note    Patient Name: Tara Tierney  MRN: [id number]  Today's Date: 12/31/2023     Discipline: Occupational Therapy    Missed Visit Reason: Missed Visit Reason: Patient placed on medical hold (Going for procedure for wash out/possible hardware removal in back.)    Missed Time: Attempt

## 2023-12-31 NOTE — ED PROVIDER NOTES
HPI   Chief Complaint   Patient presents with    Back Pain     Patient complains of lower back pain since November, has been worsening since Friday. Hx of back surgery in November 2023. 10/10 'undescribable pain'. Took Dilaudid 2mg at 4pm.       68-year-old female had back surgery the end of November of this year.  Patient has been complaining of discomfort to the area since then.  Family states patient was hospitalized for significant periods of time during the month of December.  Family states that the patient was seen by the orthopedic back surgeon yesterday and placed on oral Dilaudid.  Family states patient was instructed to come to the emergency room if this did not help her pain.  Family presents to this ER today complaining of severe pain, no loss of bowel or bladder function.                          Sharona Coma Scale Score: 15                  Patient History   Past Medical History:   Diagnosis Date    Arthritis     Back pain     COPD (chronic obstructive pulmonary disease) (CMS/HCC)     COVID-19 vaccine administered     Eczema     History of COVID-19     2020    Hyperlipidemia     Hypertension     Hypoesthesia of skin     Hypesthesia    Macular degeneration     Meniere's disease     Osteoporosis     Overactive bladder     Pain in unspecified finger(s)     Finger pain    Pain in unspecified wrist     Pain in wrist joint    Peripheral vascular disease (CMS/HCC)     Personal history of other diseases of the circulatory system     History of coronary artery disease    Personal history of other diseases of the musculoskeletal system and connective tissue     History of arthritis    Personal history of other specified conditions     History of chest pain    Personal history of other specified conditions     History of balance disorder    Personal history of other specified conditions     History of numbness    Postmenopausal     Seasonal allergies     Unspecified visual loss     Vision problems     Past  Surgical History:   Procedure Laterality Date    ADENOIDECTOMY      AORTA - BILATERAL FEMORAL ARTERY BYPASS GRAFT      BLEPHAROPTOSIS REPAIR      CARDIAC CATHETERIZATION      with stent and balloon    CHOLECYSTECTOMY      COLONOSCOPY      CT ANGIO NECK  2022    CT NECK ANGIO W AND WO IV CONTRAST 2022 [facility]    CT AORTA AND BILATERAL ILIOFEMORAL RUNOFF ANGIOGRAM W AND/OR WO IV CONTRAST  03/10/2020    CT AORTA AND BILATERAL ILIOFEMORAL RUNOFF ANGIOGRAM W AND/OR WO IV CONTRAST 3/10/2020 [facility]    CT AORTA AND BILATERAL ILIOFEMORAL RUNOFF ANGIOGRAM W AND/OR WO IV CONTRAST  2023    CT AORTA AND BILATERAL ILIOFEMORAL RUNOFF ANGIOGRAM W AND/OR WO IV CONTRAST 2023 [facility]    FL TRANSLUMINAL ANGIO PERCUTANEOUS BHARAT      TONSILLECTOMY      Tonsillectomy    TOTAL KNEE ARTHROPLASTY Bilateral     TUBAL LIGATION       Family History   Problem Relation Name Age of Onset    Breast cancer Mother      Other (arteriosclerotic cardiovascular disease) Father      Cancer Father       Social History     Tobacco Use    Smoking status: Former     Packs/day: 0.50     Years: 45.00     Additional pack years: 0.00     Total pack years: 22.50     Types: Cigarettes     Quit date: 2023     Years since quittin.3    Smokeless tobacco: Not on file   Vaping Use    Vaping Use: Never used   Substance Use Topics    Alcohol use: Never    Drug use: Never       Physical Exam   ED Triage Vitals [23 1908]   Temp Heart Rate Resp BP   36 °C (96.8 °F) 98 20 165/68      SpO2 Temp Source Heart Rate Source Patient Position   97 % Temporal Monitor Sitting      BP Location FiO2 (%)     Left arm --       Physical Exam  Vitals and nursing note reviewed.   Constitutional:       General: She is not in acute distress.     Appearance: She is well-developed.      Comments: Patient does appear to be in moderate discomfort though vital signs are stable.   HENT:      Head: Normocephalic and atraumatic.   Eyes:       Conjunctiva/sclera: Conjunctivae normal.   Cardiovascular:      Rate and Rhythm: Normal rate and regular rhythm.      Heart sounds: No murmur heard.  Pulmonary:      Effort: Pulmonary effort is normal. No respiratory distress.      Breath sounds: Normal breath sounds.   Abdominal:      Palpations: Abdomen is soft.      Tenderness: There is no abdominal tenderness.   Musculoskeletal:         General: No swelling.      Cervical back: Neck supple.      Comments: Patient shows an intact surgical incision along the lumbar spine.  No evidence of dehiscence and no evidence of discharge from the wound.   Skin:     General: Skin is warm and dry.      Capillary Refill: Capillary refill takes less than 2 seconds.   Neurological:      General: No focal deficit present.      Mental Status: She is alert and oriented to person, place, and time.   Psychiatric:         Mood and Affect: Mood normal.         ED Course & MDM        Medical Decision Making  HPI:68-year-old female had back surgery the end of November of this year.  Patient has been complaining of discomfort to the area since then.  Family states patient was hospitalized for significant periods of time during the month of December.  Family states that the patient was seen by the orthopedic back surgeon yesterday and placed on oral Dilaudid.  Family states patient was instructed to come to the emergency room if this did not help her pain.  Family presents to this ER today complaining of severe pain, no loss of bowel or bladder function.      Differential diagnosis: Back pain, spinal abscess, epidural abscess, fluid collection    Pertinent physical exam: Incision along the lumbar spine appears to be intact no evidence of dehiscence no evidence of discharge or infection.    Laboratory results:Labs Reviewed  CBC WITH AUTO DIFFERENTIAL - Abnormal     WBC                           7.1                    nRBC                          0.0                    RBC                            3.24 (*)               Hemoglobin                    9.8 (*)                Hematocrit                    30.1 (*)               MCV                           93                     MCH                           30.2                   MCHC                          32.6                   RDW                           14.6 (*)               Platelets                     286                    Neutrophils %                 79.1                   Immature Granulocytes %, Automated   0.6                    Lymphocytes %                 11.9                   Monocytes %                   7.4                    Eosinophils %                 0.7                    Basophils %                   0.3                    Neutrophils Absolute          5.63                   Immature Granulocytes Absolute, Au*   0.04                   Lymphocytes Absolute          0.85 (*)               Monocytes Absolute            0.53                   Eosinophils Absolute          0.05                   Basophils Absolute            0.02                BASIC METABOLIC PANEL - Abnormal     Glucose                       124 (*)                Sodium                        130 (*)                Potassium                     3.3 (*)                Chloride                      97 (*)                 Bicarbonate                   25                     Anion Gap                     11                     Urea Nitrogen                 8                      Creatinine                    0.78                   eGFR                          82                     Calcium                       8.8                 BLOOD CULTURE  BLOOD CULTURE  GRAY TOP     Extra Tube                                        URINALYSIS WITH REFLEX CULTURE AND MICROSCOPIC       Narrative: The following orders were created for panel order Urinalysis with Reflex Culture and Microscopic.                Procedure                               Abnormality          Status                                   ---------                               -----------         ------                                   Urinalysis with Reflex C...[[id number]]                                                               Extra Urine Gray Tube[[id number]]                                                                                     Please view results for these tests on the individual orders.  URINALYSIS WITH REFLEX CULTURE AND MICROSCOPIC  EXTRA URINE GRAY TUBE    Radiologic results:    EKG results: Please see procedure note    ED course and treatment: In the emergency department IV was established patient was given 1 mg Dilaudid IV which she states provided some relief.  I called her back surgeon Dr. Coombs.  He is concerned that she may have begun to develop fluid inside the incision.  He requested that we order contrast noncontrast MRI of her lumbar spine give her additional pain medicine and sedation so she will lay still in the MRI machine.  Patient will be admitted through the hospitalist service with Dr. Coombs on consult.  Hospitalist service requests that we complete the MRI while patient is still managed by the emergency department.  This is in the case that there is not epidural abscess and patient would need to be transferred downtown.  This patient will be signed out to Gerson Boudreaux PA-C for final disposition.    Diagnosis: Chronic back pain    Disposition: Patient will be admitted to the floor    Social determinants of health: None    *This documentation is completed using the Dragon Dictation system. There may be spelling and/or grammatical errors that were not corrected prior to final submission. I apologize for any inconvenience.*              Procedure  Procedures     Hakan Quevedo PA-C  12/30/23 8503

## 2023-12-31 NOTE — ANESTHESIA PROCEDURE NOTES
Airway  Date/Time: 12/31/2023 11:50 AM  Urgency: elective    Airway not difficult    Staffing  Performed: attending   Authorized by: Ja Devine MD    Performed by: Ja Devine MD  Patient location during procedure: OR    Indications and Patient Condition  Indications for airway management: anesthesia  Spontaneous Ventilation: absent  Sedation level: deep  Preoxygenated: yes  Patient position: sniffing  Mask difficulty assessment: 1 - vent by mask  Planned trial extubation    Final Airway Details  Final airway type: endotracheal airway      Successful airway: ETT  Cuffed: yes   Successful intubation technique: video laryngoscopy  Facilitating devices/methods: intubating stylet  Endotracheal tube insertion site: oral  Blade type: Hamilton.  Blade size: #4  ETT size (mm): 7.5  Cormack-Lehane Classification: grade I - full view of glottis  Placement verified by: capnometry   Measured from: lips  ETT to lips (cm): 21  Number of attempts at approach: 1

## 2024-01-01 ENCOUNTER — APPOINTMENT (OUTPATIENT)
Dept: RADIOLOGY | Facility: HOSPITAL | Age: 69
DRG: 856 | End: 2024-01-01
Payer: COMMERCIAL

## 2024-01-01 ENCOUNTER — HOME CARE VISIT (OUTPATIENT)
Dept: HOME HEALTH SERVICES | Facility: HOME HEALTH | Age: 69
End: 2024-01-01
Payer: COMMERCIAL

## 2024-01-01 PROBLEM — M54.50 BACK PAIN AT L4-L5 LEVEL: Status: ACTIVE | Noted: 2024-01-01

## 2024-01-01 LAB
ANION GAP SERPL CALC-SCNC: 10 MMOL/L (ref 10–20)
BUN SERPL-MCNC: 6 MG/DL (ref 6–23)
CALCIUM SERPL-MCNC: 8.1 MG/DL (ref 8.6–10.3)
CHLORIDE SERPL-SCNC: 101 MMOL/L (ref 98–107)
CO2 SERPL-SCNC: 26 MMOL/L (ref 21–32)
CREAT SERPL-MCNC: 0.79 MG/DL (ref 0.5–1.05)
ERYTHROCYTE [DISTWIDTH] IN BLOOD BY AUTOMATED COUNT: 15.1 % (ref 11.5–14.5)
ERYTHROCYTE [DISTWIDTH] IN BLOOD BY AUTOMATED COUNT: 15.2 % (ref 11.5–14.5)
GFR SERPL CREATININE-BSD FRML MDRD: 81 ML/MIN/1.73M*2
GLUCOSE SERPL-MCNC: 106 MG/DL (ref 74–99)
HCT VFR BLD AUTO: 22.3 % (ref 36–46)
HCT VFR BLD AUTO: 22.7 % (ref 36–46)
HGB BLD-MCNC: 7 G/DL (ref 12–16)
HGB BLD-MCNC: 7.3 G/DL (ref 12–16)
HOLD SPECIMEN: NORMAL
MAGNESIUM SERPL-MCNC: 1.78 MG/DL (ref 1.6–2.4)
MCH RBC QN AUTO: 30.4 PG (ref 26–34)
MCH RBC QN AUTO: 30.9 PG (ref 26–34)
MCHC RBC AUTO-ENTMCNC: 31.4 G/DL (ref 32–36)
MCHC RBC AUTO-ENTMCNC: 32.2 G/DL (ref 32–36)
MCV RBC AUTO: 96 FL (ref 80–100)
MCV RBC AUTO: 97 FL (ref 80–100)
NRBC BLD-RTO: 0 /100 WBCS (ref 0–0)
NRBC BLD-RTO: 0 /100 WBCS (ref 0–0)
PLATELET # BLD AUTO: 250 X10*3/UL (ref 150–450)
PLATELET # BLD AUTO: 257 X10*3/UL (ref 150–450)
POTASSIUM SERPL-SCNC: 3.8 MMOL/L (ref 3.5–5.3)
RBC # BLD AUTO: 2.3 X10*6/UL (ref 4–5.2)
RBC # BLD AUTO: 2.36 X10*6/UL (ref 4–5.2)
SODIUM SERPL-SCNC: 133 MMOL/L (ref 136–145)
WBC # BLD AUTO: 5.9 X10*3/UL (ref 4.4–11.3)
WBC # BLD AUTO: 6.2 X10*3/UL (ref 4.4–11.3)

## 2024-01-01 PROCEDURE — 93971 EXTREMITY STUDY: CPT | Performed by: HOSPITALIST

## 2024-01-01 PROCEDURE — 37799 UNLISTED PX VASCULAR SURGERY: CPT | Performed by: INTERNAL MEDICINE

## 2024-01-01 PROCEDURE — 83735 ASSAY OF MAGNESIUM: CPT

## 2024-01-01 PROCEDURE — 85027 COMPLETE CBC AUTOMATED: CPT

## 2024-01-01 PROCEDURE — 2500000004 HC RX 250 GENERAL PHARMACY W/ HCPCS (ALT 636 FOR OP/ED)

## 2024-01-01 PROCEDURE — 72100 X-RAY EXAM L-S SPINE 2/3 VWS: CPT

## 2024-01-01 PROCEDURE — 2500000001 HC RX 250 WO HCPCS SELF ADMINISTERED DRUGS (ALT 637 FOR MEDICARE OP)

## 2024-01-01 PROCEDURE — 99232 SBSQ HOSP IP/OBS MODERATE 35: CPT | Performed by: HOSPITALIST

## 2024-01-01 PROCEDURE — 2500000001 HC RX 250 WO HCPCS SELF ADMINISTERED DRUGS (ALT 637 FOR MEDICARE OP): Performed by: HOSPITALIST

## 2024-01-01 PROCEDURE — 1200000002 HC GENERAL ROOM WITH TELEMETRY DAILY

## 2024-01-01 PROCEDURE — 80048 BASIC METABOLIC PNL TOTAL CA: CPT

## 2024-01-01 PROCEDURE — 85027 COMPLETE CBC AUTOMATED: CPT | Performed by: INTERNAL MEDICINE

## 2024-01-01 PROCEDURE — 2500000004 HC RX 250 GENERAL PHARMACY W/ HCPCS (ALT 636 FOR OP/ED): Performed by: HOSPITALIST

## 2024-01-01 PROCEDURE — 72100 X-RAY EXAM L-S SPINE 2/3 VWS: CPT | Performed by: RADIOLOGY

## 2024-01-01 PROCEDURE — 37799 UNLISTED PX VASCULAR SURGERY: CPT

## 2024-01-01 PROCEDURE — 2500000005 HC RX 250 GENERAL PHARMACY W/O HCPCS

## 2024-01-01 PROCEDURE — 1090000001 HH PPS REVENUE CREDIT

## 2024-01-01 PROCEDURE — 2500000004 HC RX 250 GENERAL PHARMACY W/ HCPCS (ALT 636 FOR OP/ED): Performed by: INTERNAL MEDICINE

## 2024-01-01 PROCEDURE — 1090000002 HH PPS REVENUE DEBIT

## 2024-01-01 PROCEDURE — 2500000002 HC RX 250 W HCPCS SELF ADMINISTERED DRUGS (ALT 637 FOR MEDICARE OP, ALT 636 FOR OP/ED): Performed by: HOSPITALIST

## 2024-01-01 PROCEDURE — 99223 1ST HOSP IP/OBS HIGH 75: CPT | Performed by: INTERNAL MEDICINE

## 2024-01-01 RX ORDER — MECLIZINE HCL 12.5 MG 12.5 MG/1
25 TABLET ORAL EVERY 8 HOURS PRN
Status: DISCONTINUED | OUTPATIENT
Start: 2024-01-01 | End: 2024-01-18 | Stop reason: HOSPADM

## 2024-01-01 RX ORDER — MIRTAZAPINE 15 MG/1
15 TABLET, FILM COATED ORAL NIGHTLY
Status: DISCONTINUED | OUTPATIENT
Start: 2024-01-01 | End: 2024-01-18 | Stop reason: HOSPADM

## 2024-01-01 RX ORDER — BRIMONIDINE TARTRATE 2 MG/ML
1 SOLUTION/ DROPS OPHTHALMIC 2 TIMES DAILY
Status: DISCONTINUED | OUTPATIENT
Start: 2024-01-01 | End: 2024-01-18 | Stop reason: HOSPADM

## 2024-01-01 RX ORDER — SIMVASTATIN 40 MG/1
40 TABLET, FILM COATED ORAL NIGHTLY
Status: DISCONTINUED | OUTPATIENT
Start: 2024-01-01 | End: 2024-01-03

## 2024-01-01 RX ORDER — BUSPIRONE HYDROCHLORIDE 5 MG/1
10 TABLET ORAL DAILY
Status: DISCONTINUED | OUTPATIENT
Start: 2024-01-01 | End: 2024-01-18 | Stop reason: HOSPADM

## 2024-01-01 RX ORDER — PILOCARPINE HYDROCHLORIDE 5 MG/1
5 TABLET, FILM COATED ORAL DAILY
Status: DISCONTINUED | OUTPATIENT
Start: 2024-01-01 | End: 2024-01-18 | Stop reason: HOSPADM

## 2024-01-01 RX ORDER — ISOSORBIDE MONONITRATE 60 MG/1
60 TABLET, EXTENDED RELEASE ORAL
Status: DISCONTINUED | OUTPATIENT
Start: 2024-01-01 | End: 2024-01-18 | Stop reason: HOSPADM

## 2024-01-01 RX ORDER — ASPIRIN 81 MG/1
81 TABLET ORAL DAILY
Status: DISCONTINUED | OUTPATIENT
Start: 2024-01-01 | End: 2024-01-18 | Stop reason: HOSPADM

## 2024-01-01 RX ORDER — L. ACIDOPHILUS/L.BULGARICUS 1MM CELL
1 TABLET ORAL DAILY
Status: DISCONTINUED | OUTPATIENT
Start: 2024-01-01 | End: 2024-01-18 | Stop reason: HOSPADM

## 2024-01-01 RX ORDER — DIAZEPAM 2 MG/1
2 TABLET ORAL ONCE
Status: COMPLETED | OUTPATIENT
Start: 2024-01-01 | End: 2024-01-01

## 2024-01-01 RX ORDER — HYDRALAZINE HYDROCHLORIDE 50 MG/1
50 TABLET, FILM COATED ORAL 2 TIMES DAILY
Status: DISCONTINUED | OUTPATIENT
Start: 2024-01-01 | End: 2024-01-17

## 2024-01-01 RX ORDER — ATENOLOL 50 MG/1
50 TABLET ORAL EVERY MORNING
Status: DISCONTINUED | OUTPATIENT
Start: 2024-01-01 | End: 2024-01-18 | Stop reason: HOSPADM

## 2024-01-01 RX ORDER — PANTOPRAZOLE SODIUM 40 MG/1
40 TABLET, DELAYED RELEASE ORAL
Status: DISCONTINUED | OUTPATIENT
Start: 2024-01-02 | End: 2024-01-18 | Stop reason: HOSPADM

## 2024-01-01 RX ORDER — EZETIMIBE 10 MG/1
10 TABLET ORAL DAILY
Status: DISCONTINUED | OUTPATIENT
Start: 2024-01-01 | End: 2024-01-18 | Stop reason: HOSPADM

## 2024-01-01 RX ORDER — FUROSEMIDE 40 MG/1
20 TABLET ORAL DAILY
Status: DISCONTINUED | OUTPATIENT
Start: 2024-01-01 | End: 2024-01-18 | Stop reason: HOSPADM

## 2024-01-01 RX ADMIN — HYDRALAZINE HYDROCHLORIDE 50 MG: 50 TABLET ORAL at 23:19

## 2024-01-01 RX ADMIN — DIAZEPAM 2 MG: 2 TABLET ORAL at 15:48

## 2024-01-01 RX ADMIN — HYDROMORPHONE HYDROCHLORIDE 0.2 MG: 0.2 INJECTION, SOLUTION INTRAMUSCULAR; INTRAVENOUS; SUBCUTANEOUS at 04:34

## 2024-01-01 RX ADMIN — VANCOMYCIN HYDROCHLORIDE 1.25 G: 1.25 INJECTION, POWDER, LYOPHILIZED, FOR SOLUTION INTRAVENOUS at 18:29

## 2024-01-01 RX ADMIN — EZETIMIBE 10 MG: 10 TABLET ORAL at 13:37

## 2024-01-01 RX ADMIN — PREGABALIN 75 MG: 50 CAPSULE ORAL at 13:37

## 2024-01-01 RX ADMIN — CEFEPIME 2 G: 2 INJECTION, POWDER, FOR SOLUTION INTRAVENOUS at 23:14

## 2024-01-01 RX ADMIN — BUSPIRONE HYDROCHLORIDE 10 MG: 5 TABLET ORAL at 13:37

## 2024-01-01 RX ADMIN — Medication 3 MG: at 23:20

## 2024-01-01 RX ADMIN — CYCLOBENZAPRINE HYDROCHLORIDE 10 MG: 10 TABLET, FILM COATED ORAL at 02:48

## 2024-01-01 RX ADMIN — HYDROMORPHONE HYDROCHLORIDE 0.4 MG: 1 INJECTION, SOLUTION INTRAMUSCULAR; INTRAVENOUS; SUBCUTANEOUS at 18:37

## 2024-01-01 RX ADMIN — MIRTAZAPINE 15 MG: 15 TABLET, FILM COATED ORAL at 23:19

## 2024-01-01 RX ADMIN — OXYCODONE HYDROCHLORIDE 10 MG: 5 TABLET ORAL at 17:16

## 2024-01-01 RX ADMIN — HYDROMORPHONE HYDROCHLORIDE 0.4 MG: 1 INJECTION, SOLUTION INTRAMUSCULAR; INTRAVENOUS; SUBCUTANEOUS at 23:28

## 2024-01-01 RX ADMIN — ACETAMINOPHEN 650 MG: 325 TABLET ORAL at 11:16

## 2024-01-01 RX ADMIN — CEFEPIME 2 G: 2 INJECTION, POWDER, FOR SOLUTION INTRAVENOUS at 08:19

## 2024-01-01 RX ADMIN — POLYETHYLENE GLYCOL 3350 17 G: 17 POWDER, FOR SOLUTION ORAL at 08:19

## 2024-01-01 RX ADMIN — Medication: at 08:00

## 2024-01-01 RX ADMIN — OXYCODONE HYDROCHLORIDE 10 MG: 5 TABLET ORAL at 08:19

## 2024-01-01 RX ADMIN — ISOSORBIDE MONONITRATE 60 MG: 60 TABLET, EXTENDED RELEASE ORAL at 13:37

## 2024-01-01 RX ADMIN — FUROSEMIDE 20 MG: 40 TABLET ORAL at 13:36

## 2024-01-01 RX ADMIN — OXYCODONE HYDROCHLORIDE 10 MG: 5 TABLET ORAL at 02:41

## 2024-01-01 RX ADMIN — BRIMONIDINE TARTRATE 1 DROP: 2 SOLUTION OPHTHALMIC at 23:17

## 2024-01-01 RX ADMIN — LIDOCAINE 1 PATCH: 4 PATCH TOPICAL at 08:19

## 2024-01-01 RX ADMIN — ACETAMINOPHEN 650 MG: 325 TABLET ORAL at 15:48

## 2024-01-01 RX ADMIN — Medication 1 TABLET: at 13:37

## 2024-01-01 RX ADMIN — SIMVASTATIN 40 MG: 40 TABLET, FILM COATED ORAL at 23:22

## 2024-01-01 RX ADMIN — HYDRALAZINE HYDROCHLORIDE 50 MG: 50 TABLET ORAL at 13:37

## 2024-01-01 RX ADMIN — VANCOMYCIN HYDROCHLORIDE 750 MG: 750 INJECTION, SOLUTION INTRAVENOUS at 00:21

## 2024-01-01 RX ADMIN — ATENOLOL 50 MG: 50 TABLET ORAL at 13:36

## 2024-01-01 RX ADMIN — HYDROMORPHONE HYDROCHLORIDE 0.4 MG: 1 INJECTION, SOLUTION INTRAMUSCULAR; INTRAVENOUS; SUBCUTANEOUS at 14:38

## 2024-01-01 RX ADMIN — ACETAMINOPHEN 650 MG: 325 TABLET ORAL at 23:18

## 2024-01-01 RX ADMIN — HYDROMORPHONE HYDROCHLORIDE 0.2 MG: 0.2 INJECTION, SOLUTION INTRAMUSCULAR; INTRAVENOUS; SUBCUTANEOUS at 00:22

## 2024-01-01 RX ADMIN — ACETAMINOPHEN 650 MG: 325 TABLET ORAL at 04:34

## 2024-01-01 RX ADMIN — PREGABALIN 75 MG: 50 CAPSULE ORAL at 23:17

## 2024-01-01 RX ADMIN — OXYCODONE HYDROCHLORIDE 10 MG: 5 TABLET ORAL at 13:11

## 2024-01-01 ASSESSMENT — PAIN DESCRIPTION - ORIENTATION
ORIENTATION: RIGHT
ORIENTATION: RIGHT
ORIENTATION: RIGHT;LEFT;MID;LOWER
ORIENTATION: MID;LOWER
ORIENTATION: LOWER;MID

## 2024-01-01 ASSESSMENT — PAIN SCALES - PAIN ASSESSMENT IN ADVANCED DEMENTIA (PAINAD)
TOTALSCORE: 10
FACIALEXPRESSION: FACIAL GRIMACING
BREATHING: NOISY LABORED BREATHING, LONG PERIODS OF HYPERVENTILATION, CHEYNE-STOKES RESPIRATIONS
NEGVOCALIZATION: REPEATED TROUBLED CALLING OUT, LOUD MOANING/GROANING, CRYING
CONSOLABILITY: UNABLE TO CONSOLE, DISTRACT OR REASSURE
BODYLANGUAGE: RIGID, FISTS CLENCHED, KNEES UP, PUSHING/PULLING AWAY, STRIKES OUT

## 2024-01-01 ASSESSMENT — COGNITIVE AND FUNCTIONAL STATUS - GENERAL
DRESSING REGULAR LOWER BODY CLOTHING: TOTAL
PERSONAL GROOMING: TOTAL
HELP NEEDED FOR BATHING: TOTAL
WALKING IN HOSPITAL ROOM: TOTAL
MOBILITY SCORE: 6
MOVING FROM LYING ON BACK TO SITTING ON SIDE OF FLAT BED WITH BEDRAILS: TOTAL
MOVING TO AND FROM BED TO CHAIR: TOTAL
CLIMB 3 TO 5 STEPS WITH RAILING: TOTAL
EATING MEALS: TOTAL
TURNING FROM BACK TO SIDE WHILE IN FLAT BAD: TOTAL
DRESSING REGULAR UPPER BODY CLOTHING: TOTAL
STANDING UP FROM CHAIR USING ARMS: TOTAL
DAILY ACTIVITIY SCORE: 6
TOILETING: TOTAL

## 2024-01-01 ASSESSMENT — PAIN SCALES - GENERAL
PAINLEVEL_OUTOF10: 10 - WORST POSSIBLE PAIN
PAINLEVEL_OUTOF10: 8
PAINLEVEL_OUTOF10: 7
PAINLEVEL_OUTOF10: 10 - WORST POSSIBLE PAIN
PAINLEVEL_OUTOF10: 10 - WORST POSSIBLE PAIN

## 2024-01-01 ASSESSMENT — PAIN DESCRIPTION - LOCATION
LOCATION: BACK
LOCATION: LEG
LOCATION: BACK
LOCATION: LEG
LOCATION: BACK
LOCATION: LEG

## 2024-01-01 ASSESSMENT — PAIN SCALES - WONG BAKER
WONGBAKER_NUMERICALRESPONSE: HURTS WHOLE LOT
WONGBAKER_NUMERICALRESPONSE: HURTS WORST

## 2024-01-01 ASSESSMENT — PAIN - FUNCTIONAL ASSESSMENT
PAIN_FUNCTIONAL_ASSESSMENT: 0-10

## 2024-01-01 NOTE — PROGRESS NOTES
"Tara Tierney is a 68 y.o. female on day 1 of admission presenting with back pain      Subjective :Still in a lot of pain but slight improved from after OR yesterday        Objective     Last Recorded Vitals  BP (!) 186/74   Pulse 93   Temp 36.2 °C (97.2 °F)   Resp 18   Ht 1.448 m (4' 9.01\")   Wt 78.2 kg (172 lb 6.4 oz)   SpO2 100%   BMI 37.30 kg/m²      Intake/Output last 3 Shifts:    Intake/Output Summary (Last 24 hours) at 1/1/2024 1148  Last data filed at 1/1/2024 0849  Gross per 24 hour   Intake 2050 ml   Output 1190 ml   Net 860 ml         Physical Exam   Gen: appears uncomfortable   HEENT: EOM, MMM  CV: RRR, no murmurs rubs or gallops  Resp: coarse rhonchi b/l   Abdomen: soft, NT,+BS  Back: incision C/D/I, drains in place   LE: No edema    Relevant Results  Lab Results   Component Value Date    WBC 5.9 01/01/2024    HGB 7.0 (L) 01/01/2024    HCT 22.3 (L) 01/01/2024    MCV 97 01/01/2024     01/01/2024     Lab Results   Component Value Date    GLUCOSE 106 (H) 01/01/2024    CALCIUM 8.1 (L) 01/01/2024     (L) 01/01/2024    K 3.8 01/01/2024    CO2 26 01/01/2024     01/01/2024    BUN 6 01/01/2024    CREATININE 0.79 01/01/2024       Assessment/Plan  68 year old female admitted with intractable back pain     -recently had LSO surgery on 11/20 and with two washouts for MRSA wound infection  -MRI done showed increase fluid collection in L3  -severe pain, ortho following, s/p washout on 12/31, alicialey hematoma  -PT/OT    Hx of MRSA wound infection s/p LSO  -was on vancomycin q24hrs until 1/31, will resume  -given new fluid collection ID consulted and added cefipime until cultures are back     Acute blood loss anemia: secondary to above, monitor, transfuse to H/H above 7    HTN: continue home meds    Recent DVT peroneal vein and 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 Will hold eliquis.   -consult placed for IVC filter       John Salazar MD

## 2024-01-01 NOTE — PROGRESS NOTES
Tara Tierney is a 68 y.o. female on day 1 of admission presenting with Postoperative surgical complication involving musculoskeletal system associated with musculoskeletal procedure, unspecified complication.  Active with [facility] .    Ortho consulted and pt is POD # 1 Irrigation and debridement subfascial lumbar spine. Removal of hardware lumbar spine. Exploration of spinal fusion lumbar spine. Posterior lateral fusion L3-4, L4-5 and L5-S1.     Pt refusing therapy at this time secondary to pain. Plan per ID Vancomycin and add Cefepime until cultures are back.   DC plan TBD at present time          Kala Greenwood RN

## 2024-01-01 NOTE — CARE PLAN
Problem: Pain  Goal: My pain/discomfort is manageable  Outcome: Progressing     Problem: Safety  Goal: Patient will be injury free during hospitalization  Outcome: Progressing  Goal: I will remain free of falls  Outcome: Progressing     Problem: Daily Care  Goal: Daily care needs are met  Outcome: Progressing     Problem: Psychosocial Needs  Goal: Demonstrates ability to cope with hospitalization/illness  Outcome: Progressing  Goal: Collaborate with me, my family, and caregiver to identify my specific goals  Outcome: Progressing     Problem: Discharge Barriers  Goal: My discharge needs are met  Outcome: Progressing     Problem: Fall/Injury  Goal: Not fall by end of shift  Outcome: Progressing  Goal: Be free from injury by end of the shift  Outcome: Progressing  Goal: Verbalize understanding of personal risk factors for fall in the hospital  Outcome: Progressing  Goal: Verbalize understanding of risk factor reduction measures to prevent injury from fall in the home  Outcome: Progressing  Goal: Use assistive devices by end of the shift  Outcome: Progressing  Goal: Pace activities to prevent fatigue by end of the shift  Outcome: Progressing     Problem: Pain  Goal: Takes deep breaths with improved pain control throughout the shift  Outcome: Progressing  Goal: Turns in bed with improved pain control throughout the shift  Outcome: Progressing  Goal: Walks with improved pain control throughout the shift  Outcome: Progressing  Goal: Performs ADL's with improved pain control throughout shift  Outcome: Progressing  Goal: Participates in PT with improved pain control throughout the shift  Outcome: Progressing  Goal: Free from opioid side effects throughout the shift  Outcome: Progressing  Goal: Free from acute confusion related to pain meds throughout the shift  Outcome: Progressing

## 2024-01-01 NOTE — PROGRESS NOTES
Chief complaint: Back pain    HPI: Postop day #1 status post irrigation debridement and removal of hardware lumbar spine.  Patient says she still having a lot of back pain.  It radiates around to her belly.  She has a little bit of right thigh pain as well.  No fevers chills nausea vomiting.  No bowel or bladder changes.    Physical exam: Dressing clean dry and intact.  Drains have been putting out between 20 and 30 per shift.  This morning 1 drain was not compressed which needs to be addressed as a noncompressed drain could clot off.  She has 5/5 motor bilateral lower extremities.  Normal sensation bilateral lower extremities.  Range of motion of her bilateral hips and knees do not cause any pain.    Assessment/plan: Postop day #1 status post irrigation debridement and removal of hardware lumbar.  Drains will stay in until they have had 0 output for at least 48 hours.  Okay to mobilize.  Will order x-rays of the lumbar spine today and we will follow-up with serial x-rays as hardware was removed due to the persistent MRSA infection and some loosening.  We are awaiting general surgery evaluation to see if the abdominal pain she having could be coming from her abdomen.  Will get an MRI of her right hip as well to see if we can explain her groin and thigh pain on the right side as there really was no significant compression of neural elements in her lumbar spine.

## 2024-01-01 NOTE — PROGRESS NOTES
"Vancomycin Dosing by Pharmacy- FOLLOW UP    Tara Tierney is a 68 y.o. year old female who Pharmacy has been consulted for vancomycin dosing for cellulitis, skin and soft tissue. Based on the patient's indication and renal status this patient is being dosed based on a goal AUC of 400-600.     Renal function is currently stable.    Current vancomycin dose: 750 mg given every 12 hours    Estimated vancomycin AUC on current dose: 573 mg/L.hr with 14% toxicity.    Visit Vitals  BP (!) 186/74   Pulse 93   Temp 36.2 °C (97.2 °F)   Resp 18        Lab Results   Component Value Date    CREATININE 0.79 01/01/2024    CREATININE 0.72 12/31/2023    CREATININE 0.78 12/30/2023    CREATININE 0.96 12/28/2023        Patient weight is No results found for: \"PTWEIGHT\"    No results found for: \"CULTURE\"     I/O last 3 completed shifts:  In: 1880 (24 mL/kg) [P.O.:30; IV Piggyback:1850]  Out: 1160 (14.8 mL/kg) [Urine:950 (0.3 mL/kg/hr); Drains:210]  Weight: 78.2 kg   [unfilled]    No results found for: \"PATIENTTEMP\"     Assessment/Plan    Above goal AUC. Orders placed for new vancomcyin regimen of 1250 mg every 24 hours to begin at 01-01-14 @ 1800.    This dosing regimen is predicted by [employer] to result in the following pharmacokinetic parameters:  Loading dose: N/A  Regimen: 1250 mg IV every 24 hours.  Start time: 12:21 on 01/01/2024  Exposure target: AUC24 (range)400-600 mg/L.hr   AUC24,ss: 484 mg/L.hr  Probability of AUC24 > 400: 81 %  Ctrough,ss: 12.2 mg/L  Probability of Ctrough,ss > 20: 9 %  Probability of nephrotoxicity (Lodise ASHLEY 2009): 7 %      The next level will be obtained on 01-02-24 at 1st AM labs. May be obtained sooner if clinically indicated.   Will continue to monitor renal function daily while on vancomycin and order serum creatinine at least every 48 hours if not already ordered.  Follow for continued vancomycin needs, clinical response, and signs/symptoms of toxicity.       Jose Dawn, PharmD "

## 2024-01-01 NOTE — PROGRESS NOTES
Physical Therapy                 Therapy Communication Note    Patient Name: Tara Tierney  MRN: [id number]  Today's Date: 1/1/2024     Discipline: Physical Therapy    Missed Visit Reason: Missed Visit Reason: Patient refused    Missed Time: Attempt 0933 and 1349    Comment: Refused participation in therapy despite education and encouragement. Pt. States that she knows she has to move but she is mentally and physically exhausted and is in too much pain. RN aware. Will reattempt as able.   Re-attempted at 13:49; pt. Refused again. Family and RN in room. Agreeable to attempt tomorrow.

## 2024-01-01 NOTE — PROGRESS NOTES
Occupational Therapy                 Therapy Communication Note    Patient Name: Tara Tierney  MRN: [id number]  Today's Date: 1/1/2024     Discipline: Occupational Therapy    Missed Visit Reason: Missed Visit Reason: Patient refused    Missed Time: Attempt at 9:32am.     Comment: pt. Refused participation in therapy despite education and encouragement. Pt. States that she knows she has to move but she is mentally and physically exhausted and is in too much pain. RN aware. Will reattempt as able.   Re-attempted at 13:49; pt. Refused again. Family and RN in room. Agreeable to attempt tomorrow.

## 2024-01-01 NOTE — ANESTHESIA POSTPROCEDURE EVALUATION
Patient: Tara Tierney    Procedure Summary       Date: 12/31/23 Room / Location: [facility] / [facility]    Anesthesia Start: 1139 Anesthesia Stop: 1347    Procedure: IRRIGATION AND DEBRIDEMENT LUMBAR SPINE, REMOVAL HARDWARE LUMBAR SPINE (Back) Diagnosis:     Surgeons: Gerson Coombs MD Responsible Provider: Ja Devine MD    Anesthesia Type: general ASA Status: 3            Anesthesia Type: general    Vitals Value Taken Time   /58 12/31/23 1500   Temp 36.3 °C (97.3 °F) 12/31/23 1440   Pulse 94 12/31/23 1500   Resp 16 12/31/23 1500   SpO2 100 % 12/31/23 1500       Anesthesia Post Evaluation    Patient location during evaluation: PACU  Patient participation: complete - patient participated  Level of consciousness: awake and alert  Pain management: adequate  Multimodal analgesia pain management approach  Airway patency: patent  Cardiovascular status: acceptable  Respiratory status: acceptable  Hydration status: acceptable  Postoperative Nausea and Vomiting: none        No notable events documented.

## 2024-01-01 NOTE — CONSULTS
CARDIOLOGY CONSULTATION NOTE      Patient:     Tara Tierney    YOB: 1955  MRN:     [id number]    Date:     January 1, 2024    Consulting Cardiologist:    Izaiah aTvares MD , [facility]     Primary Cardiologist: NIC Barron MD , [facility]     Reason for Consultation: Consult for IVC Filter Placement, DVT / PE history      IMPRESSION:      CONSULT FOR IVC FILTER PLACEMENT, reported high risk for anticoagulation.  History of prior DVT, right, 11/23  History of Prior PE 11/23  Chronic Lower Back Pain  Lumbar decompression laminectomy November 2023  Postoperative LSO MRSA wound /epidural abscess infection with multiple washouts on IV antibiotics with vancomycin  PVOD post bilateral femoral arterial bypass, negative CTA runoff 7/2023.  Negative Echocardiogram 12/23.  Normal LV Function, LVEF 60%  Negative perfusion stress test 6/2022. Hx Cardiac Cath 2014, post PCI.  LBBB  Hypertension  Hyperlipidemia  COPD  Degenerative joint disease post prior bilateral knee arthroplasties  Eczema  Obesity  Past Tobacco abuse  Otherwise as per assessment below.    RECOMMENDATIONS:      Cardiovascular Supportive Care.  Telemetry Monitoring.  Serial Laboratories   Dr Barron to see for IVC Filter Placement in AM. Held NPO. Consents discussed.  Further Recommendations to Follow.  See Orders.    HPI:     Electronic medical records were reviewed. Patient was interviewed and examined.    The patient's problems are listed in the impression above.       Tara Tierney  was seen in cardiology consultation at the [facility] 1/1/2024.      Patient is a pleasant 68-year-old hypertensive hyperlipidemic woman with history of coronary artery disease, cardiomyopathy, peripheral vascular disease with chronic lower back pain postop lumbar spine surgery November 2023 with subsequent MRSA wound infection on IV vancomycin treatment currently.  She also had a history of a right DVT and pulmonary  embolism approximately 1 month ago.  She has significant anemia with hemoglobin currently 7.  She is felt to be high risk for anticoagulation long-term and cardiology was asked to see for placement of IVC filter.    Patient does follow with Dr. Barron as an outpatient.      Patient denies Chest Pain, SOB, Lightheadedness, Dizziness, TIA or CVA symptoms.  No CHF or Edema.  No Palpitations.  No GI,  or Bleeding Issues. No Recent Fever or Chills.     Cardiovascular and general review of systems is otherwise negative.      Allergies:     Allergies   Allergen Reactions    Neomycin Other     swelling, redness, burning post eye surgery    Neomycin-Polymyxin B-Dexameth Other and Rash     blisters, eye swelling, burning        Medications:     Current Outpatient Medications   Medication Instructions    albuterol (ProAir HFA) 90 mcg/actuation inhaler 2 puffs, inhalation    apixaban (ELIQUIS) 10 mg, oral, 2 times daily    apixaban (ELIQUIS) 5 mg, oral, 2 times daily    aspirin 81 mg, oral, Daily    atenolol (TENORMIN) 50 mg, oral, Every morning    brimonidine (AlphaGAN) 0.2 % ophthalmic solution 1 drop, Both Eyes, 2 times daily    busPIRone (BUSPAR) 10 mg, oral, Daily    cholecalciferol (Vitamin D-3) 50 mcg (2,000 unit) capsule 1 capsule, oral, Daily    cyclobenzaprine (FLEXERIL) 5 mg, oral, 3 times daily PRN    ezetimibe (ZETIA) 10 mg, oral, Daily    furosemide (LASIX) 20 mg, oral, Daily    heparin sodium,porcine/PF (HEPARIN, PORCINE, LOCK FLUSH IV) 5 mL, intravenous, Daily    hydrALAZINE (APRESOLINE) 50 mg, oral, 2 times daily    HYDROmorphone (DILAUDID) 2 mg, oral, Every 4 hours PRN    ibandronate (BONIVA) 150 mg, oral, Every 30 days    ipratropium (Atrovent HFA) 17 mcg/actuation inhaler 2 puffs, inhalation, 2 times daily RT    isosorbide mononitrate ER (IMDUR) 60 mg, oral, Daily RT    lactobacillus acidophilus tablet tablet 1 tablet, oral, Daily    loratadine (Claritin) 10 mg tablet 1 tablet, oral, Daily    LUTEIN ORAL  20 mg, oral, Daily RT    meclizine (ANTIVERT) 25 mg, oral, Every 8 hours PRN    mirtazapine (Remeron) 15 mg tablet 1 tablet, oral, Nightly    nitroglycerin (NITROSTAT) 0.4 mg, sublingual, Every 5 min PRN,  PLACE 1 TABLET UNDER THE TONGUE EVERY 5 MINUTES UP TO 3 DOSES AS NEEDED FOR CHEST PAIN.<BR>    pantoprazole (PROTONIX) 40 mg, oral, Daily before breakfast, Do not crush, chew, or split.    pilocarpine (SALAGEN (PILOCARPINE)) 5 mg, oral, Daily RT    potassium chloride ER (Micro-K) 10 mEq ER capsule 10 mEq, oral, Daily    pregabalin (LYRICA) 75 mg, oral, 2 times daily    simvastatin (ZOCOR) 40 mg, oral, Nightly    sodium chloride 0.9% (ClearShield Sodium Chlor Flush) flush 10 mL, intravenous, Daily    tolterodine (Detrol) 1 mg tablet 1 tablet, oral, Nightly    vancomycin (VANCOCIN) 1,250 mg, intravenous, Every 24 hours    vancomycin in dextrose 5 % (Vancocin) IVPB 1 g, intravenous, Every 12 hours, CBC BMP weekly<BR>Vanco level weekly<BR>CRP sed rate in 3 and 6 weeks<BR>Fax results to [id number]    vit C,D-Uc-gnpzj-lutein-zeaxan (PreserVision AREDS-2) 250-90-40-1 mg capsule 1 capsule, oral, 2 times daily       Past Medical History:   Hypertension, hyperlipidemia.  No diabetes.  No prior stroke.  History of peripheral vascular disease post aortobifemoral bypass.  Last CTA angiogram with bilateral leg runoff 7/2023.  Left bundle branch block.  History of prior remote coronary artery stenting.  Pulmonary embolism and DVT right 11/2023.  Otherwise as noted above.    No other significant past medical or surgical history.    Social History:   Quit smoking 3 months ago.  Prior 20-pack-year smoking history.  No alcohol or illicit drug use.    Family History:   Positive family history of cardiovascular disease    ELECTROCARDIOGRAM:      Sinus rhythm with short MS,  Left bundle branch block     CARDIAC TESTING:      ECHO 12/23:  1. Left ventricular systolic function is normal with a 55% estimated ejection fraction.  2.  "Spectral Doppler shows an impaired relaxation pattern of left ventricular diastolic filling.  3. There is no evidence of mitral valve stenosis.  4. Trace mitral valve regurgitation.  5. Trace tricuspid regurgitation is visualized.  6. Aortic valve stenosis is not present.  7. Moderately elevated pulmonary artery pressure.     PHYSICAL EXAMINATION:      Vital signs:  BP (!) 186/74   Pulse 93   Temp 36.2 °C (97.2 °F)   Resp 18   Ht 1.448 m (4' 9.01\")   Wt 78.2 kg (172 lb 6.4 oz)   SpO2 100%   BMI 37.30 kg/m²     General: No acute distress. Alert and oriented.  Head And Neck Examination: No jugular venous distention, no carotid bruits, no mass. Carotid upstrokes preserved. Oral mucosa moist.  No xanthelasma. Head and neck examination otherwise unremarkable.  Lungs: Clear to auscultation and percussion. No wheezes, no rales,  and no rhonchi.  Chest: Excursion appeared to be normal. No chest wall tenderness on palpation.  Heart: Normal S1 and S2. No S3. No S4. No rub. Grade 1/6 systolic murmur, best heard at the left sternal border. Point of maximal impulse was within normal limits.  Abdomen: Soft. Nontender. No organomegaly. No bruits. No masses. Obese.  Extremities: No bipedal edema. No clubbing. No cyanosis.  Pulses are strong throughout. No bruits.  Musculoskeletal Exam: No ulcers, otherwise unremarkable.  Neuro: Neurologically appeared grossly intact.      LABORATORY DATA:        CBC:   Lab Results   Component Value Date    WBC 5.9 01/01/2024    RBC 2.30 (L) 01/01/2024    HGB 7.0 (L) 01/01/2024    HCT 22.3 (L) 01/01/2024     01/01/2024        CMP:    Lab Results   Component Value Date     (L) 01/01/2024    K 3.8 01/01/2024     01/01/2024    CO2 26 01/01/2024    BUN 6 01/01/2024    CREATININE 0.79 01/01/2024    GLUCOSE 106 (H) 01/01/2024    CALCIUM 8.1 (L) 01/01/2024       Magnesium:    Lab Results   Component Value Date    MG 1.78 01/01/2024         HgBA1c:    Lab Results   Component Value " Date    HGBA1C 5.8 (A) 06/14/2022         Cardiac Enzymes:    Lab Results   Component Value Date    TROPHS 5 07/21/2023    TROPHS 5 07/21/2023    TROPHS 7 05/18/2022       Diagnostic Studies:     CT abdomen pelvis wo IV contrast    Result Date: 12/31/2023  STUDY: CT Abdomen and Pelvis without IV Contrast; 12/31/2023 at 5:11 PM. INDICATION: Abdominal pain. COMPARISON: CT AP 12/19/2023 ACCESSION NUMBER(S): [id number] ORDERING CLINICIAN: WILLIAM EDMONDS TECHNIQUE: CT of the abdomen and pelvis was performed.  Contiguous axial images were obtained at 3 mm slice thickness through the abdomen and pelvis. Coronal and sagittal reconstructions at 3 mm slice thickness were performed. No intravenous contrast was administered.  Automated mA/kV exposure control was utilized and patient examination was performed in strict accordance with principles of ALARA. FINDINGS: Please note that the evaluation of vessels, lymph nodes and organs is limited without intravenous contrast.  LOWER CHEST: No cardiomegaly.  No pericardial effusion.  Very small bilateral pleural effusions  ABDOMEN:  LIVER: No hepatomegaly.  Smooth surface contour.  Normal attenuation.  BILE DUCTS: No intrahepatic or extrahepatic biliary ductal dilatation.  GALLBLADDER: The gallbladder is absent. STOMACH: No abnormalities identified.  PANCREAS: Negative for pancreatitis  SPLEEN: No splenomegaly or focal splenic lesion.  ADRENAL GLANDS: No thickening or nodules.  KIDNEYS AND URETERS: Kidneys are normal in size and location.  No renal or ureteral calculi.  PELVIS:  BLADDER: No abnormalities identified.  REPRODUCTIVE ORGANS: No abnormalities identified.  BOWEL: Colonic diverticulosis.  Mild constipation without bowel obstruction. Negative for appendicitis  VESSELS: Limited due to lack of intravenous contrast.  Prior femoral to femoral artery bypass.  Abdominal aorta is normal in caliber.  PERITONEUM/RETROPERITONEUM/LYMPH NODES: Small amount of low-density free pelvic  fluid  No pneumoperitoneum. No lymphadenopathy.  ABDOMINAL WALL: Small fat-containing umbilical hernia. SOFT TISSUES: Postsurgical changes within the posterior lumbar soft tissues with surgical drains in place.  BONES: Status post interbody fusion of L4/5 and L5/S1 with metallic spacer. This space narrowing L3/4.  Laminectomies L3-L5.  Bone graft along the lateral margins of L3-L5 fracture of the right L3 transverse process. Fracture of the right L4 transverse process.  Old screw tract within the L4 and L5 pedicles.  Narrowing of the right L5/S1 neural foramen due to facet joint osteophyte.    Negative for bowel obstruction.  Mild constipation. Colonic diverticulosis without diverticulitis. Negative for appendicitis. Postsurgical changes lumbar spine removal of pedicle screws and posterior rods from L4 through S1.  Stable appearance of interbody spacers at L4/5 and L5/S1. Bone graft along the lateral margins of L3-L5.  Fractures of the right L4 and L3 transverse process.  The right L3 transverse process fracture appears old.  Postsurgical changes within the posterior lumbar soft tissues with surgical drains in place. Signed by Aung Sparrow MD    MR lumbar spine w and wo IV contrast    Result Date: 12/31/2023  Interpreted By:  Jonas Sanchez, STUDY: MR LUMBAR SPINE W AND WO IV CONTRAST;  12/31/2023 12:33 am   INDICATION: Signs/Symptoms:Pain.   COMPARISON: MRI of the lumbar spine dated 12/10/2023   ACCESSION NUMBER(S): [id number]   ORDERING CLINICIAN: YVETTE LEE   TECHNIQUE: Sagittal T1, T2, STIR, axial T1 and T2 weighted images of the lumbar spine were acquired. Additional axial and sagittal T1 weighted images of the lumbar spine were obtained after intravenous administration of 15.5 mL of contrast.   FINDINGS: Exam is degraded by motion.   There is again evidence of 5 lumbar type non rib-bearing vertebral bodies, with the lowest well-formed intervertebral disc space labeled L5-S1.   Postsurgical  changes of posterior decompression and laminectomies of L3 through L5 with posterior spinal fusion extending from L4 through S1 and discectomies with intervertebral disc spaces at L4-L5 and L5-S1. These are similar in appearance to prior examination on 12/10/2023. Susceptibility artifact from the surgical hardware somewhat limits evaluation of the adjacent structures.   In the interim since prior imaging on 12/10/2023, new postsurgical consistent with irrigation debridement of subfascial tissues of the cutaneous spine are evident. STIR and T2 hypointense signal abnormality previously seen in the cutaneous fat of the lower lumbar spine has resolved, with new 3.7 x 3.6 x 13.6 cm (series 7, image 10; series 10, image 10) T2 hyperintense fluid collection present, with slight peripheral enhancement. This collection may be contiguous with the skin.   T2 hyperintense collection is again present in the laminectomy surgical bed, abutting the thecal sac at the level of L3 through L5 (series 8, image 14), measuring up to 5.2 cm in craniocaudal dimension, 2.9 cm in transverse dimension and up to 1.5 cm in AP dimension (series 10, image 7). The surgical catheter previously seen within the collection is gone, although collection is decreased in size to previous imaging, with resolution of previously seen air within the collection. Mild surrounding edema and reactive soft tissue changes are present in the epidural space, somewhat increased in the interim since prior exam, with new/increased mass effect on the adjacent thecal sac.   There also appears to be small amount of new fluid present in the anterior epidural space at the level of L3 (series 10, image 3).   Although the exact characterization is difficult due to motion, there appears to be somewhat worsened spinal canal narrowing at the level of L2-L3 due to combination of new fluid in the anterior epidural space, and the T2 hyperintense fluid collection with associated  inflammatory/reactive changes along the posterior aspect of the thecal sac, with somewhat increased effacement of the subarachnoid space.   No new intra thecal enhancement is identified.   Neural foramina are not well assessed due to motion and susceptibility artifact from the surgical hardware.       1.  New surgical changes of irrigation and debridement in the laminectomy surgical bed evident, with previously seen geographic area of hypointense T2 and STIR signal in the cutaneous fat of the lumbar spine resolved, and new T2 hyperintense collection measuring 3.7 x 3.6 x 13.6 cm present in the cutaneous fat, likely representing a postsurgical seroma, although sterility can not be ascertained on imaging alone. This collection reaches of the dermis, and may drain at the skin. 2. T2 hyperintense collection within the laminectomy bed itself along the dorsal aspect of the thecal sac described on previous MRI in 12/10/2023 has mildly decreased in size from prior imaging, with interval removal of previously seen drain, although there are increased inflammatory/reactive soft tissue changes present in the laminectomy surgical bed, focus of fluid in the anterior epidural space at the level of L3 contributes to increased effacement of the thecal sac at the level of L3 compared to prior MRI.   MACRO: None   Signed by: Jonas Sanchez 12/31/2023 1:13 AM Dictation workstation:   [id number]      Radiology:     CT abdomen pelvis wo IV contrast   Final Result   Negative for bowel obstruction.  Mild constipation.   Colonic diverticulosis without diverticulitis.   Negative for appendicitis.   Postsurgical changes lumbar spine removal of pedicle screws and   posterior rods from L4 through S1.  Stable appearance of interbody   spacers at L4/5 and L5/S1.   Bone graft along the lateral margins of L3-L5.  Fractures of the right   L4 and L3 transverse process.  The right L3 transverse process   fracture appears old.  Postsurgical  changes within the posterior   lumbar soft tissues with surgical drains in place.   Signed by Aung Sparrow MD      MR lumbar spine w and wo IV contrast   Final Result   1.  New surgical changes of irrigation and debridement in the   laminectomy surgical bed evident, with previously seen geographic   area of hypointense T2 and STIR signal in the cutaneous fat of the   lumbar spine resolved, and new T2 hyperintense collection measuring   3.7 x 3.6 x 13.6 cm present in the cutaneous fat, likely representing   a postsurgical seroma, although sterility can not be ascertained on   imaging alone. This collection reaches of the dermis, and may drain   at the skin.   2. T2 hyperintense collection within the laminectomy bed itself along   the dorsal aspect of the thecal sac described on previous MRI in   12/10/2023 has mildly decreased in size from prior imaging, with   interval removal of previously seen drain, although there are   increased inflammatory/reactive soft tissue changes present in the   laminectomy surgical bed, focus of fluid in the anterior epidural   space at the level of L3 contributes to increased effacement of the   thecal sac at the level of L3 compared to prior MRI.        MACRO:   None        Signed by: Jonas Sanchez 12/31/2023 1:13 AM   Dictation workstation:   [id number]      MR hip right w and wo IV contrast    (Results Pending)   XR lumbar spine 2-3 views    (Results Pending)     CTA ABD AORTA WITH BILAT ILIOFEM EXTR RUNOFF W/WO CONT   7/23  1. No acute vascular abnormality within the abdomen or pelvis. No  aneurysm or dissection.  2. Chronic left common iliac occlusion with patent fem-fem bypass  graft. Patent lower extremity vasculature with three-vessel runoff  bilaterally.  3. No acute inflammatory process within the abdomen or pelvis.            Problem List:     Patient Active Problem List   Diagnosis    Abdominal aortic aneurysm (CMS/HCC)    Abnormal EKG    Bilateral carotid artery  stenosis    Bruit of right carotid artery    CAD in native artery    Cardiomyopathy (CMS/HCC)    Chest pain    Chronic asthmatic bronchitis    Closed displaced fracture of fifth metatarsal bone    Current every day smoker    Difficulty breathing    Essential hypertension    History of total knee replacement    Hypokalemia    Intermittent claudication (CMS/HCC)    Knee osteoarthritis    Knee pain    Limb pain    Localized swelling, mass, or lump of lower extremity    Celiac artery stenosis (CMS/HCC)    Mesenteric artery stenosis (CMS/HCC)    Mixed hyperlipidemia    Obese    PVD (peripheral vascular disease) (CMS/HCC)    Right foot pain    Swelling    Trigger finger    Urinary tract infection without hematuria    Back pain of lumbar region with sciatica    Back pain with radiculopathy    Intractable back pain    Seroma, post-traumatic (CMS/HCC)    Lumbar surgical wound fluid collection    Generalized weakness    Postoperative surgical complication involving musculoskeletal system associated with musculoskeletal procedure, unspecified complication             Izaiah Tavares MD, [facility] /  Cardiology      Of Note:  YouLike voice recognition dictation software was utilized partially in the preparation of this note, therefore, inaccuracies in spelling, word choice and punctuation may have occurred which were not recognized the time of signing.      Patient was seen and examined with total time of visit including chart preparation, rooming, and chart completion exceeding 40 minutes.    ----

## 2024-01-02 ENCOUNTER — ANESTHESIA (OUTPATIENT)
Dept: CARDIOVASCULAR ICU | Facility: HOSPITAL | Age: 69
DRG: 856 | End: 2024-01-02
Payer: COMMERCIAL

## 2024-01-02 ENCOUNTER — APPOINTMENT (OUTPATIENT)
Dept: CARDIOLOGY | Facility: HOSPITAL | Age: 69
DRG: 856 | End: 2024-01-02
Payer: COMMERCIAL

## 2024-01-02 ENCOUNTER — APPOINTMENT (OUTPATIENT)
Dept: RADIOLOGY | Facility: HOSPITAL | Age: 69
DRG: 856 | End: 2024-01-02
Payer: COMMERCIAL

## 2024-01-02 PROBLEM — I82.401 DEEP VEIN THROMBOSIS (DVT) OF RIGHT LOWER EXTREMITY (MULTI): Status: ACTIVE | Noted: 2023-12-30

## 2024-01-02 PROBLEM — D64.9 ANEMIA: Status: ACTIVE | Noted: 2023-12-30

## 2024-01-02 LAB
ANION GAP SERPL CALC-SCNC: 9 MMOL/L (ref 10–20)
BUN SERPL-MCNC: 7 MG/DL (ref 6–23)
CALCIUM SERPL-MCNC: 8.3 MG/DL (ref 8.6–10.3)
CHLORIDE SERPL-SCNC: 102 MMOL/L (ref 98–107)
CO2 SERPL-SCNC: 26 MMOL/L (ref 21–32)
CREAT SERPL-MCNC: 0.85 MG/DL (ref 0.5–1.05)
ERYTHROCYTE [DISTWIDTH] IN BLOOD BY AUTOMATED COUNT: 15.7 % (ref 11.5–14.5)
GFR SERPL CREATININE-BSD FRML MDRD: 74 ML/MIN/1.73M*2
GLUCOSE BLD MANUAL STRIP-MCNC: 115 MG/DL (ref 74–99)
GLUCOSE SERPL-MCNC: 101 MG/DL (ref 74–99)
HCT VFR BLD AUTO: 22.7 % (ref 36–46)
HGB BLD-MCNC: 7 G/DL (ref 12–16)
MAGNESIUM SERPL-MCNC: 1.85 MG/DL (ref 1.6–2.4)
MCH RBC QN AUTO: 30.6 PG (ref 26–34)
MCHC RBC AUTO-ENTMCNC: 30.8 G/DL (ref 32–36)
MCV RBC AUTO: 99 FL (ref 80–100)
NRBC BLD-RTO: 0 /100 WBCS (ref 0–0)
PLATELET # BLD AUTO: 207 X10*3/UL (ref 150–450)
POTASSIUM SERPL-SCNC: 3.7 MMOL/L (ref 3.5–5.3)
RBC # BLD AUTO: 2.29 X10*6/UL (ref 4–5.2)
SODIUM SERPL-SCNC: 133 MMOL/L (ref 136–145)
VANCOMYCIN SERPL-MCNC: 20 UG/ML (ref 5–20)
WBC # BLD AUTO: 4.6 X10*3/UL (ref 4.4–11.3)

## 2024-01-02 PROCEDURE — 37191 INS ENDOVAS VENA CAVA FILTR: CPT | Performed by: INTERNAL MEDICINE

## 2024-01-02 PROCEDURE — 2500000001 HC RX 250 WO HCPCS SELF ADMINISTERED DRUGS (ALT 637 FOR MEDICARE OP): Performed by: HOSPITALIST

## 2024-01-02 PROCEDURE — 2500000005 HC RX 250 GENERAL PHARMACY W/O HCPCS: Performed by: INTERNAL MEDICINE

## 2024-01-02 PROCEDURE — 73721 MRI JNT OF LWR EXTRE W/O DYE: CPT | Mod: RT

## 2024-01-02 PROCEDURE — 2500000004 HC RX 250 GENERAL PHARMACY W/ HCPCS (ALT 636 FOR OP/ED): Performed by: INTERNAL MEDICINE

## 2024-01-02 PROCEDURE — 80048 BASIC METABOLIC PNL TOTAL CA: CPT | Performed by: HOSPITALIST

## 2024-01-02 PROCEDURE — 82374 ASSAY BLOOD CARBON DIOXIDE: CPT | Performed by: HOSPITALIST

## 2024-01-02 PROCEDURE — 99152 MOD SED SAME PHYS/QHP 5/>YRS: CPT | Performed by: INTERNAL MEDICINE

## 2024-01-02 PROCEDURE — 93970 EXTREMITY STUDY: CPT

## 2024-01-02 PROCEDURE — 93005 ELECTROCARDIOGRAM TRACING: CPT

## 2024-01-02 PROCEDURE — 72148 MRI LUMBAR SPINE W/O DYE: CPT | Mod: RIGHT SIDE | Performed by: RADIOLOGY

## 2024-01-02 PROCEDURE — 99233 SBSQ HOSP IP/OBS HIGH 50: CPT | Performed by: INTERNAL MEDICINE

## 2024-01-02 PROCEDURE — 1090000001 HH PPS REVENUE CREDIT

## 2024-01-02 PROCEDURE — 2780000003 HC OR 278 NO HCPCS: Performed by: INTERNAL MEDICINE

## 2024-01-02 PROCEDURE — 80202 ASSAY OF VANCOMYCIN: CPT

## 2024-01-02 PROCEDURE — 2500000004 HC RX 250 GENERAL PHARMACY W/ HCPCS (ALT 636 FOR OP/ED)

## 2024-01-02 PROCEDURE — 85027 COMPLETE CBC AUTOMATED: CPT | Performed by: HOSPITALIST

## 2024-01-02 PROCEDURE — 2500000001 HC RX 250 WO HCPCS SELF ADMINISTERED DRUGS (ALT 637 FOR MEDICARE OP): Performed by: REGISTERED NURSE

## 2024-01-02 PROCEDURE — 37799 UNLISTED PX VASCULAR SURGERY: CPT | Performed by: HOSPITALIST

## 2024-01-02 PROCEDURE — 2550000001 HC RX 255 CONTRASTS: Performed by: INTERNAL MEDICINE

## 2024-01-02 PROCEDURE — 2500000004 HC RX 250 GENERAL PHARMACY W/ HCPCS (ALT 636 FOR OP/ED): Performed by: HOSPITALIST

## 2024-01-02 PROCEDURE — 99223 1ST HOSP IP/OBS HIGH 75: CPT | Performed by: REGISTERED NURSE

## 2024-01-02 PROCEDURE — C1880 VENA CAVA FILTER: HCPCS | Performed by: INTERNAL MEDICINE

## 2024-01-02 PROCEDURE — 97165 OT EVAL LOW COMPLEX 30 MIN: CPT | Mod: GO

## 2024-01-02 PROCEDURE — 93010 ELECTROCARDIOGRAM REPORT: CPT | Performed by: INTERNAL MEDICINE

## 2024-01-02 PROCEDURE — 97161 PT EVAL LOW COMPLEX 20 MIN: CPT | Mod: GP | Performed by: PHYSICAL THERAPIST

## 2024-01-02 PROCEDURE — 1200000002 HC GENERAL ROOM WITH TELEMETRY DAILY

## 2024-01-02 PROCEDURE — 2500000004 HC RX 250 GENERAL PHARMACY W/ HCPCS (ALT 636 FOR OP/ED): Performed by: NURSE PRACTITIONER

## 2024-01-02 PROCEDURE — 99232 SBSQ HOSP IP/OBS MODERATE 35: CPT | Performed by: STUDENT IN AN ORGANIZED HEALTH CARE EDUCATION/TRAINING PROGRAM

## 2024-01-02 PROCEDURE — 82947 ASSAY GLUCOSE BLOOD QUANT: CPT

## 2024-01-02 PROCEDURE — 74018 RADEX ABDOMEN 1 VIEW: CPT | Performed by: RADIOLOGY

## 2024-01-02 PROCEDURE — P9016 RBC LEUKOCYTES REDUCED: HCPCS

## 2024-01-02 PROCEDURE — 83735 ASSAY OF MAGNESIUM: CPT | Performed by: HOSPITALIST

## 2024-01-02 PROCEDURE — 1090000002 HH PPS REVENUE DEBIT

## 2024-01-02 PROCEDURE — 2500000005 HC RX 250 GENERAL PHARMACY W/O HCPCS

## 2024-01-02 PROCEDURE — 2500000001 HC RX 250 WO HCPCS SELF ADMINISTERED DRUGS (ALT 637 FOR MEDICARE OP)

## 2024-01-02 PROCEDURE — 2500000001 HC RX 250 WO HCPCS SELF ADMINISTERED DRUGS (ALT 637 FOR MEDICARE OP): Performed by: ANESTHESIOLOGY

## 2024-01-02 PROCEDURE — 06H03DZ INSERTION OF INTRALUMINAL DEVICE INTO INFERIOR VENA CAVA, PERCUTANEOUS APPROACH: ICD-10-PCS | Performed by: ORTHOPAEDIC SURGERY

## 2024-01-02 PROCEDURE — 36430 TRANSFUSION BLD/BLD COMPNT: CPT

## 2024-01-02 PROCEDURE — 2500000002 HC RX 250 W HCPCS SELF ADMINISTERED DRUGS (ALT 637 FOR MEDICARE OP, ALT 636 FOR OP/ED): Performed by: HOSPITALIST

## 2024-01-02 PROCEDURE — 74018 RADEX ABDOMEN 1 VIEW: CPT

## 2024-01-02 DEVICE — CELECT PLATINUM VENA CAVA FILTER SET FOR FEMORAL VEIN APPROACH
Type: IMPLANTABLE DEVICE | Status: FUNCTIONAL
Brand: COOK CELECT

## 2024-01-02 RX ORDER — SODIUM CHLORIDE 9 MG/ML
INJECTION, SOLUTION INTRAVENOUS CONTINUOUS PRN
Status: COMPLETED | OUTPATIENT
Start: 2024-01-02 | End: 2024-01-02

## 2024-01-02 RX ORDER — MORPHINE SULFATE 15 MG/1
15 TABLET, FILM COATED, EXTENDED RELEASE ORAL EVERY 12 HOURS SCHEDULED
Status: DISCONTINUED | OUTPATIENT
Start: 2024-01-02 | End: 2024-01-18 | Stop reason: HOSPADM

## 2024-01-02 RX ORDER — MIDAZOLAM HYDROCHLORIDE 1 MG/ML
INJECTION, SOLUTION INTRAMUSCULAR; INTRAVENOUS AS NEEDED
Status: DISCONTINUED | OUTPATIENT
Start: 2024-01-02 | End: 2024-01-02 | Stop reason: HOSPADM

## 2024-01-02 RX ORDER — FENTANYL CITRATE 50 UG/ML
INJECTION, SOLUTION INTRAMUSCULAR; INTRAVENOUS AS NEEDED
Status: DISCONTINUED | OUTPATIENT
Start: 2024-01-02 | End: 2024-01-02 | Stop reason: HOSPADM

## 2024-01-02 RX ORDER — DOCUSATE SODIUM 100 MG/1
100 CAPSULE, LIQUID FILLED ORAL 2 TIMES DAILY
Status: DISCONTINUED | OUTPATIENT
Start: 2024-01-02 | End: 2024-01-18 | Stop reason: HOSPADM

## 2024-01-02 RX ORDER — LORAZEPAM 2 MG/ML
1 INJECTION INTRAMUSCULAR ONCE
Status: COMPLETED | OUTPATIENT
Start: 2024-01-02 | End: 2024-01-02

## 2024-01-02 RX ORDER — LIDOCAINE HYDROCHLORIDE 20 MG/ML
INJECTION, SOLUTION INFILTRATION; PERINEURAL AS NEEDED
Status: DISCONTINUED | OUTPATIENT
Start: 2024-01-02 | End: 2024-01-02 | Stop reason: HOSPADM

## 2024-01-02 RX ADMIN — EZETIMIBE 10 MG: 10 TABLET ORAL at 08:27

## 2024-01-02 RX ADMIN — LIDOCAINE 1 PATCH: 4 PATCH TOPICAL at 08:26

## 2024-01-02 RX ADMIN — HYDROMORPHONE HYDROCHLORIDE 0.4 MG: 1 INJECTION, SOLUTION INTRAMUSCULAR; INTRAVENOUS; SUBCUTANEOUS at 05:21

## 2024-01-02 RX ADMIN — FUROSEMIDE 20 MG: 40 TABLET ORAL at 08:26

## 2024-01-02 RX ADMIN — CEFEPIME 2 G: 2 INJECTION, POWDER, FOR SOLUTION INTRAVENOUS at 08:27

## 2024-01-02 RX ADMIN — CEFEPIME 2 G: 2 INJECTION, POWDER, FOR SOLUTION INTRAVENOUS at 21:30

## 2024-01-02 RX ADMIN — PREGABALIN 75 MG: 50 CAPSULE ORAL at 08:26

## 2024-01-02 RX ADMIN — HYDROMORPHONE HYDROCHLORIDE 0.4 MG: 1 INJECTION, SOLUTION INTRAMUSCULAR; INTRAVENOUS; SUBCUTANEOUS at 11:59

## 2024-01-02 RX ADMIN — MORPHINE SULFATE 15 MG: 15 TABLET, EXTENDED RELEASE ORAL at 14:51

## 2024-01-02 RX ADMIN — POLYETHYLENE GLYCOL 3350 17 G: 17 POWDER, FOR SOLUTION ORAL at 08:26

## 2024-01-02 RX ADMIN — BRIMONIDINE TARTRATE 1 DROP: 2 SOLUTION OPHTHALMIC at 21:30

## 2024-01-02 RX ADMIN — DOCUSATE SODIUM 100 MG: 100 CAPSULE, LIQUID FILLED ORAL at 21:32

## 2024-01-02 RX ADMIN — OXYCODONE HYDROCHLORIDE 10 MG: 5 TABLET ORAL at 02:24

## 2024-01-02 RX ADMIN — DOCUSATE SODIUM 100 MG: 100 CAPSULE, LIQUID FILLED ORAL at 14:51

## 2024-01-02 RX ADMIN — PANTOPRAZOLE SODIUM 40 MG: 40 TABLET, DELAYED RELEASE ORAL at 06:30

## 2024-01-02 RX ADMIN — BUSPIRONE HYDROCHLORIDE 10 MG: 5 TABLET ORAL at 08:27

## 2024-01-02 RX ADMIN — ACETAMINOPHEN 650 MG: 325 TABLET ORAL at 05:20

## 2024-01-02 RX ADMIN — Medication 1 TABLET: at 08:27

## 2024-01-02 RX ADMIN — ATENOLOL 50 MG: 50 TABLET ORAL at 08:26

## 2024-01-02 RX ADMIN — PREGABALIN 75 MG: 50 CAPSULE ORAL at 21:32

## 2024-01-02 RX ADMIN — OXYCODONE HYDROCHLORIDE 10 MG: 5 TABLET ORAL at 08:29

## 2024-01-02 RX ADMIN — PILOCARPINE HYDROCHLORIDE 5 MG: 5 TABLET, FILM COATED ORAL at 08:27

## 2024-01-02 RX ADMIN — BRIMONIDINE TARTRATE 1 DROP: 2 SOLUTION OPHTHALMIC at 08:26

## 2024-01-02 RX ADMIN — MIRTAZAPINE 15 MG: 15 TABLET, FILM COATED ORAL at 21:32

## 2024-01-02 RX ADMIN — SIMVASTATIN 40 MG: 40 TABLET, FILM COATED ORAL at 21:32

## 2024-01-02 RX ADMIN — ACETAMINOPHEN 650 MG: 325 TABLET ORAL at 21:31

## 2024-01-02 RX ADMIN — ISOSORBIDE MONONITRATE 60 MG: 60 TABLET, EXTENDED RELEASE ORAL at 06:30

## 2024-01-02 RX ADMIN — HYDRALAZINE HYDROCHLORIDE 50 MG: 50 TABLET ORAL at 08:27

## 2024-01-02 RX ADMIN — MORPHINE SULFATE 15 MG: 15 TABLET, EXTENDED RELEASE ORAL at 21:31

## 2024-01-02 RX ADMIN — LORAZEPAM 1 MG: 2 INJECTION INTRAMUSCULAR; INTRAVENOUS at 10:08

## 2024-01-02 RX ADMIN — VANCOMYCIN HYDROCHLORIDE 1.25 G: 1.25 INJECTION, POWDER, LYOPHILIZED, FOR SOLUTION INTRAVENOUS at 22:17

## 2024-01-02 RX ADMIN — Medication: at 08:00

## 2024-01-02 ASSESSMENT — PAIN - FUNCTIONAL ASSESSMENT
PAIN_FUNCTIONAL_ASSESSMENT: 0-10

## 2024-01-02 ASSESSMENT — COGNITIVE AND FUNCTIONAL STATUS - GENERAL
PERSONAL GROOMING: TOTAL
TOILETING: A LOT
MOBILITY SCORE: 6
MOVING FROM LYING ON BACK TO SITTING ON SIDE OF FLAT BED WITH BEDRAILS: A LOT
PERSONAL GROOMING: A LITTLE
WALKING IN HOSPITAL ROOM: A LOT
DAILY ACTIVITIY SCORE: 16
DRESSING REGULAR UPPER BODY CLOTHING: A LITTLE
TURNING FROM BACK TO SIDE WHILE IN FLAT BAD: A LOT
DRESSING REGULAR LOWER BODY CLOTHING: TOTAL
HELP NEEDED FOR BATHING: TOTAL
STANDING UP FROM CHAIR USING ARMS: TOTAL
DAILY ACTIVITIY SCORE: 6
MOVING TO AND FROM BED TO CHAIR: TOTAL
EATING MEALS: TOTAL
MOVING FROM LYING ON BACK TO SITTING ON SIDE OF FLAT BED WITH BEDRAILS: TOTAL
WALKING IN HOSPITAL ROOM: TOTAL
CLIMB 3 TO 5 STEPS WITH RAILING: TOTAL
TURNING FROM BACK TO SIDE WHILE IN FLAT BAD: TOTAL
DRESSING REGULAR UPPER BODY CLOTHING: TOTAL
DRESSING REGULAR LOWER BODY CLOTHING: A LOT
HELP NEEDED FOR BATHING: A LOT
MOVING TO AND FROM BED TO CHAIR: A LITTLE
STANDING UP FROM CHAIR USING ARMS: A LOT
CLIMB 3 TO 5 STEPS WITH RAILING: TOTAL
TOILETING: TOTAL
MOBILITY SCORE: 12

## 2024-01-02 ASSESSMENT — ACTIVITIES OF DAILY LIVING (ADL)
ADL_ASSISTANCE: INDEPENDENT
BATHING_ASSISTANCE: MODERATE
LACK_OF_TRANSPORTATION: NO

## 2024-01-02 ASSESSMENT — ENCOUNTER SYMPTOMS
WHEEZING: 0
VOMITING: 0
NEUROLOGICAL NEGATIVE: 1
ABDOMINAL PAIN: 1
DIARRHEA: 0
EYES NEGATIVE: 1
FATIGUE: 1
FREQUENCY: 0
ABDOMINAL DISTENTION: 0
PSYCHIATRIC NEGATIVE: 1
ACTIVITY CHANGE: 1
SHORTNESS OF BREATH: 0
BACK PAIN: 1
HEMATOLOGIC/LYMPHATIC NEGATIVE: 1
WOUND: 1
COUGH: 0
CHEST TIGHTNESS: 0
NAUSEA: 0
DIFFICULTY URINATING: 0

## 2024-01-02 ASSESSMENT — PAIN DESCRIPTION - LOCATION
LOCATION: BACK

## 2024-01-02 ASSESSMENT — PAIN SCALES - GENERAL
PAINLEVEL_OUTOF10: 5 - MODERATE PAIN
PAINLEVEL_OUTOF10: 7
PAINLEVEL_OUTOF10: 8
PAINLEVEL_OUTOF10: 5 - MODERATE PAIN
PAINLEVEL_OUTOF10: 7

## 2024-01-02 ASSESSMENT — PAIN SCALES - WONG BAKER: WONGBAKER_NUMERICALRESPONSE: HURTS WHOLE LOT

## 2024-01-02 NOTE — PROGRESS NOTES
Orthopedic Spine Progress Note    Subjective   Reports moderate to severe back pain that radiates around her stomach and into right thigh and hip. Denies any saddle anesthesia or loss of bowel or bladder.   Post-Operative Day: 2 post-spine procedure irrigation and debridement and removal of lumbar spine hardware.   Systemic or Specific Complaints: Pain Control    Objective     Vital signs in last 24 hours:  Temp:  [36.3 °C (97.3 °F)-37.4 °C (99.3 °F)] 36.8 °C (98.2 °F)  Heart Rate:  [75-92] 77  Resp:  [20] 20  BP: (120-165)/(55-70) 130/61    General: alert and oriented, in no acute distress   Neurovascular: 5/5 bilateral lower extremity motor function. Normal sensation. ROM to bilateral lower extremities does not cause any pain.    Wound: Dressing CDI, 2 JOSE drains with 20-30cc of output    Range of Motion: Limited flexion secondary to pain    DVT Exam: Patient had DVT and PE last admission and was placed on anticoagulation for this. Plan is for IVC filter placement   due to high risk of DVT.      Data Review  CBC:  Results from last 7 days   Lab Units 01/02/24  0512   WBC AUTO x10*3/uL 4.6   RBC AUTO x10*6/uL 2.29*   HEMOGLOBIN g/dL 7.0*   HEMATOCRIT % 22.7*   PLATELETS AUTO x10*3/uL 207       Assessment/Plan     Status post-spine procedure: Complications: DVT and Restricted Motion     Pain Relief: some relief    Continues current post-op course    Activity: out of bed and ambulate    Weight Bearing: WBAT    Preliminary wound culture from spinal tissues shows no growth.   Preliminary wound culture from spinal swab shows no growth.   Final cultures pending.   Patient will continue on IV vanco and cefepime per ID.   General surgery consult pending for abdominal pain.   Right hip MRI pending.     AM labs reviewed and do not indicate any leukocytosis however does show hgb of 7.0. Will transfuse one unit of paced red blood cells and repeat Hgb after transfusion.      LOS: 2 days     Edi Olguin, APRN-CNP    Date:  1/2/2024  Time: 7:58 AM

## 2024-01-02 NOTE — CONSULTS
Consults  Consulting practitioner Caitlin Li CNP    Reason For Consult  Back pain and right thigh pain status post hard hardware removal    History Of Present Illness  Tara Tierney is a 68 y.o.  female status post back surgery with debridement and irrigation and removing of hardware of the lumbar spine.     Past Medical History  She has a past medical history of Arthritis, Back pain, COPD (chronic obstructive pulmonary disease) (CMS/HCC), COVID-19 vaccine administered, Eczema, History of COVID-19, Hyperlipidemia, Hypertension, Hypoesthesia of skin, Macular degeneration, Meniere's disease, Osteoporosis, Overactive bladder, Pain in unspecified finger(s), Pain in unspecified wrist, Peripheral vascular disease (CMS/HCC), Personal history of other diseases of the circulatory system, Personal history of other diseases of the musculoskeletal system and connective tissue, Personal history of other specified conditions, Personal history of other specified conditions, Personal history of other specified conditions, Postmenopausal, Seasonal allergies, and Unspecified visual loss.    Surgical History   She has a past surgical history that includes Tonsillectomy; Cardiac catheterization; Colonoscopy; FL transluminal angio percutaneous venus; Total knee arthroplasty (Bilateral); Cholecystectomy; Aorta - bilateral femoral artery bypass graft; CT angio aorta and bilateral iliofemoral runoff w and or wo IV contrast (03/10/2020); CT angio neck (05/18/2022); CT angio aorta and bilateral iliofemoral runoff w and or wo IV contrast (07/21/2023); Adenoidectomy; Tubal ligation; and Blepharoptosis repair.     Social History  She reports that she quit smoking about 4 months ago. Her smoking use included cigarettes. She has a 22.50 pack-year smoking history. She does not have any smokeless tobacco history on file. She reports that she does not drink alcohol and does not use drugs.    Family History  Family History   Problem Relation  Name Age of Onset    Breast cancer Mother      Other (arteriosclerotic cardiovascular disease) Father      Cancer Father          Allergies  Neomycin and Neomycin-polymyxin b-dexameth    Review of Systems  12 system symptoms have been inquired about and what was positive was placed in the history of present illness     Physical Exam  Alert, conscious and oriented x 3  HEENT within normal limit  Bilateral breath sounds  Heart S1-S2  Abdominal exam unremarkable for signs of peritonitis or acute abdomen  Neurological examination cranial nerves II through XII within normal limit  Motor and sensory of upper extremity within normal limit  Lower extremities right side shows some weakness of the right hip flexor       Last Recorded Vitals  /61   Pulse 79   Temp 36.8 °C (98.2 °F)   Resp 20   Wt 78.2 kg (172 lb 6.4 oz)   SpO2 92%     Relevant Results   Latest Reference Range & Units 01/02/24 05:12   GLUCOSE 74 - 99 mg/dL 101 (H)   SODIUM 136 - 145 mmol/L 133 (L)   POTASSIUM 3.5 - 5.3 mmol/L 3.7   CHLORIDE 98 - 107 mmol/L 102   Bicarbonate 21 - 32 mmol/L 26   Anion Gap 10 - 20 mmol/L 9 (L)   Blood Urea Nitrogen 6 - 23 mg/dL 7   Creatinine 0.50 - 1.05 mg/dL 0.85   EGFR >60 mL/min/1.73m*2 74   Calcium 8.6 - 10.3 mg/dL 8.3 (L)   MAGNESIUM 1.60 - 2.40 mg/dL 1.85   WBC 4.4 - 11.3 x10*3/uL 4.6   nRBC 0.0 - 0.0 /100 WBCs 0.0   RBC 4.00 - 5.20 x10*6/uL 2.29 (L)   HEMOGLOBIN 12.0 - 16.0 g/dL 7.0 (L)   HEMATOCRIT 36.0 - 46.0 % 22.7 (L)   MCV 80 - 100 fL 99   MCH 26.0 - 34.0 pg 30.6   MCHC 32.0 - 36.0 g/dL 30.8 (L)   RED CELL DISTRIBUTION WIDTH 11.5 - 14.5 % 15.7 (H)   Platelets 150 - 450 x10*3/uL 207   Vancomycin 5.0 - 20.0 ug/mL 20.0   (H): Data is abnormally high  (L): Data is abnormally low     Assessment/Plan     68 years old lady with history of degenerative disc disease lumbar spine status post hardware removal.  Her pain is constant and it has been over 3 months since her initial surgery.  Over the past admission she  did very well with MS Contin 15 mg twice daily on a schedule basis.  I would like to restart her on MS Contin 15 mg twice daily with oxycodone for breakthrough pain.  I agree with Lyrica 75 mg twice daily.    Panchito Barnes MD

## 2024-01-02 NOTE — CONSULTS
"Nutrition Note  Reason for Assessment  Reason for Assessment: Admission nursing screening (Missed MST = 2)     Nutrition Assessment    Nutrition History:  Energy Intake:  (0% x1 per records; pt had been NPO today)  Food and Nutrient History: Information mostly obtained from pt's  at bedside. He reported that pt has not had an appetite for the past month due to pain. She has been eating about 25% of her usual amount of food. Mostly eating bites 2 or 3 times per day. He reported pt's taste has also been altered. She is drinking water. She has tried boost/ensure but disliked them. She is agreeable to trying mighty shakes.  Vitamin/Herbal Supplement Use: 50 mcg vitamin D3    Difficulty chewing: denies  Difficulty swallowing: denies    PMH, meds, and labs reviewed.  Dietary Orders (From admission, onward)       Start     Ordered    01/02/24 1332  Adult diet Cardiac; 70 gm fat; 2 - 3 grams Sodium  Diet effective now        Question Answer Comment   Diet type Cardiac    Fat restriction: 70 gm fat    Sodium restriction: 2 - 3 grams Sodium        01/02/24 1334    12/31/23 0418  May Participate in Room Service  Once        Question:  .  Answer:  Yes    12/31/23 0417                       GI per flowsheet:  Gastrointestinal  Gastrointestinal (WDL): Within Defined Limits  Last bowel movement documented:  no documented BM this admission. Pt's  stated pt is constipated. Miralax is ordered.   Allergies: Neomycin and Neomycin-polymyxin b-dexameth     Anthropometrics:  Height: 144.8 cm (4' 9.01\")  Weight: 78.2 kg (172 lb 6.4 oz)  BMI (Calculated): 37.3  IBW: 42.3 kg (using the upper end of IBW range)      Weight History / % Weight Change: Pt's  stated she has lost 4 pounds in the past month. He is unsure of her current weight but states it is in the 160s. Weight this admission likely increased with fluid as pt has edema. EMR weight records: 12/5/23 84.7 kg- 7.7% weight loss x1 month (question if this weight was " increased by edema); 11/3/23 81 kg; 9/14/23 81 kg   Interpretation of Weight Loss: >5% in 1 month   Significant Weight Loss: Yes (if accurate)           Estimated Nutritional Needs:  Method for Estimating Needs: 8163-2977 kcals (18-20 kcals/kg)    Method for Estimating Needs: 55-63 gm (1.3-1.5 g/kg IBW)    Method for Estimating Needs: 1950 mL (25 mL/kg)    Nutrition Focused Physical Findings:   Orbital Fat Pads: Defer (pt falling asleep at visit)         Edema  Edema:  (non-pitting)  Edema Location: generalized    Skin: Positive (medial back incision)  Pain Score: 5 - Moderate pain     Nutrition Diagnosis   Patient has Malnutrition Diagnosis: No (need to confirm accuracy of weight and/or obtain more evidence)    Patient has Nutrition Diagnosis: Yes  New  Nutrition Diagnosis 1: Inadequate protein energy intake  Related to (1): pain  As Evidenced by (1): report of pt eating 25% of her usual intake for the past month       Nutrition Interventions/Recommendations   Interventions  Individualized Nutrition Prescription Provided for : ~1500 kcals, ~60 gm protein to be provided by [facility] diet and ONS  Collaboration and Referral of Nutrition Care: Other (Comment)  Goal: Collaborated with pt and her   Interventions: Meals and snacks, Medical food supplement  Meals and Snacks: General healthful diet  Goal: Pt to eat three meals per day  Medical Food Supplement: Commercial beverage  Goal: Pt to drink mighty shake BID (for an additional 220 kcals, 6 gm protein each)      Nutrition Monitoring and Evaluation   Body Composition/Growth/Weight History  Monitoring and Evaluation Plan: Weight  Weight: Measured weight  Criteria: reweigh at least every 5 days  Food/Nutrient Related History Monitoring  Monitoring and Evaluation Plan: Energy intake, Fluid intake, Amount of food  Energy Intake: Estimated energy intake  Criteria: Meal/ONS intake to meet >75% of estimated needs  Fluid Intake: Estimated fluid intake  Criteria: fluid  intake to meet >75% of estimated need  Amount of Food: Estimated amout of food, Medical food intake  Criteria: Pt to consume >75% of meals/ONS      Education Documentation  No documentation found.        Time Spent (min): 45 minutes  Last Date of Nutrition Visit: 01/02/24  Nutrition Follow-Up Needed?: 3-5 days

## 2024-01-02 NOTE — H&P (VIEW-ONLY)
CARDIOLOGY CONSULTATION NOTE      Patient:     Tara Tierney    YOB: 1955  MRN:     [id number]    Date:     January 2, 2024    Consulting Cardiologist:    Izaiah Tavares MD , [facility]     Primary Cardiologist: NIC Barron MD , [facility]     Reason for Consultation: Consult for IVC Filter Placement, DVT / PE history      IMPRESSION:      CONSULT FOR IVC FILTER PLACEMENT, reported high risk for anticoagulation.  History of prior DVT, right, 11/23  History of Prior PE 11/23  Chronic Lower Back Pain  Lumbar decompression laminectomy November 2023  Postoperative LSO MRSA wound /epidural abscess infection with multiple washouts on IV antibiotics with vancomycin  PVOD post bilateral femoral arterial bypass, negative CTA runoff 7/2023.  Negative Echocardiogram 12/23.  Normal LV Function, LVEF 60%  Negative perfusion stress test 6/2022. Hx Cardiac Cath 2014, post PCI.  LBBB  Hypertension  Hyperlipidemia  COPD  Degenerative joint disease post prior bilateral knee arthroplasties  Eczema  Obesity  Past Tobacco abuse  Otherwise as per assessment below.    RECOMMENDATIONS:      Cardiovascular Supportive Care.  Telemetry Monitoring.  [facility]   Dr Barron to see for IVC Filter Placement in AM. Held NPO. Consents discussed.  Further Recommendations to Follow.  See Orders.    HPI:     Electronic medical records were reviewed. Patient was interviewed and examined.    The patient's problems are listed in the impression above.       Tara Tierney  was seen in cardiology consultation at the [facility] 1/2/2024.      Patient is a pleasant 68-year-old hypertensive hyperlipidemic woman with history of coronary artery disease, cardiomyopathy, peripheral vascular disease with chronic lower back pain postop lumbar spine surgery November 2023 with subsequent MRSA wound infection on IV vancomycin treatment currently.  She also had a history of a right DVT and pulmonary  embolism approximately 1 month ago.  She has significant anemia with hemoglobin currently 7.  She is felt to be high risk for anticoagulation long-term and cardiology was asked to see for placement of IVC filter.    Patient does follow with Dr. Barron as an outpatient.      Patient denies Chest Pain, SOB, Lightheadedness, Dizziness, TIA or CVA symptoms.  No CHF or Edema.  No Palpitations.  No GI,  or Bleeding Issues. No Recent Fever or Chills.     Cardiovascular and general review of systems is otherwise negative.      Allergies:     Allergies   Allergen Reactions    Neomycin Other     swelling, redness, burning post eye surgery    Neomycin-Polymyxin B-Dexameth Other and Rash     blisters, eye swelling, burning        Medications:     Current Outpatient Medications   Medication Instructions    albuterol (ProAir HFA) 90 mcg/actuation inhaler 2 puffs, inhalation    apixaban (ELIQUIS) 10 mg, oral, 2 times daily    apixaban (ELIQUIS) 5 mg, oral, 2 times daily    aspirin 81 mg, oral, Daily    atenolol (TENORMIN) 50 mg, oral, Every morning    brimonidine (AlphaGAN) 0.2 % ophthalmic solution 1 drop, Both Eyes, 2 times daily    busPIRone (BUSPAR) 10 mg, oral, Daily    cholecalciferol (Vitamin D-3) 50 mcg (2,000 unit) capsule 1 capsule, oral, Daily    cyclobenzaprine (FLEXERIL) 5 mg, oral, 3 times daily PRN    ezetimibe (ZETIA) 10 mg, oral, Daily    furosemide (LASIX) 20 mg, oral, Daily    heparin sodium,porcine/PF (HEPARIN, PORCINE, LOCK FLUSH IV) 5 mL, intravenous, Daily    hydrALAZINE (APRESOLINE) 50 mg, oral, 2 times daily    HYDROmorphone (DILAUDID) 2 mg, oral, Every 4 hours PRN    ibandronate (BONIVA) 150 mg, oral, Every 30 days    ipratropium (Atrovent HFA) 17 mcg/actuation inhaler 2 puffs, inhalation, 2 times daily RT    isosorbide mononitrate ER (IMDUR) 60 mg, oral, Daily RT    lactobacillus acidophilus tablet tablet 1 tablet, oral, Daily    loratadine (Claritin) 10 mg tablet 1 tablet, oral, Daily    LUTEIN ORAL  20 mg, oral, Daily RT    meclizine (ANTIVERT) 25 mg, oral, Every 8 hours PRN    mirtazapine (Remeron) 15 mg tablet 1 tablet, oral, Nightly    nitroglycerin (NITROSTAT) 0.4 mg, sublingual, Every 5 min PRN,  PLACE 1 TABLET UNDER THE TONGUE EVERY 5 MINUTES UP TO 3 DOSES AS NEEDED FOR CHEST PAIN.<BR>    pantoprazole (PROTONIX) 40 mg, oral, Daily before breakfast, Do not crush, chew, or split.    pilocarpine (SALAGEN (PILOCARPINE)) 5 mg, oral, Daily RT    potassium chloride ER (Micro-K) 10 mEq ER capsule 10 mEq, oral, Daily    pregabalin (LYRICA) 75 mg, oral, 2 times daily    simvastatin (ZOCOR) 40 mg, oral, Nightly    sodium chloride 0.9% (ClearShield Sodium Chlor Flush) flush 10 mL, intravenous, Daily    tolterodine (Detrol) 1 mg tablet 1 tablet, oral, Nightly    vancomycin (VANCOCIN) 1,250 mg, intravenous, Every 24 hours    vancomycin in dextrose 5 % (Vancocin) IVPB 1 g, intravenous, Every 12 hours, CBC BMP weekly<BR>Vanco level weekly<BR>CRP sed rate in 3 and 6 weeks<BR>Fax results to [id number]    vit C,Z-Vh-gnamd-lutein-zeaxan (PreserVision AREDS-2) 250-90-40-1 mg capsule 1 capsule, oral, 2 times daily       Past Medical History:   Hypertension, hyperlipidemia.  No diabetes.  No prior stroke.  History of peripheral vascular disease post aortobifemoral bypass.  Last CTA angiogram with bilateral leg runoff 7/2023.  Left bundle branch block.  History of prior remote coronary artery stenting.  Pulmonary embolism and DVT right 11/2023.  Otherwise as noted above.    No other significant past medical or surgical history.    Social History:   Quit smoking 3 months ago.  Prior 20-pack-year smoking history.  No alcohol or illicit drug use.    Family History:   Positive family history of cardiovascular disease    ELECTROCARDIOGRAM:      Sinus rhythm with short SC,  Left bundle branch block     CARDIAC TESTING:      ECHO 12/23:  1. Left ventricular systolic function is normal with a 55% estimated ejection fraction.  2.  "Spectral Doppler shows an impaired relaxation pattern of left ventricular diastolic filling.  3. There is no evidence of mitral valve stenosis.  4. Trace mitral valve regurgitation.  5. Trace tricuspid regurgitation is visualized.  6. Aortic valve stenosis is not present.  7. Moderately elevated pulmonary artery pressure.     PHYSICAL EXAMINATION:      Vital signs:  /61   Pulse 79   Temp 36.8 °C (98.2 °F)   Resp 20   Ht 1.448 m (4' 9.01\")   Wt 78.2 kg (172 lb 6.4 oz)   SpO2 92%   BMI 37.30 kg/m²     General: No acute distress. Alert and oriented.  Head And Neck Examination: No jugular venous distention, no carotid bruits, no mass. Carotid upstrokes preserved. Oral mucosa moist.  No xanthelasma. Head and neck examination otherwise unremarkable.  Lungs: Clear to auscultation and percussion. No wheezes, no rales,  and no rhonchi.  Chest: Excursion appeared to be normal. No chest wall tenderness on palpation.  Heart: Normal S1 and S2. No S3. No S4. No rub. Grade 1/6 systolic murmur, best heard at the left sternal border. Point of maximal impulse was within normal limits.  Abdomen: Soft. Nontender. No organomegaly. No bruits. No masses. Obese.  Extremities: No bipedal edema. No clubbing. No cyanosis.  Pulses are strong throughout. No bruits.  Musculoskeletal Exam: No ulcers, otherwise unremarkable.  Neuro: Neurologically appeared grossly intact.      LABORATORY DATA:        CBC:   Lab Results   Component Value Date    WBC 4.6 01/02/2024    RBC 2.29 (L) 01/02/2024    HGB 7.0 (L) 01/02/2024    HCT 22.7 (L) 01/02/2024     01/02/2024        CMP:    Lab Results   Component Value Date     (L) 01/02/2024    K 3.7 01/02/2024     01/02/2024    CO2 26 01/02/2024    BUN 7 01/02/2024    CREATININE 0.85 01/02/2024    GLUCOSE 101 (H) 01/02/2024    CALCIUM 8.3 (L) 01/02/2024       Magnesium:    Lab Results   Component Value Date    MG 1.85 01/02/2024         HgBA1c:    Lab Results   Component Value Date "    HGBA1C 5.8 (A) 06/14/2022         Cardiac Enzymes:    Lab Results   Component Value Date    TROPHS 5 07/21/2023    TROPHS 5 07/21/2023    TROPHS 7 05/18/2022       Diagnostic Studies:     CT abdomen pelvis wo IV contrast    Result Date: 12/31/2023  STUDY: CT Abdomen and Pelvis without IV Contrast; 12/31/2023 at 5:11 PM. INDICATION: Abdominal pain. COMPARISON: CT AP 12/19/2023 ACCESSION NUMBER(S): [id number] ORDERING CLINICIAN: WILLIAM EDMONDS TECHNIQUE: CT of the abdomen and pelvis was performed.  Contiguous axial images were obtained at 3 mm slice thickness through the abdomen and pelvis. Coronal and sagittal reconstructions at 3 mm slice thickness were performed. No intravenous contrast was administered.  Automated mA/kV exposure control was utilized and patient examination was performed in strict accordance with principles of ALARA. FINDINGS: Please note that the evaluation of vessels, lymph nodes and organs is limited without intravenous contrast.  LOWER CHEST: No cardiomegaly.  No pericardial effusion.  Very small bilateral pleural effusions  ABDOMEN:  LIVER: No hepatomegaly.  Smooth surface contour.  Normal attenuation.  BILE DUCTS: No intrahepatic or extrahepatic biliary ductal dilatation.  GALLBLADDER: The gallbladder is absent. STOMACH: No abnormalities identified.  PANCREAS: Negative for pancreatitis  SPLEEN: No splenomegaly or focal splenic lesion.  ADRENAL GLANDS: No thickening or nodules.  KIDNEYS AND URETERS: Kidneys are normal in size and location.  No renal or ureteral calculi.  PELVIS:  BLADDER: No abnormalities identified.  REPRODUCTIVE ORGANS: No abnormalities identified.  BOWEL: Colonic diverticulosis.  Mild constipation without bowel obstruction. Negative for appendicitis  VESSELS: Limited due to lack of intravenous contrast.  Prior femoral to femoral artery bypass.  Abdominal aorta is normal in caliber.  PERITONEUM/RETROPERITONEUM/LYMPH NODES: Small amount of low-density free pelvic  fluid  No pneumoperitoneum. No lymphadenopathy.  ABDOMINAL WALL: Small fat-containing umbilical hernia. SOFT TISSUES: Postsurgical changes within the posterior lumbar soft tissues with surgical drains in place.  BONES: Status post interbody fusion of L4/5 and L5/S1 with metallic spacer. This space narrowing L3/4.  Laminectomies L3-L5.  Bone graft along the lateral margins of L3-L5 fracture of the right L3 transverse process. Fracture of the right L4 transverse process.  Old screw tract within the L4 and L5 pedicles.  Narrowing of the right L5/S1 neural foramen due to facet joint osteophyte.    Negative for bowel obstruction.  Mild constipation. Colonic diverticulosis without diverticulitis. Negative for appendicitis. Postsurgical changes lumbar spine removal of pedicle screws and posterior rods from L4 through S1.  Stable appearance of interbody spacers at L4/5 and L5/S1. Bone graft along the lateral margins of L3-L5.  Fractures of the right L4 and L3 transverse process.  The right L3 transverse process fracture appears old.  Postsurgical changes within the posterior lumbar soft tissues with surgical drains in place. Signed by Aung Sparrow MD    MR lumbar spine w and wo IV contrast    Result Date: 12/31/2023  Interpreted By:  Jonas Sanchez, STUDY: MR LUMBAR SPINE W AND WO IV CONTRAST;  12/31/2023 12:33 am   INDICATION: Signs/Symptoms:Pain.   COMPARISON: MRI of the lumbar spine dated 12/10/2023   ACCESSION NUMBER(S): [id number]   ORDERING CLINICIAN: YVETTE LEE   TECHNIQUE: Sagittal T1, T2, STIR, axial T1 and T2 weighted images of the lumbar spine were acquired. Additional axial and sagittal T1 weighted images of the lumbar spine were obtained after intravenous administration of 15.5 mL of contrast.   FINDINGS: Exam is degraded by motion.   There is again evidence of 5 lumbar type non rib-bearing vertebral bodies, with the lowest well-formed intervertebral disc space labeled L5-S1.   Postsurgical  changes of posterior decompression and laminectomies of L3 through L5 with posterior spinal fusion extending from L4 through S1 and discectomies with intervertebral disc spaces at L4-L5 and L5-S1. These are similar in appearance to prior examination on 12/10/2023. Susceptibility artifact from the surgical hardware somewhat limits evaluation of the adjacent structures.   In the interim since prior imaging on 12/10/2023, new postsurgical consistent with irrigation debridement of subfascial tissues of the cutaneous spine are evident. STIR and T2 hypointense signal abnormality previously seen in the cutaneous fat of the lower lumbar spine has resolved, with new 3.7 x 3.6 x 13.6 cm (series 7, image 10; series 10, image 10) T2 hyperintense fluid collection present, with slight peripheral enhancement. This collection may be contiguous with the skin.   T2 hyperintense collection is again present in the laminectomy surgical bed, abutting the thecal sac at the level of L3 through L5 (series 8, image 14), measuring up to 5.2 cm in craniocaudal dimension, 2.9 cm in transverse dimension and up to 1.5 cm in AP dimension (series 10, image 7). The surgical catheter previously seen within the collection is gone, although collection is decreased in size to previous imaging, with resolution of previously seen air within the collection. Mild surrounding edema and reactive soft tissue changes are present in the epidural space, somewhat increased in the interim since prior exam, with new/increased mass effect on the adjacent thecal sac.   There also appears to be small amount of new fluid present in the anterior epidural space at the level of L3 (series 10, image 3).   Although the exact characterization is difficult due to motion, there appears to be somewhat worsened spinal canal narrowing at the level of L2-L3 due to combination of new fluid in the anterior epidural space, and the T2 hyperintense fluid collection with associated  inflammatory/reactive changes along the posterior aspect of the thecal sac, with somewhat increased effacement of the subarachnoid space.   No new intra thecal enhancement is identified.   Neural foramina are not well assessed due to motion and susceptibility artifact from the surgical hardware.       1.  New surgical changes of irrigation and debridement in the laminectomy surgical bed evident, with previously seen geographic area of hypointense T2 and STIR signal in the cutaneous fat of the lumbar spine resolved, and new T2 hyperintense collection measuring 3.7 x 3.6 x 13.6 cm present in the cutaneous fat, likely representing a postsurgical seroma, although sterility can not be ascertained on imaging alone. This collection reaches of the dermis, and may drain at the skin. 2. T2 hyperintense collection within the laminectomy bed itself along the dorsal aspect of the thecal sac described on previous MRI in 12/10/2023 has mildly decreased in size from prior imaging, with interval removal of previously seen drain, although there are increased inflammatory/reactive soft tissue changes present in the laminectomy surgical bed, focus of fluid in the anterior epidural space at the level of L3 contributes to increased effacement of the thecal sac at the level of L3 compared to prior MRI.   MACRO: None   Signed by: Jonas Sanchez 12/31/2023 1:13 AM Dictation workstation:   [id number]      Radiology:     Vascular US lower extremity venous duplex bilateral   Final Result   Negative study.  No deep venous thrombosis of the  right or left   lower extremity.  Previous right peroneal thrombus is no longer   identified.        MACRO:   None        Signed by: Connie Pinto 1/2/2024 8:43 AM   Dictation workstation:   [id number]      XR lumbar spine 2-3 views   Final Result   Removal of lower lumbar transfixation hardware.        Signed by: Phillip Renteria 1/1/2024 12:49 PM   Dictation workstation:   [id number]      CT  abdomen pelvis wo IV contrast   Final Result   Negative for bowel obstruction.  Mild constipation.   Colonic diverticulosis without diverticulitis.   Negative for appendicitis.   Postsurgical changes lumbar spine removal of pedicle screws and   posterior rods from L4 through S1.  Stable appearance of interbody   spacers at L4/5 and L5/S1.   Bone graft along the lateral margins of L3-L5.  Fractures of the right   L4 and L3 transverse process.  The right L3 transverse process   fracture appears old.  Postsurgical changes within the posterior   lumbar soft tissues with surgical drains in place.   Signed by Aung Sparrow MD      MR lumbar spine w and wo IV contrast   Final Result   1.  New surgical changes of irrigation and debridement in the   laminectomy surgical bed evident, with previously seen geographic   area of hypointense T2 and STIR signal in the cutaneous fat of the   lumbar spine resolved, and new T2 hyperintense collection measuring   3.7 x 3.6 x 13.6 cm present in the cutaneous fat, likely representing   a postsurgical seroma, although sterility can not be ascertained on   imaging alone. This collection reaches of the dermis, and may drain   at the skin.   2. T2 hyperintense collection within the laminectomy bed itself along   the dorsal aspect of the thecal sac described on previous MRI in   12/10/2023 has mildly decreased in size from prior imaging, with   interval removal of previously seen drain, although there are   increased inflammatory/reactive soft tissue changes present in the   laminectomy surgical bed, focus of fluid in the anterior epidural   space at the level of L3 contributes to increased effacement of the   thecal sac at the level of L3 compared to prior MRI.        MACRO:   None        Signed by: Jonas Sanchez 12/31/2023 1:13 AM   Dictation workstation:   [id number]      MR hip right w and wo IV contrast    (Results Pending)   Cardiac catheterization - non-coronary    (Results  Pending)     CTA ABD AORTA WITH BILAT ILIOFEM EXTR RUNOFF W/WO CONT   7/23  1. No acute vascular abnormality within the abdomen or pelvis. No  aneurysm or dissection.  2. Chronic left common iliac occlusion with patent fem-fem bypass  graft. Patent lower extremity vasculature with three-vessel runoff  bilaterally.  3. No acute inflammatory process within the abdomen or pelvis.            Problem List:     Patient Active Problem List   Diagnosis    Abdominal aortic aneurysm (CMS/HCC)    Abnormal EKG    Bilateral carotid artery stenosis    Bruit of right carotid artery    CAD in native artery    Cardiomyopathy (CMS/HCC)    Chest pain    Chronic asthmatic bronchitis    Closed displaced fracture of fifth metatarsal bone    Current every day smoker    Difficulty breathing    Essential hypertension    History of total knee replacement    Hypokalemia    Intermittent claudication (CMS/HCC)    Knee osteoarthritis    Knee pain    Limb pain    Localized swelling, mass, or lump of lower extremity    Celiac artery stenosis (CMS/HCC)    Mesenteric artery stenosis (CMS/HCC)    Mixed hyperlipidemia    Obese    PVD (peripheral vascular disease) (CMS/HCC)    Right foot pain    Swelling    Trigger finger    Urinary tract infection without hematuria    Back pain of lumbar region with sciatica    Back pain with radiculopathy    Intractable back pain    Seroma, post-traumatic (CMS/HCC)    Lumbar surgical wound fluid collection    Generalized weakness    Postoperative surgical complication involving musculoskeletal system associated with musculoskeletal procedure, unspecified complication    Back pain at L4-L5 level    Deep vein thrombosis (DVT) of right lower extremity (CMS/HCC)    Anemia             Izaiah Tavares MD, [facility] / NO /  Cardiology      Of Note:  [employer] voice recognition dictation software was utilized partially in the preparation of this note, therefore, inaccuracies in spelling, word choice and punctuation  may have occurred which were not recognized the time of signing.      Patient was seen and examined with total time of visit including chart preparation, rooming, and chart completion exceeding 40 minutes.    ----  January 2, 2024:  Patient seen evaluate the bedside in telemetry.    Bedside examination evaluation are performed by me.    Chart was reviewed in detail discussed with the patient and staff.    Impression:  Deep vein thrombosis  Remote prior pulmonary embolism  Recent laminectomy  Normal LV function and normal perfusion scan within the last year to 2 years  Chronic left bundle branch block pattern  Hypertension  Dyslipidemia  COPD  Prior epidural abscess with MRSA wound infection      Recommendation:  Cardiology consulted for IVC filter placement  No active cardiovascular issues at this time  Filter to be done as soon as spot available  No ongoing cardiac management necessary  Follow-up postdischarge at the patient's discretion  Please notify us should there be any other issues to address while here

## 2024-01-02 NOTE — PROGRESS NOTES
"   01/02/24 1022   Discharge Planning   Living Arrangements Spouse/significant other   Support Systems Spouse/significant other;Children   Assistance Needed PTA \"was able to do things but in pain with walker\", drove, no falls   Type of Residence Private residence  (1 level)   Number of Stairs to Enter Residence 1   Number of Stairs Within Residence 0   Do you have animals or pets at home? Yes   Type of Animals or Pets 1 dog   Home or Post Acute Services In home services   Type of Home Care Services Home PT;Home OT;Home nursing visits   Patient expects to be discharged to: home with HHC for therapy and IVs- states had [facility] for IVs prior   Does the patient need discharge transport arranged? No   Financial Resource Strain   How hard is it for you to pay for the very basics like food, housing, medical care, and heating? Not hard   Housing Stability   In the last 12 months, was there a time when you were not able to pay the mortgage or rent on time? N   In the last 12 months, how many places have you lived? 1   In the last 12 months, was there a time when you did not have a steady place to sleep or slept in a shelter (including now)? N   Transportation Needs   In the past 12 months, has lack of transportation kept you from medical appointments or from getting medications? no   In the past 12 months, has lack of transportation kept you from meetings, work, or from getting things needed for daily living? No   Patient Choice   Provider Choice list and CMS website (https://medicare.gov/care-compare#search) for post-acute Quality and Resource Measure Data were provided and reviewed with: Patient   Patient / Family choosing to utilize agency / facility established prior to hospitalization Yes  (pt prefers [facility])     Spoke with PT/OT who states pt wasn't moving very well and likely will score for SNF, Pt has PICC and is on IV antibiotics Vanco and Cefepime currently. Pt states she does not want to return to SNF, prefers home " with [facility] who she had recently and  was assisting with the IV antibiotics. Confirmed with [facility] Infusion coordinator  that pt is active, with [facility] SN, PT, OT and was on Vanco 1.25 gm Q 24 hrs x 6 weeks. Final cultures are pending, notified [facility] infusion will keep updated.

## 2024-01-02 NOTE — POST-PROCEDURE NOTE
Physician Transition of Care Summary  Invasive Cardiovascular Lab    Procedure Date: 1/2/2024  Attending:    * Star Negro - Primary  Resident/Fellow/Other Assistant: Surgeon(s) and Role:    Pre Procedure Diagnosis:   Recent pulmonary embolus and DVT of lower extremity, unable to take oral anticoagulation because of severe anemia    Post Procedure Diagnosis:   Successful insertion of IVC retrievable filter    Complications:   None    Stents/Implants:   Retrievable IVC filter    Anticoagulation/Antiplatelet Plan:   None    Estimated Blood Loss:   0 mL    Electronically signed by: Star Negro MD, 1/2/2024 1:15 PM    Anesthesia: Moderate                            anesthesia Staff: None

## 2024-01-02 NOTE — PROGRESS NOTES
Physical Therapy    Physical Therapy Evaluation    Patient Name: Tara Tierney  MRN: [id number]  Today's Date: 1/2/2024   Time Calculation  Start Time: 1003  Stop Time: 1022  Time Calculation (min): 19 min    Assessment/Plan   PT Assessment  PT Assessment Results: Decreased strength, Decreased mobility, Decreased endurance  Rehab Prognosis: Good  Evaluation/Treatment Tolerance: Patient limited by fatigue  End of Session Communication: Bedside nurse  Assessment Comment: Patient will benefit from additional PT to increase mobility and activity tolerance.  End of Session Patient Position: Bed, 3 rail up, Alarm off, not on at start of session  IP OR SWING BED PT PLAN  Inpatient or Swing Bed: Inpatient  PT Plan  Treatment/Interventions: Bed mobility, Transfer training, Gait training, Therapeutic activity  PT Plan: Skilled PT  PT Frequency: 3 times per week  PT Recommended Transfer Status: Assist x1  PT - OK to Discharge:  (to [facility] with continued PT once deemed medically appropriate)      Subjective   General Visit Information:  General  Reason for Referral: s/pI & D subfacial lumbar spine; Removal of hardware; Exploration of fusion; Posterior lateral fusin L3,4, L4, 5, L5S1 12/31/23  Referred By: PT/OT 12/31/23 Agustin  Past Medical History Relevant to Rehab: HLD, HTN, PVD, OA, Obese, AA, CAD, + smoker, TKA, Chasidy, C19, PCI, osteoporosis; L3,4, L4,5, L5S1 Laminectomy; L4,5, L5S1 PLIF; L4,5, L5S! interbody cage; L3,4 Posteriorlateral fusion L3,4,; Thermal ablation L3,4, L4,5, L5S1 11/20/23; I & D 12/6/23  Prior to Session Communication: Bedside nurse  Patient Position Received: Bed, 2 rail up, Alarm off, not on at start of session  General Comment: Patient saw Dr. Coombs 12/29/31. Recommended she come to hospital Declined but then came in 12/30/23 with severe back pain.  Home Living:  Home Living  Home Living Comments: Per patient report resides with spouse (works nights) 1 story threshold to enter. Prior to  admit patient had been ambulating, performing ADLs with ww modified independent. Spouse assisted with IADLS.  Denies falls.since discharged from hospital.  Drives and spouse drives too.Independent in medicaiton management.     Precautions:  Precautions  Medical Precautions: Fall precautions  Braces Applied: TLSO when up ambulating      Objective   Pain:  Pain Assessment  Pain Score: 5 - Moderate pain  Pain Type: Surgical pain, Chronic pain  Pain Location: Back (Down both legs in front to feet.)  Cognition:  Cognition  Overall Cognitive Status: Within Functional Limits    General Assessments:  General Observation  General Observation: Patient lying in bed. Patient apprehensive regarding pain  returning. JOSE drain both sides.   Activity Tolerance  Endurance:  (Patient limits activity due to fear of pain returning.)         Static Sitting Balance  Static Sitting-Comment/Number of Minutes: Good  Dynamic Sitting Balance  Dynamic Sitting-Comments: Fair    Static Standing Balance  Static Standing-Comment/Number of Minutes: Fair  Dynamic Standing Balance  Dynamic Standing-Comments: Poor  Functional Assessments:  Bed Mobility  Bed Mobility:  (Supine > sidelying > sit  + 1 minimal assist. Uses siderail to initaite roll. Sit > side lying > supine + 1minimal assist.)    Transfers  Transfer:  (Sit > stand at bed level + 1 moderate assist, cues for hand position. Slow  transitioning to stand. STand > sit + 1 minimal assist, vc to reach back. Poor eccentric control)    Ambulation/Gait Training  Ambulation/Gait Training Performed:  (Patient able to take 4 small steps toward HOB with ww  for support + 1 minimal assist. Patient just medicated with IV Ativan due to MRI order. Further ambulation deferred)  Extremity/Trunk Assessments:  RUE   RUE : Within Functional Limits  LUE   LUE: Within Functional Limits  RLE   RLE :  (AROM; Hip/knee/ankle WFL MMT > 3+/5)  LLE   LLE :  (AROM; Hip/knee/ankle WFL MMT > 3+/5)  Outcome Measures:  [facility]  Basic Mobility  Turning from your back to your side while in a flat bed without using bedrails: A lot  Moving from lying on your back to sitting on the side of a flat bed without using bedrails: A lot  Moving to and from bed to chair (including a wheelchair): A little  Standing up from a chair using your arms (e.g. wheelchair or bedside chair): A lot  To walk in hospital room: A lot  Climbing 3-5 steps with railing: Total  Basic Mobility - Total Score: 12    Encounter Problems       Encounter Problems (Active)       PT Problem       Bed mobility supine <> sit modified independent  (Progressing)       Start:  01/02/24    Expected End:  01/16/24            Transfers sit <> stand with ww modified independent  (Progressing)       Start:  01/02/24    Expected End:  01/16/24            Patient to ambulate with ww 50' CGA  (Progressing)       Start:  01/02/24    Expected End:  01/16/24               Pain - Adult              Education Documentation  Mobility Training, taught by Connie Mercado, PT at 1/2/2024 12:38 PM.  Learner: Patient  Readiness: Acceptance  Method: Demonstration  Response: Demonstrated Understanding, Needs Reinforcement  Comment: Hand position for sit <> stand    Education Comments  No comments found.

## 2024-01-02 NOTE — PROGRESS NOTES
"Tara Tierney is a 68 y.o. female on day 2 of admission presenting with Postoperative surgical complication involving musculoskeletal system associated with musculoskeletal procedure, unspecified complication.    Subjective   Patient seen and examined.  Continues to complain of pain, reports pain is mostly in her bilateral thighs radiating towards groin.  No trouble breathing, no nausea or vomiting.       Objective     Physical Exam  Gen: appears uncomfortable   HEENT: EOM, MMM  CV: RRR, no murmurs rubs or gallops  Resp: coarse rhonchi b/l   Abdomen: soft, NT,+BS  Back: incision C/D/I, drains in place   LE: No edema  Last Recorded Vitals  Blood pressure 130/61, pulse 79, temperature 36.8 °C (98.2 °F), resp. rate 20, height 1.448 m (4' 9.01\"), weight 78.2 kg (172 lb 6.4 oz), SpO2 92 %, not currently breastfeeding.  Intake/Output last 3 Shifts:  I/O last 3 completed shifts:  In: 450 (5.8 mL/kg) [IV Piggyback:450]  Out: 3720 (47.6 mL/kg) [Urine:3450 (1.2 mL/kg/hr); Drains:270]  Weight: 78.2 kg     Relevant Results           This patient currently has cardiac telemetry ordered; if you would like to modify or discontinue the telemetry order, click here to go to the orders activity to modify/discontinue the order.  This patient has a central line   Reason for the central line remaining today? Parenteral medication                 Assessment/Plan   Principal Problem:    Postoperative surgical complication involving musculoskeletal system associated with musculoskeletal procedure, unspecified complication  Active Problems:    Essential hypertension    PVD (peripheral vascular disease) (CMS/HCC)    Back pain with radiculopathy    Back pain at L4-L5 level    Deep vein thrombosis (DVT) of right lower extremity (CMS/HCC)    Anemia    68-year-old female admitted with intractable back pain post spinal surgery with multiple washouts for hematoma/abscess    She has history of MRSA wound infection  Was on IV vancomycin  She is " supposed to continue it till 1/31, we will continue  Repeat cultures from washout have remained negative  Currently on IV cefepime as well  ID has been consulted  She will get 1 unit of packed red blood cells today due to low hemoglobin  Continue home meds for hypertension  Follow-up on MRI of the hips  She is complaining of worsening pain in her thighs we will order a venous duplex just to make sure no extension of DVT  Keep off Eliquis for now as per orthopedics recommendation due to recurrent spinal fluid accumulation  Vascular surgery consulted for IVC filter placement  DVT prophylaxis           Nitesh Ruiz MD

## 2024-01-02 NOTE — CARE PLAN
Problem: Pain  Goal: My pain/discomfort is manageable  Outcome: Progressing     Problem: Safety  Goal: Patient will be injury free during hospitalization  Outcome: Progressing  Goal: I will remain free of falls  Outcome: Progressing     Problem: Daily Care  Goal: Daily care needs are met  Outcome: Progressing     Problem: Psychosocial Needs  Goal: Demonstrates ability to cope with hospitalization/illness  Outcome: Progressing  Goal: Collaborate with me, my family, and caregiver to identify my specific goals  Outcome: Progressing     Problem: Discharge Barriers  Goal: My discharge needs are met  Outcome: Progressing     Problem: Fall/Injury  Goal: Not fall by end of shift  Outcome: Progressing  Goal: Be free from injury by end of the shift  Outcome: Progressing  Goal: Verbalize understanding of personal risk factors for fall in the hospital  Outcome: Progressing  Goal: Verbalize understanding of risk factor reduction measures to prevent injury from fall in the home  Outcome: Progressing  Goal: Use assistive devices by end of the shift  Outcome: Progressing  Goal: Pace activities to prevent fatigue by end of the shift  Outcome: Progressing     Problem: Pain  Goal: Takes deep breaths with improved pain control throughout the shift  Outcome: Progressing  Goal: Turns in bed with improved pain control throughout the shift  Outcome: Progressing  Goal: Walks with improved pain control throughout the shift  Outcome: Progressing  Goal: Performs ADL's with improved pain control throughout shift  Outcome: Progressing  Goal: Participates in PT with improved pain control throughout the shift  Outcome: Progressing  Goal: Free from opioid side effects throughout the shift  Outcome: Progressing  Goal: Free from acute confusion related to pain meds throughout the shift  Outcome: Progressing     Problem: Pain - Adult  Goal: Verbalizes/displays adequate comfort level or baseline comfort level  Outcome: Progressing     Problem: Safety  - Adult  Goal: Free from fall injury  Outcome: Progressing     Problem: Discharge Planning  Goal: Discharge to home or other facility with appropriate resources  Outcome: Progressing     Problem: Chronic Conditions and Co-morbidities  Goal: Patient's chronic conditions and co-morbidity symptoms are monitored and maintained or improved  Outcome: Progressing

## 2024-01-02 NOTE — PROGRESS NOTES
"Vancomycin Dosing by Pharmacy- FOLLOW UP    Tara Tierney is a 68 y.o. year old female who Pharmacy has been consulted for vancomycin dosing for bone and joint infection. Based on the patient's indication and renal status this patient is being dosed based on a goal AUC of 400-600.     Renal function is currently stable.    Current vancomycin dose: 1250 mg mg given every 24 hours    Most recent random level: 20 mcg/mL    Visit Vitals  /61   Pulse 79   Temp 36.8 °C (98.2 °F)   Resp 20        Lab Results   Component Value Date    CREATININE 0.85 01/02/2024    CREATININE 0.79 01/01/2024    CREATININE 0.72 12/31/2023    CREATININE 0.78 12/30/2023        Patient weight is No results found for: \"PTWEIGHT\"    No results found for: \"CULTURE\"     I/O last 3 completed shifts:  In: 450 (5.8 mL/kg) [IV Piggyback:450]  Out: 3720 (47.6 mL/kg) [Urine:3450 (1.2 mL/kg/hr); Drains:270]  Weight: 78.2 kg   [unfilled]    No results found for: \"PATIENTTEMP\"     Assessment/Plan    Within goal AUC range. Continue current vancomycin regimen.    This dosing regimen is predicted by [employer] to result in the following pharmacokinetic parameters:  Loading dose: N/A  Regimen: 1250 mg IV every 24 hours.  Start time: 18:00 on 01/02/2024  Exposure target: AUC24 (range)400-600 mg/L.hr   AUC24,ss: 504 mg/L.hr  Probability of AUC24 > 400: 90 %  Ctrough,ss: 13.1 mg/L  Probability of Ctrough,ss > 20: 8 %  Probability of nephrotoxicity (Lodise ASHLEY 2009): 8 %      The next level will be obtained on 01/05/24 at 0500. May be obtained sooner if clinically indicated.   Will continue to monitor renal function daily while on vancomycin and order serum creatinine at least every 48 hours if not already ordered.  Follow for continued vancomycin needs, clinical response, and signs/symptoms of toxicity.       Matilda Burns, [facility]           "

## 2024-01-02 NOTE — CONSULTS
Inpatient consult to Acute Care Surgery  Consult performed by: ORLANDO Szymanski  Consult ordered by: DARRYL Kline-CNP  Reason for consult: Abdominal pain      General Surgery Consult Note  Patient: Tara Tierney  Unit/Bed: 807/807-A  YOB: 1955  MRN: [id number]  Acct: [id number]   Admitting Diagnosis: Intractable back pain [M54.9]  Postoperative surgical complication involving musculoskeletal system associated with musculoskeletal procedure, unspecified complication [M96.89]  Back pain with radiculopathy [M54.10]  Back pain at L4-L5 level [M54.50]  Date:  12/30/2023  Hospital Day: 2  Attending: Nitesh Ruiz MD      Complaint:  Chief Complaint   Patient presents with    Back Pain     Patient complains of lower back pain since November, has been worsening since Friday. Hx of back surgery in November 2023. 10/10 'undescribable pain'. Took Dilaudid 2mg at 4pm.     History of Present Illness:  Tara Tierney is a 68 y.o. female who underwent LSO surgery on 11/20/2023 and since then has had 2 washouts after that for MRSA wound infection.  She is currently on vancomycin infusions at home. She presents to [facility] emergency department with complaints of severe back pain she describes as intense dull and constant which radiates to both legs.  She denies any numbness or tingling of the extremities. Denies any saddle paresthesia, loss of bowel or bladder function.  She is still able to ambulate with a walker.  She denies any recent injury or trauma.  Denies any drainage from the wound.      Upon presentation emergency department afebrile 36 0.0, HR 98, RR 20, /68, 97% on room air. Sodium 130, potassium 3.3, chloride 97, bicarb 25, anion gap 11, BUN 8, creatinine 0.78 with GFR 82.  WBC 7.1, hemoglobin 9.8, hematocrit 30.1, platelet count 286.  Urinalysis negative for infection.  Blood cultures are pending.  CRP 12.33.    Allergies:  Allergies   Allergen Reactions    Neomycin  Other     swelling, redness, burning post eye surgery    Neomycin-Polymyxin B-Dexameth Other and Rash     blisters, eye swelling, burning     PMHx:  Past Medical History:   Diagnosis Date    Arthritis     Back pain     COPD (chronic obstructive pulmonary disease) (CMS/HCC)     COVID-19 vaccine administered     Eczema     History of COVID-19     2020    Hyperlipidemia     Hypertension     Hypoesthesia of skin     Hypesthesia    Macular degeneration     Meniere's disease     Osteoporosis     Overactive bladder     Pain in unspecified finger(s)     Finger pain    Pain in unspecified wrist     Pain in wrist joint    Peripheral vascular disease (CMS/HCC)     Personal history of other diseases of the circulatory system     History of coronary artery disease    Personal history of other diseases of the musculoskeletal system and connective tissue     History of arthritis    Personal history of other specified conditions     History of chest pain    Personal history of other specified conditions     History of balance disorder    Personal history of other specified conditions     History of numbness    Postmenopausal     Seasonal allergies     Unspecified visual loss     Vision problems     PSHx:  Past Surgical History:   Procedure Laterality Date    ADENOIDECTOMY      AORTA - BILATERAL FEMORAL ARTERY BYPASS GRAFT      BLEPHAROPTOSIS REPAIR      CARDIAC CATHETERIZATION      with stent and balloon    CHOLECYSTECTOMY      COLONOSCOPY      CT ANGIO NECK  05/18/2022    CT NECK ANGIO W AND WO IV CONTRAST 5/18/2022 [facility]    CT AORTA AND BILATERAL ILIOFEMORAL RUNOFF ANGIOGRAM W AND/OR WO IV CONTRAST  03/10/2020    CT AORTA AND BILATERAL ILIOFEMORAL RUNOFF ANGIOGRAM W AND/OR WO IV CONTRAST 3/10/2020 [facility]    CT AORTA AND BILATERAL ILIOFEMORAL RUNOFF ANGIOGRAM W AND/OR WO IV CONTRAST  07/21/2023    CT AORTA AND BILATERAL ILIOFEMORAL RUNOFF ANGIOGRAM W AND/OR WO IV CONTRAST 7/21/2023 [facility]    FL  TRANSLUMINAL ANGIO PERCUTANEOUS BHARAT      TONSILLECTOMY      Tonsillectomy    TOTAL KNEE ARTHROPLASTY Bilateral     TUBAL LIGATION       Social Hx:  Social History     Socioeconomic History    Marital status:      Spouse name: None    Number of children: None    Years of education: None    Highest education level: None   Occupational History    None   Tobacco Use    Smoking status: Former     Packs/day: 0.50     Years: 45.00     Additional pack years: 0.00     Total pack years: 22.50     Types: Cigarettes     Quit date: 2023     Years since quittin.3    Smokeless tobacco: None   Vaping Use    Vaping Use: Never used   Substance and Sexual Activity    Alcohol use: Never    Drug use: Never    Sexual activity: Defer   Other Topics Concern    None   Social History Narrative    None     Social Determinants of Health     Financial Resource Strain: Low Risk  (2024)    Overall Financial Resource Strain (CARDIA)     Difficulty of Paying Living Expenses: Not hard at all   Food Insecurity: Patient Declined (2023)    Hunger Vital Sign     Worried About Running Out of Food in the Last Year: Patient declined     Ran Out of Food in the Last Year: Patient declined   Transportation Needs: No Transportation Needs (2024)    PRAPARE - Transportation     Lack of Transportation (Medical): No     Lack of Transportation (Non-Medical): No   Physical Activity: Inactive (2023)    Exercise Vital Sign     Days of Exercise per Week: 0 days     Minutes of Exercise per Session: 0 min   Stress: Patient Declined (2023)    [employer] - Occupational Stress Questionnaire     Feeling of Stress : Patient declined   Social Connections: Unknown (2023)    Social Connection and Isolation Panel [NHANES]     Frequency of Communication with Friends and Family: Patient declined     Frequency of Social Gatherings with Friends and Family: Patient declined     Attends [employer] Services:  Patient declined     Active Member of Clubs or Organizations: Patient declined     Attends Club or Organization Meetings: Patient declined     Marital Status:    Intimate Partner Violence: Not At Risk (12/31/2023)    Humiliation, Afraid, Rape, and Kick questionnaire     Fear of Current or Ex-Partner: No     Emotionally Abused: No     Physically Abused: No     Sexually Abused: No   Housing Stability: Low Risk  (1/2/2024)    Housing Stability Vital Sign     Unable to Pay for Housing in the Last Year: No     Number of Places Lived in the Last Year: 1     Unstable Housing in the Last Year: No     Family Hx:  Family History   Problem Relation Name Age of Onset    Breast cancer Mother      Other (arteriosclerotic cardiovascular disease) Father      Cancer Father       Review of Systems:   Review of Systems   Constitutional:  Positive for activity change and fatigue.   HENT: Negative.     Eyes: Negative.    Respiratory:  Negative for cough, chest tightness, shortness of breath and wheezing.    Cardiovascular:  Negative for chest pain.   Gastrointestinal:  Positive for abdominal pain. Negative for abdominal distention, diarrhea, nausea and vomiting.   Genitourinary:  Negative for difficulty urinating and frequency.   Musculoskeletal:  Positive for back pain.   Skin:  Positive for wound.   Neurological: Negative.    Hematological: Negative.    Psychiatric/Behavioral: Negative.       Physical Examination:    Visit Vitals  /61   Pulse 79   Temp 36.8 °C (98.2 °F)   Resp 20      Physical Exam  Vitals reviewed.   Constitutional:       Appearance: She is obese.   HENT:      Head: Normocephalic.      Nose: Nose normal.      Mouth/Throat:      Mouth: Mucous membranes are moist.   Cardiovascular:      Rate and Rhythm: Normal rate and regular rhythm.   Pulmonary:      Effort: Pulmonary effort is normal.      Breath sounds: Normal breath sounds.   Abdominal:      General: Bowel sounds are normal.      Palpations: Abdomen  "is soft.      Tenderness: There is abdominal tenderness in the right lower quadrant and left lower quadrant.   Skin:     General: Skin is warm.      Capillary Refill: Capillary refill takes less than 2 seconds.   Neurological:      General: No focal deficit present.      Mental Status: She is alert and oriented to person, place, and time.   Psychiatric:         Mood and Affect: Mood normal.       LABS:  CBC:   Lab Results   Component Value Date    WBC 4.6 01/02/2024    RBC 2.29 (L) 01/02/2024    HGB 7.0 (L) 01/02/2024    HCT 22.7 (L) 01/02/2024    MCV 99 01/02/2024    MCH 30.6 01/02/2024    MCHC 30.8 (L) 01/02/2024    RDW 15.7 (H) 01/02/2024     01/02/2024     CBC with Differential:    Lab Results   Component Value Date    WBC 4.6 01/02/2024    RBC 2.29 (L) 01/02/2024    HGB 7.0 (L) 01/02/2024    HCT 22.7 (L) 01/02/2024     01/02/2024    MCV 99 01/02/2024    MCH 30.6 01/02/2024    MCHC 30.8 (L) 01/02/2024    RDW 15.7 (H) 01/02/2024    NRBC 0.0 01/02/2024    LYMPHOPCT 11.9 12/30/2023    MONOPCT 7.4 12/30/2023    EOSPCT 0.7 12/30/2023    BASOPCT 0.3 12/30/2023    MONOSABS 0.53 12/30/2023    LYMPHSABS 0.85 (L) 12/30/2023    EOSABS 0.05 12/30/2023    BASOSABS 0.02 12/30/2023     CMP:    Lab Results   Component Value Date     (L) 01/02/2024    K 3.7 01/02/2024     01/02/2024    CO2 26 01/02/2024    BUN 7 01/02/2024    CREATININE 0.85 01/02/2024    GLUCOSE 101 (H) 01/02/2024    CALCIUM 8.3 (L) 01/02/2024     BMP:    Lab Results   Component Value Date     (L) 01/02/2024    K 3.7 01/02/2024     01/02/2024    CO2 26 01/02/2024    BUN 7 01/02/2024    CREATININE 0.85 01/02/2024    CALCIUM 8.3 (L) 01/02/2024    GLUCOSE 101 (H) 01/02/2024     Magnesium:  Lab Results   Component Value Date    MG 1.85 01/02/2024     Troponin:  No results found for: \"TROPHS\"    Lipid Panel:  No results found for: \"HDL\", \"CHHDL\", \"VLDL\", \"TRIG\", \"NHDL\"   Current Medications:    Current Facility-Administered " Medications:     acetaminophen (Tylenol) tablet 650 mg, 650 mg, oral, q4h PRN **OR** acetaminophen (Tylenol) oral liquid 650 mg, 650 mg, oral, q4h PRN **OR** acetaminophen (Tylenol) suppository 650 mg, 650 mg, rectal, q4h PRN, DARRYL Quiros-CNP    acetaminophen (Tylenol) tablet 650 mg, 650 mg, oral, q6h, ORLANDO Quiros, 650 mg at 01/02/24 0520    [Held by provider] aspirin EC tablet 81 mg, 81 mg, oral, Daily, John Salazar MD    atenolol (Tenormin) tablet 50 mg, 50 mg, oral, q AM, John Salazar MD, 50 mg at 01/02/24 0826    brimonidine (AlphaGAN) 0.2 % ophthalmic solution 1 drop, 1 drop, Both Eyes, BID, John Salazar MD, 1 drop at 01/02/24 0826    busPIRone (Buspar) tablet 10 mg, 10 mg, oral, Daily, John Salazar MD, 10 mg at 01/02/24 0827    cefepime (Maxipime) in dextrose 5 % water (D5W) 100 mL IV 2 g, 2 g, intravenous, q12h, Pete Echeverria MD, Stopped at 01/02/24 0857    cyclobenzaprine (Flexeril) tablet 10 mg, 10 mg, oral, TID PRN, ORLANDO Quiros, 10 mg at 01/01/24 0248    ezetimibe (Zetia) tablet 10 mg, 10 mg, oral, Daily, John Salazar MD, 10 mg at 01/02/24 0827    furosemide (Lasix) tablet 20 mg, 20 mg, oral, Daily, John Salazar MD, 20 mg at 01/02/24 0826    hydrALAZINE (Apresoline) tablet 50 mg, 50 mg, oral, BID, John Salazar MD, 50 mg at 01/02/24 0827    HYDROmorphone (Dilaudid) injection 0.4 mg, 0.4 mg, intravenous, q3h PRN, John Salazar MD, 0.4 mg at 01/02/24 0521    isosorbide mononitrate ER (Imdur) 24 hr tablet 60 mg, 60 mg, oral, Daily, John Salazar MD, 60 mg at 01/02/24 0630    lactobacillus acidophilus tablet 1 tablet, 1 tablet, oral, Daily, John Salazar MD, 1 tablet at 01/02/24 0827    lidocaine 4 % patch 1 patch, 1 patch, transdermal, Daily, ORLANDO Quiros, 1 patch at 01/02/24 0826    meclizine (Antivert) tablet 25 mg, 25 mg, oral, q8h PRN, John Salazar MD    melatonin tablet 3 mg, 3 mg, oral, Daily, ORLANDO Quiros, 3 mg at  01/01/24 2320    mirtazapine (Remeron) tablet 15 mg, 15 mg, oral, Nightly, John Salazar MD, 15 mg at 01/01/24 2319    morphine injection 2 mg, 2 mg, intravenous, q2h PRN, ORLANDO Quiros    naloxone (Narcan) injection 0.2 mg, 0.2 mg, intravenous, q5 min PRN, ORLANDO Quiros    ondansetron (Zofran) tablet 4 mg, 4 mg, oral, q8h PRN, 4 mg at 12/31/23 0904 **OR** ondansetron (Zofran) injection 4 mg, 4 mg, intravenous, q8h PRN, ORLANDO Quiros, 4 mg at 12/31/23 1317    oxyCODONE (Roxicodone) immediate release tablet 10 mg, 10 mg, oral, q4h PRN, ORLANDO Quiros, 10 mg at 01/02/24 0829    oxyCODONE (Roxicodone) immediate release tablet 5 mg, 5 mg, oral, q4h PRN, ORLANDO Quiros    oxygen (O2) therapy, , inhalation, Continuous - 02/gases, ORLANDO Quiros, Start at 01/02/24 0800    pantoprazole (ProtoNix) EC tablet 40 mg, 40 mg, oral, Daily before breakfast, John Salazar MD, 40 mg at 01/02/24 0630    pilocarpine (Salagen) tablet 5 mg, 5 mg, oral, Daily, John Salazar MD, 5 mg at 01/02/24 0827    polyethylene glycol (Glycolax, Miralax) packet 17 g, 17 g, oral, Daily, ORLANDO Quiros, 17 g at 01/02/24 0826    pregabalin (Lyrica) capsule 75 mg, 75 mg, oral, BID, John Salazar MD, 75 mg at 01/02/24 0826    simvastatin (Zocor) tablet 40 mg, 40 mg, oral, Nightly, John Salazar MD, 40 mg at 01/01/24 2322    sodium chloride 0.9% infusion, 100 mL/hr, intravenous, Continuous, William Santamaria APRN-CNP, Last Rate: 100 mL/hr at 12/31/23 1600, 100 mL/hr at 12/31/23 1600    vancomycin 1.25 g in dextrose 5 % 250 mL IV, 1,250 mg, intravenous, q24h, Jose Dawn, PharmD, Stopped at 01/01/24 1944    CT abdomen pelvis wo IV contrast    Result Date: 12/31/2023  STUDY: CT Abdomen and Pelvis without IV Contrast; 12/31/2023 at 5:11 PM. INDICATION: Abdominal pain. COMPARISON: CT AP 12/19/2023 ACCESSION NUMBER(S): [id number] ORDERING CLINICIAN: WILLIAM  GREY TECHNIQUE: CT of the abdomen and pelvis was performed.  Contiguous axial images were obtained at 3 mm slice thickness through the abdomen and pelvis. Coronal and sagittal reconstructions at 3 mm slice thickness were performed. No intravenous contrast was administered.  Automated mA/kV exposure control was utilized and patient examination was performed in strict accordance with principles of ALARA. FINDINGS: Please note that the evaluation of vessels, lymph nodes and organs is limited without intravenous contrast.  LOWER CHEST: No cardiomegaly.  No pericardial effusion.  Very small bilateral pleural effusions  ABDOMEN:  LIVER: No hepatomegaly.  Smooth surface contour.  Normal attenuation.  BILE DUCTS: No intrahepatic or extrahepatic biliary ductal dilatation.  GALLBLADDER: The gallbladder is absent. STOMACH: No abnormalities identified.  PANCREAS: Negative for pancreatitis  SPLEEN: No splenomegaly or focal splenic lesion.  ADRENAL GLANDS: No thickening or nodules.  KIDNEYS AND URETERS: Kidneys are normal in size and location.  No renal or ureteral calculi.  PELVIS:  BLADDER: No abnormalities identified.  REPRODUCTIVE ORGANS: No abnormalities identified.  BOWEL: Colonic diverticulosis.  Mild constipation without bowel obstruction. Negative for appendicitis  VESSELS: Limited due to lack of intravenous contrast.  Prior femoral to femoral artery bypass.  Abdominal aorta is normal in caliber.  PERITONEUM/RETROPERITONEUM/LYMPH NODES: Small amount of low-density free pelvic fluid  No pneumoperitoneum. No lymphadenopathy.  ABDOMINAL WALL: Small fat-containing umbilical hernia. SOFT TISSUES: Postsurgical changes within the posterior lumbar soft tissues with surgical drains in place.  BONES: Status post interbody fusion of L4/5 and L5/S1 with metallic spacer. This space narrowing L3/4.  Laminectomies L3-L5.  Bone graft along the lateral margins of L3-L5 fracture of the right L3 transverse process. Fracture of the  right L4 transverse process.  Old screw tract within the L4 and L5 pedicles.  Narrowing of the right L5/S1 neural foramen due to facet joint osteophyte.    Negative for bowel obstruction.  Mild constipation. Colonic diverticulosis without diverticulitis. Negative for appendicitis. Postsurgical changes lumbar spine removal of pedicle screws and posterior rods from L4 through S1.  Stable appearance of interbody spacers at L4/5 and L5/S1. Bone graft along the lateral margins of L3-L5.  Fractures of the right L4 and L3 transverse process.  The right L3 transverse process fracture appears old.  Postsurgical changes within the posterior lumbar soft tissues with surgical drains in place. Signed by Aung Sparrow MD    MR lumbar spine w and wo IV contrast    Result Date: 12/31/2023  Interpreted By:  Jonas Sanchez, STUDY: MR LUMBAR SPINE W AND WO IV CONTRAST;  12/31/2023 12:33 am   INDICATION: Signs/Symptoms:Pain.   COMPARISON: MRI of the lumbar spine dated 12/10/2023   ACCESSION NUMBER(S): [id number]   ORDERING CLINICIAN: YVETTE LEE   TECHNIQUE: Sagittal T1, T2, STIR, axial T1 and T2 weighted images of the lumbar spine were acquired. Additional axial and sagittal T1 weighted images of the lumbar spine were obtained after intravenous administration of 15.5 mL of contrast.   FINDINGS: Exam is degraded by motion.   There is again evidence of 5 lumbar type non rib-bearing vertebral bodies, with the lowest well-formed intervertebral disc space labeled L5-S1.   Postsurgical changes of posterior decompression and laminectomies of L3 through L5 with posterior spinal fusion extending from L4 through S1 and discectomies with intervertebral disc spaces at L4-L5 and L5-S1. These are similar in appearance to prior examination on 12/10/2023. Susceptibility artifact from the surgical hardware somewhat limits evaluation of the adjacent structures.   In the interim since prior imaging on 12/10/2023, new postsurgical consistent  with irrigation debridement of subfascial tissues of the cutaneous spine are evident. STIR and T2 hypointense signal abnormality previously seen in the cutaneous fat of the lower lumbar spine has resolved, with new 3.7 x 3.6 x 13.6 cm (series 7, image 10; series 10, image 10) T2 hyperintense fluid collection present, with slight peripheral enhancement. This collection may be contiguous with the skin.   T2 hyperintense collection is again present in the laminectomy surgical bed, abutting the thecal sac at the level of L3 through L5 (series 8, image 14), measuring up to 5.2 cm in craniocaudal dimension, 2.9 cm in transverse dimension and up to 1.5 cm in AP dimension (series 10, image 7). The surgical catheter previously seen within the collection is gone, although collection is decreased in size to previous imaging, with resolution of previously seen air within the collection. Mild surrounding edema and reactive soft tissue changes are present in the epidural space, somewhat increased in the interim since prior exam, with new/increased mass effect on the adjacent thecal sac.   There also appears to be small amount of new fluid present in the anterior epidural space at the level of L3 (series 10, image 3).   Although the exact characterization is difficult due to motion, there appears to be somewhat worsened spinal canal narrowing at the level of L2-L3 due to combination of new fluid in the anterior epidural space, and the T2 hyperintense fluid collection with associated inflammatory/reactive changes along the posterior aspect of the thecal sac, with somewhat increased effacement of the subarachnoid space.   No new intra thecal enhancement is identified.   Neural foramina are not well assessed due to motion and susceptibility artifact from the surgical hardware.       1.  New surgical changes of irrigation and debridement in the laminectomy surgical bed evident, with previously seen geographic area of hypointense T2  "and STIR signal in the cutaneous fat of the lumbar spine resolved, and new T2 hyperintense collection measuring 3.7 x 3.6 x 13.6 cm present in the cutaneous fat, likely representing a postsurgical seroma, although sterility can not be ascertained on imaging alone. This collection reaches of the dermis, and may drain at the skin. 2. T2 hyperintense collection within the laminectomy bed itself along the dorsal aspect of the thecal sac described on previous MRI in 12/10/2023 has mildly decreased in size from prior imaging, with interval removal of previously seen drain, although there are increased inflammatory/reactive soft tissue changes present in the laminectomy surgical bed, focus of fluid in the anterior epidural space at the level of L3 contributes to increased effacement of the thecal sac at the level of L3 compared to prior MRI.   MACRO: None   Signed by: Jonas Sanchez 12/31/2023 1:13 AM Dictation workstation:   [id number]       Assessment:    Patient is a 68 yr old female who came to the ED for severe back pain. General surgery was consulted due to patient stating she is have \"Abdominal pain\". Patient denies nausea and vomiting. She states her last BM was Friday and she is passing flatus. She denies bloody stools. CT ABD pelvis on 12/31/23 showed negative for bowel obstruction. Mild constipation, and colonic diverticulosis without diverticulitis. Negative for appendicitis.  She denies having urinary symptoms. UA on 12/31 neg. She does complain of right lower and left lower quadrant tenderness with palpation.         Plan:  KUB ordered  Pain control  NPO currently  Bowel regimen  General surgery to sign off thank you for the consult.   Continue care per primary team     Further recommendations per Dr. Menard     Time spent  60  minutes obtaining labs, imaging, recommendations, interview, assessment, examination, medication review/ordering, and EMR review.    Plan of care was discussed extensively with " patient. Patient verbalized understanding through teach back method. All questions and concerns addressed upon examination.     Of note, this documentation is completed using the Dragon Dictation system (voice recognition software). There may be spelling and/or grammatical errors that were not corrected prior to final submission.      Electronically signed by ORLANDO Szymanski on 1/2/2024 at 11:53 AM

## 2024-01-02 NOTE — PROGRESS NOTES
Occupational Therapy    Evaluation    Patient Name: Tara Tierney  MRN: [id number]  Today's Date: 1/2/2024  Time Calculation  Start Time: 1003  Stop Time: 1020  Time Calculation (min): 17 min        Assessment:  End of Session Communication: Bedside nurse  End of Session Patient Position: Bed, 3 rail up, Alarm off, not on at start of session (pt. recieved ativan; going for MRI, recieving blood later this PM and then going for IVC filter.)  OT Assessment Results: Decreased ADL status, Decreased endurance  Plan:  Treatment Interventions: ADL retraining, Functional transfer training, Endurance training  OT Frequency: 2 times per week  OT Discharge Recommendations: Moderate intensity level of continued care  OT - OK to Discharge: Yes (when medically stable; cleared by medical team.)    Subjective   Current Problem:  1. Back pain at L4-L5 level  CANCELED: Lower extremity venous duplex bilateral    CANCELED: Lower extremity venous duplex bilateral      2. Postoperative surgical complication involving musculoskeletal system associated with musculoskeletal procedure, unspecified complication        3. Intractable back pain        4. Back pain, lumbosacral  Type And Screen    Type And Screen    Tissue/Wound Culture/Smear    Tissue/Wound Culture/Smear    Tissue/Wound Culture/Smear    Tissue/Wound Culture/Smear    Tissue/Wound Culture/Smear    Tissue/Wound Culture/Smear    Tissue/Wound Culture/Smear    Tissue/Wound Culture/Smear      5. Deep vein thrombosis (DVT) of right lower extremity, unspecified chronicity, unspecified vein (CMS/HCC)  Case Request Cath Lab: IVC Filter Insertion    Case Request Cath Lab: IVC Filter Insertion    Case Request Cath Lab: IVC Filter Insertion    Case Request Cath Lab: IVC Filter Insertion    Cardiac catheterization - non-coronary    Cardiac catheterization - non-coronary      6. Single subsegmental pulmonary embolism without acute cor pulmonale (CMS/HCC)  Case Request Cath Lab: IVC Filter  Insertion    Case Request Cath Lab: IVC Filter Insertion      7. Anemia, unspecified type  Case Request Cath Lab: IVC Filter Insertion    Case Request Cath Lab: IVC Filter Insertion    Case Request Cath Lab: IVC Filter Insertion    Case Request Cath Lab: IVC Filter Insertion    Cardiac catheterization - non-coronary    Cardiac catheterization - non-coronary      8. Deep vein thrombosis (DVT) of proximal lower extremity, unspecified chronicity, unspecified laterality (CMS/HCC)  Vascular US lower extremity venous duplex bilateral    Vascular US lower extremity venous duplex bilateral      9. Acute deep vein thrombosis (DVT) of both peroneal veins (CMS/HCC)  Vascular US lower extremity venous duplex bilateral    Vascular US lower extremity venous duplex bilateral      10. PVD (peripheral vascular disease) (CMS/HCC)  Case Request Cath Lab: IVC Filter Insertion    Case Request Cath Lab: IVC Filter Insertion    Cardiac catheterization - non-coronary    Cardiac catheterization - non-coronary      11. Essential hypertension  Case Request Cath Lab: IVC Filter Insertion    Case Request Cath Lab: IVC Filter Insertion    Cardiac catheterization - non-coronary    Cardiac catheterization - non-coronary      12. DVT of deep femoral vein, bilateral (CMS/HCC)  CANCELED: Lower extremity venous duplex bilateral    CANCELED: Lower extremity venous duplex bilateral      13. Embolism and thrombosis of superficial veins of right lower extremity  Cardiac catheterization - non-coronary      14. Embolism and thrombosis of other arteries (CMS/HCC)  Cardiac catheterization - non-coronary        General:  General  Reason for Referral: ADL impairment  Referred By: Hina WILLS, 12/31  Past Medical History Relevant to Rehab: HLD, HTN, PVD, OA, Obese, AA, CAD, + smoker, TKA, Chasidy, C19, PCI, osteoporosis; L3,4, L4,5, L5S1 Laminectomy; L4,5, L5S1 PLIF; L4,5, L5S! interbody cage; L3,4 Posteriorlateral fusion L3,4,; Thermal ablation L3,4, L4,5, L5S1  11/20/23; I & D 12/6/23 and 12/11/23  Missed Visit: Yes  Missed Visit Reason: Patient refused  Family/Caregiver Present: No  Prior to Session Communication: Bedside nurse  Patient Position Received: Bed, 2 rail up, Alarm off, not on at start of session  General Comment: Pt. is 67 y/o female to ED with worsening back pain. Pt. saw orthopedic surgeon OP; provided with medications without relief of symptoms. Into ED and scheduled for I&D of lumbar spine and removal of hardware, exploration of posterior lateral fusion L 3-4, L4-5, L5-S1 evan Coombs 12/31.  Precautions:  LE Weight Bearing Status: Weight Bearing as Tolerated  Medical Precautions: Fall precautions  Post-Surgical Precautions: Spinal precautions  Braces Applied: TLSO when OOB    Pain:  Pain Assessment  Pain Assessment: 0-10  Pain Score: 5 - Moderate pain  Pain Type: Surgical pain  Pain Location: Back (into RLE down to foot)    Objective   Cognition:  Overall Cognitive Status: Within Functional Limits     Home Living:  Type of Home: House  Lives With: Spouse  Home Adaptive Equipment: Walker rolling or standard  Home Layout: One level  Home Access: Stairs to enter without rails  Entrance Stairs-Number of Steps: Threshold step; uses walker to navigate.  Bathroom Shower/Tub: Tub/shower unit  Bathroom Equipment:  (Has access to shower chair from mother)  Prior Function:  Level of [place]: Independent with ADLs and functional transfers, Needs assistance with homemaking  ADL Assistance: Independent  Homemaking Assistance:  ( assists)  Ambulatory Assistance:  (Been using WW since November 2023 back sx.)  Prior Function Comments: Denies falls. Was driving before surgery.     ADL:  Eating Assistance: Independent  Grooming Assistance: Minimal  Bathing Assistance: Moderate  UE Dressing Assistance: Stand by  LE Dressing Assistance: Moderate  Toileting Assistance with Device: Moderate  Activity Tolerance:  Endurance: Decreased tolerance for upright  activites  Bed Mobility/Transfers: Bed Mobility  Bed Mobility:  (log roll; Min A for sup to sit and sit to sup. VCs for technique)    Transfers  Transfer:  (Sit to stand from EOB; Mod A x1 with WW.)      Ambulation/Gait Training:  Ambulation/Gait Training  Ambulation/Gait Training Performed:  (side stepping; Min A x1 with WW)  Sitting Balance:  Static Sitting Balance  Static Sitting-Level of Assistance: Close supervision  Standing Balance:  Dynamic Standing Balance  Dynamic Standing-Comments: Fair - to Fair with WW     Strength:  Strength Comments: Lukasz 4/5 MMT      Extremities: RUE   RUE : Within Functional Limits and LUE   LUE: Within Functional Limits      Outcome Measures:[facility] Daily Activity  Putting on and taking off regular lower body clothing: A lot  Bathing (including washing, rinsing, drying): A lot  Putting on and taking off regular upper body clothing: A little  Toileting, which includes using toilet, bedpan or urinal: A lot  Taking care of personal grooming such as brushing teeth: A little  Eating Meals: None  Daily Activity - Total Score: 16        Education Documentation  Body Mechanics, taught by Johanne Saunders OT at 1/2/2024  1:28 PM.  Learner: Patient  Readiness: Acceptance  Method: Explanation  Response: Needs Reinforcement, Verbalizes Understanding    Precautions, taught by Johanne Saunders OT at 1/2/2024  1:28 PM.  Learner: Patient  Readiness: Acceptance  Method: Explanation  Response: Needs Reinforcement, Verbalizes Understanding    ADL Training, taught by Johanne Saunders OT at 1/2/2024  1:28 PM.  Learner: Patient  Readiness: Acceptance  Method: Explanation  Response: Needs Reinforcement, Verbalizes Understanding      IP EDUCATION:  Education  Individual(s) Educated: Patient  Education Provided: Fall precautons, Risk and benefits of OT discussed with patient or other, POC discussed and agreed upon, Diagnosis & Precautions    Goals:  Encounter Problems        Encounter Problems (Active)       OT Goals       CGA for all functional transfers  (Progressing)       Start:  01/02/24    Expected End:  01/16/24            CGA LB dressing with AE (Progressing)       Start:  01/02/24    Expected End:  01/16/24            Fair + dyn standing balance for ADLs and functional activities  (Progressing)       Start:  01/02/24    Expected End:  01/16/24            CGA for toileting tasks/clothing mgmt  (Progressing)       Start:  01/02/24    Expected End:  01/16/24

## 2024-01-03 ENCOUNTER — HOME INFUSION (OUTPATIENT)
Dept: INFUSION THERAPY | Age: 69
End: 2024-01-03
Payer: COMMERCIAL

## 2024-01-03 LAB
ANION GAP SERPL CALC-SCNC: 10 MMOL/L (ref 10–20)
BACTERIA SPEC CULT: ABNORMAL
BLOOD EXPIRATION DATE: NORMAL
BUN SERPL-MCNC: 10 MG/DL (ref 6–23)
CALCIUM SERPL-MCNC: 8.3 MG/DL (ref 8.6–10.3)
CHLORIDE SERPL-SCNC: 98 MMOL/L (ref 98–107)
CK SERPL-CCNC: 13 U/L (ref 0–215)
CO2 SERPL-SCNC: 28 MMOL/L (ref 21–32)
CREAT SERPL-MCNC: 0.91 MG/DL (ref 0.5–1.05)
DISPENSE STATUS: NORMAL
ERYTHROCYTE [DISTWIDTH] IN BLOOD BY AUTOMATED COUNT: 15.9 % (ref 11.5–14.5)
GFR SERPL CREATININE-BSD FRML MDRD: 68 ML/MIN/1.73M*2
GLUCOSE SERPL-MCNC: 89 MG/DL (ref 74–99)
GRAM STN SPEC: ABNORMAL
HCT VFR BLD AUTO: 24.9 % (ref 36–46)
HGB BLD-MCNC: 8 G/DL (ref 12–16)
MCH RBC QN AUTO: 30.7 PG (ref 26–34)
MCHC RBC AUTO-ENTMCNC: 32.1 G/DL (ref 32–36)
MCV RBC AUTO: 95 FL (ref 80–100)
NRBC BLD-RTO: 0 /100 WBCS (ref 0–0)
PLATELET # BLD AUTO: 241 X10*3/UL (ref 150–450)
POTASSIUM SERPL-SCNC: 3.3 MMOL/L (ref 3.5–5.3)
PRODUCT BLOOD TYPE: 600
PRODUCT CODE: NORMAL
RBC # BLD AUTO: 2.61 X10*6/UL (ref 4–5.2)
SODIUM SERPL-SCNC: 133 MMOL/L (ref 136–145)
UNIT ABO: NORMAL
UNIT NUMBER: NORMAL
UNIT RH: NORMAL
UNIT VOLUME: 350
WBC # BLD AUTO: 5.3 X10*3/UL (ref 4.4–11.3)
XM INTEP: NORMAL

## 2024-01-03 PROCEDURE — 97530 THERAPEUTIC ACTIVITIES: CPT | Mod: GO,CO

## 2024-01-03 PROCEDURE — 2500000005 HC RX 250 GENERAL PHARMACY W/O HCPCS

## 2024-01-03 PROCEDURE — 2500000001 HC RX 250 WO HCPCS SELF ADMINISTERED DRUGS (ALT 637 FOR MEDICARE OP): Performed by: ANESTHESIOLOGY

## 2024-01-03 PROCEDURE — 97530 THERAPEUTIC ACTIVITIES: CPT | Mod: GP,CQ

## 2024-01-03 PROCEDURE — 1090000001 HH PPS REVENUE CREDIT

## 2024-01-03 PROCEDURE — 37799 UNLISTED PX VASCULAR SURGERY: CPT | Performed by: STUDENT IN AN ORGANIZED HEALTH CARE EDUCATION/TRAINING PROGRAM

## 2024-01-03 PROCEDURE — 1200000002 HC GENERAL ROOM WITH TELEMETRY DAILY

## 2024-01-03 PROCEDURE — 2500000004 HC RX 250 GENERAL PHARMACY W/ HCPCS (ALT 636 FOR OP/ED): Performed by: INTERNAL MEDICINE

## 2024-01-03 PROCEDURE — 2500000004 HC RX 250 GENERAL PHARMACY W/ HCPCS (ALT 636 FOR OP/ED): Performed by: HOSPITALIST

## 2024-01-03 PROCEDURE — 2500000001 HC RX 250 WO HCPCS SELF ADMINISTERED DRUGS (ALT 637 FOR MEDICARE OP): Performed by: HOSPITALIST

## 2024-01-03 PROCEDURE — 2500000004 HC RX 250 GENERAL PHARMACY W/ HCPCS (ALT 636 FOR OP/ED)

## 2024-01-03 PROCEDURE — 82550 ASSAY OF CK (CPK): CPT | Performed by: INTERNAL MEDICINE

## 2024-01-03 PROCEDURE — 97535 SELF CARE MNGMENT TRAINING: CPT | Mod: GO,CO

## 2024-01-03 PROCEDURE — 80048 BASIC METABOLIC PNL TOTAL CA: CPT | Performed by: STUDENT IN AN ORGANIZED HEALTH CARE EDUCATION/TRAINING PROGRAM

## 2024-01-03 PROCEDURE — 2500000001 HC RX 250 WO HCPCS SELF ADMINISTERED DRUGS (ALT 637 FOR MEDICARE OP)

## 2024-01-03 PROCEDURE — 1090000002 HH PPS REVENUE DEBIT

## 2024-01-03 PROCEDURE — 2500000002 HC RX 250 W HCPCS SELF ADMINISTERED DRUGS (ALT 637 FOR MEDICARE OP, ALT 636 FOR OP/ED): Performed by: HOSPITALIST

## 2024-01-03 PROCEDURE — 99232 SBSQ HOSP IP/OBS MODERATE 35: CPT | Performed by: STUDENT IN AN ORGANIZED HEALTH CARE EDUCATION/TRAINING PROGRAM

## 2024-01-03 PROCEDURE — 85027 COMPLETE CBC AUTOMATED: CPT | Performed by: STUDENT IN AN ORGANIZED HEALTH CARE EDUCATION/TRAINING PROGRAM

## 2024-01-03 PROCEDURE — 2500000001 HC RX 250 WO HCPCS SELF ADMINISTERED DRUGS (ALT 637 FOR MEDICARE OP): Performed by: REGISTERED NURSE

## 2024-01-03 RX ORDER — SIMVASTATIN 20 MG/1
20 TABLET, FILM COATED ORAL NIGHTLY
Status: DISCONTINUED | OUTPATIENT
Start: 2024-01-03 | End: 2024-01-04

## 2024-01-03 RX ADMIN — OXYCODONE HYDROCHLORIDE 10 MG: 5 TABLET ORAL at 16:25

## 2024-01-03 RX ADMIN — PREGABALIN 75 MG: 50 CAPSULE ORAL at 21:06

## 2024-01-03 RX ADMIN — MORPHINE SULFATE 2 MG: 2 INJECTION, SOLUTION INTRAMUSCULAR; INTRAVENOUS at 09:12

## 2024-01-03 RX ADMIN — ACETAMINOPHEN 650 MG: 325 TABLET ORAL at 21:07

## 2024-01-03 RX ADMIN — ACETAMINOPHEN 650 MG: 325 TABLET ORAL at 16:24

## 2024-01-03 RX ADMIN — Medication: at 08:00

## 2024-01-03 RX ADMIN — CEFEPIME 2 G: 2 INJECTION, POWDER, FOR SOLUTION INTRAVENOUS at 13:26

## 2024-01-03 RX ADMIN — BRIMONIDINE TARTRATE 1 DROP: 2 SOLUTION OPHTHALMIC at 21:08

## 2024-01-03 RX ADMIN — HYDROMORPHONE HYDROCHLORIDE 0.4 MG: 1 INJECTION, SOLUTION INTRAMUSCULAR; INTRAVENOUS; SUBCUTANEOUS at 09:39

## 2024-01-03 RX ADMIN — HYDRALAZINE HYDROCHLORIDE 50 MG: 50 TABLET ORAL at 09:27

## 2024-01-03 RX ADMIN — HYDRALAZINE HYDROCHLORIDE 50 MG: 50 TABLET ORAL at 21:08

## 2024-01-03 RX ADMIN — MORPHINE SULFATE 15 MG: 15 TABLET, EXTENDED RELEASE ORAL at 09:13

## 2024-01-03 RX ADMIN — DAPTOMYCIN 300 MG: 500 INJECTION, POWDER, LYOPHILIZED, FOR SOLUTION INTRAVENOUS at 21:28

## 2024-01-03 RX ADMIN — OXYCODONE HYDROCHLORIDE 10 MG: 5 TABLET ORAL at 12:01

## 2024-01-03 RX ADMIN — DOCUSATE SODIUM 100 MG: 100 CAPSULE, LIQUID FILLED ORAL at 21:06

## 2024-01-03 RX ADMIN — MIRTAZAPINE 15 MG: 15 TABLET, FILM COATED ORAL at 21:08

## 2024-01-03 RX ADMIN — DOCUSATE SODIUM 100 MG: 100 CAPSULE, LIQUID FILLED ORAL at 09:15

## 2024-01-03 RX ADMIN — ACETAMINOPHEN 650 MG: 325 TABLET ORAL at 09:14

## 2024-01-03 RX ADMIN — Medication 1 TABLET: at 09:27

## 2024-01-03 RX ADMIN — FUROSEMIDE 20 MG: 40 TABLET ORAL at 09:13

## 2024-01-03 RX ADMIN — MORPHINE SULFATE 2 MG: 2 INJECTION, SOLUTION INTRAMUSCULAR; INTRAVENOUS at 13:26

## 2024-01-03 RX ADMIN — EZETIMIBE 10 MG: 10 TABLET ORAL at 09:13

## 2024-01-03 RX ADMIN — BUSPIRONE HYDROCHLORIDE 10 MG: 5 TABLET ORAL at 09:13

## 2024-01-03 RX ADMIN — MORPHINE SULFATE 15 MG: 15 TABLET, EXTENDED RELEASE ORAL at 21:06

## 2024-01-03 RX ADMIN — ISOSORBIDE MONONITRATE 60 MG: 60 TABLET, EXTENDED RELEASE ORAL at 07:33

## 2024-01-03 RX ADMIN — BRIMONIDINE TARTRATE 1 DROP: 2 SOLUTION OPHTHALMIC at 09:00

## 2024-01-03 RX ADMIN — POLYETHYLENE GLYCOL 3350 17 G: 17 POWDER, FOR SOLUTION ORAL at 09:15

## 2024-01-03 RX ADMIN — HYDROMORPHONE HYDROCHLORIDE 0.4 MG: 1 INJECTION, SOLUTION INTRAMUSCULAR; INTRAVENOUS; SUBCUTANEOUS at 16:22

## 2024-01-03 RX ADMIN — SIMVASTATIN 20 MG: 20 TABLET, FILM COATED ORAL at 21:06

## 2024-01-03 RX ADMIN — ATENOLOL 50 MG: 50 TABLET ORAL at 09:13

## 2024-01-03 RX ADMIN — PREGABALIN 75 MG: 50 CAPSULE ORAL at 09:00

## 2024-01-03 RX ADMIN — PANTOPRAZOLE SODIUM 40 MG: 40 TABLET, DELAYED RELEASE ORAL at 07:33

## 2024-01-03 RX ADMIN — LIDOCAINE 1 PATCH: 4 PATCH TOPICAL at 09:00

## 2024-01-03 ASSESSMENT — PAIN - FUNCTIONAL ASSESSMENT
PAIN_FUNCTIONAL_ASSESSMENT: 0-10

## 2024-01-03 ASSESSMENT — PAIN DESCRIPTION - LOCATION
LOCATION: BACK
LOCATION: LEG
LOCATION: BACK

## 2024-01-03 ASSESSMENT — PAIN SCALES - GENERAL
PAINLEVEL_OUTOF10: 7
PAINLEVEL_OUTOF10: 9
PAINLEVEL_OUTOF10: 10 - WORST POSSIBLE PAIN
PAINLEVEL_OUTOF10: 4
PAINLEVEL_OUTOF10: 10 - WORST POSSIBLE PAIN
PAINLEVEL_OUTOF10: 9
PAINLEVEL_OUTOF10: 7
PAINLEVEL_OUTOF10: 9

## 2024-01-03 ASSESSMENT — COGNITIVE AND FUNCTIONAL STATUS - GENERAL
MOBILITY SCORE: 16
DAILY ACTIVITIY SCORE: 17
HELP NEEDED FOR BATHING: A LITTLE
DRESSING REGULAR LOWER BODY CLOTHING: A LOT
TURNING FROM BACK TO SIDE WHILE IN FLAT BAD: A LOT
CLIMB 3 TO 5 STEPS WITH RAILING: A LITTLE
HELP NEEDED FOR BATHING: A LOT
MOVING FROM LYING ON BACK TO SITTING ON SIDE OF FLAT BED WITH BEDRAILS: A LOT
TOILETING: A LITTLE
MOVING TO AND FROM BED TO CHAIR: A LITTLE
DRESSING REGULAR LOWER BODY CLOTHING: A LOT
DRESSING REGULAR UPPER BODY CLOTHING: A LITTLE
DAILY ACTIVITIY SCORE: 17
PERSONAL GROOMING: A LITTLE
TOILETING: A LITTLE
WALKING IN HOSPITAL ROOM: A LITTLE
EATING MEALS: A LITTLE
WALKING IN HOSPITAL ROOM: A LITTLE
STANDING UP FROM CHAIR USING ARMS: A LITTLE
MOBILITY SCORE: 16
MOVING TO AND FROM BED TO CHAIR: A LITTLE
CLIMB 3 TO 5 STEPS WITH RAILING: A LITTLE
DRESSING REGULAR UPPER BODY CLOTHING: A LITTLE
PERSONAL GROOMING: A LITTLE
STANDING UP FROM CHAIR USING ARMS: A LITTLE
MOVING FROM LYING ON BACK TO SITTING ON SIDE OF FLAT BED WITH BEDRAILS: A LOT
TURNING FROM BACK TO SIDE WHILE IN FLAT BAD: A LOT

## 2024-01-03 ASSESSMENT — PAIN SCALES - WONG BAKER: WONGBAKER_NUMERICALRESPONSE: NO HURT

## 2024-01-03 ASSESSMENT — PAIN DESCRIPTION - ORIENTATION
ORIENTATION: LOWER

## 2024-01-03 ASSESSMENT — ACTIVITIES OF DAILY LIVING (ADL): HOME_MANAGEMENT_TIME_ENTRY: 40

## 2024-01-03 ASSESSMENT — PAIN SCALES - PAIN ASSESSMENT IN ADVANCED DEMENTIA (PAINAD)
FACIALEXPRESSION: SMILING OR INEXPRESSIVE
BODYLANGUAGE: RELAXED
BREATHING: NORMAL
TOTALSCORE: 0
CONSOLABILITY: NO NEED TO CONSOLE

## 2024-01-03 ASSESSMENT — PAIN DESCRIPTION - DESCRIPTORS: DESCRIPTORS: SHARP

## 2024-01-03 NOTE — PROGRESS NOTES
"  Infectious Disease    Number: [id number]    History of Present Illness:    Pain better controlled overall, had IVC filter placed        Preoperative diagnosis: Prior L3-4, L4-5 and L5-S1 midline laminectomy with instrumented interbody fusion at L4-5 and L5-S1.  Postoperative lumbar epidural abscess.  Lumbar screw malpositioning     Postoperative diagnosis: Above     Procedure: Irrigation and debridement subfascial lumbar spine.  Removal of hardware lumbar spine.  Exploration of spinal fusion lumbar spine.  Posterior lateral fusion L3-4, L4-5 and L5-S1.             No fevers  RPhysical Exam:    Blood pressure 131/62, pulse 75, temperature 36.1 °C (97 °F), resp. rate 20, height 1.448 m (4' 9.01\"), weight 78.2 kg (172 lb 6.4 oz), SpO2 91 %, not currently breastfeeding.  General: Patient appears ok at the present time. NAD  Skin: no new rashes  HEENT:  Neck is supple, No subconjunctival hemorrhages, no oral exudates  Heart: S1 S2  Abdomen: soft, ND, NTTP,  Back :no CVA tenderness  Extrem: No edema, non tender  Neuro exam: CN II-XII intact  Psych: cooperative    Labs:  I have reviewed all lab results by electronic record, including most recent CBC, metabolic panel, and pertinent abnormalities were addressed from an infectious disease perspective.  Trends are being monitored over time.    Lab Results   Component Value Date    WBC 4.6 01/02/2024    HGB 7.0 (L) 01/02/2024    HCT 22.7 (L) 01/02/2024    MCV 99 01/02/2024     01/02/2024     Lab Results   Component Value Date    GLUCOSE 101 (H) 01/02/2024    CALCIUM 8.3 (L) 01/02/2024     (L) 01/02/2024    K 3.7 01/02/2024    CO2 26 01/02/2024     01/02/2024    BUN 7 01/02/2024    CREATININE 0.85 01/02/2024       Radiology:  I have reviewed imaging results per electronic record and most pertinent abnormalities are being addressed from an infectious disease standpoint.            ASSESSMENT:  Problem List Items Addressed This Visit          Cardiac and " Vasculature    Essential hypertension    Relevant Orders    Case Request Cath Lab: IVC Filter Insertion (Completed)    Cardiac catheterization - non-coronary (Completed)    PVD (peripheral vascular disease) (CMS/HCC)    Relevant Orders    Case Request Cath Lab: IVC Filter Insertion (Completed)    Cardiac catheterization - non-coronary (Completed)       Coag and Thromboembolic    Deep vein thrombosis (DVT) of right lower extremity (CMS/[facility])    Relevant Orders    Case Request Cath Lab: IVC Filter Insertion (Completed)    Case Request Cath Lab: IVC Filter Insertion (Completed)    Cardiac catheterization - non-coronary (Completed)       Hematology and Neoplasia    Anemia    Relevant Orders    Case Request Cath Lab: IVC Filter Insertion (Completed)    Case Request Cath Lab: IVC Filter Insertion (Completed)    Cardiac catheterization - non-coronary (Completed)       Musculoskeletal and Injuries    Intractable back pain    * (Principal) Postoperative surgical complication involving musculoskeletal system associated with musculoskeletal procedure, unspecified complication    Back pain at L4-L5 level - Primary     Other Visit Diagnoses       Back pain, lumbosacral        Relevant Orders    Type And Screen (Completed)    Tissue/Wound Culture/Smear (Completed)    Tissue/Wound Culture/Smear (Completed)    Tissue/Wound Culture/Smear (Completed)    Tissue/Wound Culture/Smear (Completed)    Single subsegmental pulmonary embolism without acute cor pulmonale (CMS/[facility])        Relevant Orders    Case Request Cath Lab: IVC Filter Insertion (Completed)    Deep vein thrombosis (DVT) of proximal lower extremity, unspecified chronicity, unspecified laterality (CMS/[facility])        Relevant Orders    Vascular US lower extremity venous duplex bilateral (Completed)    Acute deep vein thrombosis (DVT) of both peroneal veins (CMS/[facility])        Relevant Orders    Vascular US lower extremity venous duplex bilateral (Completed)    DVT of deep femoral  vein, bilateral (CMS/HCC)        Embolism and thrombosis of superficial veins of right lower extremity        Embolism and thrombosis of other arteries (CMS/HCC)             Spinal abscess      PLAN:   With,staph occurring  again , despite seemingly optimal vanco levels as outpatient. ? Secondary to hardware ( now removed) or persistent infected seromas.     Consider change to alternative deptomycin, await cultures

## 2024-01-03 NOTE — PROGRESS NOTES
Patient admitted to hospital on 12/31/23. Stay has been over 24 hours and med therapy has been changed new orders will be needed upon discharge.    Chart forwarded to Intake [facility]

## 2024-01-03 NOTE — PROGRESS NOTES
Physical Therapy    Physical Therapy Treatment    Patient Name: Tara Tierney  MRN: [id number]  Today's Date: 1/3/2024  Time Calculation  Start Time: 1043  Stop Time: 1128  Time Calculation (min): 45 min       Assessment/Plan   PT Assessment  PT Assessment Results: Decreased strength, Decreased mobility, Decreased endurance  Rehab Prognosis: Good  Evaluation/Treatment Tolerance: Patient tolerated treatment well, Patient limited by pain  Medical Staff Made Aware: Yes  End of Session Communication: Bedside nurse, PCT/NA/CTA  Assessment Comment: Patient will benefit from additional PT to increase mobility and activity tolerance.  End of Session Patient Position: Up in chair, Alarm off, not on at start of session (Foot stool to rest feet on; Call light, phone, and tray table within reach.)  PT Plan  Inpatient/Swing Bed or Outpatient: Inpatient  Treatment/Interventions: Bed mobility, Transfer training, Gait training, Therapeutic activity  PT Plan: Skilled PT  PT Frequency: 3 times per week       PT Recommended Transfer Status: Assist x1    General Visit Information:   PT  Visit  PT Received On: 01/03/24  General  Family/Caregiver Present: Yes (Daughter(left the room at the start of session))  Prior to Session Communication: Bedside nurse  Patient Position Received: Up in chair, Alarm off, not on at start of session  General Comment: Pleasant and cooperative; motivated to go home when discharged from the hospital.    General Observations:   General Observation: x2 JOSE drains; Tele.    Subjective     Precautions:  Precautions  LE Weight Bearing Status: Weight Bearing as Tolerated  Medical Precautions: Fall precautions  Post-Surgical Precautions: Spinal precautions  Braces Applied: TLSO when OOB  Precautions Comment: Instructions/(A) for donning/doffing brace.    Objective     Pain:  Pain Assessment  Pain Assessment: 0-10  Pain Score: 7 (Back. Nursing aware.)    Cognition:  Cognition  Orientation Level: Oriented  X4    Treatments:              Ambulation/Gait Training  Ambulation/Gait Training Performed: Yes  Ambulation/Gait Training 1  Surface 1: Level tile  Device 1: Rolling walker  Assistance 1: Contact guard  Comments/Distance (ft) 1: ~20ft x4. TLSO brace on. Slow cathi. Slightly forward flexed posture. Step through gait pattern. Decreased step height/length B. v/c for safer AD placement; keep it closer to her body for better support/upright posture. Educated patient on safety with directional changes to avoid twisting. No LOB. Patient c/o slight discomfort with (R)LE advancement(unrated), but no pain/discomfort with (L)LE advancement. Discussed stair climbing safety and technique(curb step at home to get in/out of house). Patient verbalized understanding of ascending steps with (L)LE and descending steps with (R)LE.  Transfers  Transfer: Yes  Transfer 1  Technique 1: Sit to stand, Stand to sit  Transfer Device 1:  (ww)  Transfer Level of Assistance 1: Contact guard  Trials/Comments 1: (3x). v/c for safe hand placement and technique. Slow transition of hands to/from ww. Benefits from elevated surfaces.          Outcome Measures:  [facility] Basic Mobility  Turning from your back to your side while in a flat bed without using bedrails: A lot  Moving from lying on your back to sitting on the side of a flat bed without using bedrails: A lot  Moving to and from bed to chair (including a wheelchair): A little  Standing up from a chair using your arms (e.g. wheelchair or bedside chair): A little  To walk in hospital room: A little  Climbing 3-5 steps with railing: A little  Basic Mobility - Total Score: 16    Education Documentation  Precautions, taught by Jeanine Bryan PTA at 1/3/2024 11:55 AM.  Learner: Patient  Readiness: Acceptance  Method: Explanation, Demonstration  Response: Verbalizes Understanding, Needs Reinforcement  Comment: See therapy note.    Body Mechanics, taught by Jeanine Bryan PTA at 1/3/2024 11:55  AM.  Learner: Patient  Readiness: Acceptance  Method: Explanation, Demonstration  Response: Verbalizes Understanding, Needs Reinforcement  Comment: See therapy note.    Mobility Training, taught by Jeanine Bryan PTA at 1/3/2024 11:55 AM.  Learner: Patient  Readiness: Acceptance  Method: Explanation, Demonstration  Response: Verbalizes Understanding, Needs Reinforcement  Comment: See therapy note.    EDUCATION:  Individual(s) Educated: Patient  Education Provided: Body Mechanics, Fall Risk, Home Safety, POC, Posture, Post-Op Precautions (Safety with transfers/amb using ww properly for support; Spinal Precautions; Activity modifications as needed(fatigue/pain level); Donning/doffing TLSO brace.)  Patient Response to Education: Patient/Caregiver Verbalized Understanding of Information    Encounter Problems       Encounter Problems (Active)       PT Problem       Bed mobility supine <> sit modified independent  (Progressing)       Start:  01/02/24    Expected End:  01/04/24            Transfers sit <> stand with ww modified independent  (Progressing)       Start:  01/02/24    Expected End:  01/04/24            Patient to ambulate with ww 50' CGA  (Progressing)       Start:  01/02/24    Expected End:  01/04/24

## 2024-01-03 NOTE — PROGRESS NOTES
Infectious Diseases Inpatient Progress Note      HISTORY OF PRESENT ILLNESS:    Follow up postop deep incision wound infection with recurrent abscess while on IV Vanco, S/P hardware removal with persistent +ve MRSA Cx. ,  on IV vancomycin, well tolerated.   Recent PE and DVT.   Has resolved leg pain. No SOB. +ve constipation   Decreased appetite  No bowel movements   no urinary symptoms  No fevers or chills.    Current Medications:    acetaminophen, 650 mg, oral, q6h  [Held by provider] aspirin, 81 mg, oral, Daily  atenolol, 50 mg, oral, q AM  brimonidine, 1 drop, Both Eyes, BID  busPIRone, 10 mg, oral, Daily  daptomycin, 6 mg/kg, intravenous, q24h EDE  docusate sodium, 100 mg, oral, BID  ezetimibe, 10 mg, oral, Daily  furosemide, 20 mg, oral, Daily  hydrALAZINE, 50 mg, oral, BID  isosorbide mononitrate ER, 60 mg, oral, Daily  lactobacillus acidophilus, 1 tablet, oral, Daily  lidocaine, 1 patch, transdermal, Daily  melatonin, 3 mg, oral, Daily  mirtazapine, 15 mg, oral, Nightly  morphine CR, 15 mg, oral, q12h EDE  oxygen, , inhalation, Continuous - 02/gases  pantoprazole, 40 mg, oral, Daily before breakfast  pilocarpine, 5 mg, oral, Daily  polyethylene glycol, 17 g, oral, Daily  pregabalin, 75 mg, oral, BID  simvastatin, 20 mg, oral, Nightly        Allergies:  Neomycin and Neomycin-polymyxin b-dexameth      Review of Systems  14 system review is negative other than HPI    Physical Exam    Heart Rate:  [75-85]   Temp:  [36.1 °C (97 °F)-37.4 °C (99.3 °F)]   Resp:  [19-22]   BP: ()/(46-62)   SpO2:  [92 %-96 %]    Vitals:    01/02/24 2036 01/03/24 0056 01/03/24 0727 01/03/24 1457   BP: (!) 92/46 131/62 134/61 133/61   BP Location: Left arm Left arm     Patient Position: Lying Lying     Pulse: 80 75 77 80   Resp: 20 21 19    Temp: 37 °C (98.6 °F) 36.1 °C (97 °F) 36.8 °C (98.2 °F) 36.3 °C (97.3 °F)   TempSrc: Temporal Temporal     SpO2: 94% 92% 92% 96%   Weight:       Height:         General Appearance: alert and  "oriented to person, place and time, well-developed and well-nourished, in no acute distress  Skin: warm and dry, no rash.   Head: normocephalic and atraumatic  Eyes: anicteric sclerae  ENT:  normal mucous membranes. No oral thrush  Lungs: normal respiratory effort, clear  Heart: Nl S1 and S2  Abdomen: soft, no tenderness  1+ B leg edema  No erythema, no tenderness  Back incision with dressing, 2 JOSE drains  DATA:    Lab Results   Component Value Date    WBC 5.3 01/03/2024    HGB 8.0 (L) 01/03/2024    HCT 24.9 (L) 01/03/2024    MCV 95 01/03/2024     01/03/2024     Lab Results   Component Value Date    CREATININE 0.91 01/03/2024    BUN 10 01/03/2024     (L) 01/03/2024    K 3.3 (L) 01/03/2024    CL 98 01/03/2024    CO2 28 01/03/2024       Hepatic Function Panel:No results found for: \"ALKPHOS\", \"ALT\", \"AST\", \"PROT\", \"BILITOT\", \"BILIDIR\"    Microbiology:   Susceptibility data from last 90 days.  Collected Specimen Info Organism Amoxicillin/Clavulanate Ampicillin Ampicillin/Sulbactam Aztreonam Cefazolin Cefazolin (uncomplicated UTIs only) Cefepime Cefotaxime Ceftazidime Ceftriaxone Ciprofloxacin Clindamycin   12/31/23 Swab from SPINE Staphylococcus aureus               12/31/23 Tissue from SPINE Methicillin Resistant Staphylococcus aureus (MRSA)            S   12/31/23 Swab from SPINE Methicillin Resistant Staphylococcus aureus (MRSA)            S   12/31/23 Tissue from SPINE Staphylococcus aureus               12/11/23 Fluid from ABSCESS Methicillin Resistant Staphylococcus aureus (MRSA)            S   12/11/23 Tissue from ABSCESS Staphylococcus aureus               12/11/23 Swab from ABSCESS Methicillin Resistant Staphylococcus aureus (MRSA)            S   12/06/23 Swab from SPINE Methicillin Resistant Staphylococcus aureus (MRSA)               12/06/23 Tissue from SPINE Staphylococcus aureus               12/06/23 Tissue from SPINE Staphylococcus aureus               12/05/23 Urine from Clean Catch/Voided " Escherichia coli S R S R R R R R R R R    11/03/23 Swab from Nares/Axilla/Groin Methicillin Resistant Staphylococcus aureus (MRSA)                 Collected Specimen Info Organism Ertapenem Erythromycin Gentamicin Meropenem Nitrofurantoin Oxacillin Piperacillin/Tazobactam Tetracycline Tobramycin Trimethoprim/Sulfamethoxazole Vancomycin   12/31/23 Swab from SPINE Staphylococcus aureus              12/31/23 Tissue from SPINE Methicillin Resistant Staphylococcus aureus (MRSA)  R    R  S  S S   12/31/23 Swab from SPINE Methicillin Resistant Staphylococcus aureus (MRSA)  R    R  S  S S   12/31/23 Tissue from SPINE Staphylococcus aureus              12/11/23 Fluid from ABSCESS Methicillin Resistant Staphylococcus aureus (MRSA)  R    R  S  S S   12/11/23 Tissue from ABSCESS Staphylococcus aureus              12/11/23 Swab from ABSCESS Methicillin Resistant Staphylococcus aureus (MRSA)  R    R  S  S S   12/06/23 Swab from SPINE Methicillin Resistant Staphylococcus aureus (MRSA)              12/06/23 Tissue from SPINE Staphylococcus aureus              12/06/23 Tissue from SPINE Staphylococcus aureus              12/05/23 Urine from Clean Catch/Voided Escherichia coli S  R S S  S  I S    11/03/23 Swab from Nares/Axilla/Groin Methicillin Resistant Staphylococcus aureus (MRSA)                 Imaging:   CT abdomen pelvis wo IV contrast    Result Date: 12/31/2023  STUDY: CT Abdomen and Pelvis without IV Contrast; 12/31/2023 at 5:11 PM. INDICATION: Abdominal pain. COMPARISON: CT AP 12/19/2023 ACCESSION NUMBER(S): [id number] ORDERING CLINICIAN: WILLIAM EDMONDS TECHNIQUE: CT of the abdomen and pelvis was performed.  Contiguous axial images were obtained at 3 mm slice thickness through the abdomen and pelvis. Coronal and sagittal reconstructions at 3 mm slice thickness were performed. No intravenous contrast was administered.  Automated mA/kV exposure control was utilized and patient examination was performed in strict  accordance with principles of ALARA. FINDINGS: Please note that the evaluation of vessels, lymph nodes and organs is limited without intravenous contrast.  LOWER CHEST: No cardiomegaly.  No pericardial effusion.  Very small bilateral pleural effusions  ABDOMEN:  LIVER: No hepatomegaly.  Smooth surface contour.  Normal attenuation.  BILE DUCTS: No intrahepatic or extrahepatic biliary ductal dilatation.  GALLBLADDER: The gallbladder is absent. STOMACH: No abnormalities identified.  PANCREAS: Negative for pancreatitis  SPLEEN: No splenomegaly or focal splenic lesion.  ADRENAL GLANDS: No thickening or nodules.  KIDNEYS AND URETERS: Kidneys are normal in size and location.  No renal or ureteral calculi.  PELVIS:  BLADDER: No abnormalities identified.  REPRODUCTIVE ORGANS: No abnormalities identified.  BOWEL: Colonic diverticulosis.  Mild constipation without bowel obstruction. Negative for appendicitis  VESSELS: Limited due to lack of intravenous contrast.  Prior femoral to femoral artery bypass.  Abdominal aorta is normal in caliber.  PERITONEUM/RETROPERITONEUM/LYMPH NODES: Small amount of low-density free pelvic fluid  No pneumoperitoneum. No lymphadenopathy.  ABDOMINAL WALL: Small fat-containing umbilical hernia. SOFT TISSUES: Postsurgical changes within the posterior lumbar soft tissues with surgical drains in place.  BONES: Status post interbody fusion of L4/5 and L5/S1 with metallic spacer. This space narrowing L3/4.  Laminectomies L3-L5.  Bone graft along the lateral margins of L3-L5 fracture of the right L3 transverse process. Fracture of the right L4 transverse process.  Old screw tract within the L4 and L5 pedicles.  Narrowing of the right L5/S1 neural foramen due to facet joint osteophyte.    Negative for bowel obstruction.  Mild constipation. Colonic diverticulosis without diverticulitis. Negative for appendicitis. Postsurgical changes lumbar spine removal of pedicle screws and posterior rods from L4 through  S1.  Stable appearance of interbody spacers at L4/5 and L5/S1. Bone graft along the lateral margins of L3-L5.  Fractures of the right L4 and L3 transverse process.  The right L3 transverse process fracture appears old.  Postsurgical changes within the posterior lumbar soft tissues with surgical drains in place. Signed by Aung Sparrow MD    MR lumbar spine w and wo IV contrast    Result Date: 12/31/2023  Interpreted By:  Jonas Sanchez, STUDY: MR LUMBAR SPINE W AND WO IV CONTRAST;  12/31/2023 12:33 am   INDICATION: Signs/Symptoms:Pain.   COMPARISON: MRI of the lumbar spine dated 12/10/2023   ACCESSION NUMBER(S): [id number]   ORDERING CLINICIAN: YVETTE LEE   TECHNIQUE: Sagittal T1, T2, STIR, axial T1 and T2 weighted images of the lumbar spine were acquired. Additional axial and sagittal T1 weighted images of the lumbar spine were obtained after intravenous administration of 15.5 mL of contrast.   FINDINGS: Exam is degraded by motion.   There is again evidence of 5 lumbar type non rib-bearing vertebral bodies, with the lowest well-formed intervertebral disc space labeled L5-S1.   Postsurgical changes of posterior decompression and laminectomies of L3 through L5 with posterior spinal fusion extending from L4 through S1 and discectomies with intervertebral disc spaces at L4-L5 and L5-S1. These are similar in appearance to prior examination on 12/10/2023. Susceptibility artifact from the surgical hardware somewhat limits evaluation of the adjacent structures.   In the interim since prior imaging on 12/10/2023, new postsurgical consistent with irrigation debridement of subfascial tissues of the cutaneous spine are evident. STIR and T2 hypointense signal abnormality previously seen in the cutaneous fat of the lower lumbar spine has resolved, with new 3.7 x 3.6 x 13.6 cm (series 7, image 10; series 10, image 10) T2 hyperintense fluid collection present, with slight peripheral enhancement. This collection may  be contiguous with the skin.   T2 hyperintense collection is again present in the laminectomy surgical bed, abutting the thecal sac at the level of L3 through L5 (series 8, image 14), measuring up to 5.2 cm in craniocaudal dimension, 2.9 cm in transverse dimension and up to 1.5 cm in AP dimension (series 10, image 7). The surgical catheter previously seen within the collection is gone, although collection is decreased in size to previous imaging, with resolution of previously seen air within the collection. Mild surrounding edema and reactive soft tissue changes are present in the epidural space, somewhat increased in the interim since prior exam, with new/increased mass effect on the adjacent thecal sac.   There also appears to be small amount of new fluid present in the anterior epidural space at the level of L3 (series 10, image 3).   Although the exact characterization is difficult due to motion, there appears to be somewhat worsened spinal canal narrowing at the level of L2-L3 due to combination of new fluid in the anterior epidural space, and the T2 hyperintense fluid collection with associated inflammatory/reactive changes along the posterior aspect of the thecal sac, with somewhat increased effacement of the subarachnoid space.   No new intra thecal enhancement is identified.   Neural foramina are not well assessed due to motion and susceptibility artifact from the surgical hardware.       1.  New surgical changes of irrigation and debridement in the laminectomy surgical bed evident, with previously seen geographic area of hypointense T2 and STIR signal in the cutaneous fat of the lumbar spine resolved, and new T2 hyperintense collection measuring 3.7 x 3.6 x 13.6 cm present in the cutaneous fat, likely representing a postsurgical seroma, although sterility can not be ascertained on imaging alone. This collection reaches of the dermis, and may drain at the skin. 2. T2 hyperintense collection within the  laminectomy bed itself along the dorsal aspect of the thecal sac described on previous MRI in 12/10/2023 has mildly decreased in size from prior imaging, with interval removal of previously seen drain, although there are increased inflammatory/reactive soft tissue changes present in the laminectomy surgical bed, focus of fluid in the anterior epidural space at the level of L3 contributes to increased effacement of the thecal sac at the level of L3 compared to prior MRI.   MACRO: None   Signed by: Jonas Sanchez 12/31/2023 1:13 AM Dictation workstation:   [id number]         IMPRESSION:    Postop deep incisional wound infection of lumbar spine with failure of IV Vanco  S/P Hardware removal  MRSA infection in a patient who is a chronic carrier    Patient Active Problem List   Diagnosis    Abdominal aortic aneurysm (CMS/HCC)    Abnormal EKG    Bilateral carotid artery stenosis    Bruit of right carotid artery    CAD in native artery    Cardiomyopathy (CMS/HCC)    Chest pain    Chronic asthmatic bronchitis    Closed displaced fracture of fifth metatarsal bone    Current every day smoker    Difficulty breathing    Essential hypertension    History of total knee replacement    Hypokalemia    Intermittent claudication (CMS/HCC)    Knee osteoarthritis    Knee pain    Limb pain    Localized swelling, mass, or lump of lower extremity    Celiac artery stenosis (CMS/HCC)    Mesenteric artery stenosis (CMS/HCC)    Mixed hyperlipidemia    Obese    PVD (peripheral vascular disease) (CMS/HCC)    Right foot pain    Swelling    Trigger finger    Urinary tract infection without hematuria    Back pain of lumbar region with sciatica    Back pain with radiculopathy    Intractable back pain    Seroma, post-traumatic (CMS/HCC)    Lumbar surgical wound fluid collection    Generalized weakness    Postoperative surgical complication involving musculoskeletal system associated with musculoskeletal procedure, unspecified complication     Back pain at L4-L5 level    Deep vein thrombosis (DVT) of right lower extremity (CMS/HCC)    Anemia       PLAN:  Change Vanco to Cubicin  Decrease Zocor dose to 20 mg daily for drug interaction with CK monitoring  F/U CBC BMP  Local wound care per surgery    Discussed with patient and otherfamily    Gem Reyna MD

## 2024-01-03 NOTE — PROGRESS NOTES
"Tara Tierney is a 68 y.o. female on day 3 of admission presenting with Postoperative surgical complication involving musculoskeletal system associated with musculoskeletal procedure, unspecified complication.    Subjective   Patient seen and examined.  She reports improvement in back pain, no fevers or chills, no nausea or vomiting.  Has been working with PT.  Patient started crying after hearing the culture results, repeatedly said that she will not undergo the life again.  Was comforted, questions answered.       Objective     Physical Exam  Constitutional:       Appearance: She is obese. She is not ill-appearing.   HENT:      Head: Normocephalic and atraumatic.   Eyes:      Extraocular Movements: Extraocular movements intact.   Cardiovascular:      Rate and Rhythm: Normal rate and regular rhythm.      Heart sounds: No murmur heard.  Pulmonary:      Effort: Pulmonary effort is normal.   Abdominal:      General: There is no distension.      Palpations: Abdomen is soft.   Neurological:      General: No focal deficit present.      Mental Status: She is alert.         Last Recorded Vitals  Blood pressure 134/61, pulse 77, temperature 36.8 °C (98.2 °F), resp. rate 19, height 1.448 m (4' 9.01\"), weight 78.2 kg (172 lb 6.4 oz), SpO2 92 %, not currently breastfeeding.  Intake/Output last 3 Shifts:  I/O last 3 completed shifts:  In: 1111 (14.2 mL/kg) [P.O.:500; Blood:411; IV Piggyback:200]  Out: 4215 (53.9 mL/kg) [Urine:4100 (1.5 mL/kg/hr); Drains:115]  Weight: 78.2 kg     Relevant Results           This patient currently has cardiac telemetry ordered; if you would like to modify or discontinue the telemetry order, click here to go to the orders activity to modify/discontinue the order.                 Assessment/Plan   Principal Problem:    Postoperative surgical complication involving musculoskeletal system associated with musculoskeletal procedure, unspecified complication  Active Problems:    Essential " hypertension    PVD (peripheral vascular disease) (CMS/HCC)    Back pain with radiculopathy    Back pain at L4-L5 level    Deep vein thrombosis (DVT) of right lower extremity (CMS/HCC)    Anemia    It seems like all her cultures are growing MRSA and Staph aureus  I will go ahead and discontinue cefepime  Since cultures are still growing MRSA despite being on IV vancomycin we will consider changing her IV antibiotic  Will let ID decide on vanc/daptomycin  hardwares have been removed  Continue pain control, supportive care  Continue PT OT   Status post transfusion of 1 unit of PRBCs  Hemoglobin improved to 8, continue to monitor  Continue home meds for hypertension  Venous duplex was negative for any DVTs  Keep off Eliquis  Status post IVC filter placement  Continue rest of the medical management as she is on  DVT prophylaxis with        Nitesh Ruiz MD

## 2024-01-03 NOTE — PROGRESS NOTES
Orthopedic Spine Progress Note    Subjective   Reports moderate to severe back pain that radiates around her stomach and into right thigh and hip. Denies any saddle anesthesia or loss of bowel or bladder.   Post-Operative Day: 3 post-spine procedure irrigation and debridement and removal of lumbar spine hardware.   Systemic or Specific Complaints: Pain Control    Objective     Vital signs in last 24 hours:  Temp:  [36.1 °C (97 °F)-37.4 °C (99.3 °F)] 36.8 °C (98.2 °F)  Heart Rate:  [74-85] 77  Resp:  [19-22] 19  BP: ()/(43-62) 134/61    General: alert and oriented, in no acute distress   Neurovascular: 5/5 bilateral lower extremity motor function. Normal sensation. ROM to bilateral lower extremities does not cause any pain.    Wound: Dressing CDI, 2 JOSE drains with 20-30cc of output    Range of Motion: Limited flexion secondary to pain    DVT Exam: Patient had DVT and PE last admission and was placed on anticoagulation for this. Plan is for IVC filter placement   due to high risk of DVT.      Data Review  CBC:  Results from last 7 days   Lab Units 01/03/24  0406   WBC AUTO x10*3/uL 5.3   RBC AUTO x10*6/uL 2.61*   HEMOGLOBIN g/dL 8.0*   HEMATOCRIT % 24.9*   PLATELETS AUTO x10*3/uL 241       Assessment/Plan     Status post-spine procedure: Complications: DVT and Restricted Motion     Pain Relief: some relief    Continues current post-op course    Activity: out of bed and ambulate    Weight Bearing: WBAT    Preliminary wound culture from spinal tissues shows no growth.   Preliminary wound culture from spinal swab shows no growth.   Final cultures pending.   Patient will continue on IV vanco and cefepime per ID.   General surgery consult pending for abdominal pain.   Right hip MRI pending.     Am labs indicated improvement of hgb from 7.0 to 8.0. she did receive one unit of packed red blood cells yesterday. There is no active hemorrhage. She has some drainage in her JOSE drains which we will continue to monitor.  Drains are to remain in place until directed to remove. Total of 30 ml of drainage output overnight.      LOS: 3 days     Edi Olguin, APRN-CNP    Date: 1/3/2024  Time: 8:09 AM

## 2024-01-03 NOTE — PROGRESS NOTES
"Occupational Therapy    OT Treatment    Patient Name: Tara Tierney  MRN: [id number]  Today's Date: 1/3/2024  Time Calculation  Start Time: 0808  Stop Time: 0901  Time Calculation (min): 53 min         Assessment:  OT Assessment: Pt seen for skilled OT to address impairments with adl's, balance, and endurance.  Pt requires CGA with adl transfers and mobility d/t pain limitations and balance impairments.  Pt would benefit from continued OT to address POC and improve overall activity tolerance.  Pt reporting \"I want to go home and i'm not leaving until I am good enough to go home\".  Prognosis: Good  Evaluation/Treatment Tolerance: Patient limited by pain  Medical Staff Made Aware: Yes  End of Session Communication: Bedside nurse  End of Session Patient Position: Up in chair, Alarm on (All needs met, call light within reach)  OT Assessment Results: Decreased ADL status, Decreased safe judgment during ADL, Decreased endurance, Decreased functional mobility  Prognosis: Good  Evaluation/Treatment Tolerance: Patient limited by pain  Medical Staff Made Aware: Yes  Plan:  Treatment Interventions: ADL retraining, Functional transfer training, Endurance training  OT Frequency: 2 times per week  OT Discharge Recommendations: Moderate intensity level of continued care  OT Recommended Transfer Status: Assist of 1    Treatment Interventions: ADL retraining, Functional transfer training, Endurance training    Subjective   Previous Visit Info:  OT Last Visit  OT Received On: 01/03/24  General:  General  Prior to Session Communication: Bedside nurse (Cleared by RN for participation)  Patient Position Received: Bed, 3 rail up, Alarm off, not on at start of session  General Comment: Pt presents in bed finishing breakfast.  Agreeable to OT, pleasant and cooperative. Pt limited by pain and increased fear of pain increasing with activity. (Pt with IV and ext cath(removed for OOB act). 1/2/24- IVC filter placement,  (-) DVT BLE, (-) Abd " XR, (-)MRI acute findings)  Precautions:  LE Weight Bearing Status: Weight Bearing as Tolerated  Medical Precautions: Fall precautions  Post-Surgical Precautions: Spinal precautions (Pt able to verbalize spinal precautions with 100% accuracy.  Reports having difficulty maintaining no twisting prior to adm.)  Braces Applied: TLSO when OOB (Pt able to hannah TLSO brace with min assist.  Education provided to improve independence with placement and technique to allow pt to independently hannah.)  Vital Signs:     Pain:  Pain Assessment  Pain Assessment: 0-10  Pain Score: 7  Pain Type: Acute pain  Pain Location: Back  Pain Orientation: Lower  Pain Descriptors: Sharp  Pain Frequency: Constant/continuous  Pain Interventions: Repositioned, Relaxation technique, Ambulation/increased activity, Cold pack  Response to Interventions: Pt verbalized ice pack helps with pain    Objective    Cognition:  Cognition  Overall Cognitive Status: Within Functional Limits  Orientation Level: Oriented X4       Activities of Daily Living:          UE Dressing  UE Dressing Comments: Don gown with s/u; TLSO brace with min assist prior to OOB activity    LE Dressing  LE Dressing: Yes  LE Dressing Adaptive Equipment: Reacher  LE Dressing Comments: Pt educated on use of reacher to hannah pants.  Pt has reacher at home and uses as needed, but reports  will assist.  Pt thread LE's without difficulty from chair level.  Min assist once in stance to fully pull over hips. Pt educated to obtain balance prior to pulling clothing up for fall prevention.            Bed Mobility/Transfers: Bed Mobility  Bed Mobility: Yes  Bed Mobility 1  Bed Mobility 1: Log roll  Level of Assistance 1: Minimum assistance, Minimal verbal cues, Minimal tactile cues  Bed Mobility Comments 1: HOB~10-15 degrees.  Education provided prior to and during log roll transfer.  Min assist to elevate trunk.    Transfers  Transfer: Yes  Transfer 1  Technique 1: Sit to stand, Stand to  sit  Transfer Level of Assistance 1: Contact guard (Min assist with first sit to stand transfer from bed level.)  Trials/Comments 1: x2 from bed level, x 2 from chair level.  Min verbal cues for hand placement and safety  Transfers 2  Transfer From 2: Bed to  Transfer to 2: Chair with arms  Transfer Device 2:  (FWW)  Transfer Level of Assistance 2: Contact guard  Trials/Comments 2: Verbal cues for safety and technique      Ambulation/Gait Training:  Ambulation/Gait Training  Ambulation/Gait Training Performed: Yes (Functional mobility HHD with fww and CGA.  Reports slight decrease in pain with functional mobility(6/10))  Sitting Balance:  Static Sitting Balance  Static Sitting-Level of Assistance: Distant supervision  Dynamic Sitting Balance  Dynamic Sitting-Comments: Fair + with adl completion  Standing Balance:  Static Standing Balance  Static Standing-Comment/Number of Minutes: Fair with b/l UE support using ww  Dynamic Standing Balance  Dynamic Standing-Comments: fair/fair- with fww with dyn adl and functional tasks      Balance/Neuromuscular Re-Education  Balance/Neuromuscular Re-Education Activity Performed:  (Stand tolerance x 1-2 min with 1-2 UE support x3 trials. Pt educated on balance and postural improvement techniques.)    Outcome Measures:[facility] Daily Activity  Putting on and taking off regular lower body clothing: A lot  Bathing (including washing, rinsing, drying): A little  Putting on and taking off regular upper body clothing: A little  Toileting, which includes using toilet, bedpan or urinal: A little  Taking care of personal grooming such as brushing teeth: A little  Eating Meals: A little  Daily Activity - Total Score: 17        Education Documentation  Precautions, taught by Jennifer L Felty, OTA at 1/3/2024 12:59 PM.  Learner: Patient  Readiness: Acceptance  Method: Explanation, Demonstration, Teach-back  Response: Verbalizes Understanding    Education Comments  No comments found.        OP  EDUCATION:       Goals:  Encounter Problems       Encounter Problems (Active)       OT Goals       CGA for all functional transfers  (Progressing)       Start:  01/02/24    Expected End:  01/04/24            CGA LB dressing with AE (Progressing)       Start:  01/02/24    Expected End:  01/04/24            Fair + dyn standing balance for ADLs and functional activities  (Progressing)       Start:  01/02/24    Expected End:  01/04/24            CGA for toileting tasks/clothing mgmt  (Progressing)       Start:  01/02/24    Expected End:  01/04/24

## 2024-01-03 NOTE — CONSULTS
Vancomycin Dosing by Pharmacy- Cessation of Therapy    Consult to pharmacy for vancomycin dosing has been discontinued by the prescriber, pharmacy will sign off at this time.    Please call pharmacy if there are further questions or re-enter a consult if vancomycin is resumed.     MAMIE Payne. Ph.

## 2024-01-03 NOTE — PROGRESS NOTES
"Vancomycin Dosing by Pharmacy- FOLLOW UP    Tara Tierney is a 68 y.o. year old female who Pharmacy has been consulted for vancomycin dosing for bone and joint infection. Based on the patient's indication and renal status this patient is being dosed based on a goal AUC of 400-600.     Renal function is currently stable.    Current vancomycin dose: 1250 mg given every 24 hours    Estimated vancomycin AUC on current dose: 515 mg/L.hr     Visit Vitals  /62 (BP Location: Left arm, Patient Position: Lying)   Pulse 75   Temp 36.1 °C (97 °F) (Temporal)   Resp 21        Lab Results   Component Value Date    CREATININE 0.91 01/03/2024    CREATININE 0.85 01/02/2024    CREATININE 0.79 01/01/2024    CREATININE 0.72 12/31/2023        Patient weight is No results found for: \"PTWEIGHT\"    No results found for: \"CULTURE\"     I/O last 3 completed shifts:  In: 1111 (14.2 mL/kg) [P.O.:500; Blood:411; IV Piggyback:200]  Out: 4215 (53.9 mL/kg) [Urine:4100 (1.5 mL/kg/hr); Drains:115]  Weight: 78.2 kg   [unfilled]    No results found for: \"PATIENTTEMP\"     Assessment/Plan    Within goal AUC range. Continue current vancomycin regimen.    This dosing regimen is predicted by [employer] to result in the following pharmacokinetic parameters:  <<Regimen: 1250 mg IV every 24 hours.  Start time: 18:00 on 01/03/2024  Exposure target: AUC24 (range)400-600 mg/L.hr   AUC24,ss: 515 mg/L.hr  Probability of AUC24 > 400: 91 %  Ctrough,ss: 13.5 mg/L  Probability of Ctrough,ss > 20: 11 %  Probability of nephrotoxicity (Lodise ASHLEY 2009): 9 %    The next level will be obtained on 01/05/2024 at 0500. May be obtained sooner if clinically indicated.   Will continue to monitor renal function daily while on vancomycin and order serum creatinine at least every 48 hours if not already ordered.  Follow for continued vancomycin needs, clinical response, and signs/symptoms of toxicity.       ELAN Payne Ph.            "

## 2024-01-04 LAB
ANION GAP SERPL CALC-SCNC: 9 MMOL/L (ref 10–20)
BACTERIA BLD CULT: NORMAL
BACTERIA BLD CULT: NORMAL
BUN SERPL-MCNC: 14 MG/DL (ref 6–23)
CALCIUM SERPL-MCNC: 8.4 MG/DL (ref 8.6–10.3)
CHLORIDE SERPL-SCNC: 100 MMOL/L (ref 98–107)
CO2 SERPL-SCNC: 28 MMOL/L (ref 21–32)
CREAT SERPL-MCNC: 0.87 MG/DL (ref 0.5–1.05)
ERYTHROCYTE [DISTWIDTH] IN BLOOD BY AUTOMATED COUNT: 15.4 % (ref 11.5–14.5)
GFR SERPL CREATININE-BSD FRML MDRD: 72 ML/MIN/1.73M*2
GLUCOSE SERPL-MCNC: 117 MG/DL (ref 74–99)
HCT VFR BLD AUTO: 26.6 % (ref 36–46)
HGB BLD-MCNC: 8.5 G/DL (ref 12–16)
MCH RBC QN AUTO: 30.7 PG (ref 26–34)
MCHC RBC AUTO-ENTMCNC: 32 G/DL (ref 32–36)
MCV RBC AUTO: 96 FL (ref 80–100)
NRBC BLD-RTO: 0 /100 WBCS (ref 0–0)
PLATELET # BLD AUTO: 255 X10*3/UL (ref 150–450)
POTASSIUM SERPL-SCNC: 3.5 MMOL/L (ref 3.5–5.3)
RBC # BLD AUTO: 2.77 X10*6/UL (ref 4–5.2)
SODIUM SERPL-SCNC: 133 MMOL/L (ref 136–145)
WBC # BLD AUTO: 5.4 X10*3/UL (ref 4.4–11.3)

## 2024-01-04 PROCEDURE — 2500000005 HC RX 250 GENERAL PHARMACY W/O HCPCS

## 2024-01-04 PROCEDURE — 2500000001 HC RX 250 WO HCPCS SELF ADMINISTERED DRUGS (ALT 637 FOR MEDICARE OP): Performed by: HOSPITALIST

## 2024-01-04 PROCEDURE — 2500000004 HC RX 250 GENERAL PHARMACY W/ HCPCS (ALT 636 FOR OP/ED): Mod: JW | Performed by: INTERNAL MEDICINE

## 2024-01-04 PROCEDURE — 2500000001 HC RX 250 WO HCPCS SELF ADMINISTERED DRUGS (ALT 637 FOR MEDICARE OP)

## 2024-01-04 PROCEDURE — 1090000001 HH PPS REVENUE CREDIT

## 2024-01-04 PROCEDURE — 2500000002 HC RX 250 W HCPCS SELF ADMINISTERED DRUGS (ALT 637 FOR MEDICARE OP, ALT 636 FOR OP/ED): Performed by: HOSPITALIST

## 2024-01-04 PROCEDURE — 2500000004 HC RX 250 GENERAL PHARMACY W/ HCPCS (ALT 636 FOR OP/ED): Performed by: HOSPITALIST

## 2024-01-04 PROCEDURE — 2500000004 HC RX 250 GENERAL PHARMACY W/ HCPCS (ALT 636 FOR OP/ED)

## 2024-01-04 PROCEDURE — 37799 UNLISTED PX VASCULAR SURGERY: CPT | Performed by: STUDENT IN AN ORGANIZED HEALTH CARE EDUCATION/TRAINING PROGRAM

## 2024-01-04 PROCEDURE — 2500000001 HC RX 250 WO HCPCS SELF ADMINISTERED DRUGS (ALT 637 FOR MEDICARE OP): Performed by: ANESTHESIOLOGY

## 2024-01-04 PROCEDURE — 2500000001 HC RX 250 WO HCPCS SELF ADMINISTERED DRUGS (ALT 637 FOR MEDICARE OP): Performed by: REGISTERED NURSE

## 2024-01-04 PROCEDURE — 1200000002 HC GENERAL ROOM WITH TELEMETRY DAILY

## 2024-01-04 PROCEDURE — 85027 COMPLETE CBC AUTOMATED: CPT | Performed by: STUDENT IN AN ORGANIZED HEALTH CARE EDUCATION/TRAINING PROGRAM

## 2024-01-04 PROCEDURE — 1090000002 HH PPS REVENUE DEBIT

## 2024-01-04 PROCEDURE — 99233 SBSQ HOSP IP/OBS HIGH 50: CPT | Performed by: STUDENT IN AN ORGANIZED HEALTH CARE EDUCATION/TRAINING PROGRAM

## 2024-01-04 PROCEDURE — 80048 BASIC METABOLIC PNL TOTAL CA: CPT | Performed by: STUDENT IN AN ORGANIZED HEALTH CARE EDUCATION/TRAINING PROGRAM

## 2024-01-04 RX ORDER — ETHYL ALCOHOL 70 %
SOLUTION, NON-ORAL TOPICAL EVERY 4 HOURS PRN
Status: DISCONTINUED | OUTPATIENT
Start: 2024-01-04 | End: 2024-01-05

## 2024-01-04 RX ADMIN — CYCLOBENZAPRINE HYDROCHLORIDE 10 MG: 10 TABLET, FILM COATED ORAL at 07:57

## 2024-01-04 RX ADMIN — PREGABALIN 75 MG: 50 CAPSULE ORAL at 08:15

## 2024-01-04 RX ADMIN — ACETAMINOPHEN 650 MG: 325 TABLET ORAL at 09:58

## 2024-01-04 RX ADMIN — PREGABALIN 75 MG: 50 CAPSULE ORAL at 20:56

## 2024-01-04 RX ADMIN — ISOSORBIDE MONONITRATE 60 MG: 60 TABLET, EXTENDED RELEASE ORAL at 06:10

## 2024-01-04 RX ADMIN — POLYETHYLENE GLYCOL 3350 17 G: 17 POWDER, FOR SOLUTION ORAL at 08:15

## 2024-01-04 RX ADMIN — DAPTOMYCIN 300 MG: 500 INJECTION, POWDER, LYOPHILIZED, FOR SOLUTION INTRAVENOUS at 20:57

## 2024-01-04 RX ADMIN — Medication 1 TABLET: at 08:14

## 2024-01-04 RX ADMIN — PANTOPRAZOLE SODIUM 40 MG: 40 TABLET, DELAYED RELEASE ORAL at 06:10

## 2024-01-04 RX ADMIN — DOCUSATE SODIUM 100 MG: 100 CAPSULE, LIQUID FILLED ORAL at 08:14

## 2024-01-04 RX ADMIN — HYDROMORPHONE HYDROCHLORIDE 0.4 MG: 1 INJECTION, SOLUTION INTRAMUSCULAR; INTRAVENOUS; SUBCUTANEOUS at 21:32

## 2024-01-04 RX ADMIN — EZETIMIBE 10 MG: 10 TABLET ORAL at 08:14

## 2024-01-04 RX ADMIN — ACETAMINOPHEN 650 MG: 325 TABLET ORAL at 22:03

## 2024-01-04 RX ADMIN — LIDOCAINE 1 PATCH: 4 PATCH TOPICAL at 08:15

## 2024-01-04 RX ADMIN — FUROSEMIDE 20 MG: 40 TABLET ORAL at 09:00

## 2024-01-04 RX ADMIN — HYDRALAZINE HYDROCHLORIDE 50 MG: 50 TABLET ORAL at 21:00

## 2024-01-04 RX ADMIN — OXYCODONE HYDROCHLORIDE 5 MG: 5 TABLET ORAL at 05:37

## 2024-01-04 RX ADMIN — ACETAMINOPHEN 650 MG: 325 TABLET ORAL at 05:29

## 2024-01-04 RX ADMIN — OXYCODONE HYDROCHLORIDE 10 MG: 5 TABLET ORAL at 23:14

## 2024-01-04 RX ADMIN — MORPHINE SULFATE 15 MG: 15 TABLET, EXTENDED RELEASE ORAL at 08:15

## 2024-01-04 RX ADMIN — PILOCARPINE HYDROCHLORIDE 5 MG: 5 TABLET, FILM COATED ORAL at 08:15

## 2024-01-04 RX ADMIN — OXYCODONE HYDROCHLORIDE 10 MG: 5 TABLET ORAL at 13:58

## 2024-01-04 RX ADMIN — MIRTAZAPINE 15 MG: 15 TABLET, FILM COATED ORAL at 20:56

## 2024-01-04 RX ADMIN — CYCLOBENZAPRINE HYDROCHLORIDE 10 MG: 10 TABLET, FILM COATED ORAL at 13:59

## 2024-01-04 RX ADMIN — HYDRALAZINE HYDROCHLORIDE 50 MG: 50 TABLET ORAL at 08:14

## 2024-01-04 RX ADMIN — ACETAMINOPHEN 650 MG: 325 TABLET ORAL at 16:32

## 2024-01-04 RX ADMIN — ATENOLOL 50 MG: 50 TABLET ORAL at 08:14

## 2024-01-04 RX ADMIN — OXYCODONE HYDROCHLORIDE 10 MG: 5 TABLET ORAL at 17:58

## 2024-01-04 RX ADMIN — MORPHINE SULFATE 15 MG: 15 TABLET, EXTENDED RELEASE ORAL at 20:56

## 2024-01-04 RX ADMIN — BUSPIRONE HYDROCHLORIDE 10 MG: 5 TABLET ORAL at 08:14

## 2024-01-04 RX ADMIN — BRIMONIDINE TARTRATE 1 DROP: 2 SOLUTION OPHTHALMIC at 20:57

## 2024-01-04 RX ADMIN — BRIMONIDINE TARTRATE 1 DROP: 2 SOLUTION OPHTHALMIC at 08:15

## 2024-01-04 RX ADMIN — OXYCODONE HYDROCHLORIDE 10 MG: 5 TABLET ORAL at 09:58

## 2024-01-04 RX ADMIN — DOCUSATE SODIUM 100 MG: 100 CAPSULE, LIQUID FILLED ORAL at 20:56

## 2024-01-04 RX ADMIN — HYDROMORPHONE HYDROCHLORIDE 0.4 MG: 1 INJECTION, SOLUTION INTRAMUSCULAR; INTRAVENOUS; SUBCUTANEOUS at 07:57

## 2024-01-04 ASSESSMENT — PAIN SCALES - WONG BAKER
WONGBAKER_NUMERICALRESPONSE: NO HURT
WONGBAKER_NUMERICALRESPONSE: HURTS LITTLE BIT
WONGBAKER_NUMERICALRESPONSE: NO HURT

## 2024-01-04 ASSESSMENT — PAIN SCALES - GENERAL
PAINLEVEL_OUTOF10: 3
PAINLEVEL_OUTOF10: 6
PAINLEVEL_OUTOF10: 4
PAINLEVEL_OUTOF10: 10 - WORST POSSIBLE PAIN
PAINLEVEL_OUTOF10: 2

## 2024-01-04 ASSESSMENT — PAIN - FUNCTIONAL ASSESSMENT
PAIN_FUNCTIONAL_ASSESSMENT: 0-10

## 2024-01-04 ASSESSMENT — PAIN DESCRIPTION - LOCATION
LOCATION: BACK
LOCATION: BACK

## 2024-01-04 ASSESSMENT — PAIN SCALES - PAIN ASSESSMENT IN ADVANCED DEMENTIA (PAINAD)
CONSOLABILITY: NO NEED TO CONSOLE
BREATHING: NORMAL
CONSOLABILITY: NO NEED TO CONSOLE
TOTALSCORE: 0
BREATHING: NORMAL
FACIALEXPRESSION: SMILING OR INEXPRESSIVE
TOTALSCORE: 0
BODYLANGUAGE: RELAXED
BODYLANGUAGE: RELAXED
FACIALEXPRESSION: SMILING OR INEXPRESSIVE

## 2024-01-04 ASSESSMENT — PAIN DESCRIPTION - ORIENTATION: ORIENTATION: LOWER

## 2024-01-04 ASSESSMENT — PAIN DESCRIPTION - DESCRIPTORS: DESCRIPTORS: ACHING;SHOOTING;STABBING

## 2024-01-04 NOTE — DISCHARGE INSTRUCTIONS
No heavy lifting or straining until patient is seen in the officels.   Bactroban dressing changes twice a day per Dr. Reyes. Will need to schedule follow up appointment with him at [facility]   Encourage ambulation at least 3 times per day.   No formal physical therapy until ordered by Dr. Coombs  Follow up in the office in two weeks. Appointment made prior to surgery  You must be accompanied by a responsible adult upon discharge and for 24 hours after surgery. Do no drive a motor vehicle, operate machinery, power tools or appliances, drink alcoholic beverages, or make critical decisions for 24 hours and while on narcotic pain medication.  Be aware of dizziness, which may cause a fall. Change positions slowly.   You may resume regular diet, but do so slowly.   Use your pain medication as prescribed by your physician/nurse practitioner/physician assistant. If possible, take your medication with food, and drink plenty of fluids.   Contact surgeon if you have questions, temperature of 101 degree or greater, increased bleeding, swelling, or pain, drainage from the incision or increased redness around the incision   Call [phone] with any questions or concerns

## 2024-01-04 NOTE — PROGRESS NOTES
01/04/24 1019   Discharge Planning   Home or Post Acute Services In home services   Type of Home Care Services Home nursing visits;Home PT;Home OT   Patient expects to be discharged to: Home with [facility]     Notified by team that pts antibiotic upon DC will be Cubicin 300 mg daily, and will DC with drains. Pts DC preference remains home with [facility] for Infusions. [facility] infusion coordinator notified of change in antibiotic. Pt still has solo PICC from 12/7. Will await [facility] benefit check on IV Cubicin, and confirm staffing availability.

## 2024-01-04 NOTE — PROGRESS NOTES
Orthopedic Spine Progress Note    Subjective   Reports moderate to severe back pain that radiates around her stomach and into right thigh and hip. Denies any saddle anesthesia or loss of bowel or bladder.   Post-Operative Day: 4 post-spine procedure irrigation and debridement and removal of lumbar spine hardware.   Systemic or Specific Complaints: Pain Control    Objective     Vital signs in last 24 hours:  Temp:  [36.3 °C (97.3 °F)-36.5 °C (97.7 °F)] 36.5 °C (97.7 °F)  Heart Rate:  [80-84] 82  Resp:  [20-22] 22  BP: (125-158)/(58-67) 125/58    General: alert and oriented, in no acute distress   Neurovascular: 5/5 bilateral lower extremity motor function. Normal sensation. ROM to bilateral lower extremities does not cause any pain.    Wound: Dressing CDI, 2 JOSE drains with 20-30cc of output    Range of Motion: Limited flexion secondary to pain    DVT Exam: Patient had DVT and PE last admission and was placed on anticoagulation for this. Plan is for IVC filter placement   due to high risk of DVT.      Data Review  CBC:  Results from last 7 days   Lab Units 01/04/24  0459   WBC AUTO x10*3/uL 5.4   RBC AUTO x10*6/uL 2.77*   HEMOGLOBIN g/dL 8.5*   HEMATOCRIT % 26.6*   PLATELETS AUTO x10*3/uL 255         Assessment/Plan     Status post-spine procedure: Complications: DVT and Restricted Motion     Pain Relief: some relief    Continues current post-op course    Activity: out of bed and ambulate    Weight Bearing: WBAT    Final wound cultures show MRSA.   Final cultures pending.   Per ID patient will discharge home on IV cubicin   General surgery consult pending for abdominal pain.   Right hip MRI pending.     Am labs indicated improvement of hgb from 7.0 to 8.5. she did receive one unit of packed red blood cells yesterday. There is no active hemorrhage. She has some drainage in her JOSE drains which we will continue to monitor. Drains are to remain in place until directed to remove. Total of 40 ml of drainage output overnight  and are working appropriately and are to suction.     Patient may discharge to home from an orthropedic standpoint once cleared by all other specialities and final antibiotics have been approved.      LOS: 4 days     Edi Olguin, APRN-CNP    Date: 1/4/2024  Time: 8:53 AM

## 2024-01-04 NOTE — PROGRESS NOTES
"Tara Tierney is a 68 y.o. female on day 4 of admission presenting with Postoperative surgical complication involving musculoskeletal system associated with musculoskeletal procedure, unspecified complication.    Subjective   Patient seen and examined.  She says that she was doing well and then all of a sudden she started having the severe back pain again.  Denies any fevers or chills, no nausea or vomiting but currently very uncomfortable due to the pain.       Objective     Physical Exam  Constitutional:       General: She is in acute distress.      Appearance: She is ill-appearing.   HENT:      Head: Normocephalic.   Eyes:      Extraocular Movements: Extraocular movements intact.      Conjunctiva/sclera: Conjunctivae normal.   Cardiovascular:      Rate and Rhythm: Normal rate and regular rhythm.      Pulses: Normal pulses.   Pulmonary:      Effort: Pulmonary effort is normal.      Breath sounds: Normal breath sounds.   Abdominal:      General: There is no distension.      Palpations: Abdomen is soft.   Musculoskeletal:      Cervical back: Neck supple.   Neurological:      General: No focal deficit present.      Mental Status: She is alert.         Last Recorded Vitals  Blood pressure 125/58, pulse 82, temperature 36.5 °C (97.7 °F), temperature source Temporal, resp. rate 22, height 1.448 m (4' 9.01\"), weight 78.2 kg (172 lb 6.4 oz), SpO2 94 %, not currently breastfeeding.  Intake/Output last 3 Shifts:  I/O last 3 completed shifts:  In: 1211 (15.5 mL/kg) [P.O.:800; Blood:411]  Out: 3825 (48.9 mL/kg) [Urine:3700 (1.3 mL/kg/hr); Drains:125]  Weight: 78.2 kg     Relevant Results           This patient currently has cardiac telemetry ordered; if you would like to modify or discontinue the telemetry order, click here to go to the orders activity to modify/discontinue the order.  This patient has a central line   Reason for the central line remaining today? Parenteral medication                 Assessment/Plan "   Principal Problem:    Postoperative surgical complication involving musculoskeletal system associated with musculoskeletal procedure, unspecified complication  Active Problems:    Essential hypertension    PVD (peripheral vascular disease) (CMS/HCC)    Back pain with radiculopathy    Back pain at L4-L5 level    Deep vein thrombosis (DVT) of right lower extremity (CMS/HCC)    Anemia    Cefepime discontinued  Vancomycin changed to Cubicin by ID  MRI hip reviewed showed some pubic muscle edema/swelling  All wound cultures are growing MRSA/Staph aureus  Hardware has been removed  Continue pain control, supportive care  Continue PT OT  Hemoglobin has remained stable  Status posttransfusion of 1 unit of packed red blood cells  Continue home meds for hypertension  Keep off Eliquis  Status post IVC filter placement  Back drains are in place, drains to be removed as per orthopedics recommendation  If her pain continues to get worse will most likely need another MRI/imaging of her spine  DVT prophylaxis  Keep rest of the medical management as she is             Nitesh Ruiz MD

## 2024-01-04 NOTE — PROGRESS NOTES
01/04/24 1522   Discharge Planning   Home or Post Acute Services Post acute facilities (Rehab/SNF/etc)   Type of Post Acute Facility Services Rehab;Skilled nursing   Patient expects to be discharged to: [facility] RE[facility]     Revisited with pt/family, Therapy feels pt could benefit from additional skilled rehab at IP facility, pt refuses SNF but pt/family open to acute rehab, and agreeable to referral to [facility] Acute rehab who confirms pt meets criteria for acceptance and will initiate precert today.

## 2024-01-04 NOTE — CARE PLAN
Problem: Pain  Goal: My pain/discomfort is manageable  Outcome: Progressing     Problem: Safety  Goal: Patient will be injury free during hospitalization  Outcome: Progressing  Goal: I will remain free of falls  Outcome: Progressing     Problem: Daily Care  Goal: Daily care needs are met  Outcome: Progressing     Problem: Psychosocial Needs  Goal: Demonstrates ability to cope with hospitalization/illness  Outcome: Progressing  Goal: Collaborate with me, my family, and caregiver to identify my specific goals  Outcome: Progressing     Problem: Discharge Barriers  Goal: My discharge needs are met  Outcome: Progressing     Problem: Fall/Injury  Goal: Not fall by end of shift  Outcome: Progressing  Goal: Be free from injury by end of the shift  Outcome: Progressing  Goal: Verbalize understanding of personal risk factors for fall in the hospital  Outcome: Progressing  Goal: Verbalize understanding of risk factor reduction measures to prevent injury from fall in the home  Outcome: Progressing  Goal: Use assistive devices by end of the shift  Outcome: Progressing  Goal: Pace activities to prevent fatigue by end of the shift  Outcome: Progressing     Problem: Pain  Goal: Takes deep breaths with improved pain control throughout the shift  Outcome: Progressing  Goal: Turns in bed with improved pain control throughout the shift  Outcome: Progressing  Goal: Walks with improved pain control throughout the shift  Outcome: Progressing  Goal: Performs ADL's with improved pain control throughout shift  Outcome: Progressing  Goal: Participates in PT with improved pain control throughout the shift  Outcome: Progressing  Goal: Free from opioid side effects throughout the shift  Outcome: Progressing  Goal: Free from acute confusion related to pain meds throughout the shift  Outcome: Progressing     Problem: Pain - Adult  Goal: Verbalizes/displays adequate comfort level or baseline comfort level  Outcome: Progressing     Problem: Safety  - Adult  Goal: Free from fall injury  Outcome: Progressing     Problem: Discharge Planning  Goal: Discharge to home or other facility with appropriate resources  Outcome: Progressing   he patient's goals for the shift include      The clinical goals for the shift include Patient will remain hemodynamically stable throughout shift.    Over the shift, the patient did not make progress toward the following goals. Barriers to progression include dations to address these barriers include

## 2024-01-05 ENCOUNTER — APPOINTMENT (OUTPATIENT)
Dept: RADIOLOGY | Facility: HOSPITAL | Age: 69
DRG: 856 | End: 2024-01-05
Payer: COMMERCIAL

## 2024-01-05 LAB
25(OH)D3 SERPL-MCNC: 33 NG/ML (ref 30–100)
ANION GAP SERPL CALC-SCNC: 12 MMOL/L (ref 10–20)
ANION GAP SERPL CALC-SCNC: 12 MMOL/L (ref 10–20)
ATRIAL RATE: 79 BPM
BUN SERPL-MCNC: 10 MG/DL (ref 6–23)
BUN SERPL-MCNC: 10 MG/DL (ref 6–23)
CALCIUM SERPL-MCNC: 8.7 MG/DL (ref 8.6–10.3)
CALCIUM SERPL-MCNC: 9 MG/DL (ref 8.6–10.3)
CHLORIDE SERPL-SCNC: 96 MMOL/L (ref 98–107)
CHLORIDE SERPL-SCNC: 98 MMOL/L (ref 98–107)
CO2 SERPL-SCNC: 27 MMOL/L (ref 21–32)
CO2 SERPL-SCNC: 28 MMOL/L (ref 21–32)
CREAT SERPL-MCNC: 0.74 MG/DL (ref 0.5–1.05)
CREAT SERPL-MCNC: 0.75 MG/DL (ref 0.5–1.05)
ERYTHROCYTE [DISTWIDTH] IN BLOOD BY AUTOMATED COUNT: 15.1 % (ref 11.5–14.5)
FOLATE SERPL-MCNC: 5.1 NG/ML
GFR SERPL CREATININE-BSD FRML MDRD: 86 ML/MIN/1.73M*2
GFR SERPL CREATININE-BSD FRML MDRD: 87 ML/MIN/1.73M*2
GLUCOSE SERPL-MCNC: 128 MG/DL (ref 74–99)
GLUCOSE SERPL-MCNC: 152 MG/DL (ref 74–99)
HCT VFR BLD AUTO: 26.5 % (ref 36–46)
HGB BLD-MCNC: 8.4 G/DL (ref 12–16)
MCH RBC QN AUTO: 30.3 PG (ref 26–34)
MCHC RBC AUTO-ENTMCNC: 31.7 G/DL (ref 32–36)
MCV RBC AUTO: 96 FL (ref 80–100)
NRBC BLD-RTO: 0 /100 WBCS (ref 0–0)
P AXIS: 68 DEGREES
P OFFSET: 194 MS
P ONSET: 138 MS
PLATELET # BLD AUTO: 249 X10*3/UL (ref 150–450)
POTASSIUM SERPL-SCNC: 3.7 MMOL/L (ref 3.5–5.3)
POTASSIUM SERPL-SCNC: 3.9 MMOL/L (ref 3.5–5.3)
PR INTERVAL: 148 MS
Q ONSET: 212 MS
QRS COUNT: 13 BEATS
QRS DURATION: 134 MS
QT INTERVAL: 420 MS
QTC CALCULATION(BAZETT): 481 MS
QTC FREDERICIA: 460 MS
R AXIS: 86 DEGREES
RBC # BLD AUTO: 2.77 X10*6/UL (ref 4–5.2)
SODIUM SERPL-SCNC: 132 MMOL/L (ref 136–145)
SODIUM SERPL-SCNC: 133 MMOL/L (ref 136–145)
T AXIS: -33 DEGREES
T OFFSET: 422 MS
T4 FREE SERPL-MCNC: 1.1 NG/DL (ref 0.61–1.12)
TSH SERPL-ACNC: 4.13 MIU/L (ref 0.44–3.98)
VENTRICULAR RATE: 79 BPM
VIT B12 SERPL-MCNC: 322 PG/ML (ref 211–911)
WBC # BLD AUTO: 6.4 X10*3/UL (ref 4.4–11.3)

## 2024-01-05 PROCEDURE — 2500000004 HC RX 250 GENERAL PHARMACY W/ HCPCS (ALT 636 FOR OP/ED): Mod: JW | Performed by: INTERNAL MEDICINE

## 2024-01-05 PROCEDURE — 2500000002 HC RX 250 W HCPCS SELF ADMINISTERED DRUGS (ALT 637 FOR MEDICARE OP, ALT 636 FOR OP/ED): Performed by: HOSPITALIST

## 2024-01-05 PROCEDURE — 85027 COMPLETE CBC AUTOMATED: CPT | Performed by: NURSE PRACTITIONER

## 2024-01-05 PROCEDURE — 37799 UNLISTED PX VASCULAR SURGERY: CPT | Performed by: STUDENT IN AN ORGANIZED HEALTH CARE EDUCATION/TRAINING PROGRAM

## 2024-01-05 PROCEDURE — 2500000004 HC RX 250 GENERAL PHARMACY W/ HCPCS (ALT 636 FOR OP/ED)

## 2024-01-05 PROCEDURE — 2500000004 HC RX 250 GENERAL PHARMACY W/ HCPCS (ALT 636 FOR OP/ED): Performed by: NURSE PRACTITIONER

## 2024-01-05 PROCEDURE — 84443 ASSAY THYROID STIM HORMONE: CPT | Performed by: SPECIALIST

## 2024-01-05 PROCEDURE — 2500000004 HC RX 250 GENERAL PHARMACY W/ HCPCS (ALT 636 FOR OP/ED): Performed by: HOSPITALIST

## 2024-01-05 PROCEDURE — 2500000001 HC RX 250 WO HCPCS SELF ADMINISTERED DRUGS (ALT 637 FOR MEDICARE OP)

## 2024-01-05 PROCEDURE — 72148 MRI LUMBAR SPINE W/O DYE: CPT

## 2024-01-05 PROCEDURE — 2500000001 HC RX 250 WO HCPCS SELF ADMINISTERED DRUGS (ALT 637 FOR MEDICARE OP): Performed by: HOSPITALIST

## 2024-01-05 PROCEDURE — 72148 MRI LUMBAR SPINE W/O DYE: CPT | Performed by: STUDENT IN AN ORGANIZED HEALTH CARE EDUCATION/TRAINING PROGRAM

## 2024-01-05 PROCEDURE — 1090000002 HH PPS REVENUE DEBIT

## 2024-01-05 PROCEDURE — 82607 VITAMIN B-12: CPT | Performed by: SPECIALIST

## 2024-01-05 PROCEDURE — 1200000002 HC GENERAL ROOM WITH TELEMETRY DAILY

## 2024-01-05 PROCEDURE — 82565 ASSAY OF CREATININE: CPT | Performed by: STUDENT IN AN ORGANIZED HEALTH CARE EDUCATION/TRAINING PROGRAM

## 2024-01-05 PROCEDURE — 99232 SBSQ HOSP IP/OBS MODERATE 35: CPT | Performed by: STUDENT IN AN ORGANIZED HEALTH CARE EDUCATION/TRAINING PROGRAM

## 2024-01-05 PROCEDURE — 2500000001 HC RX 250 WO HCPCS SELF ADMINISTERED DRUGS (ALT 637 FOR MEDICARE OP): Performed by: REGISTERED NURSE

## 2024-01-05 PROCEDURE — 1090000001 HH PPS REVENUE CREDIT

## 2024-01-05 PROCEDURE — 84439 ASSAY OF FREE THYROXINE: CPT | Performed by: SPECIALIST

## 2024-01-05 PROCEDURE — 80048 BASIC METABOLIC PNL TOTAL CA: CPT | Performed by: STUDENT IN AN ORGANIZED HEALTH CARE EDUCATION/TRAINING PROGRAM

## 2024-01-05 PROCEDURE — 2500000001 HC RX 250 WO HCPCS SELF ADMINISTERED DRUGS (ALT 637 FOR MEDICARE OP): Performed by: ANESTHESIOLOGY

## 2024-01-05 PROCEDURE — 82746 ASSAY OF FOLIC ACID SERUM: CPT | Performed by: SPECIALIST

## 2024-01-05 PROCEDURE — 82306 VITAMIN D 25 HYDROXY: CPT | Performed by: SPECIALIST

## 2024-01-05 PROCEDURE — 2500000005 HC RX 250 GENERAL PHARMACY W/O HCPCS

## 2024-01-05 RX ORDER — LORAZEPAM 2 MG/ML
1 INJECTION INTRAMUSCULAR ONCE
Status: COMPLETED | OUTPATIENT
Start: 2024-01-05 | End: 2024-01-05

## 2024-01-05 RX ORDER — INFANT FORMULA WITH IRON
1 POWDER (GRAM) ORAL EVERY 4 HOURS PRN
Status: DISCONTINUED | OUTPATIENT
Start: 2024-01-05 | End: 2024-01-18 | Stop reason: HOSPADM

## 2024-01-05 RX ADMIN — MORPHINE SULFATE 15 MG: 15 TABLET, EXTENDED RELEASE ORAL at 21:05

## 2024-01-05 RX ADMIN — POLYETHYLENE GLYCOL 3350 17 G: 17 POWDER, FOR SOLUTION ORAL at 09:00

## 2024-01-05 RX ADMIN — BUSPIRONE HYDROCHLORIDE 10 MG: 5 TABLET ORAL at 11:21

## 2024-01-05 RX ADMIN — BRIMONIDINE TARTRATE 1 DROP: 2 SOLUTION OPHTHALMIC at 21:05

## 2024-01-05 RX ADMIN — HYDRALAZINE HYDROCHLORIDE 50 MG: 50 TABLET ORAL at 21:06

## 2024-01-05 RX ADMIN — MIRTAZAPINE 15 MG: 15 TABLET, FILM COATED ORAL at 21:04

## 2024-01-05 RX ADMIN — DOCUSATE SODIUM 100 MG: 100 CAPSULE, LIQUID FILLED ORAL at 11:23

## 2024-01-05 RX ADMIN — LORAZEPAM 1 MG: 2 INJECTION INTRAMUSCULAR; INTRAVENOUS at 20:00

## 2024-01-05 RX ADMIN — HYDROMORPHONE HYDROCHLORIDE 0.4 MG: 1 INJECTION, SOLUTION INTRAMUSCULAR; INTRAVENOUS; SUBCUTANEOUS at 19:11

## 2024-01-05 RX ADMIN — OXYCODONE HYDROCHLORIDE 10 MG: 5 TABLET ORAL at 17:36

## 2024-01-05 RX ADMIN — HYDRALAZINE HYDROCHLORIDE 50 MG: 50 TABLET ORAL at 11:20

## 2024-01-05 RX ADMIN — PREGABALIN 75 MG: 50 CAPSULE ORAL at 11:22

## 2024-01-05 RX ADMIN — CYCLOBENZAPRINE HYDROCHLORIDE 10 MG: 10 TABLET, FILM COATED ORAL at 06:10

## 2024-01-05 RX ADMIN — LIDOCAINE 1 PATCH: 4 PATCH TOPICAL at 11:23

## 2024-01-05 RX ADMIN — HYDROMORPHONE HYDROCHLORIDE 0.4 MG: 1 INJECTION, SOLUTION INTRAMUSCULAR; INTRAVENOUS; SUBCUTANEOUS at 05:40

## 2024-01-05 RX ADMIN — EZETIMIBE 10 MG: 10 TABLET ORAL at 11:21

## 2024-01-05 RX ADMIN — HYDROMORPHONE HYDROCHLORIDE 0.4 MG: 1 INJECTION, SOLUTION INTRAMUSCULAR; INTRAVENOUS; SUBCUTANEOUS at 16:29

## 2024-01-05 RX ADMIN — DAPTOMYCIN 300 MG: 500 INJECTION, POWDER, LYOPHILIZED, FOR SOLUTION INTRAVENOUS at 21:04

## 2024-01-05 RX ADMIN — PREGABALIN 75 MG: 50 CAPSULE ORAL at 21:06

## 2024-01-05 RX ADMIN — DOCUSATE SODIUM 100 MG: 100 CAPSULE, LIQUID FILLED ORAL at 21:04

## 2024-01-05 RX ADMIN — PANTOPRAZOLE SODIUM 40 MG: 40 TABLET, DELAYED RELEASE ORAL at 06:10

## 2024-01-05 RX ADMIN — ACETAMINOPHEN 650 MG: 325 TABLET ORAL at 11:32

## 2024-01-05 RX ADMIN — Medication 1 TABLET: at 11:21

## 2024-01-05 RX ADMIN — OXYCODONE HYDROCHLORIDE 10 MG: 5 TABLET ORAL at 11:24

## 2024-01-05 RX ADMIN — ACETAMINOPHEN 650 MG: 325 TABLET ORAL at 16:29

## 2024-01-05 RX ADMIN — Medication 3 MG: at 21:07

## 2024-01-05 RX ADMIN — BRIMONIDINE TARTRATE 1 DROP: 2 SOLUTION OPHTHALMIC at 11:24

## 2024-01-05 RX ADMIN — FUROSEMIDE 20 MG: 40 TABLET ORAL at 11:22

## 2024-01-05 RX ADMIN — ACETAMINOPHEN 650 MG: 325 TABLET ORAL at 05:27

## 2024-01-05 RX ADMIN — ACETAMINOPHEN 650 MG: 325 TABLET ORAL at 21:04

## 2024-01-05 RX ADMIN — PILOCARPINE HYDROCHLORIDE 5 MG: 5 TABLET, FILM COATED ORAL at 11:33

## 2024-01-05 RX ADMIN — CYCLOBENZAPRINE HYDROCHLORIDE 10 MG: 10 TABLET, FILM COATED ORAL at 11:18

## 2024-01-05 RX ADMIN — MORPHINE SULFATE 15 MG: 15 TABLET, EXTENDED RELEASE ORAL at 11:21

## 2024-01-05 RX ADMIN — ATENOLOL 50 MG: 50 TABLET ORAL at 11:22

## 2024-01-05 RX ADMIN — ISOSORBIDE MONONITRATE 60 MG: 60 TABLET, EXTENDED RELEASE ORAL at 06:10

## 2024-01-05 RX ADMIN — OXYCODONE HYDROCHLORIDE 10 MG: 5 TABLET ORAL at 05:27

## 2024-01-05 ASSESSMENT — PAIN SCALES - GENERAL
PAINLEVEL_OUTOF10: 10 - WORST POSSIBLE PAIN
PAINLEVEL_OUTOF10: 9
PAINLEVEL_OUTOF10: 10 - WORST POSSIBLE PAIN
PAINLEVEL_OUTOF10: 7

## 2024-01-05 ASSESSMENT — COGNITIVE AND FUNCTIONAL STATUS - GENERAL
CLIMB 3 TO 5 STEPS WITH RAILING: A LOT
TOILETING: A LOT
STANDING UP FROM CHAIR USING ARMS: A LOT
WALKING IN HOSPITAL ROOM: A LITTLE
HELP NEEDED FOR BATHING: A LOT
MOVING FROM LYING ON BACK TO SITTING ON SIDE OF FLAT BED WITH BEDRAILS: A LOT
TURNING FROM BACK TO SIDE WHILE IN FLAT BAD: A LOT
DRESSING REGULAR LOWER BODY CLOTHING: A LOT
PERSONAL GROOMING: A LOT
DAILY ACTIVITIY SCORE: 14
MOVING TO AND FROM BED TO CHAIR: A LOT
MOBILITY SCORE: 13
DRESSING REGULAR UPPER BODY CLOTHING: A LOT

## 2024-01-05 ASSESSMENT — PAIN DESCRIPTION - LOCATION
LOCATION: LEG
LOCATION: BACK

## 2024-01-05 ASSESSMENT — PAIN - FUNCTIONAL ASSESSMENT
PAIN_FUNCTIONAL_ASSESSMENT: 0-10

## 2024-01-05 ASSESSMENT — PAIN DESCRIPTION - ORIENTATION
ORIENTATION: LOWER
ORIENTATION: RIGHT

## 2024-01-05 ASSESSMENT — ACTIVITIES OF DAILY LIVING (ADL): EFFECT OF PAIN ON DAILY ACTIVITIES: MOVEMENT

## 2024-01-05 NOTE — PROGRESS NOTES
"Nutrition Follow Up Assessment:   Nutrition Assessment         Patient is a 68 y.o. female presenting with: Postoperative surgical complication involving musculoskeletal system associated with musculoskeletal procedure      Nutrition History:  Energy Intake: Good > 75 % (~90% of lunch)  Food and Nutrient History: RD follow-up. Pt reporting severe pain with loss of appetite though says has been trying to eat meals. Dislikes Might[employer]akes, willing to trial chocolate [employer] [place] Instant Breakfast mixed with 2% milk once daily and a peanut butter and jelly sandwich sent in addition to meals once daily. Requesting milk on every tray, kitchen aware. Says food is bland, plans to send extra seasoning packets. Dislikes food at this facility though pt does not want visitors to bring food in at this time.  Food Allergies/Intolerances:  None  GI Symptoms: None  Oral Problems: None       Anthropometrics:  Height: 144.8 cm (4' 9.01\")   Weight: 78.2 kg (172 lb 6.4 oz)   BMI (Calculated): 37.3  IBW/kg (Dietitian Calculated): 42.3 kg (using the upper end of IBW range)          Weight History:     Weight History / % Weight Change: From RD note on 1/2: \"Pt's  stated she has lost 4 pounds in the past month. He is unsure of her current weight but states it is in the 160s. Weight this admission likely increased with fluid as pt has edema. EMR weight records: 12/5/23 84.7 kg- 7.7% weight loss x1 month (question if this weight was increased by edema); 11/3/23 81 kg; 9/14/23 81 kg\"  Interpretation of Weight Loss: >5% in 1 month   Significant Weight Loss: Yes (if accurate)        Nutrition Focused Physical Exam Findings:  defer: reporting severe pain    Edema:  Edema: +1 trace  Edema Location: non-pitting generalized edema, 1+ BLE per nursing assessment  Physical Findings:  Skin: Positive (back incision)    Nutrition Significant Labs:  BMP Trend:   Results from last 7 days   Lab Units 01/05/24  1200 01/04/24  0459 01/03/24  0406 " 01/02/24  0512   GLUCOSE mg/dL 152* 117* 89 101*   CALCIUM mg/dL 8.7 8.4* 8.3* 8.3*   SODIUM mmol/L 133* 133* 133* 133*   POTASSIUM mmol/L 3.7 3.5 3.3* 3.7   CO2 mmol/L 27 28 28 26   CHLORIDE mmol/L 98 100 98 102   BUN mg/dL 10 14 10 7   CREATININE mg/dL 0.75 0.87 0.91 0.85        Nutrition Specific Medications: Reviewed      I/O:   Last BM Date: 01/04/24; Stool Appearance: Unable to assess (01/04/24 2150)        Dietary Orders (From admission, onward)       Start     Ordered    01/05/24 1323  Oral nutritional supplements  Until discontinued        Comments: Chocolate [place] Instant Breakfast mixed with 2% milk   Question Answer Comment   Deliver with Dinner    Select supplement: [place] Breakfast Essentials        01/05/24 1323    01/02/24 1443  Adult diet Regular  Diet effective now        Question:  Diet type  Answer:  Regular    01/02/24 1442    12/31/23 0418  May Participate in Room Service  Once        Question:  .  Answer:  Yes    12/31/23 0417                     Estimated Needs:   Total Energy Estimated Needs (kCal): 1560 kCal  Method for Estimating Needs: 1214-4285 kcals (18-20 kcals/kg)  Total Protein Estimated Needs (g): 63 g  Method for Estimating Needs: 55-63 gm (1.3-1.5 g/kg IBW)  Total Fluid Estimated Needs (mL): 1950 mL  Method for Estimating Needs: 1950 mL (25 mL/kg)        Nutrition Diagnosis   Malnutrition Diagnosis  Patient has Malnutrition Diagnosis: No (need to confirm accuracy of weight and/or obtain more evidence)    Nutrition Diagnosis  Patient has Nutrition Diagnosis: Yes  Diagnosis Status (1): Ongoing  Nutrition Diagnosis 1: Inadequate protein energy intake  Related to (1): pain  As Evidenced by (1): pt and RN reporting continued decreased appetite/intakes       Nutrition Interventions/Recommendations         Nutrition Prescription:  Individualized Nutrition Prescription Provided for : Regular diet. [place] Instant Breakfast mixed with 2% milk once daily. Peanut butter and jelly  sandwich once daily in addition to meals.        Nutrition Interventions:   Food and/or Nutrient Delivery Interventions  Interventions: Meals and snacks, Medical food supplement  Goal: Consumes 3 meals per day  Medical Food Supplement: Commercial beverage  Goal: [place] instant breakfast in once daily (provides 130 kcal and 5 g protein per packet + calories and protein from milk)    Coordination of Nutrition Care by a Nutrition Professional  Collaboration and Referral of Nutrition Care: Collaboration by nutrition professional with other providers  Goal: RN    Nutrition Education:       Discussed importance of consuming adequate nutrition and importance of consuming oral nutritional supplements when intakes are not consistently adequate.    Nutrition Monitoring and Evaluation   Food/Nutrient Related History Monitoring  Monitoring and Evaluation Plan: Energy intake, Amount of food  Energy Intake: Estimated energy intake  Criteria: Pt meets >75% of estimated energy needs  Amount of Food: Estimated amout of food, Medical food intake  Criteria: Pt consumes >75% of meals and supplements    Body Composition/Growth/Weight History  Monitoring and Evaluation Plan: Weight  Weight: Measured weight  Criteria: Maintains stable weight    Biochemical Data, Medical Tests and Procedures  Monitoring and Evaluation Plan: Glucose/endocrine profile, Electrolyte/renal panel  Criteria: WNL  Glucose/Endocrine Profile: Glucose, casual  Criteria: BG within desirable range            Time Spent/Follow-up Reminder:   Time Spent (min): 30 minutes  Last Date of Nutrition Visit: 01/05/24  Nutrition Follow-Up Needed?: 3-8 days

## 2024-01-05 NOTE — PROGRESS NOTES
Occupational Therapy                 Therapy Communication Note    Patient Name: Tara Tierney  MRN: [id number]  Today's Date: 1/5/2024     Discipline: Occupational Therapy    Missed Visit Reason: Missed Visit Reason: Other (Comment) (pt with increased back pain, pt scheduled for MRI this date, will hold OT tx d/t pain)    Missed Time: Attempt 1110    Comment:

## 2024-01-05 NOTE — PROGRESS NOTES
"Tara Tierney is a 68 y.o. female on day 5 of admission presenting with Postoperative surgical complication involving musculoskeletal system associated with musculoskeletal procedure, unspecified complication.    Subjective   Patient seen and examined.  She reports worsening pain in her back, so that got worse since yesterday and she is uncomfortable.  Denies any shortness of breath, nausea, vomiting.  Appears upset and emotional.       Objective     Physical Exam  Constitutional:       Appearance: She is ill-appearing.   HENT:      Head: Normocephalic and atraumatic.   Eyes:      Extraocular Movements: Extraocular movements intact.      Conjunctiva/sclera: Conjunctivae normal.   Cardiovascular:      Rate and Rhythm: Normal rate and regular rhythm.   Pulmonary:      Effort: Pulmonary effort is normal. No respiratory distress.      Breath sounds: No wheezing.   Abdominal:      General: There is no distension.      Palpations: Abdomen is soft.   Neurological:      Mental Status: She is alert. Mental status is at baseline.         Last Recorded Vitals  Blood pressure 135/86, pulse 99, temperature 37.5 °C (99.5 °F), resp. rate 18, height 1.448 m (4' 9.01\"), weight 78.2 kg (172 lb 6.4 oz), SpO2 93 %, not currently breastfeeding.  Intake/Output last 3 Shifts:  I/O last 3 completed shifts:  In: 300 (3.8 mL/kg) [P.O.:300]  Out: 4060 (51.9 mL/kg) [Urine:3950 (1.4 mL/kg/hr); Drains:110]  Weight: 78.2 kg     Relevant Results           This patient currently has cardiac telemetry ordered; if you would like to modify or discontinue the telemetry order, click here to go to the orders activity to modify/discontinue the order.                 Assessment/Plan   Principal Problem:    Postoperative surgical complication involving musculoskeletal system associated with musculoskeletal procedure, unspecified complication  Active Problems:    Essential hypertension    PVD (peripheral vascular disease) (CMS/HCC)    Back pain with " radiculopathy    Back pain at L4-L5 level    Deep vein thrombosis (DVT) of right lower extremity (CMS/HCC)    Anemia    Reports worsening pain  MRI lumbar spine ordered by orthopedics  Will follow-up on results  Continue Cubicin  MRI hip reviewed  Wound cultures are growing MRSA/staph  Hemoglobin has remained stable, status post transfusion of 1 unit of PRBCs  Keep off Eliquis  Status post IVC filter placement  Continue home meds for hypertension   Drains to be removed as per orthopedics recommendation  DVT prophylaxis  Keep rest of the medical management as she is on will need to be discharged to acute rehab        Nitesh Ruiz MD

## 2024-01-05 NOTE — PROGRESS NOTES
Orthopedic Spine Progress Note    Subjective   Reports moderate to severe back pain that radiates around her stomach and into right thigh and hip. Denies any saddle anesthesia or loss of bowel or bladder.   Post-Operative Day: 5 post-spine procedure irrigation and debridement and removal of lumbar spine hardware.   Systemic or Specific Complaints: Pain Control    Objective     Vital signs in last 24 hours:  Temp:  [36.2 °C (97.2 °F)-37.5 °C (99.5 °F)] 37.5 °C (99.5 °F)  Heart Rate:  [75-99] 99  Resp:  [18] 18  BP: (115-135)/(56-86) 135/86    General: alert and oriented, in no acute distress   Neurovascular: 5/5 bilateral lower extremity motor function. Normal sensation. ROM to bilateral lower extremities does not cause any pain.    Wound: Dressing CDI, 2 JOSE drains with 20-30cc of output    Range of Motion: Limited flexion secondary to pain    DVT Exam: Patient had DVT and PE last admission and was placed on anticoagulation for this. Plan is for IVC filter placement   due to high risk of DVT.      Data Review  CBC:  Results from last 7 days   Lab Units 01/04/24  0459   WBC AUTO x10*3/uL 5.4   RBC AUTO x10*6/uL 2.77*   HEMOGLOBIN g/dL 8.5*   HEMATOCRIT % 26.6*   PLATELETS AUTO x10*3/uL 255         Assessment/Plan     Status post-spine procedure: Complications: DVT and Restricted Motion     Pain Relief: some relief    Continues current post-op course    Activity: out of bed and ambulate    Weight Bearing: WBAT    Final wound cultures show MRSA.   Per ID patient will discharge home on IV cubicin     Patient with severe low back pain radiating into right hip that has worsened upon waking up this am. Will obtain MRI of lumbar spine. MRI of right hip shows mild osteoarthritic changes of both hips and sacroiliac joints. Nonspecific per pubic muscle edema and edema in the lateral subcutaneous fat.     Superficial JOSE drain with a total of 5cc output. Drain does not continuously stay compressed however will keep in place and  this is due to the lumbar incision being slightly open and air getting into the incision then into drain. Will keep drain in place. Deep drain with 20cc of output.     Labs pending at this time.     Continue current pain regimen. Further recommendations to follow MRI results.          LOS: 5 days     Edi Olguin, APRN-CNP    Date: 1/5/2024  Time: 11:40 AM    Patient was doing quite well up until this morning.  She now has some return of her right leg pain.  That had completely resolved.  Her back pain was doing better as well.  Unclear what this etiology is.  Her left superficial drain is not always holding compression because on exam she has a little bit of opening in the wound in the central portion which is allowing air to leak in.  There is some drainage from the superficial wound as well.  There is no redness or warmth.  There is no gross dehiscence.  The sutures are still in place.  The deep drain is still putting out.  Infectious disease has changed antibiotics.  We are going to order an MRI of her lumbar spine to make sure there has not been a significant change.  At this point we washed her out 3 time she has had antibiotic changes and she still has drains in.  I am not sure there is much else to do depending what the MRI shows.  It is unclear why she is having the right leg pain.  MRI of the right hip which also shows the pelvis done January 2 did not show any significant abnormalities.  General surgery was consulted and they have not come up with any abnormalities from GI standpoint.  We will continue to follow her closely and keep her on antibiotics and see what this new MRI shows.  On exam she has good strength bilateral lower extremities.  Normal sensation bilateral lower extremities.  She does not appear to have any loss of bowel or bladder.  She does have a Marcio wick in place but says that she seems to feel like she has normal control.  She has normal sensation in her saddle area.

## 2024-01-05 NOTE — PROGRESS NOTES
Physical Therapy                 Therapy Communication Note    Patient Name: Tara Tierney  MRN: [id number]  Today's Date: 1/5/2024     Discipline: Physical Therapy    Missed Visit Reason: Attempted to see patient 2x this date. (08:30) Patient with nursing and MD. (11:06) Patient not able to tolerate therapy; increased back pain. MD ordered MRI of L-spine. Will re-attempt as appropriate.

## 2024-01-06 PROCEDURE — 2500000001 HC RX 250 WO HCPCS SELF ADMINISTERED DRUGS (ALT 637 FOR MEDICARE OP): Performed by: HOSPITALIST

## 2024-01-06 PROCEDURE — 99232 SBSQ HOSP IP/OBS MODERATE 35: CPT | Performed by: STUDENT IN AN ORGANIZED HEALTH CARE EDUCATION/TRAINING PROGRAM

## 2024-01-06 PROCEDURE — 2500000001 HC RX 250 WO HCPCS SELF ADMINISTERED DRUGS (ALT 637 FOR MEDICARE OP): Performed by: ANESTHESIOLOGY

## 2024-01-06 PROCEDURE — 2500000005 HC RX 250 GENERAL PHARMACY W/O HCPCS

## 2024-01-06 PROCEDURE — 2500000001 HC RX 250 WO HCPCS SELF ADMINISTERED DRUGS (ALT 637 FOR MEDICARE OP): Performed by: STUDENT IN AN ORGANIZED HEALTH CARE EDUCATION/TRAINING PROGRAM

## 2024-01-06 PROCEDURE — 2500000001 HC RX 250 WO HCPCS SELF ADMINISTERED DRUGS (ALT 637 FOR MEDICARE OP)

## 2024-01-06 PROCEDURE — 1200000002 HC GENERAL ROOM WITH TELEMETRY DAILY

## 2024-01-06 PROCEDURE — 2500000004 HC RX 250 GENERAL PHARMACY W/ HCPCS (ALT 636 FOR OP/ED): Performed by: HOSPITALIST

## 2024-01-06 PROCEDURE — 2500000004 HC RX 250 GENERAL PHARMACY W/ HCPCS (ALT 636 FOR OP/ED): Performed by: INTERNAL MEDICINE

## 2024-01-06 PROCEDURE — 2500000004 HC RX 250 GENERAL PHARMACY W/ HCPCS (ALT 636 FOR OP/ED)

## 2024-01-06 PROCEDURE — 2500000001 HC RX 250 WO HCPCS SELF ADMINISTERED DRUGS (ALT 637 FOR MEDICARE OP): Performed by: REGISTERED NURSE

## 2024-01-06 PROCEDURE — 2500000002 HC RX 250 W HCPCS SELF ADMINISTERED DRUGS (ALT 637 FOR MEDICARE OP, ALT 636 FOR OP/ED): Performed by: HOSPITALIST

## 2024-01-06 PROCEDURE — 1090000002 HH PPS REVENUE DEBIT

## 2024-01-06 PROCEDURE — 2500000001 HC RX 250 WO HCPCS SELF ADMINISTERED DRUGS (ALT 637 FOR MEDICARE OP): Performed by: SPECIALIST

## 2024-01-06 PROCEDURE — 1090000001 HH PPS REVENUE CREDIT

## 2024-01-06 RX ORDER — BACLOFEN 10 MG/1
5 TABLET ORAL 4 TIMES DAILY
Status: DISCONTINUED | OUTPATIENT
Start: 2024-01-06 | End: 2024-01-18 | Stop reason: HOSPADM

## 2024-01-06 RX ORDER — HYDROMORPHONE HYDROCHLORIDE 2 MG/1
2 TABLET ORAL EVERY 4 HOURS PRN
Status: DISCONTINUED | OUTPATIENT
Start: 2024-01-06 | End: 2024-01-18 | Stop reason: HOSPADM

## 2024-01-06 RX ORDER — OXYCODONE HYDROCHLORIDE 5 MG/1
10 TABLET ORAL EVERY 6 HOURS PRN
Status: DISCONTINUED | OUTPATIENT
Start: 2024-01-06 | End: 2024-01-18 | Stop reason: HOSPADM

## 2024-01-06 RX ADMIN — CYCLOBENZAPRINE HYDROCHLORIDE 10 MG: 10 TABLET, FILM COATED ORAL at 17:24

## 2024-01-06 RX ADMIN — MORPHINE SULFATE 15 MG: 15 TABLET, EXTENDED RELEASE ORAL at 08:56

## 2024-01-06 RX ADMIN — ATENOLOL 50 MG: 50 TABLET ORAL at 08:56

## 2024-01-06 RX ADMIN — Medication 1 TABLET: at 08:56

## 2024-01-06 RX ADMIN — ACETAMINOPHEN 650 MG: 325 TABLET ORAL at 05:00

## 2024-01-06 RX ADMIN — ACETAMINOPHEN 650 MG: 325 TABLET ORAL at 09:00

## 2024-01-06 RX ADMIN — ACETAMINOPHEN 650 MG: 325 TABLET ORAL at 16:38

## 2024-01-06 RX ADMIN — DOCUSATE SODIUM 100 MG: 100 CAPSULE, LIQUID FILLED ORAL at 20:35

## 2024-01-06 RX ADMIN — MIRTAZAPINE 15 MG: 15 TABLET, FILM COATED ORAL at 20:36

## 2024-01-06 RX ADMIN — PILOCARPINE HYDROCHLORIDE 5 MG: 5 TABLET, FILM COATED ORAL at 08:56

## 2024-01-06 RX ADMIN — MORPHINE SULFATE 15 MG: 15 TABLET, EXTENDED RELEASE ORAL at 20:35

## 2024-01-06 RX ADMIN — LIDOCAINE 1 PATCH: 4 PATCH TOPICAL at 08:57

## 2024-01-06 RX ADMIN — Medication 3 MG: at 18:45

## 2024-01-06 RX ADMIN — FUROSEMIDE 20 MG: 40 TABLET ORAL at 08:56

## 2024-01-06 RX ADMIN — PREGABALIN 75 MG: 50 CAPSULE ORAL at 20:36

## 2024-01-06 RX ADMIN — BUSPIRONE HYDROCHLORIDE 10 MG: 5 TABLET ORAL at 08:56

## 2024-01-06 RX ADMIN — HYDRALAZINE HYDROCHLORIDE 50 MG: 50 TABLET ORAL at 20:35

## 2024-01-06 RX ADMIN — BRIMONIDINE TARTRATE 1 DROP: 2 SOLUTION OPHTHALMIC at 08:58

## 2024-01-06 RX ADMIN — OXYCODONE HYDROCHLORIDE 10 MG: 5 TABLET ORAL at 10:01

## 2024-01-06 RX ADMIN — HYDROMORPHONE HYDROCHLORIDE 2 MG: 2 TABLET ORAL at 12:46

## 2024-01-06 RX ADMIN — HYDROMORPHONE HYDROCHLORIDE 0.4 MG: 1 INJECTION, SOLUTION INTRAMUSCULAR; INTRAVENOUS; SUBCUTANEOUS at 08:03

## 2024-01-06 RX ADMIN — BACLOFEN 5 MG: 10 TABLET ORAL at 21:49

## 2024-01-06 RX ADMIN — PREGABALIN 75 MG: 50 CAPSULE ORAL at 08:56

## 2024-01-06 RX ADMIN — ACETAMINOPHEN 650 MG: 325 TABLET ORAL at 21:53

## 2024-01-06 RX ADMIN — OXYCODONE HYDROCHLORIDE 10 MG: 5 TABLET ORAL at 18:45

## 2024-01-06 RX ADMIN — DAPTOMYCIN 500 MG: 500 INJECTION, POWDER, LYOPHILIZED, FOR SOLUTION INTRAVENOUS at 20:33

## 2024-01-06 RX ADMIN — POLYETHYLENE GLYCOL 3350 17 G: 17 POWDER, FOR SOLUTION ORAL at 08:56

## 2024-01-06 RX ADMIN — Medication: at 08:00

## 2024-01-06 RX ADMIN — HYDROMORPHONE HYDROCHLORIDE 2 MG: 2 TABLET ORAL at 16:41

## 2024-01-06 RX ADMIN — EZETIMIBE 10 MG: 10 TABLET ORAL at 08:56

## 2024-01-06 RX ADMIN — DOCUSATE SODIUM 100 MG: 100 CAPSULE, LIQUID FILLED ORAL at 08:56

## 2024-01-06 RX ADMIN — OXYCODONE HYDROCHLORIDE 10 MG: 5 TABLET ORAL at 14:40

## 2024-01-06 RX ADMIN — BRIMONIDINE TARTRATE 1 DROP: 2 SOLUTION OPHTHALMIC at 20:34

## 2024-01-06 RX ADMIN — CYCLOBENZAPRINE HYDROCHLORIDE 10 MG: 10 TABLET, FILM COATED ORAL at 09:15

## 2024-01-06 RX ADMIN — HYDRALAZINE HYDROCHLORIDE 50 MG: 50 TABLET ORAL at 08:56

## 2024-01-06 RX ADMIN — ISOSORBIDE MONONITRATE 60 MG: 60 TABLET, EXTENDED RELEASE ORAL at 06:53

## 2024-01-06 RX ADMIN — PANTOPRAZOLE SODIUM 40 MG: 40 TABLET, DELAYED RELEASE ORAL at 06:53

## 2024-01-06 ASSESSMENT — PAIN - FUNCTIONAL ASSESSMENT
PAIN_FUNCTIONAL_ASSESSMENT: 0-10

## 2024-01-06 ASSESSMENT — COGNITIVE AND FUNCTIONAL STATUS - GENERAL
MOVING FROM LYING ON BACK TO SITTING ON SIDE OF FLAT BED WITH BEDRAILS: A LOT
HELP NEEDED FOR BATHING: A LOT
WALKING IN HOSPITAL ROOM: A LOT
CLIMB 3 TO 5 STEPS WITH RAILING: A LOT
DRESSING REGULAR LOWER BODY CLOTHING: A LOT
MOBILITY SCORE: 12
TURNING FROM BACK TO SIDE WHILE IN FLAT BAD: A LOT
MOVING FROM LYING ON BACK TO SITTING ON SIDE OF FLAT BED WITH BEDRAILS: A LOT
TOILETING: A LOT
PERSONAL GROOMING: A LOT
DRESSING REGULAR UPPER BODY CLOTHING: A LOT
TURNING FROM BACK TO SIDE WHILE IN FLAT BAD: A LOT
DAILY ACTIVITIY SCORE: 14
STANDING UP FROM CHAIR USING ARMS: A LOT
DRESSING REGULAR LOWER BODY CLOTHING: A LOT
HELP NEEDED FOR BATHING: A LOT
STANDING UP FROM CHAIR USING ARMS: A LOT
CLIMB 3 TO 5 STEPS WITH RAILING: A LOT
MOVING TO AND FROM BED TO CHAIR: A LOT
DRESSING REGULAR UPPER BODY CLOTHING: A LOT
DAILY ACTIVITIY SCORE: 14
WALKING IN HOSPITAL ROOM: A LOT
MOVING TO AND FROM BED TO CHAIR: A LOT
MOBILITY SCORE: 12
PERSONAL GROOMING: A LOT
TOILETING: A LOT

## 2024-01-06 ASSESSMENT — PAIN SCALES - GENERAL
PAINLEVEL_OUTOF10: 10 - WORST POSSIBLE PAIN
PAINLEVEL_OUTOF10: 8
PAINLEVEL_OUTOF10: 5 - MODERATE PAIN
PAINLEVEL_OUTOF10: 8
PAINLEVEL_OUTOF10: 6
PAINLEVEL_OUTOF10: 7
PAINLEVEL_OUTOF10: 0 - NO PAIN
PAINLEVEL_OUTOF10: 6
PAINLEVEL_OUTOF10: 8
PAINLEVEL_OUTOF10: 0 - NO PAIN
PAINLEVEL_OUTOF10: 6

## 2024-01-06 ASSESSMENT — PAIN DESCRIPTION - LOCATION
LOCATION: BACK

## 2024-01-06 NOTE — PROGRESS NOTES
"Tara Tierney is a 68 y.o. female on day 6 of admission presenting with Postoperative surgical complication involving musculoskeletal system associated with musculoskeletal procedure, unspecified complication.    Subjective   Seen and examined.  Continues to be in severe pain, crying, says that she is having difficulty moving.  No fevers or chills, no nausea or vomiting.       Objective     Physical Exam  Constitutional:       General: She is in acute distress.      Appearance: She is not toxic-appearing.   Eyes:      Extraocular Movements: Extraocular movements intact.      Conjunctiva/sclera: Conjunctivae normal.   Cardiovascular:      Rate and Rhythm: Normal rate and regular rhythm.   Pulmonary:      Effort: Pulmonary effort is normal.      Breath sounds: No wheezing.   Abdominal:      General: There is no distension.      Palpations: Abdomen is soft.   Neurological:      Mental Status: She is alert. Mental status is at baseline.         Last Recorded Vitals  Blood pressure 179/72, pulse 77, temperature 37 °C (98.6 °F), resp. rate 20, height 1.448 m (4' 9.01\"), weight 78.2 kg (172 lb 6.4 oz), SpO2 92 %, not currently breastfeeding.  Intake/Output last 3 Shifts:  I/O last 3 completed shifts:  In: 668 (8.5 mL/kg) [P.O.:500; IV Piggyback:168]  Out: 4310 (55.1 mL/kg) [Urine:4250 (1.5 mL/kg/hr); Drains:60]  Weight: 78.2 kg     Relevant Results           This patient currently has cardiac telemetry ordered; if you would like to modify or discontinue the telemetry order, click here to go to the orders activity to modify/discontinue the order.  This patient has a central line   Reason for the central line remaining today? Parenteral medication                 Assessment/Plan   Principal Problem:    Postoperative surgical complication involving musculoskeletal system associated with musculoskeletal procedure, unspecified complication  Active Problems:    Essential hypertension    PVD (peripheral vascular disease) " (CMS/HCC)    Back pain with radiculopathy    Back pain at L4-L5 level    Deep vein thrombosis (DVT) of right lower extremity (CMS/HCC)    Anemia    Still reporting severe pain  MRI lumbar spine was consistent with discitis but no fluid collection  Will continue IV Cubicin  Follow-up on orthopedics and ID's recommendations  Wound cultures are growing MRSA/staph  Keep off Eliquis  Status post IVC filter placement  Hemoglobin has remained stable  I will change her pain management, will keep her on morphine 15 mg twice daily long-acting, will add Dilaudid 2 mg every 4 hours as needed for severe pain, oxycodone 10 mg every 6 hours for moderate pain and IV morphine 2 mg for breakthrough pain  Continue home meds for hypertension  Drains to be removed as per orthopedics recommendation  DVT prophylaxis             Nitesh Ruiz MD

## 2024-01-06 NOTE — PROGRESS NOTES
Infectious Diseases Inpatient Progress Note      HISTORY OF PRESENT ILLNESS:    Follow up postop deep incision wound infection with recurrent abscess while on IV Vanco, S/P hardware removal with persistent +ve MRSA Cx. ,     Ongoing back pain  Current Medications:    acetaminophen, 650 mg, oral, q6h  [Held by provider] aspirin, 81 mg, oral, Daily  atenolol, 50 mg, oral, q AM  brimonidine, 1 drop, Both Eyes, BID  busPIRone, 10 mg, oral, Daily  daptomycin, 500 mg, intravenous, Nightly  docusate sodium, 100 mg, oral, BID  ezetimibe, 10 mg, oral, Daily  furosemide, 20 mg, oral, Daily  hydrALAZINE, 50 mg, oral, BID  isosorbide mononitrate ER, 60 mg, oral, Daily  lactobacillus acidophilus, 1 tablet, oral, Daily  lidocaine, 1 patch, transdermal, Daily  melatonin, 3 mg, oral, Daily  mirtazapine, 15 mg, oral, Nightly  morphine CR, 15 mg, oral, q12h EDE  oxygen, , inhalation, Continuous - 02/gases  pantoprazole, 40 mg, oral, Daily before breakfast  pilocarpine, 5 mg, oral, Daily  polyethylene glycol, 17 g, oral, Daily  pregabalin, 75 mg, oral, BID        Allergies:  Neomycin and Neomycin-polymyxin b-dexameth      Review of Systems  14 system review is negative other than HPI    Physical Exam    Heart Rate:  [74-89]   Temp:  [36.3 °C (97.3 °F)-37 °C (98.6 °F)]   Resp:  [18-21]   BP: (119-179)/(58-78)   SpO2:  [92 %-96 %]    Vitals:    01/06/24 0651 01/06/24 0745 01/06/24 0800 01/06/24 1416   BP: 157/66 179/72  119/58   BP Location: Left arm      Patient Position: Lying      Pulse: 80 77  74   Resp: 18 20     Temp: 36.6 °C (97.9 °F) 37 °C (98.6 °F)  36.6 °C (97.9 °F)   TempSrc: Temporal      SpO2: 92% 92% 92% 93%   Weight:       Height:         General Appearance: alert and oriented to person, place and time, well-developed and well-nourished, in no acute distress  Skin: warm and dry, no rash.   Head: normocephalic and atraumatic  Eyes: anicteric sclerae  ENT:  normal mucous membranes. No oral thrush  Lungs: normal respiratory  "effort, clear  Heart: Nl S1 and S2  Abdomen: soft, no tenderness  1+ B leg edema  No erythema, no tenderness  Back incision with dressing, 2 JOSE drains  DATA:    Lab Results   Component Value Date    WBC 6.4 01/05/2024    HGB 8.4 (L) 01/05/2024    HCT 26.5 (L) 01/05/2024    MCV 96 01/05/2024     01/05/2024     Lab Results   Component Value Date    CREATININE 0.74 01/05/2024    BUN 10 01/05/2024     (L) 01/05/2024    K 3.9 01/05/2024    CL 96 (L) 01/05/2024    CO2 28 01/05/2024       Hepatic Function Panel:No results found for: \"ALKPHOS\", \"ALT\", \"AST\", \"PROT\", \"BILITOT\", \"BILIDIR\"    Microbiology:   Susceptibility data from last 90 days.  Collected Specimen Info Organism Amoxicillin/Clavulanate Ampicillin Ampicillin/Sulbactam Aztreonam Cefazolin Cefazolin (uncomplicated UTIs only) Cefepime Cefotaxime Ceftazidime Ceftriaxone Ciprofloxacin Clindamycin   12/31/23 Swab from SPINE Staphylococcus aureus               12/31/23 Tissue from SPINE Methicillin Resistant Staphylococcus aureus (MRSA)            S   12/31/23 Swab from SPINE Methicillin Resistant Staphylococcus aureus (MRSA)            S   12/31/23 Tissue from SPINE Staphylococcus aureus               12/11/23 Fluid from ABSCESS Methicillin Resistant Staphylococcus aureus (MRSA)            S   12/11/23 Tissue from ABSCESS Staphylococcus aureus               12/11/23 Swab from ABSCESS Methicillin Resistant Staphylococcus aureus (MRSA)            S   12/06/23 Swab from SPINE Methicillin Resistant Staphylococcus aureus (MRSA)               12/06/23 Tissue from SPINE Staphylococcus aureus               12/06/23 Tissue from SPINE Staphylococcus aureus               12/05/23 Urine from Clean Catch/Voided Escherichia coli S R S R R R R R R R R    11/03/23 Swab from Nares/Axilla/Groin Methicillin Resistant Staphylococcus aureus (MRSA)                 Collected Specimen Info Organism Ertapenem Erythromycin Gentamicin Meropenem Nitrofurantoin Oxacillin " Piperacillin/Tazobactam Tetracycline Tobramycin Trimethoprim/Sulfamethoxazole Vancomycin   12/31/23 Swab from SPINE Staphylococcus aureus              12/31/23 Tissue from SPINE Methicillin Resistant Staphylococcus aureus (MRSA)  R    R  S  S S   12/31/23 Swab from SPINE Methicillin Resistant Staphylococcus aureus (MRSA)  R    R  S  S S   12/31/23 Tissue from SPINE Staphylococcus aureus              12/11/23 Fluid from ABSCESS Methicillin Resistant Staphylococcus aureus (MRSA)  R    R  S  S S   12/11/23 Tissue from ABSCESS Staphylococcus aureus              12/11/23 Swab from ABSCESS Methicillin Resistant Staphylococcus aureus (MRSA)  R    R  S  S S   12/06/23 Swab from SPINE Methicillin Resistant Staphylococcus aureus (MRSA)              12/06/23 Tissue from SPINE Staphylococcus aureus              12/06/23 Tissue from SPINE Staphylococcus aureus              12/05/23 Urine from Clean Catch/Voided Escherichia coli S  R S S  S  I S    11/03/23 Swab from Nares/Axilla/Groin Methicillin Resistant Staphylococcus aureus (MRSA)                   Imaging:   CT abdomen pelvis wo IV contrast    Result Date: 12/31/2023  STUDY: CT Abdomen and Pelvis without IV Contrast; 12/31/2023 at 5:11 PM. INDICATION: Abdominal pain. COMPARISON: CT AP 12/19/2023 ACCESSION NUMBER(S): [id number] ORDERING CLINICIAN: WILLIAM EDMONDS TECHNIQUE: CT of the abdomen and pelvis was performed.  Contiguous axial images were obtained at 3 mm slice thickness through the abdomen and pelvis. Coronal and sagittal reconstructions at 3 mm slice thickness were performed. No intravenous contrast was administered.  Automated mA/kV exposure control was utilized and patient examination was performed in strict accordance with principles of ALARA. FINDINGS: Please note that the evaluation of vessels, lymph nodes and organs is limited without intravenous contrast.  LOWER CHEST: No cardiomegaly.  No pericardial effusion.  Very small bilateral pleural effusions   ABDOMEN:  LIVER: No hepatomegaly.  Smooth surface contour.  Normal attenuation.  BILE DUCTS: No intrahepatic or extrahepatic biliary ductal dilatation.  GALLBLADDER: The gallbladder is absent. STOMACH: No abnormalities identified.  PANCREAS: Negative for pancreatitis  SPLEEN: No splenomegaly or focal splenic lesion.  ADRENAL GLANDS: No thickening or nodules.  KIDNEYS AND URETERS: Kidneys are normal in size and location.  No renal or ureteral calculi.  PELVIS:  BLADDER: No abnormalities identified.  REPRODUCTIVE ORGANS: No abnormalities identified.  BOWEL: Colonic diverticulosis.  Mild constipation without bowel obstruction. Negative for appendicitis  VESSELS: Limited due to lack of intravenous contrast.  Prior femoral to femoral artery bypass.  Abdominal aorta is normal in caliber.  PERITONEUM/RETROPERITONEUM/LYMPH NODES: Small amount of low-density free pelvic fluid  No pneumoperitoneum. No lymphadenopathy.  ABDOMINAL WALL: Small fat-containing umbilical hernia. SOFT TISSUES: Postsurgical changes within the posterior lumbar soft tissues with surgical drains in place.  BONES: Status post interbody fusion of L4/5 and L5/S1 with metallic spacer. This space narrowing L3/4.  Laminectomies L3-L5.  Bone graft along the lateral margins of L3-L5 fracture of the right L3 transverse process. Fracture of the right L4 transverse process.  Old screw tract within the L4 and L5 pedicles.  Narrowing of the right L5/S1 neural foramen due to facet joint osteophyte.    Negative for bowel obstruction.  Mild constipation. Colonic diverticulosis without diverticulitis. Negative for appendicitis. Postsurgical changes lumbar spine removal of pedicle screws and posterior rods from L4 through S1.  Stable appearance of interbody spacers at L4/5 and L5/S1. Bone graft along the lateral margins of L3-L5.  Fractures of the right L4 and L3 transverse process.  The right L3 transverse process fracture appears old.  Postsurgical changes within the  posterior lumbar soft tissues with surgical drains in place. Signed by Aung Sparrow MD    MR lumbar spine w and wo IV contrast    Result Date: 12/31/2023  Interpreted By:  Jonas Sanchez, STUDY: MR LUMBAR SPINE W AND WO IV CONTRAST;  12/31/2023 12:33 am   INDICATION: Signs/Symptoms:Pain.   COMPARISON: MRI of the lumbar spine dated 12/10/2023   ACCESSION NUMBER(S): [id number]   ORDERING CLINICIAN: YVETTE LEE   TECHNIQUE: Sagittal T1, T2, STIR, axial T1 and T2 weighted images of the lumbar spine were acquired. Additional axial and sagittal T1 weighted images of the lumbar spine were obtained after intravenous administration of 15.5 mL of contrast.   FINDINGS: Exam is degraded by motion.   There is again evidence of 5 lumbar type non rib-bearing vertebral bodies, with the lowest well-formed intervertebral disc space labeled L5-S1.   Postsurgical changes of posterior decompression and laminectomies of L3 through L5 with posterior spinal fusion extending from L4 through S1 and discectomies with intervertebral disc spaces at L4-L5 and L5-S1. These are similar in appearance to prior examination on 12/10/2023. Susceptibility artifact from the surgical hardware somewhat limits evaluation of the adjacent structures.   In the interim since prior imaging on 12/10/2023, new postsurgical consistent with irrigation debridement of subfascial tissues of the cutaneous spine are evident. STIR and T2 hypointense signal abnormality previously seen in the cutaneous fat of the lower lumbar spine has resolved, with new 3.7 x 3.6 x 13.6 cm (series 7, image 10; series 10, image 10) T2 hyperintense fluid collection present, with slight peripheral enhancement. This collection may be contiguous with the skin.   T2 hyperintense collection is again present in the laminectomy surgical bed, abutting the thecal sac at the level of L3 through L5 (series 8, image 14), measuring up to 5.2 cm in craniocaudal dimension, 2.9 cm in  transverse dimension and up to 1.5 cm in AP dimension (series 10, image 7). The surgical catheter previously seen within the collection is gone, although collection is decreased in size to previous imaging, with resolution of previously seen air within the collection. Mild surrounding edema and reactive soft tissue changes are present in the epidural space, somewhat increased in the interim since prior exam, with new/increased mass effect on the adjacent thecal sac.   There also appears to be small amount of new fluid present in the anterior epidural space at the level of L3 (series 10, image 3).   Although the exact characterization is difficult due to motion, there appears to be somewhat worsened spinal canal narrowing at the level of L2-L3 due to combination of new fluid in the anterior epidural space, and the T2 hyperintense fluid collection with associated inflammatory/reactive changes along the posterior aspect of the thecal sac, with somewhat increased effacement of the subarachnoid space.   No new intra thecal enhancement is identified.   Neural foramina are not well assessed due to motion and susceptibility artifact from the surgical hardware.       1.  New surgical changes of irrigation and debridement in the laminectomy surgical bed evident, with previously seen geographic area of hypointense T2 and STIR signal in the cutaneous fat of the lumbar spine resolved, and new T2 hyperintense collection measuring 3.7 x 3.6 x 13.6 cm present in the cutaneous fat, likely representing a postsurgical seroma, although sterility can not be ascertained on imaging alone. This collection reaches of the dermis, and may drain at the skin. 2. T2 hyperintense collection within the laminectomy bed itself along the dorsal aspect of the thecal sac described on previous MRI in 12/10/2023 has mildly decreased in size from prior imaging, with interval removal of previously seen drain, although there are increased  inflammatory/reactive soft tissue changes present in the laminectomy surgical bed, focus of fluid in the anterior epidural space at the level of L3 contributes to increased effacement of the thecal sac at the level of L3 compared to prior MRI.   MACRO: None   Signed by: Jonas Sanchez 12/31/2023 1:13 AM Dictation workstation:   [id number]         IMPRESSION:    Postop deep incisional wound infection of lumbar spine with failure of IV Vanco  S/P Hardware removal  MRSA infection in a patient who is a chronic carrier    Patient Active Problem List   Diagnosis    Abdominal aortic aneurysm (CMS/HCC)    Abnormal EKG    Bilateral carotid artery stenosis    Bruit of right carotid artery    CAD in native artery    Cardiomyopathy (CMS/HCC)    Chest pain    Chronic asthmatic bronchitis    Closed displaced fracture of fifth metatarsal bone    Current every day smoker    Difficulty breathing    Essential hypertension    History of total knee replacement    Hypokalemia    Intermittent claudication (CMS/HCC)    Knee osteoarthritis    Knee pain    Limb pain    Localized swelling, mass, or lump of lower extremity    Celiac artery stenosis (CMS/HCC)    Mesenteric artery stenosis (CMS/HCC)    Mixed hyperlipidemia    Obese    PVD (peripheral vascular disease) (CMS/HCC)    Right foot pain    Swelling    Trigger finger    Urinary tract infection without hematuria    Back pain of lumbar region with sciatica    Back pain with radiculopathy    Intractable back pain    Seroma, post-traumatic (CMS/HCC)    Lumbar surgical wound fluid collection    Generalized weakness    Postoperative surgical complication involving musculoskeletal system associated with musculoskeletal procedure, unspecified complication    Back pain at L4-L5 level    Deep vein thrombosis (DVT) of right lower extremity (CMS/HCC)    Anemia       PLAN:  Change Vanco to Cubicin  Hold statin while on daptomycin unless absolutely necessary, then otherwise need to check  cpks more frequently  Weekly, cbc, cmp, cpk    Pete Echeverria MD

## 2024-01-06 NOTE — PROGRESS NOTES
"Physical Therapy                 Therapy Communication Note    Patient Name: Tara Tierney  MRN: [id number]  Today's Date: 1/6/2024     Discipline: Physical Therapy    Missed Visit Reason: Missed Visit Reason: Patient refused    Missed Time: Attempt    Comment:RN stating pt. Ok to see if agreeable and pt. Medicated but still with c/o pain. On attempt to see pt. Declining participation stating \"It's not that I don't want to get up , but I hurt too much too move and have pain all the way down my legs\" while conversing with pt. MD arrived to see pt. Will continue to follow as appropriate.  "

## 2024-01-06 NOTE — PROGRESS NOTES
Patient seen and evaluated.    Bilateral drains examined.  Both holding suction.  It appears that superficial continues to lose amount of free hold.    The patient is awake alert and oriented.  States that she has had improvement in leg pain from yesterday.    Bilateral lower extremities neurovascularly intact.    MRI performed yesterday.  Dr. Coombs to review today.  Continue antibiotics.    Appropriate wound care, nursing care, discharge planning, medical management ,and monitoring of labs will continue    Dana Queen MD

## 2024-01-06 NOTE — PROGRESS NOTES
Chief complaint: Postop irrigation debridement    HPI: Patient feeling improved today.  She says compared to yesterday she is significantly improved but still has quite a bit of back pain and thigh pain.  She just wants to be able to get up and get out of bed and move around without so much pain.  No numbness and tingling.  No perceived weakness in her legs.  Pretty much the back pain that is preventing her from doing anything.  I also spoke on the phone with her  today for over 10 minutes.  I explained to him everything that is going on and answered all of his questions and he understood and was happy with our current plan and path forward.    MRI was reviewed and it shows resolution of the epidural fluid collection and subcutaneous fluid collection.  L3-4 shows discitis and possible ventral epidural abscess extending from the L3-4 disc cephalad, which also has some associated osteomyelitis.  There is edema, but no abscess, within the right psoas.  There is no areas of any significant stenosis visualized on the MRI.    Assessment/plan: Patient with a very difficult situation.  Her most recent irrigation and debridement has resulted in resolution of her epidural fluid collection and subcutaneous fluid collection.  However there is osteomyelitis around an L3-4 discitis and a ventral fluid collection and some edema within her right psoas.  That fluid collection is in an area that is extremely difficult to get to surgically given her adhesions and scar tissue from multiple surgeries and its location.  Given that her main problem at this point is discitis and osteomyelitis I think continuing to treat her as we would any other discitis/osteomyelitis is our best path.  I do not think she needs any other surgical intervention and I think we will wait and see how the antibiotics that she is now on perform.  I did discuss this case personally with Dr. Wan and she explained to me that the organism showed to be  sensitive to vancomycin in the lab, but appeared to be not responding to it.  She is therefore been switched to a stronger antibiotic that should provide better results.  We will continue with the current antibiotics, we will continue with the current drains, and we will see if she gets more comfortable.  If she becomes more comfortable will be able to discharge her home.  In about a month we will want to repeat her MRI of her lumbar spine and we will follow her ESR, CRP and inflammatory markers and see if those are continuing to improve.  It appears as if we have been able to manage the superficial drain with appropriate dressing placement to avoid air seeping in through the wound causing the drain to lose its compression.  The patient and family are aware that there is a potential to have skin/wound healing issues that we did not have after the first 2 washouts given the fact that there is some minimal dehiscence between the sutures currently.  At some point, we could consider a surface wound VAC but I think appropriate dressings with suction from within the subcutaneous space is probably the best approach to allow for wound healing.  They also understand if she ends up having wound healing issues she may need a traditional wound VAC and or other coverage procedures with plastic surgery.    With my long discussions with the patient and her  today they understand the seriousness of this situation and the complexity of the situation.  They understand that it is unclear how things are going to evolve over the next days to weeks.

## 2024-01-06 NOTE — CONSULTS
Patient is a pleasant 68-year-old  left-handed white woman with a history of hypertensive hyperlipidemic woman with history of coronary artery disease, cardiomyopathy, peripheral vascular disease with chronic lower back pain postop lumbar spine surgery November 2023 with subsequent MRSA wound infection on IV antibiotic treatment managed by Dr. Dubois currently.  She also had a history of a right DVT and pulmonary embolism approximately in November 2023.  She has significant anemia with hemoglobin currently 7.  She is felt to be high risk for anticoagulation long-term.  IVC filter right leg placed on January 2, 2024 by Dr. Negro without any problem  Patient continues to have significant back pain.  Dr. Coombs, infectious disease are following the patient this is patient's third admission to the hospital on December 30, 2023  Dr. Barnes is on consult.  She is getting significant amount of analgesics p.o., intravenous.   Pain is in the more than the right lower back with radiation to the right thigh and right hip.  Imaging of the right hip did not show significant pathology  MRI of the lumbar spine was done today evening, results are pending.  Physical therapy could not work with her due to the severe pain  Cardiology has seen the patient already   Patient does follow with Dr. Barron as an outpatient.       Patient denies Chest Pain, SOB, Lightheadedness, Dizziness, TIA or CVA symptoms.  No CHF or Edema.  No Palpitations.  No GI,  or Bleeding Issues. No Recent Fever or Chills.  Patient had a right carotid endarterectomy by Dr. Edwards in the past.  History of right cerebellar TIAs in the past  Past tobacco abuse  She has had a revascularization of the lower extremities     Cardiovascular and general review of systems is otherwise negative.        Allergies:            Allergies   Allergen Reactions    Neomycin Other       swelling, redness, burning post eye surgery    Neomycin-Polymyxin B-Dexameth Other  and Rash       blisters, eye swelling, burning         Medications:           Current Outpatient Medications   Medication Instructions    albuterol (ProAir HFA) 90 mcg/actuation inhaler 2 puffs, inhalation    apixaban (ELIQUIS) 10 mg, oral, 2 times daily    apixaban (ELIQUIS) 5 mg, oral, 2 times daily    aspirin 81 mg, oral, Daily    atenolol (TENORMIN) 50 mg, oral, Every morning    brimonidine (AlphaGAN) 0.2 % ophthalmic solution 1 drop, Both Eyes, 2 times daily    busPIRone (BUSPAR) 10 mg, oral, Daily    cholecalciferol (Vitamin D-3) 50 mcg (2,000 unit) capsule 1 capsule, oral, Daily    cyclobenzaprine (FLEXERIL) 5 mg, oral, 3 times daily PRN    ezetimibe (ZETIA) 10 mg, oral, Daily    furosemide (LASIX) 20 mg, oral, Daily    heparin sodium,porcine/PF (HEPARIN, PORCINE, LOCK FLUSH IV) 5 mL, intravenous, Daily    hydrALAZINE (APRESOLINE) 50 mg, oral, 2 times daily    HYDROmorphone (DILAUDID) 2 mg, oral, Every 4 hours PRN    ibandronate (BONIVA) 150 mg, oral, Every 30 days    ipratropium (Atrovent HFA) 17 mcg/actuation inhaler 2 puffs, inhalation, 2 times daily RT    isosorbide mononitrate ER (IMDUR) 60 mg, oral, Daily RT    lactobacillus acidophilus tablet tablet 1 tablet, oral, Daily    loratadine (Claritin) 10 mg tablet 1 tablet, oral, Daily    LUTEIN ORAL 20 mg, oral, Daily RT    meclizine (ANTIVERT) 25 mg, oral, Every 8 hours PRN    mirtazapine (Remeron) 15 mg tablet 1 tablet, oral, Nightly    nitroglycerin (NITROSTAT) 0.4 mg, sublingual, Every 5 min PRN,      PLACE 1 TABLET UNDER THE TONGUE EVERY 5 MINUTES UP TO 3 DOSES AS NEEDED FOR CHEST PAIN.<BR>    pantoprazole (PROTONIX) 40 mg, oral, Daily before breakfast, Do not crush, chew, or split.    pilocarpine (SALAGEN (PILOCARPINE)) 5 mg, oral, Daily RT    potassium chloride ER (Micro-K) 10 mEq ER capsule 10 mEq, oral, Daily    pregabalin (LYRICA) 75 mg, oral, 2 times daily    simvastatin (ZOCOR) 40 mg, oral, Nightly    sodium chloride 0.9% (ClearShield Sodium  Chlor Flush) flush 10 mL, intravenous, Daily    tolterodine (Detrol) 1 mg tablet 1 tablet, oral, Nightly    vancomycin (VANCOCIN) 1,250 mg, intravenous, Every 24 hours    vancomycin in dextrose 5 % (Vancocin) IVPB 1 g, intravenous, Every 12 hours, CBC BMP weekly<BR>Vanco level weekly<BR>CRP sed rate in 3 and 6 weeks<BR>Fax results to [id number]    vit C,M-Wo-fkmaf-lutein-zeaxan (PreserVision AREDS-2) 250-90-40-1 mg capsule 1 capsule, oral, 2 times daily         Past Medical History:   Hypertension, hyperlipidemia.  No diabetes.  No prior stroke.  History of peripheral vascular disease post aortobifemoral bypass.  Last CTA angiogram with bilateral leg runoff 7/2023.  Left bundle branch block.  History of prior remote coronary artery stenting.  Pulmonary embolism and DVT right 11/2023.  Otherwise as noted above.     No other significant past medical or surgical history.     Social History:   Quit smoking 3 months ago.  Prior 20-pack-year smoking history.  No alcohol or illicit drug use.     Family History:   Positive family history of cardiovascular disease     ELECTROCARDIOGRAM:       Sinus rhythm with short [place],  Left bundle branch block      CARDIAC TESTING:       ECHO 12/23:  1. Left ventricular systolic function is normal with a 55% estimated ejection fraction.  2. Spectral Doppler shows an impaired relaxation pattern of left ventricular diastolic filling.  3. There is no evidence of mitral valve stenosis.  4. Trace mitral valve regurgitation.  5. Trace tricuspid regurgitation is visualized.  6. Aortic valve stenosis is not present.  7. Moderately elevated pulmonary artery pressure.             Visit Vitals  /63   Pulse 76   Temp 36.3 °C (97.3 °F)   Resp 20        GENERAL APPEARANCE: Well developed, well nourished, and in no acute distress.     CARDIOVASCULAR: Peripheral pulsations are well maintained.   No arterial bruits are heard over the head, eyeballs, left carotid bifurcation, abdominal aorta, iliacs,  or femorals. Carotid pulses intact.  Patient has right carotid bruit  Heart is regular rate and rhythm, she has a systolic murmur.  Abdomen is soft, non-tender, no hepatosplenomegaly.      No sign of head or neck injury    Neurological Exam:    MENTAL STATE: Oriented to day date,month,year, place and person. Knew the name of the president.  Patient was slow to response after the Ativan was given.      Recent and remote memory was intact. Attention span and concentration were normal. General fund of knowledge was intact.   Language: speech comprehension, repetition, expression, and naming is intact for patient´s age and level of education.      CRANIAL NERVES:   CN 2 The fundi were well visualized with normal disc margins, clear vessels and vascular pulsations. No disc edema.  No hemorrhages or exudates were present in the posterior segments that were visualized. Visual fields full to confrontation.   CN 3, 4, 6 Pupils round, equally reactive to light. No ptosis. EOM normal alignment, full range with normal saccades, pursuit and convergence. No nystagmus.   CN 5 Facial sensation intact bilaterally.   CN 7 Normal and symmetric facial strength. Nasolabial folds symmetric.   CN 8 Hearing intact to finger rub bilaterally. On [place] and [place] testing there was no lateralisation  CN 9 Palate elevates symmetrically.   CN 11 Bilaterally normal strength of shoulder shrug and neck turning.   CN 12 Tongue midline, with normal bulk and strength; no fasciculations.   Patient is handling pharyngeal secretions well.   Speech: articulation is intact.      MOTOR: The patient has normal muscle tone and has normal muscle mass. Muscle strength was 5/5 in distal and proximal muscles in both upper and lower extremities when she gave l the effort.  With the pain effort was difficult in the lower extremities. No fasciculations, tremor or other abnormal movements were present.   On [place] testing the patient has no pronation or drift.     "  REFLEXES:   RIGHT UE  LEFT UE   BR:         1       BR:       1      Biceps:    1                  Biceps:1  Triceps:   1                     Triceps: 1  RIGHT LLE  LEFT LLE   Patella:   1                  Patella: 1  Achilles:1                     Achilles:1  Babinski: plantar responses are flexor.   No frontal release signs or other pathologic reflexes present.     COORDINATION: In both upper extremities, finger-nose-finger was intact without dysmetria. No dysdiadochokinesia. In both lower extremities, heel-to-knee-shin was intact.     GAIT: Could not be tested.  Patient is feeling weak with the back pain.  Romberg patient could not be tested  SENSORY: In both upper and lower extremities, sensation was decreased to pinprick, temperature   below the knees and elbows bilaterally, vibration decreased in the distal extremities, and intact joint position sense.    BMP:  Results from last 7 days   Lab Units 01/05/24 1200 01/04/24  0459 01/03/24  0406   SODIUM mmol/L 133* 133* 133*   POTASSIUM mmol/L 3.7 3.5 3.3*   CHLORIDE mmol/L 98 100 98   CO2 mmol/L 27 28 28   BUN mg/dL 10 14 10   CREATININE mg/dL 0.75 0.87 0.91       CMP:  Results from last 7 days   Lab Units 01/05/24 1200 01/04/24  0459 01/03/24  0406   SODIUM mmol/L 133* 133* 133*   POTASSIUM mmol/L 3.7 3.5 3.3*   CHLORIDE mmol/L 98 100 98   CO2 mmol/L 27 28 28   BUN mg/dL 10 14 10   CREATININE mg/dL 0.75 0.87 0.91   GLUCOSE mg/dL 152* 117* 89   CALCIUM mg/dL 8.7 8.4* 8.3*       CBC:  Results from last 7 days   Lab Units 01/05/24 1200 01/04/24  0459 01/03/24  0406   WBC AUTO x10*3/uL 6.4 5.4 5.3   RBC AUTO x10*6/uL 2.77* 2.77* 2.61*   HEMOGLOBIN g/dL 8.4* 8.5* 8.0*   HEMATOCRIT % 26.5* 26.6* 24.9*   MCV fL 96 96 95   MCH pg 30.3 30.7 30.7   MCHC g/dL 31.7* 32.0 32.1   RDW % 15.1* 15.4* 15.9*   PLATELETS AUTO x10*3/uL 249 255 241       INR:        Lipid Profile:        No lab exists for component: \"LABVLDL\"    HgbA1C:        ABG:        No lab exists for " "component: \"PO2\", \"PCO2\", \"HCO3\", \"BE\", \"O2SAT\"    TSH:  No results found for: \"TSH\"  No results found for: \"EJOUGGSA29\"  No results found for: \"FOLATE\"  Lab Results   Component Value Date    VITD25 47 08/17/2021         No MRI head results found for the past 12 months     No CT head results found for the past 12 months     EMG       EEG         Current Facility-Administered Medications:     acetaminophen (Tylenol) tablet 650 mg, 650 mg, oral, q4h PRN **OR** acetaminophen (Tylenol) oral liquid 650 mg, 650 mg, oral, q4h PRN **OR** acetaminophen (Tylenol) suppository 650 mg, 650 mg, rectal, q4h PRN, ORLANDO Quiros    acetaminophen (Tylenol) tablet 650 mg, 650 mg, oral, q6h, ORLANDO Quiros, 650 mg at 01/05/24 1629    [Held by provider] aspirin EC tablet 81 mg, 81 mg, oral, Daily, John Salazar MD    atenolol (Tenormin) tablet 50 mg, 50 mg, oral, q AM, John Salazar MD, 50 mg at 01/05/24 1122    brimonidine (AlphaGAN) 0.2 % ophthalmic solution 1 drop, 1 drop, Both Eyes, BID, John Salazar MD, 1 drop at 01/05/24 1124    busPIRone (Buspar) tablet 10 mg, 10 mg, oral, Daily, John Salazar MD, 10 mg at 01/05/24 1121    cyclobenzaprine (Flexeril) tablet 10 mg, 10 mg, oral, TID PRN, ORLANDO Quiros, 10 mg at 01/05/24 1118    DAPTOmycin (Cubicin) 300 mg in sodium chloride 0.9% 50 mL IV, 300 mg, intravenous, Nightly, Gem Reyna MD, Stopped at 01/04/24 2127    docusate sodium (Colace) capsule 100 mg, 100 mg, oral, BID, ORLANDO Szymanski, 100 mg at 01/05/24 1123    ezetimibe (Zetia) tablet 10 mg, 10 mg, oral, Daily, John Salazar MD, 10 mg at 01/05/24 1121    furosemide (Lasix) tablet 20 mg, 20 mg, oral, Daily, John Salazar MD, 20 mg at 01/05/24 1122    hydrALAZINE (Apresoline) tablet 50 mg, 50 mg, oral, BID, John Salazar MD, 50 mg at 01/05/24 1120    HYDROmorphone (Dilaudid) injection 0.4 mg, 0.4 mg, intravenous, q3h PRN, John Salazar MD, 0.4 mg at 01/05/24 1911    isosorbide " mononitrate ER (Imdur) 24 hr tablet 60 mg, 60 mg, oral, Daily, John Salazar MD, 60 mg at 01/05/24 0610    lactobacillus acidophilus tablet 1 tablet, 1 tablet, oral, Daily, John Salazar MD, 1 tablet at 01/05/24 1121    lidocaine 4 % patch 1 patch, 1 patch, transdermal, Daily, ORLANDO Quiros, 1 patch at 01/05/24 1123    meclizine (Antivert) tablet 25 mg, 25 mg, oral, q8h PRN, John Salazar MD    melatonin tablet 3 mg, 3 mg, oral, Daily, ORLANDO Quiros, 3 mg at 01/01/24 2320    mirtazapine (Remeron) tablet 15 mg, 15 mg, oral, Nightly, John Salazar MD, 15 mg at 01/04/24 2056    morphine CR (MS Contin) 12 hr tablet 15 mg, 15 mg, oral, q12h EDE, Panchito Barnes MD, 15 mg at 01/05/24 1121    morphine injection 2 mg, 2 mg, intravenous, q2h PRN, DARRYL Quiros-CNP, 2 mg at 01/03/24 1326    naloxone (Narcan) injection 0.2 mg, 0.2 mg, intravenous, q5 min PRN, DARRYL Quiros-CNP    ondansetron (Zofran) tablet 4 mg, 4 mg, oral, q8h PRN, 4 mg at 12/31/23 0904 **OR** ondansetron (Zofran) injection 4 mg, 4 mg, intravenous, q8h PRN, DARRYL Quiros-CNP, 4 mg at 12/31/23 1317    oxyCODONE (Roxicodone) immediate release tablet 10 mg, 10 mg, oral, q4h PRN, DARRYL Quiros-CNP, 10 mg at 01/05/24 1736    oxyCODONE (Roxicodone) immediate release tablet 5 mg, 5 mg, oral, q4h PRN, DARRYL Quiros-CNP, 5 mg at 01/04/24 0537    oxygen (O2) therapy, , inhalation, Continuous - 02/gases, ORLANDO Quiros, Start at 01/03/24 0800    pantoprazole (ProtoNix) EC tablet 40 mg, 40 mg, oral, Daily before breakfast, John Salazar MD, 40 mg at 01/05/24 0610    pilocarpine (Salagen) tablet 5 mg, 5 mg, oral, Daily, John Salazar MD, 5 mg at 01/05/24 1133    polyethylene glycol (Glycolax, Miralax) packet 17 g, 17 g, oral, Daily, ORLANDO Quiros, 17 g at 01/05/24 0900    pregabalin (Lyrica) capsule 75 mg, 75 mg, oral, BID, John Salazar MD, 75 mg at 01/05/24 1122     vitamin A & D ointment 1 Application, 1 Application, Topical, q4h PRN, Nitesh Ruiz MD     Assessment:  Patient has a chronic back pain.  She has a lumbar spine surgery in November 2023.  Dr. Coombs is following the patient.  Patient developed a postop staph infection with MRSA.  She has the antibiotics, incision and drainage.  Pain in the back is more on the right side along with pain in the right thigh and right hip.  Imaging of the right hip did not show significant pathology  MRI of the lumbar spine was repeated today results pending today  Patient is getting a lot of analgesics.  Dr. Barnes is on consult  Right leg DVT she has an inferior vena cava filter put in January 2.  She is on Eliquis  Right carotid bruit, right carotid endarterectomy by Dr. Edwards.  History of right cerebral TIA  Coronary artery disease  Cardiomyopathy  Bilateral total knee replacements.  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  S/p bilateral leg revascularization June 2022  Hypertension  Chronic edema  COPD with history of tobacco abuse in the past  Overweight  Vitamin D deficiency      Recommendation:  We can add the TENS unit to her regimen  Additional lab work  With a better pain control she can start ambulating  Thank you for the consult. We shall follow the patient with you.    Onur Perez MD

## 2024-01-06 NOTE — CARE PLAN
Problem: Pain  Goal: My pain/discomfort is manageable  1/6/2024 1141 by Shereen Bautista, RN  Outcome: Progressing  Note: Pt started shift with 10/10 pain moaning and restless, began introducing her PRN and scheduled medications to gain better pain managment  1/6/2024 1136 by Shereen Bautista, RN  Outcome: Progressing   The patient's goals for the shift include      The clinical goals for the shift include pt will report pain at an acceptable level throughout shift    Over the shift, the patient did not make progress toward the following goals. Barriers to progression include little to no medications overnight. Recommendations to address these barriers include reassess pain and work with pt to decrease level of pain .

## 2024-01-06 NOTE — CARE PLAN
The patient's goals for the shift include      The clinical goals for the shift include pt will report pain at an acceptable level throughout shift    Over the shift, the patient did not make progress toward the following goals. Barriers to progression include anxiety. Recommendations to address these barriers include calm quiet environment early and frequent movement and continued education .    Problem: Pain  Goal: My pain/discomfort is manageable  1/6/2024 1808 by Shereen Bautista, RN  Outcome: Not Progressing  1/6/2024 1141 by Shereen Bautista RN  Outcome: Progressing  Note: Pt started shift with 10/10 pain moaning and restless, began introducing her PRN and scheduled medications to gain better pain managment  1/6/2024 1136 by Shereen Bautista, RN  Outcome: Progressing

## 2024-01-06 NOTE — CARE PLAN
The patient's goals for the shift include      The clinical goals for the shift include Patient's pain will be under controll throughout shift.    Over the shift, the patient did not make progress toward the following goals. Barriers to progression include pain. Recommendations to address these barriers include layer pain medications.

## 2024-01-06 NOTE — CARE PLAN
Problem: Pain - Adult  Goal: Verbalizes/displays adequate comfort level or baseline comfort level  Outcome: Progressing   The patient's goals for the shift include  Pain under control.    The clinical goals for the shift include Patient's pain will be under controll throughout shift.

## 2024-01-07 LAB
ANION GAP SERPL CALC-SCNC: 12 MMOL/L (ref 10–20)
BUN SERPL-MCNC: 11 MG/DL (ref 6–23)
CALCIUM SERPL-MCNC: 8.9 MG/DL (ref 8.6–10.3)
CHLORIDE SERPL-SCNC: 97 MMOL/L (ref 98–107)
CO2 SERPL-SCNC: 28 MMOL/L (ref 21–32)
CREAT SERPL-MCNC: 0.77 MG/DL (ref 0.5–1.05)
ERYTHROCYTE [DISTWIDTH] IN BLOOD BY AUTOMATED COUNT: 15.2 % (ref 11.5–14.5)
GFR SERPL CREATININE-BSD FRML MDRD: 83 ML/MIN/1.73M*2
GLUCOSE SERPL-MCNC: 105 MG/DL (ref 74–99)
HCT VFR BLD AUTO: 28.3 % (ref 36–46)
HGB BLD-MCNC: 8.9 G/DL (ref 12–16)
MCH RBC QN AUTO: 30.3 PG (ref 26–34)
MCHC RBC AUTO-ENTMCNC: 31.4 G/DL (ref 32–36)
MCV RBC AUTO: 96 FL (ref 80–100)
NRBC BLD-RTO: 0 /100 WBCS (ref 0–0)
PLATELET # BLD AUTO: 288 X10*3/UL (ref 150–450)
POTASSIUM SERPL-SCNC: 3.8 MMOL/L (ref 3.5–5.3)
RBC # BLD AUTO: 2.94 X10*6/UL (ref 4–5.2)
SODIUM SERPL-SCNC: 133 MMOL/L (ref 136–145)
WBC # BLD AUTO: 4.8 X10*3/UL (ref 4.4–11.3)

## 2024-01-07 PROCEDURE — 1090000001 HH PPS REVENUE CREDIT

## 2024-01-07 PROCEDURE — 2500000001 HC RX 250 WO HCPCS SELF ADMINISTERED DRUGS (ALT 637 FOR MEDICARE OP): Performed by: SPECIALIST

## 2024-01-07 PROCEDURE — 2500000001 HC RX 250 WO HCPCS SELF ADMINISTERED DRUGS (ALT 637 FOR MEDICARE OP): Performed by: ANESTHESIOLOGY

## 2024-01-07 PROCEDURE — 2500000001 HC RX 250 WO HCPCS SELF ADMINISTERED DRUGS (ALT 637 FOR MEDICARE OP): Performed by: STUDENT IN AN ORGANIZED HEALTH CARE EDUCATION/TRAINING PROGRAM

## 2024-01-07 PROCEDURE — 2500000004 HC RX 250 GENERAL PHARMACY W/ HCPCS (ALT 636 FOR OP/ED)

## 2024-01-07 PROCEDURE — 2500000004 HC RX 250 GENERAL PHARMACY W/ HCPCS (ALT 636 FOR OP/ED): Performed by: HOSPITALIST

## 2024-01-07 PROCEDURE — 2500000004 HC RX 250 GENERAL PHARMACY W/ HCPCS (ALT 636 FOR OP/ED): Mod: JZ | Performed by: INTERNAL MEDICINE

## 2024-01-07 PROCEDURE — 2500000001 HC RX 250 WO HCPCS SELF ADMINISTERED DRUGS (ALT 637 FOR MEDICARE OP): Performed by: REGISTERED NURSE

## 2024-01-07 PROCEDURE — 1090000002 HH PPS REVENUE DEBIT

## 2024-01-07 PROCEDURE — 99232 SBSQ HOSP IP/OBS MODERATE 35: CPT | Performed by: STUDENT IN AN ORGANIZED HEALTH CARE EDUCATION/TRAINING PROGRAM

## 2024-01-07 PROCEDURE — 2500000002 HC RX 250 W HCPCS SELF ADMINISTERED DRUGS (ALT 637 FOR MEDICARE OP, ALT 636 FOR OP/ED): Performed by: HOSPITALIST

## 2024-01-07 PROCEDURE — 37799 UNLISTED PX VASCULAR SURGERY: CPT | Performed by: STUDENT IN AN ORGANIZED HEALTH CARE EDUCATION/TRAINING PROGRAM

## 2024-01-07 PROCEDURE — 2500000005 HC RX 250 GENERAL PHARMACY W/O HCPCS

## 2024-01-07 PROCEDURE — 2500000001 HC RX 250 WO HCPCS SELF ADMINISTERED DRUGS (ALT 637 FOR MEDICARE OP)

## 2024-01-07 PROCEDURE — 2500000004 HC RX 250 GENERAL PHARMACY W/ HCPCS (ALT 636 FOR OP/ED): Performed by: STUDENT IN AN ORGANIZED HEALTH CARE EDUCATION/TRAINING PROGRAM

## 2024-01-07 PROCEDURE — 85027 COMPLETE CBC AUTOMATED: CPT | Performed by: STUDENT IN AN ORGANIZED HEALTH CARE EDUCATION/TRAINING PROGRAM

## 2024-01-07 PROCEDURE — 80048 BASIC METABOLIC PNL TOTAL CA: CPT | Performed by: STUDENT IN AN ORGANIZED HEALTH CARE EDUCATION/TRAINING PROGRAM

## 2024-01-07 PROCEDURE — 1200000002 HC GENERAL ROOM WITH TELEMETRY DAILY

## 2024-01-07 PROCEDURE — 2500000001 HC RX 250 WO HCPCS SELF ADMINISTERED DRUGS (ALT 637 FOR MEDICARE OP): Performed by: HOSPITALIST

## 2024-01-07 RX ADMIN — HYDROMORPHONE HYDROCHLORIDE 2 MG: 2 TABLET ORAL at 13:29

## 2024-01-07 RX ADMIN — DOCUSATE SODIUM 100 MG: 100 CAPSULE, LIQUID FILLED ORAL at 21:38

## 2024-01-07 RX ADMIN — PILOCARPINE HYDROCHLORIDE 5 MG: 5 TABLET, FILM COATED ORAL at 09:03

## 2024-01-07 RX ADMIN — FUROSEMIDE 20 MG: 40 TABLET ORAL at 09:03

## 2024-01-07 RX ADMIN — BRIMONIDINE TARTRATE 1 DROP: 2 SOLUTION OPHTHALMIC at 21:39

## 2024-01-07 RX ADMIN — ACETAMINOPHEN 650 MG: 325 TABLET ORAL at 22:00

## 2024-01-07 RX ADMIN — HYDRALAZINE HYDROCHLORIDE 50 MG: 50 TABLET ORAL at 21:37

## 2024-01-07 RX ADMIN — ACETAMINOPHEN 650 MG: 325 TABLET ORAL at 04:27

## 2024-01-07 RX ADMIN — HYDROMORPHONE HYDROCHLORIDE 2 MG: 2 TABLET ORAL at 04:48

## 2024-01-07 RX ADMIN — ISOSORBIDE MONONITRATE 60 MG: 60 TABLET, EXTENDED RELEASE ORAL at 06:17

## 2024-01-07 RX ADMIN — Medication 1 TABLET: at 09:03

## 2024-01-07 RX ADMIN — BACLOFEN 5 MG: 10 TABLET ORAL at 06:17

## 2024-01-07 RX ADMIN — ALTEPLASE 1 MG: 2.2 INJECTION, POWDER, LYOPHILIZED, FOR SOLUTION INTRAVENOUS at 11:50

## 2024-01-07 RX ADMIN — OXYCODONE HYDROCHLORIDE 10 MG: 5 TABLET ORAL at 16:18

## 2024-01-07 RX ADMIN — DAPTOMYCIN 500 MG: 500 INJECTION, POWDER, LYOPHILIZED, FOR SOLUTION INTRAVENOUS at 21:00

## 2024-01-07 RX ADMIN — BUSPIRONE HYDROCHLORIDE 10 MG: 5 TABLET ORAL at 09:02

## 2024-01-07 RX ADMIN — MIRTAZAPINE 15 MG: 15 TABLET, FILM COATED ORAL at 21:37

## 2024-01-07 RX ADMIN — POLYETHYLENE GLYCOL 3350 17 G: 17 POWDER, FOR SOLUTION ORAL at 09:03

## 2024-01-07 RX ADMIN — HYDROMORPHONE HYDROCHLORIDE 2 MG: 2 TABLET ORAL at 17:47

## 2024-01-07 RX ADMIN — Medication 3 MG: at 17:48

## 2024-01-07 RX ADMIN — PREGABALIN 75 MG: 50 CAPSULE ORAL at 09:03

## 2024-01-07 RX ADMIN — ACETAMINOPHEN 650 MG: 325 TABLET ORAL at 09:04

## 2024-01-07 RX ADMIN — DOCUSATE SODIUM 100 MG: 100 CAPSULE, LIQUID FILLED ORAL at 09:03

## 2024-01-07 RX ADMIN — BACLOFEN 5 MG: 10 TABLET ORAL at 11:51

## 2024-01-07 RX ADMIN — PANTOPRAZOLE SODIUM 40 MG: 40 TABLET, DELAYED RELEASE ORAL at 06:18

## 2024-01-07 RX ADMIN — ACETAMINOPHEN 650 MG: 325 TABLET ORAL at 16:17

## 2024-01-07 RX ADMIN — BACLOFEN 5 MG: 10 TABLET ORAL at 21:38

## 2024-01-07 RX ADMIN — PREGABALIN 75 MG: 50 CAPSULE ORAL at 21:37

## 2024-01-07 RX ADMIN — EZETIMIBE 10 MG: 10 TABLET ORAL at 09:03

## 2024-01-07 RX ADMIN — HYDRALAZINE HYDROCHLORIDE 50 MG: 50 TABLET ORAL at 09:03

## 2024-01-07 RX ADMIN — ATENOLOL 50 MG: 50 TABLET ORAL at 09:03

## 2024-01-07 RX ADMIN — MORPHINE SULFATE 15 MG: 15 TABLET, EXTENDED RELEASE ORAL at 09:03

## 2024-01-07 RX ADMIN — BACLOFEN 5 MG: 10 TABLET ORAL at 16:17

## 2024-01-07 RX ADMIN — MORPHINE SULFATE 15 MG: 15 TABLET, EXTENDED RELEASE ORAL at 21:37

## 2024-01-07 RX ADMIN — LIDOCAINE 1 PATCH: 4 PATCH TOPICAL at 09:02

## 2024-01-07 RX ADMIN — BRIMONIDINE TARTRATE 1 DROP: 2 SOLUTION OPHTHALMIC at 09:02

## 2024-01-07 ASSESSMENT — PAIN - FUNCTIONAL ASSESSMENT
PAIN_FUNCTIONAL_ASSESSMENT: 0-10

## 2024-01-07 ASSESSMENT — COGNITIVE AND FUNCTIONAL STATUS - GENERAL
PATIENT BASELINE BEDBOUND: NO
TOILETING: A LOT
DRESSING REGULAR UPPER BODY CLOTHING: A LOT
MOBILITY SCORE: 12
STANDING UP FROM CHAIR USING ARMS: A LOT
MOVING TO AND FROM BED TO CHAIR: A LOT
CLIMB 3 TO 5 STEPS WITH RAILING: A LOT
TURNING FROM BACK TO SIDE WHILE IN FLAT BAD: A LOT
DAILY ACTIVITIY SCORE: 14
PERSONAL GROOMING: A LOT
WALKING IN HOSPITAL ROOM: A LOT
MOVING FROM LYING ON BACK TO SITTING ON SIDE OF FLAT BED WITH BEDRAILS: A LOT
DRESSING REGULAR LOWER BODY CLOTHING: A LOT
HELP NEEDED FOR BATHING: A LOT

## 2024-01-07 ASSESSMENT — PAIN SCALES - GENERAL
PAINLEVEL_OUTOF10: 9
PAINLEVEL_OUTOF10: 0 - NO PAIN
PAINLEVEL_OUTOF10: 0 - NO PAIN
PAINLEVEL_OUTOF10: 10 - WORST POSSIBLE PAIN
PAINLEVEL_OUTOF10: 5 - MODERATE PAIN
PAINLEVEL_OUTOF10: 8
PAINLEVEL_OUTOF10: 9
PAINLEVEL_OUTOF10: 0 - NO PAIN
PAINLEVEL_OUTOF10: 0 - NO PAIN

## 2024-01-07 ASSESSMENT — PAIN DESCRIPTION - LOCATION
LOCATION: BACK

## 2024-01-07 ASSESSMENT — PAIN DESCRIPTION - ORIENTATION: ORIENTATION: RIGHT

## 2024-01-07 ASSESSMENT — PAIN DESCRIPTION - DESCRIPTORS: DESCRIPTORS: SHARP

## 2024-01-07 NOTE — PROGRESS NOTES
"Tara Tierney is a 68 y.o. female on day 7 of admission presenting with Postoperative surgical complication involving musculoskeletal system associated with musculoskeletal procedure, unspecified complication.    Subjective   Patient seen and examined.  Continues to complain of pain in her back although it is better compared to yesterday.  No fevers, chills, nausea, vomiting passing urine and having bowel movements.  Denies any trouble breathing.       Objective     Physical Exam  Constitutional:       General: She is in acute distress.      Appearance: She is not toxic-appearing.   Eyes:      Extraocular Movements: Extraocular movements intact.      Conjunctiva/sclera: Conjunctivae normal.   Cardiovascular:      Rate and Rhythm: Normal rate and regular rhythm.   Pulmonary:      Effort: Pulmonary effort is normal.      Breath sounds: No wheezing.   Abdominal:      General: There is no distension.      Palpations: Abdomen is soft.   Neurological:      Mental Status: She is alert. Mental status is at baseline.   Last Recorded Vitals  Blood pressure 130/58, pulse 82, temperature 37.1 °C (98.8 °F), resp. rate 16, height 1.448 m (4' 9.01\"), weight 78.2 kg (172 lb 6.4 oz), SpO2 92 %, not currently breastfeeding.  Intake/Output last 3 Shifts:  I/O last 3 completed shifts:  In: 1208 (15.4 mL/kg) [P.O.:980; IV Piggyback:228]  Out: 3535 (45.2 mL/kg) [Urine:3500 (1.2 mL/kg/hr); Drains:35]  Weight: 78.2 kg     Relevant Results           This patient currently has cardiac telemetry ordered; if you would like to modify or discontinue the telemetry order, click here to go to the orders activity to modify/discontinue the order.                 Assessment/Plan   Principal Problem:    Postoperative surgical complication involving musculoskeletal system associated with musculoskeletal procedure, unspecified complication  Active Problems:    Essential hypertension    PVD (peripheral vascular disease) (CMS/HCC)    Back pain with " radiculopathy    Back pain at L4-L5 level    Deep vein thrombosis (DVT) of right lower extremity (CMS/HCC)    Anemia    We will continue IV Cubicin, wound cultures are growing MRSA/staph  Status post IVC filter placement  Keep off Eliquis  Pain seems to be improving but she is is still complaining of a lot of pain  Pain medications changed  We will continue PT OT  Drains in place, not draining any significant amount of fluid  Plan to discharge once pain improves still in significant pain  Leukocytosis has resolved  Will check a CRP tomorrow  ID and orthopedics following  Will keep her off statin till the duration of Cubicin     Continue home meds for hypertension  DVT prophylaxis        Nitesh Ruiz MD

## 2024-01-07 NOTE — PROGRESS NOTES
The patient is awake and alert  Patient is an extensive and the spasms in the back and the legs before, doing better with the baclofen.  Patient was already on a Zanaflex regimen and Lyrica at home  Patient started a low-dose of baclofen, 5 mg 4 times a day, doing better  MRI lumbar spine had shown Discitis and other changes.  She is on antibiotics regimen which was revised  No significant spinal cord compression  Visit Vitals  /63   Pulse 79   Temp 36.7 °C (98.1 °F)   Resp 16        Neurological Exam:    Oriented to day date,month,year, place and person. Knew the name of the president.  Patient was slow to response after the Ativan was given.      Recent and remote memory was intact. Attention span and concentration were normal. General fund of knowledge was intact.   Language: speech comprehension, repetition, expression, and naming is intact for patient´s age and level of education.      CRANIAL NERVES:   CN 2 The fundi were well visualized with normal disc margins, clear vessels and vascular pulsations. No disc edema.  No hemorrhages or exudates were present in the posterior segments that were visualized. Visual fields full to confrontation.   CN 3, 4, 6 Pupils round, equally reactive to light. No ptosis. EOM normal alignment, full range with normal saccades, pursuit and convergence. No nystagmus.   CN 5 Facial sensation intact bilaterally.   CN 7 Normal and symmetric facial strength. Nasolabial folds symmetric.   CN 8 Hearing intact to finger rub bilaterally. On Rinne and Monreal testing there was no lateralisation  CN 9 Palate elevates symmetrically.   CN 11 Bilaterally normal strength of shoulder shrug and neck turning.   CN 12 Tongue midline, with normal bulk and strength; no fasciculations.   Patient is handling pharyngeal secretions well.   Speech: articulation is intact.      MOTOR: The patient has normal muscle tone and has normal muscle mass. Muscle strength was 5/5 in distal and proximal muscles  "in both upper and lower extremities when she gave l the effort.  With the pain effort was difficult in the lower extremities. No fasciculations, tremor or other abnormal movements were present.   On [place] testing the patient has no pronation or drift.      REFLEXES:   RIGHT UE         LEFT UE   BR:         1                         BR:       1      Biceps:    1                        Biceps:1  Triceps:   1                     Triceps: 1  RIGHT LLE        LEFT LLE   Patella:   1                         Patella: 1  Achilles:1                     Achilles:1  Babinski: plantar responses are flexor.   No frontal release signs or other pathologic reflexes present.      COORDINATION: In both upper extremities, finger-nose-finger was intact without dysmetria. No dysdiadochokinesia. In both lower extremities, heel-to-knee-shin was intact.      GAIT: Could not be tested.  Patient is feeling weak with the back pain.  Romberg patient could not be tested  SENSORY: In both upper and lower extremities, sensation was decreased to pinprick, temperature   below the knees and elbows bilaterally, vibration decreased in the distal extremities, and intact joint position sense.    BMP:  Results from last 7 days   Lab Units 01/07/24  0438 01/05/24  2207 01/05/24  1200   SODIUM mmol/L 133* 132* 133*   POTASSIUM mmol/L 3.8 3.9 3.7   CHLORIDE mmol/L 97* 96* 98   CO2 mmol/L 28 28 27   BUN mg/dL 11 10 10   CREATININE mg/dL 0.77 0.74 0.75       CBC:  Results from last 7 days   Lab Units 01/07/24  0438 01/05/24  1200 01/04/24  0459   WBC AUTO x10*3/uL 4.8 6.4 5.4   RBC AUTO x10*6/uL 2.94* 2.77* 2.77*   HEMOGLOBIN g/dL 8.9* 8.4* 8.5*   HEMATOCRIT % 28.3* 26.5* 26.6*   MCV fL 96 96 96   MCH pg 30.3 30.3 30.7   MCHC g/dL 31.4* 31.7* 32.0   RDW % 15.2* 15.1* 15.4*   PLATELETS AUTO x10*3/uL 288 249 255       INR:        Lipid Profile:        No lab exists for component: \"LABVLDL\"    HgbA1C:        CMP:  Results from last 7 days   Lab Units " "01/07/24  0438 01/05/24  2207 01/05/24  1200   SODIUM mmol/L 133* 132* 133*   POTASSIUM mmol/L 3.8 3.9 3.7   CHLORIDE mmol/L 97* 96* 98   CO2 mmol/L 28 28 27   BUN mg/dL 11 10 10   CREATININE mg/dL 0.77 0.74 0.75   GLUCOSE mg/dL 105* 128* 152*   CALCIUM mg/dL 8.9 9.0 8.7       ABG:        No lab exists for component: \"PO2\", \"PCO2\", \"HCO3\", \"BE\", \"O2SAT\"    TSH:  Lab Results   Component Value Date    TSH 4.13 (H) 01/05/2024     Lab Results   Component Value Date    BWTDVTNW43 322 01/05/2024     Lab Results   Component Value Date    FOLATE 5.1 01/05/2024     Lab Results   Component Value Date    VITD25 33 01/05/2024         No MRI head results found for the past 12 months     No CT head results found for the past 12 months     CT abdomen pelvis wo IV contrast    Result Date: 12/31/2023  STUDY: CT Abdomen and Pelvis without IV Contrast; 12/31/2023 at 5:11 PM. INDICATION: Abdominal pain. COMPARISON: CT AP 12/19/2023 ACCESSION NUMBER(S): [id number] ORDERING CLINICIAN: WILLIAM EDMONDS TECHNIQUE: CT of the abdomen and pelvis was performed.  Contiguous axial images were obtained at 3 mm slice thickness through the abdomen and pelvis. Coronal and sagittal reconstructions at 3 mm slice thickness were performed. No intravenous contrast was administered.  Automated mA/kV exposure control was utilized and patient examination was performed in strict accordance with principles of ALARA. FINDINGS: Please note that the evaluation of vessels, lymph nodes and organs is limited without intravenous contrast.  LOWER CHEST: No cardiomegaly.  No pericardial effusion.  Very small bilateral pleural effusions  ABDOMEN:  LIVER: No hepatomegaly.  Smooth surface contour.  Normal attenuation.  BILE DUCTS: No intrahepatic or extrahepatic biliary ductal dilatation.  GALLBLADDER: The gallbladder is absent. STOMACH: No abnormalities identified.  PANCREAS: Negative for pancreatitis  SPLEEN: No splenomegaly or focal splenic lesion.  ADRENAL GLANDS: " No thickening or nodules.  KIDNEYS AND URETERS: Kidneys are normal in size and location.  No renal or ureteral calculi.  PELVIS:  BLADDER: No abnormalities identified.  REPRODUCTIVE ORGANS: No abnormalities identified.  BOWEL: Colonic diverticulosis.  Mild constipation without bowel obstruction. Negative for appendicitis  VESSELS: Limited due to lack of intravenous contrast.  Prior femoral to femoral artery bypass.  Abdominal aorta is normal in caliber.  PERITONEUM/RETROPERITONEUM/LYMPH NODES: Small amount of low-density free pelvic fluid  No pneumoperitoneum. No lymphadenopathy.  ABDOMINAL WALL: Small fat-containing umbilical hernia. SOFT TISSUES: Postsurgical changes within the posterior lumbar soft tissues with surgical drains in place.  BONES: Status post interbody fusion of L4/5 and L5/S1 with metallic spacer. This space narrowing L3/4.  Laminectomies L3-L5.  Bone graft along the lateral margins of L3-L5 fracture of the right L3 transverse process. Fracture of the right L4 transverse process.  Old screw tract within the L4 and L5 pedicles.  Narrowing of the right L5/S1 neural foramen due to facet joint osteophyte.    Negative for bowel obstruction.  Mild constipation. Colonic diverticulosis without diverticulitis. Negative for appendicitis. Postsurgical changes lumbar spine removal of pedicle screws and posterior rods from L4 through S1.  Stable appearance of interbody spacers at L4/5 and L5/S1. Bone graft along the lateral margins of L3-L5.  Fractures of the right L4 and L3 transverse process.  The right L3 transverse process fracture appears old.  Postsurgical changes within the posterior lumbar soft tissues with surgical drains in place. Signed by Aung Sparrow MD    MR lumbar spine w and wo IV contrast    Result Date: 12/31/2023  Interpreted By:  Jonas Sanchez, STUDY: MR LUMBAR SPINE W AND WO IV CONTRAST;  12/31/2023 12:33 am   INDICATION: Signs/Symptoms:Pain.   COMPARISON: MRI of the lumbar spine  dated 12/10/2023   ACCESSION NUMBER(S): [id number]   ORDERING CLINICIAN: YVETTE LEE   TECHNIQUE: Sagittal T1, T2, STIR, axial T1 and T2 weighted images of the lumbar spine were acquired. Additional axial and sagittal T1 weighted images of the lumbar spine were obtained after intravenous administration of 15.5 mL of contrast.   FINDINGS: Exam is degraded by motion.   There is again evidence of 5 lumbar type non rib-bearing vertebral bodies, with the lowest well-formed intervertebral disc space labeled L5-S1.   Postsurgical changes of posterior decompression and laminectomies of L3 through L5 with posterior spinal fusion extending from L4 through S1 and discectomies with intervertebral disc spaces at L4-L5 and L5-S1. These are similar in appearance to prior examination on 12/10/2023. Susceptibility artifact from the surgical hardware somewhat limits evaluation of the adjacent structures.   In the interim since prior imaging on 12/10/2023, new postsurgical consistent with irrigation debridement of subfascial tissues of the cutaneous spine are evident. STIR and T2 hypointense signal abnormality previously seen in the cutaneous fat of the lower lumbar spine has resolved, with new 3.7 x 3.6 x 13.6 cm (series 7, image 10; series 10, image 10) T2 hyperintense fluid collection present, with slight peripheral enhancement. This collection may be contiguous with the skin.   T2 hyperintense collection is again present in the laminectomy surgical bed, abutting the thecal sac at the level of L3 through L5 (series 8, image 14), measuring up to 5.2 cm in craniocaudal dimension, 2.9 cm in transverse dimension and up to 1.5 cm in AP dimension (series 10, image 7). The surgical catheter previously seen within the collection is gone, although collection is decreased in size to previous imaging, with resolution of previously seen air within the collection. Mild surrounding edema and reactive soft tissue changes are present in the  epidural space, somewhat increased in the interim since prior exam, with new/increased mass effect on the adjacent thecal sac.   There also appears to be small amount of new fluid present in the anterior epidural space at the level of L3 (series 10, image 3).   Although the exact characterization is difficult due to motion, there appears to be somewhat worsened spinal canal narrowing at the level of L2-L3 due to combination of new fluid in the anterior epidural space, and the T2 hyperintense fluid collection with associated inflammatory/reactive changes along the posterior aspect of the thecal sac, with somewhat increased effacement of the subarachnoid space.   No new intra thecal enhancement is identified.   Neural foramina are not well assessed due to motion and susceptibility artifact from the surgical hardware.       1.  New surgical changes of irrigation and debridement in the laminectomy surgical bed evident, with previously seen geographic area of hypointense T2 and STIR signal in the cutaneous fat of the lumbar spine resolved, and new T2 hyperintense collection measuring 3.7 x 3.6 x 13.6 cm present in the cutaneous fat, likely representing a postsurgical seroma, although sterility can not be ascertained on imaging alone. This collection reaches of the dermis, and may drain at the skin. 2. T2 hyperintense collection within the laminectomy bed itself along the dorsal aspect of the thecal sac described on previous MRI in 12/10/2023 has mildly decreased in size from prior imaging, with interval removal of previously seen drain, although there are increased inflammatory/reactive soft tissue changes present in the laminectomy surgical bed, focus of fluid in the anterior epidural space at the level of L3 contributes to increased effacement of the thecal sac at the level of L3 compared to prior MRI.   MACRO: None   Signed by: Jonas Sanchez 12/31/2023 1:13 AM Dictation workstation:   [id number]    MR LUMBAR  SPINE WO IV CONTRAST;  1/5/2024 8:48 pm      INDICATION:  Signs/Symptoms:intractable back pain s/o incision and drainage with  lumbar spine hardware removal.      COMPARISON:  Lumbar MRI dated 12/30/2022 and 12/10/2022. CT abdomen pelvis dated  12/31/2022.      ACCESSION NUMBER(S):  [id number]      ORDERING CLINICIAN:  ERICK KEN      TECHNIQUE:  Sagittal T2, T1, and STIR and axial T1, T2 sequences of the lumbar  spine. No intravenous contrast was administered.      FINDINGS:      Lumbar spine:  Normal lumbar lordosis. The last well-formed disc is designated  L5-S1. Minimal L5 over S1 anterolisthesis unchanged from prior. There  is minimal L3 over L4 retrolisthesis unchanged from prior. There has  been interval removal of the bilateral transpedicular screws and  vertical rods at L4 through S1 with T2 hyperintense ghost tracts  noted. There are unchanged disc spacers at L4-L5 and L5-S1. There is  a subcutaneous drain in place extending cranially along the midline  to the level of L3 without associated fluid collection. This  surrounded by subcutaneous edema. There is also a right deep  paravertebral drain in place terminating in the right deep  paravertebral soft tissues at the level of L1-L2 without associated  fluid collection, sagittal image 11 of 25. There is bilateral  posterior paravertebral muscle edema without evidence of fluid  collection.      Vertebral body heights are maintained. 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 There is a T2 hyperintense  fluid collection within the left subarticular ventral epidural space  extending cranially at the level of L3 and which appears to be in  continuity with the L3-L4 disc space measuring 2.0 cm craniocaudally  and 1.0 x 0.7 cm axially, unchanged from the recent prior. This  contributes to mild effacement of the left subarticular space.              The conus medullaris  terminates at L1 and is normal in appearance.      T12-L1: No significant disc bulge or herniation.  L1-L2: Is a diffuse disc bulge and facet hypertrophy contributing to  mild bilateral foraminal stenosis without significant central canal  stenosis. L2-L3: Small disc bulge and facet hypertrophy without  significant central or foraminal canal stenosis. L3-L4: There is  posterior laminectomy change without evidence of central canal  stenosis. There is mild retrolisthesis of L3 over L4 as well as T2  hyperintense fluid collection extending superiorly from the left  subarticular space and contributing to mild left subarticular  stenosis as well as mild central canal stenosis at the level of L3  similar to prior. There is suspected moderate to severe right  foraminal stenosis due to mild retrolisthesis with facet hypertrophy  as well as bone marrow edema at the right synovial facets, for  example sagittal image 9 of 25. There is also mild edema within the  right psoas muscle at the level of L3-L4, axial image 4 of 27 without  evidence of a fluid collection. There are bilateral right greater  than left facet effusions at L3-L4 and possibility of septic  arthritis can not be excluded. There is mild to moderate left  foraminal stenosis. L4-L5: There are right-sided facetectomy changes  with with hazy fluid within the surgical bed which may be expected.  Posterior laminectomy change. There is mild bilateral foraminal  narrowing. No central canal stenosis. L5-S1: There are right-sided  facetectomy changes with hazy fluid within the surgical bed, which  may be expected. Posterior laminectomy change. Mild bilateral  foraminal narrowing. No central canal stenosis.      IMPRESSION:  Interval removal of bilateral transpedicular screws and vertical rods  at L3 through S1 and interval resolution of posterior paravertebral  fluid collections. Lumbar drains within the subcutaneous and deep  posterior paravertebral soft tissues as  described above with  associated soft tissue edema, however no evidence of surrounding  fluid collection.      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 There is minimal L3 over L4  retrolisthesis as well as facet hypertrophy, which contributes to  moderate to severe right foraminal stenosis at L3-L4. There is also  asymmetric right-sided facet effusion and right subchondral edema at  L3-L4 facets and asymmetric right psoas edema at L3-L4 concerning for  septic arthritis.      There is unchanged ventral epidural collection on the left at the  level of L3 which may be communicating with the L3-L4 disc. The  sterility of this collection can not be determined given multiple  prior surgeries. It contributes to unchanged mild central canal  stenosis and left subarticular stenosis at L3-L4.      Signed by: Marco Valles 1/6/2024 12:46 AM  Dictation workstation:   [id number]  EMG     No EMG results found for the past 12 months        No EEG results found for the past 12 months      Current Facility-Administered Medications:     acetaminophen (Tylenol) tablet 650 mg, 650 mg, oral, q4h PRN, 650 mg at 01/05/24 2104 **OR** acetaminophen (Tylenol) oral liquid 650 mg, 650 mg, oral, q4h PRN **OR** acetaminophen (Tylenol) suppository 650 mg, 650 mg, rectal, q4h PRN, Maribeth Santamaria APRN-CNP    acetaminophen (Tylenol) tablet 650 mg, 650 mg, oral, q6h, DARRYL Quiros-CNP, 650 mg at 01/07/24 1617    alteplase (Cathflo Activase) injection 1 mg, 1 mg, intra-catheter, PRN, Nitesh Ruiz MD, 1 mg at 01/07/24 1150    [Held by provider] aspirin EC tablet 81 mg, 81 mg, oral, Daily, John Salazar MD    atenolol (Tenormin) tablet 50 mg, 50 mg, oral, q AM, John Salazar MD, 50 mg at 01/07/24 0903    baclofen (Lioresal) tablet 5 mg, 5 mg, oral, 4x daily, Onur Perez MD, 5 mg at 01/07/24 1617    brimonidine (AlphaGAN) 0.2 % ophthalmic solution 1 drop, 1  drop, Both Eyes, BID, John Salazar MD, 1 drop at 01/07/24 0902    busPIRone (Buspar) tablet 10 mg, 10 mg, oral, Daily, John Salazar MD, 10 mg at 01/07/24 0902    cyclobenzaprine (Flexeril) tablet 10 mg, 10 mg, oral, TID PRN, ORLANDO Quiros, 10 mg at 01/06/24 1724    DAPTOmycin (Cubicin) 500 mg in sodium chloride 0.9% 50 mL IV, 500 mg, intravenous, Nightly, Pete Echeverria MD, Stopped at 01/06/24 2103    docusate sodium (Colace) capsule 100 mg, 100 mg, oral, BID, ORLANDO Szymanski, 100 mg at 01/07/24 0903    ezetimibe (Zetia) tablet 10 mg, 10 mg, oral, Daily, John Salazar MD, 10 mg at 01/07/24 0903    furosemide (Lasix) tablet 20 mg, 20 mg, oral, Daily, John Salazar MD, 20 mg at 01/07/24 0903    hydrALAZINE (Apresoline) tablet 50 mg, 50 mg, oral, BID, John Salazar MD, 50 mg at 01/07/24 0903    HYDROmorphone (Dilaudid) tablet 2 mg, 2 mg, oral, q4h PRN, Nitesh Ruiz MD, 2 mg at 01/07/24 1747    isosorbide mononitrate ER (Imdur) 24 hr tablet 60 mg, 60 mg, oral, Daily, John Salazar MD, 60 mg at 01/07/24 0617    lactobacillus acidophilus tablet 1 tablet, 1 tablet, oral, Daily, John Salazar MD, 1 tablet at 01/07/24 0903    lidocaine 4 % patch 1 patch, 1 patch, transdermal, Daily, ORLANDO Quiros, 1 patch at 01/07/24 0902    meclizine (Antivert) tablet 25 mg, 25 mg, oral, q8h PRN, John Salazar MD    melatonin tablet 3 mg, 3 mg, oral, Daily, ORLANDO Quiros, 3 mg at 01/07/24 1748    mirtazapine (Remeron) tablet 15 mg, 15 mg, oral, Nightly, John Salazar MD, 15 mg at 01/06/24 2036    morphine CR (MS Contin) 12 hr tablet 15 mg, 15 mg, oral, q12h EDE, Panchito Barnes MD, 15 mg at 01/07/24 0903    morphine injection 2 mg, 2 mg, intravenous, q2h PRN, DARRYL Quiros-CNP, 2 mg at 01/03/24 1326    naloxone (Narcan) injection 0.2 mg, 0.2 mg, intravenous, q5 min PRN, DARRYL Quiros-CNP    ondansetron (Zofran) tablet 4 mg, 4 mg, oral, q8h PRN, 4 mg at  12/31/23 0904 **OR** ondansetron (Zofran) injection 4 mg, 4 mg, intravenous, q8h PRN, ORLANDO Quiros, 4 mg at 12/31/23 1317    oxyCODONE (Roxicodone) immediate release tablet 10 mg, 10 mg, oral, q6h PRN, Nitesh Ruiz MD, 10 mg at 01/07/24 1618    pantoprazole (ProtoNix) EC tablet 40 mg, 40 mg, oral, Daily before breakfast, John Salazar MD, 40 mg at 01/07/24 0618    pilocarpine (Salagen) tablet 5 mg, 5 mg, oral, Daily, John Salazar MD, 5 mg at 01/07/24 0903    polyethylene glycol (Glycolax, Miralax) packet 17 g, 17 g, oral, Daily, ORLANDO Quiros, 17 g at 01/07/24 0903    pregabalin (Lyrica) capsule 75 mg, 75 mg, oral, BID, John Salazar MD, 75 mg at 01/07/24 0903    vitamin A & D ointment 1 Application, 1 Application, Topical, q4h PRN, Nitesh Ruiz MD         Assessment:  Patient has a chronic back pain.  She has a lumbar spine surgery in November 2023.  Dr. Coombs is following the patient.  Patient developed a postop staph infection with MRSA.  She has the antibiotics, incision and drainage.  Pain in the back is more on the right side along with pain in the right thigh and right hip.  Imaging of the right hip did not show significant pathology.  Pain exacerbation with the L3-4 discitis and osteomyelitis.  Infectious disease on consult antibiotics have been adjusted  Patient is getting a lot of analgesics.  Dr. Barnes is on consult  Right leg DVT she has an inferior vena cava filter put in January 2.  She is on Eliquis  Right carotid bruit, right carotid endarterectomy by Dr. Edwards.  History of right cerebral TIA  Coronary artery disease  Cardiomyopathy  Bilateral total knee replacements.  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  S/p bilateral leg revascularization June 2022  Hypertension  Chronic edema  COPD with history of tobacco abuse in the past  Overweight  Vitamin D deficiency has been treated  Recommendation:  We can add the TENS unit to her  regimen  Additional lab work showed a borderline elevated TSH, B12, folic acid level, vitamin D in good range  With a better pain control she can start increasing her activity  baclofen low-dose was added to her regimen without any side effects, patient improved as far as pasms are concerned     Onur Perez MD

## 2024-01-07 NOTE — NURSING NOTE
"Pt did not get out of bed on 1/6, was encouraged, to allow staff to assist with turns, and to work with therapy as well. Today pt did get up to chair multiple times, at about 1730 pt spouse told RN that she was instructed to only get out of bed if she had back brace on, by Dr Coombs. Pt states she forgot abut the brace. Rn assisted pt with putting the brace on while in chair. RN read through all notes this admission without finding orders or note instructing nursing or pt to have pt wear brace when out of bed.  Note from 1/5 states \"out of bed and ambulate\" and also \"WBAT\" weight Bering as tolerated. Rn contacted attending doctor to obtain clarification of use of brace for pt. MD did not have notation of brace and referred nurse to orthopaedic team. Contacted NP FADIA Olguin, who stated pt had hardware removed and to contact surgeon for clarification of use of brace.   "

## 2024-01-07 NOTE — CARE PLAN
The patient's goals for the shift include      The clinical goals for the shift include patient pain is under control throughout shift    Over the shift, the patient did not make progress toward the following goals. Barriers to progression include knowledge deficit. Recommendations to address these barriers include education.    Problem: Skin  Goal: Promote skin healing  1/7/2024 1030 by Shereen Bautista RN  Outcome: Not Progressing  Flowsheets (Taken 1/6/2024 2054 by Lior Sterling RN)  Promote skin healing:   Turn/reposition every 2 hours/use positioning/transfer devices   Protective dressings over bony prominences   Assess skin/pad under line(s)/device(s)  Note: Encourage pt to turn q2 hours with staff assistance pt maintains she is turning herself does not want staff help and has pink blanchable area on left buttock education provided   1/7/2024 0941 by Shereen Bautista RN  Outcome: Progressing  Flowsheets (Taken 1/6/2024 2054 by Lior Sterling RN)  Promote skin healing:   Turn/reposition every 2 hours/use positioning/transfer devices   Protective dressings over bony prominences   Assess skin/pad under line(s)/device(s)

## 2024-01-07 NOTE — CARE PLAN
The patient's goals for the shift include      The clinical goals for the shift include Patient's pain is under control throughout shift.    Over the shift, the patient did not make progress toward the following goals. Barriers to progression include anxiety. Recommendations to address these barriers include reassurance.

## 2024-01-07 NOTE — PROGRESS NOTES
The patient is awake and alert  Patient is an extensive and the spasms in the back and the legs start.  Patient is already on a Zanaflex regimen and Lyrica  Patient will start a low-dose of baclofen  MRI lumbar spine  Discitis and other changes.  She is on antibiotics regimen  No significant spinal cord compression  Visit Vitals  /60 (BP Location: Left arm)   Pulse 80   Temp 36.8 °C (98.2 °F) (Temporal)   Resp 18        Neurological Exam:    Oriented to day date,month,year, place and person. Knew the name of the president.  Patient was slow to response after the Ativan was given.      Recent and remote memory was intact. Attention span and concentration were normal. General fund of knowledge was intact.   Language: speech comprehension, repetition, expression, and naming is intact for patient´s age and level of education.      CRANIAL NERVES:   CN 2 The fundi were well visualized with normal disc margins, clear vessels and vascular pulsations. No disc edema.  No hemorrhages or exudates were present in the posterior segments that were visualized. Visual fields full to confrontation.   CN 3, 4, 6 Pupils round, equally reactive to light. No ptosis. EOM normal alignment, full range with normal saccades, pursuit and convergence. No nystagmus.   CN 5 Facial sensation intact bilaterally.   CN 7 Normal and symmetric facial strength. Nasolabial folds symmetric.   CN 8 Hearing intact to finger rub bilaterally. On Rinne and [place] testing there was no lateralisation  CN 9 Palate elevates symmetrically.   CN 11 Bilaterally normal strength of shoulder shrug and neck turning.   CN 12 Tongue midline, with normal bulk and strength; no fasciculations.   Patient is handling pharyngeal secretions well.   Speech: articulation is intact.      MOTOR: The patient has normal muscle tone and has normal muscle mass. Muscle strength was 5/5 in distal and proximal muscles in both upper and lower extremities when she gave l the effort.   "With the pain effort was difficult in the lower extremities. No fasciculations, tremor or other abnormal movements were present.   On [facility] testing the patient has no pronation or drift.      REFLEXES:   RIGHT UE         LEFT UE   BR:         1                         BR:       1      Biceps:    1                        Biceps:1  Triceps:   1                     Triceps: 1  RIGHT LLE        LEFT LLE   Patella:   1                         Patella: 1  Achilles:1                     Achilles:1  Babinski: plantar responses are flexor.   No frontal release signs or other pathologic reflexes present.      COORDINATION: In both upper extremities, finger-nose-finger was intact without dysmetria. No dysdiadochokinesia. In both lower extremities, heel-to-knee-shin was intact.      GAIT: Could not be tested.  Patient is feeling weak with the back pain.  Romberg patient could not be tested  SENSORY: In both upper and lower extremities, sensation was decreased to pinprick, temperature   below the knees and elbows bilaterally, vibration decreased in the distal extremities, and intact joint position sense.    BMP:  Results from last 7 days   Lab Units 01/05/24 2207 01/05/24 1200 01/04/24  0459   SODIUM mmol/L 132* 133* 133*   POTASSIUM mmol/L 3.9 3.7 3.5   CHLORIDE mmol/L 96* 98 100   CO2 mmol/L 28 27 28   BUN mg/dL 10 10 14   CREATININE mg/dL 0.74 0.75 0.87       CBC:  Results from last 7 days   Lab Units 01/05/24 1200 01/04/24 0459 01/03/24  0406   WBC AUTO x10*3/uL 6.4 5.4 5.3   RBC AUTO x10*6/uL 2.77* 2.77* 2.61*   HEMOGLOBIN g/dL 8.4* 8.5* 8.0*   HEMATOCRIT % 26.5* 26.6* 24.9*   MCV fL 96 96 95   MCH pg 30.3 30.7 30.7   MCHC g/dL 31.7* 32.0 32.1   RDW % 15.1* 15.4* 15.9*   PLATELETS AUTO x10*3/uL 249 255 241       INR:        Lipid Profile:        No lab exists for component: \"LABVLDL\"    HgbA1C:        CMP:  Results from last 7 days   Lab Units 01/05/24 2207 01/05/24 1200 01/04/24  0459   SODIUM mmol/L 132* 133* 133* " "  POTASSIUM mmol/L 3.9 3.7 3.5   CHLORIDE mmol/L 96* 98 100   CO2 mmol/L 28 27 28   BUN mg/dL 10 10 14   CREATININE mg/dL 0.74 0.75 0.87   GLUCOSE mg/dL 128* 152* 117*   CALCIUM mg/dL 9.0 8.7 8.4*       ABG:        No lab exists for component: \"PO2\", \"PCO2\", \"HCO3\", \"BE\", \"O2SAT\"    TSH:  Lab Results   Component Value Date    TSH 4.13 (H) 01/05/2024     Lab Results   Component Value Date    OXATAEDU82 322 01/05/2024     Lab Results   Component Value Date    FOLATE 5.1 01/05/2024     Lab Results   Component Value Date    VITD25 33 01/05/2024         No MRI head results found for the past 12 months     No CT head results found for the past 12 months     CT abdomen pelvis wo IV contrast    Result Date: 12/31/2023  STUDY: CT Abdomen and Pelvis without IV Contrast; 12/31/2023 at 5:11 PM. INDICATION: Abdominal pain. COMPARISON: CT AP 12/19/2023 ACCESSION NUMBER(S): [id number] ORDERING CLINICIAN: WILLIAM EDMONDS TECHNIQUE: CT of the abdomen and pelvis was performed.  Contiguous axial images were obtained at 3 mm slice thickness through the abdomen and pelvis. Coronal and sagittal reconstructions at 3 mm slice thickness were performed. No intravenous contrast was administered.  Automated mA/kV exposure control was utilized and patient examination was performed in strict accordance with principles of ALARA. FINDINGS: Please note that the evaluation of vessels, lymph nodes and organs is limited without intravenous contrast.  LOWER CHEST: No cardiomegaly.  No pericardial effusion.  Very small bilateral pleural effusions  ABDOMEN:  LIVER: No hepatomegaly.  Smooth surface contour.  Normal attenuation.  BILE DUCTS: No intrahepatic or extrahepatic biliary ductal dilatation.  GALLBLADDER: The gallbladder is absent. STOMACH: No abnormalities identified.  PANCREAS: Negative for pancreatitis  SPLEEN: No splenomegaly or focal splenic lesion.  ADRENAL GLANDS: No thickening or nodules.  KIDNEYS AND URETERS: Kidneys are normal in size " and location.  No renal or ureteral calculi.  PELVIS:  BLADDER: No abnormalities identified.  REPRODUCTIVE ORGANS: No abnormalities identified.  BOWEL: Colonic diverticulosis.  Mild constipation without bowel obstruction. Negative for appendicitis  VESSELS: Limited due to lack of intravenous contrast.  Prior femoral to femoral artery bypass.  Abdominal aorta is normal in caliber.  PERITONEUM/RETROPERITONEUM/LYMPH NODES: Small amount of low-density free pelvic fluid  No pneumoperitoneum. No lymphadenopathy.  ABDOMINAL WALL: Small fat-containing umbilical hernia. SOFT TISSUES: Postsurgical changes within the posterior lumbar soft tissues with surgical drains in place.  BONES: Status post interbody fusion of L4/5 and L5/S1 with metallic spacer. This space narrowing L3/4.  Laminectomies L3-L5.  Bone graft along the lateral margins of L3-L5 fracture of the right L3 transverse process. Fracture of the right L4 transverse process.  Old screw tract within the L4 and L5 pedicles.  Narrowing of the right L5/S1 neural foramen due to facet joint osteophyte.    Negative for bowel obstruction.  Mild constipation. Colonic diverticulosis without diverticulitis. Negative for appendicitis. Postsurgical changes lumbar spine removal of pedicle screws and posterior rods from L4 through S1.  Stable appearance of interbody spacers at L4/5 and L5/S1. Bone graft along the lateral margins of L3-L5.  Fractures of the right L4 and L3 transverse process.  The right L3 transverse process fracture appears old.  Postsurgical changes within the posterior lumbar soft tissues with surgical drains in place. Signed by Aung Sparrow MD    MR lumbar spine w and wo IV contrast    Result Date: 12/31/2023  Interpreted By:  Jonas Sanchez, STUDY: MR LUMBAR SPINE W AND WO IV CONTRAST;  12/31/2023 12:33 am   INDICATION: Signs/Symptoms:Pain.   COMPARISON: MRI of the lumbar spine dated 12/10/2023   ACCESSION NUMBER(S): [id number]   ORDERING CLINICIAN:  YVETTE ELE   TECHNIQUE: Sagittal T1, T2, STIR, axial T1 and T2 weighted images of the lumbar spine were acquired. Additional axial and sagittal T1 weighted images of the lumbar spine were obtained after intravenous administration of 15.5 mL of contrast.   FINDINGS: Exam is degraded by motion.   There is again evidence of 5 lumbar type non rib-bearing vertebral bodies, with the lowest well-formed intervertebral disc space labeled L5-S1.   Postsurgical changes of posterior decompression and laminectomies of L3 through L5 with posterior spinal fusion extending from L4 through S1 and discectomies with intervertebral disc spaces at L4-L5 and L5-S1. These are similar in appearance to prior examination on 12/10/2023. Susceptibility artifact from the surgical hardware somewhat limits evaluation of the adjacent structures.   In the interim since prior imaging on 12/10/2023, new postsurgical consistent with irrigation debridement of subfascial tissues of the cutaneous spine are evident. STIR and T2 hypointense signal abnormality previously seen in the cutaneous fat of the lower lumbar spine has resolved, with new 3.7 x 3.6 x 13.6 cm (series 7, image 10; series 10, image 10) T2 hyperintense fluid collection present, with slight peripheral enhancement. This collection may be contiguous with the skin.   T2 hyperintense collection is again present in the laminectomy surgical bed, abutting the thecal sac at the level of L3 through L5 (series 8, image 14), measuring up to 5.2 cm in craniocaudal dimension, 2.9 cm in transverse dimension and up to 1.5 cm in AP dimension (series 10, image 7). The surgical catheter previously seen within the collection is gone, although collection is decreased in size to previous imaging, with resolution of previously seen air within the collection. Mild surrounding edema and reactive soft tissue changes are present in the epidural space, somewhat increased in the interim since prior exam, with  new/increased mass effect on the adjacent thecal sac.   There also appears to be small amount of new fluid present in the anterior epidural space at the level of L3 (series 10, image 3).   Although the exact characterization is difficult due to motion, there appears to be somewhat worsened spinal canal narrowing at the level of L2-L3 due to combination of new fluid in the anterior epidural space, and the T2 hyperintense fluid collection with associated inflammatory/reactive changes along the posterior aspect of the thecal sac, with somewhat increased effacement of the subarachnoid space.   No new intra thecal enhancement is identified.   Neural foramina are not well assessed due to motion and susceptibility artifact from the surgical hardware.       1.  New surgical changes of irrigation and debridement in the laminectomy surgical bed evident, with previously seen geographic area of hypointense T2 and STIR signal in the cutaneous fat of the lumbar spine resolved, and new T2 hyperintense collection measuring 3.7 x 3.6 x 13.6 cm present in the cutaneous fat, likely representing a postsurgical seroma, although sterility can not be ascertained on imaging alone. This collection reaches of the dermis, and may drain at the skin. 2. T2 hyperintense collection within the laminectomy bed itself along the dorsal aspect of the thecal sac described on previous MRI in 12/10/2023 has mildly decreased in size from prior imaging, with interval removal of previously seen drain, although there are increased inflammatory/reactive soft tissue changes present in the laminectomy surgical bed, focus of fluid in the anterior epidural space at the level of L3 contributes to increased effacement of the thecal sac at the level of L3 compared to prior MRI.   MACRO: None   Signed by: Jonas Sanchez 12/31/2023 1:13 AM Dictation workstation:   [id number]      EMG     No EMG results found for the past 12 months        No EEG results found  for the past 12 months      Current Facility-Administered Medications:     acetaminophen (Tylenol) tablet 650 mg, 650 mg, oral, q4h PRN, 650 mg at 01/05/24 2104 **OR** acetaminophen (Tylenol) oral liquid 650 mg, 650 mg, oral, q4h PRN **OR** acetaminophen (Tylenol) suppository 650 mg, 650 mg, rectal, q4h PRN, ORLANDO Quiros    acetaminophen (Tylenol) tablet 650 mg, 650 mg, oral, q6h, ORLANDO Quiros, 650 mg at 01/06/24 1638    alteplase (Cathflo Activase) injection 1 mg, 1 mg, intra-catheter, PRN, Nitesh Ruiz MD    [Held by provider] aspirin EC tablet 81 mg, 81 mg, oral, Daily, John Salazar MD    atenolol (Tenormin) tablet 50 mg, 50 mg, oral, q AM, John Salazar MD, 50 mg at 01/06/24 0856    brimonidine (AlphaGAN) 0.2 % ophthalmic solution 1 drop, 1 drop, Both Eyes, BID, John Salazar MD, 1 drop at 01/06/24 0858    busPIRone (Buspar) tablet 10 mg, 10 mg, oral, Daily, John Salazar MD, 10 mg at 01/06/24 0856    cyclobenzaprine (Flexeril) tablet 10 mg, 10 mg, oral, TID PRN, ORLANDO Quiros, 10 mg at 01/06/24 1724    DAPTOmycin (Cubicin) 500 mg in sodium chloride 0.9% 50 mL IV, 500 mg, intravenous, Nightly, Pete Echeverria MD    docusate sodium (Colace) capsule 100 mg, 100 mg, oral, BID, ORLANDO Szymanski, 100 mg at 01/06/24 0856    ezetimibe (Zetia) tablet 10 mg, 10 mg, oral, Daily, John Salazar MD, 10 mg at 01/06/24 0856    furosemide (Lasix) tablet 20 mg, 20 mg, oral, Daily, John Salazar MD, 20 mg at 01/06/24 0856    hydrALAZINE (Apresoline) tablet 50 mg, 50 mg, oral, BID, John Salazar MD, 50 mg at 01/06/24 0856    HYDROmorphone (Dilaudid) tablet 2 mg, 2 mg, oral, q4h PRN, Nitesh Ruiz MD, 2 mg at 01/06/24 1641    isosorbide mononitrate ER (Imdur) 24 hr tablet 60 mg, 60 mg, oral, Daily, John Salazar MD, 60 mg at 01/06/24 0653    lactobacillus acidophilus tablet 1 tablet, 1 tablet, oral, Daily, John Salazar MD, 1 tablet at 01/06/24 0856    lidocaine 4 %  patch 1 patch, 1 patch, transdermal, Daily, ORLANDO Quiros, 1 patch at 01/06/24 0857    meclizine (Antivert) tablet 25 mg, 25 mg, oral, q8h PRN, John Salazar MD    melatonin tablet 3 mg, 3 mg, oral, Daily, ORLANDO Quiros, 3 mg at 01/06/24 1845    mirtazapine (Remeron) tablet 15 mg, 15 mg, oral, Nightly, John Salazar MD, 15 mg at 01/05/24 2104    morphine CR (MS Contin) 12 hr tablet 15 mg, 15 mg, oral, q12h EDE, Panchito Barnes MD, 15 mg at 01/06/24 0856    morphine injection 2 mg, 2 mg, intravenous, q2h PRN, ORLANDO Quiros, 2 mg at 01/03/24 1326    naloxone (Narcan) injection 0.2 mg, 0.2 mg, intravenous, q5 min PRN, ORLANDO Quiros    ondansetron (Zofran) tablet 4 mg, 4 mg, oral, q8h PRN, 4 mg at 12/31/23 0904 **OR** ondansetron (Zofran) injection 4 mg, 4 mg, intravenous, q8h PRN, ORLANDO Quiros, 4 mg at 12/31/23 1317    oxyCODONE (Roxicodone) immediate release tablet 10 mg, 10 mg, oral, q6h PRN, Nitesh Ruiz MD, 10 mg at 01/06/24 1845    oxygen (O2) therapy, , inhalation, Continuous - 02/gases, ORLANDO Quiros, Start at 01/06/24 0800    pantoprazole (ProtoNix) EC tablet 40 mg, 40 mg, oral, Daily before breakfast, John Salazar MD, 40 mg at 01/06/24 0653    pilocarpine (Salagen) tablet 5 mg, 5 mg, oral, Daily, John Salazar MD, 5 mg at 01/06/24 0856    polyethylene glycol (Glycolax, Miralax) packet 17 g, 17 g, oral, Daily, Maribeth Santamaria APRN-CNP, 17 g at 01/06/24 0856    pregabalin (Lyrica) capsule 75 mg, 75 mg, oral, BID, John Salazar MD, 75 mg at 01/06/24 0856    vitamin A & D ointment 1 Application, 1 Application, Topical, q4h PRN, Nitesh Ruiz MD         Assessment:  Patient has a chronic back pain.  She has a lumbar spine surgery in November 2023.  Dr. Coombs is following the patient.  Patient developed a postop staph infection with MRSA.  She has the antibiotics, incision and drainage.  Pain in the back is more on  the right side along with pain in the right thigh and right hip.  Imaging of the right hip did not show significant pathology.  Pain exacerbation with the L3-4 discitis and osteomyelitis.  Infectious disease on consult antibiotics have been adjusted  Patient is getting a lot of analgesics.  Dr. Barnes is on consult  Right leg DVT she has an inferior vena cava filter put in January 2.  She is on Eliquis  Right carotid bruit, right carotid endarterectomy by Dr. Edwards.  History of right cerebral TIA  Coronary artery disease  Cardiomyopathy  Bilateral total knee replacements.  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  S/p bilateral leg revascularization June 2022  Hypertension  Chronic edema  COPD with history of tobacco abuse in the past  Overweight  Vitamin D deficiency has been treated  Recommendation:  We can add the TENS unit to her regimen  Additional lab work showed a borderline elevated TSH, B12, folic acid level, vitamin D in good range  With a better pain control she can start increasing her activity  Add baclofen to her regimen     Onur Perez MD

## 2024-01-07 NOTE — CARE PLAN
The patient's goals for the shift include      The clinical goals for the shift include Patient's pain is under control throughout shift.

## 2024-01-07 NOTE — PROGRESS NOTES
Patient seen and evaluated.    Bilateral drains examined.  Both holding suction.     The patient is awake alert and oriented.  Leg pain worse today than yesterday.  Localized to the thigh.  She is sitting up in bed this morning.    Bilateral lower extremities neurovascularly intact.    MRI reviewed and patient seen by Dr. Coombs yesterday.  Continue antibiotics.    Appropriate wound care, nursing care, discharge planning, medical management ,and monitoring of labs will continue    Dana Queen MD

## 2024-01-08 LAB
CRP SERPL-MCNC: 14.19 MG/DL
HCT VFR BLD AUTO: 27.7 % (ref 36–46)
HGB BLD-MCNC: 8.9 G/DL (ref 12–16)

## 2024-01-08 PROCEDURE — 97530 THERAPEUTIC ACTIVITIES: CPT | Mod: GP,CQ

## 2024-01-08 PROCEDURE — 97530 THERAPEUTIC ACTIVITIES: CPT | Mod: GO,CO

## 2024-01-08 PROCEDURE — 2500000001 HC RX 250 WO HCPCS SELF ADMINISTERED DRUGS (ALT 637 FOR MEDICARE OP): Performed by: HOSPITALIST

## 2024-01-08 PROCEDURE — 2500000005 HC RX 250 GENERAL PHARMACY W/O HCPCS

## 2024-01-08 PROCEDURE — 2500000001 HC RX 250 WO HCPCS SELF ADMINISTERED DRUGS (ALT 637 FOR MEDICARE OP): Performed by: STUDENT IN AN ORGANIZED HEALTH CARE EDUCATION/TRAINING PROGRAM

## 2024-01-08 PROCEDURE — 2500000001 HC RX 250 WO HCPCS SELF ADMINISTERED DRUGS (ALT 637 FOR MEDICARE OP): Performed by: ANESTHESIOLOGY

## 2024-01-08 PROCEDURE — 2500000004 HC RX 250 GENERAL PHARMACY W/ HCPCS (ALT 636 FOR OP/ED): Performed by: HOSPITALIST

## 2024-01-08 PROCEDURE — 2500000001 HC RX 250 WO HCPCS SELF ADMINISTERED DRUGS (ALT 637 FOR MEDICARE OP)

## 2024-01-08 PROCEDURE — 2500000001 HC RX 250 WO HCPCS SELF ADMINISTERED DRUGS (ALT 637 FOR MEDICARE OP): Performed by: SPECIALIST

## 2024-01-08 PROCEDURE — 85014 HEMATOCRIT: CPT | Performed by: NURSE PRACTITIONER

## 2024-01-08 PROCEDURE — 1090000002 HH PPS REVENUE DEBIT

## 2024-01-08 PROCEDURE — 86140 C-REACTIVE PROTEIN: CPT | Performed by: STUDENT IN AN ORGANIZED HEALTH CARE EDUCATION/TRAINING PROGRAM

## 2024-01-08 PROCEDURE — 2500000002 HC RX 250 W HCPCS SELF ADMINISTERED DRUGS (ALT 637 FOR MEDICARE OP, ALT 636 FOR OP/ED): Performed by: HOSPITALIST

## 2024-01-08 PROCEDURE — 2500000004 HC RX 250 GENERAL PHARMACY W/ HCPCS (ALT 636 FOR OP/ED)

## 2024-01-08 PROCEDURE — 2500000004 HC RX 250 GENERAL PHARMACY W/ HCPCS (ALT 636 FOR OP/ED): Mod: JZ | Performed by: INTERNAL MEDICINE

## 2024-01-08 PROCEDURE — 1100000001 HC PRIVATE ROOM DAILY

## 2024-01-08 PROCEDURE — 1090000001 HH PPS REVENUE CREDIT

## 2024-01-08 PROCEDURE — 37799 UNLISTED PX VASCULAR SURGERY: CPT | Performed by: STUDENT IN AN ORGANIZED HEALTH CARE EDUCATION/TRAINING PROGRAM

## 2024-01-08 PROCEDURE — 97535 SELF CARE MNGMENT TRAINING: CPT | Mod: GO,CO

## 2024-01-08 PROCEDURE — 2500000001 HC RX 250 WO HCPCS SELF ADMINISTERED DRUGS (ALT 637 FOR MEDICARE OP): Performed by: REGISTERED NURSE

## 2024-01-08 PROCEDURE — 99232 SBSQ HOSP IP/OBS MODERATE 35: CPT | Performed by: STUDENT IN AN ORGANIZED HEALTH CARE EDUCATION/TRAINING PROGRAM

## 2024-01-08 RX ADMIN — FUROSEMIDE 20 MG: 40 TABLET ORAL at 08:48

## 2024-01-08 RX ADMIN — PREGABALIN 75 MG: 50 CAPSULE ORAL at 20:17

## 2024-01-08 RX ADMIN — Medication 3 MG: at 20:17

## 2024-01-08 RX ADMIN — OXYCODONE HYDROCHLORIDE 10 MG: 5 TABLET ORAL at 12:44

## 2024-01-08 RX ADMIN — MORPHINE SULFATE 15 MG: 15 TABLET, EXTENDED RELEASE ORAL at 20:15

## 2024-01-08 RX ADMIN — BRIMONIDINE TARTRATE 1 DROP: 2 SOLUTION OPHTHALMIC at 20:18

## 2024-01-08 RX ADMIN — HYDRALAZINE HYDROCHLORIDE 50 MG: 50 TABLET ORAL at 20:17

## 2024-01-08 RX ADMIN — DAPTOMYCIN 500 MG: 500 INJECTION, POWDER, LYOPHILIZED, FOR SOLUTION INTRAVENOUS at 20:16

## 2024-01-08 RX ADMIN — DOCUSATE SODIUM 100 MG: 100 CAPSULE, LIQUID FILLED ORAL at 08:48

## 2024-01-08 RX ADMIN — MIRTAZAPINE 15 MG: 15 TABLET, FILM COATED ORAL at 20:16

## 2024-01-08 RX ADMIN — EZETIMIBE 10 MG: 10 TABLET ORAL at 08:48

## 2024-01-08 RX ADMIN — PANTOPRAZOLE SODIUM 40 MG: 40 TABLET, DELAYED RELEASE ORAL at 06:11

## 2024-01-08 RX ADMIN — Medication 1 TABLET: at 08:48

## 2024-01-08 RX ADMIN — BACLOFEN 5 MG: 10 TABLET ORAL at 06:10

## 2024-01-08 RX ADMIN — ACETAMINOPHEN 650 MG: 325 TABLET ORAL at 15:27

## 2024-01-08 RX ADMIN — LIDOCAINE 1 PATCH: 4 PATCH TOPICAL at 08:49

## 2024-01-08 RX ADMIN — ATENOLOL 50 MG: 50 TABLET ORAL at 08:48

## 2024-01-08 RX ADMIN — ACETAMINOPHEN 650 MG: 325 TABLET ORAL at 09:02

## 2024-01-08 RX ADMIN — PILOCARPINE HYDROCHLORIDE 5 MG: 5 TABLET, FILM COATED ORAL at 08:48

## 2024-01-08 RX ADMIN — MORPHINE SULFATE 15 MG: 15 TABLET, EXTENDED RELEASE ORAL at 08:49

## 2024-01-08 RX ADMIN — ISOSORBIDE MONONITRATE 60 MG: 60 TABLET, EXTENDED RELEASE ORAL at 06:10

## 2024-01-08 RX ADMIN — BACLOFEN 5 MG: 10 TABLET ORAL at 20:16

## 2024-01-08 RX ADMIN — ACETAMINOPHEN 650 MG: 325 TABLET ORAL at 04:16

## 2024-01-08 RX ADMIN — PREGABALIN 75 MG: 50 CAPSULE ORAL at 08:49

## 2024-01-08 RX ADMIN — BUSPIRONE HYDROCHLORIDE 10 MG: 5 TABLET ORAL at 08:48

## 2024-01-08 RX ADMIN — BACLOFEN 5 MG: 10 TABLET ORAL at 17:00

## 2024-01-08 RX ADMIN — BRIMONIDINE TARTRATE 1 DROP: 2 SOLUTION OPHTHALMIC at 08:49

## 2024-01-08 RX ADMIN — HYDRALAZINE HYDROCHLORIDE 50 MG: 50 TABLET ORAL at 08:48

## 2024-01-08 RX ADMIN — BACLOFEN 5 MG: 10 TABLET ORAL at 12:44

## 2024-01-08 RX ADMIN — CYCLOBENZAPRINE HYDROCHLORIDE 10 MG: 10 TABLET, FILM COATED ORAL at 15:28

## 2024-01-08 ASSESSMENT — COGNITIVE AND FUNCTIONAL STATUS - GENERAL
MOVING TO AND FROM BED TO CHAIR: A LITTLE
MOVING TO AND FROM BED TO CHAIR: A LOT
STANDING UP FROM CHAIR USING ARMS: A LOT
TURNING FROM BACK TO SIDE WHILE IN FLAT BAD: A LOT
HELP NEEDED FOR BATHING: A LITTLE
STANDING UP FROM CHAIR USING ARMS: A LITTLE
TOILETING: A LOT
CLIMB 3 TO 5 STEPS WITH RAILING: A LITTLE
DRESSING REGULAR LOWER BODY CLOTHING: A LITTLE
PERSONAL GROOMING: A LOT
DAILY ACTIVITIY SCORE: 18
DRESSING REGULAR UPPER BODY CLOTHING: A LITTLE
DRESSING REGULAR UPPER BODY CLOTHING: A LOT
DRESSING REGULAR LOWER BODY CLOTHING: A LOT
MOBILITY SCORE: 17
DAILY ACTIVITIY SCORE: 14
PERSONAL GROOMING: A LITTLE
WALKING IN HOSPITAL ROOM: A LITTLE
MOVING FROM LYING ON BACK TO SITTING ON SIDE OF FLAT BED WITH BEDRAILS: A LITTLE
WALKING IN HOSPITAL ROOM: A LOT
TOILETING: A LITTLE
CLIMB 3 TO 5 STEPS WITH RAILING: TOTAL
HELP NEEDED FOR BATHING: A LOT
TURNING FROM BACK TO SIDE WHILE IN FLAT BAD: A LOT
MOBILITY SCORE: 11
MOVING FROM LYING ON BACK TO SITTING ON SIDE OF FLAT BED WITH BEDRAILS: A LOT
EATING MEALS: A LITTLE

## 2024-01-08 ASSESSMENT — PAIN DESCRIPTION - DESCRIPTORS
DESCRIPTORS: ACHING
DESCRIPTORS: DULL;ACHING

## 2024-01-08 ASSESSMENT — PAIN SCALES - GENERAL
PAINLEVEL_OUTOF10: 1
PAINLEVEL_OUTOF10: 3
PAINLEVEL_OUTOF10: 3

## 2024-01-08 ASSESSMENT — PAIN - FUNCTIONAL ASSESSMENT
PAIN_FUNCTIONAL_ASSESSMENT: 0-10

## 2024-01-08 ASSESSMENT — PAIN DESCRIPTION - LOCATION: LOCATION: BACK

## 2024-01-08 ASSESSMENT — ACTIVITIES OF DAILY LIVING (ADL): HOME_MANAGEMENT_TIME_ENTRY: 15

## 2024-01-08 NOTE — PROGRESS NOTES
"Occupational Therapy    OT Treatment    Patient Name: Tara Tierney  MRN: [id number]  Today's Date: 1/8/2024  Time Calculation  Start Time: 1045  Stop Time: 1110  Time Calculation (min): 25 min         Assessment:  OT Assessment: Pt seen for skilled OT to address impairments with adl's, balance, and endurance. Pt demo's improvements with pain but continues to require CGA with adl transfers and mobility d/t decreased endurance and safety awareness.  Prognosis: Good  End of Session Communication: Bedside nurse  End of Session Patient Position: Up in chair  OT Assessment Results: Decreased ADL status, Decreased safe judgment during ADL, Decreased endurance, Decreased functional mobility  Prognosis: Good  Plan:  Treatment Interventions: ADL retraining, Functional transfer training, Endurance training  OT Frequency: 2 times per week  OT Discharge Recommendations: Moderate intensity level of continued care  OT Recommended Transfer Status:  (CGA)    Treatment Interventions: ADL retraining, Functional transfer training, Endurance training    Subjective   Previous Visit Info:  OT Last Visit  OT Received On: 01/08/24  General:  General  Family/Caregiver Present: Yes  Caregiver Feedback: Pt's family arrived at end of tx.  Very supportive and verbalized excitement to see pt moving without pain  Prior to Session Communication: Bedside nurse (cleared by RN for participation.)  Patient Position Received: Up in chair, Alarm off, not on at start of session  General Comment: Pt reports feeling better today.  States pain is controlled but still fearful of pain increasing with activity.  Pt states she would like to return home once discharged. (Pt has purewick, requested to have suction reattached at end of tx.  \" I don't want to make these girls run and with this I know I won't pee myself\". Pt is continent of urine)  Precautions:  LE Weight Bearing Status: Weight Bearing as Tolerated  Medical Precautions: Fall " precautions  Post-Surgical Precautions: Spinal precautions  Braces Applied: TLSO when OOB  Precautions Comment: Pt verbalized understanding of precautions.  Independently states spinal precautions  Vital Signs:     Pain:  Pain Assessment  Pain Assessment: 0-10  Pain Score: 3  Pain Type: Surgical pain  Pain Location: Back  Pain Orientation: Lower  Pain Descriptors: Aching    Objective    Cognition:  Cognition  Overall Cognitive Status: Within Functional Limits    Activities of Daily Living:         LE Dressing  LE Dressing: Yes  LE Dressing Adaptive Equipment: Reacher  Pants Level of Assistance: Minimum assistance (to thread LE's. Pt having difficulty with vision, states it's hard to see with 2 dark items( red reacher and green pants)  Pt reports scheduled for shots for eyes that she gets regularly to help with vision.)  LE Dressing Where Assessed: Chair  LE Dressing Comments: Pt demo's fair+ balance in stance when pulling clothing over hips    Toileting  Toileting Comments: Pt educated on compensatory techniques for toilet hygiene.  Pt with limited reach to complete posterior pericare.  Education provided on toilet aid use (has one at home but has not used it) and seated pericare.  Pt able to reach right arm back but would need assist for thoroughness without toilet aid.  Pt reports spouse will assist if needed.       Bed Mobility/Transfers:      Transfers  Transfer: Yes  Transfer 1  Technique 1: Sit to stand, Stand to sit  Transfer Device 1:  (FWW)  Transfer Level of Assistance 1: Contact guard  Trials/Comments 1: x 3 trials from chair level, x 1 from toilet level.  Min verbal and demonstrative cues needed for technique    Toilet Transfers  Toilet Transfer From: Chair  Toilet Transfer Type: To and from  Toilet Transfer to: Raised toilet seat without rails  Toilet Transfer Technique: Ambulating  Toilet Transfers: Contact guard  Toilet Transfers Comments: Educated on safe transfer techniques.  Pt reports having  elevated toilet at home       Ambulation/Gait Training:  Ambulation/Gait Training  Ambulation/Gait Training Performed:  (Functional mobility household distances and between adl tasks with fww and CGA)  Sitting Balance:  Static Sitting Balance  Static Sitting-Comment/Number of Minutes: G-  Dynamic Sitting Balance  Dynamic Sitting-Comments: G-  Standing Balance:  Static Standing Balance  Static Standing-Comment/Number of Minutes: F+  Dynamic Standing Balance  Dynamic Standing-Comments: Fair/Fair+    Therapy/Activity:      Therapeutic Activity  Therapeutic Activity Performed: Yes (Pt tolerating ~4 min of sustained activity prior to self-initiating rest break x 3 trials with adl's and functional tasks.)          Outcome Measures:[facility] Daily Activity  Putting on and taking off regular lower body clothing: A little  Bathing (including washing, rinsing, drying): A little  Putting on and taking off regular upper body clothing: A little  Toileting, which includes using toilet, bedpan or urinal: A little  Taking care of personal grooming such as brushing teeth: A little  Eating Meals: A little  Daily Activity - Total Score: 18        Education Documentation  Body Mechanics, taught by Jennifer L Felty, OTA at 1/8/2024 11:42 AM.  Learner: Patient  Readiness: Acceptance  Method: Explanation, Demonstration, Teach-back  Response: Verbalizes Understanding  Comment: Needs reinforcement to follow    Precautions, taught by Jennifer L Felty, OTA at 1/8/2024 11:42 AM.  Learner: Patient  Readiness: Acceptance  Method: Explanation, Demonstration, Teach-back  Response: Verbalizes Understanding  Comment: Needs reinforcement to follow    ADL Training, taught by Jennifer L Felty, OTA at 1/8/2024 11:42 AM.  Learner: Patient  Readiness: Acceptance  Method: Explanation, Demonstration, Teach-back  Response: Verbalizes Understanding  Comment: Needs reinforcement to follow    Education Comments  No comments found.        OP  EDUCATION:  Education  Individual(s) Educated: Patient  Education Comment: Pt educated on safe transfer techniques including hand placement and positioning; techniques/strategies for balance improvement; compensatory adl techniques; safe body mechanics; energy conservation.    Goals:  Encounter Problems       Encounter Problems (Active)       OT Goals       CGA for all functional transfers  (Progressing)       Start:  01/02/24    Expected End:  01/04/24            CGA LB dressing with AE (Progressing)       Start:  01/02/24    Expected End:  01/04/24            Fair + dyn standing balance for ADLs and functional activities  (Progressing)       Start:  01/02/24    Expected End:  01/04/24            CGA for toileting tasks/clothing mgmt  (Progressing)       Start:  01/02/24    Expected End:  01/04/24

## 2024-01-08 NOTE — HOSPITAL COURSE
68F with PMH of COPD, HTN, HLD, CAD remote stent, PAD s/p fem/pop bypass, recent DVT/PE who underwent LSO back surgery in 11/2023, who has since had 2 washouts that showed MRSA infection for which she was on long term IV vancomycin treatment PTA. She presented with worsening intractable lower back pain. Had multiple recent admissions. Initial MRI L spine here showed new appearing fluid collection around L3. She was seen by orthopedics, underwent irrigation and debridement of subfascial lumbar spine, as well as removal of lumbar spinal hardware on 12/31/23.  She had drains left in place. She had a repeat MRI L spine done that showed possible L3-L4 discitis/OM, as well as L3-L4 facet septic arthritis, and unchanged ventral epidural L sided collection at level of L3. Per ortho, this is not reachable for drainage, and rec continued IV abx to treat for discitis/OM and keep drains in place. Her cultures from irrigation this admission grew MRSA. ID was following, and given her continued worsening symptoms on presentation, they changed IV abx to daptomycin. Her symptoms seem to improve with change in abx. Statin will be held while on this medication. She will follow up with PCP, ortho, ID.     She had pain management and neurology consulted this admission as well for intractable back pain. She had her regimen adjusted, with some improvement.     She was seen by PT/OT, and recommended for ***.     Discussion was had with ortho, who recommended pt to avoid systemic AC for now given multiple washouts and reaccumulation of spinal fluid, as well as significant anemia close to Hgb 7. She received 1u pRBC this admission. She had IVC filter placed 1/2/24, and Eliquis will be held on discharge. Will continue on aspirin.

## 2024-01-08 NOTE — PROGRESS NOTES
Physical Therapy    Physical Therapy Treatment    Patient Name: Tara Tierney  MRN: [id number]  Today's Date: 1/8/2024  Time Calculation  Start Time: 0850  Stop Time: 0929  Time Calculation (min): 39 min       Assessment/Plan   PT Assessment  PT Assessment Results: Decreased strength, Decreased mobility, Decreased endurance  Rehab Prognosis: Good  Evaluation/Treatment Tolerance: Patient tolerated treatment well  Medical Staff Made Aware: Yes  End of Session Communication: Bedside nurse, PCT/NA/WILEY  Assessment Comment: Patient will benefit from additional PT to increase mobility and activity tolerance.  End of Session Patient Position: Up in chair, Alarm off, not on at start of session (Call light, phone, and tray table within reach.)  PT Plan  Inpatient/Swing Bed or Outpatient: Inpatient  Treatment/Interventions: Bed mobility, Transfer training, Gait training, Therapeutic activity  PT Plan: Skilled PT  PT Frequency: 3 times per week       PT Recommended Transfer Status: Assist x1    General Visit Information:   PT  Visit  PT Received On: 01/08/24  General  Family/Caregiver Present: No  Prior to Session Communication: Bedside nurse, PCT/NA/WILEY  Patient Position Received: Bed, 3 rail up, Alarm off, not on at start of session  General Comment: Feeling better today.    General Observations:   General Observation: x2 JOSE drains; Tele; Purewick(removed for mobility).    Subjective     Precautions:  Precautions  LE Weight Bearing Status: Weight Bearing as Tolerated  Medical Precautions: Fall precautions  Post-Surgical Precautions: Spinal precautions  Braces Applied: TLSO when OOB  Precautions Comment: Instructions/(A) for donning brace for OOB activites.    Objective     Pain:  Pain Assessment  Pain Assessment: 0-10  Pain Score: 3  Pain Location: Back  Pain Orientation: Lower  Pain Descriptors: Dull, Aching. Nursing aware.    Cognition:  Cognition  Orientation Level: Oriented X4    Treatments:           Bed Mobility  Bed  Mobility: Yes  Bed Mobility 1  Bed Mobility 1: Supine to sitting  Level of Assistance 1: Minimum assistance, Minimal verbal cues, Minimal tactile cues  Bed Mobility Comments 1: HOB ~40degrees.  Patient using bed rails well for support. (A) to lift UB from HOB while moving LEs over the EOB.  Ambulation/Gait Training  Ambulation/Gait Training Performed: Yes  Ambulation/Gait Training 1  Surface 1: Level tile  Device 1: Rolling walker  Assistance 1: Contact guard  Comments/Distance (ft) 1: ~15ft x1 and ~20ft x1. TLSO brace on. Step through gait pattern. Decreased step height/length B. v/c and (A) for safer maneuvering of ww with 90/180° turns to avoid twisting. No LOB. Patient able to amb household distances.  Transfers  Transfer: Yes  Transfer 1  Technique 1: Sit to stand, Stand to sit  Transfer Device 1:  (ww)  Transfer Level of Assistance 1: Minimum assistance, Contact guard  Trials/Comments 1: (4x). Min(A) sit to stand and CGA stand to sit. v/c for safe hand placement and technique. Slow transition of hands to/from ww. Benefits from elevated surfaces.          Outcome Measures:  [facility] Basic Mobility  Turning from your back to your side while in a flat bed without using bedrails: A little  Moving from lying on your back to sitting on the side of a flat bed without using bedrails: A lot  Moving to and from bed to chair (including a wheelchair): A little  Standing up from a chair using your arms (e.g. wheelchair or bedside chair): A little  To walk in hospital room: A little  Climbing 3-5 steps with railing: A little  Basic Mobility - Total Score: 17    Education Documentation  Precautions, taught by Jeanine Bryan PTA at 1/8/2024 10:08 AM.  Learner: Patient  Readiness: Acceptance  Method: Explanation, Demonstration, Teach-back  Response: Verbalizes Understanding, Needs Reinforcement  Comment: See therapy note.    Body Mechanics, taught by Jeanine Bryan PTA at 1/8/2024 10:08 AM.  Learner: Patient  Readiness:  Acceptance  Method: Explanation, Demonstration, Teach-back  Response: Verbalizes Understanding, Needs Reinforcement  Comment: See therapy note.    Mobility Training, taught by Jeanine Bryan PTA at 1/8/2024 10:08 AM.  Learner: Patient  Readiness: Acceptance  Method: Explanation, Demonstration, Teach-back  Response: Verbalizes Understanding, Needs Reinforcement  Comment: See therapy note.        EDUCATION:  Individual(s) Educated: Patient  Education Provided: Body Mechanics, Fall Risk, Home Safety, POC, Post-Op Precautions, Posture (Safety with transfers/amb; Spinal Precautions; Activity modifications as needed(fatigue/pain level); Donning/doffing TLSO brace.)  Patient Response to Education: Patient/Caregiver Verbalized Understanding of Information    Encounter Problems       Encounter Problems (Active)       PT Problem       Bed mobility supine <> sit modified independent  (Progressing)       Start:  01/02/24    Expected End:  01/04/24            Transfers sit <> stand with ww modified independent  (Progressing)       Start:  01/02/24    Expected End:  01/04/24            Patient to ambulate with ww 50' CGA  (Progressing)       Start:  01/02/24    Expected End:  01/04/24

## 2024-01-08 NOTE — PROGRESS NOTES
"Tara Tierney is a 68 y.o. female on day 8 of admission presenting with Postoperative surgical complication involving musculoskeletal system associated with musculoskeletal procedure, unspecified complication.    Subjective   Patient seen and examined.  Continues to complain of pain although says that it is better.  She has not gotten out of bed.  No shortness of breath, did have bowel movement yesterday no fevers or chills.       Objective     Physical Exam  Appearance: She is not toxic-appearing.   Eyes:      Extraocular Movements: Extraocular movements intact.      Conjunctiva/sclera: Conjunctivae normal.   Cardiovascular:      Rate and Rhythm: Normal rate and regular rhythm.   Pulmonary:      Effort: Pulmonary effort is normal.      Breath sounds: No wheezing.   Abdominal:      General: There is no distension.      Palpations: Abdomen is soft.   Neurological:      Mental Status: She is alert. Mental status is at baseline.   Last Recorded Vitals  Blood pressure 120/56, pulse 76, temperature 36 °C (96.8 °F), resp. rate 16, height 1.448 m (4' 9.01\"), weight 78.2 kg (172 lb 6.4 oz), SpO2 98 %, not currently breastfeeding.  Intake/Output last 3 Shifts:  I/O last 3 completed shifts:  In: 1600 (20.5 mL/kg) [P.O.:1480; IV Piggyback:120]  Out: 3068 (39.2 mL/kg) [Urine:3000 (1.1 mL/kg/hr); Drains:68]  Weight: 78.2 kg     Relevant Results           This patient currently has cardiac telemetry ordered; if you would like to modify or discontinue the telemetry order, click here to go to the orders activity to modify/discontinue the order.  This patient has a central line   Reason for the central line remaining today? Line unnecessary, will be removed today                 Assessment/Plan   Principal Problem:    Postoperative surgical complication involving musculoskeletal system associated with musculoskeletal procedure, unspecified complication  Active Problems:    Essential hypertension    PVD (peripheral vascular " disease) (CMS/HCC)    Back pain with radiculopathy    Back pain at L4-L5 level    Deep vein thrombosis (DVT) of right lower extremity (CMS/HCC)    Anemia    Continue IV Cubicin, wound cultures grew MRSA/staph  Continues to have pain, due to discitis I believe the pain will slowly get better  Do not think patient would be able to take care of herself at home with ongoing pain and difficulty ambulation  She will benefit from rehab/SNF placement  Drain removal as per orthopedics  Continue current pain management  Continue PT OT  Leukocytosis has resolved  CRP is still elevated  ID is following  Keep her off statin till the duration of cubicin  Continue home meds for hypertension  She is awaiting pre-CERT to be discharged to an acute rehab             Nitesh Ruiz MD

## 2024-01-08 NOTE — PROGRESS NOTES
Infectious Diseases Inpatient Progress Note      HISTORY OF PRESENT ILLNESS:    Follow up postop deep incision wound infection with recurrent abscess while on IV Vanco, S/P hardware removal with persistent +ve MRSA Cx. ,  on IV Cubicin, well tolerated.   I had a lengthy discussion with the patient explaining that Zocor interacts with Cubicin and had to be on hold during the treatment and that it can be resumed after Cubicin course is done.  I explained to the patient that she will require IV antibiotics for 8 weeks to be followed up as oral antibiotics for life  Has persistent severe low back pain.  JOSE drain remains to be in place  Recent PE and DVT.   Has resolved leg pain. No SOB. +ve constipation   Decreased appetite  No bowel movements   no urinary symptoms  No fevers or chills.    Current Medications:    acetaminophen, 650 mg, oral, q6h  [Held by provider] aspirin, 81 mg, oral, Daily  atenolol, 50 mg, oral, q AM  baclofen, 5 mg, oral, 4x daily  brimonidine, 1 drop, Both Eyes, BID  busPIRone, 10 mg, oral, Daily  daptomycin, 500 mg, intravenous, Nightly  docusate sodium, 100 mg, oral, BID  ezetimibe, 10 mg, oral, Daily  furosemide, 20 mg, oral, Daily  hydrALAZINE, 50 mg, oral, BID  isosorbide mononitrate ER, 60 mg, oral, Daily  lactobacillus acidophilus, 1 tablet, oral, Daily  lidocaine, 1 patch, transdermal, Daily  melatonin, 3 mg, oral, Daily  mirtazapine, 15 mg, oral, Nightly  morphine CR, 15 mg, oral, q12h EDE  pantoprazole, 40 mg, oral, Daily before breakfast  pilocarpine, 5 mg, oral, Daily  polyethylene glycol, 17 g, oral, Daily  pregabalin, 75 mg, oral, BID        Allergies:  Neomycin and Neomycin-polymyxin b-dexameth      Review of Systems  14 system review is negative other than HPI    Physical Exam    Heart Rate:  [74-80]   Temp:  [36 °C (96.8 °F)-37.1 °C (98.8 °F)]   Resp:  [18]   BP: (120-144)/(56-65)   SpO2:  [91 %-98 %]    Vitals:    01/08/24 0001 01/08/24 0449 01/08/24 0732 01/08/24 1401   BP:  "144/65 131/62 120/56 135/58   Patient Position: Lying   Sitting   Pulse: 74 74 76 80   Resp:    18   Temp: 36.5 °C (97.7 °F) 37.1 °C (98.8 °F) 36 °C (96.8 °F) 36.8 °C (98.2 °F)   TempSrc: Temporal      SpO2: 92% 96% 98% 97%   Weight:       Height:         General Appearance: alert and oriented to person, place and time, well-developed and well-nourished, in no acute distress  Skin: warm and dry, no rash.   Head: normocephalic and atraumatic  Eyes: anicteric sclerae  ENT:  normal mucous membranes. No oral thrush  Lungs: normal respiratory effort, clear  Heart: Nl S1 and S2  Abdomen: soft, no tenderness  1+ B leg edema  No erythema, no tenderness  Back incision with dressing, intact JOSE drains  DATA:    Lab Results   Component Value Date    WBC 4.8 01/07/2024    HGB 8.9 (L) 01/08/2024    HCT 27.7 (L) 01/08/2024    MCV 96 01/07/2024     01/07/2024     Lab Results   Component Value Date    CREATININE 0.77 01/07/2024    BUN 11 01/07/2024     (L) 01/07/2024    K 3.8 01/07/2024    CL 97 (L) 01/07/2024    CO2 28 01/07/2024       Hepatic Function Panel:No results found for: \"ALKPHOS\", \"ALT\", \"AST\", \"PROT\", \"BILITOT\", \"BILIDIR\"    Microbiology:   Susceptibility data from last 90 days.  Collected Specimen Info Organism Amoxicillin/Clavulanate Ampicillin Ampicillin/Sulbactam Aztreonam Cefazolin Cefazolin (uncomplicated UTIs only) Cefepime Cefotaxime Ceftazidime Ceftriaxone Ciprofloxacin Clindamycin   12/31/23 Swab from SPINE Staphylococcus aureus               12/31/23 Tissue from SPINE Methicillin Resistant Staphylococcus aureus (MRSA)            S   12/31/23 Swab from SPINE Methicillin Resistant Staphylococcus aureus (MRSA)            S   12/31/23 Tissue from SPINE Staphylococcus aureus               12/11/23 Fluid from ABSCESS Methicillin Resistant Staphylococcus aureus (MRSA)            S   12/11/23 Tissue from ABSCESS Staphylococcus aureus               12/11/23 Swab from ABSCESS Methicillin Resistant " Staphylococcus aureus (MRSA)            S   12/06/23 Swab from SPINE Methicillin Resistant Staphylococcus aureus (MRSA)               12/06/23 Tissue from SPINE Staphylococcus aureus               12/06/23 Tissue from SPINE Staphylococcus aureus               12/05/23 Urine from Clean Catch/Voided Escherichia coli S R S R R R R R R R R    11/03/23 Swab from Nares/Axilla/Groin Methicillin Resistant Staphylococcus aureus (MRSA)                 Collected Specimen Info Organism Ertapenem Erythromycin Gentamicin Meropenem Nitrofurantoin Oxacillin Piperacillin/Tazobactam Tetracycline Tobramycin Trimethoprim/Sulfamethoxazole Vancomycin   12/31/23 Swab from SPINE Staphylococcus aureus              12/31/23 Tissue from SPINE Methicillin Resistant Staphylococcus aureus (MRSA)  R    R  S  S S   12/31/23 Swab from SPINE Methicillin Resistant Staphylococcus aureus (MRSA)  R    R  S  S S   12/31/23 Tissue from SPINE Staphylococcus aureus              12/11/23 Fluid from ABSCESS Methicillin Resistant Staphylococcus aureus (MRSA)  R    R  S  S S   12/11/23 Tissue from ABSCESS Staphylococcus aureus              12/11/23 Swab from ABSCESS Methicillin Resistant Staphylococcus aureus (MRSA)  R    R  S  S S   12/06/23 Swab from SPINE Methicillin Resistant Staphylococcus aureus (MRSA)              12/06/23 Tissue from SPINE Staphylococcus aureus              12/06/23 Tissue from SPINE Staphylococcus aureus              12/05/23 Urine from Clean Catch/Voided Escherichia coli S  R S S  S  I S    11/03/23 Swab from Nares/Axilla/Groin Methicillin Resistant Staphylococcus aureus (MRSA)                   Imaging:   CT abdomen pelvis wo IV contrast    Result Date: 12/31/2023  STUDY: CT Abdomen and Pelvis without IV Contrast; 12/31/2023 at 5:11 PM. INDICATION: Abdominal pain. COMPARISON: CT AP 12/19/2023 ACCESSION NUMBER(S): [id number] ORDERING CLINICIAN: WILLIAM EDMONDS TECHNIQUE: CT of the abdomen and pelvis was performed.  Contiguous  axial images were obtained at 3 mm slice thickness through the abdomen and pelvis. Coronal and sagittal reconstructions at 3 mm slice thickness were performed. No intravenous contrast was administered.  Automated mA/kV exposure control was utilized and patient examination was performed in strict accordance with principles of ALARA. FINDINGS: Please note that the evaluation of vessels, lymph nodes and organs is limited without intravenous contrast.  LOWER CHEST: No cardiomegaly.  No pericardial effusion.  Very small bilateral pleural effusions  ABDOMEN:  LIVER: No hepatomegaly.  Smooth surface contour.  Normal attenuation.  BILE DUCTS: No intrahepatic or extrahepatic biliary ductal dilatation.  GALLBLADDER: The gallbladder is absent. STOMACH: No abnormalities identified.  PANCREAS: Negative for pancreatitis  SPLEEN: No splenomegaly or focal splenic lesion.  ADRENAL GLANDS: No thickening or nodules.  KIDNEYS AND URETERS: Kidneys are normal in size and location.  No renal or ureteral calculi.  PELVIS:  BLADDER: No abnormalities identified.  REPRODUCTIVE ORGANS: No abnormalities identified.  BOWEL: Colonic diverticulosis.  Mild constipation without bowel obstruction. Negative for appendicitis  VESSELS: Limited due to lack of intravenous contrast.  Prior femoral to femoral artery bypass.  Abdominal aorta is normal in caliber.  PERITONEUM/RETROPERITONEUM/LYMPH NODES: Small amount of low-density free pelvic fluid  No pneumoperitoneum. No lymphadenopathy.  ABDOMINAL WALL: Small fat-containing umbilical hernia. SOFT TISSUES: Postsurgical changes within the posterior lumbar soft tissues with surgical drains in place.  BONES: Status post interbody fusion of L4/5 and L5/S1 with metallic spacer. This space narrowing L3/4.  Laminectomies L3-L5.  Bone graft along the lateral margins of L3-L5 fracture of the right L3 transverse process. Fracture of the right L4 transverse process.  Old screw tract within the L4 and L5 pedicles.   Narrowing of the right L5/S1 neural foramen due to facet joint osteophyte.    Negative for bowel obstruction.  Mild constipation. Colonic diverticulosis without diverticulitis. Negative for appendicitis. Postsurgical changes lumbar spine removal of pedicle screws and posterior rods from L4 through S1.  Stable appearance of interbody spacers at L4/5 and L5/S1. Bone graft along the lateral margins of L3-L5.  Fractures of the right L4 and L3 transverse process.  The right L3 transverse process fracture appears old.  Postsurgical changes within the posterior lumbar soft tissues with surgical drains in place. Signed by Aung Sparrow MD    MR lumbar spine w and wo IV contrast    Result Date: 12/31/2023  Interpreted By:  Jonas Sanchez, STUDY: MR LUMBAR SPINE W AND WO IV CONTRAST;  12/31/2023 12:33 am   INDICATION: Signs/Symptoms:Pain.   COMPARISON: MRI of the lumbar spine dated 12/10/2023   ACCESSION NUMBER(S): [id number]   ORDERING CLINICIAN: YVETTE LEE   TECHNIQUE: Sagittal T1, T2, STIR, axial T1 and T2 weighted images of the lumbar spine were acquired. Additional axial and sagittal T1 weighted images of the lumbar spine were obtained after intravenous administration of 15.5 mL of contrast.   FINDINGS: Exam is degraded by motion.   There is again evidence of 5 lumbar type non rib-bearing vertebral bodies, with the lowest well-formed intervertebral disc space labeled L5-S1.   Postsurgical changes of posterior decompression and laminectomies of L3 through L5 with posterior spinal fusion extending from L4 through S1 and discectomies with intervertebral disc spaces at L4-L5 and L5-S1. These are similar in appearance to prior examination on 12/10/2023. Susceptibility artifact from the surgical hardware somewhat limits evaluation of the adjacent structures.   In the interim since prior imaging on 12/10/2023, new postsurgical consistent with irrigation debridement of subfascial tissues of the cutaneous spine are  evident. STIR and T2 hypointense signal abnormality previously seen in the cutaneous fat of the lower lumbar spine has resolved, with new 3.7 x 3.6 x 13.6 cm (series 7, image 10; series 10, image 10) T2 hyperintense fluid collection present, with slight peripheral enhancement. This collection may be contiguous with the skin.   T2 hyperintense collection is again present in the laminectomy surgical bed, abutting the thecal sac at the level of L3 through L5 (series 8, image 14), measuring up to 5.2 cm in craniocaudal dimension, 2.9 cm in transverse dimension and up to 1.5 cm in AP dimension (series 10, image 7). The surgical catheter previously seen within the collection is gone, although collection is decreased in size to previous imaging, with resolution of previously seen air within the collection. Mild surrounding edema and reactive soft tissue changes are present in the epidural space, somewhat increased in the interim since prior exam, with new/increased mass effect on the adjacent thecal sac.   There also appears to be small amount of new fluid present in the anterior epidural space at the level of L3 (series 10, image 3).   Although the exact characterization is difficult due to motion, there appears to be somewhat worsened spinal canal narrowing at the level of L2-L3 due to combination of new fluid in the anterior epidural space, and the T2 hyperintense fluid collection with associated inflammatory/reactive changes along the posterior aspect of the thecal sac, with somewhat increased effacement of the subarachnoid space.   No new intra thecal enhancement is identified.   Neural foramina are not well assessed due to motion and susceptibility artifact from the surgical hardware.       1.  New surgical changes of irrigation and debridement in the laminectomy surgical bed evident, with previously seen geographic area of hypointense T2 and STIR signal in the cutaneous fat of the lumbar spine resolved, and new T2  hyperintense collection measuring 3.7 x 3.6 x 13.6 cm present in the cutaneous fat, likely representing a postsurgical seroma, although sterility can not be ascertained on imaging alone. This collection reaches of the dermis, and may drain at the skin. 2. T2 hyperintense collection within the laminectomy bed itself along the dorsal aspect of the thecal sac described on previous MRI in 12/10/2023 has mildly decreased in size from prior imaging, with interval removal of previously seen drain, although there are increased inflammatory/reactive soft tissue changes present in the laminectomy surgical bed, focus of fluid in the anterior epidural space at the level of L3 contributes to increased effacement of the thecal sac at the level of L3 compared to prior MRI.   MACRO: None   Signed by: Jonas Sanchez 12/31/2023 1:13 AM Dictation workstation:   [id number]       CRP of 14.19  IMPRESSION:    Postop deep incisional wound infection of lumbar spine with failure of IV Vanco  S/P Hardware removal  MRSA infection in a patient who is a chronic carrier    Patient Active Problem List   Diagnosis    Abdominal aortic aneurysm (CMS/HCC)    Abnormal EKG    Bilateral carotid artery stenosis    Bruit of right carotid artery    CAD in native artery    Cardiomyopathy (CMS/HCC)    Chest pain    Chronic asthmatic bronchitis    Closed displaced fracture of fifth metatarsal bone    Current every day smoker    Difficulty breathing    Essential hypertension    History of total knee replacement    Hypokalemia    Intermittent claudication (CMS/HCC)    Knee osteoarthritis    Knee pain    Limb pain    Localized swelling, mass, or lump of lower extremity    Celiac artery stenosis (CMS/HCC)    Mesenteric artery stenosis (CMS/HCC)    Mixed hyperlipidemia    Obese    PVD (peripheral vascular disease) (CMS/HCC)    Right foot pain    Swelling    Trigger finger    Urinary tract infection without hematuria    Back pain of lumbar region with  sciatica    Back pain with radiculopathy    Intractable back pain    Seroma, post-traumatic (CMS/HCC)    Lumbar surgical wound fluid collection    Generalized weakness    Postoperative surgical complication involving musculoskeletal system associated with musculoskeletal procedure, unspecified complication    Back pain at L4-L5 level    Deep vein thrombosis (DVT) of right lower extremity (CMS/HCC)    Anemia       PLAN:  Continue IV Cubicin for 8 weeks  I will call microbiology in a.m. to ensure the PERNELL's are done since they have not reported  Hold Zocor dose during treatment with Cubicin  May continue Zetia  F/U CBC BMP CPK weekly  Local wound care per surgery  I do not anticipate CRP to be improving that quickly.    Discussed with patient and Dr. Malvin Reyna MD

## 2024-01-08 NOTE — PROGRESS NOTES
DAILY PROGRESS NOTE      POD7 Lumbar washout and hardware removal     Patient doing well  Visit Vitals  /56   Pulse 76   Temp 36 °C (96.8 °F)   Resp 16      Temp (24hrs), Av.6 °C (97.9 °F), Min:36 °C (96.8 °F), Max:37.1 °C (98.8 °F)       Pain Control significant improvement. Reports very minimal lower extremity pain at this time.   No chest pain or shortness of breath.  No calf pain    Exam:   Good bilateral lower extremity strength and sensation.   Extremity shows neuro vascular status intact. Flexion and extension intact on extremity.  Calves soft and non-tender without evidence of DVT.        Labs reviewed:  CBC: @LABRCNT(WBC:3,HGB:3,PLT:3)@  BMP:  @LABRCNT(Na:3,K:3,CL:3,CO2:3,BUN:3,Creatinine:3,Glucose:3)@    I&O  I/O last 3 completed shifts:  In: 1600 (20.5 mL/kg) [P.O.:1480; IV Piggyback:120]  Out: 3068 (39.2 mL/kg) [Urine:3000 (1.1 mL/kg/hr); Drains:68]  Weight: 78.2 kg       Assessment:    Patient seen this morning sitting up in chart with lumbar brace on. She reports significant improvement in her lower extremity pain and just reports some generalized weakness to lower extremities contributing this to decreased mobility.   JOSE drains are holding suction and working appropriately. Deep drain with 22cc serousang drainage and superficial drain with 16cc serosang drainage.     Plan:    Continue with therapy   Brace when OOB and with ambulation   JOSE drains to remain in place until there is 0 output for 48 hours   Continue IV antibiotics per ID  Continue pain control per pain management     Edi Olguin, APRN-CNP   2024 10:06 AM    Subjective: I feel significantly better today.  I been walking in the choudhary and feels so much better    Objective: Patient looks significantly improved.  She is sitting up in a chair with normal affect and alert and oriented x 3 and looks comfortable.  She is smiling and laughing and joking.  Her  is in the room.  Discussed with both them at length and they  both think she is significantly improved.  She has no numbness and tingling in her legs.  She is voiding normally.  She only has back pain.  All of the thigh and groin pain is completely gone.  No fevers chills.  She has been ambulating the choudhary without any issues.    Objective: Dressing is clean dry and intact.  Will get a leave that on and less it is needed to be taken down.  Drains are still putting out bilaterally and staying compressed.    Assessment/plan: Significant improvement in symptoms now that she is on a new antibiotic.  We will keep the drains in.  Will keep antibiotics going for quite some time.  Given the fact now that she has osteomyelitis and discitis in that small fluid collection ventral to the dorsal sac she will need long-term antibiotics.  The drains will not come out until they stop putting fluid out.  I do not want her discharged to a skilled nursing facility as she will not get appropriate care in facility like that and needs to either stay in the hospital or be discharged home when she is able to do that.  Infectious disease will continue to monitor the antibiotics and medicine team will continue to monitor her medical issues.

## 2024-01-08 NOTE — CARE PLAN
Problem: Pain  Goal: My pain/discomfort is manageable  Outcome: Progressing     Problem: Safety  Goal: Patient will be injury free during hospitalization  Outcome: Progressing  Goal: I will remain free of falls  Outcome: Met     Problem: Daily Care  Goal: Daily care needs are met  Outcome: Progressing     Problem: Psychosocial Needs  Goal: Demonstrates ability to cope with hospitalization/illness  Outcome: Progressing  Goal: Collaborate with me, my family, and caregiver to identify my specific goals  Outcome: Progressing     Problem: Fall/Injury  Goal: Not fall by end of shift  Outcome: Met  Goal: Be free from injury by end of the shift  Outcome: Met  Goal: Verbalize understanding of personal risk factors for fall in the hospital  Outcome: Progressing  Goal: Verbalize understanding of risk factor reduction measures to prevent injury from fall in the home  Outcome: Progressing  Goal: Use assistive devices by end of the shift  Outcome: Progressing  Goal: Pace activities to prevent fatigue by end of the shift  Outcome: Progressing     Problem: Skin  Goal: Promote skin healing  Outcome: Progressing     Problem: Pain  Goal: Takes deep breaths with improved pain control throughout the shift  Outcome: Progressing  Goal: Turns in bed with improved pain control throughout the shift  Outcome: Progressing  Goal: Walks with improved pain control throughout the shift  Outcome: Not Progressing  Goal: Free from opioid side effects throughout the shift  Outcome: Not Progressing  Goal: Free from acute confusion related to pain meds throughout the shift  Outcome: Progressing   The patient's goals for the shift include      The clinical goals for the shift include patient will have decreased pain throughout shift    OProblem: Pain  Goal: Walks with improved pain control throughout the shift  Outcome: Not Progressing  Goal: Free from opioid side effects throughout the shift  Outcome: Not Progressing

## 2024-01-09 LAB
ANION GAP SERPL CALC-SCNC: 10 MMOL/L (ref 10–20)
BUN SERPL-MCNC: 13 MG/DL (ref 6–23)
CALCIUM SERPL-MCNC: 9.1 MG/DL (ref 8.6–10.3)
CHLORIDE SERPL-SCNC: 97 MMOL/L (ref 98–107)
CO2 SERPL-SCNC: 28 MMOL/L (ref 21–32)
CREAT SERPL-MCNC: 0.8 MG/DL (ref 0.5–1.05)
EGFRCR SERPLBLD CKD-EPI 2021: 79 ML/MIN/1.73M*2
ERYTHROCYTE [DISTWIDTH] IN BLOOD BY AUTOMATED COUNT: 15.2 % (ref 11.5–14.5)
GLUCOSE SERPL-MCNC: 126 MG/DL (ref 74–99)
HCT VFR BLD AUTO: 25.1 % (ref 36–46)
HGB BLD-MCNC: 8.1 G/DL (ref 12–16)
MCH RBC QN AUTO: 30.6 PG (ref 26–34)
MCHC RBC AUTO-ENTMCNC: 32.3 G/DL (ref 32–36)
MCV RBC AUTO: 95 FL (ref 80–100)
NRBC BLD-RTO: 0 /100 WBCS (ref 0–0)
PLATELET # BLD AUTO: 279 X10*3/UL (ref 150–450)
POTASSIUM SERPL-SCNC: 3.6 MMOL/L (ref 3.5–5.3)
RBC # BLD AUTO: 2.65 X10*6/UL (ref 4–5.2)
SODIUM SERPL-SCNC: 131 MMOL/L (ref 136–145)
WBC # BLD AUTO: 8.6 X10*3/UL (ref 4.4–11.3)

## 2024-01-09 PROCEDURE — 37799 UNLISTED PX VASCULAR SURGERY: CPT | Performed by: STUDENT IN AN ORGANIZED HEALTH CARE EDUCATION/TRAINING PROGRAM

## 2024-01-09 PROCEDURE — 1090000001 HH PPS REVENUE CREDIT

## 2024-01-09 PROCEDURE — 2500000001 HC RX 250 WO HCPCS SELF ADMINISTERED DRUGS (ALT 637 FOR MEDICARE OP): Performed by: REGISTERED NURSE

## 2024-01-09 PROCEDURE — 2500000001 HC RX 250 WO HCPCS SELF ADMINISTERED DRUGS (ALT 637 FOR MEDICARE OP): Performed by: SPECIALIST

## 2024-01-09 PROCEDURE — 82374 ASSAY BLOOD CARBON DIOXIDE: CPT | Performed by: STUDENT IN AN ORGANIZED HEALTH CARE EDUCATION/TRAINING PROGRAM

## 2024-01-09 PROCEDURE — 2500000001 HC RX 250 WO HCPCS SELF ADMINISTERED DRUGS (ALT 637 FOR MEDICARE OP): Performed by: ANESTHESIOLOGY

## 2024-01-09 PROCEDURE — 2500000005 HC RX 250 GENERAL PHARMACY W/O HCPCS

## 2024-01-09 PROCEDURE — 1090000002 HH PPS REVENUE DEBIT

## 2024-01-09 PROCEDURE — 97530 THERAPEUTIC ACTIVITIES: CPT | Mod: GP,CQ

## 2024-01-09 PROCEDURE — 2500000001 HC RX 250 WO HCPCS SELF ADMINISTERED DRUGS (ALT 637 FOR MEDICARE OP): Performed by: HOSPITALIST

## 2024-01-09 PROCEDURE — 2500000001 HC RX 250 WO HCPCS SELF ADMINISTERED DRUGS (ALT 637 FOR MEDICARE OP): Performed by: STUDENT IN AN ORGANIZED HEALTH CARE EDUCATION/TRAINING PROGRAM

## 2024-01-09 PROCEDURE — 2500000002 HC RX 250 W HCPCS SELF ADMINISTERED DRUGS (ALT 637 FOR MEDICARE OP, ALT 636 FOR OP/ED): Performed by: HOSPITALIST

## 2024-01-09 PROCEDURE — 85027 COMPLETE CBC AUTOMATED: CPT | Performed by: STUDENT IN AN ORGANIZED HEALTH CARE EDUCATION/TRAINING PROGRAM

## 2024-01-09 PROCEDURE — 1100000001 HC PRIVATE ROOM DAILY

## 2024-01-09 PROCEDURE — 2500000004 HC RX 250 GENERAL PHARMACY W/ HCPCS (ALT 636 FOR OP/ED): Performed by: HOSPITALIST

## 2024-01-09 PROCEDURE — 2500000004 HC RX 250 GENERAL PHARMACY W/ HCPCS (ALT 636 FOR OP/ED): Mod: JZ | Performed by: INTERNAL MEDICINE

## 2024-01-09 PROCEDURE — 99231 SBSQ HOSP IP/OBS SF/LOW 25: CPT | Performed by: INTERNAL MEDICINE

## 2024-01-09 PROCEDURE — 2500000001 HC RX 250 WO HCPCS SELF ADMINISTERED DRUGS (ALT 637 FOR MEDICARE OP)

## 2024-01-09 RX ADMIN — BACLOFEN 5 MG: 10 TABLET ORAL at 12:18

## 2024-01-09 RX ADMIN — BACLOFEN 5 MG: 10 TABLET ORAL at 17:46

## 2024-01-09 RX ADMIN — DOCUSATE SODIUM 100 MG: 100 CAPSULE, LIQUID FILLED ORAL at 20:13

## 2024-01-09 RX ADMIN — FUROSEMIDE 20 MG: 40 TABLET ORAL at 09:34

## 2024-01-09 RX ADMIN — ATENOLOL 50 MG: 50 TABLET ORAL at 09:34

## 2024-01-09 RX ADMIN — Medication 1 TABLET: at 09:34

## 2024-01-09 RX ADMIN — HYDROMORPHONE HYDROCHLORIDE 2 MG: 2 TABLET ORAL at 22:20

## 2024-01-09 RX ADMIN — HYDRALAZINE HYDROCHLORIDE 50 MG: 50 TABLET ORAL at 09:34

## 2024-01-09 RX ADMIN — ACETAMINOPHEN 650 MG: 325 TABLET ORAL at 04:38

## 2024-01-09 RX ADMIN — EZETIMIBE 10 MG: 10 TABLET ORAL at 09:34

## 2024-01-09 RX ADMIN — ISOSORBIDE MONONITRATE 60 MG: 60 TABLET, EXTENDED RELEASE ORAL at 06:51

## 2024-01-09 RX ADMIN — BRIMONIDINE TARTRATE 1 DROP: 2 SOLUTION OPHTHALMIC at 09:38

## 2024-01-09 RX ADMIN — BACLOFEN 5 MG: 10 TABLET ORAL at 06:51

## 2024-01-09 RX ADMIN — PREGABALIN 75 MG: 50 CAPSULE ORAL at 20:14

## 2024-01-09 RX ADMIN — ASPIRIN 81 MG: 81 TABLET, COATED ORAL at 09:34

## 2024-01-09 RX ADMIN — BACLOFEN 5 MG: 10 TABLET ORAL at 20:12

## 2024-01-09 RX ADMIN — PANTOPRAZOLE SODIUM 40 MG: 40 TABLET, DELAYED RELEASE ORAL at 06:51

## 2024-01-09 RX ADMIN — Medication 3 MG: at 20:12

## 2024-01-09 RX ADMIN — MORPHINE SULFATE 15 MG: 15 TABLET, EXTENDED RELEASE ORAL at 09:34

## 2024-01-09 RX ADMIN — ACETAMINOPHEN 650 MG: 325 TABLET ORAL at 17:46

## 2024-01-09 RX ADMIN — BUSPIRONE HYDROCHLORIDE 10 MG: 5 TABLET ORAL at 09:34

## 2024-01-09 RX ADMIN — OXYCODONE HYDROCHLORIDE 10 MG: 5 TABLET ORAL at 13:17

## 2024-01-09 RX ADMIN — DAPTOMYCIN 500 MG: 500 INJECTION, POWDER, LYOPHILIZED, FOR SOLUTION INTRAVENOUS at 20:15

## 2024-01-09 RX ADMIN — MORPHINE SULFATE 15 MG: 15 TABLET, EXTENDED RELEASE ORAL at 20:14

## 2024-01-09 RX ADMIN — PILOCARPINE HYDROCHLORIDE 5 MG: 5 TABLET, FILM COATED ORAL at 09:38

## 2024-01-09 RX ADMIN — PREGABALIN 75 MG: 50 CAPSULE ORAL at 09:34

## 2024-01-09 RX ADMIN — HYDRALAZINE HYDROCHLORIDE 50 MG: 50 TABLET ORAL at 20:13

## 2024-01-09 RX ADMIN — ACETAMINOPHEN 650 MG: 325 TABLET ORAL at 22:20

## 2024-01-09 RX ADMIN — ACETAMINOPHEN 650 MG: 325 TABLET ORAL at 09:34

## 2024-01-09 RX ADMIN — LIDOCAINE 1 PATCH: 4 PATCH TOPICAL at 09:36

## 2024-01-09 RX ADMIN — BRIMONIDINE TARTRATE 1 DROP: 2 SOLUTION OPHTHALMIC at 20:15

## 2024-01-09 RX ADMIN — MIRTAZAPINE 15 MG: 15 TABLET, FILM COATED ORAL at 20:14

## 2024-01-09 ASSESSMENT — COGNITIVE AND FUNCTIONAL STATUS - GENERAL
STANDING UP FROM CHAIR USING ARMS: A LOT
MOVING TO AND FROM BED TO CHAIR: A LITTLE
MOBILITY SCORE: 16
CLIMB 3 TO 5 STEPS WITH RAILING: A LITTLE
WALKING IN HOSPITAL ROOM: A LITTLE
STANDING UP FROM CHAIR USING ARMS: A LOT
HELP NEEDED FOR BATHING: A LITTLE
TURNING FROM BACK TO SIDE WHILE IN FLAT BAD: A LOT
PERSONAL GROOMING: A LITTLE
TURNING FROM BACK TO SIDE WHILE IN FLAT BAD: A LOT
TOILETING: A LITTLE
DRESSING REGULAR LOWER BODY CLOTHING: A LITTLE
WALKING IN HOSPITAL ROOM: A LITTLE
CLIMB 3 TO 5 STEPS WITH RAILING: TOTAL
MOVING FROM LYING ON BACK TO SITTING ON SIDE OF FLAT BED WITH BEDRAILS: A LITTLE
DRESSING REGULAR UPPER BODY CLOTHING: A LITTLE
MOVING FROM LYING ON BACK TO SITTING ON SIDE OF FLAT BED WITH BEDRAILS: A LITTLE
DAILY ACTIVITIY SCORE: 19
MOVING TO AND FROM BED TO CHAIR: A LITTLE
MOBILITY SCORE: 14

## 2024-01-09 ASSESSMENT — PAIN - FUNCTIONAL ASSESSMENT
PAIN_FUNCTIONAL_ASSESSMENT: 0-10

## 2024-01-09 ASSESSMENT — PAIN SCALES - GENERAL
PAINLEVEL_OUTOF10: 1
PAINLEVEL_OUTOF10: 10 - WORST POSSIBLE PAIN
PAINLEVEL_OUTOF10: 8
PAINLEVEL_OUTOF10: 7
PAINLEVEL_OUTOF10: 8
PAINLEVEL_OUTOF10: 8

## 2024-01-09 ASSESSMENT — PAIN DESCRIPTION - LOCATION
LOCATION: BACK
LOCATION: BACK

## 2024-01-09 NOTE — PROGRESS NOTES
DAILY PROGRESS NOTE      POD8 Lumbar washout and hardware removal     Patient doing well  Visit Vitals  /57   Pulse 74   Temp 36.1 °C (97 °F)   Resp 18      Temp (24hrs), Av.9 °C (98.5 °F), Min:36.1 °C (97 °F), Max:37.5 °C (99.5 °F)       Pain Control significant improvement. Reports very minimal lower extremity pain at this time.   No chest pain or shortness of breath.  No calf pain    Exam:   Good bilateral lower extremity strength and sensation.   Extremity shows neuro vascular status intact. Flexion and extension intact on extremity.  Calves soft and non-tender without evidence of DVT.        Labs reviewed:  CBC: @LABRCNT(WBC:3,HGB:3,PLT:3)@  BMP:  @LABRCNT(Na:3,K:3,CL:3,CO2:3,BUN:3,Creatinine:3,Glucose:3)@    I&O  I/O last 3 completed shifts:  In: 1600 (20.5 mL/kg) [P.O.:1480; IV Piggyback:120]  Out: 3026 (38.7 mL/kg) [Urine:2350 (0.8 mL/kg/hr); Drains:676]  Weight: 78.2 kg       Assessment:    Patient seen this morning sitting up in chart with lumbar brace on. She reports significant improvement in her lower extremity pain and just reports some generalized weakness to lower extremities contributing this to decreased mobility.   JOSE drains are holding suction and working appropriately. Deep drain with 20cc serousang drainage and superficial drain with 5cc serosang drainage.     Plan:    Continue with therapy   Brace when OOB and with ambulation   JOSE drains to remain in place until there is 0 output for 48 hours   Continue IV antibiotics per ID  Continue pain control per pain management   Cannot shower until drainage has stopped     Edi Olguin, APRN-CNP   2024 10:28 AM

## 2024-01-09 NOTE — PROGRESS NOTES
Physical Therapy    Physical Therapy Treatment    Patient Name: Tara Tierney  MRN: [id number]  Today's Date: 1/9/2024  Time Calculation  Start Time: 0926  Stop Time: 0950  Time Calculation (min): 24 min       Assessment/Plan   PT Assessment  PT Assessment Results: Decreased strength, Decreased mobility, Decreased endurance  Rehab Prognosis: Good  Evaluation/Treatment Tolerance: Patient tolerated treatment well, Patient limited by pain, Patient limited by fatigue  Medical Staff Made Aware: Yes  End of Session Communication: Bedside nurse  Assessment Comment: Patient will benefit from additional PT to increase mobility and activity tolerance.  End of Session Patient Position: Up in chair, Alarm off, not on at start of session (Nursing in to give patient morning meds; Call light, phone, and tray table within reach.)  PT Plan  Inpatient/Swing Bed or Outpatient: Inpatient  Treatment/Interventions: Bed mobility, Transfer training, Gait training, Therapeutic activity  PT Plan: Skilled PT  PT Frequency: 3 times per week       PT Recommended Transfer Status: Assist x1    General Visit Information:   PT  Visit  PT Received On: 01/09/24  General  Family/Caregiver Present: No  Prior to Session Communication: Bedside nurse  Patient Position Received: Bed, 3 rail up, Alarm off, not on at start of session  General Comment: Patient states (R)LE feels like it wants to give out with WB activities. Nursing aware.    General Observations:   General Observation: x2 JOSE drains; Tele; Purewick(removed for mobility).    Subjective     Precautions:  Precautions  LE Weight Bearing Status: Weight Bearing as Tolerated  Medical Precautions: Fall precautions  Post-Surgical Precautions: Spinal precautions  Braces Applied: TLSO when OOB  Precautions Comment: Instructions/(A) for donning brace for OOB activites.    Objective     Pain:  Pain Assessment  Pain Assessment: 0-10  Pain Score: 7  Pain Location: Back  Pain Orientation: Lower  Pain  Radiating Towards: (R)LE(thigh). Nursing aware.  Pain Descriptors: Dull, Aching    Cognition:  Cognition  Orientation Level: Oriented X4    Treatments:           Bed Mobility  Bed Mobility: Yes  Bed Mobility 1  Bed Mobility 1: Supine to sitting  Level of Assistance 1: Minimum assistance  Bed Mobility Comments 1: HOB ~40degrees. v/c for log roll technique. Patient using bed rail well for support. (A) to lift UB from HOB while moving LEs over the EOB. (A) and instructions for donning TLSO brace.  Ambulation/Gait Training  Ambulation/Gait Training Performed: Yes  Ambulation/Gait Training 1  Surface 1: Level tile  Device 1: Rolling walker  Assistance 1: Contact guard  Comments/Distance (ft) 1: ~15ft x1. NBOS. Slow cathi. Step through gait pattern. Decreased step height/length B. c/o (R)LE feeling weak with WB, but no buckling noted. Limited amb distance(pain).  Transfers  Transfer: Yes  Transfer 1  Technique 1: Sit to stand, Stand to sit  Transfer Device 1:  (ww)  Transfer Level of Assistance 1: Moderate assistance, Minimum assistance  Trials/Comments 1: (3x). Mod(A) sit to stand and Min(A) stand to sit. v/c for safe hand placement and technique. Slow transition of hands to/from ww. Benefits from elevated surfaces.          Outcome Measures:  [facility] Basic Mobility  Turning from your back to your side while in a flat bed without using bedrails: A little  Moving from lying on your back to sitting on the side of a flat bed without using bedrails: A lot  Moving to and from bed to chair (including a wheelchair): A little  Standing up from a chair using your arms (e.g. wheelchair or bedside chair): A lot  To walk in hospital room: A little  Climbing 3-5 steps with railing: A little  Basic Mobility - Total Score: 16    Education Documentation  Precautions, taught by Jeanine Bryan PTA at 1/9/2024 12:51 PM.  Learner: Patient  Readiness: Acceptance  Method: Explanation, Demonstration  Response: Verbalizes Understanding, Needs  Reinforcement  Comment: See therapy note.    Body Mechanics, taught by Jeanine Bryan PTA at 1/9/2024 12:51 PM.  Learner: Patient  Readiness: Acceptance  Method: Explanation, Demonstration  Response: Verbalizes Understanding, Needs Reinforcement  Comment: See therapy note.    Mobility Training, taught by Jeanine Bryan PTA at 1/9/2024 12:51 PM.  Learner: Patient  Readiness: Acceptance  Method: Explanation, Demonstration  Response: Verbalizes Understanding, Needs Reinforcement  Comment: See therapy note.    EDUCATION:  Individual(s) Educated: Patient  Education Provided: Body Mechanics, Fall Risk, Home Safety, POC, Post-Op Precautions, Posture (Safety with bed mobility(log roll technique); Safety with transfers/amb using ww properly for support; Donning TLSO brace for OOB activities.)  Patient Response to Education: Patient/Caregiver Verbalized Understanding of Information    Encounter Problems       Encounter Problems (Active)       PT Problem       Bed mobility supine <> sit modified independent  (Progressing)       Start:  01/02/24    Expected End:  01/22/24            Transfers sit <> stand with ww modified independent  (Progressing)       Start:  01/02/24    Expected End:  01/22/24            Patient to ambulate with ww 50' CGA  (Progressing)       Start:  01/02/24    Expected End:  01/22/24

## 2024-01-09 NOTE — PROGRESS NOTES
01/09/24 1442   Discharge Planning   Home or Post Acute Services In home services   Type of Home Care Services Home nursing visits   Patient expects to be discharged to: Home with [facility] IV antibiotics     Revisited with pt today regarding DC planning, pt states does not want to go to rehab facility, prefers home with [facility] for infusions. ADOD is unknown at present time. CT team will continue to monitor case for progression and DC planning.

## 2024-01-09 NOTE — PROGRESS NOTES
"Tara Tierney is a 68 y.o. female on day 9 of admission presenting with Postoperative surgical complication involving musculoskeletal system associated with musculoskeletal procedure, unspecified complication.    Subjective   Patient seen and examined. Feels her back pain has worsened acutely again today, feels it is due to overworking with pt/ot yesterday.        Objective     Physical Exam  Appearance: She is not toxic-appearing.   Eyes:      Extraocular Movements: Extraocular movements intact.      Conjunctiva/sclera: Conjunctivae normal.   Cardiovascular:      Rate and Rhythm: Normal rate and regular rhythm.   Pulmonary:      Effort: Pulmonary effort is normal.      Breath sounds: No wheezing.   Abdominal:      General: There is no distension.      Palpations: Abdomen is soft.   Neurological:      Mental Status: She is alert. Mental status is at baseline.   Last Recorded Vitals  Blood pressure 123/57, pulse 74, temperature 36.1 °C (97 °F), resp. rate 18, height 1.448 m (4' 9.01\"), weight 78.2 kg (172 lb 6.4 oz), SpO2 92 %, not currently breastfeeding.  Intake/Output last 3 Shifts:  I/O last 3 completed shifts:  In: 1600 (20.5 mL/kg) [P.O.:1480; IV Piggyback:120]  Out: 3026 (38.7 mL/kg) [Urine:2350 (0.8 mL/kg/hr); Drains:676]  Weight: 78.2 kg     Relevant Results              This patient has a central line   Reason for the central line remaining today? Line unnecessary, will be removed today                 Assessment/Plan   Principal Problem:    Postoperative surgical complication involving musculoskeletal system associated with musculoskeletal procedure, unspecified complication  Active Problems:    Essential hypertension    PVD (peripheral vascular disease) (CMS/HCC)    Back pain with radiculopathy    Back pain at L4-L5 level    Deep vein thrombosis (DVT) of right lower extremity (CMS/HCC)    Anemia    Continue IV Cubicin, wound cultures grew MRSA/staph  Continues to have pain, due to discitis I believe " the pain will slowly get better  Do not think patient would be able to take care of herself at home with ongoing pain and difficulty ambulation  She will benefit from rehab/SNF placement  Drain removal as per orthopedics  Continue current pain management  Continue PT OT  Leukocytosis has resolved  CRP is still elevated  ID is following  Keep her off statin till the duration of cubicin  Continue home meds for hypertension  She is awaiting pre-CERT to be discharged to an acute rehab    1/9/24  - back pain slightly worse today  - stable mild hyponatremia, monitor for now, consider further galeano if worsening  - anemia, Hgb 8.1 today, still having serosang drain output  - currently on IV daptomycin for MRSA, ID following, will need prolonged course  - JOSE drains per ortho  - neurology following, they added baclofen this admission  - spoke with ortho, do not want snf or acute rehab discharge, will plan to transfer to ortho service today with medicine on consult             Caleb Olivo MD

## 2024-01-10 LAB
ANION GAP SERPL CALC-SCNC: 11 MMOL/L (ref 10–20)
ATRIAL RATE: 86 BPM
BUN SERPL-MCNC: 15 MG/DL (ref 6–23)
CALCIUM SERPL-MCNC: 9.2 MG/DL (ref 8.6–10.3)
CHLORIDE SERPL-SCNC: 97 MMOL/L (ref 98–107)
CO2 SERPL-SCNC: 26 MMOL/L (ref 21–32)
CREAT SERPL-MCNC: 0.8 MG/DL (ref 0.5–1.05)
CRP SERPL HS-MCNC: >80 MG/L
EGFRCR SERPLBLD CKD-EPI 2021: 79 ML/MIN/1.73M*2
ERYTHROCYTE [DISTWIDTH] IN BLOOD BY AUTOMATED COUNT: 15 % (ref 11.5–14.5)
ERYTHROCYTE [SEDIMENTATION RATE] IN BLOOD BY WESTERGREN METHOD: 36 MM/H (ref 0–30)
GLUCOSE SERPL-MCNC: 141 MG/DL (ref 74–99)
HCT VFR BLD AUTO: 26.5 % (ref 36–46)
HGB BLD-MCNC: 8.5 G/DL (ref 12–16)
HOLD SPECIMEN: NORMAL
MCH RBC QN AUTO: 30.7 PG (ref 26–34)
MCHC RBC AUTO-ENTMCNC: 32.1 G/DL (ref 32–36)
MCV RBC AUTO: 96 FL (ref 80–100)
NRBC BLD-RTO: 0 /100 WBCS (ref 0–0)
P AXIS: 65 DEGREES
P OFFSET: 201 MS
P ONSET: 165 MS
PLATELET # BLD AUTO: 288 X10*3/UL (ref 150–450)
POTASSIUM SERPL-SCNC: 4.1 MMOL/L (ref 3.5–5.3)
PR INTERVAL: 92 MS
Q ONSET: 211 MS
QRS COUNT: 15 BEATS
QRS DURATION: 138 MS
QT INTERVAL: 400 MS
QTC CALCULATION(BAZETT): 478 MS
QTC FREDERICIA: 451 MS
R AXIS: 10 DEGREES
RBC # BLD AUTO: 2.77 X10*6/UL (ref 4–5.2)
SODIUM SERPL-SCNC: 130 MMOL/L (ref 136–145)
T AXIS: 69 DEGREES
T OFFSET: 411 MS
VENTRICULAR RATE: 86 BPM
WBC # BLD AUTO: 9.3 X10*3/UL (ref 4.4–11.3)

## 2024-01-10 PROCEDURE — 2500000001 HC RX 250 WO HCPCS SELF ADMINISTERED DRUGS (ALT 637 FOR MEDICARE OP): Performed by: SPECIALIST

## 2024-01-10 PROCEDURE — 2500000001 HC RX 250 WO HCPCS SELF ADMINISTERED DRUGS (ALT 637 FOR MEDICARE OP): Performed by: HOSPITALIST

## 2024-01-10 PROCEDURE — 37799 UNLISTED PX VASCULAR SURGERY: CPT | Performed by: REGISTERED NURSE

## 2024-01-10 PROCEDURE — 2500000004 HC RX 250 GENERAL PHARMACY W/ HCPCS (ALT 636 FOR OP/ED): Performed by: HOSPITALIST

## 2024-01-10 PROCEDURE — 2500000001 HC RX 250 WO HCPCS SELF ADMINISTERED DRUGS (ALT 637 FOR MEDICARE OP): Performed by: REGISTERED NURSE

## 2024-01-10 PROCEDURE — 80048 BASIC METABOLIC PNL TOTAL CA: CPT | Performed by: REGISTERED NURSE

## 2024-01-10 PROCEDURE — 2500000005 HC RX 250 GENERAL PHARMACY W/O HCPCS

## 2024-01-10 PROCEDURE — 97530 THERAPEUTIC ACTIVITIES: CPT | Mod: GO,CO

## 2024-01-10 PROCEDURE — 99232 SBSQ HOSP IP/OBS MODERATE 35: CPT | Performed by: REGISTERED NURSE

## 2024-01-10 PROCEDURE — 85027 COMPLETE CBC AUTOMATED: CPT | Performed by: REGISTERED NURSE

## 2024-01-10 PROCEDURE — 2500000001 HC RX 250 WO HCPCS SELF ADMINISTERED DRUGS (ALT 637 FOR MEDICARE OP): Performed by: ANESTHESIOLOGY

## 2024-01-10 PROCEDURE — 1090000002 HH PPS REVENUE DEBIT

## 2024-01-10 PROCEDURE — 1100000001 HC PRIVATE ROOM DAILY

## 2024-01-10 PROCEDURE — 2500000002 HC RX 250 W HCPCS SELF ADMINISTERED DRUGS (ALT 637 FOR MEDICARE OP, ALT 636 FOR OP/ED): Performed by: HOSPITALIST

## 2024-01-10 PROCEDURE — 85652 RBC SED RATE AUTOMATED: CPT | Performed by: INTERNAL MEDICINE

## 2024-01-10 PROCEDURE — 86141 C-REACTIVE PROTEIN HS: CPT | Mod: ELYLAB | Performed by: INTERNAL MEDICINE

## 2024-01-10 PROCEDURE — 97112 NEUROMUSCULAR REEDUCATION: CPT | Mod: GO,CO

## 2024-01-10 PROCEDURE — 2500000005 HC RX 250 GENERAL PHARMACY W/O HCPCS: Performed by: SPECIALIST

## 2024-01-10 PROCEDURE — 2500000001 HC RX 250 WO HCPCS SELF ADMINISTERED DRUGS (ALT 637 FOR MEDICARE OP): Performed by: STUDENT IN AN ORGANIZED HEALTH CARE EDUCATION/TRAINING PROGRAM

## 2024-01-10 PROCEDURE — 2500000001 HC RX 250 WO HCPCS SELF ADMINISTERED DRUGS (ALT 637 FOR MEDICARE OP)

## 2024-01-10 PROCEDURE — 2500000004 HC RX 250 GENERAL PHARMACY W/ HCPCS (ALT 636 FOR OP/ED): Mod: JZ | Performed by: INTERNAL MEDICINE

## 2024-01-10 PROCEDURE — 1090000001 HH PPS REVENUE CREDIT

## 2024-01-10 RX ORDER — LIDOCAINE 560 MG/1
1 PATCH PERCUTANEOUS; TOPICAL; TRANSDERMAL DAILY
Status: DISCONTINUED | OUTPATIENT
Start: 2024-01-10 | End: 2024-01-18 | Stop reason: HOSPADM

## 2024-01-10 RX ADMIN — HYDRALAZINE HYDROCHLORIDE 50 MG: 50 TABLET ORAL at 20:32

## 2024-01-10 RX ADMIN — PILOCARPINE HYDROCHLORIDE 5 MG: 5 TABLET, FILM COATED ORAL at 09:11

## 2024-01-10 RX ADMIN — FUROSEMIDE 20 MG: 40 TABLET ORAL at 09:11

## 2024-01-10 RX ADMIN — EZETIMIBE 10 MG: 10 TABLET ORAL at 09:10

## 2024-01-10 RX ADMIN — OXYCODONE HYDROCHLORIDE 10 MG: 5 TABLET ORAL at 13:55

## 2024-01-10 RX ADMIN — BUSPIRONE HYDROCHLORIDE 10 MG: 5 TABLET ORAL at 09:10

## 2024-01-10 RX ADMIN — LIDOCAINE 1 PATCH: 4 PATCH TOPICAL at 09:10

## 2024-01-10 RX ADMIN — DOCUSATE SODIUM 100 MG: 100 CAPSULE, LIQUID FILLED ORAL at 09:10

## 2024-01-10 RX ADMIN — PREGABALIN 75 MG: 50 CAPSULE ORAL at 20:31

## 2024-01-10 RX ADMIN — BRIMONIDINE TARTRATE 1 DROP: 2 SOLUTION OPHTHALMIC at 09:30

## 2024-01-10 RX ADMIN — ASPIRIN 81 MG: 81 TABLET, COATED ORAL at 09:11

## 2024-01-10 RX ADMIN — ACETAMINOPHEN 650 MG: 325 TABLET ORAL at 10:59

## 2024-01-10 RX ADMIN — DAPTOMYCIN 500 MG: 500 INJECTION, POWDER, LYOPHILIZED, FOR SOLUTION INTRAVENOUS at 20:38

## 2024-01-10 RX ADMIN — ACETAMINOPHEN 650 MG: 325 TABLET ORAL at 21:40

## 2024-01-10 RX ADMIN — CYCLOBENZAPRINE HYDROCHLORIDE 10 MG: 10 TABLET, FILM COATED ORAL at 12:24

## 2024-01-10 RX ADMIN — Medication 1 TABLET: at 09:10

## 2024-01-10 RX ADMIN — HYDRALAZINE HYDROCHLORIDE 50 MG: 50 TABLET ORAL at 09:10

## 2024-01-10 RX ADMIN — PREGABALIN 75 MG: 50 CAPSULE ORAL at 09:11

## 2024-01-10 RX ADMIN — LIDOCAINE 1 PATCH: 4 PATCH TOPICAL at 21:42

## 2024-01-10 RX ADMIN — ACETAMINOPHEN 650 MG: 325 TABLET ORAL at 04:47

## 2024-01-10 RX ADMIN — HYDROMORPHONE HYDROCHLORIDE 2 MG: 2 TABLET ORAL at 10:59

## 2024-01-10 RX ADMIN — Medication 3 MG: at 20:30

## 2024-01-10 RX ADMIN — MORPHINE SULFATE 15 MG: 15 TABLET, EXTENDED RELEASE ORAL at 09:11

## 2024-01-10 RX ADMIN — ISOSORBIDE MONONITRATE 60 MG: 60 TABLET, EXTENDED RELEASE ORAL at 06:26

## 2024-01-10 RX ADMIN — BACLOFEN 5 MG: 10 TABLET ORAL at 20:31

## 2024-01-10 RX ADMIN — BACLOFEN 5 MG: 10 TABLET ORAL at 06:27

## 2024-01-10 RX ADMIN — ACETAMINOPHEN 650 MG: 325 TABLET ORAL at 18:10

## 2024-01-10 RX ADMIN — DOCUSATE SODIUM 100 MG: 100 CAPSULE, LIQUID FILLED ORAL at 20:32

## 2024-01-10 RX ADMIN — ATENOLOL 50 MG: 50 TABLET ORAL at 09:18

## 2024-01-10 RX ADMIN — PANTOPRAZOLE SODIUM 40 MG: 40 TABLET, DELAYED RELEASE ORAL at 06:26

## 2024-01-10 RX ADMIN — BACLOFEN 5 MG: 10 TABLET ORAL at 18:10

## 2024-01-10 RX ADMIN — BRIMONIDINE TARTRATE 1 DROP: 2 SOLUTION OPHTHALMIC at 20:33

## 2024-01-10 RX ADMIN — MORPHINE SULFATE 15 MG: 15 TABLET, EXTENDED RELEASE ORAL at 20:32

## 2024-01-10 RX ADMIN — MEDPURA VITAMIN A AND D 1 APPLICATION: 95 OINTMENT TOPICAL at 21:55

## 2024-01-10 RX ADMIN — BACLOFEN 5 MG: 10 TABLET ORAL at 12:24

## 2024-01-10 RX ADMIN — MIRTAZAPINE 15 MG: 15 TABLET, FILM COATED ORAL at 20:31

## 2024-01-10 ASSESSMENT — PAIN SCALES - GENERAL
PAINLEVEL_OUTOF10: 10 - WORST POSSIBLE PAIN
PAINLEVEL_OUTOF10: 2
PAINLEVEL_OUTOF10: 0 - NO PAIN
PAINLEVEL_OUTOF10: 10 - WORST POSSIBLE PAIN
PAINLEVEL_OUTOF10: 6
PAINLEVEL_OUTOF10: 8
PAINLEVEL_OUTOF10: 6
PAINLEVEL_OUTOF10: 9

## 2024-01-10 ASSESSMENT — COGNITIVE AND FUNCTIONAL STATUS - GENERAL
TURNING FROM BACK TO SIDE WHILE IN FLAT BAD: A LOT
DRESSING REGULAR UPPER BODY CLOTHING: A LITTLE
TOILETING: A LOT
HELP NEEDED FOR BATHING: A LOT
TOILETING: A LITTLE
DAILY ACTIVITIY SCORE: 15
EATING MEALS: A LITTLE
EATING MEALS: A LITTLE
WALKING IN HOSPITAL ROOM: A LITTLE
PERSONAL GROOMING: A LITTLE
WALKING IN HOSPITAL ROOM: A LITTLE
DRESSING REGULAR LOWER BODY CLOTHING: A LOT
MOVING FROM LYING ON BACK TO SITTING ON SIDE OF FLAT BED WITH BEDRAILS: A LITTLE
DAILY ACTIVITIY SCORE: 15
DRESSING REGULAR LOWER BODY CLOTHING: A LITTLE
DRESSING REGULAR UPPER BODY CLOTHING: A LITTLE
CLIMB 3 TO 5 STEPS WITH RAILING: TOTAL
MOVING TO AND FROM BED TO CHAIR: A LITTLE
HELP NEEDED FOR BATHING: A LITTLE
MOBILITY SCORE: 14
STANDING UP FROM CHAIR USING ARMS: A LOT
STANDING UP FROM CHAIR USING ARMS: A LOT
CLIMB 3 TO 5 STEPS WITH RAILING: TOTAL
DRESSING REGULAR UPPER BODY CLOTHING: A LITTLE
PERSONAL GROOMING: A LITTLE
PERSONAL GROOMING: A LITTLE
MOBILITY SCORE: 14
MOVING FROM LYING ON BACK TO SITTING ON SIDE OF FLAT BED WITH BEDRAILS: A LITTLE
DRESSING REGULAR LOWER BODY CLOTHING: A LOT
HELP NEEDED FOR BATHING: A LOT
TOILETING: A LOT
MOVING TO AND FROM BED TO CHAIR: A LITTLE
DAILY ACTIVITIY SCORE: 19
TURNING FROM BACK TO SIDE WHILE IN FLAT BAD: A LOT

## 2024-01-10 ASSESSMENT — PAIN - FUNCTIONAL ASSESSMENT
PAIN_FUNCTIONAL_ASSESSMENT: 0-10

## 2024-01-10 ASSESSMENT — PAIN DESCRIPTION - LOCATION
LOCATION: BACK

## 2024-01-10 ASSESSMENT — PAIN DESCRIPTION - DESCRIPTORS
DESCRIPTORS: SHARP
DESCRIPTORS: SHARP;SHOOTING;SQUEEZING;STABBING

## 2024-01-10 ASSESSMENT — PAIN DESCRIPTION - ORIENTATION: ORIENTATION: LOWER

## 2024-01-10 NOTE — PROGRESS NOTES
Tara Tierney is a 68 y.o. female on day 10 of admission presenting with Postoperative surgical complication involving musculoskeletal system associated with musculoskeletal procedure, unspecified complication.      Subjective   Patient examined and seen. Alert and oriented x3, resting in chair.  Patient denies chest pain, shortness of breath, palpitations, abdominal pain, fever or chills.  She does complain of significant back pain that is tolerable with pain medications. She states she is able to walk to the bathroom without dyspnea or considerable pain.        Objective     Last Recorded Vitals  /61   Pulse 78   Temp 36.8 °C (98.2 °F)   Resp 23   Wt 78.2 kg (172 lb 6.4 oz)   SpO2 93%   Intake/Output last 3 Shifts:    Intake/Output Summary (Last 24 hours) at 1/10/2024 1203  Last data filed at 1/10/2024 1000  Gross per 24 hour   Intake 410 ml   Output 2111 ml   Net -1701 ml       Admission Weight  Weight: 77.1 kg (170 lb) (12/30/23 1908)    Daily Weight  12/31/23 : 78.2 kg (172 lb 6.4 oz)    Image Results  MR lumbar spine wo IV contrast  Narrative: Interpreted By:  Marco Valles,   STUDY:  MR LUMBAR SPINE WO IV CONTRAST;  1/5/2024 8:48 pm      INDICATION:  Signs/Symptoms:intractable back pain s/o incision and drainage with  lumbar spine hardware removal.      COMPARISON:  Lumbar MRI dated 12/30/2022 and 12/10/2022. CT abdomen pelvis dated  12/31/2022.      ACCESSION NUMBER(S):  [id number]      ORDERING CLINICIAN:  ERICK KEN      TECHNIQUE:  Sagittal T2, T1, and STIR and axial T1, T2 sequences of the lumbar  spine. No intravenous contrast was administered.      FINDINGS:      Lumbar spine:  Normal lumbar lordosis. The last well-formed disc is designated  L5-S1. Minimal L5 over S1 anterolisthesis unchanged from prior. There  is minimal L3 over L4 retrolisthesis unchanged from prior. There has  been interval removal of the bilateral transpedicular screws and  vertical rods at L4 through S1 with  T2 hyperintense ghost tracts  noted. There are unchanged disc spacers at L4-L5 and L5-S1. There is  a subcutaneous drain in place extending cranially along the midline  to the level of L3 without associated fluid collection. This  surrounded by subcutaneous edema. There is also a right deep  paravertebral drain in place terminating in the right deep  paravertebral soft tissues at the level of L1-L2 without associated  fluid collection, sagittal image 11 of 25. There is bilateral  posterior paravertebral muscle edema without evidence of fluid  collection.      Vertebral body heights are maintained. 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 There is a T2 hyperintense  fluid collection within the left subarticular ventral epidural space  extending cranially at the level of L3 and which appears to be in  continuity with the L3-L4 disc space measuring 2.0 cm craniocaudally  and 1.0 x 0.7 cm axially, unchanged from the recent prior. This  contributes to mild effacement of the left subarticular space.              The conus medullaris terminates at L1 and is normal in appearance.      T12-L1: No significant disc bulge or herniation.  L1-L2: Is a diffuse disc bulge and facet hypertrophy contributing to  mild bilateral foraminal stenosis without significant central canal  stenosis. L2-L3: Small disc bulge and facet hypertrophy without  significant central or foraminal canal stenosis. L3-L4: There is  posterior laminectomy change without evidence of central canal  stenosis. There is mild retrolisthesis of L3 over L4 as well as T2  hyperintense fluid collection extending superiorly from the left  subarticular space and contributing to mild left subarticular  stenosis as well as mild central canal stenosis at the level of L3  similar to prior. There is suspected moderate to severe right  foraminal stenosis due to mild retrolisthesis with  facet hypertrophy  as well as bone marrow edema at the right synovial facets, for  example sagittal image 9 of 25. There is also mild edema within the  right psoas muscle at the level of L3-L4, axial image 4 of 27 without  evidence of a fluid collection. There are bilateral right greater  than left facet effusions at L3-L4 and possibility of septic  arthritis can not be excluded. There is mild to moderate left  foraminal stenosis. L4-L5: There are right-sided facetectomy changes  with with hazy fluid within the surgical bed which may be expected.  Posterior laminectomy change. There is mild bilateral foraminal  narrowing. No central canal stenosis. L5-S1: There are right-sided  facetectomy changes with hazy fluid within the surgical bed, which  may be expected. Posterior laminectomy change. Mild bilateral  foraminal narrowing. No central canal stenosis.      Impression: Interval removal of bilateral transpedicular screws and vertical rods  at L3 through S1 and interval resolution of posterior paravertebral  fluid collections. Lumbar drains within the subcutaneous and deep  posterior paravertebral soft tissues as described above with  associated soft tissue edema, however no evidence of surrounding  fluid collection.      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 There is minimal L3 over L4  retrolisthesis as well as facet hypertrophy, which contributes to  moderate to severe right foraminal stenosis at L3-L4. There is also  asymmetric right-sided facet effusion and right subchondral edema at  L3-L4 facets and asymmetric right psoas edema at L3-L4 concerning for  septic arthritis.      There is unchanged ventral epidural collection on the left at the  level of L3 which may be communicating with the L3-L4 disc. The  sterility of this collection can not be determined given multiple  prior surgeries. It contributes to unchanged mild central  canal  stenosis and left subarticular stenosis at L3-L4.      Signed by: Marco Valles 1/6/2024 12:46 AM  Dictation workstation:   [id number]      Physical Exam  Constitutional: elderly appearing, awake/alert/oriented x3, cooperative  Respiratory/Thorax: Patent airways,  normal breath sounds   Cardiovascular: Regular, rate and rhythm, 2+ equal pulses of the extremities, Musculoskeletal: mild decrease range of motion lumbar spline with JOSE drain with serosanguineous drainage,   Skin: warm, dry, intact except as noted  Neurological: alert/oriented x 3, speech clear at baseline      Relevant Results  Scheduled medications  acetaminophen, 650 mg, oral, q6h  aspirin, 81 mg, oral, Daily  atenolol, 50 mg, oral, q AM  baclofen, 5 mg, oral, 4x daily  brimonidine, 1 drop, Both Eyes, BID  busPIRone, 10 mg, oral, Daily  daptomycin, 500 mg, intravenous, Nightly  docusate sodium, 100 mg, oral, BID  ezetimibe, 10 mg, oral, Daily  furosemide, 20 mg, oral, Daily  hydrALAZINE, 50 mg, oral, BID  isosorbide mononitrate ER, 60 mg, oral, Daily  lactobacillus acidophilus, 1 tablet, oral, Daily  lidocaine, 1 patch, transdermal, Daily  melatonin, 3 mg, oral, Daily  mirtazapine, 15 mg, oral, Nightly  morphine CR, 15 mg, oral, q12h EDE  pantoprazole, 40 mg, oral, Daily before breakfast  pilocarpine, 5 mg, oral, Daily  polyethylene glycol, 17 g, oral, Daily  pregabalin, 75 mg, oral, BID      Continuous medications     PRN medications  PRN medications: acetaminophen **OR** acetaminophen **OR** acetaminophen, alteplase, cyclobenzaprine, HYDROmorphone, meclizine, morphine, naloxone, ondansetron **OR** ondansetron, oxyCODONE, vitamin A & D    No results found for this or any previous visit (from the past 24 hour(s)).    MR lumbar spine wo IV contrast    Result Date: 1/6/2024  Interpreted By:  Marco Valles, STUDY: MR LUMBAR SPINE WO IV CONTRAST;  1/5/2024 8:48 pm   INDICATION: Signs/Symptoms:intractable back pain s/o incision and drainage with  lumbar spine hardware removal.   COMPARISON: Lumbar MRI dated 12/30/2022 and 12/10/2022. CT abdomen pelvis dated 12/31/2022.   ACCESSION NUMBER(S): [id number]   ORDERING CLINICIAN: ERICK KEN   TECHNIQUE: Sagittal T2, T1, and STIR and axial T1, T2 sequences of the lumbar spine. No intravenous contrast was administered.   FINDINGS:   Lumbar spine: Normal lumbar lordosis. The last well-formed disc is designated L5-S1. Minimal L5 over S1 anterolisthesis unchanged from prior. There is minimal L3 over L4 retrolisthesis unchanged from prior. There has been interval removal of the bilateral transpedicular screws and vertical rods at L4 through S1 with T2 hyperintense ghost tracts noted. There are unchanged disc spacers at L4-L5 and L5-S1. There is a subcutaneous drain in place extending cranially along the midline to the level of L3 without associated fluid collection. This surrounded by subcutaneous edema. There is also a right deep paravertebral drain in place terminating in the right deep paravertebral soft tissues at the level of L1-L2 without associated fluid collection, sagittal image 11 of 25. There is bilateral posterior paravertebral muscle edema without evidence of fluid collection.   Vertebral body heights are maintained. There is extensive bone marrow edema and intra discal edema at L3-L4 new from December 10th concerning for discitis osteomyelitis. There is a T2 hyperintense fluid collection within the left subarticular ventral epidural space extending cranially at the level of L3 and which appears to be in continuity with the L3-L4 disc space measuring 2.0 cm craniocaudally and 1.0 x 0.7 cm axially, unchanged from the recent prior. This contributes to mild effacement of the left subarticular space.       The conus medullaris terminates at L1 and is normal in appearance.   T12-L1: No significant disc bulge or herniation. L1-L2: Is a diffuse disc bulge and facet hypertrophy contributing to mild bilateral  foraminal stenosis without significant central canal stenosis. L2-L3: Small disc bulge and facet hypertrophy without significant central or foraminal canal stenosis. L3-L4: There is posterior laminectomy change without evidence of central canal stenosis. There is mild retrolisthesis of L3 over L4 as well as T2 hyperintense fluid collection extending superiorly from the left subarticular space and contributing to mild left subarticular stenosis as well as mild central canal stenosis at the level of L3 similar to prior. There is suspected moderate to severe right foraminal stenosis due to mild retrolisthesis with facet hypertrophy as well as bone marrow edema at the right synovial facets, for example sagittal image 9 of 25. There is also mild edema within the right psoas muscle at the level of L3-L4, axial image 4 of 27 without evidence of a fluid collection. There are bilateral right greater than left facet effusions at L3-L4 and possibility of septic arthritis can not be excluded. There is mild to moderate left foraminal stenosis. L4-L5: There are right-sided facetectomy changes with with hazy fluid within the surgical bed which may be expected. Posterior laminectomy change. There is mild bilateral foraminal narrowing. No central canal stenosis. L5-S1: There are right-sided facetectomy changes with hazy fluid within the surgical bed, which may be expected. Posterior laminectomy change. Mild bilateral foraminal narrowing. No central canal stenosis.       Interval removal of bilateral transpedicular screws and vertical rods at L3 through S1 and interval resolution of posterior paravertebral fluid collections. Lumbar drains within the subcutaneous and deep posterior paravertebral soft tissues as described above with associated soft tissue edema, however no evidence of surrounding fluid collection.   There is extensive bone marrow edema and intra discal edema at L3-L4 concerning for discitis osteomyelitis. There is  minimal L3 over L4 retrolisthesis as well as facet hypertrophy, which contributes to moderate to severe right foraminal stenosis at L3-L4. There is also asymmetric right-sided facet effusion and right subchondral edema at L3-L4 facets and asymmetric right psoas edema at L3-L4 concerning for septic arthritis.   There is unchanged ventral epidural collection on the left at the level of L3 which may be communicating with the L3-L4 disc. The sterility of this collection can not be determined given multiple prior surgeries. It contributes to unchanged mild central canal stenosis and left subarticular stenosis at L3-L4.   Signed by: Marco Valles 1/6/2024 12:46 AM Dictation workstation:   [id number]    ECG 12 Lead    Result Date: 1/5/2024  Normal sinus rhythm Nonspecific intraventricular block Cannot rule out Anterior infarct , age undetermined T wave abnormality, consider inferior ischemia Abnormal ECG When compared with ECG of 19-DEC-2023 01:43, (unconfirmed) Questionable change in QRS axis T wave inversion now evident in Inferior leads T wave inversion less evident in Lateral leads Confirmed by Giselle Richter ([id number]) on 1/5/2024 5:22:23 PM    XR abdomen 1 view    Result Date: 1/2/2024  Interpreted By:  Jose Sevilla, STUDY: XR ABDOMEN 1 VIEW;  1/2/2024 2:38 pm   INDICATION: Signs/Symptoms:rule out obstruction.   COMPARISON: CT abdomen/pelvis of 1231 23   ACCESSION NUMBER(S): [id number]   ORDERING CLINICIAN: DEANDRE LAIRD   FINDINGS: 2 frontal views of the abdomen were obtained.   A nonspecific nonobstructive bowel gas pattern is demonstrated. Right upper quadrant surgical clips are seen along with a right mid abdominal IVC filter. Additional probable drainage tubing overlies the pelvis. Surgical clips are seen in the left hip and medial thigh region along with small surgical clips overlying the right hip. Multilevel degenerative changes of the spine are partially visualized.       1.  Nonspecific nonobstructive  bowel gas pattern.   Signed by: Jose Sfjerelgoj 1/2/2024 4:03 PM Dictation workstation:   [id number]    Cardiac catheterization - non-coronary    Result Date: 1/2/2024   [facility], Cath Lab        [address] [place], [place] [id number] Cardiovascular Catheterization Report Patient Name:      KILEY LOJA    Performing Physician: [id number] Star Diaz MD Study Date:        1/2/2024              Verifying Physician:  [id number] Diaz MD MRN/PID:           [id number]              Cardiologist: Accession#:        [id number]          Ordering Provider:    [id number] LYLE RUSHING Date of Birth/Age: 1955 / 68 years Fellow: Gender:            F                     Fellow: Encounter#:        [id number]  Study: IVC - Inferior Vena Cava Filter  Procedure Description: After infiltration with 2% Lidocaine, the right femoral vein was cannulated with a modified Seldinger technique. Post-procedure, the venous sheath was pulled and pressure was applied to the site.  Procedure Description Comments: The patient underwent successful insertion of retrievable IVC filter. The patient recently had an orthopedic procedure. She had DVT of the lower extremity and pulmonary embolus. She apparently is now readmitted with infection and severe anemia. She is unable to take oral anticoagulants because of this. Therefore a retrievable IVC filter was inserted. The right groin area was anesthetized with lidocaine. The 7 Uzbek IVC filter sheath was inserted in the right common femoral vein. A baseline angiogram was performed. The IVC filter was then inserted in standard fashion just below the renal veins. A post procedure angiogram was performed to document the position of the filter. The equipment is removed. Pressure was  applied to the right groin area until hemostasis was achieved. The patient tolerated the procedure well. There were no complications.  Hemo Personnel: +---------------------------+---------+ Name                       Duty      +---------------------------+---------+ Star Negro MD 1 +---------------------------+---------+  Cardiac Cath Post Procedure Notes: Post Procedure Diagnosis: Retrievable IVC filter inserted. Blood Loss:               Estimated blood loss during the procedure was 0 mls. Specimens Removed:        Number of specimen(s) removed: none. ____________________________________________________________________________________ CONCLUSIONS:  1. Successful insertion of retrievable IVC filter. ICD 10 Codes: Embolism and thrombosis of superficial veins of right lower extremity-I82.811; Embolism and thrombosis of other arteries-I74.8  CPT Codes: Insertion IVC filter-16527  [id number] Star Diaz MD Performing Physician Electronically signed by [id number] Star Diaz MD on 1/2/2024 at 1:25:27 PM  ** Final **     MR hip right wo IV contrast    Result Date: 1/2/2024  Interpreted By:  Yoav Blake, STUDY: MR HIP RIGHT WO IV CONTRAST;  1/2/2024 11:37 am   INDICATION: Signs/Symptoms:right groin pain and thigh pain. History of lumbar spine decompression and fusion with postoperative complication and recent hardware removal.   COMPARISON: CT abdomen and pelvis 12/31/2023   ACCESSION NUMBER(S): [id number]   ORDERING CLINICIAN: YEVTTE ROSALES   TECHNIQUE: Multiplanar and multisequential MR images of the lumbar spine are performed. There is motion degradation more pronounced on sagittal images.   FINDINGS: Postoperative changes are identified in the lower lumbar spine. There is ill-defined fluid and edema in the paraspinal soft tissues at the surgical site. A surgical drain is partially visualized at the laminectomy site. The lower lumbar spine is not accurately  characterized with this field of view.   There are mild osteoarthritic changes of both hips with some thinning of the articular cartilage in superior joint space narrowing. The femoral heads are of normal morphology. The femoral articular surfaces are congruent with the acetabula bilaterally. There is a small volume of joint fluid bilaterally though no significant effusion. There is no paralabral fluid collection.   There is no sign of acute osseous fracture, bone marrow edema, or osseous mass. There are no findings of femoral head avascular necrosis.   There are mild osteoarthritic changes of both sacroiliac joints. The sacroiliac joints remain patent and symmetric.   There is no space-occupying mass or fluid collection in the piriformis fossa bilaterally. The piriformis muscles are symmetric in size and signal. There is mild muscle edema diffusely though no intramuscular mass or fluid collection. There is no deep fascial fluid.   Multiple diverticular are identified in the distal colon. There is a small amount of free pelvic fluid posteriorly. There is some ill-defined fluid and edema in the lateral subcutaneous fat bilaterally. There is nonspecific edema in the per pubic musculature at the level of the abductor muscle bellies greater on the right.       Postoperative changes in the lower lumbar spine are incompletely visualized and poorly characterized on this examination. There is some nonspecific per pubic muscle edema and edema in the lateral subcutaneous fat.   Mild osteoarthritic changes of both hips and sacroiliac joints.   There is no evidence of acute osseous abnormality.   Incidental small volume of free pelvic fluid and distal colonic diverticulosis.     Signed by: Yoav Blake 1/2/2024 12:56 PM Dictation workstation:   [id number]    Vascular US lower extremity venous duplex bilateral    Result Date: 1/2/2024  Interpreted By:  Connie Pinto, STUDY: [facility] US LOWER EXTREMITY VENOUS DUPLEX  BILATERAL  1/2/2024 8:29 am   INDICATION: 67 y/o   F with  Signs/Symptoms:hx of RLE dvt, reval for dvt. LMP: Unknown.   COMPARISON: 12/15/2023   ACCESSION NUMBER(S): [id number]   ORDERING CLINICIAN: CRIS ALEGRE   TECHNIQUE: Routine ultrasound of the  right and left lower extremity was performed with duplex Doppler (color and spectral) evaluation. Static images were obtained for remote interpretation.   FINDINGS: THIGH VEINS:  The common femoral, femoral, popliteal, proximal medial saphenous, and deep femoral veins are patent and free of thrombus. The veins are normally compressible.  They demonstrate normal phasic flow and augmentation response.   CALF VEINS:  The paired peroneal and posterior tibial calf veins are patent.  Previous right peroneal thrombus is no longer identified.       Negative study.  No deep venous thrombosis of the  right or left lower extremity.  Previous right peroneal thrombus is no longer identified.   MACRO: None   Signed by: Connie Pinto 1/2/2024 8:43 AM Dictation workstation:   [id number]    XR lumbar spine 2-3 views    Result Date: 1/1/2024  Interpreted By:  Phillip Renteria, STUDY: XR LUMBAR SPINE 2-3 VIEWS;  1/1/2024 12:46 pm   INDICATION: Signs/Symptoms:s/p removal hardware.   COMPARISON: 12/29/2023   ACCESSION NUMBER(S): [id number]   ORDERING CLINICIAN: YVETTE ROSALES   FINDINGS: Alignment:  Alignment on the AP view is maintained   Disc levels:  Moderate-to-marked narrowing of the L3-4 disc interspace with mild marginal osteophyte formation similar to the prior exam. Disc prosthesis has been placed at the L4-5 and L5-S1 levels as previously, with L3 through L5 laminectomies.. However, there has been removal of posterior transfixation hardware with posterior rods and L4 through S1 transpedicular screws. Posterior bone fusion remains.   Bony structures:  Intact   Other findings:  There are posterior soft tissue surgical drains.       Removal of lower lumbar transfixation  hardware.   Signed by: Phillip Renteria 1/1/2024 12:49 PM Dictation workstation:   [id number]    CT abdomen pelvis wo IV contrast    Result Date: 12/31/2023  STUDY: CT Abdomen and Pelvis without IV Contrast; 12/31/2023 at 5:11 PM. INDICATION: Abdominal pain. COMPARISON: CT AP 12/19/2023 ACCESSION NUMBER(S): [id number] ORDERING CLINICIAN: WILLIAM EDMONDS TECHNIQUE: CT of the abdomen and pelvis was performed.  Contiguous axial images were obtained at 3 mm slice thickness through the abdomen and pelvis. Coronal and sagittal reconstructions at 3 mm slice thickness were performed. No intravenous contrast was administered.  Automated mA/kV exposure control was utilized and patient examination was performed in strict accordance with principles of ALARA. FINDINGS: Please note that the evaluation of vessels, lymph nodes and organs is limited without intravenous contrast.  LOWER CHEST: No cardiomegaly.  No pericardial effusion.  Very small bilateral pleural effusions  ABDOMEN:  LIVER: No hepatomegaly.  Smooth surface contour.  Normal attenuation.  BILE DUCTS: No intrahepatic or extrahepatic biliary ductal dilatation.  GALLBLADDER: The gallbladder is absent. STOMACH: No abnormalities identified.  PANCREAS: Negative for pancreatitis  SPLEEN: No splenomegaly or focal splenic lesion.  ADRENAL GLANDS: No thickening or nodules.  KIDNEYS AND URETERS: Kidneys are normal in size and location.  No renal or ureteral calculi.  PELVIS:  BLADDER: No abnormalities identified.  REPRODUCTIVE ORGANS: No abnormalities identified.  BOWEL: Colonic diverticulosis.  Mild constipation without bowel obstruction. Negative for appendicitis  VESSELS: Limited due to lack of intravenous contrast.  Prior femoral to femoral artery bypass.  Abdominal aorta is normal in caliber.  PERITONEUM/RETROPERITONEUM/LYMPH NODES: Small amount of low-density free pelvic fluid  No pneumoperitoneum. No lymphadenopathy.  ABDOMINAL WALL: Small fat-containing umbilical  hernia. SOFT TISSUES: Postsurgical changes within the posterior lumbar soft tissues with surgical drains in place.  BONES: Status post interbody fusion of L4/5 and L5/S1 with metallic spacer. This space narrowing L3/4.  Laminectomies L3-L5.  Bone graft along the lateral margins of L3-L5 fracture of the right L3 transverse process. Fracture of the right L4 transverse process.  Old screw tract within the L4 and L5 pedicles.  Narrowing of the right L5/S1 neural foramen due to facet joint osteophyte.    Negative for bowel obstruction.  Mild constipation. Colonic diverticulosis without diverticulitis. Negative for appendicitis. Postsurgical changes lumbar spine removal of pedicle screws and posterior rods from L4 through S1.  Stable appearance of interbody spacers at L4/5 and L5/S1. Bone graft along the lateral margins of L3-L5.  Fractures of the right L4 and L3 transverse process.  The right L3 transverse process fracture appears old.  Postsurgical changes within the posterior lumbar soft tissues with surgical drains in place. Signed by Aung Sparrow MD    MR lumbar spine w and wo IV contrast    Result Date: 12/31/2023  Interpreted By:  Jonas Sanchez, STUDY: MR LUMBAR SPINE W AND WO IV CONTRAST;  12/31/2023 12:33 am   INDICATION: Signs/Symptoms:Pain.   COMPARISON: MRI of the lumbar spine dated 12/10/2023   ACCESSION NUMBER(S): [id number]   ORDERING CLINICIAN: YVETTE LEE   TECHNIQUE: Sagittal T1, T2, STIR, axial T1 and T2 weighted images of the lumbar spine were acquired. Additional axial and sagittal T1 weighted images of the lumbar spine were obtained after intravenous administration of 15.5 mL of contrast.   FINDINGS: Exam is degraded by motion.   There is again evidence of 5 lumbar type non rib-bearing vertebral bodies, with the lowest well-formed intervertebral disc space labeled L5-S1.   Postsurgical changes of posterior decompression and laminectomies of L3 through L5 with posterior spinal fusion  extending from L4 through S1 and discectomies with intervertebral disc spaces at L4-L5 and L5-S1. These are similar in appearance to prior examination on 12/10/2023. Susceptibility artifact from the surgical hardware somewhat limits evaluation of the adjacent structures.   In the interim since prior imaging on 12/10/2023, new postsurgical consistent with irrigation debridement of subfascial tissues of the cutaneous spine are evident. STIR and T2 hypointense signal abnormality previously seen in the cutaneous fat of the lower lumbar spine has resolved, with new 3.7 x 3.6 x 13.6 cm (series 7, image 10; series 10, image 10) T2 hyperintense fluid collection present, with slight peripheral enhancement. This collection may be contiguous with the skin.   T2 hyperintense collection is again present in the laminectomy surgical bed, abutting the thecal sac at the level of L3 through L5 (series 8, image 14), measuring up to 5.2 cm in craniocaudal dimension, 2.9 cm in transverse dimension and up to 1.5 cm in AP dimension (series 10, image 7). The surgical catheter previously seen within the collection is gone, although collection is decreased in size to previous imaging, with resolution of previously seen air within the collection. Mild surrounding edema and reactive soft tissue changes are present in the epidural space, somewhat increased in the interim since prior exam, with new/increased mass effect on the adjacent thecal sac.   There also appears to be small amount of new fluid present in the anterior epidural space at the level of L3 (series 10, image 3).   Although the exact characterization is difficult due to motion, there appears to be somewhat worsened spinal canal narrowing at the level of L2-L3 due to combination of new fluid in the anterior epidural space, and the T2 hyperintense fluid collection with associated inflammatory/reactive changes along the posterior aspect of the thecal sac, with somewhat increased  effacement of the subarachnoid space.   No new intra thecal enhancement is identified.   Neural foramina are not well assessed due to motion and susceptibility artifact from the surgical hardware.       1.  New surgical changes of irrigation and debridement in the laminectomy surgical bed evident, with previously seen geographic area of hypointense T2 and STIR signal in the cutaneous fat of the lumbar spine resolved, and new T2 hyperintense collection measuring 3.7 x 3.6 x 13.6 cm present in the cutaneous fat, likely representing a postsurgical seroma, although sterility can not be ascertained on imaging alone. This collection reaches of the dermis, and may drain at the skin. 2. T2 hyperintense collection within the laminectomy bed itself along the dorsal aspect of the thecal sac described on previous MRI in 12/10/2023 has mildly decreased in size from prior imaging, with interval removal of previously seen drain, although there are increased inflammatory/reactive soft tissue changes present in the laminectomy surgical bed, focus of fluid in the anterior epidural space at the level of L3 contributes to increased effacement of the thecal sac at the level of L3 compared to prior MRI.   MACRO: None   Signed by: Jonas Sanchez 12/31/2023 1:13 AM Dictation workstation:   [id number]    XR lumbar spine 2-3 views    Result Date: 12/29/2023  Interpreted By:  Gerson Coombs, STUDY: XR LUMBAR SPINE 2-3 VIEWS;  ;  12/29/2023 2:11 pm   INDICATION: Signs/Symptoms:pain.   ACCESSION NUMBER(S): [id number]   ORDERING CLINICIAN: GERSON COOMBS   FINDINGS: AP lateral flexion extension x-rays of the lumbar spine show a midline laminectomy with instrumentation and interbody cages at L4-5 and L5-S1. Hardware is in good position without any evidence of failure. There is a grade 1 spondylolisthesis at L3-4. There is moderate degenerative changes above the level of fusion. Lumbar lordosis is maintained. There is no scoliosis.  There is no fractures. Bony pelvis and hips are partially visualized and show minimal hip degenerative changes.     Signed by: Gerson Coombs 12/29/2023 4:43 PM Dictation workstation:   [id number]    CT abdomen pelvis w IV contrast    Result Date: 12/19/2023  STUDY: CT Abdomen and Pelvis with IV Contrast; 12/19/2023 at 4:01 PM. INDICATION: Left lower quadrant and left suprapubic pain post menopause. COMPARISON: CT AP 12/15/2023; CTA AP 12/5/2023. ACCESSION NUMBER(S): [id number] ORDERING CLINICIAN: CHEKO CONNOLLY TECHNIQUE: CT of the abdomen and pelvis was performed.  Contiguous axial images were obtained at 3 mm slice thickness through the abdomen and pelvis. Coronal and sagittal reconstructions at 3 mm slice thickness were performed.  Omnipaque 350 75 mL was administered intravenously.  FINDINGS: LOWER CHEST: No cardiomegaly.  No pericardial effusion.  There is subsegmental atelectasis.  Small pleural effusions in the lung bases.  ABDOMEN:  LIVER: No hepatomegaly.  Smooth surface contour.  Normal attenuation.  BILE DUCTS: No intrahepatic or extrahepatic biliary ductal dilatation.  GALLBLADDER: The gallbladder is absent. STOMACH: No abnormalities identified.  PANCREAS: No masses or ductal dilatation.  SPLEEN: No splenomegaly or focal splenic lesion.  ADRENAL GLANDS: No thickening or nodules.  KIDNEYS AND URETERS: Kidneys are normal in size and location.  No renal or ureteral calculi.  PELVIS:  BLADDER: No abnormalities identified.  REPRODUCTIVE ORGANS: No abnormalities identified.  BOWEL: There is diverticulosis.  VESSELS: No abnormalities identified.  Abdominal aorta is normal in caliber. There is a femoral-femoral crossover graft.  PERITONEUM/RETROPERITONEUM/LYMPH NODES: No free fluid.  No pneumoperitoneum. No lymphadenopathy.  ABDOMINAL WALL: There is left inguinal hernia containing fat. SOFT TISSUES: No abnormalities identified.  BONES: The patient status post laminectomy with posterior fusion from L4 to  S1.  When compared to December 2023, surgical drain posteriorly has been removed.  There is now a thick-walled fluid collection posteriorly measuring 3.3 x 3.0 and extending over distance of at least 12 cm.    Status post laminectomy with posterior fusion.  There is a 3.3 x 3.0 x 12 cm thick-walled fluid collection posteriorly in the subcutaneous tissues.  This may represent a postoperative seroma however, CSF leak or abscess cannot be excluded.  This is new from December 2023. Diverticulosis without diverticulitis. Signed by Andre Mckee MD    ECG 12 lead    Result Date: 12/19/2023  Sinus rhythm with short AR Left bundle branch block Abnormal ECG When compared with ECG of 18-DEC-2023 12:36, (unconfirmed) AR interval has decreased Left bundle branch block has replaced Incomplete right bundle branch block    CT lumbar spine wo IV contrast    Result Date: 12/19/2023  Interpreted By:  Finkelstein, Evan, STUDY: CT LUMBAR SPINE WO IV CONTRAST;  12/19/2023 4:15 am   INDICATION: Signs/Symptoms:Status postlaminectomy history of epidural abscess.   COMPARISON: None.   ACCESSION NUMBER(S): [id number]   ORDERING CLINICIAN: HEATHER ARRINGTON   TECHNIQUE: Axial noncontrast CT images of the lumbar spine with coronal and sagittal reconstructed images.   FINDINGS: Postsurgical changes of posterior instrumented fusion L4 through S1. There are bilateral transpedicular screws with vertical connecting rods and interbody spacers at L4/L5 and L5/S1. ALIGNMENT: No traumatic malalignment VERTEBRAE: No acute loss of vertebral body height. DISC SPACES: Interbody spacers at L4/L5 and L5/S1. Otherwise, the disc spaces are preserved without significant narrowing. SPINAL CANAL: No critical spinal canal stenosis above the level of surgery. There has been posterior decompression of L3 through L5, however, the canal at this level is limited due to extensive streak artifact from hardware.. PARAVERTEBRAL SOFT TISSUES: Within the superficial back soft  tissues, there is a collection of fluid measuring up to approximately 3.4 x 3.4 x 12.8 cm (maximum AP by transverse by craniocaudal dimension), extending from the level of L1 through L5. There are several punctate lucencies within this collection of fluid. Deep to the superficial collection, there is also fluid and/or soft tissue thickening near the canal at the level of prior surgery L4 through S1 which extends posteriorly surrounding the posterior elements. Evaluation, however, is severely limited due to streak artifact from lumbar spine hardware.         Postsurgical changes of posterior instrumented fusion L4 through S1 as above with posterior decompression L3 through L5. 3.4 x 3.4 x 12.8 cm collection of fluid in the superficial posterior back soft tissues extending from the level of L1 through L5. There are punctate lucencies within the collection concerning for infection given the timing of surgery greater than 1 week ago. There is also fluid and/or soft tissue thickening deep to the superficial collection extending from the level of the spinal canal into the paraspinal soft tissues and surrounding the posterior elements. No definite fat plane is seen between this superficial collection and the deeper situated fluid. Evaluation of this fluid and/or soft tissue thickening, however, is severely limited due to extensive streak artifact from surgical hardware.     MACRO: None.   Signed by: Evan Finkelstein 12/19/2023 4:27 AM Dictation workstation:   [id number]    XR lumbar spine 2-3 views    Result Date: 12/19/2023  Interpreted By:  Paul Melvin, STUDY: XR LUMBAR SPINE 2-3 VIEWS; ;  12/19/2023 2:34 am   INDICATION: Signs/Symptoms:Status post L3 S1 laminectomy, L4 L5-S1 TLIF.   COMPARISON: 08/23/2023   ACCESSION NUMBER(S): [id number]   ORDERING CLINICIAN: HEATHER ARRINGTON   FINDINGS: AP, lateral, and L5-S1 spot views of the lumbar spine: Status post L4-S1 posterior spinal fusion and laminectomy as well  as L4-L5 and L5-S1 interbody spacer placement. Hardware is intact. There is mild grade 1 anterolisthesis of L5 on S1. There is mild multilevel endplate osteophyte formation. Mild disc space narrowing, most pronounced at L3-L4. Right upper quadrant surgical clips. Moderate colonic stool volume. Vascular calcifications.       1. No acute osseous abnormality. 2. Postsurgical changes from L4-S1 with intact hardware. 3. Mild multilevel spinal degenerative change. 4. Moderate colonic stool volume.   Signed by: Paul Melvin 12/19/2023 2:45 AM Dictation workstation:   [id number]    Transthoracic Echo (TTE) Complete    Result Date: 12/16/2023          [place] [address]  Tel [phone] Fax [phone] TRANSTHORACIC ECHOCARDIOGRAM REPORT  Patient Name:      KILEY TERRY PURVI Garcia Physician:    [id number] Yoav Neville DO Study Date:        12/16/2023            Ordering Provider:    [id number] FOREIGN HUBER MRN/PID:           [id number]              Fellow: Accession#:        [id number]          Nurse: Date of Birth/Age: 1955 / 68 years Sonographer:          Gertrudis Squires RDCS Gender:            F                     Additional Staff: Height:            144.78 cm             Admit Date:           12/5/2023 Weight:            82.56 kg              Admission Status:     Inpatient -                                                                Routine BSA:               1.73 m2               Department Location:  [address] [place] [address] Blood Pressure: 128 /62 mmHg Study Type:    TRANSTHORACIC ECHO (TTE) COMPLETE Diagnosis/ICD: Personal history of pulmonary embolism-Z86.711 Indication:    PE CPT Codes:     Echo Complete w Full Doppler-[id number] Patient History: Smoker:            Current. Pertinent  History: HTN, Hyperlipidemia, Dyspnea, PVD, CAD and AAA. Study Detail: The following Echo studies were performed: 2D, M-Mode, Doppler and               color flow. Definity used as a contrast agent for endocardial               border definition. Total contrast used for this procedure was 2 mL               via IV push. The patient was awake.  PHYSICIAN INTERPRETATION: Left Ventricle: Left ventricular systolic function is normal, with an estimated ejection fraction of 55%. There are no regional wall motion abnormalities. The left ventricular cavity size is normal. The left ventricular septal wall thickness is mildly increased. Spectral Doppler shows an impaired relaxation pattern of left ventricular diastolic filling. LV Wall Scoring: All segments are normal. Left Atrium: The left atrium is upper limits of normal in size. Right Ventricle: The right ventricle is normal in size. There is normal right ventricular global systolic function. Right Atrium: The right atrium is normal in size. Aortic Valve: The aortic valve appears structurally normal. The aortic valve appears tricuspid. There is mild aortic valve cusp calcification. There is no evidence of aortic valve stenosis. There is no evidence of aortic valve regurgitation. The peak instantaneous gradient of the aortic valve is 13.5 mmHg. The mean gradient of the aortic valve is 8.0 mmHg. Mitral Valve: The mitral valve is normal in structure. There is no evidence of mitral valve stenosis. There is normal mitral valve leaflet mobility. There is trace mitral valve regurgitation. Tricuspid Valve: The tricuspid valve is structurally normal. There is normal tricuspid valve leaflet mobility. There is trace tricuspid regurgitation. Pulmonic Valve: The pulmonic valve is structurally normal. There is no indication of pulmonic valve regurgitation. Pericardium: There is no pericardial effusion noted. Aorta: The aortic root is normal. Pulmonary Artery: The tricuspid regurgitant  velocity is 3.08 m/s, and with an estimated right atrial pressure of 3 mmHg, the estimated pulmonary artery pressure is moderately elevated with the RVSP at 40.9 mmHg. Systemic Veins: The inferior vena cava appears to be of normal size. In comparison to the previous echocardiogram(s): Prior examinations are available and were reviewed for comparison purposes. Study relatively unchanged from my study done here in June 2022.  CONCLUSIONS:  1. Left ventricular systolic function is normal with a 55% estimated ejection fraction.  2. Spectral Doppler shows an impaired relaxation pattern of left ventricular diastolic filling.  3. There is no evidence of mitral valve stenosis.  4. Trace mitral valve regurgitation.  5. Trace tricuspid regurgitation is visualized.  6. Aortic valve stenosis is not present.  7. Moderately elevated pulmonary artery pressure. QUANTITATIVE DATA SUMMARY: 2D MEASUREMENTS:                          Normal Ranges: Ao Root d:     2.30 cm   (2.0-3.7cm) LAs:           3.90 cm   (2.7-4.0cm) IVSd:          1.23 cm   (0.6-1.1cm) LVPWd:         1.00 cm   (0.6-1.1cm) LVIDd:         4.11 cm   (3.9-5.9cm) LVIDs:         2.97 cm LV Mass Index: 89.5 g/m2 LV % FS        27.7 % LA VOLUME:                               Normal Ranges: LA Vol A4C:        36.7 ml    (22+/-6mL/m2) LA Vol A2C:        35.2 ml LA Vol BP:         37.2 ml LA Vol Index A4C:  21.2ml/m2 LA Vol Index A2C:  20.4 ml/m2 LA Vol Index BP:   21.5 ml/m2 LA Area A4C:       15.3 cm2 LA Area A2C:       14.5 cm2 LA Major Axis A4C: 5.4 cm LA Major Axis A2C: 5.1 cm LA Volume Index:   20.6 ml/m2 RA VOLUME BY A/L METHOD:                               Normal Ranges: RA Vol A4C:        46.8 ml    (8.3-19.5ml) RA Vol Index A4C:  27.1 ml/m2 RA Area A4C:       17.4 cm2 RA Major Axis A4C: 5.5 cm AORTA MEASUREMENTS:                    Normal Ranges: Asc Ao, d: 2.20 cm (2.1-3.4cm) LV SYSTOLIC FUNCTION BY 2D PLANIMETRY (MOD):                     Normal Ranges: EF-A4C  View: 50.3 % (>=55%) EF-A2C View: 56.0 % EF-Biplane:  55.4 % LV DIASTOLIC FUNCTION:                        Normal Ranges: MV Peak E:    1.12 m/s (0.7-1.2 m/s) MV Peak A:    1.21 m/s (0.42-0.7 m/s) E/A Ratio:    0.93     (1.0-2.2) MV e'         0.12 m/s (>8.0) MV lateral e' 0.12 m/s MV medial e'  0.12 m/s E/e' Ratio:   9.33     (<8.0) MITRAL VALVE:                 Normal Ranges: MV DT: 250 msec (150-240msec) AORTIC VALVE:                                    Normal Ranges: AoV Vmax:                1.84 m/s  (<=1.7m/s) AoV Peak P.5 mmHg (<20mmHg) AoV Mean P.0 mmHg  (1.7-11.5mmHg) LVOT Max Anam:            1.54 m/s  (<=1.1m/s) AoV VTI:                 44.90 cm  (18-25cm) LVOT VTI:                36.40 cm LVOT Diameter:           1.80 cm   (1.8-2.4cm) AoV Area, VTI:           2.06 cm2  (2.5-5.5cm2) AoV Area,Vmax:           2.13 cm2  (2.5-4.5cm2) AoV Dimensionless Index: 0.81  RIGHT VENTRICLE: RV Basal 3.57 cm RV Mid   2.77 cm RV Major 6.9 cm TAPSE:   28.2 mm RV s'    0.13 m/s TRICUSPID VALVE/RVSP:                             Normal Ranges: Peak TR Velocity: 3.08 m/s RV Syst Pressure: 40.9 mmHg (< 30mmHg) IVC Diam:         1.79 cm PULMONIC VALVE:                         Normal Ranges: PV Accel Time: 85 msec  (>120ms) PV Max Anam:    1.3 m/s  (0.6-0.9m/s) PV Max P.0 mmHg  Sari Neville DO Electronically signed on 2023 at 12:29:49 PM  Wall Scoring  ** Final **     Lower extremity venous duplex bilateral    Result Date: 12/15/2023  Interpreted By:  Keagan Sanders, STUDY: [facility] US LOWER EXTREMITY VENOUS DUPLEX BILATERAL;  12/15/2023 8:14 pm   INDICATION: Signs/Symptoms:DVT.   COMPARISON: None.   ACCESSION NUMBER(S): [id number]   ORDERING CLINICIAN: LILI AVILES   TECHNIQUE: Vascular ultrasound of the bilateral lower extremities was performed. Real-time compression views as well as Gray scale, color Doppler and spectral Doppler waveform analysis was performed.   FINDINGS:  Evaluation of the visualized portions of the bilateral common femoral vein, proximal, mid, and distal femoral vein, and popliteal vein were performed.  Evaluation of the visualized portions of the  posterior tibial and peroneal veins were also performed.   The right common femoral vein, femoral vein, popliteal vein and posterior tibial veins are patent. There is thrombus in the right peroneal veins.   The left common femoral vein, femoral vein, popliteal vein and the left posterior tibial and peroneal veins are patent.       Thrombus of right peroneal veins.   MACRO:   Keagan Sanders discussed the significance and urgency of this critical finding by telephone with  LILI AVILES on 12/15/2023 at 8:41 pm. (**-RCF-**) Findings:  See findings.     Signed by: Keagan Sanders 12/15/2023 8:41 PM Dictation workstation:   [id number]    CT angio chest for pulmonary embolism    Result Date: 12/15/2023  Interpreted By:  Keagan Sanders, STUDY: CT ANGIO CHEST FOR PULMONARY EMBOLISM;  12/15/2023 7:39 pm   INDICATION: Signs/Symptoms:PE?.   COMPARISON: None.   ACCESSION NUMBER(S): [id number]   ORDERING CLINICIAN: LILI AVILES   TECHNIQUE: Contiguous axial images of the chest, abdomen and pelvis were obtained after the intravenous administration of  contrast. Coronal and sagittal reformatted images were obtained from the axial images. MIPS and 3D reformatted images were also performed and reviewed.   FINDINGS: No axillary, mediastinal, or hilar lymphadenopathy.   The heart is normal in size. Coronary artery atherosclerotic calcifications. RV to LV ratio 1.1. No significant pericardial effusion. There are acute pulmonary emboli in lobar and segmental branches in the right lower lobe and middle lobe.   Mild bibasilar subsegment atelectasis. No significant pleural effusion. No pneumothorax.   Limited evaluation of the upper abdomen.   Multilevel degenerative change of the thoracic spine.       Acute lobar and segmental pulmonary  emboli in the right lower lobe and middle lobe. RV to LV ratio 1.1; please correlate for component of right heart strain   MACRO: Keagan Sanders discussed the significance and urgency of this critical finding by telephone with  LILI AVILES on 12/15/2023 at 8:41 pm. (**-RCF-**) Findings:  See findings.   Signed by: Keagan Sanders 12/15/2023 8:41 PM Dictation workstation:   [id number]    CT abdomen pelvis w IV contrast    Result Date: 12/15/2023  Interpreted By:  Schoenberger, Joseph, STUDY: CT ABDOMEN PELVIS W IV CONTRAST;  12/15/2023 2:23 pm   INDICATION: Signs/Symptoms:abd pain.   COMPARISON: 12/05/2023   ACCESSION NUMBER(S): [id number]   ORDERING CLINICIAN: LILI AVILES   TECHNIQUE: CT of the abdomen and pelvis was performed.  Standard contiguous axial images were obtained at 3 mm slice thickness through the abdomen and pelvis. Coronal and sagittal reconstructions at 3 mm slice thickness were performed.   75 ml of contrast Omnipaque 350 were administered intravenously without immediate complication.   FINDINGS: LOWER CHEST: In a left lower lobe vessel there appears to be a vascular structures that bifurcates and likely represents an artery. The possibility of a filling defect is a consideration. Rec the possibility of an acute pulmonary embolus should be considered. Reference coronal reconstructions images numbered 70 through 73 of 130 series 202. It is recommended this patient undergo a CT pulmonary angiogram to evaluate further. There are areas platelike atelectasis in both lung bases. Otherwise unremarkable.   ABDOMEN:   LIVER: Within normal limits.   BILE DUCTS: Normal caliber.   GALLBLADDER: Surgically absent   PANCREAS: Within normal limits.   SPLEEN: Within normal limits.   ADRENAL GLANDS: Bilateral adrenal glands appear normal.   KIDNEYS AND URETERS: The kidneys are normal in size and enhance symmetrically.  No hydroureteronephrosis or nephroureterolithiasis is identified.   PELVIS:   BLADDER:  Within normal limits.   REPRODUCTIVE ORGANS: 1 unremarkable   BOWEL: The stomach is unremarkable. The small and large bowel are normal in caliber and demonstrate no wall thickening.   Normal appendix.   VESSELS: There is no aneurysmal dilatation of the abdominal aorta. The IVC appears normal.   PERITONEUM/RETROPERITONEUM/LYMPH NODES: No ascites or free air, no fluid collection.  No abdominopelvic lymphadenopathy is present.   BONES AND ABDOMINAL WALL: No suspicious osseous lesions are identified. Degenerative discogenic disease is noted in the lower thoracic and lumbar spine.   In the interval since the prior exam 2 drains have been placed in the spinal operative site. 1 from the left enters at the mid sacral level traveling cranially to the epidural region. The 2nd enters more superiorly to the right traveling cranially in the subcutaneous is tissues. These appear to have drained the fluid collections successfully. Again postoperative findings are unchanged from the prior with extensive laminectomy and posterior fusion hardware placement.       1.  Somewhat subtle but possible acute pulmonary emboli in the right lower lobe. Recommend further evaluation with CT pulmonary angiogram. 2. Successful drainage of fluid collections in the lumbar operative site when compared to the previous exam.   The possibility of an acute pulmonary embolus and recommendation for CT a chest was communicated using the secure messaging system through the medical record to the referring physician at the time of this exam.   Signed by: Joseph Schoenberger 12/15/2023 3:09 PM Dictation workstation:   [id number]          Assessment/Plan       Postoperative surgical complication involving musculoskeletal system associated with musculoskeletal procedure, unspecified complication  Active Problems:    Essential hypertension    PVD (peripheral vascular disease) (CMS/HCC)    Back pain with radiculopathy    Back pain at L4-L5 level    Deep vein  thrombosis (DVT) of right lower extremity (CMS/HCC)    Anemia     Continue IV Cubicin, wound cultures grew MRSA/staph  Continues to have pain, due to discitis I believe the pain will slowly get better  Do not think patient would be able to take care of herself at home with ongoing pain and difficulty ambulation  She will benefit from rehab/SNF placement  Drain removal as per orthopedics  Continue current pain management  Continue PT OT  Leukocytosis has resolved  CRP is still elevated  ID is following  Keep her off statin till the duration of cubicin  Continue home meds for hypertension  She is awaiting pre-CERT to be discharged to an acute rehab     1/9/24  - back pain slightly worse today  - stable mild hyponatremia, monitor for now, consider further galeano if worsening  - anemia, Hgb 8.1 today, still having serosang drain output  - currently on IV daptomycin for MRSA, ID following, will need prolonged course  - JOSE drains per ortho  - neurology following, they added baclofen this admission  - spoke with ortho, do not want snf or acute rehab discharge, will plan to transfer to ortho service today with medicine on consult      1/10/24  - back pain remains slightly worse today - PT/OT evaluation   - stable mild hyponatremia, slight decline- asymptomatic, continue trend   - anemia Hbg 8.1 and stable - will trend   - currently on IV daptomycin for MRSA, ID following, will need prolonged course  - JOSE drains per ortho  - neurology following, they added baclofen this admission, they will continue to monitor   - Infectious Diease following, appreciate recommendations     Hemodynamically stable   Thank you for consult  Medicine will continue to follow    Time spent  36  minutes obtaining labs, imaging, recommendations, interview, assessment, examination, medication review/ordering, and EMR review.    Plan of care was discussed extensively with patient and RN.  Patient verbalized understanding through teach back method. All  questions and concerns addressed upon examination.     Of note, this documentation is completed using the Dragon Dictation system (voice recognition software). There may be spelling and/or grammatical errors that were not corrected prior to final submission.                       Principal Problem:    Postoperative surgical complication involving musculoskeletal system associated with musculoskeletal procedure, unspecified complication  Active Problems:    Essential hypertension    PVD (peripheral vascular disease) (CMS/HCC)    Back pain with radiculopathy    Back pain at L4-L5 level    Deep vein thrombosis (DVT) of right lower extremity (CMS/HCC)    Anemia                  Stacey Bliss, APRN-CNP

## 2024-01-10 NOTE — CARE PLAN
The patient's goals for the shift include  less painful to move.    The clinical goals for the shift include Patient will  have pain under control throughout shift.

## 2024-01-10 NOTE — PROGRESS NOTES
Occupational Therapy    OT Treatment    Patient Name: Tara Tierney  MRN: [id number]  Today's Date: 1/10/2024  Time Calculation  Start Time: 1117  Stop Time: 1140  Time Calculation (min): 23 min         Assessment:  OT Assessment: Pt seen for skilled OT. Pt limited by significant pain.  Pt agreeable to complete standing trials but unable to complete adl's d/t pain.  Prognosis: Good  Evaluation/Treatment Tolerance: Patient limited by pain  Medical Staff Made Aware: Yes  End of Session Communication: Bedside nurse  End of Session Patient Position: Up in chair, Alarm on (all needs met, call light within reach.  Assisted with s/u of lunch however pt reports it's not what she ordered but not hungry either.  Patient care liason to assist with ordering correct food.)  Prognosis: Good  Evaluation/Treatment Tolerance: Patient limited by pain  Medical Staff Made Aware: Yes  Plan:  Treatment Interventions: ADL retraining, Functional transfer training, Endurance training  OT Frequency: 2 times per week  OT Discharge Recommendations: Moderate intensity level of continued care  OT Recommended Transfer Status:  (CGA)    Treatment Interventions: ADL retraining, Functional transfer training, Endurance training    Subjective   Previous Visit Info:  OT Last Visit  OT Received On: 01/10/24  General:  General  Prior to Session Communication: Bedside nurse (cleared by RN for participation in OT)  Patient Position Received: Up in chair, Alarm on  General Comment: Pt with c/o increased pain.  States she feels like she is doing to much activity, causing increased pain.  Pt also stating right leg feels like it is going to give out with prolonged standing.  Precautions:  LE Weight Bearing Status: Weight Bearing as Tolerated  Medical Precautions: Fall precautions  Post-Surgical Precautions: Spinal precautions  Braces Applied: TLSO (Pt did not have TLSO brace on upon arrival states MD said she only needs to wear it when  "ambulating.)  Precautions Comment: serafin fraire       Pain:  Pain Assessment  Pain Assessment: 0-10  Pain Score: 10 - Worst possible pain  Pain Type: Acute pain  Pain Location: Back  Pain Orientation: Lower, Upper  Pain Radiating Towards: right groin, right thigh  Pain Descriptors: Sharp, Shooting, Squeezing, Stabbing    Objective    Cognition:  Cognition  Overall Cognitive Status: Within Functional Limits  Orientation Level: Oriented X4       Activities of Daily Living:        UE Dressing  UE Dressing Comments: Jayson TLSO brace while seated in chair with min assist.  Cues for technique    LE Dressing  LE Dressing Comments: declined       Functional Standing Tolerance:     Bed Mobility/Transfers:      Transfer 1  Technique 1: Sit to stand, Stand to sit  Transfer Device 1:  (fww)  Transfer Level of Assistance 1: Minimum assistance  Trials/Comments 1: x2 trials from chair level.  Min verbal cues for hand placement.  Pt limited by pain and having difficulty with carryover of safety education.         Standing Balance:  Static Standing Balance  Static Standing-Balance Support: Bilateral upper extremity supported  Static Standing-Comment/Number of Minutes: Fair+ x 1 1/2 min-2 min x 2 trials  Dynamic Standing Balance  Dynamic Standing-Balance Support:  (alternating L/R UE support)  Dynamic Standing-Comments: Fair    Balance/Neuromuscular Re-Education  Balance/Neuromuscular Re-Education Activity Performed:  (Standing trials with weightshifting and postural/balance activities for improved posture and pain relief.  Pt reports pain \"slightly better\".)    Outcome Measures:[facility] Daily Activity  Putting on and taking off regular lower body clothing: A lot  Bathing (including washing, rinsing, drying): A lot  Putting on and taking off regular upper body clothing: A little  Toileting, which includes using toilet, bedpan or urinal: A lot  Taking care of personal grooming such as brushing teeth: A little  Eating Meals: A " marlyn  Daily Activity - Total Score: 15        Education Documentation  Body Mechanics, taught by Jennifer L Felty, OTA at 1/10/2024  3:30 PM.  Learner: Patient  Readiness: Acceptance  Method: Explanation, Demonstration  Response: Verbalizes Understanding  Comment: Pt educated on body mechanics for pain relief and fall prevention    Education Comments  No comments found.        OP EDUCATION:       Goals:  Encounter Problems       Encounter Problems (Active)       OT Goals       CGA for all functional transfers  (Progressing)       Start:  01/02/24    Expected End:  01/12/24            CGA LB dressing with AE (Progressing)       Start:  01/02/24    Expected End:  01/12/24            Fair + dyn standing balance for ADLs and functional activities  (Progressing)       Start:  01/02/24    Expected End:  01/12/24            CGA for toileting tasks/clothing mgmt  (Progressing)       Start:  01/02/24    Expected End:  01/12/24

## 2024-01-10 NOTE — PROGRESS NOTES
Infectious Diseases Inpatient Progress Note      HISTORY OF PRESENT ILLNESS:    Patient is complaining of severe low back pain radiating across her back.  Patient reports that her back pain is much worse than the day before. positive bilateral legs weakness.  She still has bilateral JOSE drains with small amount of serosanguineous drainage.   No fevers or chills.   Follow up postop deep incision wound infection with recurrent abscess while on IV Vanco, S/P hardware removal with persistent +ve MRSA Cx. ,  on IV Cubicin, well tolerated.   Recent PE and DVT.   Has resolved leg pain. No SOB. +ve constipation   Decreased appetite  No bowel movements   no urinary symptoms  No fevers or chills.    Current Medications:    acetaminophen, 650 mg, oral, q6h  aspirin, 81 mg, oral, Daily  atenolol, 50 mg, oral, q AM  baclofen, 5 mg, oral, 4x daily  brimonidine, 1 drop, Both Eyes, BID  busPIRone, 10 mg, oral, Daily  daptomycin, 500 mg, intravenous, Nightly  docusate sodium, 100 mg, oral, BID  ezetimibe, 10 mg, oral, Daily  furosemide, 20 mg, oral, Daily  hydrALAZINE, 50 mg, oral, BID  isosorbide mononitrate ER, 60 mg, oral, Daily  lactobacillus acidophilus, 1 tablet, oral, Daily  lidocaine, 1 patch, transdermal, Daily  melatonin, 3 mg, oral, Daily  mirtazapine, 15 mg, oral, Nightly  morphine CR, 15 mg, oral, q12h EDE  pantoprazole, 40 mg, oral, Daily before breakfast  pilocarpine, 5 mg, oral, Daily  polyethylene glycol, 17 g, oral, Daily  pregabalin, 75 mg, oral, BID        Allergies:  Neomycin and Neomycin-polymyxin b-dexameth      Review of Systems  14 system review is negative other than HPI    Physical Exam    Heart Rate:  [78-88]   Temp:  [36.8 °C (98.2 °F)-37.2 °C (99 °F)]   Resp:  [23]   BP: (125-146)/(60-67)   SpO2:  [93 %-97 %]    Vitals:    01/09/24 1606 01/09/24 1935 01/09/24 2320 01/10/24 0700   BP: 129/60 134/63 146/67 125/61   Pulse: 83 85 88 78   Resp:    23   Temp:  37.2 °C (99 °F) 37.1 °C (98.8 °F) 36.8 °C (98.2  "°F)   TempSrc:       SpO2: 97% 93% 96% 93%   Weight:       Height:         General Appearance: alert and oriented to person, place and time, well-developed and well-nourished, in no acute distress  Skin: warm and dry, no rash.   Head: normocephalic and atraumatic  Eyes: anicteric sclerae  ENT:  normal mucous membranes. No oral thrush  Lungs: normal respiratory effort, clear  Heart: Nl S1 and S2  Abdomen: soft, no tenderness  1+ B leg edema  No erythema, no tenderness  Back incision is intact.  Intact JOSE drain sites.   No point tenderness  DATA:    Lab Results   Component Value Date    WBC 9.3 01/10/2024    HGB 8.5 (L) 01/10/2024    HCT 26.5 (L) 01/10/2024    MCV 96 01/10/2024     01/10/2024     Lab Results   Component Value Date    CREATININE 0.80 01/10/2024    BUN 15 01/10/2024     (L) 01/10/2024    K 4.1 01/10/2024    CL 97 (L) 01/10/2024    CO2 26 01/10/2024       Hepatic Function Panel:No results found for: \"ALKPHOS\", \"ALT\", \"AST\", \"PROT\", \"BILITOT\", \"BILIDIR\"    Microbiology:   Susceptibility data from last 90 days.  Collected Specimen Info Organism Amoxicillin/Clavulanate Ampicillin Ampicillin/Sulbactam Aztreonam Cefazolin Cefazolin (uncomplicated UTIs only) Cefepime Cefotaxime Ceftazidime Ceftriaxone Ciprofloxacin Clindamycin   12/31/23 Swab from SPINE Staphylococcus aureus               12/31/23 Tissue from SPINE Methicillin Resistant Staphylococcus aureus (MRSA)            S   12/31/23 Swab from SPINE Methicillin Resistant Staphylococcus aureus (MRSA)            S   12/31/23 Tissue from SPINE Staphylococcus aureus               12/11/23 Fluid from ABSCESS Methicillin Resistant Staphylococcus aureus (MRSA)            S   12/11/23 Tissue from ABSCESS Staphylococcus aureus               12/11/23 Swab from ABSCESS Methicillin Resistant Staphylococcus aureus (MRSA)            S   12/06/23 Swab from SPINE Methicillin Resistant Staphylococcus aureus (MRSA)               12/06/23 Tissue from SPINE " Staphylococcus aureus               12/06/23 Tissue from SPINE Staphylococcus aureus               12/05/23 Urine from Clean Catch/Voided Escherichia coli S R S R R R R R R R R    11/03/23 Swab from Nares/Axilla/Groin Methicillin Resistant Staphylococcus aureus (MRSA)                 Collected Specimen Info Organism Ertapenem Erythromycin Gentamicin Meropenem Nitrofurantoin Oxacillin Piperacillin/Tazobactam Tetracycline Tobramycin Trimethoprim/Sulfamethoxazole Vancomycin   12/31/23 Swab from SPINE Staphylococcus aureus              12/31/23 Tissue from SPINE Methicillin Resistant Staphylococcus aureus (MRSA)  R    R  S  S S   12/31/23 Swab from SPINE Methicillin Resistant Staphylococcus aureus (MRSA)  R    R  S  S S   12/31/23 Tissue from SPINE Staphylococcus aureus              12/11/23 Fluid from ABSCESS Methicillin Resistant Staphylococcus aureus (MRSA)  R    R  S  S S   12/11/23 Tissue from ABSCESS Staphylococcus aureus              12/11/23 Swab from ABSCESS Methicillin Resistant Staphylococcus aureus (MRSA)  R    R  S  S S   12/06/23 Swab from SPINE Methicillin Resistant Staphylococcus aureus (MRSA)              12/06/23 Tissue from SPINE Staphylococcus aureus              12/06/23 Tissue from SPINE Staphylococcus aureus              12/05/23 Urine from Clean Catch/Voided Escherichia coli S  R S S  S  I S    11/03/23 Swab from Nares/Axilla/Groin Methicillin Resistant Staphylococcus aureus (MRSA)                Vanco PERNELL of 2.0 as discussed with microbiology  Imaging:   CT abdomen pelvis wo IV contrast    Result Date: 12/31/2023  STUDY: CT Abdomen and Pelvis without IV Contrast; 12/31/2023 at 5:11 PM. INDICATION: Abdominal pain. COMPARISON: CT AP 12/19/2023 ACCESSION NUMBER(S): [id number] ORDERING CLINICIAN: WILLIAM EDMONDS TECHNIQUE: CT of the abdomen and pelvis was performed.  Contiguous axial images were obtained at 3 mm slice thickness through the abdomen and pelvis. Coronal and sagittal reconstructions  at 3 mm slice thickness were performed. No intravenous contrast was administered.  Automated mA/kV exposure control was utilized and patient examination was performed in strict accordance with principles of ALARA. FINDINGS: Please note that the evaluation of vessels, lymph nodes and organs is limited without intravenous contrast.  LOWER CHEST: No cardiomegaly.  No pericardial effusion.  Very small bilateral pleural effusions  ABDOMEN:  LIVER: No hepatomegaly.  Smooth surface contour.  Normal attenuation.  BILE DUCTS: No intrahepatic or extrahepatic biliary ductal dilatation.  GALLBLADDER: The gallbladder is absent. STOMACH: No abnormalities identified.  PANCREAS: Negative for pancreatitis  SPLEEN: No splenomegaly or focal splenic lesion.  ADRENAL GLANDS: No thickening or nodules.  KIDNEYS AND URETERS: Kidneys are normal in size and location.  No renal or ureteral calculi.  PELVIS:  BLADDER: No abnormalities identified.  REPRODUCTIVE ORGANS: No abnormalities identified.  BOWEL: Colonic diverticulosis.  Mild constipation without bowel obstruction. Negative for appendicitis  VESSELS: Limited due to lack of intravenous contrast.  Prior femoral to femoral artery bypass.  Abdominal aorta is normal in caliber.  PERITONEUM/RETROPERITONEUM/LYMPH NODES: Small amount of low-density free pelvic fluid  No pneumoperitoneum. No lymphadenopathy.  ABDOMINAL WALL: Small fat-containing umbilical hernia. SOFT TISSUES: Postsurgical changes within the posterior lumbar soft tissues with surgical drains in place.  BONES: Status post interbody fusion of L4/5 and L5/S1 with metallic spacer. This space narrowing L3/4.  Laminectomies L3-L5.  Bone graft along the lateral margins of L3-L5 fracture of the right L3 transverse process. Fracture of the right L4 transverse process.  Old screw tract within the L4 and L5 pedicles.  Narrowing of the right L5/S1 neural foramen due to facet joint osteophyte.    Negative for bowel obstruction.  Mild  constipation. Colonic diverticulosis without diverticulitis. Negative for appendicitis. Postsurgical changes lumbar spine removal of pedicle screws and posterior rods from L4 through S1.  Stable appearance of interbody spacers at L4/5 and L5/S1. Bone graft along the lateral margins of L3-L5.  Fractures of the right L4 and L3 transverse process.  The right L3 transverse process fracture appears old.  Postsurgical changes within the posterior lumbar soft tissues with surgical drains in place. Signed by Aung Sparrow MD    MR lumbar spine w and wo IV contrast    Result Date: 12/31/2023  Interpreted By:  Jonas Sanchez, STUDY: MR LUMBAR SPINE W AND WO IV CONTRAST;  12/31/2023 12:33 am   INDICATION: Signs/Symptoms:Pain.   COMPARISON: MRI of the lumbar spine dated 12/10/2023   ACCESSION NUMBER(S): [id number]   ORDERING CLINICIAN: YVETTE LEE   TECHNIQUE: Sagittal T1, T2, STIR, axial T1 and T2 weighted images of the lumbar spine were acquired. Additional axial and sagittal T1 weighted images of the lumbar spine were obtained after intravenous administration of 15.5 mL of contrast.   FINDINGS: Exam is degraded by motion.   There is again evidence of 5 lumbar type non rib-bearing vertebral bodies, with the lowest well-formed intervertebral disc space labeled L5-S1.   Postsurgical changes of posterior decompression and laminectomies of L3 through L5 with posterior spinal fusion extending from L4 through S1 and discectomies with intervertebral disc spaces at L4-L5 and L5-S1. These are similar in appearance to prior examination on 12/10/2023. Susceptibility artifact from the surgical hardware somewhat limits evaluation of the adjacent structures.   In the interim since prior imaging on 12/10/2023, new postsurgical consistent with irrigation debridement of subfascial tissues of the cutaneous spine are evident. STIR and T2 hypointense signal abnormality previously seen in the cutaneous fat of the lower lumbar spine  has resolved, with new 3.7 x 3.6 x 13.6 cm (series 7, image 10; series 10, image 10) T2 hyperintense fluid collection present, with slight peripheral enhancement. This collection may be contiguous with the skin.   T2 hyperintense collection is again present in the laminectomy surgical bed, abutting the thecal sac at the level of L3 through L5 (series 8, image 14), measuring up to 5.2 cm in craniocaudal dimension, 2.9 cm in transverse dimension and up to 1.5 cm in AP dimension (series 10, image 7). The surgical catheter previously seen within the collection is gone, although collection is decreased in size to previous imaging, with resolution of previously seen air within the collection. Mild surrounding edema and reactive soft tissue changes are present in the epidural space, somewhat increased in the interim since prior exam, with new/increased mass effect on the adjacent thecal sac.   There also appears to be small amount of new fluid present in the anterior epidural space at the level of L3 (series 10, image 3).   Although the exact characterization is difficult due to motion, there appears to be somewhat worsened spinal canal narrowing at the level of L2-L3 due to combination of new fluid in the anterior epidural space, and the T2 hyperintense fluid collection with associated inflammatory/reactive changes along the posterior aspect of the thecal sac, with somewhat increased effacement of the subarachnoid space.   No new intra thecal enhancement is identified.   Neural foramina are not well assessed due to motion and susceptibility artifact from the surgical hardware.       1.  New surgical changes of irrigation and debridement in the laminectomy surgical bed evident, with previously seen geographic area of hypointense T2 and STIR signal in the cutaneous fat of the lumbar spine resolved, and new T2 hyperintense collection measuring 3.7 x 3.6 x 13.6 cm present in the cutaneous fat, likely representing a  postsurgical seroma, although sterility can not be ascertained on imaging alone. This collection reaches of the dermis, and may drain at the skin. 2. T2 hyperintense collection within the laminectomy bed itself along the dorsal aspect of the thecal sac described on previous MRI in 12/10/2023 has mildly decreased in size from prior imaging, with interval removal of previously seen drain, although there are increased inflammatory/reactive soft tissue changes present in the laminectomy surgical bed, focus of fluid in the anterior epidural space at the level of L3 contributes to increased effacement of the thecal sac at the level of L3 compared to prior MRI.   MACRO: None   Signed by: Jonas Sanchez 12/31/2023 1:13 AM Dictation workstation:   [id number]       I discussed culture results with microbiology.  MRSA PERNELL to vancomycin is 2.  It is sensitive to Cubicin  IMPRESSION:    Postop deep incisional wound infection of lumbar spine with failure of IV Vanco  S/P Hardware removal  MRSA infection in a patient who is a chronic carrier    Patient Active Problem List   Diagnosis    Abdominal aortic aneurysm (CMS/HCC)    Abnormal EKG    Bilateral carotid artery stenosis    Bruit of right carotid artery    CAD in native artery    Cardiomyopathy (CMS/HCC)    Chest pain    Chronic asthmatic bronchitis    Closed displaced fracture of fifth metatarsal bone    Current every day smoker    Difficulty breathing    Essential hypertension    History of total knee replacement    Hypokalemia    Intermittent claudication (CMS/HCC)    Knee osteoarthritis    Knee pain    Limb pain    Localized swelling, mass, or lump of lower extremity    Celiac artery stenosis (CMS/HCC)    Mesenteric artery stenosis (CMS/HCC)    Mixed hyperlipidemia    Obese    PVD (peripheral vascular disease) (CMS/HCC)    Right foot pain    Swelling    Trigger finger    Urinary tract infection without hematuria    Back pain of lumbar region with sciatica    Back  pain with radiculopathy    Intractable back pain    Seroma, post-traumatic (CMS/HCC)    Lumbar surgical wound fluid collection    Generalized weakness    Postoperative surgical complication involving musculoskeletal system associated with musculoskeletal procedure, unspecified complication    Back pain at L4-L5 level    Deep vein thrombosis (DVT) of right lower extremity (CMS/HCC)    Anemia       PLAN:  Check high-sensitivity CRP and sed rate today since patient is having acute exacerbation of the pain   continue IV Cubicin for now  Hold Zocor dose during treatment with Cubicin  May continue Zetia  Local wound care per surgery    Discussed with , patient and RN    Gem Reyna MD

## 2024-01-10 NOTE — PROGRESS NOTES
DAILY PROGRESS NOTE      POD9 Lumbar washout and hardware removal     Patient doing well  Visit Vitals  /61   Pulse 78   Temp 36.8 °C (98.2 °F)   Resp 23      Temp (24hrs), Av.1 °C (98.7 °F), Min:36.8 °C (98.2 °F), Max:37.2 °C (99 °F)       Pain Control significant improvement. Reports very minimal lower extremity pain at this time.   No chest pain or shortness of breath.  No calf pain    Exam:   Good bilateral lower extremity strength and sensation.   Extremity shows neuro vascular status intact. Flexion and extension intact on extremity.  Calves soft and non-tender without evidence of DVT.        Labs reviewed:  CBC: @LABRCNT(WBC:3,HGB:3,PLT:3)@  BMP:  @LABRCNT(Na:3,K:3,CL:3,CO2:3,BUN:3,Creatinine:3,Glucose:3)@    I&O  I/O last 3 completed shifts:  In: 470 (6 mL/kg) [P.O.:350; IV Piggyback:120]  Out: 1599 (20.4 mL/kg) [Urine:950 (0.3 mL/kg/hr); Drains:649]  Weight: 78.2 kg       Assessment:    Patient seen this morning sitting up in chart with lumbar brace on. She reports significant improvement in her lower extremity pain and just reports some generalized weakness to lower extremities contributing this to decreased mobility.   JOSE drains are holding suction and working appropriately. Deep drain with 19cc serousang drainage and superficial drain with 17cc serosang drainage.     Plan:    Continue with therapy   Brace when OOB and with ambulation   JOSE drains to remain in place until there is 0 output for 48 hours   Continue IV antibiotics per ID  Continue pain control per pain management   Cannot shower until drainage has stopped     Edi Olguin, DARRYL-CNP   1/10/2024 11:17 AM

## 2024-01-10 NOTE — NURSING NOTE
Dressings removed on spine and bilateral bethany drain sites for infectious disease to assess sites.  Dressings replaced on all areas.  Left bethany drain unable to retain suction post dressing change.

## 2024-01-11 ENCOUNTER — APPOINTMENT (OUTPATIENT)
Dept: RADIOLOGY | Facility: HOSPITAL | Age: 69
DRG: 856 | End: 2024-01-11
Payer: COMMERCIAL

## 2024-01-11 LAB
APPEARANCE UR: CLEAR
BILIRUB UR STRIP.AUTO-MCNC: NEGATIVE MG/DL
COLOR UR: YELLOW
GLUCOSE UR STRIP.AUTO-MCNC: NEGATIVE MG/DL
KETONES UR STRIP.AUTO-MCNC: NEGATIVE MG/DL
LEUKOCYTE ESTERASE UR QL STRIP.AUTO: NEGATIVE
NITRITE UR QL STRIP.AUTO: NEGATIVE
PH UR STRIP.AUTO: 7 [PH]
PROT UR STRIP.AUTO-MCNC: ABNORMAL MG/DL
RBC # UR STRIP.AUTO: NEGATIVE /UL
RBC #/AREA URNS AUTO: NORMAL /HPF
SP GR UR STRIP.AUTO: 1.01
UROBILINOGEN UR STRIP.AUTO-MCNC: <2 MG/DL
WBC #/AREA URNS AUTO: NORMAL /HPF

## 2024-01-11 PROCEDURE — 2500000001 HC RX 250 WO HCPCS SELF ADMINISTERED DRUGS (ALT 637 FOR MEDICARE OP): Performed by: STUDENT IN AN ORGANIZED HEALTH CARE EDUCATION/TRAINING PROGRAM

## 2024-01-11 PROCEDURE — 2500000005 HC RX 250 GENERAL PHARMACY W/O HCPCS: Performed by: SPECIALIST

## 2024-01-11 PROCEDURE — 81001 URINALYSIS AUTO W/SCOPE: CPT | Performed by: REGISTERED NURSE

## 2024-01-11 PROCEDURE — 97530 THERAPEUTIC ACTIVITIES: CPT | Mod: GP,CQ

## 2024-01-11 PROCEDURE — 1100000001 HC PRIVATE ROOM DAILY

## 2024-01-11 PROCEDURE — 2500000004 HC RX 250 GENERAL PHARMACY W/ HCPCS (ALT 636 FOR OP/ED): Performed by: NURSE PRACTITIONER

## 2024-01-11 PROCEDURE — 2500000004 HC RX 250 GENERAL PHARMACY W/ HCPCS (ALT 636 FOR OP/ED): Performed by: HOSPITALIST

## 2024-01-11 PROCEDURE — 1090000001 HH PPS REVENUE CREDIT

## 2024-01-11 PROCEDURE — 72148 MRI LUMBAR SPINE W/O DYE: CPT

## 2024-01-11 PROCEDURE — 2500000001 HC RX 250 WO HCPCS SELF ADMINISTERED DRUGS (ALT 637 FOR MEDICARE OP)

## 2024-01-11 PROCEDURE — 2500000004 HC RX 250 GENERAL PHARMACY W/ HCPCS (ALT 636 FOR OP/ED)

## 2024-01-11 PROCEDURE — 2500000001 HC RX 250 WO HCPCS SELF ADMINISTERED DRUGS (ALT 637 FOR MEDICARE OP): Performed by: ANESTHESIOLOGY

## 2024-01-11 PROCEDURE — 1090000002 HH PPS REVENUE DEBIT

## 2024-01-11 PROCEDURE — 2500000001 HC RX 250 WO HCPCS SELF ADMINISTERED DRUGS (ALT 637 FOR MEDICARE OP): Performed by: HOSPITALIST

## 2024-01-11 PROCEDURE — 2500000001 HC RX 250 WO HCPCS SELF ADMINISTERED DRUGS (ALT 637 FOR MEDICARE OP): Performed by: SPECIALIST

## 2024-01-11 PROCEDURE — 2500000001 HC RX 250 WO HCPCS SELF ADMINISTERED DRUGS (ALT 637 FOR MEDICARE OP): Performed by: REGISTERED NURSE

## 2024-01-11 PROCEDURE — 99232 SBSQ HOSP IP/OBS MODERATE 35: CPT | Performed by: REGISTERED NURSE

## 2024-01-11 PROCEDURE — 2500000002 HC RX 250 W HCPCS SELF ADMINISTERED DRUGS (ALT 637 FOR MEDICARE OP, ALT 636 FOR OP/ED): Performed by: HOSPITALIST

## 2024-01-11 PROCEDURE — 2500000004 HC RX 250 GENERAL PHARMACY W/ HCPCS (ALT 636 FOR OP/ED): Performed by: INTERNAL MEDICINE

## 2024-01-11 PROCEDURE — 72148 MRI LUMBAR SPINE W/O DYE: CPT | Performed by: RADIOLOGY

## 2024-01-11 RX ORDER — LORAZEPAM 2 MG/ML
1 INJECTION INTRAMUSCULAR ONCE
Status: COMPLETED | OUTPATIENT
Start: 2024-01-11 | End: 2024-01-11

## 2024-01-11 RX ADMIN — PILOCARPINE HYDROCHLORIDE 5 MG: 5 TABLET, FILM COATED ORAL at 09:09

## 2024-01-11 RX ADMIN — MORPHINE SULFATE 15 MG: 15 TABLET, EXTENDED RELEASE ORAL at 20:46

## 2024-01-11 RX ADMIN — MORPHINE SULFATE 2 MG: 2 INJECTION, SOLUTION INTRAMUSCULAR; INTRAVENOUS at 10:20

## 2024-01-11 RX ADMIN — ISOSORBIDE MONONITRATE 60 MG: 60 TABLET, EXTENDED RELEASE ORAL at 06:16

## 2024-01-11 RX ADMIN — ACETAMINOPHEN 650 MG: 325 TABLET ORAL at 04:58

## 2024-01-11 RX ADMIN — MORPHINE SULFATE 15 MG: 15 TABLET, EXTENDED RELEASE ORAL at 09:14

## 2024-01-11 RX ADMIN — ACETAMINOPHEN 650 MG: 325 TABLET ORAL at 15:08

## 2024-01-11 RX ADMIN — HYDROMORPHONE HYDROCHLORIDE 0.5 MG: 1 INJECTION, SOLUTION INTRAMUSCULAR; INTRAVENOUS; SUBCUTANEOUS at 15:08

## 2024-01-11 RX ADMIN — ACETAMINOPHEN 650 MG: 325 TABLET ORAL at 09:09

## 2024-01-11 RX ADMIN — PREGABALIN 75 MG: 50 CAPSULE ORAL at 09:09

## 2024-01-11 RX ADMIN — MORPHINE SULFATE 2 MG: 2 INJECTION, SOLUTION INTRAMUSCULAR; INTRAVENOUS at 12:47

## 2024-01-11 RX ADMIN — DOCUSATE SODIUM 100 MG: 100 CAPSULE, LIQUID FILLED ORAL at 09:18

## 2024-01-11 RX ADMIN — EZETIMIBE 10 MG: 10 TABLET ORAL at 09:10

## 2024-01-11 RX ADMIN — BRIMONIDINE TARTRATE 1 DROP: 2 SOLUTION OPHTHALMIC at 09:00

## 2024-01-11 RX ADMIN — Medication 3 MG: at 20:45

## 2024-01-11 RX ADMIN — DAPTOMYCIN 650 MG: 500 INJECTION, POWDER, LYOPHILIZED, FOR SOLUTION INTRAVENOUS at 22:55

## 2024-01-11 RX ADMIN — CYCLOBENZAPRINE HYDROCHLORIDE 10 MG: 10 TABLET, FILM COATED ORAL at 09:15

## 2024-01-11 RX ADMIN — LORAZEPAM 1 MG: 2 INJECTION INTRAMUSCULAR; INTRAVENOUS at 15:07

## 2024-01-11 RX ADMIN — Medication 1 TABLET: at 09:10

## 2024-01-11 RX ADMIN — BUSPIRONE HYDROCHLORIDE 10 MG: 5 TABLET ORAL at 09:10

## 2024-01-11 RX ADMIN — HYDROMORPHONE HYDROCHLORIDE 2 MG: 2 TABLET ORAL at 11:56

## 2024-01-11 RX ADMIN — OXYCODONE HYDROCHLORIDE 10 MG: 5 TABLET ORAL at 09:21

## 2024-01-11 RX ADMIN — BACLOFEN 5 MG: 10 TABLET ORAL at 20:45

## 2024-01-11 RX ADMIN — HYDRALAZINE HYDROCHLORIDE 50 MG: 50 TABLET ORAL at 09:10

## 2024-01-11 RX ADMIN — LIDOCAINE 1 PATCH: 4 PATCH TOPICAL at 09:10

## 2024-01-11 RX ADMIN — BACLOFEN 5 MG: 10 TABLET ORAL at 06:11

## 2024-01-11 RX ADMIN — MIRTAZAPINE 15 MG: 15 TABLET, FILM COATED ORAL at 20:46

## 2024-01-11 RX ADMIN — BACLOFEN 5 MG: 10 TABLET ORAL at 17:30

## 2024-01-11 RX ADMIN — FUROSEMIDE 20 MG: 40 TABLET ORAL at 09:18

## 2024-01-11 RX ADMIN — PANTOPRAZOLE SODIUM 40 MG: 40 TABLET, DELAYED RELEASE ORAL at 06:12

## 2024-01-11 RX ADMIN — BRIMONIDINE TARTRATE 1 DROP: 2 SOLUTION OPHTHALMIC at 20:47

## 2024-01-11 RX ADMIN — PREGABALIN 75 MG: 50 CAPSULE ORAL at 20:46

## 2024-01-11 RX ADMIN — ATENOLOL 50 MG: 50 TABLET ORAL at 09:10

## 2024-01-11 RX ADMIN — CEFTAROLINE FOSAMIL 600 MG: 600 POWDER, FOR SOLUTION INTRAVENOUS at 17:30

## 2024-01-11 RX ADMIN — ASPIRIN 81 MG: 81 TABLET, COATED ORAL at 09:09

## 2024-01-11 RX ADMIN — HYDRALAZINE HYDROCHLORIDE 50 MG: 50 TABLET ORAL at 20:47

## 2024-01-11 RX ADMIN — BACLOFEN 5 MG: 10 TABLET ORAL at 12:47

## 2024-01-11 RX ADMIN — DOCUSATE SODIUM 100 MG: 100 CAPSULE, LIQUID FILLED ORAL at 20:45

## 2024-01-11 ASSESSMENT — PAIN - FUNCTIONAL ASSESSMENT
PAIN_FUNCTIONAL_ASSESSMENT: 0-10

## 2024-01-11 ASSESSMENT — PAIN DESCRIPTION - ORIENTATION
ORIENTATION: LOWER

## 2024-01-11 ASSESSMENT — COGNITIVE AND FUNCTIONAL STATUS - GENERAL
MOVING TO AND FROM BED TO CHAIR: A LITTLE
MOBILITY SCORE: 16
WALKING IN HOSPITAL ROOM: A LITTLE
MOVING FROM LYING ON BACK TO SITTING ON SIDE OF FLAT BED WITH BEDRAILS: A LOT
CLIMB 3 TO 5 STEPS WITH RAILING: A LITTLE
STANDING UP FROM CHAIR USING ARMS: A LITTLE
TURNING FROM BACK TO SIDE WHILE IN FLAT BAD: A LOT

## 2024-01-11 ASSESSMENT — PAIN SCALES - GENERAL
PAINLEVEL_OUTOF10: 9
PAINLEVEL_OUTOF10: 6
PAINLEVEL_OUTOF10: 10 - WORST POSSIBLE PAIN
PAINLEVEL_OUTOF10: 5 - MODERATE PAIN
PAINLEVEL_OUTOF10: 6
PAINLEVEL_OUTOF10: 9

## 2024-01-11 ASSESSMENT — PAIN DESCRIPTION - LOCATION
LOCATION: BACK

## 2024-01-11 NOTE — CARE PLAN
The patient's goals for the shift include      The clinical goals for the shift include patient will remain hemodynamically stable throughout shift.

## 2024-01-11 NOTE — PROGRESS NOTES
The patient is awake and alert    !  Started Lidoderm patch.  Patient feeling better today  Ambulated better today.  Has a low physical endurance  Patient was having recurrent spasms in the back and the legs before, doing better with the baclofen.  Patient was already on a Zanaflex regimen and Lyrica at home  Patient started a low-dose of baclofen, 5 mg 4 times a day, doing better  Moving the extremities better  Ambulated better  Rehab is working with her  MRI lumbar spine had shown Discitis and other changes.  She is on antibiotics regimen which was revised, on Cubicin  No significant spinal cord compression  Visit Vitals  /61   Pulse 76   Temp 36.4 °C (97.5 °F)   Resp 18        Neurological Exam:    Oriented to day date,month,year, place and person. Knew the name of the president.  Patient was slow to response after the Ativan was given.      Recent and remote memory was intact. Attention span and concentration were normal. General fund of knowledge was intact.   Language: speech comprehension, repetition, expression, and naming is intact for patient´s age and level of education.      CRANIAL NERVES:   CN 2 The fundi were well visualized with normal disc margins, clear vessels and vascular pulsations. No disc edema.  No hemorrhages or exudates were present in the posterior segments that were visualized. Visual fields full to confrontation.   CN 3, 4, 6 Pupils round, equally reactive to light. No ptosis. EOM normal alignment, full range with normal saccades, pursuit and convergence. No nystagmus.   CN 5 Facial sensation intact bilaterally.   CN 7 Normal and symmetric facial strength. Nasolabial folds symmetric.   CN 8 Hearing intact to finger rub bilaterally. On Rinne and Monreal testing there was no lateralisation  CN 9 Palate elevates symmetrically.   CN 11 Bilaterally normal strength of shoulder shrug and neck turning.   CN 12 Tongue midline, with normal bulk and strength; no fasciculations.   Patient is  handling pharyngeal secretions well.   Speech: articulation is intact.      MOTOR: The patient has normal muscle tone and has normal muscle mass. Muscle strength was 5/5 in distal and proximal muscles in both upper and lower extremities when she gave l the effort.  With the pain effort was difficult in the lower extremities. No fasciculations, tremor or other abnormal movements were present.   On Viola testing the patient has no pronation or drift.      REFLEXES:   RIGHT UE         LEFT UE   BR:         1                         BR:       1      Biceps:    1                        Biceps:1  Triceps:   1                     Triceps: 1  RIGHT LLE        LEFT LLE   Patella:   1                         Patella: 1  Achilles:1                     Achilles:1  Babinski: plantar responses are flexor.   No frontal release signs or other pathologic reflexes present.      COORDINATION: In both upper extremities, finger-nose-finger was intact without dysmetria. No dysdiadochokinesia. In both lower extremities, heel-to-knee-shin was intact.      GAIT: Could not be tested.  Patient is feeling weak with the back pain.  Romberg patient could not be tested  SENSORY: In both upper and lower extremities, sensation was decreased to pinprick, temperature   below the knees and elbows bilaterally, vibration decreased in the distal extremities, and intact joint position sense.    BMP:  Results from last 7 days   Lab Units 01/10/24  1331 01/09/24  0438 01/07/24  0438   SODIUM mmol/L 130* 131* 133*   POTASSIUM mmol/L 4.1 3.6 3.8   CHLORIDE mmol/L 97* 97* 97*   CO2 mmol/L 26 28 28   BUN mg/dL 15 13 11   CREATININE mg/dL 0.80 0.80 0.77         CBC:  Results from last 7 days   Lab Units 01/10/24  1331 01/09/24  0438 01/08/24  0411 01/07/24  0438   WBC AUTO x10*3/uL 9.3 8.6  --  4.8   RBC AUTO x10*6/uL 2.77* 2.65*  --  2.94*   HEMOGLOBIN g/dL 8.5* 8.1* 8.9* 8.9*   HEMATOCRIT % 26.5* 25.1* 27.7* 28.3*   MCV fL 96 95  --  96   MCH pg 30.7 30.6  --   "30.3   MCHC g/dL 32.1 32.3  --  31.4*   RDW % 15.0* 15.2*  --  15.2*   PLATELETS AUTO x10*3/uL 288 279  --  288         INR:        Lipid Profile:        No lab exists for component: \"LABVLDL\"    HgbA1C:        CMP:  Results from last 7 days   Lab Units 01/10/24  1331 01/09/24  0438 01/07/24  0438   SODIUM mmol/L 130* 131* 133*   POTASSIUM mmol/L 4.1 3.6 3.8   CHLORIDE mmol/L 97* 97* 97*   CO2 mmol/L 26 28 28   BUN mg/dL 15 13 11   CREATININE mg/dL 0.80 0.80 0.77   GLUCOSE mg/dL 141* 126* 105*   CALCIUM mg/dL 9.2 9.1 8.9         ABG:        No lab exists for component: \"PO2\", \"PCO2\", \"HCO3\", \"BE\", \"O2SAT\"    TSH:  No results found for: \"TSH\"    Lab Results   Component Value Date    XIQXVUSV78 322 01/05/2024     Lab Results   Component Value Date    FOLATE 5.1 01/05/2024     Lab Results   Component Value Date    VITD25 33 01/05/2024         No MRI head results found for the past 12 months     No CT head results found for the past 12 months     CT abdomen pelvis wo IV contrast    Result Date: 12/31/2023  STUDY: CT Abdomen and Pelvis without IV Contrast; 12/31/2023 at 5:11 PM. INDICATION: Abdominal pain. COMPARISON: CT AP 12/19/2023 ACCESSION NUMBER(S): [id number] ORDERING CLINICIAN: WILLIAM EDMONDS TECHNIQUE: CT of the abdomen and pelvis was performed.  Contiguous axial images were obtained at 3 mm slice thickness through the abdomen and pelvis. Coronal and sagittal reconstructions at 3 mm slice thickness were performed. No intravenous contrast was administered.  Automated mA/kV exposure control was utilized and patient examination was performed in strict accordance with principles of ALARA. FINDINGS: Please note that the evaluation of vessels, lymph nodes and organs is limited without intravenous contrast.  LOWER CHEST: No cardiomegaly.  No pericardial effusion.  Very small bilateral pleural effusions  ABDOMEN:  LIVER: No hepatomegaly.  Smooth surface contour.  Normal attenuation.  BILE DUCTS: No intrahepatic or " extrahepatic biliary ductal dilatation.  GALLBLADDER: The gallbladder is absent. STOMACH: No abnormalities identified.  PANCREAS: Negative for pancreatitis  SPLEEN: No splenomegaly or focal splenic lesion.  ADRENAL GLANDS: No thickening or nodules.  KIDNEYS AND URETERS: Kidneys are normal in size and location.  No renal or ureteral calculi.  PELVIS:  BLADDER: No abnormalities identified.  REPRODUCTIVE ORGANS: No abnormalities identified.  BOWEL: Colonic diverticulosis.  Mild constipation without bowel obstruction. Negative for appendicitis  VESSELS: Limited due to lack of intravenous contrast.  Prior femoral to femoral artery bypass.  Abdominal aorta is normal in caliber.  PERITONEUM/RETROPERITONEUM/LYMPH NODES: Small amount of low-density free pelvic fluid  No pneumoperitoneum. No lymphadenopathy.  ABDOMINAL WALL: Small fat-containing umbilical hernia. SOFT TISSUES: Postsurgical changes within the posterior lumbar soft tissues with surgical drains in place.  BONES: Status post interbody fusion of L4/5 and L5/S1 with metallic spacer. This space narrowing L3/4.  Laminectomies L3-L5.  Bone graft along the lateral margins of L3-L5 fracture of the right L3 transverse process. Fracture of the right L4 transverse process.  Old screw tract within the L4 and L5 pedicles.  Narrowing of the right L5/S1 neural foramen due to facet joint osteophyte.    Negative for bowel obstruction.  Mild constipation. Colonic diverticulosis without diverticulitis. Negative for appendicitis. Postsurgical changes lumbar spine removal of pedicle screws and posterior rods from L4 through S1.  Stable appearance of interbody spacers at L4/5 and L5/S1. Bone graft along the lateral margins of L3-L5.  Fractures of the right L4 and L3 transverse process.  The right L3 transverse process fracture appears old.  Postsurgical changes within the posterior lumbar soft tissues with surgical drains in place. Signed by Aung Sparrow MD    MR lumbar spine w and  wo IV contrast    Result Date: 12/31/2023  Interpreted By:  Jonas Sanchez, STUDY: MR LUMBAR SPINE W AND WO IV CONTRAST;  12/31/2023 12:33 am   INDICATION: Signs/Symptoms:Pain.   COMPARISON: MRI of the lumbar spine dated 12/10/2023   ACCESSION NUMBER(S): [id number]   ORDERING CLINICIAN: VYETTE LEE   TECHNIQUE: Sagittal T1, T2, STIR, axial T1 and T2 weighted images of the lumbar spine were acquired. Additional axial and sagittal T1 weighted images of the lumbar spine were obtained after intravenous administration of 15.5 mL of contrast.   FINDINGS: Exam is degraded by motion.   There is again evidence of 5 lumbar type non rib-bearing vertebral bodies, with the lowest well-formed intervertebral disc space labeled L5-S1.   Postsurgical changes of posterior decompression and laminectomies of L3 through L5 with posterior spinal fusion extending from L4 through S1 and discectomies with intervertebral disc spaces at L4-L5 and L5-S1. These are similar in appearance to prior examination on 12/10/2023. Susceptibility artifact from the surgical hardware somewhat limits evaluation of the adjacent structures.   In the interim since prior imaging on 12/10/2023, new postsurgical consistent with irrigation debridement of subfascial tissues of the cutaneous spine are evident. STIR and T2 hypointense signal abnormality previously seen in the cutaneous fat of the lower lumbar spine has resolved, with new 3.7 x 3.6 x 13.6 cm (series 7, image 10; series 10, image 10) T2 hyperintense fluid collection present, with slight peripheral enhancement. This collection may be contiguous with the skin.   T2 hyperintense collection is again present in the laminectomy surgical bed, abutting the thecal sac at the level of L3 through L5 (series 8, image 14), measuring up to 5.2 cm in craniocaudal dimension, 2.9 cm in transverse dimension and up to 1.5 cm in AP dimension (series 10, image 7). The surgical catheter previously seen within  the collection is gone, although collection is decreased in size to previous imaging, with resolution of previously seen air within the collection. Mild surrounding edema and reactive soft tissue changes are present in the epidural space, somewhat increased in the interim since prior exam, with new/increased mass effect on the adjacent thecal sac.   There also appears to be small amount of new fluid present in the anterior epidural space at the level of L3 (series 10, image 3).   Although the exact characterization is difficult due to motion, there appears to be somewhat worsened spinal canal narrowing at the level of L2-L3 due to combination of new fluid in the anterior epidural space, and the T2 hyperintense fluid collection with associated inflammatory/reactive changes along the posterior aspect of the thecal sac, with somewhat increased effacement of the subarachnoid space.   No new intra thecal enhancement is identified.   Neural foramina are not well assessed due to motion and susceptibility artifact from the surgical hardware.       1.  New surgical changes of irrigation and debridement in the laminectomy surgical bed evident, with previously seen geographic area of hypointense T2 and STIR signal in the cutaneous fat of the lumbar spine resolved, and new T2 hyperintense collection measuring 3.7 x 3.6 x 13.6 cm present in the cutaneous fat, likely representing a postsurgical seroma, although sterility can not be ascertained on imaging alone. This collection reaches of the dermis, and may drain at the skin. 2. T2 hyperintense collection within the laminectomy bed itself along the dorsal aspect of the thecal sac described on previous MRI in 12/10/2023 has mildly decreased in size from prior imaging, with interval removal of previously seen drain, although there are increased inflammatory/reactive soft tissue changes present in the laminectomy surgical bed, focus of fluid in the anterior epidural space at the  level of L3 contributes to increased effacement of the thecal sac at the level of L3 compared to prior MRI.   MACRO: None   Signed by: Jonas Sanchez 12/31/2023 1:13 AM Dictation workstation:   [id number]    MR LUMBAR SPINE WO IV CONTRAST;  1/5/2024 8:48 pm      INDICATION:  Signs/Symptoms:intractable back pain s/o incision and drainage with  lumbar spine hardware removal.      COMPARISON:  Lumbar MRI dated 12/30/2022 and 12/10/2022. CT abdomen pelvis dated  12/31/2022.      ACCESSION NUMBER(S):  [id number]      ORDERING CLINICIAN:  ERICK KEN      TECHNIQUE:  Sagittal T2, T1, and STIR and axial T1, T2 sequences of the lumbar  spine. No intravenous contrast was administered.      FINDINGS:      Lumbar spine:  Normal lumbar lordosis. The last well-formed disc is designated  L5-S1. Minimal L5 over S1 anterolisthesis unchanged from prior. There  is minimal L3 over L4 retrolisthesis unchanged from prior. There has  been interval removal of the bilateral transpedicular screws and  vertical rods at L4 through S1 with T2 hyperintense ghost tracts  noted. There are unchanged disc spacers at L4-L5 and L5-S1. There is  a subcutaneous drain in place extending cranially along the midline  to the level of L3 without associated fluid collection. This  surrounded by subcutaneous edema. There is also a right deep  paravertebral drain in place terminating in the right deep  paravertebral soft tissues at the level of L1-L2 without associated  fluid collection, sagittal image 11 of 25. There is bilateral  posterior paravertebral muscle edema without evidence of fluid  collection.      Vertebral body heights are maintained. 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 There is a T2 hyperintense  fluid collection within the left subarticular ventral epidural space  extending cranially at the level of L3 and which appears to be  in  continuity with the L3-L4 disc space measuring 2.0 cm craniocaudally  and 1.0 x 0.7 cm axially, unchanged from the recent prior. This  contributes to mild effacement of the left subarticular space.              The conus medullaris terminates at L1 and is normal in appearance.      T12-L1: No significant disc bulge or herniation.  L1-L2: Is a diffuse disc bulge and facet hypertrophy contributing to  mild bilateral foraminal stenosis without significant central canal  stenosis. L2-L3: Small disc bulge and facet hypertrophy without  significant central or foraminal canal stenosis. L3-L4: There is  posterior laminectomy change without evidence of central canal  stenosis. There is mild retrolisthesis of L3 over L4 as well as T2  hyperintense fluid collection extending superiorly from the left  subarticular space and contributing to mild left subarticular  stenosis as well as mild central canal stenosis at the level of L3  similar to prior. There is suspected moderate to severe right  foraminal stenosis due to mild retrolisthesis with facet hypertrophy  as well as bone marrow edema at the right synovial facets, for  example sagittal image 9 of 25. There is also mild edema within the  right psoas muscle at the level of L3-L4, axial image 4 of 27 without  evidence of a fluid collection. There are bilateral right greater  than left facet effusions at L3-L4 and possibility of septic  arthritis can not be excluded. There is mild to moderate left  foraminal stenosis. L4-L5: There are right-sided facetectomy changes  with with hazy fluid within the surgical bed which may be expected.  Posterior laminectomy change. There is mild bilateral foraminal  narrowing. No central canal stenosis. L5-S1: There are right-sided  facetectomy changes with hazy fluid within the surgical bed, which  may be expected. Posterior laminectomy change. Mild bilateral  foraminal narrowing. No central canal stenosis.      IMPRESSION:  Interval  removal of bilateral transpedicular screws and vertical rods  at L3 through S1 and interval resolution of posterior paravertebral  fluid collections. Lumbar drains within the subcutaneous and deep  posterior paravertebral soft tissues as described above with  associated soft tissue edema, however no evidence of surrounding  fluid collection.      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 There is minimal L3 over L4  retrolisthesis as well as facet hypertrophy, which contributes to  moderate to severe right foraminal stenosis at L3-L4. There is also  asymmetric right-sided facet effusion and right subchondral edema at  L3-L4 facets and asymmetric right psoas edema at L3-L4 concerning for  septic arthritis.      There is unchanged ventral epidural collection on the left at the  level of L3 which may be communicating with the L3-L4 disc. The  sterility of this collection can not be determined given multiple  prior surgeries. It contributes to unchanged mild central canal  stenosis and left subarticular stenosis at L3-L4.      Signed by: Marco Valles 1/6/2024 12:46 AM  Dictation workstation:   [id number]  EMG     No EMG results found for the past 12 months        No EEG results found for the past 12 months      Current Facility-Administered Medications:     acetaminophen (Tylenol) tablet 650 mg, 650 mg, oral, q4h PRN, 650 mg at 01/05/24 2104 **OR** acetaminophen (Tylenol) oral liquid 650 mg, 650 mg, oral, q4h PRN **OR** acetaminophen (Tylenol) suppository 650 mg, 650 mg, rectal, q4h PRN, DARRYL Quiros-CNP    acetaminophen (Tylenol) tablet 650 mg, 650 mg, oral, q6h, DARRYL Quiros-CNP, 650 mg at 01/11/24 0909    alteplase (Cathflo Activase) injection 1 mg, 1 mg, intra-catheter, PRN, Nitesh Ruiz MD, 1 mg at 01/07/24 1150    aspirin EC tablet 81 mg, 81 mg, oral, Daily, John Salazar MD, 81 mg at 01/11/24 0909    atenolol  (Tenormin) tablet 50 mg, 50 mg, oral, q AM, John Salazar MD, 50 mg at 01/11/24 0910    baclofen (Lioresal) tablet 5 mg, 5 mg, oral, 4x daily, Onur Perez MD, 5 mg at 01/11/24 1247    brimonidine (AlphaGAN) 0.2 % ophthalmic solution 1 drop, 1 drop, Both Eyes, BID, John Salazar MD, 1 drop at 01/11/24 0900    busPIRone (Buspar) tablet 10 mg, 10 mg, oral, Daily, John Salazar MD, 10 mg at 01/11/24 0910    cyclobenzaprine (Flexeril) tablet 10 mg, 10 mg, oral, TID PRN, DARRYL Quiros-CNP, 10 mg at 01/11/24 0915    DAPTOmycin (Cubicin) 500 mg in sodium chloride 0.9% 50 mL IV, 500 mg, intravenous, Nightly, Pete Echeverria MD, Stopped at 01/10/24 2108    docusate sodium (Colace) capsule 100 mg, 100 mg, oral, BID, DARRYL Szymanski-CNP, 100 mg at 01/11/24 0918    ezetimibe (Zetia) tablet 10 mg, 10 mg, oral, Daily, John Salazar MD, 10 mg at 01/11/24 0910    furosemide (Lasix) tablet 20 mg, 20 mg, oral, Daily, John Salazar MD, 20 mg at 01/11/24 0918    hydrALAZINE (Apresoline) tablet 50 mg, 50 mg, oral, BID, John Salazar MD, 50 mg at 01/11/24 0910    HYDROmorphone (Dilaudid) injection 0.5 mg, 0.5 mg, intravenous, Once, DARRYL Kline-CNP    HYDROmorphone (Dilaudid) tablet 2 mg, 2 mg, oral, q4h PRN, Nitesh Ruiz MD, 2 mg at 01/11/24 1156    isosorbide mononitrate ER (Imdur) 24 hr tablet 60 mg, 60 mg, oral, Daily, Jhon Salazar MD, 60 mg at 01/11/24 0616    lactobacillus acidophilus tablet 1 tablet, 1 tablet, oral, Daily, John Salazar MD, 1 tablet at 01/11/24 0910    lidocaine 4 % patch 1 patch, 1 patch, transdermal, Daily, Maribeth Santamaria, APRN-CNP, 1 patch at 01/10/24 0910    lidocaine 4 % patch 1 patch, 1 patch, transdermal, Daily, Onur Perez MD, 1 patch at 01/11/24 0910    LORazepam (Ativan) injection 1 mg, 1 mg, intravenous, Once, Edi Olguin, APRN-CNP    meclizine (Antivert) tablet 25 mg, 25 mg, oral, q8h PRN, John Salazar MD    melatonin tablet 3 mg, 3 mg, oral, Daily,  Maribeth Hina, APRN-CNP, 3 mg at 01/10/24 2030    mirtazapine (Remeron) tablet 15 mg, 15 mg, oral, Nightly, John Salazar MD, 15 mg at 01/10/24 2031    morphine CR (MS Contin) 12 hr tablet 15 mg, 15 mg, oral, q12h EDE, Panchito Barnes MD, 15 mg at 01/11/24 0914    morphine injection 2 mg, 2 mg, intravenous, q2h PRN, ORLANDO Quiros, 2 mg at 01/11/24 1247    naloxone (Narcan) injection 0.2 mg, 0.2 mg, intravenous, q5 min PRN, ORLANDO Quiros    ondansetron (Zofran) tablet 4 mg, 4 mg, oral, q8h PRN, 4 mg at 12/31/23 0904 **OR** ondansetron (Zofran) injection 4 mg, 4 mg, intravenous, q8h PRN, ORLANDO Quiros, 4 mg at 12/31/23 1317    oxyCODONE (Roxicodone) immediate release tablet 10 mg, 10 mg, oral, q6h PRN, Nitesh Ruiz MD, 10 mg at 01/11/24 0921    pantoprazole (ProtoNix) EC tablet 40 mg, 40 mg, oral, Daily before breakfast, John Salazar MD, 40 mg at 01/11/24 0612    pilocarpine (Salagen) tablet 5 mg, 5 mg, oral, Daily, John Salazar MD, 5 mg at 01/11/24 0909    polyethylene glycol (Glycolax, Miralax) packet 17 g, 17 g, oral, Daily, OLRANDO Quiros, 17 g at 01/07/24 0903    pregabalin (Lyrica) capsule 75 mg, 75 mg, oral, BID, John Salazar MD, 75 mg at 01/11/24 0909    vitamin A & D ointment 1 Application, 1 Application, Topical, q4h PRN, Nitesh Ruiz MD, 1 Application at 01/10/24 2155         Assessment:  Patient has a chronic back pain.  She has a lumbar spine surgery in November 2023.  Dr. Coombs is following the patient.  Patient developed a postop staph infection with MRSA.  She has the antibiotics, incision and drainage.  Pain in the back is more on the right side along with pain in the right thigh and right hip.  Imaging of the right hip did not show significant pathology.  Pain exacerbation with the L3-4 discitis and osteomyelitis.  Infectious disease on consult antibiotics have been adjusted, patient is on Cubicin  Patient is improving with  the movements in the legs.  Pain was worse today  Patient is getting a lot of analgesics.  Dr. Barnes is on consult  Right leg DVT she has an inferior vena cava filter put in January 2.  She is on Eliquis  Right carotid bruit, right carotid endarterectomy by Dr. Edwards.  History of right cerebral TIA  Coronary artery disease  Cardiomyopathy  Bilateral total knee replacements.  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  S/p bilateral leg revascularization June 2022  Hypertension  Chronic edema  COPD with history of tobacco abuse in the past  Overweight  Vitamin D deficiency has been treated  Lidoderm patch helping   additional lab work showed a borderline elevated TSH, B12, folic acid level, vitamin D in good range  With a better pain control she can start increasing her activity.  Rehab to increase activity as tolerated  baclofen low-dose was added to her regimen without any side effects, patient improved as far as pasms are concerned     Onur Perez MD

## 2024-01-11 NOTE — PROGRESS NOTES
"Nutrition Follow Up Assessment:   Nutrition Assessment         Patient is a 68 y.o. female presenting with: postoperative surgical complication involving musculoskeletal system associated with musculoskeletal procedure      Nutrition History:  Energy Intake: Fair 50-75 %  Food and Nutrient History: RD follow-up. Pt reporting continued severe pain with continued loss of appetite. Continues to dislike hospital food though is enjoying peanut butter and jelly sandwiches and milks on trays. Dislikes [place] Instant Breakfast, discontinued. Not wishing to receive other oral nutritional supplements at this time. Per pt, daughter has brought in [employer] sandwich and blizzard, with 50% of each consumed. Encouraged pt to have family continue to bring in food to help with intakes. Visitor at bedside though did not contribute to conversation.  Food Allergies/Intolerances:  None  GI Symptoms: None  Oral Problems: None       Anthropometrics:  Height: 144.8 cm (4' 9.01\")   Weight: 78.2 kg (172 lb 6.4 oz)   BMI (Calculated): 37.3  IBW/kg (Dietitian Calculated): 42.3 kg (using the upper end of IBW range)          Weight History:     Wt Readings from Last 10 Encounters:   12/31/23 78.2 kg (172 lb 6.4 oz)   12/20/23 80.5 kg (177 lb 7.5 oz)   12/15/23 82.6 kg (182 lb 1.6 oz)   11/21/23 68.4 kg (150 lb 12.7 oz)   11/07/23 77.1 kg (170 lb)   11/03/23 81 kg (178 lb 9.2 oz)   09/14/23 79.1 kg (174 lb 6 oz)   12/15/22 76.2 kg (167 lb 14.4 oz)   05/31/22 78.8 kg (173 lb 12.8 oz)   03/15/22 78.5 kg (173 lb)         Weight Change %: No new wt obtained since 12/31/23       Nutrition Focused Physical Exam Findings:  defer: d/t severe pain    Edema:  Edema Location: non-pitting generalized edema per nursing  Physical Findings:  Skin: Positive (back incision)    Nutrition Significant Labs:  BMP Trend:   Results from last 7 days   Lab Units 01/10/24  1331 01/09/24  0438 01/07/24  0438 01/05/24  2207   GLUCOSE mg/dL 141* 126* 105* 128* "   CALCIUM mg/dL 9.2 9.1 8.9 9.0   SODIUM mmol/L 130* 131* 133* 132*   POTASSIUM mmol/L 4.1 3.6 3.8 3.9   CO2 mmol/L 26 28 28 28   CHLORIDE mmol/L 97* 97* 97* 96*   BUN mg/dL 15 13 11 10   CREATININE mg/dL 0.80 0.80 0.77 0.74        Nutrition Specific Medications: Reviewed      I/O:   Last BM Date: 01/10/24; Stool Appearance: Unable to assess (01/04/24 2150)        Dietary Orders (From admission, onward)       Start     Ordered    01/02/24 1443  Adult diet Regular  Diet effective now        Question:  Diet type  Answer:  Regular    01/02/24 1442    12/31/23 0418  May Participate in Room Service  Once        Question:  .  Answer:  Yes    12/31/23 0417                     Estimated Needs:   Total Energy Estimated Needs (kCal): 1560 kCal  Method for Estimating Needs: 7059-6724 kcals (18-20 kcals/kg)  Total Protein Estimated Needs (g): 63 g  Method for Estimating Needs: 55-63 gm (1.3-1.5 g/kg IBW)  Total Fluid Estimated Needs (mL): 1950 mL  Method for Estimating Needs: 1950 mL (25 mL/kg)        Nutrition Diagnosis   Malnutrition Diagnosis  Patient has Malnutrition Diagnosis: No (need to confirm accuracy of weight and/or obtain more evidence)    Nutrition Diagnosis  Patient has Nutrition Diagnosis: Yes  Diagnosis Status (1): Ongoing  Nutrition Diagnosis 1: Inadequate protein energy intake  Related to (1): pain  As Evidenced by (1): pt reporting continued decreased appetite/intakes, dislike of supplements       Nutrition Interventions/Recommendations         Nutrition Prescription:  Individualized Nutrition Prescription Provided for : Regular diet. Peanut butter and jelly sandwich once daily in addition to meals. Two 2% milk cartons per meals at request of pt. Allow family and visitors to bring in food.        Nutrition Interventions:   Food and/or Nutrient Delivery Interventions  Interventions: Meals and snacks  Meals and Snacks: General healthful diet  Goal: Consumes 3 meals per day         Nutrition Education:       Discussed importance of adequate nutrition to promote wound healing.    Nutrition Monitoring and Evaluation   Food/Nutrient Related History Monitoring  Monitoring and Evaluation Plan: Energy intake, Amount of food  Energy Intake: Estimated energy intake  Criteria: Pt meets >75% of estimated energy needs  Amount of Food: Estimated amout of food  Criteria: Pt consumes >50% of meals    Body Composition/Growth/Weight History  Monitoring and Evaluation Plan: Weight  Weight: Measured weight  Criteria: Maintains stable weight    Biochemical Data, Medical Tests and Procedures  Monitoring and Evaluation Plan: Glucose/endocrine profile, Electrolyte/renal panel  Criteria: WNL  Glucose/Endocrine Profile: Glucose, casual  Criteria: BG within desirable range            Time Spent/Follow-up Reminder:   Time Spent (min): 30 minutes  Last Date of Nutrition Visit: 01/11/24  Nutrition Follow-Up Needed?: 5-7 days

## 2024-01-11 NOTE — NURSING NOTE
Received call from [facility] insurance from ROBIN mccabe. Returned call at phone [phone] and received VM, left message requesting call back.

## 2024-01-11 NOTE — PROGRESS NOTES
"Physical Therapy    Physical Therapy Treatment    Patient Name: Tara Tierney  MRN: [id number]  Today's Date: 1/11/2024  Time Calculation  Start Time: 0935  Stop Time: 0945  Time Calculation (min): 10 min       Assessment/Plan   PT Assessment  PT Assessment Results: Decreased strength, Decreased mobility, Decreased endurance  Rehab Prognosis: Good  Evaluation/Treatment Tolerance: Patient limited by pain  Medical Staff Made Aware: Yes  End of Session Communication: Bedside nurse, PCT/NA/CTA  Assessment Comment: Patient will benefit from additional PT to increase mobility and activity tolerance.  End of Session Patient Position: Up in chair  PT Plan  Inpatient/Swing Bed or Outpatient: Inpatient  Treatment/Interventions: Bed mobility, Transfer training, Gait training, Therapeutic activity  PT Plan: Skilled PT  PT Frequency: 3 times per week       PT Recommended Transfer Status: Assist x1    General Visit Information:   PT  Visit  PT Received On: 01/11/24  General  Missed Visit: Yes  Missed Visit Reason: Other (Comment)  Family/Caregiver Present: No  Prior to Session Communication: Bedside nurse  Patient Position Received: Up in chair, Alarm off, not on at start of session  General Comment: Patient tear-eyed and upset about current situation. Support provided.    General Observations:   General Observation: x2 JOSE drains; Tele.    Subjective     Precautions:  Precautions  LE Weight Bearing Status: Weight Bearing as Tolerated  Medical Precautions: Fall precautions  Post-Surgical Precautions: Spinal precautions  Braces Applied: TLSO (Pt did not have TLSO brace on upon arrival; states MD told her she only needs to wear it when ambulating.)    Objective     Pain:  Pain Assessment  Pain Assessment: 0-10 (c/o increased pain in low back that goes down (R)LE with \"burning\" pain in (R)gluteal area. Nursing informed and aware.)    Cognition:  Cognition  Orientation Level: Oriented X4    Treatments:         "     Transfers  Transfer: Yes  Transfer 1  Technique 1: Sit to stand, Stand to sit  Transfer Device 1:  (ww)  Transfer Level of Assistance 1: Minimum assistance  Trials/Comments 1: (2x). Instructions for donning TLSO brace for support with transfers, but patient stated the MD told her she just had to wear it for amb; declined donning brace. v/c for safe hand placement and technique. Slow transition of hands to/from ww. Support provided to shift COG foward over FRACISCO. c/o back pain and (R)LE weakness. No buckling with WB. Patient did not feel able to amb at this time 2° pain. Nursing aware. .          Outcome Measures:  [facility] Basic Mobility  Turning from your back to your side while in a flat bed without using bedrails: A lot  Moving from lying on your back to sitting on the side of a flat bed without using bedrails: A lot  Moving to and from bed to chair (including a wheelchair): A little  Standing up from a chair using your arms (e.g. wheelchair or bedside chair): A little  To walk in hospital room: A little  Climbing 3-5 steps with railing: A little  Basic Mobility - Total Score: 16    Education Documentation  Precautions, taught by Jeanine Bryan PTA at 1/11/2024 12:54 PM.  Learner: Patient  Readiness: Acceptance  Method: Explanation  Response: Verbalizes Understanding, Needs Reinforcement  Comment: See therapy note.    Body Mechanics, taught by Jeanine Bryan PTA at 1/11/2024 12:54 PM.  Learner: Patient  Readiness: Acceptance  Method: Explanation  Response: Verbalizes Understanding, Needs Reinforcement  Comment: See therapy note.    Mobility Training, taught by Jeanine Bryan PTA at 1/11/2024 12:54 PM.  Learner: Patient  Readiness: Acceptance  Method: Explanation  Response: Verbalizes Understanding, Needs Reinforcement  Comment: See therapy note.      EDUCATION:  Individual(s) Educated: Patient  Education Provided: Fall Risk, Body Mechanics, Home Safety, POC, Post-Op Precautions  Patient Response to  Education: Patient/Caregiver Verbalized Understanding of Information    Encounter Problems       Encounter Problems (Active)       PT Problem       Bed mobility supine <> sit modified independent  (Progressing)       Start:  01/02/24    Expected End:  01/12/24            Transfers sit <> stand with ww modified independent  (Progressing)       Start:  01/02/24    Expected End:  01/12/24            Patient to ambulate with ww 50' CGA  (Progressing)       Start:  01/02/24    Expected End:  01/12/24

## 2024-01-11 NOTE — PROGRESS NOTES
DAILY PROGRESS NOTE      POD10 Lumbar washout and hardware removal     Patient doing well  Visit Vitals  /61   Pulse 76   Temp 36.4 °C (97.5 °F)   Resp 18      Temp (24hrs), Av.6 °C (97.9 °F), Min:36.4 °C (97.5 °F), Max:36.9 °C (98.4 °F)       Pain Control significant improvement. Reports very minimal lower extremity pain at this time.   No chest pain or shortness of breath.  No calf pain    Exam:   Good bilateral lower extremity strength and sensation.   Extremity shows neuro vascular status intact. Flexion and extension intact on extremity.  Calves soft and non-tender without evidence of DVT.        Labs reviewed:  CBC: @LABRCNT(WBC:3,HGB:3,PLT:3)@  BMP:  @LABRCNT(Na:3,K:3,CL:3,CO2:3,BUN:3,Creatinine:3,Glucose:3)@    I&O  I/O last 3 completed shifts:  In: 670 (8.6 mL/kg) [P.O.:550; IV Piggyback:120]  Out: 3969 (50.8 mL/kg) [Urine:3850 (1.4 mL/kg/hr); Drains:119]  Weight: 78.2 kg       Assessment:    Patient seen this morning sitting up in chart with lumbar brace on. She reports significant improvement in her lower extremity pain and just reports some generalized weakness to lower extremities contributing this to decreased mobility.   JOSE drains are holding suction and working appropriately. Deep drain with 19cc serousang drainage and superficial drain with 14cc serosang drainage.     1108 Called from patient's nurse noting that they got patient up to the bathroom and she is not having severe low back and right leg pain. She has been medicated per order and is not finding any relief in her pain. Spoke to Dr. Reyna with infectious disease as patients CRP and ESR and elevating. Recommend MRI lumbar spine without contrast. Also recommend contacting Dr. Barnes from pain management to help optimize pain control. Further recommendations to follow results of MRI.     Plan:    Continue with therapy   Brace when OOB and with ambulation   JOSE drains to remain in place until there is 0 output for 48 hours    Continue IV antibiotics per ID  Continue pain control per pain management   Cannot shower until drainage has stopped     Edi Olguin, APRN-CNP   1/11/2024 8:24 AM

## 2024-01-11 NOTE — PROGRESS NOTES
Tara Tierney is a 68 y.o. female on day 11 of admission presenting with Postoperative surgical complication involving musculoskeletal system associated with musculoskeletal procedure, unspecified complication.      Subjective   Patient examined and seen. Alert and oriented x3, resting up in chair. Reports worsening pain with ambulation. Pain is to lower back and right posterior thigh. States this has progressively worsened.  Patient denies chest pain, shortness of breath, palpitations, abdominal pain, fever or chills.  Orthopedics aware of increased pain with ambulation.          Objective     Last Recorded Vitals  /61   Pulse 76   Temp 36.4 °C (97.5 °F)   Resp 18   Wt 78.2 kg (172 lb 6.4 oz)   SpO2 95%   Intake/Output last 3 Shifts:    Intake/Output Summary (Last 24 hours) at 1/11/2024 1205  Last data filed at 1/11/2024 0615  Gross per 24 hour   Intake 260 ml   Output 1883 ml   Net -1623 ml       Admission Weight  Weight: 77.1 kg (170 lb) (12/30/23 1908)    Daily Weight  12/31/23 : 78.2 kg (172 lb 6.4 oz)    Image Results  ECG 12 lead  Sinus rhythm with short NM  Left bundle branch block  Abnormal ECG  When compared with ECG of 18-DEC-2023 12:36, (unconfirmed)  NM interval has decreased  Left bundle branch block has replaced Incomplete right bundle branch block    See ED provider note for full interpretation and clinical correlation  Confirmed by Sarah Richardson ([id number]) on 1/10/2024 12:16:27 PM      Physical Exam  Constitutional: elderly appearing, awake/alert/oriented x3, cooperative  Respiratory/Thorax: Patent airways,  normal breath sounds   Cardiovascular: Regular, rate and rhythm, 2+ equal pulses of the extremities, Musculoskeletal: mild decrease range of motion lumbar spline with JOSE drain with serosanguineous drainage, + tenderness with ambulation to right posterior thigh  Skin: warm, dry, intact except as noted  Neurological: alert/oriented x 3, speech clear at baseline    Relevant  Results    Scheduled medications  acetaminophen, 650 mg, oral, q6h  aspirin, 81 mg, oral, Daily  atenolol, 50 mg, oral, q AM  baclofen, 5 mg, oral, 4x daily  brimonidine, 1 drop, Both Eyes, BID  busPIRone, 10 mg, oral, Daily  daptomycin, 500 mg, intravenous, Nightly  docusate sodium, 100 mg, oral, BID  ezetimibe, 10 mg, oral, Daily  furosemide, 20 mg, oral, Daily  hydrALAZINE, 50 mg, oral, BID  isosorbide mononitrate ER, 60 mg, oral, Daily  lactobacillus acidophilus, 1 tablet, oral, Daily  lidocaine, 1 patch, transdermal, Daily  lidocaine, 1 patch, transdermal, Daily  LORazepam, 1 mg, intravenous, Once  melatonin, 3 mg, oral, Daily  mirtazapine, 15 mg, oral, Nightly  morphine CR, 15 mg, oral, q12h EDE  pantoprazole, 40 mg, oral, Daily before breakfast  pilocarpine, 5 mg, oral, Daily  polyethylene glycol, 17 g, oral, Daily  pregabalin, 75 mg, oral, BID      Continuous medications     PRN medications  PRN medications: acetaminophen **OR** acetaminophen **OR** acetaminophen, alteplase, cyclobenzaprine, HYDROmorphone, meclizine, morphine, naloxone, ondansetron **OR** ondansetron, oxyCODONE, vitamin A & D    Results for orders placed or performed during the hospital encounter of 12/30/23 (from the past 24 hour(s))   Basic Metabolic Panel   Result Value Ref Range    Glucose 141 (H) 74 - 99 mg/dL    Sodium 130 (L) 136 - 145 mmol/L    Potassium 4.1 3.5 - 5.3 mmol/L    Chloride 97 (L) 98 - 107 mmol/L    Bicarbonate 26 21 - 32 mmol/L    Anion Gap 11 10 - 20 mmol/L    Urea Nitrogen 15 6 - 23 mg/dL    Creatinine 0.80 0.50 - 1.05 mg/dL    eGFR 79 >60 mL/min/1.73m*2    Calcium 9.2 8.6 - 10.3 mg/dL   CBC   Result Value Ref Range    WBC 9.3 4.4 - 11.3 x10*3/uL    nRBC 0.0 0.0 - 0.0 /100 WBCs    RBC 2.77 (L) 4.00 - 5.20 x10*6/uL    Hemoglobin 8.5 (L) 12.0 - 16.0 g/dL    Hematocrit 26.5 (L) 36.0 - 46.0 %    MCV 96 80 - 100 fL    MCH 30.7 26.0 - 34.0 pg    MCHC 32.1 32.0 - 36.0 g/dL    RDW 15.0 (H) 11.5 - 14.5 %    Platelets 288 150 -  450 x10*3/uL   SST TOP   Result Value Ref Range    Extra Tube Hold for add-ons.    C-Reactive Protein, High Sensitivity   Result Value Ref Range    CRP, High Sensitivity >80.0 (H) <1.0 mg/L   Sedimentation rate, automated   Result Value Ref Range    Sedimentation Rate 36 (H) 0 - 30 mm/h                   Assessment/Plan      Postoperative surgical complication involving musculoskeletal system associated with musculoskeletal procedure, unspecified complication  Active Problems:    Essential hypertension    PVD (peripheral vascular disease) (CMS/HCC)    Back pain with radiculopathy    Back pain at L4-L5 level    Deep vein thrombosis (DVT) of right lower extremity (CMS/HCC)    Anemia     Continue IV Cubicin, wound cultures grew MRSA/staph  Continues to have pain, due to discitis I believe the pain will slowly get better  Do not think patient would be able to take care of herself at home with ongoing pain and difficulty ambulation  She will benefit from rehab/SNF placement  Drain removal as per orthopedics  Continue current pain management  Continue PT OT  Leukocytosis has resolved  CRP is still elevated  ID is following  Keep her off statin till the duration of cubicin  Continue home meds for hypertension  She is awaiting pre-CERT to be discharged to an acute rehab     1/9/24  - back pain slightly worse today  - stable mild hyponatremia, monitor for now, consider further galeano if worsening  - anemia, Hgb 8.1 today, still having serosang drain output  - currently on IV daptomycin for MRSA, ID following, will need prolonged course  - JOSE drains per ortho  - neurology following, they added baclofen this admission  - spoke with ortho, do not want snf or acute rehab discharge, will plan to transfer to ortho service today with medicine on consult      1/10/24  - back pain remains slightly worse today - PT/OT evaluation   - stable mild hyponatremia, slight decline- asymptomatic, continue trend   - anemia Hbg 8.1 and stable - will  trend   - currently on IV daptomycin for MRSA, ID following, will need prolonged course  - JOSE drains per ortho  - neurology following, they added baclofen this admission, they will continue to monitor   - Infectious Diease following, appreciate recommendations     1/11/24  Hospitalist Team  Consulted: HTN   - back pain progressively worse per patient after walking including posterior right thigh with increased weakness  -No new labs today, will trend as needed  - Mild Hyponatremia - 131 corrected and asymptomatic - stable   - anemia is stable with hbg 8.5   - ID following cultures and IV Cubicin for tx of infection - elevated CRP and Sed Rate noted - MRSA in wound   - Neurology following   - JOSE drains per Ortho team      Hemodynamically stable   Thank you for consult  MEDICINE TO SIGN OFF  CALL FOR ACUTE NEEDS      Time spent  36  minutes obtaining labs, imaging, recommendations, interview, assessment, examination, medication review/ordering, and EMR review.     Plan of care was discussed extensively with patient, ,  and RN.  Patient verbalized understanding through teach back method. All questions and concerns addressed upon examination.      Of note, this documentation is completed using the Dragon Dictation system (voice recognition software). There may be spelling and/or grammatical errors that were not corrected prior to final submission.          Principal Problem:    Postoperative surgical complication involving musculoskeletal system associated with musculoskeletal procedure, unspecified complication  Active Problems:    Essential hypertension    PVD (peripheral vascular disease) (CMS/HCC)    Back pain with radiculopathy    Back pain at L4-L5 level    Deep vein thrombosis (DVT) of right lower extremity (CMS/HCC)    Anemia        Stacey Bliss, APRN-CNP

## 2024-01-11 NOTE — PROGRESS NOTES
The patient is awake and alert  More pain today.  ! shall start Lidoderm patch  Ambulated better today.  Has a low physical endurance  Patient was having recurrent spasms in the back and the legs before, doing better with the baclofen.  Patient was already on a Zanaflex regimen and Lyrica at home  Patient started a low-dose of baclofen, 5 mg 4 times a day, doing better  Moving the extremities better  Ambulated better  Rehab is working with her  MRI lumbar spine had shown Discitis and other changes.  She is on antibiotics regimen which was revised, on Cubicin  No significant spinal cord compression  Visit Vitals  /57   Pulse 78   Temp 36.5 °C (97.7 °F)   Resp 23        Neurological Exam:    Oriented to day date,month,year, place and person. Knew the name of the president.  Patient was slow to response after the Ativan was given.      Recent and remote memory was intact. Attention span and concentration were normal. General fund of knowledge was intact.   Language: speech comprehension, repetition, expression, and naming is intact for patient´s age and level of education.      CRANIAL NERVES:   CN 2 The fundi were well visualized with normal disc margins, clear vessels and vascular pulsations. No disc edema.  No hemorrhages or exudates were present in the posterior segments that were visualized. Visual fields full to confrontation.   CN 3, 4, 6 Pupils round, equally reactive to light. No ptosis. EOM normal alignment, full range with normal saccades, pursuit and convergence. No nystagmus.   CN 5 Facial sensation intact bilaterally.   CN 7 Normal and symmetric facial strength. Nasolabial folds symmetric.   CN 8 Hearing intact to finger rub bilaterally. On Rinne and [place] testing there was no lateralisation  CN 9 Palate elevates symmetrically.   CN 11 Bilaterally normal strength of shoulder shrug and neck turning.   CN 12 Tongue midline, with normal bulk and strength; no fasciculations.   Patient is handling  pharyngeal secretions well.   Speech: articulation is intact.      MOTOR: The patient has normal muscle tone and has normal muscle mass. Muscle strength was 5/5 in distal and proximal muscles in both upper and lower extremities when she gave l the effort.  With the pain effort was difficult in the lower extremities. No fasciculations, tremor or other abnormal movements were present.   On [place] testing the patient has no pronation or drift.      REFLEXES:   RIGHT UE         LEFT UE   BR:         1                         BR:       1      Biceps:    1                        Biceps:1  Triceps:   1                     Triceps: 1  RIGHT LLE        LEFT LLE   Patella:   1                         Patella: 1  Achilles:1                     Achilles:1  Babinski: plantar responses are flexor.   No frontal release signs or other pathologic reflexes present.      COORDINATION: In both upper extremities, finger-nose-finger was intact without dysmetria. No dysdiadochokinesia. In both lower extremities, heel-to-knee-shin was intact.      GAIT: Could not be tested.  Patient is feeling weak with the back pain.  Romberg patient could not be tested  SENSORY: In both upper and lower extremities, sensation was decreased to pinprick, temperature   below the knees and elbows bilaterally, vibration decreased in the distal extremities, and intact joint position sense.    BMP:  Results from last 7 days   Lab Units 01/10/24  1331 01/09/24  0438 01/07/24  0438   SODIUM mmol/L 130* 131* 133*   POTASSIUM mmol/L 4.1 3.6 3.8   CHLORIDE mmol/L 97* 97* 97*   CO2 mmol/L 26 28 28   BUN mg/dL 15 13 11   CREATININE mg/dL 0.80 0.80 0.77         CBC:  Results from last 7 days   Lab Units 01/10/24  1331 01/09/24  0438 01/08/24  0411 01/07/24  0438   WBC AUTO x10*3/uL 9.3 8.6  --  4.8   RBC AUTO x10*6/uL 2.77* 2.65*  --  2.94*   HEMOGLOBIN g/dL 8.5* 8.1* 8.9* 8.9*   HEMATOCRIT % 26.5* 25.1* 27.7* 28.3*   MCV fL 96 95  --  96   MCH pg 30.7 30.6  --  30.3  "  MCHC g/dL 32.1 32.3  --  31.4*   RDW % 15.0* 15.2*  --  15.2*   PLATELETS AUTO x10*3/uL 288 279  --  288         INR:        Lipid Profile:        No lab exists for component: \"LABVLDL\"    HgbA1C:        CMP:  Results from last 7 days   Lab Units 01/10/24  1331 01/09/24  0438 01/07/24  0438   SODIUM mmol/L 130* 131* 133*   POTASSIUM mmol/L 4.1 3.6 3.8   CHLORIDE mmol/L 97* 97* 97*   CO2 mmol/L 26 28 28   BUN mg/dL 15 13 11   CREATININE mg/dL 0.80 0.80 0.77   GLUCOSE mg/dL 141* 126* 105*   CALCIUM mg/dL 9.2 9.1 8.9         ABG:        No lab exists for component: \"PO2\", \"PCO2\", \"HCO3\", \"BE\", \"O2SAT\"    TSH:  No results found for: \"TSH\"    Lab Results   Component Value Date    ZJLTWPJJ24 322 01/05/2024     Lab Results   Component Value Date    FOLATE 5.1 01/05/2024     Lab Results   Component Value Date    VITD25 33 01/05/2024         No MRI head results found for the past 12 months     No CT head results found for the past 12 months     CT abdomen pelvis wo IV contrast    Result Date: 12/31/2023  STUDY: CT Abdomen and Pelvis without IV Contrast; 12/31/2023 at 5:11 PM. INDICATION: Abdominal pain. COMPARISON: CT AP 12/19/2023 ACCESSION NUMBER(S): [id number] ORDERING CLINICIAN: WILLIAM EDMONDS TECHNIQUE: CT of the abdomen and pelvis was performed.  Contiguous axial images were obtained at 3 mm slice thickness through the abdomen and pelvis. Coronal and sagittal reconstructions at 3 mm slice thickness were performed. No intravenous contrast was administered.  Automated mA/kV exposure control was utilized and patient examination was performed in strict accordance with principles of ALARA. FINDINGS: Please note that the evaluation of vessels, lymph nodes and organs is limited without intravenous contrast.  LOWER CHEST: No cardiomegaly.  No pericardial effusion.  Very small bilateral pleural effusions  ABDOMEN:  LIVER: No hepatomegaly.  Smooth surface contour.  Normal attenuation.  BILE DUCTS: No intrahepatic or " extrahepatic biliary ductal dilatation.  GALLBLADDER: The gallbladder is absent. STOMACH: No abnormalities identified.  PANCREAS: Negative for pancreatitis  SPLEEN: No splenomegaly or focal splenic lesion.  ADRENAL GLANDS: No thickening or nodules.  KIDNEYS AND URETERS: Kidneys are normal in size and location.  No renal or ureteral calculi.  PELVIS:  BLADDER: No abnormalities identified.  REPRODUCTIVE ORGANS: No abnormalities identified.  BOWEL: Colonic diverticulosis.  Mild constipation without bowel obstruction. Negative for appendicitis  VESSELS: Limited due to lack of intravenous contrast.  Prior femoral to femoral artery bypass.  Abdominal aorta is normal in caliber.  PERITONEUM/RETROPERITONEUM/LYMPH NODES: Small amount of low-density free pelvic fluid  No pneumoperitoneum. No lymphadenopathy.  ABDOMINAL WALL: Small fat-containing umbilical hernia. SOFT TISSUES: Postsurgical changes within the posterior lumbar soft tissues with surgical drains in place.  BONES: Status post interbody fusion of L4/5 and L5/S1 with metallic spacer. This space narrowing L3/4.  Laminectomies L3-L5.  Bone graft along the lateral margins of L3-L5 fracture of the right L3 transverse process. Fracture of the right L4 transverse process.  Old screw tract within the L4 and L5 pedicles.  Narrowing of the right L5/S1 neural foramen due to facet joint osteophyte.    Negative for bowel obstruction.  Mild constipation. Colonic diverticulosis without diverticulitis. Negative for appendicitis. Postsurgical changes lumbar spine removal of pedicle screws and posterior rods from L4 through S1.  Stable appearance of interbody spacers at L4/5 and L5/S1. Bone graft along the lateral margins of L3-L5.  Fractures of the right L4 and L3 transverse process.  The right L3 transverse process fracture appears old.  Postsurgical changes within the posterior lumbar soft tissues with surgical drains in place. Signed by Aung Sparrow MD    MR lumbar spine w and  wo IV contrast    Result Date: 12/31/2023  Interpreted By:  Jonas Sanchez, STUDY: MR LUMBAR SPINE W AND WO IV CONTRAST;  12/31/2023 12:33 am   INDICATION: Signs/Symptoms:Pain.   COMPARISON: MRI of the lumbar spine dated 12/10/2023   ACCESSION NUMBER(S): [id number]   ORDERING CLINICIAN: YVETTE LEE   TECHNIQUE: Sagittal T1, T2, STIR, axial T1 and T2 weighted images of the lumbar spine were acquired. Additional axial and sagittal T1 weighted images of the lumbar spine were obtained after intravenous administration of 15.5 mL of contrast.   FINDINGS: Exam is degraded by motion.   There is again evidence of 5 lumbar type non rib-bearing vertebral bodies, with the lowest well-formed intervertebral disc space labeled L5-S1.   Postsurgical changes of posterior decompression and laminectomies of L3 through L5 with posterior spinal fusion extending from L4 through S1 and discectomies with intervertebral disc spaces at L4-L5 and L5-S1. These are similar in appearance to prior examination on 12/10/2023. Susceptibility artifact from the surgical hardware somewhat limits evaluation of the adjacent structures.   In the interim since prior imaging on 12/10/2023, new postsurgical consistent with irrigation debridement of subfascial tissues of the cutaneous spine are evident. STIR and T2 hypointense signal abnormality previously seen in the cutaneous fat of the lower lumbar spine has resolved, with new 3.7 x 3.6 x 13.6 cm (series 7, image 10; series 10, image 10) T2 hyperintense fluid collection present, with slight peripheral enhancement. This collection may be contiguous with the skin.   T2 hyperintense collection is again present in the laminectomy surgical bed, abutting the thecal sac at the level of L3 through L5 (series 8, image 14), measuring up to 5.2 cm in craniocaudal dimension, 2.9 cm in transverse dimension and up to 1.5 cm in AP dimension (series 10, image 7). The surgical catheter previously seen within  the collection is gone, although collection is decreased in size to previous imaging, with resolution of previously seen air within the collection. Mild surrounding edema and reactive soft tissue changes are present in the epidural space, somewhat increased in the interim since prior exam, with new/increased mass effect on the adjacent thecal sac.   There also appears to be small amount of new fluid present in the anterior epidural space at the level of L3 (series 10, image 3).   Although the exact characterization is difficult due to motion, there appears to be somewhat worsened spinal canal narrowing at the level of L2-L3 due to combination of new fluid in the anterior epidural space, and the T2 hyperintense fluid collection with associated inflammatory/reactive changes along the posterior aspect of the thecal sac, with somewhat increased effacement of the subarachnoid space.   No new intra thecal enhancement is identified.   Neural foramina are not well assessed due to motion and susceptibility artifact from the surgical hardware.       1.  New surgical changes of irrigation and debridement in the laminectomy surgical bed evident, with previously seen geographic area of hypointense T2 and STIR signal in the cutaneous fat of the lumbar spine resolved, and new T2 hyperintense collection measuring 3.7 x 3.6 x 13.6 cm present in the cutaneous fat, likely representing a postsurgical seroma, although sterility can not be ascertained on imaging alone. This collection reaches of the dermis, and may drain at the skin. 2. T2 hyperintense collection within the laminectomy bed itself along the dorsal aspect of the thecal sac described on previous MRI in 12/10/2023 has mildly decreased in size from prior imaging, with interval removal of previously seen drain, although there are increased inflammatory/reactive soft tissue changes present in the laminectomy surgical bed, focus of fluid in the anterior epidural space at the  level of L3 contributes to increased effacement of the thecal sac at the level of L3 compared to prior MRI.   MACRO: None   Signed by: Jonas Sanchez 12/31/2023 1:13 AM Dictation workstation:   [id number]    MR LUMBAR SPINE WO IV CONTRAST;  1/5/2024 8:48 pm      INDICATION:  Signs/Symptoms:intractable back pain s/o incision and drainage with  lumbar spine hardware removal.      COMPARISON:  Lumbar MRI dated 12/30/2022 and 12/10/2022. CT abdomen pelvis dated  12/31/2022.      ACCESSION NUMBER(S):  [id number]      ORDERING CLINICIAN:  ERICK KEN      TECHNIQUE:  Sagittal T2, T1, and STIR and axial T1, T2 sequences of the lumbar  spine. No intravenous contrast was administered.      FINDINGS:      Lumbar spine:  Normal lumbar lordosis. The last well-formed disc is designated  L5-S1. Minimal L5 over S1 anterolisthesis unchanged from prior. There  is minimal L3 over L4 retrolisthesis unchanged from prior. There has  been interval removal of the bilateral transpedicular screws and  vertical rods at L4 through S1 with T2 hyperintense ghost tracts  noted. There are unchanged disc spacers at L4-L5 and L5-S1. There is  a subcutaneous drain in place extending cranially along the midline  to the level of L3 without associated fluid collection. This  surrounded by subcutaneous edema. There is also a right deep  paravertebral drain in place terminating in the right deep  paravertebral soft tissues at the level of L1-L2 without associated  fluid collection, sagittal image 11 of 25. There is bilateral  posterior paravertebral muscle edema without evidence of fluid  collection.      Vertebral body heights are maintained. 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 There is a T2 hyperintense  fluid collection within the left subarticular ventral epidural space  extending cranially at the level of L3 and which appears to be  in  continuity with the L3-L4 disc space measuring 2.0 cm craniocaudally  and 1.0 x 0.7 cm axially, unchanged from the recent prior. This  contributes to mild effacement of the left subarticular space.              The conus medullaris terminates at L1 and is normal in appearance.      T12-L1: No significant disc bulge or herniation.  L1-L2: Is a diffuse disc bulge and facet hypertrophy contributing to  mild bilateral foraminal stenosis without significant central canal  stenosis. L2-L3: Small disc bulge and facet hypertrophy without  significant central or foraminal canal stenosis. L3-L4: There is  posterior laminectomy change without evidence of central canal  stenosis. There is mild retrolisthesis of L3 over L4 as well as T2  hyperintense fluid collection extending superiorly from the left  subarticular space and contributing to mild left subarticular  stenosis as well as mild central canal stenosis at the level of L3  similar to prior. There is suspected moderate to severe right  foraminal stenosis due to mild retrolisthesis with facet hypertrophy  as well as bone marrow edema at the right synovial facets, for  example sagittal image 9 of 25. There is also mild edema within the  right psoas muscle at the level of L3-L4, axial image 4 of 27 without  evidence of a fluid collection. There are bilateral right greater  than left facet effusions at L3-L4 and possibility of septic  arthritis can not be excluded. There is mild to moderate left  foraminal stenosis. L4-L5: There are right-sided facetectomy changes  with with hazy fluid within the surgical bed which may be expected.  Posterior laminectomy change. There is mild bilateral foraminal  narrowing. No central canal stenosis. L5-S1: There are right-sided  facetectomy changes with hazy fluid within the surgical bed, which  may be expected. Posterior laminectomy change. Mild bilateral  foraminal narrowing. No central canal stenosis.      IMPRESSION:  Interval  removal of bilateral transpedicular screws and vertical rods  at L3 through S1 and interval resolution of posterior paravertebral  fluid collections. Lumbar drains within the subcutaneous and deep  posterior paravertebral soft tissues as described above with  associated soft tissue edema, however no evidence of surrounding  fluid collection.      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 There is minimal L3 over L4  retrolisthesis as well as facet hypertrophy, which contributes to  moderate to severe right foraminal stenosis at L3-L4. There is also  asymmetric right-sided facet effusion and right subchondral edema at  L3-L4 facets and asymmetric right psoas edema at L3-L4 concerning for  septic arthritis.      There is unchanged ventral epidural collection on the left at the  level of L3 which may be communicating with the L3-L4 disc. The  sterility of this collection can not be determined given multiple  prior surgeries. It contributes to unchanged mild central canal  stenosis and left subarticular stenosis at L3-L4.      Signed by: Marco Valles 1/6/2024 12:46 AM  Dictation workstation:   [id number]  EMG     No EMG results found for the past 12 months        No EEG results found for the past 12 months      Current Facility-Administered Medications:     acetaminophen (Tylenol) tablet 650 mg, 650 mg, oral, q4h PRN, 650 mg at 01/05/24 2104 **OR** acetaminophen (Tylenol) oral liquid 650 mg, 650 mg, oral, q4h PRN **OR** acetaminophen (Tylenol) suppository 650 mg, 650 mg, rectal, q4h PRN, DARRYL Quiros-CNP    acetaminophen (Tylenol) tablet 650 mg, 650 mg, oral, q6h, DARRYL Quiros-CNP, 650 mg at 01/10/24 1810    alteplase (Cathflo Activase) injection 1 mg, 1 mg, intra-catheter, PRN, Nitesh Ruiz MD, 1 mg at 01/07/24 1150    aspirin EC tablet 81 mg, 81 mg, oral, Daily, John Salazar MD, 81 mg at 01/10/24 0911    atenolol  (Tenormin) tablet 50 mg, 50 mg, oral, q AM, John Salazar MD, 50 mg at 01/10/24 0918    baclofen (Lioresal) tablet 5 mg, 5 mg, oral, 4x daily, Onur Perez MD, 5 mg at 01/10/24 1810    brimonidine (AlphaGAN) 0.2 % ophthalmic solution 1 drop, 1 drop, Both Eyes, BID, John Salazar MD, 1 drop at 01/10/24 0930    busPIRone (Buspar) tablet 10 mg, 10 mg, oral, Daily, John Salazar MD, 10 mg at 01/10/24 0910    cyclobenzaprine (Flexeril) tablet 10 mg, 10 mg, oral, TID PRN, Maribeth Santamaria, APRN-CNP, 10 mg at 01/10/24 1224    DAPTOmycin (Cubicin) 500 mg in sodium chloride 0.9% 50 mL IV, 500 mg, intravenous, Nightly, Pete Echeverria MD, Stopped at 01/09/24 2045    docusate sodium (Colace) capsule 100 mg, 100 mg, oral, BID, Caitlin Li, APRN-CNP, 100 mg at 01/10/24 0910    ezetimibe (Zetia) tablet 10 mg, 10 mg, oral, Daily, John Salazar MD, 10 mg at 01/10/24 0910    furosemide (Lasix) tablet 20 mg, 20 mg, oral, Daily, John Salazar MD, 20 mg at 01/10/24 0911    hydrALAZINE (Apresoline) tablet 50 mg, 50 mg, oral, BID, John Salazar MD, 50 mg at 01/10/24 0910    HYDROmorphone (Dilaudid) tablet 2 mg, 2 mg, oral, q4h PRN, Nitesh Ruiz MD, 2 mg at 01/10/24 1059    isosorbide mononitrate ER (Imdur) 24 hr tablet 60 mg, 60 mg, oral, Daily, John Salazar MD, 60 mg at 01/10/24 0626    lactobacillus acidophilus tablet 1 tablet, 1 tablet, oral, Daily, John Salazar MD, 1 tablet at 01/10/24 0910    lidocaine 4 % patch 1 patch, 1 patch, transdermal, Daily, Maribeth Santamaria, DARRYL-CNP, 1 patch at 01/10/24 0910    meclizine (Antivert) tablet 25 mg, 25 mg, oral, q8h PRN, John Salazar MD    melatonin tablet 3 mg, 3 mg, oral, Daily, DARRYL Quiros-CNP, 3 mg at 01/09/24 2012    mirtazapine (Remeron) tablet 15 mg, 15 mg, oral, Nightly, John Salazar MD, 15 mg at 01/09/24 2014    morphine CR (MS Contin) 12 hr tablet 15 mg, 15 mg, oral, q12h EDE, Panchito Barnes MD, 15 mg at 01/10/24 0911    morphine injection 2 mg, 2  mg, intravenous, q2h PRN, ORLANDO Quiros, 2 mg at 01/03/24 1326    naloxone (Narcan) injection 0.2 mg, 0.2 mg, intravenous, q5 min PRN, ORLANDO Quiros    ondansetron (Zofran) tablet 4 mg, 4 mg, oral, q8h PRN, 4 mg at 12/31/23 0904 **OR** ondansetron (Zofran) injection 4 mg, 4 mg, intravenous, q8h PRN, OR[place]O Quiros, 4 mg at 12/31/23 1317    oxyCODONE (Roxicodone) immediate release tablet 10 mg, 10 mg, oral, q6h PRN, Nitesh Ruiz MD, 10 mg at 01/10/24 1355    pantoprazole (ProtoNix) EC tablet 40 mg, 40 mg, oral, Daily before breakfast, John Salazar MD, 40 mg at 01/10/24 0626    pilocarpine (Salagen) tablet 5 mg, 5 mg, oral, Daily, John Salazar MD, 5 mg at 01/10/24 0911    polyethylene glycol (Glycolax, Miralax) packet 17 g, 17 g, oral, Daily, ORLANDO Quiros, 17 g at 01/07/24 0903    pregabalin (Lyrica) capsule 75 mg, 75 mg, oral, BID, John Salazar MD, 75 mg at 01/10/24 0911    vitamin A & D ointment 1 Application, 1 Application, Topical, q4h PRN, Nitesh Ruiz MD         Assessment:  Patient has a chronic back pain.  She has a lumbar spine surgery in November 2023.  Dr. Coombs is following the patient.  Patient developed a postop staph infection with MRSA.  She has the antibiotics, incision and drainage.  Pain in the back is more on the right side along with pain in the right thigh and right hip.  Imaging of the right hip did not show significant pathology.  Pain exacerbation with the L3-4 discitis and osteomyelitis.  Infectious disease on consult antibiotics have been adjusted, patient is on Cubicin  Patient is improving with the movements in the legs.  Pain was worse today  Patient is getting a lot of analgesics.  Dr. Barnes is on consult  Right leg DVT she has an inferior vena cava filter put in January 2.  She is on Eliquis  Right carotid bruit, right carotid endarterectomy by Dr. Edwards.  History of right cerebral TIA  Coronary artery  disease  Cardiomyopathy  Bilateral total knee replacements.  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  S/p bilateral leg revascularization June 2022  Hypertension  Chronic edema  COPD with history of tobacco abuse in the past  Overweight  Vitamin D deficiency has been treated  Recommendation:  Start Lidoderm patch tonight  Additional lab work showed a borderline elevated TSH, B12, folic acid level, vitamin D in good range  With a better pain control she can start increasing her activity.  Rehab to increase activity as tolerated  baclofen low-dose was added to her regimen without any side effects, patient improved as far as pasms are concerned     Onur Perez MD

## 2024-01-12 LAB — CK SERPL-CCNC: 31 U/L (ref 0–215)

## 2024-01-12 PROCEDURE — 2500000001 HC RX 250 WO HCPCS SELF ADMINISTERED DRUGS (ALT 637 FOR MEDICARE OP): Performed by: REGISTERED NURSE

## 2024-01-12 PROCEDURE — 1100000001 HC PRIVATE ROOM DAILY

## 2024-01-12 PROCEDURE — 1090000002 HH PPS REVENUE DEBIT

## 2024-01-12 PROCEDURE — 2500000005 HC RX 250 GENERAL PHARMACY W/O HCPCS: Performed by: SPECIALIST

## 2024-01-12 PROCEDURE — 37799 UNLISTED PX VASCULAR SURGERY: CPT | Performed by: ORTHOPAEDIC SURGERY

## 2024-01-12 PROCEDURE — 1090000001 HH PPS REVENUE CREDIT

## 2024-01-12 PROCEDURE — 2500000001 HC RX 250 WO HCPCS SELF ADMINISTERED DRUGS (ALT 637 FOR MEDICARE OP): Performed by: ANESTHESIOLOGY

## 2024-01-12 PROCEDURE — 2500000001 HC RX 250 WO HCPCS SELF ADMINISTERED DRUGS (ALT 637 FOR MEDICARE OP)

## 2024-01-12 PROCEDURE — 2500000001 HC RX 250 WO HCPCS SELF ADMINISTERED DRUGS (ALT 637 FOR MEDICARE OP): Performed by: HOSPITALIST

## 2024-01-12 PROCEDURE — 2500000004 HC RX 250 GENERAL PHARMACY W/ HCPCS (ALT 636 FOR OP/ED): Performed by: HOSPITALIST

## 2024-01-12 PROCEDURE — 2500000005 HC RX 250 GENERAL PHARMACY W/O HCPCS

## 2024-01-12 PROCEDURE — 2500000001 HC RX 250 WO HCPCS SELF ADMINISTERED DRUGS (ALT 637 FOR MEDICARE OP): Performed by: STUDENT IN AN ORGANIZED HEALTH CARE EDUCATION/TRAINING PROGRAM

## 2024-01-12 PROCEDURE — 2500000004 HC RX 250 GENERAL PHARMACY W/ HCPCS (ALT 636 FOR OP/ED)

## 2024-01-12 PROCEDURE — 2500000001 HC RX 250 WO HCPCS SELF ADMINISTERED DRUGS (ALT 637 FOR MEDICARE OP): Performed by: SPECIALIST

## 2024-01-12 PROCEDURE — 2500000002 HC RX 250 W HCPCS SELF ADMINISTERED DRUGS (ALT 637 FOR MEDICARE OP, ALT 636 FOR OP/ED): Performed by: HOSPITALIST

## 2024-01-12 PROCEDURE — 2500000004 HC RX 250 GENERAL PHARMACY W/ HCPCS (ALT 636 FOR OP/ED): Mod: JZ | Performed by: INTERNAL MEDICINE

## 2024-01-12 PROCEDURE — 82550 ASSAY OF CK (CPK): CPT | Performed by: ORTHOPAEDIC SURGERY

## 2024-01-12 RX ORDER — PREGABALIN 50 MG/1
100 CAPSULE ORAL 2 TIMES DAILY
Status: DISCONTINUED | OUTPATIENT
Start: 2024-01-12 | End: 2024-01-18 | Stop reason: HOSPADM

## 2024-01-12 RX ADMIN — ISOSORBIDE MONONITRATE 60 MG: 60 TABLET, EXTENDED RELEASE ORAL at 06:22

## 2024-01-12 RX ADMIN — BRIMONIDINE TARTRATE 1 DROP: 2 SOLUTION OPHTHALMIC at 20:30

## 2024-01-12 RX ADMIN — BACLOFEN 5 MG: 10 TABLET ORAL at 06:23

## 2024-01-12 RX ADMIN — PILOCARPINE HYDROCHLORIDE 5 MG: 5 TABLET, FILM COATED ORAL at 08:39

## 2024-01-12 RX ADMIN — BACLOFEN 5 MG: 10 TABLET ORAL at 12:06

## 2024-01-12 RX ADMIN — HYDRALAZINE HYDROCHLORIDE 50 MG: 50 TABLET ORAL at 20:27

## 2024-01-12 RX ADMIN — LIDOCAINE 1 PATCH: 4 PATCH TOPICAL at 08:39

## 2024-01-12 RX ADMIN — ASPIRIN 81 MG: 81 TABLET, COATED ORAL at 08:40

## 2024-01-12 RX ADMIN — MORPHINE SULFATE 2 MG: 2 INJECTION, SOLUTION INTRAMUSCULAR; INTRAVENOUS at 15:50

## 2024-01-12 RX ADMIN — ACETAMINOPHEN 650 MG: 325 TABLET ORAL at 10:00

## 2024-01-12 RX ADMIN — CYCLOBENZAPRINE HYDROCHLORIDE 10 MG: 10 TABLET, FILM COATED ORAL at 20:30

## 2024-01-12 RX ADMIN — DAPTOMYCIN 650 MG: 500 INJECTION, POWDER, LYOPHILIZED, FOR SOLUTION INTRAVENOUS at 20:34

## 2024-01-12 RX ADMIN — MIRTAZAPINE 15 MG: 15 TABLET, FILM COATED ORAL at 20:26

## 2024-01-12 RX ADMIN — Medication 3 MG: at 20:25

## 2024-01-12 RX ADMIN — LIDOCAINE 1 PATCH: 4 PATCH TOPICAL at 08:42

## 2024-01-12 RX ADMIN — MORPHINE SULFATE 2 MG: 2 INJECTION, SOLUTION INTRAMUSCULAR; INTRAVENOUS at 12:06

## 2024-01-12 RX ADMIN — ATENOLOL 50 MG: 50 TABLET ORAL at 08:40

## 2024-01-12 RX ADMIN — FUROSEMIDE 20 MG: 40 TABLET ORAL at 08:39

## 2024-01-12 RX ADMIN — PANTOPRAZOLE SODIUM 40 MG: 40 TABLET, DELAYED RELEASE ORAL at 06:22

## 2024-01-12 RX ADMIN — ACETAMINOPHEN 650 MG: 325 TABLET ORAL at 15:50

## 2024-01-12 RX ADMIN — CEFTAROLINE FOSAMIL 600 MG: 600 POWDER, FOR SOLUTION INTRAVENOUS at 17:58

## 2024-01-12 RX ADMIN — HYDROMORPHONE HYDROCHLORIDE 2 MG: 2 TABLET ORAL at 18:05

## 2024-01-12 RX ADMIN — BUSPIRONE HYDROCHLORIDE 10 MG: 5 TABLET ORAL at 08:40

## 2024-01-12 RX ADMIN — EZETIMIBE 10 MG: 10 TABLET ORAL at 08:40

## 2024-01-12 RX ADMIN — ACETAMINOPHEN 650 MG: 325 TABLET ORAL at 05:37

## 2024-01-12 RX ADMIN — CEFTAROLINE FOSAMIL 600 MG: 600 POWDER, FOR SOLUTION INTRAVENOUS at 05:37

## 2024-01-12 RX ADMIN — BACLOFEN 5 MG: 10 TABLET ORAL at 20:26

## 2024-01-12 RX ADMIN — HYDRALAZINE HYDROCHLORIDE 50 MG: 50 TABLET ORAL at 08:40

## 2024-01-12 RX ADMIN — MORPHINE SULFATE 15 MG: 15 TABLET, EXTENDED RELEASE ORAL at 08:40

## 2024-01-12 RX ADMIN — DOCUSATE SODIUM 100 MG: 100 CAPSULE, LIQUID FILLED ORAL at 20:26

## 2024-01-12 RX ADMIN — OXYCODONE HYDROCHLORIDE 10 MG: 5 TABLET ORAL at 08:40

## 2024-01-12 RX ADMIN — PREGABALIN 75 MG: 50 CAPSULE ORAL at 08:40

## 2024-01-12 RX ADMIN — BACLOFEN 5 MG: 10 TABLET ORAL at 16:24

## 2024-01-12 RX ADMIN — PREGABALIN 100 MG: 50 CAPSULE ORAL at 20:28

## 2024-01-12 RX ADMIN — BRIMONIDINE TARTRATE 1 DROP: 2 SOLUTION OPHTHALMIC at 08:47

## 2024-01-12 RX ADMIN — HYDROMORPHONE HYDROCHLORIDE 2 MG: 2 TABLET ORAL at 14:15

## 2024-01-12 RX ADMIN — ACETAMINOPHEN 650 MG: 325 TABLET ORAL at 22:17

## 2024-01-12 RX ADMIN — Medication 1 TABLET: at 08:40

## 2024-01-12 RX ADMIN — MORPHINE SULFATE 15 MG: 15 TABLET, EXTENDED RELEASE ORAL at 20:28

## 2024-01-12 ASSESSMENT — PAIN DESCRIPTION - LOCATION
LOCATION: BACK

## 2024-01-12 ASSESSMENT — COGNITIVE AND FUNCTIONAL STATUS - GENERAL
DRESSING REGULAR UPPER BODY CLOTHING: A LOT
MOVING FROM LYING ON BACK TO SITTING ON SIDE OF FLAT BED WITH BEDRAILS: A LITTLE
MOVING TO AND FROM BED TO CHAIR: A LITTLE
TOILETING: A LITTLE
DRESSING REGULAR LOWER BODY CLOTHING: A LOT
CLIMB 3 TO 5 STEPS WITH RAILING: A LOT
TURNING FROM BACK TO SIDE WHILE IN FLAT BAD: A LOT
HELP NEEDED FOR BATHING: A LITTLE
STANDING UP FROM CHAIR USING ARMS: A LITTLE
WALKING IN HOSPITAL ROOM: A LITTLE
MOBILITY SCORE: 16
DAILY ACTIVITIY SCORE: 18

## 2024-01-12 ASSESSMENT — PAIN SCALES - GENERAL
PAINLEVEL_OUTOF10: 10 - WORST POSSIBLE PAIN
PAINLEVEL_OUTOF10: 10 - WORST POSSIBLE PAIN
PAINLEVEL_OUTOF10: 8
PAINLEVEL_OUTOF10: 8
PAINLEVEL_OUTOF10: 7
PAINLEVEL_OUTOF10: 8
PAINLEVEL_OUTOF10: 10 - WORST POSSIBLE PAIN

## 2024-01-12 ASSESSMENT — PAIN - FUNCTIONAL ASSESSMENT
PAIN_FUNCTIONAL_ASSESSMENT: 0-10

## 2024-01-12 NOTE — PROGRESS NOTES
Infectious Diseases Inpatient Progress Note      HISTORY OF PRESENT ILLNESS:    Patient has persistent severe low back pain, persistent legs weakness, persistent highly elevated CRP of greater than 80.  I added Teflaro to Cubicin yesterday because of lack of clinical improvement.  I had a lengthy discussion with the patient and Dr. Coombs about her guarded prognosis especially in view of persistent abscess that is not amenable to drainage.   She still has bilateral JOSE drains with small amount of serosanguineous drainage.   No fevers or chills.   Follow up postop deep incision wound infection with recurrent abscess while on IV Vanco, S/P hardware removal with persistent +ve MRSA Cx. ,  on IV Cubicin, well tolerated.   Recent PE and DVT.   Has resolved leg pain. No SOB. +ve constipation   Decreased appetite  No bowel movements   no urinary symptoms  No fevers or chills.    Current Medications:    acetaminophen, 650 mg, oral, q6h  aspirin, 81 mg, oral, Daily  atenolol, 50 mg, oral, q AM  baclofen, 5 mg, oral, 4x daily  brimonidine, 1 drop, Both Eyes, BID  busPIRone, 10 mg, oral, Daily  daptomycin, 8 mg/kg, intravenous, Nightly  docusate sodium, 100 mg, oral, BID  ezetimibe, 10 mg, oral, Daily  furosemide, 20 mg, oral, Daily  hydrALAZINE, 50 mg, oral, BID  isosorbide mononitrate ER, 60 mg, oral, Daily  lactobacillus acidophilus, 1 tablet, oral, Daily  lidocaine, 1 patch, transdermal, Daily  lidocaine, 1 patch, transdermal, Daily  melatonin, 3 mg, oral, Daily  mirtazapine, 15 mg, oral, Nightly  morphine CR, 15 mg, oral, q12h EDE  pantoprazole, 40 mg, oral, Daily before breakfast  pilocarpine, 5 mg, oral, Daily  polyethylene glycol, 17 g, oral, Daily  pregabalin, 100 mg, oral, BID        Allergies:  Neomycin and Neomycin-polymyxin b-dexameth      Review of Systems  14 system review is negative other than HPI    Physical Exam    Heart Rate:  [67-74]   Temp:  [36.4 °C (97.5 °F)-37.5 °C (99.5 °F)]   Resp:  [17-22]   BP:  "(108-153)/(50-67)   SpO2:  [92 %-96 %]    Vitals:    01/12/24 0734 01/12/24 1207 01/12/24 1415 01/12/24 1453   BP: 126/61 108/50 123/59 121/58   BP Location:   Left arm    Pulse: 67 71 72 74   Resp: 17  20    Temp: 36.4 °C (97.5 °F)  36.4 °C (97.5 °F) 36.7 °C (98.1 °F)   TempSrc:   Temporal    SpO2: 92% 96%  95%   Weight:       Height:         General Appearance: alert and oriented to person, place and time, well-developed and well-nourished, in no acute distress  Skin: warm and dry, no rash.   Head: normocephalic and atraumatic  Eyes: anicteric sclerae  ENT:  normal mucous membranes. No oral thrush  Lungs: normal respiratory effort, clear  Heart: Nl S1 and S2  Abdomen: soft, no tenderness  1+ B leg edema  No erythema, no tenderness  Back incision is intact.  Intact JOSE drain sites, small amount of serosanguineous drainage.   No point tenderness  DATA:    Lab Results   Component Value Date    WBC 9.3 01/10/2024    HGB 8.5 (L) 01/10/2024    HCT 26.5 (L) 01/10/2024    MCV 96 01/10/2024     01/10/2024     Lab Results   Component Value Date    CREATININE 0.80 01/10/2024    BUN 15 01/10/2024     (L) 01/10/2024    K 4.1 01/10/2024    CL 97 (L) 01/10/2024    CO2 26 01/10/2024       Hepatic Function Panel:No results found for: \"ALKPHOS\", \"ALT\", \"AST\", \"PROT\", \"BILITOT\", \"BILIDIR\"    Microbiology:   Susceptibility data from last 90 days.  Collected Specimen Info Organism Amoxicillin/Clavulanate Ampicillin Ampicillin/Sulbactam Aztreonam Cefazolin Cefazolin (uncomplicated UTIs only) Cefepime Cefotaxime Ceftazidime Ceftriaxone Ciprofloxacin Clindamycin   12/31/23 Swab from SPINE Staphylococcus aureus               12/31/23 Tissue from SPINE Methicillin Resistant Staphylococcus aureus (MRSA)            S   12/31/23 Swab from SPINE Methicillin Resistant Staphylococcus aureus (MRSA)            S   12/31/23 Tissue from SPINE Staphylococcus aureus               12/11/23 Fluid from ABSCESS Methicillin Resistant " Staphylococcus aureus (MRSA)            S   12/11/23 Tissue from ABSCESS Staphylococcus aureus               12/11/23 Swab from ABSCESS Methicillin Resistant Staphylococcus aureus (MRSA)            S   12/06/23 Swab from SPINE Methicillin Resistant Staphylococcus aureus (MRSA)               12/06/23 Tissue from SPINE Staphylococcus aureus               12/06/23 Tissue from SPINE Staphylococcus aureus               12/05/23 Urine from Clean Catch/Voided Escherichia coli S R S R R R R R R R R    11/03/23 Swab from Nares/Axilla/Groin Methicillin Resistant Staphylococcus aureus (MRSA)                 Collected Specimen Info Organism Ertapenem Erythromycin Gentamicin Meropenem Nitrofurantoin Oxacillin Piperacillin/Tazobactam Tetracycline Tobramycin Trimethoprim/Sulfamethoxazole Vancomycin   12/31/23 Swab from SPINE Staphylococcus aureus              12/31/23 Tissue from SPINE Methicillin Resistant Staphylococcus aureus (MRSA)  R    R  S  S S   12/31/23 Swab from SPINE Methicillin Resistant Staphylococcus aureus (MRSA)  R    R  S  S S   12/31/23 Tissue from SPINE Staphylococcus aureus              12/11/23 Fluid from ABSCESS Methicillin Resistant Staphylococcus aureus (MRSA)  R    R  S  S S   12/11/23 Tissue from ABSCESS Staphylococcus aureus              12/11/23 Swab from ABSCESS Methicillin Resistant Staphylococcus aureus (MRSA)  R    R  S  S S   12/06/23 Swab from SPINE Methicillin Resistant Staphylococcus aureus (MRSA)              12/06/23 Tissue from SPINE Staphylococcus aureus              12/06/23 Tissue from SPINE Staphylococcus aureus              12/05/23 Urine from Clean Catch/Voided Escherichia coli S  R S S  S  I S    11/03/23 Swab from Nares/Axilla/Groin Methicillin Resistant Staphylococcus aureus (MRSA)                Vanco PERNELL of 2.0 as discussed with microbiology  Imaging:   CT abdomen pelvis wo IV contrast    Result Date: 12/31/2023  STUDY: CT Abdomen and Pelvis without IV Contrast; 12/31/2023 at 5:11  PM. INDICATION: Abdominal pain. COMPARISON: CT AP 12/19/2023 ACCESSION NUMBER(S): [id number] ORDERING CLINICIAN: WILLIAM EDMONDS TECHNIQUE: CT of the abdomen and pelvis was performed.  Contiguous axial images were obtained at 3 mm slice thickness through the abdomen and pelvis. Coronal and sagittal reconstructions at 3 mm slice thickness were performed. No intravenous contrast was administered.  Automated mA/kV exposure control was utilized and patient examination was performed in strict accordance with principles of ALARA. FINDINGS: Please note that the evaluation of vessels, lymph nodes and organs is limited without intravenous contrast.  LOWER CHEST: No cardiomegaly.  No pericardial effusion.  Very small bilateral pleural effusions  ABDOMEN:  LIVER: No hepatomegaly.  Smooth surface contour.  Normal attenuation.  BILE DUCTS: No intrahepatic or extrahepatic biliary ductal dilatation.  GALLBLADDER: The gallbladder is absent. STOMACH: No abnormalities identified.  PANCREAS: Negative for pancreatitis  SPLEEN: No splenomegaly or focal splenic lesion.  ADRENAL GLANDS: No thickening or nodules.  KIDNEYS AND URETERS: Kidneys are normal in size and location.  No renal or ureteral calculi.  PELVIS:  BLADDER: No abnormalities identified.  REPRODUCTIVE ORGANS: No abnormalities identified.  BOWEL: Colonic diverticulosis.  Mild constipation without bowel obstruction. Negative for appendicitis  VESSELS: Limited due to lack of intravenous contrast.  Prior femoral to femoral artery bypass.  Abdominal aorta is normal in caliber.  PERITONEUM/RETROPERITONEUM/LYMPH NODES: Small amount of low-density free pelvic fluid  No pneumoperitoneum. No lymphadenopathy.  ABDOMINAL WALL: Small fat-containing umbilical hernia. SOFT TISSUES: Postsurgical changes within the posterior lumbar soft tissues with surgical drains in place.  BONES: Status post interbody fusion of L4/5 and L5/S1 with metallic spacer. This space narrowing L3/4.   Laminectomies L3-L5.  Bone graft along the lateral margins of L3-L5 fracture of the right L3 transverse process. Fracture of the right L4 transverse process.  Old screw tract within the L4 and L5 pedicles.  Narrowing of the right L5/S1 neural foramen due to facet joint osteophyte.    Negative for bowel obstruction.  Mild constipation. Colonic diverticulosis without diverticulitis. Negative for appendicitis. Postsurgical changes lumbar spine removal of pedicle screws and posterior rods from L4 through S1.  Stable appearance of interbody spacers at L4/5 and L5/S1. Bone graft along the lateral margins of L3-L5.  Fractures of the right L4 and L3 transverse process.  The right L3 transverse process fracture appears old.  Postsurgical changes within the posterior lumbar soft tissues with surgical drains in place. Signed by Aung Sparrow MD    MR lumbar spine w and wo IV contrast    Result Date: 12/31/2023  Interpreted By:  Jonas Sanchez, STUDY: MR LUMBAR SPINE W AND WO IV CONTRAST;  12/31/2023 12:33 am   INDICATION: Signs/Symptoms:Pain.   COMPARISON: MRI of the lumbar spine dated 12/10/2023   ACCESSION NUMBER(S): [id number]   ORDERING CLINICIAN: YVETTE LEE   TECHNIQUE: Sagittal T1, T2, STIR, axial T1 and T2 weighted images of the lumbar spine were acquired. Additional axial and sagittal T1 weighted images of the lumbar spine were obtained after intravenous administration of 15.5 mL of contrast.   FINDINGS: Exam is degraded by motion.   There is again evidence of 5 lumbar type non rib-bearing vertebral bodies, with the lowest well-formed intervertebral disc space labeled L5-S1.   Postsurgical changes of posterior decompression and laminectomies of L3 through L5 with posterior spinal fusion extending from L4 through S1 and discectomies with intervertebral disc spaces at L4-L5 and L5-S1. These are similar in appearance to prior examination on 12/10/2023. Susceptibility artifact from the surgical hardware  somewhat limits evaluation of the adjacent structures.   In the interim since prior imaging on 12/10/2023, new postsurgical consistent with irrigation debridement of subfascial tissues of the cutaneous spine are evident. STIR and T2 hypointense signal abnormality previously seen in the cutaneous fat of the lower lumbar spine has resolved, with new 3.7 x 3.6 x 13.6 cm (series 7, image 10; series 10, image 10) T2 hyperintense fluid collection present, with slight peripheral enhancement. This collection may be contiguous with the skin.   T2 hyperintense collection is again present in the laminectomy surgical bed, abutting the thecal sac at the level of L3 through L5 (series 8, image 14), measuring up to 5.2 cm in craniocaudal dimension, 2.9 cm in transverse dimension and up to 1.5 cm in AP dimension (series 10, image 7). The surgical catheter previously seen within the collection is gone, although collection is decreased in size to previous imaging, with resolution of previously seen air within the collection. Mild surrounding edema and reactive soft tissue changes are present in the epidural space, somewhat increased in the interim since prior exam, with new/increased mass effect on the adjacent thecal sac.   There also appears to be small amount of new fluid present in the anterior epidural space at the level of L3 (series 10, image 3).   Although the exact characterization is difficult due to motion, there appears to be somewhat worsened spinal canal narrowing at the level of L2-L3 due to combination of new fluid in the anterior epidural space, and the T2 hyperintense fluid collection with associated inflammatory/reactive changes along the posterior aspect of the thecal sac, with somewhat increased effacement of the subarachnoid space.   No new intra thecal enhancement is identified.   Neural foramina are not well assessed due to motion and susceptibility artifact from the surgical hardware.       1.  New surgical  changes of irrigation and debridement in the laminectomy surgical bed evident, with previously seen geographic area of hypointense T2 and STIR signal in the cutaneous fat of the lumbar spine resolved, and new T2 hyperintense collection measuring 3.7 x 3.6 x 13.6 cm present in the cutaneous fat, likely representing a postsurgical seroma, although sterility can not be ascertained on imaging alone. This collection reaches of the dermis, and may drain at the skin. 2. T2 hyperintense collection within the laminectomy bed itself along the dorsal aspect of the thecal sac described on previous MRI in 12/10/2023 has mildly decreased in size from prior imaging, with interval removal of previously seen drain, although there are increased inflammatory/reactive soft tissue changes present in the laminectomy surgical bed, focus of fluid in the anterior epidural space at the level of L3 contributes to increased effacement of the thecal sac at the level of L3 compared to prior MRI.   MACRO: None   Signed by: Jonas Sanchez 12/31/2023 1:13 AM Dictation workstation:   [id number]       I discussed culture results with microbiology.  MRSA PERNELL to vancomycin is 2.  It is sensitive to Cubicin  IMPRESSION:    Postop deep incisional wound infection of lumbar spine with failure of IV Vanco.  Worsening symptoms despite Cubicin  S/P Hardware removal  MRSA infection in a patient who is a chronic carrier    Patient Active Problem List   Diagnosis    Abdominal aortic aneurysm (CMS/HCC)    Abnormal EKG    Bilateral carotid artery stenosis    Bruit of right carotid artery    CAD in native artery    Cardiomyopathy (CMS/HCC)    Chest pain    Chronic asthmatic bronchitis    Closed displaced fracture of fifth metatarsal bone    Current every day smoker    Difficulty breathing    Essential hypertension    History of total knee replacement    Hypokalemia    Intermittent claudication (CMS/HCC)    Knee osteoarthritis    Knee pain    Limb pain     Localized swelling, mass, or lump of lower extremity    Celiac artery stenosis (CMS/HCC)    Mesenteric artery stenosis (CMS/HCC)    Mixed hyperlipidemia    Obese    PVD (peripheral vascular disease) (CMS/HCC)    Right foot pain    Swelling    Trigger finger    Urinary tract infection without hematuria    Back pain of lumbar region with sciatica    Back pain with radiculopathy    Intractable back pain    Seroma, post-traumatic (CMS/HCC)    Lumbar surgical wound fluid collection    Generalized weakness    Postoperative surgical complication involving musculoskeletal system associated with musculoskeletal procedure, unspecified complication    Back pain at L4-L5 level    Deep vein thrombosis (DVT) of right lower extremity (CMS/HCC)    Anemia       PLAN:  Continue IV Teflaro for now  continue IV Cubicin for now  Check CPK in a.m.  Hold Zocor dose during treatment with Cubicin  May continue Zetia  Local wound care per surgery    Discussed with patient and Dr. Malvin Reyna MD

## 2024-01-12 NOTE — NURSING NOTE
Secure chat sent to Dr. Barnes at 14:10 regarding pain management consult that was placed yesterday morning. No response, attempted to call office but office closed. Reached out to nursing supervisor and made aware of situation. Patients family requesting to talk to physician as patient has been in uncontrollable pain.

## 2024-01-12 NOTE — NURSING NOTE
Revisited with pt/family, pt remains on IV Cubicin antibiotic, pt still has JOSE drains that will remain in place until there is no output x 48 hours, and brace when out of bed with ambulation. Neurology, medical, ID and pain management are on consult. Pt states prefers home with [place] resumption for IV antibiotics/nursing but would agree to acute rehab if Surgeon wants her to go, agreeable to acute rehab only, would refuse SNF. Will await pt conversation with surgeon to determine if plan will be acute rehab or [facility]. Referral that was made early in this admission to acute rehab, [facility] intent to deny at that time. Will reach out to [facility] to determine if would still accept pt if receive insurance approval. Per [facility] re[facility] liaison they don't need a new precert and will send for an expedited appeal with updated clinicals.

## 2024-01-12 NOTE — CARE PLAN
Problem: Pain  Goal: My pain/discomfort is manageable  Outcome: Progressing     Problem: Safety  Goal: Patient will be injury free during hospitalization  Outcome: Progressing     Problem: Daily Care  Goal: Daily care needs are met  Outcome: Progressing     Problem: Psychosocial Needs  Goal: Demonstrates ability to cope with hospitalization/illness  Outcome: Progressing  Goal: Collaborate with me, my family, and caregiver to identify my specific goals  Outcome: Progressing     Problem: Discharge Barriers  Goal: My discharge needs are met  Outcome: Progressing     Problem: Fall/Injury  Goal: Verbalize understanding of personal risk factors for fall in the hospital  Outcome: Progressing  Goal: Verbalize understanding of risk factor reduction measures to prevent injury from fall in the home  Outcome: Progressing  Goal: Use assistive devices by end of the shift  Outcome: Progressing  Goal: Pace activities to prevent fatigue by end of the shift  Outcome: Progressing     Problem: Pain  Goal: Takes deep breaths with improved pain control throughout the shift  Outcome: Progressing  Goal: Turns in bed with improved pain control throughout the shift  Outcome: Progressing  Goal: Walks with improved pain control throughout the shift  Outcome: Progressing  Goal: Performs ADL's with improved pain control throughout shift  Outcome: Progressing  Goal: Participates in PT with improved pain control throughout the shift  Outcome: Progressing  Goal: Free from opioid side effects throughout the shift  Outcome: Progressing  Goal: Free from acute confusion related to pain meds throughout the shift  Outcome: Progressing     Problem: Pain - Adult  Goal: Verbalizes/displays adequate comfort level or baseline comfort level  Outcome: Progressing     Problem: Safety - Adult  Goal: Free from fall injury  Outcome: Progressing     Problem: Discharge Planning  Goal: Discharge to home or other facility with appropriate resources  Outcome:  Progressing     Problem: Chronic Conditions and Co-morbidities  Goal: Patient's chronic conditions and co-morbidity symptoms are monitored and maintained or improved  Outcome: Progressing     Problem: Skin  Goal: Promote skin healing  Outcome: Progressing

## 2024-01-12 NOTE — NURSING NOTE
Dr Barnes called unit phone spoke with him and he stated he seen patient x2 weeks ago and said he increased patients lyrica from 75 to 100. Stated he added on baclofen as  well. He stated he spoke with patient and family about this and spoke with RBN yesterday

## 2024-01-12 NOTE — PROGRESS NOTES
DAILY PROGRESS NOTE      POD11 Lumbar washout and hardware removal     Patient doing well  Visit Vitals  /61   Pulse 67   Temp 36.4 °C (97.5 °F)   Resp 17      Temp (24hrs), Av.8 °C (98.2 °F), Min:36.4 °C (97.5 °F), Max:37.5 °C (99.5 °F)       Pain Control significant improvement. Reports very minimal lower extremity pain at this time.   No chest pain or shortness of breath.  No calf pain    Exam:   Good bilateral lower extremity strength and sensation.   Extremity shows neuro vascular status intact. Flexion and extension intact on extremity.  Calves soft and non-tender without evidence of DVT.        Labs reviewed:  CBC: @LABRCNT(WBC:3,HGB:3,PLT:3)@  BMP:  @LABRCNT(Na:3,K:3,CL:3,CO2:3,BUN:3,Creatinine:3,Glucose:3)@    I&O  I/O last 3 completed shifts:  In: 630 (8.1 mL/kg) [P.O.:500; IV Piggyback:130]  Out: 3158 (40.4 mL/kg) [Urine:3050 (1.1 mL/kg/hr); Drains:108]  Weight: 78.2 kg       Assessment:    Patient seen sitting in bed resting comfortably. She has minimal complaints of pain at this time. Lower extremities NVI with good strength and sensation.      MRI from yesterday does not show any acute changes from previous MRI dated 24.   Had a detailed conversation with patient including myself and Dr. Bella regarding rehab placement at discharge. Patient would like to speak to her  and surgeon before she makes a decision. Her ultimate goal is to go home and be independent.     Plan:    Continue with therapy   Brace when OOB and with ambulation   JOSE drains to remain in place until there is 0 output for 48 hours   Continue IV antibiotics per ID  Continue pain control per pain management   Cannot shower until drainage has stopped     DARRYL Kline-CNP   2024 11:47 AM

## 2024-01-13 PROCEDURE — 2500000001 HC RX 250 WO HCPCS SELF ADMINISTERED DRUGS (ALT 637 FOR MEDICARE OP)

## 2024-01-13 PROCEDURE — 2500000001 HC RX 250 WO HCPCS SELF ADMINISTERED DRUGS (ALT 637 FOR MEDICARE OP): Performed by: ANESTHESIOLOGY

## 2024-01-13 PROCEDURE — 2500000001 HC RX 250 WO HCPCS SELF ADMINISTERED DRUGS (ALT 637 FOR MEDICARE OP): Performed by: STUDENT IN AN ORGANIZED HEALTH CARE EDUCATION/TRAINING PROGRAM

## 2024-01-13 PROCEDURE — 2500000004 HC RX 250 GENERAL PHARMACY W/ HCPCS (ALT 636 FOR OP/ED): Performed by: HOSPITALIST

## 2024-01-13 PROCEDURE — 2500000001 HC RX 250 WO HCPCS SELF ADMINISTERED DRUGS (ALT 637 FOR MEDICARE OP): Performed by: REGISTERED NURSE

## 2024-01-13 PROCEDURE — 2500000004 HC RX 250 GENERAL PHARMACY W/ HCPCS (ALT 636 FOR OP/ED): Performed by: INTERNAL MEDICINE

## 2024-01-13 PROCEDURE — 2500000005 HC RX 250 GENERAL PHARMACY W/O HCPCS: Performed by: SPECIALIST

## 2024-01-13 PROCEDURE — 2500000002 HC RX 250 W HCPCS SELF ADMINISTERED DRUGS (ALT 637 FOR MEDICARE OP, ALT 636 FOR OP/ED): Performed by: HOSPITALIST

## 2024-01-13 PROCEDURE — 1090000001 HH PPS REVENUE CREDIT

## 2024-01-13 PROCEDURE — 1090000002 HH PPS REVENUE DEBIT

## 2024-01-13 PROCEDURE — 2500000005 HC RX 250 GENERAL PHARMACY W/O HCPCS

## 2024-01-13 PROCEDURE — 1100000001 HC PRIVATE ROOM DAILY

## 2024-01-13 PROCEDURE — 2500000001 HC RX 250 WO HCPCS SELF ADMINISTERED DRUGS (ALT 637 FOR MEDICARE OP): Performed by: HOSPITALIST

## 2024-01-13 PROCEDURE — 2500000001 HC RX 250 WO HCPCS SELF ADMINISTERED DRUGS (ALT 637 FOR MEDICARE OP): Performed by: SPECIALIST

## 2024-01-13 RX ADMIN — HYDRALAZINE HYDROCHLORIDE 50 MG: 50 TABLET ORAL at 20:49

## 2024-01-13 RX ADMIN — Medication 3 MG: at 20:46

## 2024-01-13 RX ADMIN — CEFTAROLINE FOSAMIL 600 MG: 600 POWDER, FOR SOLUTION INTRAVENOUS at 16:40

## 2024-01-13 RX ADMIN — ACETAMINOPHEN 650 MG: 325 TABLET ORAL at 16:40

## 2024-01-13 RX ADMIN — PANTOPRAZOLE SODIUM 40 MG: 40 TABLET, DELAYED RELEASE ORAL at 06:20

## 2024-01-13 RX ADMIN — HYDROMORPHONE HYDROCHLORIDE 2 MG: 2 TABLET ORAL at 14:40

## 2024-01-13 RX ADMIN — PREGABALIN 100 MG: 50 CAPSULE ORAL at 20:47

## 2024-01-13 RX ADMIN — EZETIMIBE 10 MG: 10 TABLET ORAL at 09:37

## 2024-01-13 RX ADMIN — CEFTAROLINE FOSAMIL 600 MG: 600 POWDER, FOR SOLUTION INTRAVENOUS at 04:00

## 2024-01-13 RX ADMIN — BACLOFEN 5 MG: 10 TABLET ORAL at 12:43

## 2024-01-13 RX ADMIN — ATENOLOL 50 MG: 50 TABLET ORAL at 09:37

## 2024-01-13 RX ADMIN — BRIMONIDINE TARTRATE 1 DROP: 2 SOLUTION OPHTHALMIC at 09:42

## 2024-01-13 RX ADMIN — MORPHINE SULFATE 15 MG: 15 TABLET, EXTENDED RELEASE ORAL at 20:48

## 2024-01-13 RX ADMIN — PREGABALIN 100 MG: 50 CAPSULE ORAL at 09:37

## 2024-01-13 RX ADMIN — BACLOFEN 5 MG: 10 TABLET ORAL at 16:40

## 2024-01-13 RX ADMIN — ACETAMINOPHEN 650 MG: 325 TABLET ORAL at 09:37

## 2024-01-13 RX ADMIN — BACLOFEN 5 MG: 10 TABLET ORAL at 20:47

## 2024-01-13 RX ADMIN — OXYCODONE HYDROCHLORIDE 10 MG: 5 TABLET ORAL at 16:45

## 2024-01-13 RX ADMIN — Medication 1 TABLET: at 09:37

## 2024-01-13 RX ADMIN — PILOCARPINE HYDROCHLORIDE 5 MG: 5 TABLET, FILM COATED ORAL at 09:37

## 2024-01-13 RX ADMIN — LIDOCAINE 1 PATCH: 4 PATCH TOPICAL at 09:36

## 2024-01-13 RX ADMIN — FUROSEMIDE 20 MG: 40 TABLET ORAL at 09:37

## 2024-01-13 RX ADMIN — BRIMONIDINE TARTRATE 1 DROP: 2 SOLUTION OPHTHALMIC at 21:03

## 2024-01-13 RX ADMIN — BUSPIRONE HYDROCHLORIDE 10 MG: 5 TABLET ORAL at 09:37

## 2024-01-13 RX ADMIN — ACETAMINOPHEN 650 MG: 325 TABLET ORAL at 22:03

## 2024-01-13 RX ADMIN — ISOSORBIDE MONONITRATE 60 MG: 60 TABLET, EXTENDED RELEASE ORAL at 06:19

## 2024-01-13 RX ADMIN — MORPHINE SULFATE 15 MG: 15 TABLET, EXTENDED RELEASE ORAL at 09:37

## 2024-01-13 RX ADMIN — BACLOFEN 5 MG: 10 TABLET ORAL at 06:19

## 2024-01-13 RX ADMIN — ASPIRIN 81 MG: 81 TABLET, COATED ORAL at 09:37

## 2024-01-13 RX ADMIN — DOCUSATE SODIUM 100 MG: 100 CAPSULE, LIQUID FILLED ORAL at 20:46

## 2024-01-13 RX ADMIN — DOCUSATE SODIUM 100 MG: 100 CAPSULE, LIQUID FILLED ORAL at 09:37

## 2024-01-13 RX ADMIN — DAPTOMYCIN 650 MG: 500 INJECTION, POWDER, LYOPHILIZED, FOR SOLUTION INTRAVENOUS at 20:49

## 2024-01-13 RX ADMIN — MIRTAZAPINE 15 MG: 15 TABLET, FILM COATED ORAL at 20:48

## 2024-01-13 RX ADMIN — ACETAMINOPHEN 650 MG: 325 TABLET ORAL at 03:58

## 2024-01-13 RX ADMIN — HYDRALAZINE HYDROCHLORIDE 50 MG: 50 TABLET ORAL at 09:37

## 2024-01-13 ASSESSMENT — COGNITIVE AND FUNCTIONAL STATUS - GENERAL
HELP NEEDED FOR BATHING: A LITTLE
WALKING IN HOSPITAL ROOM: A LITTLE
MOVING TO AND FROM BED TO CHAIR: A LITTLE
TURNING FROM BACK TO SIDE WHILE IN FLAT BAD: A LITTLE
MOBILITY SCORE: 19
DAILY ACTIVITIY SCORE: 18
DRESSING REGULAR LOWER BODY CLOTHING: A LOT
HELP NEEDED FOR BATHING: A LITTLE
DAILY ACTIVITIY SCORE: 18
CLIMB 3 TO 5 STEPS WITH RAILING: A LOT
STANDING UP FROM CHAIR USING ARMS: A LITTLE
MOVING TO AND FROM BED TO CHAIR: A LITTLE
TURNING FROM BACK TO SIDE WHILE IN FLAT BAD: A LITTLE
DRESSING REGULAR LOWER BODY CLOTHING: A LOT
CLIMB 3 TO 5 STEPS WITH RAILING: A LITTLE
TOILETING: A LITTLE
WALKING IN HOSPITAL ROOM: A LITTLE
STANDING UP FROM CHAIR USING ARMS: A LITTLE
DRESSING REGULAR UPPER BODY CLOTHING: A LOT
TOILETING: A LITTLE
DRESSING REGULAR UPPER BODY CLOTHING: A LOT
MOBILITY SCORE: 18

## 2024-01-13 ASSESSMENT — PAIN - FUNCTIONAL ASSESSMENT
PAIN_FUNCTIONAL_ASSESSMENT: 0-10

## 2024-01-13 ASSESSMENT — PAIN DESCRIPTION - DESCRIPTORS
DESCRIPTORS: ACHING
DESCRIPTORS: ACHING

## 2024-01-13 ASSESSMENT — PAIN SCALES - GENERAL
PAINLEVEL_OUTOF10: 8
PAINLEVEL_OUTOF10: 6
PAINLEVEL_OUTOF10: 0 - NO PAIN
PAINLEVEL_OUTOF10: 8
PAINLEVEL_OUTOF10: 0 - NO PAIN
PAINLEVEL_OUTOF10: 10 - WORST POSSIBLE PAIN
PAINLEVEL_OUTOF10: 8

## 2024-01-13 ASSESSMENT — PAIN DESCRIPTION - LOCATION
LOCATION: BACK
LOCATION: BACK

## 2024-01-13 ASSESSMENT — PAIN DESCRIPTION - ORIENTATION: ORIENTATION: LOWER

## 2024-01-13 NOTE — PROGRESS NOTES
DAILY PROGRESS NOTE      POD12 Lumbar washout and hardware removal     Patient doing well  Visit Vitals  /68   Pulse 67   Temp 36.5 °C (97.7 °F) (Temporal)   Resp 18      Temp (24hrs), Av.4 °C (97.6 °F), Min:36.3 °C (97.3 °F), Max:36.7 °C (98.1 °F)       Pain Control significant improvement. Reports very minimal lower extremity pain at this time.   No chest pain or shortness of breath.  No calf pain    Exam:   Good bilateral lower extremity strength and sensation.   Extremity shows neuro vascular status intact. Flexion and extension intact on extremity.  Calves soft and non-tender without evidence of DVT.        Labs reviewed:  CBC: @LABRCNT(WBC:3,HGB:3,PLT:3)@  BMP:  @LABRCNT(Na:3,K:3,CL:3,CO2:3,BUN:3,Creatinine:3,Glucose:3)@    I&O  I/O last 3 completed shifts:  In: 1166 (14.9 mL/kg) [P.O.:900; IV Piggyback:266]  Out: 2632 (33.7 mL/kg) [Urine:2550 (0.9 mL/kg/hr); Drains:82]  Weight: 78.2 kg       Assessment:    I&D lumbar spine    Plan:    Continue with therapy   Brace when OOB and with ambulation   JOSE drains to remain in place until there is 0 output for 48 hours   Continue IV antibiotics per ID  Continue pain control per pain management   Cannot shower until drainage has stopped     Jomar Nails MD   2024 11:49 AM    Agree with above.  Patient was seen and examined by myself today.  She is much more comfortable.  It appears as if her comfort level seems to wax and wane.  I have discussed this case personally with the infectious disease team and they decided to add a another antibiotic.  They are concerned that she is not responding to what they originally had her on.  This will be the third antibiotic that they have tried for her.  She does have discitis and osteomyelitis that likely occurred after she did not respond to the vancomycin.  I would expect a slow steady recovery if her body responds to these antibiotics.  She has not been offered to ambulate or ambulated in the past 3 days.  It  is important for the nursing staff and other staff to get the patient out of bed and moving.  Our goal is to try to get her discharged home.  She tells me that she was denied acute rehab facility stay.  Her drains have been slowing down significantly with her output.  Her dressing is clean dry and intact.  At this point I think we should continue with the antibiotic course as recommended by infectious disease.  The drains will stay in until they are putting absolutely nothing out.  We will try to get her mobilized.  Hopefully we can get her discharged home in the next couple days on IV antibiotics.  I had a long discussion with the patient, her son and her  today for approximately 20 minutes.    Gerson Coombs MD

## 2024-01-13 NOTE — PROGRESS NOTES
The patient is awake and alert    !  Started Lidoderm patch.  The pain level continues to fluctuate.  Ambulated better today.  Has a low physical endurance  Patient was having recurrent spasms in the back and the legs before, doing better with the baclofen.  Patient was already on a Zanaflex regimen and Lyrica at home  Patient started a low-dose of baclofen, 5 mg 4 times a day, doing better  Moving the extremities better  Ambulated better  Rehab is working with her  MRI lumbar spine had shown Discitis and other changes.  She is on antibiotics regimen which was revised, on Cubicin  No significant spinal cord compression  Discharge planning in progress for rehab  Visit Vitals  /84   Pulse 75   Temp 36.5 °C (97.7 °F)   Resp 18        Neurological Exam:    Oriented to day date,month,year, place and person. Knew the name of the president.  Patient was slow to response after the Ativan was given.      Recent and remote memory was intact. Attention span and concentration were normal. General fund of knowledge was intact.   Language: speech comprehension, repetition, expression, and naming is intact for patient´s age and level of education.      CRANIAL NERVES:   CN 2 The fundi were well visualized with normal disc margins, clear vessels and vascular pulsations. No disc edema.  No hemorrhages or exudates were present in the posterior segments that were visualized. Visual fields full to confrontation.   CN 3, 4, 6 Pupils round, equally reactive to light. No ptosis. EOM normal alignment, full range with normal saccades, pursuit and convergence. No nystagmus.   CN 5 Facial sensation intact bilaterally.   CN 7 Normal and symmetric facial strength. Nasolabial folds symmetric.   CN 8 Hearing intact to finger rub bilaterally. On [place] and [place] testing there was no lateralisation  CN 9 Palate elevates symmetrically.   CN 11 Bilaterally normal strength of shoulder shrug and neck turning.   CN 12 Tongue midline, with normal  bulk and strength; no fasciculations.   Patient is handling pharyngeal secretions well.   Speech: articulation is intact.      MOTOR: The patient has normal muscle tone and has normal muscle mass. Muscle strength was 5/5 in distal and proximal muscles in both upper and lower extremities when she gave l the effort.  With the pain effort was difficult in the lower extremities. No fasciculations, tremor or other abnormal movements were present.   On [place] testing the patient has no pronation or drift.      REFLEXES:   RIGHT UE         LEFT UE   BR:         1                         BR:       1      Biceps:    1                        Biceps:1  Triceps:   1                     Triceps: 1  RIGHT LLE        LEFT LLE   Patella:   1                         Patella: 1  Achilles:1                     Achilles:1  Babinski: plantar responses are flexor.   No frontal release signs or other pathologic reflexes present.      COORDINATION: In both upper extremities, finger-nose-finger was intact without dysmetria. No dysdiadochokinesia. In both lower extremities, heel-to-knee-shin was intact.      GAIT: Could not be tested.  Patient is feeling weak with the back pain.  Romberg patient could not be tested  SENSORY: In both upper and lower extremities, sensation was decreased to pinprick, temperature   below the knees and elbows bilaterally, vibration decreased in the distal extremities, and intact joint position sense.    BMP:  Results from last 7 days   Lab Units 01/10/24  1331 01/09/24 0438 01/07/24 0438   SODIUM mmol/L 130* 131* 133*   POTASSIUM mmol/L 4.1 3.6 3.8   CHLORIDE mmol/L 97* 97* 97*   CO2 mmol/L 26 28 28   BUN mg/dL 15 13 11   CREATININE mg/dL 0.80 0.80 0.77         CBC:  Results from last 7 days   Lab Units 01/10/24  1331 01/09/24  0438 01/08/24  0411 01/07/24 0438   WBC AUTO x10*3/uL 9.3 8.6  --  4.8   RBC AUTO x10*6/uL 2.77* 2.65*  --  2.94*   HEMOGLOBIN g/dL 8.5* 8.1* 8.9* 8.9*   HEMATOCRIT % 26.5* 25.1* 27.7*  "28.3*   MCV fL 96 95  --  96   MCH pg 30.7 30.6  --  30.3   MCHC g/dL 32.1 32.3  --  31.4*   RDW % 15.0* 15.2*  --  15.2*   PLATELETS AUTO x10*3/uL 288 279  --  288         INR:        Lipid Profile:        No lab exists for component: \"LABVLDL\"    HgbA1C:        CMP:  Results from last 7 days   Lab Units 01/10/24  1331 01/09/24  0438 01/07/24  0438   SODIUM mmol/L 130* 131* 133*   POTASSIUM mmol/L 4.1 3.6 3.8   CHLORIDE mmol/L 97* 97* 97*   CO2 mmol/L 26 28 28   BUN mg/dL 15 13 11   CREATININE mg/dL 0.80 0.80 0.77   GLUCOSE mg/dL 141* 126* 105*   CALCIUM mg/dL 9.2 9.1 8.9         ABG:        No lab exists for component: \"PO2\", \"PCO2\", \"HCO3\", \"BE\", \"O2SAT\"    TSH:  No results found for: \"TSH\"    Lab Results   Component Value Date    RHMBIVWT81 322 01/05/2024     Lab Results   Component Value Date    FOLATE 5.1 01/05/2024     Lab Results   Component Value Date    VITD25 33 01/05/2024         No MRI head results found for the past 12 months     No CT head results found for the past 12 months     CT abdomen pelvis wo IV contrast    Result Date: 12/31/2023  STUDY: CT Abdomen and Pelvis without IV Contrast; 12/31/2023 at 5:11 PM. INDICATION: Abdominal pain. COMPARISON: CT AP 12/19/2023 ACCESSION NUMBER(S): [id number] ORDERING CLINICIAN: WILLIAM EDMONDS TECHNIQUE: CT of the abdomen and pelvis was performed.  Contiguous axial images were obtained at 3 mm slice thickness through the abdomen and pelvis. Coronal and sagittal reconstructions at 3 mm slice thickness were performed. No intravenous contrast was administered.  Automated mA/kV exposure control was utilized and patient examination was performed in strict accordance with principles of ALARA. FINDINGS: Please note that the evaluation of vessels, lymph nodes and organs is limited without intravenous contrast.  LOWER CHEST: No cardiomegaly.  No pericardial effusion.  Very small bilateral pleural effusions  ABDOMEN:  LIVER: No hepatomegaly.  Smooth surface contour.  " Normal attenuation.  BILE DUCTS: No intrahepatic or extrahepatic biliary ductal dilatation.  GALLBLADDER: The gallbladder is absent. STOMACH: No abnormalities identified.  PANCREAS: Negative for pancreatitis  SPLEEN: No splenomegaly or focal splenic lesion.  ADRENAL GLANDS: No thickening or nodules.  KIDNEYS AND URETERS: Kidneys are normal in size and location.  No renal or ureteral calculi.  PELVIS:  BLADDER: No abnormalities identified.  REPRODUCTIVE ORGANS: No abnormalities identified.  BOWEL: Colonic diverticulosis.  Mild constipation without bowel obstruction. Negative for appendicitis  VESSELS: Limited due to lack of intravenous contrast.  Prior femoral to femoral artery bypass.  Abdominal aorta is normal in caliber.  PERITONEUM/RETROPERITONEUM/LYMPH NODES: Small amount of low-density free pelvic fluid  No pneumoperitoneum. No lymphadenopathy.  ABDOMINAL WALL: Small fat-containing umbilical hernia. SOFT TISSUES: Postsurgical changes within the posterior lumbar soft tissues with surgical drains in place.  BONES: Status post interbody fusion of L4/5 and L5/S1 with metallic spacer. This space narrowing L3/4.  Laminectomies L3-L5.  Bone graft along the lateral margins of L3-L5 fracture of the right L3 transverse process. Fracture of the right L4 transverse process.  Old screw tract within the L4 and L5 pedicles.  Narrowing of the right L5/S1 neural foramen due to facet joint osteophyte.    Negative for bowel obstruction.  Mild constipation. Colonic diverticulosis without diverticulitis. Negative for appendicitis. Postsurgical changes lumbar spine removal of pedicle screws and posterior rods from L4 through S1.  Stable appearance of interbody spacers at L4/5 and L5/S1. Bone graft along the lateral margins of L3-L5.  Fractures of the right L4 and L3 transverse process.  The right L3 transverse process fracture appears old.  Postsurgical changes within the posterior lumbar soft tissues with surgical drains in place.  Signed by Aung Sparrow MD    MR lumbar spine w and wo IV contrast    Result Date: 12/31/2023  Interpreted By:  Jonas Sanchez, STUDY: MR LUMBAR SPINE W AND WO IV CONTRAST;  12/31/2023 12:33 am   INDICATION: Signs/Symptoms:Pain.   COMPARISON: MRI of the lumbar spine dated 12/10/2023   ACCESSION NUMBER(S): [id number]   ORDERING CLINICIAN: YVETTE LEE   TECHNIQUE: Sagittal T1, T2, STIR, axial T1 and T2 weighted images of the lumbar spine were acquired. Additional axial and sagittal T1 weighted images of the lumbar spine were obtained after intravenous administration of 15.5 mL of contrast.   FINDINGS: Exam is degraded by motion.   There is again evidence of 5 lumbar type non rib-bearing vertebral bodies, with the lowest well-formed intervertebral disc space labeled L5-S1.   Postsurgical changes of posterior decompression and laminectomies of L3 through L5 with posterior spinal fusion extending from L4 through S1 and discectomies with intervertebral disc spaces at L4-L5 and L5-S1. These are similar in appearance to prior examination on 12/10/2023. Susceptibility artifact from the surgical hardware somewhat limits evaluation of the adjacent structures.   In the interim since prior imaging on 12/10/2023, new postsurgical consistent with irrigation debridement of subfascial tissues of the cutaneous spine are evident. STIR and T2 hypointense signal abnormality previously seen in the cutaneous fat of the lower lumbar spine has resolved, with new 3.7 x 3.6 x 13.6 cm (series 7, image 10; series 10, image 10) T2 hyperintense fluid collection present, with slight peripheral enhancement. This collection may be contiguous with the skin.   T2 hyperintense collection is again present in the laminectomy surgical bed, abutting the thecal sac at the level of L3 through L5 (series 8, image 14), measuring up to 5.2 cm in craniocaudal dimension, 2.9 cm in transverse dimension and up to 1.5 cm in AP dimension (series 10,  image 7). The surgical catheter previously seen within the collection is gone, although collection is decreased in size to previous imaging, with resolution of previously seen air within the collection. Mild surrounding edema and reactive soft tissue changes are present in the epidural space, somewhat increased in the interim since prior exam, with new/increased mass effect on the adjacent thecal sac.   There also appears to be small amount of new fluid present in the anterior epidural space at the level of L3 (series 10, image 3).   Although the exact characterization is difficult due to motion, there appears to be somewhat worsened spinal canal narrowing at the level of L2-L3 due to combination of new fluid in the anterior epidural space, and the T2 hyperintense fluid collection with associated inflammatory/reactive changes along the posterior aspect of the thecal sac, with somewhat increased effacement of the subarachnoid space.   No new intra thecal enhancement is identified.   Neural foramina are not well assessed due to motion and susceptibility artifact from the surgical hardware.       1.  New surgical changes of irrigation and debridement in the laminectomy surgical bed evident, with previously seen geographic area of hypointense T2 and STIR signal in the cutaneous fat of the lumbar spine resolved, and new T2 hyperintense collection measuring 3.7 x 3.6 x 13.6 cm present in the cutaneous fat, likely representing a postsurgical seroma, although sterility can not be ascertained on imaging alone. This collection reaches of the dermis, and may drain at the skin. 2. T2 hyperintense collection within the laminectomy bed itself along the dorsal aspect of the thecal sac described on previous MRI in 12/10/2023 has mildly decreased in size from prior imaging, with interval removal of previously seen drain, although there are increased inflammatory/reactive soft tissue changes present in the laminectomy surgical bed,  focus of fluid in the anterior epidural space at the level of L3 contributes to increased effacement of the thecal sac at the level of L3 compared to prior MRI.   MACRO: None   Signed by: Jonas Sanchez 12/31/2023 1:13 AM Dictation workstation:   [id number]    MR LUMBAR SPINE WO IV CONTRAST;  1/5/2024 8:48 pm      INDICATION:  Signs/Symptoms:intractable back pain s/o incision and drainage with  lumbar spine hardware removal.      COMPARISON:  Lumbar MRI dated 12/30/2022 and 12/10/2022. CT abdomen pelvis dated  12/31/2022.      ACCESSION NUMBER(S):  [id number]      ORDERING CLINICIAN:  ERICK KEN      TECHNIQUE:  Sagittal T2, T1, and STIR and axial T1, T2 sequences of the lumbar  spine. No intravenous contrast was administered.      FINDINGS:      Lumbar spine:  Normal lumbar lordosis. The last well-formed disc is designated  L5-S1. Minimal L5 over S1 anterolisthesis unchanged from prior. There  is minimal L3 over L4 retrolisthesis unchanged from prior. There has  been interval removal of the bilateral transpedicular screws and  vertical rods at L4 through S1 with T2 hyperintense ghost tracts  noted. There are unchanged disc spacers at L4-L5 and L5-S1. There is  a subcutaneous drain in place extending cranially along the midline  to the level of L3 without associated fluid collection. This  surrounded by subcutaneous edema. There is also a right deep  paravertebral drain in place terminating in the right deep  paravertebral soft tissues at the level of L1-L2 without associated  fluid collection, sagittal image 11 of 25. There is bilateral  posterior paravertebral muscle edema without evidence of fluid  collection.      Vertebral body heights are maintained. 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 There is a T2 hyperintense  fluid collection within the left subarticular ventral epidural space  extending cranially  at the level of L3 and which appears to be in  continuity with the L3-L4 disc space measuring 2.0 cm craniocaudally  and 1.0 x 0.7 cm axially, unchanged from the recent prior. This  contributes to mild effacement of the left subarticular space.              The conus medullaris terminates at L1 and is normal in appearance.      T12-L1: No significant disc bulge or herniation.  L1-L2: Is a diffuse disc bulge and facet hypertrophy contributing to  mild bilateral foraminal stenosis without significant central canal  stenosis. L2-L3: Small disc bulge and facet hypertrophy without  significant central or foraminal canal stenosis. L3-L4: There is  posterior laminectomy change without evidence of central canal  stenosis. There is mild retrolisthesis of L3 over L4 as well as T2  hyperintense fluid collection extending superiorly from the left  subarticular space and contributing to mild left subarticular  stenosis as well as mild central canal stenosis at the level of L3  similar to prior. There is suspected moderate to severe right  foraminal stenosis due to mild retrolisthesis with facet hypertrophy  as well as bone marrow edema at the right synovial facets, for  example sagittal image 9 of 25. There is also mild edema within the  right psoas muscle at the level of L3-L4, axial image 4 of 27 without  evidence of a fluid collection. There are bilateral right greater  than left facet effusions at L3-L4 and possibility of septic  arthritis can not be excluded. There is mild to moderate left  foraminal stenosis. L4-L5: There are right-sided facetectomy changes  with with hazy fluid within the surgical bed which may be expected.  Posterior laminectomy change. There is mild bilateral foraminal  narrowing. No central canal stenosis. L5-S1: There are right-sided  facetectomy changes with hazy fluid within the surgical bed, which  may be expected. Posterior laminectomy change. Mild bilateral  foraminal narrowing. No central canal  stenosis.      IMPRESSION:  Interval removal of bilateral transpedicular screws and vertical rods  at L3 through S1 and interval resolution of posterior paravertebral  fluid collections. Lumbar drains within the subcutaneous and deep  posterior paravertebral soft tissues as described above with  associated soft tissue edema, however no evidence of surrounding  fluid collection.      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 There is minimal L3 over L4  retrolisthesis as well as facet hypertrophy, which contributes to  moderate to severe right foraminal stenosis at L3-L4. There is also  asymmetric right-sided facet effusion and right subchondral edema at  L3-L4 facets and asymmetric right psoas edema at L3-L4 concerning for  septic arthritis.      There is unchanged ventral epidural collection on the left at the  level of L3 which may be communicating with the L3-L4 disc. The  sterility of this collection can not be determined given multiple  prior surgeries. It contributes to unchanged mild central canal  stenosis and left subarticular stenosis at L3-L4.      Signed by: Marco Valles 1/6/2024 12:46 AM  Dictation workstation:   [id number]  EMG     No EMG results found for the past 12 months        No EEG results found for the past 12 months      Current Facility-Administered Medications:     acetaminophen (Tylenol) tablet 650 mg, 650 mg, oral, q4h PRN, 650 mg at 01/05/24 2104 **OR** acetaminophen (Tylenol) oral liquid 650 mg, 650 mg, oral, q4h PRN **OR** acetaminophen (Tylenol) suppository 650 mg, 650 mg, rectal, q4h PRN, DARRYL Quiros-CNP    acetaminophen (Tylenol) tablet 650 mg, 650 mg, oral, q6h, DARRYL Quiros-CNP, 650 mg at 01/12/24 1550    alteplase (Cathflo Activase) injection 1 mg, 1 mg, intra-catheter, PRN, Nitesh Ruiz MD, 1 mg at 01/07/24 1150    aspirin EC tablet 81 mg, 81 mg, oral, Daily, John Salazar MD, 81 mg  at 01/12/24 0840    atenolol (Tenormin) tablet 50 mg, 50 mg, oral, q AM, John Salazar MD, 50 mg at 01/12/24 0840    baclofen (Lioresal) tablet 5 mg, 5 mg, oral, 4x daily, Onur Perez MD, 5 mg at 01/12/24 2026    brimonidine (AlphaGAN) 0.2 % ophthalmic solution 1 drop, 1 drop, Both Eyes, BID, John Salazar MD, 1 drop at 01/12/24 2030    busPIRone (Buspar) tablet 10 mg, 10 mg, oral, Daily, John Salazar MD, 10 mg at 01/12/24 0840    ceftaroline (Teflaro) 600 mg in dextrose 5 % in water (D5W) 50 mL IV, 600 mg, intravenous, q12h, Gme Reyna MD, Stopped at 01/12/24 1858    cyclobenzaprine (Flexeril) tablet 10 mg, 10 mg, oral, TID PRN, Maribeth Santamaria, APRN-CNP, 10 mg at 01/12/24 2030    DAPTOmycin (Cubicin) 650 mg in sodium chloride 0.9% 50 mL IV, 8 mg/kg, intravenous, Nightly, Gem Reyna MD, Stopped at 01/11/24 2325    docusate sodium (Colace) capsule 100 mg, 100 mg, oral, BID, Caitlin Li, APRN-CNP, 100 mg at 01/12/24 2026    ezetimibe (Zetia) tablet 10 mg, 10 mg, oral, Daily, John Salazar MD, 10 mg at 01/12/24 0840    furosemide (Lasix) tablet 20 mg, 20 mg, oral, Daily, John Salazar MD, 20 mg at 01/12/24 0839    hydrALAZINE (Apresoline) tablet 50 mg, 50 mg, oral, BID, John Salazar MD, 50 mg at 01/12/24 2027    HYDROmorphone (Dilaudid) tablet 2 mg, 2 mg, oral, q4h PRN, Nitesh Ruiz MD, 2 mg at 01/12/24 1805    isosorbide mononitrate ER (Imdur) 24 hr tablet 60 mg, 60 mg, oral, Daily, John Salazar MD, 60 mg at 01/12/24 0622    lactobacillus acidophilus tablet 1 tablet, 1 tablet, oral, Daily, John Salazar MD, 1 tablet at 01/12/24 0840    lidocaine 4 % patch 1 patch, 1 patch, transdermal, Daily, DARRYL Quiros-CNP, 1 patch at 01/12/24 0842    lidocaine 4 % patch 1 patch, 1 patch, transdermal, Daily, Onur Perez MD, 1 patch at 01/12/24 0839    meclizine (Antivert) tablet 25 mg, 25 mg, oral, q8h PRN, John Salazar MD    melatonin tablet 3 mg, 3 mg, oral, Daily, Maribeth Santamaria,  DARRYL-CNP, 3 mg at 01/12/24 2025    mirtazapine (Remeron) tablet 15 mg, 15 mg, oral, Nightly, John Salazar MD, 15 mg at 01/12/24 2026    morphine CR (MS Contin) 12 hr tablet 15 mg, 15 mg, oral, q12h EDE, Panchito Barnes MD, 15 mg at 01/12/24 2028    morphine injection 2 mg, 2 mg, intravenous, q2h PRN, [place]O Quiros, 2 mg at 01/12/24 1550    naloxone (Narcan) injection 0.2 mg, 0.2 mg, intravenous, q5 min PRN, [place]O Quiros    ondansetron (Zofran) tablet 4 mg, 4 mg, oral, q8h PRN, 4 mg at 12/31/23 0904 **OR** ondansetron (Zofran) injection 4 mg, 4 mg, intravenous, q8h PRN, [place]O Quiros, 4 mg at 12/31/23 1317    oxyCODONE (Roxicodone) immediate release tablet 10 mg, 10 mg, oral, q6h PRN, Nitesh Ruiz MD, 10 mg at 01/12/24 0840    pantoprazole (ProtoNix) EC tablet 40 mg, 40 mg, oral, Daily before breakfast, John Salazar MD, 40 mg at 01/12/24 0622    pilocarpine (Salagen) tablet 5 mg, 5 mg, oral, Daily, John Salazar MD, 5 mg at 01/12/24 0839    polyethylene glycol (Glycolax, Miralax) packet 17 g, 17 g, oral, Daily, [place]O uQiros, 17 g at 01/07/24 0903    pregabalin (Lyrica) capsule 100 mg, 100 mg, oral, BID, Panchito Barnes MD, 100 mg at 01/12/24 2028    vitamin A & D ointment 1 Application, 1 Application, Topical, q4h PRN, Nitesh Ruiz MD, 1 Application at 01/10/24 2155         Assessment:  Patient has a chronic back pain.  She has a lumbar spine surgery in November 2023.  Dr. Coombs is following the patient.  Patient developed a postop staph infection with MRSA.  She has the antibiotics, incision and drainage.  Pain in the back is more on the right side along with pain in the right thigh and right hip.  Imaging of the right hip did not show significant pathology.  Pain exacerbation with the L3-4 discitis and osteomyelitis.  Infectious disease on consult antibiotics have been adjusted, patient is on Cubicin  Patient is improving with the movements in  the legs.  Pain was worse today  Patient is getting a lot of analgesics.  Dr. Barnes is on consult  Right leg DVT she has an inferior vena cava filter put in January 2.  She is on Eliquis  Right carotid bruit, right carotid endarterectomy by Dr. Edwards.  History of right cerebral TIA  Coronary artery disease  Cardiomyopathy  Bilateral total knee replacements.  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  S/p bilateral leg revascularization June 2022  Hypertension  Chronic edema  COPD with history of tobacco abuse in the past  Overweight  Vitamin D deficiency has been treated  Lidoderm patch helping   additional lab work showed a borderline elevated TSH, B12, folic acid level, vitamin D in good range  With a better pain control she can start increasing her activity.  Rehab to increase activity as tolerated  baclofen low-dose was added to her regimen without any side effects, patient improved as far as pasms are concerned    Recommendation:  Start Lidoderm patch tonight  Additional lab work showed a borderline elevated TSH, B12, folic acid level, vitamin D in good range  With a better pain control she can start increasing her activity.  Rehab to increase activity as tolerated  baclofen low-dose was added to her regimen without any side effects, patient improved as far as pasms are concerned  Discharge planning in progress     Onur Perez MD

## 2024-01-13 NOTE — PROGRESS NOTES
The patient is awake and alert,  at bedside.  Up to the chair cautiously on 1/13.  She endorses improvement in the muscle cramping since being on the baclofen  Started Lidoderm patch.  The pain level continues to fluctuate.  Patient was having recurrent spasms in the back and the legs before, doing better with the baclofen.  Patient was already on a Zanaflex regimen and Lyrica at home  Patient started a low-dose of baclofen, 5 mg 4 times a day, doing better  Moving the extremities better  Ambulated better  Rehab is working with her  MRI lumbar spine had shown Discitis and other changes.  She is on antibiotics regimen which was revised, on Cubicin  No significant spinal cord compression  Discharge planning in progress for rehab  Visit Vitals  /60 (Patient Position: Sitting)   Pulse 69   Temp 36.6 °C (97.9 °F)   Resp 18        Neurological Exam:    Oriented to day date,month,year, place and person. Knew the name of the president.  Attention span and concentration were normal.   Language: speech comprehension, repetition, expression, and naming is intact for patient´s age and level of education.      CRANIAL NERVES:   CN 2 Visual fields full to confrontation.   CN 3, 4, 6 Pupils round, equally reactive to light. No ptosis. EOM normal alignment, full range with normal saccades, pursuit and convergence. No nystagmus.   CN 5 Facial sensation intact bilaterally.   CN 7 Normal and symmetric facial strength. Nasolabial folds symmetric.   CN 8 Hearing intact to finger rub bilaterally. On Rinne and Monreal testing there was no lateralisation  CN 9 Palate elevates symmetrically.   CN 11 Bilaterally normal strength of shoulder shrug and neck turning.   CN 12 Tongue midline, with normal bulk and strength; no fasciculations.   Patient is handling pharyngeal secretions well.   Speech: articulation is intact.      MOTOR: The patient has normal muscle tone and has normal muscle mass. Muscle strength was 5/5 in distal and  "proximal muscles in both upper and lower extremities when she gave l the effort.  With the pain effort was difficult in the lower extremities. No fasciculations, tremor or other abnormal movements were present.   On Winn testing the patient has no pronation or drift.      REFLEXES: unchanged from initial examination     COORDINATION: In both upper extremities, finger-nose-finger was intact without dysmetria. No dysdiadochokinesia. In both lower extremities, heel-to-knee-shin was intact.      GAIT: Could not be tested.  Patient is feeling weak with the back pain.  Romberg patient could not be tested      BMP:  Results from last 7 days   Lab Units 01/10/24  1331 01/09/24  0438 01/07/24  0438   SODIUM mmol/L 130* 131* 133*   POTASSIUM mmol/L 4.1 3.6 3.8   CHLORIDE mmol/L 97* 97* 97*   CO2 mmol/L 26 28 28   BUN mg/dL 15 13 11   CREATININE mg/dL 0.80 0.80 0.77         CBC:  Results from last 7 days   Lab Units 01/10/24  1331 01/09/24  0438 01/08/24  0411 01/07/24  0438   WBC AUTO x10*3/uL 9.3 8.6  --  4.8   RBC AUTO x10*6/uL 2.77* 2.65*  --  2.94*   HEMOGLOBIN g/dL 8.5* 8.1* 8.9* 8.9*   HEMATOCRIT % 26.5* 25.1* 27.7* 28.3*   MCV fL 96 95  --  96   MCH pg 30.7 30.6  --  30.3   MCHC g/dL 32.1 32.3  --  31.4*   RDW % 15.0* 15.2*  --  15.2*   PLATELETS AUTO x10*3/uL 288 279  --  288         INR:        Lipid Profile:        No lab exists for component: \"LABVLDL\"    HgbA1C:        CMP:  Results from last 7 days   Lab Units 01/10/24  1331 01/09/24  0438 01/07/24  0438   SODIUM mmol/L 130* 131* 133*   POTASSIUM mmol/L 4.1 3.6 3.8   CHLORIDE mmol/L 97* 97* 97*   CO2 mmol/L 26 28 28   BUN mg/dL 15 13 11   CREATININE mg/dL 0.80 0.80 0.77   GLUCOSE mg/dL 141* 126* 105*   CALCIUM mg/dL 9.2 9.1 8.9         ABG:        No lab exists for component: \"PO2\", \"PCO2\", \"HCO3\", \"BE\", \"O2SAT\"    TSH:  No results found for: \"TSH\"    Lab Results   Component Value Date    RVALWCAG57 322 01/05/2024     Lab Results   Component Value Date    FOLATE " 5.1 01/05/2024     Lab Results   Component Value Date    VITD25 33 01/05/2024         No MRI head results found for the past 12 months     No CT head results found for the past 12 months     CT abdomen pelvis wo IV contrast    Result Date: 12/31/2023  STUDY: CT Abdomen and Pelvis without IV Contrast; 12/31/2023 at 5:11 PM. INDICATION: Abdominal pain. COMPARISON: CT AP 12/19/2023 ACCESSION NUMBER(S): [id number] ORDERING CLINICIAN: WILLIAM EDMONDS TECHNIQUE: CT of the abdomen and pelvis was performed.  Contiguous axial images were obtained at 3 mm slice thickness through the abdomen and pelvis. Coronal and sagittal reconstructions at 3 mm slice thickness were performed. No intravenous contrast was administered.  Automated mA/kV exposure control was utilized and patient examination was performed in strict accordance with principles of ALARA. FINDINGS: Please note that the evaluation of vessels, lymph nodes and organs is limited without intravenous contrast.  LOWER CHEST: No cardiomegaly.  No pericardial effusion.  Very small bilateral pleural effusions  ABDOMEN:  LIVER: No hepatomegaly.  Smooth surface contour.  Normal attenuation.  BILE DUCTS: No intrahepatic or extrahepatic biliary ductal dilatation.  GALLBLADDER: The gallbladder is absent. STOMACH: No abnormalities identified.  PANCREAS: Negative for pancreatitis  SPLEEN: No splenomegaly or focal splenic lesion.  ADRENAL GLANDS: No thickening or nodules.  KIDNEYS AND URETERS: Kidneys are normal in size and location.  No renal or ureteral calculi.  PELVIS:  BLADDER: No abnormalities identified.  REPRODUCTIVE ORGANS: No abnormalities identified.  BOWEL: Colonic diverticulosis.  Mild constipation without bowel obstruction. Negative for appendicitis  VESSELS: Limited due to lack of intravenous contrast.  Prior femoral to femoral artery bypass.  Abdominal aorta is normal in caliber.  PERITONEUM/RETROPERITONEUM/LYMPH NODES: Small amount of low-density free pelvic  fluid  No pneumoperitoneum. No lymphadenopathy.  ABDOMINAL WALL: Small fat-containing umbilical hernia. SOFT TISSUES: Postsurgical changes within the posterior lumbar soft tissues with surgical drains in place.  BONES: Status post interbody fusion of L4/5 and L5/S1 with metallic spacer. This space narrowing L3/4.  Laminectomies L3-L5.  Bone graft along the lateral margins of L3-L5 fracture of the right L3 transverse process. Fracture of the right L4 transverse process.  Old screw tract within the L4 and L5 pedicles.  Narrowing of the right L5/S1 neural foramen due to facet joint osteophyte.    Negative for bowel obstruction.  Mild constipation. Colonic diverticulosis without diverticulitis. Negative for appendicitis. Postsurgical changes lumbar spine removal of pedicle screws and posterior rods from L4 through S1.  Stable appearance of interbody spacers at L4/5 and L5/S1. Bone graft along the lateral margins of L3-L5.  Fractures of the right L4 and L3 transverse process.  The right L3 transverse process fracture appears old.  Postsurgical changes within the posterior lumbar soft tissues with surgical drains in place. Signed by Aung Sparrow MD    MR lumbar spine w and wo IV contrast    Result Date: 12/31/2023  Interpreted By:  Jonas Sanchez, STUDY: MR LUMBAR SPINE W AND WO IV CONTRAST;  12/31/2023 12:33 am   INDICATION: Signs/Symptoms:Pain.   COMPARISON: MRI of the lumbar spine dated 12/10/2023   ACCESSION NUMBER(S): [id number]   ORDERING CLINICIAN: YVETTE LEE   TECHNIQUE: Sagittal T1, T2, STIR, axial T1 and T2 weighted images of the lumbar spine were acquired. Additional axial and sagittal T1 weighted images of the lumbar spine were obtained after intravenous administration of 15.5 mL of contrast.   FINDINGS: Exam is degraded by motion.   There is again evidence of 5 lumbar type non rib-bearing vertebral bodies, with the lowest well-formed intervertebral disc space labeled L5-S1.   Postsurgical  changes of posterior decompression and laminectomies of L3 through L5 with posterior spinal fusion extending from L4 through S1 and discectomies with intervertebral disc spaces at L4-L5 and L5-S1. These are similar in appearance to prior examination on 12/10/2023. Susceptibility artifact from the surgical hardware somewhat limits evaluation of the adjacent structures.   In the interim since prior imaging on 12/10/2023, new postsurgical consistent with irrigation debridement of subfascial tissues of the cutaneous spine are evident. STIR and T2 hypointense signal abnormality previously seen in the cutaneous fat of the lower lumbar spine has resolved, with new 3.7 x 3.6 x 13.6 cm (series 7, image 10; series 10, image 10) T2 hyperintense fluid collection present, with slight peripheral enhancement. This collection may be contiguous with the skin.   T2 hyperintense collection is again present in the laminectomy surgical bed, abutting the thecal sac at the level of L3 through L5 (series 8, image 14), measuring up to 5.2 cm in craniocaudal dimension, 2.9 cm in transverse dimension and up to 1.5 cm in AP dimension (series 10, image 7). The surgical catheter previously seen within the collection is gone, although collection is decreased in size to previous imaging, with resolution of previously seen air within the collection. Mild surrounding edema and reactive soft tissue changes are present in the epidural space, somewhat increased in the interim since prior exam, with new/increased mass effect on the adjacent thecal sac.   There also appears to be small amount of new fluid present in the anterior epidural space at the level of L3 (series 10, image 3).   Although the exact characterization is difficult due to motion, there appears to be somewhat worsened spinal canal narrowing at the level of L2-L3 due to combination of new fluid in the anterior epidural space, and the T2 hyperintense fluid collection with associated  inflammatory/reactive changes along the posterior aspect of the thecal sac, with somewhat increased effacement of the subarachnoid space.   No new intra thecal enhancement is identified.   Neural foramina are not well assessed due to motion and susceptibility artifact from the surgical hardware.       1.  New surgical changes of irrigation and debridement in the laminectomy surgical bed evident, with previously seen geographic area of hypointense T2 and STIR signal in the cutaneous fat of the lumbar spine resolved, and new T2 hyperintense collection measuring 3.7 x 3.6 x 13.6 cm present in the cutaneous fat, likely representing a postsurgical seroma, although sterility can not be ascertained on imaging alone. This collection reaches of the dermis, and may drain at the skin. 2. T2 hyperintense collection within the laminectomy bed itself along the dorsal aspect of the thecal sac described on previous MRI in 12/10/2023 has mildly decreased in size from prior imaging, with interval removal of previously seen drain, although there are increased inflammatory/reactive soft tissue changes present in the laminectomy surgical bed, focus of fluid in the anterior epidural space at the level of L3 contributes to increased effacement of the thecal sac at the level of L3 compared to prior MRI.   MACRO: None   Signed by: Jonas Sanchez 12/31/2023 1:13 AM Dictation workstation:   [id number]    MR LUMBAR SPINE WO IV CONTRAST;  1/5/2024 8:48 pm      INDICATION:  Signs/Symptoms:intractable back pain s/o incision and drainage with  lumbar spine hardware removal.      COMPARISON:  Lumbar MRI dated 12/30/2022 and 12/10/2022. CT abdomen pelvis dated  12/31/2022.      ACCESSION NUMBER(S):  [id number]      ORDERING CLINICIAN:  ERICK KEN      TECHNIQUE:  Sagittal T2, T1, and STIR and axial T1, T2 sequences of the lumbar  spine. No intravenous contrast was administered.      FINDINGS:      Lumbar spine:  Normal lumbar lordosis.  The last well-formed disc is designated  L5-S1. Minimal L5 over S1 anterolisthesis unchanged from prior. There  is minimal L3 over L4 retrolisthesis unchanged from prior. There has  been interval removal of the bilateral transpedicular screws and  vertical rods at L4 through S1 with T2 hyperintense ghost tracts  noted. There are unchanged disc spacers at L4-L5 and L5-S1. There is  a subcutaneous drain in place extending cranially along the midline  to the level of L3 without associated fluid collection. This  surrounded by subcutaneous edema. There is also a right deep  paravertebral drain in place terminating in the right deep  paravertebral soft tissues at the level of L1-L2 without associated  fluid collection, sagittal image 11 of 25. There is bilateral  posterior paravertebral muscle edema without evidence of fluid  collection.      Vertebral body heights are maintained. 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 There is a T2 hyperintense  fluid collection within the left subarticular ventral epidural space  extending cranially at the level of L3 and which appears to be in  continuity with the L3-L4 disc space measuring 2.0 cm craniocaudally  and 1.0 x 0.7 cm axially, unchanged from the recent prior. This  contributes to mild effacement of the left subarticular space.              The conus medullaris terminates at L1 and is normal in appearance.      T12-L1: No significant disc bulge or herniation.  L1-L2: Is a diffuse disc bulge and facet hypertrophy contributing to  mild bilateral foraminal stenosis without significant central canal  stenosis. L2-L3: Small disc bulge and facet hypertrophy without  significant central or foraminal canal stenosis. L3-L4: There is  posterior laminectomy change without evidence of central canal  stenosis. There is mild retrolisthesis of L3 over L4 as well as T2  hyperintense fluid  collection extending superiorly from the left  subarticular space and contributing to mild left subarticular  stenosis as well as mild central canal stenosis at the level of L3  similar to prior. There is suspected moderate to severe right  foraminal stenosis due to mild retrolisthesis with facet hypertrophy  as well as bone marrow edema at the right synovial facets, for  example sagittal image 9 of 25. There is also mild edema within the  right psoas muscle at the level of L3-L4, axial image 4 of 27 without  evidence of a fluid collection. There are bilateral right greater  than left facet effusions at L3-L4 and possibility of septic  arthritis can not be excluded. There is mild to moderate left  foraminal stenosis. L4-L5: There are right-sided facetectomy changes  with with hazy fluid within the surgical bed which may be expected.  Posterior laminectomy change. There is mild bilateral foraminal  narrowing. No central canal stenosis. L5-S1: There are right-sided  facetectomy changes with hazy fluid within the surgical bed, which  may be expected. Posterior laminectomy change. Mild bilateral  foraminal narrowing. No central canal stenosis.      IMPRESSION:  Interval removal of bilateral transpedicular screws and vertical rods  at L3 through S1 and interval resolution of posterior paravertebral  fluid collections. Lumbar drains within the subcutaneous and deep  posterior paravertebral soft tissues as described above with  associated soft tissue edema, however no evidence of surrounding  fluid collection.      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 There is minimal L3 over L4  retrolisthesis as well as facet hypertrophy, which contributes to  moderate to severe right foraminal stenosis at L3-L4. There is also  asymmetric right-sided facet effusion and right subchondral edema at  L3-L4 facets and asymmetric right psoas edema at L3-L4 concerning  for  septic arthritis.      There is unchanged ventral epidural collection on the left at the  level of L3 which may be communicating with the L3-L4 disc. The  sterility of this collection can not be determined given multiple  prior surgeries. It contributes to unchanged mild central canal  stenosis and left subarticular stenosis at L3-L4.      Signed by: Marco Valles 1/6/2024 12:46 AM  Dictation workstation:   [id number]  EMG     No EMG results found for the past 12 months        No EEG results found for the past 12 months      Current Facility-Administered Medications:     acetaminophen (Tylenol) tablet 650 mg, 650 mg, oral, q4h PRN, 650 mg at 01/05/24 2104 **OR** acetaminophen (Tylenol) oral liquid 650 mg, 650 mg, oral, q4h PRN **OR** acetaminophen (Tylenol) suppository 650 mg, 650 mg, rectal, q4h PRN, Maribeth Santamaria APRN-CNP    acetaminophen (Tylenol) tablet 650 mg, 650 mg, oral, q6h, DARRYL Quiros-CNP, 650 mg at 01/13/24 0937    alteplase (Cathflo Activase) injection 1 mg, 1 mg, intra-catheter, PRN, Nitesh Ruiz MD, 1 mg at 01/07/24 1150    aspirin EC tablet 81 mg, 81 mg, oral, Daily, John Salazar MD, 81 mg at 01/13/24 0937    atenolol (Tenormin) tablet 50 mg, 50 mg, oral, q AM, oJhn Salazar MD, 50 mg at 01/13/24 0937    baclofen (Lioresal) tablet 5 mg, 5 mg, oral, 4x daily, Onur Perez MD, 5 mg at 01/13/24 1243    brimonidine (AlphaGAN) 0.2 % ophthalmic solution 1 drop, 1 drop, Both Eyes, BID, John Salazar MD, 1 drop at 01/13/24 0942    busPIRone (Buspar) tablet 10 mg, 10 mg, oral, Daily, John Salazar MD, 10 mg at 01/13/24 0937    ceftaroline (Teflaro) 600 mg in dextrose 5 % in water (D5W) 50 mL IV, 600 mg, intravenous, q12h, Gem A Abbud, MD, Stopped at 01/13/24 0500    cyclobenzaprine (Flexeril) tablet 10 mg, 10 mg, oral, TID PRN, DARRYL Quiros-CNP, 10 mg at 01/12/24 2030    DAPTOmycin (Cubicin) 650 mg in sodium chloride 0.9% 50 mL IV, 8 mg/kg, intravenous, Nightly,  Gem Reyna MD, Stopped at 01/12/24 2104    docusate sodium (Colace) capsule 100 mg, 100 mg, oral, BID, DARRYL Szymanski-CNP, 100 mg at 01/13/24 0937    ezetimibe (Zetia) tablet 10 mg, 10 mg, oral, Daily, John Salazar MD, 10 mg at 01/13/24 0937    furosemide (Lasix) tablet 20 mg, 20 mg, oral, Daily, John Salazar MD, 20 mg at 01/13/24 0937    hydrALAZINE (Apresoline) tablet 50 mg, 50 mg, oral, BID, John Salazar MD, 50 mg at 01/13/24 0937    HYDROmorphone (Dilaudid) tablet 2 mg, 2 mg, oral, q4h PRN, Nitesh Ruiz MD, 2 mg at 01/13/24 1440    isosorbide mononitrate ER (Imdur) 24 hr tablet 60 mg, 60 mg, oral, Daily, John Salazar MD, 60 mg at 01/13/24 0619    lactobacillus acidophilus tablet 1 tablet, 1 tablet, oral, Daily, John Salazar MD, 1 tablet at 01/13/24 0937    lidocaine 4 % patch 1 patch, 1 patch, transdermal, Daily, ORLANDO Quiros, 1 patch at 01/13/24 0936    lidocaine 4 % patch 1 patch, 1 patch, transdermal, Daily, Onur Perez MD, 1 patch at 01/13/24 0936    meclizine (Antivert) tablet 25 mg, 25 mg, oral, q8h PRN, John Salazar MD    melatonin tablet 3 mg, 3 mg, oral, Daily, SUKHDEEP QuirosCNP, 3 mg at 01/12/24 2025    mirtazapine (Remeron) tablet 15 mg, 15 mg, oral, Nightly, John Salazar MD, 15 mg at 01/12/24 2026    morphine CR (MS Contin) 12 hr tablet 15 mg, 15 mg, oral, q12h EDE, Panchito Barnes MD, 15 mg at 01/13/24 0937    morphine injection 2 mg, 2 mg, intravenous, q2h PRN, ORLANDO Quiros, 2 mg at 01/12/24 1550    naloxone (Narcan) injection 0.2 mg, 0.2 mg, intravenous, q5 min PRN, ORLANDO Quiros    ondansetron (Zofran) tablet 4 mg, 4 mg, oral, q8h PRN, 4 mg at 12/31/23 0904 **OR** ondansetron (Zofran) injection 4 mg, 4 mg, intravenous, q8h PRN, ORLANDO Quiros, 4 mg at 12/31/23 1317    oxyCODONE (Roxicodone) immediate release tablet 10 mg, 10 mg, oral, q6h PRN, Nitesh Ruiz MD, 10 mg at 01/12/24 0840    pantoprazole  (ProtoNix) EC tablet 40 mg, 40 mg, oral, Daily before breakfast, John Salazar MD, 40 mg at 01/13/24 0620    pilocarpine (Salagen) tablet 5 mg, 5 mg, oral, Daily, John Salazar MD, 5 mg at 01/13/24 0937    polyethylene glycol (Glycolax, Miralax) packet 17 g, 17 g, oral, Daily, Maribeth Santamaria, APRN-CNP, 17 g at 01/07/24 0903    pregabalin (Lyrica) capsule 100 mg, 100 mg, oral, BID, Panchito Barnes MD, 100 mg at 01/13/24 0937    vitamin A & D ointment 1 Application, 1 Application, Topical, q4h PRN, Nitesh Ruiz MD, 1 Application at 01/10/24 2155         Assessment:  Patient has a chronic back pain.  She has a lumbar spine surgery in November 2023.  Dr. Coombs is following the patient.  Patient developed a postop staph infection with MRSA.  She has the antibiotics, incision and drainage.  Pain in the back is more on the right side along with pain in the right thigh and right hip.  Imaging of the right hip did not show significant pathology.  Pain exacerbation with the L3-4 discitis and osteomyelitis.  Infectious disease on consult antibiotics have been adjusted, patient is on Cubicin  Patient is improving with the movements in the legs.  Pain was worse today  Patient is getting a lot of analgesics.  Dr. Barnes is on consult  Right leg DVT she has an inferior vena cava filter put in January 2.  She is on Eliquis  Right carotid bruit, right carotid endarterectomy by Dr. Edwards.  History of right cerebral TIA  Coronary artery disease  Cardiomyopathy  Bilateral total knee replacements.  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  S/p bilateral leg revascularization June 2022  Hypertension  Chronic edema  COPD with history of tobacco abuse in the past  Overweight  Vitamin D deficiency has been treated  Lidoderm patch helping   additional lab work showed a borderline elevated TSH, B12, folic acid level, vitamin D in good range  With a better pain control she can start increasing her activity.   Rehab to increase activity as tolerated  baclofen low-dose was added to her regimen without any side effects, patient improved as far as pasms are concerned    Recommendation:  Continue Lidoderm patch   Additional lab work showed a borderline elevated TSH, B12, folic acid level, vitamin D in good range  With a better pain control she can start increasing her activity.  Rehab to increase activity as tolerated  baclofen low-dose was added to her regimen without any side effects, patient improved as far as spasms are concerned  Discharge planning in progress     Lata Perez MD

## 2024-01-13 NOTE — CARE PLAN
The patient's goals for the shift include      The clinical goals for the shift include patient will have pain under control throughout shift.    Over the shift, the patient did not make progress toward the following goals. Barriers to progression include . Recommendations to address these barriers include .

## 2024-01-13 NOTE — NURSING NOTE
Called orthopedic on call number to make physician aware of Left lower back JOSE drain not holding suction. This RN did look at JOSE insertion site to make sure that the JOSE drain wasn't coming loose. Sutures still intact and new island dressing placed back on.     Spoke to Dr. Nails on telephone and was advised to change out bulb if there is one available, other wise he would come look at it in the morning.    Ortho floor does not have a spare bulb suction at this time.     Will continue to monitor.

## 2024-01-14 PROCEDURE — 2500000001 HC RX 250 WO HCPCS SELF ADMINISTERED DRUGS (ALT 637 FOR MEDICARE OP): Performed by: ANESTHESIOLOGY

## 2024-01-14 PROCEDURE — 2500000004 HC RX 250 GENERAL PHARMACY W/ HCPCS (ALT 636 FOR OP/ED)

## 2024-01-14 PROCEDURE — 2500000001 HC RX 250 WO HCPCS SELF ADMINISTERED DRUGS (ALT 637 FOR MEDICARE OP): Performed by: STUDENT IN AN ORGANIZED HEALTH CARE EDUCATION/TRAINING PROGRAM

## 2024-01-14 PROCEDURE — 2500000001 HC RX 250 WO HCPCS SELF ADMINISTERED DRUGS (ALT 637 FOR MEDICARE OP): Performed by: SPECIALIST

## 2024-01-14 PROCEDURE — 2500000005 HC RX 250 GENERAL PHARMACY W/O HCPCS: Performed by: SPECIALIST

## 2024-01-14 PROCEDURE — 2500000001 HC RX 250 WO HCPCS SELF ADMINISTERED DRUGS (ALT 637 FOR MEDICARE OP)

## 2024-01-14 PROCEDURE — 2500000004 HC RX 250 GENERAL PHARMACY W/ HCPCS (ALT 636 FOR OP/ED): Performed by: HOSPITALIST

## 2024-01-14 PROCEDURE — 2500000001 HC RX 250 WO HCPCS SELF ADMINISTERED DRUGS (ALT 637 FOR MEDICARE OP): Performed by: REGISTERED NURSE

## 2024-01-14 PROCEDURE — 1090000002 HH PPS REVENUE DEBIT

## 2024-01-14 PROCEDURE — 1100000001 HC PRIVATE ROOM DAILY

## 2024-01-14 PROCEDURE — 1090000001 HH PPS REVENUE CREDIT

## 2024-01-14 PROCEDURE — 2500000004 HC RX 250 GENERAL PHARMACY W/ HCPCS (ALT 636 FOR OP/ED): Performed by: INTERNAL MEDICINE

## 2024-01-14 PROCEDURE — 2500000002 HC RX 250 W HCPCS SELF ADMINISTERED DRUGS (ALT 637 FOR MEDICARE OP, ALT 636 FOR OP/ED): Performed by: HOSPITALIST

## 2024-01-14 PROCEDURE — 2500000005 HC RX 250 GENERAL PHARMACY W/O HCPCS

## 2024-01-14 PROCEDURE — 2500000001 HC RX 250 WO HCPCS SELF ADMINISTERED DRUGS (ALT 637 FOR MEDICARE OP): Performed by: HOSPITALIST

## 2024-01-14 RX ADMIN — PREGABALIN 100 MG: 50 CAPSULE ORAL at 20:42

## 2024-01-14 RX ADMIN — DOCUSATE SODIUM 100 MG: 100 CAPSULE, LIQUID FILLED ORAL at 10:20

## 2024-01-14 RX ADMIN — ISOSORBIDE MONONITRATE 60 MG: 60 TABLET, EXTENDED RELEASE ORAL at 06:18

## 2024-01-14 RX ADMIN — ACETAMINOPHEN 650 MG: 325 TABLET ORAL at 21:34

## 2024-01-14 RX ADMIN — DOCUSATE SODIUM 100 MG: 100 CAPSULE, LIQUID FILLED ORAL at 20:42

## 2024-01-14 RX ADMIN — BACLOFEN 5 MG: 10 TABLET ORAL at 06:19

## 2024-01-14 RX ADMIN — OXYCODONE HYDROCHLORIDE 10 MG: 5 TABLET ORAL at 16:20

## 2024-01-14 RX ADMIN — BUSPIRONE HYDROCHLORIDE 10 MG: 5 TABLET ORAL at 10:20

## 2024-01-14 RX ADMIN — EZETIMIBE 10 MG: 10 TABLET ORAL at 10:20

## 2024-01-14 RX ADMIN — BACLOFEN 5 MG: 10 TABLET ORAL at 20:42

## 2024-01-14 RX ADMIN — HYDRALAZINE HYDROCHLORIDE 50 MG: 50 TABLET ORAL at 10:20

## 2024-01-14 RX ADMIN — ACETAMINOPHEN 650 MG: 325 TABLET ORAL at 10:20

## 2024-01-14 RX ADMIN — ACETAMINOPHEN 650 MG: 325 TABLET ORAL at 16:18

## 2024-01-14 RX ADMIN — BRIMONIDINE TARTRATE 1 DROP: 2 SOLUTION OPHTHALMIC at 20:44

## 2024-01-14 RX ADMIN — Medication 1 TABLET: at 10:20

## 2024-01-14 RX ADMIN — PREGABALIN 100 MG: 50 CAPSULE ORAL at 10:20

## 2024-01-14 RX ADMIN — FUROSEMIDE 20 MG: 40 TABLET ORAL at 10:20

## 2024-01-14 RX ADMIN — ASPIRIN 81 MG: 81 TABLET, COATED ORAL at 10:20

## 2024-01-14 RX ADMIN — Medication 3 MG: at 20:46

## 2024-01-14 RX ADMIN — MIRTAZAPINE 15 MG: 15 TABLET, FILM COATED ORAL at 20:43

## 2024-01-14 RX ADMIN — PILOCARPINE HYDROCHLORIDE 5 MG: 5 TABLET, FILM COATED ORAL at 10:20

## 2024-01-14 RX ADMIN — BACLOFEN 5 MG: 10 TABLET ORAL at 16:18

## 2024-01-14 RX ADMIN — CEFTAROLINE FOSAMIL 600 MG: 600 POWDER, FOR SOLUTION INTRAVENOUS at 05:00

## 2024-01-14 RX ADMIN — HYDRALAZINE HYDROCHLORIDE 50 MG: 50 TABLET ORAL at 20:41

## 2024-01-14 RX ADMIN — ATENOLOL 50 MG: 50 TABLET ORAL at 10:20

## 2024-01-14 RX ADMIN — PANTOPRAZOLE SODIUM 40 MG: 40 TABLET, DELAYED RELEASE ORAL at 06:19

## 2024-01-14 RX ADMIN — BRIMONIDINE TARTRATE 1 DROP: 2 SOLUTION OPHTHALMIC at 10:21

## 2024-01-14 RX ADMIN — LIDOCAINE 1 PATCH: 4 PATCH TOPICAL at 10:19

## 2024-01-14 RX ADMIN — MORPHINE SULFATE 15 MG: 15 TABLET, EXTENDED RELEASE ORAL at 20:43

## 2024-01-14 RX ADMIN — MORPHINE SULFATE 15 MG: 15 TABLET, EXTENDED RELEASE ORAL at 10:20

## 2024-01-14 RX ADMIN — DAPTOMYCIN 650 MG: 500 INJECTION, POWDER, LYOPHILIZED, FOR SOLUTION INTRAVENOUS at 20:44

## 2024-01-14 RX ADMIN — CEFTAROLINE FOSAMIL 600 MG: 600 POWDER, FOR SOLUTION INTRAVENOUS at 16:18

## 2024-01-14 RX ADMIN — HYDROMORPHONE HYDROCHLORIDE 2 MG: 2 TABLET ORAL at 13:38

## 2024-01-14 RX ADMIN — BACLOFEN 5 MG: 10 TABLET ORAL at 13:38

## 2024-01-14 RX ADMIN — ACETAMINOPHEN 650 MG: 325 TABLET ORAL at 05:00

## 2024-01-14 ASSESSMENT — COGNITIVE AND FUNCTIONAL STATUS - GENERAL
HELP NEEDED FOR BATHING: A LITTLE
DRESSING REGULAR UPPER BODY CLOTHING: A LOT
CLIMB 3 TO 5 STEPS WITH RAILING: A LITTLE
CLIMB 3 TO 5 STEPS WITH RAILING: A LITTLE
MOBILITY SCORE: 19
DRESSING REGULAR LOWER BODY CLOTHING: A LOT
TOILETING: A LITTLE
DRESSING REGULAR UPPER BODY CLOTHING: A LOT
DRESSING REGULAR LOWER BODY CLOTHING: A LOT
STANDING UP FROM CHAIR USING ARMS: A LITTLE
DAILY ACTIVITIY SCORE: 18
MOBILITY SCORE: 19
TURNING FROM BACK TO SIDE WHILE IN FLAT BAD: A LITTLE
HELP NEEDED FOR BATHING: A LITTLE
TOILETING: A LITTLE
WALKING IN HOSPITAL ROOM: A LITTLE
WALKING IN HOSPITAL ROOM: A LITTLE
STANDING UP FROM CHAIR USING ARMS: A LITTLE
MOVING TO AND FROM BED TO CHAIR: A LITTLE
MOVING TO AND FROM BED TO CHAIR: A LITTLE
TURNING FROM BACK TO SIDE WHILE IN FLAT BAD: A LITTLE
DAILY ACTIVITIY SCORE: 18

## 2024-01-14 ASSESSMENT — PAIN SCALES - GENERAL
PAINLEVEL_OUTOF10: 0 - NO PAIN
PAINLEVEL_OUTOF10: 9
PAINLEVEL_OUTOF10: 6
PAINLEVEL_OUTOF10: 8
PAINLEVEL_OUTOF10: 9
PAINLEVEL_OUTOF10: 0 - NO PAIN

## 2024-01-14 ASSESSMENT — PAIN - FUNCTIONAL ASSESSMENT
PAIN_FUNCTIONAL_ASSESSMENT: 0-10

## 2024-01-14 ASSESSMENT — PAIN DESCRIPTION - ORIENTATION
ORIENTATION: LOWER

## 2024-01-14 ASSESSMENT — PAIN DESCRIPTION - LOCATION
LOCATION: BACK

## 2024-01-14 NOTE — PROGRESS NOTES
The patient is awake and alert, feels low energy levels and exertional limitations but slowing improving.  She endorses improvement in the muscle cramping since being on the baclofen  Started Lidoderm patch.  The pain level continues to fluctuate.  Patient was having recurrent spasms in the back and the legs before, doing better with the baclofen.  Patient was already on a Zanaflex regimen and Lyrica at home  Patient started a low-dose of baclofen, 5 mg 4 times a day, doing better  Moving the extremities better  Ambulated better  Rehab is working with her  MRI lumbar spine had shown Discitis and other changes.  She is on antibiotics regimen which was revised, on Cubicin  No significant spinal cord compression  Discharge planning in progress for rehab  Visit Vitals  /59   Pulse 68   Temp 36.3 °C (97.3 °F) (Temporal)   Resp 18        Neurological Exam:    Oriented to day date,month,year, place and person. Knew the name of the president.  Attention span and concentration were normal.   Language: speech comprehension, repetition, expression, and naming is intact for patient´s age and level of education.      CRANIAL NERVES:   CN 2 Visual fields full to confrontation.   CN 3, 4, 6 Pupils round, equally reactive to light. No ptosis. EOM normal alignment, full range with normal saccades, pursuit and convergence. No nystagmus.   CN 5 Facial sensation intact bilaterally.   CN 7 Normal and symmetric facial strength. Nasolabial folds symmetric.   CN 8 Hearing intact to finger rub bilaterally. On Rinne and Monreal testing there was no lateralisation  CN 9 Palate elevates symmetrically.   CN 11 Bilaterally normal strength of shoulder shrug and neck turning.   CN 12 Tongue midline, with normal bulk and strength; no fasciculations.   Patient is handling pharyngeal secretions well.   Speech: articulation is intact.      MOTOR: The patient has normal muscle tone and has normal muscle mass. Muscle strength was 5/5 in distal and  "proximal muscles in both upper and lower extremities when she gave l the effort.  With the pain effort was difficult in the lower extremities. No fasciculations, tremor or other abnormal movements were present.   On Wendel testing the patient has no pronation or drift.      REFLEXES: unchanged from initial examination     COORDINATION: In both upper extremities, finger-nose-finger was intact without dysmetria. No dysdiadochokinesia. In both lower extremities, heel-to-knee-shin was intact.      GAIT: Could not be tested.  Patient is feeling weak with the back pain.  Romberg patient could not be tested      BMP:  Results from last 7 days   Lab Units 01/10/24  1331 01/09/24  0438   SODIUM mmol/L 130* 131*   POTASSIUM mmol/L 4.1 3.6   CHLORIDE mmol/L 97* 97*   CO2 mmol/L 26 28   BUN mg/dL 15 13   CREATININE mg/dL 0.80 0.80         CBC:  Results from last 7 days   Lab Units 01/10/24  1331 01/09/24  0438 01/08/24  0411   WBC AUTO x10*3/uL 9.3 8.6  --    RBC AUTO x10*6/uL 2.77* 2.65*  --    HEMOGLOBIN g/dL 8.5* 8.1* 8.9*   HEMATOCRIT % 26.5* 25.1* 27.7*   MCV fL 96 95  --    MCH pg 30.7 30.6  --    MCHC g/dL 32.1 32.3  --    RDW % 15.0* 15.2*  --    PLATELETS AUTO x10*3/uL 288 279  --          INR:        Lipid Profile:        No lab exists for component: \"LABVLDL\"    HgbA1C:        CMP:  Results from last 7 days   Lab Units 01/10/24  1331 01/09/24  0438   SODIUM mmol/L 130* 131*   POTASSIUM mmol/L 4.1 3.6   CHLORIDE mmol/L 97* 97*   CO2 mmol/L 26 28   BUN mg/dL 15 13   CREATININE mg/dL 0.80 0.80   GLUCOSE mg/dL 141* 126*   CALCIUM mg/dL 9.2 9.1         ABG:        No lab exists for component: \"PO2\", \"PCO2\", \"HCO3\", \"BE\", \"O2SAT\"    TSH:  No results found for: \"TSH\"    Lab Results   Component Value Date    EWTMMBTD69 322 01/05/2024     Lab Results   Component Value Date    FOLATE 5.1 01/05/2024     Lab Results   Component Value Date    VITD25 33 01/05/2024         No MRI head results found for the past 12 months     No CT " head results found for the past 12 months     CT abdomen pelvis wo IV contrast    Result Date: 12/31/2023  STUDY: CT Abdomen and Pelvis without IV Contrast; 12/31/2023 at 5:11 PM. INDICATION: Abdominal pain. COMPARISON: CT AP 12/19/2023 ACCESSION NUMBER(S): [id number] ORDERING CLINICIAN: WILLIAM EDMONDS TECHNIQUE: CT of the abdomen and pelvis was performed.  Contiguous axial images were obtained at 3 mm slice thickness through the abdomen and pelvis. Coronal and sagittal reconstructions at 3 mm slice thickness were performed. No intravenous contrast was administered.  Automated mA/kV exposure control was utilized and patient examination was performed in strict accordance with principles of ALARA. FINDINGS: Please note that the evaluation of vessels, lymph nodes and organs is limited without intravenous contrast.  LOWER CHEST: No cardiomegaly.  No pericardial effusion.  Very small bilateral pleural effusions  ABDOMEN:  LIVER: No hepatomegaly.  Smooth surface contour.  Normal attenuation.  BILE DUCTS: No intrahepatic or extrahepatic biliary ductal dilatation.  GALLBLADDER: The gallbladder is absent. STOMACH: No abnormalities identified.  PANCREAS: Negative for pancreatitis  SPLEEN: No splenomegaly or focal splenic lesion.  ADRENAL GLANDS: No thickening or nodules.  KIDNEYS AND URETERS: Kidneys are normal in size and location.  No renal or ureteral calculi.  PELVIS:  BLADDER: No abnormalities identified.  REPRODUCTIVE ORGANS: No abnormalities identified.  BOWEL: Colonic diverticulosis.  Mild constipation without bowel obstruction. Negative for appendicitis  VESSELS: Limited due to lack of intravenous contrast.  Prior femoral to femoral artery bypass.  Abdominal aorta is normal in caliber.  PERITONEUM/RETROPERITONEUM/LYMPH NODES: Small amount of low-density free pelvic fluid  No pneumoperitoneum. No lymphadenopathy.  ABDOMINAL WALL: Small fat-containing umbilical hernia. SOFT TISSUES: Postsurgical changes within the  posterior lumbar soft tissues with surgical drains in place.  BONES: Status post interbody fusion of L4/5 and L5/S1 with metallic spacer. This space narrowing L3/4.  Laminectomies L3-L5.  Bone graft along the lateral margins of L3-L5 fracture of the right L3 transverse process. Fracture of the right L4 transverse process.  Old screw tract within the L4 and L5 pedicles.  Narrowing of the right L5/S1 neural foramen due to facet joint osteophyte.    Negative for bowel obstruction.  Mild constipation. Colonic diverticulosis without diverticulitis. Negative for appendicitis. Postsurgical changes lumbar spine removal of pedicle screws and posterior rods from L4 through S1.  Stable appearance of interbody spacers at L4/5 and L5/S1. Bone graft along the lateral margins of L3-L5.  Fractures of the right L4 and L3 transverse process.  The right L3 transverse process fracture appears old.  Postsurgical changes within the posterior lumbar soft tissues with surgical drains in place. Signed by Aung Sparrow MD    MR lumbar spine w and wo IV contrast    Result Date: 12/31/2023  Interpreted By:  Jonas Sanchez, STUDY: MR LUMBAR SPINE W AND WO IV CONTRAST;  12/31/2023 12:33 am   INDICATION: Signs/Symptoms:Pain.   COMPARISON: MRI of the lumbar spine dated 12/10/2023   ACCESSION NUMBER(S): [id number]   ORDERING CLINICIAN: YVETTE LEE   TECHNIQUE: Sagittal T1, T2, STIR, axial T1 and T2 weighted images of the lumbar spine were acquired. Additional axial and sagittal T1 weighted images of the lumbar spine were obtained after intravenous administration of 15.5 mL of contrast.   FINDINGS: Exam is degraded by motion.   There is again evidence of 5 lumbar type non rib-bearing vertebral bodies, with the lowest well-formed intervertebral disc space labeled L5-S1.   Postsurgical changes of posterior decompression and laminectomies of L3 through L5 with posterior spinal fusion extending from L4 through S1 and discectomies with  intervertebral disc spaces at L4-L5 and L5-S1. These are similar in appearance to prior examination on 12/10/2023. Susceptibility artifact from the surgical hardware somewhat limits evaluation of the adjacent structures.   In the interim since prior imaging on 12/10/2023, new postsurgical consistent with irrigation debridement of subfascial tissues of the cutaneous spine are evident. STIR and T2 hypointense signal abnormality previously seen in the cutaneous fat of the lower lumbar spine has resolved, with new 3.7 x 3.6 x 13.6 cm (series 7, image 10; series 10, image 10) T2 hyperintense fluid collection present, with slight peripheral enhancement. This collection may be contiguous with the skin.   T2 hyperintense collection is again present in the laminectomy surgical bed, abutting the thecal sac at the level of L3 through L5 (series 8, image 14), measuring up to 5.2 cm in craniocaudal dimension, 2.9 cm in transverse dimension and up to 1.5 cm in AP dimension (series 10, image 7). The surgical catheter previously seen within the collection is gone, although collection is decreased in size to previous imaging, with resolution of previously seen air within the collection. Mild surrounding edema and reactive soft tissue changes are present in the epidural space, somewhat increased in the interim since prior exam, with new/increased mass effect on the adjacent thecal sac.   There also appears to be small amount of new fluid present in the anterior epidural space at the level of L3 (series 10, image 3).   Although the exact characterization is difficult due to motion, there appears to be somewhat worsened spinal canal narrowing at the level of L2-L3 due to combination of new fluid in the anterior epidural space, and the T2 hyperintense fluid collection with associated inflammatory/reactive changes along the posterior aspect of the thecal sac, with somewhat increased effacement of the subarachnoid space.   No new intra  thecal enhancement is identified.   Neural foramina are not well assessed due to motion and susceptibility artifact from the surgical hardware.       1.  New surgical changes of irrigation and debridement in the laminectomy surgical bed evident, with previously seen geographic area of hypointense T2 and STIR signal in the cutaneous fat of the lumbar spine resolved, and new T2 hyperintense collection measuring 3.7 x 3.6 x 13.6 cm present in the cutaneous fat, likely representing a postsurgical seroma, although sterility can not be ascertained on imaging alone. This collection reaches of the dermis, and may drain at the skin. 2. T2 hyperintense collection within the laminectomy bed itself along the dorsal aspect of the thecal sac described on previous MRI in 12/10/2023 has mildly decreased in size from prior imaging, with interval removal of previously seen drain, although there are increased inflammatory/reactive soft tissue changes present in the laminectomy surgical bed, focus of fluid in the anterior epidural space at the level of L3 contributes to increased effacement of the thecal sac at the level of L3 compared to prior MRI.   MACRO: None   Signed by: Jonas Sanchez 12/31/2023 1:13 AM Dictation workstation:   [id number]    MR LUMBAR SPINE WO IV CONTRAST;  1/5/2024 8:48 pm      INDICATION:  Signs/Symptoms:intractable back pain s/o incision and drainage with  lumbar spine hardware removal.      COMPARISON:  Lumbar MRI dated 12/30/2022 and 12/10/2022. CT abdomen pelvis dated  12/31/2022.      ACCESSION NUMBER(S):  [id number]      ORDERING CLINICIAN:  ERICK KEN      TECHNIQUE:  Sagittal T2, T1, and STIR and axial T1, T2 sequences of the lumbar  spine. No intravenous contrast was administered.      FINDINGS:      Lumbar spine:  Normal lumbar lordosis. The last well-formed disc is designated  L5-S1. Minimal L5 over S1 anterolisthesis unchanged from prior. There  is minimal L3 over L4 retrolisthesis  unchanged from prior. There has  been interval removal of the bilateral transpedicular screws and  vertical rods at L4 through S1 with T2 hyperintense ghost tracts  noted. There are unchanged disc spacers at L4-L5 and L5-S1. There is  a subcutaneous drain in place extending cranially along the midline  to the level of L3 without associated fluid collection. This  surrounded by subcutaneous edema. There is also a right deep  paravertebral drain in place terminating in the right deep  paravertebral soft tissues at the level of L1-L2 without associated  fluid collection, sagittal image 11 of 25. There is bilateral  posterior paravertebral muscle edema without evidence of fluid  collection.      Vertebral body heights are maintained. 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 There is a T2 hyperintense  fluid collection within the left subarticular ventral epidural space  extending cranially at the level of L3 and which appears to be in  continuity with the L3-L4 disc space measuring 2.0 cm craniocaudally  and 1.0 x 0.7 cm axially, unchanged from the recent prior. This  contributes to mild effacement of the left subarticular space.              The conus medullaris terminates at L1 and is normal in appearance.      T12-L1: No significant disc bulge or herniation.  L1-L2: Is a diffuse disc bulge and facet hypertrophy contributing to  mild bilateral foraminal stenosis without significant central canal  stenosis. L2-L3: Small disc bulge and facet hypertrophy without  significant central or foraminal canal stenosis. L3-L4: There is  posterior laminectomy change without evidence of central canal  stenosis. There is mild retrolisthesis of L3 over L4 as well as T2  hyperintense fluid collection extending superiorly from the left  subarticular space and contributing to mild left subarticular  stenosis as well as mild central canal stenosis  at the level of L3  similar to prior. There is suspected moderate to severe right  foraminal stenosis due to mild retrolisthesis with facet hypertrophy  as well as bone marrow edema at the right synovial facets, for  example sagittal image 9 of 25. There is also mild edema within the  right psoas muscle at the level of L3-L4, axial image 4 of 27 without  evidence of a fluid collection. There are bilateral right greater  than left facet effusions at L3-L4 and possibility of septic  arthritis can not be excluded. There is mild to moderate left  foraminal stenosis. L4-L5: There are right-sided facetectomy changes  with with hazy fluid within the surgical bed which may be expected.  Posterior laminectomy change. There is mild bilateral foraminal  narrowing. No central canal stenosis. L5-S1: There are right-sided  facetectomy changes with hazy fluid within the surgical bed, which  may be expected. Posterior laminectomy change. Mild bilateral  foraminal narrowing. No central canal stenosis.      IMPRESSION:  Interval removal of bilateral transpedicular screws and vertical rods  at L3 through S1 and interval resolution of posterior paravertebral  fluid collections. Lumbar drains within the subcutaneous and deep  posterior paravertebral soft tissues as described above with  associated soft tissue edema, however no evidence of surrounding  fluid collection.      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 There is minimal L3 over L4  retrolisthesis as well as facet hypertrophy, which contributes to  moderate to severe right foraminal stenosis at L3-L4. There is also  asymmetric right-sided facet effusion and right subchondral edema at  L3-L4 facets and asymmetric right psoas edema at L3-L4 concerning for  septic arthritis.      There is unchanged ventral epidural collection on the left at the  level of L3 which may be communicating with the L3-L4 disc.  The  sterility of this collection can not be determined given multiple  prior surgeries. It contributes to unchanged mild central canal  stenosis and left subarticular stenosis at L3-L4.      Signed by: Marco Valles 1/6/2024 12:46 AM  Dictation workstation:   [id number]  EMG     No EMG results found for the past 12 months        No EEG results found for the past 12 months      Current Facility-Administered Medications:     acetaminophen (Tylenol) tablet 650 mg, 650 mg, oral, q4h PRN, 650 mg at 01/05/24 2104 **OR** acetaminophen (Tylenol) oral liquid 650 mg, 650 mg, oral, q4h PRN **OR** acetaminophen (Tylenol) suppository 650 mg, 650 mg, rectal, q4h PRN, Maribeth Santamaria APRN-CNP    acetaminophen (Tylenol) tablet 650 mg, 650 mg, oral, q6h, DARRYL Quiros-CNP, 650 mg at 01/14/24 1020    alteplase (Cathflo Activase) injection 1 mg, 1 mg, intra-catheter, PRN, Nitesh Ruiz MD, 1 mg at 01/07/24 1150    aspirin EC tablet 81 mg, 81 mg, oral, Daily, John Salazar MD, 81 mg at 01/14/24 1020    atenolol (Tenormin) tablet 50 mg, 50 mg, oral, q AM, John Salazar MD, 50 mg at 01/14/24 1020    baclofen (Lioresal) tablet 5 mg, 5 mg, oral, 4x daily, Onur Perez MD, 5 mg at 01/14/24 1338    brimonidine (AlphaGAN) 0.2 % ophthalmic solution 1 drop, 1 drop, Both Eyes, BID, John Salazar MD, 1 drop at 01/14/24 1021    busPIRone (Buspar) tablet 10 mg, 10 mg, oral, Daily, John Salazar MD, 10 mg at 01/14/24 1020    ceftaroline (Teflaro) 600 mg in dextrose 5 % in water (D5W) 50 mL IV, 600 mg, intravenous, q12h, Gem Reyna MD, Stopped at 01/14/24 0600    cyclobenzaprine (Flexeril) tablet 10 mg, 10 mg, oral, TID PRN, DARRYL Quiros-CNP, 10 mg at 01/12/24 2030    DAPTOmycin (Cubicin) 650 mg in sodium chloride 0.9% 50 mL IV, 8 mg/kg, intravenous, Nightly, Gem Reyna MD, Stopped at 01/13/24 2119    docusate sodium (Colace) capsule 100 mg, 100 mg, oral, BID, DARRYL Szymanski-CNP, 100 mg at 01/14/24  1020    ezetimibe (Zetia) tablet 10 mg, 10 mg, oral, Daily, John Salazar MD, 10 mg at 01/14/24 1020    furosemide (Lasix) tablet 20 mg, 20 mg, oral, Daily, John Salazar MD, 20 mg at 01/14/24 1020    hydrALAZINE (Apresoline) tablet 50 mg, 50 mg, oral, BID, John Salazar MD, 50 mg at 01/14/24 1020    HYDROmorphone (Dilaudid) tablet 2 mg, 2 mg, oral, q4h PRN, Nitesh Ruiz MD, 2 mg at 01/14/24 1338    isosorbide mononitrate ER (Imdur) 24 hr tablet 60 mg, 60 mg, oral, Daily, John Salazar MD, 60 mg at 01/14/24 0618    lactobacillus acidophilus tablet 1 tablet, 1 tablet, oral, Daily, John Salazar MD, 1 tablet at 01/14/24 1020    lidocaine 4 % patch 1 patch, 1 patch, transdermal, Daily, ORLANDO Quiros, 1 patch at 01/14/24 1019    lidocaine 4 % patch 1 patch, 1 patch, transdermal, Daily, Onur Perez MD, 1 patch at 01/14/24 1019    meclizine (Antivert) tablet 25 mg, 25 mg, oral, q8h PRN, John Salazar MD    melatonin tablet 3 mg, 3 mg, oral, Daily, OR[place]O Quiros, 3 mg at 01/13/24 2046    mirtazapine (Remeron) tablet 15 mg, 15 mg, oral, Nightly, John Salazar MD, 15 mg at 01/13/24 2048    morphine CR (MS Contin) 12 hr tablet 15 mg, 15 mg, oral, q12h EDE, Panchito Barnes MD, 15 mg at 01/14/24 1020    morphine injection 2 mg, 2 mg, intravenous, q2h PRN, OR[place]O Quiros, 2 mg at 01/12/24 1550    naloxone (Narcan) injection 0.2 mg, 0.2 mg, intravenous, q5 min PRN, Maribeth Hina, APRN-CNP    ondansetron (Zofran) tablet 4 mg, 4 mg, oral, q8h PRN, 4 mg at 12/31/23 0904 **OR** ondansetron (Zofran) injection 4 mg, 4 mg, intravenous, q8h PRN, Maribeth Santamaria, DARRYL-CNP, 4 mg at 12/31/23 1317    oxyCODONE (Roxicodone) immediate release tablet 10 mg, 10 mg, oral, q6h PRN, Nitesh Ruiz MD, 10 mg at 01/13/24 1645    pantoprazole (ProtoNix) EC tablet 40 mg, 40 mg, oral, Daily before breakfast, John Salazar MD, 40 mg at 01/14/24 0619    pilocarpine (Salagen) tablet 5 mg, 5 mg,  oral, Daily, John Salazar MD, 5 mg at 01/14/24 1020    polyethylene glycol (Glycolax, Miralax) packet 17 g, 17 g, oral, Daily, DARRYL Quiros-CNP, 17 g at 01/07/24 0903    pregabalin (Lyrica) capsule 100 mg, 100 mg, oral, BID, Panchito Barnes MD, 100 mg at 01/14/24 1020    vitamin A & D ointment 1 Application, 1 Application, Topical, q4h PRN, Nitesh Ruiz MD, 1 Application at 01/10/24 3985         Assessment:  Patient has a chronic back pain.  She has a lumbar spine surgery in November 2023.  Dr. Coombs is following the patient.  Patient developed a postop staph infection with MRSA.  She has the antibiotics, incision and drainage.  Pain in the back is more on the right side along with pain in the right thigh and right hip.  Imaging of the right hip did not show significant pathology.  Pain exacerbation with the L3-4 discitis and osteomyelitis.  Infectious disease on consult antibiotics have been adjusted, patient is on Cubicin  Patient is improving with the movements in the legs.  Pain was worse today  Patient is getting a lot of analgesics.  Dr. Barnes is on consult  Right leg DVT she has an inferior vena cava filter put in January 2.  She is on Eliquis  Right carotid bruit, right carotid endarterectomy by Dr. Edwards.  History of right cerebral TIA  Coronary artery disease  Cardiomyopathy  Bilateral total knee replacements.  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  S/p bilateral leg revascularization June 2022  Hypertension  Chronic edema  COPD with history of tobacco abuse in the past  Overweight  Vitamin D deficiency has been treated  Lidoderm patch helping   additional lab work showed a borderline elevated TSH, B12, folic acid level, vitamin D in good range  With a better pain control she can start increasing her activity.  Rehab to increase activity as tolerated  baclofen low-dose was added to her regimen without any side effects, patient improved as far as pasms are  concerned    Recommendation:  Continue Lidoderm patch   Additional lab work showed a borderline elevated TSH, B12, folic acid level, vitamin D in good range  With a better pain control she can start increasing her activity.  Rehab to increase activity as tolerated  baclofen low-dose was added to her regimen without any side effects, patient improved as far as spasms are concerned  Discharge planning in progress     Lata Perez MD

## 2024-01-14 NOTE — PROGRESS NOTES
Infectious Diseases Inpatient Progress Note      HISTORY OF PRESENT ILLNESS:    Patient has persistent severe low back pain, persistent legs weakness, persistent highly elevated CRP of greater than 80.  Currently on Teflaro and Cubicin yesterday.    She still has bilateral JOSE drains with small amount of serosanguineous drainage.   No fevers or chills.   Follow up postop deep incision wound infection with recurrent abscess while on IV Vanco, S/P hardware removal with persistent +ve MRSA Cx.    Recent PE and DVT.   Has resolved leg pain. No SOB. +ve constipation   Decreased appetite  No bowel movements   no urinary symptoms  No fevers or chills.    Current Medications:    acetaminophen, 650 mg, oral, q6h  aspirin, 81 mg, oral, Daily  atenolol, 50 mg, oral, q AM  baclofen, 5 mg, oral, 4x daily  brimonidine, 1 drop, Both Eyes, BID  busPIRone, 10 mg, oral, Daily  ceftaroline, 600 mg, intravenous, q12h  daptomycin, 8 mg/kg, intravenous, Nightly  docusate sodium, 100 mg, oral, BID  ezetimibe, 10 mg, oral, Daily  furosemide, 20 mg, oral, Daily  hydrALAZINE, 50 mg, oral, BID  isosorbide mononitrate ER, 60 mg, oral, Daily  lactobacillus acidophilus, 1 tablet, oral, Daily  lidocaine, 1 patch, transdermal, Daily  lidocaine, 1 patch, transdermal, Daily  melatonin, 3 mg, oral, Daily  mirtazapine, 15 mg, oral, Nightly  morphine CR, 15 mg, oral, q12h EDE  pantoprazole, 40 mg, oral, Daily before breakfast  pilocarpine, 5 mg, oral, Daily  polyethylene glycol, 17 g, oral, Daily  pregabalin, 100 mg, oral, BID        Allergies:  Neomycin and Neomycin-polymyxin b-dexameth      Review of Systems  14 system review is negative other than HPI  Positive right lower quadrant abdominal pain.  Positive constipation  Positive severe low back pain, worse upon movement  Positive bilateral legs weakness, uses a walker for ambulation  Positive left upper thigh pain  Physical Exam    Heart Rate:  [64-74]   Temp:  [36.2 °C (97.2 °F)-37 °C (98.6 °F)]  "  Resp:  [16-18]   BP: (126-146)/(59-66)   SpO2:  [92 %-96 %]    Vitals:    01/14/24 0015 01/14/24 0455 01/14/24 0617 01/14/24 0718   BP: 138/63 135/60 146/66 126/59   BP Location:  Left arm     Patient Position:  Lying     Pulse: 72 74 64 68   Resp: 16 18 16 18   Temp: 36.4 °C (97.5 °F) 36.2 °C (97.2 °F)  36.3 °C (97.3 °F)   TempSrc:  Temporal  Temporal   SpO2: 92%  93% 95%   Weight:       Height:         General Appearance: alert and oriented to person, place and time, well-developed and well-nourished, in no acute distress  Skin: warm and dry, no rash.   Head: normocephalic and atraumatic  Eyes: anicteric sclerae  ENT:  normal mucous membranes. No oral thrush  Lungs: normal respiratory effort, clear  Heart: Nl S1 and S2  Abdomen: soft, no tenderness  no leg edema  No erythema, no tenderness  Back incision is intact.  Intact JOSE drain sites, small amount of serosanguineous drainage on dressing.   No point tenderness  DATA:    Lab Results   Component Value Date    WBC 9.3 01/10/2024    HGB 8.5 (L) 01/10/2024    HCT 26.5 (L) 01/10/2024    MCV 96 01/10/2024     01/10/2024     Lab Results   Component Value Date    CREATININE 0.80 01/10/2024    BUN 15 01/10/2024     (L) 01/10/2024    K 4.1 01/10/2024    CL 97 (L) 01/10/2024    CO2 26 01/10/2024       Hepatic Function Panel:No results found for: \"ALKPHOS\", \"ALT\", \"AST\", \"PROT\", \"BILITOT\", \"BILIDIR\"    Microbiology:   Susceptibility data from last 90 days.  Collected Specimen Info Organism Amoxicillin/Clavulanate Ampicillin Ampicillin/Sulbactam Aztreonam Cefazolin Cefazolin (uncomplicated UTIs only) Cefepime Cefotaxime Ceftazidime Ceftriaxone Ciprofloxacin Clindamycin   12/31/23 Swab from SPINE Staphylococcus aureus               12/31/23 Tissue from SPINE Methicillin Resistant Staphylococcus aureus (MRSA)            S   12/31/23 Swab from SPINE Methicillin Resistant Staphylococcus aureus (MRSA)            S   12/31/23 Tissue from SPINE Staphylococcus aureus  "              12/11/23 Fluid from ABSCESS Methicillin Resistant Staphylococcus aureus (MRSA)            S   12/11/23 Tissue from ABSCESS Staphylococcus aureus               12/11/23 Swab from ABSCESS Methicillin Resistant Staphylococcus aureus (MRSA)            S   12/06/23 Swab from SPINE Methicillin Resistant Staphylococcus aureus (MRSA)               12/06/23 Tissue from SPINE Staphylococcus aureus               12/06/23 Tissue from SPINE Staphylococcus aureus               12/05/23 Urine from Clean Catch/Voided Escherichia coli S R S R R R R R R R R    11/03/23 Swab from Nares/Axilla/Groin Methicillin Resistant Staphylococcus aureus (MRSA)                 Collected Specimen Info Organism Ertapenem Erythromycin Gentamicin Meropenem Nitrofurantoin Oxacillin Piperacillin/Tazobactam Tetracycline Tobramycin Trimethoprim/Sulfamethoxazole Vancomycin   12/31/23 Swab from SPINE Staphylococcus aureus              12/31/23 Tissue from SPINE Methicillin Resistant Staphylococcus aureus (MRSA)  R    R  S  S S   12/31/23 Swab from SPINE Methicillin Resistant Staphylococcus aureus (MRSA)  R    R  S  S S   12/31/23 Tissue from SPINE Staphylococcus aureus              12/11/23 Fluid from ABSCESS Methicillin Resistant Staphylococcus aureus (MRSA)  R    R  S  S S   12/11/23 Tissue from ABSCESS Staphylococcus aureus              12/11/23 Swab from ABSCESS Methicillin Resistant Staphylococcus aureus (MRSA)  R    R  S  S S   12/06/23 Swab from SPINE Methicillin Resistant Staphylococcus aureus (MRSA)              12/06/23 Tissue from SPINE Staphylococcus aureus              12/06/23 Tissue from SPINE Staphylococcus aureus              12/05/23 Urine from Clean Catch/Voided Escherichia coli S  R S S  S  I S    11/03/23 Swab from Nares/Axilla/Groin Methicillin Resistant Staphylococcus aureus (MRSA)                Vanco PERNELL of 2.0 as discussed with microbiology  Imaging:   CT abdomen pelvis wo IV contrast    Result Date: 12/31/2023  STUDY:  CT Abdomen and Pelvis without IV Contrast; 12/31/2023 at 5:11 PM. INDICATION: Abdominal pain. COMPARISON: CT AP 12/19/2023 ACCESSION NUMBER(S): [id number] ORDERING CLINICIAN: WILLIAM EDMONDS TECHNIQUE: CT of the abdomen and pelvis was performed.  Contiguous axial images were obtained at 3 mm slice thickness through the abdomen and pelvis. Coronal and sagittal reconstructions at 3 mm slice thickness were performed. No intravenous contrast was administered.  Automated mA/kV exposure control was utilized and patient examination was performed in strict accordance with principles of ALARA. FINDINGS: Please note that the evaluation of vessels, lymph nodes and organs is limited without intravenous contrast.  LOWER CHEST: No cardiomegaly.  No pericardial effusion.  Very small bilateral pleural effusions  ABDOMEN:  LIVER: No hepatomegaly.  Smooth surface contour.  Normal attenuation.  BILE DUCTS: No intrahepatic or extrahepatic biliary ductal dilatation.  GALLBLADDER: The gallbladder is absent. STOMACH: No abnormalities identified.  PANCREAS: Negative for pancreatitis  SPLEEN: No splenomegaly or focal splenic lesion.  ADRENAL GLANDS: No thickening or nodules.  KIDNEYS AND URETERS: Kidneys are normal in size and location.  No renal or ureteral calculi.  PELVIS:  BLADDER: No abnormalities identified.  REPRODUCTIVE ORGANS: No abnormalities identified.  BOWEL: Colonic diverticulosis.  Mild constipation without bowel obstruction. Negative for appendicitis  VESSELS: Limited due to lack of intravenous contrast.  Prior femoral to femoral artery bypass.  Abdominal aorta is normal in caliber.  PERITONEUM/RETROPERITONEUM/LYMPH NODES: Small amount of low-density free pelvic fluid  No pneumoperitoneum. No lymphadenopathy.  ABDOMINAL WALL: Small fat-containing umbilical hernia. SOFT TISSUES: Postsurgical changes within the posterior lumbar soft tissues with surgical drains in place.  BONES: Status post interbody fusion of L4/5 and  L5/S1 with metallic spacer. This space narrowing L3/4.  Laminectomies L3-L5.  Bone graft along the lateral margins of L3-L5 fracture of the right L3 transverse process. Fracture of the right L4 transverse process.  Old screw tract within the L4 and L5 pedicles.  Narrowing of the right L5/S1 neural foramen due to facet joint osteophyte.    Negative for bowel obstruction.  Mild constipation. Colonic diverticulosis without diverticulitis. Negative for appendicitis. Postsurgical changes lumbar spine removal of pedicle screws and posterior rods from L4 through S1.  Stable appearance of interbody spacers at L4/5 and L5/S1. Bone graft along the lateral margins of L3-L5.  Fractures of the right L4 and L3 transverse process.  The right L3 transverse process fracture appears old.  Postsurgical changes within the posterior lumbar soft tissues with surgical drains in place. Signed by Aung Sparrow MD    MR lumbar spine w and wo IV contrast    Result Date: 12/31/2023  Interpreted By:  Jonas Sanchez, STUDY: MR LUMBAR SPINE W AND WO IV CONTRAST;  12/31/2023 12:33 am   INDICATION: Signs/Symptoms:Pain.   COMPARISON: MRI of the lumbar spine dated 12/10/2023   ACCESSION NUMBER(S): [id number]   ORDERING CLINICIAN: YVETTE LEE   TECHNIQUE: Sagittal T1, T2, STIR, axial T1 and T2 weighted images of the lumbar spine were acquired. Additional axial and sagittal T1 weighted images of the lumbar spine were obtained after intravenous administration of 15.5 mL of contrast.   FINDINGS: Exam is degraded by motion.   There is again evidence of 5 lumbar type non rib-bearing vertebral bodies, with the lowest well-formed intervertebral disc space labeled L5-S1.   Postsurgical changes of posterior decompression and laminectomies of L3 through L5 with posterior spinal fusion extending from L4 through S1 and discectomies with intervertebral disc spaces at L4-L5 and L5-S1. These are similar in appearance to prior examination on 12/10/2023.  Susceptibility artifact from the surgical hardware somewhat limits evaluation of the adjacent structures.   In the interim since prior imaging on 12/10/2023, new postsurgical consistent with irrigation debridement of subfascial tissues of the cutaneous spine are evident. STIR and T2 hypointense signal abnormality previously seen in the cutaneous fat of the lower lumbar spine has resolved, with new 3.7 x 3.6 x 13.6 cm (series 7, image 10; series 10, image 10) T2 hyperintense fluid collection present, with slight peripheral enhancement. This collection may be contiguous with the skin.   T2 hyperintense collection is again present in the laminectomy surgical bed, abutting the thecal sac at the level of L3 through L5 (series 8, image 14), measuring up to 5.2 cm in craniocaudal dimension, 2.9 cm in transverse dimension and up to 1.5 cm in AP dimension (series 10, image 7). The surgical catheter previously seen within the collection is gone, although collection is decreased in size to previous imaging, with resolution of previously seen air within the collection. Mild surrounding edema and reactive soft tissue changes are present in the epidural space, somewhat increased in the interim since prior exam, with new/increased mass effect on the adjacent thecal sac.   There also appears to be small amount of new fluid present in the anterior epidural space at the level of L3 (series 10, image 3).   Although the exact characterization is difficult due to motion, there appears to be somewhat worsened spinal canal narrowing at the level of L2-L3 due to combination of new fluid in the anterior epidural space, and the T2 hyperintense fluid collection with associated inflammatory/reactive changes along the posterior aspect of the thecal sac, with somewhat increased effacement of the subarachnoid space.   No new intra thecal enhancement is identified.   Neural foramina are not well assessed due to motion and susceptibility artifact  from the surgical hardware.       1.  New surgical changes of irrigation and debridement in the laminectomy surgical bed evident, with previously seen geographic area of hypointense T2 and STIR signal in the cutaneous fat of the lumbar spine resolved, and new T2 hyperintense collection measuring 3.7 x 3.6 x 13.6 cm present in the cutaneous fat, likely representing a postsurgical seroma, although sterility can not be ascertained on imaging alone. This collection reaches of the dermis, and may drain at the skin. 2. T2 hyperintense collection within the laminectomy bed itself along the dorsal aspect of the thecal sac described on previous MRI in 12/10/2023 has mildly decreased in size from prior imaging, with interval removal of previously seen drain, although there are increased inflammatory/reactive soft tissue changes present in the laminectomy surgical bed, focus of fluid in the anterior epidural space at the level of L3 contributes to increased effacement of the thecal sac at the level of L3 compared to prior MRI.   MACRO: None   Signed by: Jonas Sanchez 12/31/2023 1:13 AM Dictation workstation:   [id number]       I discussed culture results with microbiology.  MRSA PERNELL to vancomycin is 2.  It is sensitive to Cubicin  CPK of 31  IMPRESSION:    Postop deep incisional wound infection and abscess of lumbar spine with failure of IV Vanco.  Worsening symptoms despite Cubicin  S/P Hardware removal  MRSA infection in a patient who is a chronic carrier    Patient Active Problem List   Diagnosis    Abdominal aortic aneurysm (CMS/HCC)    Abnormal EKG    Bilateral carotid artery stenosis    Bruit of right carotid artery    CAD in native artery    Cardiomyopathy (CMS/HCC)    Chest pain    Chronic asthmatic bronchitis    Closed displaced fracture of fifth metatarsal bone    Current every day smoker    Difficulty breathing    Essential hypertension    History of total knee replacement    Hypokalemia    Intermittent  claudication (CMS/HCC)    Knee osteoarthritis    Knee pain    Limb pain    Localized swelling, mass, or lump of lower extremity    Celiac artery stenosis (CMS/HCC)    Mesenteric artery stenosis (CMS/HCC)    Mixed hyperlipidemia    Obese    PVD (peripheral vascular disease) (CMS/HCC)    Right foot pain    Swelling    Trigger finger    Urinary tract infection without hematuria    Back pain of lumbar region with sciatica    Back pain with radiculopathy    Intractable back pain    Seroma, post-traumatic (CMS/HCC)    Lumbar surgical wound fluid collection    Generalized weakness    Postoperative surgical complication involving musculoskeletal system associated with musculoskeletal procedure, unspecified complication    Back pain at L4-L5 level    Deep vein thrombosis (DVT) of right lower extremity (CMS/HCC)    Anemia       PLAN:  Continue IV Teflaro and Cubicin because of worsening symptoms, deep abscess it is not amenable to drainage, failure of IV vancomycin.  Follow-up CBC BMP and CPK weekly  May discharge from infectious disease perspective.  Recommended rehab  Hold Zocor dose during treatment with Cubicin  May continue Zetia  Local wound care per surgery    Discussed with patient and RN    Gem Reyna MD

## 2024-01-14 NOTE — PROGRESS NOTES
DAILY PROGRESS NOTE      POD13 Lumbar washout and hardware removal     Patient doing well  Visit Vitals  /59   Pulse 68   Temp 36.3 °C (97.3 °F) (Temporal)   Resp 18      Temp (24hrs), Av.5 °C (97.7 °F), Min:36.2 °C (97.2 °F), Max:37 °C (98.6 °F)       Pain Control significant improvement. Reports very minimal lower extremity pain at this time.   No chest pain or shortness of breath.  No calf pain    Exam:   Good bilateral lower extremity strength and sensation.   Extremity shows neuro vascular status intact. Flexion and extension intact on extremity.  Calves soft and non-tender without evidence of DVT.        Labs reviewed:  CBC: @LABRCNT(WBC:3,HGB:3,PLT:3)@  BMP:  @LABRCNT(Na:3,K:3,CL:3,CO2:3,BUN:3,Creatinine:3,Glucose:3)@    I&O  I/O last 3 completed shifts:  In: 1229 (15.7 mL/kg) [P.O.:830; IV Piggyback:399]  Out: 2431.5 (31.1 mL/kg) [Urine:2350 (0.8 mL/kg/hr); Drains:81.5]  Weight: 78.2 kg       Assessment:    I&D lumbar spine    Plan:    Continue with therapy   Brace when OOB and with ambulation   JOSE drains to remain in place until there is 0 output for 48 hours   Continue IV antibiotics per ID  Continue pain control per pain management   Cannot shower until drainage has stopped     Jomar Nails MD   2024 2:05 PM   This was a shared visit with the JOSE. I reviewed and verified the documentation and independently performed the documented:

## 2024-01-15 ENCOUNTER — APPOINTMENT (OUTPATIENT)
Dept: RADIOLOGY | Facility: HOSPITAL | Age: 69
DRG: 856 | End: 2024-01-15
Payer: COMMERCIAL

## 2024-01-15 LAB
ANION GAP SERPL CALC-SCNC: 11 MMOL/L (ref 10–20)
BUN SERPL-MCNC: 13 MG/DL (ref 6–23)
CALCIUM SERPL-MCNC: 8.9 MG/DL (ref 8.6–10.3)
CHLORIDE SERPL-SCNC: 100 MMOL/L (ref 98–107)
CO2 SERPL-SCNC: 27 MMOL/L (ref 21–32)
CREAT SERPL-MCNC: 0.72 MG/DL (ref 0.5–1.05)
EGFRCR SERPLBLD CKD-EPI 2021: 90 ML/MIN/1.73M*2
ERYTHROCYTE [DISTWIDTH] IN BLOOD BY AUTOMATED COUNT: 14.9 % (ref 11.5–14.5)
GLUCOSE SERPL-MCNC: 136 MG/DL (ref 74–99)
HCT VFR BLD AUTO: 27.9 % (ref 36–46)
HGB BLD-MCNC: 8.6 G/DL (ref 12–16)
MCH RBC QN AUTO: 28.9 PG (ref 26–34)
MCHC RBC AUTO-ENTMCNC: 30.8 G/DL (ref 32–36)
MCV RBC AUTO: 94 FL (ref 80–100)
NRBC BLD-RTO: 0 /100 WBCS (ref 0–0)
PLATELET # BLD AUTO: 299 X10*3/UL (ref 150–450)
POTASSIUM SERPL-SCNC: 4 MMOL/L (ref 3.5–5.3)
RBC # BLD AUTO: 2.98 X10*6/UL (ref 4–5.2)
SODIUM SERPL-SCNC: 134 MMOL/L (ref 136–145)
WBC # BLD AUTO: 7.5 X10*3/UL (ref 4.4–11.3)

## 2024-01-15 PROCEDURE — 2500000001 HC RX 250 WO HCPCS SELF ADMINISTERED DRUGS (ALT 637 FOR MEDICARE OP): Performed by: HOSPITALIST

## 2024-01-15 PROCEDURE — 2500000001 HC RX 250 WO HCPCS SELF ADMINISTERED DRUGS (ALT 637 FOR MEDICARE OP): Performed by: SPECIALIST

## 2024-01-15 PROCEDURE — 2500000004 HC RX 250 GENERAL PHARMACY W/ HCPCS (ALT 636 FOR OP/ED): Performed by: INTERNAL MEDICINE

## 2024-01-15 PROCEDURE — 1090000002 HH PPS REVENUE DEBIT

## 2024-01-15 PROCEDURE — 97535 SELF CARE MNGMENT TRAINING: CPT | Mod: GO,CO

## 2024-01-15 PROCEDURE — 2500000004 HC RX 250 GENERAL PHARMACY W/ HCPCS (ALT 636 FOR OP/ED): Performed by: HOSPITALIST

## 2024-01-15 PROCEDURE — 99222 1ST HOSP IP/OBS MODERATE 55: CPT | Performed by: REGISTERED NURSE

## 2024-01-15 PROCEDURE — 2500000004 HC RX 250 GENERAL PHARMACY W/ HCPCS (ALT 636 FOR OP/ED)

## 2024-01-15 PROCEDURE — 74018 RADEX ABDOMEN 1 VIEW: CPT | Performed by: RADIOLOGY

## 2024-01-15 PROCEDURE — 37799 UNLISTED PX VASCULAR SURGERY: CPT | Performed by: REGISTERED NURSE

## 2024-01-15 PROCEDURE — 80048 BASIC METABOLIC PNL TOTAL CA: CPT | Performed by: REGISTERED NURSE

## 2024-01-15 PROCEDURE — 97530 THERAPEUTIC ACTIVITIES: CPT | Mod: GP,CQ

## 2024-01-15 PROCEDURE — 1090000001 HH PPS REVENUE CREDIT

## 2024-01-15 PROCEDURE — 2500000001 HC RX 250 WO HCPCS SELF ADMINISTERED DRUGS (ALT 637 FOR MEDICARE OP)

## 2024-01-15 PROCEDURE — 2500000001 HC RX 250 WO HCPCS SELF ADMINISTERED DRUGS (ALT 637 FOR MEDICARE OP): Performed by: NURSE PRACTITIONER

## 2024-01-15 PROCEDURE — 74018 RADEX ABDOMEN 1 VIEW: CPT

## 2024-01-15 PROCEDURE — 99232 SBSQ HOSP IP/OBS MODERATE 35: CPT | Performed by: REGISTERED NURSE

## 2024-01-15 PROCEDURE — 2500000001 HC RX 250 WO HCPCS SELF ADMINISTERED DRUGS (ALT 637 FOR MEDICARE OP): Performed by: ANESTHESIOLOGY

## 2024-01-15 PROCEDURE — 2500000001 HC RX 250 WO HCPCS SELF ADMINISTERED DRUGS (ALT 637 FOR MEDICARE OP): Performed by: STUDENT IN AN ORGANIZED HEALTH CARE EDUCATION/TRAINING PROGRAM

## 2024-01-15 PROCEDURE — 97116 GAIT TRAINING THERAPY: CPT | Mod: GP,CQ

## 2024-01-15 PROCEDURE — 2500000001 HC RX 250 WO HCPCS SELF ADMINISTERED DRUGS (ALT 637 FOR MEDICARE OP): Performed by: REGISTERED NURSE

## 2024-01-15 PROCEDURE — 1100000001 HC PRIVATE ROOM DAILY

## 2024-01-15 PROCEDURE — 2500000005 HC RX 250 GENERAL PHARMACY W/O HCPCS

## 2024-01-15 PROCEDURE — 2500000005 HC RX 250 GENERAL PHARMACY W/O HCPCS: Performed by: SPECIALIST

## 2024-01-15 PROCEDURE — 2500000002 HC RX 250 W HCPCS SELF ADMINISTERED DRUGS (ALT 637 FOR MEDICARE OP, ALT 636 FOR OP/ED): Performed by: HOSPITALIST

## 2024-01-15 PROCEDURE — 85027 COMPLETE CBC AUTOMATED: CPT | Performed by: REGISTERED NURSE

## 2024-01-15 RX ADMIN — MORPHINE SULFATE 15 MG: 15 TABLET, EXTENDED RELEASE ORAL at 20:30

## 2024-01-15 RX ADMIN — ONDANSETRON 4 MG: 2 INJECTION INTRAMUSCULAR; INTRAVENOUS at 09:47

## 2024-01-15 RX ADMIN — HYDRALAZINE HYDROCHLORIDE 50 MG: 50 TABLET ORAL at 09:49

## 2024-01-15 RX ADMIN — BACLOFEN 5 MG: 10 TABLET ORAL at 12:39

## 2024-01-15 RX ADMIN — LIDOCAINE 1 PATCH: 4 PATCH TOPICAL at 09:48

## 2024-01-15 RX ADMIN — BACLOFEN 5 MG: 10 TABLET ORAL at 20:29

## 2024-01-15 RX ADMIN — DOCUSATE SODIUM 100 MG: 100 CAPSULE, LIQUID FILLED ORAL at 09:48

## 2024-01-15 RX ADMIN — APIXABAN 5 MG: 5 TABLET, FILM COATED ORAL at 09:48

## 2024-01-15 RX ADMIN — HYDROMORPHONE HYDROCHLORIDE 2 MG: 2 TABLET ORAL at 16:19

## 2024-01-15 RX ADMIN — FUROSEMIDE 20 MG: 40 TABLET ORAL at 09:48

## 2024-01-15 RX ADMIN — CEFTAROLINE FOSAMIL 600 MG: 600 POWDER, FOR SOLUTION INTRAVENOUS at 16:20

## 2024-01-15 RX ADMIN — DAPTOMYCIN 650 MG: 500 INJECTION, POWDER, LYOPHILIZED, FOR SOLUTION INTRAVENOUS at 20:29

## 2024-01-15 RX ADMIN — LIDOCAINE 1 PATCH: 4 PATCH TOPICAL at 09:50

## 2024-01-15 RX ADMIN — MIRTAZAPINE 15 MG: 15 TABLET, FILM COATED ORAL at 20:30

## 2024-01-15 RX ADMIN — HYDRALAZINE HYDROCHLORIDE 50 MG: 50 TABLET ORAL at 20:29

## 2024-01-15 RX ADMIN — BRIMONIDINE TARTRATE 1 DROP: 2 SOLUTION OPHTHALMIC at 20:44

## 2024-01-15 RX ADMIN — HYDROMORPHONE HYDROCHLORIDE 2 MG: 2 TABLET ORAL at 09:48

## 2024-01-15 RX ADMIN — ACETAMINOPHEN 650 MG: 325 TABLET ORAL at 09:47

## 2024-01-15 RX ADMIN — APIXABAN 5 MG: 5 TABLET, FILM COATED ORAL at 20:30

## 2024-01-15 RX ADMIN — CEFTAROLINE FOSAMIL 600 MG: 600 POWDER, FOR SOLUTION INTRAVENOUS at 05:07

## 2024-01-15 RX ADMIN — MORPHINE SULFATE 15 MG: 15 TABLET, EXTENDED RELEASE ORAL at 09:49

## 2024-01-15 RX ADMIN — BACLOFEN 5 MG: 10 TABLET ORAL at 16:16

## 2024-01-15 RX ADMIN — BACLOFEN 5 MG: 10 TABLET ORAL at 06:24

## 2024-01-15 RX ADMIN — ISOSORBIDE MONONITRATE 60 MG: 60 TABLET, EXTENDED RELEASE ORAL at 06:24

## 2024-01-15 RX ADMIN — Medication 3 MG: at 20:30

## 2024-01-15 RX ADMIN — BRIMONIDINE TARTRATE 1 DROP: 2 SOLUTION OPHTHALMIC at 09:49

## 2024-01-15 RX ADMIN — ACETAMINOPHEN 650 MG: 325 TABLET ORAL at 05:06

## 2024-01-15 RX ADMIN — PREGABALIN 100 MG: 50 CAPSULE ORAL at 09:48

## 2024-01-15 RX ADMIN — EZETIMIBE 10 MG: 10 TABLET ORAL at 09:49

## 2024-01-15 RX ADMIN — PANTOPRAZOLE SODIUM 40 MG: 40 TABLET, DELAYED RELEASE ORAL at 06:25

## 2024-01-15 RX ADMIN — DOCUSATE SODIUM 100 MG: 100 CAPSULE, LIQUID FILLED ORAL at 20:29

## 2024-01-15 RX ADMIN — PILOCARPINE HYDROCHLORIDE 5 MG: 5 TABLET, FILM COATED ORAL at 09:49

## 2024-01-15 RX ADMIN — Medication 1 TABLET: at 09:48

## 2024-01-15 RX ADMIN — ACETAMINOPHEN 650 MG: 325 TABLET ORAL at 16:16

## 2024-01-15 RX ADMIN — ATENOLOL 50 MG: 50 TABLET ORAL at 09:48

## 2024-01-15 RX ADMIN — ASPIRIN 81 MG: 81 TABLET, COATED ORAL at 09:47

## 2024-01-15 RX ADMIN — BUSPIRONE HYDROCHLORIDE 10 MG: 5 TABLET ORAL at 09:49

## 2024-01-15 RX ADMIN — PREGABALIN 100 MG: 50 CAPSULE ORAL at 20:30

## 2024-01-15 ASSESSMENT — ENCOUNTER SYMPTOMS
CONSTITUTIONAL NEGATIVE: 1
ABDOMINAL PAIN: 1
BACK PAIN: 1
HEMATOLOGIC/LYMPHATIC NEGATIVE: 1
ALLERGIC/IMMUNOLOGIC NEGATIVE: 1
WOUND: 1
EYES NEGATIVE: 1
VOMITING: 1
CARDIOVASCULAR NEGATIVE: 1
ENDOCRINE NEGATIVE: 1
PSYCHIATRIC NEGATIVE: 1
RESPIRATORY NEGATIVE: 1
NEUROLOGICAL NEGATIVE: 1

## 2024-01-15 ASSESSMENT — COGNITIVE AND FUNCTIONAL STATUS - GENERAL
CLIMB 3 TO 5 STEPS WITH RAILING: A LOT
WALKING IN HOSPITAL ROOM: A LITTLE
MOVING TO AND FROM BED TO CHAIR: A LITTLE
MOVING TO AND FROM BED TO CHAIR: A LITTLE
MOBILITY SCORE: 17
MOBILITY SCORE: 17
WALKING IN HOSPITAL ROOM: A LITTLE
STANDING UP FROM CHAIR USING ARMS: A LITTLE
STANDING UP FROM CHAIR USING ARMS: A LITTLE
TURNING FROM BACK TO SIDE WHILE IN FLAT BAD: A LITTLE
TURNING FROM BACK TO SIDE WHILE IN FLAT BAD: A LITTLE
CLIMB 3 TO 5 STEPS WITH RAILING: A LOT
MOVING FROM LYING ON BACK TO SITTING ON SIDE OF FLAT BED WITH BEDRAILS: A LITTLE
MOVING FROM LYING ON BACK TO SITTING ON SIDE OF FLAT BED WITH BEDRAILS: A LITTLE

## 2024-01-15 ASSESSMENT — PAIN DESCRIPTION - LOCATION
LOCATION: BACK

## 2024-01-15 ASSESSMENT — PAIN SCALES - GENERAL
PAINLEVEL_OUTOF10: 9
PAINLEVEL_OUTOF10: 0 - NO PAIN
PAINLEVEL_OUTOF10: 10 - WORST POSSIBLE PAIN
PAINLEVEL_OUTOF10: 9
PAINLEVEL_OUTOF10: 4
PAINLEVEL_OUTOF10: 9

## 2024-01-15 ASSESSMENT — PAIN - FUNCTIONAL ASSESSMENT
PAIN_FUNCTIONAL_ASSESSMENT: 0-10

## 2024-01-15 ASSESSMENT — ACTIVITIES OF DAILY LIVING (ADL): HOME_MANAGEMENT_TIME_ENTRY: 15

## 2024-01-15 NOTE — PROGRESS NOTES
DAILY PROGRESS NOTE      POD14 Lumbar washout and hardware removal     Patient doing well  Visit Vitals  /61   Pulse 70   Temp 36.9 °C (98.4 °F) (Temporal)   Resp 18      Temp (24hrs), Av.5 °C (97.7 °F), Min:36.3 °C (97.3 °F), Max:36.9 °C (98.4 °F)       Pain Control significant improvement. Reports very minimal lower extremity pain at this time.   No chest pain or shortness of breath.  No calf pain    Exam:   Good bilateral lower extremity strength and sensation.   Extremity shows neuro vascular status intact. Flexion and extension intact on extremity.  Calves soft and non-tender without evidence of DVT.  Deep JOSE drain with 16 ml serous drainage  Superficial JOSE drain with 14 ml serous drainage          Labs reviewed:  CBC: @LABRCNT(WBC:3,HGB:3,PLT:3)@  BMP:  @LABRCNT(Na:3,K:3,CL:3,CO2:3,BUN:3,Creatinine:3,Glucose:3)@    I&O  I/O last 3 completed shifts:  In: 996 (12.7 mL/kg) [P.O.:590; IV Piggyback:406]  Out: 2842.5 (36.3 mL/kg) [Urine:2750 (1 mL/kg/hr); Drains:92.5]  Weight: 78.2 kg       Assessment:    I&D lumbar spine  Second antibiotic added due to patient having worsening symptoms with pain.   Incision assessed and is still slightly open, will consult Dr. Reyes for wound care recommendations with possible incisional prevena dressing placement.   Patient became very nauseated after rolling to her right side to assess lumbar incision. Nurse reports patient to have multiple episodes of green bile emesis. Patient made NPO and will obtain KUB to rule out ileus. Patient notes that she has not had a bowel movement in about four days and this could be due to decreased mobility and use of narcotic pain medication. BS are present. Will consult general surgery for possible ileus and have sent message to ID as well for possible side effect of new antibiotic telfaro.     Plan:    Continue with therapy   Brace when OOB and with ambulation   JOSE drains to remain in place until there is 0 output for 48 hours    Continue IV antibiotics per ID  Continue pain control per pain management   Cannot shower until drainage has stopped   KUB to rule out ileus   Encourage ambulation   Further recommendations to follow results of KUB    Edi Olguin, APRN-CNP   1/15/2024 9:11 AM    In a face-to-face encounter, I performed a history and physical examination, discussed pertinent diagnostic studies if indicated, and discussed diagnosis and management strategies with both the patient and the midlevel provider.  I reviewed the midlevel's note and agree with the documented findings and plan of care.    New GI symptoms mentioned above.  Awaiting input from general surgery/internal medicine/infectious disease.  Relatively comfortable with regards to her back and legs at this time.  I suspect this will be a very slow healing process with regards to her osteomyelitis discitis and epidural abscess.  Drains are still putting out slightly.  Would like to keep drains in until they are not putting anything out as this patient has already had 3 different washouts and appears to reaccumulate fluid quickly once drains are removed.  We will continue to follow.

## 2024-01-15 NOTE — CARE PLAN
The patient's goals for the shift include      The clinical goals for the shift include patient will remain hemodynamically stable throughout shift.    Over the shift, the patient did not make progress toward the following goals. Barriers to progression include  Recommendations to address these barriers include

## 2024-01-15 NOTE — NURSING NOTE
Per rounding report with team, pt still has JOSE drain until no drainage x 48 hours, remains on IV antibiotics which is now Teflaro and Cubicin. Pt has not had BM x 4 days, to have KUB to rule out ileus and encouraged ambulation. PT AMPAC score today is 17. Per Orthopedics, anticipate pt will be home with [facility] when medically ready. CT team continuing to monitor case for progression and DC planning.

## 2024-01-15 NOTE — CONSULTS
Inpatient consult to Acute Care Surgery  Consult performed by: ORLNADO Szymanski  Consult ordered by: ORLANDO Kline  Reason for consult: illeus      General Surgery Consult Note  Patient: Tara Tierney  Unit/Bed: 807/807-A  YOB: 1955  MRN: [id number]  Acct: [id number]   Admitting Diagnosis: Intractable back pain [M54.9]  Postoperative surgical complication involving musculoskeletal system associated with musculoskeletal procedure, unspecified complication [M96.89]  Back pain with radiculopathy [M54.10]  Back pain at L4-L5 level [M54.50]  Date:  12/30/2023  Hospital Day: 15  Attending: Gerson Coombs MD         Complaint:  Chief Complaint   Patient presents with    Back Pain     Patient complains of lower back pain since November, has been worsening since Friday. Hx of back surgery in November 2023. 10/10 'undescribable pain'. Took Dilaudid 2mg at 4pm.        History of Present Illness:  Tara Tierney is a 68 y.o. female who underwent LSO surgery on 11/20/2023 and since then has had 2 washouts after that for MRSA wound infection.  She is currently on vancomycin infusions at home. She presents to [facility] emergency department with complaints of severe back pain she describes as intense dull and constant which radiates to both legs.  She denies any numbness or tingling of the extremities. Denies any saddle paresthesia, loss of bowel or bladder function.  She is still able to ambulate with a walker.  She denies any recent injury or trauma.  Denies any drainage from the wound.     Allergies:  Allergies   Allergen Reactions    Neomycin Other     swelling, redness, burning post eye surgery    Neomycin-Polymyxin B-Dexameth Other and Rash     blisters, eye swelling, burning     PMHx:  Past Medical History:   Diagnosis Date    Arthritis     Back pain     COPD (chronic obstructive pulmonary disease) (CMS/[facility])     COVID-19 vaccine administered     Eczema     History of COVID-19      2020    Hyperlipidemia     Hypertension     Hypoesthesia of skin     Hypesthesia    Macular degeneration     Meniere's disease     Osteoporosis     Overactive bladder     Pain in unspecified finger(s)     Finger pain    Pain in unspecified wrist     Pain in wrist joint    Peripheral vascular disease (CMS/HCC)     Personal history of other diseases of the circulatory system     History of coronary artery disease    Personal history of other diseases of the musculoskeletal system and connective tissue     History of arthritis    Personal history of other specified conditions     History of chest pain    Personal history of other specified conditions     History of balance disorder    Personal history of other specified conditions     History of numbness    Postmenopausal     Seasonal allergies     Unspecified visual loss     Vision problems     PSHx:  Past Surgical History:   Procedure Laterality Date    ADENOIDECTOMY      AORTA - BILATERAL FEMORAL ARTERY BYPASS GRAFT      BLEPHAROPTOSIS REPAIR      CARDIAC CATHETERIZATION      with stent and balloon    CARDIAC CATHETERIZATION N/A 1/2/2024    Procedure: IVC Filter Insertion;  Surgeon: Star Negro MD;  Location: [facility];  Service: Cardiovascular;  Laterality: N/A;    CHOLECYSTECTOMY      COLONOSCOPY      CT ANGIO NECK  05/18/2022    CT NECK ANGIO W AND WO IV CONTRAST 5/18/2022 [facility]    CT AORTA AND BILATERAL ILIOFEMORAL RUNOFF ANGIOGRAM W AND/OR WO IV CONTRAST  03/10/2020    CT AORTA AND BILATERAL ILIOFEMORAL RUNOFF ANGIOGRAM W AND/OR WO IV CONTRAST 3/10/2020 [facility]    CT AORTA AND BILATERAL ILIOFEMORAL RUNOFF ANGIOGRAM W AND/OR WO IV CONTRAST  07/21/2023    CT AORTA AND BILATERAL ILIOFEMORAL RUNOFF ANGIOGRAM W AND/OR WO IV CONTRAST 7/21/2023 [facility] CT    FL TRANSLUMINAL ANGIO PERCUTANEOUS BHARAT      TONSILLECTOMY      Tonsillectomy    TOTAL KNEE ARTHROPLASTY Bilateral     TUBAL LIGATION       Social Hx:  Social  History     Socioeconomic History    Marital status:      Spouse name: None    Number of children: None    Years of education: None    Highest education level: None   Occupational History    None   Tobacco Use    Smoking status: Former     Packs/day: 0.50     Years: 45.00     Additional pack years: 0.00     Total pack years: 22.50     Types: Cigarettes     Quit date: 2023     Years since quittin.3    Smokeless tobacco: None   Vaping Use    Vaping Use: Never used   Substance and Sexual Activity    Alcohol use: Never    Drug use: Never    Sexual activity: Defer   Other Topics Concern    None   Social History Narrative    None     Social Determinants of Health     Financial Resource Strain: Low Risk  (2024)    Overall Financial Resource Strain (CARDIA)     Difficulty of Paying Living Expenses: Not hard at all   Food Insecurity: Patient Declined (2023)    Hunger Vital Sign     Worried About Running Out of Food in the Last Year: Patient declined     Ran Out of Food in the Last Year: Patient declined   Transportation Needs: No Transportation Needs (2024)    PRAPARE - Transportation     Lack of Transportation (Medical): No     Lack of Transportation (Non-Medical): No   Physical Activity: Inactive (2023)    Exercise Vital Sign     Days of Exercise per Week: 0 days     Minutes of Exercise per Session: 0 min   Stress: Patient Declined (2023)    [employer] - Occupational Stress Questionnaire     Feeling of Stress : Patient declined   Social Connections: Unknown (2023)    Social Connection and Isolation Panel [NHANES]     Frequency of Communication with Friends and Family: Patient declined     Frequency of Social Gatherings with Friends and Family: Patient declined     Attends [employer] Services: Patient declined     Active Member of Clubs or Organizations: Patient declined     Attends Club or Organization Meetings: Patient declined     Marital Status:     Intimate Partner Violence: Not At Risk (12/31/2023)    Humiliation, Afraid, Rape, and Kick questionnaire     Fear of Current or Ex-Partner: No     Emotionally Abused: No     Physically Abused: No     Sexually Abused: No   Housing Stability: Low Risk  (1/2/2024)    Housing Stability Vital Sign     Unable to Pay for Housing in the Last Year: No     Number of Places Lived in the Last Year: 1     Unstable Housing in the Last Year: No     Family Hx:  Family History   Problem Relation Name Age of Onset    Breast cancer Mother      Other (arteriosclerotic cardiovascular disease) Father      Cancer Father       Review of Systems:   Review of Systems   Constitutional: Negative.    HENT: Negative.     Eyes: Negative.    Respiratory: Negative.     Cardiovascular: Negative.    Gastrointestinal:  Positive for abdominal pain and vomiting.   Endocrine: Negative.    Genitourinary: Negative.    Musculoskeletal:  Positive for back pain.   Skin:  Positive for wound.   Allergic/Immunologic: Negative.    Neurological: Negative.    Hematological: Negative.    Psychiatric/Behavioral: Negative.       Physical Examination:    Visit Vitals  /61   Pulse 70   Temp 36.9 °C (98.4 °F) (Temporal)   Resp 18      Physical Exam  Vitals reviewed.   Constitutional:       Appearance: Normal appearance.   HENT:      Head: Normocephalic.      Nose: Nose normal.      Mouth/Throat:      Mouth: Mucous membranes are moist.   Cardiovascular:      Rate and Rhythm: Normal rate and regular rhythm.   Pulmonary:      Effort: Pulmonary effort is normal.      Breath sounds: Normal breath sounds.   Abdominal:      General: Bowel sounds are normal. There is distension (slightly).      Palpations: Abdomen is soft.   Skin:     General: Skin is warm.      Capillary Refill: Capillary refill takes less than 2 seconds.   Neurological:      General: No focal deficit present.      Mental Status: She is alert and oriented to person, place, and time.  "  Psychiatric:         Mood and Affect: Mood normal.       LABS:  CBC:   Lab Results   Component Value Date    WBC 7.5 01/15/2024    RBC 2.98 (L) 01/15/2024    HGB 8.6 (L) 01/15/2024    HCT 27.9 (L) 01/15/2024    MCV 94 01/15/2024    MCH 28.9 01/15/2024    MCHC 30.8 (L) 01/15/2024    RDW 14.9 (H) 01/15/2024     01/15/2024     CBC with Differential:    Lab Results   Component Value Date    WBC 7.5 01/15/2024    RBC 2.98 (L) 01/15/2024    HGB 8.6 (L) 01/15/2024    HCT 27.9 (L) 01/15/2024     01/15/2024    MCV 94 01/15/2024    MCH 28.9 01/15/2024    MCHC 30.8 (L) 01/15/2024    RDW 14.9 (H) 01/15/2024    NRBC 0.0 01/15/2024     CMP:    Lab Results   Component Value Date     (L) 01/15/2024    K 4.0 01/15/2024     01/15/2024    CO2 27 01/15/2024    BUN 13 01/15/2024    CREATININE 0.72 01/15/2024    GLUCOSE 136 (H) 01/15/2024    CALCIUM 8.9 01/15/2024     BMP:    Lab Results   Component Value Date     (L) 01/15/2024    K 4.0 01/15/2024     01/15/2024    CO2 27 01/15/2024    BUN 13 01/15/2024    CREATININE 0.72 01/15/2024    CALCIUM 8.9 01/15/2024    GLUCOSE 136 (H) 01/15/2024     Magnesium:No results found for: \"MG\"  Troponin:  No results found for: \"TROPHS\"    Lipid Panel:  No results found for: \"HDL\", \"CHHDL\", \"VLDL\", \"TRIG\", \"NHDL\"   Current Medications:    Current Facility-Administered Medications:     acetaminophen (Tylenol) tablet 650 mg, 650 mg, oral, q4h PRN, 650 mg at 01/05/24 2104 **OR** acetaminophen (Tylenol) oral liquid 650 mg, 650 mg, oral, q4h PRN **OR** acetaminophen (Tylenol) suppository 650 mg, 650 mg, rectal, q4h PRN, ORLANDO Quiros    acetaminophen (Tylenol) tablet 650 mg, 650 mg, oral, q6h, ORLANDO Quiros, 650 mg at 01/15/24 0947    alteplase (Cathflo Activase) injection 1 mg, 1 mg, intra-catheter, PRN, Nitesh Ruiz MD, 1 mg at 01/07/24 1150    apixaban (Eliquis) tablet 5 mg, 5 mg, oral, BID, ORLANDO Kline, 5 mg at 01/15/24 " 0948    aspirin EC tablet 81 mg, 81 mg, oral, Daily, John Salazar MD, 81 mg at 01/15/24 0947    atenolol (Tenormin) tablet 50 mg, 50 mg, oral, q AM, John Salazar MD, 50 mg at 01/15/24 0948    baclofen (Lioresal) tablet 5 mg, 5 mg, oral, 4x daily, Onur Perez MD, 5 mg at 01/15/24 1239    brimonidine (AlphaGAN) 0.2 % ophthalmic solution 1 drop, 1 drop, Both Eyes, BID, John Salazar MD, 1 drop at 01/15/24 0949    busPIRone (Buspar) tablet 10 mg, 10 mg, oral, Daily, John Salazar MD, 10 mg at 01/15/24 0949    ceftaroline (Teflaro) 600 mg in dextrose 5 % in water (D5W) 50 mL IV, 600 mg, intravenous, q12h, Gem Reyna MD, Stopped at 01/15/24 0607    cyclobenzaprine (Flexeril) tablet 10 mg, 10 mg, oral, TID PRN, Maribeth Santamaria, APRN-CNP, 10 mg at 01/12/24 2030    DAPTOmycin (Cubicin) 650 mg in sodium chloride 0.9% 50 mL IV, 8 mg/kg, intravenous, Nightly, Gem Reyna MD, Stopped at 01/14/24 2114    docusate sodium (Colace) capsule 100 mg, 100 mg, oral, BID, Caitlin Li, APRN-CNP, 100 mg at 01/15/24 0948    ezetimibe (Zetia) tablet 10 mg, 10 mg, oral, Daily, John Salazar MD, 10 mg at 01/15/24 0949    furosemide (Lasix) tablet 20 mg, 20 mg, oral, Daily, John Salazar MD, 20 mg at 01/15/24 0948    hydrALAZINE (Apresoline) tablet 50 mg, 50 mg, oral, BID, John Salazar MD, 50 mg at 01/15/24 0949    HYDROmorphone (Dilaudid) tablet 2 mg, 2 mg, oral, q4h PRN, Nitesh Ruiz MD, 2 mg at 01/15/24 0948    isosorbide mononitrate ER (Imdur) 24 hr tablet 60 mg, 60 mg, oral, Daily, John Salazar MD, 60 mg at 01/15/24 0624    lactobacillus acidophilus tablet 1 tablet, 1 tablet, oral, Daily, John Salazar MD, 1 tablet at 01/15/24 0948    lidocaine 4 % patch 1 patch, 1 patch, transdermal, Daily, DARRYL Quiros-CNP, 1 patch at 01/15/24 0950    lidocaine 4 % patch 1 patch, 1 patch, transdermal, Daily, Onur Perez MD, 1 patch at 01/15/24 0948    meclizine (Antivert) tablet 25 mg, 25 mg, oral, q8h PRN, John AMIN  MD Marie    melatonin tablet 3 mg, 3 mg, oral, Daily, ORLANDO Quiros, 3 mg at 01/14/24 2046    mirtazapine (Remeron) tablet 15 mg, 15 mg, oral, Nightly, John Salazar MD, 15 mg at 01/14/24 2043    morphine CR (MS Contin) 12 hr tablet 15 mg, 15 mg, oral, q12h EDE, Panchito Barnes MD, 15 mg at 01/15/24 0949    morphine injection 2 mg, 2 mg, intravenous, q2h PRN, ORLANDO Quiros, 2 mg at 01/12/24 1550    naloxone (Narcan) injection 0.2 mg, 0.2 mg, intravenous, q5 min PRN, ORLANDO Quiros    ondansetron (Zofran) tablet 4 mg, 4 mg, oral, q8h PRN, 4 mg at 12/31/23 0904 **OR** ondansetron (Zofran) injection 4 mg, 4 mg, intravenous, q8h PRN, ORLANDO Quiros, 4 mg at 01/15/24 0947    oxyCODONE (Roxicodone) immediate release tablet 10 mg, 10 mg, oral, q6h PRN, Nitesh Ruiz MD, 10 mg at 01/14/24 1620    pantoprazole (ProtoNix) EC tablet 40 mg, 40 mg, oral, Daily before breakfast, John Salazar MD, 40 mg at 01/15/24 0625    pilocarpine (Salagen) tablet 5 mg, 5 mg, oral, Daily, John Salazar MD, 5 mg at 01/15/24 0949    polyethylene glycol (Glycolax, Miralax) packet 17 g, 17 g, oral, Daily, ORLANDO Quiros, 17 g at 01/07/24 0903    pregabalin (Lyrica) capsule 100 mg, 100 mg, oral, BID, Panchito Barnes MD, 100 mg at 01/15/24 0948    vitamin A & D ointment 1 Application, 1 Application, Topical, q4h PRN, Nitesh Ruiz MD, 1 Application at 01/10/24 2155    CT abdomen pelvis wo IV contrast    Result Date: 12/31/2023  STUDY: CT Abdomen and Pelvis without IV Contrast; 12/31/2023 at 5:11 PM. INDICATION: Abdominal pain. COMPARISON: CT AP 12/19/2023 ACCESSION NUMBER(S): [id number] ORDERING CLINICIAN: WILLIAM EDMONDS TECHNIQUE: CT of the abdomen and pelvis was performed.  Contiguous axial images were obtained at 3 mm slice thickness through the abdomen and pelvis. Coronal and sagittal reconstructions at 3 mm slice thickness were performed. No intravenous contrast was  administered.  Automated mA/kV exposure control was utilized and patient examination was performed in strict accordance with principles of ALARA. FINDINGS: Please note that the evaluation of vessels, lymph nodes and organs is limited without intravenous contrast.  LOWER CHEST: No cardiomegaly.  No pericardial effusion.  Very small bilateral pleural effusions  ABDOMEN:  LIVER: No hepatomegaly.  Smooth surface contour.  Normal attenuation.  BILE DUCTS: No intrahepatic or extrahepatic biliary ductal dilatation.  GALLBLADDER: The gallbladder is absent. STOMACH: No abnormalities identified.  PANCREAS: Negative for pancreatitis  SPLEEN: No splenomegaly or focal splenic lesion.  ADRENAL GLANDS: No thickening or nodules.  KIDNEYS AND URETERS: Kidneys are normal in size and location.  No renal or ureteral calculi.  PELVIS:  BLADDER: No abnormalities identified.  REPRODUCTIVE ORGANS: No abnormalities identified.  BOWEL: Colonic diverticulosis.  Mild constipation without bowel obstruction. Negative for appendicitis  VESSELS: Limited due to lack of intravenous contrast.  Prior femoral to femoral artery bypass.  Abdominal aorta is normal in caliber.  PERITONEUM/RETROPERITONEUM/LYMPH NODES: Small amount of low-density free pelvic fluid  No pneumoperitoneum. No lymphadenopathy.  ABDOMINAL WALL: Small fat-containing umbilical hernia. SOFT TISSUES: Postsurgical changes within the posterior lumbar soft tissues with surgical drains in place.  BONES: Status post interbody fusion of L4/5 and L5/S1 with metallic spacer. This space narrowing L3/4.  Laminectomies L3-L5.  Bone graft along the lateral margins of L3-L5 fracture of the right L3 transverse process. Fracture of the right L4 transverse process.  Old screw tract within the L4 and L5 pedicles.  Narrowing of the right L5/S1 neural foramen due to facet joint osteophyte.    Negative for bowel obstruction.  Mild constipation. Colonic diverticulosis without diverticulitis. Negative for  appendicitis. Postsurgical changes lumbar spine removal of pedicle screws and posterior rods from L4 through S1.  Stable appearance of interbody spacers at L4/5 and L5/S1. Bone graft along the lateral margins of L3-L5.  Fractures of the right L4 and L3 transverse process.  The right L3 transverse process fracture appears old.  Postsurgical changes within the posterior lumbar soft tissues with surgical drains in place. Signed by Aung Sparrow MD    MR lumbar spine w and wo IV contrast    Result Date: 12/31/2023  Interpreted By:  Jonas Sanchez, STUDY: MR LUMBAR SPINE W AND WO IV CONTRAST;  12/31/2023 12:33 am   INDICATION: Signs/Symptoms:Pain.   COMPARISON: MRI of the lumbar spine dated 12/10/2023   ACCESSION NUMBER(S): [id number]   ORDERING CLINICIAN: YVETTE LEE   TECHNIQUE: Sagittal T1, T2, STIR, axial T1 and T2 weighted images of the lumbar spine were acquired. Additional axial and sagittal T1 weighted images of the lumbar spine were obtained after intravenous administration of 15.5 mL of contrast.   FINDINGS: Exam is degraded by motion.   There is again evidence of 5 lumbar type non rib-bearing vertebral bodies, with the lowest well-formed intervertebral disc space labeled L5-S1.   Postsurgical changes of posterior decompression and laminectomies of L3 through L5 with posterior spinal fusion extending from L4 through S1 and discectomies with intervertebral disc spaces at L4-L5 and L5-S1. These are similar in appearance to prior examination on 12/10/2023. Susceptibility artifact from the surgical hardware somewhat limits evaluation of the adjacent structures.   In the interim since prior imaging on 12/10/2023, new postsurgical consistent with irrigation debridement of subfascial tissues of the cutaneous spine are evident. STIR and T2 hypointense signal abnormality previously seen in the cutaneous fat of the lower lumbar spine has resolved, with new 3.7 x 3.6 x 13.6 cm (series 7, image 10; series 10,  image 10) T2 hyperintense fluid collection present, with slight peripheral enhancement. This collection may be contiguous with the skin.   T2 hyperintense collection is again present in the laminectomy surgical bed, abutting the thecal sac at the level of L3 through L5 (series 8, image 14), measuring up to 5.2 cm in craniocaudal dimension, 2.9 cm in transverse dimension and up to 1.5 cm in AP dimension (series 10, image 7). The surgical catheter previously seen within the collection is gone, although collection is decreased in size to previous imaging, with resolution of previously seen air within the collection. Mild surrounding edema and reactive soft tissue changes are present in the epidural space, somewhat increased in the interim since prior exam, with new/increased mass effect on the adjacent thecal sac.   There also appears to be small amount of new fluid present in the anterior epidural space at the level of L3 (series 10, image 3).   Although the exact characterization is difficult due to motion, there appears to be somewhat worsened spinal canal narrowing at the level of L2-L3 due to combination of new fluid in the anterior epidural space, and the T2 hyperintense fluid collection with associated inflammatory/reactive changes along the posterior aspect of the thecal sac, with somewhat increased effacement of the subarachnoid space.   No new intra thecal enhancement is identified.   Neural foramina are not well assessed due to motion and susceptibility artifact from the surgical hardware.       1.  New surgical changes of irrigation and debridement in the laminectomy surgical bed evident, with previously seen geographic area of hypointense T2 and STIR signal in the cutaneous fat of the lumbar spine resolved, and new T2 hyperintense collection measuring 3.7 x 3.6 x 13.6 cm present in the cutaneous fat, likely representing a postsurgical seroma, although sterility can not be ascertained on imaging alone. This  collection reaches of the dermis, and may drain at the skin. 2. T2 hyperintense collection within the laminectomy bed itself along the dorsal aspect of the thecal sac described on previous MRI in 12/10/2023 has mildly decreased in size from prior imaging, with interval removal of previously seen drain, although there are increased inflammatory/reactive soft tissue changes present in the laminectomy surgical bed, focus of fluid in the anterior epidural space at the level of L3 contributes to increased effacement of the thecal sac at the level of L3 compared to prior MRI.   MACRO: None   Signed by: Jonas Sanchez 12/31/2023 1:13 AM Dictation workstation:   [id number]       Assessment:    Patient is a 68 yr old female who came to the ED for severe back pain. General surgery was consulted due to possible ileus. Patient examined and seen. Alert and oriented x3, resting comfortably.  Patient denies chest pain, shortness of breath, palpitations, abdominal pain, fever or chills.  Patient reports this morning after Dr. Coombs rounded, she had 2 episodes of emesis, brown in color. Denies coffee ground emesis or abdominal pain. She states she had nausea and emesis after taking medications on empty stomach.    On exam ABD is soft, non tender, but slightly distended. Bowel sounds in al 4 quadrants. Last BM this morning. KUB shows Moderate colonic fecal residue. Nonspecific nonobstructive bowel gas pattern.    Plan:  Do not give medications on an empty stomach  Continue bowel regimen   General surgery to sign off pending Dr. Menard review.     Further recommendations per Dr. Menard     Time spent  60  minutes obtaining labs, imaging, recommendations, interview, assessment, examination, medication review/ordering, and EMR review.    Plan of care was discussed extensively with patient. Patient verbalized understanding through teach back method. All questions and concerns addressed upon examination.     Of note, this  documentation is completed using the Dragon Dictation system (voice recognition software). There may be spelling and/or grammatical errors that were not corrected prior to final submission.      Electronically signed by ORLANDO Szymanski on 1/15/2024 at 1:51 PM

## 2024-01-15 NOTE — PROGRESS NOTES
The patient is awake and alert, feels low energy levels and exertional limitations but slowing improving.  She endorses improvement in the muscle cramping since being on the baclofen  Started Lidoderm patch.  The pain level continues to fluctuate.  Patient was having recurrent spasms in the back and the legs before, doing better with the baclofen.  Patient was already on a Zanaflex regimen and Lyrica at home  Patient started a low-dose of baclofen, 5 mg 4 times a day, doing better  Moving the extremities better  Ambulated better  Rehab is working with her  MRI lumbar spine had shown Discitis and other changes.  She is on antibiotics regimen which was revised, on Cubicin  No significant spinal cord compression  Discharge planning in progress for rehab  Visit Vitals  /61   Pulse 70   Temp 36.9 °C (98.4 °F) (Temporal)   Resp 18        Neurological Exam:    Oriented to day date,month,year, place and person. Knew the name of the president.  Attention span and concentration were normal.   Language: speech comprehension, repetition, expression, and naming is intact for patient´s age and level of education.      CRANIAL NERVES:   CN 2 Visual fields full to confrontation.   CN 3, 4, 6 Pupils round, equally reactive to light. No ptosis. EOM normal alignment, full range with normal saccades, pursuit and convergence. No nystagmus.   CN 5 Facial sensation intact bilaterally.   CN 7 Normal and symmetric facial strength. Nasolabial folds symmetric.   CN 8 Hearing intact to finger rub bilaterally. On Rin[place] and [place] testing there was no lateralisation  CN 9 Palate elevates symmetrically.   CN 11 Bilaterally normal strength of shoulder shrug and neck turning.   CN 12 Tongue midline, with normal bulk and strength; no fasciculations.   Patient is handling pharyngeal secretions well.   Speech: articulation is intact.      MOTOR: The patient has normal muscle tone and has normal muscle mass. Muscle strength was 5/5 in distal and  "proximal muscles in both upper and lower extremities when she gave l the effort.  With the pain effort was difficult in the lower extremities. No fasciculations, tremor or other abnormal movements were present.   On [place] testing the patient has no pronation or drift.      REFLEXES: unchanged from initial examination     COORDINATION: In both upper extremities, finger-nose-finger was intact without dysmetria. No dysdiadochokinesia. In both lower extremities, heel-to-knee-shin was intact.      GAIT: Could not be tested.  Patient is feeling weak with the back pain.  Romberg patient could not be tested      BMP:  Results from last 7 days   Lab Units 01/15/24  1214 01/10/24  1331 01/09/24  0438   SODIUM mmol/L 134* 130* 131*   POTASSIUM mmol/L 4.0 4.1 3.6   CHLORIDE mmol/L 100 97* 97*   CO2 mmol/L 27 26 28   BUN mg/dL 13 15 13   CREATININE mg/dL 0.72 0.80 0.80         CBC:  Results from last 7 days   Lab Units 01/15/24  1214 01/10/24  1331 01/09/24  0438   WBC AUTO x10*3/uL 7.5 9.3 8.6   RBC AUTO x10*6/uL 2.98* 2.77* 2.65*   HEMOGLOBIN g/dL 8.6* 8.5* 8.1*   HEMATOCRIT % 27.9* 26.5* 25.1*   MCV fL 94 96 95   MCH pg 28.9 30.7 30.6   MCHC g/dL 30.8* 32.1 32.3   RDW % 14.9* 15.0* 15.2*   PLATELETS AUTO x10*3/uL 299 288 279         INR:        Lipid Profile:        No lab exists for component: \"LABVLDL\"    HgbA1C:        CMP:  Results from last 7 days   Lab Units 01/15/24  1214 01/10/24  1331 01/09/24  0438   SODIUM mmol/L 134* 130* 131*   POTASSIUM mmol/L 4.0 4.1 3.6   CHLORIDE mmol/L 100 97* 97*   CO2 mmol/L 27 26 28   BUN mg/dL 13 15 13   CREATININE mg/dL 0.72 0.80 0.80   GLUCOSE mg/dL 136* 141* 126*   CALCIUM mg/dL 8.9 9.2 9.1         ABG:        No lab exists for component: \"PO2\", \"PCO2\", \"HCO3\", \"BE\", \"O2SAT\"    TSH:  No results found for: \"TSH\"    Lab Results   Component Value Date    AATACGJY90 322 01/05/2024     Lab Results   Component Value Date    FOLATE 5.1 01/05/2024     Lab Results   Component Value Date    " VITD25 33 01/05/2024         No MRI head results found for the past 12 months     No CT head results found for the past 12 months     CT abdomen pelvis wo IV contrast    Result Date: 12/31/2023  STUDY: CT Abdomen and Pelvis without IV Contrast; 12/31/2023 at 5:11 PM. INDICATION: Abdominal pain. COMPARISON: CT AP 12/19/2023 ACCESSION NUMBER(S): [id number] ORDERING CLINICIAN: WILLIAM EDMONDS TECHNIQUE: CT of the abdomen and pelvis was performed.  Contiguous axial images were obtained at 3 mm slice thickness through the abdomen and pelvis. Coronal and sagittal reconstructions at 3 mm slice thickness were performed. No intravenous contrast was administered.  Automated mA/kV exposure control was utilized and patient examination was performed in strict accordance with principles of ALARA. FINDINGS: Please note that the evaluation of vessels, lymph nodes and organs is limited without intravenous contrast.  LOWER CHEST: No cardiomegaly.  No pericardial effusion.  Very small bilateral pleural effusions  ABDOMEN:  LIVER: No hepatomegaly.  Smooth surface contour.  Normal attenuation.  BILE DUCTS: No intrahepatic or extrahepatic biliary ductal dilatation.  GALLBLADDER: The gallbladder is absent. STOMACH: No abnormalities identified.  PANCREAS: Negative for pancreatitis  SPLEEN: No splenomegaly or focal splenic lesion.  ADRENAL GLANDS: No thickening or nodules.  KIDNEYS AND URETERS: Kidneys are normal in size and location.  No renal or ureteral calculi.  PELVIS:  BLADDER: No abnormalities identified.  REPRODUCTIVE ORGANS: No abnormalities identified.  BOWEL: Colonic diverticulosis.  Mild constipation without bowel obstruction. Negative for appendicitis  VESSELS: Limited due to lack of intravenous contrast.  Prior femoral to femoral artery bypass.  Abdominal aorta is normal in caliber.  PERITONEUM/RETROPERITONEUM/LYMPH NODES: Small amount of low-density free pelvic fluid  No pneumoperitoneum. No lymphadenopathy.  ABDOMINAL  WALL: Small fat-containing umbilical hernia. SOFT TISSUES: Postsurgical changes within the posterior lumbar soft tissues with surgical drains in place.  BONES: Status post interbody fusion of L4/5 and L5/S1 with metallic spacer. This space narrowing L3/4.  Laminectomies L3-L5.  Bone graft along the lateral margins of L3-L5 fracture of the right L3 transverse process. Fracture of the right L4 transverse process.  Old screw tract within the L4 and L5 pedicles.  Narrowing of the right L5/S1 neural foramen due to facet joint osteophyte.    Negative for bowel obstruction.  Mild constipation. Colonic diverticulosis without diverticulitis. Negative for appendicitis. Postsurgical changes lumbar spine removal of pedicle screws and posterior rods from L4 through S1.  Stable appearance of interbody spacers at L4/5 and L5/S1. Bone graft along the lateral margins of L3-L5.  Fractures of the right L4 and L3 transverse process.  The right L3 transverse process fracture appears old.  Postsurgical changes within the posterior lumbar soft tissues with surgical drains in place. Signed by Aung Sparrow MD    MR lumbar spine w and wo IV contrast    Result Date: 12/31/2023  Interpreted By:  Jonas Sanchez, STUDY: MR LUMBAR SPINE W AND WO IV CONTRAST;  12/31/2023 12:33 am   INDICATION: Signs/Symptoms:Pain.   COMPARISON: MRI of the lumbar spine dated 12/10/2023   ACCESSION NUMBER(S): [id number]   ORDERING CLINICIAN: YVETTE LEE   TECHNIQUE: Sagittal T1, T2, STIR, axial T1 and T2 weighted images of the lumbar spine were acquired. Additional axial and sagittal T1 weighted images of the lumbar spine were obtained after intravenous administration of 15.5 mL of contrast.   FINDINGS: Exam is degraded by motion.   There is again evidence of 5 lumbar type non rib-bearing vertebral bodies, with the lowest well-formed intervertebral disc space labeled L5-S1.   Postsurgical changes of posterior decompression and laminectomies of L3  through L5 with posterior spinal fusion extending from L4 through S1 and discectomies with intervertebral disc spaces at L4-L5 and L5-S1. These are similar in appearance to prior examination on 12/10/2023. Susceptibility artifact from the surgical hardware somewhat limits evaluation of the adjacent structures.   In the interim since prior imaging on 12/10/2023, new postsurgical consistent with irrigation debridement of subfascial tissues of the cutaneous spine are evident. STIR and T2 hypointense signal abnormality previously seen in the cutaneous fat of the lower lumbar spine has resolved, with new 3.7 x 3.6 x 13.6 cm (series 7, image 10; series 10, image 10) T2 hyperintense fluid collection present, with slight peripheral enhancement. This collection may be contiguous with the skin.   T2 hyperintense collection is again present in the laminectomy surgical bed, abutting the thecal sac at the level of L3 through L5 (series 8, image 14), measuring up to 5.2 cm in craniocaudal dimension, 2.9 cm in transverse dimension and up to 1.5 cm in AP dimension (series 10, image 7). The surgical catheter previously seen within the collection is gone, although collection is decreased in size to previous imaging, with resolution of previously seen air within the collection. Mild surrounding edema and reactive soft tissue changes are present in the epidural space, somewhat increased in the interim since prior exam, with new/increased mass effect on the adjacent thecal sac.   There also appears to be small amount of new fluid present in the anterior epidural space at the level of L3 (series 10, image 3).   Although the exact characterization is difficult due to motion, there appears to be somewhat worsened spinal canal narrowing at the level of L2-L3 due to combination of new fluid in the anterior epidural space, and the T2 hyperintense fluid collection with associated inflammatory/reactive changes along the posterior aspect of the  thecal sac, with somewhat increased effacement of the subarachnoid space.   No new intra thecal enhancement is identified.   Neural foramina are not well assessed due to motion and susceptibility artifact from the surgical hardware.       1.  New surgical changes of irrigation and debridement in the laminectomy surgical bed evident, with previously seen geographic area of hypointense T2 and STIR signal in the cutaneous fat of the lumbar spine resolved, and new T2 hyperintense collection measuring 3.7 x 3.6 x 13.6 cm present in the cutaneous fat, likely representing a postsurgical seroma, although sterility can not be ascertained on imaging alone. This collection reaches of the dermis, and may drain at the skin. 2. T2 hyperintense collection within the laminectomy bed itself along the dorsal aspect of the thecal sac described on previous MRI in 12/10/2023 has mildly decreased in size from prior imaging, with interval removal of previously seen drain, although there are increased inflammatory/reactive soft tissue changes present in the laminectomy surgical bed, focus of fluid in the anterior epidural space at the level of L3 contributes to increased effacement of the thecal sac at the level of L3 compared to prior MRI.   MACRO: None   Signed by: Jonas Sanchez 12/31/2023 1:13 AM Dictation workstation:   [id number]    MR LUMBAR SPINE WO IV CONTRAST;  1/5/2024 8:48 pm      INDICATION:  Signs/Symptoms:intractable back pain s/o incision and drainage with  lumbar spine hardware removal.      COMPARISON:  Lumbar MRI dated 12/30/2022 and 12/10/2022. CT abdomen pelvis dated  12/31/2022.      ACCESSION NUMBER(S):  [id number]      ORDERING CLINICIAN:  ERICK KEN      TECHNIQUE:  Sagittal T2, T1, and STIR and axial T1, T2 sequences of the lumbar  spine. No intravenous contrast was administered.      FINDINGS:      Lumbar spine:  Normal lumbar lordosis. The last well-formed disc is designated  L5-S1. Minimal L5 over  S1 anterolisthesis unchanged from prior. There  is minimal L3 over L4 retrolisthesis unchanged from prior. There has  been interval removal of the bilateral transpedicular screws and  vertical rods at L4 through S1 with T2 hyperintense ghost tracts  noted. There are unchanged disc spacers at L4-L5 and L5-S1. There is  a subcutaneous drain in place extending cranially along the midline  to the level of L3 without associated fluid collection. This  surrounded by subcutaneous edema. There is also a right deep  paravertebral drain in place terminating in the right deep  paravertebral soft tissues at the level of L1-L2 without associated  fluid collection, sagittal image 11 of 25. There is bilateral  posterior paravertebral muscle edema without evidence of fluid  collection.      Vertebral body heights are maintained. 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 There is a T2 hyperintense  fluid collection within the left subarticular ventral epidural space  extending cranially at the level of L3 and which appears to be in  continuity with the L3-L4 disc space measuring 2.0 cm craniocaudally  and 1.0 x 0.7 cm axially, unchanged from the recent prior. This  contributes to mild effacement of the left subarticular space.              The conus medullaris terminates at L1 and is normal in appearance.      T12-L1: No significant disc bulge or herniation.  L1-L2: Is a diffuse disc bulge and facet hypertrophy contributing to  mild bilateral foraminal stenosis without significant central canal  stenosis. L2-L3: Small disc bulge and facet hypertrophy without  significant central or foraminal canal stenosis. L3-L4: There is  posterior laminectomy change without evidence of central canal  stenosis. There is mild retrolisthesis of L3 over L4 as well as T2  hyperintense fluid collection extending superiorly from the left  subarticular space and  contributing to mild left subarticular  stenosis as well as mild central canal stenosis at the level of L3  similar to prior. There is suspected moderate to severe right  foraminal stenosis due to mild retrolisthesis with facet hypertrophy  as well as bone marrow edema at the right synovial facets, for  example sagittal image 9 of 25. There is also mild edema within the  right psoas muscle at the level of L3-L4, axial image 4 of 27 without  evidence of a fluid collection. There are bilateral right greater  than left facet effusions at L3-L4 and possibility of septic  arthritis can not be excluded. There is mild to moderate left  foraminal stenosis. L4-L5: There are right-sided facetectomy changes  with with hazy fluid within the surgical bed which may be expected.  Posterior laminectomy change. There is mild bilateral foraminal  narrowing. No central canal stenosis. L5-S1: There are right-sided  facetectomy changes with hazy fluid within the surgical bed, which  may be expected. Posterior laminectomy change. Mild bilateral  foraminal narrowing. No central canal stenosis.      IMPRESSION:  Interval removal of bilateral transpedicular screws and vertical rods  at L3 through S1 and interval resolution of posterior paravertebral  fluid collections. Lumbar drains within the subcutaneous and deep  posterior paravertebral soft tissues as described above with  associated soft tissue edema, however no evidence of surrounding  fluid collection.      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 There is minimal L3 over L4  retrolisthesis as well as facet hypertrophy, which contributes to  moderate to severe right foraminal stenosis at L3-L4. There is also  asymmetric right-sided facet effusion and right subchondral edema at  L3-L4 facets and asymmetric right psoas edema at L3-L4 concerning for  septic arthritis.      There is unchanged ventral epidural collection  on the left at the  level of L3 which may be communicating with the L3-L4 disc. The  sterility of this collection can not be determined given multiple  prior surgeries. It contributes to unchanged mild central canal  stenosis and left subarticular stenosis at L3-L4.      Signed by: Marco Valles 1/6/2024 12:46 AM  Dictation workstation:   [id number]  EMG     No EMG results found for the past 12 months        No EEG results found for the past 12 months      Current Facility-Administered Medications:     acetaminophen (Tylenol) tablet 650 mg, 650 mg, oral, q4h PRN, 650 mg at 01/05/24 2104 **OR** acetaminophen (Tylenol) oral liquid 650 mg, 650 mg, oral, q4h PRN **OR** acetaminophen (Tylenol) suppository 650 mg, 650 mg, rectal, q4h PRN, Maribeth Santamaria, APRN-CNP    acetaminophen (Tylenol) tablet 650 mg, 650 mg, oral, q6h, Maribeth Santamaria APRN-CNP, 650 mg at 01/15/24 0947    alteplase (Cathflo Activase) injection 1 mg, 1 mg, intra-catheter, PRN, Nitesh Ruiz MD, 1 mg at 01/07/24 1150    apixaban (Eliquis) tablet 5 mg, 5 mg, oral, BID, Edi Olguin APRN-CNP, 5 mg at 01/15/24 0948    aspirin EC tablet 81 mg, 81 mg, oral, Daily, John Salazar MD, 81 mg at 01/15/24 0947    atenolol (Tenormin) tablet 50 mg, 50 mg, oral, q AM, John Salazar MD, 50 mg at 01/15/24 0948    baclofen (Lioresal) tablet 5 mg, 5 mg, oral, 4x daily, Onur Perez MD, 5 mg at 01/15/24 1239    brimonidine (AlphaGAN) 0.2 % ophthalmic solution 1 drop, 1 drop, Both Eyes, BID, John Salazar MD, 1 drop at 01/15/24 0949    busPIRone (Buspar) tablet 10 mg, 10 mg, oral, Daily, John Salazar MD, 10 mg at 01/15/24 0949    ceftaroline (Teflaro) 600 mg in dextrose 5 % in water (D5W) 50 mL IV, 600 mg, intravenous, q12h, Gem Reyna MD, Stopped at 01/15/24 0607    cyclobenzaprine (Flexeril) tablet 10 mg, 10 mg, oral, TID PRN, Maribeth Santamaria, APRN-CNP, 10 mg at 01/12/24 2030    DAPTOmycin (Cubicin) 650 mg in sodium chloride 0.9% 50 mL IV, 8  mg/kg, intravenous, Nightly, Gem Reyna MD, Stopped at 01/14/24 2114    docusate sodium (Colace) capsule 100 mg, 100 mg, oral, BID, Caitlin Li, APRN-CNP, 100 mg at 01/15/24 0948    ezetimibe (Zetia) tablet 10 mg, 10 mg, oral, Daily, John Salazar MD, 10 mg at 01/15/24 0949    furosemide (Lasix) tablet 20 mg, 20 mg, oral, Daily, John Salazar MD, 20 mg at 01/15/24 0948    hydrALAZINE (Apresoline) tablet 50 mg, 50 mg, oral, BID, John Salazar MD, 50 mg at 01/15/24 0949    HYDROmorphone (Dilaudid) tablet 2 mg, 2 mg, oral, q4h PRN, Nitesh Ruiz MD, 2 mg at 01/15/24 0948    isosorbide mononitrate ER (Imdur) 24 hr tablet 60 mg, 60 mg, oral, Daily, John Salazar MD, 60 mg at 01/15/24 0624    lactobacillus acidophilus tablet 1 tablet, 1 tablet, oral, Daily, John Salazar MD, 1 tablet at 01/15/24 0948    lidocaine 4 % patch 1 patch, 1 patch, transdermal, Daily, DARRYL Quiros-CNP, 1 patch at 01/15/24 0950    lidocaine 4 % patch 1 patch, 1 patch, transdermal, Daily, Onur Perez MD, 1 patch at 01/15/24 0948    meclizine (Antivert) tablet 25 mg, 25 mg, oral, q8h PRN, John Salazar MD    melatonin tablet 3 mg, 3 mg, oral, Daily, DARRYL Quiros-CNP, 3 mg at 01/14/24 2046    mirtazapine (Remeron) tablet 15 mg, 15 mg, oral, Nightly, John Salazar MD, 15 mg at 01/14/24 2043    morphine CR (MS Contin) 12 hr tablet 15 mg, 15 mg, oral, q12h EDE, Panchito Barnes MD, 15 mg at 01/15/24 0949    morphine injection 2 mg, 2 mg, intravenous, q2h PRN, OR[place]O Quiros, 2 mg at 01/12/24 1550    naloxone (Narcan) injection 0.2 mg, 0.2 mg, intravenous, q5 min PRN, OR[place]O Quiros    ondansetron (Zofran) tablet 4 mg, 4 mg, oral, q8h PRN, 4 mg at 12/31/23 0904 **OR** ondansetron (Zofran) injection 4 mg, 4 mg, intravenous, q8h PRN, OR[place]O Quiros, 4 mg at 01/15/24 0947    oxyCODONE (Roxicodone) immediate release tablet 10 mg, 10 mg, oral, q6h PRN, Nitesh Ruiz MD, 10 mg at  01/14/24 1620    pantoprazole (ProtoNix) EC tablet 40 mg, 40 mg, oral, Daily before breakfast, John Salazar MD, 40 mg at 01/15/24 0625    pilocarpine (Salagen) tablet 5 mg, 5 mg, oral, Daily, John Salazar MD, 5 mg at 01/15/24 0949    polyethylene glycol (Glycolax, Miralax) packet 17 g, 17 g, oral, Daily, DARRYL Quiros-CNP, 17 g at 01/07/24 0903    pregabalin (Lyrica) capsule 100 mg, 100 mg, oral, BID, Panchito Barnes MD, 100 mg at 01/15/24 0948    vitamin A & D ointment 1 Application, 1 Application, Topical, q4h PRN, Nitesh Ruiz MD, 1 Application at 01/10/24 2155         Assessment:  Patient has a chronic back pain.  She has a lumbar spine surgery in November 2023.  Dr. Coombs is following the patient.  Patient developed a postop staph infection with MRSA.  She has the antibiotics, incision and drainage.  Pain in the back is more on the right side along with pain in the right thigh and right hip.  Imaging of the right hip did not show significant pathology.  Pain exacerbation with the L3-4 discitis and osteomyelitis.  Infectious disease on consult antibiotics have been adjusted, patient is on Cubicin  Patient is improving with the movements in the legs.  Pain was worse today  Patient is getting a lot of analgesics.  Dr. Barnes is on consult  Right leg DVT she has an inferior vena cava filter put in January 2.  She is on Eliquis  Right carotid bruit, right carotid endarterectomy by Dr. Edwards.  History of right cerebral TIA  Coronary artery disease  Cardiomyopathy  Bilateral total knee replacements.  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  S/p bilateral leg revascularization June 2022  Hypertension  Chronic edema  COPD with history of tobacco abuse in the past  Overweight  Vitamin D deficiency has been treated  Lidoderm patch helping   additional lab work showed a borderline elevated TSH, B12, folic acid level, vitamin D in good range  With a better pain control she can  start increasing her activity.  Rehab to increase activity as tolerated  baclofen low-dose was added to her regimen without any side effects, patient improved as far as pasms are concerned    Recommendation:  Continue Lidoderm patch   Additional lab work showed a borderline elevated TSH, B12, folic acid level, vitamin D in good range  White cell count has been in good range  With a better pain control she can start increasing her activity.  Rehab to increase activity as tolerated  baclofen low-dose was added to her regimen without any side effects, patient improved as far as spasms are concerned  Discharge planning in progress     Onur Perez MD

## 2024-01-15 NOTE — PROGRESS NOTES
Physical Therapy    Physical Therapy Treatment    Patient Name: Tara Tierney  MRN: [id number]  Today's Date: 1/15/2024  Time Calculation  Start Time: 0821  Stop Time: 0906  Time Calculation (min): 45 min       Assessment/Plan   End of Session Communication: Bedside nurse, PCT/NA/CTA (Notified purewick removed and tolerated session well despite not feeling well this date.)  Assessment Comment: Pt demonstrates the need for increased assistance this date due to not feeling well, tired/fatigued. Call light and all needs in reach.  End of Session Patient Position: Up in chair, Alarm on        General Visit Information:   PT  Visit  PT Received On: 01/15/24  General  Prior to Session Communication: Bedside nurse  Patient Position Received: Bed, 4 rail up, Alarm off, not on at start of session  General Comment: Pt agreeable to therapy although states naesous and has been vomiting this morning. Would like to base session off how pt is feeling throughout.  Subjective     Precautions:  Precautions  LE Weight Bearing Status: Weight Bearing as Tolerated  Medical Precautions: Fall precautions  Post-Surgical Precautions: Spinal precautions  Braces Applied: TLSO (Pt did not have TLSO brace on upon arrival; states MD told her she only needs to wear it when ambulating.)  Precautions Comment: Instructions/(A) for donning brace for OOB activites.       Objective     Pain:  Pain Assessment  Pain Assessment: 0-10  Pain Score: 9  Pain Type: Surgical pain, Chronic pain  Pain Location: Back         Treatments:  Therapeutic Exercise  Therapeutic Exercise Performed: Yes  Therapeutic Exercise Activity 1: supine, BLE ankle pumps, GS 15x        Bed Mobility  Bed Mobility: Yes  Bed Mobility 1  Bed Mobility 1: Supine to sitting  Level of Assistance 1: Close supervision  Bed Mobility Comments 1: Pt performed supine<>EOB SUP with use of bedrails to lift trunk. Pt requires VC for maintaining spinal precautions and sequence of log  roll.  Ambulation/Gait Training  Ambulation/Gait Training Performed: Yes  Ambulation/Gait Training 1  Surface 1: Level tile  Device 1: Rolling walker (FWW)  Gait Support Devices:  (TLSO donned prior to ambulation)  Assistance 1: Contact guard  Quality of Gait 1: Antalgic  Comments/Distance (ft) 1: Pt ambulated 5' with FWW, CGA. Pt demonstrates  antalgic gait this date due to not feeling well in general. Pt performs in slow, step through pattern with heavy UE support/tension this date.  Ambulation/Gait Training 2  Surface 2: Level tile  Device 2: Rolling walker (FWW)  Assistance 2: Contact guard  Quality of Gait 2: Antalgic  Comments/Distance (ft) 2: Pt required seated rest break from previous ambulation before proceeding an additional 15' x2 with FWW, CGA. Pt demonstrates same characteristics as noted above. VC for corrections throughout. Demonstrates awareness of spinal precautions and proper AD management.  Transfers  Transfer: Yes  Transfer 1  Transfer From 1: Chair with arms to  Transfer to 1: Stand  Technique 1: Stand pivot  Transfer Device 1: Walker (FWW)  Transfer Level of Assistance 1: Moderate assistance  Trials/Comments 1: Pt performed stand pivot from standing position<>recliner Marichuy. Pt requires VC for sequencing, fair eccentric control to sitting position. Good AD management throughout.  Transfers 2  Transfer From 2: Bed to  Transfer to 2: Stand  Technique 2: Stand to sit, Sit to stand  Transfer Device 2: Walker (FWW)  Transfer Level of Assistance 2: Moderate assistance  Trials/Comments 2: Pt performed STS from EOB<>FWW ModA this date lifting to stand due to increased pain and discomfort. VC required for sequencing.  Transfers 3  Transfer From 3: Commode-standard to  Transfer to 3: Stand  Technique 3: Sit to stand  Transfer Device 3: Walker (FWW)  Transfer Level of Assistance 3: Moderate assistance  Trials/Comments 3: Pt performed STS from commode<>FWW ModA this date due to increased fatigue/not feeling  well and discomfort. Pt requires VC for sequencing throughout.          Outcome Measures:  [facility] Basic Mobility  Turning from your back to your side while in a flat bed without using bedrails: A little  Moving from lying on your back to sitting on the side of a flat bed without using bedrails: A little  Moving to and from bed to chair (including a wheelchair): A little  Standing up from a chair using your arms (e.g. wheelchair or bedside chair): A little  To walk in hospital room: A little  Climbing 3-5 steps with railing: A lot  Basic Mobility - Total Score: 17  Education Documentation  Precautions, taught by Regi Sumner PTA at 1/15/2024  9:38 AM.  Learner: Patient  Readiness: Acceptance  Method: Explanation, Demonstration  Response: Verbalizes Understanding, Needs Reinforcement    Body Mechanics, taught by Regi Sumner PTA at 1/15/2024  9:38 AM.  Learner: Patient  Readiness: Acceptance  Method: Explanation, Demonstration  Response: Verbalizes Understanding, Needs Reinforcement    Mobility Training, taught by Regi Sumner PTA at 1/15/2024  9:38 AM.  Learner: Patient  Readiness: Acceptance  Method: Explanation, Demonstration  Response: Verbalizes Understanding, Needs Reinforcement    Education Comments  No comments found.        EDUCATION:  Outpatient Education  Individual(s) Educated: Patient  Education Provided: Fall Risk, Body Mechanics, Home Safety, POC, Post-Op Precautions  Patient Response to Education: Patient/Caregiver Verbalized Understanding of Information  Encounter Problems       Encounter Problems (Active)       PT Problem       Bed mobility supine <> sit modified independent  (Progressing)       Start:  01/02/24    Expected End:  01/12/24            Transfers sit <> stand with ww modified independent  (Progressing)       Start:  01/02/24    Expected End:  01/12/24            Patient to ambulate with ww 50' CGA  (Progressing)       Start:  01/02/24    Expected End:  01/12/24               Pain  - Adult

## 2024-01-15 NOTE — PROGRESS NOTES
Tara Tierney is a 68 y.o. female on day 15 of admission presenting with Postoperative surgical complication involving musculoskeletal system associated with musculoskeletal procedure, unspecified complication.      Subjective   Patient examined and seen. Alert and oriented x3, resting comfortably.  Patient denies chest pain, shortness of breath, palpitations, abdominal pain, fever or chills.  She states that she had 2 episodes of emesis, brown in color. Denies coffee ground emesis or abdominal pain. She states she had nausea and emesis after taking medications on empty stomach. She does not have a gallbladder.        Objective       Last Recorded Vitals  /61   Pulse 70   Temp 36.9 °C (98.4 °F) (Temporal)   Resp 18   Wt 78.2 kg (172 lb 6.4 oz)   SpO2 95%   Intake/Output last 3 Shifts:    Intake/Output Summary (Last 24 hours) at 1/15/2024 1318  Last data filed at 1/15/2024 0930  Gross per 24 hour   Intake 203 ml   Output 1501.5 ml   Net -1298.5 ml       Admission Weight  Weight: 77.1 kg (170 lb) (12/30/23 1908)    Daily Weight  12/31/23 : 78.2 kg (172 lb 6.4 oz)    Image Results  MR lumbar spine wo IV contrast  Narrative: Interpreted By:  Alexi Bass and Ohs Zachary   STUDY:  MR LUMBAR SPINE WO IV CONTRAST      INDICATION:  Signs/Symptoms:intractable back and right leg pain      COMPARISON:  MRI lumbar spine 01/05/2024, lumbar spine radiograph 01/01/2024.      ACCESSION NUMBER(S):  [id number]      ORDERING CLINICIAN:  ERICK KEN      TECHNIQUE:  Multiplanar multisequence MRI of the lumbar spine was performed  without the administration of intravenous contrast, according to  standard protocol.      FINDINGS:  Evaluation is degraded due to patient motion.      This report assumes 5 non-rib bearing lumbar vertebral bodies. The  lowest intervertebral disc will be labeled L5-S1.      There are postoperative changes from L3-L4, L4-L5, and L5-S1 with  placement of intervertebral disc spacers at L4-L5  and L5-S1. There is  stable significant amount T2 hyperintense signal located within the  laminectomy beds and surrounding paravertebral soft tissues which is  most consistent with edema and there is an indwelling surgical drain  which again terminates within the right paravertebral soft tissues at  the level of L1-L2.      There are again tracts located within the L4-S1 vertebral bodies  consistent with prior removal of surgical screws.      There is stable mild retrolisthesis at L3-L4. There is stable T1  hypointense and STIR hyperintense signal throughout the L3-L4  vertebral bodies and posterior elements and STIR hyperintense signal  located within the L3-L4 intervertebral disc which is highly  suspicious for osteomyelitis/discitis.      There is no striking signal abnormality located within the remaining  visualized vertebral bodies to suggest an infectious process, noting  that evaluation is somewhat degraded due to patient motion and  postoperative changes.      T2 hyperintense signal located within the left ventral epidural space  at the level of L3 has slightly decreased, now measuring 4 mm in  maximum thickness compared to 7 mm. Associated mass effect on the  thecal sac has decreased. There is overall mild spinal canal stenosis  at L3-L4. There is no striking spinal canal stenosis at the other  levels.      There is stable expansile STIR hyperintense signal located within the  bilateral L3-L4 facet joint spaces. There is likely stable edema  located within the psoas major muscles bilaterally.      Multilevel degenerative changes are stable compared to the prior MRI.      Impression: Evaluation is somewhat degraded due to patient motion.      1. Compared to the recent MRI lumbar spine study, T2 hyperintense  located within the left L3 ventral epidural space has mildly  decreased. Given lack of intravenous contrast, it is difficult to  determine if this represents focal fluid or prominent epidural  plexus,  favor likely fluid as it appears to communicate with the  adjacent L3-L4 intervertebral disc. Associated mass effect on the  ventral thecal sac has decreased.  2. Otherwise, unchanged MRI of the lumbar spine including extensive  postoperative changes and findings which are highly suspicious for  osteomyelitis/discitis at L3-L4.          I personally reviewed the images/study and I agree with the findings  as stated by Dr. Marcsu Fitzpatrick. This study was interpreted at  [facility], [place], [place].      MACRO:  None      Signed by: Alexi Bass 1/11/2024 5:13 PM  Dictation workstation:   [id number]      Physical Exam  Constitutional: elderly appearing, awake/alert/oriented x3, cooperative  Respiratory/Thorax: Patent airways,  normal breath sounds   Cardiovascular: Regular, rate and rhythm, 2+ equal pulses of the extremities, Musculoskeletal: mild decrease range of motion lumbar spline with JOSE drain with serosanguineous drainage, + tenderness with ambulation to right posterior thigh  Skin: warm, dry, intact except as noted  Neurological: alert/oriented x 3, speech clear at baseline      Relevant Results    Scheduled medications  acetaminophen, 650 mg, oral, q6h  apixaban, 5 mg, oral, BID  aspirin, 81 mg, oral, Daily  atenolol, 50 mg, oral, q AM  baclofen, 5 mg, oral, 4x daily  brimonidine, 1 drop, Both Eyes, BID  busPIRone, 10 mg, oral, Daily  ceftaroline, 600 mg, intravenous, q12h  daptomycin, 8 mg/kg, intravenous, Nightly  docusate sodium, 100 mg, oral, BID  ezetimibe, 10 mg, oral, Daily  furosemide, 20 mg, oral, Daily  hydrALAZINE, 50 mg, oral, BID  isosorbide mononitrate ER, 60 mg, oral, Daily  lactobacillus acidophilus, 1 tablet, oral, Daily  lidocaine, 1 patch, transdermal, Daily  lidocaine, 1 patch, transdermal, Daily  melatonin, 3 mg, oral, Daily  mirtazapine, 15 mg, oral, Nightly  morphine CR, 15 mg, oral, q12h EDE  pantoprazole, 40 mg, oral, Daily before  breakfast  pilocarpine, 5 mg, oral, Daily  polyethylene glycol, 17 g, oral, Daily  pregabalin, 100 mg, oral, BID      Continuous medications     PRN medications  PRN medications: acetaminophen **OR** acetaminophen **OR** acetaminophen, alteplase, cyclobenzaprine, HYDROmorphone, meclizine, morphine, naloxone, ondansetron **OR** ondansetron, oxyCODONE, vitamin A & D    Results for orders placed or performed during the hospital encounter of 12/30/23 (from the past 24 hour(s))   CBC   Result Value Ref Range    WBC 7.5 4.4 - 11.3 x10*3/uL    nRBC 0.0 0.0 - 0.0 /100 WBCs    RBC 2.98 (L) 4.00 - 5.20 x10*6/uL    Hemoglobin 8.6 (L) 12.0 - 16.0 g/dL    Hematocrit 27.9 (L) 36.0 - 46.0 %    MCV 94 80 - 100 fL    MCH 28.9 26.0 - 34.0 pg    MCHC 30.8 (L) 32.0 - 36.0 g/dL    RDW 14.9 (H) 11.5 - 14.5 %    Platelets 299 150 - 450 x10*3/uL   Basic Metabolic Panel   Result Value Ref Range    Glucose 136 (H) 74 - 99 mg/dL    Sodium 134 (L) 136 - 145 mmol/L    Potassium 4.0 3.5 - 5.3 mmol/L    Chloride 100 98 - 107 mmol/L    Bicarbonate 27 21 - 32 mmol/L    Anion Gap 11 10 - 20 mmol/L    Urea Nitrogen 13 6 - 23 mg/dL    Creatinine 0.72 0.50 - 1.05 mg/dL    eGFR 90 >60 mL/min/1.73m*2    Calcium 8.9 8.6 - 10.3 mg/dL         Assessment/Plan      Postoperative surgical complication involving musculoskeletal system associated with musculoskeletal procedure, unspecified complication  Active Problems:    Essential hypertension    PVD (peripheral vascular disease) (CMS/HCC)    Back pain with radiculopathy    Back pain at L4-L5 level    Deep vein thrombosis (DVT) of right lower extremity (CMS/HCC)    Anemia    Plan:    Continue IV antibiotics per Infectious Disease/ wound growth MRSA/STAPH  Recommend SNF / rehab - however, Ortho does not wish for patient to go to facility  Ortho recommends home placement  Continue PT/OT  Keep off statin while on Cubicin IV antibiotic  Continue home meds for hypertension   Mild hyponatremia - improving to 134  today   Hemoglobin/ anemia - stable     ReConsulted for emesis  - NPO Will be seen by general sx  KUB is pending   No abdominal pain, no current emeisis or abdominal pain   Last BM today   Dx: possible illeus  Doubt GI bleed  Hx of cholestecomy   Nausea resolved at this time     Hemodynamically stable   Medicine will follow peripherally  CALL FOR ACUTE NEEDS    Time spent 36  minutes obtaining labs, imaging, recommendations, interview, assessment, examination, medication review/ordering, and EMR review.    Plan of care was discussed extensively with patient. Patient verbalized understanding through teach back method. All questions and concerns addressed upon examination.     Of note, this documentation is completed using the Dragon Dictation system (voice recognition software). There may be spelling and/or grammatical errors that were not corrected prior to final submission.                Principal Problem:    Postoperative surgical complication involving musculoskeletal system associated with musculoskeletal procedure, unspecified complication  Active Problems:    Essential hypertension    PVD (peripheral vascular disease) (CMS/HCC)    Back pain with radiculopathy    Back pain at L4-L5 level    Deep vein thrombosis (DVT) of right lower extremity (CMS/HCC)    Anemia         Stacey Bliss, APRN-CNP

## 2024-01-15 NOTE — PROGRESS NOTES
Occupational Therapy    OT Treatment    Patient Name: Tara Tierney  MRN: [id number]  Today's Date: 1/15/2024  Time Calculation  Start Time: 0945  Stop Time: 0955  Time Calculation (min): 10 min     Addendum: wrong time billed *    Assessment:  End of Session Communication: Bedside nurse  End of Session Patient Position: Up in chair, Alarm on     Subjective   Previous Visit Info:  OT Last Visit  OT Received On: 01/15/24  General:  General  Prior to Session Communication: Bedside nurse  Patient Position Received: Up in chair, Alarm on  General Comment: pt agreeable to OT tx however stating she is very nauseous. pt has spinal precautions and donning TLSO when OOB.  Precautions:  Medical Precautions: Fall precautions  Vital Signs:     Pain:  Pain Assessment  Pain Assessment: 0-10  Pain Score: 4  Pain Type: Surgical pain  Pain Location: Back       Activities of Daily Living: LE Dressing  LE Dressing Comments: pt seated in recliner threading pants over BLE with use of LH reacher, pt required SBA and VC for problem solving when performing. pt standing with FWW required min A for posterior clothing mgmt. pt educated on where to purchase LH AE and states she has at home  Functional Standing Tolerance:  Functional Standing Tolerance Comments: pt demos F - supported standing balance while engaging in LB dressing tasks  Bed Mobility/Transfers: Transfers  Transfer:  (pt performing various functional transfers with use of FWW and required min A for steadying, pt educated on safe hand placement when performing sit  < > stand transfers.)            Education Documentation  Body Mechanics, taught by CHRISTOPHER Mendes at 1/15/2024 10:24 AM.  Learner: Patient  Readiness: Acceptance  Method: Explanation  Response: Needs Reinforcement  Comment: pt educated on LH AE and OT POC    Precautions, taught by CHRISTOPHER Mendes at 1/15/2024 10:24 AM.  Learner: Patient  Readiness: Acceptance  Method: Explanation  Response: Needs  Reinforcement  Comment: pt educated on LH AE and OT POC    ADL Training, taught by CHRISTOPHER Mendes at 1/15/2024 10:24 AM.  Learner: Patient  Readiness: Acceptance  Method: Explanation  Response: Needs Reinforcement  Comment: pt educated on LH AE and OT POC    Education Comments  No comments found.        OP EDUCATION:       Goals:  Encounter Problems       Encounter Problems (Active)       OT Goals       CGA for all functional transfers  (Progressing)       Start:  01/02/24    Expected End:  01/12/24            CGA LB dressing with AE (Progressing)       Start:  01/02/24    Expected End:  01/12/24            Fair + dyn standing balance for ADLs and functional activities  (Progressing)       Start:  01/02/24    Expected End:  01/12/24            CGA for toileting tasks/clothing mgmt  (Progressing)       Start:  01/02/24    Expected End:  01/12/24

## 2024-01-16 PROCEDURE — 99221 1ST HOSP IP/OBS SF/LOW 40: CPT | Performed by: PLASTIC SURGERY

## 2024-01-16 PROCEDURE — 2500000001 HC RX 250 WO HCPCS SELF ADMINISTERED DRUGS (ALT 637 FOR MEDICARE OP): Performed by: REGISTERED NURSE

## 2024-01-16 PROCEDURE — 2500000001 HC RX 250 WO HCPCS SELF ADMINISTERED DRUGS (ALT 637 FOR MEDICARE OP): Performed by: STUDENT IN AN ORGANIZED HEALTH CARE EDUCATION/TRAINING PROGRAM

## 2024-01-16 PROCEDURE — 2500000005 HC RX 250 GENERAL PHARMACY W/O HCPCS

## 2024-01-16 PROCEDURE — 2500000001 HC RX 250 WO HCPCS SELF ADMINISTERED DRUGS (ALT 637 FOR MEDICARE OP): Performed by: NURSE PRACTITIONER

## 2024-01-16 PROCEDURE — 2500000001 HC RX 250 WO HCPCS SELF ADMINISTERED DRUGS (ALT 637 FOR MEDICARE OP): Performed by: PLASTIC SURGERY

## 2024-01-16 PROCEDURE — 1090000001 HH PPS REVENUE CREDIT

## 2024-01-16 PROCEDURE — 2500000001 HC RX 250 WO HCPCS SELF ADMINISTERED DRUGS (ALT 637 FOR MEDICARE OP): Performed by: SPECIALIST

## 2024-01-16 PROCEDURE — 2500000004 HC RX 250 GENERAL PHARMACY W/ HCPCS (ALT 636 FOR OP/ED)

## 2024-01-16 PROCEDURE — 2500000001 HC RX 250 WO HCPCS SELF ADMINISTERED DRUGS (ALT 637 FOR MEDICARE OP): Performed by: HOSPITALIST

## 2024-01-16 PROCEDURE — 2500000002 HC RX 250 W HCPCS SELF ADMINISTERED DRUGS (ALT 637 FOR MEDICARE OP, ALT 636 FOR OP/ED): Performed by: HOSPITALIST

## 2024-01-16 PROCEDURE — 97530 THERAPEUTIC ACTIVITIES: CPT | Mod: GP,CQ

## 2024-01-16 PROCEDURE — 2500000004 HC RX 250 GENERAL PHARMACY W/ HCPCS (ALT 636 FOR OP/ED): Performed by: INTERNAL MEDICINE

## 2024-01-16 PROCEDURE — 2500000001 HC RX 250 WO HCPCS SELF ADMINISTERED DRUGS (ALT 637 FOR MEDICARE OP)

## 2024-01-16 PROCEDURE — 99232 SBSQ HOSP IP/OBS MODERATE 35: CPT | Performed by: REGISTERED NURSE

## 2024-01-16 PROCEDURE — 1090000002 HH PPS REVENUE DEBIT

## 2024-01-16 PROCEDURE — 2500000005 HC RX 250 GENERAL PHARMACY W/O HCPCS: Performed by: SPECIALIST

## 2024-01-16 PROCEDURE — 2500000001 HC RX 250 WO HCPCS SELF ADMINISTERED DRUGS (ALT 637 FOR MEDICARE OP): Performed by: ANESTHESIOLOGY

## 2024-01-16 PROCEDURE — 1100000001 HC PRIVATE ROOM DAILY

## 2024-01-16 PROCEDURE — 2500000004 HC RX 250 GENERAL PHARMACY W/ HCPCS (ALT 636 FOR OP/ED): Performed by: HOSPITALIST

## 2024-01-16 RX ORDER — MUPIROCIN 20 MG/G
OINTMENT TOPICAL 2 TIMES DAILY
Status: DISCONTINUED | OUTPATIENT
Start: 2024-01-16 | End: 2024-01-18 | Stop reason: HOSPADM

## 2024-01-16 RX ADMIN — HYDROMORPHONE HYDROCHLORIDE 2 MG: 2 TABLET ORAL at 20:38

## 2024-01-16 RX ADMIN — BACLOFEN 5 MG: 10 TABLET ORAL at 20:39

## 2024-01-16 RX ADMIN — ISOSORBIDE MONONITRATE 60 MG: 60 TABLET, EXTENDED RELEASE ORAL at 06:06

## 2024-01-16 RX ADMIN — BRIMONIDINE TARTRATE 1 DROP: 2 SOLUTION OPHTHALMIC at 21:58

## 2024-01-16 RX ADMIN — MORPHINE SULFATE 15 MG: 15 TABLET, EXTENDED RELEASE ORAL at 20:39

## 2024-01-16 RX ADMIN — CEFTAROLINE FOSAMIL 600 MG: 600 POWDER, FOR SOLUTION INTRAVENOUS at 17:52

## 2024-01-16 RX ADMIN — HYDRALAZINE HYDROCHLORIDE 50 MG: 50 TABLET ORAL at 20:39

## 2024-01-16 RX ADMIN — ATENOLOL 50 MG: 50 TABLET ORAL at 08:18

## 2024-01-16 RX ADMIN — Medication 3 MG: at 17:53

## 2024-01-16 RX ADMIN — HYDROMORPHONE HYDROCHLORIDE 2 MG: 2 TABLET ORAL at 12:37

## 2024-01-16 RX ADMIN — LIDOCAINE 1 PATCH: 4 PATCH TOPICAL at 08:28

## 2024-01-16 RX ADMIN — BACLOFEN 5 MG: 10 TABLET ORAL at 17:52

## 2024-01-16 RX ADMIN — APIXABAN 5 MG: 5 TABLET, FILM COATED ORAL at 08:18

## 2024-01-16 RX ADMIN — ASPIRIN 81 MG: 81 TABLET, COATED ORAL at 08:18

## 2024-01-16 RX ADMIN — MIRTAZAPINE 15 MG: 15 TABLET, FILM COATED ORAL at 20:39

## 2024-01-16 RX ADMIN — ONDANSETRON 4 MG: 2 INJECTION INTRAMUSCULAR; INTRAVENOUS at 20:46

## 2024-01-16 RX ADMIN — FUROSEMIDE 20 MG: 40 TABLET ORAL at 08:18

## 2024-01-16 RX ADMIN — ACETAMINOPHEN 650 MG: 325 TABLET ORAL at 06:08

## 2024-01-16 RX ADMIN — BRIMONIDINE TARTRATE 1 DROP: 2 SOLUTION OPHTHALMIC at 08:30

## 2024-01-16 RX ADMIN — PILOCARPINE HYDROCHLORIDE 5 MG: 5 TABLET, FILM COATED ORAL at 08:21

## 2024-01-16 RX ADMIN — MORPHINE SULFATE 15 MG: 15 TABLET, EXTENDED RELEASE ORAL at 08:18

## 2024-01-16 RX ADMIN — ACETAMINOPHEN 650 MG: 325 TABLET ORAL at 12:37

## 2024-01-16 RX ADMIN — BACLOFEN 5 MG: 10 TABLET ORAL at 06:06

## 2024-01-16 RX ADMIN — PREGABALIN 100 MG: 50 CAPSULE ORAL at 08:18

## 2024-01-16 RX ADMIN — MUPIROCIN: 20 OINTMENT TOPICAL at 20:38

## 2024-01-16 RX ADMIN — EZETIMIBE 10 MG: 10 TABLET ORAL at 08:18

## 2024-01-16 RX ADMIN — APIXABAN 5 MG: 5 TABLET, FILM COATED ORAL at 20:39

## 2024-01-16 RX ADMIN — DOCUSATE SODIUM 100 MG: 100 CAPSULE, LIQUID FILLED ORAL at 08:18

## 2024-01-16 RX ADMIN — ACETAMINOPHEN 650 MG: 325 TABLET ORAL at 23:11

## 2024-01-16 RX ADMIN — LIDOCAINE 1 PATCH: 4 PATCH TOPICAL at 08:18

## 2024-01-16 RX ADMIN — CEFTAROLINE FOSAMIL 600 MG: 600 POWDER, FOR SOLUTION INTRAVENOUS at 04:38

## 2024-01-16 RX ADMIN — BACLOFEN 5 MG: 10 TABLET ORAL at 12:36

## 2024-01-16 RX ADMIN — Medication 1 TABLET: at 08:18

## 2024-01-16 RX ADMIN — PANTOPRAZOLE SODIUM 40 MG: 40 TABLET, DELAYED RELEASE ORAL at 06:06

## 2024-01-16 RX ADMIN — DOCUSATE SODIUM 100 MG: 100 CAPSULE, LIQUID FILLED ORAL at 20:39

## 2024-01-16 RX ADMIN — PREGABALIN 100 MG: 50 CAPSULE ORAL at 20:39

## 2024-01-16 RX ADMIN — HYDRALAZINE HYDROCHLORIDE 50 MG: 50 TABLET ORAL at 08:18

## 2024-01-16 RX ADMIN — BUSPIRONE HYDROCHLORIDE 10 MG: 5 TABLET ORAL at 08:18

## 2024-01-16 RX ADMIN — DAPTOMYCIN 650 MG: 500 INJECTION, POWDER, LYOPHILIZED, FOR SOLUTION INTRAVENOUS at 21:58

## 2024-01-16 ASSESSMENT — PAIN DESCRIPTION - DESCRIPTORS: DESCRIPTORS: ACHING

## 2024-01-16 ASSESSMENT — COGNITIVE AND FUNCTIONAL STATUS - GENERAL
MOVING TO AND FROM BED TO CHAIR: A LITTLE
CLIMB 3 TO 5 STEPS WITH RAILING: A LOT
MOVING FROM LYING ON BACK TO SITTING ON SIDE OF FLAT BED WITH BEDRAILS: A LOT
STANDING UP FROM CHAIR USING ARMS: A LITTLE
MOVING TO AND FROM BED TO CHAIR: A LITTLE
WALKING IN HOSPITAL ROOM: A LITTLE
TURNING FROM BACK TO SIDE WHILE IN FLAT BAD: A LOT
MOVING FROM LYING ON BACK TO SITTING ON SIDE OF FLAT BED WITH BEDRAILS: A LITTLE
STANDING UP FROM CHAIR USING ARMS: A LITTLE
CLIMB 3 TO 5 STEPS WITH RAILING: A LITTLE
WALKING IN HOSPITAL ROOM: A LITTLE
MOBILITY SCORE: 17
MOBILITY SCORE: 16
TURNING FROM BACK TO SIDE WHILE IN FLAT BAD: A LITTLE

## 2024-01-16 ASSESSMENT — PAIN SCALES - GENERAL
PAINLEVEL_OUTOF10: 7
PAINLEVEL_OUTOF10: 9
PAINLEVEL_OUTOF10: 4
PAINLEVEL_OUTOF10: 10 - WORST POSSIBLE PAIN
PAINLEVEL_OUTOF10: 8

## 2024-01-16 ASSESSMENT — PAIN - FUNCTIONAL ASSESSMENT
PAIN_FUNCTIONAL_ASSESSMENT: 0-10

## 2024-01-16 ASSESSMENT — PAIN DESCRIPTION - LOCATION: LOCATION: BACK

## 2024-01-16 ASSESSMENT — PAIN DESCRIPTION - ORIENTATION: ORIENTATION: POSTERIOR;LOWER

## 2024-01-16 NOTE — CARE PLAN
Problem: Pain  Goal: My pain/discomfort is manageable  Outcome: Progressing     Problem: Pain  Goal: My pain/discomfort is manageable  Outcome: Progressing     Problem: Pain  Goal: My pain/discomfort is manageable  Outcome: Progressing   The patient's goals for the shift include pt will report decreased level of pain during this shift using coping strategies and pain medicine as ordered.     The clinical goals for the shift include patient will have bowel movement by the end of shift    Over the shift, the patient did not make progress toward the following goals. Barriers to progression include . Recommendations to address these barriers include .

## 2024-01-16 NOTE — PROGRESS NOTES
The patient is awake and alert, feels low energy levels and exertional limitations but slowing improving.  She endorses improvement in the muscle cramping since being on the baclofen  Started Lidoderm patch.  The pain level continues to fluctuate.  Rehab working with the patient  Patient was having recurrent spasms in the back and the legs before, doing better with the baclofen.  Patient was already on a Zanaflex regimen and Lyrica at home  Patient started a low-dose of baclofen, 5 mg 4 times a day, doing better  Moving the extremities better  Ambulated better  Rehab is working with her  MRI lumbar spine had shown Discitis and other changes.  She is on antibiotics regimen which was revised, on Cubicin  No significant spinal cord compression  Discharge planning in progress for rehab    Visit Vitals  /55 (Patient Position: Sitting)   Pulse 66   Temp 35.7 °C (96.3 °F)   Resp 20        Neurological Exam:    Oriented to day date,month,year, place and person. Knew the name of the president.  Attention span and concentration were normal.   Language: speech comprehension, repetition, expression, and naming is intact for patient´s age and level of education.      CRANIAL NERVES:   CN 2 Visual fields full to confrontation.   CN 3, 4, 6 Pupils round, equally reactive to light. No ptosis. EOM normal alignment, full range with normal saccades, pursuit and convergence. No nystagmus.   CN 5 Facial sensation intact bilaterally.   CN 7 Normal and symmetric facial strength. Nasolabial folds symmetric.   CN 8 Hearing intact to finger rub bilaterally. On [place] and [place] testing there was no lateralisation  CN 9 Palate elevates symmetrically.   CN 11 Bilaterally normal strength of shoulder shrug and neck turning.   CN 12 Tongue midline, with normal bulk and strength; no fasciculations.   Patient is handling pharyngeal secretions well.   Speech: articulation is intact.      MOTOR: The patient has normal muscle tone and has normal  "muscle mass. Muscle strength was 5/5 in distal and proximal muscles in both upper and lower extremities when she gave l the effort.  With the pain effort was difficult in the lower extremities. No fasciculations, tremor or other abnormal movements were present.   On [place] testing the patient has no pronation or drift.      REFLEXES: unchanged from initial examination     COORDINATION: In both upper extremities, finger-nose-finger was intact without dysmetria. No dysdiadochokinesia. In both lower extremities, heel-to-knee-shin was intact.      GAIT: Could not be tested.  Patient is feeling weak with the back pain.  Romberg patient could not be tested      BMP:  Results from last 7 days   Lab Units 01/15/24  1214 01/10/24  1331   SODIUM mmol/L 134* 130*   POTASSIUM mmol/L 4.0 4.1   CHLORIDE mmol/L 100 97*   CO2 mmol/L 27 26   BUN mg/dL 13 15   CREATININE mg/dL 0.72 0.80         CBC:  Results from last 7 days   Lab Units 01/15/24  1214 01/10/24  1331   WBC AUTO x10*3/uL 7.5 9.3   RBC AUTO x10*6/uL 2.98* 2.77*   HEMOGLOBIN g/dL 8.6* 8.5*   HEMATOCRIT % 27.9* 26.5*   MCV fL 94 96   MCH pg 28.9 30.7   MCHC g/dL 30.8* 32.1   RDW % 14.9* 15.0*   PLATELETS AUTO x10*3/uL 299 288         INR:        Lipid Profile:        No lab exists for component: \"LABVLDL\"    HgbA1C:        CMP:  Results from last 7 days   Lab Units 01/15/24  1214 01/10/24  1331   SODIUM mmol/L 134* 130*   POTASSIUM mmol/L 4.0 4.1   CHLORIDE mmol/L 100 97*   CO2 mmol/L 27 26   BUN mg/dL 13 15   CREATININE mg/dL 0.72 0.80   GLUCOSE mg/dL 136* 141*   CALCIUM mg/dL 8.9 9.2         ABG:        No lab exists for component: \"PO2\", \"PCO2\", \"HCO3\", \"BE\", \"O2SAT\"    TSH:  No results found for: \"TSH\"    Lab Results   Component Value Date    MINKDYHA23 322 01/05/2024     Lab Results   Component Value Date    FOLATE 5.1 01/05/2024     Lab Results   Component Value Date    VITD25 33 01/05/2024         No MRI head results found for the past 12 months     No CT head " results found for the past 12 months     CT abdomen pelvis wo IV contrast    Result Date: 12/31/2023  STUDY: CT Abdomen and Pelvis without IV Contrast; 12/31/2023 at 5:11 PM. INDICATION: Abdominal pain. COMPARISON: CT AP 12/19/2023 ACCESSION NUMBER(S): [id number] ORDERING CLINICIAN: WILLIAM EDMONDS TECHNIQUE: CT of the abdomen and pelvis was performed.  Contiguous axial images were obtained at 3 mm slice thickness through the abdomen and pelvis. Coronal and sagittal reconstructions at 3 mm slice thickness were performed. No intravenous contrast was administered.  Automated mA/kV exposure control was utilized and patient examination was performed in strict accordance with principles of ALARA. FINDINGS: Please note that the evaluation of vessels, lymph nodes and organs is limited without intravenous contrast.  LOWER CHEST: No cardiomegaly.  No pericardial effusion.  Very small bilateral pleural effusions  ABDOMEN:  LIVER: No hepatomegaly.  Smooth surface contour.  Normal attenuation.  BILE DUCTS: No intrahepatic or extrahepatic biliary ductal dilatation.  GALLBLADDER: The gallbladder is absent. STOMACH: No abnormalities identified.  PANCREAS: Negative for pancreatitis  SPLEEN: No splenomegaly or focal splenic lesion.  ADRENAL GLANDS: No thickening or nodules.  KIDNEYS AND URETERS: Kidneys are normal in size and location.  No renal or ureteral calculi.  PELVIS:  BLADDER: No abnormalities identified.  REPRODUCTIVE ORGANS: No abnormalities identified.  BOWEL: Colonic diverticulosis.  Mild constipation without bowel obstruction. Negative for appendicitis  VESSELS: Limited due to lack of intravenous contrast.  Prior femoral to femoral artery bypass.  Abdominal aorta is normal in caliber.  PERITONEUM/RETROPERITONEUM/LYMPH NODES: Small amount of low-density free pelvic fluid  No pneumoperitoneum. No lymphadenopathy.  ABDOMINAL WALL: Small fat-containing umbilical hernia. SOFT TISSUES: Postsurgical changes within the  posterior lumbar soft tissues with surgical drains in place.  BONES: Status post interbody fusion of L4/5 and L5/S1 with metallic spacer. This space narrowing L3/4.  Laminectomies L3-L5.  Bone graft along the lateral margins of L3-L5 fracture of the right L3 transverse process. Fracture of the right L4 transverse process.  Old screw tract within the L4 and L5 pedicles.  Narrowing of the right L5/S1 neural foramen due to facet joint osteophyte.    Negative for bowel obstruction.  Mild constipation. Colonic diverticulosis without diverticulitis. Negative for appendicitis. Postsurgical changes lumbar spine removal of pedicle screws and posterior rods from L4 through S1.  Stable appearance of interbody spacers at L4/5 and L5/S1. Bone graft along the lateral margins of L3-L5.  Fractures of the right L4 and L3 transverse process.  The right L3 transverse process fracture appears old.  Postsurgical changes within the posterior lumbar soft tissues with surgical drains in place. Signed by Aung Sparrow MD    MR lumbar spine w and wo IV contrast    Result Date: 12/31/2023  Interpreted By:  Jonas Sanchez, STUDY: MR LUMBAR SPINE W AND WO IV CONTRAST;  12/31/2023 12:33 am   INDICATION: Signs/Symptoms:Pain.   COMPARISON: MRI of the lumbar spine dated 12/10/2023   ACCESSION NUMBER(S): [id number]   ORDERING CLINICIAN: YVETTE LEE   TECHNIQUE: Sagittal T1, T2, STIR, axial T1 and T2 weighted images of the lumbar spine were acquired. Additional axial and sagittal T1 weighted images of the lumbar spine were obtained after intravenous administration of 15.5 mL of contrast.   FINDINGS: Exam is degraded by motion.   There is again evidence of 5 lumbar type non rib-bearing vertebral bodies, with the lowest well-formed intervertebral disc space labeled L5-S1.   Postsurgical changes of posterior decompression and laminectomies of L3 through L5 with posterior spinal fusion extending from L4 through S1 and discectomies with  intervertebral disc spaces at L4-L5 and L5-S1. These are similar in appearance to prior examination on 12/10/2023. Susceptibility artifact from the surgical hardware somewhat limits evaluation of the adjacent structures.   In the interim since prior imaging on 12/10/2023, new postsurgical consistent with irrigation debridement of subfascial tissues of the cutaneous spine are evident. STIR and T2 hypointense signal abnormality previously seen in the cutaneous fat of the lower lumbar spine has resolved, with new 3.7 x 3.6 x 13.6 cm (series 7, image 10; series 10, image 10) T2 hyperintense fluid collection present, with slight peripheral enhancement. This collection may be contiguous with the skin.   T2 hyperintense collection is again present in the laminectomy surgical bed, abutting the thecal sac at the level of L3 through L5 (series 8, image 14), measuring up to 5.2 cm in craniocaudal dimension, 2.9 cm in transverse dimension and up to 1.5 cm in AP dimension (series 10, image 7). The surgical catheter previously seen within the collection is gone, although collection is decreased in size to previous imaging, with resolution of previously seen air within the collection. Mild surrounding edema and reactive soft tissue changes are present in the epidural space, somewhat increased in the interim since prior exam, with new/increased mass effect on the adjacent thecal sac.   There also appears to be small amount of new fluid present in the anterior epidural space at the level of L3 (series 10, image 3).   Although the exact characterization is difficult due to motion, there appears to be somewhat worsened spinal canal narrowing at the level of L2-L3 due to combination of new fluid in the anterior epidural space, and the T2 hyperintense fluid collection with associated inflammatory/reactive changes along the posterior aspect of the thecal sac, with somewhat increased effacement of the subarachnoid space.   No new intra  thecal enhancement is identified.   Neural foramina are not well assessed due to motion and susceptibility artifact from the surgical hardware.       1.  New surgical changes of irrigation and debridement in the laminectomy surgical bed evident, with previously seen geographic area of hypointense T2 and STIR signal in the cutaneous fat of the lumbar spine resolved, and new T2 hyperintense collection measuring 3.7 x 3.6 x 13.6 cm present in the cutaneous fat, likely representing a postsurgical seroma, although sterility can not be ascertained on imaging alone. This collection reaches of the dermis, and may drain at the skin. 2. T2 hyperintense collection within the laminectomy bed itself along the dorsal aspect of the thecal sac described on previous MRI in 12/10/2023 has mildly decreased in size from prior imaging, with interval removal of previously seen drain, although there are increased inflammatory/reactive soft tissue changes present in the laminectomy surgical bed, focus of fluid in the anterior epidural space at the level of L3 contributes to increased effacement of the thecal sac at the level of L3 compared to prior MRI.   MACRO: None   Signed by: Jonas Sanchez 12/31/2023 1:13 AM Dictation workstation:   [id number]    MR LUMBAR SPINE WO IV CONTRAST;  1/5/2024 8:48 pm      INDICATION:  Signs/Symptoms:intractable back pain s/o incision and drainage with  lumbar spine hardware removal.      COMPARISON:  Lumbar MRI dated 12/30/2022 and 12/10/2022. CT abdomen pelvis dated  12/31/2022.      ACCESSION NUMBER(S):  [id number]      ORDERING CLINICIAN:  ERICK KEN      TECHNIQUE:  Sagittal T2, T1, and STIR and axial T1, T2 sequences of the lumbar  spine. No intravenous contrast was administered.      FINDINGS:      Lumbar spine:  Normal lumbar lordosis. The last well-formed disc is designated  L5-S1. Minimal L5 over S1 anterolisthesis unchanged from prior. There  is minimal L3 over L4 retrolisthesis  unchanged from prior. There has  been interval removal of the bilateral transpedicular screws and  vertical rods at L4 through S1 with T2 hyperintense ghost tracts  noted. There are unchanged disc spacers at L4-L5 and L5-S1. There is  a subcutaneous drain in place extending cranially along the midline  to the level of L3 without associated fluid collection. This  surrounded by subcutaneous edema. There is also a right deep  paravertebral drain in place terminating in the right deep  paravertebral soft tissues at the level of L1-L2 without associated  fluid collection, sagittal image 11 of 25. There is bilateral  posterior paravertebral muscle edema without evidence of fluid  collection.      Vertebral body heights are maintained. 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 There is a T2 hyperintense  fluid collection within the left subarticular ventral epidural space  extending cranially at the level of L3 and which appears to be in  continuity with the L3-L4 disc space measuring 2.0 cm craniocaudally  and 1.0 x 0.7 cm axially, unchanged from the recent prior. This  contributes to mild effacement of the left subarticular space.              The conus medullaris terminates at L1 and is normal in appearance.      T12-L1: No significant disc bulge or herniation.  L1-L2: Is a diffuse disc bulge and facet hypertrophy contributing to  mild bilateral foraminal stenosis without significant central canal  stenosis. L2-L3: Small disc bulge and facet hypertrophy without  significant central or foraminal canal stenosis. L3-L4: There is  posterior laminectomy change without evidence of central canal  stenosis. There is mild retrolisthesis of L3 over L4 as well as T2  hyperintense fluid collection extending superiorly from the left  subarticular space and contributing to mild left subarticular  stenosis as well as mild central canal stenosis  at the level of L3  similar to prior. There is suspected moderate to severe right  foraminal stenosis due to mild retrolisthesis with facet hypertrophy  as well as bone marrow edema at the right synovial facets, for  example sagittal image 9 of 25. There is also mild edema within the  right psoas muscle at the level of L3-L4, axial image 4 of 27 without  evidence of a fluid collection. There are bilateral right greater  than left facet effusions at L3-L4 and possibility of septic  arthritis can not be excluded. There is mild to moderate left  foraminal stenosis. L4-L5: There are right-sided facetectomy changes  with with hazy fluid within the surgical bed which may be expected.  Posterior laminectomy change. There is mild bilateral foraminal  narrowing. No central canal stenosis. L5-S1: There are right-sided  facetectomy changes with hazy fluid within the surgical bed, which  may be expected. Posterior laminectomy change. Mild bilateral  foraminal narrowing. No central canal stenosis.      IMPRESSION:  Interval removal of bilateral transpedicular screws and vertical rods  at L3 through S1 and interval resolution of posterior paravertebral  fluid collections. Lumbar drains within the subcutaneous and deep  posterior paravertebral soft tissues as described above with  associated soft tissue edema, however no evidence of surrounding  fluid collection.      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 There is minimal L3 over L4  retrolisthesis as well as facet hypertrophy, which contributes to  moderate to severe right foraminal stenosis at L3-L4. There is also  asymmetric right-sided facet effusion and right subchondral edema at  L3-L4 facets and asymmetric right psoas edema at L3-L4 concerning for  septic arthritis.      There is unchanged ventral epidural collection on the left at the  level of L3 which may be communicating with the L3-L4 disc.  The  sterility of this collection can not be determined given multiple  prior surgeries. It contributes to unchanged mild central canal  stenosis and left subarticular stenosis at L3-L4.      Signed by: Marco Valles 1/6/2024 12:46 AM  Dictation workstation:   [id number]  EMG     No EMG results found for the past 12 months        No EEG results found for the past 12 months      Current Facility-Administered Medications:     acetaminophen (Tylenol) tablet 650 mg, 650 mg, oral, q4h PRN, 650 mg at 01/05/24 2104 **OR** acetaminophen (Tylenol) oral liquid 650 mg, 650 mg, oral, q4h PRN **OR** acetaminophen (Tylenol) suppository 650 mg, 650 mg, rectal, q4h PRN, Maribeth Santamaria APRN-CNP    acetaminophen (Tylenol) tablet 650 mg, 650 mg, oral, q6h, DARRYL Quiros-CNP, 650 mg at 01/16/24 1237    alteplase (Cathflo Activase) injection 1 mg, 1 mg, intra-catheter, PRN, Nitesh Ruiz MD, 1 mg at 01/07/24 1150    apixaban (Eliquis) tablet 5 mg, 5 mg, oral, BID, Edi Olguin, APRN-CNP, 5 mg at 01/16/24 0818    aspirin EC tablet 81 mg, 81 mg, oral, Daily, John Salazar MD, 81 mg at 01/16/24 0818    atenolol (Tenormin) tablet 50 mg, 50 mg, oral, q AM, John Salazar MD, 50 mg at 01/16/24 0818    baclofen (Lioresal) tablet 5 mg, 5 mg, oral, 4x daily, Onur Perez MD, 5 mg at 01/16/24 1752    brimonidine (AlphaGAN) 0.2 % ophthalmic solution 1 drop, 1 drop, Both Eyes, BID, John Salazar MD, 1 drop at 01/16/24 0830    busPIRone (Buspar) tablet 10 mg, 10 mg, oral, Daily, John Salazar MD, 10 mg at 01/16/24 0818    ceftaroline (Teflaro) 600 mg in dextrose 5 % in water (D5W) 50 mL IV, 600 mg, intravenous, q12h, Gem Reyna MD, Last Rate: 70 mL/hr at 01/16/24 1752, 600 mg at 01/16/24 1752    cyclobenzaprine (Flexeril) tablet 10 mg, 10 mg, oral, TID PRN, Maribeth Santamaria, APRN-CNP, 10 mg at 01/12/24 2030    DAPTOmycin (Cubicin) 650 mg in sodium chloride 0.9% 50 mL IV, 8 mg/kg, intravenous, Nightly, Gem Reyna MD,  Last Rate: 126 mL/hr at 01/16/24 0600, Rate Verify at 01/16/24 0600    docusate sodium (Colace) capsule 100 mg, 100 mg, oral, BID, DARRYL Szymanski-CNP, 100 mg at 01/16/24 0818    ezetimibe (Zetia) tablet 10 mg, 10 mg, oral, Daily, John Salazar MD, 10 mg at 01/16/24 0818    furosemide (Lasix) tablet 20 mg, 20 mg, oral, Daily, John Salazar MD, 20 mg at 01/16/24 0818    hydrALAZINE (Apresoline) tablet 50 mg, 50 mg, oral, BID, John Salazar MD, 50 mg at 01/16/24 0818    HYDROmorphone (Dilaudid) tablet 2 mg, 2 mg, oral, q4h PRN, Nitesh Ruiz MD, 2 mg at 01/16/24 1237    isosorbide mononitrate ER (Imdur) 24 hr tablet 60 mg, 60 mg, oral, Daily, John Salazar MD, 60 mg at 01/16/24 0606    lactobacillus acidophilus tablet 1 tablet, 1 tablet, oral, Daily, John Salazar MD, 1 tablet at 01/16/24 0818    lidocaine 4 % patch 1 patch, 1 patch, transdermal, Daily, DARRYL Quiros-CNP, 1 patch at 01/16/24 0828    lidocaine 4 % patch 1 patch, 1 patch, transdermal, Daily, Onur Perez MD, 1 patch at 01/16/24 0818    meclizine (Antivert) tablet 25 mg, 25 mg, oral, q8h PRN, John Salazar MD    melatonin tablet 3 mg, 3 mg, oral, Daily, DARRYL Quiros-CNP, 3 mg at 01/16/24 1753    mirtazapine (Remeron) tablet 15 mg, 15 mg, oral, Nightly, John Salazar MD, 15 mg at 01/15/24 2030    morphine CR (MS Contin) 12 hr tablet 15 mg, 15 mg, oral, q12h EDE, Panchito Barnes MD, 15 mg at 01/16/24 0818    morphine injection 2 mg, 2 mg, intravenous, q2h PRN, OR[place]O Quiros, 2 mg at 01/12/24 1550    mupirocin (Bactroban) 2 % ointment, , Topical, BID, Roland J Reyes, MD    naloxone (Narcan) injection 0.2 mg, 0.2 mg, intravenous, q5 min PRN, [place]O Quiros    ondansetron (Zofran) tablet 4 mg, 4 mg, oral, q8h PRN, 4 mg at 12/31/23 0904 **OR** ondansetron (Zofran) injection 4 mg, 4 mg, intravenous, q8h PRN, [place]O Quiros, 4 mg at 01/15/24 0947    oxyCODONE (Roxicodone) immediate release  tablet 10 mg, 10 mg, oral, q6h PRN, Nitesh Ruiz MD, 10 mg at 01/14/24 1620    pantoprazole (ProtoNix) EC tablet 40 mg, 40 mg, oral, Daily before breakfast, John Salazar MD, 40 mg at 01/16/24 0606    pilocarpine (Salagen) tablet 5 mg, 5 mg, oral, Daily, John Salazar MD, 5 mg at 01/16/24 0821    polyethylene glycol (Glycolax, Miralax) packet 17 g, 17 g, oral, Daily, Maribeth Santamaria, APRN-CNP, 17 g at 01/07/24 0903    pregabalin (Lyrica) capsule 100 mg, 100 mg, oral, BID, Panchito Barnes MD, 100 mg at 01/16/24 0818    vitamin A & D ointment 1 Application, 1 Application, Topical, q4h PRN, Nitesh Ruiz MD, 1 Application at 01/10/24 2155         Assessment:  Patient has a chronic back pain.  She has a lumbar spine surgery in November 2023.  Dr. Coombs is following the patient.  Patient developed a postop staph infection with MRSA.  She has the antibiotics, incision and drainage.  Pain in the back is more on the right side along with pain in the right thigh and right hip.  Imaging of the right hip did not show significant pathology.  Pain exacerbation with the L3-4 discitis and osteomyelitis.  Infectious disease on consult antibiotics have been adjusted, patient is on Cubicin  Patient is improving with the movements in the legs.  Pain was worse today  Patient is getting a lot of analgesics.  Dr. Barnes is on consult  Right leg DVT she has an inferior vena cava filter put in January 2.  She is on Eliquis  Right carotid bruit, right carotid endarterectomy by Dr. Edwards.  History of right cerebral TIA  Coronary artery disease  Cardiomyopathy  Bilateral total knee replacements.  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  S/p bilateral leg revascularization June 2022  Hypertension  Chronic edema  COPD with history of tobacco abuse in the past  Overweight  Vitamin D deficiency has been treated  Lidoderm patch helping   additional lab work showed a borderline elevated TSH, B12, folic acid  level, vitamin D in good range  With a better pain control she can start increasing her activity.  Rehab to increase activity as tolerated  baclofen low-dose was added to her regimen without any side effects, patient improved as far as pasms are concerned    Recommendation:  Continue Lidoderm patch   Additional lab work showed a borderline elevated TSH, B12, folic acid level, vitamin D in good range  White cell count has been in good range  With a better pain control she can start increasing her activity.  Rehab to increase activity as tolerated  baclofen low-dose was added to her regimen without any side effects, patient improved as far as spasms are concerned  Discharge planning in progress     Onur Perez MD

## 2024-01-16 NOTE — PROGRESS NOTES
Tara Tierney is a 68 y.o. female on day 16 of admission presenting with Postoperative surgical complication involving musculoskeletal system associated with musculoskeletal procedure, unspecified complication.    Consult: HTN management     Subjective   .Patient examined and seen. Alert and oriented x3, resting comfortably.  Patient denies chest pain, shortness of breath, palpitations, abdominal pain, fever or chills.  Patient is agreeable to go home when cleared.  Reports support system is intact. She states she is feeling better, but still have right leg weakness that she discussed with surgeon this morning. She is able to walk and is improving overall. Voices no other complaints.          Objective     Last Recorded Vitals  /56 (BP Location: Left arm, Patient Position: Sitting)   Pulse 68   Temp 36.7 °C (98.1 °F) (Temporal)   Resp 16   Wt 78.2 kg (172 lb 6.4 oz)   SpO2 95%   Intake/Output last 3 Shifts:    Intake/Output Summary (Last 24 hours) at 1/16/2024 1314  Last data filed at 1/16/2024 0836  Gross per 24 hour   Intake 613 ml   Output 830 ml   Net -217 ml       Admission Weight  Weight: 77.1 kg (170 lb) (12/30/23 1908)    Daily Weight  12/31/23 : 78.2 kg (172 lb 6.4 oz)    Image Results  XR abdomen 1 view  Narrative: Interpreted By:  Jose Sevilla,   STUDY:  XR ABDOMEN 1 VIEW;  1/15/2024 11:25 am      INDICATION:  Signs/Symptoms:emesis and constipation.      COMPARISON:  01/02/2024.      ACCESSION NUMBER(S):  [id number]      ORDERING CLINICIAN:  ERICK KEN      FINDINGS:  Single frontal view of the abdomen was obtained.      Lung bases are clear. Moderate colonic fecal residue is present. A  nonspecific nonobstructive bowel gas pattern is demonstrated.  Right  upper quadrant surgical clips are seen. IVC filter is again seen.  Tubing or leads overlie the lumbar spine. Multilevel degenerative  changes of the spine are partially visualized.      Impression: 1.  Moderate colonic fecal  residue. Nonspecific nonobstructive bowel  gas pattern.      Signed by: Jose Sevilla 1/15/2024 1:29 PM  Dictation workstation:   [id number]      Physical Exam  Constitutional: elderly appearing, awake/alert/oriented x3, cooperative  Respiratory/Thorax: Patent airways,  normal breath sounds   Cardiovascular: Regular, rate and rhythm, 2+ equal pulses of the extremities, Musculoskeletal: mild decrease range of motion lumbar spline with JOSE drain with serosanguineous drainage, brace in place   Skin: warm, dry, intact except as noted  Neurological: alert/oriented x 3, speech clear at baseline    Relevant Results  Scheduled medications  acetaminophen, 650 mg, oral, q6h  apixaban, 5 mg, oral, BID  aspirin, 81 mg, oral, Daily  atenolol, 50 mg, oral, q AM  baclofen, 5 mg, oral, 4x daily  brimonidine, 1 drop, Both Eyes, BID  busPIRone, 10 mg, oral, Daily  ceftaroline, 600 mg, intravenous, q12h  daptomycin, 8 mg/kg, intravenous, Nightly  docusate sodium, 100 mg, oral, BID  ezetimibe, 10 mg, oral, Daily  furosemide, 20 mg, oral, Daily  hydrALAZINE, 50 mg, oral, BID  isosorbide mononitrate ER, 60 mg, oral, Daily  lactobacillus acidophilus, 1 tablet, oral, Daily  lidocaine, 1 patch, transdermal, Daily  lidocaine, 1 patch, transdermal, Daily  melatonin, 3 mg, oral, Daily  mirtazapine, 15 mg, oral, Nightly  morphine CR, 15 mg, oral, q12h EDE  mupirocin, , Topical, BID  pantoprazole, 40 mg, oral, Daily before breakfast  pilocarpine, 5 mg, oral, Daily  polyethylene glycol, 17 g, oral, Daily  pregabalin, 100 mg, oral, BID      Continuous medications     PRN medications  PRN medications: acetaminophen **OR** acetaminophen **OR** acetaminophen, alteplase, cyclobenzaprine, HYDROmorphone, meclizine, morphine, naloxone, ondansetron **OR** ondansetron, oxyCODONE, vitamin A & D    No results found for this or any previous visit (from the past 24 hour(s)).      Assessment/Plan      Scheduled medications  acetaminophen, 650 mg, oral,  q6h  apixaban, 5 mg, oral, BID  aspirin, 81 mg, oral, Daily  atenolol, 50 mg, oral, q AM  baclofen, 5 mg, oral, 4x daily  brimonidine, 1 drop, Both Eyes, BID  busPIRone, 10 mg, oral, Daily  ceftaroline, 600 mg, intravenous, q12h  daptomycin, 8 mg/kg, intravenous, Nightly  docusate sodium, 100 mg, oral, BID  ezetimibe, 10 mg, oral, Daily  furosemide, 20 mg, oral, Daily  hydrALAZINE, 50 mg, oral, BID  isosorbide mononitrate ER, 60 mg, oral, Daily  lactobacillus acidophilus, 1 tablet, oral, Daily  lidocaine, 1 patch, transdermal, Daily  lidocaine, 1 patch, transdermal, Daily  melatonin, 3 mg, oral, Daily  mirtazapine, 15 mg, oral, Nightly  morphine CR, 15 mg, oral, q12h EDE  mupirocin, , Topical, BID  pantoprazole, 40 mg, oral, Daily before breakfast  pilocarpine, 5 mg, oral, Daily  polyethylene glycol, 17 g, oral, Daily  pregabalin, 100 mg, oral, BID      Continuous medications     PRN medications  PRN medications: acetaminophen **OR** acetaminophen **OR** acetaminophen, alteplase, cyclobenzaprine, HYDROmorphone, meclizine, morphine, naloxone, ondansetron **OR** ondansetron, oxyCODONE, vitamin A & D    No results found for this or any previous visit (from the past 24 hour(s)).    XR abdomen 1 view    Result Date: 1/15/2024  Interpreted By:  Jose Sevilla, STUDY: XR ABDOMEN 1 VIEW;  1/15/2024 11:25 am   INDICATION: Signs/Symptoms:emesis and constipation.   COMPARISON: 01/02/2024.   ACCESSION NUMBER(S): [id number]   ORDERING CLINICIAN: ERICK KEN   FINDINGS: Single frontal view of the abdomen was obtained.   Lung bases are clear. Moderate colonic fecal residue is present. A nonspecific nonobstructive bowel gas pattern is demonstrated.  Right upper quadrant surgical clips are seen. IVC filter is again seen. Tubing or leads overlie the lumbar spine. Multilevel degenerative changes of the spine are partially visualized.       1.  Moderate colonic fecal residue. Nonspecific nonobstructive bowel gas pattern.   Signed  by: Jose Sfiligoj 1/15/2024 1:29 PM Dictation workstation:   [id number]    MR lumbar spine wo IV contrast    Result Date: 1/11/2024  Interpreted By:  Alexi Bass and Ohs Zachary STUDY: MR LUMBAR SPINE WO IV CONTRAST   INDICATION: Signs/Symptoms:intractable back and right leg pain   COMPARISON: MRI lumbar spine 01/05/2024, lumbar spine radiograph 01/01/2024.   ACCESSION NUMBER(S): [id number]   ORDERING CLINICIAN: ERICK KEN   TECHNIQUE: Multiplanar multisequence MRI of the lumbar spine was performed without the administration of intravenous contrast, according to standard protocol.   FINDINGS: Evaluation is degraded due to patient motion.   This report assumes 5 non-rib bearing lumbar vertebral bodies. The lowest intervertebral disc will be labeled L5-S1.   There are postoperative changes from L3-L4, L4-L5, and L5-S1 with placement of intervertebral disc spacers at L4-L5 and L5-S1. There is stable significant amount T2 hyperintense signal located within the laminectomy beds and surrounding paravertebral soft tissues which is most consistent with edema and there is an indwelling surgical drain which again terminates within the right paravertebral soft tissues at the level of L1-L2.   There are again tracts located within the L4-S1 vertebral bodies consistent with prior removal of surgical screws.   There is stable mild retrolisthesis at L3-L4. There is stable T1 hypointense and STIR hyperintense signal throughout the L3-L4 vertebral bodies and posterior elements and STIR hyperintense signal located within the L3-L4 intervertebral disc which is highly suspicious for osteomyelitis/discitis.   There is no striking signal abnormality located within the remaining visualized vertebral bodies to suggest an infectious process, noting that evaluation is somewhat degraded due to patient motion and postoperative changes.   T2 hyperintense signal located within the left ventral epidural space at the level of L3 has  slightly decreased, now measuring 4 mm in maximum thickness compared to 7 mm. Associated mass effect on the thecal sac has decreased. There is overall mild spinal canal stenosis at L3-L4. There is no striking spinal canal stenosis at the other levels.   There is stable expansile STIR hyperintense signal located within the bilateral L3-L4 facet joint spaces. There is likely stable edema located within the psoas major muscles bilaterally.   Multilevel degenerative changes are stable compared to the prior MRI.       Evaluation is somewhat degraded due to patient motion.   1. Compared to the recent MRI lumbar spine study, T2 hyperintense located within the left L3 ventral epidural space has mildly decreased. Given lack of intravenous contrast, it is difficult to determine if this represents focal fluid or prominent epidural plexus, favor likely fluid as it appears to communicate with the adjacent L3-L4 intervertebral disc. Associated mass effect on the ventral thecal sac has decreased. 2. Otherwise, unchanged MRI of the lumbar spine including extensive postoperative changes and findings which are highly suspicious for osteomyelitis/discitis at L3-L4.     I personally reviewed the images/study and I agree with the findings as stated by Dr. Marcus Fitzpatrick. This study was interpreted at [place], [place].   MACRO: None   Signed by: Alexi Bass 1/11/2024 5:13 PM Dictation workstation:   [id number]            Principal Problem:    Postoperative surgical complication involving musculoskeletal system associated with musculoskeletal procedure, unspecified complication  Active Problems:    Essential hypertension    PVD (peripheral vascular disease) (CMS/HCC)    Back pain with radiculopathy    Back pain at L4-L5 level    Deep vein thrombosis (DVT) of right lower extremity (CMS/HCC)    Anemia       Postoperative surgical complication involving musculoskeletal system associated with  musculoskeletal procedure, unspecified complication  Active Problems:    Essential hypertension    PVD (peripheral vascular disease) (CMS/HCC)    Back pain with radiculopathy    Back pain at L4-L5 level    Deep vein thrombosis (DVT) of right lower extremity (CMS/HCC)    Anemia     Plan:     Continue IV antibiotics per Infectious Disease/ wound growth MRSA/STAPH  Recommend SNF / rehab - however, Ortho does not wish for patient to go to facility  Ortho recommends home placement  Continue PT/OT  Keep off statin while on Cubicin IV antibiotic  Continue home meds for hypertension   Mild hyponatremia - improving to 134 today remains stable  Hemoglobin/ anemia - stable      ReConsulted for emesis - Now resolved   - General Sx s/o no acute interventional needs warranted   KUB is pending  - no significant concern on imaging   No abdominal pain, no current emeisis or abdominal pain   Last BM 1/15/24 with good output        Hemodynamically stable   Medicine will follow peripherally at request of Orthopedics   CALL FOR ACUTE NEEDS     Time spent 36  minutes obtaining labs, imaging, recommendations, interview, assessment, examination, medication review/ordering, and EMR review.     Plan of care was discussed extensively with patient. Patient verbalized understanding through teach back method. All questions and concerns addressed upon examination.      Of note, this documentation is completed using the Dragon Dictation system (voice recognition software). There may be spelling and/or grammatical errors that were not corrected prior to final submission.               DARRYL Sahni-CNP

## 2024-01-16 NOTE — PROGRESS NOTES
Music Therapy Note    Tara Tierney was referred by Pain rounds          Pre-assessment  Unable to Assess Reason: Emotional distress  Pain Score: 9  Mood/Affect: Flat/blunted (grimacing)  Verbalized Emotional State: Acceptance         Treatment/Interventions  Music Therapy Interventions: Assessment    Post-assessment  Unable to Assess Reason: Did not provide expressive therapy intervention    Narrative  Assessment Detail: PT sitting up in bed with  at bedside. PT with flat affect and some grimacing due to pain. PT pleasant but hesitant to services.  Intervention: MT educated and assessed PT on MT services and how the use of interventions can help with relaxation and reduce pain perception. PT declined services at this time.  Follow-up: MT will follow up as needed.    Education Documentation  Resources, taught by Gertrudis Barnes at 1/16/2024  4:04 PM.  Learner: Patient  Readiness: Acceptance  Method: Explanation  Response: Verbalizes Understanding    Coping Strategies, taught by Gertrudis Barnes at 1/16/2024  4:04 PM.  Learner: Patient  Readiness: Acceptance  Method: Explanation  Response: Verbalizes Understanding    Relaxation, taught by Gertrudis Barnes at 1/16/2024  4:04 PM.  Learner: Patient  Readiness: Acceptance  Method: Explanation  Response: Verbalizes Understanding    Regiments, taught by Gertrudis Barnes at 1/16/2024  4:04 PM.  Learner: Patient  Readiness: Acceptance  Method: Explanation  Response: Verbalizes Understanding    Integrative Health, taught by Gertrudis Barnes at 1/16/2024  4:04 PM.  Learner: Patient  Readiness: Acceptance  Method: Explanation  Response: Verbalizes Understanding    Focal Points, taught by Gertrudis Barnes at 1/16/2024  4:04 PM.  Learner: Patient  Readiness: Acceptance  Method: Explanation  Response: Verbalizes Understanding    Pain Management, taught by Gertrudis Barnes at 1/16/2024  4:04 PM.  Learner: Patient  Readiness: Acceptance  Method:  Explanation  Response: Verbalizes Understanding

## 2024-01-16 NOTE — CONSULTS
PLASTIC  SURGERY  CONSULTATION        Reason for consultation:       Open wound of back      History of present illness:      68-year-old female status post previous back surgery complicated by MRSA wound infection who underwent hardware removal and now is being referred for incisional management        Past medical history:      Arthritis  Back pain  COPD  Eczema  Hyperlipidemia  Hypertension  Ménière's disease  Osteoporosis  Peripheral vascular disease        Past surgical history:      Tonsillectomy  Colonoscopy  Cardiac catheterization  Bilateral total knee replacement  Cholecystectomy  Aortobifemoral bypass and graft  Tubal ligation  Blepharoptosis repair      Medications:       Eliquis  Aspirin  Atenolol  Baclofen  BuSpar  Ceftaroline  Cholecalciferol  Daptomycin  Valium  Zetia  Lasix  Hydralazine  Dilaudid  Isosorbide mononitrate  Lorazepam  Mirtazapine    ALLERGIES:      Neomycin          Social history:      Quit smoking 3 months ago  Nondrinker      Family history:        Breast cancer  Cardiovascular disease          Review of systems:  At least 10 systems reviewed and are otherwise negative except for as noted in the history of present illness                PHYSICAL  EXAMINATION       Height:    4 foot 9 inches                weight:         172 pounds                 Vital signs:    Temperature 36.7 pulse 68 blood pressure 119/56    General: Well-developed,      female       in no acute distress, alert and oriented ×3    HEENT:   Within normal limits    Neck:   Supple, no observable masses    Lungs: Clear to auscultation    Heart: Regular rate and rhythm    Abdomen: Soft, nontender    Extremities: Full range of motion    Lower back: Long incision with sutures in place with lower half draining serosanguineous fluid    Neurological: Grossly within normal limits                Impression:      Healing incision lower back with serosanguineous drainage      Recommendation:      Start Bactroban  ointment dressings twice daily  May eventually require a wound VAC if drainage persists      Thank you for the referral.    Roland Reyes, MD        *These notes are being done using Dragon voice recognition technology and may include unintended errors with respect to translation of words, typographical errors or grammar errors which may not have been identified prior to finalization of the chart note.

## 2024-01-16 NOTE — PROGRESS NOTES
Infectious Diseases Inpatient Progress Note      HISTORY OF PRESENT ILLNESS:    Back pain a little less today, some nasusea, p    She still has bilateral JOSE drains with small amount of serosanguineous drainage.   No fevers or chills.   Follow up postop deep incision wound infection with recurrent abscess while on IV Vanco, S/P hardware removal with persistent +ve MRSA Cx.    Recent PE and DVT.   Has resolved leg pain. No SOB. +ve constipation     Allergies:  Neomycin and Neomycin-polymyxin b-dexameth      Review of Systems  14 system review is negative other than HPI  Positive right lower quadrant abdominal pain.  Positive constipation  Positive severe low back pain, worse upon movement  Positive bilateral legs weakness, uses a walker for ambulation  Positive left upper thigh pain  Physical Exam    Heart Rate:  [70-75]   Temp:  [36 °C (96.8 °F)-36.9 °C (98.4 °F)]   Resp:  [18]   BP: (108-136)/(61-83)   SpO2:  [95 %-97 %]    Vitals:    01/14/24 1943 01/14/24 2350 01/15/24 0700 01/15/24 1952   BP: 139/63 148/65 136/61 108/83   BP Location:       Patient Position:       Pulse: 71 78 70 75   Resp: 18 18 18    Temp: 36.5 °C (97.7 °F) 36.4 °C (97.5 °F) 36.9 °C (98.4 °F) 36 °C (96.8 °F)   TempSrc:   Temporal    SpO2: 95% 94% 95% 97%   Weight:       Height:         General Appearance: alert and oriented to person, place and time, well-developed and well-nourished, in no acute distress  Skin: warm and dry, no rash.   Head: normocephalic and atraumatic  Eyes: anicteric sclerae  ENT:  normal mucous membranes. No oral thrush  Lungs: normal respiratory effort, clear  Heart: Nl S1 and S2  Abdomen: soft, no tenderness  no leg edema  No erythema, no tenderness  Back incision is intact.  Intact JOSE drain sites, small amount of serosanguineous drainage on dressing.   No point tenderness  DATA:    Lab Results   Component Value Date    WBC 7.5 01/15/2024    HGB 8.6 (L) 01/15/2024    HCT 27.9 (L) 01/15/2024    MCV 94 01/15/2024    PLT  "299 01/15/2024     Lab Results   Component Value Date    CREATININE 0.72 01/15/2024    BUN 13 01/15/2024     (L) 01/15/2024    K 4.0 01/15/2024     01/15/2024    CO2 27 01/15/2024       Hepatic Function Panel:No results found for: \"ALKPHOS\", \"ALT\", \"AST\", \"PROT\", \"BILITOT\", \"BILIDIR\"    Microbiology:   Susceptibility data from last 90 days.  Collected Specimen Info Organism Amoxicillin/Clavulanate Ampicillin Ampicillin/Sulbactam Aztreonam Cefazolin Cefazolin (uncomplicated UTIs only) Cefepime Cefotaxime Ceftazidime Ceftriaxone Ciprofloxacin Clindamycin   12/31/23 Swab from SPINE Staphylococcus aureus               12/31/23 Tissue from SPINE Methicillin Resistant Staphylococcus aureus (MRSA)            S   12/31/23 Swab from SPINE Methicillin Resistant Staphylococcus aureus (MRSA)            S   12/31/23 Tissue from SPINE Staphylococcus aureus               12/11/23 Fluid from ABSCESS Methicillin Resistant Staphylococcus aureus (MRSA)            S   12/11/23 Tissue from ABSCESS Staphylococcus aureus               12/11/23 Swab from ABSCESS Methicillin Resistant Staphylococcus aureus (MRSA)            S   12/06/23 Swab from SPINE Methicillin Resistant Staphylococcus aureus (MRSA)               12/06/23 Tissue from SPINE Staphylococcus aureus               12/06/23 Tissue from SPINE Staphylococcus aureus               12/05/23 Urine from Clean Catch/Voided Escherichia coli S R S R R R R R R R R    11/03/23 Swab from Nares/Axilla/Groin Methicillin Resistant Staphylococcus aureus (MRSA)                 Collected Specimen Info Organism Ertapenem Erythromycin Gentamicin Meropenem Nitrofurantoin Oxacillin Piperacillin/Tazobactam Tetracycline Tobramycin Trimethoprim/Sulfamethoxazole Vancomycin   12/31/23 Swab from SPINE Staphylococcus aureus              12/31/23 Tissue from SPINE Methicillin Resistant Staphylococcus aureus (MRSA)  R    R  S  S S   12/31/23 Swab from SPINE Methicillin Resistant Staphylococcus " aureus (MRSA)  R    R  S  S S   12/31/23 Tissue from SPINE Staphylococcus aureus              12/11/23 Fluid from ABSCESS Methicillin Resistant Staphylococcus aureus (MRSA)  R    R  S  S S   12/11/23 Tissue from ABSCESS Staphylococcus aureus              12/11/23 Swab from ABSCESS Methicillin Resistant Staphylococcus aureus (MRSA)  R    R  S  S S   12/06/23 Swab from SPINE Methicillin Resistant Staphylococcus aureus (MRSA)              12/06/23 Tissue from SPINE Staphylococcus aureus              12/06/23 Tissue from SPINE Staphylococcus aureus              12/05/23 Urine from Clean Catch/Voided Escherichia coli S  R S S  S  I S    11/03/23 Swab from Nares/Axilla/Groin Methicillin Resistant Staphylococcus aureus (MRSA)                Vanco PERNELL of 2.0 as discussed with microbiology  Imaging:   CT abdomen pelvis wo IV contrast    Result Date: 12/31/2023  STUDY: CT Abdomen and Pelvis without IV Contrast; 12/31/2023 at 5:11 PM. INDICATION: Abdominal pain. COMPARISON: CT AP 12/19/2023 ACCESSION NUMBER(S): [id number] ORDERING CLINICIAN: WILLIAM EDMONDS TECHNIQUE: CT of the abdomen and pelvis was performed.  Contiguous axial images were obtained at 3 mm slice thickness through the abdomen and pelvis. Coronal and sagittal reconstructions at 3 mm slice thickness were performed. No intravenous contrast was administered.  Automated mA/kV exposure control was utilized and patient examination was performed in strict accordance with principles of ALARA. FINDINGS: Please note that the evaluation of vessels, lymph nodes and organs is limited without intravenous contrast.  LOWER CHEST: No cardiomegaly.  No pericardial effusion.  Very small bilateral pleural effusions  ABDOMEN:  LIVER: No hepatomegaly.  Smooth surface contour.  Normal attenuation.  BILE DUCTS: No intrahepatic or extrahepatic biliary ductal dilatation.  GALLBLADDER: The gallbladder is absent. STOMACH: No abnormalities identified.  PANCREAS: Negative for  pancreatitis  SPLEEN: No splenomegaly or focal splenic lesion.  ADRENAL GLANDS: No thickening or nodules.  KIDNEYS AND URETERS: Kidneys are normal in size and location.  No renal or ureteral calculi.  PELVIS:  BLADDER: No abnormalities identified.  REPRODUCTIVE ORGANS: No abnormalities identified.  BOWEL: Colonic diverticulosis.  Mild constipation without bowel obstruction. Negative for appendicitis  VESSELS: Limited due to lack of intravenous contrast.  Prior femoral to femoral artery bypass.  Abdominal aorta is normal in caliber.  PERITONEUM/RETROPERITONEUM/LYMPH NODES: Small amount of low-density free pelvic fluid  No pneumoperitoneum. No lymphadenopathy.  ABDOMINAL WALL: Small fat-containing umbilical hernia. SOFT TISSUES: Postsurgical changes within the posterior lumbar soft tissues with surgical drains in place.  BONES: Status post interbody fusion of L4/5 and L5/S1 with metallic spacer. This space narrowing L3/4.  Laminectomies L3-L5.  Bone graft along the lateral margins of L3-L5 fracture of the right L3 transverse process. Fracture of the right L4 transverse process.  Old screw tract within the L4 and L5 pedicles.  Narrowing of the right L5/S1 neural foramen due to facet joint osteophyte.    Negative for bowel obstruction.  Mild constipation. Colonic diverticulosis without diverticulitis. Negative for appendicitis. Postsurgical changes lumbar spine removal of pedicle screws and posterior rods from L4 through S1.  Stable appearance of interbody spacers at L4/5 and L5/S1. Bone graft along the lateral margins of L3-L5.  Fractures of the right L4 and L3 transverse process.  The right L3 transverse process fracture appears old.  Postsurgical changes within the posterior lumbar soft tissues with surgical drains in place. Signed by Aung Sparrow MD    MR lumbar spine w and wo IV contrast    Result Date: 12/31/2023  Interpreted By:  Jonas Sanchez, STUDY: MR LUMBAR SPINE W AND WO IV CONTRAST;  12/31/2023  12:33 am   INDICATION: Signs/Symptoms:Pain.   COMPARISON: MRI of the lumbar spine dated 12/10/2023   ACCESSION NUMBER(S): [id number]   ORDERING CLINICIAN: YVETTE LEE   TECHNIQUE: Sagittal T1, T2, STIR, axial T1 and T2 weighted images of the lumbar spine were acquired. Additional axial and sagittal T1 weighted images of the lumbar spine were obtained after intravenous administration of 15.5 mL of contrast.   FINDINGS: Exam is degraded by motion.   There is again evidence of 5 lumbar type non rib-bearing vertebral bodies, with the lowest well-formed intervertebral disc space labeled L5-S1.   Postsurgical changes of posterior decompression and laminectomies of L3 through L5 with posterior spinal fusion extending from L4 through S1 and discectomies with intervertebral disc spaces at L4-L5 and L5-S1. These are similar in appearance to prior examination on 12/10/2023. Susceptibility artifact from the surgical hardware somewhat limits evaluation of the adjacent structures.   In the interim since prior imaging on 12/10/2023, new postsurgical consistent with irrigation debridement of subfascial tissues of the cutaneous spine are evident. STIR and T2 hypointense signal abnormality previously seen in the cutaneous fat of the lower lumbar spine has resolved, with new 3.7 x 3.6 x 13.6 cm (series 7, image 10; series 10, image 10) T2 hyperintense fluid collection present, with slight peripheral enhancement. This collection may be contiguous with the skin.   T2 hyperintense collection is again present in the laminectomy surgical bed, abutting the thecal sac at the level of L3 through L5 (series 8, image 14), measuring up to 5.2 cm in craniocaudal dimension, 2.9 cm in transverse dimension and up to 1.5 cm in AP dimension (series 10, image 7). The surgical catheter previously seen within the collection is gone, although collection is decreased in size to previous imaging, with resolution of previously seen air within the  collection. Mild surrounding edema and reactive soft tissue changes are present in the epidural space, somewhat increased in the interim since prior exam, with new/increased mass effect on the adjacent thecal sac.   There also appears to be small amount of new fluid present in the anterior epidural space at the level of L3 (series 10, image 3).   Although the exact characterization is difficult due to motion, there appears to be somewhat worsened spinal canal narrowing at the level of L2-L3 due to combination of new fluid in the anterior epidural space, and the T2 hyperintense fluid collection with associated inflammatory/reactive changes along the posterior aspect of the thecal sac, with somewhat increased effacement of the subarachnoid space.   No new intra thecal enhancement is identified.   Neural foramina are not well assessed due to motion and susceptibility artifact from the surgical hardware.       1.  New surgical changes of irrigation and debridement in the laminectomy surgical bed evident, with previously seen geographic area of hypointense T2 and STIR signal in the cutaneous fat of the lumbar spine resolved, and new T2 hyperintense collection measuring 3.7 x 3.6 x 13.6 cm present in the cutaneous fat, likely representing a postsurgical seroma, although sterility can not be ascertained on imaging alone. This collection reaches of the dermis, and may drain at the skin. 2. T2 hyperintense collection within the laminectomy bed itself along the dorsal aspect of the thecal sac described on previous MRI in 12/10/2023 has mildly decreased in size from prior imaging, with interval removal of previously seen drain, although there are increased inflammatory/reactive soft tissue changes present in the laminectomy surgical bed, focus of fluid in the anterior epidural space at the level of L3 contributes to increased effacement of the thecal sac at the level of L3 compared to prior MRI.   MACRO: None   Signed by:  Jonas Bundymchuk 12/31/2023 1:13 AM Dictation workstation:   [id number]       I discussed culture results with microbiology.  MRSA PERNELL to vancomycin is 2.  It is sensitive to Cubicin  CPK of 31  IMPRESSION:    Postop deep incisional wound infection and abscess of lumbar spine with failure of IV Vanco.  Worsening symptoms despite Cubicin  S/P Hardware removal  MRSA infection in a patient who is a chronic carrier    Patient Active Problem List   Diagnosis    Abdominal aortic aneurysm (CMS/HCC)    Abnormal EKG    Bilateral carotid artery stenosis    Bruit of right carotid artery    CAD in native artery    Cardiomyopathy (CMS/HCC)    Chest pain    Chronic asthmatic bronchitis    Closed displaced fracture of fifth metatarsal bone    Current every day smoker    Difficulty breathing    Essential hypertension    History of total knee replacement    Hypokalemia    Intermittent claudication (CMS/HCC)    Knee osteoarthritis    Knee pain    Limb pain    Localized swelling, mass, or lump of lower extremity    Celiac artery stenosis (CMS/HCC)    Mesenteric artery stenosis (CMS/HCC)    Mixed hyperlipidemia    Obese    PVD (peripheral vascular disease) (CMS/HCC)    Right foot pain    Swelling    Trigger finger    Urinary tract infection without hematuria    Back pain of lumbar region with sciatica    Back pain with radiculopathy    Intractable back pain    Seroma, post-traumatic (CMS/HCC)    Lumbar surgical wound fluid collection    Generalized weakness    Postoperative surgical complication involving musculoskeletal system associated with musculoskeletal procedure, unspecified complication    Back pain at L4-L5 level    Deep vein thrombosis (DVT) of right lower extremity (CMS/HCC)    Anemia       PLAN:  Continue IV Teflaro and Cubicin because of worsening symptoms, deep abscess it is not amenable to drainage, failure of IV vancomycin.  Follow-up CBC CMP and CPK weekly  May discharge from infectious disease perspective.   Recommended rehab  Hold Zocor dose during treatment with Cubicin  May continue Zetia  Local wound care per surgery    Discussed with patient and RN    Pete Echeverria MD

## 2024-01-16 NOTE — PROGRESS NOTES
Physical Therapy    Physical Therapy Treatment    Patient Name: Tara Tierney  MRN: [id number]  Today's Date: 1/16/2024  Time Calculation  Start Time: 0752  Stop Time: 0818  Time Calculation (min): 26 min       Assessment/Plan   PT Assessment  PT Assessment Results: Decreased strength, Decreased mobility, Decreased endurance  Rehab Prognosis: Good  Evaluation/Treatment Tolerance: Patient tolerated treatment well, Patient limited by fatigue, Patient limited by pain  Medical Staff Made Aware: Yes  End of Session Communication: Bedside nurse  Assessment Comment: Patient continues to be limited by pain and challenged with bed mobility/transfers. Patient will benefit from additional PT to address deficits and improve mobility.  End of Session Patient Position: Up in chair, Alarm off, not on at start of session (Call light, phone, and tray table within reach.)  PT Plan  Inpatient/Swing Bed or Outpatient: Inpatient  Treatment/Interventions: Bed mobility, Transfer training, Gait training, Therapeutic activity  PT Plan: Skilled PT  PT Frequency: 3 times per week     PT Recommended Transfer Status: Assist x1    General Visit Information:   PT  Visit  PT Received On: 01/16/24  General  Family/Caregiver Present: No  Prior to Session Communication: Bedside nurse  Patient Position Received: Bed, 4 rail up, Alarm off, not on at start of session  General Comment: Patient sleeping, but awakens to name being called. Pleasant and cooperative.    General Observations:   General Observation: Tele; x2 JOSE drains; (A) to don brace.    Subjective     Precautions:  Precautions  LE Weight Bearing Status: Weight Bearing as Tolerated  Medical Precautions: Fall precautions  Post-Surgical Precautions: Spinal precautions  Braces Applied: TLSO  Precautions Comment: Instructions/(A) for donning brace for OOB activites.    Objective     Pain:  Pain Assessment  Pain Assessment: 0-10  Pain Score: 10 - Worst possible pain (LBP and (R)groin/thigh  pain. Nursing aware.)    Cognition:  Cognition  Orientation Level: Oriented X4    Balance:   Static Sitting Balance  Static Sitting-Comment/Number of Minutes: G  Dynamic Sitting Balance  Dynamic Sitting-Comments: F  Static Standing Balance  Static Standing-Balance Support: Bilateral upper extremity supported (FWW)  Static Standing-Level of Assistance: Contact guard  Static Standing-Comment/Number of Minutes: F with ww  Dynamic Standing Balance  Dynamic Standing-Comments: F with ww    Treatments:           Bed Mobility  Bed Mobility: Yes  Bed Mobility 1  Bed Mobility 1: Supine to sitting  Level of Assistance 1: Moderate assistance  Bed Mobility Comments 1: HOB ~30°. v/c for log roll technique. Hand over hand (A) to reach across body to use the bed rail for support. (A) to lift UB from HOB while moving LEs over the EOB. v/c to avoid excessive forward flexed 2° patient trying to stretch while sitting EOB.  Ambulation/Gait Training  Ambulation/Gait Training Performed: Yes  Ambulation/Gait Training 1  Surface 1: Level tile  Device 1: Rolling walker  Assistance 1: Contact guard  Comments/Distance (ft) 1: ~5ft. Brace on prior to amb. NBOS. Slow cathi. Decreased step height/length B. v/c and (A) for safer maneuvering of ww with 90/180° turns. Declined amb further 2° increased LBP. Nursing aware.  Transfers  Transfer: Yes  Transfer 1  Technique 1: Sit to stand, Stand to sit  Transfer Device 1:  (ww)  Transfer Level of Assistance 1: Moderate assistance  Trials/Comments 1: (2x). v/c for safe hand placement and technique. Slow transition of hands to/from ww. Benefits from elevated surfaces. c/o weakness in (B)LE(R worse than L), but no buckling with WB.          Outcome Measures:  [facility] Basic Mobility  Turning from your back to your side while in a flat bed without using bedrails: A lot  Moving from lying on your back to sitting on the side of a flat bed without using bedrails: A lot  Moving to and from bed to chair  (including a wheelchair): A little  Standing up from a chair using your arms (e.g. wheelchair or bedside chair): A little  To walk in hospital room: A little  Climbing 3-5 steps with railing: A little  Basic Mobility - Total Score: 16    Education Documentation  Precautions, taught by Jeanine Bryan PTA at 1/16/2024  8:48 AM.  Learner: Patient  Readiness: Acceptance  Method: Explanation, Demonstration  Response: Verbalizes Understanding, Needs Reinforcement  Comment: See therapy note.    Body Mechanics, taught by Jeanine Bryan PTA at 1/16/2024  8:48 AM.  Learner: Patient  Readiness: Acceptance  Method: Explanation, Demonstration  Response: Verbalizes Understanding, Needs Reinforcement  Comment: See therapy note.    Mobility Training, taught by Jeanine Bryan PTA at 1/16/2024  8:48 AM.  Learner: Patient  Readiness: Acceptance  Method: Explanation, Demonstration  Response: Verbalizes Understanding, Needs Reinforcement  Comment: See therapy note.      EDUCATION:  Individual(s) Educated: Patient  Education Provided: Body Mechanics, Fall Risk, Home Safety, POC, Post-Op Precautions, Posture (Safety with bed mobility and transfers while keeping aware of spinal precautions.)  Patient Response to Education: Patient/Caregiver Verbalized Understanding of Information    Encounter Problems       Encounter Problems (Active)       PT Problem       Bed mobility supine <> sit modified independent  (Progressing)       Start:  01/02/24    Expected End:  01/12/24            Transfers sit <> stand with ww modified independent  (Progressing)       Start:  01/02/24    Expected End:  01/12/24            Patient to ambulate with ww 50' CGA  (Progressing)       Start:  01/02/24    Expected End:  01/12/24

## 2024-01-16 NOTE — PROGRESS NOTES
DAILY PROGRESS NOTE      POD15 Lumbar washout and hardware removal     Patient doing well  Visit Vitals  /67 (Patient Position: Sitting)   Pulse 63   Temp 36.7 °C (98.1 °F) (Temporal)   Resp 16      Temp (24hrs), Av.4 °C (97.6 °F), Min:36 °C (96.8 °F), Max:36.7 °C (98.1 °F)       Pain Control significant improvement. Reports very minimal lower extremity pain at this time.   No chest pain or shortness of breath.  No calf pain    Exam:   Good bilateral lower extremity strength and sensation.   Extremity shows neuro vascular status intact. Flexion and extension intact on extremity.  Calves soft and non-tender without evidence of DVT.  Deep JOSE drain with 10 ml serous drainage  Superficial JOSE drain with 5 ml serous drainage          Labs reviewed:  CBC: @LABRCNT(WBC:3,HGB:3,PLT:3)@  BMP:  @LABRCNT(Na:3,K:3,CL:3,CO2:3,BUN:3,Creatinine:3,Glucose:3)@    I&O  I/O last 3 completed shifts:  In: 336 (4.3 mL/kg) [IV Piggyback:336]  Out: 1816 (23.2 mL/kg) [Urine:1700 (0.6 mL/kg/hr); Emesis/NG output:40; Drains:76]  Weight: 78.2 kg       Assessment:    I&D lumbar spine  Second antibiotic added due to patient having worsening symptoms with pain.   Nausea and vomiting have significantly improved.     Plan:    Continue with therapy   Brace  with ambulation. Does not have to wear brace when in chair.   JOSE drains to remain in place until there is 0 output for 48 hours   Continue IV antibiotics per ID  Continue pain control per pain management   Cannot shower until drainage has stopped   KUB to rule out ileus: negative   Encourage ambulation   Home care orders placed, recommend infectious disease to fill out iv antibiotic infusion section to include lab draws and picc line management including dressing changes.   Wound care per Dr. Reyes Adjondi M Born, APRN-CNP   2024 10:53 AM    I

## 2024-01-17 ENCOUNTER — DOCUMENTATION (OUTPATIENT)
Dept: HOME HEALTH SERVICES | Facility: HOME HEALTH | Age: 69
End: 2024-01-17
Payer: COMMERCIAL

## 2024-01-17 ENCOUNTER — HOME INFUSION (OUTPATIENT)
Dept: INFUSION THERAPY | Age: 69
End: 2024-01-17
Payer: COMMERCIAL

## 2024-01-17 LAB
ALBUMIN SERPL BCP-MCNC: 3 G/DL (ref 3.4–5)
ALP SERPL-CCNC: 169 U/L (ref 33–136)
ALT SERPL W P-5'-P-CCNC: 45 U/L (ref 7–45)
ANION GAP SERPL CALC-SCNC: 12 MMOL/L (ref 10–20)
AST SERPL W P-5'-P-CCNC: 23 U/L (ref 9–39)
BILIRUB SERPL-MCNC: 0.3 MG/DL (ref 0–1.2)
BUN SERPL-MCNC: 16 MG/DL (ref 6–23)
CALCIUM SERPL-MCNC: 9 MG/DL (ref 8.6–10.3)
CHLORIDE SERPL-SCNC: 99 MMOL/L (ref 98–107)
CO2 SERPL-SCNC: 28 MMOL/L (ref 21–32)
CREAT SERPL-MCNC: 0.88 MG/DL (ref 0.5–1.05)
EGFRCR SERPLBLD CKD-EPI 2021: 71 ML/MIN/1.73M*2
ERYTHROCYTE [DISTWIDTH] IN BLOOD BY AUTOMATED COUNT: 14.8 % (ref 11.5–14.5)
ERYTHROCYTE [DISTWIDTH] IN BLOOD BY AUTOMATED COUNT: 14.9 % (ref 11.5–14.5)
GLUCOSE SERPL-MCNC: 167 MG/DL (ref 74–99)
HCT VFR BLD AUTO: 26.3 % (ref 36–46)
HCT VFR BLD AUTO: 29.3 % (ref 36–46)
HGB BLD-MCNC: 8.1 G/DL (ref 12–16)
HGB BLD-MCNC: 9.3 G/DL (ref 12–16)
MCH RBC QN AUTO: 29 PG (ref 26–34)
MCH RBC QN AUTO: 30 PG (ref 26–34)
MCHC RBC AUTO-ENTMCNC: 30.8 G/DL (ref 32–36)
MCHC RBC AUTO-ENTMCNC: 31.7 G/DL (ref 32–36)
MCV RBC AUTO: 94 FL (ref 80–100)
MCV RBC AUTO: 95 FL (ref 80–100)
NRBC BLD-RTO: 0 /100 WBCS (ref 0–0)
NRBC BLD-RTO: 0 /100 WBCS (ref 0–0)
PLATELET # BLD AUTO: 282 X10*3/UL (ref 150–450)
PLATELET # BLD AUTO: 313 X10*3/UL (ref 150–450)
POTASSIUM SERPL-SCNC: 3.8 MMOL/L (ref 3.5–5.3)
PROT SERPL-MCNC: 6.4 G/DL (ref 6.4–8.2)
RBC # BLD AUTO: 2.79 X10*6/UL (ref 4–5.2)
RBC # BLD AUTO: 3.1 X10*6/UL (ref 4–5.2)
SODIUM SERPL-SCNC: 135 MMOL/L (ref 136–145)
WBC # BLD AUTO: 5.5 X10*3/UL (ref 4.4–11.3)
WBC # BLD AUTO: 6.2 X10*3/UL (ref 4.4–11.3)

## 2024-01-17 PROCEDURE — 2500000001 HC RX 250 WO HCPCS SELF ADMINISTERED DRUGS (ALT 637 FOR MEDICARE OP): Performed by: ANESTHESIOLOGY

## 2024-01-17 PROCEDURE — 2500000005 HC RX 250 GENERAL PHARMACY W/O HCPCS: Performed by: SPECIALIST

## 2024-01-17 PROCEDURE — 99231 SBSQ HOSP IP/OBS SF/LOW 25: CPT | Performed by: REGISTERED NURSE

## 2024-01-17 PROCEDURE — 2500000001 HC RX 250 WO HCPCS SELF ADMINISTERED DRUGS (ALT 637 FOR MEDICARE OP): Performed by: HOSPITALIST

## 2024-01-17 PROCEDURE — 85027 COMPLETE CBC AUTOMATED: CPT | Performed by: NURSE PRACTITIONER

## 2024-01-17 PROCEDURE — 2500000002 HC RX 250 W HCPCS SELF ADMINISTERED DRUGS (ALT 637 FOR MEDICARE OP, ALT 636 FOR OP/ED): Performed by: HOSPITALIST

## 2024-01-17 PROCEDURE — 2500000004 HC RX 250 GENERAL PHARMACY W/ HCPCS (ALT 636 FOR OP/ED): Mod: JZ | Performed by: INTERNAL MEDICINE

## 2024-01-17 PROCEDURE — 1090000001 HH PPS REVENUE CREDIT

## 2024-01-17 PROCEDURE — 2500000001 HC RX 250 WO HCPCS SELF ADMINISTERED DRUGS (ALT 637 FOR MEDICARE OP): Performed by: REGISTERED NURSE

## 2024-01-17 PROCEDURE — 2500000001 HC RX 250 WO HCPCS SELF ADMINISTERED DRUGS (ALT 637 FOR MEDICARE OP): Performed by: STUDENT IN AN ORGANIZED HEALTH CARE EDUCATION/TRAINING PROGRAM

## 2024-01-17 PROCEDURE — 1090000002 HH PPS REVENUE DEBIT

## 2024-01-17 PROCEDURE — 2500000004 HC RX 250 GENERAL PHARMACY W/ HCPCS (ALT 636 FOR OP/ED): Performed by: HOSPITALIST

## 2024-01-17 PROCEDURE — 1100000001 HC PRIVATE ROOM DAILY

## 2024-01-17 PROCEDURE — 85027 COMPLETE CBC AUTOMATED: CPT | Performed by: INTERNAL MEDICINE

## 2024-01-17 PROCEDURE — 2500000005 HC RX 250 GENERAL PHARMACY W/O HCPCS

## 2024-01-17 PROCEDURE — 37799 UNLISTED PX VASCULAR SURGERY: CPT | Performed by: NURSE PRACTITIONER

## 2024-01-17 PROCEDURE — 2500000004 HC RX 250 GENERAL PHARMACY W/ HCPCS (ALT 636 FOR OP/ED)

## 2024-01-17 PROCEDURE — 37799 UNLISTED PX VASCULAR SURGERY: CPT | Performed by: INTERNAL MEDICINE

## 2024-01-17 PROCEDURE — 2500000001 HC RX 250 WO HCPCS SELF ADMINISTERED DRUGS (ALT 637 FOR MEDICARE OP): Performed by: NURSE PRACTITIONER

## 2024-01-17 PROCEDURE — 80053 COMPREHEN METABOLIC PANEL: CPT | Performed by: NURSE PRACTITIONER

## 2024-01-17 PROCEDURE — 2500000001 HC RX 250 WO HCPCS SELF ADMINISTERED DRUGS (ALT 637 FOR MEDICARE OP): Performed by: SPECIALIST

## 2024-01-17 PROCEDURE — 99231 SBSQ HOSP IP/OBS SF/LOW 25: CPT | Performed by: PLASTIC SURGERY

## 2024-01-17 PROCEDURE — 2500000001 HC RX 250 WO HCPCS SELF ADMINISTERED DRUGS (ALT 637 FOR MEDICARE OP)

## 2024-01-17 PROCEDURE — 2500000004 HC RX 250 GENERAL PHARMACY W/ HCPCS (ALT 636 FOR OP/ED): Performed by: NURSE PRACTITIONER

## 2024-01-17 RX ORDER — HYDRALAZINE HYDROCHLORIDE 25 MG/1
25 TABLET, FILM COATED ORAL 2 TIMES DAILY
Status: DISCONTINUED | OUTPATIENT
Start: 2024-01-17 | End: 2024-01-18 | Stop reason: HOSPADM

## 2024-01-17 RX ADMIN — PANTOPRAZOLE SODIUM 40 MG: 40 TABLET, DELAYED RELEASE ORAL at 06:42

## 2024-01-17 RX ADMIN — PILOCARPINE HYDROCHLORIDE 5 MG: 5 TABLET, FILM COATED ORAL at 08:36

## 2024-01-17 RX ADMIN — ATENOLOL 50 MG: 50 TABLET ORAL at 08:35

## 2024-01-17 RX ADMIN — OXYCODONE HYDROCHLORIDE 10 MG: 5 TABLET ORAL at 13:33

## 2024-01-17 RX ADMIN — MIRTAZAPINE 15 MG: 15 TABLET, FILM COATED ORAL at 20:28

## 2024-01-17 RX ADMIN — ISOSORBIDE MONONITRATE 60 MG: 60 TABLET, EXTENDED RELEASE ORAL at 06:41

## 2024-01-17 RX ADMIN — CEFTAROLINE FOSAMIL 600 MG: 600 POWDER, FOR SOLUTION INTRAVENOUS at 16:32

## 2024-01-17 RX ADMIN — MORPHINE SULFATE 15 MG: 15 TABLET, EXTENDED RELEASE ORAL at 08:35

## 2024-01-17 RX ADMIN — EZETIMIBE 10 MG: 10 TABLET ORAL at 08:36

## 2024-01-17 RX ADMIN — ACETAMINOPHEN 650 MG: 325 TABLET ORAL at 16:32

## 2024-01-17 RX ADMIN — ACETAMINOPHEN 650 MG: 325 TABLET ORAL at 22:35

## 2024-01-17 RX ADMIN — HYDROMORPHONE HYDROCHLORIDE 2 MG: 2 TABLET ORAL at 23:15

## 2024-01-17 RX ADMIN — DOCUSATE SODIUM 100 MG: 100 CAPSULE, LIQUID FILLED ORAL at 20:30

## 2024-01-17 RX ADMIN — BACLOFEN 5 MG: 10 TABLET ORAL at 13:30

## 2024-01-17 RX ADMIN — LIDOCAINE 1 PATCH: 4 PATCH TOPICAL at 08:00

## 2024-01-17 RX ADMIN — SODIUM CHLORIDE 500 ML: 9 INJECTION, SOLUTION INTRAVENOUS at 15:19

## 2024-01-17 RX ADMIN — POLYETHYLENE GLYCOL 3350 17 G: 17 POWDER, FOR SOLUTION ORAL at 08:36

## 2024-01-17 RX ADMIN — DOCUSATE SODIUM 100 MG: 100 CAPSULE, LIQUID FILLED ORAL at 08:35

## 2024-01-17 RX ADMIN — PREGABALIN 100 MG: 50 CAPSULE ORAL at 08:35

## 2024-01-17 RX ADMIN — Medication 1 TABLET: at 08:35

## 2024-01-17 RX ADMIN — ACETAMINOPHEN 650 MG: 325 TABLET ORAL at 04:41

## 2024-01-17 RX ADMIN — BRIMONIDINE TARTRATE 1 DROP: 2 SOLUTION OPHTHALMIC at 08:38

## 2024-01-17 RX ADMIN — BUSPIRONE HYDROCHLORIDE 10 MG: 5 TABLET ORAL at 08:35

## 2024-01-17 RX ADMIN — BACLOFEN 5 MG: 10 TABLET ORAL at 06:41

## 2024-01-17 RX ADMIN — Medication 3 MG: at 20:26

## 2024-01-17 RX ADMIN — MORPHINE SULFATE 15 MG: 15 TABLET, EXTENDED RELEASE ORAL at 20:30

## 2024-01-17 RX ADMIN — ACETAMINOPHEN 650 MG: 325 TABLET ORAL at 11:40

## 2024-01-17 RX ADMIN — MUPIROCIN: 20 OINTMENT TOPICAL at 08:38

## 2024-01-17 RX ADMIN — ASPIRIN 81 MG: 81 TABLET, COATED ORAL at 08:35

## 2024-01-17 RX ADMIN — DAPTOMYCIN 650 MG: 500 INJECTION, POWDER, LYOPHILIZED, FOR SOLUTION INTRAVENOUS at 20:31

## 2024-01-17 RX ADMIN — BACLOFEN 5 MG: 10 TABLET ORAL at 20:29

## 2024-01-17 RX ADMIN — MUPIROCIN: 20 OINTMENT TOPICAL at 22:33

## 2024-01-17 RX ADMIN — BRIMONIDINE TARTRATE 1 DROP: 2 SOLUTION OPHTHALMIC at 20:27

## 2024-01-17 RX ADMIN — HYDRALAZINE HYDROCHLORIDE 25 MG: 25 TABLET ORAL at 20:31

## 2024-01-17 RX ADMIN — CEFTAROLINE FOSAMIL 600 MG: 600 POWDER, FOR SOLUTION INTRAVENOUS at 04:41

## 2024-01-17 RX ADMIN — APIXABAN 5 MG: 5 TABLET, FILM COATED ORAL at 20:29

## 2024-01-17 RX ADMIN — FUROSEMIDE 20 MG: 40 TABLET ORAL at 08:35

## 2024-01-17 RX ADMIN — HYDRALAZINE HYDROCHLORIDE 50 MG: 50 TABLET ORAL at 08:36

## 2024-01-17 RX ADMIN — PREGABALIN 100 MG: 50 CAPSULE ORAL at 20:28

## 2024-01-17 RX ADMIN — BACLOFEN 5 MG: 10 TABLET ORAL at 16:32

## 2024-01-17 RX ADMIN — APIXABAN 5 MG: 5 TABLET, FILM COATED ORAL at 08:35

## 2024-01-17 ASSESSMENT — COGNITIVE AND FUNCTIONAL STATUS - GENERAL
DRESSING REGULAR UPPER BODY CLOTHING: A LOT
TURNING FROM BACK TO SIDE WHILE IN FLAT BAD: A LOT
DRESSING REGULAR LOWER BODY CLOTHING: A LOT
CLIMB 3 TO 5 STEPS WITH RAILING: A LITTLE
MOVING FROM LYING ON BACK TO SITTING ON SIDE OF FLAT BED WITH BEDRAILS: A LITTLE
DAILY ACTIVITIY SCORE: 18
WALKING IN HOSPITAL ROOM: A LITTLE
HELP NEEDED FOR BATHING: A LITTLE
WALKING IN HOSPITAL ROOM: A LITTLE
MOVING TO AND FROM BED TO CHAIR: A LITTLE
STANDING UP FROM CHAIR USING ARMS: A LITTLE
MOBILITY SCORE: 18
MOVING TO AND FROM BED TO CHAIR: A LITTLE
TURNING FROM BACK TO SIDE WHILE IN FLAT BAD: A LITTLE
MOVING FROM LYING ON BACK TO SITTING ON SIDE OF FLAT BED WITH BEDRAILS: A LOT
MOBILITY SCORE: 16
STANDING UP FROM CHAIR USING ARMS: A LITTLE
TOILETING: A LITTLE
DAILY ACTIVITIY SCORE: 18
DRESSING REGULAR LOWER BODY CLOTHING: A LOT
CLIMB 3 TO 5 STEPS WITH RAILING: A LITTLE
HELP NEEDED FOR BATHING: A LITTLE
DRESSING REGULAR UPPER BODY CLOTHING: A LOT
TOILETING: A LITTLE

## 2024-01-17 ASSESSMENT — PAIN SCALES - GENERAL
PAINLEVEL_OUTOF10: 3
PAINLEVEL_OUTOF10: 8
PAINLEVEL_OUTOF10: 9
PAINLEVEL_OUTOF10: 10 - WORST POSSIBLE PAIN

## 2024-01-17 ASSESSMENT — PAIN - FUNCTIONAL ASSESSMENT
PAIN_FUNCTIONAL_ASSESSMENT: 0-10

## 2024-01-17 ASSESSMENT — PAIN DESCRIPTION - DESCRIPTORS
DESCRIPTORS: SHARP
DESCRIPTORS: ACHING

## 2024-01-17 ASSESSMENT — PAIN DESCRIPTION - LOCATION
LOCATION: BACK
LOCATION: BACK

## 2024-01-17 NOTE — CARE PLAN
Problem: Safety  Goal: Patient will be injury free during hospitalization  Outcome: Progressing     Problem: Pain  Goal: My pain/discomfort is manageable  Outcome: Progressing

## 2024-01-17 NOTE — PROGRESS NOTES
01/17/24  ASSESSMENT DATE    REVIEWED PT INFO AS CORRECT.   DX... MRSA SURGICAL WOUND INFECTION S/P I&D  REVIEWED ALLERGIES…   Allergies   Allergen Reactions    Neomycin Other     swelling, redness, burning post eye surgery    Neomycin-Polymyxin B-Dexameth Other and Rash     blisters, eye swelling, burning        PMH:  has a past medical history of Arthritis, Back pain, COPD (chronic obstructive pulmonary disease) (CMS/[facility]), COVID-19 vaccine administered, Eczema, History of COVID-19, Hyperlipidemia, Hypertension, Hypoesthesia of skin, Macular degeneration, Meniere's disease, Osteoporosis, Overactive bladder, Pain in unspecified finger(s), Pain in unspecified wrist, Peripheral vascular disease (CMS/[facility]), Personal history of other diseases of the circulatory system, Personal history of other diseases of the musculoskeletal system and connective tissue, Personal history of other specified conditions, Personal history of other specified conditions, Personal history of other specified conditions, Postmenopausal, Seasonal allergies, and Unspecified visual loss.    TOB:  reports that she quit smoking about 4 months ago. Her smoking use included cigarettes. She has a 22.50 pack-year smoking history. She does not have any smokeless tobacco history on file.    WEIGHT….  78.2 KG HEIGHT…. 144 CM       NO MEDICATION INTERACTIONS.  REVIEWED RELEVANT BASELINE VALUES  LABS FOR HOME INFUSION ARE …….. CBC, BMP WEEKLY, CRRP IN 3 WEEKS  PT HAS  LUMEN PICC Solo 1L LINE KNOWN TO [facility], FLUSH PER [facility] SALINE ONLY PROTOCOL.  CONTINUE MED THRU TENTATIVE STOP DATE ….02/28/24  MD FOLLOWING….DR ESQUIVEL/DR [place]  MED PLACED IN ELASTOMERIC PUMP  CARE PLAN DONE TODAY      Tara Tierney   IS A  68 y.o. female  THAT IS BEING DISCHARGED FROM THE HOSPITAL WITH A DIAGNOSIS OF DEEP SURGICAL WOUND INFECTION S/P I&D…PATIENT IS A RETURNING [facility] CLIENT...ALLERGIES AND PMH LISTED ABOVE ...PATIENT IS ORDERED TEFLARO 400MG IV Q12H AND DAPTOMYCIN 470MG  IV Q24H..START OF CARE IS LATE AFTERNOON OF 01/18/24 AND CONTINUES THROUGH 02/28/24 ....ORDERS REVIEWED AND ARE APPROPRIATE FOR PATIENT AND INDICATION...BOTH DOSES VERIFIE WITH PHYSICIAN...WEEKLY LABS ORDERED AND DR ESQUIVEL IS FOLLOWING    MEDICATION PROFILE REVIEWED AND APPROPRIATE...WILL NEED TO DELIVER TWICE WEEKLY DUE TO TEFLARO STABILITY    MD APPOINTMENT IS SCHEDULED FOR 02/01/24    CONFIRMED DELIVERY TIME TO HOME WITH PATIENT SPOUSE… WILL DELIVER BETWEEN 11A AND 2PM. NURSING TEAM NOTIFIED. WILL SEND ALL MEDS as ELASTOMERICS DUE TO FAMILIARITY     PROCESSED FILL OF 5 daptomycin hp and 9 TEFLARO HP (ONE EACH OFF ICE) FOR 01/18/24 MIX AND DELIVERY BETWEEN 11A AND 2PM ...FILL IS A 5 DAY SUPPLY AND COVERS 01/18 THROUGH 01/22/24      FOLLOW UP 01/22/24WITH PATIENT PROGRESS AND LABS IF AVAILABLE...REFILL TEFLARO AND DAPTOMYCIN FOR STRAIGHT DELIVERY

## 2024-01-17 NOTE — PROGRESS NOTES
Tara Tierney is a 68 y.o. female on day 17 of admission presenting with Postoperative surgical complication involving musculoskeletal system associated with musculoskeletal procedure, unspecified complication.    Consult: HTN as request of Ortho to stay on patient throughout  hospital course     Subjective   Patient examined and seen. Alert and oriented x3, resting comfortably.  Patient denies chest pain, shortness of breath, palpitations, abdominal pain, fever or chills.  Patient is agreeable to go home when cleared.  Reports support system is intact. She is concerned about her discharge and her medications. Therapeutic communication provided to patient and that all medications will be addressed prior to her discharge with the discharge planning team.     Reports ongoing right thigh/groin pain that is stabbing in nature and intermittent. It is not worsening. Orthopedics NP is aware and monitoring as appropriate.       Objective     Last Recorded Vitals  /60   Pulse 63   Temp 36.6 °C (97.9 °F)   Resp 18   Wt 78.2 kg (172 lb 6.4 oz)   SpO2 94%   Intake/Output last 3 Shifts:    Intake/Output Summary (Last 24 hours) at 1/17/2024 1151  Last data filed at 1/17/2024 0600  Gross per 24 hour   Intake 623 ml   Output 1798 ml   Net -1175 ml       Admission Weight  Weight: 77.1 kg (170 lb) (12/30/23 1908)    Daily Weight  12/31/23 : 78.2 kg (172 lb 6.4 oz)    Image Results  XR abdomen 1 view  Narrative: Interpreted By:  Jose Sevilla,   STUDY:  XR ABDOMEN 1 VIEW;  1/15/2024 11:25 am      INDICATION:  Signs/Symptoms:emesis and constipation.      COMPARISON:  01/02/2024.      ACCESSION NUMBER(S):  [id number]      ORDERING CLINICIAN:  ERICK KEN      FINDINGS:  Single frontal view of the abdomen was obtained.      Lung bases are clear. Moderate colonic fecal residue is present. A  nonspecific nonobstructive bowel gas pattern is demonstrated.  Right  upper quadrant surgical clips are seen. IVC filter is  again seen.  Tubing or leads overlie the lumbar spine. Multilevel degenerative  changes of the spine are partially visualized.      Impression: 1.  Moderate colonic fecal residue. Nonspecific nonobstructive bowel  gas pattern.      Signed by: Jose Sevilla 1/15/2024 1:29 PM  Dictation workstation:   [id number]      Physical Exam  Constitutional: elderly appearing, awake/alert/oriented x3, cooperative  Respiratory/Thorax: Patent airways,  normal breath sounds   Cardiovascular: Regular, rate and rhythm, 2+ equal pulses of the extremities, Musculoskeletal: mild decrease range of motion lumbar spline with JOSE drain with serosanguineous drainage, brace in place   Skin: warm, dry, intact except as noted  Neurological: alert/oriented x 3, speech clear at baseline      Relevant Results  Scheduled medications  acetaminophen, 650 mg, oral, q6h  apixaban, 5 mg, oral, BID  aspirin, 81 mg, oral, Daily  atenolol, 50 mg, oral, q AM  baclofen, 5 mg, oral, 4x daily  brimonidine, 1 drop, Both Eyes, BID  busPIRone, 10 mg, oral, Daily  ceftaroline, 600 mg, intravenous, q12h  daptomycin, 8 mg/kg, intravenous, Nightly  docusate sodium, 100 mg, oral, BID  ezetimibe, 10 mg, oral, Daily  furosemide, 20 mg, oral, Daily  hydrALAZINE, 50 mg, oral, BID  isosorbide mononitrate ER, 60 mg, oral, Daily  lactobacillus acidophilus, 1 tablet, oral, Daily  lidocaine, 1 patch, transdermal, Daily  lidocaine, 1 patch, transdermal, Daily  melatonin, 3 mg, oral, Daily  mirtazapine, 15 mg, oral, Nightly  morphine CR, 15 mg, oral, q12h EDE  mupirocin, , Topical, BID  pantoprazole, 40 mg, oral, Daily before breakfast  pilocarpine, 5 mg, oral, Daily  polyethylene glycol, 17 g, oral, Daily  pregabalin, 100 mg, oral, BID      Continuous medications     PRN medications  PRN medications: acetaminophen **OR** acetaminophen **OR** acetaminophen, alteplase, cyclobenzaprine, HYDROmorphone, meclizine, morphine, naloxone, ondansetron **OR** ondansetron, oxyCODONE,  vitamin A & D    Results for orders placed or performed during the hospital encounter of 12/30/23 (from the past 24 hour(s))   CBC   Result Value Ref Range    WBC 5.5 4.4 - 11.3 x10*3/uL    nRBC 0.0 0.0 - 0.0 /100 WBCs    RBC 3.10 (L) 4.00 - 5.20 x10*6/uL    Hemoglobin 9.3 (L) 12.0 - 16.0 g/dL    Hematocrit 29.3 (L) 36.0 - 46.0 %    MCV 95 80 - 100 fL    MCH 30.0 26.0 - 34.0 pg    MCHC 31.7 (L) 32.0 - 36.0 g/dL    RDW 14.9 (H) 11.5 - 14.5 %    Platelets 313 150 - 450 x10*3/uL   Comprehensive metabolic panel   Result Value Ref Range    Glucose 167 (H) 74 - 99 mg/dL    Sodium 135 (L) 136 - 145 mmol/L    Potassium 3.8 3.5 - 5.3 mmol/L    Chloride 99 98 - 107 mmol/L    Bicarbonate 28 21 - 32 mmol/L    Anion Gap 12 10 - 20 mmol/L    Urea Nitrogen 16 6 - 23 mg/dL    Creatinine 0.88 0.50 - 1.05 mg/dL    eGFR 71 >60 mL/min/1.73m*2    Calcium 9.0 8.6 - 10.3 mg/dL    Albumin 3.0 (L) 3.4 - 5.0 g/dL    Alkaline Phosphatase 169 (H) 33 - 136 U/L    Total Protein 6.4 6.4 - 8.2 g/dL    AST 23 9 - 39 U/L    Bilirubin, Total 0.3 0.0 - 1.2 mg/dL    ALT 45 7 - 45 U/L            Assessment/Plan          Principal Problem:    Postoperative surgical complication involving musculoskeletal system associated with musculoskeletal procedure, unspecified complication  Active Problems:    Essential hypertension    PVD (peripheral vascular disease) (CMS/HCC)    Back pain with radiculopathy    Back pain at L4-L5 level    Deep vein thrombosis (DVT) of right lower extremity (CMS/HCC)    Anemia         Postoperative surgical complication involving musculoskeletal system associated with musculoskeletal procedure, unspecified complication  Active Problems:    Essential hypertension    PVD (peripheral vascular disease) (CMS/HCC)    Back pain with radiculopathy    Back pain at L4-L5 level    Deep vein thrombosis (DVT) of right lower extremity (CMS/HCC)    Anemia     Plan:     Continue IV antibiotics per Infectious Disease/ wound growth  MRSA/STAPH  Recommend SNF / rehab - however, Ortho does not wish for patient to go to facility  Ortho recommends home placement  Continue PT/OT  Keep off statin while on Cubicin IV antibiotic  Continue home meds for hypertension   Mild hyponatremia - improving to 134 today remains stable  Hemoglobin/ anemia - stable      1/17/24  Patient is improving overall  Pain management is on board  PT/OT with encouragement to ambulate  HTN is well controlled at this time  Eliquis resumed by Orthopedics  Hemodynamically stable  Hemoglobin stable   Pt voices no other complaints  ID following for abx therapy     Thank you for consult   Hemodynamically stable   Medicine will follow peripherally at request of Orthopedics   CALL FOR ACUTE NEEDS     Time spent 26  minutes obtaining labs, imaging, recommendations, interview, assessment, examination, medication review/ordering, and EMR review.     Plan of care was discussed extensively with patient. Patient verbalized understanding through teach back method. All questions and concerns addressed upon examination.      Of note, this documentation is completed using the Dragon Dictation system (voice recognition software). There may be spelling and/or grammatical errors that were not corrected prior to final submission.       Stacey Bliss, APRN-CNP      Principal Problem:    Postoperative surgical complication involving musculoskeletal system associated with musculoskeletal procedure, unspecified complication  Active Problems:    Essential hypertension    PVD (peripheral vascular disease) (CMS/HCC)    Back pain with radiculopathy    Back pain at L4-L5 level    Deep vein thrombosis (DVT) of right lower extremity (CMS/HCC)    Anemia      Stacey Bliss, APRN-CNP

## 2024-01-17 NOTE — PROGRESS NOTES
Infectious Diseases Inpatient Progress Note      HISTORY OF PRESENT ILLNESS:    Back pain a little less today, no nausea today p    She still has bilateral JOSE drains with small amount of serosanguineous drainage.   No fevers or chills.   Follow up postop deep incision wound infection with recurrent abscess while on IV Vanco, S/P hardware removal with persistent +ve MRSA Cx.    Recent PE and DVT.   Has resolved leg pain. No SOB. +ve constipation     Allergies:  Neomycin and Neomycin-polymyxin b-dexameth      Review of Systems  14 system review is negative other than HPI  Positive right lower quadrant abdominal pain.  Positive constipation  Positive severe low back pain, worse upon movement  Positive bilateral legs weakness, uses a walker for ambulation  Positive left upper thigh pain  Physical Exam    Heart Rate:  [63-75]   Temp:  [35.7 °C (96.3 °F)-36.7 °C (98.1 °F)]   Resp:  [16-20]   BP: (108-134)/(55-83)   SpO2:  [92 %-97 %]    Vitals:    01/16/24 0036 01/16/24 0836 01/16/24 1200 01/16/24 1545   BP: 134/60 128/67 119/56 118/55   BP Location:   Left arm    Patient Position:  Sitting Sitting Sitting   Pulse: 64 63 68 66   Resp: 19 16 16 20   Temp: 36.6 °C (97.9 °F) 36.7 °C (98.1 °F) 36.7 °C (98.1 °F) 35.7 °C (96.3 °F)   TempSrc:  Temporal Temporal    SpO2: 92% 93% 95% 96%   Weight:       Height:         General Appearance: alert and oriented to person, place and time, well-developed and well-nourished, in no acute distress  Skin: warm and dry, no rash.   Head: normocephalic and atraumatic  Eyes: anicteric sclerae  ENT:  normal mucous membranes. No oral thrush  Lungs: normal respiratory effort, clear  Heart: Nl S1 and S2  Abdomen: soft, no tenderness  no leg edema  No erythema, no tenderness  Back incision is intact.  Intact JOSE drain sites, small amount of serosanguineous drainage on dressing.   No point tenderness  DATA:    Lab Results   Component Value Date    WBC 7.5 01/15/2024    HGB 8.6 (L) 01/15/2024    HCT  "27.9 (L) 01/15/2024    MCV 94 01/15/2024     01/15/2024     Lab Results   Component Value Date    CREATININE 0.72 01/15/2024    BUN 13 01/15/2024     (L) 01/15/2024    K 4.0 01/15/2024     01/15/2024    CO2 27 01/15/2024       Hepatic Function Panel:No results found for: \"ALKPHOS\", \"ALT\", \"AST\", \"PROT\", \"BILITOT\", \"BILIDIR\"    Microbiology:   Susceptibility data from last 90 days.  Collected Specimen Info Organism Amoxicillin/Clavulanate Ampicillin Ampicillin/Sulbactam Aztreonam Cefazolin Cefazolin (uncomplicated UTIs only) Cefepime Cefotaxime Ceftazidime Ceftriaxone Ciprofloxacin Clindamycin   12/31/23 Swab from SPINE Staphylococcus aureus               12/31/23 Tissue from SPINE Methicillin Resistant Staphylococcus aureus (MRSA)            S   12/31/23 Swab from SPINE Methicillin Resistant Staphylococcus aureus (MRSA)            S   12/31/23 Tissue from SPINE Staphylococcus aureus               12/11/23 Fluid from ABSCESS Methicillin Resistant Staphylococcus aureus (MRSA)            S   12/11/23 Tissue from ABSCESS Staphylococcus aureus               12/11/23 Swab from ABSCESS Methicillin Resistant Staphylococcus aureus (MRSA)            S   12/06/23 Swab from SPINE Methicillin Resistant Staphylococcus aureus (MRSA)               12/06/23 Tissue from SPINE Staphylococcus aureus               12/06/23 Tissue from SPINE Staphylococcus aureus               12/05/23 Urine from Clean Catch/Voided Escherichia coli S R S R R R R R R R R    11/03/23 Swab from Nares/Axilla/Groin Methicillin Resistant Staphylococcus aureus (MRSA)                 Collected Specimen Info Organism Ertapenem Erythromycin Gentamicin Meropenem Nitrofurantoin Oxacillin Piperacillin/Tazobactam Tetracycline Tobramycin Trimethoprim/Sulfamethoxazole Vancomycin   12/31/23 Swab from SPINE Staphylococcus aureus              12/31/23 Tissue from SPINE Methicillin Resistant Staphylococcus aureus (MRSA)  R    R  S  S S   12/31/23 Swab " from SPINE Methicillin Resistant Staphylococcus aureus (MRSA)  R    R  S  S S   12/31/23 Tissue from SPINE Staphylococcus aureus              12/11/23 Fluid from ABSCESS Methicillin Resistant Staphylococcus aureus (MRSA)  R    R  S  S S   12/11/23 Tissue from ABSCESS Staphylococcus aureus              12/11/23 Swab from ABSCESS Methicillin Resistant Staphylococcus aureus (MRSA)  R    R  S  S S   12/06/23 Swab from SPINE Methicillin Resistant Staphylococcus aureus (MRSA)              12/06/23 Tissue from SPINE Staphylococcus aureus              12/06/23 Tissue from SPINE Staphylococcus aureus              12/05/23 Urine from Clean Catch/Voided Escherichia coli S  R S S  S  I S    11/03/23 Swab from Nares/Axilla/Groin Methicillin Resistant Staphylococcus aureus (MRSA)                Vanco PERNELL of 2.0 as discussed with microbiology  Imaging:   CT abdomen pelvis wo IV contrast    Result Date: 12/31/2023  STUDY: CT Abdomen and Pelvis without IV Contrast; 12/31/2023 at 5:11 PM. INDICATION: Abdominal pain. COMPARISON: CT AP 12/19/2023 ACCESSION NUMBER(S): [id number] ORDERING CLINICIAN: WILLIAM EDMONDS TECHNIQUE: CT of the abdomen and pelvis was performed.  Contiguous axial images were obtained at 3 mm slice thickness through the abdomen and pelvis. Coronal and sagittal reconstructions at 3 mm slice thickness were performed. No intravenous contrast was administered.  Automated mA/kV exposure control was utilized and patient examination was performed in strict accordance with principles of ALARA. FINDINGS: Please note that the evaluation of vessels, lymph nodes and organs is limited without intravenous contrast.  LOWER CHEST: No cardiomegaly.  No pericardial effusion.  Very small bilateral pleural effusions  ABDOMEN:  LIVER: No hepatomegaly.  Smooth surface contour.  Normal attenuation.  BILE DUCTS: No intrahepatic or extrahepatic biliary ductal dilatation.  GALLBLADDER: The gallbladder is absent. STOMACH: No  abnormalities identified.  PANCREAS: Negative for pancreatitis  SPLEEN: No splenomegaly or focal splenic lesion.  ADRENAL GLANDS: No thickening or nodules.  KIDNEYS AND URETERS: Kidneys are normal in size and location.  No renal or ureteral calculi.  PELVIS:  BLADDER: No abnormalities identified.  REPRODUCTIVE ORGANS: No abnormalities identified.  BOWEL: Colonic diverticulosis.  Mild constipation without bowel obstruction. Negative for appendicitis  VESSELS: Limited due to lack of intravenous contrast.  Prior femoral to femoral artery bypass.  Abdominal aorta is normal in caliber.  PERITONEUM/RETROPERITONEUM/LYMPH NODES: Small amount of low-density free pelvic fluid  No pneumoperitoneum. No lymphadenopathy.  ABDOMINAL WALL: Small fat-containing umbilical hernia. SOFT TISSUES: Postsurgical changes within the posterior lumbar soft tissues with surgical drains in place.  BONES: Status post interbody fusion of L4/5 and L5/S1 with metallic spacer. This space narrowing L3/4.  Laminectomies L3-L5.  Bone graft along the lateral margins of L3-L5 fracture of the right L3 transverse process. Fracture of the right L4 transverse process.  Old screw tract within the L4 and L5 pedicles.  Narrowing of the right L5/S1 neural foramen due to facet joint osteophyte.    Negative for bowel obstruction.  Mild constipation. Colonic diverticulosis without diverticulitis. Negative for appendicitis. Postsurgical changes lumbar spine removal of pedicle screws and posterior rods from L4 through S1.  Stable appearance of interbody spacers at L4/5 and L5/S1. Bone graft along the lateral margins of L3-L5.  Fractures of the right L4 and L3 transverse process.  The right L3 transverse process fracture appears old.  Postsurgical changes within the posterior lumbar soft tissues with surgical drains in place. Signed by Aung Sparrow MD    MR lumbar spine w and wo IV contrast    Result Date: 12/31/2023  Interpreted By:  Jonas Sanchez, STUDY: MR  LUMBAR SPINE W AND WO IV CONTRAST;  12/31/2023 12:33 am   INDICATION: Signs/Symptoms:Pain.   COMPARISON: MRI of the lumbar spine dated 12/10/2023   ACCESSION NUMBER(S): [id number]   ORDERING CLINICIAN: YVETTE LEE   TECHNIQUE: Sagittal T1, T2, STIR, axial T1 and T2 weighted images of the lumbar spine were acquired. Additional axial and sagittal T1 weighted images of the lumbar spine were obtained after intravenous administration of 15.5 mL of contrast.   FINDINGS: Exam is degraded by motion.   There is again evidence of 5 lumbar type non rib-bearing vertebral bodies, with the lowest well-formed intervertebral disc space labeled L5-S1.   Postsurgical changes of posterior decompression and laminectomies of L3 through L5 with posterior spinal fusion extending from L4 through S1 and discectomies with intervertebral disc spaces at L4-L5 and L5-S1. These are similar in appearance to prior examination on 12/10/2023. Susceptibility artifact from the surgical hardware somewhat limits evaluation of the adjacent structures.   In the interim since prior imaging on 12/10/2023, new postsurgical consistent with irrigation debridement of subfascial tissues of the cutaneous spine are evident. STIR and T2 hypointense signal abnormality previously seen in the cutaneous fat of the lower lumbar spine has resolved, with new 3.7 x 3.6 x 13.6 cm (series 7, image 10; series 10, image 10) T2 hyperintense fluid collection present, with slight peripheral enhancement. This collection may be contiguous with the skin.   T2 hyperintense collection is again present in the laminectomy surgical bed, abutting the thecal sac at the level of L3 through L5 (series 8, image 14), measuring up to 5.2 cm in craniocaudal dimension, 2.9 cm in transverse dimension and up to 1.5 cm in AP dimension (series 10, image 7). The surgical catheter previously seen within the collection is gone, although collection is decreased in size to previous imaging, with  resolution of previously seen air within the collection. Mild surrounding edema and reactive soft tissue changes are present in the epidural space, somewhat increased in the interim since prior exam, with new/increased mass effect on the adjacent thecal sac.   There also appears to be small amount of new fluid present in the anterior epidural space at the level of L3 (series 10, image 3).   Although the exact characterization is difficult due to motion, there appears to be somewhat worsened spinal canal narrowing at the level of L2-L3 due to combination of new fluid in the anterior epidural space, and the T2 hyperintense fluid collection with associated inflammatory/reactive changes along the posterior aspect of the thecal sac, with somewhat increased effacement of the subarachnoid space.   No new intra thecal enhancement is identified.   Neural foramina are not well assessed due to motion and susceptibility artifact from the surgical hardware.       1.  New surgical changes of irrigation and debridement in the laminectomy surgical bed evident, with previously seen geographic area of hypointense T2 and STIR signal in the cutaneous fat of the lumbar spine resolved, and new T2 hyperintense collection measuring 3.7 x 3.6 x 13.6 cm present in the cutaneous fat, likely representing a postsurgical seroma, although sterility can not be ascertained on imaging alone. This collection reaches of the dermis, and may drain at the skin. 2. T2 hyperintense collection within the laminectomy bed itself along the dorsal aspect of the thecal sac described on previous MRI in 12/10/2023 has mildly decreased in size from prior imaging, with interval removal of previously seen drain, although there are increased inflammatory/reactive soft tissue changes present in the laminectomy surgical bed, focus of fluid in the anterior epidural space at the level of L3 contributes to increased effacement of the thecal sac at the level of L3 compared  to prior MRI.   MACRO: None   Signed by: Jonas Sanchez 12/31/2023 1:13 AM Dictation workstation:   [id number]       I discussed culture results with microbiology.  MRSA PERNELL to vancomycin is 2.  It is sensitive to Cubicin  CPK of 31  IMPRESSION:    Postop deep incisional wound infection and abscess of lumbar spine with failure of IV Vanco.  Worsening symptoms despite Cubicin  S/P Hardware removal  MRSA infection in a patient who is a chronic carrier    Patient Active Problem List   Diagnosis    Abdominal aortic aneurysm (CMS/HCC)    Abnormal EKG    Bilateral carotid artery stenosis    Bruit of right carotid artery    CAD in native artery    Cardiomyopathy (CMS/HCC)    Chest pain    Chronic asthmatic bronchitis    Closed displaced fracture of fifth metatarsal bone    Current every day smoker    Difficulty breathing    Essential hypertension    History of total knee replacement    Hypokalemia    Intermittent claudication (CMS/HCC)    Knee osteoarthritis    Knee pain    Limb pain    Localized swelling, mass, or lump of lower extremity    Celiac artery stenosis (CMS/HCC)    Mesenteric artery stenosis (CMS/HCC)    Mixed hyperlipidemia    Obese    PVD (peripheral vascular disease) (CMS/HCC)    Right foot pain    Swelling    Trigger finger    Urinary tract infection without hematuria    Back pain of lumbar region with sciatica    Back pain with radiculopathy    Intractable back pain    Seroma, post-traumatic (CMS/HCC)    Lumbar surgical wound fluid collection    Generalized weakness    Postoperative surgical complication involving musculoskeletal system associated with musculoskeletal procedure, unspecified complication    Back pain at L4-L5 level    Deep vein thrombosis (DVT) of right lower extremity (CMS/HCC)    Anemia       PLAN:  Continue IV Teflaro and Cubicin because of worsening symptoms, deep abscess it is not amenable to drainage, failure of IV vancomycin.  Follow-up CBC CMP and CPK weekly  May discharge  from infectious disease perspective.  Recommended rehab  Hold Zocor dose during treatment with Cubicin  May continue Zetia  Local wound care per surgery    Discussed with patient and RN    Pete Echeverria MD

## 2024-01-17 NOTE — NURSING NOTE
"Notified by [facility] infusion team that benefit check is as follows for IV antibiotics:   Pt has [employer] Dual & Part D  (Part D)- Drug/Medication Coverage  Medication Co-Pay Amount: $0.00 (1 week supply) Daptomycin 650mg IV Q24hrs  Medication Co-Pay Amount: $0.00 (1 week supply Teflaro 600mg IV Q12hrs  The Part D co-pay amount is an ESTIMATE and can vary based on where the patient is in the Medicare Coverage Gap or \"Donut [place]\".     ([employer] Dual)- Per Cari (Services/Supply Coverage)  Deductible:  $0.00 w/100% coverage.  Out-of-pocket: $0.00  Per Cari Amount: $0.00 for both meds( 1 week supply) *Calculated  based on deductible being met - this is an ESTIMATE*   Skilled Nursing: covered by Medicare if patient is Homebound.  NO Costs to pt.  Unsuccessful communication of insurance benefits to patient. Left message  "

## 2024-01-17 NOTE — PROGRESS NOTES
Infectious Diseases Inpatient Progress Note      HISTORY OF PRESENT ILLNESS:    Patient has persistent severe low back pain, persistent legs weakness, persistent highly elevated CRP of greater than 80.  I added Teflaro to Cubicin yesterday because of lack of clinical improvement because of persistent deep abscess that is not amenable to surgery.   Patient persist to have severe low back pain worse on movement   She is agreeable to go home with home health care   Patient will get follow-up MRI in 4 weeks to evaluate deep back abscess   she still has bilateral JOSE drains with small amount of serosanguineous drainage.   No fevers or chills.   Follow up postop deep incision wound infection with recurrent abscess while on IV Vanco, S/P hardware removal with persistent +ve MRSA Cx. ,  on IV Cubicin, well tolerated.   Recent PE and DVT.   Has resolved leg pain. No SOB. +ve constipation   Decreased appetite  No bowel movements   no urinary symptoms  No fevers or chills.    Current Medications:    acetaminophen, 650 mg, oral, q6h  apixaban, 5 mg, oral, BID  aspirin, 81 mg, oral, Daily  atenolol, 50 mg, oral, q AM  baclofen, 5 mg, oral, 4x daily  brimonidine, 1 drop, Both Eyes, BID  busPIRone, 10 mg, oral, Daily  ceftaroline, 600 mg, intravenous, q12h  daptomycin, 8 mg/kg, intravenous, Nightly  docusate sodium, 100 mg, oral, BID  ezetimibe, 10 mg, oral, Daily  furosemide, 20 mg, oral, Daily  hydrALAZINE, 25 mg, oral, BID  isosorbide mononitrate ER, 60 mg, oral, Daily  lactobacillus acidophilus, 1 tablet, oral, Daily  lidocaine, 1 patch, transdermal, Daily  lidocaine, 1 patch, transdermal, Daily  melatonin, 3 mg, oral, Daily  mirtazapine, 15 mg, oral, Nightly  morphine CR, 15 mg, oral, q12h EDE  mupirocin, , Topical, BID  pantoprazole, 40 mg, oral, Daily before breakfast  pilocarpine, 5 mg, oral, Daily  polyethylene glycol, 17 g, oral, Daily  pregabalin, 100 mg, oral, BID  sodium chloride, 500 mL, intravenous,  Once        Allergies:  Neomycin and Neomycin-polymyxin b-dexameth      Review of Systems  14 system review is negative other than HPI    Physical Exam    Heart Rate:  [63-79]   Temp:  [35.7 °C (96.3 °F)-36.8 °C (98.2 °F)]   Resp:  [16-20]   BP: ()/(49-61)   SpO2:  [92 %-96 %]    Vitals:    01/17/24 0442 01/17/24 0756 01/17/24 1430 01/17/24 1441   BP: 126/59 132/60 89/55 (!) 100/49   BP Location: Left arm      Patient Position: Lying      Pulse: 79 63 71    Resp: 16 18     Temp: 36.4 °C (97.5 °F) 36.6 °C (97.9 °F) 36.4 °C (97.5 °F)    TempSrc: Temporal      SpO2: 92% 94% 96%    Weight:       Height:         General Appearance: alert and oriented to person, place and time, well-developed and well-nourished, in no acute distress  Skin: warm and dry, no rash.   Head: normocephalic and atraumatic  Eyes: anicteric sclerae  ENT:  normal mucous membranes. No oral thrush  Lungs: normal respiratory effort, clear  Heart: Nl S1 and S2  Abdomen: soft, no tenderness  1+ B leg edema  No erythema, no tenderness  Back incision is intact.  Intact JOSE drain sites, small amount of serosanguineous drainage.   No point tenderness  DATA:    Lab Results   Component Value Date    WBC 5.5 01/17/2024    HGB 9.3 (L) 01/17/2024    HCT 29.3 (L) 01/17/2024    MCV 95 01/17/2024     01/17/2024     Lab Results   Component Value Date    CREATININE 0.88 01/17/2024    BUN 16 01/17/2024     (L) 01/17/2024    K 3.8 01/17/2024    CL 99 01/17/2024    CO2 28 01/17/2024       Hepatic Function Panel:  Lab Results   Component Value Date    ALKPHOS 169 (H) 01/17/2024    ALT 45 01/17/2024    AST 23 01/17/2024    PROT 6.4 01/17/2024    BILITOT 0.3 01/17/2024       Microbiology:   Susceptibility data from last 90 days.  Collected Specimen Info Organism Amoxicillin/Clavulanate Ampicillin Ampicillin/Sulbactam Aztreonam Cefazolin Cefazolin (uncomplicated UTIs only) Cefepime Cefotaxime Ceftazidime Ceftriaxone Ciprofloxacin Clindamycin   12/31/23  Swab from SPINE Staphylococcus aureus               12/31/23 Tissue from SPINE Methicillin Resistant Staphylococcus aureus (MRSA)            S   12/31/23 Swab from SPINE Methicillin Resistant Staphylococcus aureus (MRSA)            S   12/31/23 Tissue from SPINE Staphylococcus aureus               12/11/23 Fluid from ABSCESS Methicillin Resistant Staphylococcus aureus (MRSA)            S   12/11/23 Tissue from ABSCESS Staphylococcus aureus               12/11/23 Swab from ABSCESS Methicillin Resistant Staphylococcus aureus (MRSA)            S   12/06/23 Swab from SPINE Methicillin Resistant Staphylococcus aureus (MRSA)               12/06/23 Tissue from SPINE Staphylococcus aureus               12/06/23 Tissue from SPINE Staphylococcus aureus               12/05/23 Urine from Clean Catch/Voided Escherichia coli S R S R R R R R R R R    11/03/23 Swab from Nares/Axilla/Groin Methicillin Resistant Staphylococcus aureus (MRSA)                 Collected Specimen Info Organism Ertapenem Erythromycin Gentamicin Meropenem Nitrofurantoin Oxacillin Piperacillin/Tazobactam Tetracycline Tobramycin Trimethoprim/Sulfamethoxazole Vancomycin   12/31/23 Swab from SPINE Staphylococcus aureus              12/31/23 Tissue from SPINE Methicillin Resistant Staphylococcus aureus (MRSA)  R    R  S  S S   12/31/23 Swab from SPINE Methicillin Resistant Staphylococcus aureus (MRSA)  R    R  S  S S   12/31/23 Tissue from SPINE Staphylococcus aureus              12/11/23 Fluid from ABSCESS Methicillin Resistant Staphylococcus aureus (MRSA)  R    R  S  S S   12/11/23 Tissue from ABSCESS Staphylococcus aureus              12/11/23 Swab from ABSCESS Methicillin Resistant Staphylococcus aureus (MRSA)  R    R  S  S S   12/06/23 Swab from SPINE Methicillin Resistant Staphylococcus aureus (MRSA)              12/06/23 Tissue from SPINE Staphylococcus aureus              12/06/23 Tissue from SPINE Staphylococcus aureus              12/05/23 Urine from  Clean Catch/Voided Escherichia coli S  R S S  S  I S    11/03/23 Swab from Nares/Axilla/Groin Methicillin Resistant Staphylococcus aureus (MRSA)                Vanco PERNELL of 2.0 as discussed with microbiology  Imaging:   CT abdomen pelvis wo IV contrast    Result Date: 12/31/2023  STUDY: CT Abdomen and Pelvis without IV Contrast; 12/31/2023 at 5:11 PM. INDICATION: Abdominal pain. COMPARISON: CT AP 12/19/2023 ACCESSION NUMBER(S): [id number] ORDERING CLINICIAN: WILLIAM EDMONDS TECHNIQUE: CT of the abdomen and pelvis was performed.  Contiguous axial images were obtained at 3 mm slice thickness through the abdomen and pelvis. Coronal and sagittal reconstructions at 3 mm slice thickness were performed. No intravenous contrast was administered.  Automated mA/kV exposure control was utilized and patient examination was performed in strict accordance with principles of ALARA. FINDINGS: Please note that the evaluation of vessels, lymph nodes and organs is limited without intravenous contrast.  LOWER CHEST: No cardiomegaly.  No pericardial effusion.  Very small bilateral pleural effusions  ABDOMEN:  LIVER: No hepatomegaly.  Smooth surface contour.  Normal attenuation.  BILE DUCTS: No intrahepatic or extrahepatic biliary ductal dilatation.  GALLBLADDER: The gallbladder is absent. STOMACH: No abnormalities identified.  PANCREAS: Negative for pancreatitis  SPLEEN: No splenomegaly or focal splenic lesion.  ADRENAL GLANDS: No thickening or nodules.  KIDNEYS AND URETERS: Kidneys are normal in size and location.  No renal or ureteral calculi.  PELVIS:  BLADDER: No abnormalities identified.  REPRODUCTIVE ORGANS: No abnormalities identified.  BOWEL: Colonic diverticulosis.  Mild constipation without bowel obstruction. Negative for appendicitis  VESSELS: Limited due to lack of intravenous contrast.  Prior femoral to femoral artery bypass.  Abdominal aorta is normal in caliber.  PERITONEUM/RETROPERITONEUM/LYMPH NODES: Small amount of  low-density free pelvic fluid  No pneumoperitoneum. No lymphadenopathy.  ABDOMINAL WALL: Small fat-containing umbilical hernia. SOFT TISSUES: Postsurgical changes within the posterior lumbar soft tissues with surgical drains in place.  BONES: Status post interbody fusion of L4/5 and L5/S1 with metallic spacer. This space narrowing L3/4.  Laminectomies L3-L5.  Bone graft along the lateral margins of L3-L5 fracture of the right L3 transverse process. Fracture of the right L4 transverse process.  Old screw tract within the L4 and L5 pedicles.  Narrowing of the right L5/S1 neural foramen due to facet joint osteophyte.    Negative for bowel obstruction.  Mild constipation. Colonic diverticulosis without diverticulitis. Negative for appendicitis. Postsurgical changes lumbar spine removal of pedicle screws and posterior rods from L4 through S1.  Stable appearance of interbody spacers at L4/5 and L5/S1. Bone graft along the lateral margins of L3-L5.  Fractures of the right L4 and L3 transverse process.  The right L3 transverse process fracture appears old.  Postsurgical changes within the posterior lumbar soft tissues with surgical drains in place. Signed by Aung Sparrow MD    MR lumbar spine w and wo IV contrast    Result Date: 12/31/2023  Interpreted By:  Jonas Sanchez, STUDY: MR LUMBAR SPINE W AND WO IV CONTRAST;  12/31/2023 12:33 am   INDICATION: Signs/Symptoms:Pain.   COMPARISON: MRI of the lumbar spine dated 12/10/2023   ACCESSION NUMBER(S): [id number]   ORDERING CLINICIAN: YVETTE LEE   TECHNIQUE: Sagittal T1, T2, STIR, axial T1 and T2 weighted images of the lumbar spine were acquired. Additional axial and sagittal T1 weighted images of the lumbar spine were obtained after intravenous administration of 15.5 mL of contrast.   FINDINGS: Exam is degraded by motion.   There is again evidence of 5 lumbar type non rib-bearing vertebral bodies, with the lowest well-formed intervertebral disc space labeled  L5-S1.   Postsurgical changes of posterior decompression and laminectomies of L3 through L5 with posterior spinal fusion extending from L4 through S1 and discectomies with intervertebral disc spaces at L4-L5 and L5-S1. These are similar in appearance to prior examination on 12/10/2023. Susceptibility artifact from the surgical hardware somewhat limits evaluation of the adjacent structures.   In the interim since prior imaging on 12/10/2023, new postsurgical consistent with irrigation debridement of subfascial tissues of the cutaneous spine are evident. STIR and T2 hypointense signal abnormality previously seen in the cutaneous fat of the lower lumbar spine has resolved, with new 3.7 x 3.6 x 13.6 cm (series 7, image 10; series 10, image 10) T2 hyperintense fluid collection present, with slight peripheral enhancement. This collection may be contiguous with the skin.   T2 hyperintense collection is again present in the laminectomy surgical bed, abutting the thecal sac at the level of L3 through L5 (series 8, image 14), measuring up to 5.2 cm in craniocaudal dimension, 2.9 cm in transverse dimension and up to 1.5 cm in AP dimension (series 10, image 7). The surgical catheter previously seen within the collection is gone, although collection is decreased in size to previous imaging, with resolution of previously seen air within the collection. Mild surrounding edema and reactive soft tissue changes are present in the epidural space, somewhat increased in the interim since prior exam, with new/increased mass effect on the adjacent thecal sac.   There also appears to be small amount of new fluid present in the anterior epidural space at the level of L3 (series 10, image 3).   Although the exact characterization is difficult due to motion, there appears to be somewhat worsened spinal canal narrowing at the level of L2-L3 due to combination of new fluid in the anterior epidural space, and the T2 hyperintense fluid collection  with associated inflammatory/reactive changes along the posterior aspect of the thecal sac, with somewhat increased effacement of the subarachnoid space.   No new intra thecal enhancement is identified.   Neural foramina are not well assessed due to motion and susceptibility artifact from the surgical hardware.       1.  New surgical changes of irrigation and debridement in the laminectomy surgical bed evident, with previously seen geographic area of hypointense T2 and STIR signal in the cutaneous fat of the lumbar spine resolved, and new T2 hyperintense collection measuring 3.7 x 3.6 x 13.6 cm present in the cutaneous fat, likely representing a postsurgical seroma, although sterility can not be ascertained on imaging alone. This collection reaches of the dermis, and may drain at the skin. 2. T2 hyperintense collection within the laminectomy bed itself along the dorsal aspect of the thecal sac described on previous MRI in 12/10/2023 has mildly decreased in size from prior imaging, with interval removal of previously seen drain, although there are increased inflammatory/reactive soft tissue changes present in the laminectomy surgical bed, focus of fluid in the anterior epidural space at the level of L3 contributes to increased effacement of the thecal sac at the level of L3 compared to prior MRI.   MACRO: None   Signed by: Jonas Sanchez 12/31/2023 1:13 AM Dictation workstation:   [id number]       I discussed culture results with microbiology.  MRSA PERNELL to vancomycin is 2.  It is sensitive to Cubicin  IMPRESSION:    Postop deep incisional wound infection of lumbar spine with failure of IV Vanco.  Worsening symptoms despite Cubicin  S/P Hardware removal  MRSA infection in a patient who is a chronic carrier    Patient Active Problem List   Diagnosis    Abdominal aortic aneurysm (CMS/HCC)    Abnormal EKG    Bilateral carotid artery stenosis    Bruit of right carotid artery    CAD in native artery     Cardiomyopathy (CMS/HCC)    Chest pain    Chronic asthmatic bronchitis    Closed displaced fracture of fifth metatarsal bone    Current every day smoker    Difficulty breathing    Essential hypertension    History of total knee replacement    Hypokalemia    Intermittent claudication (CMS/HCC)    Knee osteoarthritis    Knee pain    Limb pain    Localized swelling, mass, or lump of lower extremity    Celiac artery stenosis (CMS/HCC)    Mesenteric artery stenosis (CMS/HCC)    Mixed hyperlipidemia    Obese    PVD (peripheral vascular disease) (CMS/HCC)    Right foot pain    Swelling    Trigger finger    Urinary tract infection without hematuria    Back pain of lumbar region with sciatica    Back pain with radiculopathy    Intractable back pain    Seroma, post-traumatic (CMS/HCC)    Lumbar surgical wound fluid collection    Generalized weakness    Postoperative surgical complication involving musculoskeletal system associated with musculoskeletal procedure, unspecified complication    Back pain at L4-L5 level    Deep vein thrombosis (DVT) of right lower extremity (CMS/HCC)    Anemia       PLAN:  May discharge on IV Teflaro and Cubicin for 6 weeks  CBC BMP weekly  CPK weekly  CRP in 3 weeks  Follow-up in 4 weeks  Hold Zocor dose during treatment with Cubicin  May continue Zetia  Follow-up with Dr. Coombs    Discussed with RN patient and daughter    Gem Reyna MD

## 2024-01-17 NOTE — HH CARE COORDINATION
Home Care received a Referral for INFUSION, SN, PT, OT, HHA. We have processed the referral for a RESUMPTION of Care on 1/18/2024.     If you have any questions or concerns, please feel free to contact us at [phone]. Follow the prompts, enter your five digit zip code, and you will be directed to your care team on WEST 1.

## 2024-01-17 NOTE — PROGRESS NOTES
Plastic Surgery Note    Afebrile, vital signs stable  Dressing in place in back incision  Impression: Status post removal of hardware of back  Continue current dressing changes

## 2024-01-17 NOTE — PROGRESS NOTES
01/17/24 1610   Discharge Planning   Home or Post Acute Services In home services   Type of Home Care Services Home nursing visits;Home PT;Home OT   Patient expects to be discharged to: home with [facility]     [facility] Coordinator has confirmed that HCO and IV antibiotic Rxs have been received, and confirmed SOC for 1/18/24 afternoon doses of IV antibiotics. Pt can DC after 1/18/24 Thursday AM dose of IV antibiotic. CNP and pts RN notified of this information. CT team will continue to monitor case for DC planning.

## 2024-01-17 NOTE — PROGRESS NOTES
DAILY PROGRESS NOTE      POD16 Lumbar washout and hardware removal     Patient doing well  Visit Vitals  /60   Pulse 63   Temp 36.6 °C (97.9 °F)   Resp 18      Temp (24hrs), Av.4 °C (97.6 °F), Min:35.7 °C (96.3 °F), Max:36.8 °C (98.2 °F)       Pain Control significant improvement. Reports very minimal lower extremity pain at this time.   No chest pain or shortness of breath.  No calf pain    Exam:   Good bilateral lower extremity strength and sensation.   Extremity shows neuro vascular status intact. Flexion and extension intact on extremity.  Calves soft and non-tender without evidence of DVT.  Deep JOSE drain with 15 ml serous drainage  Superficial JOSE drain with 13 ml serous drainage          Labs reviewed:  CBC: @LABRCNT(WBC:3,HGB:3,PLT:3)@  BMP:  @LABRCNT(Na:3,K:3,CL:3,CO2:3,BUN:3,Creatinine:3,Glucose:3)@    I&O  I/O last 3 completed shifts:  In: 1236 (15.8 mL/kg) [P.O.:900; IV Piggyback:336]  Out: 1818 (23.2 mL/kg) [Urine:1700 (0.6 mL/kg/hr); Drains:118]  Weight: 78.2 kg       Assessment:    I&D lumbar spine  Second antibiotic added due to patient having worsening symptoms with pain.   Nausea and vomiting have significantly improved.   Patient reports intermittent increasing pain primarily upon waking up and when sitting in chair.  She is very anxious about returning home however reports that she wants to go home because she will do better there.  Her fear is that she is going to have pain and return back to the hospital for admission.  Explained to patient that pain is to be expected due to the type of infection including discitis and osteomyelitis.  Explained to her that the pain is not going to totally be relieved and discussed nonpharmacological pain relief such as deep breathing exercises and ice.    Plan:    Continue with therapy   Brace  with ambulation. Does not have to wear brace when in chair.   JOSE drains to remain in place until there is 0 output for 48 hours   Continue IV antibiotics per  ID  Continue pain control per pain management   Cannot shower until drainage has stopped   KUB to rule out ileus: negative   Encourage ambulation   Home care orders placed, recommend infectious disease to fill out iv antibiotic infusion section to include lab draws and picc line management including dressing changes.   Wound care per Dr. Reyes : Bactroban ointment dressing twice daily per Dr. Ortega's recommendations.  May eventually require wound VAC if drainage persists.    Patient can discharge to home from an orthopedic standpoint.  Waiting for infectious disease to order home-going antibiotics.  Have reached out to ID awaiting response.      Edi Olguin, APRN-CNP   1/17/2024 9:55 AM    IAnd ice

## 2024-01-17 NOTE — PROGRESS NOTES
The patient is awake and alert, feels low energy levels and exertional limitations but slowly improving.  She endorses improvement in the muscle cramping since being on the baclofen  Started Lidoderm patch.  The pain level continues to fluctuate.  Rehab working with the patient  Patient was having recurrent spasms in the back and the legs before, doing better with the baclofen.  Patient was already on a Zanaflex regimen and Lyrica at home  Patient started a low-dose of baclofen, 5 mg 4 times a day, doing better  Moving the extremities better  Ambulated better  Rehab is working with her  MRI lumbar spine had shown Discitis and other changes.  She is on antibiotics regimen which was revised, on Cubicin  No significant spinal cord compression  Discharge planning in progress for home care with home IV infusions    Visit Vitals  BP (!) 100/49   Pulse 71   Temp 36.4 °C (97.5 °F)   Resp 18        Neurological Exam:    Oriented to day date,month,year, place and person. Knew the name of the president.  Attention span and concentration were normal.   Language: speech comprehension, repetition, expression, and naming is intact for patient´s age and level of education.      CRANIAL NERVES:   CN 2 Visual fields full to confrontation.   CN 3, 4, 6 Pupils round, equally reactive to light. No ptosis. EOM normal alignment, full range with normal saccades, pursuit and convergence. No nystagmus.   CN 5 Facial sensation intact bilaterally.   CN 7 Normal and symmetric facial strength. Nasolabial folds symmetric.   CN 8 Hearing intact to finger rub bilaterally. On [place] and [place] testing there was no lateralisation  CN 9 Palate elevates symmetrically.   CN 11 Bilaterally normal strength of shoulder shrug and neck turning.   CN 12 Tongue midline, with normal bulk and strength; no fasciculations.   Patient is handling pharyngeal secretions well.   Speech: articulation is intact.      MOTOR: The patient has normal muscle tone and has  "normal muscle mass. Muscle strength was 5/5 in distal and proximal muscles in both upper and lower extremities when she gave l the effort.  With the pain effort was difficult in the lower extremities. No fasciculations, tremor or other abnormal movements were present.   On [employer] testing the patient has no pronation or drift.      REFLEXES: unchanged from initial examination     COORDINATION: In both upper extremities, finger-nose-finger was intact without dysmetria. No dysdiadochokinesia. In both lower extremities, heel-to-knee-shin was intact.      GAIT: Could not be tested.  Patient is feeling weak with the back pain.  Romberg patient could not be tested      BMP:  Results from last 7 days   Lab Units 01/17/24  0822 01/15/24  1214   SODIUM mmol/L 135* 134*   POTASSIUM mmol/L 3.8 4.0   CHLORIDE mmol/L 99 100   CO2 mmol/L 28 27   BUN mg/dL 16 13   CREATININE mg/dL 0.88 0.72         CBC:  Results from last 7 days   Lab Units 01/17/24  0822 01/15/24  1214   WBC AUTO x10*3/uL 5.5 7.5   RBC AUTO x10*6/uL 3.10* 2.98*   HEMOGLOBIN g/dL 9.3* 8.6*   HEMATOCRIT % 29.3* 27.9*   MCV fL 95 94   MCH pg 30.0 28.9   MCHC g/dL 31.7* 30.8*   RDW % 14.9* 14.9*   PLATELETS AUTO x10*3/uL 313 299         INR:        Lipid Profile:        No lab exists for component: \"LABVLDL\"    HgbA1C:        CMP:  Results from last 7 days   Lab Units 01/17/24  0822 01/15/24  1214   SODIUM mmol/L 135* 134*   POTASSIUM mmol/L 3.8 4.0   CHLORIDE mmol/L 99 100   CO2 mmol/L 28 27   BUN mg/dL 16 13   CREATININE mg/dL 0.88 0.72   GLUCOSE mg/dL 167* 136*   CALCIUM mg/dL 9.0 8.9   PROTEIN TOTAL g/dL 6.4  --    BILIRUBIN TOTAL mg/dL 0.3  --    ALK PHOS U/L 169*  --    AST U/L 23  --    ALT U/L 45  --          ABG:        No lab exists for component: \"PO2\", \"PCO2\", \"HCO3\", \"BE\", \"O2SAT\"    TSH:  No results found for: \"TSH\"    Lab Results   Component Value Date    QVAHMLER95 322 01/05/2024     Lab Results   Component Value Date    FOLATE 5.1 01/05/2024     Lab " Results   Component Value Date    VITD25 33 01/05/2024         No MRI head results found for the past 12 months     No CT head results found for the past 12 months     CT abdomen pelvis wo IV contrast    Result Date: 12/31/2023  STUDY: CT Abdomen and Pelvis without IV Contrast; 12/31/2023 at 5:11 PM. INDICATION: Abdominal pain. COMPARISON: CT AP 12/19/2023 ACCESSION NUMBER(S): [id number] ORDERING CLINICIAN: WILLIAM EDMONDS TECHNIQUE: CT of the abdomen and pelvis was performed.  Contiguous axial images were obtained at 3 mm slice thickness through the abdomen and pelvis. Coronal and sagittal reconstructions at 3 mm slice thickness were performed. No intravenous contrast was administered.  Automated mA/kV exposure control was utilized and patient examination was performed in strict accordance with principles of ALARA. FINDINGS: Please note that the evaluation of vessels, lymph nodes and organs is limited without intravenous contrast.  LOWER CHEST: No cardiomegaly.  No pericardial effusion.  Very small bilateral pleural effusions  ABDOMEN:  LIVER: No hepatomegaly.  Smooth surface contour.  Normal attenuation.  BILE DUCTS: No intrahepatic or extrahepatic biliary ductal dilatation.  GALLBLADDER: The gallbladder is absent. STOMACH: No abnormalities identified.  PANCREAS: Negative for pancreatitis  SPLEEN: No splenomegaly or focal splenic lesion.  ADRENAL GLANDS: No thickening or nodules.  KIDNEYS AND URETERS: Kidneys are normal in size and location.  No renal or ureteral calculi.  PELVIS:  BLADDER: No abnormalities identified.  REPRODUCTIVE ORGANS: No abnormalities identified.  BOWEL: Colonic diverticulosis.  Mild constipation without bowel obstruction. Negative for appendicitis  VESSELS: Limited due to lack of intravenous contrast.  Prior femoral to femoral artery bypass.  Abdominal aorta is normal in caliber.  PERITONEUM/RETROPERITONEUM/LYMPH NODES: Small amount of low-density free pelvic fluid  No pneumoperitoneum.  No lymphadenopathy.  ABDOMINAL WALL: Small fat-containing umbilical hernia. SOFT TISSUES: Postsurgical changes within the posterior lumbar soft tissues with surgical drains in place.  BONES: Status post interbody fusion of L4/5 and L5/S1 with metallic spacer. This space narrowing L3/4.  Laminectomies L3-L5.  Bone graft along the lateral margins of L3-L5 fracture of the right L3 transverse process. Fracture of the right L4 transverse process.  Old screw tract within the L4 and L5 pedicles.  Narrowing of the right L5/S1 neural foramen due to facet joint osteophyte.    Negative for bowel obstruction.  Mild constipation. Colonic diverticulosis without diverticulitis. Negative for appendicitis. Postsurgical changes lumbar spine removal of pedicle screws and posterior rods from L4 through S1.  Stable appearance of interbody spacers at L4/5 and L5/S1. Bone graft along the lateral margins of L3-L5.  Fractures of the right L4 and L3 transverse process.  The right L3 transverse process fracture appears old.  Postsurgical changes within the posterior lumbar soft tissues with surgical drains in place. Signed by Aung Sparrow MD    MR lumbar spine w and wo IV contrast    Result Date: 12/31/2023  Interpreted By:  Jonas Sanchez, STUDY: MR LUMBAR SPINE W AND WO IV CONTRAST;  12/31/2023 12:33 am   INDICATION: Signs/Symptoms:Pain.   COMPARISON: MRI of the lumbar spine dated 12/10/2023   ACCESSION NUMBER(S): [id number]   ORDERING CLINICIAN: YVETTE LEE   TECHNIQUE: Sagittal T1, T2, STIR, axial T1 and T2 weighted images of the lumbar spine were acquired. Additional axial and sagittal T1 weighted images of the lumbar spine were obtained after intravenous administration of 15.5 mL of contrast.   FINDINGS: Exam is degraded by motion.   There is again evidence of 5 lumbar type non rib-bearing vertebral bodies, with the lowest well-formed intervertebral disc space labeled L5-S1.   Postsurgical changes of posterior  decompression and laminectomies of L3 through L5 with posterior spinal fusion extending from L4 through S1 and discectomies with intervertebral disc spaces at L4-L5 and L5-S1. These are similar in appearance to prior examination on 12/10/2023. Susceptibility artifact from the surgical hardware somewhat limits evaluation of the adjacent structures.   In the interim since prior imaging on 12/10/2023, new postsurgical consistent with irrigation debridement of subfascial tissues of the cutaneous spine are evident. STIR and T2 hypointense signal abnormality previously seen in the cutaneous fat of the lower lumbar spine has resolved, with new 3.7 x 3.6 x 13.6 cm (series 7, image 10; series 10, image 10) T2 hyperintense fluid collection present, with slight peripheral enhancement. This collection may be contiguous with the skin.   T2 hyperintense collection is again present in the laminectomy surgical bed, abutting the thecal sac at the level of L3 through L5 (series 8, image 14), measuring up to 5.2 cm in craniocaudal dimension, 2.9 cm in transverse dimension and up to 1.5 cm in AP dimension (series 10, image 7). The surgical catheter previously seen within the collection is gone, although collection is decreased in size to previous imaging, with resolution of previously seen air within the collection. Mild surrounding edema and reactive soft tissue changes are present in the epidural space, somewhat increased in the interim since prior exam, with new/increased mass effect on the adjacent thecal sac.   There also appears to be small amount of new fluid present in the anterior epidural space at the level of L3 (series 10, image 3).   Although the exact characterization is difficult due to motion, there appears to be somewhat worsened spinal canal narrowing at the level of L2-L3 due to combination of new fluid in the anterior epidural space, and the T2 hyperintense fluid collection with associated inflammatory/reactive  changes along the posterior aspect of the thecal sac, with somewhat increased effacement of the subarachnoid space.   No new intra thecal enhancement is identified.   Neural foramina are not well assessed due to motion and susceptibility artifact from the surgical hardware.       1.  New surgical changes of irrigation and debridement in the laminectomy surgical bed evident, with previously seen geographic area of hypointense T2 and STIR signal in the cutaneous fat of the lumbar spine resolved, and new T2 hyperintense collection measuring 3.7 x 3.6 x 13.6 cm present in the cutaneous fat, likely representing a postsurgical seroma, although sterility can not be ascertained on imaging alone. This collection reaches of the dermis, and may drain at the skin. 2. T2 hyperintense collection within the laminectomy bed itself along the dorsal aspect of the thecal sac described on previous MRI in 12/10/2023 has mildly decreased in size from prior imaging, with interval removal of previously seen drain, although there are increased inflammatory/reactive soft tissue changes present in the laminectomy surgical bed, focus of fluid in the anterior epidural space at the level of L3 contributes to increased effacement of the thecal sac at the level of L3 compared to prior MRI.   MACRO: None   Signed by: Jonas Sanchez 12/31/2023 1:13 AM Dictation workstation:   [id number]    MR LUMBAR SPINE WO IV CONTRAST;  1/5/2024 8:48 pm      INDICATION:  Signs/Symptoms:intractable back pain s/o incision and drainage with  lumbar spine hardware removal.      COMPARISON:  Lumbar MRI dated 12/30/2022 and 12/10/2022. CT abdomen pelvis dated  12/31/2022.      ACCESSION NUMBER(S):  [id number]      ORDERING CLINICIAN:  ERICK KEN      TECHNIQUE:  Sagittal T2, T1, and STIR and axial T1, T2 sequences of the lumbar  spine. No intravenous contrast was administered.      FINDINGS:      Lumbar spine:  Normal lumbar lordosis. The last well-formed  disc is designated  L5-S1. Minimal L5 over S1 anterolisthesis unchanged from prior. There  is minimal L3 over L4 retrolisthesis unchanged from prior. There has  been interval removal of the bilateral transpedicular screws and  vertical rods at L4 through S1 with T2 hyperintense ghost tracts  noted. There are unchanged disc spacers at L4-L5 and L5-S1. There is  a subcutaneous drain in place extending cranially along the midline  to the level of L3 without associated fluid collection. This  surrounded by subcutaneous edema. There is also a right deep  paravertebral drain in place terminating in the right deep  paravertebral soft tissues at the level of L1-L2 without associated  fluid collection, sagittal image 11 of 25. There is bilateral  posterior paravertebral muscle edema without evidence of fluid  collection.      Vertebral body heights are maintained. 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 There is a T2 hyperintense  fluid collection within the left subarticular ventral epidural space  extending cranially at the level of L3 and which appears to be in  continuity with the L3-L4 disc space measuring 2.0 cm craniocaudally  and 1.0 x 0.7 cm axially, unchanged from the recent prior. This  contributes to mild effacement of the left subarticular space.              The conus medullaris terminates at L1 and is normal in appearance.      T12-L1: No significant disc bulge or herniation.  L1-L2: Is a diffuse disc bulge and facet hypertrophy contributing to  mild bilateral foraminal stenosis without significant central canal  stenosis. L2-L3: Small disc bulge and facet hypertrophy without  significant central or foraminal canal stenosis. L3-L4: There is  posterior laminectomy change without evidence of central canal  stenosis. There is mild retrolisthesis of L3 over L4 as well as T2  hyperintense fluid collection extending  superiorly from the left  subarticular space and contributing to mild left subarticular  stenosis as well as mild central canal stenosis at the level of L3  similar to prior. There is suspected moderate to severe right  foraminal stenosis due to mild retrolisthesis with facet hypertrophy  as well as bone marrow edema at the right synovial facets, for  example sagittal image 9 of 25. There is also mild edema within the  right psoas muscle at the level of L3-L4, axial image 4 of 27 without  evidence of a fluid collection. There are bilateral right greater  than left facet effusions at L3-L4 and possibility of septic  arthritis can not be excluded. There is mild to moderate left  foraminal stenosis. L4-L5: There are right-sided facetectomy changes  with with hazy fluid within the surgical bed which may be expected.  Posterior laminectomy change. There is mild bilateral foraminal  narrowing. No central canal stenosis. L5-S1: There are right-sided  facetectomy changes with hazy fluid within the surgical bed, which  may be expected. Posterior laminectomy change. Mild bilateral  foraminal narrowing. No central canal stenosis.      IMPRESSION:  Interval removal of bilateral transpedicular screws and vertical rods  at L3 through S1 and interval resolution of posterior paravertebral  fluid collections. Lumbar drains within the subcutaneous and deep  posterior paravertebral soft tissues as described above with  associated soft tissue edema, however no evidence of surrounding  fluid collection.      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 There is minimal L3 over L4  retrolisthesis as well as facet hypertrophy, which contributes to  moderate to severe right foraminal stenosis at L3-L4. There is also  asymmetric right-sided facet effusion and right subchondral edema at  L3-L4 facets and asymmetric right psoas edema at L3-L4 concerning for  septic arthritis.       There is unchanged ventral epidural collection on the left at the  level of L3 which may be communicating with the L3-L4 disc. The  sterility of this collection can not be determined given multiple  prior surgeries. It contributes to unchanged mild central canal  stenosis and left subarticular stenosis at L3-L4.      Signed by: Marco Valles 1/6/2024 12:46 AM  Dictation workstation:   [id number]  EMG     No EMG results found for the past 12 months        No EEG results found for the past 12 months      Current Facility-Administered Medications:     acetaminophen (Tylenol) tablet 650 mg, 650 mg, oral, q4h PRN, 650 mg at 01/05/24 2104 **OR** acetaminophen (Tylenol) oral liquid 650 mg, 650 mg, oral, q4h PRN **OR** acetaminophen (Tylenol) suppository 650 mg, 650 mg, rectal, q4h PRN, Maribeth Santamaria, APRN-CNP    acetaminophen (Tylenol) tablet 650 mg, 650 mg, oral, q6h, DARRYL Quiros-CNP, 650 mg at 01/17/24 1632    alteplase (Cathflo Activase) injection 1 mg, 1 mg, intra-catheter, PRN, Nitesh Ruiz MD, 1 mg at 01/07/24 1150    apixaban (Eliquis) tablet 5 mg, 5 mg, oral, BID, Edi Olguin, APRN-CNP, 5 mg at 01/17/24 0835    aspirin EC tablet 81 mg, 81 mg, oral, Daily, John Salazar MD, 81 mg at 01/17/24 0835    atenolol (Tenormin) tablet 50 mg, 50 mg, oral, q AM, John Salazar MD, 50 mg at 01/17/24 0835    baclofen (Lioresal) tablet 5 mg, 5 mg, oral, 4x daily, Onur Perez MD, 5 mg at 01/17/24 1632    brimonidine (AlphaGAN) 0.2 % ophthalmic solution 1 drop, 1 drop, Both Eyes, BID, John Salazar MD, 1 drop at 01/17/24 0838    busPIRone (Buspar) tablet 10 mg, 10 mg, oral, Daily, John Salazar MD, 10 mg at 01/17/24 0835    ceftaroline (Teflaro) 600 mg in dextrose 5 % in water (D5W) 50 mL IV, 600 mg, intravenous, q12h, Gem Reyna MD, Last Rate: 70 mL/hr at 01/17/24 1632, 600 mg at 01/17/24 1632    cyclobenzaprine (Flexeril) tablet 10 mg, 10 mg, oral, TID PRN, DARRYL Quiros-CNP, 10 mg at  01/12/24 2030    DAPTOmycin (Cubicin) 650 mg in sodium chloride 0.9% 50 mL IV, 8 mg/kg, intravenous, Nightly, Gem Reyna MD, Stopped at 01/16/24 2228    docusate sodium (Colace) capsule 100 mg, 100 mg, oral, BID, Caitlin Li, DARRYL-CNP, 100 mg at 01/17/24 0835    ezetimibe (Zetia) tablet 10 mg, 10 mg, oral, Daily, John Salazar MD, 10 mg at 01/17/24 0836    furosemide (Lasix) tablet 20 mg, 20 mg, oral, Daily, John Salazar MD, 20 mg at 01/17/24 0835    hydrALAZINE (Apresoline) tablet 25 mg, 25 mg, oral, BID, Stacey Bliss APRN-CNP    HYDROmorphone (Dilaudid) tablet 2 mg, 2 mg, oral, q4h PRN, Nitesh Ruiz MD, 2 mg at 01/16/24 2038    isosorbide mononitrate ER (Imdur) 24 hr tablet 60 mg, 60 mg, oral, Daily, John Salazar MD, 60 mg at 01/17/24 0641    lactobacillus acidophilus tablet 1 tablet, 1 tablet, oral, Daily, John Salazar MD, 1 tablet at 01/17/24 0835    lidocaine 4 % patch 1 patch, 1 patch, transdermal, Daily, ORLANDO Quiros, 1 patch at 01/17/24 0800    lidocaine 4 % patch 1 patch, 1 patch, transdermal, Daily, Onur Perez MD, 1 patch at 01/16/24 0818    meclizine (Antivert) tablet 25 mg, 25 mg, oral, q8h PRN, John Salazar MD    melatonin tablet 3 mg, 3 mg, oral, Daily, OR[place]O Quiros, 3 mg at 01/16/24 1753    mirtazapine (Remeron) tablet 15 mg, 15 mg, oral, Nightly, John Salazar MD, 15 mg at 01/16/24 2039    morphine CR (MS Contin) 12 hr tablet 15 mg, 15 mg, oral, q12h EDE, Panchito Barnes MD, 15 mg at 01/17/24 0835    morphine injection 2 mg, 2 mg, intravenous, q2h PRN, ORLANDO Quiros, 2 mg at 01/12/24 1550    mupirocin (Bactroban) 2 % ointment, , Topical, BID, Roland J Reyes, MD, Given at 01/17/24 0838    naloxone (Narcan) injection 0.2 mg, 0.2 mg, intravenous, q5 min PRN, ORLANDO Quiros    ondansetron (Zofran) tablet 4 mg, 4 mg, oral, q8h PRN, 4 mg at 12/31/23 0904 **OR** ondansetron (Zofran) injection 4 mg, 4 mg, intravenous, q8h PRN,  DARRYL Quiros-CNP, 4 mg at 01/16/24 2046    oxyCODONE (Roxicodone) immediate release tablet 10 mg, 10 mg, oral, q6h PRN, Nitesh Ruiz MD, 10 mg at 01/17/24 1333    pantoprazole (ProtoNix) EC tablet 40 mg, 40 mg, oral, Daily before breakfast, John Salazar MD, 40 mg at 01/17/24 0642    pilocarpine (Salagen) tablet 5 mg, 5 mg, oral, Daily, John Salazar MD, 5 mg at 01/17/24 0836    polyethylene glycol (Glycolax, Miralax) packet 17 g, 17 g, oral, Daily, ORLANDO Quiros, 17 g at 01/17/24 0836    pregabalin (Lyrica) capsule 100 mg, 100 mg, oral, BID, Panchito Barnes MD, 100 mg at 01/17/24 0835    vitamin A & D ointment 1 Application, 1 Application, Topical, q4h PRN, Nitesh Ruiz MD, 1 Application at 01/10/24 2155         Assessment:  Patient has a chronic back pain.  She has a lumbar spine surgery in November 2023.  Dr. Coombs is following the patient.  Patient developed a postop staph infection with MRSA.  She has the antibiotics, incision and drainage.  Pain in the back is more on the right side along with pain in the right thigh and right hip.  Imaging of the right hip did not show significant pathology.  Pain exacerbation with the L3-4 discitis and osteomyelitis.  Infectious disease on consult antibiotics have been adjusted, patient is on Cubicin  Patient is improving with the movements in the legs.  Pain was worse today  Patient is getting a lot of analgesics.  Dr. Barnes is on consult  Right leg DVT she has an inferior vena cava filter put in January 2.  She is on Eliquis  Right carotid bruit, right carotid endarterectomy by Dr. Edwards.  History of right cerebral TIA  Coronary artery disease  Cardiomyopathy  Bilateral total knee replacements.  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  S/p bilateral leg revascularization June 2022  Hypertension  Chronic edema  COPD with history of tobacco abuse in the past  Overweight  Vitamin D deficiency has been  treated  Lidoderm patch helping   additional lab work showed a borderline elevated TSH, B12, folic acid level, vitamin D in good range  With a better pain control she can start increasing her activity.  Rehab to increase activity as tolerated  baclofen low-dose was added to her regimen without any side effects, patient improved as far as pasms are concerned    Recommendation:  Continue Lidoderm patch   Additional lab work showed a borderline elevated TSH, B12, folic acid level, vitamin D in good range  White cell count has been in good range  With a better pain control she can start increasing her activity.  Rehab to increase activity as tolerated  baclofen low-dose was added to her regimen without any side effects, patient improved as far as spasms are concerned  Discharge planning in progress     Lata Perez MD

## 2024-01-18 ENCOUNTER — PHARMACY VISIT (OUTPATIENT)
Dept: PHARMACY | Facility: CLINIC | Age: 69
End: 2024-01-18
Payer: COMMERCIAL

## 2024-01-18 ENCOUNTER — HOME CARE VISIT (OUTPATIENT)
Dept: HOME HEALTH SERVICES | Facility: HOME HEALTH | Age: 69
End: 2024-01-18
Payer: COMMERCIAL

## 2024-01-18 VITALS
HEART RATE: 72 BPM | RESPIRATION RATE: 20 BRPM | DIASTOLIC BLOOD PRESSURE: 52 MMHG | OXYGEN SATURATION: 94 % | TEMPERATURE: 97.2 F | SYSTOLIC BLOOD PRESSURE: 134 MMHG

## 2024-01-18 VITALS
DIASTOLIC BLOOD PRESSURE: 52 MMHG | BODY MASS INDEX: 37.19 KG/M2 | HEART RATE: 70 BPM | TEMPERATURE: 97.2 F | OXYGEN SATURATION: 96 % | SYSTOLIC BLOOD PRESSURE: 110 MMHG | WEIGHT: 172.4 LBS | HEIGHT: 57 IN | RESPIRATION RATE: 18 BRPM

## 2024-01-18 PROCEDURE — 2500000004 HC RX 250 GENERAL PHARMACY W/ HCPCS (ALT 636 FOR OP/ED): Performed by: INTERNAL MEDICINE

## 2024-01-18 PROCEDURE — 2500000001 HC RX 250 WO HCPCS SELF ADMINISTERED DRUGS (ALT 637 FOR MEDICARE OP): Performed by: REGISTERED NURSE

## 2024-01-18 PROCEDURE — 1800000001 HC LEAVE OF ABSENSE - GENERAL CLASSIFICATION

## 2024-01-18 PROCEDURE — 2500000001 HC RX 250 WO HCPCS SELF ADMINISTERED DRUGS (ALT 637 FOR MEDICARE OP): Performed by: SPECIALIST

## 2024-01-18 PROCEDURE — 2500000005 HC RX 250 GENERAL PHARMACY W/O HCPCS

## 2024-01-18 PROCEDURE — 2500000004 HC RX 250 GENERAL PHARMACY W/ HCPCS (ALT 636 FOR OP/ED): Performed by: HOSPITALIST

## 2024-01-18 PROCEDURE — 2500000005 HC RX 250 GENERAL PHARMACY W/O HCPCS: Performed by: SPECIALIST

## 2024-01-18 PROCEDURE — 2500000001 HC RX 250 WO HCPCS SELF ADMINISTERED DRUGS (ALT 637 FOR MEDICARE OP): Performed by: NURSE PRACTITIONER

## 2024-01-18 PROCEDURE — 99231 SBSQ HOSP IP/OBS SF/LOW 25: CPT | Performed by: PLASTIC SURGERY

## 2024-01-18 PROCEDURE — G0299 HHS/HOSPICE OF RN EA 15 MIN: HCPCS | Mod: HHH

## 2024-01-18 PROCEDURE — RXMED WILLOW AMBULATORY MEDICATION CHARGE

## 2024-01-18 PROCEDURE — 1090000001 HH PPS REVENUE CREDIT

## 2024-01-18 PROCEDURE — 2500000001 HC RX 250 WO HCPCS SELF ADMINISTERED DRUGS (ALT 637 FOR MEDICARE OP): Performed by: HOSPITALIST

## 2024-01-18 PROCEDURE — 2500000001 HC RX 250 WO HCPCS SELF ADMINISTERED DRUGS (ALT 637 FOR MEDICARE OP): Performed by: ANESTHESIOLOGY

## 2024-01-18 PROCEDURE — 2500000004 HC RX 250 GENERAL PHARMACY W/ HCPCS (ALT 636 FOR OP/ED)

## 2024-01-18 PROCEDURE — 2500000001 HC RX 250 WO HCPCS SELF ADMINISTERED DRUGS (ALT 637 FOR MEDICARE OP): Performed by: STUDENT IN AN ORGANIZED HEALTH CARE EDUCATION/TRAINING PROGRAM

## 2024-01-18 PROCEDURE — 1090000002 HH PPS REVENUE DEBIT

## 2024-01-18 PROCEDURE — 2500000002 HC RX 250 W HCPCS SELF ADMINISTERED DRUGS (ALT 637 FOR MEDICARE OP, ALT 636 FOR OP/ED): Performed by: HOSPITALIST

## 2024-01-18 RX ORDER — MORPHINE SULFATE 15 MG/1
15 TABLET, FILM COATED, EXTENDED RELEASE ORAL EVERY 12 HOURS SCHEDULED
Qty: 60 TABLET | Refills: 0 | Status: SHIPPED | OUTPATIENT
Start: 2024-01-18 | End: 2024-02-07 | Stop reason: HOSPADM

## 2024-01-18 RX ORDER — MORPHINE SULFATE 15 MG/1
15 TABLET, FILM COATED, EXTENDED RELEASE ORAL EVERY 12 HOURS SCHEDULED
Qty: 60 TABLET | Refills: 0 | Status: SHIPPED | OUTPATIENT
Start: 2024-01-18 | End: 2024-01-18 | Stop reason: SDUPTHER

## 2024-01-18 RX ORDER — MUPIROCIN 20 MG/G
OINTMENT TOPICAL 2 TIMES DAILY
Qty: 15 G | Refills: 0 | Status: SHIPPED | OUTPATIENT
Start: 2024-01-18 | End: 2024-02-07 | Stop reason: HOSPADM

## 2024-01-18 RX ORDER — LIDOCAINE 560 MG/1
1 PATCH PERCUTANEOUS; TOPICAL; TRANSDERMAL DAILY
Qty: 20 PATCH | Refills: 0 | Status: SHIPPED | OUTPATIENT
Start: 2024-01-19 | End: 2024-02-07 | Stop reason: HOSPADM

## 2024-01-18 RX ORDER — BACLOFEN 10 MG/1
5 TABLET ORAL 4 TIMES DAILY
Qty: 20 TABLET | Refills: 0 | Status: SHIPPED | OUTPATIENT
Start: 2024-01-18 | End: 2024-01-26 | Stop reason: SDUPTHER

## 2024-01-18 RX ADMIN — DOCUSATE SODIUM 100 MG: 100 CAPSULE, LIQUID FILLED ORAL at 09:53

## 2024-01-18 RX ADMIN — ONDANSETRON 4 MG: 2 INJECTION INTRAMUSCULAR; INTRAVENOUS at 07:24

## 2024-01-18 RX ADMIN — BUSPIRONE HYDROCHLORIDE 10 MG: 5 TABLET ORAL at 09:53

## 2024-01-18 RX ADMIN — Medication 1 TABLET: at 09:53

## 2024-01-18 RX ADMIN — ACETAMINOPHEN 650 MG: 325 TABLET ORAL at 15:11

## 2024-01-18 RX ADMIN — MORPHINE SULFATE 15 MG: 15 TABLET, EXTENDED RELEASE ORAL at 09:53

## 2024-01-18 RX ADMIN — ACETAMINOPHEN 650 MG: 325 TABLET ORAL at 09:53

## 2024-01-18 RX ADMIN — BACLOFEN 5 MG: 10 TABLET ORAL at 13:59

## 2024-01-18 RX ADMIN — BRIMONIDINE TARTRATE 1 DROP: 2 SOLUTION OPHTHALMIC at 09:00

## 2024-01-18 RX ADMIN — PANTOPRAZOLE SODIUM 40 MG: 40 TABLET, DELAYED RELEASE ORAL at 06:55

## 2024-01-18 RX ADMIN — ASPIRIN 81 MG: 81 TABLET, COATED ORAL at 09:53

## 2024-01-18 RX ADMIN — LIDOCAINE 1 PATCH: 4 PATCH TOPICAL at 09:53

## 2024-01-18 RX ADMIN — HYDROMORPHONE HYDROCHLORIDE 2 MG: 2 TABLET ORAL at 09:53

## 2024-01-18 RX ADMIN — ISOSORBIDE MONONITRATE 60 MG: 60 TABLET, EXTENDED RELEASE ORAL at 06:54

## 2024-01-18 RX ADMIN — APIXABAN 5 MG: 5 TABLET, FILM COATED ORAL at 09:53

## 2024-01-18 RX ADMIN — BACLOFEN 5 MG: 10 TABLET ORAL at 06:54

## 2024-01-18 RX ADMIN — HYDROMORPHONE HYDROCHLORIDE 2 MG: 2 TABLET ORAL at 15:11

## 2024-01-18 RX ADMIN — ATENOLOL 50 MG: 50 TABLET ORAL at 09:53

## 2024-01-18 RX ADMIN — LIDOCAINE 1 PATCH: 4 PATCH TOPICAL at 09:54

## 2024-01-18 RX ADMIN — MUPIROCIN: 20 OINTMENT TOPICAL at 09:00

## 2024-01-18 RX ADMIN — EZETIMIBE 10 MG: 10 TABLET ORAL at 09:53

## 2024-01-18 RX ADMIN — HYDRALAZINE HYDROCHLORIDE 25 MG: 25 TABLET ORAL at 09:53

## 2024-01-18 RX ADMIN — CEFTAROLINE FOSAMIL 600 MG: 600 POWDER, FOR SOLUTION INTRAVENOUS at 04:11

## 2024-01-18 RX ADMIN — ACETAMINOPHEN 650 MG: 325 TABLET ORAL at 04:09

## 2024-01-18 RX ADMIN — FUROSEMIDE 20 MG: 40 TABLET ORAL at 09:53

## 2024-01-18 RX ADMIN — PREGABALIN 100 MG: 50 CAPSULE ORAL at 09:52

## 2024-01-18 ASSESSMENT — ENCOUNTER SYMPTOMS
PERSON REPORTING PAIN: PATIENT
PAIN LOCATION - PAIN SEVERITY: 10/10
PAIN: 1
PAIN LOCATION: BACK

## 2024-01-18 ASSESSMENT — PAIN SCALES - GENERAL
PAINLEVEL_OUTOF10: 0 - NO PAIN
PAINLEVEL_OUTOF10: 0 - NO PAIN
PAINLEVEL_OUTOF10: 10 - WORST POSSIBLE PAIN

## 2024-01-18 ASSESSMENT — PAIN - FUNCTIONAL ASSESSMENT
PAIN_FUNCTIONAL_ASSESSMENT: 0-10
PAIN_FUNCTIONAL_ASSESSMENT: 0-10

## 2024-01-18 NOTE — PROGRESS NOTES
Nutrition Progress Note    Pt with planned discharge today to home. RD visit brief, pt denies current nutrition needs. Says has been eating at least 50% of meals, meal records show has been eating % of meals last 2 days. Discussed continuing snacks between meals to help with wound healing. Pt looking forward to homemade meals.

## 2024-01-18 NOTE — PROGRESS NOTES
DAILY PROGRESS NOTE      POD17 Lumbar washout and hardware removal     Patient doing well  Visit Vitals  /69   Pulse 68   Temp 36.7 °C (98.1 °F)   Resp 18      Temp (24hrs), Av.5 °C (97.7 °F), Min:36.2 °C (97.2 °F), Max:36.7 °C (98.1 °F)       Pain Control significant improvement. Reports very minimal lower extremity pain at this time.   No chest pain or shortness of breath.  No calf pain    Exam:   Good bilateral lower extremity strength and sensation.   Extremity shows neuro vascular status intact. Flexion and extension intact on extremity.  Calves soft and non-tender without evidence of DVT.  Deep JOSE drain with 9 ml serous drainage  Superficial JOSE drain with 1 ml serous drainage          Labs reviewed:  CBC: @LABRCNT(WBC:3,HGB:3,PLT:3)@  BMP:  @LABRCNT(Na:3,K:3,CL:3,CO2:3,BUN:3,Creatinine:3,Glucose:3)@    I&O  I/O last 3 completed shifts:  In: 1146 (14.7 mL/kg) [P.O.:740; IV Piggyback:406]  Out: 2569 (32.9 mL/kg) [Urine:2500 (0.9 mL/kg/hr); Drains:69]  Weight: 78.2 kg       Assessment:    I&D lumbar spine  Second antibiotic added due to patient having worsening symptoms with pain.   Nausea and vomiting have significantly improved.   Patient reports intermittent increasing pain primarily upon waking up and when sitting in chair.  She is very anxious about returning home however reports that she wants to go home because she will do better there.  Her fear is that she is going to have pain and return back to the hospital for admission.  Explained to patient that pain is to be expected due to the type of infection including discitis and osteomyelitis.  Explained to her that the pain is not going to totally be relieved and discussed nonpharmacological pain relief such as deep breathing exercises and ice.    Plan:    Continue with therapy   Brace  with ambulation. Does not have to wear brace when in chair.   JOSE drains to remain in place until there is 0 output for 48 hours   Continue IV antibiotics per  ID  Continue pain control per pain management   Cannot shower until drainage has stopped   KUB to rule out ileus: negative   Encourage ambulation   Home care orders placed, recommend infectious disease to fill out iv antibiotic infusion section to include lab draws and picc line management including dressing changes.   Wound care per Dr. Reyes : Bactroban ointment dressing twice daily per Dr. Ortega's recommendations.  May eventually require wound VAC if drainage persists.    Patient can discharge to home from an orthopedic standpoint.  Waiting for infectious disease to order home-going antibiotics.  Have reached out to ID awaiting response.      Patient will be discharged to home today. Home care is set up to meet her at her home at 1500 today.     Edi Olguin, APRN-CNP   1/18/2024 9:04 AM    IAnd ice

## 2024-01-18 NOTE — PROGRESS NOTES
Plastic Surgery Note    Afebrile, vital signs stable  Midline back incision with no drainage on dressing that was placed last night  Impression: Open wound midline of back  Continue current dressing changes

## 2024-01-18 NOTE — CARE PLAN
The patient's goals for the shift include      The clinical goals for the shift include Patient will report less pain throughout shift.    Over the shift, the patient did not make progress toward the following goals. Barriers to progression include . Recommendations to address these barriers include .

## 2024-01-18 NOTE — DISCHARGE SUMMARY
Discharge summary  This patient Tara Tierney was admitted to the hospital on 12/30/2023  after undergoing Procedure(s) (LRB):  IVC Filter Insertion (N/A) without complications that morning.    During the postoperative period,while in hospital, patient was medically managed by the hospitalist. Please see medial notes and H&P for patients additional diagnoses.  Ortho agrees with all medical diagnoses and treatments while patient in hospital.  No significant or unexpected findings or abnormal ortho imaging were noted during the hospital stay    Hospital course      Patient tolerated surgical procedure well and there was no complications. Patient progressed adequately through their recovery during hospital stay including PT and rehabilitation.    Patient was then D/C on  to home  in stable condition.  Patient was instructed on the use of pain medications, the signs and symptoms of infection, and was given our number to call should they have any questions or concerns following discharge.    Based on my clinical judgment, the patient was provided with a 7-day prescription for opioid medication at 30 MED, indicated for treatment of acute pain in the setting of recent surgery. OARRS report was run and has demonstrated an appropriate time course.  The patient has been provided with counseling pertaining to safe use of opioid medication.      Patient may ambulate with back brace in place no heavy lifting, bending, twisting.   Bactroban dressing changes twice a day.   Follow up with surgeon in 2 weeks

## 2024-01-19 PROCEDURE — 1090000001 HH PPS REVENUE CREDIT

## 2024-01-19 PROCEDURE — 1090000002 HH PPS REVENUE DEBIT

## 2024-01-19 ASSESSMENT — ENCOUNTER SYMPTOMS
APPETITE LEVEL: FAIR
MUSCLE WEAKNESS: 1

## 2024-01-19 ASSESSMENT — ACTIVITIES OF DAILY LIVING (ADL)
ENTERING_EXITING_HOME: MAXIMUM ASSIST
OASIS_M1830: 03

## 2024-01-19 NOTE — HOME HEALTH
JAKY completed on pt with recent hospitalization for 4th back surgery. Pt is home now with changed IVAB.  is pcg and changes dressings to pt's back surgical wound and administered IVAB. [facility] method teach and return demonstration sufficient as  has done this prior. VSS, pt c/o pain to back and is taking pain medication as ordered. PICC line dressing/extension/cap change performed as it has not been done since 1/4. Med profile and allergies reviewed and updated, no discrepancies found. No further needs at this visit.

## 2024-01-20 PROCEDURE — 1090000002 HH PPS REVENUE DEBIT

## 2024-01-20 PROCEDURE — 1090000001 HH PPS REVENUE CREDIT

## 2024-01-21 PROCEDURE — 1090000002 HH PPS REVENUE DEBIT

## 2024-01-21 PROCEDURE — 1090000001 HH PPS REVENUE CREDIT

## 2024-01-22 ENCOUNTER — HOME CARE VISIT (OUTPATIENT)
Dept: HOME HEALTH SERVICES | Facility: HOME HEALTH | Age: 69
End: 2024-01-22
Payer: COMMERCIAL

## 2024-01-22 ENCOUNTER — HOME INFUSION (OUTPATIENT)
Dept: INFUSION THERAPY | Age: 69
End: 2024-01-22
Payer: COMMERCIAL

## 2024-01-22 ENCOUNTER — DOCUMENTATION (OUTPATIENT)
Dept: PHARMACY | Facility: CLINIC | Age: 69
End: 2024-01-22

## 2024-01-22 PROCEDURE — 1090000002 HH PPS REVENUE DEBIT

## 2024-01-22 PROCEDURE — 1090000001 HH PPS REVENUE CREDIT

## 2024-01-22 NOTE — PROGRESS NOTES
SPOKE W/ PT - DELIVERY IS SCHEDULED FOR MONDAY/THURSDAY BY 9 PM.  ASKED PT IF THERE ARE ANY QUESTIONS TO A PHARMACIST. PT SAID: NO QUESTIONS.

## 2024-01-22 NOTE — PROGRESS NOTES
Tara Tierney is receiving Teflaro and daptomycin for deep surgical wound infection thru 2/28     Followed by Dr Echeverria/Axel    No new labs to review     RX contacted pt, confirmed infusions going well and inventory in home thru today. She is agreeable to another delivery today and Thurs 1/25 for meds with supplies/flushes to match.    Dispensing 1/22 with supplies to match   6x Teflaro HP  3x dapto HP  DOS 1/23-1/25    Dispensing 1/25 with supplies to match   8x Teflaro HP  4x dapto HP  DOS 1/26-1/29    Follow up 1/29 check labs, send straight (3/4)

## 2024-01-23 ENCOUNTER — HOME CARE VISIT (OUTPATIENT)
Dept: HOME HEALTH SERVICES | Facility: HOME HEALTH | Age: 69
End: 2024-01-23
Payer: COMMERCIAL

## 2024-01-23 PROCEDURE — G0156 HHCP-SVS OF AIDE,EA 15 MIN: HCPCS | Mod: HHH

## 2024-01-23 PROCEDURE — 0023 HH SOC

## 2024-01-23 PROCEDURE — 1090000002 HH PPS REVENUE DEBIT

## 2024-01-23 PROCEDURE — G0151 HHCP-SERV OF PT,EA 15 MIN: HCPCS | Mod: HHH

## 2024-01-23 PROCEDURE — 1090000001 HH PPS REVENUE CREDIT

## 2024-01-23 ASSESSMENT — ENCOUNTER SYMPTOMS
PAIN LOCATION - PAIN SEVERITY: 8/10
PAIN: 1
LOWEST PAIN SEVERITY IN PAST 24 HOURS: 8/10
PERSON REPORTING PAIN: PATIENT
PAIN LOCATION: BACK
HIGHEST PAIN SEVERITY IN PAST 24 HOURS: 10/10
SUBJECTIVE PAIN PROGRESSION: UNCHANGED

## 2024-01-23 ASSESSMENT — ACTIVITIES OF DAILY LIVING (ADL)
AMBULATION ASSISTANCE ON FLAT SURFACES: 1
AMBULATION_DISTANCE/DURATION_TOLERATED: 2'

## 2024-01-24 PROCEDURE — 1090000001 HH PPS REVENUE CREDIT

## 2024-01-24 PROCEDURE — 1090000002 HH PPS REVENUE DEBIT

## 2024-01-24 NOTE — DISCHARGE SUMMARY
Discharge Diagnosis  Generalized weakness    Issues Requiring Follow-Up  Back pain,  Post operative epidural abscess d/t MRSA on vancomycin  Acute RLE peroneal vein DVT and acute lobar and segmental PE in RLL/RML   E. Coli UTI     Discharge Meds     Your medication list        START taking these medications        Instructions Last Dose Given Next Dose Due   amoxicillin-pot clavulanate 875-125 mg tablet  Commonly known as: Augmentin      Take 1 tablet (875 mg) by mouth 2 times a day for 7 days.       lactobacillus acidophilus tablet tablet      Take 1 tablet by mouth once daily. Do not start before December 21, 2023.       magnesium citrate solution      Take 296 mL by mouth once daily for 2 days.       morphine CR 15 mg 12 hr tablet  Commonly known as: MS Contin      Take 1 tablet (15 mg) by mouth every 12 hours for 7 days. Do not crush, chew, or split.              CHANGE how you take these medications        Instructions Last Dose Given Next Dose Due   cyclobenzaprine 5 mg tablet  Commonly known as: Flexeril  What changed: when to take this      Take 1 tablet (5 mg) by mouth 3 times a day as needed for muscle spasms.       vancomycin IVPB  Commonly known as: Vancocin  What changed: Another medication with the same name was added. Make sure you understand how and when to take each.      Infuse 200 mL (1 g) at 200 mL/hr over 60 minutes into a venous catheter every 12 hours. CBC BMP weekly  Vanco level weekly  CRP sed rate in 3 and 6 weeks  Fax results to [id number]       vancomycin 1250 mg/250 mL IV  Commonly known as: Vancocin  What changed: You were already taking a medication with the same name, and this prescription was added. Make sure you understand how and when to take each.      Infuse 250 mL (1,250 mg) at 200 mL/hr over 75 minutes into a venous catheter once every 24 hours.              CONTINUE taking these medications        Instructions Last Dose Given Next Dose Due   apixaban 5 mg tablet  Commonly  known as: Eliquis      Take 2 tablets (10 mg) by mouth 2 times a day for 13 doses.       apixaban 5 mg tablet  Commonly known as: Eliquis  Start taking on: December 25, 2023      Take 1 tablet (5 mg) by mouth 2 times a day. Do not start before December 25, 2023.       aspirin 81 mg EC tablet           atenolol 50 mg tablet  Commonly known as: Tenormin           Atrovent HFA 17 mcg/actuation inhaler  Generic drug: ipratropium           brimonidine 0.2 % ophthalmic solution  Commonly known as: AlphaGAN           busPIRone 10 mg tablet  Commonly known as: Buspar           cholecalciferol 50 mcg (2,000 unit) capsule  Commonly known as: Vitamin D-3           Claritin 10 mg tablet  Generic drug: loratadine           docusate sodium 100 mg capsule  Commonly known as: Colace      Take 1 capsule (100 mg) by mouth 2 times a day.       ezetimibe 10 mg tablet  Commonly known as: Zetia           furosemide 20 mg tablet  Commonly known as: Lasix           hydrALAZINE 50 mg tablet  Commonly known as: Apresoline           ibandronate 150 mg tablet  Commonly known as: Boniva           isosorbide mononitrate ER 60 mg 24 hr tablet  Commonly known as: Imdur           LUTEIN ORAL           meclizine 25 mg tablet  Commonly known as: Antivert           nitroglycerin 0.4 mg SL tablet  Commonly known as: Nitrostat           pantoprazole 40 mg EC tablet  Commonly known as: ProtoNix           potassium chloride ER 10 mEq ER capsule  Commonly known as: Micro-K           pregabalin 75 mg capsule  Commonly known as: Lyrica           PreserVision AREDS-2 250-90-40-1 mg capsule  Generic drug: vit C,K-Qw-qkmwa-lutein-zeaxan           ProAir HFA 90 mcg/actuation inhaler  Generic drug: albuterol           Remeron 15 mg tablet  Generic drug: mirtazapine           Salagen (pilocarpine) 5 mg tablet  Generic drug: pilocarpine           simvastatin 40 mg tablet  Commonly known as: Zocor           tolterodine 1 mg tablet  Commonly known as: Detrol                   STOP taking these medications      phenazopyridine 95 mg tablet  Commonly known as: Urinary Pain Relief               ASK your doctor about these medications        Instructions Last Dose Given Next Dose Due   oxyCODONE 5 mg immediate release tablet  Commonly known as: Roxicodone  Ask about: Should I take this medication?      Take 1 tablet (5 mg) by mouth every 4 hours if needed for moderate pain (4 - 6) or severe pain (7 - 10) for up to 7 days.                 Where to Get Your Medications        These medications were sent to [id number] - [place], [place] - [address]  [address] [facility] [place] [address]      Phone: [phone]   amoxicillin-pot clavulanate 875-125 mg tablet  lactobacillus acidophilus tablet tablet  magnesium citrate solution       You can get these medications from any pharmacy    Bring a paper prescription for each of these medications  morphine CR 15 mg 12 hr tablet       Information about where to get these medications is not yet available    Ask your nurse or doctor about these medications  vancomycin 1250 mg/250 mL IV         Test Results Pending At Discharge  Pending Labs       No current pending labs.            Hospital Course   68 year old female presenting from [facility] with reported intractable back pain and left lower extremity pain.  CT scan showed extensive stool burden.  Pateint was given magnesium citrate with subsequent multiple bowel movements. Pain controlled appropriately   Vancomycin was continued to cover for organisms from abscess. Urology and orthopedic surgery consulted for assistance in management. UTI treated with Augmentin.  ID recommendations for vancomycin appreciated. Patient discharged with home health care and appropriate follow up    Principal Problem:    Generalized weakness      Pertinent Physical Exam At Time of Discharge  Physical Exam    Constitutional:       Appearance: Normal appearance. She is normal weight.   HENT:      Head:  Normocephalic and atraumatic.      Mouth/Throat:      Mouth: Mucous membranes are moist.   Eyes:      Extraocular Movements: Extraocular movements intact.      Pupils: Pupils are equal, round, and reactive to light.   Cardiovascular:      Rate and Rhythm: Normal rate and regular rhythm.      Pulses: Normal pulses.      Heart sounds: Normal heart sounds. No murmur heard.     No gallop.   Pulmonary:      Effort: Pulmonary effort is normal. No respiratory distress.      Breath sounds: Normal breath sounds. No wheezing, rhonchi or rales.   Abdominal:      General: Abdomen is flat. Bowel sounds are normal. There is no distension.      Palpations: Abdomen is soft. There is no mass.      Tenderness: There is no abdominal tenderness. There is no rebound.      Hernia: No hernia is present.      Comments: LLQ no longer tender to touch    Musculoskeletal:         General: No swelling, tenderness or signs of injury. Normal range of motion.      Cervical back: Normal range of motion and neck supple.      Right lower leg: No edema.      Left lower leg: No edema.   Skin:     General: Skin is warm and dry.      Capillary Refill: Capillary refill takes less than 2 seconds.      Findings: No bruising, erythema or rash.   Neurological:      General: No focal deficit present.      Mental Status: She is alert and oriented to person, place, and time.      Cranial Nerves: No cranial nerve deficit.      Sensory: No sensory deficit.      Motor: No weakness.   Psychiatric:         Mood and Affect: Mood normal.         Behavior: Behavior normal.     Outpatient Follow-Up  Future Appointments   Date Time Provider Department Center   1/25/2024 To Be Determined Stephanie García RN [facility]   1/25/2024 12:00 PM Chery John PTA [facility]   1/29/2024 To Be Determined Cece Suarez CNA [facility]   1/29/2024 To Be Determined Chery John PTA [facility]   1/30/2024  2:15 PM Gerson Coombs MD [id number] [place]   1/31/2024 To Be Determined  Chery John, [facility]   2/1/2024 To Be Determined Stephanie García RN [facility]   2/5/2024 To Be Determined Cece Suarez FADIA [facility]   2/5/2024 To Be Determined Chery John, [facility]   2/7/2024 To Be Determined Chery John, [facility]   2/8/2024 To Be Determined Sandy Nascimento RN [facility]   2/12/2024 To Be Determined Cece Suarez, [facility]   2/12/2024 To Be Determined Chery John, [facility]   2/14/2024 To Be Determined Christian Roy, PT [facility]   2/16/2024 To Be Determined Stephanie García, ROBIN [facility]   2/19/2024 To Be Determined Cece Suarez [facility]   3/14/2024  9:30 AM Hugo Barron MD [id number] [place]         Byron Jarquin MD

## 2024-01-25 ENCOUNTER — TELEPHONE (OUTPATIENT)
Dept: ORTHOPEDIC SURGERY | Facility: CLINIC | Age: 69
End: 2024-01-25
Payer: COMMERCIAL

## 2024-01-25 ENCOUNTER — HOME CARE VISIT (OUTPATIENT)
Dept: HOME HEALTH SERVICES | Facility: HOME HEALTH | Age: 69
End: 2024-01-25
Payer: COMMERCIAL

## 2024-01-25 ENCOUNTER — LAB REQUISITION (OUTPATIENT)
Dept: LAB | Facility: LAB | Age: 69
End: 2024-01-25
Payer: COMMERCIAL

## 2024-01-25 VITALS
DIASTOLIC BLOOD PRESSURE: 60 MMHG | HEART RATE: 66 BPM | TEMPERATURE: 97.9 F | OXYGEN SATURATION: 97 % | SYSTOLIC BLOOD PRESSURE: 146 MMHG

## 2024-01-25 DIAGNOSIS — T81.41XA INFECTION FOLLOWING A PROCEDURE, SUPERFICIAL INCISIONAL SURGICAL SITE, INITIAL ENCOUNTER: ICD-10-CM

## 2024-01-25 LAB
ANION GAP SERPL CALC-SCNC: 15 MMOL/L (ref 10–20)
BUN SERPL-MCNC: 20 MG/DL (ref 6–23)
CALCIUM SERPL-MCNC: 9.6 MG/DL (ref 8.6–10.3)
CHLORIDE SERPL-SCNC: 92 MMOL/L (ref 98–107)
CK SERPL-CCNC: 28 U/L (ref 0–215)
CO2 SERPL-SCNC: 27 MMOL/L (ref 21–32)
CREAT SERPL-MCNC: 0.99 MG/DL (ref 0.5–1.05)
EGFRCR SERPLBLD CKD-EPI 2021: 61 ML/MIN/1.73M*2
ERYTHROCYTE [DISTWIDTH] IN BLOOD BY AUTOMATED COUNT: 15.1 % (ref 11.5–14.5)
GLUCOSE SERPL-MCNC: 169 MG/DL (ref 74–99)
HCT VFR BLD AUTO: 35.9 % (ref 36–46)
HGB BLD-MCNC: 11.1 G/DL (ref 12–16)
MCH RBC QN AUTO: 29.4 PG (ref 26–34)
MCHC RBC AUTO-ENTMCNC: 30.9 G/DL (ref 32–36)
MCV RBC AUTO: 95 FL (ref 80–100)
NRBC BLD-RTO: 0 /100 WBCS (ref 0–0)
PLATELET # BLD AUTO: 353 X10*3/UL (ref 150–450)
POTASSIUM SERPL-SCNC: 3.7 MMOL/L (ref 3.5–5.3)
RBC # BLD AUTO: 3.78 X10*6/UL (ref 4–5.2)
SODIUM SERPL-SCNC: 130 MMOL/L (ref 136–145)
WBC # BLD AUTO: 7.6 X10*3/UL (ref 4.4–11.3)

## 2024-01-25 PROCEDURE — 85027 COMPLETE CBC AUTOMATED: CPT

## 2024-01-25 PROCEDURE — G0299 HHS/HOSPICE OF RN EA 15 MIN: HCPCS | Mod: HHH

## 2024-01-25 PROCEDURE — 1090000002 HH PPS REVENUE DEBIT

## 2024-01-25 PROCEDURE — G0157 HHC PT ASSISTANT EA 15: HCPCS | Mod: HHH

## 2024-01-25 PROCEDURE — 1090000001 HH PPS REVENUE CREDIT

## 2024-01-25 PROCEDURE — 82550 ASSAY OF CK (CPK): CPT

## 2024-01-25 PROCEDURE — 80048 BASIC METABOLIC PNL TOTAL CA: CPT

## 2024-01-25 PROCEDURE — G0180 MD CERTIFICATION HHA PATIENT: HCPCS | Performed by: ORTHOPAEDIC SURGERY

## 2024-01-25 SDOH — HEALTH STABILITY: PHYSICAL HEALTH: PHYSICAL EXERCISE: 15

## 2024-01-25 SDOH — HEALTH STABILITY: PHYSICAL HEALTH: EXERCISE TYPE: SEATED LE STRENGTHENING, STANDING PROGRAM

## 2024-01-25 SDOH — HEALTH STABILITY: PHYSICAL HEALTH: EXERCISE ACTIVITY: HRTR, MARCHING, LAQ

## 2024-01-25 SDOH — HEALTH STABILITY: PHYSICAL HEALTH: PHYSICAL EXERCISE: SEATED

## 2024-01-25 SDOH — HEALTH STABILITY: PHYSICAL HEALTH: EXERCISE ACTIVITIES SETS: 1

## 2024-01-25 ASSESSMENT — ENCOUNTER SYMPTOMS
MUSCLE WEAKNESS: 1
PAIN: 1
PERSON REPORTING PAIN: PATIENT
PAIN LOCATION - PAIN SEVERITY: 7/10
SUBJECTIVE PAIN PROGRESSION: UNCHANGED
PAIN LOCATION: BACK
APPETITE LEVEL: GOOD
PAIN LOCATION - PAIN QUALITY: SORE
PAIN LOCATION - PAIN SEVERITY: 6/10
PAIN LOCATION: BACK
PAIN LOCATION - PAIN FREQUENCY: CONSTANT
PAIN LOCATION - PAIN SEVERITY: 7/10
CHANGE IN APPETITE: UNCHANGED
PAIN: 1
PAIN LOCATION: BACK

## 2024-01-25 NOTE — TELEPHONE ENCOUNTER
Lizette a  home care nurse called to let us know that one of the 2 bethany drains is coming out , Rita ,and Lizette explained that the pt needed to go to the ER to get the bethany drain looked at . Per Rita ,Stephanie said that the  refused to take her to the her. I called and talked to both the pt and the  and her and the  was worried about transportation to the hospital because the pt can not stand by herself ,I told him to call [facility] or 911 on a non emergent sqaud  to transfer her to the er. Pt  appeared to be agreeable . States he will probably do it tomorrow because the pt had a long day. I told her  this can on;y be fixed by a hospital , her expressed understanding,

## 2024-01-26 ENCOUNTER — TELEPHONE (OUTPATIENT)
Dept: ORTHOPEDIC SURGERY | Facility: CLINIC | Age: 69
End: 2024-01-26
Payer: COMMERCIAL

## 2024-01-26 DIAGNOSIS — M54.16 LUMBAR RADICULOPATHY: ICD-10-CM

## 2024-01-26 DIAGNOSIS — T81.89XD LUMBAR SURGICAL WOUND FLUID COLLECTION, SUBSEQUENT ENCOUNTER: Primary | ICD-10-CM

## 2024-01-26 PROCEDURE — 1090000001 HH PPS REVENUE CREDIT

## 2024-01-26 PROCEDURE — 1090000002 HH PPS REVENUE DEBIT

## 2024-01-26 RX ORDER — BACLOFEN 10 MG/1
TABLET ORAL
Qty: 20 TABLET | Refills: 0 | Status: SHIPPED | OUTPATIENT
Start: 2024-01-26

## 2024-01-26 RX ORDER — OXYCODONE AND ACETAMINOPHEN 5; 325 MG/1; MG/1
1 TABLET ORAL EVERY 6 HOURS PRN
Qty: 28 TABLET | Refills: 0 | Status: ON HOLD | OUTPATIENT
Start: 2024-01-26 | End: 2024-02-05

## 2024-01-26 RX ORDER — OXYCODONE AND ACETAMINOPHEN 5; 325 MG/1; MG/1
1 TABLET ORAL EVERY 6 HOURS PRN
Qty: 28 TABLET | Refills: 0 | Status: CANCELLED | OUTPATIENT
Start: 2024-01-26 | End: 2024-02-02

## 2024-01-26 NOTE — TELEPHONE ENCOUNTER
Patient's  called states he went to  medication from pharmacy, the pharmacy only had muscle relaxer and not her pain medication. I'm unsure if one was supposed to be sent or not.

## 2024-01-26 NOTE — PROGRESS NOTES
Patient's  Jomar had called saying that he wanted Percocet instead of the OxyContin, I talked to Dr. Burciaga and he said he is willing to fill the Percocet if the OxyContin is surrendered to the pharmacy.  I talked to Regi at the [facility] pharmacy in the area and let her know that Dr. Schafer and she is going to send it over but the patient is to surrender the OxyContin.  Called the patient's  Jomar to let him know that he needs to take the OxyContin to the pharmacy  to surrender it and they will fill  the Percocet.  Also patient is to take the baclofen instead of tinazadine  is aware.

## 2024-01-27 ENCOUNTER — HOSPITAL ENCOUNTER (INPATIENT)
Facility: HOSPITAL | Age: 69
LOS: 11 days | Discharge: NURSING, REHAB OR LONG-TERM CARE | DRG: 856 | End: 2024-02-07
Attending: STUDENT IN AN ORGANIZED HEALTH CARE EDUCATION/TRAINING PROGRAM | Admitting: STUDENT IN AN ORGANIZED HEALTH CARE EDUCATION/TRAINING PROGRAM
Payer: COMMERCIAL

## 2024-01-27 ENCOUNTER — APPOINTMENT (OUTPATIENT)
Dept: CARDIOLOGY | Facility: HOSPITAL | Age: 69
DRG: 856 | End: 2024-01-27
Payer: COMMERCIAL

## 2024-01-27 ENCOUNTER — APPOINTMENT (OUTPATIENT)
Dept: RADIOLOGY | Facility: HOSPITAL | Age: 69
DRG: 856 | End: 2024-01-27
Payer: COMMERCIAL

## 2024-01-27 DIAGNOSIS — M79.604 PAIN IN BOTH LOWER EXTREMITIES: ICD-10-CM

## 2024-01-27 DIAGNOSIS — M54.50 BACK PAIN AT L4-L5 LEVEL: ICD-10-CM

## 2024-01-27 DIAGNOSIS — I65.23 BILATERAL CAROTID ARTERY STENOSIS: ICD-10-CM

## 2024-01-27 DIAGNOSIS — T81.49XA SURGICAL WOUND INFECTION: ICD-10-CM

## 2024-01-27 DIAGNOSIS — R41.82 ALTERED MENTAL STATUS, UNSPECIFIED ALTERED MENTAL STATUS TYPE: Primary | ICD-10-CM

## 2024-01-27 DIAGNOSIS — T81.89XD LUMBAR SURGICAL WOUND FLUID COLLECTION, SUBSEQUENT ENCOUNTER: ICD-10-CM

## 2024-01-27 DIAGNOSIS — I82.4Y1 DEEP VEIN THROMBOSIS (DVT) OF PROXIMAL VEIN OF RIGHT LOWER EXTREMITY, UNSPECIFIED CHRONICITY (MULTI): ICD-10-CM

## 2024-01-27 DIAGNOSIS — M79.605 PAIN IN BOTH LOWER EXTREMITIES: ICD-10-CM

## 2024-01-27 LAB
ALBUMIN SERPL BCP-MCNC: 3.8 G/DL (ref 3.4–5)
ALP SERPL-CCNC: 144 U/L (ref 33–136)
ALT SERPL W P-5'-P-CCNC: 28 U/L (ref 7–45)
AMPHETAMINES UR QL SCN: ABNORMAL
ANION GAP SERPL CALC-SCNC: 20 MMOL/L (ref 10–20)
APPEARANCE UR: CLEAR
AST SERPL W P-5'-P-CCNC: 17 U/L (ref 9–39)
ATRIAL RATE: 69 BPM
BARBITURATES UR QL SCN: ABNORMAL
BASOPHILS # BLD AUTO: 0.01 X10*3/UL (ref 0–0.1)
BASOPHILS NFR BLD AUTO: 0.1 %
BENZODIAZ UR QL SCN: ABNORMAL
BILIRUB SERPL-MCNC: 0.8 MG/DL (ref 0–1.2)
BILIRUB UR STRIP.AUTO-MCNC: NEGATIVE MG/DL
BUN SERPL-MCNC: 22 MG/DL (ref 6–23)
BZE UR QL SCN: ABNORMAL
CALCIUM SERPL-MCNC: 10.3 MG/DL (ref 8.6–10.3)
CANNABINOIDS UR QL SCN: ABNORMAL
CARDIAC TROPONIN I PNL SERPL HS: 26 NG/L (ref 0–13)
CARDIAC TROPONIN I PNL SERPL HS: 28 NG/L (ref 0–13)
CHLORIDE SERPL-SCNC: 95 MMOL/L (ref 98–107)
CK SERPL-CCNC: 23 U/L (ref 0–215)
CO2 SERPL-SCNC: 20 MMOL/L (ref 21–32)
COLOR UR: YELLOW
CREAT SERPL-MCNC: 0.91 MG/DL (ref 0.5–1.05)
CRP SERPL-MCNC: 1.54 MG/DL
EGFRCR SERPLBLD CKD-EPI 2021: 68 ML/MIN/1.73M*2
EOSINOPHIL # BLD AUTO: 0 X10*3/UL (ref 0–0.7)
EOSINOPHIL NFR BLD AUTO: 0 %
ERYTHROCYTE [DISTWIDTH] IN BLOOD BY AUTOMATED COUNT: 14.8 % (ref 11.5–14.5)
FENTANYL+NORFENTANYL UR QL SCN: ABNORMAL
FLUAV RNA RESP QL NAA+PROBE: NOT DETECTED
FLUBV RNA RESP QL NAA+PROBE: NOT DETECTED
GLUCOSE SERPL-MCNC: 168 MG/DL (ref 74–99)
GLUCOSE UR STRIP.AUTO-MCNC: NEGATIVE MG/DL
HCT VFR BLD AUTO: 37.4 % (ref 36–46)
HGB BLD-MCNC: 12.5 G/DL (ref 12–16)
HOLD SPECIMEN: NORMAL
IMM GRANULOCYTES # BLD AUTO: 0.05 X10*3/UL (ref 0–0.7)
IMM GRANULOCYTES NFR BLD AUTO: 0.6 % (ref 0–0.9)
KETONES UR STRIP.AUTO-MCNC: ABNORMAL MG/DL
LACTATE SERPL-SCNC: 1.5 MMOL/L (ref 0.4–2)
LEUKOCYTE ESTERASE UR QL STRIP.AUTO: NEGATIVE
LIPASE SERPL-CCNC: 76 U/L (ref 9–82)
LYMPHOCYTES # BLD AUTO: 1.42 X10*3/UL (ref 1.2–4.8)
LYMPHOCYTES NFR BLD AUTO: 15.9 %
MAGNESIUM SERPL-MCNC: 2.03 MG/DL (ref 1.6–2.4)
MCH RBC QN AUTO: 29.1 PG (ref 26–34)
MCHC RBC AUTO-ENTMCNC: 33.4 G/DL (ref 32–36)
MCV RBC AUTO: 87 FL (ref 80–100)
MONOCYTES # BLD AUTO: 0.68 X10*3/UL (ref 0.1–1)
MONOCYTES NFR BLD AUTO: 7.6 %
MUCOUS THREADS #/AREA URNS AUTO: ABNORMAL /LPF
NEUTROPHILS # BLD AUTO: 6.78 X10*3/UL (ref 1.2–7.7)
NEUTROPHILS NFR BLD AUTO: 75.8 %
NITRITE UR QL STRIP.AUTO: NEGATIVE
NRBC BLD-RTO: 0 /100 WBCS (ref 0–0)
OPIATES UR QL SCN: ABNORMAL
OXYCODONE+OXYMORPHONE UR QL SCN: ABNORMAL
P AXIS: 96 DEGREES
P OFFSET: 182 MS
P ONSET: 143 MS
PCP UR QL SCN: ABNORMAL
PH UR STRIP.AUTO: 7 [PH]
PLATELET # BLD AUTO: 358 X10*3/UL (ref 150–450)
POTASSIUM SERPL-SCNC: 3 MMOL/L (ref 3.5–5.3)
PR INTERVAL: 118 MS
PROCALCITONIN SERPL-MCNC: 0.02 NG/ML
PROT SERPL-MCNC: 8 G/DL (ref 6.4–8.2)
PROT UR STRIP.AUTO-MCNC: ABNORMAL MG/DL
Q ONSET: 202 MS
QRS COUNT: 11 BEATS
QRS DURATION: 166 MS
QT INTERVAL: 498 MS
QTC CALCULATION(BAZETT): 533 MS
QTC FREDERICIA: 522 MS
R AXIS: -26 DEGREES
RBC # BLD AUTO: 4.3 X10*6/UL (ref 4–5.2)
RBC # UR STRIP.AUTO: NEGATIVE /UL
RBC #/AREA URNS AUTO: ABNORMAL /HPF
SARS-COV-2 RNA RESP QL NAA+PROBE: NOT DETECTED
SODIUM SERPL-SCNC: 132 MMOL/L (ref 136–145)
SP GR UR STRIP.AUTO: 1.01
SQUAMOUS #/AREA URNS AUTO: ABNORMAL /HPF
T AXIS: 83 DEGREES
T OFFSET: 451 MS
T4 FREE SERPL-MCNC: 0.97 NG/DL (ref 0.61–1.12)
TSH SERPL-ACNC: 0.31 MIU/L (ref 0.44–3.98)
UROBILINOGEN UR STRIP.AUTO-MCNC: 2 MG/DL
VENTRICULAR RATE: 69 BPM
WBC # BLD AUTO: 8.9 X10*3/UL (ref 4.4–11.3)
WBC #/AREA URNS AUTO: ABNORMAL /HPF

## 2024-01-27 PROCEDURE — 83690 ASSAY OF LIPASE: CPT | Performed by: STUDENT IN AN ORGANIZED HEALTH CARE EDUCATION/TRAINING PROGRAM

## 2024-01-27 PROCEDURE — 70450 CT HEAD/BRAIN W/O DYE: CPT | Performed by: RADIOLOGY

## 2024-01-27 PROCEDURE — 83605 ASSAY OF LACTIC ACID: CPT | Performed by: STUDENT IN AN ORGANIZED HEALTH CARE EDUCATION/TRAINING PROGRAM

## 2024-01-27 PROCEDURE — 1090000002 HH PPS REVENUE DEBIT

## 2024-01-27 PROCEDURE — 84145 PROCALCITONIN (PCT): CPT | Mod: ELYLAB | Performed by: STUDENT IN AN ORGANIZED HEALTH CARE EDUCATION/TRAINING PROGRAM

## 2024-01-27 PROCEDURE — 71260 CT THORAX DX C+: CPT | Mod: FOREIGN READ | Performed by: RADIOLOGY

## 2024-01-27 PROCEDURE — 81001 URINALYSIS AUTO W/SCOPE: CPT | Performed by: STUDENT IN AN ORGANIZED HEALTH CARE EDUCATION/TRAINING PROGRAM

## 2024-01-27 PROCEDURE — 99223 1ST HOSP IP/OBS HIGH 75: CPT | Performed by: STUDENT IN AN ORGANIZED HEALTH CARE EDUCATION/TRAINING PROGRAM

## 2024-01-27 PROCEDURE — 81003 URINALYSIS AUTO W/O SCOPE: CPT | Performed by: STUDENT IN AN ORGANIZED HEALTH CARE EDUCATION/TRAINING PROGRAM

## 2024-01-27 PROCEDURE — 87086 URINE CULTURE/COLONY COUNT: CPT | Mod: ELYLAB | Performed by: STUDENT IN AN ORGANIZED HEALTH CARE EDUCATION/TRAINING PROGRAM

## 2024-01-27 PROCEDURE — 87636 SARSCOV2 & INF A&B AMP PRB: CPT | Performed by: EMERGENCY MEDICINE

## 2024-01-27 PROCEDURE — 96375 TX/PRO/DX INJ NEW DRUG ADDON: CPT

## 2024-01-27 PROCEDURE — 74177 CT ABD & PELVIS W/CONTRAST: CPT

## 2024-01-27 PROCEDURE — 83735 ASSAY OF MAGNESIUM: CPT | Performed by: STUDENT IN AN ORGANIZED HEALTH CARE EDUCATION/TRAINING PROGRAM

## 2024-01-27 PROCEDURE — 87040 BLOOD CULTURE FOR BACTERIA: CPT | Mod: ELYLAB | Performed by: STUDENT IN AN ORGANIZED HEALTH CARE EDUCATION/TRAINING PROGRAM

## 2024-01-27 PROCEDURE — 2500000004 HC RX 250 GENERAL PHARMACY W/ HCPCS (ALT 636 FOR OP/ED): Mod: JZ | Performed by: STUDENT IN AN ORGANIZED HEALTH CARE EDUCATION/TRAINING PROGRAM

## 2024-01-27 PROCEDURE — 82550 ASSAY OF CK (CPK): CPT | Performed by: STUDENT IN AN ORGANIZED HEALTH CARE EDUCATION/TRAINING PROGRAM

## 2024-01-27 PROCEDURE — 2550000001 HC RX 255 CONTRASTS: Performed by: STUDENT IN AN ORGANIZED HEALTH CARE EDUCATION/TRAINING PROGRAM

## 2024-01-27 PROCEDURE — 36415 COLL VENOUS BLD VENIPUNCTURE: CPT | Performed by: STUDENT IN AN ORGANIZED HEALTH CARE EDUCATION/TRAINING PROGRAM

## 2024-01-27 PROCEDURE — 86140 C-REACTIVE PROTEIN: CPT | Performed by: STUDENT IN AN ORGANIZED HEALTH CARE EDUCATION/TRAINING PROGRAM

## 2024-01-27 PROCEDURE — 74177 CT ABD & PELVIS W/CONTRAST: CPT | Mod: FOREIGN READ | Performed by: RADIOLOGY

## 2024-01-27 PROCEDURE — 70450 CT HEAD/BRAIN W/O DYE: CPT

## 2024-01-27 PROCEDURE — 1200000002 HC GENERAL ROOM WITH TELEMETRY DAILY

## 2024-01-27 PROCEDURE — 93005 ELECTROCARDIOGRAM TRACING: CPT

## 2024-01-27 PROCEDURE — 84443 ASSAY THYROID STIM HORMONE: CPT | Performed by: STUDENT IN AN ORGANIZED HEALTH CARE EDUCATION/TRAINING PROGRAM

## 2024-01-27 PROCEDURE — 2500000004 HC RX 250 GENERAL PHARMACY W/ HCPCS (ALT 636 FOR OP/ED): Performed by: EMERGENCY MEDICINE

## 2024-01-27 PROCEDURE — 72128 CT CHEST SPINE W/O DYE: CPT | Mod: RCN

## 2024-01-27 PROCEDURE — 80053 COMPREHEN METABOLIC PANEL: CPT | Performed by: STUDENT IN AN ORGANIZED HEALTH CARE EDUCATION/TRAINING PROGRAM

## 2024-01-27 PROCEDURE — 84484 ASSAY OF TROPONIN QUANT: CPT | Performed by: STUDENT IN AN ORGANIZED HEALTH CARE EDUCATION/TRAINING PROGRAM

## 2024-01-27 PROCEDURE — 72131 CT LUMBAR SPINE W/O DYE: CPT | Mod: RCN | Performed by: RADIOLOGY

## 2024-01-27 PROCEDURE — 84439 ASSAY OF FREE THYROXINE: CPT | Performed by: STUDENT IN AN ORGANIZED HEALTH CARE EDUCATION/TRAINING PROGRAM

## 2024-01-27 PROCEDURE — 2500000004 HC RX 250 GENERAL PHARMACY W/ HCPCS (ALT 636 FOR OP/ED): Performed by: STUDENT IN AN ORGANIZED HEALTH CARE EDUCATION/TRAINING PROGRAM

## 2024-01-27 PROCEDURE — 1090000001 HH PPS REVENUE CREDIT

## 2024-01-27 PROCEDURE — 72131 CT LUMBAR SPINE W/O DYE: CPT | Mod: RCN

## 2024-01-27 PROCEDURE — 96361 HYDRATE IV INFUSION ADD-ON: CPT

## 2024-01-27 PROCEDURE — 85025 COMPLETE CBC W/AUTO DIFF WBC: CPT | Performed by: STUDENT IN AN ORGANIZED HEALTH CARE EDUCATION/TRAINING PROGRAM

## 2024-01-27 PROCEDURE — P9612 CATHETERIZE FOR URINE SPEC: HCPCS

## 2024-01-27 PROCEDURE — 71260 CT THORAX DX C+: CPT

## 2024-01-27 PROCEDURE — 80307 DRUG TEST PRSMV CHEM ANLYZR: CPT | Performed by: STUDENT IN AN ORGANIZED HEALTH CARE EDUCATION/TRAINING PROGRAM

## 2024-01-27 PROCEDURE — 99285 EMERGENCY DEPT VISIT HI MDM: CPT | Performed by: STUDENT IN AN ORGANIZED HEALTH CARE EDUCATION/TRAINING PROGRAM

## 2024-01-27 PROCEDURE — 2500000001 HC RX 250 WO HCPCS SELF ADMINISTERED DRUGS (ALT 637 FOR MEDICARE OP): Performed by: STUDENT IN AN ORGANIZED HEALTH CARE EDUCATION/TRAINING PROGRAM

## 2024-01-27 PROCEDURE — 96374 THER/PROPH/DIAG INJ IV PUSH: CPT

## 2024-01-27 RX ORDER — HYDROMORPHONE HYDROCHLORIDE 1 MG/ML
0.6 INJECTION, SOLUTION INTRAMUSCULAR; INTRAVENOUS; SUBCUTANEOUS EVERY 4 HOURS PRN
Status: DISCONTINUED | OUTPATIENT
Start: 2024-01-27 | End: 2024-01-31

## 2024-01-27 RX ORDER — POTASSIUM CHLORIDE 14.9 MG/ML
20 INJECTION INTRAVENOUS ONCE
Status: COMPLETED | OUTPATIENT
Start: 2024-01-27 | End: 2024-01-27

## 2024-01-27 RX ORDER — ACETAMINOPHEN 160 MG/5ML
650 SOLUTION ORAL EVERY 4 HOURS PRN
Status: DISCONTINUED | OUTPATIENT
Start: 2024-01-27 | End: 2024-02-07 | Stop reason: HOSPADM

## 2024-01-27 RX ORDER — SENNOSIDES 8.6 MG/1
2 TABLET ORAL NIGHTLY
Status: DISCONTINUED | OUTPATIENT
Start: 2024-01-27 | End: 2024-02-07 | Stop reason: HOSPADM

## 2024-01-27 RX ORDER — ONDANSETRON HYDROCHLORIDE 2 MG/ML
4 INJECTION, SOLUTION INTRAVENOUS ONCE
Status: COMPLETED | OUTPATIENT
Start: 2024-01-27 | End: 2024-01-27

## 2024-01-27 RX ORDER — ONDANSETRON HYDROCHLORIDE 2 MG/ML
4 INJECTION, SOLUTION INTRAVENOUS EVERY 6 HOURS PRN
Status: DISCONTINUED | OUTPATIENT
Start: 2024-01-27 | End: 2024-02-07 | Stop reason: HOSPADM

## 2024-01-27 RX ORDER — HYDROMORPHONE HYDROCHLORIDE 1 MG/ML
1 INJECTION, SOLUTION INTRAMUSCULAR; INTRAVENOUS; SUBCUTANEOUS ONCE
Status: COMPLETED | OUTPATIENT
Start: 2024-01-27 | End: 2024-01-27

## 2024-01-27 RX ORDER — ACETAMINOPHEN 650 MG/1
650 SUPPOSITORY RECTAL EVERY 4 HOURS PRN
Status: DISCONTINUED | OUTPATIENT
Start: 2024-01-27 | End: 2024-02-07 | Stop reason: HOSPADM

## 2024-01-27 RX ORDER — LABETALOL HYDROCHLORIDE 5 MG/ML
10 INJECTION, SOLUTION INTRAVENOUS ONCE
Status: COMPLETED | OUTPATIENT
Start: 2024-01-27 | End: 2024-01-27

## 2024-01-27 RX ORDER — ACETAMINOPHEN 325 MG/1
650 TABLET ORAL EVERY 4 HOURS PRN
Status: DISCONTINUED | OUTPATIENT
Start: 2024-01-27 | End: 2024-02-07 | Stop reason: HOSPADM

## 2024-01-27 RX ADMIN — DAPTOMYCIN 600 MG: 500 INJECTION, POWDER, LYOPHILIZED, FOR SOLUTION INTRAVENOUS at 21:17

## 2024-01-27 RX ADMIN — CEFTAROLINE FOSAMIL 600 MG: 600 POWDER, FOR SOLUTION INTRAVENOUS at 21:58

## 2024-01-27 RX ADMIN — HYDROMORPHONE HYDROCHLORIDE 1 MG: 1 INJECTION, SOLUTION INTRAMUSCULAR; INTRAVENOUS; SUBCUTANEOUS at 04:57

## 2024-01-27 RX ADMIN — POTASSIUM CHLORIDE 20 MEQ: 14.9 INJECTION, SOLUTION INTRAVENOUS at 07:45

## 2024-01-27 RX ADMIN — CEFTAROLINE FOSAMIL 400 MG: 600 POWDER, FOR SOLUTION INTRAVENOUS at 08:14

## 2024-01-27 RX ADMIN — SODIUM CHLORIDE, POTASSIUM CHLORIDE, SODIUM LACTATE AND CALCIUM CHLORIDE 500 ML: 600; 310; 30; 20 INJECTION, SOLUTION INTRAVENOUS at 06:05

## 2024-01-27 RX ADMIN — ONDANSETRON 4 MG: 2 INJECTION INTRAMUSCULAR; INTRAVENOUS at 21:28

## 2024-01-27 RX ADMIN — ONDANSETRON 4 MG: 2 INJECTION INTRAMUSCULAR; INTRAVENOUS at 04:58

## 2024-01-27 RX ADMIN — APIXABAN 5 MG: 5 TABLET, FILM COATED ORAL at 21:45

## 2024-01-27 RX ADMIN — LABETALOL HYDROCHLORIDE 10 MG: 5 INJECTION, SOLUTION INTRAVENOUS at 17:14

## 2024-01-27 RX ADMIN — IOHEXOL 90 ML: 350 INJECTION, SOLUTION INTRAVENOUS at 06:23

## 2024-01-27 RX ADMIN — SODIUM CHLORIDE, POTASSIUM CHLORIDE, SODIUM LACTATE AND CALCIUM CHLORIDE 500 ML: 600; 310; 30; 20 INJECTION, SOLUTION INTRAVENOUS at 04:57

## 2024-01-27 SDOH — SOCIAL STABILITY: SOCIAL INSECURITY: DO YOU FEEL UNSAFE GOING BACK TO THE PLACE WHERE YOU ARE LIVING?: NO

## 2024-01-27 SDOH — SOCIAL STABILITY: SOCIAL INSECURITY: ARE YOU OR HAVE YOU BEEN THREATENED OR ABUSED PHYSICALLY, EMOTIONALLY, OR SEXUALLY BY ANYONE?: NO

## 2024-01-27 SDOH — SOCIAL STABILITY: SOCIAL INSECURITY: DO YOU FEEL ANYONE HAS EXPLOITED OR TAKEN ADVANTAGE OF YOU FINANCIALLY OR OF YOUR PERSONAL PROPERTY?: NO

## 2024-01-27 SDOH — SOCIAL STABILITY: SOCIAL INSECURITY: HAVE YOU HAD THOUGHTS OF HARMING ANYONE ELSE?: NO

## 2024-01-27 SDOH — SOCIAL STABILITY: SOCIAL INSECURITY: DOES ANYONE TRY TO KEEP YOU FROM HAVING/CONTACTING OTHER FRIENDS OR DOING THINGS OUTSIDE YOUR HOME?: NO

## 2024-01-27 SDOH — SOCIAL STABILITY: SOCIAL INSECURITY: HAS ANYONE EVER THREATENED TO HURT YOUR FAMILY OR YOUR PETS?: NO

## 2024-01-27 SDOH — SOCIAL STABILITY: SOCIAL INSECURITY: ARE THERE ANY APPARENT SIGNS OF INJURIES/BEHAVIORS THAT COULD BE RELATED TO ABUSE/NEGLECT?: NO

## 2024-01-27 SDOH — SOCIAL STABILITY: SOCIAL INSECURITY: WERE YOU ABLE TO COMPLETE ALL THE BEHAVIORAL HEALTH SCREENINGS?: YES

## 2024-01-27 SDOH — SOCIAL STABILITY: SOCIAL INSECURITY: ABUSE: ADULT

## 2024-01-27 ASSESSMENT — ACTIVITIES OF DAILY LIVING (ADL)
WALKS IN HOME: DEPENDENT
FEEDING YOURSELF: NEEDS ASSISTANCE
TOILETING: DEPENDENT
PATIENT'S MEMORY ADEQUATE TO SAFELY COMPLETE DAILY ACTIVITIES?: YES
HEARING - LEFT EAR: FUNCTIONAL
LACK_OF_TRANSPORTATION: NO
LACK_OF_TRANSPORTATION: NO
GROOMING: DEPENDENT
JUDGMENT_ADEQUATE_SAFELY_COMPLETE_DAILY_ACTIVITIES: YES
BATHING: DEPENDENT
ASSISTIVE_DEVICE: COMMODE;WALKER
DRESSING YOURSELF: DEPENDENT
HEARING - RIGHT EAR: FUNCTIONAL
ADEQUATE_TO_COMPLETE_ADL: YES

## 2024-01-27 ASSESSMENT — LIFESTYLE VARIABLES
HOW MANY STANDARD DRINKS CONTAINING ALCOHOL DO YOU HAVE ON A TYPICAL DAY: PATIENT DOES NOT DRINK
EVER FELT BAD OR GUILTY ABOUT YOUR DRINKING: NO
AUDIT-C TOTAL SCORE: 0
SUBSTANCE_ABUSE_PAST_12_MONTHS: NO
REASON UNABLE TO ASSESS: YES
AUDIT-C TOTAL SCORE: 0
HOW OFTEN DO YOU HAVE A DRINK CONTAINING ALCOHOL: NEVER
EVER HAD A DRINK FIRST THING IN THE MORNING TO STEADY YOUR NERVES TO GET RID OF A HANGOVER: NO
HAVE YOU EVER FELT YOU SHOULD CUT DOWN ON YOUR DRINKING: NO
SKIP TO QUESTIONS 9-10: 1
HAVE PEOPLE ANNOYED YOU BY CRITICIZING YOUR DRINKING: NO
HOW OFTEN DO YOU HAVE 6 OR MORE DRINKS ON ONE OCCASION: NEVER
PRESCIPTION_ABUSE_PAST_12_MONTHS: NO

## 2024-01-27 ASSESSMENT — COGNITIVE AND FUNCTIONAL STATUS - GENERAL
TURNING FROM BACK TO SIDE WHILE IN FLAT BAD: TOTAL
PERSONAL GROOMING: TOTAL
MOVING TO AND FROM BED TO CHAIR: TOTAL
WALKING IN HOSPITAL ROOM: TOTAL
WALKING IN HOSPITAL ROOM: TOTAL
PATIENT BASELINE BEDBOUND: NO
STANDING UP FROM CHAIR USING ARMS: TOTAL
MOVING FROM LYING ON BACK TO SITTING ON SIDE OF FLAT BED WITH BEDRAILS: TOTAL
HELP NEEDED FOR BATHING: TOTAL
DRESSING REGULAR UPPER BODY CLOTHING: TOTAL
DAILY ACTIVITIY SCORE: 6
MOVING FROM LYING ON BACK TO SITTING ON SIDE OF FLAT BED WITH BEDRAILS: TOTAL
CLIMB 3 TO 5 STEPS WITH RAILING: TOTAL
DRESSING REGULAR LOWER BODY CLOTHING: TOTAL
EATING MEALS: TOTAL
MOVING TO AND FROM BED TO CHAIR: TOTAL
DRESSING REGULAR LOWER BODY CLOTHING: TOTAL
PATIENT BASELINE BEDBOUND: NO
TURNING FROM BACK TO SIDE WHILE IN FLAT BAD: TOTAL
STANDING UP FROM CHAIR USING ARMS: TOTAL
CLIMB 3 TO 5 STEPS WITH RAILING: TOTAL
TOILETING: TOTAL
MOBILITY SCORE: 6
MOBILITY SCORE: 6

## 2024-01-27 ASSESSMENT — PAIN SCALES - GENERAL: PAINLEVEL_OUTOF10: 9

## 2024-01-27 ASSESSMENT — PAIN - FUNCTIONAL ASSESSMENT: PAIN_FUNCTIONAL_ASSESSMENT: 0-10

## 2024-01-27 ASSESSMENT — PATIENT HEALTH QUESTIONNAIRE - PHQ9
2. FEELING DOWN, DEPRESSED OR HOPELESS: SEVERAL DAYS
SUM OF ALL RESPONSES TO PHQ9 QUESTIONS 1 & 2: 2
1. LITTLE INTEREST OR PLEASURE IN DOING THINGS: SEVERAL DAYS

## 2024-01-27 ASSESSMENT — COLUMBIA-SUICIDE SEVERITY RATING SCALE - C-SSRS
2. HAVE YOU ACTUALLY HAD ANY THOUGHTS OF KILLING YOURSELF?: NO
6. HAVE YOU EVER DONE ANYTHING, STARTED TO DO ANYTHING, OR PREPARED TO DO ANYTHING TO END YOUR LIFE?: NO
1. IN THE PAST MONTH, HAVE YOU WISHED YOU WERE DEAD OR WISHED YOU COULD GO TO SLEEP AND NOT WAKE UP?: NO

## 2024-01-27 NOTE — ED PROVIDER NOTES
"HPI   Chief Complaint   Patient presents with    Altered Mental Status     Pt had 4 recent back surgery. Pt currently has an MRSA infection.         Brought to the emergency department by her daughter for evaluation of altered mental status.  The daughter states that she has had for back surgeries initially dating back since November.  \"She had hardware placed in her back but then they ended up just taking everything out because she got an infection.  Now she has had 2 drains in there.  The orthopedic doctor actually came to the house 2 days ago just to replace the superficial drain and he states to new one back in there.  My stepdad gave her some MS Contin yesterday morning and I think he only just give her 1 dose but since then she is just been out of it \".  The patient is complaining of generalized abdominal and back pain.  She denies any leg swelling.  Denies any chest pain or shortness of breath.  Beyond this, she is unable to give any further history due to acuity of condition.      History provided by:  Patient and relative  History limited by:  Acuity of condition and mental status change                      Sharona Coma Scale Score: 12                  Patient History   Past Medical History:   Diagnosis Date    Arthritis     Back pain     COPD (chronic obstructive pulmonary disease) (CMS/HCC)     COVID-19 vaccine administered     Eczema     History of COVID-19     2020    Hyperlipidemia     Hypertension     Hypoesthesia of skin     Hypesthesia    Macular degeneration     Meniere's disease     Osteoporosis     Overactive bladder     Pain in unspecified finger(s)     Finger pain    Pain in unspecified wrist     Pain in wrist joint    Peripheral vascular disease (CMS/HCC)     Personal history of other diseases of the circulatory system     History of coronary artery disease    Personal history of other diseases of the musculoskeletal system and connective tissue     History of arthritis    Personal history of " other specified conditions     History of chest pain    Personal history of other specified conditions     History of balance disorder    Personal history of other specified conditions     History of numbness    Postmenopausal     Seasonal allergies     Unspecified visual loss     Vision problems     Past Surgical History:   Procedure Laterality Date    ADENOIDECTOMY      AORTA - BILATERAL FEMORAL ARTERY BYPASS GRAFT      BLEPHAROPTOSIS REPAIR      CARDIAC CATHETERIZATION      with stent and balloon    CARDIAC CATHETERIZATION N/A 2024    Procedure: IVC Filter Insertion;  Surgeon: Star Negro MD;  Location: [facility];  Service: Cardiovascular;  Laterality: N/A;    CHOLECYSTECTOMY      COLONOSCOPY      CT ANGIO NECK  2022    CT NECK ANGIO W AND WO IV CONTRAST 2022 [facility]    CT AORTA AND BILATERAL ILIOFEMORAL RUNOFF ANGIOGRAM W AND/OR WO IV CONTRAST  03/10/2020    CT AORTA AND BILATERAL ILIOFEMORAL RUNOFF ANGIOGRAM W AND/OR WO IV CONTRAST 3/10/2020 [facility]    CT AORTA AND BILATERAL ILIOFEMORAL RUNOFF ANGIOGRAM W AND/OR WO IV CONTRAST  2023    CT AORTA AND BILATERAL ILIOFEMORAL RUNOFF ANGIOGRAM W AND/OR WO IV CONTRAST 2023 [facility]    FL TRANSLUMINAL ANGIO PERCUTANEOUS BHARAT      TONSILLECTOMY      Tonsillectomy    TOTAL KNEE ARTHROPLASTY Bilateral     TUBAL LIGATION       Family History   Problem Relation Name Age of Onset    Breast cancer Mother      Other (arteriosclerotic cardiovascular disease) Father      Cancer Father       Social History     Tobacco Use    Smoking status: Former     Packs/day: 0.50     Years: 45.00     Additional pack years: 0.00     Total pack years: 22.50     Types: Cigarettes     Quit date: 2023     Years since quittin.4    Smokeless tobacco: Not on file   Vaping Use    Vaping Use: Never used   Substance Use Topics    Alcohol use: Never    Drug use: Never       Physical Exam   ED Triage Vitals   Temperature  "Heart Rate Respirations BP   01/27/24 0454 01/27/24 0431 01/27/24 0431 01/27/24 0431   36.2 °C (97.2 °F) 68 18 (!) 197/96      Pulse Ox Temp src Heart Rate Source Patient Position   01/27/24 0431 -- -- --   100 %         BP Location FiO2 (%)     -- --             Physical Exam  Constitutional:       Appearance: She is ill-appearing.   HENT:      Head: Normocephalic and atraumatic.      Right Ear: External ear normal.      Left Ear: External ear normal.      Mouth/Throat:      Mouth: Mucous membranes are dry.   Eyes:      Extraocular Movements: Extraocular movements intact.      Pupils: Pupils are equal, round, and reactive to light.      Comments: Pupils are 4 mm and reactive to light bl. No pinpoint pupils noted    Cardiovascular:      Rate and Rhythm: Normal rate.      Pulses: Normal pulses.      Heart sounds: Normal heart sounds.   Pulmonary:      Effort: Pulmonary effort is normal.      Breath sounds: Normal breath sounds.   Abdominal:      Tenderness: There is abdominal tenderness. There is no guarding.      Comments: Generalized abdominal tenderness to palpation.   Musculoskeletal:        Arms:       Right lower leg: No edema.      Left lower leg: No edema.      Comments: Area of midline incision that is held intact with sutures and staples over the inferior thoracic and superior lumbar region.  2 adjacent paraspinal drains.  The left paraspinal drain is labeled \"superficial\" and right paraspinal drain is labeled \"deep\".  I do see a mixture of purulent drainage in the JOSE drain that is labeled as deep and near the insertion site I see extravasation of purulent material.  No palpable fluctuance at the incision site or active drainage.    PICC line in the right upper extremity with antibiotic disc.  No erythema or fluctuance or purulent drainage noted at the insertion site.     Skin:     Capillary Refill: Capillary refill takes less than 2 seconds.   Neurological:      General: No focal deficit present.      " "Mental Status: She is disoriented.      GCS: GCS eye subscore is 4. GCS verbal subscore is 4. GCS motor subscore is 6.         ED Course & MDM        Medical Decision Making  Patient is disoriented on arrival.  She is able to tell me her name and identifies her daughter at bedside.  I do not appreciate focal deficits and patient is moving all 4 extremities spontaneously.  She has good plantarflexion and dorsiflexion bilaterally.  Unable to hold either leg up off the bed but complains of significant back pain when she attempts to do so.  No facial droop or arm drift noted bilaterally I do appreciate large incision site at the inferior thoracic and superior lumbar region with 2 adjacent JOSE drains.  Concern for infection on the deep drain as I see purulent drainage within the JOSE drain itself and also at the insertion site.  Initiated septic workup for the patient including blood cultures.    Patient has been receiving IV antibiotics through the PICC line for back infection.    Daughter states \"the orthopedic doctor came to the house a couple days ago and actually replaced the superficial drain \".    Will obtain definitive imaging with CT of the thoracic/L-spine addition to abdomen/pelvis and evaluate for intra-abdominal and back infection/abscess.  Will also place indwelling Stauffer catheter to obtain urine culture at this time.  Chart review also shows patient recently had an IVC filter placed.  Will ensure that there is no free air or fluid in the abdomen as well.    In the meantime, started on lactated Ringer's 500 cc, Zofran 4 mg IV and Dilaudid 1 mg IV for pain control.    EKG interpreted by myself shows a sinus rhythm with heart 69, ,  and QTc 533.  Shows left axis deviation with left bundle branch block.  The left bundle is not new.  There is no Sgarbossa criteria for STEMI criteria met.  Leads V4 through V6 and aVR are severely limited by baseline artifact.    Will sign pt out to my morning colleague " with disposition pending reassessment and CT results     Procedure  Procedures     Osei Swann MD  01/27/24 0683

## 2024-01-27 NOTE — H&P
Medical Group History and Physical  ASSESSMENT & PLAN:       Acute toxic metabolic encephalopathy  -Suspect secondary to lumbar infection.  Will continue antibiotic therapy as per below.  CT head obtained on arrival was reviewed and negative for acute intracranial process.  Low suspicion for stroke/seizure.  Will send TSH.  Patient endorsing constipation will escalate bowel regimen.  Will continue to monitor neurologic status.    Postop deep incisional MRSA wound infection of lumbar spine  Diskitis/osteomyelitis of lumbar vertebrae  Paraspinal abscess  Lumbar stensosis s/p laminectomy/fusion with subsequent removal of hardware  -Will continue patient on daptomycin/ceftaroline for now  -ID and orthopedic surgery consulted for evaluation.  Recommendations appreciated    RLE DVT  -Continued on eliqius    Hx COPD, HTN, PVD, overactive bladder  -Home meds to be resumed as applicable pending verification    VTE Prophylaxis: On eliqius      ---Of note, this documentation is completed using the Dragon Dictation system (voice recognition software). There may be spelling and/or grammatical errors that were not corrected prior to final submission.---    Raul George MD    HISTORY OF PRESENT ILLNESS:   Chief Complaint: Confusion    History Of Present Illness:    Tara Tierney is a 68 y.o. female with a past medical history including COPD, RLE DVT, and lumbar stenosis status post laminectomy/fusion with subsequent complication of surgical site infection who is brought to hospital by family due to concerns of confusion.  Patient reportedly underwent laminectomy/spinal fusion in November and developed increasing pain near surgical site in the postop period.  Following intervention patient was noted to develop surgical site infection with abscess formation with cultures positive for MRSA.  Patient has had multiple hospitalizations in time since with reported failure of IV vancomycin and daptomycin monotherapy in the  past.  Currently has 2 JOSE drains in place with continued drainage reported.  Has been on a regimen of daptomycin and ceftaroline under the guidance of Dr. Reyna.  Patient is somnolent and not situationally aware so history is obtained from daughter at bedside.  Reportedly mother was in normal state of health until the morning of 1/26.  States her father had given her mother and OxyContin and ever since then patient has been confused, fatigued, and not recognizing people.  Patient awakes to verbal/physical stimuli.  Endorsing some abdominal discomfort and feels like she needs to defecate.  Endorsing pain in her back.  Further ROS was limited secondary to patient condition.       Review of systems: 10 point review of systems is otherwise negative except as mentioned above.    PAST HISTORIES:     Past Medical History:  She has a past medical history of Arthritis, Back pain, COPD (chronic obstructive pulmonary disease) (CMS/HCC), COVID-19 vaccine administered, Eczema, History of COVID-19, Hyperlipidemia, Hypertension, Hypoesthesia of skin, Macular degeneration, Meniere's disease, Osteoporosis, Overactive bladder, Pain in unspecified finger(s), Pain in unspecified wrist, Peripheral vascular disease (CMS/HCC), Personal history of other diseases of the circulatory system, Personal history of other diseases of the musculoskeletal system and connective tissue, Personal history of other specified conditions, Personal history of other specified conditions, Personal history of other specified conditions, Postmenopausal, Seasonal allergies, and Unspecified visual loss.    Past Surgical History:  She has a past surgical history that includes Tonsillectomy; Cardiac catheterization; Colonoscopy; [place] transluminal angio percutaneous venus; Total knee arthroplasty (Bilateral); Cholecystectomy; Aorta - bilateral femoral artery bypass graft; CT angio aorta and bilateral iliofemoral runoff w and or wo IV contrast (03/10/2020); CT angio  neck (05/18/2022); CT angio aorta and bilateral iliofemoral runoff w and or wo IV contrast (07/21/2023); Adenoidectomy; Tubal ligation; Blepharoptosis repair; and Cardiac catheterization (N/A, 1/2/2024).      Social History:  She reports that she quit smoking about 4 months ago. Her smoking use included cigarettes. She has a 22.50 pack-year smoking history. She does not have any smokeless tobacco history on file. She reports that she does not drink alcohol and does not use drugs.    Family History:  Family History   Problem Relation Name Age of Onset   • Breast cancer Mother     • Other (arteriosclerotic cardiovascular disease) Father     • Cancer Father          Allergies:  Neomycin and Neomycin-polymyxin b-dexameth    OBJECTIVE:     Last Recorded Vitals:  Vitals:    01/27/24 0540 01/27/24 0650 01/27/24 0735 01/27/24 0900   BP: (!) 133/91 180/71 (!) 185/91 (!) 196/83   BP Location:   Left arm Left arm   Patient Position:   Lying Lying   Pulse: 67 81 74 74   Resp: 16 18 16 18   Temp:       SpO2: 94% 94% 95% 98%   Weight:       Height:         Last I/O:  No intake/output data recorded.    Physical Exam  General: Ill-appearing elderly female moderate distress  HEENT: Clear sclera, EOMI, trachea midline, moist mucous membranes  Respiratory: Lungs clear to auscultation, with no wheezes or rhonchi appreciated  Cardiovascular: S1 and S2 auscultated, no murmurs clicks or rubs  Abdomen: Soft, tender to palpation, nondistended  Musculoskeletal: 2 JOSE drains in place from lumbar region with some purulent drainage appreciated.  Extremity ROM intact  Neurological: Somnolent, cranial nerves grossly intact,  Psychiatric: Appropriate mood and affect  Skin: Warm, dry    Scheduled Medications  potassium chloride, 20 mEq, intravenous, Once      PRN Medications    Continuous Medications       Outpatient Medications:  Prior to Admission medications    Medication Sig Start Date End Date Taking? Authorizing Provider   albuterol (ProAir  HFA) 90 mcg/actuation inhaler Inhale 2 puffs.    Historical Provider, MD   apixaban (Eliquis) 5 mg tablet Take 2 tablets (10 mg) by mouth 2 times a day for 2 days. 12/23/23 12/25/23  ORLANDO Trevizo   apixaban (Eliquis) 5 mg tablet Take 1 tablet (5 mg) by mouth 2 times a day. Do not start before December 25, 2023. 12/25/23 12/24/24  [place] Trevizo   aspirin 81 mg EC tablet Take 1 tablet (81 mg) by mouth once daily.    Historical Provider, MD   atenolol (Tenormin) 50 mg tablet Take 1 tablet (50 mg) by mouth once daily in the morning. 6/6/22   Historical Provider, MD   baclofen (Lioresal) 10 mg tablet Take half a tab 4 times daily as needed for muscle spasms for up to 10 days 1/26/24   Cole C Budinsky, MD   brimonidine (AlphaGAN) 0.2 % ophthalmic solution Administer 1 drop into both eyes 2 times a day. 6/2/22   Historical Provider, MD   busPIRone (Buspar) 10 mg tablet Take 1 tablet (10 mg) by mouth once daily. 6/6/22   Historical Provider, MD   ceftaroline (Teflaro) 600 mg injection Infuse 400 mg into a venous catheter every 12 hours. 1/17/24 3/1/24  Gem Reyna MD   cholecalciferol (Vitamin D-3) 50 mcg (2,000 unit) capsule Take 1 capsule (50 mcg) by mouth once daily.    Historical Provider, MD   cyclobenzaprine (Flexeril) 5 mg tablet Take 1 tablet (5 mg) by mouth 3 times a day as needed for muscle spasms.  Patient taking differently: Take 1 tablet (5 mg) by mouth every 8 hours if needed for muscle spasms. 11/24/23   ORLANDO Quiros   ezetimibe (Zetia) 10 mg tablet Take 1 tablet (10 mg) by mouth once daily. 6/26/22   Historical Provider, MD   furosemide (Lasix) 20 mg tablet Take 1 tablet (20 mg) by mouth once daily. 6/6/22   Historical Provider, MD   heparin sodium,porcine/PF (HEPARIN, PORCINE, LOCK FLUSH IV) Infuse 5 mL into a venous catheter once daily. Indications: heparin lock    Historical Provider, MD   hydrALAZINE (Apresoline) 50 mg tablet Take 1 tablet (50 mg) by mouth 2  times a day.    Historical Provider, MD   ibandronate (Boniva) 150 mg tablet Take 1 tablet (150 mg) by mouth every 30 (thirty) days. 8/22/23   Historical Provider, MD   ipratropium (Atrovent HFA) 17 mcg/actuation inhaler Inhale 2 puffs 2 times a day. 6/24/22   Historical Provider, MD   isosorbide mononitrate ER (Imdur) 60 mg 24 hr tablet Take 1 tablet (60 mg) by mouth once daily.    Historical Provider, MD   lactobacillus acidophilus tablet tablet Take 1 tablet by mouth once daily. Do not start before December 21, 2023. 12/21/23 1/31/24  [place]O Trevizo   lidocaine 4 % patch Place 1 patch over 12 hours on the skin once daily for 10 days. Remove & discard patch within 12 hours or as directed by MD. Do not start before January 19, 2024. 1/19/24 1/29/24  [place] Kline   loratadine (Claritin) 10 mg tablet Take 1 tablet (10 mg) by mouth once daily.    Historical Provider, MD   LUTEIN ORAL Take 20 mg by mouth once daily.    Historical Provider, MD   meclizine (Antivert) 25 mg tablet Take 1 tablet (25 mg) by mouth every 8 hours if needed for dizziness. 6/6/22   Historical Provider, MD   mirtazapine (Remeron) 15 mg tablet Take 1 tablet (15 mg) by mouth once daily at bedtime.    Historical Provider, MD   morphine CR (MS Contin) 15 mg 12 hr tablet Take 1 tablet (15 mg) by mouth every 12 hours. Do not crush, chew, or split. 1/18/24 2/17/24  [place]O Kline   mupirocin (Bactroban) 2 % ointment Apply topically 2 times a day for 10 days. 1/18/24 1/28/24  [place] Kline   nitroglycerin (Nitrostat) 0.4 mg SL tablet Place 1 tablet (0.4 mg) under the tongue every 5 minutes if needed for chest pain.  PLACE 1 TABLET UNDER THE TONGUE EVERY 5 MINUTES UP TO 3 DOSES AS NEEDED FOR CHEST PAIN.    Historical Provider, MD   oxyCODONE-acetaminophen (Percocet) 5-325 mg tablet Take 1 tablet by mouth every 6 hours if needed for severe pain (7 - 10) for up to 7 days. 1/26/24 2/2/24  Cole C Budinsky, MD    pantoprazole (ProtoNix) 40 mg EC tablet Take 1 tablet (40 mg) by mouth once daily in the morning. Take before meals. Do not crush, chew, or split.    Historical Provider, MD   pilocarpine (Salagen, pilocarpine,) 5 mg tablet Take 1 tablet (5 mg) by mouth once daily.    Historical Provider, MD   potassium chloride ER (Micro-K) 10 mEq ER capsule Take 1 capsule (10 mEq) by mouth once daily. 6/14/22   Historical Provider, MD   pregabalin (Lyrica) 75 mg capsule Take 1 capsule (75 mg) by mouth 2 times a day. 6/6/22   Historical Provider, MD   sodium chloride 0.9% (ClearShield Sodium Chlor Flush) flush Infuse 10 mL into a venous catheter once daily.    Historical Provider, MD   sodium chloride 0.9% parenteral solution 50 mL with DAPTOmycin 50 mg/mL recon soln 470 mg Infuse 470 mg at 118.8 mL/hr over 30 minutes into a venous catheter once every 24 hours. 1/17/24 2/16/24  Gem Reyna MD   tolterodine (Detrol) 1 mg tablet Take 1 tablet (1 mg) by mouth once daily at bedtime.    Historical Provider, MD   vancomycin (Vancocin) 1250 mg/250 mL IV Infuse 250 mL (1,250 mg) at 200 mL/hr over 75 minutes into a venous catheter once every 24 hours. 12/21/23 2/1/24  [place]O Trevizo   vancomycin in dextrose 5 % (Vancocin) IVPB Infuse 200 mL (1 g) at 200 mL/hr over 60 minutes into a venous catheter every 12 hours. CBC BMP weekly  Vanco level weekly  CRP sed rate in 3 and 6 weeks  Fax results to [id number] 12/12/23 1/25/24  Gem Reyna MD   vit C,T-Zh-yczhx-lutein-zeaxan (PreserVision AREDS-2) 250-90-40-1 mg capsule Take 1 capsule by mouth twice a day.    Historical Provider, MD   baclofen (Lioresal) 10 mg tablet Take 0.5 tablets (5 mg) by mouth 4 times a day for 10 days 1/18/24 1/26/24  [place] Kline       LABS AND IMAGING:     Labs:  Results from last 7 days   Lab Units 01/27/24  0452 01/25/24  1015   WBC AUTO x10*3/uL 8.9 7.6   RBC AUTO x10*6/uL 4.30 3.78*   HEMOGLOBIN g/dL 12.5 11.1*   HEMATOCRIT % 37.4 35.9*    MCV fL 87 95   MCH pg 29.1 29.4   MCHC g/dL 33.4 30.9*   RDW % 14.8* 15.1*   PLATELETS AUTO x10*3/uL 358 353     Results from last 7 days   Lab Units 01/27/24  0452 01/25/24  1015   SODIUM mmol/L 132* 130*   POTASSIUM mmol/L 3.0* 3.7   CHLORIDE mmol/L 95* 92*   CO2 mmol/L 20* 27   BUN mg/dL 22 20   CREATININE mg/dL 0.91 0.99   GLUCOSE mg/dL 168* 169*   PROTEIN TOTAL g/dL 8.0  --    CALCIUM mg/dL 10.3 9.6   BILIRUBIN TOTAL mg/dL 0.8  --    ALK PHOS U/L 144*  --    AST U/L 17  --    ALT U/L 28  --      Results from last 7 days   Lab Units 01/27/24  0452   MAGNESIUM mg/dL 2.03     Results from last 7 days   Lab Units 01/27/24  0602 01/27/24  0452   TROPHS ng/L 26* 28*       Imaging:  CT chest abdomen pelvis w IV contrast  Narrative: STUDY:  CT Chest, Abdomen, and Pelvis with IV Contrast; 01/27/2024 6:37 AM  INDICATION:  Generalized abdominal pain.  Recent spine surgery and IVC filter.   Evaluate for free air/fluid.  COMPARISON:  XR abdomen 01/15/2024.  CT AP 12/31/2023, 12/19/2023.  ACCESSION NUMBER(S):  [id number]  ORDERING CLINICIAN:  Osei Swann  TECHNIQUE:  CT of the chest, abdomen, and pelvis was performed.  Contiguous axial  images were obtained at 3 mm slice thickness through the chest,  abdomen, and pelvis.  Coronal and sagittal reconstructions at 3 mm  slice thickness were performed.  Omnipaque 350 75 mL was administered  intravenously.    FINDINGS:  CHEST:  MEDIASTINUM:  Right PICC appears satisfactorily positioned extending to the right  atrium.  The heart is normal in size without pericardial effusion.   Central vascular structures opacify normally.  No thyroid nodule.  No  identifiable breast mass.    LUNGS/PLEURA:  There is no pleural effusion, pleural thickening, or pneumothorax.  Minimal dependent atelectasis at the lung bases.  LYMPH NODES:  Thoracic lymph nodes are not enlarged.  ABDOMEN:     LIVER:  No hepatomegaly.  Smooth surface contour.  Normal attenuation.     BILE DUCTS:  No intrahepatic or  extrahepatic biliary ductal dilatation.     GALLBLADDER:  Gallbladder is surgically absent.  STOMACH:  No abnormalities identified.     PANCREAS:  No masses or ductal dilatation.     SPLEEN:  No splenomegaly or focal splenic lesion.     ADRENAL GLANDS:  No thickening or nodules.     KIDNEYS AND URETERS:  Kidneys are normal in size and location.  No renal or ureteral  calculi.     PELVIS:     BLADDER:  Stauffer catheter present.  Mild wall thickening of the urinary bladder  may simply be related to incomplete distention.  Large quantity of  stool in the rectum suggests constipation versus impaction.     REPRODUCTIVE ORGANS:  No abnormalities identified.     BOWEL:  No abnormalities identified.  Appendix appears normal.     VESSELS:  No abnormalities identified.  IVC filter appears appropriately  positioned.  Abdominal aorta is normal in caliber.      PERITONEUM/RETROPERITONEUM/LYMPH NODES:  No free fluid.  No pneumoperitoneum.  No lymphadenopathy.     ABDOMINAL WALL:  No abnormalities identified.  SOFT TISSUES:   No abnormalities identified.     BONES:  Mildly exaggerated thoracic kyphosis with mild to moderate  degenerative disease.  There are postsurgical changes of the lumbar spine.  Prosthetic discs  at L4-5 and L5-S1 are in similar position when compared to the prior  CT.  Two opaque drains at the surgical site are again noted.   Subcutaneous emphysema noted along the course of the drains.  Presumed  phlegmon noted at the surgical location.  No drainable collection.   Since the prior study, there has been erosive or destructive changes  of the inferior endplate of L3 and to a lesser extent the superior  endplate of L4.  The findings would be strongly suspicious for  discitis osteomyelitis.    No acute fracture or aggressive osseous lesion.  Impression: CT CHEST:  1.  No CT evidence of pulmonary embolism.  2.  No pulmonary mass, nodule or consolidation.  No pleural effusion.   No pneumothorax.  No thoracic  adenopathy.  CT ABDOMEN AND PELVIS:  1.  Postsurgical changes of the lumbar spine again noted.  There are  two indwelling drains superficially unchanged from 12/31/2023.  The  prosthetic discs at L4-5 and L5-S1 appears satisfactorily positioned,  unchanged.  However, there is been interval destructive endplate  changes along the inferior aspect of L3 and to a lesser extent the  superior endplate of L4.  The findings would be suspicious for  discitis/osteomyelitis.  2.  No ascites, free air or bowel obstruction.  3.  No urinary tract calculus or hydronephrosis.  4.  IVC filter appears satisfactorily positioned.  Signed by Joshua Alfonso MD  CT lumbar spine wo IV contrast, CT thoracic spine wo IV contrast  Narrative: Interpreted By:  Joanne Cruz,   STUDY:  CT LUMBAR SPINE WO IV CONTRAST; CT THORACIC SPINE WO IV CONTRAST  1/27/2024 6:22 am      INDICATION:  Signs/Symptoms:recent post op. 2 JOSE drains. the right paraspinal  appears infected. eval for fluid collection      COMPARISON:  MRI lumbar spine 01/11/2024      ACCESSION NUMBER(S):  [id number]; [id number]      ORDERING CLINICIAN:  LATOYA GUEVARA      TECHNIQUE:  Axial CT images of the lumbar spine are obtained. Axial, coronal and  sagittal reconstructions are provided for review.      FINDINGS:  CT THORACIC SPINE:  Alignment: Within normal limits.  There is degenerative change with mild-to-moderate anterior  osteophytic spurring at all levels particularly the midthoracic spine.      Vertebrae/Disc Spaces:   The vertebral body heights are intact. The  disc spaces are preserved.      There is a benign calcified right upper lobe granuloma.      CT LUMBAR SPINE:  There are disc spacers at L4-5 and L5-S1 in good position.  Status post decompression laminectomies L3-L5.  There is bone grafting material along the right and left lateral  aspects of the mid and lower lumbar spine. There are 2 drains. There  is a drain along the inferior aspect of the back. This  "courses  superiorly and the tip is in the soft tissues posterior to L1. There  is a 2nd drain along the inferior aspect of the back. This courses  superiorly and the tip is in the soft tissues posterior to L1. There  is induration of the soft tissues along the back and induration of  the paraspinal muscles. There is a 2.5 x 2.0 cm x 1.8 cm ill-defined  fluid collection in the paraspinal muscles of the back at the level  of L4-5 just to the right of midline. This could be a true fluid  collection or induration of the paraspinal muscles. There is no  discrete enhancing wall. Findings could represent a abscess,  postoperative seroma or liquified hematoma. There is no air in this  fluid. This is not along the course of one of the drains. There is  ghost artifact in the pedicles of L4, L5 and S1 from prior hardware  which has been removed.      Alignment: There is 5 mm retrolisthesis of L3 with respect to L4.      Vertebrae/Disc Spaces:  There is destruction and irregularity of the  inferior endplate of L3 and superior endplate of L4 consistent with  diskitis and adjacent osteomyelitis.      T12-L1:  There is no significant central canal stenosis.      L1-2:  There is no significant central canal or neural foraminal  stenosis.      L2-3:  There is no significant central canal or neural foraminal  stenosis.      L3-4: There is destruction of the inferior endplate of L3 and  superior endplate of L4. There is irregularity of the bone. There is  \"widening\" of the disc space. Findings are consistent with discitis  and adjacent osteomyelitis. There is a fracture of the right pedicle  of L4.      L4-5:  There is no significant central canal or neural foraminal  stenosis.      L5-S1:  There is no significant central canal or neural foraminal  stenosis.      Prevertebral/Paraspinal Soft Tissues: The prevertebral and paraspinal  soft tissues are unremarkable.      Status post cholecystectomy.  There is an inferior vena cava filter " inferior to the renal veins      Impression: 1. Degenerative change thoracic spine. No central canal stenosis or  neural foraminal compromise  2. Diskitis and adjacent osteomyelitis L3-4.  3. 2.5 x 2.0 x 1.8 cm fluid collection in the paraspinal muscles of  the back just to the right of midline. This contains no air. This is  ill-defined and of question of whether this is a true discrete round  fluid collection or ill definition and induration of the paraspinal  muscles. No discrete enhancing wall is seen. Findings could represent  an abscess, postoperative seroma or liquified hematoma. This is not  along the course of one of the drains.  4. Nondisplaced fracture right pedicle L4.  5. Disc spacers L4-5 and L5-S1. Status post decompression  laminectomies L3-L5. There is bone grafting material along the right  and left lateral aspects of the mid and lower lumbar spine. There is  ghost artifact in the pedicles of L4, L5 and S1 which represents  hardware which has been removed. There are 2 drains in the soft  tissues of the back.      MACRO:  None      Signed by: Joanne Cruz 1/27/2024 7:22 AM  Dictation workstation:   [id number]  ECG 12 lead  Normal sinus rhythm  Left bundle branch block  Abnormal ECG  When compared with ECG of 02-JAN-2024 07:11,  Questionable change in QRS duration  CT head wo IV contrast  Narrative: Interpreted By:  Joanne Cruz,   STUDY:  CT HEAD WO IV CONTRAST;  1/27/2024 6:21 am      INDICATION:  Signs/Symptoms:AMS. eval for acute hemorrhage.      COMPARISON:  08/04/2009      ACCESSION NUMBER(S):  [id number]      ORDERING CLINICIAN:  LATOYA GUEVARA      TECHNIQUE:  Examination was performed in the axial plane using soft tissue and  bone algorithm.      FINDINGS:  INTRACRANIAL:  There is prominence of the ventricular system and cerebral sulci  consistent with cerebral atrophy. There are periventricular  hypodensities consistent with  moderate small vessel disease. No mass  or mass effect is  identified. There is no hemorrhage or subdural  fluid collection. There is no acute infarct.  There is no fracture of the calvarium      EXTRACRANIAL:  Visualized paranasal sinuses and mastoids are clear.      Impression: No acute intracranial pathology.      MACRO:  None      Signed by: Joanne Cruz 1/27/2024 7:03 AM  Dictation workstation:   [id number]

## 2024-01-27 NOTE — PROGRESS NOTES
Emergency Medicine Transition of Care Note    I received Tara Tierney in signout from Dr. Swann.  Please see the previous ED provider note for all HPI, PE and MDM up to the time of signout at 7 AM. This is in addition to the primary record.    In brief Tara Tierney is an 68 y.o. female presenting for   Chief Complaint   Patient presents with    Altered Mental Status     Pt had 4 recent back surgery. Pt currently has an MRSA infection.       At the time of signout we were awaiting: CT imaging and likely admission    Diagnoses as of 01/27/24 0846   Altered mental status, unspecified altered mental status type   Surgical wound infection       Medical Decision Making  68-year-old female presents emergency department with reported altered mental status.  Patient was signed out to me by Dr. Swann.  She has significant past medical history of multiple back surgeries with washout and drain in place.  Patient is currently on IV antibiotics outpatient.  She is given her morning dose of ceftaroline which she was due for at 6 AM.  On my evaluation of the patient she is resting in bed comfortably in no acute distress.  Heart RRR.  Lungs are clear to auscultation bilaterally.  Patient is appreciated to have a murmur.  Abdomen is benign without rebound, rigidity, guarding, or distention.  Drains are in place with some fluid in the bulbs.  Deep drain does appear to have what looks to be purulent yellow drainage.  Distal pulses are intact.  No significant peripheral edema.  Patient awakes to voice and follows simple commands on my exam. I reviewed workup obtained by previous provider.  Patient has nonspecific elevation of cardiac enzyme that is not significantly increasing.  CMP shows hypokalemia and mild dehydration patient treated with IV fluids by previous provider and IV potassium is ordered.  UA does not show this finding of infection.  CBC does not show significant leukocytosis or anemia.  Patient does not have  findings of sepsis.  COVID and flu testing are negative.  CT head shows no acute intracranial process such as hemorrhage or mass effect.  CT imaging of the spine shows postsurgical changes with drains in place.  Patient also has findings of fluid collection, discitis, and adjacent osteomyelitis as well as nondisplaced fracture of the right pedicle of L4.  CT chest abdomen pelvis does not show any pulmonary embolus or findings of pneumonia.  CT abdomen pelvis does not show any intra-abdominal infection or bowel obstruction.  Advised patient and family of findings thus far and recommendation for further evaluation and treatment of her altered mental status.  I also discussed this case with Dr. Navarrete orthopedics on-call who states they will be on consult.  In addition, patient's infectious disease doctor was consulted.  Case discussed with hospitalist on-call.        Final diagnoses:   [R41.82] Altered mental status, unspecified altered mental status type   [T81.49XA] Surgical wound infection           Procedure  Procedures    Al Winters, DO

## 2024-01-28 ENCOUNTER — APPOINTMENT (OUTPATIENT)
Dept: RADIOLOGY | Facility: HOSPITAL | Age: 69
DRG: 856 | End: 2024-01-28
Payer: COMMERCIAL

## 2024-01-28 ENCOUNTER — HOME CARE VISIT (OUTPATIENT)
Dept: HOME HEALTH SERVICES | Facility: HOME HEALTH | Age: 69
End: 2024-01-28
Payer: COMMERCIAL

## 2024-01-28 LAB
ANION GAP SERPL CALC-SCNC: 17 MMOL/L (ref 10–20)
BACTERIA UR CULT: NORMAL
BASOPHILS # BLD AUTO: 0.02 X10*3/UL (ref 0–0.1)
BASOPHILS NFR BLD AUTO: 0.1 %
BUN SERPL-MCNC: 23 MG/DL (ref 6–23)
CALCIUM SERPL-MCNC: 10.5 MG/DL (ref 8.6–10.3)
CHLORIDE SERPL-SCNC: 97 MMOL/L (ref 98–107)
CO2 SERPL-SCNC: 28 MMOL/L (ref 21–32)
CREAT SERPL-MCNC: 0.89 MG/DL (ref 0.5–1.05)
EGFRCR SERPLBLD CKD-EPI 2021: 70 ML/MIN/1.73M*2
EOSINOPHIL # BLD AUTO: 0 X10*3/UL (ref 0–0.7)
EOSINOPHIL NFR BLD AUTO: 0 %
ERYTHROCYTE [DISTWIDTH] IN BLOOD BY AUTOMATED COUNT: 15.3 % (ref 11.5–14.5)
GLUCOSE SERPL-MCNC: 152 MG/DL (ref 74–99)
HCT VFR BLD AUTO: 41.3 % (ref 36–46)
HGB BLD-MCNC: 13.3 G/DL (ref 12–16)
IMM GRANULOCYTES # BLD AUTO: 0.08 X10*3/UL (ref 0–0.7)
IMM GRANULOCYTES NFR BLD AUTO: 0.5 % (ref 0–0.9)
LYMPHOCYTES # BLD AUTO: 1.62 X10*3/UL (ref 1.2–4.8)
LYMPHOCYTES NFR BLD AUTO: 9.5 %
MAGNESIUM SERPL-MCNC: 2.26 MG/DL (ref 1.6–2.4)
MCH RBC QN AUTO: 28.7 PG (ref 26–34)
MCHC RBC AUTO-ENTMCNC: 32.2 G/DL (ref 32–36)
MCV RBC AUTO: 89 FL (ref 80–100)
MONOCYTES # BLD AUTO: 1.24 X10*3/UL (ref 0.1–1)
MONOCYTES NFR BLD AUTO: 7.3 %
NEUTROPHILS # BLD AUTO: 14.07 X10*3/UL (ref 1.2–7.7)
NEUTROPHILS NFR BLD AUTO: 82.6 %
NRBC BLD-RTO: 0 /100 WBCS (ref 0–0)
PLATELET # BLD AUTO: 377 X10*3/UL (ref 150–450)
POTASSIUM SERPL-SCNC: 2.5 MMOL/L (ref 3.5–5.3)
RBC # BLD AUTO: 4.63 X10*6/UL (ref 4–5.2)
SODIUM SERPL-SCNC: 139 MMOL/L (ref 136–145)
WBC # BLD AUTO: 17 X10*3/UL (ref 4.4–11.3)

## 2024-01-28 PROCEDURE — 2500000004 HC RX 250 GENERAL PHARMACY W/ HCPCS (ALT 636 FOR OP/ED): Performed by: STUDENT IN AN ORGANIZED HEALTH CARE EDUCATION/TRAINING PROGRAM

## 2024-01-28 PROCEDURE — 2550000001 HC RX 255 CONTRASTS: Performed by: STUDENT IN AN ORGANIZED HEALTH CARE EDUCATION/TRAINING PROGRAM

## 2024-01-28 PROCEDURE — 83735 ASSAY OF MAGNESIUM: CPT | Performed by: STUDENT IN AN ORGANIZED HEALTH CARE EDUCATION/TRAINING PROGRAM

## 2024-01-28 PROCEDURE — 99223 1ST HOSP IP/OBS HIGH 75: CPT | Performed by: ORTHOPAEDIC SURGERY

## 2024-01-28 PROCEDURE — 80048 BASIC METABOLIC PNL TOTAL CA: CPT | Performed by: STUDENT IN AN ORGANIZED HEALTH CARE EDUCATION/TRAINING PROGRAM

## 2024-01-28 PROCEDURE — 2500000004 HC RX 250 GENERAL PHARMACY W/ HCPCS (ALT 636 FOR OP/ED): Performed by: INTERNAL MEDICINE

## 2024-01-28 PROCEDURE — 72158 MRI LUMBAR SPINE W/O & W/DYE: CPT

## 2024-01-28 PROCEDURE — 85025 COMPLETE CBC W/AUTO DIFF WBC: CPT | Performed by: STUDENT IN AN ORGANIZED HEALTH CARE EDUCATION/TRAINING PROGRAM

## 2024-01-28 PROCEDURE — 1200000002 HC GENERAL ROOM WITH TELEMETRY DAILY

## 2024-01-28 PROCEDURE — 72158 MRI LUMBAR SPINE W/O & W/DYE: CPT | Performed by: RADIOLOGY

## 2024-01-28 PROCEDURE — 99233 SBSQ HOSP IP/OBS HIGH 50: CPT | Performed by: STUDENT IN AN ORGANIZED HEALTH CARE EDUCATION/TRAINING PROGRAM

## 2024-01-28 PROCEDURE — 1090000001 HH PPS REVENUE CREDIT

## 2024-01-28 PROCEDURE — A9575 INJ GADOTERATE MEGLUMI 0.1ML: HCPCS | Performed by: STUDENT IN AN ORGANIZED HEALTH CARE EDUCATION/TRAINING PROGRAM

## 2024-01-28 PROCEDURE — 1090000002 HH PPS REVENUE DEBIT

## 2024-01-28 PROCEDURE — 2500000004 HC RX 250 GENERAL PHARMACY W/ HCPCS (ALT 636 FOR OP/ED): Mod: JZ | Performed by: INTERNAL MEDICINE

## 2024-01-28 RX ORDER — LORAZEPAM 2 MG/ML
2 INJECTION INTRAMUSCULAR ONCE AS NEEDED
Status: COMPLETED | OUTPATIENT
Start: 2024-01-28 | End: 2024-01-28

## 2024-01-28 RX ORDER — POTASSIUM CHLORIDE 14.9 MG/ML
20 INJECTION INTRAVENOUS
Status: DISPENSED | OUTPATIENT
Start: 2024-01-28 | End: 2024-01-28

## 2024-01-28 RX ORDER — GADOTERATE MEGLUMINE 376.9 MG/ML
0.2 INJECTION INTRAVENOUS
Status: COMPLETED | OUTPATIENT
Start: 2024-01-28 | End: 2024-01-28

## 2024-01-28 RX ADMIN — POTASSIUM CHLORIDE 20 MEQ: 14.9 INJECTION, SOLUTION INTRAVENOUS at 15:39

## 2024-01-28 RX ADMIN — POTASSIUM CHLORIDE 20 MEQ: 14.9 INJECTION, SOLUTION INTRAVENOUS at 10:16

## 2024-01-28 RX ADMIN — CEFTAROLINE FOSAMIL 600 MG: 600 POWDER, FOR SOLUTION INTRAVENOUS at 08:26

## 2024-01-28 RX ADMIN — POTASSIUM CHLORIDE 20 MEQ: 14.9 INJECTION, SOLUTION INTRAVENOUS at 13:12

## 2024-01-28 RX ADMIN — DAPTOMYCIN 500 MG: 500 INJECTION, POWDER, LYOPHILIZED, FOR SOLUTION INTRAVENOUS at 20:35

## 2024-01-28 RX ADMIN — CEFTAROLINE FOSAMIL 600 MG: 600 POWDER, FOR SOLUTION INTRAVENOUS at 20:35

## 2024-01-28 RX ADMIN — GADOTERATE MEGLUMINE 13.5 ML: 376.9 INJECTION INTRAVENOUS at 18:39

## 2024-01-28 RX ADMIN — LORAZEPAM 2 MG: 2 INJECTION INTRAMUSCULAR; INTRAVENOUS at 17:09

## 2024-01-28 ASSESSMENT — COGNITIVE AND FUNCTIONAL STATUS - GENERAL
EATING MEALS: TOTAL
STANDING UP FROM CHAIR USING ARMS: TOTAL
MOVING FROM LYING ON BACK TO SITTING ON SIDE OF FLAT BED WITH BEDRAILS: A LOT
PERSONAL GROOMING: TOTAL
DAILY ACTIVITIY SCORE: 8
DRESSING REGULAR UPPER BODY CLOTHING: TOTAL
MOBILITY SCORE: 7
TOILETING: TOTAL
DAILY ACTIVITIY SCORE: 6
MOVING FROM LYING ON BACK TO SITTING ON SIDE OF FLAT BED WITH BEDRAILS: TOTAL
MOVING TO AND FROM BED TO CHAIR: TOTAL
STANDING UP FROM CHAIR USING ARMS: TOTAL
TURNING FROM BACK TO SIDE WHILE IN FLAT BAD: TOTAL
CLIMB 3 TO 5 STEPS WITH RAILING: TOTAL
TOILETING: TOTAL
DRESSING REGULAR LOWER BODY CLOTHING: TOTAL
CLIMB 3 TO 5 STEPS WITH RAILING: TOTAL
MOVING TO AND FROM BED TO CHAIR: TOTAL
EATING MEALS: A LOT
MOBILITY SCORE: 6
HELP NEEDED FOR BATHING: TOTAL
DRESSING REGULAR LOWER BODY CLOTHING: TOTAL
DRESSING REGULAR UPPER BODY CLOTHING: TOTAL
WALKING IN HOSPITAL ROOM: TOTAL
HELP NEEDED FOR BATHING: TOTAL
WALKING IN HOSPITAL ROOM: TOTAL
TURNING FROM BACK TO SIDE WHILE IN FLAT BAD: TOTAL
PERSONAL GROOMING: A LOT

## 2024-01-28 ASSESSMENT — PAIN SCALES - GENERAL: PAINLEVEL_OUTOF10: 5 - MODERATE PAIN

## 2024-01-28 NOTE — PROGRESS NOTES
ASSESSMENT & PLAN:         Acute toxic metabolic encephalopathy  -Highly suspect infectious etiology of encephalopathy. Bcx now positive w/ G+ cocci in clusters with worsening leukocytosis, 8.9 -> 17.0. Subclinical hyperthyroidism unlikely responsible. Some suspicion for AMS secondary to antibiotic administration, however, given current status discontinuation would be unreasonable at this time. Mentation is improved from day prior. Will monitor.      Postop deep incisional MRSA wound infection of lumbar spine  Likely MRSA bacteremia  Diskitis/osteomyelitis of lumbar vertebrae  Paraspinal abscess  Lumbar stensosis s/p laminectomy/fusion with subsequent removal of hardware  -Will continue patient on daptomycin/ceftaroline for now  -ID and orthopedic surgery consulted for evaluation.  Recommendations appreciated.   -ID advising continuation of dapto/ceftaroline. Agree with assessment that infection is unlikely medically curable without complete source control.  -Discussed case with orthopaedic surgery, Dr. Navarrete, He is to discuss case further with Dr. Coombs regarding reexploration of would and washout.      RLE DVT  -Continued on eliqius     Hx COPD, HTN, PVD, overactive bladder  -Home meds resumed as applicable     VTE Prophylaxis: On eliqius           Raul George MD    SUBJECTIVE     Patient had no acute events overnight.  Mental status appears improved from day prior with patient being more awake and alert.  Patient remains confused however; inappropriately answering questions.  Continues to endorse back discomfort.  Further ROS limited secondary to patient condition.  Patient's family at bedside who was updated on plan of care.      OBJECTIVE:       Last Recorded Vitals:  Vitals:    01/27/24 2338 01/28/24 0431 01/28/24 0721 01/28/24 0731   BP: 148/72 131/84 177/82    BP Location: Left arm Left arm     Patient Position: Lying Lying     Pulse: 86 71 86    Resp: 12 12     Temp: 36.6 °C (97.9 °F) 36.3 °C (97.3  °F) 36.3 °C (97.3 °F)    TempSrc: Temporal Temporal     SpO2: 94% 94% 94%    Weight:    67.3 kg (148 lb 5.9 oz)   Height:           Last I/O:  I/O last 3 completed shifts:  In: 132 (1.9 mL/kg) [IV Piggyback:132]  Out: 2100 (30.5 mL/kg) [Urine:2100 (0.8 mL/kg/hr)]  Weight: 68.8 kg     Physical Exam:  General: Well-developed adult female in moderate distress  HEENT: Clear sclera, EOMI, trachea midline, moist mucous membranes  Respiratory: Lungs clear to auscultation, with no wheezes or rhonchi appreciated  Cardiovascular: S1 and S2 auscultated, no murmurs clicks or rubs  Abdomen: Soft, nontender, nondistended  Neurological: Awake, alert, confused, cranial nerves grossly intact, spontaneously moves all extremities  Skin: Warm, dry    Inpatient Medications:  apixaban, 5 mg, oral, BID  ceftaroline, 600 mg, intravenous, q12h  daptomycin, 8 mg/kg, intravenous, q24h EDE  potassium chloride, 20 mEq, intravenous, q2h  psyllium, 1 packet, oral, Daily  sennosides, 2 tablet, oral, Nightly        PRN Medications  PRN medications: acetaminophen **OR** acetaminophen **OR** acetaminophen, HYDROmorphone, HYDROmorphone, ondansetron    Continuous Medications:         LABS AND IMAGING:     Labs:  Results for orders placed or performed during the hospital encounter of 01/27/24 (from the past 24 hour(s))   Drug Screen, Urine   Result Value Ref Range    Amphetamine Screen, Urine Presumptive Negative Presumptive Negative    Barbiturate Screen, Urine Presumptive Negative Presumptive Negative    Benzodiazepines Screen, Urine Presumptive Negative Presumptive Negative    Cannabinoid Screen, Urine Presumptive Negative Presumptive Negative    Cocaine Metabolite Screen, Urine Presumptive Negative Presumptive Negative    Fentanyl Screen, Urine Presumptive Negative Presumptive Negative    Opiate Screen, Urine Presumptive Positive (A) Presumptive Negative    Oxycodone Screen, Urine Presumptive Positive (A) Presumptive Negative    PCP Screen, Urine  Presumptive Negative Presumptive Negative   Magnesium   Result Value Ref Range    Magnesium 2.26 1.60 - 2.40 mg/dL   Basic Metabolic Panel   Result Value Ref Range    Glucose 152 (H) 74 - 99 mg/dL    Sodium 139 136 - 145 mmol/L    Potassium 2.5 (LL) 3.5 - 5.3 mmol/L    Chloride 97 (L) 98 - 107 mmol/L    Bicarbonate 28 21 - 32 mmol/L    Anion Gap 17 10 - 20 mmol/L    Urea Nitrogen 23 6 - 23 mg/dL    Creatinine 0.89 0.50 - 1.05 mg/dL    eGFR 70 >60 mL/min/1.73m*2    Calcium 10.5 (H) 8.6 - 10.3 mg/dL   CBC and Auto Differential   Result Value Ref Range    WBC 17.0 (H) 4.4 - 11.3 x10*3/uL    nRBC 0.0 0.0 - 0.0 /100 WBCs    RBC 4.63 4.00 - 5.20 x10*6/uL    Hemoglobin 13.3 12.0 - 16.0 g/dL    Hematocrit 41.3 36.0 - 46.0 %    MCV 89 80 - 100 fL    MCH 28.7 26.0 - 34.0 pg    MCHC 32.2 32.0 - 36.0 g/dL    RDW 15.3 (H) 11.5 - 14.5 %    Platelets 377 150 - 450 x10*3/uL    Neutrophils % 82.6 40.0 - 80.0 %    Immature Granulocytes %, Automated 0.5 0.0 - 0.9 %    Lymphocytes % 9.5 13.0 - 44.0 %    Monocytes % 7.3 2.0 - 10.0 %    Eosinophils % 0.0 0.0 - 6.0 %    Basophils % 0.1 0.0 - 2.0 %    Neutrophils Absolute 14.07 (H) 1.20 - 7.70 x10*3/uL    Immature Granulocytes Absolute, Automated 0.08 0.00 - 0.70 x10*3/uL    Lymphocytes Absolute 1.62 1.20 - 4.80 x10*3/uL    Monocytes Absolute 1.24 (H) 0.10 - 1.00 x10*3/uL    Eosinophils Absolute 0.00 0.00 - 0.70 x10*3/uL    Basophils Absolute 0.02 0.00 - 0.10 x10*3/uL        Imaging:  ECG 12 lead  Normal sinus rhythm  Left bundle branch block  Abnormal ECG  When compared with ECG of 02-JAN-2024 07:11,  Questionable change in QRS duration  See ED provider note for full interpretation and clinical correlation  Confirmed by Bridger Erickson ([id number]) on 1/27/2024 12:29:08 PM  CT chest abdomen pelvis w IV contrast  Narrative: STUDY:  CT Chest, Abdomen, and Pelvis with IV Contrast; 01/27/2024 6:37 AM  INDICATION:  Generalized abdominal pain.  Recent spine surgery and IVC filter.   Evaluate for  free air/fluid.  COMPARISON:  XR abdomen 01/15/2024.  CT AP 12/31/2023, 12/19/2023.  ACCESSION NUMBER(S):  [id number]  ORDERING CLINICIAN:  Osei Swann  TECHNIQUE:  CT of the chest, abdomen, and pelvis was performed.  Contiguous axial  images were obtained at 3 mm slice thickness through the chest,  abdomen, and pelvis.  Coronal and sagittal reconstructions at 3 mm  slice thickness were performed.  Omnipaque 350 75 mL was administered  intravenously.    FINDINGS:  CHEST:  MEDIASTINUM:  Right PICC appears satisfactorily positioned extending to the right  atrium.  The heart is normal in size without pericardial effusion.   Central vascular structures opacify normally.  No thyroid nodule.  No  identifiable breast mass.    LUNGS/PLEURA:  There is no pleural effusion, pleural thickening, or pneumothorax.  Minimal dependent atelectasis at the lung bases.  LYMPH NODES:  Thoracic lymph nodes are not enlarged.  ABDOMEN:     LIVER:  No hepatomegaly.  Smooth surface contour.  Normal attenuation.     BILE DUCTS:  No intrahepatic or extrahepatic biliary ductal dilatation.     GALLBLADDER:  Gallbladder is surgically absent.  STOMACH:  No abnormalities identified.     PANCREAS:  No masses or ductal dilatation.     SPLEEN:  No splenomegaly or focal splenic lesion.     ADRENAL GLANDS:  No thickening or nodules.     KIDNEYS AND URETERS:  Kidneys are normal in size and location.  No renal or ureteral  calculi.     PELVIS:     BLADDER:  Stauffer catheter present.  Mild wall thickening of the urinary bladder  may simply be related to incomplete distention.  Large quantity of  stool in the rectum suggests constipation versus impaction.     REPRODUCTIVE ORGANS:  No abnormalities identified.     BOWEL:  No abnormalities identified.  Appendix appears normal.     VESSELS:  No abnormalities identified.  IVC filter appears appropriately  positioned.  Abdominal aorta is normal in caliber.      PERITONEUM/RETROPERITONEUM/LYMPH NODES:  No free  fluid.  No pneumoperitoneum.  No lymphadenopathy.     ABDOMINAL WALL:  No abnormalities identified.  SOFT TISSUES:   No abnormalities identified.     BONES:  Mildly exaggerated thoracic kyphosis with mild to moderate  degenerative disease.  There are postsurgical changes of the lumbar spine.  Prosthetic discs  at L4-5 and L5-S1 are in similar position when compared to the prior  CT.  Two opaque drains at the surgical site are again noted.   Subcutaneous emphysema noted along the course of the drains.  Presumed  phlegmon noted at the surgical location.  No drainable collection.   Since the prior study, there has been erosive or destructive changes  of the inferior endplate of L3 and to a lesser extent the superior  endplate of L4.  The findings would be strongly suspicious for  discitis osteomyelitis.    No acute fracture or aggressive osseous lesion.  Impression: CT CHEST:  1.  No CT evidence of pulmonary embolism.  2.  No pulmonary mass, nodule or consolidation.  No pleural effusion.   No pneumothorax.  No thoracic adenopathy.  CT ABDOMEN AND PELVIS:  1.  Postsurgical changes of the lumbar spine again noted.  There are  two indwelling drains superficially unchanged from 12/31/2023.  The  prosthetic discs at L4-5 and L5-S1 appears satisfactorily positioned,  unchanged.  However, there is been interval destructive endplate  changes along the inferior aspect of L3 and to a lesser extent the  superior endplate of L4.  The findings would be suspicious for  discitis/osteomyelitis.  2.  No ascites, free air or bowel obstruction.  3.  No urinary tract calculus or hydronephrosis.  4.  IVC filter appears satisfactorily positioned.  Signed by Joshua Alfonso MD  CT lumbar spine wo IV contrast, CT thoracic spine wo IV contrast  Narrative: Interpreted By:  Joanne Cruz,   STUDY:  CT LUMBAR SPINE WO IV CONTRAST; CT THORACIC SPINE WO IV CONTRAST  1/27/2024 6:22 am      INDICATION:  Signs/Symptoms:recent post op. 2 JOSE drains.  the right paraspinal  appears infected. eval for fluid collection      COMPARISON:  MRI lumbar spine 01/11/2024      ACCESSION NUMBER(S):  [id number]; [id number]      ORDERING CLINICIAN:  LATOYA GUEVARA      TECHNIQUE:  Axial CT images of the lumbar spine are obtained. Axial, coronal and  sagittal reconstructions are provided for review.      FINDINGS:  CT THORACIC SPINE:  Alignment: Within normal limits.  There is degenerative change with mild-to-moderate anterior  osteophytic spurring at all levels particularly the midthoracic spine.      Vertebrae/Disc Spaces:   The vertebral body heights are intact. The  disc spaces are preserved.      There is a benign calcified right upper lobe granuloma.      CT LUMBAR SPINE:  There are disc spacers at L4-5 and L5-S1 in good position.  Status post decompression laminectomies L3-L5.  There is bone grafting material along the right and left lateral  aspects of the mid and lower lumbar spine. There are 2 drains. There  is a drain along the inferior aspect of the back. This courses  superiorly and the tip is in the soft tissues posterior to L1. There  is a 2nd drain along the inferior aspect of the back. This courses  superiorly and the tip is in the soft tissues posterior to L1. There  is induration of the soft tissues along the back and induration of  the paraspinal muscles. There is a 2.5 x 2.0 cm x 1.8 cm ill-defined  fluid collection in the paraspinal muscles of the back at the level  of L4-5 just to the right of midline. This could be a true fluid  collection or induration of the paraspinal muscles. There is no  discrete enhancing wall. Findings could represent a abscess,  postoperative seroma or liquified hematoma. There is no air in this  fluid. This is not along the course of one of the drains. There is  ghost artifact in the pedicles of L4, L5 and S1 from prior hardware  which has been removed.      Alignment: There is 5 mm retrolisthesis of L3 with respect to L4.     "  Vertebrae/Disc Spaces:  There is destruction and irregularity of the  inferior endplate of L3 and superior endplate of L4 consistent with  diskitis and adjacent osteomyelitis.      T12-L1:  There is no significant central canal stenosis.      L1-2:  There is no significant central canal or neural foraminal  stenosis.      L2-3:  There is no significant central canal or neural foraminal  stenosis.      L3-4: There is destruction of the inferior endplate of L3 and  superior endplate of L4. There is irregularity of the bone. There is  \"widening\" of the disc space. Findings are consistent with discitis  and adjacent osteomyelitis. There is a fracture of the right pedicle  of L4.      L4-5:  There is no significant central canal or neural foraminal  stenosis.      L5-S1:  There is no significant central canal or neural foraminal  stenosis.      Prevertebral/Paraspinal Soft Tissues: The prevertebral and paraspinal  soft tissues are unremarkable.      Status post cholecystectomy.  There is an inferior vena cava filter inferior to the renal veins      Impression: 1. Degenerative change thoracic spine. No central canal stenosis or  neural foraminal compromise  2. Diskitis and adjacent osteomyelitis L3-4.  3. 2.5 x 2.0 x 1.8 cm fluid collection in the paraspinal muscles of  the back just to the right of midline. This contains no air. This is  ill-defined and of question of whether this is a true discrete round  fluid collection or ill definition and induration of the paraspinal  muscles. No discrete enhancing wall is seen. Findings could represent  an abscess, postoperative seroma or liquified hematoma. This is not  along the course of one of the drains.  4. Nondisplaced fracture right pedicle L4.  5. Disc spacers L4-5 and L5-S1. Status post decompression  laminectomies L3-L5. There is bone grafting material along the right  and left lateral aspects of the mid and lower lumbar spine. There is  ghost artifact in the pedicles " of L4, L5 and S1 which represents  hardware which has been removed. There are 2 drains in the soft  tissues of the back.      MACRO:  None      Signed by: Joanne Cruz 1/27/2024 7:22 AM  Dictation workstation:   [id number]  CT head wo IV contrast  Narrative: Interpreted By:  Joanne Cruz,   STUDY:  CT HEAD WO IV CONTRAST;  1/27/2024 6:21 am      INDICATION:  Signs/Symptoms:AMS. eval for acute hemorrhage.      COMPARISON:  08/04/2009      ACCESSION NUMBER(S):  [id number]      ORDERING CLINICIAN:  LATOYA GUEVARA      TECHNIQUE:  Examination was performed in the axial plane using soft tissue and  bone algorithm.      FINDINGS:  INTRACRANIAL:  There is prominence of the ventricular system and cerebral sulci  consistent with cerebral atrophy. There are periventricular  hypodensities consistent with  moderate small vessel disease. No mass  or mass effect is identified. There is no hemorrhage or subdural  fluid collection. There is no acute infarct.  There is no fracture of the calvarium      EXTRACRANIAL:  Visualized paranasal sinuses and mastoids are clear.      Impression: No acute intracranial pathology.      MACRO:  None      Signed by: Joanne Cruz 1/27/2024 7:03 AM  Dictation workstation:   [id number]

## 2024-01-28 NOTE — CARE PLAN
The patient's goals for the shift include No falls    The clinical goals for the shift include No Falls

## 2024-01-28 NOTE — CONSULTS
Infectious Disease    Patient Name: Tara Tierney  Date: 2024  YOB: 1955  Medical Record Number: [id number]        Chief Complaint   Patient presents with    Altered Mental Status     Pt had 4 recent back surgery. Pt currently has an MRSA infection.           History of Present Illness:    Patient returns to the hospital with encephalopathy mental status changes.  Apparently started day before.  Patient is known to our service from multiple hospitalizations for spinal infection.  See previous charts for more information she is under treatment for salvage MRSA infection of the spine.  She has 2 drains present for the last surgery.  Apparently there was an inability to drain the infection fully through surgical debridement given location.  He has been on daptomycin and ceftaroline has been receiving therapy at home. No Reports of fevers at this time.  Patient reports possibly a mild improvement in sensorium.    Review of Systems: Cannot be obtained patient is confused.        Social History     Tobacco Use    Smoking status: Former     Packs/day: 0.50     Years: 45.00     Additional pack years: 0.00     Total pack years: 22.50     Types: Cigarettes     Quit date: 2023     Years since quittin.4   Vaping Use    Vaping Use: Never used   Substance Use Topics    Alcohol use: Never    Drug use: Never         Past Medical History:   Diagnosis Date    Arthritis     Back pain     COPD (chronic obstructive pulmonary disease) (CMS/HCC)     COVID-19 vaccine administered     Eczema     History of COVID-19     2020    Hyperlipidemia     Hypertension     Hypoesthesia of skin     Hypesthesia    Macular degeneration     Meniere's disease     Osteoporosis     Overactive bladder     Pain in unspecified finger(s)     Finger pain    Pain in unspecified wrist     Pain in wrist joint    Peripheral vascular disease (CMS/HCC)     Personal history of other diseases of the circulatory system     History of  coronary artery disease    Personal history of other diseases of the musculoskeletal system and connective tissue     History of arthritis    Personal history of other specified conditions     History of chest pain    Personal history of other specified conditions     History of balance disorder    Personal history of other specified conditions     History of numbness    Postmenopausal     Seasonal allergies     Unspecified visual loss     Vision problems           Past Surgical History:   Procedure Laterality Date    ADENOIDECTOMY      AORTA - BILATERAL FEMORAL ARTERY BYPASS GRAFT      BLEPHAROPTOSIS REPAIR      CARDIAC CATHETERIZATION      with stent and balloon    CARDIAC CATHETERIZATION N/A 1/2/2024    Procedure: IVC Filter Insertion;  Surgeon: Star Negro MD;  Location: [facility];  Service: Cardiovascular;  Laterality: N/A;    CHOLECYSTECTOMY      COLONOSCOPY      CT ANGIO NECK  05/18/2022    CT NECK ANGIO W AND WO IV CONTRAST 5/18/2022 [facility]    CT AORTA AND BILATERAL ILIOFEMORAL RUNOFF ANGIOGRAM W AND/OR WO IV CONTRAST  03/10/2020    CT AORTA AND BILATERAL ILIOFEMORAL RUNOFF ANGIOGRAM W AND/OR WO IV CONTRAST 3/10/2020 [facility]    CT AORTA AND BILATERAL ILIOFEMORAL RUNOFF ANGIOGRAM W AND/OR WO IV CONTRAST  07/21/2023    CT AORTA AND BILATERAL ILIOFEMORAL RUNOFF ANGIOGRAM W AND/OR WO IV CONTRAST 7/21/2023 [facility]    FL TRANSLUMINAL ANGIO PERCUTANEOUS BHARAT      TONSILLECTOMY      Tonsillectomy    TOTAL KNEE ARTHROPLASTY Bilateral     TUBAL LIGATION             Current Facility-Administered Medications:     acetaminophen (Tylenol) tablet 650 mg, 650 mg, oral, q4h PRN **OR** acetaminophen (Tylenol) oral liquid 650 mg, 650 mg, nasogastric tube, q4h PRN **OR** acetaminophen (Tylenol) suppository 650 mg, 650 mg, rectal, q4h PRN, Raul George MD    apixaban (Eliquis) tablet 5 mg, 5 mg, oral, BID, Raul George MD, 5 mg at 01/27/24 2143    ceftaroline (Teflaro) 600 mg  "in dextrose 5 % in water (D5W) 50 mL IV, 600 mg, intravenous, q12h, Raul George MD, Stopped at 01/27/24 2258    HYDROmorphone (Dilaudid) injection 0.3 mg, 0.3 mg, intravenous, q4h PRN, Raul George MD    HYDROmorphone (Dilaudid) injection 0.6 mg, 0.6 mg, intravenous, q4h PRN, Raul George MD    ondansetron (Zofran) injection 4 mg, 4 mg, intravenous, q6h PRN, Raul George MD, 4 mg at 01/27/24 2128    potassium chloride 20 mEq in 100 mL IV premix, 20 mEq, intravenous, q2h, Raul George MD    psyllium (Metamucil) 1 packet, 1 packet, oral, Daily, Raul George MD    sennosides (Senokot) tablet 17.2 mg, 2 tablet, oral, Nightly, Raul George MD     Allergies   Allergen Reactions    Neomycin Other     swelling, redness, burning post eye surgery    Neomycin-Polymyxin B-Dexameth Other and Rash     blisters, eye swelling, burning          Family History   Problem Relation Name Age of Onset    Breast cancer Mother      Other (arteriosclerotic cardiovascular disease) Father      Cancer Father           Physical Exam:    Blood pressure 177/82, pulse 86, temperature 36.3 °C (97.3 °F), resp. rate 12, height 1.44 m (4' 8.69\"), weight 67.3 kg (148 lb 5.9 oz), SpO2 94 %, not currently breastfeeding.  General: . NAD  Skin: no new rashes  HEENT:  Neck is supple, No subconjunctival hemorrhages, no oral exudates  Heart: S1 S2  Lungs: clear bilaterally  Abdomen: soft, ND, NTTP,  Back :no CVA tenderness,  Incision approximated there is 2 drains of tenderness around  Extrem: No edema, non tender  Neuro exam: CN II-XII intact, alert and oriented x 2  Psych: cooperative    Labs:  I have reviewed all lab results by electronic record, including most recent CBC, metabolic panel, and pertinent abnormalities were addressed from an infectious disease perspective.  Trends are being monitored over time.    Lab Results   Component Value Date    WBC 17.0 (H) 01/28/2024    HGB 13.3 01/28/2024    HCT 41.3 01/28/2024    MCV 89 01/28/2024     " 01/28/2024     Lab Results   Component Value Date    GLUCOSE 152 (H) 01/28/2024    CALCIUM 10.5 (H) 01/28/2024     01/28/2024    K 2.5 (LL) 01/28/2024    CO2 28 01/28/2024    CL 97 (L) 01/28/2024    BUN 23 01/28/2024    CREATININE 0.89 01/28/2024       Radiology:  I have reviewed imaging results per electronic record and most pertinent abnormalities are being addressed from an infectious disease standpoint.            ASSESSMENT:  Problem List Items Addressed This Visit          Neuro    * (Principal) Altered mental status, unspecified altered mental status type - Primary     Other Visit Diagnoses       Surgical wound infection            MRSA infection    .    Unclear Etiology of her mental status changes.  Currently new onset.  Patient with refractory MRSA infection of the spine.    If there is an infectious etiology here, and it is MRSA unlikely to be medically curable as already on salvage therapy with daptomycin and ceftaroline.  Other regimens will likely be suboptimal.  There is  also has a potential that ceftaroline can cause encephalopathy somewhat analogous to cefepime usually in the setting of renal failure which she does not have at point. I do not want to hold antibiotic therapy until we have more of an established diagnosis and new or worsening infection noted, as we do not have good alternative antibiotic options/      PLAN:  Neurosurgery to see  Daptomycin and ceftaroline

## 2024-01-28 NOTE — CONSULTS
History: Tara is here for mental status changes.  She is status post irrigation and debridement of a lumbar spine wound x 2 and has been on IV antibiotics.  Her family noted increased mental status changes over the last few days and she was brought to the hospital.    Past medical history: Multiple  Medications: Multiple  Allergies: Neomycin    Please refer to the intake H&P regarding the patient's review of systems, family history and social history as was done today    HEENT: Normal  Lungs: Clear to auscultation  Heart: RRR  Abdomen: Soft, nontender  Skin: clear  Extremity: Back wound is clear.  Minimal serosanguineous drainage.  She able to move the foot and toes well on both sides.  She does have pain in the back with deep palpation or increased leg motion.  Drains are intact.  Contralateral exam is normal for strength, motion, stability and neurovascular assessment.    Radiographs: None today    Assessment: Mental status changes status post lumbar wound irrigation and debridement x 2    Plan: Her white blood cell count has remained stable.  She continues on IV antibiotics.  Her right sided drain put out 10 cc and her left-sided drain had 0 today.  She is being worked up by the medicine and infectious disease teams for her mental status changes we will continue to follow her appropriately.  She can be out of bed and weight-bear as tolerated if doing better.  All questions were answered today with the patient.    Addendum: Her repeat blood work yesterday did show an elevated white blood cell count.  Given these findings an MRI with and without gadolinium was ordered and she will be n.p.o. after midnight in the event that further surgery is needed.    This note was generated with voice recognition software and may contain grammatical errors.

## 2024-01-29 ENCOUNTER — HOME INFUSION (OUTPATIENT)
Dept: INFUSION THERAPY | Age: 69
End: 2024-01-29
Payer: COMMERCIAL

## 2024-01-29 ENCOUNTER — ANESTHESIA EVENT (OUTPATIENT)
Dept: OPERATING ROOM | Facility: HOSPITAL | Age: 69
DRG: 856 | End: 2024-01-29
Payer: COMMERCIAL

## 2024-01-29 ENCOUNTER — APPOINTMENT (OUTPATIENT)
Dept: RADIOLOGY | Facility: HOSPITAL | Age: 69
DRG: 856 | End: 2024-01-29
Payer: COMMERCIAL

## 2024-01-29 ENCOUNTER — ANESTHESIA (OUTPATIENT)
Dept: OPERATING ROOM | Facility: HOSPITAL | Age: 69
DRG: 856 | End: 2024-01-29
Payer: COMMERCIAL

## 2024-01-29 ENCOUNTER — OUTSIDE SERVICES (OUTPATIENT)
Dept: INFECTIOUS DISEASES | Age: 69
End: 2024-01-29
Payer: COMMERCIAL

## 2024-01-29 DIAGNOSIS — A49.02 MRSA INFECTION: ICD-10-CM

## 2024-01-29 DIAGNOSIS — T81.49XA SURGICAL WOUND INFECTION: Primary | ICD-10-CM

## 2024-01-29 DIAGNOSIS — T81.89XA NON-HEALING SURGICAL WOUND, INITIAL ENCOUNTER: ICD-10-CM

## 2024-01-29 DIAGNOSIS — L08.9 INFECTED WOUND: ICD-10-CM

## 2024-01-29 DIAGNOSIS — T14.8XXA INFECTED WOUND: ICD-10-CM

## 2024-01-29 LAB
AMMONIA PLAS-SCNC: 30 UMOL/L (ref 16–53)
ANION GAP BLDA CALCULATED.4IONS-SCNC: 7 MMO/L (ref 10–25)
ANION GAP SERPL CALC-SCNC: 15 MMOL/L (ref 10–20)
ARTERIAL PATENCY WRIST A: POSITIVE
BASE EXCESS BLDA CALC-SCNC: 6.6 MMOL/L (ref -2–3)
BASOPHILS # BLD AUTO: 0.01 X10*3/UL (ref 0–0.1)
BASOPHILS NFR BLD AUTO: 0.1 %
BODY TEMPERATURE: ABNORMAL
BUN SERPL-MCNC: 29 MG/DL (ref 6–23)
CA-I BLDA-SCNC: 1.39 MMOL/L (ref 1.1–1.33)
CALCIUM SERPL-MCNC: 10.2 MG/DL (ref 8.6–10.3)
CHLORIDE BLDA-SCNC: 110 MMOL/L (ref 98–107)
CHLORIDE SERPL-SCNC: 105 MMOL/L (ref 98–107)
CK SERPL-CCNC: 75 U/L (ref 0–215)
CO2 SERPL-SCNC: 27 MMOL/L (ref 21–32)
CREAT SERPL-MCNC: 0.89 MG/DL (ref 0.5–1.05)
EGFRCR SERPLBLD CKD-EPI 2021: 70 ML/MIN/1.73M*2
EOSINOPHIL # BLD AUTO: 0 X10*3/UL (ref 0–0.7)
EOSINOPHIL NFR BLD AUTO: 0 %
ERYTHROCYTE [DISTWIDTH] IN BLOOD BY AUTOMATED COUNT: 15.6 % (ref 11.5–14.5)
GLUCOSE BLD MANUAL STRIP-MCNC: 139 MG/DL (ref 74–99)
GLUCOSE BLDA-MCNC: 136 MG/DL (ref 74–99)
GLUCOSE SERPL-MCNC: 143 MG/DL (ref 74–99)
HCO3 BLDA-SCNC: 28.7 MMOL/L (ref 22–26)
HCT VFR BLD AUTO: 41.5 % (ref 36–46)
HCT VFR BLD EST: 39 % (ref 36–46)
HGB BLD-MCNC: 13.2 G/DL (ref 12–16)
HGB BLDA-MCNC: 13.1 G/DL (ref 12–16)
IMM GRANULOCYTES # BLD AUTO: 0.06 X10*3/UL (ref 0–0.7)
IMM GRANULOCYTES NFR BLD AUTO: 0.4 % (ref 0–0.9)
INHALED O2 CONCENTRATION: 21 %
LACTATE BLDA-SCNC: 1.2 MMOL/L (ref 0.4–2)
LYMPHOCYTES # BLD AUTO: 1.75 X10*3/UL (ref 1.2–4.8)
LYMPHOCYTES NFR BLD AUTO: 13.1 %
MAGNESIUM SERPL-MCNC: 2.47 MG/DL (ref 1.6–2.4)
MCH RBC QN AUTO: 29.3 PG (ref 26–34)
MCHC RBC AUTO-ENTMCNC: 31.8 G/DL (ref 32–36)
MCV RBC AUTO: 92 FL (ref 80–100)
MONOCYTES # BLD AUTO: 1.22 X10*3/UL (ref 0.1–1)
MONOCYTES NFR BLD AUTO: 9.1 %
NEUTROPHILS # BLD AUTO: 10.33 X10*3/UL (ref 1.2–7.7)
NEUTROPHILS NFR BLD AUTO: 77.3 %
NRBC BLD-RTO: 0 /100 WBCS (ref 0–0)
OXYHGB MFR BLDA: 96.2 % (ref 94–98)
PCO2 BLDA: 32 MM HG (ref 38–42)
PH BLDA: 7.56 PH (ref 7.38–7.42)
PLATELET # BLD AUTO: 344 X10*3/UL (ref 150–450)
PO2 BLDA: 81 MM HG (ref 85–95)
POTASSIUM BLDA-SCNC: 3.8 MMOL/L (ref 3.5–5.3)
POTASSIUM SERPL-SCNC: 3.5 MMOL/L (ref 3.5–5.3)
RBC # BLD AUTO: 4.5 X10*6/UL (ref 4–5.2)
SAO2 % BLDA: 98 % (ref 94–100)
SODIUM BLDA-SCNC: 142 MMOL/L (ref 136–145)
SODIUM SERPL-SCNC: 143 MMOL/L (ref 136–145)
SPECIMEN DRAWN FROM PATIENT: ABNORMAL
WBC # BLD AUTO: 13.4 X10*3/UL (ref 4.4–11.3)

## 2024-01-29 PROCEDURE — 36600 WITHDRAWAL OF ARTERIAL BLOOD: CPT

## 2024-01-29 PROCEDURE — 1200000002 HC GENERAL ROOM WITH TELEMETRY DAILY

## 2024-01-29 PROCEDURE — 1090000002 HH PPS REVENUE DEBIT

## 2024-01-29 PROCEDURE — 70553 MRI BRAIN STEM W/O & W/DYE: CPT | Performed by: RADIOLOGY

## 2024-01-29 PROCEDURE — 82805 BLOOD GASES W/O2 SATURATION: CPT | Performed by: PSYCHIATRY & NEUROLOGY

## 2024-01-29 PROCEDURE — 2500000004 HC RX 250 GENERAL PHARMACY W/ HCPCS (ALT 636 FOR OP/ED): Mod: JZ | Performed by: INTERNAL MEDICINE

## 2024-01-29 PROCEDURE — 70553 MRI BRAIN STEM W/O & W/DYE: CPT

## 2024-01-29 PROCEDURE — 0JB70ZZ EXCISION OF BACK SUBCUTANEOUS TISSUE AND FASCIA, OPEN APPROACH: ICD-10-PCS | Performed by: PLASTIC SURGERY

## 2024-01-29 PROCEDURE — 11043 DBRDMT MUSC&/FSCA 1ST 20/<: CPT | Performed by: PLASTIC SURGERY

## 2024-01-29 PROCEDURE — 74018 RADEX ABDOMEN 1 VIEW: CPT

## 2024-01-29 PROCEDURE — 82550 ASSAY OF CK (CPK): CPT | Performed by: INTERNAL MEDICINE

## 2024-01-29 PROCEDURE — 80048 BASIC METABOLIC PNL TOTAL CA: CPT | Performed by: STUDENT IN AN ORGANIZED HEALTH CARE EDUCATION/TRAINING PROGRAM

## 2024-01-29 PROCEDURE — 99221 1ST HOSP IP/OBS SF/LOW 40: CPT | Performed by: PLASTIC SURGERY

## 2024-01-29 PROCEDURE — 2500000001 HC RX 250 WO HCPCS SELF ADMINISTERED DRUGS (ALT 637 FOR MEDICARE OP): Performed by: STUDENT IN AN ORGANIZED HEALTH CARE EDUCATION/TRAINING PROGRAM

## 2024-01-29 PROCEDURE — 99232 SBSQ HOSP IP/OBS MODERATE 35: CPT | Performed by: INTERNAL MEDICINE

## 2024-01-29 PROCEDURE — 1090000001 HH PPS REVENUE CREDIT

## 2024-01-29 PROCEDURE — 84132 ASSAY OF SERUM POTASSIUM: CPT | Performed by: PSYCHIATRY & NEUROLOGY

## 2024-01-29 PROCEDURE — 2500000004 HC RX 250 GENERAL PHARMACY W/ HCPCS (ALT 636 FOR OP/ED): Performed by: INTERNAL MEDICINE

## 2024-01-29 PROCEDURE — 82140 ASSAY OF AMMONIA: CPT | Performed by: PSYCHIATRY & NEUROLOGY

## 2024-01-29 PROCEDURE — 2550000001 HC RX 255 CONTRASTS: Performed by: PSYCHIATRY & NEUROLOGY

## 2024-01-29 PROCEDURE — 99233 SBSQ HOSP IP/OBS HIGH 50: CPT | Performed by: STUDENT IN AN ORGANIZED HEALTH CARE EDUCATION/TRAINING PROGRAM

## 2024-01-29 PROCEDURE — 83735 ASSAY OF MAGNESIUM: CPT | Performed by: STUDENT IN AN ORGANIZED HEALTH CARE EDUCATION/TRAINING PROGRAM

## 2024-01-29 PROCEDURE — 74018 RADEX ABDOMEN 1 VIEW: CPT | Performed by: RADIOLOGY

## 2024-01-29 PROCEDURE — A9575 INJ GADOTERATE MEGLUMI 0.1ML: HCPCS | Performed by: PSYCHIATRY & NEUROLOGY

## 2024-01-29 PROCEDURE — 82947 ASSAY GLUCOSE BLOOD QUANT: CPT

## 2024-01-29 PROCEDURE — 85025 COMPLETE CBC W/AUTO DIFF WBC: CPT | Performed by: STUDENT IN AN ORGANIZED HEALTH CARE EDUCATION/TRAINING PROGRAM

## 2024-01-29 PROCEDURE — 2500000004 HC RX 250 GENERAL PHARMACY W/ HCPCS (ALT 636 FOR OP/ED): Performed by: STUDENT IN AN ORGANIZED HEALTH CARE EDUCATION/TRAINING PROGRAM

## 2024-01-29 RX ORDER — HYDRALAZINE HYDROCHLORIDE 50 MG/1
50 TABLET, FILM COATED ORAL 2 TIMES DAILY
Status: DISCONTINUED | OUTPATIENT
Start: 2024-01-29 | End: 2024-02-07 | Stop reason: HOSPADM

## 2024-01-29 RX ORDER — HYDRALAZINE HYDROCHLORIDE 20 MG/ML
10 INJECTION INTRAMUSCULAR; INTRAVENOUS EVERY 4 HOURS PRN
Status: DISCONTINUED | OUTPATIENT
Start: 2024-01-29 | End: 2024-02-07 | Stop reason: HOSPADM

## 2024-01-29 RX ORDER — SODIUM CHLORIDE, SODIUM LACTATE, POTASSIUM CHLORIDE, CALCIUM CHLORIDE 600; 310; 30; 20 MG/100ML; MG/100ML; MG/100ML; MG/100ML
75 INJECTION, SOLUTION INTRAVENOUS CONTINUOUS
Status: DISCONTINUED | OUTPATIENT
Start: 2024-01-29 | End: 2024-02-01

## 2024-01-29 RX ORDER — ISOSORBIDE MONONITRATE 60 MG/1
60 TABLET, EXTENDED RELEASE ORAL
Status: DISCONTINUED | OUTPATIENT
Start: 2024-01-29 | End: 2024-02-07 | Stop reason: HOSPADM

## 2024-01-29 RX ORDER — L. ACIDOPHILUS/L.BULGARICUS 1MM CELL
1 TABLET ORAL DAILY
Status: DISCONTINUED | OUTPATIENT
Start: 2024-01-29 | End: 2024-02-07 | Stop reason: HOSPADM

## 2024-01-29 RX ORDER — GADOTERATE MEGLUMINE 376.9 MG/ML
0.2 INJECTION INTRAVENOUS
Status: COMPLETED | OUTPATIENT
Start: 2024-01-29 | End: 2024-01-29

## 2024-01-29 RX ORDER — ASPIRIN 81 MG/1
81 TABLET ORAL DAILY
Status: DISCONTINUED | OUTPATIENT
Start: 2024-01-29 | End: 2024-02-07 | Stop reason: HOSPADM

## 2024-01-29 RX ORDER — POLYETHYLENE GLYCOL 3350 17 G/17G
17 POWDER, FOR SOLUTION ORAL DAILY
Status: DISCONTINUED | OUTPATIENT
Start: 2024-01-29 | End: 2024-02-07 | Stop reason: HOSPADM

## 2024-01-29 RX ORDER — SENNOSIDES 8.6 MG/1
1 TABLET ORAL NIGHTLY
Status: DISCONTINUED | OUTPATIENT
Start: 2024-01-29 | End: 2024-02-07 | Stop reason: HOSPADM

## 2024-01-29 RX ADMIN — APIXABAN 5 MG: 5 TABLET, FILM COATED ORAL at 20:53

## 2024-01-29 RX ADMIN — DAPTOMYCIN 500 MG: 500 INJECTION, POWDER, LYOPHILIZED, FOR SOLUTION INTRAVENOUS at 20:53

## 2024-01-29 RX ADMIN — POLYETHYLENE GLYCOL 3350 17 G: 17 POWDER, FOR SOLUTION ORAL at 15:46

## 2024-01-29 RX ADMIN — HYDRALAZINE HYDROCHLORIDE 50 MG: 50 TABLET ORAL at 20:53

## 2024-01-29 RX ADMIN — SODIUM CHLORIDE, POTASSIUM CHLORIDE, SODIUM LACTATE AND CALCIUM CHLORIDE 75 ML/HR: 600; 310; 30; 20 INJECTION, SOLUTION INTRAVENOUS at 11:28

## 2024-01-29 RX ADMIN — HYDRALAZINE HYDROCHLORIDE 10 MG: 20 INJECTION INTRAMUSCULAR; INTRAVENOUS at 00:25

## 2024-01-29 RX ADMIN — HYDRALAZINE HYDROCHLORIDE 50 MG: 50 TABLET ORAL at 15:46

## 2024-01-29 RX ADMIN — ASPIRIN 81 MG: 81 TABLET, COATED ORAL at 15:46

## 2024-01-29 RX ADMIN — HYDROMORPHONE HYDROCHLORIDE 0.3 MG: 1 INJECTION, SOLUTION INTRAMUSCULAR; INTRAVENOUS; SUBCUTANEOUS at 10:23

## 2024-01-29 RX ADMIN — GADOTERATE MEGLUMINE 13.5 ML: 376.9 INJECTION INTRAVENOUS at 19:27

## 2024-01-29 RX ADMIN — STANDARDIZED SENNA CONCENTRATE 17.2 MG: 8.6 TABLET ORAL at 20:53

## 2024-01-29 RX ADMIN — HYDROMORPHONE HYDROCHLORIDE 0.3 MG: 1 INJECTION, SOLUTION INTRAMUSCULAR; INTRAVENOUS; SUBCUTANEOUS at 14:37

## 2024-01-29 RX ADMIN — Medication 1 TABLET: at 15:46

## 2024-01-29 ASSESSMENT — PAIN - FUNCTIONAL ASSESSMENT
PAIN_FUNCTIONAL_ASSESSMENT: WONG-BAKER FACES

## 2024-01-29 ASSESSMENT — PAIN DESCRIPTION - DESCRIPTORS
DESCRIPTORS: ACHING
DESCRIPTORS: ACHING;SHARP;SHOOTING

## 2024-01-29 ASSESSMENT — PAIN DESCRIPTION - LOCATION: LOCATION: BACK

## 2024-01-29 ASSESSMENT — COGNITIVE AND FUNCTIONAL STATUS - GENERAL
DRESSING REGULAR LOWER BODY CLOTHING: TOTAL
TOILETING: TOTAL
DAILY ACTIVITIY SCORE: 6
EATING MEALS: TOTAL
DRESSING REGULAR UPPER BODY CLOTHING: TOTAL
PERSONAL GROOMING: TOTAL
WALKING IN HOSPITAL ROOM: TOTAL
HELP NEEDED FOR BATHING: TOTAL
CLIMB 3 TO 5 STEPS WITH RAILING: TOTAL
MOVING TO AND FROM BED TO CHAIR: TOTAL
MOVING FROM LYING ON BACK TO SITTING ON SIDE OF FLAT BED WITH BEDRAILS: A LOT
STANDING UP FROM CHAIR USING ARMS: TOTAL
TURNING FROM BACK TO SIDE WHILE IN FLAT BAD: TOTAL
MOBILITY SCORE: 7

## 2024-01-29 ASSESSMENT — PAIN SCALES - GENERAL
PAINLEVEL_OUTOF10: 5 - MODERATE PAIN
PAINLEVEL_OUTOF10: 5 - MODERATE PAIN

## 2024-01-29 ASSESSMENT — PAIN SCALES - WONG BAKER
WONGBAKER_NUMERICALRESPONSE: HURTS LITTLE BIT
WONGBAKER_NUMERICALRESPONSE: HURTS EVEN MORE
WONGBAKER_NUMERICALRESPONSE: HURTS EVEN MORE
WONGBAKER_NUMERICALRESPONSE: HURTS LITTLE BIT
WONGBAKER_NUMERICALRESPONSE: HURTS LITTLE BIT

## 2024-01-29 NOTE — PROGRESS NOTES
Infectious Disease Progress Note       1/29/2024    Patient is a followup regarding severe back pain persistent bilateral lower extremity weakness altered mentation elevated CRP.  Patient has history of MRSA bacteremia, now status post hardware removal with no residual hardware, bilateral JOSE drains which have been removed, open wound postop deep incision, and now, altered mental status    Family is currently at bedside.  Chart reviewed.  She is currently on combination of daptomycin and ceftaroline.  She had elevated vancomycin PERNELL, therefore was changed to daptomycin out of concern for developing vancomycin resistance.  Ceftaroline was added as adjunct therapy    There has been an issue with polypharmacy, specifically pain medication.  She has had a history of baclofen, morphine, and now Dilaudid, which may have contributed to her altered mental status.  By altered mental status I mean that she has been extremely sleepy.  Today she is arousable and becomes alert and interactive when stimulated.  She continues to be nonverbal with the exception of some noises due to pain.     Her family tells me that she had inability to swallow prior to coming to the hospital    She has had multiple surgical interventions for her lumbar spine and has persistent severe back pain.    Her MRI of the spine is showing evidence of previous laminectomies L3-S1 levels, there is a rim-enhancing fluid surrounding the surgical drainage catheter along laminectomy bed and extensive evidence of signal abnormality and enhancement within the soft tissues along the laminectomy bed surrounding posterior paraspinal soft tissues and superficially along the surgical tract -unclear if this is a superimposed infectious process.  There is also an abnormal infiltrative signal and enhancement within the paraspinal soft tissues medial psoas muscle bilaterally on the right greater than the left extending from L3 into sacrum which also looks to be possibly  infectious    Unsure if any of these can be reached by interventional radiology for diagnostic purposes.  I have spoken to orthopedics and these will not be accessible by surgical intervention.  Given that the patient has been through multiple surgeries this would likely not be the recommended approach as it is.     She has not had an MRI of the brain, and due to new neurological findings, would be best to involve neurology for opinion.  I have discussed this with primary      Lab Results   Component Value Date    WBC 13.4 (H) 01/29/2024    HGB 13.2 01/29/2024    HCT 41.5 01/29/2024    MCV 92 01/29/2024     01/29/2024     Lab Results   Component Value Date    GLUCOSE 143 (H) 01/29/2024    CALCIUM 10.2 01/29/2024     01/29/2024    K 3.5 01/29/2024    CO2 27 01/29/2024     01/29/2024    BUN 29 (H) 01/29/2024    CREATININE 0.89 01/29/2024       WBC trends are being monitored. Antibiotic doses are being adjusted per most recent renal labs.     Vitals:    01/29/24 0737   BP: 136/65   Pulse: 94   Resp: 16   Temp: 36.3 °C (97.3 °F)   SpO2: 95%           Patient Active Problem List   Diagnosis    Abdominal aortic aneurysm (CMS/HCC)    Abnormal EKG    Bilateral carotid artery stenosis    Bruit of right carotid artery    CAD in native artery    Cardiomyopathy (CMS/HCC)    Chest pain    Chronic asthmatic bronchitis    Closed displaced fracture of fifth metatarsal bone    Current every day smoker    Difficulty breathing    Essential hypertension    History of total knee replacement    Hypokalemia    Intermittent claudication (CMS/HCC)    Knee osteoarthritis    Knee pain    Limb pain    Localized swelling, mass, or lump of lower extremity    Celiac artery stenosis (CMS/HCC)    Mesenteric artery stenosis (CMS/HCC)    Mixed hyperlipidemia    Obese    PVD (peripheral vascular disease) (CMS/HCC)    Right foot pain    Swelling    Trigger finger    Urinary tract infection without hematuria    Back pain of lumbar  region with sciatica    Back pain with radiculopathy    Intractable back pain    Seroma, post-traumatic (CMS/HCC)    Lumbar surgical wound fluid collection    Generalized weakness    Postoperative surgical complication involving musculoskeletal system associated with musculoskeletal procedure, unspecified complication    Back pain at L4-L5 level    Deep vein thrombosis (DVT) of right lower extremity (CMS/HCC)    Anemia    Altered mental status, unspecified altered mental status type    Surgical wound infection           ASSESSMENT:        PLAN:  Will discuss with interventional radiology possibility of obtaining sample from fluid collection  Would consult neurology due to patient speech deficit that seems to be new.  She is only making sounds and this is new - possible CVA?  Case discussed with orthopedics  Discussed with family at bedside    Polypharmacy.  Patient was on pain medication outpatient.  Family had mentioned morphine however there was also possibility of OxyContin and possibly Percocet.  She was given Dilaudid today which is also made her very sleepy. She has had issues with constipation per MR. Would involve pain management as well.     Imaging and labs were reviewed per medical records and any ID pertinent labs were also addressed        Amy Celestin,

## 2024-01-29 NOTE — CONSULTS
"Inpatient consult to Neurology  Consult performed by: Lata Perez MD  Consult ordered by: Raul George MD          History Of Present Illness  Tara Tierney is a 68 y.o. female presenting with altered mental status with confusion that arose after opiate medication administered at home on friday January 26.  History was provided by the patient's .  She had had a very active day on Thursday, unusually active, and had significant pain.  She took \"MS contin\" and slept from 4 AM to 4 PM.  When she awoke she was very confused, at times agitated, not making sense.  Her  tried to convince her to go to the emergency room and refused.  Ultimately he was able to get her to the ER.    Since admission she has shown slow improvement.  For the first time she started describing pain.  She is very drowsy, at times only awakening after sternal rub.  Other times she received sedating medication in the ER she had 1 dose of Ativan and she had 1 mg of IV Dilaudid.  On the floor today 3 hours before my examination she had 0.3 mg of IV Dilaudid in advance of stitches being removed and wound care with plastic surgery.    Her  denies any history of sleep apnea or symptoms of the same.    On January 25 there was concern that the lumbar drain was dislodged, orthopedics recommended evaluation in the ER.  Both JOSE drains were removed by orthopedics this morning    Infectious disease has recommended discontinuation of the cephalosporin she continues on daptomycin      Visit Vitals  /64   Pulse 80   Temp 36.3 °C (97.3 °F)   Resp 18        Neurological Exam:  GENERAL APPEARANCE: Lethargic    MENTAL STATE: Groans to noxious stimuli    CRANIAL NERVES:   CN 2 not testable due to encephalopathy  CN 3, 4, 6  Pupils round, equally reactive to light.    CN 5 not testable due to encephalopathy  CN 7 Normal and symmetric facial strength. Nasolabial folds symmetric.   CN 8 not testable due to encephalopathy  CN 9 not " "testable due to encephalopathy  CN 11 not testable due to encephalopathy  CN 12 not testable due to encephalopathy    MOTOR: Minimal withdrawal in all 4 limbs    REFLEXES: not testable due to encephalopathy    SENSORY: Flicker withdrawal in all 4 limbs    COORDINATION: not testable due to encephalopathy    GAIT: not testable due to encephalopathy        BMP:  Results from last 7 days   Lab Units 01/29/24 0403 01/28/24  0525 01/27/24 0452   SODIUM mmol/L 143 139 132*   POTASSIUM mmol/L 3.5 2.5* 3.0*   CHLORIDE mmol/L 105 97* 95*   CO2 mmol/L 27 28 20*   BUN mg/dL 29* 23 22   CREATININE mg/dL 0.89 0.89 0.91       CBC:  Results from last 7 days   Lab Units 01/29/24 0403 01/28/24 0525 01/27/24 0452   WBC AUTO x10*3/uL 13.4* 17.0* 8.9   RBC AUTO x10*6/uL 4.50 4.63 4.30   HEMOGLOBIN g/dL 13.2 13.3 12.5   HEMATOCRIT % 41.5 41.3 37.4   MCV fL 92 89 87   MCH pg 29.3 28.7 29.1   MCHC g/dL 31.8* 32.2 33.4   RDW % 15.6* 15.3* 14.8*   PLATELETS AUTO x10*3/uL 344 377 358       INR:        Lipid Profile:        No lab exists for component: \"LABVLDL\"    HgbA1C:        CMP:  Results from last 7 days   Lab Units 01/29/24 0403 01/28/24 0525 01/27/24 0452   SODIUM mmol/L 143 139 132*   POTASSIUM mmol/L 3.5 2.5* 3.0*   CHLORIDE mmol/L 105 97* 95*   CO2 mmol/L 27 28 20*   BUN mg/dL 29* 23 22   CREATININE mg/dL 0.89 0.89 0.91   GLUCOSE mg/dL 143* 152* 168*   CALCIUM mg/dL 10.2 10.5* 10.3   PROTEIN TOTAL g/dL  --   --  8.0   BILIRUBIN TOTAL mg/dL  --   --  0.8   ALK PHOS U/L  --   --  144*   AST U/L  --   --  17   ALT U/L  --   --  28       ABG:        No lab exists for component: \"PO2\", \"PCO2\", \"HCO3\", \"BE\", \"O2SAT\"    TSH:  Lab Results   Component Value Date    TSH 0.31 (L) 01/27/2024     Lab Results   Component Value Date    WYQPLKYL88 322 01/05/2024     Lab Results   Component Value Date    FOLATE 5.1 01/05/2024     Lab Results   Component Value Date    VITD25 33 01/05/2024         No MRI head results found for the past 12 " months     CT head wo IV contrast    Result Date: 1/27/2024  Interpreted By:  Joanne Cruz, STUDY: CT HEAD WO IV CONTRAST;  1/27/2024 6:21 am   INDICATION: Signs/Symptoms:AMS. eval for acute hemorrhage.   COMPARISON: 08/04/2009   ACCESSION NUMBER(S): [id number]   ORDERING CLINICIAN: LATOYA GUEVARA   TECHNIQUE: Examination was performed in the axial plane using soft tissue and bone algorithm.   FINDINGS: INTRACRANIAL: There is prominence of the ventricular system and cerebral sulci consistent with cerebral atrophy. There are periventricular hypodensities consistent with  moderate small vessel disease. No mass or mass effect is identified. There is no hemorrhage or subdural fluid collection. There is no acute infarct. There is no fracture of the calvarium   EXTRACRANIAL: Visualized paranasal sinuses and mastoids are clear.       No acute intracranial pathology.   MACRO: None   Signed by: Joanne Cruz 1/27/2024 7:03 AM Dictation workstation:   [id number]      MR lumbar spine w and wo IV contrast    Result Date: 1/28/2024  Interpreted By:  Yoav Davidson, STUDY: MR LUMBAR SPINE W AND WO IV CONTRAST;  1/28/2024 6:39 pm   INDICATION: Signs/Symptoms: Rule out abscess.   COMPARISON: 01/11/2024   ACCESSION NUMBER(S): [id number]   ORDERING CLINICIAN: FRANCESCA ORR   TECHNIQUE: Sagittal STIR, sagittal T2, sagittal T1, axial T2, axial T1, as well as post gadolinium sagittal axial T1 weighted MRI images of the lumbar spine were obtained. The patient received 13 mL of Dotarem gadolinium intravenously.   FINDINGS: Postoperative changes are again identified compatible with a previous posterior laminectomies at the L3 through S1 levels as well as discectomies at the L4/5 and L5/S1 levels. Metallic artifact from orthopedic hardware is identified along the disc spaces at the L4/5 and L5/S1 levels. There are surgical drainage catheter again noted along the laminectomy bed as well as more superficially along the surgical tract  with the posterior paraspinal soft tissues. There is a minimal amount of rim enhancing fluid surrounding the surgical drainage catheter along laminectomy bed the sterility of which can not be ascertained on this MRI study.   There is again evidence of extensive infiltrative signal abnormality and enhancement within the soft tissues along the laminectomy bed and surrounding posterior paraspinal soft tissues extending superficially along the surgical tract which may be postsurgical in etiology although a superimposed infectious/inflammatory process could give a similar MRI appearance and must be considered.   There is again evidence of abnormal signal along the L3/4 disc space as well as within the adjacent bone marrow of the L3 and L4 vertebrae most compatible with underlying discitis/osteomyelitis. There has been mild interval bony destruction/collapse of the inferior L3 and to a lesser degree superior L4 vertebrae when compared with the prior study dated 01/11/2024. There is again evidence of approximately 4 mm of retrolisthesis of L3 on L4.   There is mild circumferential abnormal epidural thickening and enhancement within the spinal canal best appreciated extending from the L3 through S1 levels which may be postsurgical and/or infectious/inflammatory in origin.   There is abnormal infiltrative signal and enhancement within the paraspinal soft tissues/medial psoas muscles bilaterally right greater than left extending from the L3 level caudally into the sacral region likely infectious/inflammatory in origin.   The visualized spinal cord demonstrates no signal abnormality or abnormal enhancement within it. The conus medullaris is normally positioned terminating at the L1 level.   At the L5/S1 level,  there are postoperative changes compatible with a previous posterior laminectomy as well as discectomy. There is enhancing soft tissue noted along the laminectomy bed as well as enhancing epidural thickening and  enhancement circumferentially surrounding the thecal sac within the spinal canal. There is no significant narrowing of the thecal sac within the spinal canal. There is infiltrative enhancing epidural soft tissue extending laterally into the right neural foramen. There is mild-to-moderate encroachment upon the left neural foramen.   At the L4/L5 level,  there are postoperative changes compatible with a previous posterior laminectomy as well as discectomy. There is enhancing soft tissue noted along the laminectomy bed as well as enhancing epidural thickening and enhancement circumferentially surrounding the thecal sac within the spinal canal. There is mild overall narrowing of the thecal sac within the spinal canal. There is infiltrative enhancing epidural soft tissue extending laterally into the right neural foramen. There is mild-to-moderate encroachment upon the left neural foramen.   At the L3/L4 level,  there are postoperative changes compatible with a previous L3 laminectomy. There is again evidence of approximately 4 mm of retrolisthesis of L3 on L4. There is enhancing soft tissue noted along the laminectomy bed as well as enhancing epidural thickening and enhancement circumferentially surrounding the thecal sac within the spinal canal. There is no significant narrowing of the thecal sac within the spinal canal. There is bilateral neural foraminal narrowing with infiltrative enhancing epidural soft tissue extending laterally into the region of the neural foramen bilaterally.   At the L2/L3 level,  there are degenerative facet changes and a minimal posterior disc bulge without significant spinal canal narrowing. There is mild encroachment upon the inferior recesses of the neural foramen bilaterally.   At the L1/L2 level,  there is a minimal posterior disc bulge and mild degenerative facet changes contributing to very mild encroachment upon the spinal canal. There is mild encroachment upon the inferior recesses  of the neural foramen left greater than right.   At the T12/L1 level,  there is no significant spinal canal stenosis or neuroforaminal stenosis.   At the T11/12 level, there is a minimal posterior disc bulge and mild degenerative facet changes without significant spinal canal or neural foraminal narrowing.         Postoperative changes are again identified compatible with a previous posterior laminectomies at the L3 through S1 levels as well as discectomies at the L4/5 and L5/S1 levels. Metallic artifact from orthopedic hardware is identified along the disc spaces at the L4/5 and L5/S1 levels. There are surgical drainage catheter again noted along the laminectomy bed as well as more superficially along the surgical tract with the posterior paraspinal soft tissues. There is a minimal amount of rim enhancing fluid surrounding the surgical drainage catheter along laminectomy bed the sterility of which can not be ascertained on this MRI study.   There is again evidence of extensive infiltrative signal abnormality and enhancement within the soft tissues along the laminectomy bed and surrounding posterior paraspinal soft tissues extending superficially along the surgical tract which may be postsurgical in etiology although a superimposed infectious/inflammatory process could give a similar MRI appearance and must be considered.   There is again evidence of abnormal signal along the L3/4 disc space as well as within the adjacent bone marrow of the L3 and L4 vertebrae most compatible with underlying discitis/osteomyelitis. There has been mild interval bony destruction/collapse of the inferior L3 and to a lesser degree superior L4 vertebrae when compared with the prior study dated 01/11/2024. There is again evidence of approximately 4 mm of retrolisthesis of L3 on L4.   There is mild circumferential abnormal epidural thickening and enhancement within the spinal canal best appreciated extending from the L3 through S1 levels  which may be postsurgical and/or infectious/inflammatory in origin.   There is abnormal infiltrative signal and enhancement within the paraspinal soft tissues/medial psoas muscles bilaterally right greater than left extending from the L3 level caudally into the sacral region likely infectious/inflammatory in origin.   There is multilevel spondylosis. There are varying degrees of spinal canal and neural foraminal narrowing as described above.   MACRO: None.   Signed by: Yoav Davidson 1/28/2024 7:27 PM Dictation workstation:   [id number]    ECG 12 lead    Result Date: 1/27/2024  Normal sinus rhythm Left bundle branch block Abnormal ECG When compared with ECG of 02-JAN-2024 07:11, Questionable change in QRS duration See ED provider note for full interpretation and clinical correlation Confirmed by Bridger Erickson ([id number]) on 1/27/2024 12:29:08 PM    CT chest abdomen pelvis w IV contrast    Result Date: 1/27/2024  STUDY: CT Chest, Abdomen, and Pelvis with IV Contrast; 01/27/2024 6:37 AM INDICATION: Generalized abdominal pain.  Recent spine surgery and IVC filter. Evaluate for free air/fluid. COMPARISON: XR abdomen 01/15/2024.  CT AP 12/31/2023, 12/19/2023. ACCESSION NUMBER(S): [id number] ORDERING CLINICIAN: Osei Swann TECHNIQUE: CT of the chest, abdomen, and pelvis was performed.  Contiguous axial images were obtained at 3 mm slice thickness through the chest, abdomen, and pelvis.  Coronal and sagittal reconstructions at 3 mm slice thickness were performed.  Omnipaque 350 75 mL was administered intravenously.  FINDINGS: CHEST: MEDIASTINUM: Right PICC appears satisfactorily positioned extending to the right atrium.  The heart is normal in size without pericardial effusion. Central vascular structures opacify normally.  No thyroid nodule.  No identifiable breast mass.  LUNGS/PLEURA: There is no pleural effusion, pleural thickening, or pneumothorax. Minimal dependent atelectasis at the lung bases. LYMPH NODES: Thoracic  lymph nodes are not enlarged. ABDOMEN:  LIVER: No hepatomegaly.  Smooth surface contour.  Normal attenuation.  BILE DUCTS: No intrahepatic or extrahepatic biliary ductal dilatation.  GALLBLADDER: Gallbladder is surgically absent. STOMACH: No abnormalities identified.  PANCREAS: No masses or ductal dilatation.  SPLEEN: No splenomegaly or focal splenic lesion.  ADRENAL GLANDS: No thickening or nodules.  KIDNEYS AND URETERS: Kidneys are normal in size and location.  No renal or ureteral calculi.  PELVIS:  BLADDER: Stauffer catheter present.  Mild wall thickening of the urinary bladder may simply be related to incomplete distention.  Large quantity of stool in the rectum suggests constipation versus impaction.  REPRODUCTIVE ORGANS: No abnormalities identified.  BOWEL: No abnormalities identified.  Appendix appears normal.  VESSELS: No abnormalities identified.  IVC filter appears appropriately positioned.  Abdominal aorta is normal in caliber.  PERITONEUM/RETROPERITONEUM/LYMPH NODES: No free fluid.  No pneumoperitoneum. No lymphadenopathy.  ABDOMINAL WALL: No abnormalities identified. SOFT TISSUES: No abnormalities identified.  BONES: Mildly exaggerated thoracic kyphosis with mild to moderate degenerative disease. There are postsurgical changes of the lumbar spine.  Prosthetic discs at L4-5 and L5-S1 are in similar position when compared to the prior CT.  Two opaque drains at the surgical site are again noted. Subcutaneous emphysema noted along the course of the drains.  Presumed phlegmon noted at the surgical location.  No drainable collection. Since the prior study, there has been erosive or destructive changes of the inferior endplate of L3 and to a lesser extent the superior endplate of L4.  The findings would be strongly suspicious for discitis osteomyelitis.  No acute fracture or aggressive osseous lesion.    CT CHEST: 1.  No CT evidence of pulmonary embolism. 2.  No pulmonary mass, nodule or consolidation.  No  pleural effusion. No pneumothorax.  No thoracic adenopathy. CT ABDOMEN AND PELVIS: 1.  Postsurgical changes of the lumbar spine again noted.  There are two indwelling drains superficially unchanged from 12/31/2023.  The prosthetic discs at L4-5 and L5-S1 appears satisfactorily positioned, unchanged.  However, there is been interval destructive endplate changes along the inferior aspect of L3 and to a lesser extent the superior endplate of L4.  The findings would be suspicious for discitis/osteomyelitis. 2.  No ascites, free air or bowel obstruction. 3.  No urinary tract calculus or hydronephrosis. 4.  IVC filter appears satisfactorily positioned. Signed by Joshua Alfonso MD    CT lumbar spine wo IV contrast    Result Date: 1/27/2024  Interpreted By:  Joanne Cruz, STUDY: CT LUMBAR SPINE WO IV CONTRAST; CT THORACIC SPINE WO IV CONTRAST 1/27/2024 6:22 am   INDICATION: Signs/Symptoms:recent post op. 2 JOSE drains. the right paraspinal appears infected. eval for fluid collection   COMPARISON: MRI lumbar spine 01/11/2024   ACCESSION NUMBER(S): [id number]; [id number]   ORDERING CLINICIAN: LATOYA GUEVARA   TECHNIQUE: Axial CT images of the lumbar spine are obtained. Axial, coronal and sagittal reconstructions are provided for review.   FINDINGS: CT THORACIC SPINE: Alignment: Within normal limits. There is degenerative change with mild-to-moderate anterior osteophytic spurring at all levels particularly the midthoracic spine.   Vertebrae/Disc Spaces:   The vertebral body heights are intact. The disc spaces are preserved.   There is a benign calcified right upper lobe granuloma.   CT LUMBAR SPINE: There are disc spacers at L4-5 and L5-S1 in good position. Status post decompression laminectomies L3-L5. There is bone grafting material along the right and left lateral aspects of the mid and lower lumbar spine. There are 2 drains. There is a drain along the inferior aspect of the back. This courses superiorly and the tip is  "in the soft tissues posterior to L1. There is a 2nd drain along the inferior aspect of the back. This courses superiorly and the tip is in the soft tissues posterior to L1. There is induration of the soft tissues along the back and induration of the paraspinal muscles. There is a 2.5 x 2.0 cm x 1.8 cm ill-defined fluid collection in the paraspinal muscles of the back at the level of L4-5 just to the right of midline. This could be a true fluid collection or induration of the paraspinal muscles. There is no discrete enhancing wall. Findings could represent a abscess, postoperative seroma or liquified hematoma. There is no air in this fluid. This is not along the course of one of the drains. There is ghost artifact in the pedicles of L4, L5 and S1 from prior hardware which has been removed.   Alignment: There is 5 mm retrolisthesis of L3 with respect to L4.   Vertebrae/Disc Spaces:  There is destruction and irregularity of the inferior endplate of L3 and superior endplate of L4 consistent with diskitis and adjacent osteomyelitis.   T12-L1:  There is no significant central canal stenosis.   L1-2:  There is no significant central canal or neural foraminal stenosis.   L2-3:  There is no significant central canal or neural foraminal stenosis.   L3-4: There is destruction of the inferior endplate of L3 and superior endplate of L4. There is irregularity of the bone. There is \"widening\" of the disc space. Findings are consistent with discitis and adjacent osteomyelitis. There is a fracture of the right pedicle of L4.   L4-5:  There is no significant central canal or neural foraminal stenosis.   L5-S1:  There is no significant central canal or neural foraminal stenosis.   Prevertebral/Paraspinal Soft Tissues: The prevertebral and paraspinal soft tissues are unremarkable.   Status post cholecystectomy. There is an inferior vena cava filter inferior to the renal veins       1. Degenerative change thoracic spine. No central " canal stenosis or neural foraminal compromise 2. Diskitis and adjacent osteomyelitis L3-4. 3. 2.5 x 2.0 x 1.8 cm fluid collection in the paraspinal muscles of the back just to the right of midline. This contains no air. This is ill-defined and of question of whether this is a true discrete round fluid collection or ill definition and induration of the paraspinal muscles. No discrete enhancing wall is seen. Findings could represent an abscess, postoperative seroma or liquified hematoma. This is not along the course of one of the drains. 4. Nondisplaced fracture right pedicle L4. 5. Disc spacers L4-5 and L5-S1. Status post decompression laminectomies L3-L5. There is bone grafting material along the right and left lateral aspects of the mid and lower lumbar spine. There is ghost artifact in the pedicles of L4, L5 and S1 which represents hardware which has been removed. There are 2 drains in the soft tissues of the back.   MACRO: None   Signed by: Joanne Cruz 1/27/2024 7:22 AM Dictation workstation:   [id number]    CT thoracic spine wo IV contrast    Result Date: 1/27/2024  Interpreted By:  Joanne Cruz, STUDY: CT LUMBAR SPINE WO IV CONTRAST; CT THORACIC SPINE WO IV CONTRAST 1/27/2024 6:22 am   INDICATION: Signs/Symptoms:recent post op. 2 JOSE drains. the right paraspinal appears infected. eval for fluid collection   COMPARISON: MRI lumbar spine 01/11/2024   ACCESSION NUMBER(S): [id number]; [id number]   ORDERING CLINICIAN: LATOYA GUEVARA   TECHNIQUE: Axial CT images of the lumbar spine are obtained. Axial, coronal and sagittal reconstructions are provided for review.   FINDINGS: CT THORACIC SPINE: Alignment: Within normal limits. There is degenerative change with mild-to-moderate anterior osteophytic spurring at all levels particularly the midthoracic spine.   Vertebrae/Disc Spaces:   The vertebral body heights are intact. The disc spaces are preserved.   There is a benign calcified right upper lobe granuloma.    "CT LUMBAR SPINE: There are disc spacers at L4-5 and L5-S1 in good position. Status post decompression laminectomies L3-L5. There is bone grafting material along the right and left lateral aspects of the mid and lower lumbar spine. There are 2 drains. There is a drain along the inferior aspect of the back. This courses superiorly and the tip is in the soft tissues posterior to L1. There is a 2nd drain along the inferior aspect of the back. This courses superiorly and the tip is in the soft tissues posterior to L1. There is induration of the soft tissues along the back and induration of the paraspinal muscles. There is a 2.5 x 2.0 cm x 1.8 cm ill-defined fluid collection in the paraspinal muscles of the back at the level of L4-5 just to the right of midline. This could be a true fluid collection or induration of the paraspinal muscles. There is no discrete enhancing wall. Findings could represent a abscess, postoperative seroma or liquified hematoma. There is no air in this fluid. This is not along the course of one of the drains. There is ghost artifact in the pedicles of L4, L5 and S1 from prior hardware which has been removed.   Alignment: There is 5 mm retrolisthesis of L3 with respect to L4.   Vertebrae/Disc Spaces:  There is destruction and irregularity of the inferior endplate of L3 and superior endplate of L4 consistent with diskitis and adjacent osteomyelitis.   T12-L1:  There is no significant central canal stenosis.   L1-2:  There is no significant central canal or neural foraminal stenosis.   L2-3:  There is no significant central canal or neural foraminal stenosis.   L3-4: There is destruction of the inferior endplate of L3 and superior endplate of L4. There is irregularity of the bone. There is \"widening\" of the disc space. Findings are consistent with discitis and adjacent osteomyelitis. There is a fracture of the right pedicle of L4.   L4-5:  There is no significant central canal or neural foraminal " stenosis.   L5-S1:  There is no significant central canal or neural foraminal stenosis.   Prevertebral/Paraspinal Soft Tissues: The prevertebral and paraspinal soft tissues are unremarkable.   Status post cholecystectomy. There is an inferior vena cava filter inferior to the renal veins       1. Degenerative change thoracic spine. No central canal stenosis or neural foraminal compromise 2. Diskitis and adjacent osteomyelitis L3-4. 3. 2.5 x 2.0 x 1.8 cm fluid collection in the paraspinal muscles of the back just to the right of midline. This contains no air. This is ill-defined and of question of whether this is a true discrete round fluid collection or ill definition and induration of the paraspinal muscles. No discrete enhancing wall is seen. Findings could represent an abscess, postoperative seroma or liquified hematoma. This is not along the course of one of the drains. 4. Nondisplaced fracture right pedicle L4. 5. Disc spacers L4-5 and L5-S1. Status post decompression laminectomies L3-L5. There is bone grafting material along the right and left lateral aspects of the mid and lower lumbar spine. There is ghost artifact in the pedicles of L4, L5 and S1 which represents hardware which has been removed. There are 2 drains in the soft tissues of the back.   MACRO: None   Signed by: Joanne Cruz 1/27/2024 7:22 AM Dictation workstation:   [id number]    CT head wo IV contrast    Result Date: 1/27/2024  Interpreted By:  Joanne Cruz, STUDY: CT HEAD WO IV CONTRAST;  1/27/2024 6:21 am   INDICATION: Signs/Symptoms:AMS. eval for acute hemorrhage.   COMPARISON: 08/04/2009   ACCESSION NUMBER(S): [id number]   ORDERING CLINICIAN: LATOYA GUEVARA   TECHNIQUE: Examination was performed in the axial plane using soft tissue and bone algorithm.   FINDINGS: INTRACRANIAL: There is prominence of the ventricular system and cerebral sulci consistent with cerebral atrophy. There are periventricular hypodensities consistent with   moderate small vessel disease. No mass or mass effect is identified. There is no hemorrhage or subdural fluid collection. There is no acute infarct. There is no fracture of the calvarium   EXTRACRANIAL: Visualized paranasal sinuses and mastoids are clear.       No acute intracranial pathology.   MACRO: None   Signed by: Joannelane Cruz 1/27/2024 7:03 AM Dictation workstation:   [id number]      EMG     No EMG results found for the past 12 months        No EEG results found for the past 12 months      Current Facility-Administered Medications:     acetaminophen (Tylenol) tablet 650 mg, 650 mg, oral, q4h PRN **OR** acetaminophen (Tylenol) oral liquid 650 mg, 650 mg, nasogastric tube, q4h PRN **OR** acetaminophen (Tylenol) suppository 650 mg, 650 mg, rectal, q4h PRN, Raul George MD    apixaban (Eliquis) tablet 5 mg, 5 mg, oral, BID, Raul George MD, 5 mg at 01/27/24 2145    aspirin EC tablet 81 mg, 81 mg, oral, Daily, Raul George MD, 81 mg at 01/29/24 1546    DAPTOmycin (Cubicin) 500 mg in sodium chloride 0.9% 50 mL IV, 8 mg/kg, intravenous, q24h EDE, Pete Echeverria MD, Stopped at 01/28/24 2105    hydrALAZINE (Apresoline) injection 10 mg, 10 mg, intravenous, q4h PRN, Sunshine Lange MD, 10 mg at 01/29/24 0025    hydrALAZINE (Apresoline) tablet 50 mg, 50 mg, oral, BID, Raul George MD, 50 mg at 01/29/24 1546    HYDROmorphone (Dilaudid) injection 0.3 mg, 0.3 mg, intravenous, q4h PRN, Raul George MD, 0.3 mg at 01/29/24 1437    HYDROmorphone (Dilaudid) injection 0.6 mg, 0.6 mg, intravenous, q4h PRN, Raul George MD    isosorbide mononitrate ER (Imdur) 24 hr tablet 60 mg, 60 mg, oral, Daily, Raul George MD    lactated Ringer's infusion, 75 mL/hr, intravenous, Continuous, Raul George MD, Last Rate: 75 mL/hr at 01/29/24 1128, 75 mL/hr at 01/29/24 1128    lactobacillus acidophilus tablet 1 tablet, 1 tablet, oral, Daily, Raul George MD, 1 tablet at 01/29/24 9646    ondansetron (Zofran) injection 4 mg, 4 mg,  intravenous, q6h PRN, Raul George MD, 4 mg at 01/27/24 2128    polyethylene glycol (Glycolax, Miralax) packet 17 g, 17 g, oral, Daily, Raul George MD, 17 g at 01/29/24 1546    psyllium (Metamucil) 1 packet, 1 packet, oral, Daily, Raul George MD    sennosides (Senokot) tablet 17.2 mg, 2 tablet, oral, Nightly, Raul George MD    sennosides (Senokot) tablet 8.6 mg, 1 tablet, oral, Nightly, Raul George MD         Assessment:  Acute encephalopathy in the setting of MRSA wound infection after lumbar surgery.  Recent increase in the opiate medication which may have triggered the encephalopathy however there is leukocytosis and MRI of the lumbar spine showing L3-L4 discitis and epidural thickening and enhancement L3-S1    Recommendation:  -Concern for meningitic process given the encephalopathy and MRI findings  -Possible superimposed hypercapnia given the depressed mental status will check an ABG  -Ideally CSF testing would be beneficial however, one would of course not want to introduce infection by cannulizing again area (levels L3/4 and L4/5 show fluid collections) actively contaminated with MRSA and introducing that to the dura.  -MRI of the brain with contrast to evaluate dural enhancement  -consider tagged RBC scan  -recommend coverage for meningitis will discuss with infectious disease  -routine EEG  -will check free T4  -will follow while admitted         Lata Perez MD

## 2024-01-29 NOTE — PROGRESS NOTES
Nutrition Progress Note    12 [place] corpak placed via Cortrak today in right nare at 60 cm, bridle in place. Lissy, ICU RN at bedside. Chery, RN aware of need for KUB to confirm placement.

## 2024-01-29 NOTE — PROGRESS NOTES
Spoke with family regarding discharge planning, patient had been home with IV antibiotics d/t spine infection in back. Patient had spinal surgery in November, has had multiple infectious processes and had to have hardware removed. Patient awaiting on MRI results to see if more surgery is required at this time. Plan is still undetermined based on patients outcome. As of now patient is unable to eat or drink. Will continue to monitor for discharge plans, whether back home with [facility] or may need SNF.

## 2024-01-29 NOTE — PROGRESS NOTES
"Tara Tierney is a 68 y.o. female on day 2 of admission presenting with Altered mental status, unspecified altered mental status type.      Subjective   Patient with altered mental status unable to provide any information at this time.        Objective     Last Recorded Vitals  Blood pressure 136/65, pulse 94, temperature 36.3 °C (97.3 °F), temperature source Temporal, resp. rate 16, height 1.44 m (4' 8.69\"), weight 67.3 kg (148 lb 5.9 oz), SpO2 95 %, not currently breastfeeding.    Physical Exam    Patient seen lying in bed with eyes closed. She does not open eyes to verbal stimuli however does groan when rolled to her side in bed. She is not able to follow any commands at this time and is lethargic. She is able to maintain her own airway at this time.   Lumbar incision well approximated with the exception of a small area on the lower aspect of incision that is slightly open still. She was getting twice daily dressing changes at home per plastic surgery. The open area does not have any drainage from it at this time and there is no erythema present.   2 bethany drains removed this morning upon rounds without any difficulty. There is some irritation at the insertion sites which is not uncommon after drain placement.    Current Medications:  apixaban, 5 mg, oral, BID  daptomycin, 8 mg/kg, intravenous, q24h EDE  psyllium, 1 packet, oral, Daily  sennosides, 2 tablet, oral, Nightly           CBC:   Results from last 7 days   Lab Units 01/29/24  0403 01/28/24  0525 01/27/24  0452   WBC AUTO x10*3/uL 13.4* 17.0* 8.9   RBC AUTO x10*6/uL 4.50 4.63 4.30   HEMOGLOBIN g/dL 13.2 13.3 12.5   HEMATOCRIT % 41.5 41.3 37.4   MCV fL 92 89 87   MCH pg 29.3 28.7 29.1   MCHC g/dL 31.8* 32.2 33.4   RDW % 15.6* 15.3* 14.8*   PLATELETS AUTO x10*3/uL 344 377 358     CMP:    Results from last 7 days   Lab Units 01/29/24  0403 01/28/24  0525 01/27/24  0452   SODIUM mmol/L 143 139 132*   POTASSIUM mmol/L 3.5 2.5* 3.0*   CHLORIDE mmol/L 105 97* " 95*   CO2 mmol/L 27 28 20*   BUN mg/dL 29* 23 22   CREATININE mg/dL 0.89 0.89 0.91   GLUCOSE mg/dL 143* 152* 168*   PROTEIN TOTAL g/dL  --   --  8.0   CALCIUM mg/dL 10.2 10.5* 10.3   BILIRUBIN TOTAL mg/dL  --   --  0.8   ALK PHOS U/L  --   --  144*   AST U/L  --   --  17   ALT U/L  --   --  28     BMP:    Results from last 7 days   Lab Units 01/29/24  0403 01/28/24  0525 01/27/24  0452   SODIUM mmol/L 143 139 132*   POTASSIUM mmol/L 3.5 2.5* 3.0*   CHLORIDE mmol/L 105 97* 95*   CO2 mmol/L 27 28 20*   BUN mg/dL 29* 23 22   CREATININE mg/dL 0.89 0.89 0.91   CALCIUM mg/dL 10.2 10.5* 10.3   GLUCOSE mg/dL 143* 152* 168*        This patient has a central line   Reason for the central line remaining today? Parenteral medication    This patient has a urinary catheter   Reason for the urinary catheter remaining today? critically ill patient who need accurate urinary output measurements             Assessment/Plan      Principal Problem:    Altered mental status, unspecified altered mental status type  Active Problems:    Surgical wound infection    Keep NPO with further recommendations to follow MRI review.   Continue IV antibiotics per infectious disease   Continue pain control per primary team            Edi Olguin, APRN-CNP    Patient was doing well at home and drainage was slowing down from her drains.  My physician assistant Hussein Monique went by to see her at her house on January 26.  At that point she was alert and oriented and doing well.  He stopped by because the family had some concern that maybe one of the drains had pulled out.  The drain really had not pulled out and was in fine position.  There was no excessive drainage from her wound or the drain sites.  He applied a new dressing and the patient was scheduled to follow-up with me in the office on Tuesday, which was my next office day.  Yesterday the patient developed some mental status changes and was admitted to the hospital.  She currently is somewhat  lethargic but arousable.  Infectious disease has antibiotic side effect is #1 differential for her mental status changes.  There was 1 positive blood culture but the others were not and so it is not thought that she is septic at this time.  The patient was discharged home from the hospital as she did not qualify for rehab facility.    On physical exam drains were putting nothing out and removed easily.  There was no excessive purulence for signs of deep infection when the drains were removed.  There was some fibrinous material at the opening of the drain sites and some minimal erythema but all in all there was no concern with how the drains look when they were removed.  The incision still is not healed centrally.  There is no active drainage from the incision.    MRI was reviewed by myself and discussed on the phone with the radiologist Dr. Blake.  There was no significant fluid collection in the epidural space or the suprafascial layer.  The fluid collection that was ventral to the dural sac that appeared to be stemming from the L3-4 disc space was decreased in size compared to her prior MRI a couple weeks ago.  There was some increase in collapse of the vertebral body from the osteomyelitis.  There was no chapito instability or malalignment.  There was nothing on the MRI to suggest an irrigation and debridement would be useful as there was no distinct fluid collection that was accessible.  There was that small fluid collection that was ventral, described above, but it is not accessible without performing an extremely large approach/operation and instrumentation.  At this point since the fluid collection is decreasing in size the benefits of that type of surgery are significantly lower than the potential risks.    Assessment/plan: Patient with MRSA infection that appears to be resistant to most antibiotics.  She may be responding now to the current antibiotic regimen but may be having some side effects from the  antibiotics.  We will defer to the medical team and infectious disease to determine what sort of workup she should have and how her medical condition should be treated.  There is no indication for surgical intervention at this time.  I discussed this case with Dr. Reyes from the wound care team and we both agree that removing sutures and placing a wound VAC would probably be the next best step for her posterior midline incision as she does not appear to be healing that.  We will continue to follow and see how she progresses from a neurologic standpoint.  I will defer to the medical team and infectious disease if and when they think imaging of her brain and or a neurology consult would be appropriate.  Infectious disease was inquiring about the possibility of an aspiration to get another fluid sample.  There really is no significant fluid or abscess except a little bit ventral to the thecal sac.  That is not accessible with interventional radiology as I discussed that with the radiology department.  Additionally, that fluid collection has been decreasing in size so therefore appears to be heading in the right direction and responding to the antibiotics that the patient is on.    Gerson Coombs MD

## 2024-01-29 NOTE — CONSULTS
"Nutrition Initial Assessment:   Nutrition Assessment    Reason for Assessment: Admission nursing screening, Provider consult order, Tube feeding recommendations    Patient is a 68 y.o. female presenting with: altered mental status. Family at bedside, provided all of pt's nutrition hx.       Nutrition History:  Energy Intake: Poor < 50 %  Food and Nutrient History: Per family, pt was eating less (toddler sized portions) for a couple of months due to multiple surgeries and complications. Was drinking plenty of water during this time though. Completely stopped eating/drinking on Friday (1/26) due to inability to chew/swallow. Corpak to be placed today. Family also reports pt has been nauseated for a week and had vomiting on Friday. Also report no BM for several days.  Vitamin/Herbal Supplement Use: vitamin D3, preservision, per home med list  Food Allergies/Intolerances:  None  GI Symptoms: Constipation, Nausea, and Vomiting  Oral Problems: Chewing difficulty and Swallowing difficulty       Anthropometrics:  Height: 144 cm (4' 8.69\")   Weight: 67.3 kg (148 lb 5.9 oz)   BMI (Calculated): 32.46  IBW/kg (Dietitian Calculated): 38.6 kg          Weight History:   Wt Readings from Last 10 Encounters:   01/28/24 67.3 kg (148 lb 5.9 oz)   12/31/23 78.2 kg (172 lb 6.4 oz)   12/20/23 80.5 kg (177 lb 7.5 oz)   12/15/23 82.6 kg (182 lb 1.6 oz)   11/21/23 68.4 kg (150 lb 12.7 oz)   11/07/23 77.1 kg (170 lb)   11/03/23 81 kg (178 lb 9.2 oz)   09/14/23 79.1 kg (174 lb 6 oz)   12/15/22 76.2 kg (167 lb 14.4 oz)   05/31/22 78.8 kg (173 lb 12.8 oz)      Weight Change %:  Weight History / % Weight Change: Family reports pt lost 30# in 2 months. Based on available wt records, pt with 10.9 kg (14%) wt loss x 1 month.  Significant Weight Loss: Yes  Interpretation of Weight Loss: >5% in 1 month    Nutrition Focused Physical Exam Findings:  defer: pt unable to participate - will attempt when pt more awake/alert  Subcutaneous Fat Loss: "   Orbital Fat Pads: Defer  Muscle Wasting:  Temporalis: Defer  Edema:  Edema: none  Physical Findings:  Skin: Negative    Nutrition Significant Labs:    Reviewed   Nutrition Specific Medications:  Reviewed     I/O:   Last BM Date: 01/29/24; Stool Appearance: Formed (01/29/24 0800)        Dietary Orders (From admission, onward)       Start     Ordered    01/29/24 0001  NPO Diet; Effective midnight  Diet effective midnight         01/28/24 1340                     Estimated Needs:   Total Energy Estimated Needs (kCal): 1683 kCal  Method for Estimating Needs: 25 kcal/kg ABW  Total Protein Estimated Needs (g): 67 g  Method for Estimating Needs: 1 g/kg ABW  Total Fluid Estimated Needs (mL): 1683 mL  Method for Estimating Needs: 25 ml/kg ABW        Nutrition Diagnosis   Malnutrition Diagnosis  Patient has Malnutrition Diagnosis: Yes  Diagnosis Status: New  Malnutrition Diagnosis: Severe malnutrition related to chronic disease or condition  As Evidenced by: family reports of pt eating <75% of estimated needs x 2-3 months; 14% wt loss x 1 month            Nutrition Interventions/Recommendations         Nutrition Prescription:  Individualized Nutrition Prescription Provided for : Enteral nutrition        Nutrition Interventions:   Food and/or Nutrient Delivery Interventions  Interventions: Enteral intake  Enteral Intake: Modify composition of enteral nutrition, Modify rate of enteral nutrition, Feeding tube flush  Goal: Recommend TF Jevity 1.5, start at 10 ml/hr and increase by 10 ml q6h as tolerated to goal rate of 45 ml/hr continuous via Corpak (provides 1620 kcal, 69 g protein, and 821 ml free H2O). 100 ml flushes q4h or per MD.  Additional Interventions: Monitor potassium, magnesium, phosphorus q12h until pt tolerating TF at goal rate with no signs/symptoms of refeeding syndrome. Please also provide scheduled stool softener to promote BM and prevent further constipation.    Coordination of Nutrition Care by a Nutrition  Professional  Collaboration and Referral of Nutrition Care: Collaboration by nutrition professional with other nutrition professionals, Collaboration by nutrition professional with other providers  Goal: Coordination of Corpak placement with [place] team; recommendations sent to Dr. George via secure chat    Nutrition Education:   Addressed family's questions regarding tube placement and tube feeds.        Nutrition Monitoring and Evaluation   Food/Nutrient Related History Monitoring  Monitoring and Evaluation Plan: Enteral and parenteral nutrition intake, Energy intake, Fluid intake  Energy Intake: Estimated energy intake  Criteria: Meets >75% of estimated energy needs  Fluid Intake: Estimated fluid intake  Criteria: Meets >75% of estimated fluid needs  Enteral and Parenteral Nutrition Intake: Enteral nutrition intake  Criteria: Tolerates tube feeds at goal rate    Body Composition/Growth/Weight History  Monitoring and Evaluation Plan: Weight  Weight: Measured weight  Criteria: Maintain stable wt    Biochemical Data, Medical Tests and Procedures  Monitoring and Evaluation Plan: Glucose/endocrine profile, Electrolyte/renal panel  Electrolyte and Renal Panel: Magnesium, Potassium, Phosphorus, Sodium  Criteria: Electrolytes WNL  Glucose/Endocrine Profile: Glucose, casual  Criteria: BG within desirable range    Nutrition Focused Physical Findings  Monitoring and Evaluation Plan: Digestive System  Digestive System: Constipation, Nausea, Vomiting  Criteria: Improvement in GI symptoms       Time Spent/Follow-up Reminder:   Time Spent (min): 60 minutes  Last Date of Nutrition Visit: 01/29/24  Nutrition Follow-Up Needed?: 3-5 days  Follow up Comment: Dorian 1.5 @ 45

## 2024-01-29 NOTE — PROGRESS NOTES
Speech-Language Pathology                 Therapy Communication Note    Patient Name: Tara Tierney  MRN: [id number]  Today's Date: 1/29/2024     Discipline: Speech Language Pathology    Missed Visit Reason:  Pt. Currently NPO pending ortho. Speech therapy to re-attempt at later time as pt. Available/appropriate.     Missed Time: Attempt

## 2024-01-29 NOTE — PROGRESS NOTES
Patient admitted to [facility] on 1/27/24 for encephalopathy mental status changes. New homecare orders will be needed at discharge.  Email sent to intake nurses to add to infusion report and watch for possible new referral upon discharge home.  Chart given to intake [facility].

## 2024-01-29 NOTE — PROGRESS NOTES
ASSESSMENT & PLAN:         Acute toxic metabolic encephalopathy  -Suspected to be secondary to infectious process vs antibiotic side effect. Bcx growing coag - staph, so likely represents contaminant. Will continue to follow. Leukocytosis improving (8.9 -> 17.0 -> 13.4) Subclinical hyperthyroidism unlikely responsible.  Will continue to monitor.   -Neurology consulted for evaluation; recs appreciated     Postop deep incisional MRSA wound infection of lumbar spine  Likely MRSA bacteremia  Diskitis/osteomyelitis of lumbar vertebrae  Paraspinal abscess  Lumbar stensosis s/p laminectomy/fusion with subsequent removal of hardware  -Will continue patient on daptomycin/ceftaroline for now  -ID and orthopedic surgery consulted for evaluation.  Recommendations appreciated.   -ID advising continuation of dapto/ceftaroline. Agree with assessment that infection is unlikely medically curable without complete source control.  -Orthopaedic surgery believe the benefits of intervention are outweighed by the risk of operative management. To discuss with plastic surgery regarding closure of posterior midline incision.      Nutrition  -Patient not eating or drinking. To place corpak to initate tube feeds/administer meds. Dietary evaluation appreciated. To start on jevity 1.5 with goal rate of 45 ml/hr.     RLE DVT  -Continued on eliqius     Hx COPD, HTN, PVD, overactive bladder  -Home meds resumed as applicable     VTE Prophylaxis: On eliqius           Raul George MD    SUBJECTIVE     Patient had no acute events overnight.  Mental status appears improved from day prior with patient being more awake and alert.  Patient remains confused however; inappropriately answering questions.  Continues to endorse back discomfort.  Further ROS limited secondary to patient condition.  Patient's family at bedside who was updated on plan of care.      OBJECTIVE:       Last Recorded Vitals:  Vitals:    01/28/24 1935 01/28/24 2337 01/29/24 0352  01/29/24 0737   BP: (!) 182/81 176/78 160/88 136/65   BP Location: Left arm Left arm  Left arm   Patient Position: Lying Lying  Lying   Pulse: 76 88 90 94   Resp: 20 16 16 16   Temp: 36 °C (96.8 °F) 36.1 °C (97 °F) 36 °C (96.8 °F) 36.3 °C (97.3 °F)   TempSrc: Temporal Temporal  Temporal   SpO2: 94% 95% 95% 95%   Weight:       Height:           Last I/O:  I/O last 3 completed shifts:  In: 502 (7.5 mL/kg) [IV Piggyback:502]  Out: 710 (10.5 mL/kg) [Urine:700 (0.3 mL/kg/hr); Drains:10]  Weight: 67.3 kg     Physical Exam:  General: Well-developed adult female in moderate distress  HEENT: Clear sclera, EOMI, trachea midline, moist mucous membranes  Respiratory: Lungs clear to auscultation, with no wheezes or rhonchi appreciated  Cardiovascular: S1 and S2 auscultated, no murmurs clicks or rubs  Abdomen: Soft, nontender, nondistended  Neurological: Awake, alert, confused, cranial nerves grossly intact, spontaneously moves all extremities  Skin: Warm, dry    Inpatient Medications:  apixaban, 5 mg, oral, BID  daptomycin, 8 mg/kg, intravenous, q24h EDE  psyllium, 1 packet, oral, Daily  sennosides, 2 tablet, oral, Nightly        PRN Medications  PRN medications: acetaminophen **OR** acetaminophen **OR** acetaminophen, hydrALAZINE, HYDROmorphone, HYDROmorphone, ondansetron    Continuous Medications:  lactated Ringer's, 75 mL/hr, Last Rate: 75 mL/hr (01/29/24 1128)        LABS AND IMAGING:     Labs:  Results for orders placed or performed during the hospital encounter of 01/27/24 (from the past 24 hour(s))   Magnesium   Result Value Ref Range    Magnesium 2.47 (H) 1.60 - 2.40 mg/dL   Basic Metabolic Panel   Result Value Ref Range    Glucose 143 (H) 74 - 99 mg/dL    Sodium 143 136 - 145 mmol/L    Potassium 3.5 3.5 - 5.3 mmol/L    Chloride 105 98 - 107 mmol/L    Bicarbonate 27 21 - 32 mmol/L    Anion Gap 15 10 - 20 mmol/L    Urea Nitrogen 29 (H) 6 - 23 mg/dL    Creatinine 0.89 0.50 - 1.05 mg/dL    eGFR 70 >60 mL/min/1.73m*2     Calcium 10.2 8.6 - 10.3 mg/dL   CBC and Auto Differential   Result Value Ref Range    WBC 13.4 (H) 4.4 - 11.3 x10*3/uL    nRBC 0.0 0.0 - 0.0 /100 WBCs    RBC 4.50 4.00 - 5.20 x10*6/uL    Hemoglobin 13.2 12.0 - 16.0 g/dL    Hematocrit 41.5 36.0 - 46.0 %    MCV 92 80 - 100 fL    MCH 29.3 26.0 - 34.0 pg    MCHC 31.8 (L) 32.0 - 36.0 g/dL    RDW 15.6 (H) 11.5 - 14.5 %    Platelets 344 150 - 450 x10*3/uL    Neutrophils % 77.3 40.0 - 80.0 %    Immature Granulocytes %, Automated 0.4 0.0 - 0.9 %    Lymphocytes % 13.1 13.0 - 44.0 %    Monocytes % 9.1 2.0 - 10.0 %    Eosinophils % 0.0 0.0 - 6.0 %    Basophils % 0.1 0.0 - 2.0 %    Neutrophils Absolute 10.33 (H) 1.20 - 7.70 x10*3/uL    Immature Granulocytes Absolute, Automated 0.06 0.00 - 0.70 x10*3/uL    Lymphocytes Absolute 1.75 1.20 - 4.80 x10*3/uL    Monocytes Absolute 1.22 (H) 0.10 - 1.00 x10*3/uL    Eosinophils Absolute 0.00 0.00 - 0.70 x10*3/uL    Basophils Absolute 0.01 0.00 - 0.10 x10*3/uL   Creatine Kinase   Result Value Ref Range    Creatine Kinase 75 0 - 215 U/L   POCT GLUCOSE   Result Value Ref Range    POCT Glucose 139 (H) 74 - 99 mg/dL        Imaging:  MR lumbar spine w and wo IV contrast  Narrative: Interpreted By:  Yoav Davidson,   STUDY:  MR LUMBAR SPINE W AND WO IV CONTRAST;  1/28/2024 6:39 pm      INDICATION:  Signs/Symptoms: Rule out abscess.      COMPARISON:  01/11/2024      ACCESSION NUMBER(S):  [id number]      ORDERING CLINICIAN:  FRANCESCA ORR      TECHNIQUE:  Sagittal STIR, sagittal T2, sagittal T1, axial T2, axial T1, as well  as post gadolinium sagittal axial T1 weighted MRI images of the  lumbar spine were obtained. The patient received 13 mL of Dotarem  gadolinium intravenously.      FINDINGS:  Postoperative changes are again identified compatible with a previous  posterior laminectomies at the L3 through S1 levels as well as  discectomies at the L4/5 and L5/S1 levels. Metallic artifact from  orthopedic hardware is identified along the disc  spaces at the L4/5  and L5/S1 levels. There are surgical drainage catheter again noted  along the laminectomy bed as well as more superficially along the  surgical tract with the posterior paraspinal soft tissues. There is a  minimal amount of rim enhancing fluid surrounding the surgical  drainage catheter along laminectomy bed the sterility of which can  not be ascertained on this MRI study.      There is again evidence of extensive infiltrative signal abnormality  and enhancement within the soft tissues along the laminectomy bed and  surrounding posterior paraspinal soft tissues extending superficially  along the surgical tract which may be postsurgical in etiology  although a superimposed infectious/inflammatory process could give a  similar MRI appearance and must be considered.      There is again evidence of abnormal signal along the L3/4 disc space  as well as within the adjacent bone marrow of the L3 and L4 vertebrae  most compatible with underlying discitis/osteomyelitis. There has  been mild interval bony destruction/collapse of the inferior L3 and  to a lesser degree superior L4 vertebrae when compared with the prior  study dated 01/11/2024. There is again evidence of approximately 4 mm  of retrolisthesis of L3 on L4.      There is mild circumferential abnormal epidural thickening and  enhancement within the spinal canal best appreciated extending from  the L3 through S1 levels which may be postsurgical and/or  infectious/inflammatory in origin.      There is abnormal infiltrative signal and enhancement within the  paraspinal soft tissues/medial psoas muscles bilaterally right  greater than left extending from the L3 level caudally into the  sacral region likely infectious/inflammatory in origin.      The visualized spinal cord demonstrates no signal abnormality or  abnormal enhancement within it. The conus medullaris is normally  positioned terminating at the L1 level.      At the L5/S1 level,  there are  postoperative changes compatible with  a previous posterior laminectomy as well as discectomy. There is  enhancing soft tissue noted along the laminectomy bed as well as  enhancing epidural thickening and enhancement circumferentially  surrounding the thecal sac within the spinal canal. There is no  significant narrowing of the thecal sac within the spinal canal.  There is infiltrative enhancing epidural soft tissue extending  laterally into the right neural foramen. There is mild-to-moderate  encroachment upon the left neural foramen.      At the L4/L5 level,  there are postoperative changes compatible with  a previous posterior laminectomy as well as discectomy. There is  enhancing soft tissue noted along the laminectomy bed as well as  enhancing epidural thickening and enhancement circumferentially  surrounding the thecal sac within the spinal canal. There is mild  overall narrowing of the thecal sac within the spinal canal. There is  infiltrative enhancing epidural soft tissue extending laterally into  the right neural foramen. There is mild-to-moderate encroachment upon  the left neural foramen.      At the L3/L4 level,  there are postoperative changes compatible with  a previous L3 laminectomy. There is again evidence of approximately 4  mm of retrolisthesis of L3 on L4. There is enhancing soft tissue  noted along the laminectomy bed as well as enhancing epidural  thickening and enhancement circumferentially surrounding the thecal  sac within the spinal canal. There is no significant narrowing of the  thecal sac within the spinal canal. There is bilateral neural  foraminal narrowing with infiltrative enhancing epidural soft tissue  extending laterally into the region of the neural foramen bilaterally.      At the L2/L3 level,  there are degenerative facet changes and a  minimal posterior disc bulge without significant spinal canal  narrowing. There is mild encroachment upon the inferior recesses of  the neural  foramen bilaterally.      At the L1/L2 level,  there is a minimal posterior disc bulge and mild  degenerative facet changes contributing to very mild encroachment  upon the spinal canal. There is mild encroachment upon the inferior  recesses of the neural foramen left greater than right.      At the T12/L1 level,  there is no significant spinal canal stenosis  or neuroforaminal stenosis.      At the T11/12 level, there is a minimal posterior disc bulge and mild  degenerative facet changes without significant spinal canal or neural  foraminal narrowing.          Impression: Postoperative changes are again identified compatible with a previous  posterior laminectomies at the L3 through S1 levels as well as  discectomies at the L4/5 and L5/S1 levels. Metallic artifact from  orthopedic hardware is identified along the disc spaces at the L4/5  and L5/S1 levels. There are surgical drainage catheter again noted  along the laminectomy bed as well as more superficially along the  surgical tract with the posterior paraspinal soft tissues. There is a  minimal amount of rim enhancing fluid surrounding the surgical  drainage catheter along laminectomy bed the sterility of which can  not be ascertained on this MRI study.      There is again evidence of extensive infiltrative signal abnormality  and enhancement within the soft tissues along the laminectomy bed and  surrounding posterior paraspinal soft tissues extending superficially  along the surgical tract which may be postsurgical in etiology  although a superimposed infectious/inflammatory process could give a  similar MRI appearance and must be considered.      There is again evidence of abnormal signal along the L3/4 disc space  as well as within the adjacent bone marrow of the L3 and L4 vertebrae  most compatible with underlying discitis/osteomyelitis. There has  been mild interval bony destruction/collapse of the inferior L3 and  to a lesser degree superior L4 vertebrae  when compared with the prior  study dated 01/11/2024. There is again evidence of approximately 4 mm  of retrolisthesis of L3 on L4.      There is mild circumferential abnormal epidural thickening and  enhancement within the spinal canal best appreciated extending from  the L3 through S1 levels which may be postsurgical and/or  infectious/inflammatory in origin.      There is abnormal infiltrative signal and enhancement within the  paraspinal soft tissues/medial psoas muscles bilaterally right  greater than left extending from the L3 level caudally into the  sacral region likely infectious/inflammatory in origin.      There is multilevel spondylosis. There are varying degrees of spinal  canal and neural foraminal narrowing as described above.      MACRO:  None.      Signed by: Yoav Davidson 1/28/2024 7:27 PM  Dictation workstation:   [id number]

## 2024-01-30 ENCOUNTER — OUTSIDE SERVICES (OUTPATIENT)
Dept: INFECTIOUS DISEASES | Age: 69
End: 2024-01-30
Payer: COMMERCIAL

## 2024-01-30 ENCOUNTER — APPOINTMENT (OUTPATIENT)
Dept: NEUROLOGY | Facility: HOSPITAL | Age: 69
DRG: 856 | End: 2024-01-30
Payer: COMMERCIAL

## 2024-01-30 ENCOUNTER — APPOINTMENT (OUTPATIENT)
Dept: ORTHOPEDIC SURGERY | Facility: CLINIC | Age: 69
End: 2024-01-30
Payer: COMMERCIAL

## 2024-01-30 DIAGNOSIS — M46.26 INFECTION OF LUMBAR SPINE (HCC): ICD-10-CM

## 2024-01-30 DIAGNOSIS — A49.02 MRSA INFECTION: Primary | ICD-10-CM

## 2024-01-30 DIAGNOSIS — T81.49XA POSTOPERATIVE WOUND INFECTION: ICD-10-CM

## 2024-01-30 LAB
ANION GAP SERPL CALC-SCNC: 13 MMOL/L (ref 10–20)
BASOPHILS # BLD AUTO: 0.01 X10*3/UL (ref 0–0.1)
BASOPHILS NFR BLD AUTO: 0.1 %
BUN SERPL-MCNC: 27 MG/DL (ref 6–23)
CALCIUM SERPL-MCNC: 9.4 MG/DL (ref 8.6–10.3)
CHLORIDE SERPL-SCNC: 111 MMOL/L (ref 98–107)
CO2 SERPL-SCNC: 25 MMOL/L (ref 21–32)
CREAT SERPL-MCNC: 0.84 MG/DL (ref 0.5–1.05)
EGFRCR SERPLBLD CKD-EPI 2021: 75 ML/MIN/1.73M*2
EOSINOPHIL # BLD AUTO: 0 X10*3/UL (ref 0–0.7)
EOSINOPHIL NFR BLD AUTO: 0 %
ERYTHROCYTE [DISTWIDTH] IN BLOOD BY AUTOMATED COUNT: 15.7 % (ref 11.5–14.5)
GLUCOSE SERPL-MCNC: 166 MG/DL (ref 74–99)
HCT VFR BLD AUTO: 36.6 % (ref 36–46)
HGB BLD-MCNC: 11.6 G/DL (ref 12–16)
IMM GRANULOCYTES # BLD AUTO: 0.1 X10*3/UL (ref 0–0.7)
IMM GRANULOCYTES NFR BLD AUTO: 0.6 % (ref 0–0.9)
LYMPHOCYTES # BLD AUTO: 1.55 X10*3/UL (ref 1.2–4.8)
LYMPHOCYTES NFR BLD AUTO: 9.2 %
MAGNESIUM SERPL-MCNC: 2.11 MG/DL (ref 1.6–2.4)
MCH RBC QN AUTO: 29.4 PG (ref 26–34)
MCHC RBC AUTO-ENTMCNC: 31.7 G/DL (ref 32–36)
MCV RBC AUTO: 93 FL (ref 80–100)
MONOCYTES # BLD AUTO: 1.19 X10*3/UL (ref 0.1–1)
MONOCYTES NFR BLD AUTO: 7.1 %
NEUTROPHILS # BLD AUTO: 13.99 X10*3/UL (ref 1.2–7.7)
NEUTROPHILS NFR BLD AUTO: 83 %
NRBC BLD-RTO: 0 /100 WBCS (ref 0–0)
PLATELET # BLD AUTO: 278 X10*3/UL (ref 150–450)
POTASSIUM SERPL-SCNC: 3.2 MMOL/L (ref 3.5–5.3)
RBC # BLD AUTO: 3.95 X10*6/UL (ref 4–5.2)
SODIUM SERPL-SCNC: 146 MMOL/L (ref 136–145)
WBC # BLD AUTO: 16.8 X10*3/UL (ref 4.4–11.3)

## 2024-01-30 PROCEDURE — 2500000004 HC RX 250 GENERAL PHARMACY W/ HCPCS (ALT 636 FOR OP/ED): Performed by: INTERNAL MEDICINE

## 2024-01-30 PROCEDURE — 1090000001 HH PPS REVENUE CREDIT

## 2024-01-30 PROCEDURE — 1200000002 HC GENERAL ROOM WITH TELEMETRY DAILY

## 2024-01-30 PROCEDURE — 1090000002 HH PPS REVENUE DEBIT

## 2024-01-30 PROCEDURE — 85025 COMPLETE CBC W/AUTO DIFF WBC: CPT | Performed by: STUDENT IN AN ORGANIZED HEALTH CARE EDUCATION/TRAINING PROGRAM

## 2024-01-30 PROCEDURE — 95816 EEG AWAKE AND DROWSY: CPT | Performed by: PSYCHIATRY & NEUROLOGY

## 2024-01-30 PROCEDURE — 3E0G76Z INTRODUCTION OF NUTRITIONAL SUBSTANCE INTO UPPER GI, VIA NATURAL OR ARTIFICIAL OPENING: ICD-10-PCS | Performed by: ORTHOPAEDIC SURGERY

## 2024-01-30 PROCEDURE — 80048 BASIC METABOLIC PNL TOTAL CA: CPT | Performed by: STUDENT IN AN ORGANIZED HEALTH CARE EDUCATION/TRAINING PROGRAM

## 2024-01-30 PROCEDURE — 95816 EEG AWAKE AND DROWSY: CPT

## 2024-01-30 PROCEDURE — 99232 SBSQ HOSP IP/OBS MODERATE 35: CPT | Performed by: STUDENT IN AN ORGANIZED HEALTH CARE EDUCATION/TRAINING PROGRAM

## 2024-01-30 PROCEDURE — 99232 SBSQ HOSP IP/OBS MODERATE 35: CPT | Performed by: INTERNAL MEDICINE

## 2024-01-30 PROCEDURE — 2500000004 HC RX 250 GENERAL PHARMACY W/ HCPCS (ALT 636 FOR OP/ED): Performed by: STUDENT IN AN ORGANIZED HEALTH CARE EDUCATION/TRAINING PROGRAM

## 2024-01-30 PROCEDURE — 99231 SBSQ HOSP IP/OBS SF/LOW 25: CPT | Performed by: PLASTIC SURGERY

## 2024-01-30 PROCEDURE — 2500000001 HC RX 250 WO HCPCS SELF ADMINISTERED DRUGS (ALT 637 FOR MEDICARE OP): Performed by: STUDENT IN AN ORGANIZED HEALTH CARE EDUCATION/TRAINING PROGRAM

## 2024-01-30 PROCEDURE — 87205 SMEAR GRAM STAIN: CPT | Mod: ELYLAB | Performed by: INTERNAL MEDICINE

## 2024-01-30 PROCEDURE — 2500000001 HC RX 250 WO HCPCS SELF ADMINISTERED DRUGS (ALT 637 FOR MEDICARE OP): Performed by: INTERNAL MEDICINE

## 2024-01-30 PROCEDURE — 83735 ASSAY OF MAGNESIUM: CPT | Performed by: STUDENT IN AN ORGANIZED HEALTH CARE EDUCATION/TRAINING PROGRAM

## 2024-01-30 PROCEDURE — 87070 CULTURE OTHR SPECIMN AEROBIC: CPT | Mod: ELYLAB | Performed by: INTERNAL MEDICINE

## 2024-01-30 PROCEDURE — 92610 EVALUATE SWALLOWING FUNCTION: CPT | Mod: GN | Performed by: SPEECH-LANGUAGE PATHOLOGIST

## 2024-01-30 RX ORDER — RIFAMPIN 300 MG/1
300 CAPSULE ORAL 2 TIMES DAILY
Status: DISCONTINUED | OUTPATIENT
Start: 2024-01-30 | End: 2024-02-07 | Stop reason: HOSPADM

## 2024-01-30 RX ADMIN — DEXTROSE MONOHYDRATE 2 G: 5 INJECTION INTRAVENOUS at 12:50

## 2024-01-30 RX ADMIN — APIXABAN 5 MG: 5 TABLET, FILM COATED ORAL at 20:04

## 2024-01-30 RX ADMIN — DEXTROSE MONOHYDRATE 2 G: 5 INJECTION INTRAVENOUS at 02:05

## 2024-01-30 RX ADMIN — RIFAMPIN 300 MG: 300 CAPSULE ORAL at 20:04

## 2024-01-30 RX ADMIN — HYDROMORPHONE HYDROCHLORIDE 0.6 MG: 1 INJECTION, SOLUTION INTRAMUSCULAR; INTRAVENOUS; SUBCUTANEOUS at 20:03

## 2024-01-30 RX ADMIN — RIFAMPIN 300 MG: 300 CAPSULE ORAL at 02:05

## 2024-01-30 RX ADMIN — HYDROMORPHONE HYDROCHLORIDE 0.6 MG: 1 INJECTION, SOLUTION INTRAMUSCULAR; INTRAVENOUS; SUBCUTANEOUS at 15:38

## 2024-01-30 RX ADMIN — HYDROMORPHONE HYDROCHLORIDE 0.6 MG: 1 INJECTION, SOLUTION INTRAMUSCULAR; INTRAVENOUS; SUBCUTANEOUS at 02:26

## 2024-01-30 RX ADMIN — HYDRALAZINE HYDROCHLORIDE 50 MG: 50 TABLET ORAL at 09:24

## 2024-01-30 RX ADMIN — HYDROMORPHONE HYDROCHLORIDE 0.3 MG: 1 INJECTION, SOLUTION INTRAMUSCULAR; INTRAVENOUS; SUBCUTANEOUS at 10:30

## 2024-01-30 RX ADMIN — APIXABAN 5 MG: 5 TABLET, FILM COATED ORAL at 09:24

## 2024-01-30 RX ADMIN — DAPTOMYCIN 500 MG: 500 INJECTION, POWDER, LYOPHILIZED, FOR SOLUTION INTRAVENOUS at 20:03

## 2024-01-30 RX ADMIN — HYDRALAZINE HYDROCHLORIDE 50 MG: 50 TABLET ORAL at 20:04

## 2024-01-30 RX ADMIN — ASPIRIN 81 MG: 81 TABLET, COATED ORAL at 09:22

## 2024-01-30 RX ADMIN — Medication 1 TABLET: at 09:24

## 2024-01-30 RX ADMIN — RIFAMPIN 300 MG: 300 CAPSULE ORAL at 09:25

## 2024-01-30 RX ADMIN — STANDARDIZED SENNA CONCENTRATE 8.6 MG: 8.6 TABLET ORAL at 20:04

## 2024-01-30 ASSESSMENT — COGNITIVE AND FUNCTIONAL STATUS - GENERAL
PERSONAL GROOMING: TOTAL
PERSONAL GROOMING: TOTAL
DRESSING REGULAR LOWER BODY CLOTHING: TOTAL
CLIMB 3 TO 5 STEPS WITH RAILING: TOTAL
CLIMB 3 TO 5 STEPS WITH RAILING: TOTAL
TURNING FROM BACK TO SIDE WHILE IN FLAT BAD: TOTAL
TOILETING: TOTAL
MOVING TO AND FROM BED TO CHAIR: TOTAL
DRESSING REGULAR LOWER BODY CLOTHING: TOTAL
HELP NEEDED FOR BATHING: TOTAL
HELP NEEDED FOR BATHING: TOTAL
EATING MEALS: TOTAL
DRESSING REGULAR UPPER BODY CLOTHING: TOTAL
DAILY ACTIVITIY SCORE: 6
STANDING UP FROM CHAIR USING ARMS: TOTAL
TOILETING: TOTAL
DRESSING REGULAR UPPER BODY CLOTHING: TOTAL
DAILY ACTIVITIY SCORE: 6
EATING MEALS: TOTAL
MOBILITY SCORE: 7
MOVING FROM LYING ON BACK TO SITTING ON SIDE OF FLAT BED WITH BEDRAILS: A LOT
TURNING FROM BACK TO SIDE WHILE IN FLAT BAD: TOTAL
MOBILITY SCORE: 7
STANDING UP FROM CHAIR USING ARMS: TOTAL
MOVING FROM LYING ON BACK TO SITTING ON SIDE OF FLAT BED WITH BEDRAILS: A LOT
WALKING IN HOSPITAL ROOM: TOTAL
MOVING TO AND FROM BED TO CHAIR: TOTAL
WALKING IN HOSPITAL ROOM: TOTAL

## 2024-01-30 ASSESSMENT — PAIN - FUNCTIONAL ASSESSMENT
PAIN_FUNCTIONAL_ASSESSMENT: WONG-BAKER FACES
PAIN_FUNCTIONAL_ASSESSMENT: 0-10
PAIN_FUNCTIONAL_ASSESSMENT: WONG-BAKER FACES
PAIN_FUNCTIONAL_ASSESSMENT: WONG-BAKER FACES

## 2024-01-30 ASSESSMENT — PAIN DESCRIPTION - LOCATION
LOCATION: BACK
LOCATION: LEG

## 2024-01-30 ASSESSMENT — PAIN SCALES - GENERAL
PAINLEVEL_OUTOF10: 10 - WORST POSSIBLE PAIN
PAINLEVEL_OUTOF10: 5 - MODERATE PAIN
PAINLEVEL_OUTOF10: 7
PAINLEVEL_OUTOF10: 9
PAINLEVEL_OUTOF10: 0 - NO PAIN

## 2024-01-30 ASSESSMENT — PAIN DESCRIPTION - ORIENTATION: ORIENTATION: LEFT;RIGHT

## 2024-01-30 ASSESSMENT — PAIN DESCRIPTION - DESCRIPTORS: DESCRIPTORS: ACHING

## 2024-01-30 ASSESSMENT — PAIN SCALES - WONG BAKER
WONGBAKER_NUMERICALRESPONSE: HURTS WHOLE LOT
WONGBAKER_NUMERICALRESPONSE: HURTS EVEN MORE

## 2024-01-30 NOTE — PROGRESS NOTES
Spoke to Dr Davidson at  [facility] regarding MRI Spine:   Progressive destruction noted endplates L3/4 level compared to 27 days ago. LP not advisable due to enhancement in tissue which could indicate inflammation or infection.     Discussed with primary. Patient is more awake today and verbalized with him.   Will wait for family decision regarding possible transfer for IR evaluation. Ortho continues to follow.     Continue Dapto, Rifampin, and Ceftriaxone combination for possible meningitis ( 2 weeks ) and then long term treatment with Daptomycin (for 6 more weeks) for definitive diskitis with hx of MRSA. Weekly CMP, CK levels and CRP are a must.     Amy Celestin, DO

## 2024-01-30 NOTE — PROGRESS NOTES
Patient currently down for EEG, has corpac now for tube feeds. Plan remains undetermined, depending on how patient does neurologically. Patient was lethargic yesterday, unable to swallow, so corpac was placed per family's request. Will continue to follow and monitor for discharge planning.

## 2024-01-30 NOTE — CARE PLAN
The patient's goals for the shift include No falls    The clinical goals for the shift include pain control

## 2024-01-30 NOTE — PROGRESS NOTES
Infectious Disease Progress Note       1/30/2024    Patient is a followup regarding severe back pain persistent bilateral lower extremity weakness altered mentation elevated CRP.  Patient has history of MRSA bacteremia, now status post hardware removal with no residual hardware, bilateral JOSE drains which have been removed, open wound postop deep incision, and now, altered mental status    There has been an issue with polypharmacy, she had been nonverbal.    Her MRI of the spine is showing evidence of previous laminectomies L3-S1 levels, there is a rim-enhancing fluid surrounding the surgical drainage catheter along laminectomy bed and extensive evidence of signal abnormality and enhancement within the soft tissues along the laminectomy bed surrounding posterior paraspinal soft tissues and superficially along the surgical tract -unclear if this is a superimposed infectious process.  There is also an abnormal infiltrative signal and enhancement within the paraspinal soft tissues medial psoas muscle bilaterally on the right greater than the left extending from L3 into sacrum which also looks to be possibly infectious    MRI of the Brain done yesterday is showing evidence of mild disproportionate supratentorial ventriculomegaly, which is unchanged since the past CT of the head.  Possible normal pressure hydrocephalus.  Neurology is concerned about possible meningitis due to some additional signal abnormalities and has asked me to temporarily broaden coverage.    Patient's coverage was broadened to daptomycin ceftriaxone and rifampin with help of pharmacy  I had spoken to radiology downtown and they confirmed increased signal enhancement and bony destruction which is worse than approximately 27 days ago      Patient is much more awake alert talking in complete sentences able to express herself to me there is still some confusion however she is definitely talking now  Tells me that she could not previously talk because  she was in too much pain    Lab Results   Component Value Date    WBC 13.4 (H) 01/29/2024    HGB 13.2 01/29/2024    HCT 41.5 01/29/2024    MCV 92 01/29/2024     01/29/2024     Lab Results   Component Value Date    GLUCOSE 143 (H) 01/29/2024    CALCIUM 10.2 01/29/2024     01/29/2024    K 3.5 01/29/2024    CO2 27 01/29/2024     01/29/2024    BUN 29 (H) 01/29/2024    CREATININE 0.89 01/29/2024       WBC trends are being monitored. Antibiotic doses are being adjusted per most recent renal labs.     Vitals:    01/30/24 0906   BP: 149/65   Pulse: 89   Resp: 18   Temp: 36.3 °C (97.3 °F)   SpO2: 94%           Patient Active Problem List   Diagnosis    Abdominal aortic aneurysm (CMS/HCC)    Abnormal EKG    Bilateral carotid artery stenosis    Bruit of right carotid artery    CAD in native artery    Cardiomyopathy (CMS/HCC)    Chest pain    Chronic asthmatic bronchitis    Closed displaced fracture of fifth metatarsal bone    Current every day smoker    Difficulty breathing    Essential hypertension    History of total knee replacement    Hypokalemia    Intermittent claudication (CMS/HCC)    Knee osteoarthritis    Knee pain    Limb pain    Localized swelling, mass, or lump of lower extremity    Celiac artery stenosis (CMS/HCC)    Mesenteric artery stenosis (CMS/HCC)    Mixed hyperlipidemia    Obese    PVD (peripheral vascular disease) (CMS/HCC)    Right foot pain    Swelling    Trigger finger    Urinary tract infection without hematuria    Back pain of lumbar region with sciatica    Back pain with radiculopathy    Intractable back pain    Seroma, post-traumatic (CMS/HCC)    Lumbar surgical wound fluid collection    Generalized weakness    Postoperative surgical complication involving musculoskeletal system associated with musculoskeletal procedure, unspecified complication    Back pain at L4-L5 level    Deep vein thrombosis (DVT) of right lower extremity (CMS/HCC)    Anemia    Altered mental status,  unspecified altered mental status type    Surgical wound infection     Hx MRSA Bacteremia, now s/p hardware removal from spine  Persistent back pain  Diskitis - likely MRSA given her history  Surgical wound infection  Acute metabolic encephalopathy  Speech deficit - new, resolving?  Polypharmacy    PLAN:  I am wondering if the addition of rifampin to daptomycin as well as the ceftriaxone helped her  She is much more conversational  No transfer anticipated at this time  Continue daptomycin, Rifampin, and Ceftriaxone x 4 weeks then will review evaluate on another 4 weeks based on pharmacy recommendation and literature search.   Case discussed with primary    Imaging and labs were reviewed per medical records and any ID pertinent labs were also addressed        Amy Celestin DO

## 2024-01-30 NOTE — PROGRESS NOTES
Inpatient Speech-Language Pathology Clinical Swallow Evaluation       Patient Name: Tara Tierney  MRN: [id number]  : 1955  Today's Date: 24  Time Calculation  Start Time: 945  Stop Time: 1005  Time Calculation (min): 20 min       Current Problem:   1. Altered mental status, unspecified altered mental status type        2. Surgical wound infection  CANCELED: Case Request Operating Room: Spine Lumbar I  and  D    CANCELED: Case Request Operating Room: Spine Lumbar I  and  D        Recommendations:  Risk for Aspiration: Yes   Solid Diet Recommendations : NPO   Liquid Diet Recommendations: Lowe free water protocol after oral care   Compensatory Swallowing Strategies: Single sips, Small bites/sips     Medication Administration Recommendations: Non Oral    Medical Staff Made Aware: Yes    Assessment:  Respiratory Status: Room air   Patient Positioning: Partially/Semi Reclined   Consistencies Trialed: Consistencies Trialed: Thin (IDDSI Level 0) - Straw   Oral Motor: Impaired Function  Lingual Strength: Reduced   Dentition: Adequate/Natural, Some Missing Teeth      Pt laying in bed reporting discomfort and pain in back. Unable to sit upright for P.O. intake due to this. Family in room stating she eats in recliner at home as well. Pt with decreased participation over the course of the evaluation. Pt had moments of more clarity and then moments where she was minimally responsive. Pt agreeable to a single sip of thin water via straw. No overt s/s aspiration and/or penetration observed. Pt refused all other P.O. trials due to pain. Continue with NPO recommendation and Cortrak. Pt is able to have sips of thin liquids if desired. Speech therapy will continue to reassess diet over admission.     Plan:  SLP Plan: Skilled SLP Skilled speech therapy for dysphagia treatment is warranted in order to provide training and instruction regarding the use of compensatory swallow strategies, assess tolerance of diet and  pt/caregiver education in order to reduce risk of aspiration, dehydration and malnutrition.  SLP Frequency: 3x per week   Duration: Current admission   Discussed POC: Patient, Nursing   Discussed Risks/Benefits: Yes   Patient/Caregiver Agreeable: Yes   Prognosis: Fair  SLP Discharge Recommendations: unable to determine at this time, refer to subsequent notes    Goals:  Pt will trial diet upgrades with clinician to determine if safe for P.O. intake.     Subjective   Pt reclined in bed in moderate pain and discomfort.     General Visit Information:  Pt. Admitted 1/27 with AMS. Diagnosed with lumbar infection and meningitis. Cortrak in place due to pt not eating and drinking.   Past medical history: COPD, RLE DVT, and lumbar stenosis status post laminectomy/fusion with subsequent complication of surgical site infection     Living Environment: Home     Reason for Referral: Not eating and drinking  Current Diet : NPO - Cortrak   Prior to Session Communication: Bedside nurse     Pain:  Pain Assessment  Pain Assessment: Velez-Baker FACES  Velez-Baker FACES Pain Rating: Hurts whole lot  Pain Location: Back     Treatment Completed with evaluation:   No    SLP Inpatient Education:  Pt. educated on safe swallow strategies, role of SLP, S/S of aspiration to be aware of, risks of aspiration, current swallow function, recommended diet, recommendation of Modified Barium swallow study and procedure  Patient needs further instruction

## 2024-01-30 NOTE — PROGRESS NOTES
Plastic Surgery Note    Afebrile, vital signs stable  More awake this a.m. responding to questioning  Back wound with dressing intact  Impression: Open wound of back  Start wound VAC today

## 2024-01-30 NOTE — PROGRESS NOTES
"Tara Tierney is a 68 y.o. female on day 3 of admission presenting with Altered mental status, unspecified altered mental status type.    Subjective   Patient seen and examined.  She is more awake and alert compared to yesterday, answering questions appropriately.  Complains of back pain, no shortness of breath, nausea, vomiting, fevers or chills.       Objective     Physical Exam  Constitutional:       Appearance: She is ill-appearing. She is not toxic-appearing.   HENT:      Head: Normocephalic.   Eyes:      Extraocular Movements: Extraocular movements intact.      Conjunctiva/sclera: Conjunctivae normal.   Cardiovascular:      Rate and Rhythm: Normal rate and regular rhythm.   Pulmonary:      Effort: Pulmonary effort is normal. No respiratory distress.   Abdominal:      Palpations: Abdomen is soft. There is no mass.   Musculoskeletal:      Cervical back: Neck supple.   Neurological:      Mental Status: She is alert and oriented to person, place, and time.         Last Recorded Vitals  Blood pressure 149/65, pulse 89, temperature 36.3 °C (97.3 °F), temperature source Temporal, resp. rate 18, height 1.44 m (4' 8.69\"), weight 69.1 kg (152 lb 5.4 oz), SpO2 94 %, not currently breastfeeding.  Intake/Output last 3 Shifts:  I/O last 3 completed shifts:  In: 1141.3 (16.5 mL/kg) [I.V.:1021.3 (14.8 mL/kg); NG/GT:120]  Out: 500 (7.2 mL/kg) [Urine:500 (0.2 mL/kg/hr)]  Weight: 69.1 kg     Relevant Results                        Malnutrition Diagnosis Status: New  Malnutrition Diagnosis: Severe malnutrition related to chronic disease or condition  As Evidenced by: family reports of pt eating <75% of estimated needs x 2-3 months; 14% wt loss x 1 month  I agree with the dietitian's malnutrition diagnosis.      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, COPD, hypertension, peripheral vascular disease, DVT who was admitted with acute toxic metabolic encephalopathy most likely secondary to meningitis    Was started on empiric treatment of meningitis, her mental status is back to baseline  One of the blood cultures grew staph epidermis second 1 is negative  On empiric treatment for meningitis with ceftriaxone, rifampicin and Cubicin  Plastic surgery consulted for wound VAC placement  MRI brain reviewed, no acute stroke  MRI lumbar spine showed persistent extensive infiltrative signal abnormality and enhancement within the soft tissue along the laminectomy bed and surrounding posterior paraspinal soft tissue  Orthopedics has been reconsulted  Continue pain control, supportive care.  Currently on Eliquis will need to be on hold if plan for any surgical intervention  DVT prophylaxis addressed    Discussed with the family in detail the case and complications, potential transfer to main [place].  Family is refusing at this time, they will talk with the  and get back to me.               Nitesh Ruiz MD

## 2024-01-30 NOTE — PROGRESS NOTES
Initially consulted for wound vac application mid back. Removed dressing and irrigated wound with NSS. Wound bed is pink there is no purulent drainage seen at this time. Minimal bleeding wound edges. No foul smell and no eschar tissue noted. Applied black foam base of wound and set wound vac at 125 mmHg continuous. Wound measurements are 13.0cm x 3.5cm x 3.0cm.   Wound Care Progress Note     Visit Date: 1/30/2024      Patient Name: Tara Tierney         MRN: [id number]                Reason for Visit: Application of wound vac mid back        Wound History: Since last November.     Pertinent Labs:   Albumin   Date Value Ref Range Status   01/27/2024 3.8 3.4 - 5.0 g/dL Final           Wound Assessment:           NEW    NG/OG/Feeding Tube Right nostril 12 Fr. (Active)   Placement Date/Time: 01/29/24 1400   Placed by: Lissy iTm RN  Type of Tube: (c) Feeding Tube  Tube Length: 60 cm  Tube Location: Right nostril  Tube Size (Fr.): 12 Fr.   Number of days: 0       External Urinary Catheter Female (Active)   Placement Date/Time: 01/29/24 1030   External Catheter Type: Female   Number of days: 1     NG/OG/Feeding Tube Right nostril 12 Fr. (Active)   Tube Status Continuous tube feeding 01/29/24 1600   Placement Verification X-ray 01/29/24 1600   Distal Tube Measurement 60 cm 01/29/24 1600   Site Assessment Clean;Dry;Intact 01/30/24 0008   Irrigant Tap water 01/29/24 1600   Response To Intervention No resistance met 01/29/24 1600   Tube Securement Nasal bridle 01/29/24 1600   Tube Feeding Frequency Continuous 01/29/24 1600   Tube Feeding Jevity 1.5 01/29/24 1600   Tube Feeding Strength Full strength 01/29/24 1600   Tube Feeding Method Continuous per pump 01/29/24 1600   Feeding Tube Flushed With Tap water 01/29/24 1600   Intake (mL) 120 mL 01/29/24 1600       External Urinary Catheter Female (Active)                   Wound 11/20/23 Incision Back Lower;Medial (Active)   Date First Assessed: 11/20/23   Present on Original  Admission: No  Hand Hygiene Completed: Yes  Primary Wound Type: (c) Incision  Location: Back  Wound Location Orientation: Lower;Medial   Number of days: 71       Wound (Active)   No Date First Assessed or Time First Assessed found.     Number of days:      Wound 11/20/23 Incision Back Lower;Medial (Active)       Wound (Active)                   Wound Team Plan: Continue with wound vac as ordered.     Young Vargas LPN  1/30/2024  1:49 PM

## 2024-01-30 NOTE — CONSULTS
PLASTIC  SURGERY  CONSULTATION        Reason for consultation:       Open wound of back      History of present illness:      Who underwent laminectomy and spinal fusion complicated by MRSA abscess formation with drainage of abscess and revision patient admitted for mental status changes.        Past medical history:      Infection of lumbar spine and osteomyelitis  COPD  Right lower extremity DVT  Lumbar stenosis  Hyperlipidemia  Hypertension  Macular degeneration  Ménière's disease  Osteoporosis      Past surgical history:      Tonsillectomy  Cardiac cath  Total knee replacement bilateral  Cholecystectomy  Bilateral femoral artery bypass and graft  Tubal condition  Laminectomy/fusion with subsequent removal of hardware      Medications:       Eliquis  Aspirin  Atenolol  Baclofen  Duca locks  Buspirone  Maxipime  Daptomycin  Zetia  Fentanyl  Hydralazine  Dilaudid  Imdur  Labetalol  Ativan  Oxybutynin  Pantoprazole  MiraLAX    ALLERGIES:      Neomycin    Polymyxin        Social history:      Former smoker, nondrinker      Family history:      Breast cancer  Cardiovascular disease            Review of systems:  At least 10 systems reviewed and are otherwise negative except for as noted in the history of present illness                PHYSICAL  EXAMINATION       Height: 4 foot 8 inches                 weight:      148 pounds                    Vital signs:   Temperature 36 pulse 86 blood pressure 158/70    General: Well-developed, female        lethargic barely follows commands    HEENT:   Within normal limits    Neck:   Supple, no observable masses    Lungs: Clear to auscultation    Heart: Regular rate and rhythm    Abdomen: Soft, nontender    Extremities: Full range of motion; back multiple sutures in place with dehiscence and minimal drainage    Neurological: Lethargic      Procedure note: At the bedside, sutures were removed in the midportion of the incision with minimal seroma.  Wound debrided to fascial  level with scalpel blade.  Please note this is an excisional debridement.  Wound was then packed with gauze and dressing applied.  Patient tolerated the procedure well          Impression:      Open wound of  back      Recommendation:      Suture removal and debridement at bedside with wound VAC placement to be done tomorrow        Thank you for the referral.    Roland Reyes, MD        *These notes are being done using Dragon voice recognition technology and may include unintended errors with respect to translation of words, typographical errors or grammar errors which may not have been identified prior to finalization of the chart note.

## 2024-01-30 NOTE — PROGRESS NOTES
Patient more awake today. Is able to verbalized some words.     Corepak placed yesterday and patient is getting tube feeds.     Wound vac to be placed today by wound nurse.     Continue IV antibiotics per ID.     Will continue to follow.

## 2024-01-31 ENCOUNTER — APPOINTMENT (OUTPATIENT)
Dept: RADIOLOGY | Facility: HOSPITAL | Age: 69
DRG: 856 | End: 2024-01-31
Payer: COMMERCIAL

## 2024-01-31 ENCOUNTER — OUTSIDE SERVICES (OUTPATIENT)
Dept: INFECTIOUS DISEASES | Age: 69
End: 2024-01-31
Payer: COMMERCIAL

## 2024-01-31 DIAGNOSIS — G93.41 ACUTE METABOLIC ENCEPHALOPATHY: Primary | ICD-10-CM

## 2024-01-31 DIAGNOSIS — M46.26 INFECTION OF LUMBAR SPINE (HCC): ICD-10-CM

## 2024-01-31 LAB
BACTERIA BLD AEROBE CULT: ABNORMAL
BACTERIA BLD CULT: ABNORMAL
BACTERIA BLD CULT: NORMAL
CK SERPL-CCNC: 135 U/L (ref 0–215)
CRP SERPL-MCNC: 9.99 MG/DL
ERYTHROCYTE [DISTWIDTH] IN BLOOD BY AUTOMATED COUNT: 15.9 % (ref 11.5–14.5)
GLUCOSE BLD MANUAL STRIP-MCNC: 130 MG/DL (ref 74–99)
GRAM STN SPEC: ABNORMAL
HCT VFR BLD AUTO: 36.4 % (ref 36–46)
HGB BLD-MCNC: 11.4 G/DL (ref 12–16)
HOLD SPECIMEN: NORMAL
HOLD SPECIMEN: NORMAL
MCH RBC QN AUTO: 29.3 PG (ref 26–34)
MCHC RBC AUTO-ENTMCNC: 31.3 G/DL (ref 32–36)
MCV RBC AUTO: 94 FL (ref 80–100)
NRBC BLD-RTO: 0 /100 WBCS (ref 0–0)
PLATELET # BLD AUTO: 260 X10*3/UL (ref 150–450)
RBC # BLD AUTO: 3.89 X10*6/UL (ref 4–5.2)
WBC # BLD AUTO: 12.6 X10*3/UL (ref 4.4–11.3)

## 2024-01-31 PROCEDURE — 99232 SBSQ HOSP IP/OBS MODERATE 35: CPT | Performed by: INTERNAL MEDICINE

## 2024-01-31 PROCEDURE — 99232 SBSQ HOSP IP/OBS MODERATE 35: CPT | Performed by: STUDENT IN AN ORGANIZED HEALTH CARE EDUCATION/TRAINING PROGRAM

## 2024-01-31 PROCEDURE — 1200000002 HC GENERAL ROOM WITH TELEMETRY DAILY

## 2024-01-31 PROCEDURE — 82947 ASSAY GLUCOSE BLOOD QUANT: CPT

## 2024-01-31 PROCEDURE — 82550 ASSAY OF CK (CPK): CPT | Performed by: STUDENT IN AN ORGANIZED HEALTH CARE EDUCATION/TRAINING PROGRAM

## 2024-01-31 PROCEDURE — 2500000004 HC RX 250 GENERAL PHARMACY W/ HCPCS (ALT 636 FOR OP/ED): Performed by: INTERNAL MEDICINE

## 2024-01-31 PROCEDURE — 2500000001 HC RX 250 WO HCPCS SELF ADMINISTERED DRUGS (ALT 637 FOR MEDICARE OP): Performed by: INTERNAL MEDICINE

## 2024-01-31 PROCEDURE — 85027 COMPLETE CBC AUTOMATED: CPT | Performed by: STUDENT IN AN ORGANIZED HEALTH CARE EDUCATION/TRAINING PROGRAM

## 2024-01-31 PROCEDURE — 99231 SBSQ HOSP IP/OBS SF/LOW 25: CPT | Performed by: PLASTIC SURGERY

## 2024-01-31 PROCEDURE — 92526 ORAL FUNCTION THERAPY: CPT | Mod: GN

## 2024-01-31 PROCEDURE — 2500000004 HC RX 250 GENERAL PHARMACY W/ HCPCS (ALT 636 FOR OP/ED): Mod: JZ | Performed by: INTERNAL MEDICINE

## 2024-01-31 PROCEDURE — 2500000004 HC RX 250 GENERAL PHARMACY W/ HCPCS (ALT 636 FOR OP/ED): Performed by: STUDENT IN AN ORGANIZED HEALTH CARE EDUCATION/TRAINING PROGRAM

## 2024-01-31 PROCEDURE — 86140 C-REACTIVE PROTEIN: CPT | Performed by: STUDENT IN AN ORGANIZED HEALTH CARE EDUCATION/TRAINING PROGRAM

## 2024-01-31 PROCEDURE — 2500000001 HC RX 250 WO HCPCS SELF ADMINISTERED DRUGS (ALT 637 FOR MEDICARE OP): Performed by: STUDENT IN AN ORGANIZED HEALTH CARE EDUCATION/TRAINING PROGRAM

## 2024-01-31 PROCEDURE — 1090000001 HH PPS REVENUE CREDIT

## 2024-01-31 PROCEDURE — 93880 EXTRACRANIAL BILAT STUDY: CPT

## 2024-01-31 PROCEDURE — 93880 EXTRACRANIAL BILAT STUDY: CPT | Performed by: RADIOLOGY

## 2024-01-31 PROCEDURE — 1090000002 HH PPS REVENUE DEBIT

## 2024-01-31 RX ORDER — MENTHOL AND ZINC OXIDE .44; 20.625 G/100G; G/100G
1 OINTMENT TOPICAL 4 TIMES DAILY PRN
Status: DISCONTINUED | OUTPATIENT
Start: 2024-01-31 | End: 2024-02-07 | Stop reason: HOSPADM

## 2024-01-31 RX ORDER — POTASSIUM CHLORIDE 14.9 MG/ML
20 INJECTION INTRAVENOUS
Status: COMPLETED | OUTPATIENT
Start: 2024-01-31 | End: 2024-01-31

## 2024-01-31 RX ORDER — BACLOFEN 10 MG/1
5 TABLET ORAL 3 TIMES DAILY
Status: DISCONTINUED | OUTPATIENT
Start: 2024-01-31 | End: 2024-02-04

## 2024-01-31 RX ORDER — OXYCODONE HYDROCHLORIDE 5 MG/1
10 TABLET ORAL EVERY 6 HOURS PRN
Status: DISCONTINUED | OUTPATIENT
Start: 2024-01-31 | End: 2024-02-07 | Stop reason: HOSPADM

## 2024-01-31 RX ADMIN — Medication 1 APPLICATION: at 11:14

## 2024-01-31 RX ADMIN — BACLOFEN 5 MG: 10 TABLET ORAL at 15:30

## 2024-01-31 RX ADMIN — BACLOFEN 5 MG: 10 TABLET ORAL at 11:18

## 2024-01-31 RX ADMIN — HYDROMORPHONE HYDROCHLORIDE 0.6 MG: 1 INJECTION, SOLUTION INTRAMUSCULAR; INTRAVENOUS; SUBCUTANEOUS at 00:26

## 2024-01-31 RX ADMIN — APIXABAN 5 MG: 5 TABLET, FILM COATED ORAL at 11:06

## 2024-01-31 RX ADMIN — SODIUM CHLORIDE, POTASSIUM CHLORIDE, SODIUM LACTATE AND CALCIUM CHLORIDE 75 ML/HR: 600; 310; 30; 20 INJECTION, SOLUTION INTRAVENOUS at 07:03

## 2024-01-31 RX ADMIN — RIFAMPIN 300 MG: 300 CAPSULE ORAL at 11:06

## 2024-01-31 RX ADMIN — DAPTOMYCIN 500 MG: 500 INJECTION, POWDER, LYOPHILIZED, FOR SOLUTION INTRAVENOUS at 20:59

## 2024-01-31 RX ADMIN — HYDRALAZINE HYDROCHLORIDE 50 MG: 50 TABLET ORAL at 21:00

## 2024-01-31 RX ADMIN — HYDROMORPHONE HYDROCHLORIDE 0.3 MG: 1 INJECTION, SOLUTION INTRAMUSCULAR; INTRAVENOUS; SUBCUTANEOUS at 14:20

## 2024-01-31 RX ADMIN — DEXTROSE MONOHYDRATE 2 G: 5 INJECTION INTRAVENOUS at 00:23

## 2024-01-31 RX ADMIN — ASPIRIN 81 MG: 81 TABLET, COATED ORAL at 11:07

## 2024-01-31 RX ADMIN — HYDROMORPHONE HYDROCHLORIDE 0.6 MG: 1 INJECTION, SOLUTION INTRAMUSCULAR; INTRAVENOUS; SUBCUTANEOUS at 06:16

## 2024-01-31 RX ADMIN — DEXTROSE MONOHYDRATE 2 G: 5 INJECTION INTRAVENOUS at 13:12

## 2024-01-31 RX ADMIN — SODIUM CHLORIDE, POTASSIUM CHLORIDE, SODIUM LACTATE AND CALCIUM CHLORIDE 75 ML/HR: 600; 310; 30; 20 INJECTION, SOLUTION INTRAVENOUS at 21:07

## 2024-01-31 RX ADMIN — RIFAMPIN 300 MG: 300 CAPSULE ORAL at 21:00

## 2024-01-31 RX ADMIN — HYDRALAZINE HYDROCHLORIDE 50 MG: 50 TABLET ORAL at 11:07

## 2024-01-31 RX ADMIN — POTASSIUM CHLORIDE 20 MEQ: 14.9 INJECTION, SOLUTION INTRAVENOUS at 11:18

## 2024-01-31 RX ADMIN — HYDROMORPHONE HYDROCHLORIDE 0.3 MG: 1 INJECTION, SOLUTION INTRAMUSCULAR; INTRAVENOUS; SUBCUTANEOUS at 21:00

## 2024-01-31 RX ADMIN — ISOSORBIDE MONONITRATE 60 MG: 60 TABLET, EXTENDED RELEASE ORAL at 06:13

## 2024-01-31 RX ADMIN — Medication 1 TABLET: at 11:06

## 2024-01-31 RX ADMIN — BACLOFEN 5 MG: 10 TABLET ORAL at 21:00

## 2024-01-31 RX ADMIN — POTASSIUM CHLORIDE 20 MEQ: 14.9 INJECTION, SOLUTION INTRAVENOUS at 13:17

## 2024-01-31 RX ADMIN — APIXABAN 5 MG: 5 TABLET, FILM COATED ORAL at 21:00

## 2024-01-31 ASSESSMENT — COGNITIVE AND FUNCTIONAL STATUS - GENERAL
MOVING TO AND FROM BED TO CHAIR: TOTAL
MOBILITY SCORE: 7
STANDING UP FROM CHAIR USING ARMS: TOTAL
DRESSING REGULAR LOWER BODY CLOTHING: TOTAL
HELP NEEDED FOR BATHING: TOTAL
EATING MEALS: TOTAL
DAILY ACTIVITIY SCORE: 6
TOILETING: TOTAL
DRESSING REGULAR UPPER BODY CLOTHING: TOTAL
MOVING FROM LYING ON BACK TO SITTING ON SIDE OF FLAT BED WITH BEDRAILS: A LOT
WALKING IN HOSPITAL ROOM: TOTAL
PERSONAL GROOMING: TOTAL
CLIMB 3 TO 5 STEPS WITH RAILING: TOTAL
TURNING FROM BACK TO SIDE WHILE IN FLAT BAD: TOTAL

## 2024-01-31 ASSESSMENT — PAIN - FUNCTIONAL ASSESSMENT
PAIN_FUNCTIONAL_ASSESSMENT: 0-10

## 2024-01-31 ASSESSMENT — PAIN SCALES - GENERAL
PAINLEVEL_OUTOF10: 10 - WORST POSSIBLE PAIN
PAINLEVEL_OUTOF10: 10 - WORST POSSIBLE PAIN
PAINLEVEL_OUTOF10: 8
PAINLEVEL_OUTOF10: 10 - WORST POSSIBLE PAIN

## 2024-01-31 ASSESSMENT — PAIN DESCRIPTION - DESCRIPTORS: DESCRIPTORS: ACHING;CRAMPING

## 2024-01-31 ASSESSMENT — PAIN DESCRIPTION - LOCATION: LOCATION: BACK

## 2024-01-31 NOTE — PROGRESS NOTES
Wound Vac Check, There is scant red bloody drainage seen in cannister. Black foam is compressed and pressure maintained at 125 mmHg continuous.  Wound Care Progress Note     Visit Date: 1/31/2024      Patient Name: Tara Tierney         MRN: [id number]                Reason for Visit: Wound Check        Wound History: Chronic     Pertinent Labs:   Albumin   Date Value Ref Range Status   01/27/2024 3.8 3.4 - 5.0 g/dL Final           Wound Assessment:           NEW    NG/OG/Feeding Tube Right nostril 12 Fr. (Active)   Placement Date/Time: 01/29/24 1400   Placed by: Lissy Tim RN  Type of Tube: (c) Feeding Tube  Tube Length: 60 cm  Tube Location: Right nostril  Tube Size (Fr.): 12 Fr.   Number of days: 2       External Urinary Catheter Female (Active)   Placement Date/Time: 01/29/24 1030   External Catheter Type: Female   Number of days: 2     NG/OG/Feeding Tube Right nostril 12 Fr. (Active)   Tube Status Continuous tube feeding 01/30/24 2000   Placement Verification X-ray 01/29/24 1600   Distal Tube Measurement 60 cm 01/30/24 2000   Site Assessment Clean;Dry;Intact 01/31/24 0008   Irrigant Tap water 01/29/24 1600   Response To Intervention No resistance met 01/29/24 1600   Tube Securement Nasal bridle 01/30/24 2000   Tube Feeding Frequency Continuous 01/30/24 2000   Tube Feeding Jevity 1.5 01/30/24 2000   Tube Feeding Strength Full strength 01/29/24 1600   Tube Feeding Method Continuous per pump 01/30/24 2000   Feeding Tube Flushed With Tap water 01/30/24 2000   Intake (mL) 611 mL 01/30/24 2000       External Urinary Catheter Female (Active)   Output (mL) 300 mL 01/30/24 2238                   Wound 11/20/23 Incision Back Lower;Medial (Active)   Date First Assessed: 11/20/23   Present on Original Admission: No  Hand Hygiene Completed: Yes  Primary Wound Type: (c) Incision  Location: Back  Wound Location Orientation: Lower;Medial   Number of days: 72       Wound (Active)   No Date First Assessed or Time First  Assessed found.     Number of days:      Wound 11/20/23 Incision Back Lower;Medial (Active)   Dressing Changed Changed 01/30/24 7227   Dressing Status Clean;Dry 01/30/24 2000       Wound (Active)                   Wound Team Plan: Continue With Wound Vac As Ordered.     Young Vargas LPN  1/31/2024  2:12 PM

## 2024-01-31 NOTE — PROGRESS NOTES
Plastic Surgery Note    Afebrile, vital signs stable  Back wound with wound VAC in place  Impression: Open wound of back  Continue wound VAC

## 2024-01-31 NOTE — CARE PLAN
The patient's goals for the shift include No falls    The clinical goals for the shift include Patient will continue to be more alert and increase frequency of speaking with staff.

## 2024-01-31 NOTE — PROGRESS NOTES
Speech-Language Pathology    Inpatient Speech Language Pathology Treatment Note     Patient Name: Tara Tierney  MRN: [id number]  : 1955  Today's Date: 24  Time Calculation  Start Time: 1100  Stop Time: 1113  Time Calculation (min): 13 min         PLAN:  Skilled speech therapy for dysphagia treatment continues to be warranted to provide training and instruction regarding the use of compensatory swallow strategies, for pt/caregiver education in order to reduce risk of aspiration, dehydration and malnutrition. , to assess tolerance of diet   SLP TX Plan: Continue Plan of Care  SLP Frequency: 2x per week  Discussed POC: Patient, Caregiver/family  Discussed Risks/Benefits: Patient  Patient/Caregiver Agreeable: Yes      Recommendations:   Solid Diet Recommendations: Regular (IDDSI Level 7)  Liquid Diet Recommendations: Thin (IDDSI Level 0)  Compensatory strategies: small bites/sips, alternate bites/sips, upright 90 degrees for intake   Medication administration: whole with liquids     Subjective:  Pt. Seen at bedside for skilled dysphagia treatment.   Prior to Session Communication: Bedside nurse  Pain:    Back pain reported, RN aware        Oxygen Status:   nasal cannula             Goals:   Pt will trial diet upgrades with clinician to determine if safe for P.O. intake.  - Tolerated this date.   New Goals: Pt. To tolerate regular/thin liquid diet without pulmonary compromise        SLP Assessment:  Pt. Seen at bedside. Daughter present. Daughter reported they prefer cortrak remain present due to poor intake. Pt. Reported a history of esophageal web removal in the past.   Pt. Did not want to sit upright past 45 degrees due to pain. She was educated on need for being upright as much as possible tp decrease risk of aspiration. Pt stated understanding but was not able to sit up further. She tolerated all  trials of regular, puree and thin liquids with no s/s aspiration or laryngeal penetration.   Pts  swallow is functional. Cortrak only in place due to poor intake. Not needed due to impaired swallow. Will defer to dietician and physician for removal decision however do not recommend in place for long as this being present could decrease pt.s want for oral intake.     Treatment Outcome:  SLP TX Intervention Outcome: Making Progress Towards Goals    Treatment Tolerance: Patient tolerated treatment well   Prognosis: Fair   Barriers: Comorbidities   Medical Staff Made Aware: Yes       Education:  Pt. Given skilled instruction on safe swallow strategies, recommendations   Pt. gave verbal understanding

## 2024-01-31 NOTE — PROGRESS NOTES
Spoke with patient and family members at bedside. Patient is wide awake and alert today, and she states she is going home. She refuses to go to any SNF. Patient will have wound vac to wound, she will need IV antibiotics, she is active with [facility] already.  was trained on the infusions and nurse would do wound vac care. Patient has corpac, but is very awake today and stating she is very hungry. Will have speech see if she is able to pass swallowing eval today. Plan will be home with [facility].

## 2024-01-31 NOTE — PROGRESS NOTES
Patient's  is at bedside  Had discussion with colleagues at [facility] as well as with infectious disease, there is not an opportunity to access CSF either through a cervical puncture or suboccipital puncture as this is not done at [facility] nor [place].  Dr. Euceda is comfortable continuing empiric coverage without CSF analysis.  The patient continues to show a tremendous clinical improvement.  MRI brain was collected 1/30/24 and did not identify any leptomeningeal enhancement or other pathology, there is however dilatation of the ventricular system.  Patient is not in any distress, alert conversational.  No photophobia, no headache.      Visit Vitals  /65 (BP Location: Left arm, Patient Position: Lying)   Pulse 83   Temp 36.8 °C (98.2 °F) (Temporal)   Resp 18        Neurological Exam:  GENERAL APPEARANCE: No distress interactive and cooperative.     MENTAL STATE: Patient is alert and fully oriented to time as well as location.  She is able to speak in complete sentences.  She can follow single step commands without any difficulty.    CRANIAL NERVES: normal  CN 2 Visual fields full to confrontation.   CN 3, 4, 6  Pupils round, equally reactive to light. No ptosis.EOMs normal alignment, full range with normal saccades, pursuit and convergence. No nystagmus.   CN 5 Facial sensation intact bilaterally.    CN 7 Normal and symmetric facial strength. Nasolabial folds symmetric.   CN 8 Hearing intact to finger rub bilaterally.   CN 9 Palate elevates symmetrically.    CN 11 Bilaterally normal strength of shoulder shrug and neck turning.   CN 12 Tongue midline, with normal bulk and strength; no fasciculations.     Motor examination reveals generalized weakness, it is difficult for her to participate in confrontational testing.      Gait not testable due to pain and weakness      BMP:  Results from last 7 days   Lab Units 01/30/24  1138 01/29/24  0403 01/28/24  0525   SODIUM mmol/L 146* 143 139   POTASSIUM  "mmol/L 3.2* 3.5 2.5*   CHLORIDE mmol/L 111* 105 97*   CO2 mmol/L 25 27 28   BUN mg/dL 27* 29* 23   CREATININE mg/dL 0.84 0.89 0.89         CBC:  Results from last 7 days   Lab Units 01/31/24  0526 01/30/24  1138 01/29/24  0403   WBC AUTO x10*3/uL 12.6* 16.8* 13.4*   RBC AUTO x10*6/uL 3.89* 3.95* 4.50   HEMOGLOBIN g/dL 11.4* 11.6* 13.2   HEMATOCRIT % 36.4 36.6 41.5   MCV fL 94 93 92   MCH pg 29.3 29.4 29.3   MCHC g/dL 31.3* 31.7* 31.8*   RDW % 15.9* 15.7* 15.6*   PLATELETS AUTO x10*3/uL 260 278 344         INR:        Lipid Profile:        No lab exists for component: \"LABVLDL\"    HgbA1C:        CMP:  Results from last 7 days   Lab Units 01/30/24  1138 01/29/24  0403 01/28/24  0525 01/27/24  0452   SODIUM mmol/L 146* 143 139 132*   POTASSIUM mmol/L 3.2* 3.5 2.5* 3.0*   CHLORIDE mmol/L 111* 105 97* 95*   CO2 mmol/L 25 27 28 20*   BUN mg/dL 27* 29* 23 22   CREATININE mg/dL 0.84 0.89 0.89 0.91   GLUCOSE mg/dL 166* 143* 152* 168*   CALCIUM mg/dL 9.4 10.2 10.5* 10.3   PROTEIN TOTAL g/dL  --   --   --  8.0   BILIRUBIN TOTAL mg/dL  --   --   --  0.8   ALK PHOS U/L  --   --   --  144*   AST U/L  --   --   --  17   ALT U/L  --   --   --  28         ABG:        No lab exists for component: \"PO2\", \"PCO2\", \"HCO3\", \"BE\", \"O2SAT\"    TSH:  No results found for: \"TSH\"  Lab Results   Component Value Date    OOTXZMJI62 322 01/05/2024     Lab Results   Component Value Date    FOLATE 5.1 01/05/2024     Lab Results   Component Value Date    VITD25 33 01/05/2024         MR brain w and wo IV contrast    Result Date: 1/30/2024  Interpreted By:  Alexi Bass, STUDY: MR BRAIN W AND WO IV CONTRAST; ;  1/29/2024 7:34 pm   INDICATION: Signs/Symptoms:menengitis.   COMPARISON: CT head from 01/20/2024. MRI brain from 01/18/2016.   ACCESSION NUMBER(S): [id number]   ORDERING CLINICIAN: RENETTA TAYLOR   TECHNIQUE: MRI of the brain was performed with the acquisition of axial diffusion-weighted, axial T1, axial FLAIR, axial T2 gradient echo, axial " T2 fat saturated, axial T1 fat saturated postcontrast sequence, and axial T1 volumetric post-contrast sequence with multiplanar reformats.   Contrast: 13.5 mL of Dotarem was injected intravenously.   FINDINGS: Evaluation is somewhat degraded due to patient motion.   There is no acute intracranial hemorrhage or infarct. There is no abnormal extra-axial fluid collection or mass effect.   On the FLAIR sequence, there is increased signal within sulci in the bilateral cerebral hemispheres, primarily along the periphery with sparing of the central sulci which is likely artifactual. No signal abnormality seen within the sulci on the other sequences.   There is stable mild disproportionate enlargement of the supratentorial ventricles compared the adjacent sulci and widening of the sylvian fissures in a pattern that can be seen with normal pressure hydrocephalus. Ventriculomegaly related to potential meningitis is favored less likely and ventricles are unchanged in size since the prior CT head. Ventricles have increased in size since the prior MRI brain from 2016, favored to be related to parenchymal volume loss.   There is no abnormal intracranial enhancement or mass.   There are confluent and scattered regions of T2 hyperintensity within the cerebral hemispheric white matter and robin which are probably sequela of chronic small vessel ischemic changes. There are 2 small foci of T2 hyperintensity located within the right cerebellum which probably reflect old infarcts.   Within the left cerebellum, there is subtle T2 hyperintense signal (series 9, image 10 of 40) without abnormal enhancement or restricted diffusion which may be sequela of late subacute infarct. There is no volume loss.   Visualized paranasal sinuses and mastoid air cells are essentially clear.       Evaluation is somewhat degraded due to patient motion.   1. No acute intracranial abnormality or mass effect. No abnormal intracranial enhancement or mass.   2.  Mild disproportionate supratentorial ventriculomegaly, unchanged since the recent CT head and in a pattern that is suggestive of normal pressure hydrocephalus. Appearance is not typical for an infectious process.   3. Chronic intracranial findings as above.   This study was interpreted at [facility].   MACRO: None   Signed by: Alexi Bass 1/30/2024 7:32 AM Dictation workstation:   [id number]      CT head wo IV contrast    Result Date: 1/27/2024  Interpreted By:  Joanne Cruz, STUDY: CT HEAD WO IV CONTRAST;  1/27/2024 6:21 am   INDICATION: Signs/Symptoms:AMS. eval for acute hemorrhage.   COMPARISON: 08/04/2009   ACCESSION NUMBER(S): [id number]   ORDERING CLINICIAN: LATOYA GUEVARA   TECHNIQUE: Examination was performed in the axial plane using soft tissue and bone algorithm.   FINDINGS: INTRACRANIAL: There is prominence of the ventricular system and cerebral sulci consistent with cerebral atrophy. There are periventricular hypodensities consistent with  moderate small vessel disease. No mass or mass effect is identified. There is no hemorrhage or subdural fluid collection. There is no acute infarct. There is no fracture of the calvarium   EXTRACRANIAL: Visualized paranasal sinuses and mastoids are clear.       No acute intracranial pathology.   MACRO: None   Signed by: Joanne Cruz 1/27/2024 7:03 AM Dictation workstation:   [id number]      MR lumbar spine w and wo IV contrast    Result Date: 1/28/2024  Interpreted By:  Yoav Davidson, STUDY: MR LUMBAR SPINE W AND WO IV CONTRAST;  1/28/2024 6:39 pm   INDICATION: Signs/Symptoms: Rule out abscess.   COMPARISON: 01/11/2024   ACCESSION NUMBER(S): [id number]   ORDERING CLINICIAN: FRANCESCA ORR   TECHNIQUE: Sagittal STIR, sagittal T2, sagittal T1, axial T2, axial T1, as well as post gadolinium sagittal axial T1 weighted MRI images of the lumbar spine were obtained. The patient received 13 mL of Dotarem gadolinium intravenously.    FINDINGS: Postoperative changes are again identified compatible with a previous posterior laminectomies at the L3 through S1 levels as well as discectomies at the L4/5 and L5/S1 levels. Metallic artifact from orthopedic hardware is identified along the disc spaces at the L4/5 and L5/S1 levels. There are surgical drainage catheter again noted along the laminectomy bed as well as more superficially along the surgical tract with the posterior paraspinal soft tissues. There is a minimal amount of rim enhancing fluid surrounding the surgical drainage catheter along laminectomy bed the sterility of which can not be ascertained on this MRI study.   There is again evidence of extensive infiltrative signal abnormality and enhancement within the soft tissues along the laminectomy bed and surrounding posterior paraspinal soft tissues extending superficially along the surgical tract which may be postsurgical in etiology although a superimposed infectious/inflammatory process could give a similar MRI appearance and must be considered.   There is again evidence of abnormal signal along the L3/4 disc space as well as within the adjacent bone marrow of the L3 and L4 vertebrae most compatible with underlying discitis/osteomyelitis. There has been mild interval bony destruction/collapse of the inferior L3 and to a lesser degree superior L4 vertebrae when compared with the prior study dated 01/11/2024. There is again evidence of approximately 4 mm of retrolisthesis of L3 on L4.   There is mild circumferential abnormal epidural thickening and enhancement within the spinal canal best appreciated extending from the L3 through S1 levels which may be postsurgical and/or infectious/inflammatory in origin.   There is abnormal infiltrative signal and enhancement within the paraspinal soft tissues/medial psoas muscles bilaterally right greater than left extending from the L3 level caudally into the sacral region likely  infectious/inflammatory in origin.   The visualized spinal cord demonstrates no signal abnormality or abnormal enhancement within it. The conus medullaris is normally positioned terminating at the L1 level.   At the L5/S1 level,  there are postoperative changes compatible with a previous posterior laminectomy as well as discectomy. There is enhancing soft tissue noted along the laminectomy bed as well as enhancing epidural thickening and enhancement circumferentially surrounding the thecal sac within the spinal canal. There is no significant narrowing of the thecal sac within the spinal canal. There is infiltrative enhancing epidural soft tissue extending laterally into the right neural foramen. There is mild-to-moderate encroachment upon the left neural foramen.   At the L4/L5 level,  there are postoperative changes compatible with a previous posterior laminectomy as well as discectomy. There is enhancing soft tissue noted along the laminectomy bed as well as enhancing epidural thickening and enhancement circumferentially surrounding the thecal sac within the spinal canal. There is mild overall narrowing of the thecal sac within the spinal canal. There is infiltrative enhancing epidural soft tissue extending laterally into the right neural foramen. There is mild-to-moderate encroachment upon the left neural foramen.   At the L3/L4 level,  there are postoperative changes compatible with a previous L3 laminectomy. There is again evidence of approximately 4 mm of retrolisthesis of L3 on L4. There is enhancing soft tissue noted along the laminectomy bed as well as enhancing epidural thickening and enhancement circumferentially surrounding the thecal sac within the spinal canal. There is no significant narrowing of the thecal sac within the spinal canal. There is bilateral neural foraminal narrowing with infiltrative enhancing epidural soft tissue extending laterally into the region of the neural foramen bilaterally.    At the L2/L3 level,  there are degenerative facet changes and a minimal posterior disc bulge without significant spinal canal narrowing. There is mild encroachment upon the inferior recesses of the neural foramen bilaterally.   At the L1/L2 level,  there is a minimal posterior disc bulge and mild degenerative facet changes contributing to very mild encroachment upon the spinal canal. There is mild encroachment upon the inferior recesses of the neural foramen left greater than right.   At the T12/L1 level,  there is no significant spinal canal stenosis or neuroforaminal stenosis.   At the T11/12 level, there is a minimal posterior disc bulge and mild degenerative facet changes without significant spinal canal or neural foraminal narrowing.         Postoperative changes are again identified compatible with a previous posterior laminectomies at the L3 through S1 levels as well as discectomies at the L4/5 and L5/S1 levels. Metallic artifact from orthopedic hardware is identified along the disc spaces at the L4/5 and L5/S1 levels. There are surgical drainage catheter again noted along the laminectomy bed as well as more superficially along the surgical tract with the posterior paraspinal soft tissues. There is a minimal amount of rim enhancing fluid surrounding the surgical drainage catheter along laminectomy bed the sterility of which can not be ascertained on this MRI study.   There is again evidence of extensive infiltrative signal abnormality and enhancement within the soft tissues along the laminectomy bed and surrounding posterior paraspinal soft tissues extending superficially along the surgical tract which may be postsurgical in etiology although a superimposed infectious/inflammatory process could give a similar MRI appearance and must be considered.   There is again evidence of abnormal signal along the L3/4 disc space as well as within the adjacent bone marrow of the L3 and L4 vertebrae most compatible with  underlying discitis/osteomyelitis. There has been mild interval bony destruction/collapse of the inferior L3 and to a lesser degree superior L4 vertebrae when compared with the prior study dated 01/11/2024. There is again evidence of approximately 4 mm of retrolisthesis of L3 on L4.   There is mild circumferential abnormal epidural thickening and enhancement within the spinal canal best appreciated extending from the L3 through S1 levels which may be postsurgical and/or infectious/inflammatory in origin.   There is abnormal infiltrative signal and enhancement within the paraspinal soft tissues/medial psoas muscles bilaterally right greater than left extending from the L3 level caudally into the sacral region likely infectious/inflammatory in origin.   There is multilevel spondylosis. There are varying degrees of spinal canal and neural foraminal narrowing as described above.   MACRO: None.   Signed by: Yoav Davidson 1/28/2024 7:27 PM Dictation workstation:   [id number]    ECG 12 lead    Result Date: 1/27/2024  Normal sinus rhythm Left bundle branch block Abnormal ECG When compared with ECG of 02-JAN-2024 07:11, Questionable change in QRS duration See ED provider note for full interpretation and clinical correlation Confirmed by Bridger Erickson ([id number]) on 1/27/2024 12:29:08 PM    CT chest abdomen pelvis w IV contrast    Result Date: 1/27/2024  STUDY: CT Chest, Abdomen, and Pelvis with IV Contrast; 01/27/2024 6:37 AM INDICATION: Generalized abdominal pain.  Recent spine surgery and IVC filter. Evaluate for free air/fluid. COMPARISON: XR abdomen 01/15/2024.  CT AP 12/31/2023, 12/19/2023. ACCESSION NUMBER(S): [id number] ORDERING CLINICIAN: Osei Swann TECHNIQUE: CT of the chest, abdomen, and pelvis was performed.  Contiguous axial images were obtained at 3 mm slice thickness through the chest, abdomen, and pelvis.  Coronal and sagittal reconstructions at 3 mm slice thickness were performed.  Omnipaque 350 75 mL was  administered intravenously.  FINDINGS: CHEST: MEDIASTINUM: Right PICC appears satisfactorily positioned extending to the right atrium.  The heart is normal in size without pericardial effusion. Central vascular structures opacify normally.  No thyroid nodule.  No identifiable breast mass.  LUNGS/PLEURA: There is no pleural effusion, pleural thickening, or pneumothorax. Minimal dependent atelectasis at the lung bases. LYMPH NODES: Thoracic lymph nodes are not enlarged. ABDOMEN:  LIVER: No hepatomegaly.  Smooth surface contour.  Normal attenuation.  BILE DUCTS: No intrahepatic or extrahepatic biliary ductal dilatation.  GALLBLADDER: Gallbladder is surgically absent. STOMACH: No abnormalities identified.  PANCREAS: No masses or ductal dilatation.  SPLEEN: No splenomegaly or focal splenic lesion.  ADRENAL GLANDS: No thickening or nodules.  KIDNEYS AND URETERS: Kidneys are normal in size and location.  No renal or ureteral calculi.  PELVIS:  BLADDER: Stauffer catheter present.  Mild wall thickening of the urinary bladder may simply be related to incomplete distention.  Large quantity of stool in the rectum suggests constipation versus impaction.  REPRODUCTIVE ORGANS: No abnormalities identified.  BOWEL: No abnormalities identified.  Appendix appears normal.  VESSELS: No abnormalities identified.  IVC filter appears appropriately positioned.  Abdominal aorta is normal in caliber.  PERITONEUM/RETROPERITONEUM/LYMPH NODES: No free fluid.  No pneumoperitoneum. No lymphadenopathy.  ABDOMINAL WALL: No abnormalities identified. SOFT TISSUES: No abnormalities identified.  BONES: Mildly exaggerated thoracic kyphosis with mild to moderate degenerative disease. There are postsurgical changes of the lumbar spine.  Prosthetic discs at L4-5 and L5-S1 are in similar position when compared to the prior CT.  Two opaque drains at the surgical site are again noted. Subcutaneous emphysema noted along the course of the drains.  Presumed phlegmon  noted at the surgical location.  No drainable collection. Since the prior study, there has been erosive or destructive changes of the inferior endplate of L3 and to a lesser extent the superior endplate of L4.  The findings would be strongly suspicious for discitis osteomyelitis.  No acute fracture or aggressive osseous lesion.    CT CHEST: 1.  No CT evidence of pulmonary embolism. 2.  No pulmonary mass, nodule or consolidation.  No pleural effusion. No pneumothorax.  No thoracic adenopathy. CT ABDOMEN AND PELVIS: 1.  Postsurgical changes of the lumbar spine again noted.  There are two indwelling drains superficially unchanged from 12/31/2023.  The prosthetic discs at L4-5 and L5-S1 appears satisfactorily positioned, unchanged.  However, there is been interval destructive endplate changes along the inferior aspect of L3 and to a lesser extent the superior endplate of L4.  The findings would be suspicious for discitis/osteomyelitis. 2.  No ascites, free air or bowel obstruction. 3.  No urinary tract calculus or hydronephrosis. 4.  IVC filter appears satisfactorily positioned. Signed by Joshua Alfonso MD    CT lumbar spine wo IV contrast    Result Date: 1/27/2024  Interpreted By:  Joanne Cruz, STUDY: CT LUMBAR SPINE WO IV CONTRAST; CT THORACIC SPINE WO IV CONTRAST 1/27/2024 6:22 am   INDICATION: Signs/Symptoms:recent post op. 2 JOSE drains. the right paraspinal appears infected. eval for fluid collection   COMPARISON: MRI lumbar spine 01/11/2024   ACCESSION NUMBER(S): [id number]; [id number]   ORDERING CLINICIAN: LATOYA GUEVARA   TECHNIQUE: Axial CT images of the lumbar spine are obtained. Axial, coronal and sagittal reconstructions are provided for review.   FINDINGS: CT THORACIC SPINE: Alignment: Within normal limits. There is degenerative change with mild-to-moderate anterior osteophytic spurring at all levels particularly the midthoracic spine.   Vertebrae/Disc Spaces:   The vertebral body heights are intact.  "The disc spaces are preserved.   There is a benign calcified right upper lobe granuloma.   CT LUMBAR SPINE: There are disc spacers at L4-5 and L5-S1 in good position. Status post decompression laminectomies L3-L5. There is bone grafting material along the right and left lateral aspects of the mid and lower lumbar spine. There are 2 drains. There is a drain along the inferior aspect of the back. This courses superiorly and the tip is in the soft tissues posterior to L1. There is a 2nd drain along the inferior aspect of the back. This courses superiorly and the tip is in the soft tissues posterior to L1. There is induration of the soft tissues along the back and induration of the paraspinal muscles. There is a 2.5 x 2.0 cm x 1.8 cm ill-defined fluid collection in the paraspinal muscles of the back at the level of L4-5 just to the right of midline. This could be a true fluid collection or induration of the paraspinal muscles. There is no discrete enhancing wall. Findings could represent a abscess, postoperative seroma or liquified hematoma. There is no air in this fluid. This is not along the course of one of the drains. There is ghost artifact in the pedicles of L4, L5 and S1 from prior hardware which has been removed.   Alignment: There is 5 mm retrolisthesis of L3 with respect to L4.   Vertebrae/Disc Spaces:  There is destruction and irregularity of the inferior endplate of L3 and superior endplate of L4 consistent with diskitis and adjacent osteomyelitis.   T12-L1:  There is no significant central canal stenosis.   L1-2:  There is no significant central canal or neural foraminal stenosis.   L2-3:  There is no significant central canal or neural foraminal stenosis.   L3-4: There is destruction of the inferior endplate of L3 and superior endplate of L4. There is irregularity of the bone. There is \"widening\" of the disc space. Findings are consistent with discitis and adjacent osteomyelitis. There is a fracture of the " right pedicle of L4.   L4-5:  There is no significant central canal or neural foraminal stenosis.   L5-S1:  There is no significant central canal or neural foraminal stenosis.   Prevertebral/Paraspinal Soft Tissues: The prevertebral and paraspinal soft tissues are unremarkable.   Status post cholecystectomy. There is an inferior vena cava filter inferior to the renal veins       1. Degenerative change thoracic spine. No central canal stenosis or neural foraminal compromise 2. Diskitis and adjacent osteomyelitis L3-4. 3. 2.5 x 2.0 x 1.8 cm fluid collection in the paraspinal muscles of the back just to the right of midline. This contains no air. This is ill-defined and of question of whether this is a true discrete round fluid collection or ill definition and induration of the paraspinal muscles. No discrete enhancing wall is seen. Findings could represent an abscess, postoperative seroma or liquified hematoma. This is not along the course of one of the drains. 4. Nondisplaced fracture right pedicle L4. 5. Disc spacers L4-5 and L5-S1. Status post decompression laminectomies L3-L5. There is bone grafting material along the right and left lateral aspects of the mid and lower lumbar spine. There is ghost artifact in the pedicles of L4, L5 and S1 which represents hardware which has been removed. There are 2 drains in the soft tissues of the back.   MACRO: None   Signed by: Joanne Cruz 1/27/2024 7:22 AM Dictation workstation:   [id number]    CT thoracic spine wo IV contrast    Result Date: 1/27/2024  Interpreted By:  Joanne Cruz, STUDY: CT LUMBAR SPINE WO IV CONTRAST; CT THORACIC SPINE WO IV CONTRAST 1/27/2024 6:22 am   INDICATION: Signs/Symptoms:recent post op. 2 JOSE drains. the right paraspinal appears infected. eval for fluid collection   COMPARISON: MRI lumbar spine 01/11/2024   ACCESSION NUMBER(S): [id number]; [id number]   ORDERING CLINICIAN: LATOYA GUEVARA   TECHNIQUE: Axial CT images of the lumbar spine are  obtained. Axial, coronal and sagittal reconstructions are provided for review.   FINDINGS: CT THORACIC SPINE: Alignment: Within normal limits. There is degenerative change with mild-to-moderate anterior osteophytic spurring at all levels particularly the midthoracic spine.   Vertebrae/Disc Spaces:   The vertebral body heights are intact. The disc spaces are preserved.   There is a benign calcified right upper lobe granuloma.   CT LUMBAR SPINE: There are disc spacers at L4-5 and L5-S1 in good position. Status post decompression laminectomies L3-L5. There is bone grafting material along the right and left lateral aspects of the mid and lower lumbar spine. There are 2 drains. There is a drain along the inferior aspect of the back. This courses superiorly and the tip is in the soft tissues posterior to L1. There is a 2nd drain along the inferior aspect of the back. This courses superiorly and the tip is in the soft tissues posterior to L1. There is induration of the soft tissues along the back and induration of the paraspinal muscles. There is a 2.5 x 2.0 cm x 1.8 cm ill-defined fluid collection in the paraspinal muscles of the back at the level of L4-5 just to the right of midline. This could be a true fluid collection or induration of the paraspinal muscles. There is no discrete enhancing wall. Findings could represent a abscess, postoperative seroma or liquified hematoma. There is no air in this fluid. This is not along the course of one of the drains. There is ghost artifact in the pedicles of L4, L5 and S1 from prior hardware which has been removed.   Alignment: There is 5 mm retrolisthesis of L3 with respect to L4.   Vertebrae/Disc Spaces:  There is destruction and irregularity of the inferior endplate of L3 and superior endplate of L4 consistent with diskitis and adjacent osteomyelitis.   T12-L1:  There is no significant central canal stenosis.   L1-2:  There is no significant central canal or neural foraminal  "stenosis.   L2-3:  There is no significant central canal or neural foraminal stenosis.   L3-4: There is destruction of the inferior endplate of L3 and superior endplate of L4. There is irregularity of the bone. There is \"widening\" of the disc space. Findings are consistent with discitis and adjacent osteomyelitis. There is a fracture of the right pedicle of L4.   L4-5:  There is no significant central canal or neural foraminal stenosis.   L5-S1:  There is no significant central canal or neural foraminal stenosis.   Prevertebral/Paraspinal Soft Tissues: The prevertebral and paraspinal soft tissues are unremarkable.   Status post cholecystectomy. There is an inferior vena cava filter inferior to the renal veins       1. Degenerative change thoracic spine. No central canal stenosis or neural foraminal compromise 2. Diskitis and adjacent osteomyelitis L3-4. 3. 2.5 x 2.0 x 1.8 cm fluid collection in the paraspinal muscles of the back just to the right of midline. This contains no air. This is ill-defined and of question of whether this is a true discrete round fluid collection or ill definition and induration of the paraspinal muscles. No discrete enhancing wall is seen. Findings could represent an abscess, postoperative seroma or liquified hematoma. This is not along the course of one of the drains. 4. Nondisplaced fracture right pedicle L4. 5. Disc spacers L4-5 and L5-S1. Status post decompression laminectomies L3-L5. There is bone grafting material along the right and left lateral aspects of the mid and lower lumbar spine. There is ghost artifact in the pedicles of L4, L5 and S1 which represents hardware which has been removed. There are 2 drains in the soft tissues of the back.   MACRO: None   Signed by: Joanne Cruz 1/27/2024 7:22 AM Dictation workstation:   [id number]    CT head wo IV contrast    Result Date: 1/27/2024  Interpreted By:  Joanne Cruz, STUDY: CT HEAD WO IV CONTRAST;  1/27/2024 6:21 am   " INDICATION: Signs/Symptoms:AMS. eval for acute hemorrhage.   COMPARISON: 08/04/2009   ACCESSION NUMBER(S): [id number]   ORDERING CLINICIAN: LATOYA GUEVARA   TECHNIQUE: Examination was performed in the axial plane using soft tissue and bone algorithm.   FINDINGS: INTRACRANIAL: There is prominence of the ventricular system and cerebral sulci consistent with cerebral atrophy. There are periventricular hypodensities consistent with  moderate small vessel disease. No mass or mass effect is identified. There is no hemorrhage or subdural fluid collection. There is no acute infarct. There is no fracture of the calvarium   EXTRACRANIAL: Visualized paranasal sinuses and mastoids are clear.       No acute intracranial pathology.   MACRO: None   Signed by: Joanne Cruz 1/27/2024 7:03 AM Dictation workstation:   [id number]      EMG     No EMG results found for the past 12 months        EEG    Result Date: 1/30/2024  IMPRESSION Impression This routine EEG is indicative of a moderate diffuse encephalopathy. No epileptiform discharges or lateralizing signs are recorded. A full report will be scanned into the patient's chart at a later time. This report has been interpreted and electronically signed by       Current Facility-Administered Medications:     acetaminophen (Tylenol) tablet 650 mg, 650 mg, oral, q4h PRN **OR** acetaminophen (Tylenol) oral liquid 650 mg, 650 mg, nasogastric tube, q4h PRN **OR** acetaminophen (Tylenol) suppository 650 mg, 650 mg, rectal, q4h PRN, Raul George MD    apixaban (Eliquis) tablet 5 mg, 5 mg, oral, BID, Raul George MD, 5 mg at 01/31/24 1106    aspirin EC tablet 81 mg, 81 mg, oral, Daily, Raul George MD, 81 mg at 01/31/24 1107    baclofen (Lioresal) tablet 5 mg, 5 mg, oral, TID, Nitesh Ruiz MD, 5 mg at 01/31/24 1530    cefTRIAXone (Rocephin) 2 g in dextrose 5 % 50 mL IV, 2 g, intravenous, q12h, Amy Celestin DO, Stopped at 01/31/24 1342    DAPTOmycin (Cubicin) 500 mg in sodium  chloride 0.9% 50 mL IV, 8 mg/kg, intravenous, q24h EDE, Pete Echeverria MD, Stopped at 01/30/24 2033    hydrALAZINE (Apresoline) injection 10 mg, 10 mg, intravenous, q4h PRN, Sunshine Lange MD, 10 mg at 01/29/24 0025    hydrALAZINE (Apresoline) tablet 50 mg, 50 mg, oral, BID, Raul George MD, 50 mg at 01/31/24 1107    HYDROmorphone (Dilaudid) injection 0.3 mg, 0.3 mg, intravenous, q4h PRN, Raul George MD, 0.3 mg at 01/31/24 1420    isosorbide mononitrate ER (Imdur) 24 hr tablet 60 mg, 60 mg, oral, Daily, Raul George MD, 60 mg at 01/31/24 0613    lactated Ringer's infusion, 75 mL/hr, intravenous, Continuous, Raul George MD, Last Rate: 75 mL/hr at 01/31/24 1200, 75 mL/hr at 01/31/24 1200    lactobacillus acidophilus tablet 1 tablet, 1 tablet, oral, Daily, Raul George MD, 1 tablet at 01/31/24 1106    menthol-zinc oxide (Calmoseptine - Risamine) 0.44-20.6 % ointment 1 Application, 1 Application, Topical, 4x daily PRN, Nitesh Ruiz MD, 1 Application at 01/31/24 1114    ondansetron (Zofran) injection 4 mg, 4 mg, intravenous, q6h PRN, Raul George MD, 4 mg at 01/27/24 2128    oxyCODONE (Roxicodone) immediate release tablet 10 mg, 10 mg, oral, q6h PRN, Nitesh Ruiz MD    polyethylene glycol (Glycolax, Miralax) packet 17 g, 17 g, oral, Daily, Raul George MD, 17 g at 01/29/24 1546    psyllium (Metamucil) 1 packet, 1 packet, oral, Daily, Raul George MD    rifAMPin (Rifadin) capsule 300 mg, 300 mg, oral, BID, Amy Celestin DO, 300 mg at 01/31/24 1106    sennosides (Senokot) tablet 17.2 mg, 2 tablet, oral, Nightly, Raul George MD, 17.2 mg at 01/29/24 2053    sennosides (Senokot) tablet 8.6 mg, 1 tablet, oral, Nightly, Raul George MD, 8.6 mg at 01/30/24 2004     Assessment:  Acute encephalopathy in the setting of MRSA wound infection after lumbar surgery.  Recent increase in the opiate medication which may have triggered the encephalopathy however there is leukocytosis and MRI of the lumbar spine  showing L3-L4 discitis and epidural thickening and enhancement L3-S1, partially treated meningitis is a concern.      Recommendation:  -Continue empiric coverage for meningitis given her clinical improvement  -Accessing CSF at this point is a greater risk to the patient then would be clinically beneficial, superior levels (cervical tap suboccipital tap) are not available.  This was reviewed with the patient and family who expressed understanding and agreement.  -Will collect carotid Dopplers as the patient's surveillance has come due  -MRI of the brain with contrast to evaluate dural enhancement showed hydrocephalus, no leptomeningeal enhancement however there was a motion degraded component according to the radiologist report.  - ABG ultimately demonstrated significant alkalosis that appears to be mixed respiratory and metabolic the pH of 7.56.  -Ideally CSF testing would be beneficial however, one would of course not want to introduce infection by cannulizing again area (levels L3/4 and L4/5 show fluid collections) actively contaminated with MRSA and introducing that to the dura.    -Had extensive discussion with infectious disease and the patient has shown clinical improvement after additional coverage for meningitis  -routine EEG 1/30 showed diffuse slowing  -Thyroid studies in fact had been collected and were normal.  -Advance diet and activity as able  -will follow while admitted

## 2024-01-31 NOTE — PROGRESS NOTES
MRI brain was collected today and did not identify any leptomeningeal enhancement or other pathology, there is however dilatation of the ventricular system.  Patient's daughter son and 2 grandchildren are at bedside.  She is more wakeful but still slow in responding to conversation, difficulty with orientation.  She denies headache.  She denies light sensitivity however her son did observe that when she was out in the bright hallway she had told staff that the light was hurting her eyes.  Patient is not in any distress      Visit Vitals  /65 (BP Location: Left arm, Patient Position: Lying)   Pulse 83   Temp 36.8 °C (98.2 °F) (Temporal)   Resp 18        Neurological Exam:  GENERAL APPEARANCE: No distress interactive and cooperative.     MENTAL STATE: Patient is oriented to the year, not able to give the month or the date, not able to give the city that were in.  Even with multiple-choice options she was unable to say [place], her family prompted her to give her address she was unable to do so.  Attention fluctuates significantly.  She did spontaneously recall that her usual neurologist, Dr. MAGY Perez, is out of the country and asked me if he made at home.    CRANIAL NERVES: normal  CN 2 Visual fields full to confrontation.   CN 3, 4, 6  Pupils round, equally reactive to light. No ptosis.EOMs normal alignment, full range with normal saccades, pursuit and convergence. No nystagmus.   CN 5 Facial sensation intact bilaterally.    CN 7 Normal and symmetric facial strength. Nasolabial folds symmetric.   CN 8 Hearing intact to finger rub bilaterally.   CN 9 Palate elevates symmetrically.    CN 11 Bilaterally normal strength of shoulder shrug and neck turning.   CN 12 Tongue midline, with normal bulk and strength; no fasciculations.     Motor examination reveals generalized weakness, it is difficult for her to participate in confrontational testing.  Symmetric spontaneous movement in all 4 limbs    She appropriately  "localizes to noxious stim in all 4 limbs    Coordination testing was limited by encephalopathy she was unable to carry out the command    Gait not testable due to encephalopathy      BMP:  Results from last 7 days   Lab Units 01/30/24  1138 01/29/24  0403 01/28/24  0525   SODIUM mmol/L 146* 143 139   POTASSIUM mmol/L 3.2* 3.5 2.5*   CHLORIDE mmol/L 111* 105 97*   CO2 mmol/L 25 27 28   BUN mg/dL 27* 29* 23   CREATININE mg/dL 0.84 0.89 0.89       CBC:  Results from last 7 days   Lab Units 01/31/24  0526 01/30/24  1138 01/29/24  0403   WBC AUTO x10*3/uL 12.6* 16.8* 13.4*   RBC AUTO x10*6/uL 3.89* 3.95* 4.50   HEMOGLOBIN g/dL 11.4* 11.6* 13.2   HEMATOCRIT % 36.4 36.6 41.5   MCV fL 94 93 92   MCH pg 29.3 29.4 29.3   MCHC g/dL 31.3* 31.7* 31.8*   RDW % 15.9* 15.7* 15.6*   PLATELETS AUTO x10*3/uL 260 278 344       INR:        Lipid Profile:        No lab exists for component: \"LABVLDL\"    HgbA1C:        CMP:  Results from last 7 days   Lab Units 01/30/24  1138 01/29/24  0403 01/28/24  0525 01/27/24  0452   SODIUM mmol/L 146* 143 139 132*   POTASSIUM mmol/L 3.2* 3.5 2.5* 3.0*   CHLORIDE mmol/L 111* 105 97* 95*   CO2 mmol/L 25 27 28 20*   BUN mg/dL 27* 29* 23 22   CREATININE mg/dL 0.84 0.89 0.89 0.91   GLUCOSE mg/dL 166* 143* 152* 168*   CALCIUM mg/dL 9.4 10.2 10.5* 10.3   PROTEIN TOTAL g/dL  --   --   --  8.0   BILIRUBIN TOTAL mg/dL  --   --   --  0.8   ALK PHOS U/L  --   --   --  144*   AST U/L  --   --   --  17   ALT U/L  --   --   --  28       ABG:        No lab exists for component: \"PO2\", \"PCO2\", \"HCO3\", \"BE\", \"O2SAT\"    TSH:  No results found for: \"TSH\"  Lab Results   Component Value Date    UWXUHEED49 322 01/05/2024     Lab Results   Component Value Date    FOLATE 5.1 01/05/2024     Lab Results   Component Value Date    VITD25 33 01/05/2024         MR brain w and wo IV contrast    Result Date: 1/30/2024  Interpreted By:  Alexi Bass, STUDY: MR BRAIN W AND WO IV CONTRAST; ;  1/29/2024 7:34 pm   INDICATION: " Signs/Symptoms:menengitis.   COMPARISON: CT head from 01/20/2024. MRI brain from 01/18/2016.   ACCESSION NUMBER(S): [id number]   ORDERING CLINICIAN: RENETTA TAYLOR   TECHNIQUE: MRI of the brain was performed with the acquisition of axial diffusion-weighted, axial T1, axial FLAIR, axial T2 gradient echo, axial T2 fat saturated, axial T1 fat saturated postcontrast sequence, and axial T1 volumetric post-contrast sequence with multiplanar reformats.   Contrast: 13.5 mL of Dotarem was injected intravenously.   FINDINGS: Evaluation is somewhat degraded due to patient motion.   There is no acute intracranial hemorrhage or infarct. There is no abnormal extra-axial fluid collection or mass effect.   On the FLAIR sequence, there is increased signal within sulci in the bilateral cerebral hemispheres, primarily along the periphery with sparing of the central sulci which is likely artifactual. No signal abnormality seen within the sulci on the other sequences.   There is stable mild disproportionate enlargement of the supratentorial ventricles compared the adjacent sulci and widening of the sylvian fissures in a pattern that can be seen with normal pressure hydrocephalus. Ventriculomegaly related to potential meningitis is favored less likely and ventricles are unchanged in size since the prior CT head. Ventricles have increased in size since the prior MRI brain from 2016, favored to be related to parenchymal volume loss.   There is no abnormal intracranial enhancement or mass.   There are confluent and scattered regions of T2 hyperintensity within the cerebral hemispheric white matter and robin which are probably sequela of chronic small vessel ischemic changes. There are 2 small foci of T2 hyperintensity located within the right cerebellum which probably reflect old infarcts.   Within the left cerebellum, there is subtle T2 hyperintense signal (series 9, image 10 of 40) without abnormal enhancement or restricted diffusion  which may be sequela of late subacute infarct. There is no volume loss.   Visualized paranasal sinuses and mastoid air cells are essentially clear.       Evaluation is somewhat degraded due to patient motion.   1. No acute intracranial abnormality or mass effect. No abnormal intracranial enhancement or mass.   2. Mild disproportionate supratentorial ventriculomegaly, unchanged since the recent CT head and in a pattern that is suggestive of normal pressure hydrocephalus. Appearance is not typical for an infectious process.   3. Chronic intracranial findings as above.   This study was interpreted at [facility].   MACRO: None   Signed by: Alexi Bass 1/30/2024 7:32 AM Dictation workstation:   [id number]      CT head wo IV contrast    Result Date: 1/27/2024  Interpreted By:  Joanne Cruz, STUDY: CT HEAD WO IV CONTRAST;  1/27/2024 6:21 am   INDICATION: Signs/Symptoms:AMS. eval for acute hemorrhage.   COMPARISON: 08/04/2009   ACCESSION NUMBER(S): [id number]   ORDERING CLINICIAN: LATOYA GUEVARA   TECHNIQUE: Examination was performed in the axial plane using soft tissue and bone algorithm.   FINDINGS: INTRACRANIAL: There is prominence of the ventricular system and cerebral sulci consistent with cerebral atrophy. There are periventricular hypodensities consistent with  moderate small vessel disease. No mass or mass effect is identified. There is no hemorrhage or subdural fluid collection. There is no acute infarct. There is no fracture of the calvarium   EXTRACRANIAL: Visualized paranasal sinuses and mastoids are clear.       No acute intracranial pathology.   MACRO: None   Signed by: Joanne Cruz 1/27/2024 7:03 AM Dictation workstation:   [id number]      MR lumbar spine w and wo IV contrast    Result Date: 1/28/2024  Interpreted By:  Yoav Davidson, STUDY: MR LUMBAR SPINE W AND WO IV CONTRAST;  1/28/2024 6:39 pm   INDICATION: Signs/Symptoms: Rule out abscess.   COMPARISON:  01/11/2024   ACCESSION NUMBER(S): [id number]   ORDERING CLINICIAN: FRANCESCA ORR   TECHNIQUE: Sagittal STIR, sagittal T2, sagittal T1, axial T2, axial T1, as well as post gadolinium sagittal axial T1 weighted MRI images of the lumbar spine were obtained. The patient received 13 mL of Dotarem gadolinium intravenously.   FINDINGS: Postoperative changes are again identified compatible with a previous posterior laminectomies at the L3 through S1 levels as well as discectomies at the L4/5 and L5/S1 levels. Metallic artifact from orthopedic hardware is identified along the disc spaces at the L4/5 and L5/S1 levels. There are surgical drainage catheter again noted along the laminectomy bed as well as more superficially along the surgical tract with the posterior paraspinal soft tissues. There is a minimal amount of rim enhancing fluid surrounding the surgical drainage catheter along laminectomy bed the sterility of which can not be ascertained on this MRI study.   There is again evidence of extensive infiltrative signal abnormality and enhancement within the soft tissues along the laminectomy bed and surrounding posterior paraspinal soft tissues extending superficially along the surgical tract which may be postsurgical in etiology although a superimposed infectious/inflammatory process could give a similar MRI appearance and must be considered.   There is again evidence of abnormal signal along the L3/4 disc space as well as within the adjacent bone marrow of the L3 and L4 vertebrae most compatible with underlying discitis/osteomyelitis. There has been mild interval bony destruction/collapse of the inferior L3 and to a lesser degree superior L4 vertebrae when compared with the prior study dated 01/11/2024. There is again evidence of approximately 4 mm of retrolisthesis of L3 on L4.   There is mild circumferential abnormal epidural thickening and enhancement within the spinal canal best appreciated extending from the L3  through S1 levels which may be postsurgical and/or infectious/inflammatory in origin.   There is abnormal infiltrative signal and enhancement within the paraspinal soft tissues/medial psoas muscles bilaterally right greater than left extending from the L3 level caudally into the sacral region likely infectious/inflammatory in origin.   The visualized spinal cord demonstrates no signal abnormality or abnormal enhancement within it. The conus medullaris is normally positioned terminating at the L1 level.   At the L5/S1 level,  there are postoperative changes compatible with a previous posterior laminectomy as well as discectomy. There is enhancing soft tissue noted along the laminectomy bed as well as enhancing epidural thickening and enhancement circumferentially surrounding the thecal sac within the spinal canal. There is no significant narrowing of the thecal sac within the spinal canal. There is infiltrative enhancing epidural soft tissue extending laterally into the right neural foramen. There is mild-to-moderate encroachment upon the left neural foramen.   At the L4/L5 level,  there are postoperative changes compatible with a previous posterior laminectomy as well as discectomy. There is enhancing soft tissue noted along the laminectomy bed as well as enhancing epidural thickening and enhancement circumferentially surrounding the thecal sac within the spinal canal. There is mild overall narrowing of the thecal sac within the spinal canal. There is infiltrative enhancing epidural soft tissue extending laterally into the right neural foramen. There is mild-to-moderate encroachment upon the left neural foramen.   At the L3/L4 level,  there are postoperative changes compatible with a previous L3 laminectomy. There is again evidence of approximately 4 mm of retrolisthesis of L3 on L4. There is enhancing soft tissue noted along the laminectomy bed as well as enhancing epidural thickening and enhancement  circumferentially surrounding the thecal sac within the spinal canal. There is no significant narrowing of the thecal sac within the spinal canal. There is bilateral neural foraminal narrowing with infiltrative enhancing epidural soft tissue extending laterally into the region of the neural foramen bilaterally.   At the L2/L3 level,  there are degenerative facet changes and a minimal posterior disc bulge without significant spinal canal narrowing. There is mild encroachment upon the inferior recesses of the neural foramen bilaterally.   At the L1/L2 level,  there is a minimal posterior disc bulge and mild degenerative facet changes contributing to very mild encroachment upon the spinal canal. There is mild encroachment upon the inferior recesses of the neural foramen left greater than right.   At the T12/L1 level,  there is no significant spinal canal stenosis or neuroforaminal stenosis.   At the T11/12 level, there is a minimal posterior disc bulge and mild degenerative facet changes without significant spinal canal or neural foraminal narrowing.         Postoperative changes are again identified compatible with a previous posterior laminectomies at the L3 through S1 levels as well as discectomies at the L4/5 and L5/S1 levels. Metallic artifact from orthopedic hardware is identified along the disc spaces at the L4/5 and L5/S1 levels. There are surgical drainage catheter again noted along the laminectomy bed as well as more superficially along the surgical tract with the posterior paraspinal soft tissues. There is a minimal amount of rim enhancing fluid surrounding the surgical drainage catheter along laminectomy bed the sterility of which can not be ascertained on this MRI study.   There is again evidence of extensive infiltrative signal abnormality and enhancement within the soft tissues along the laminectomy bed and surrounding posterior paraspinal soft tissues extending superficially along the surgical tract  which may be postsurgical in etiology although a superimposed infectious/inflammatory process could give a similar MRI appearance and must be considered.   There is again evidence of abnormal signal along the L3/4 disc space as well as within the adjacent bone marrow of the L3 and L4 vertebrae most compatible with underlying discitis/osteomyelitis. There has been mild interval bony destruction/collapse of the inferior L3 and to a lesser degree superior L4 vertebrae when compared with the prior study dated 01/11/2024. There is again evidence of approximately 4 mm of retrolisthesis of L3 on L4.   There is mild circumferential abnormal epidural thickening and enhancement within the spinal canal best appreciated extending from the L3 through S1 levels which may be postsurgical and/or infectious/inflammatory in origin.   There is abnormal infiltrative signal and enhancement within the paraspinal soft tissues/medial psoas muscles bilaterally right greater than left extending from the L3 level caudally into the sacral region likely infectious/inflammatory in origin.   There is multilevel spondylosis. There are varying degrees of spinal canal and neural foraminal narrowing as described above.   MACRO: None.   Signed by: Yoav Davidson 1/28/2024 7:27 PM Dictation workstation:   [id number]    ECG 12 lead    Result Date: 1/27/2024  Normal sinus rhythm Left bundle branch block Abnormal ECG When compared with ECG of 02-JAN-2024 07:11, Questionable change in QRS duration See ED provider note for full interpretation and clinical correlation Confirmed by Bridger Erickson ([id number]) on 1/27/2024 12:29:08 PM    CT chest abdomen pelvis w IV contrast    Result Date: 1/27/2024  STUDY: CT Chest, Abdomen, and Pelvis with IV Contrast; 01/27/2024 6:37 AM INDICATION: Generalized abdominal pain.  Recent spine surgery and IVC filter. Evaluate for free air/fluid. COMPARISON: XR abdomen 01/15/2024.  CT AP 12/31/2023, 12/19/2023. ACCESSION NUMBER(S):  [id number] ORDERING CLINICIAN: Osei Swann TECHNIQUE: CT of the chest, abdomen, and pelvis was performed.  Contiguous axial images were obtained at 3 mm slice thickness through the chest, abdomen, and pelvis.  Coronal and sagittal reconstructions at 3 mm slice thickness were performed.  Omnipaque 350 75 mL was administered intravenously.  FINDINGS: CHEST: MEDIASTINUM: Right PICC appears satisfactorily positioned extending to the right atrium.  The heart is normal in size without pericardial effusion. Central vascular structures opacify normally.  No thyroid nodule.  No identifiable breast mass.  LUNGS/PLEURA: There is no pleural effusion, pleural thickening, or pneumothorax. Minimal dependent atelectasis at the lung bases. LYMPH NODES: Thoracic lymph nodes are not enlarged. ABDOMEN:  LIVER: No hepatomegaly.  Smooth surface contour.  Normal attenuation.  BILE DUCTS: No intrahepatic or extrahepatic biliary ductal dilatation.  GALLBLADDER: Gallbladder is surgically absent. STOMACH: No abnormalities identified.  PANCREAS: No masses or ductal dilatation.  SPLEEN: No splenomegaly or focal splenic lesion.  ADRENAL GLANDS: No thickening or nodules.  KIDNEYS AND URETERS: Kidneys are normal in size and location.  No renal or ureteral calculi.  PELVIS:  BLADDER: Stauffer catheter present.  Mild wall thickening of the urinary bladder may simply be related to incomplete distention.  Large quantity of stool in the rectum suggests constipation versus impaction.  REPRODUCTIVE ORGANS: No abnormalities identified.  BOWEL: No abnormalities identified.  Appendix appears normal.  VESSELS: No abnormalities identified.  IVC filter appears appropriately positioned.  Abdominal aorta is normal in caliber.  PERITONEUM/RETROPERITONEUM/LYMPH NODES: No free fluid.  No pneumoperitoneum. No lymphadenopathy.  ABDOMINAL WALL: No abnormalities identified. SOFT TISSUES: No abnormalities identified.  BONES: Mildly exaggerated thoracic kyphosis with mild  to moderate degenerative disease. There are postsurgical changes of the lumbar spine.  Prosthetic discs at L4-5 and L5-S1 are in similar position when compared to the prior CT.  Two opaque drains at the surgical site are again noted. Subcutaneous emphysema noted along the course of the drains.  Presumed phlegmon noted at the surgical location.  No drainable collection. Since the prior study, there has been erosive or destructive changes of the inferior endplate of L3 and to a lesser extent the superior endplate of L4.  The findings would be strongly suspicious for discitis osteomyelitis.  No acute fracture or aggressive osseous lesion.    CT CHEST: 1.  No CT evidence of pulmonary embolism. 2.  No pulmonary mass, nodule or consolidation.  No pleural effusion. No pneumothorax.  No thoracic adenopathy. CT ABDOMEN AND PELVIS: 1.  Postsurgical changes of the lumbar spine again noted.  There are two indwelling drains superficially unchanged from 12/31/2023.  The prosthetic discs at L4-5 and L5-S1 appears satisfactorily positioned, unchanged.  However, there is been interval destructive endplate changes along the inferior aspect of L3 and to a lesser extent the superior endplate of L4.  The findings would be suspicious for discitis/osteomyelitis. 2.  No ascites, free air or bowel obstruction. 3.  No urinary tract calculus or hydronephrosis. 4.  IVC filter appears satisfactorily positioned. Signed by Joshua Alfonso MD    CT lumbar spine wo IV contrast    Result Date: 1/27/2024  Interpreted By:  Joanne Cruz, STUDY: CT LUMBAR SPINE WO IV CONTRAST; CT THORACIC SPINE WO IV CONTRAST 1/27/2024 6:22 am   INDICATION: Signs/Symptoms:recent post op. 2 JOSE drains. the right paraspinal appears infected. eval for fluid collection   COMPARISON: MRI lumbar spine 01/11/2024   ACCESSION NUMBER(S): [id number]; [id number]   ORDERING CLINICIAN: LATOYA GUEVARA   TECHNIQUE: Axial CT images of the lumbar spine are obtained. Axial, coronal  and sagittal reconstructions are provided for review.   FINDINGS: CT THORACIC SPINE: Alignment: Within normal limits. There is degenerative change with mild-to-moderate anterior osteophytic spurring at all levels particularly the midthoracic spine.   Vertebrae/Disc Spaces:   The vertebral body heights are intact. The disc spaces are preserved.   There is a benign calcified right upper lobe granuloma.   CT LUMBAR SPINE: There are disc spacers at L4-5 and L5-S1 in good position. Status post decompression laminectomies L3-L5. There is bone grafting material along the right and left lateral aspects of the mid and lower lumbar spine. There are 2 drains. There is a drain along the inferior aspect of the back. This courses superiorly and the tip is in the soft tissues posterior to L1. There is a 2nd drain along the inferior aspect of the back. This courses superiorly and the tip is in the soft tissues posterior to L1. There is induration of the soft tissues along the back and induration of the paraspinal muscles. There is a 2.5 x 2.0 cm x 1.8 cm ill-defined fluid collection in the paraspinal muscles of the back at the level of L4-5 just to the right of midline. This could be a true fluid collection or induration of the paraspinal muscles. There is no discrete enhancing wall. Findings could represent a abscess, postoperative seroma or liquified hematoma. There is no air in this fluid. This is not along the course of one of the drains. There is ghost artifact in the pedicles of L4, L5 and S1 from prior hardware which has been removed.   Alignment: There is 5 mm retrolisthesis of L3 with respect to L4.   Vertebrae/Disc Spaces:  There is destruction and irregularity of the inferior endplate of L3 and superior endplate of L4 consistent with diskitis and adjacent osteomyelitis.   T12-L1:  There is no significant central canal stenosis.   L1-2:  There is no significant central canal or neural foraminal stenosis.   L2-3:  There is  "no significant central canal or neural foraminal stenosis.   L3-4: There is destruction of the inferior endplate of L3 and superior endplate of L4. There is irregularity of the bone. There is \"widening\" of the disc space. Findings are consistent with discitis and adjacent osteomyelitis. There is a fracture of the right pedicle of L4.   L4-5:  There is no significant central canal or neural foraminal stenosis.   L5-S1:  There is no significant central canal or neural foraminal stenosis.   Prevertebral/Paraspinal Soft Tissues: The prevertebral and paraspinal soft tissues are unremarkable.   Status post cholecystectomy. There is an inferior vena cava filter inferior to the renal veins       1. Degenerative change thoracic spine. No central canal stenosis or neural foraminal compromise 2. Diskitis and adjacent osteomyelitis L3-4. 3. 2.5 x 2.0 x 1.8 cm fluid collection in the paraspinal muscles of the back just to the right of midline. This contains no air. This is ill-defined and of question of whether this is a true discrete round fluid collection or ill definition and induration of the paraspinal muscles. No discrete enhancing wall is seen. Findings could represent an abscess, postoperative seroma or liquified hematoma. This is not along the course of one of the drains. 4. Nondisplaced fracture right pedicle L4. 5. Disc spacers L4-5 and L5-S1. Status post decompression laminectomies L3-L5. There is bone grafting material along the right and left lateral aspects of the mid and lower lumbar spine. There is ghost artifact in the pedicles of L4, L5 and S1 which represents hardware which has been removed. There are 2 drains in the soft tissues of the back.   MACRO: None   Signed by: Joanne Cruz 1/27/2024 7:22 AM Dictation workstation:   [id number]    CT thoracic spine wo IV contrast    Result Date: 1/27/2024  Interpreted By:  Joanne Cruz, STUDY: CT LUMBAR SPINE WO IV CONTRAST; CT THORACIC SPINE WO IV CONTRAST " 1/27/2024 6:22 am   INDICATION: Signs/Symptoms:recent post op. 2 JOSE drains. the right paraspinal appears infected. eval for fluid collection   COMPARISON: MRI lumbar spine 01/11/2024   ACCESSION NUMBER(S): [id number]; [id number]   ORDERING CLINICIAN: LATOYA GUEVARA   TECHNIQUE: Axial CT images of the lumbar spine are obtained. Axial, coronal and sagittal reconstructions are provided for review.   FINDINGS: CT THORACIC SPINE: Alignment: Within normal limits. There is degenerative change with mild-to-moderate anterior osteophytic spurring at all levels particularly the midthoracic spine.   Vertebrae/Disc Spaces:   The vertebral body heights are intact. The disc spaces are preserved.   There is a benign calcified right upper lobe granuloma.   CT LUMBAR SPINE: There are disc spacers at L4-5 and L5-S1 in good position. Status post decompression laminectomies L3-L5. There is bone grafting material along the right and left lateral aspects of the mid and lower lumbar spine. There are 2 drains. There is a drain along the inferior aspect of the back. This courses superiorly and the tip is in the soft tissues posterior to L1. There is a 2nd drain along the inferior aspect of the back. This courses superiorly and the tip is in the soft tissues posterior to L1. There is induration of the soft tissues along the back and induration of the paraspinal muscles. There is a 2.5 x 2.0 cm x 1.8 cm ill-defined fluid collection in the paraspinal muscles of the back at the level of L4-5 just to the right of midline. This could be a true fluid collection or induration of the paraspinal muscles. There is no discrete enhancing wall. Findings could represent a abscess, postoperative seroma or liquified hematoma. There is no air in this fluid. This is not along the course of one of the drains. There is ghost artifact in the pedicles of L4, L5 and S1 from prior hardware which has been removed.   Alignment: There is 5 mm retrolisthesis of L3 with  "respect to L4.   Vertebrae/Disc Spaces:  There is destruction and irregularity of the inferior endplate of L3 and superior endplate of L4 consistent with diskitis and adjacent osteomyelitis.   T12-L1:  There is no significant central canal stenosis.   L1-2:  There is no significant central canal or neural foraminal stenosis.   L2-3:  There is no significant central canal or neural foraminal stenosis.   L3-4: There is destruction of the inferior endplate of L3 and superior endplate of L4. There is irregularity of the bone. There is \"widening\" of the disc space. Findings are consistent with discitis and adjacent osteomyelitis. There is a fracture of the right pedicle of L4.   L4-5:  There is no significant central canal or neural foraminal stenosis.   L5-S1:  There is no significant central canal or neural foraminal stenosis.   Prevertebral/Paraspinal Soft Tissues: The prevertebral and paraspinal soft tissues are unremarkable.   Status post cholecystectomy. There is an inferior vena cava filter inferior to the renal veins       1. Degenerative change thoracic spine. No central canal stenosis or neural foraminal compromise 2. Diskitis and adjacent osteomyelitis L3-4. 3. 2.5 x 2.0 x 1.8 cm fluid collection in the paraspinal muscles of the back just to the right of midline. This contains no air. This is ill-defined and of question of whether this is a true discrete round fluid collection or ill definition and induration of the paraspinal muscles. No discrete enhancing wall is seen. Findings could represent an abscess, postoperative seroma or liquified hematoma. This is not along the course of one of the drains. 4. Nondisplaced fracture right pedicle L4. 5. Disc spacers L4-5 and L5-S1. Status post decompression laminectomies L3-L5. There is bone grafting material along the right and left lateral aspects of the mid and lower lumbar spine. There is ghost artifact in the pedicles of L4, L5 and S1 which represents hardware " which has been removed. There are 2 drains in the soft tissues of the back.   MACRO: None   Signed by: Joanne Cruz 1/27/2024 7:22 AM Dictation workstation:   [id number]    CT head wo IV contrast    Result Date: 1/27/2024  Interpreted By:  Joanne Cruz, STUDY: CT HEAD WO IV CONTRAST;  1/27/2024 6:21 am   INDICATION: Signs/Symptoms:AMS. eval for acute hemorrhage.   COMPARISON: 08/04/2009   ACCESSION NUMBER(S): [id number]   ORDERING CLINICIAN: LATOYA GUEVARA   TECHNIQUE: Examination was performed in the axial plane using soft tissue and bone algorithm.   FINDINGS: INTRACRANIAL: There is prominence of the ventricular system and cerebral sulci consistent with cerebral atrophy. There are periventricular hypodensities consistent with  moderate small vessel disease. No mass or mass effect is identified. There is no hemorrhage or subdural fluid collection. There is no acute infarct. There is no fracture of the calvarium   EXTRACRANIAL: Visualized paranasal sinuses and mastoids are clear.       No acute intracranial pathology.   MACRO: None   Signed by: Joanne Cruz 1/27/2024 7:03 AM Dictation workstation:   [id number]      EMG     No EMG results found for the past 12 months        EEG    Result Date: 1/30/2024  IMPRESSION Impression This routine EEG is indicative of a moderate diffuse encephalopathy. No epileptiform discharges or lateralizing signs are recorded. A full report will be scanned into the patient's chart at a later time. This report has been interpreted and electronically signed by       Current Facility-Administered Medications:     acetaminophen (Tylenol) tablet 650 mg, 650 mg, oral, q4h PRN **OR** acetaminophen (Tylenol) oral liquid 650 mg, 650 mg, nasogastric tube, q4h PRN **OR** acetaminophen (Tylenol) suppository 650 mg, 650 mg, rectal, q4h PRN, Raul George MD    apixaban (Eliquis) tablet 5 mg, 5 mg, oral, BID, Raul George MD, 5 mg at 01/31/24 1106    aspirin EC tablet 81 mg, 81 mg, oral,  Daily, Raul George MD, 81 mg at 01/31/24 1107    baclofen (Lioresal) tablet 5 mg, 5 mg, oral, TID, Nitesh Ruiz MD, 5 mg at 01/31/24 1118    cefTRIAXone (Rocephin) 2 g in dextrose 5 % 50 mL IV, 2 g, intravenous, q12h, Amy Celestin DO, Stopped at 01/31/24 1342    DAPTOmycin (Cubicin) 500 mg in sodium chloride 0.9% 50 mL IV, 8 mg/kg, intravenous, q24h EDE, Pete Echeverria MD, Stopped at 01/30/24 2033    hydrALAZINE (Apresoline) injection 10 mg, 10 mg, intravenous, q4h PRN, Sunshine Lange MD, 10 mg at 01/29/24 0025    hydrALAZINE (Apresoline) tablet 50 mg, 50 mg, oral, BID, Raul George MD, 50 mg at 01/31/24 1107    HYDROmorphone (Dilaudid) injection 0.3 mg, 0.3 mg, intravenous, q4h PRN, Raul George MD, 0.3 mg at 01/31/24 1420    isosorbide mononitrate ER (Imdur) 24 hr tablet 60 mg, 60 mg, oral, Daily, Raul George MD, 60 mg at 01/31/24 0613    lactated Ringer's infusion, 75 mL/hr, intravenous, Continuous, Raul George MD, Last Rate: 75 mL/hr at 01/31/24 1200, 75 mL/hr at 01/31/24 1200    lactobacillus acidophilus tablet 1 tablet, 1 tablet, oral, Daily, Raul George MD, 1 tablet at 01/31/24 1106    menthol-zinc oxide (Calmoseptine - Risamine) 0.44-20.6 % ointment 1 Application, 1 Application, Topical, 4x daily PRN, Nitesh Ruiz MD, 1 Application at 01/31/24 1114    ondansetron (Zofran) injection 4 mg, 4 mg, intravenous, q6h PRN, Raul George MD, 4 mg at 01/27/24 2128    oxyCODONE (Roxicodone) immediate release tablet 10 mg, 10 mg, oral, q6h PRN, Nitesh Ruiz MD    polyethylene glycol (Glycolax, Miralax) packet 17 g, 17 g, oral, Daily, Raul George MD, 17 g at 01/29/24 1546    psyllium (Metamucil) 1 packet, 1 packet, oral, Daily, Raul George MD    rifAMPin (Rifadin) capsule 300 mg, 300 mg, oral, BID, Amy Celestin DO, 300 mg at 01/31/24 1106    sennosides (Senokot) tablet 17.2 mg, 2 tablet, oral, Nightly, Raul George MD, 17.2 mg at 01/29/24 2053    sennosides (Senokot) tablet 8.6 mg,  1 tablet, oral, Nightly, Raul George MD, 8.6 mg at 01/30/24 2004     Assessment:  Acute encephalopathy in the setting of MRSA wound infection after lumbar surgery.  Recent increase in the opiate medication which may have triggered the encephalopathy however there is leukocytosis and MRI of the lumbar spine showing L3-L4 discitis and epidural thickening and enhancement L3-S1, partially treated meningitis is a concern.      Recommendation:  -Concern for meningitic process given the encephalopathy and MRI L spine findings  -Possible superimposed hypercapnia given the depressed mental status will check an ABG, the ABG ultimately demonstrated significant alkalosis that appears to be mixed respiratory and metabolic the pH of 7.56.  -Ideally CSF testing would be beneficial however, one would of course not want to introduce infection by cannulizing again area (levels L3/4 and L4/5 show fluid collections) actively contaminated with MRSA and introducing that to the dura.  Discussed this with the family, there may be opportunities to obtain fluid from a superior sampling location like a lateral cervical puncture or suboccipital puncture, we will explore if there is capability of obtaining fluid in that manner at this facility or main campus.  -MRI of the brain with contrast to evaluate dural enhancement  -Had extensive discussion with infectious disease and the patient has shown clinical improvement after additional coverage for meningitis  -routine EEG showed diffuse slowing  -Thyroid studies in fact had been collected and were normal.  -will follow while admitted

## 2024-01-31 NOTE — PROGRESS NOTES
"Tara Tierney is a 68 y.o. female on day 4 of admission presenting with Altered mental status, unspecified altered mental status type.    Subjective   Patient seen and examined.  She is awake and alert, answering questions appropriately, does complain of some back pain but more so like cramping.  Denies any trouble breathing, no fevers or chills.       Objective     Physical Exam  Constitutional:       Appearance: She is ill-appearing.   HENT:      Head: Normocephalic and atraumatic.   Eyes:      Extraocular Movements: Extraocular movements intact.      Conjunctiva/sclera: Conjunctivae normal.   Cardiovascular:      Rate and Rhythm: Normal rate and regular rhythm.      Pulses: Normal pulses.   Pulmonary:      Effort: Pulmonary effort is normal.      Breath sounds: Normal breath sounds.   Musculoskeletal:      Cervical back: No tenderness.   Neurological:      Mental Status: She is alert.      Comments: Patient is awake and alert, she is answering questions appropriately, moving all 4 limbs         Last Recorded Vitals  Blood pressure 130/60, pulse (!) 111, temperature 36.7 °C (98.1 °F), resp. rate 18, height 1.44 m (4' 8.69\"), weight 69.1 kg (152 lb 5.4 oz), SpO2 97 %, not currently breastfeeding.  Intake/Output last 3 Shifts:  I/O last 3 completed shifts:  In: 3867.3 (56 mL/kg) [I.V.:3156.3 (45.7 mL/kg); NG/GT:611; IV Piggyback:100]  Out: 300 (4.3 mL/kg) [Urine:300 (0.1 mL/kg/hr)]  Weight: 69.1 kg     Relevant Results              This patient has a central line   Reason for the central line remaining today? Parenteral medication            Malnutrition Diagnosis Status: New  Malnutrition Diagnosis: Severe malnutrition related to chronic disease or condition  As Evidenced by: family reports of pt eating <75% of estimated needs x 2-3 months; 14% wt loss x 1 month  I agree with the dietitian's malnutrition diagnosis.      Assessment/Plan   Principal Problem:    Altered mental status, unspecified altered mental " status type  Active Problems:    Surgical wound infection    this unfortunate 68-year-old female with past medical history of discitis/osteomyelitis of lumbar vertebrae post spinal fusion surgery, deep incisional MRSA wound infection of lumbar spine, COPD, hypertension, peripheral vascular disease, DVT who was admitted with acute toxic metabolic encephalopathy most likely secondary to meningitis     Mental status seems to have improved considerably with empiric treatment for meningitis  ID is following  Unfortunately she is not a candidate for LP  Not complaining of much back pain and a drain has been placed  Orthopedics following  Plastic surgery on board  Will put her on some oral options for pain  She is complaining of leg cramping we will put her on baclofen  Currently on tube feeds, speech therapy to see the patient, if she passes swallow eval we can discontinue tube feeds she is having diarrhea because of that  Replete potassium  Does not seem like there are any plans of surgical intervention at this point, we will continue Eliquis  DVT prophylaxis addressed           Nitesh Ruiz MD

## 2024-02-01 ENCOUNTER — OUTSIDE SERVICES (OUTPATIENT)
Dept: INFECTIOUS DISEASES | Age: 69
End: 2024-02-01
Payer: COMMERCIAL

## 2024-02-01 ENCOUNTER — APPOINTMENT (OUTPATIENT)
Dept: WOUND CARE | Facility: CLINIC | Age: 69
End: 2024-02-01
Payer: COMMERCIAL

## 2024-02-01 DIAGNOSIS — M46.26 INFECTION OF LUMBAR SPINE (HCC): Primary | ICD-10-CM

## 2024-02-01 LAB
ANION GAP SERPL CALC-SCNC: 12 MMOL/L (ref 10–20)
BACTERIA SPEC CULT: NORMAL
BUN SERPL-MCNC: 13 MG/DL (ref 6–23)
CALCIUM SERPL-MCNC: 8.7 MG/DL (ref 8.6–10.3)
CHLORIDE SERPL-SCNC: 108 MMOL/L (ref 98–107)
CO2 SERPL-SCNC: 25 MMOL/L (ref 21–32)
CREAT SERPL-MCNC: 0.66 MG/DL (ref 0.5–1.05)
EGFRCR SERPLBLD CKD-EPI 2021: >90 ML/MIN/1.73M*2
GLUCOSE SERPL-MCNC: 188 MG/DL (ref 74–99)
GRAM STN SPEC: NORMAL
GRAM STN SPEC: NORMAL
HOLD SPECIMEN: NORMAL
POTASSIUM SERPL-SCNC: 3.1 MMOL/L (ref 3.5–5.3)
SODIUM SERPL-SCNC: 142 MMOL/L (ref 136–145)

## 2024-02-01 PROCEDURE — 1090000002 HH PPS REVENUE DEBIT

## 2024-02-01 PROCEDURE — 2500000004 HC RX 250 GENERAL PHARMACY W/ HCPCS (ALT 636 FOR OP/ED): Performed by: STUDENT IN AN ORGANIZED HEALTH CARE EDUCATION/TRAINING PROGRAM

## 2024-02-01 PROCEDURE — 2500000004 HC RX 250 GENERAL PHARMACY W/ HCPCS (ALT 636 FOR OP/ED): Mod: JW | Performed by: STUDENT IN AN ORGANIZED HEALTH CARE EDUCATION/TRAINING PROGRAM

## 2024-02-01 PROCEDURE — 2500000004 HC RX 250 GENERAL PHARMACY W/ HCPCS (ALT 636 FOR OP/ED): Performed by: NURSE PRACTITIONER

## 2024-02-01 PROCEDURE — 2500000001 HC RX 250 WO HCPCS SELF ADMINISTERED DRUGS (ALT 637 FOR MEDICARE OP): Performed by: STUDENT IN AN ORGANIZED HEALTH CARE EDUCATION/TRAINING PROGRAM

## 2024-02-01 PROCEDURE — 99232 SBSQ HOSP IP/OBS MODERATE 35: CPT | Performed by: INTERNAL MEDICINE

## 2024-02-01 PROCEDURE — 2500000004 HC RX 250 GENERAL PHARMACY W/ HCPCS (ALT 636 FOR OP/ED): Performed by: INTERNAL MEDICINE

## 2024-02-01 PROCEDURE — 2500000001 HC RX 250 WO HCPCS SELF ADMINISTERED DRUGS (ALT 637 FOR MEDICARE OP): Performed by: INTERNAL MEDICINE

## 2024-02-01 PROCEDURE — 97165 OT EVAL LOW COMPLEX 30 MIN: CPT | Mod: GO

## 2024-02-01 PROCEDURE — 1090000001 HH PPS REVENUE CREDIT

## 2024-02-01 PROCEDURE — 97161 PT EVAL LOW COMPLEX 20 MIN: CPT | Mod: GP

## 2024-02-01 PROCEDURE — 99231 SBSQ HOSP IP/OBS SF/LOW 25: CPT | Performed by: PLASTIC SURGERY

## 2024-02-01 PROCEDURE — 80048 BASIC METABOLIC PNL TOTAL CA: CPT | Performed by: STUDENT IN AN ORGANIZED HEALTH CARE EDUCATION/TRAINING PROGRAM

## 2024-02-01 PROCEDURE — 99232 SBSQ HOSP IP/OBS MODERATE 35: CPT | Performed by: STUDENT IN AN ORGANIZED HEALTH CARE EDUCATION/TRAINING PROGRAM

## 2024-02-01 PROCEDURE — 1200000002 HC GENERAL ROOM WITH TELEMETRY DAILY

## 2024-02-01 RX ORDER — POTASSIUM CHLORIDE 20 MEQ/1
40 TABLET, EXTENDED RELEASE ORAL ONCE
Status: COMPLETED | OUTPATIENT
Start: 2024-02-01 | End: 2024-02-01

## 2024-02-01 RX ORDER — POTASSIUM CHLORIDE 1.5 G/1.58G
40 POWDER, FOR SOLUTION ORAL ONCE
Status: COMPLETED | OUTPATIENT
Start: 2024-02-01 | End: 2024-02-01

## 2024-02-01 RX ORDER — OXYCODONE HYDROCHLORIDE 5 MG/1
5 TABLET ORAL EVERY 6 HOURS PRN
Status: DISCONTINUED | OUTPATIENT
Start: 2024-02-01 | End: 2024-02-07 | Stop reason: HOSPADM

## 2024-02-01 RX ORDER — POTASSIUM CHLORIDE 14.9 MG/ML
20 INJECTION INTRAVENOUS ONCE
Status: COMPLETED | OUTPATIENT
Start: 2024-02-01 | End: 2024-02-02

## 2024-02-01 RX ADMIN — OXYCODONE HYDROCHLORIDE 5 MG: 5 TABLET ORAL at 20:38

## 2024-02-01 RX ADMIN — STANDARDIZED SENNA CONCENTRATE 17.2 MG: 8.6 TABLET ORAL at 20:39

## 2024-02-01 RX ADMIN — POTASSIUM CHLORIDE 40 MEQ: 1500 TABLET, EXTENDED RELEASE ORAL at 16:30

## 2024-02-01 RX ADMIN — RIFAMPIN 300 MG: 300 CAPSULE ORAL at 20:38

## 2024-02-01 RX ADMIN — BACLOFEN 5 MG: 10 TABLET ORAL at 09:09

## 2024-02-01 RX ADMIN — STANDARDIZED SENNA CONCENTRATE 8.6 MG: 8.6 TABLET ORAL at 21:00

## 2024-02-01 RX ADMIN — ISOSORBIDE MONONITRATE 60 MG: 60 TABLET, EXTENDED RELEASE ORAL at 05:53

## 2024-02-01 RX ADMIN — DEXTROSE MONOHYDRATE 2 G: 5 INJECTION INTRAVENOUS at 11:43

## 2024-02-01 RX ADMIN — HYDROMORPHONE HYDROCHLORIDE 0.3 MG: 1 INJECTION, SOLUTION INTRAMUSCULAR; INTRAVENOUS; SUBCUTANEOUS at 06:22

## 2024-02-01 RX ADMIN — HYDROMORPHONE HYDROCHLORIDE 0.3 MG: 1 INJECTION, SOLUTION INTRAMUSCULAR; INTRAVENOUS; SUBCUTANEOUS at 00:09

## 2024-02-01 RX ADMIN — ASPIRIN 81 MG: 81 TABLET, COATED ORAL at 09:11

## 2024-02-01 RX ADMIN — Medication 1 TABLET: at 09:11

## 2024-02-01 RX ADMIN — APIXABAN 5 MG: 5 TABLET, FILM COATED ORAL at 09:11

## 2024-02-01 RX ADMIN — ONDANSETRON 4 MG: 2 INJECTION INTRAMUSCULAR; INTRAVENOUS at 16:40

## 2024-02-01 RX ADMIN — BACLOFEN 5 MG: 10 TABLET ORAL at 20:39

## 2024-02-01 RX ADMIN — POTASSIUM CHLORIDE 20 MEQ: 14.9 INJECTION, SOLUTION INTRAVENOUS at 23:32

## 2024-02-01 RX ADMIN — RIFAMPIN 300 MG: 300 CAPSULE ORAL at 09:11

## 2024-02-01 RX ADMIN — HYDRALAZINE HYDROCHLORIDE 50 MG: 50 TABLET ORAL at 20:39

## 2024-02-01 RX ADMIN — DAPTOMYCIN 500 MG: 500 INJECTION, POWDER, LYOPHILIZED, FOR SOLUTION INTRAVENOUS at 20:51

## 2024-02-01 RX ADMIN — APIXABAN 5 MG: 5 TABLET, FILM COATED ORAL at 20:38

## 2024-02-01 RX ADMIN — POTASSIUM CHLORIDE 40 MEQ: 1.5 POWDER, FOR SOLUTION ORAL at 14:24

## 2024-02-01 RX ADMIN — ALTEPLASE 1 MG: 2.2 INJECTION, POWDER, LYOPHILIZED, FOR SOLUTION INTRAVENOUS at 14:31

## 2024-02-01 RX ADMIN — DEXTROSE MONOHYDRATE 2 G: 5 INJECTION INTRAVENOUS at 00:07

## 2024-02-01 RX ADMIN — HYDRALAZINE HYDROCHLORIDE 50 MG: 50 TABLET ORAL at 09:11

## 2024-02-01 ASSESSMENT — COGNITIVE AND FUNCTIONAL STATUS - GENERAL
WALKING IN HOSPITAL ROOM: A LOT
HELP NEEDED FOR BATHING: A LOT
MOVING FROM LYING ON BACK TO SITTING ON SIDE OF FLAT BED WITH BEDRAILS: A LOT
TOILETING: TOTAL
WALKING IN HOSPITAL ROOM: TOTAL
CLIMB 3 TO 5 STEPS WITH RAILING: TOTAL
MOBILITY SCORE: 11
EATING MEALS: TOTAL
STANDING UP FROM CHAIR USING ARMS: TOTAL
MOVING TO AND FROM BED TO CHAIR: A LOT
DRESSING REGULAR UPPER BODY CLOTHING: TOTAL
TURNING FROM BACK TO SIDE WHILE IN FLAT BAD: A LOT
DAILY ACTIVITIY SCORE: 6
MOVING FROM LYING ON BACK TO SITTING ON SIDE OF FLAT BED WITH BEDRAILS: A LOT
PERSONAL GROOMING: A LITTLE
STANDING UP FROM CHAIR USING ARMS: A LOT
HELP NEEDED FOR BATHING: TOTAL
DRESSING REGULAR UPPER BODY CLOTHING: A LITTLE
MOVING TO AND FROM BED TO CHAIR: TOTAL
DRESSING REGULAR LOWER BODY CLOTHING: TOTAL
PERSONAL GROOMING: TOTAL
EATING MEALS: A LOT
DRESSING REGULAR LOWER BODY CLOTHING: TOTAL
TURNING FROM BACK TO SIDE WHILE IN FLAT BAD: TOTAL
CLIMB 3 TO 5 STEPS WITH RAILING: TOTAL
TOILETING: TOTAL
MOBILITY SCORE: 7
DAILY ACTIVITIY SCORE: 12

## 2024-02-01 ASSESSMENT — ACTIVITIES OF DAILY LIVING (ADL): BATHING_ASSISTANCE: MAXIMAL

## 2024-02-01 ASSESSMENT — PAIN - FUNCTIONAL ASSESSMENT
PAIN_FUNCTIONAL_ASSESSMENT: 0-10

## 2024-02-01 ASSESSMENT — PAIN SCALES - GENERAL
PAINLEVEL_OUTOF10: 10 - WORST POSSIBLE PAIN
PAINLEVEL_OUTOF10: 9
PAINLEVEL_OUTOF10: 0 - NO PAIN
PAINLEVEL_OUTOF10: 8
PAINLEVEL_OUTOF10: 9
PAINLEVEL_OUTOF10: 8

## 2024-02-01 ASSESSMENT — PAIN DESCRIPTION - DESCRIPTORS: DESCRIPTORS: ACHING;CRAMPING

## 2024-02-01 NOTE — PROGRESS NOTES
Spoke with patient today, she was wanting to attempt to sit up on a BSC, PT/OT not yet seen patient, advised that if she could wait until therapy comes in to assist with patient out of bed. Patient was agreeable. Patient still states she is going home, will not consider SNF.

## 2024-02-01 NOTE — PROGRESS NOTES
Plastic Surgery Note    Afebrile, vital signs stable  Wound VAC in place and midline back wound.  Drain sites are open  Impression: Open wound of back  Continue wound VAC  Start packing back drain site with strip of Xeroform

## 2024-02-01 NOTE — PROGRESS NOTES
Infectious Disease Progress Note       1/31/2024    Patient is a followup regarding severe back pain persistent bilateral lower extremity weakness altered mentation elevated CRP.  Patient has history of MRSA bacteremia, now status post hardware removal with no residual hardware, bilateral JOSE drains which have been removed, open wound postop deep incision, and now, altered mental status    There has been an issue with polypharmacy, she had been nonverbal.    Her MRI of the spine is showing evidence of previous laminectomies L3-S1 levels, there is a rim-enhancing fluid surrounding the surgical drainage catheter along laminectomy bed and extensive evidence of signal abnormality and enhancement within the soft tissues along the laminectomy bed surrounding posterior paraspinal soft tissues and superficially along the surgical tract -unclear if this is a superimposed infectious process.  There is also an abnormal infiltrative signal and enhancement within the paraspinal soft tissues medial psoas muscle bilaterally on the right greater than the left extending from L3 into sacrum which also looks to be possibly infectious    MRI of the Brain done yesterday is showing evidence of mild disproportionate supratentorial ventriculomegaly, which is unchanged since the past CT of the head.  Possible normal pressure hydrocephalus.  Neurology is concerned about possible meningitis due to some additional signal abnormalities and has asked me to temporarily broaden coverage.    Patient's coverage was broadened to daptomycin ceftriaxone and rifampin with help of pharmacy  I had spoken to radiology downtown and they confirmed increased signal enhancement and bony destruction which is worse than approximately 27 days ago      Patient is much more awake alert talking in complete sentences able to express herself to me there is still some confusion however she is definitely talking now  Tells me that she could not previously talk because  she was in too much pain    Lab Results   Component Value Date    WBC 12.6 (H) 01/31/2024    HGB 11.4 (L) 01/31/2024    HCT 36.4 01/31/2024    MCV 94 01/31/2024     01/31/2024     Lab Results   Component Value Date    GLUCOSE 166 (H) 01/30/2024    CALCIUM 9.4 01/30/2024     (H) 01/30/2024    K 3.2 (L) 01/30/2024    CO2 25 01/30/2024     (H) 01/30/2024    BUN 27 (H) 01/30/2024    CREATININE 0.84 01/30/2024       WBC trends are being monitored. Antibiotic doses are being adjusted per most recent renal labs.     Vitals:    01/31/24 1545   BP: 150/70   Pulse: 93   Resp: 18   Temp: 36.3 °C (97.3 °F)   SpO2: 96%           Patient Active Problem List   Diagnosis    Abdominal aortic aneurysm (CMS/HCC)    Abnormal EKG    Bilateral carotid artery stenosis    Bruit of right carotid artery    CAD in native artery    Cardiomyopathy (CMS/HCC)    Chest pain    Chronic asthmatic bronchitis    Closed displaced fracture of fifth metatarsal bone    Current every day smoker    Difficulty breathing    Essential hypertension    History of total knee replacement    Hypokalemia    Intermittent claudication (CMS/HCC)    Knee osteoarthritis    Knee pain    Limb pain    Localized swelling, mass, or lump of lower extremity    Celiac artery stenosis (CMS/HCC)    Mesenteric artery stenosis (CMS/HCC)    Mixed hyperlipidemia    Obese    PVD (peripheral vascular disease) (CMS/HCC)    Right foot pain    Swelling    Trigger finger    Urinary tract infection without hematuria    Back pain of lumbar region with sciatica    Back pain with radiculopathy    Intractable back pain    Seroma, post-traumatic (CMS/HCC)    Lumbar surgical wound fluid collection    Generalized weakness    Postoperative surgical complication involving musculoskeletal system associated with musculoskeletal procedure, unspecified complication    Back pain at L4-L5 level    Deep vein thrombosis (DVT) of right lower extremity (CMS/HCC)    Anemia    Altered mental  status, unspecified altered mental status type    Surgical wound infection     Hx MRSA Bacteremia, now s/p hardware removal from spine  Persistent back pain  Diskitis - likely MRSA given her history  Surgical wound infection  Acute metabolic encephalopathy  Speech deficit - new, resolving?  Polypharmacy    PLAN:  Case discussed with  extensively over the phone and in person.   I am wondering if the addition of rifampin to daptomycin as well as the ceftriaxone helped her  She is much more conversational  No transfer anticipated at this time  Continue daptomycin, Rifampin x 4 weeks then will re-evaluate when she comes to my office on follow-up for another 4 weeks of IV antibiotics for discitis if she is tolerating that regimen.   Her ceftriaxone as empiric times only 2-week course for meningitis since lumbar puncture cannot be done.  It should be discontinued after that    Case discussed with primary    Imaging and labs were reviewed per medical records and any ID pertinent labs were also addressed        Amy Celestin DO

## 2024-02-01 NOTE — PROGRESS NOTES
Occupational Therapy    Evaluation    Patient Name: Tara Tierney  MRN: [id number]  Today's Date: 2/1/2024  Time Calculation  Start Time: 1045  Stop Time: 1101  Time Calculation (min): 16 min      Assessment:  End of Session Communication: Bedside nurse  End of Session Patient Position: Bed, 3 rail up, Alarm on     Plan:  Treatment Interventions: ADL retraining, Functional transfer training, Endurance training, Patient/family training  OT Frequency: 2 times per week  OT Discharge Recommendations: Moderate intensity level of continued care  OT Recommended Transfer Status: Moderate assist, Assist of 2 (with FWW)  OT - OK to Discharge: Yes (Once medically appropriate.)  Treatment Interventions: ADL retraining, Functional transfer training, Endurance training, Patient/family training    Subjective   Current Problem:  1. Altered mental status, unspecified altered mental status type  EEG      2. Surgical wound infection  CANCELED: Case Request Operating Room: Spine Lumbar I  and  D    CANCELED: Case Request Operating Room: Spine Lumbar I  and  D      3. Bilateral carotid artery stenosis  Carotid duplex bilateral    Carotid duplex bilateral        General:  General  Reason for Referral: ADLs  Referred By: Joseph  Past Medical History Relevant to Rehab: Arthritis, Back pain, COPD, COVID-19, Hyperlipidemia, Hypertension, Hypoesthesia of skin, Macular degeneration, Meniere's disease, Osteoporosis, Overactive bladder, Total knee arthroplasty, Cholecystectomy, Aorta - bilateral femoral artery bypass graft, Multiple back surgeries with washout and drain  Prior to Session Communication: Bedside nurse (Cleared for therapy evaluation by nursing.)  Patient Position Received: Bed, 3 rail up, Alarm on (Wound vac, alarm, tele, NG, purwick.)  General Comment: Patient presented with reports of AMS and sx wound infection. Patient was on outpatient IV antiobiotics. Patient was scheduled for I&D of lumbar wound 1/29 but refused.  Patient had wound vac placed and cont with medical management. Test/labs CT head neg, CT C spine discitis and OM at L3-L4, s/p changes lumbar spine, CT T spine neg, MRI s/p changes spine, discitis and OM L3-L4 MRI head : neg acute findings showed mild supratential ventriculomegaly .  Precautions:  Medical Precautions: Fall precautions  Precautions Comment: Spine precautions     Pain:  Pain Assessment  Pain Score: 9  Pain Location:  (Back, LEs)    Objective   Cognition:  Overall Cognitive Status: Within Functional Limits  Orientation Level: Oriented X4           Home Living:  Home Living Comments: Patient lives with  in a 1 story house with 1 MIKE. Tub shower without any DME. Owns a BSC. Active with [facility]HC.  Prior Function:  Prior Function Comments: Patient ambulates with a FWW. Independent with ADLs, family completes IADLs. Denies falls in the past 3 months. Patient does not drive.     ADL:  Eating Assistance:  (Setup/S)  Grooming Assistance: Minimal  Bathing Assistance: Maximal  UE Dressing Assistance: Minimal  LE Dressing Assistance: Total  Toileting Assistance with Device: Total  Activity Tolerance:  Endurance: Decreased tolerance for upright activites  Bed Mobility/Transfers: Bed Mobility  Bed Mobility: Yes  Bed Mobility 1  Bed Mobility 1: Supine to sitting, Sitting to supine  Level of Assistance 1: Moderate assistance  Bed Mobility Comments 1: Mod A x2 via log roll, cues for technique.    Transfers  Transfer: Yes  Transfer 1  Technique 1: Sit to stand, Stand to sit  Transfer Device 1: Walker  Transfer Level of Assistance 1: Moderate assistance, +2  Trials/Comments 1: Patient completed sit <> stand and able to take a few side steps at EOB with a FWW, mod A x2 for balance. Cues for safe hand placement/walker management.     Sitting Balance:  Static Sitting Balance  Static Sitting-Comment/Number of Minutes: Fair  Standing Balance:  Static Standing Balance  Static Standing-Comment/Number of Minutes: Poor +    Sensation:  Sensation Comment: Denies paresthesia in UEs.  Strength:  Strength Comments: B/L UE MMT not assessed at this time.    Extremities: RUE   RUE :  (R UE AROM shoulder flex completed to 90 degrees, elbow flex/ext and digit flex/ext WFL.) and LUE   LUE:  (L UE AROM shoulder flex completed to 90 degrees, elbow flex/ext and digit flex/ext WFL.)    Outcome Measures:[facility] Daily Activity  Putting on and taking off regular lower body clothing: Total  Bathing (including washing, rinsing, drying): A lot  Putting on and taking off regular upper body clothing: A little  Toileting, which includes using toilet, bedpan or urinal: Total  Taking care of personal grooming such as brushing teeth: A little  Eating Meals: A lot  Daily Activity - Total Score: 12    Education Documentation  ADL Training, taught by Lizzie Guerra OT at 2/1/2024  2:29 PM.  Learner: Patient  Readiness: Acceptance  Method: Explanation  Response: Verbalizes Understanding    EDUCATION:  Education  Individual(s) Educated: Patient  Education Provided: POC discussed and agreed upon, Risk and benefits of OT discussed with patient or other, Fall precautons  Patient Response to Education: Patient/Caregiver Verbalized Understanding of Information    Goals:  Encounter Problems       Encounter Problems (Active)       OT Goals       Patient will complete functional transfers with a FWW at CGA level.  (Progressing)       Start:  02/01/24    Expected End:  02/15/24            Patient will complete toileting at a CGA level.  (Progressing)       Start:  02/01/24    Expected End:  02/15/24            Patient will complete lower body dressing at a CGA level.  (Progressing)       Start:  02/01/24    Expected End:  02/15/24            Patient will demonstrate fair/fair + standing balance during functional tasks. (Progressing)       Start:  02/01/24    Expected End:  02/15/24            Patient will tolerate standing greater than 3 minutes during functional tasks.  (Progressing)       Start:  02/01/24    Expected End:  02/15/24

## 2024-02-01 NOTE — PROGRESS NOTES
The patient continues to show a tremendous clinical improvement with current treatments.  MRI brain was collected 1/30/24 and did not identify any leptomeningeal enhancement or other pathology, there is however dilatation of the ventricular system.  Patient is not in any distress, alert conversational.  No photophobia, no headache.      Visit Vitals  /61   Pulse (!) 115   Temp 36.4 °C (97.5 °F)   Resp 18        Neurological Exam:  GENERAL APPEARANCE: No distress interactive and cooperative.     MENTAL STATE: Patient is alert and fully oriented to time as well as location.  She is able to speak in complete sentences.  She can follow single step commands without any difficulty.    CRANIAL NERVES: normal  CN 2 Visual fields full to confrontation.   CN 3, 4, 6  Pupils round, equally reactive to light. No ptosis.EOMs normal alignment, full range with normal saccades, pursuit and convergence. No nystagmus.   CN 5 Facial sensation intact bilaterally.    CN 7 Normal and symmetric facial strength. Nasolabial folds symmetric.   CN 8 Hearing intact to finger rub bilaterally.   CN 9 Palate elevates symmetrically.    CN 11 Bilaterally normal strength of shoulder shrug and neck turning.   CN 12 Tongue midline, with normal bulk and strength; no fasciculations.     Motor examination reveals 5/5 strength in the uppers bilaterally, 4/5 hip flexion bilaterally.  Difficult to isolate knee flexion d/t pain, 5/5 bilaeral dorsiflexion and plantar flexion    Sensory: decreased sensation to light touch in the left lower, mild hyperstesia in the right lower.  Normal uppers bilaterally    Gait not testable due to pain and weakness      BMP:  Results from last 7 days   Lab Units 02/01/24  1016 01/30/24  1138 01/29/24  0403   SODIUM mmol/L 142 146* 143   POTASSIUM mmol/L 3.1* 3.2* 3.5   CHLORIDE mmol/L 108* 111* 105   CO2 mmol/L 25 25 27   BUN mg/dL 13 27* 29*   CREATININE mg/dL 0.66 0.84 0.89         CBC:  Results from last 7 days  "  Lab Units 01/31/24  0526 01/30/24  1138 01/29/24  0403   WBC AUTO x10*3/uL 12.6* 16.8* 13.4*   RBC AUTO x10*6/uL 3.89* 3.95* 4.50   HEMOGLOBIN g/dL 11.4* 11.6* 13.2   HEMATOCRIT % 36.4 36.6 41.5   MCV fL 94 93 92   MCH pg 29.3 29.4 29.3   MCHC g/dL 31.3* 31.7* 31.8*   RDW % 15.9* 15.7* 15.6*   PLATELETS AUTO x10*3/uL 260 278 344         INR:        Lipid Profile:        No lab exists for component: \"LABVLDL\"    HgbA1C:        CMP:  Results from last 7 days   Lab Units 02/01/24  1016 01/30/24  1138 01/29/24  0403 01/28/24  0525 01/27/24  0452   SODIUM mmol/L 142 146* 143   < > 132*   POTASSIUM mmol/L 3.1* 3.2* 3.5   < > 3.0*   CHLORIDE mmol/L 108* 111* 105   < > 95*   CO2 mmol/L 25 25 27   < > 20*   BUN mg/dL 13 27* 29*   < > 22   CREATININE mg/dL 0.66 0.84 0.89   < > 0.91   GLUCOSE mg/dL 188* 166* 143*   < > 168*   CALCIUM mg/dL 8.7 9.4 10.2   < > 10.3   PROTEIN TOTAL g/dL  --   --   --   --  8.0   BILIRUBIN TOTAL mg/dL  --   --   --   --  0.8   ALK PHOS U/L  --   --   --   --  144*   AST U/L  --   --   --   --  17   ALT U/L  --   --   --   --  28    < > = values in this interval not displayed.         ABG:        No lab exists for component: \"PO2\", \"PCO2\", \"HCO3\", \"BE\", \"O2SAT\"    TSH:  No results found for: \"TSH\"  Lab Results   Component Value Date    WKAXDATI86 322 01/05/2024     Lab Results   Component Value Date    FOLATE 5.1 01/05/2024     Lab Results   Component Value Date    VITD25 33 01/05/2024         MR brain w and wo IV contrast    Result Date: 1/30/2024  Interpreted By:  Alexi Bass, STUDY: MR BRAIN W AND WO IV CONTRAST; ;  1/29/2024 7:34 pm   INDICATION: Signs/Symptoms:menengitis.   COMPARISON: CT head from 01/20/2024. MRI brain from 01/18/2016.   ACCESSION NUMBER(S): [id number]   ORDERING CLINICIAN: RENETTA TAYLOR   TECHNIQUE: MRI of the brain was performed with the acquisition of axial diffusion-weighted, axial T1, axial FLAIR, axial T2 gradient echo, axial T2 fat saturated, axial T1 fat " saturated postcontrast sequence, and axial T1 volumetric post-contrast sequence with multiplanar reformats.   Contrast: 13.5 mL of Dotarem was injected intravenously.   FINDINGS: Evaluation is somewhat degraded due to patient motion.   There is no acute intracranial hemorrhage or infarct. There is no abnormal extra-axial fluid collection or mass effect.   On the FLAIR sequence, there is increased signal within sulci in the bilateral cerebral hemispheres, primarily along the periphery with sparing of the central sulci which is likely artifactual. No signal abnormality seen within the sulci on the other sequences.   There is stable mild disproportionate enlargement of the supratentorial ventricles compared the adjacent sulci and widening of the sylvian fissures in a pattern that can be seen with normal pressure hydrocephalus. Ventriculomegaly related to potential meningitis is favored less likely and ventricles are unchanged in size since the prior CT head. Ventricles have increased in size since the prior MRI brain from 2016, favored to be related to parenchymal volume loss.   There is no abnormal intracranial enhancement or mass.   There are confluent and scattered regions of T2 hyperintensity within the cerebral hemispheric white matter and robin which are probably sequela of chronic small vessel ischemic changes. There are 2 small foci of T2 hyperintensity located within the right cerebellum which probably reflect old infarcts.   Within the left cerebellum, there is subtle T2 hyperintense signal (series 9, image 10 of 40) without abnormal enhancement or restricted diffusion which may be sequela of late subacute infarct. There is no volume loss.   Visualized paranasal sinuses and mastoid air cells are essentially clear.       Evaluation is somewhat degraded due to patient motion.   1. No acute intracranial abnormality or mass effect. No abnormal intracranial enhancement or mass.   2. Mild disproportionate  supratentorial ventriculomegaly, unchanged since the recent CT head and in a pattern that is suggestive of normal pressure hydrocephalus. Appearance is not typical for an infectious process.   3. Chronic intracranial findings as above.   This study was interpreted at [facility].   MACRO: None   Signed by: Alexi Bsas 1/30/2024 7:32 AM Dictation workstation:   [id number]      CT head wo IV contrast    Result Date: 1/27/2024  Interpreted By:  Joanne Cruz, STUDY: CT HEAD WO IV CONTRAST;  1/27/2024 6:21 am   INDICATION: Signs/Symptoms:AMS. eval for acute hemorrhage.   COMPARISON: 08/04/2009   ACCESSION NUMBER(S): [id number]   ORDERING CLINICIAN: LATOYA GUEVARA   TECHNIQUE: Examination was performed in the axial plane using soft tissue and bone algorithm.   FINDINGS: INTRACRANIAL: There is prominence of the ventricular system and cerebral sulci consistent with cerebral atrophy. There are periventricular hypodensities consistent with  moderate small vessel disease. No mass or mass effect is identified. There is no hemorrhage or subdural fluid collection. There is no acute infarct. There is no fracture of the calvarium   EXTRACRANIAL: Visualized paranasal sinuses and mastoids are clear.       No acute intracranial pathology.   MACRO: None   Signed by: Joanne Cruz 1/27/2024 7:03 AM Dictation workstation:   [id number]      MR lumbar spine w and wo IV contrast    Result Date: 1/28/2024  Interpreted By:  Yoav Davidson, STUDY: MR LUMBAR SPINE W AND WO IV CONTRAST;  1/28/2024 6:39 pm   INDICATION: Signs/Symptoms: Rule out abscess.   COMPARISON: 01/11/2024   ACCESSION NUMBER(S): [id number]   ORDERING CLINICIAN: FRANCESCA ORR   TECHNIQUE: Sagittal STIR, sagittal T2, sagittal T1, axial T2, axial T1, as well as post gadolinium sagittal axial T1 weighted MRI images of the lumbar spine were obtained. The patient received 13 mL of Dotarem gadolinium intravenously.   FINDINGS:  Postoperative changes are again identified compatible with a previous posterior laminectomies at the L3 through S1 levels as well as discectomies at the L4/5 and L5/S1 levels. Metallic artifact from orthopedic hardware is identified along the disc spaces at the L4/5 and L5/S1 levels. There are surgical drainage catheter again noted along the laminectomy bed as well as more superficially along the surgical tract with the posterior paraspinal soft tissues. There is a minimal amount of rim enhancing fluid surrounding the surgical drainage catheter along laminectomy bed the sterility of which can not be ascertained on this MRI study.   There is again evidence of extensive infiltrative signal abnormality and enhancement within the soft tissues along the laminectomy bed and surrounding posterior paraspinal soft tissues extending superficially along the surgical tract which may be postsurgical in etiology although a superimposed infectious/inflammatory process could give a similar MRI appearance and must be considered.   There is again evidence of abnormal signal along the L3/4 disc space as well as within the adjacent bone marrow of the L3 and L4 vertebrae most compatible with underlying discitis/osteomyelitis. There has been mild interval bony destruction/collapse of the inferior L3 and to a lesser degree superior L4 vertebrae when compared with the prior study dated 01/11/2024. There is again evidence of approximately 4 mm of retrolisthesis of L3 on L4.   There is mild circumferential abnormal epidural thickening and enhancement within the spinal canal best appreciated extending from the L3 through S1 levels which may be postsurgical and/or infectious/inflammatory in origin.   There is abnormal infiltrative signal and enhancement within the paraspinal soft tissues/medial psoas muscles bilaterally right greater than left extending from the L3 level caudally into the sacral region likely infectious/inflammatory in origin.    The visualized spinal cord demonstrates no signal abnormality or abnormal enhancement within it. The conus medullaris is normally positioned terminating at the L1 level.   At the L5/S1 level,  there are postoperative changes compatible with a previous posterior laminectomy as well as discectomy. There is enhancing soft tissue noted along the laminectomy bed as well as enhancing epidural thickening and enhancement circumferentially surrounding the thecal sac within the spinal canal. There is no significant narrowing of the thecal sac within the spinal canal. There is infiltrative enhancing epidural soft tissue extending laterally into the right neural foramen. There is mild-to-moderate encroachment upon the left neural foramen.   At the L4/L5 level,  there are postoperative changes compatible with a previous posterior laminectomy as well as discectomy. There is enhancing soft tissue noted along the laminectomy bed as well as enhancing epidural thickening and enhancement circumferentially surrounding the thecal sac within the spinal canal. There is mild overall narrowing of the thecal sac within the spinal canal. There is infiltrative enhancing epidural soft tissue extending laterally into the right neural foramen. There is mild-to-moderate encroachment upon the left neural foramen.   At the L3/L4 level,  there are postoperative changes compatible with a previous L3 laminectomy. There is again evidence of approximately 4 mm of retrolisthesis of L3 on L4. There is enhancing soft tissue noted along the laminectomy bed as well as enhancing epidural thickening and enhancement circumferentially surrounding the thecal sac within the spinal canal. There is no significant narrowing of the thecal sac within the spinal canal. There is bilateral neural foraminal narrowing with infiltrative enhancing epidural soft tissue extending laterally into the region of the neural foramen bilaterally.   At the L2/L3 level,  there are  degenerative facet changes and a minimal posterior disc bulge without significant spinal canal narrowing. There is mild encroachment upon the inferior recesses of the neural foramen bilaterally.   At the L1/L2 level,  there is a minimal posterior disc bulge and mild degenerative facet changes contributing to very mild encroachment upon the spinal canal. There is mild encroachment upon the inferior recesses of the neural foramen left greater than right.   At the T12/L1 level,  there is no significant spinal canal stenosis or neuroforaminal stenosis.   At the T11/12 level, there is a minimal posterior disc bulge and mild degenerative facet changes without significant spinal canal or neural foraminal narrowing.         Postoperative changes are again identified compatible with a previous posterior laminectomies at the L3 through S1 levels as well as discectomies at the L4/5 and L5/S1 levels. Metallic artifact from orthopedic hardware is identified along the disc spaces at the L4/5 and L5/S1 levels. There are surgical drainage catheter again noted along the laminectomy bed as well as more superficially along the surgical tract with the posterior paraspinal soft tissues. There is a minimal amount of rim enhancing fluid surrounding the surgical drainage catheter along laminectomy bed the sterility of which can not be ascertained on this MRI study.   There is again evidence of extensive infiltrative signal abnormality and enhancement within the soft tissues along the laminectomy bed and surrounding posterior paraspinal soft tissues extending superficially along the surgical tract which may be postsurgical in etiology although a superimposed infectious/inflammatory process could give a similar MRI appearance and must be considered.   There is again evidence of abnormal signal along the L3/4 disc space as well as within the adjacent bone marrow of the L3 and L4 vertebrae most compatible with underlying  discitis/osteomyelitis. There has been mild interval bony destruction/collapse of the inferior L3 and to a lesser degree superior L4 vertebrae when compared with the prior study dated 01/11/2024. There is again evidence of approximately 4 mm of retrolisthesis of L3 on L4.   There is mild circumferential abnormal epidural thickening and enhancement within the spinal canal best appreciated extending from the L3 through S1 levels which may be postsurgical and/or infectious/inflammatory in origin.   There is abnormal infiltrative signal and enhancement within the paraspinal soft tissues/medial psoas muscles bilaterally right greater than left extending from the L3 level caudally into the sacral region likely infectious/inflammatory in origin.   There is multilevel spondylosis. There are varying degrees of spinal canal and neural foraminal narrowing as described above.   MACRO: None.   Signed by: Yoav Davidson 1/28/2024 7:27 PM Dictation workstation:   [id number]    ECG 12 lead    Result Date: 1/27/2024  Normal sinus rhythm Left bundle branch block Abnormal ECG When compared with ECG of 02-JAN-2024 07:11, Questionable change in QRS duration See ED provider note for full interpretation and clinical correlation Confirmed by Bridger Erickson ([id number]) on 1/27/2024 12:29:08 PM    CT chest abdomen pelvis w IV contrast    Result Date: 1/27/2024  STUDY: CT Chest, Abdomen, and Pelvis with IV Contrast; 01/27/2024 6:37 AM INDICATION: Generalized abdominal pain.  Recent spine surgery and IVC filter. Evaluate for free air/fluid. COMPARISON: XR abdomen 01/15/2024.  CT AP 12/31/2023, 12/19/2023. ACCESSION NUMBER(S): [id number] ORDERING CLINICIAN: Osei Swann TECHNIQUE: CT of the chest, abdomen, and pelvis was performed.  Contiguous axial images were obtained at 3 mm slice thickness through the chest, abdomen, and pelvis.  Coronal and sagittal reconstructions at 3 mm slice thickness were performed.  Omnipaque 350 75 mL was administered  intravenously.  FINDINGS: CHEST: MEDIASTINUM: Right PICC appears satisfactorily positioned extending to the right atrium.  The heart is normal in size without pericardial effusion. Central vascular structures opacify normally.  No thyroid nodule.  No identifiable breast mass.  LUNGS/PLEURA: There is no pleural effusion, pleural thickening, or pneumothorax. Minimal dependent atelectasis at the lung bases. LYMPH NODES: Thoracic lymph nodes are not enlarged. ABDOMEN:  LIVER: No hepatomegaly.  Smooth surface contour.  Normal attenuation.  BILE DUCTS: No intrahepatic or extrahepatic biliary ductal dilatation.  GALLBLADDER: Gallbladder is surgically absent. STOMACH: No abnormalities identified.  PANCREAS: No masses or ductal dilatation.  SPLEEN: No splenomegaly or focal splenic lesion.  ADRENAL GLANDS: No thickening or nodules.  KIDNEYS AND URETERS: Kidneys are normal in size and location.  No renal or ureteral calculi.  PELVIS:  BLADDER: Stauffer catheter present.  Mild wall thickening of the urinary bladder may simply be related to incomplete distention.  Large quantity of stool in the rectum suggests constipation versus impaction.  REPRODUCTIVE ORGANS: No abnormalities identified.  BOWEL: No abnormalities identified.  Appendix appears normal.  VESSELS: No abnormalities identified.  IVC filter appears appropriately positioned.  Abdominal aorta is normal in caliber.  PERITONEUM/RETROPERITONEUM/LYMPH NODES: No free fluid.  No pneumoperitoneum. No lymphadenopathy.  ABDOMINAL WALL: No abnormalities identified. SOFT TISSUES: No abnormalities identified.  BONES: Mildly exaggerated thoracic kyphosis with mild to moderate degenerative disease. There are postsurgical changes of the lumbar spine.  Prosthetic discs at L4-5 and L5-S1 are in similar position when compared to the prior CT.  Two opaque drains at the surgical site are again noted. Subcutaneous emphysema noted along the course of the drains.  Presumed phlegmon noted at the  surgical location.  No drainable collection. Since the prior study, there has been erosive or destructive changes of the inferior endplate of L3 and to a lesser extent the superior endplate of L4.  The findings would be strongly suspicious for discitis osteomyelitis.  No acute fracture or aggressive osseous lesion.    CT CHEST: 1.  No CT evidence of pulmonary embolism. 2.  No pulmonary mass, nodule or consolidation.  No pleural effusion. No pneumothorax.  No thoracic adenopathy. CT ABDOMEN AND PELVIS: 1.  Postsurgical changes of the lumbar spine again noted.  There are two indwelling drains superficially unchanged from 12/31/2023.  The prosthetic discs at L4-5 and L5-S1 appears satisfactorily positioned, unchanged.  However, there is been interval destructive endplate changes along the inferior aspect of L3 and to a lesser extent the superior endplate of L4.  The findings would be suspicious for discitis/osteomyelitis. 2.  No ascites, free air or bowel obstruction. 3.  No urinary tract calculus or hydronephrosis. 4.  IVC filter appears satisfactorily positioned. Signed by Joshua Alfonso MD    CT lumbar spine wo IV contrast    Result Date: 1/27/2024  Interpreted By:  Joanne Cruz, STUDY: CT LUMBAR SPINE WO IV CONTRAST; CT THORACIC SPINE WO IV CONTRAST 1/27/2024 6:22 am   INDICATION: Signs/Symptoms:recent post op. 2 JOSE drains. the right paraspinal appears infected. eval for fluid collection   COMPARISON: MRI lumbar spine 01/11/2024   ACCESSION NUMBER(S): [id number]; [id number]   ORDERING CLINICIAN: LATOYA GUEVARA   TECHNIQUE: Axial CT images of the lumbar spine are obtained. Axial, coronal and sagittal reconstructions are provided for review.   FINDINGS: CT THORACIC SPINE: Alignment: Within normal limits. There is degenerative change with mild-to-moderate anterior osteophytic spurring at all levels particularly the midthoracic spine.   Vertebrae/Disc Spaces:   The vertebral body heights are intact. The disc  "spaces are preserved.   There is a benign calcified right upper lobe granuloma.   CT LUMBAR SPINE: There are disc spacers at L4-5 and L5-S1 in good position. Status post decompression laminectomies L3-L5. There is bone grafting material along the right and left lateral aspects of the mid and lower lumbar spine. There are 2 drains. There is a drain along the inferior aspect of the back. This courses superiorly and the tip is in the soft tissues posterior to L1. There is a 2nd drain along the inferior aspect of the back. This courses superiorly and the tip is in the soft tissues posterior to L1. There is induration of the soft tissues along the back and induration of the paraspinal muscles. There is a 2.5 x 2.0 cm x 1.8 cm ill-defined fluid collection in the paraspinal muscles of the back at the level of L4-5 just to the right of midline. This could be a true fluid collection or induration of the paraspinal muscles. There is no discrete enhancing wall. Findings could represent a abscess, postoperative seroma or liquified hematoma. There is no air in this fluid. This is not along the course of one of the drains. There is ghost artifact in the pedicles of L4, L5 and S1 from prior hardware which has been removed.   Alignment: There is 5 mm retrolisthesis of L3 with respect to L4.   Vertebrae/Disc Spaces:  There is destruction and irregularity of the inferior endplate of L3 and superior endplate of L4 consistent with diskitis and adjacent osteomyelitis.   T12-L1:  There is no significant central canal stenosis.   L1-2:  There is no significant central canal or neural foraminal stenosis.   L2-3:  There is no significant central canal or neural foraminal stenosis.   L3-4: There is destruction of the inferior endplate of L3 and superior endplate of L4. There is irregularity of the bone. There is \"widening\" of the disc space. Findings are consistent with discitis and adjacent osteomyelitis. There is a fracture of the right " pedicle of L4.   L4-5:  There is no significant central canal or neural foraminal stenosis.   L5-S1:  There is no significant central canal or neural foraminal stenosis.   Prevertebral/Paraspinal Soft Tissues: The prevertebral and paraspinal soft tissues are unremarkable.   Status post cholecystectomy. There is an inferior vena cava filter inferior to the renal veins       1. Degenerative change thoracic spine. No central canal stenosis or neural foraminal compromise 2. Diskitis and adjacent osteomyelitis L3-4. 3. 2.5 x 2.0 x 1.8 cm fluid collection in the paraspinal muscles of the back just to the right of midline. This contains no air. This is ill-defined and of question of whether this is a true discrete round fluid collection or ill definition and induration of the paraspinal muscles. No discrete enhancing wall is seen. Findings could represent an abscess, postoperative seroma or liquified hematoma. This is not along the course of one of the drains. 4. Nondisplaced fracture right pedicle L4. 5. Disc spacers L4-5 and L5-S1. Status post decompression laminectomies L3-L5. There is bone grafting material along the right and left lateral aspects of the mid and lower lumbar spine. There is ghost artifact in the pedicles of L4, L5 and S1 which represents hardware which has been removed. There are 2 drains in the soft tissues of the back.   MACRO: None   Signed by: Joanne Cruz 1/27/2024 7:22 AM Dictation workstation:   [id number]    CT thoracic spine wo IV contrast    Result Date: 1/27/2024  Interpreted By:  Joanne Cruz, STUDY: CT LUMBAR SPINE WO IV CONTRAST; CT THORACIC SPINE WO IV CONTRAST 1/27/2024 6:22 am   INDICATION: Signs/Symptoms:recent post op. 2 JOSE drains. the right paraspinal appears infected. eval for fluid collection   COMPARISON: MRI lumbar spine 01/11/2024   ACCESSION NUMBER(S): [id number]; [id number]   ORDERING CLINICIAN: LATOYA GUEVARA   TECHNIQUE: Axial CT images of the lumbar spine are  obtained. Axial, coronal and sagittal reconstructions are provided for review.   FINDINGS: CT THORACIC SPINE: Alignment: Within normal limits. There is degenerative change with mild-to-moderate anterior osteophytic spurring at all levels particularly the midthoracic spine.   Vertebrae/Disc Spaces:   The vertebral body heights are intact. The disc spaces are preserved.   There is a benign calcified right upper lobe granuloma.   CT LUMBAR SPINE: There are disc spacers at L4-5 and L5-S1 in good position. Status post decompression laminectomies L3-L5. There is bone grafting material along the right and left lateral aspects of the mid and lower lumbar spine. There are 2 drains. There is a drain along the inferior aspect of the back. This courses superiorly and the tip is in the soft tissues posterior to L1. There is a 2nd drain along the inferior aspect of the back. This courses superiorly and the tip is in the soft tissues posterior to L1. There is induration of the soft tissues along the back and induration of the paraspinal muscles. There is a 2.5 x 2.0 cm x 1.8 cm ill-defined fluid collection in the paraspinal muscles of the back at the level of L4-5 just to the right of midline. This could be a true fluid collection or induration of the paraspinal muscles. There is no discrete enhancing wall. Findings could represent a abscess, postoperative seroma or liquified hematoma. There is no air in this fluid. This is not along the course of one of the drains. There is ghost artifact in the pedicles of L4, L5 and S1 from prior hardware which has been removed.   Alignment: There is 5 mm retrolisthesis of L3 with respect to L4.   Vertebrae/Disc Spaces:  There is destruction and irregularity of the inferior endplate of L3 and superior endplate of L4 consistent with diskitis and adjacent osteomyelitis.   T12-L1:  There is no significant central canal stenosis.   L1-2:  There is no significant central canal or neural foraminal  "stenosis.   L2-3:  There is no significant central canal or neural foraminal stenosis.   L3-4: There is destruction of the inferior endplate of L3 and superior endplate of L4. There is irregularity of the bone. There is \"widening\" of the disc space. Findings are consistent with discitis and adjacent osteomyelitis. There is a fracture of the right pedicle of L4.   L4-5:  There is no significant central canal or neural foraminal stenosis.   L5-S1:  There is no significant central canal or neural foraminal stenosis.   Prevertebral/Paraspinal Soft Tissues: The prevertebral and paraspinal soft tissues are unremarkable.   Status post cholecystectomy. There is an inferior vena cava filter inferior to the renal veins       1. Degenerative change thoracic spine. No central canal stenosis or neural foraminal compromise 2. Diskitis and adjacent osteomyelitis L3-4. 3. 2.5 x 2.0 x 1.8 cm fluid collection in the paraspinal muscles of the back just to the right of midline. This contains no air. This is ill-defined and of question of whether this is a true discrete round fluid collection or ill definition and induration of the paraspinal muscles. No discrete enhancing wall is seen. Findings could represent an abscess, postoperative seroma or liquified hematoma. This is not along the course of one of the drains. 4. Nondisplaced fracture right pedicle L4. 5. Disc spacers L4-5 and L5-S1. Status post decompression laminectomies L3-L5. There is bone grafting material along the right and left lateral aspects of the mid and lower lumbar spine. There is ghost artifact in the pedicles of L4, L5 and S1 which represents hardware which has been removed. There are 2 drains in the soft tissues of the back.   MACRO: None   Signed by: Joanne Cruz 1/27/2024 7:22 AM Dictation workstation:   [id number]    CT head wo IV contrast    Result Date: 1/27/2024  Interpreted By:  Joanne Cruz, STUDY: CT HEAD WO IV CONTRAST;  1/27/2024 6:21 am   " INDICATION: Signs/Symptoms:AMS. eval for acute hemorrhage.   COMPARISON: 08/04/2009   ACCESSION NUMBER(S): [id number]   ORDERING CLINICIAN: LATOYA GUEVARA   TECHNIQUE: Examination was performed in the axial plane using soft tissue and bone algorithm.   FINDINGS: INTRACRANIAL: There is prominence of the ventricular system and cerebral sulci consistent with cerebral atrophy. There are periventricular hypodensities consistent with  moderate small vessel disease. No mass or mass effect is identified. There is no hemorrhage or subdural fluid collection. There is no acute infarct. There is no fracture of the calvarium   EXTRACRANIAL: Visualized paranasal sinuses and mastoids are clear.       No acute intracranial pathology.   MACRO: None   Signed by: Joanne Cruz 1/27/2024 7:03 AM Dictation workstation:   [id number]      EMG     No EMG results found for the past 12 months        EEG    Result Date: 1/30/2024  IMPRESSION Impression This routine EEG is indicative of a moderate diffuse encephalopathy. No epileptiform discharges or lateralizing signs are recorded. A full report will be scanned into the patient's chart at a later time. This report has been interpreted and electronically signed by       Current Facility-Administered Medications:     acetaminophen (Tylenol) tablet 650 mg, 650 mg, oral, q4h PRN **OR** acetaminophen (Tylenol) oral liquid 650 mg, 650 mg, nasogastric tube, q4h PRN **OR** acetaminophen (Tylenol) suppository 650 mg, 650 mg, rectal, q4h PRN, Raul George MD    alteplase (Cathflo Activase) injection 1 mg, 1 mg, intra-catheter, PRN, Nitesh Ruiz MD, 1 mg at 02/01/24 1431    apixaban (Eliquis) tablet 5 mg, 5 mg, oral, BID, Raul George MD, 5 mg at 02/01/24 0911    aspirin EC tablet 81 mg, 81 mg, oral, Daily, Raul George MD, 81 mg at 02/01/24 0911    baclofen (Lioresal) tablet 5 mg, 5 mg, oral, TID, Nitesh Ruiz MD, 5 mg at 02/01/24 0909    cefTRIAXone (Rocephin) 2 g in dextrose 5 % 50 mL  IV, 2 g, intravenous, q12h, Amy Celestin DO, Stopped at 02/01/24 1213    DAPTOmycin (Cubicin) 500 mg in sodium chloride 0.9% 50 mL IV, 8 mg/kg, intravenous, q24h EDE, Pete Echeverria MD, Stopped at 01/31/24 2129    hydrALAZINE (Apresoline) injection 10 mg, 10 mg, intravenous, q4h PRN, Sunshine Lange MD, 10 mg at 01/29/24 0025    hydrALAZINE (Apresoline) tablet 50 mg, 50 mg, oral, BID, Raul George MD, 50 mg at 02/01/24 0911    isosorbide mononitrate ER (Imdur) 24 hr tablet 60 mg, 60 mg, oral, Daily, Raul George MD, 60 mg at 02/01/24 0553    lactobacillus acidophilus tablet 1 tablet, 1 tablet, oral, Daily, Raul George MD, 1 tablet at 02/01/24 0911    menthol-zinc oxide (Calmoseptine - Risamine) 0.44-20.6 % ointment 1 Application, 1 Application, Topical, 4x daily PRN, Nitesh Ruiz MD, 1 Application at 01/31/24 1114    ondansetron (Zofran) injection 4 mg, 4 mg, intravenous, q6h PRN, Raul George MD, 4 mg at 01/27/24 2128    oxyCODONE (Roxicodone) immediate release tablet 10 mg, 10 mg, oral, q6h PRN, Nitesh Ruiz MD    oxyCODONE (Roxicodone) immediate release tablet 5 mg, 5 mg, oral, q6h PRN, Nitehs Ruiz MD    polyethylene glycol (Glycolax, Miralax) packet 17 g, 17 g, oral, Daily, Raul George MD, 17 g at 01/29/24 1546    psyllium (Metamucil) 1 packet, 1 packet, oral, Daily, Raul George MD    rifAMPin (Rifadin) capsule 300 mg, 300 mg, oral, BID, Amy Celestin DO, 300 mg at 02/01/24 0911    sennosides (Senokot) tablet 17.2 mg, 2 tablet, oral, Nightly, Raul George MD, 17.2 mg at 01/29/24 2053    sennosides (Senokot) tablet 8.6 mg, 1 tablet, oral, Nightly, Raul George MD, 8.6 mg at 01/30/24 2004     Assessment:  Acute encephalopathy in the setting of MRSA wound infection after lumbar surgery.  Recent increase in the opiate medication which may have triggered the encephalopathy however there is leukocytosis and MRI of the lumbar spine showing L3-L4 discitis and epidural thickening and  enhancement L3-S1, partially treated meningitis is a concern.      Recommendation:  -Continue empiric coverage for meningitis given her clinical improvement  -Accessing CSF at this point is a greater risk to the patient then would be clinically beneficial, superior levels (cervical tap suboccipital tap) are not available.  This was reviewed with the patient and family who expressed understanding and agreement.  -Will collect carotid Dopplers as the patient's surveillance has come due, Right 50-69% left less than 50%  -MRI of the brain with contrast to evaluate dural enhancement showed hydrocephalus, no leptomeningeal enhancement however there was a motion degraded component according to the radiologist report.  - ABG ultimately demonstrated significant alkalosis that appears to be mixed respiratory and metabolic the pH of 7.56.  -Ideally CSF testing would be beneficial however, one would of course not want to introduce infection by cannulizing again area (levels L3/4 and L4/5 show fluid collections) actively contaminated with MRSA and introducing that to the dura.    -Had extensive discussion with infectious disease and the patient has shown clinical improvement after additional coverage for meningitis  -routine EEG 1/30 showed diffuse slowing  -Thyroid studies in fact had been collected and were normal.  -Advance diet and activity as able  -will follow while admitted

## 2024-02-01 NOTE — PROGRESS NOTES
Wound Vac Check, leak was detected distally of black foam. Secured with vac drape. Black foam compressed and pressure maintained at 125 mmHg continuous. Small amount of new bloody drainage in cannister.  Wound Care Progress Note     Visit Date: 2/1/2024      Patient Name: Tara Tierney         MRN: [id number]                Reason for Visit: Wound Vac Check        Wound History: Chronic     Pertinent Labs:   Albumin   Date Value Ref Range Status   01/27/2024 3.8 3.4 - 5.0 g/dL Final           Wound Assessment:           NEW    NG/OG/Feeding Tube Right nostril 12 Fr. (Active)   Placement Date/Time: 01/29/24 1400   Placed by: Lissy Tim RN  Type of Tube: (c) Feeding Tube  Tube Length: 60 cm  Tube Location: Right nostril  Tube Size (Fr.): 12 Fr.   Number of days: 3       External Urinary Catheter Female (Active)   Placement Date/Time: 01/29/24 1030   External Catheter Type: Female   Number of days: 3     NG/OG/Feeding Tube Right nostril 12 Fr. (Active)   Tube Status Continuous tube feeding 01/31/24 1700   Placement Verification X-ray 01/29/24 1600   Distal Tube Measurement 60 cm 01/31/24 1700   Site Assessment Clean;Dry;Intact 02/01/24 0008   Irrigant Tap water 01/31/24 1700   Response To Intervention No resistance met 01/29/24 1600   Tube Securement Nasal bridle 01/31/24 1700   Tube Feeding Frequency Continuous 01/31/24 1700   Tube Feeding Jevity 1.5 01/31/24 1700   Tube Feeding Strength Full strength 01/31/24 1700   Tube Feeding Method Continuous per pump 01/30/24 2000   Tube Feeding Bag Changed Yes 01/31/24 0630   Feeding Tube Flushed With Tap water 01/31/24 1700   Intake (mL) 404 mL 02/01/24 0555       External Urinary Catheter Female (Active)   External Catheter Status Changed 01/31/24 0630   Securement Method Other (Comment) 01/31/24 0630   Output (mL) 700 mL 02/01/24 0900                   Wound 11/20/23 Incision Back Lower;Medial (Active)   Date First Assessed: 11/20/23   Present on Original Admission: No   Hand Hygiene Completed: Yes  Primary Wound Type: (c) Incision  Location: Back  Wound Location Orientation: Lower;Medial   Number of days: 73       Wound (Active)   No Date First Assessed or Time First Assessed found.     Number of days:      Wound 11/20/23 Incision Back Lower;Medial (Active)   Dressing Changed Changed 01/30/24 1437   Dressing Status Clean;Occlusive 02/01/24 0900       Wound (Active)                   Wound Team Plan: Continue With Current Dressing Changes As Ordered.     Young Vargas LPN  2/1/2024  3:14 PM

## 2024-02-01 NOTE — PROGRESS NOTES
Physical Therapy    Physical Therapy Evaluation    Patient Name: Tara Tierney  MRN: [id number]  Today's Date: 2/1/2024   Time Calculation  Start Time: 1044  Stop Time: 1102  Time Calculation (min): 18 min    Assessment/Plan   PT Assessment  PT Assessment Results: Decreased strength, Decreased endurance, Impaired balance, Decreased mobility, Decreased coordination, Pain, Orthopedic restrictions  Rehab Prognosis: Fair  Evaluation/Treatment Tolerance: Patient limited by fatigue, Patient limited by pain  End of Session Communication: Care Coordinator, Bedside nurse  Assessment Comment: pt with decreased mobility /gait strength balance and endurance . pt to benefit from skilled PT to address deficits and improve functional mobilty .  End of Session Patient Position: Bed, 3 rail up, Alarm on  IP OR SWING BED PT PLAN  Inpatient or Swing Bed: Inpatient  PT Plan  Treatment/Interventions: Bed mobility, Transfer training, Gait training, Stair training, Balance training, Strengthening, Endurance training, Therapeutic exercise, Therapeutic activity, Home exercise program  PT Plan: Skilled PT  PT Frequency: 3 times per week  PT Discharge Recommendations: Moderate intensity level of continued care  PT Recommended Transfer Status: Assist x2, Assistive device  PT - OK to Discharge: Yes (once medically ready for discharge to next level of care.)    Subjective     Current Problem:  Patient Active Problem List   Diagnosis    Abdominal aortic aneurysm (CMS/HCC)    Abnormal EKG    Bilateral carotid artery stenosis    Bruit of right carotid artery    CAD in native artery    Cardiomyopathy (CMS/HCC)    Chest pain    Chronic asthmatic bronchitis    Closed displaced fracture of fifth metatarsal bone    Current every day smoker    Difficulty breathing    Essential hypertension    History of total knee replacement    Hypokalemia    Intermittent claudication (CMS/HCC)    Knee osteoarthritis    Knee pain    Limb pain    Localized swelling,  mass, or lump of lower extremity    Celiac artery stenosis (CMS/HCC)    Mesenteric artery stenosis (CMS/HCC)    Mixed hyperlipidemia    Obese    PVD (peripheral vascular disease) (CMS/HCC)    Right foot pain    Swelling    Trigger finger    Urinary tract infection without hematuria    Back pain of lumbar region with sciatica    Back pain with radiculopathy    Intractable back pain    Seroma, post-traumatic (CMS/HCC)    Lumbar surgical wound fluid collection    Generalized weakness    Postoperative surgical complication involving musculoskeletal system associated with musculoskeletal procedure, unspecified complication    Back pain at L4-L5 level    Deep vein thrombosis (DVT) of right lower extremity (CMS/HCC)    Anemia    Altered mental status, unspecified altered mental status type    Surgical wound infection       General Visit Information:  General  Reason for Referral: weakness / impaired mobility  Referred By: Joseph OT/PT 2/1  Past Medical History Relevant to Rehab: COPD, HTN ,  PVD, Arthritis  Anemia, CAD,  DVT, HLD, lumbar stenosis ,  s/p laminectomy fusion Nov/2023.  s/p infections with I&D and IV antibiotics pt has JOSE drains .  Prior to Session Communication: Bedside nurse (cleared to see for therapy evals)  Patient Position Received: Bed, 3 rail up, Alarm on (pt has tele , wound vac)  General Comment: pt is a 67 yo female came to the hospital on 1/27 with reports of  AMS and sx wound infection.  pt was scheduled for I&D of lumbar wound 1/29 but refused . pt had wound vac placed and cont with medical management.  test/labs  CT head neg,  CT C spine  discitis and OM at L3-L4,  s/p changes lumbar spine,  CT T spine neg,  MRI  s/p changes spine,  discitis and OM L3-L4 MRI head : neg acute findings  showed mild supratential ventriculomegaly .    Home Living:  Home Living  Home Living Comments: Patient lives with  in a 1 story house with 1 MIKE. Tub shower without any DME. Owns a BSC. [facility].    Prior Level of Function:  Prior Function Per Pt/Caregiver Report  Prior Function Comments: Patient ambulates with a FWW. Independent with ADLs, family completes IADLs. Denies falls in the past 3 months. Patient does not drive.    Precautions:  Precautions  LE Weight Bearing Status: Weight Bearing as Tolerated  Medical Precautions: Fall precautions, Spinal precautions    Objective     Pain:  Pain Assessment  Pain Assessment: 0-10  Pain Score: 9  Pain Type: Acute pain  Pain Location: Back    Cognition:  Cognition  Overall Cognitive Status: Within Functional Limits  Orientation Level: Oriented X4    General Assessments:      Activity Tolerance  Endurance: Decreased tolerance for upright activites     Strength  Strength Comments: BLE 4-/5    Dynamic Sitting Balance  Dynamic Sitting-Comments: fair -  Dynamic Standing Balance  Dynamic Standing-Comments: fair -    Functional Assessments:     Bed Mobility  Bed Mobility: Yes  Bed Mobility 1  Bed Mobility 1: Supine to sitting, Sitting to supine, Scooting  Level of Assistance 1: Moderate assistance  Bed Mobility Comments 1: mod A x 2 (ed on log roll to EOB)  Transfers  Transfer: Yes  Transfer 1  Technique 1: Sit to stand, Stand to sit  Transfer Device 1: Walker  Transfer Level of Assistance 1: Moderate assistance, +2  Trials/Comments 1: cues to push up from bed and reach back with transfers  Ambulation/Gait Training  Ambulation/Gait Training Performed: Yes  Ambulation/Gait Training 1  Surface 1: Level tile  Device 1: Rolling walker  Assistance 1: Moderate assistance  Quality of Gait 1: Inconsistent stride length, Decreased step length, Antalgic  Comments/Distance (ft) 1: mod A x 2 with FWW 3 steps to head of bed shuffled gait    Extremity/Trunk Assessments:    RLE   RLE : Within Functional Limits  LLE   LLE : Within Functional Limits    Outcome Measures:  [facility] Basic Mobility  Turning from your back to your side while in a flat bed without using bedrails: A lot  Moving  from lying on your back to sitting on the side of a flat bed without using bedrails: A lot  Moving to and from bed to chair (including a wheelchair): A lot  Standing up from a chair using your arms (e.g. wheelchair or bedside chair): A lot  To walk in hospital room: A lot  Climbing 3-5 steps with railing: Total  Basic Mobility - Total Score: 11    Goals:  Encounter Problems       Encounter Problems (Active)       PT Problem       Pt will demonstrate SBA with bed mobility to edge of bed.         Start:  02/01/24    Expected End:  02/15/24            Pt will demonstrate SBA with sit to stand/chair transfers with FWW.         Start:  02/01/24    Expected End:  02/15/24            Pt will ambulate 50 feet with FWW SBA.         Start:  02/01/24    Expected End:  02/15/24            Pt to demo improved BLE strength by being able to complete supine/seated thera ex 2x20 BLEs with 4 or less rest breaks .         Start:  02/01/24    Expected End:  02/15/24               Pain - Adult            Education Documentation  Mobility Training, taught by Geo Hagen, PT at 2/1/2024  2:44 PM.  Learner: Patient  Readiness: Acceptance  Method: Explanation  Response: Verbalizes Understanding    Education Comments  No comments found.

## 2024-02-01 NOTE — PROGRESS NOTES
"Nutrition Follow Up Assessment:   Nutrition Assessment         Patient is a 68 y.o. female presenting with: altered mental status. Pt asleep, family at bedside. +Corpak but TF discontinued.       Nutrition History:  Energy Intake: Poor < 50 %  Food and Nutrient History: Diet advanced yesterday and TF discontinued. No documented meal intakes listed in EMR since diet advancement. Lunch tray at bedside, untouched - pt sleeping. Family at bedside was not there for breakfast and nurse does not know how much pt ate but pt had told the nurse she doesn't eat much anyway. Highly suspect pt is not meeting nutrition needs via diet alone. Will add ONS with meals. Per physician, pt had diarrhea with tube feeds. Pt is also on antibiotics and received several stool softeners/laxatives 1/29-1/30, many of which can impact stool consistency days later. Corpak still in place - recommend initiating PRN bolus feeds if pt consumes <50% of a good sized meal (protein, starch, vegetable or fruit).  Food Allergies/Intolerances:  None  GI Symptoms: Diarrhea - had several stool softeners/laxatives due to constipation on admission.   Oral Problems: None       Anthropometrics:  Height: 144 cm (4' 8.69\")   Weight: 69.1 kg (152 lb 5.4 oz)   BMI (Calculated): 33.32  IBW/kg (Dietitian Calculated): 38.6 kg          Weight History:     Weight Change %:  Weight History / % Weight Change: New wt 69.1 kg on 1/30 - up ~2 kg x 2 days.    Nutrition Focused Physical Exam Findings:  defer: pt sleeping  Subcutaneous Fat Loss:   Orbital Fat Pads: Defer  Muscle Wasting:  Temporalis: Defer  Edema:  Edema: none  Physical Findings:  Skin: Negative (lower back incision per nursing assessment)    Nutrition Significant Labs:    Reviewed   Nutrition Specific Medications:  Reviewed     I/O:   Last BM Date: 02/01/24; Stool Appearance: Watery (02/01/24 1017)        Dietary Orders (From admission, onward)       Start     Ordered    02/01/24 1245  Oral nutritional " supplements  Until discontinued        Comments: Chocolate   Question Answer Comment   Deliver with All meals    Select supplement: Mighty Shake        02/01/24 1244    01/31/24 1301  Adult diet Regular  Diet effective now        Question:  Diet type  Answer:  Regular    01/31/24 1300                     Estimated Needs:   Total Energy Estimated Needs (kCal): 1683 kCal  Method for Estimating Needs: 25 kcal/kg ABW  Total Protein Estimated Needs (g): 67 g  Method for Estimating Needs: 1 g/kg ABW  Total Fluid Estimated Needs (mL): 1683 mL  Method for Estimating Needs: 25 ml/kg ABW        Nutrition Diagnosis   Malnutrition Diagnosis  Patient has Malnutrition Diagnosis: Yes  Diagnosis Status: Ongoing  Malnutrition Diagnosis: Severe malnutrition related to chronic disease or condition  As Evidenced by: family reports of pt eating <75% of estimated needs x 2-3 months; 14% wt loss x 1 month            Nutrition Interventions/Recommendations         Nutrition Prescription:  Individualized Nutrition Prescription Provided for : Regular diet with ONS; enteral nutrition if consuming <50-75% of needs        Nutrition Interventions:   Food and/or Nutrient Delivery Interventions  Interventions: Enteral intake, Meals and snacks, Medical food supplement  Meals and Snacks: General healthful diet  Goal: Consumes 3 meals per day  Enteral Intake: Modify composition of enteral nutrition, Modify rate of enteral nutrition  Goal: If pt is not meeting >50-75% of estimated needs, recommend ordering PRN TF of Jevity 1.5 bolus feeds of 375 ml via Corpak - give if pt eats <50% of meal + supplement (each bolus provides 562 kcal, 24 g protein, and 285 ml free H2O). 50 ml flushes after each feed or per MD. If Corpak is not being utilized, flush with 30 ml water q6h to maintain tube patency.  Medical Food Supplement: Commercial beverage  Goal: Mighty Shake TID (220 kcal and 6 g protein per serving) chocolate    Coordination of Nutrition Care by a  Nutrition Professional  Collaboration and Referral of Nutrition Care: Collaboration by nutrition professional with other providers  Goal: Spoke with Priya RN; secure message sent to Dr. Ruiz    Nutrition Education:   Family with no questions related to nutrition at this time.        Nutrition Monitoring and Evaluation   Food/Nutrient Related History Monitoring  Monitoring and Evaluation Plan: Energy intake, Amount of food, Fluid intake  Energy Intake: Estimated energy intake  Criteria: Pt meets >75% of estimated energy needs  Fluid Intake: Estimated fluid intake  Criteria: fluid intake to meet >75% of estimated need  Amount of Food: Estimated amout of food, Medical food intake  Criteria: Pt consumes >75% of meals and supplements  Enteral and Parenteral Nutrition Intake: Enteral nutrition intake  Criteria: Tolerates bolus tube feeds    Body Composition/Growth/Weight History  Monitoring and Evaluation Plan: Weight  Weight: Measured weight  Criteria: Maintains stable weight    Biochemical Data, Medical Tests and Procedures  Monitoring and Evaluation Plan: Glucose/endocrine profile  Electrolyte and Renal Panel: Magnesium, Phosphorus, Potassium, Sodium  Criteria: Electrolytes WNL  Glucose/Endocrine Profile: Glucose, casual  Criteria: BG within desirable range    Nutrition Focused Physical Findings  Monitoring and Evaluation Plan: Digestive System  Digestive System: Diarrhea  Criteria: Improvement in GI symptoms       Time Spent/Follow-up Reminder:   Time Spent (min): 45 minutes  Last Date of Nutrition Visit: 02/01/24  Nutrition Follow-Up Needed?: 3-5 days  Follow up Comment: Mighty shake TID; bolus feeds?

## 2024-02-01 NOTE — PROGRESS NOTES
Infectious Disease Progress Note       2/1/2024    Patient is a followup regarding severe back pain persistent bilateral lower extremity weakness altered mentation elevated CRP with significant history of MRSA bacteremia, now status post hardware removal with no residual hardware, bilateral JOSE drains which have been removed, open wound postop deep incision, and altered mental status which has been steadily improving over the past few days    Her MRI of the spine is showing evidence of previous laminectomies L3-S1 levels, there is a rim-enhancing fluid surrounding the surgical drainage catheter along laminectomy bed and extensive evidence of signal abnormality and enhancement within the soft tissues along the laminectomy bed surrounding posterior paraspinal soft tissues and superficially along the surgical tract -unclear if this is a superimposed infectious process.  There is also an abnormal infiltrative signal and enhancement within the paraspinal soft tissues medial psoas muscle bilaterally on the right greater than the left extending from L3 into sacrum which also looks to be possibly infectious    MRI of the Brain showing evidence of mild disproportionate supratentorial ventriculomegaly, which is unchanged since the past CT of the head.  Possible normal pressure hydrocephalus.  Neurology is concerned about possible meningitis due to some additional signal abnormalities and has asked me to temporarily broaden coverage.    Patient's coverage was broadened to daptomycin ceftriaxone and rifampin with help of pharmacy  I had spoken to radiology downtown and they confirmed increased signal enhancement and bony destruction which is worse than approximately 27 days ago    Since broadening her treatment, within 24 hours her mentation seem to have improved, she is much more talkative, able to carry on a full conversation.  Her pain continues.  But she is following commands.  Unsure if this was due to the medication or  "just timing of her situation    Case discussed with her  over the phone and in person at length.  At this time, we are opting to continue her current regimen since she is showing improvement for the first time in \"a while\" per family    Her family is at bedside  Seen with wound care today Case discussed at length with wound care as well.       Lab Results   Component Value Date    WBC 12.6 (H) 01/31/2024    HGB 11.4 (L) 01/31/2024    HCT 36.4 01/31/2024    MCV 94 01/31/2024     01/31/2024     Lab Results   Component Value Date    GLUCOSE 188 (H) 02/01/2024    CALCIUM 8.7 02/01/2024     02/01/2024    K 3.1 (L) 02/01/2024    CO2 25 02/01/2024     (H) 02/01/2024    BUN 13 02/01/2024    CREATININE 0.66 02/01/2024       WBC trends are being monitored. Antibiotic doses are being adjusted per most recent renal labs.     Vitals:    02/01/24 0750   BP: 164/67   Pulse: 103   Resp: 16   Temp: 36.7 °C (98.1 °F)   SpO2: 98%     Patient is awake and alert, able to speak to me in complete sentences for the most part but goes to sleep during conversation with her family  Obese female no rashes, NAD  + NG tube for nutritional support  Neck supple  Heart S1S2  Chest: Equal expansion, bilaterally clear to auscultation  Abdomen: soft, ND, NTTP, no guarding  Extrem: no pain to palpation  Neuro: CNS intact  Affect appropriate and patient is interactive    Seen with wound care the wound looks pretty clean overall    Patient Active Problem List   Diagnosis    Abdominal aortic aneurysm (CMS/HCC)    Abnormal EKG    Bilateral carotid artery stenosis    Bruit of right carotid artery    CAD in native artery    Cardiomyopathy (CMS/HCC)    Chest pain    Chronic asthmatic bronchitis    Closed displaced fracture of fifth metatarsal bone    Current every day smoker    Difficulty breathing    Essential hypertension    History of total knee replacement    Hypokalemia    Intermittent claudication (CMS/HCC)    Knee " osteoarthritis    Knee pain    Limb pain    Localized swelling, mass, or lump of lower extremity    Celiac artery stenosis (CMS/HCC)    Mesenteric artery stenosis (CMS/HCC)    Mixed hyperlipidemia    Obese    PVD (peripheral vascular disease) (CMS/HCC)    Right foot pain    Swelling    Trigger finger    Urinary tract infection without hematuria    Back pain of lumbar region with sciatica    Back pain with radiculopathy    Intractable back pain    Seroma, post-traumatic (CMS/HCC)    Lumbar surgical wound fluid collection    Generalized weakness    Postoperative surgical complication involving musculoskeletal system associated with musculoskeletal procedure, unspecified complication    Back pain at L4-L5 level    Deep vein thrombosis (DVT) of right lower extremity (CMS/HCC)    Anemia    Altered mental status, unspecified altered mental status type    Surgical wound infection     Hx MRSA Bacteremia, now s/p hardware removal from spine  Persistent back pain  Diskitis with possible meningitis?- likely also secondary to MRSA given her history.  Unable to get tissue specimen or bone specimen at this point.  Unable to get lumbar puncture as well.  There were too many enhancing tissues that may cause further seeding.  Risk outweighs the benefit.  At this time choosing to treat empirically.  Either way she is clinically improving.  Surgical wound infection -wound is open but appears to be clean at this time  Acute metabolic encephalopathy  Speech deficit -much better  Polypharmacy    PLAN:  Continue daptomycin, x 4 weeks with possible extension to 8 weeks   Regarding her rifampin and her ceftriaxone, will plan to discontinue her ceftriaxone after 2 weeks.  Will likely discontinue her rifampin after 4 weeks  Case discussed with wound care at bedside extensively with family member present      Amy Celesitn,

## 2024-02-01 NOTE — PROGRESS NOTES
DAILY PROGRESS NOTE          Patient improving, more awake alert and conversing   Visit Vitals  /67 (BP Location: Left arm, Patient Position: Lying)   Pulse 103   Temp 36.7 °C (98.1 °F) (Temporal)   Resp 16      Temp (24hrs), Av.5 °C (97.7 °F), Min:36.2 °C (97.2 °F), Max:36.8 °C (98.2 °F)       Pain Control good  No chest pain or shortness of breath.  No calf pain    Exam:     Extremity shows neuro vascular status intact. Flexion and extension intact on extremity.  Calves soft and non-tender without evidence of DVT.  Wound vac to midline back wound in place and working appropriately  Drain sites to start dressing changes today with xeroform packing         Labs reviewed:  Recent Results (from the past 24 hour(s))   Creatine Kinase    Collection Time: 24 11:26 AM   Result Value Ref Range    Creatine Kinase 135 0 - 215 U/L   C-reactive protein    Collection Time: 24 11:26 AM   Result Value Ref Range    C-Reactive Protein 9.99 (H) <1.00 mg/dL   POCT GLUCOSE    Collection Time: 24  4:34 PM   Result Value Ref Range    POCT Glucose 130 (H) 74 - 99 mg/dL       I&O  I/O last 3 completed shifts:  In: 7280 (105.4 mL/kg) [I.V.:5449 (78.9 mL/kg); NG/GT:1631; IV Piggyback:200]  Out: 1100 (15.9 mL/kg) [Urine:1100 (0.4 mL/kg/hr)]  Weight: 69.1 kg       Assessment:    Patient continues on IV daptomycin, rifampin and ceftrioxone for possible meningitis. CSF fluid unable to be aspirated due to risk of infection. Patients altered mental status has improved since admission.   Midline incision wound vac in place       Plan:    Wound care per Dr. Reyes  IV antibiotics per ID  Continue pain control   Discharge planning: discharge disposition DARRYL Childress-CNP   2024 9:15 AM

## 2024-02-01 NOTE — PROGRESS NOTES
"Tara Tierney is a 68 y.o. female on day 5 of admission presenting with Altered mental status, unspecified altered mental status type.    Subjective   Patient seen and examined.  She is much more awake and alert, wants to get up and eat.  Still having some back pain no fevers or chills, no trouble breathing.       Objective     Physical Exam  Constitutional:       Appearance: She is ill-appearing.   HENT:      Head: Normocephalic and atraumatic.   Eyes:      Extraocular Movements: Extraocular movements intact.      Conjunctiva/sclera: Conjunctivae normal.   Cardiovascular:      Rate and Rhythm: Normal rate and regular rhythm.      Pulses: Normal pulses.   Pulmonary:      Effort: Pulmonary effort is normal.      Breath sounds: Normal breath sounds.   Musculoskeletal:      Cervical back: No tenderness.   Neurological:      Mental Status: She is alert.      Comments: Patient is awake and alert, she is answering questions appropriately, moving all 4 limbs       Last Recorded Vitals  Blood pressure 164/67, pulse 103, temperature 36.7 °C (98.1 °F), temperature source Temporal, resp. rate 16, height 1.44 m (4' 8.69\"), weight 69.1 kg (152 lb 5.4 oz), SpO2 98 %, not currently breastfeeding.  Intake/Output last 3 Shifts:  I/O last 3 completed shifts:  In: 7280 (105.4 mL/kg) [I.V.:5449 (78.9 mL/kg); NG/GT:1631; IV Piggyback:200]  Out: 1100 (15.9 mL/kg) [Urine:1100 (0.4 mL/kg/hr)]  Weight: 69.1 kg     Relevant Results                        Malnutrition Diagnosis Status: New  Malnutrition Diagnosis: Severe malnutrition related to chronic disease or condition  As Evidenced by: family reports of pt eating <75% of estimated needs x 2-3 months; 14% wt loss x 1 month  I agree with the dietitian's malnutrition diagnosis.      Assessment/Plan   Principal Problem:    Altered mental status, unspecified altered mental status type  Active Problems:    Surgical wound infection    this unfortunate 68-year-old female with past medical " history of discitis/osteomyelitis of lumbar vertebrae post spinal fusion surgery, deep incisional MRSA wound infection of lumbar spine, COPD, hypertension, peripheral vascular disease, DVT who was admitted with acute toxic metabolic encephalopathy most likely secondary to meningitis      Patient passed her swallow eval  Will discontinue NG tube and tube feeds  Restart oral diet  Mental status is back to baseline  ID is following  She is not a candidate for LP  Will need to be empirically treated for meningitis  She is already on Cubicin will need to continue ceftriaxone and rifampicin for 10 to 15 days  As per Ortho no plan of reexploration  Back pain is stable  Drain in place  Plastic surgery following  Will discontinue IV Dilaudid, start oral meds for pain  Started on baclofen for leg cramping  Replete potassium, we will recheck tomorrow  Continue Eliquis  Continue rest of her regular meds  Supportive care  DVT prophylaxis   PT OT has been ordered        Nitesh Ruiz MD

## 2024-02-01 NOTE — DISCHARGE SUMMARY
Discharge Diagnosis  Altered mental status, unspecified altered mental status type    Issues Requiring Follow-Up      Test Results Pending At Discharge  Pending Labs       Order Current Status    Tissue/Wound Culture/Smear Preliminary result            Hospital Course   83-year-old female with past medical history of dementia who was admitted with left hip fracture status post left hip arthroplasty, postop day 2 today.  She is awake and alert but confused, does have dementia at baseline.  I believe that might be her baseline mental status, no shortness of breath, no nausea, vomiting, pain is bearable in her hip.  Hemoglobin drop is acceptable.  Creatinine is stable.  Cleared for discharge by orthopedics.  Medically cleared for discharge as well.  Will send her to a rehab facility.    Pertinent Physical Exam At Time of Discharge  Physical Exam  HENT:      Head: Normocephalic and atraumatic.   Eyes:      Extraocular Movements: Extraocular movements intact.      Conjunctiva/sclera: Conjunctivae normal.   Cardiovascular:      Rate and Rhythm: Normal rate and regular rhythm.      Pulses: Normal pulses.   Pulmonary:      Effort: Pulmonary effort is normal.      Breath sounds: Normal breath sounds.   Musculoskeletal:      Cervical back: No tenderness.   Neurological:      Mental Status: She is alert.   Home Medications     Medication List      ASK your doctor about these medications     * apixaban 5 mg tablet; Commonly known as: Eliquis; Take 2 tablets (10   mg) by mouth 2 times a day for 2 days.   * apixaban 5 mg tablet; Commonly known as: Eliquis; Take 1 tablet (5 mg)   by mouth 2 times a day. Do not start before December 25, 2023.   aspirin 81 mg EC tablet   atenolol 50 mg tablet; Commonly known as: Tenormin   Atrovent HFA 17 mcg/actuation inhaler; Generic drug: ipratropium   baclofen 10 mg tablet; Commonly known as: Lioresal; Take half a tab 4   times daily as needed for muscle spasms for up to 10 days   brimonidine  0.2 % ophthalmic solution; Commonly known as: AlphaGAN   busPIRone 10 mg tablet; Commonly known as: Buspar   ceftaroline 600 mg injection; Commonly known as: Teflaro; Infuse 400 mg   into a venous catheter every 12 hours.   cholecalciferol 50 mcg (2,000 unit) capsule; Commonly known as: Vitamin   D-3   Claritin 10 mg tablet; Generic drug: loratadine   ClearShield Sodium Chlor Flush flush; Generic drug: sodium chloride 0.9%   cyclobenzaprine 5 mg tablet; Commonly known as: Flexeril; Take 1 tablet   (5 mg) by mouth 3 times a day as needed for muscle spasms.   ezetimibe 10 mg tablet; Commonly known as: Zetia   furosemide 20 mg tablet; Commonly known as: Lasix   HEPARIN (PORCINE) LOCK FLUSH IV   hydrALAZINE 50 mg tablet; Commonly known as: Apresoline   ibandronate 150 mg tablet; Commonly known as: Boniva   isosorbide mononitrate ER 60 mg 24 hr tablet; Commonly known as: Imdur   lactobacillus acidophilus tablet tablet; Take 1 tablet by mouth once   daily. Do not start before December 21, 2023.; Ask about: Should I take   this medication?   lidocaine 4 % patch; Place 1 patch over 12 hours on the skin once daily   for 10 days. Remove & discard patch within 12 hours or as directed by MD.   Do not start before January 19, 2024.; Ask about: Should I take this   medication?   LUTEIN ORAL   meclizine 25 mg tablet; Commonly known as: Antivert   morphine CR 15 mg 12 hr tablet; Commonly known as: MS Contin; Take 1   tablet (15 mg) by mouth every 12 hours. Do not crush, chew, or split.   mupirocin 2 % ointment; Commonly known as: Bactroban; Apply topically 2   times a day for 10 days.; Ask about: Should I take this medication?   nitroglycerin 0.4 mg SL tablet; Commonly known as: Nitrostat   oxyCODONE-acetaminophen 5-325 mg tablet; Commonly known as: Percocet;   Take 1 tablet by mouth every 6 hours if needed for severe pain (7 - 10)   for up to 7 days.   pantoprazole 40 mg EC tablet; Commonly known as: ProtoNix   potassium chloride  ER 10 mEq ER capsule; Commonly known as: Micro-K   pregabalin 75 mg capsule; Commonly known as: Lyrica   PreserVision AREDS-2 250-90-40-1 mg capsule; Generic drug: vit   C,R-Mu-ystfh-lutein-zeaxan   ProAir HFA 90 mcg/actuation inhaler; Generic drug: albuterol   Remeron 15 mg tablet; Generic drug: mirtazapine   Salagen (pilocarpine) 5 mg tablet; Generic drug: pilocarpine   sodium chloride 0.9% parenteral solution 50 mL with DAPTOmycin 50 mg/mL   recon soln 470 mg; Infuse 470 mg at 118.8 mL/hr over 30 minutes into a   venous catheter once every 24 hours.   tolterodine 1 mg tablet; Commonly known as: Detrol   * vancomycin IVPB; Commonly known as: Vancocin; Infuse 200 mL (1 g) at   200 mL/hr over 60 minutes into a venous catheter every 12 hours. CBC BMP   weekly Vanco level weekly CRP sed rate in 3 and 6 weeks Fax results to   [id number]; Ask about: Should I take this medication?   * vancomycin 1250 mg/250 mL IV; Commonly known as: Vancocin; Infuse 250   mL (1,250 mg) at 200 mL/hr over 75 minutes into a venous catheter once   every 24 hours.  * This list has 4 medication(s) that are the same as other medications   prescribed for you. Read the directions carefully, and ask your doctor or   other care provider to review them with you.       Outpatient Follow-Up  Future Appointments   Date Time Provider Department Center   3/14/2024  9:30 AM Hugo Barron MD [id number] [place]       Nitesh Ruiz MD

## 2024-02-02 ENCOUNTER — DOCUMENTATION (OUTPATIENT)
Dept: ORTHOPEDICS | Facility: HOSPITAL | Age: 69
End: 2024-02-02
Payer: COMMERCIAL

## 2024-02-02 ENCOUNTER — HOME INFUSION (OUTPATIENT)
Dept: INFUSION THERAPY | Age: 69
End: 2024-02-02
Payer: COMMERCIAL

## 2024-02-02 LAB
ERYTHROCYTE [DISTWIDTH] IN BLOOD BY AUTOMATED COUNT: 15.9 % (ref 11.5–14.5)
HCT VFR BLD AUTO: 31.8 % (ref 36–46)
HGB BLD-MCNC: 10.2 G/DL (ref 12–16)
HOLD SPECIMEN: NORMAL
MCH RBC QN AUTO: 29.7 PG (ref 26–34)
MCHC RBC AUTO-ENTMCNC: 32.1 G/DL (ref 32–36)
MCV RBC AUTO: 93 FL (ref 80–100)
NRBC BLD-RTO: 0 /100 WBCS (ref 0–0)
PLATELET # BLD AUTO: 233 X10*3/UL (ref 150–450)
RBC # BLD AUTO: 3.43 X10*6/UL (ref 4–5.2)
WBC # BLD AUTO: 7.7 X10*3/UL (ref 4.4–11.3)

## 2024-02-02 PROCEDURE — 99222 1ST HOSP IP/OBS MODERATE 55: CPT | Performed by: REGISTERED NURSE

## 2024-02-02 PROCEDURE — 99231 SBSQ HOSP IP/OBS SF/LOW 25: CPT | Performed by: PLASTIC SURGERY

## 2024-02-02 PROCEDURE — 2500000001 HC RX 250 WO HCPCS SELF ADMINISTERED DRUGS (ALT 637 FOR MEDICARE OP): Performed by: STUDENT IN AN ORGANIZED HEALTH CARE EDUCATION/TRAINING PROGRAM

## 2024-02-02 PROCEDURE — 1090000001 HH PPS REVENUE CREDIT

## 2024-02-02 PROCEDURE — 97530 THERAPEUTIC ACTIVITIES: CPT | Mod: GP,CQ

## 2024-02-02 PROCEDURE — 2500000001 HC RX 250 WO HCPCS SELF ADMINISTERED DRUGS (ALT 637 FOR MEDICARE OP): Performed by: REGISTERED NURSE

## 2024-02-02 PROCEDURE — 2500000004 HC RX 250 GENERAL PHARMACY W/ HCPCS (ALT 636 FOR OP/ED): Mod: JZ | Performed by: INTERNAL MEDICINE

## 2024-02-02 PROCEDURE — 99232 SBSQ HOSP IP/OBS MODERATE 35: CPT | Performed by: STUDENT IN AN ORGANIZED HEALTH CARE EDUCATION/TRAINING PROGRAM

## 2024-02-02 PROCEDURE — 97116 GAIT TRAINING THERAPY: CPT | Mod: GP,CQ

## 2024-02-02 PROCEDURE — 2500000001 HC RX 250 WO HCPCS SELF ADMINISTERED DRUGS (ALT 637 FOR MEDICARE OP): Performed by: INTERNAL MEDICINE

## 2024-02-02 PROCEDURE — 2500000004 HC RX 250 GENERAL PHARMACY W/ HCPCS (ALT 636 FOR OP/ED): Performed by: INTERNAL MEDICINE

## 2024-02-02 PROCEDURE — 1200000002 HC GENERAL ROOM WITH TELEMETRY DAILY

## 2024-02-02 PROCEDURE — 1090000002 HH PPS REVENUE DEBIT

## 2024-02-02 PROCEDURE — 85027 COMPLETE CBC AUTOMATED: CPT | Performed by: STUDENT IN AN ORGANIZED HEALTH CARE EDUCATION/TRAINING PROGRAM

## 2024-02-02 RX ORDER — FLUOXETINE 10 MG/1
10 CAPSULE ORAL DAILY
Status: DISCONTINUED | OUTPATIENT
Start: 2024-02-02 | End: 2024-02-07 | Stop reason: HOSPADM

## 2024-02-02 RX ADMIN — Medication 1 APPLICATION: at 21:28

## 2024-02-02 RX ADMIN — BACLOFEN 5 MG: 10 TABLET ORAL at 21:28

## 2024-02-02 RX ADMIN — ASPIRIN 81 MG: 81 TABLET, COATED ORAL at 08:50

## 2024-02-02 RX ADMIN — RIFAMPIN 300 MG: 300 CAPSULE ORAL at 21:28

## 2024-02-02 RX ADMIN — APIXABAN 5 MG: 5 TABLET, FILM COATED ORAL at 21:28

## 2024-02-02 RX ADMIN — ISOSORBIDE MONONITRATE 60 MG: 60 TABLET, EXTENDED RELEASE ORAL at 06:23

## 2024-02-02 RX ADMIN — DEXTROSE MONOHYDRATE 2 G: 5 INJECTION INTRAVENOUS at 01:18

## 2024-02-02 RX ADMIN — DAPTOMYCIN 500 MG: 500 INJECTION, POWDER, LYOPHILIZED, FOR SOLUTION INTRAVENOUS at 21:28

## 2024-02-02 RX ADMIN — FLUOXETINE 10 MG: 10 CAPSULE ORAL at 17:13

## 2024-02-02 RX ADMIN — DEXTROSE MONOHYDRATE 2 G: 5 INJECTION INTRAVENOUS at 12:25

## 2024-02-02 RX ADMIN — OXYCODONE HYDROCHLORIDE 5 MG: 5 TABLET ORAL at 12:24

## 2024-02-02 RX ADMIN — RIFAMPIN 300 MG: 300 CAPSULE ORAL at 08:50

## 2024-02-02 RX ADMIN — APIXABAN 5 MG: 5 TABLET, FILM COATED ORAL at 08:50

## 2024-02-02 RX ADMIN — BACLOFEN 5 MG: 10 TABLET ORAL at 08:51

## 2024-02-02 RX ADMIN — Medication 1 TABLET: at 08:51

## 2024-02-02 RX ADMIN — HYDRALAZINE HYDROCHLORIDE 50 MG: 50 TABLET ORAL at 21:28

## 2024-02-02 RX ADMIN — HYDRALAZINE HYDROCHLORIDE 50 MG: 50 TABLET ORAL at 08:51

## 2024-02-02 ASSESSMENT — COGNITIVE AND FUNCTIONAL STATUS - GENERAL
TURNING FROM BACK TO SIDE WHILE IN FLAT BAD: TOTAL
MOVING FROM LYING ON BACK TO SITTING ON SIDE OF FLAT BED WITH BEDRAILS: A LOT
DAILY ACTIVITIY SCORE: 6
EATING MEALS: TOTAL
CLIMB 3 TO 5 STEPS WITH RAILING: TOTAL
STANDING UP FROM CHAIR USING ARMS: TOTAL
DAILY ACTIVITIY SCORE: 10
CLIMB 3 TO 5 STEPS WITH RAILING: TOTAL
WALKING IN HOSPITAL ROOM: TOTAL
HELP NEEDED FOR BATHING: TOTAL
HELP NEEDED FOR BATHING: TOTAL
MOBILITY SCORE: 7
MOBILITY SCORE: 7
WALKING IN HOSPITAL ROOM: TOTAL
STANDING UP FROM CHAIR USING ARMS: TOTAL
DRESSING REGULAR LOWER BODY CLOTHING: TOTAL
WALKING IN HOSPITAL ROOM: TOTAL
MOVING TO AND FROM BED TO CHAIR: A LOT
STANDING UP FROM CHAIR USING ARMS: A LOT
DRESSING REGULAR UPPER BODY CLOTHING: TOTAL
MOVING TO AND FROM BED TO CHAIR: A LOT
CLIMB 3 TO 5 STEPS WITH RAILING: TOTAL
TOILETING: TOTAL
MOVING TO AND FROM BED TO CHAIR: TOTAL
PERSONAL GROOMING: A LITTLE
DAILY ACTIVITIY SCORE: 10
TOILETING: TOTAL
MOVING FROM LYING ON BACK TO SITTING ON SIDE OF FLAT BED WITH BEDRAILS: A LOT
PERSONAL GROOMING: A LITTLE
PERSONAL GROOMING: TOTAL
DRESSING REGULAR UPPER BODY CLOTHING: TOTAL
MOVING FROM LYING ON BACK TO SITTING ON SIDE OF FLAT BED WITH BEDRAILS: A LOT
TURNING FROM BACK TO SIDE WHILE IN FLAT BAD: A LOT
WALKING IN HOSPITAL ROOM: TOTAL
DRESSING REGULAR LOWER BODY CLOTHING: TOTAL
DRESSING REGULAR LOWER BODY CLOTHING: TOTAL
TURNING FROM BACK TO SIDE WHILE IN FLAT BAD: A LOT
EATING MEALS: A LITTLE
HELP NEEDED FOR BATHING: TOTAL
TOILETING: TOTAL
MOBILITY SCORE: 9
STANDING UP FROM CHAIR USING ARMS: TOTAL
MOVING FROM LYING ON BACK TO SITTING ON SIDE OF FLAT BED WITH BEDRAILS: A LOT
TURNING FROM BACK TO SIDE WHILE IN FLAT BAD: TOTAL
MOVING TO AND FROM BED TO CHAIR: TOTAL
MOBILITY SCORE: 10
EATING MEALS: A LITTLE
DRESSING REGULAR UPPER BODY CLOTHING: TOTAL
CLIMB 3 TO 5 STEPS WITH RAILING: TOTAL

## 2024-02-02 ASSESSMENT — PAIN SCALES - GENERAL
PAINLEVEL_OUTOF10: 8
PAINLEVEL_OUTOF10: 0 - NO PAIN

## 2024-02-02 ASSESSMENT — PAIN - FUNCTIONAL ASSESSMENT: PAIN_FUNCTIONAL_ASSESSMENT: 0-10

## 2024-02-02 ASSESSMENT — PAIN DESCRIPTION - DESCRIPTORS: DESCRIPTORS: ACHING;CRAMPING

## 2024-02-02 NOTE — PROGRESS NOTES
Per [facility] pt.  And spouse have now agreed to snf and are requesting [facility].  Pt. Will need iv antibiotics, woundvac and therapy.  Referral sent to [facility], awaiting response.  Pt. With [facility] will require ins. Precert.    Update:  per [facility] they have accepted pt. And will start ins. Precert.

## 2024-02-02 NOTE — PROGRESS NOTES
PATIENT DISCHARGED TO SNF FOR IV ABT AND WOUND CARE WITH WOUND VAC     May complete IV abt at home once wound vac no longer needed

## 2024-02-02 NOTE — CONSULTS
Chief Complaint  Postop deep incisional MRSA wound infection of lumbar spine no known past psych history and past medical history of COPD, hypertension, PVD, overactive bladder, and lumbar stenosis status post laminectomy/fusion with subsequent complication of surgical site infection who is brought to hospital by family due to concerns of confusion.  Patient reportedly underwent laminectomy/spinal fusion in November and developed increasing pain near surgical site in the postop period.  Following intervention patient was noted to develop surgical site infection with abscess formation with cultures positive for MRSA.  Patient has had multiple hospitalizations in time since with reported failure of IV vancomycin and daptomycin monotherapy in the past.  Patient treated for postop deep incisional MRSA wound infection of lumbar spine.  Patient initially also exhibiting signs of toxic metabolic encephalopathy.  Patient cognition has improved significantly she is now fully alert and oriented.    Psych consulted as family concerned that patient is suffering from trauma and subsequent depression and anxiety secondary to recent hospitalizations, surgical procedures, and acute pain.  There are some concern that this may be hindering her recovery.  Patient seen today with her  at the bedside.  Patient somewhat lethargic but cooperative.  She does acknowledge that she has been having difficulty since December 5 when she started to have complications from her first procedure.  It has been a very difficult time since then as she has been in the hospital multiple times, and has been dealing with acute pain and complications secondary to her surgery.  Patient denies any past psych history.  Denies any family history of psychiatric illness.  Patient reports that she sleeps well, does report lack of appetite, just got a feeding tube out today.  Patient reports that anytime the patient experiences pain she starts to worry and  "think back to her times in the ED when she was in excruciating pain.  He does feel that she has anxiety associated with these thoughts, patient denies anxious mood though she does seem depressed.  Does acknowledge lack of motivation at times, states she feels frustrated and feels like her \"life has been taken away\".  Patient did deny any thoughts to harm herself or anyone else.    History Of Present Illness  Tara Tierney is a 68 y.o. year old female patient with    Past Medical History  Past Medical History:   Diagnosis Date    Arthritis     Back pain     COPD (chronic obstructive pulmonary disease) (CMS/Formerly [facility])     COVID-19 vaccine administered     Eczema     History of COVID-19     2020    Hyperlipidemia     Hypertension     Hypoesthesia of skin     Hypesthesia    Macular degeneration     Meniere's disease     Osteoporosis     Overactive bladder     Pain in unspecified finger(s)     Finger pain    Pain in unspecified wrist     Pain in wrist joint    Peripheral vascular disease (CMS/Formerly [facility])     Personal history of other diseases of the circulatory system     History of coronary artery disease    Personal history of other diseases of the musculoskeletal system and connective tissue     History of arthritis    Personal history of other specified conditions     History of chest pain    Personal history of other specified conditions     History of balance disorder    Personal history of other specified conditions     History of numbness    Postmenopausal     Seasonal allergies     Unspecified visual loss     Vision problems       Past Psychiatric History:   Previous therapy: no  Previous psychiatric treatment and medication trials: no  Previous psychiatric hospitalizations: no  Previous diagnoses: no  Previous suicide attempts: no  History of violence: no      Allergies  Allergies   Allergen Reactions    Neomycin Other     swelling, redness, burning post eye surgery    Neomycin-Polymyxin B-Dexameth Other and Rash     " blisters, eye swelling, burning        MSE  General: Appropriately groomed and dressed.  Appearance: Appears stated age.  Attitude: Calm, cooperative.  Behavior: Appropriate eye contact.  Motor activity: No agitation or retardation. no EPS.  Normal gait.  Speech: Regular rate, rhythm, volume and tone.  Mood: Depressed  Affect: Flat  Thought process: Organized, linear, goal-directed.  Associations are logical.  Thought content: Does not endorse suicidal or homicidal ideation, no delusions elicited.  Thought perception: Did not endorse auditory or visual hallucinations.  Cognition: Alert, oriented x3.  no deficit in memory or attention.  Insight: Fair.  Judgment: Fair.    Psychiatric Risk Assessment  Violence Risk Assessment: none  Acute Risk of Harm to Others is Considered: low   Suicide Risk Assessment: chronic medical illness, chronic pain, and current psychiatric illness  Protective Factors against Suicide: adherence to  treatment, child-related concerns/living with children at home < 18 yrs, hopefulness/future orientation, positive family relationships, sense of responsibility toward family, and social support/connectedness  Acute Risk of Harm to Self is Considered: low    Last Recorded Vitals  Vitals:    02/02/24 1437   BP: 118/58   Pulse: 91   Resp:    Temp: 35.6 °C (96.1 °F)   SpO2: 95%        Relevant Results  Scheduled medications  apixaban, 5 mg, oral, BID  aspirin, 81 mg, oral, Daily  baclofen, 5 mg, oral, TID  cefTRIAXone, 2 g, intravenous, q12h  daptomycin, 8 mg/kg, intravenous, q24h EDE  hydrALAZINE, 50 mg, oral, BID  isosorbide mononitrate ER, 60 mg, oral, Daily  lactobacillus acidophilus, 1 tablet, oral, Daily  polyethylene glycol, 17 g, oral, Daily  psyllium, 1 packet, oral, Daily  rifAMPin, 300 mg, oral, BID  sennosides, 2 tablet, oral, Nightly  sennosides, 1 tablet, oral, Nightly      Continuous medications     PRN medications  PRN medications: acetaminophen **OR** acetaminophen **OR**  acetaminophen, alteplase, hydrALAZINE, menthol-zinc oxide, ondansetron, oxyCODONE, oxyCODONE     Results for orders placed or performed during the hospital encounter of 01/27/24 (from the past 96 hour(s))   BLOOD GAS ARTERIAL FULL PANEL   Result Value Ref Range    POCT pH, Arterial 7.56 (H) 7.38 - 7.42 pH    POCT pCO2, Arterial 32 (L) 38 - 42 mm Hg    POCT pO2, Arterial 81 (L) 85 - 95 mm Hg    POCT SO2, Arterial 98 94 - 100 %    POCT Oxy Hemoglobin, Arterial 96.2 94.0 - 98.0 %    POCT Hematocrit Calculated, Arterial 39.0 36.0 - 46.0 %    POCT Sodium, Arterial 142 136 - 145 mmol/L    POCT Potassium, Arterial 3.8 3.5 - 5.3 mmol/L    POCT Chloride, Arterial 110 (H) 98 - 107 mmol/L    POCT Ionized Calcium, Arterial 1.39 (H) 1.10 - 1.33 mmol/L    POCT Glucose, Arterial 136 (H) 74 - 99 mg/dL    POCT Lactate, Arterial 1.2 0.4 - 2.0 mmol/L    POCT Base Excess, Arterial 6.6 (H) -2.0 - 3.0 mmol/L    POCT HCO3 Calculated, Arterial 28.7 (H) 22.0 - 26.0 mmol/L    POCT Hemoglobin, Arterial 13.1 12.0 - 16.0 g/dL    POCT Anion Gap, Arterial 7 (L) 10 - 25 mmo/L    Patient Temperature      FiO2 21 %    Site of Arterial Puncture Radial Left     Paul's Test Positive    Ammonia   Result Value Ref Range    Ammonia 30 16 - 53 umol/L   Tissue/Wound Culture/Smear    Specimen: Wound/Tissue; Tissue/Biopsy   Result Value Ref Range    Tissue/Wound Culture/Smear No growth aerobically and anaerobically     Gram Stain (2+) Few Polymorphonuclear leukocytes     Gram Stain No organisms seen    Basic Metabolic Panel   Result Value Ref Range    Glucose 166 (H) 74 - 99 mg/dL    Sodium 146 (H) 136 - 145 mmol/L    Potassium 3.2 (L) 3.5 - 5.3 mmol/L    Chloride 111 (H) 98 - 107 mmol/L    Bicarbonate 25 21 - 32 mmol/L    Anion Gap 13 10 - 20 mmol/L    Urea Nitrogen 27 (H) 6 - 23 mg/dL    Creatinine 0.84 0.50 - 1.05 mg/dL    eGFR 75 >60 mL/min/1.73m*2    Calcium 9.4 8.6 - 10.3 mg/dL   CBC and Auto Differential   Result Value Ref Range    WBC 16.8 (H) 4.4 -  11.3 x10*3/uL    nRBC 0.0 0.0 - 0.0 /100 WBCs    RBC 3.95 (L) 4.00 - 5.20 x10*6/uL    Hemoglobin 11.6 (L) 12.0 - 16.0 g/dL    Hematocrit 36.6 36.0 - 46.0 %    MCV 93 80 - 100 fL    MCH 29.4 26.0 - 34.0 pg    MCHC 31.7 (L) 32.0 - 36.0 g/dL    RDW 15.7 (H) 11.5 - 14.5 %    Platelets 278 150 - 450 x10*3/uL    Neutrophils % 83.0 40.0 - 80.0 %    Immature Granulocytes %, Automated 0.6 0.0 - 0.9 %    Lymphocytes % 9.2 13.0 - 44.0 %    Monocytes % 7.1 2.0 - 10.0 %    Eosinophils % 0.0 0.0 - 6.0 %    Basophils % 0.1 0.0 - 2.0 %    Neutrophils Absolute 13.99 (H) 1.20 - 7.70 x10*3/uL    Immature Granulocytes Absolute, Automated 0.10 0.00 - 0.70 x10*3/uL    Lymphocytes Absolute 1.55 1.20 - 4.80 x10*3/uL    Monocytes Absolute 1.19 (H) 0.10 - 1.00 x10*3/uL    Eosinophils Absolute 0.00 0.00 - 0.70 x10*3/uL    Basophils Absolute 0.01 0.00 - 0.10 x10*3/uL   Magnesium   Result Value Ref Range    Magnesium 2.11 1.60 - 2.40 mg/dL   PST Top   Result Value Ref Range    Extra Tube Hold for add-ons.    SST TOP   Result Value Ref Range    Extra Tube Hold for add-ons.    CBC   Result Value Ref Range    WBC 12.6 (H) 4.4 - 11.3 x10*3/uL    nRBC 0.0 0.0 - 0.0 /100 WBCs    RBC 3.89 (L) 4.00 - 5.20 x10*6/uL    Hemoglobin 11.4 (L) 12.0 - 16.0 g/dL    Hematocrit 36.4 36.0 - 46.0 %    MCV 94 80 - 100 fL    MCH 29.3 26.0 - 34.0 pg    MCHC 31.3 (L) 32.0 - 36.0 g/dL    RDW 15.9 (H) 11.5 - 14.5 %    Platelets 260 150 - 450 x10*3/uL   Creatine Kinase   Result Value Ref Range    Creatine Kinase 135 0 - 215 U/L   C-reactive protein   Result Value Ref Range    C-Reactive Protein 9.99 (H) <1.00 mg/dL   POCT GLUCOSE   Result Value Ref Range    POCT Glucose 130 (H) 74 - 99 mg/dL   Basic metabolic panel   Result Value Ref Range    Glucose 188 (H) 74 - 99 mg/dL    Sodium 142 136 - 145 mmol/L    Potassium 3.1 (L) 3.5 - 5.3 mmol/L    Chloride 108 (H) 98 - 107 mmol/L    Bicarbonate 25 21 - 32 mmol/L    Anion Gap 12 10 - 20 mmol/L    Urea Nitrogen 13 6 - 23 mg/dL     Creatinine 0.66 0.50 - 1.05 mg/dL    eGFR >90 >60 mL/min/1.73m*2    Calcium 8.7 8.6 - 10.3 mg/dL   Lavender Top   Result Value Ref Range    Extra Tube Hold for add-ons.    SST TOP   Result Value Ref Range    Extra Tube Hold for add-ons.    CBC   Result Value Ref Range    WBC 7.7 4.4 - 11.3 x10*3/uL    nRBC 0.0 0.0 - 0.0 /100 WBCs    RBC 3.43 (L) 4.00 - 5.20 x10*6/uL    Hemoglobin 10.2 (L) 12.0 - 16.0 g/dL    Hematocrit 31.8 (L) 36.0 - 46.0 %    MCV 93 80 - 100 fL    MCH 29.7 26.0 - 34.0 pg    MCHC 32.1 32.0 - 36.0 g/dL    RDW 15.9 (H) 11.5 - 14.5 %    Platelets 233 150 - 450 x10*3/uL         Assessment/Plan   Patient is 68-year-old female with no known past psych history who underwent laminectomy/spinal fusion in November and developed increasing pain near surgical site in the postop period.  Patient has had multiple complications since his procedure, currently being treated for deep incisional MRSA wound infection of lumbar spine.  Psych consulted as patient has experienced significant trauma from these recent experiences.  Family reports that patient often feels very anxious anytime she experiences pain, and often ruminates on past episodes of severe pain that she is experienced.  PTSD will need to be ruled out for patient, she certainly exhibits symptoms of anxiety and depression.  Educated both patient and her  on the need for therapy to process this trauma moving forward.  Spoke to them at the [facility] who has walk-in appointments Monday through Friday, they state that patient's ambulation makes it difficult for her to physically go to appointments.  Recommend social work attempt to get the patient a telehealth appointment with the  therapy services.  Patient is agreeable to start Prozac 10 mg oral daily to help with anxiety/depression.    Impression:  Adjustment disorder with depressed and anxious mood, rule out PTSD    Recommendations:    -- Medications:           -Start Prozac 10 mg oral  daily    -- Recommend social work assist patient with setting up therapy services through  telehealth.    -- Discussed recommendations with primary team.

## 2024-02-02 NOTE — PROGRESS NOTES
Plastic Surgery Note    Afebrile, vital signs stable  Wound VAC in place in mid back wound  No periwound erythema  Impression open wound of back  For wound VAC change today

## 2024-02-02 NOTE — PROGRESS NOTES
"Tara Tierney is a 68 y.o. female on day 6 of admission presenting with Altered mental status, unspecified altered mental status type.    Subjective   Patient seen and examined.  She feels much better compared to yesterday, reports improvement in back pain, still complaining of some leg cramping.  No fever or chills, no nausea or vomiting.  She feels like her appetite has returned.       Objective     Physical Exam    Appearance: She is ill-appearing.   HENT:      Head: Normocephalic and atraumatic.   Eyes:      Extraocular Movements: Extraocular movements intact.      Conjunctiva/sclera: Conjunctivae normal.   Cardiovascular:      Rate and Rhythm: Normal rate and regular rhythm.      Pulses: Normal pulses.   Pulmonary:      Effort: Pulmonary effort is normal.      Breath sounds: Normal breath sounds.   Musculoskeletal:      Cervical back: No tenderness.   Neurological:      Mental Status: She is alert.   Last Recorded Vitals  Blood pressure (!) 192/81, pulse 100, temperature 35.8 °C (96.4 °F), resp. rate 18, height 1.44 m (4' 8.69\"), weight 69.1 kg (152 lb 5.4 oz), SpO2 96 %, not currently breastfeeding.  Intake/Output last 3 Shifts:  I/O last 3 completed shifts:  In: 2979 (43.1 mL/kg) [I.V.:1859 (26.9 mL/kg); NG/GT:1020; IV Piggyback:100]  Out: 3000 (43.4 mL/kg) [Urine:3000 (1.2 mL/kg/hr)]  Weight: 69.1 kg     Relevant Results              This patient has a central line   Reason for the central line remaining today? Parenteral medication                 Assessment/Plan   Principal Problem:    Altered mental status, unspecified altered mental status type  Active Problems:    Surgical wound infection    this unfortunate 68-year-old female with past medical history of discitis/osteomyelitis of lumbar vertebrae post spinal fusion surgery, deep incisional MRSA wound infection of lumbar spine, COPD, hypertension, peripheral vascular disease, DVT who was admitted with acute toxic metabolic encephalopathy most " likely secondary to meningitis       Clinically patient continues to do well  She is awake and alert nonfocal  On empiric treatment for meningitis with rifampicin, ceftriaxone and Cubicin  As per ID she will need to be on it for 4 more weeks  Wound VAC has been placed in the back  She will need to be discharged on it  Not a candidate for LP  As per Ortho no plan of reexploration  Back pain is stable  Plastic surgery is following, appreciate input  Started on baclofen for leg cramping  IV pain medications discontinued  Continue oral pain control  Continue Eliquis  Continue rest of her regular meds  Supportive care  PT OT  DVT prophylaxis  Patient wants to go home with home health care, we will discharge when ready      As per bedside nurse her p.o. intake has improved, will pull out the NG tube today           Nitesh Ruiz MD

## 2024-02-02 NOTE — PROGRESS NOTES
Physical Therapy    Physical Therapy Treatment    Patient Name: Tara Tierney  MRN: [id number]  Today's Date: 2/2/2024  Time Calculation  Start Time: 1259  Stop Time: 1343  Time Calculation (min): 44 min       Assessment/Plan   PT Assessment  PT Assessment Results: Decreased strength, Decreased endurance, Impaired balance, Decreased mobility, Decreased coordination, Pain, Orthopedic restrictions  Rehab Prognosis: Fair  Evaluation/Treatment Tolerance: Patient limited by fatigue, Patient limited by pain  End of Session Communication: Care Coordinator, Bedside nurse  Assessment Comment: pt with decreased mobility /gait strength balance and endurance . pt to benefit from skilled PT to address deficits and improve functional mobilty .  End of Session Patient Position: Bed, 3 rail up, Alarm off, not on at start of session     Treatment/Interventions: Bed mobility, Transfer training, Gait training, Therapeutic activity  PT Plan: Skilled PT  PT Frequency: 3 times per week  PT Discharge Recommendations: Moderate intensity level of continued care    PT Recommended Transfer Status: Assist x2, Assistive device    General Visit Information:   PT  Visit  PT Received On: 02/02/24  General  Reason for Referral: weakness / impaired mobility  Referred By: Joseph OT/PT 2/1  Past Medical History Relevant to Rehab: COPD, HTN ,  PVD, Arthritis  Anemia, CAD,  DVT, HLD, lumbar stenosis ,  s/p laminectomy fusion Nov/2023.  s/p infections with I&D and IV antibiotics pt has JOSE drains .  Family/Caregiver Present: Yes  Caregiver Feedback: daughter present and supportive (daughter states that she feels pt would benefit from SNF secondary to not moving muh at home and feels pt is very weak)  Prior to Session Communication: Bedside nurse (cleared to particpate)  Patient Position Received: Bed, 3 rail up, Alarm on (pt has tele , wound vac, purewick)  General Comment: pt is agreeable to participate, states that she plans to go home upon  discharge , stating that her spouse and brother assist and she s able to get to the bathroom when needed , states she has a purewick at home        Precautions:  Precautions  LE Weight Bearing Status: Weight Bearing as Tolerated  Medical Precautions: Fall precautions, Spinal precautions  Braces Applied: pt does not have TLSO in room  Precautions Comment: pt able to recall spina preuatios , however needs vc to avoid twisting when sitting and in supine, pt states it feels more comfotable    Vital Signs:     Objective     Pain:  Pain Assessment  Pain Assessment: 0-10  Pain Score: 8  Pain Type: Acute pain  Pain Location: Back (pt also c/o pain and weakness in B LE's, rates pain in LE's 8/10 as well)  Pain Descriptors: Aching, Cramping    Cognition:  Cognition  Overall Cognitive Status: Within Functional Limits  Orientation Level: Oriented X4        Activity Tolerance:  Activity Tolerance  Endurance: Decreased tolerance for upright activites    Treatments:           Bed Mobility  Bed Mobility: Yes  Bed Mobility 1  Bed Mobility 1: Supine to sitting, Sitting to supine, Scooting  Level of Assistance 1: Moderate assistance (vc for logrolling techique and to maintain spinal precautions, sit to supine with Max a, Pt able to scoot laterally and BW whle sitting EOB and able to repostion in bed with Min A)  Ambulation/Gait Training  Ambulation/Gait Training Performed: Yes  Ambulation/Gait Training 1  Surface 1: Level tile  Device 1: Rolling walker  Assistance 1: Moderate assistance  Quality of Gait 1: Inconsistent stride length, Decreased step length, Antalgic  Comments/Distance (ft) 1: pt able to take 5 to 6  steps at EOB with Min A x 2 and vc for posture , sequencing and to advance WW  Transfers  Transfer: Yes  Transfer 1  Technique 1: Sit to stand, Stand to sit  Transfer Level of Assistance 1: Moderate assistance, +2  Trials/Comments 1: transfers sit<--> stand with Mod A  x2 progressing to Mod A x 1,  pt required 2  attemts  "to complete , c/o pain and \"plops\" back into sitting, pt appears to feel more secure with assist x 2 vs assist x 1 (tolerated stitting EOB for 10 mn interval , with c/o increased pain , pt having wound vac changed whle siting EOB)          Outcome Measures:  [facility] Basic Mobility  Turning from your back to your side while in a flat bed without using bedrails: A lot  Moving from lying on your back to sitting on the side of a flat bed without using bedrails: A lot  Moving to and from bed to chair (including a wheelchair): A lot  Standing up from a chair using your arms (e.g. wheelchair or bedside chair): A lot  To walk in hospital room: Total  Climbing 3-5 steps with railing: Total  Basic Mobility - Total Score: 10  Education Documentation  No documentation found.  Education Comments  No comments found.        EDUCATION:  Outpatient Education  Individual(s) Educated: Patient, Child  Education Provided: Body Mechanics, Fall Risk, Home Safety, Post-Op Precautions  Patient Response to Education: Patient/Caregiver Verbalized Understanding of Information    Encounter Problems       Encounter Problems (Active)       PT Problem       Pt will demonstrate SBA with bed mobility to edge of bed.   (Progressing)       Start:  02/01/24    Expected End:  02/15/24            Pt will demonstrate SBA with sit to stand/chair transfers with FWW.   (Progressing)       Start:  02/01/24    Expected End:  02/15/24            Pt will ambulate 50 feet with FWW SBA.   (Progressing)       Start:  02/01/24    Expected End:  02/15/24            Pt to demo improved BLE strength by being able to complete supine/seated thera ex 2x20 BLEs with 4 or less rest breaks .   (Not Progressing)       Start:  02/01/24    Expected End:  02/15/24               Pain - Adult              "

## 2024-02-02 NOTE — PROGRESS NOTES
Removed wound vac dressing from lower back wound. Wound bed is beefy red and there is granulation seen base of wound. Minimal scattered slough tissue. Wound edges are beefy red, surrounding tissue intact. There is no fouls smell and no eschar tissue seen. Scant light pink tinge drainage noted. Irrigated with NSS and applied black foam base of wound. Set wound vac at 125 mmHg continuous. Removed xeroform packing from drain sites bilateral buttocks. Wound beds not well visualized. Irrigated with NSS and packed with xeroform dressing. Secured with Mepilex.  Wound Care Progress Note     Visit Date: 2/2/2024      Patient Name: Tara Tierney         MRN: [id number]                Reason for Visit: Wound Vac Dressing Change        Wound History: 3 months     Pertinent Labs:   Albumin   Date Value Ref Range Status   01/27/2024 3.8 3.4 - 5.0 g/dL Final           Wound Assessment:           NEW    NG/OG/Feeding Tube Right nostril 12 Fr. (Active)   Placement Date/Time: 01/29/24 1400   Placed by: Lissy Tim RN  Type of Tube: (c) Feeding Tube  Tube Length: 60 cm  Tube Location: Right nostril  Tube Size (Fr.): 12 Fr.   Number of days: 4       External Urinary Catheter Female (Active)   Placement Date/Time: 01/29/24 1030   External Catheter Type: Female   Number of days: 4     NG/OG/Feeding Tube Right nostril 12 Fr. (Active)   Tube Status Clamped 02/02/24 0400   Placement Verification Measurements 02/02/24 0400   Distal Tube Measurement 60 cm 01/31/24 1700   Site Assessment Clean;Dry 02/02/24 0808   Irrigant Tap water 01/31/24 1700   Response To Intervention No resistance met 01/29/24 1600   Tube Securement Nasal bridle 02/02/24 0400   Tube Feeding Frequency Continuous 01/31/24 1700   Tube Feeding Jevity 1.5 01/31/24 1700   Tube Feeding Strength Full strength 01/31/24 1700   Tube Feeding Method Continuous per pump 01/30/24 2000   Tube Feeding Bag Changed Yes 01/31/24 0630   Feeding Tube Flushed With Tap water 01/31/24 1700    Intake (mL) 404 mL 02/01/24 0555       External Urinary Catheter Female (Active)   External Catheter Status Changed 02/02/24 0400   Securement Method Other (Comment) 01/31/24 0630   Output (mL) 1300 mL 02/02/24 0400                   Wound 11/20/23 Incision Back Lower;Medial (Active)   Date First Assessed: 11/20/23   Present on Original Admission: No  Hand Hygiene Completed: Yes  Primary Wound Type: (c) Incision  Location: Back  Wound Location Orientation: Lower;Medial   Number of days: 74       Wound (Active)   No Date First Assessed or Time First Assessed found.     Number of days:      Wound 11/20/23 Incision Back Lower;Medial (Active)   Dressing Changed Changed 01/30/24 1437   Dressing Status Clean;Dry 02/02/24 0804       Wound (Active)                   Wound Team Plan: Continue With Current Dressing Changes     Young Vargas LPN  2/2/2024  2:29 PM

## 2024-02-02 NOTE — PROGRESS NOTES
Ortho/spine:    Follow up on pt with post op lumbar wound infection beingtreated with ABX for mrsa.  Pt alert and oriented x 3 and moving all extremities.  Some weakness noted in right quad 4/5 but otherwise motor intact in all muscle groups.  Neg homans and wound vac in place. Compared to yesterday, pt doing the same.  Pt still not OOB for ambulation        Plan:, discussed with family about increasing activity with PT.  Also discussed getting the pt to rehab facility to work on strenth and incresing ambulation so its safe to get pt home.  Advise DTaP she has to be able to get oob to go to bathroom and normal daily bodily functions    Will follow up with social workers and medicine

## 2024-02-02 NOTE — DISCHARGE INSTRUCTIONS
Patient has a follow up wound care appointment at [facility] which has already been scheduled for Thursday February 8th at 8:30 am with Dr. Reyes. Please arrive 20 minutes early for registration. If you can not make this appointment please call [phone] to reschedule.    Patient will need weekly CBC, BMP and CRP     Please follow up with Dr. Coombs in one week. An appointment has been scheduled for you and is located on the follow up section of your discharge.     Please continue to wear the brace with ambulation.

## 2024-02-02 NOTE — PROGRESS NOTES
Seen after therapy and after wound VAC was changed, the patient is fatigued and inattentive.     and daughter at bedside describe severe pain when the patient was seen in the emergency room several months ago and the hardware infection was first identified, they worry that she is experiencing posttraumatic stress disorder secondary to that event and that the patient may be overaggressive whenever she feels any kind of pain she has an unconscious need to guard against it and the need to avoid movement to provoke anything else because of the reminiscence of that experience.    The patient continues to show a tremendous clinical improvement with current treatments.  MRI brain was collected 1/30/24 and did not identify any leptomeningeal enhancement or other pathology, there is however dilatation of the ventricular system.  Patient is not in any distress, alert conversational.  No photophobia, no headache.      Visit Vitals  /58   Pulse 100   Temp 35.8 °C (96.4 °F)   Resp 18        Neurological Exam:  GENERAL APPEARANCE: No distress interactive and cooperative.     MENTAL STATE: Patient is drowsy and falls off to sleep during our conversation.    CRANIAL NERVES: normal  CN 2 Visual fields full to confrontation.   CN 3, 4, 6  Pupils round, equally reactive to light. No ptosis.EOMs normal alignment, full range with normal saccades, pursuit and convergence. No nystagmus.   CN 5 Facial sensation intact bilaterally.    CN 7 Normal and symmetric facial strength. Nasolabial folds symmetric.   CN 8 Hearing intact to finger rub bilaterally.   CN 9 Palate elevates symmetrically.    CN 11 Bilaterally normal strength of shoulder shrug and neck turning.   CN 12 Tongue midline, with normal bulk and strength; no fasciculations.     Motor examination reveals 5/5 strength in the uppers bilaterally, 4/5 hip flexion bilaterally.  Difficult to isolate knee flexion d/t pain, 5/5 bilaeral dorsiflexion and plantar  "flexion    Sensory: decreased sensation to light touch in the left lower, mild hyperstesia in the right lower.  Normal uppers bilaterally    Gait not testable due to pain and weakness      BMP:  Results from last 7 days   Lab Units 02/01/24  1016 01/30/24  1138 01/29/24  0403   SODIUM mmol/L 142 146* 143   POTASSIUM mmol/L 3.1* 3.2* 3.5   CHLORIDE mmol/L 108* 111* 105   CO2 mmol/L 25 25 27   BUN mg/dL 13 27* 29*   CREATININE mg/dL 0.66 0.84 0.89         CBC:  Results from last 7 days   Lab Units 02/02/24  0528 01/31/24  0526 01/30/24  1138   WBC AUTO x10*3/uL 7.7 12.6* 16.8*   RBC AUTO x10*6/uL 3.43* 3.89* 3.95*   HEMOGLOBIN g/dL 10.2* 11.4* 11.6*   HEMATOCRIT % 31.8* 36.4 36.6   MCV fL 93 94 93   MCH pg 29.7 29.3 29.4   MCHC g/dL 32.1 31.3* 31.7*   RDW % 15.9* 15.9* 15.7*   PLATELETS AUTO x10*3/uL 233 260 278         INR:        Lipid Profile:        No lab exists for component: \"LABVLDL\"    HgbA1C:        CMP:  Results from last 7 days   Lab Units 02/01/24  1016 01/30/24  1138 01/29/24  0403 01/28/24  0525 01/27/24  0452   SODIUM mmol/L 142 146* 143   < > 132*   POTASSIUM mmol/L 3.1* 3.2* 3.5   < > 3.0*   CHLORIDE mmol/L 108* 111* 105   < > 95*   CO2 mmol/L 25 25 27   < > 20*   BUN mg/dL 13 27* 29*   < > 22   CREATININE mg/dL 0.66 0.84 0.89   < > 0.91   GLUCOSE mg/dL 188* 166* 143*   < > 168*   CALCIUM mg/dL 8.7 9.4 10.2   < > 10.3   PROTEIN TOTAL g/dL  --   --   --   --  8.0   BILIRUBIN TOTAL mg/dL  --   --   --   --  0.8   ALK PHOS U/L  --   --   --   --  144*   AST U/L  --   --   --   --  17   ALT U/L  --   --   --   --  28    < > = values in this interval not displayed.         ABG:        No lab exists for component: \"PO2\", \"PCO2\", \"HCO3\", \"BE\", \"O2SAT\"    TSH:  No results found for: \"TSH\"  Lab Results   Component Value Date    MFUKYSAK64 322 01/05/2024     Lab Results   Component Value Date    FOLATE 5.1 01/05/2024     Lab Results   Component Value Date    VITD25 33 01/05/2024         MR brain w and wo IV " contrast    Result Date: 1/30/2024  Interpreted By:  Alexi Bass, STUDY: MR BRAIN W AND WO IV CONTRAST; ;  1/29/2024 7:34 pm   INDICATION: Signs/Symptoms:menengitis.   COMPARISON: CT head from 01/20/2024. MRI brain from 01/18/2016.   ACCESSION NUMBER(S): [id number]   ORDERING CLINICIAN: RENETTA TAYLOR   TECHNIQUE: MRI of the brain was performed with the acquisition of axial diffusion-weighted, axial T1, axial FLAIR, axial T2 gradient echo, axial T2 fat saturated, axial T1 fat saturated postcontrast sequence, and axial T1 volumetric post-contrast sequence with multiplanar reformats.   Contrast: 13.5 mL of Dotarem was injected intravenously.   FINDINGS: Evaluation is somewhat degraded due to patient motion.   There is no acute intracranial hemorrhage or infarct. There is no abnormal extra-axial fluid collection or mass effect.   On the FLAIR sequence, there is increased signal within sulci in the bilateral cerebral hemispheres, primarily along the periphery with sparing of the central sulci which is likely artifactual. No signal abnormality seen within the sulci on the other sequences.   There is stable mild disproportionate enlargement of the supratentorial ventricles compared the adjacent sulci and widening of the sylvian fissures in a pattern that can be seen with normal pressure hydrocephalus. Ventriculomegaly related to potential meningitis is favored less likely and ventricles are unchanged in size since the prior CT head. Ventricles have increased in size since the prior MRI brain from 2016, favored to be related to parenchymal volume loss.   There is no abnormal intracranial enhancement or mass.   There are confluent and scattered regions of T2 hyperintensity within the cerebral hemispheric white matter and robin which are probably sequela of chronic small vessel ischemic changes. There are 2 small foci of T2 hyperintensity located within the right cerebellum which probably reflect old infarcts.   Within  the left cerebellum, there is subtle T2 hyperintense signal (series 9, image 10 of 40) without abnormal enhancement or restricted diffusion which may be sequela of late subacute infarct. There is no volume loss.   Visualized paranasal sinuses and mastoid air cells are essentially clear.       Evaluation is somewhat degraded due to patient motion.   1. No acute intracranial abnormality or mass effect. No abnormal intracranial enhancement or mass.   2. Mild disproportionate supratentorial ventriculomegaly, unchanged since the recent CT head and in a pattern that is suggestive of normal pressure hydrocephalus. Appearance is not typical for an infectious process.   3. Chronic intracranial findings as above.   This study was interpreted at [facility].   MACRO: None   Signed by: Alexi Bass 1/30/2024 7:32 AM Dictation workstation:   [id number]      CT head wo IV contrast    Result Date: 1/27/2024  Interpreted By:  Joanne Cruz, STUDY: CT HEAD WO IV CONTRAST;  1/27/2024 6:21 am   INDICATION: Signs/Symptoms:AMS. eval for acute hemorrhage.   COMPARISON: 08/04/2009   ACCESSION NUMBER(S): [id number]   ORDERING CLINICIAN: LATOYA GUEVARA   TECHNIQUE: Examination was performed in the axial plane using soft tissue and bone algorithm.   FINDINGS: INTRACRANIAL: There is prominence of the ventricular system and cerebral sulci consistent with cerebral atrophy. There are periventricular hypodensities consistent with  moderate small vessel disease. No mass or mass effect is identified. There is no hemorrhage or subdural fluid collection. There is no acute infarct. There is no fracture of the calvarium   EXTRACRANIAL: Visualized paranasal sinuses and mastoids are clear.       No acute intracranial pathology.   MACRO: None   Signed by: Joanne Cruz 1/27/2024 7:03 AM Dictation workstation:   [id number]      MR lumbar spine w and wo IV contrast    Result Date: 1/28/2024  Interpreted By:  Lety  Yoav, STUDY: MR LUMBAR SPINE W AND WO IV CONTRAST;  1/28/2024 6:39 pm   INDICATION: Signs/Symptoms: Rule out abscess.   COMPARISON: 01/11/2024   ACCESSION NUMBER(S): [id number]   ORDERING CLINICIAN: FRANCESCA ORR   TECHNIQUE: Sagittal STIR, sagittal T2, sagittal T1, axial T2, axial T1, as well as post gadolinium sagittal axial T1 weighted MRI images of the lumbar spine were obtained. The patient received 13 mL of Dotarem gadolinium intravenously.   FINDINGS: Postoperative changes are again identified compatible with a previous posterior laminectomies at the L3 through S1 levels as well as discectomies at the L4/5 and L5/S1 levels. Metallic artifact from orthopedic hardware is identified along the disc spaces at the L4/5 and L5/S1 levels. There are surgical drainage catheter again noted along the laminectomy bed as well as more superficially along the surgical tract with the posterior paraspinal soft tissues. There is a minimal amount of rim enhancing fluid surrounding the surgical drainage catheter along laminectomy bed the sterility of which can not be ascertained on this MRI study.   There is again evidence of extensive infiltrative signal abnormality and enhancement within the soft tissues along the laminectomy bed and surrounding posterior paraspinal soft tissues extending superficially along the surgical tract which may be postsurgical in etiology although a superimposed infectious/inflammatory process could give a similar MRI appearance and must be considered.   There is again evidence of abnormal signal along the L3/4 disc space as well as within the adjacent bone marrow of the L3 and L4 vertebrae most compatible with underlying discitis/osteomyelitis. There has been mild interval bony destruction/collapse of the inferior L3 and to a lesser degree superior L4 vertebrae when compared with the prior study dated 01/11/2024. There is again evidence of approximately 4 mm of retrolisthesis of L3 on L4.    There is mild circumferential abnormal epidural thickening and enhancement within the spinal canal best appreciated extending from the L3 through S1 levels which may be postsurgical and/or infectious/inflammatory in origin.   There is abnormal infiltrative signal and enhancement within the paraspinal soft tissues/medial psoas muscles bilaterally right greater than left extending from the L3 level caudally into the sacral region likely infectious/inflammatory in origin.   The visualized spinal cord demonstrates no signal abnormality or abnormal enhancement within it. The conus medullaris is normally positioned terminating at the L1 level.   At the L5/S1 level,  there are postoperative changes compatible with a previous posterior laminectomy as well as discectomy. There is enhancing soft tissue noted along the laminectomy bed as well as enhancing epidural thickening and enhancement circumferentially surrounding the thecal sac within the spinal canal. There is no significant narrowing of the thecal sac within the spinal canal. There is infiltrative enhancing epidural soft tissue extending laterally into the right neural foramen. There is mild-to-moderate encroachment upon the left neural foramen.   At the L4/L5 level,  there are postoperative changes compatible with a previous posterior laminectomy as well as discectomy. There is enhancing soft tissue noted along the laminectomy bed as well as enhancing epidural thickening and enhancement circumferentially surrounding the thecal sac within the spinal canal. There is mild overall narrowing of the thecal sac within the spinal canal. There is infiltrative enhancing epidural soft tissue extending laterally into the right neural foramen. There is mild-to-moderate encroachment upon the left neural foramen.   At the L3/L4 level,  there are postoperative changes compatible with a previous L3 laminectomy. There is again evidence of approximately 4 mm of retrolisthesis of L3 on  L4. There is enhancing soft tissue noted along the laminectomy bed as well as enhancing epidural thickening and enhancement circumferentially surrounding the thecal sac within the spinal canal. There is no significant narrowing of the thecal sac within the spinal canal. There is bilateral neural foraminal narrowing with infiltrative enhancing epidural soft tissue extending laterally into the region of the neural foramen bilaterally.   At the L2/L3 level,  there are degenerative facet changes and a minimal posterior disc bulge without significant spinal canal narrowing. There is mild encroachment upon the inferior recesses of the neural foramen bilaterally.   At the L1/L2 level,  there is a minimal posterior disc bulge and mild degenerative facet changes contributing to very mild encroachment upon the spinal canal. There is mild encroachment upon the inferior recesses of the neural foramen left greater than right.   At the T12/L1 level,  there is no significant spinal canal stenosis or neuroforaminal stenosis.   At the T11/12 level, there is a minimal posterior disc bulge and mild degenerative facet changes without significant spinal canal or neural foraminal narrowing.         Postoperative changes are again identified compatible with a previous posterior laminectomies at the L3 through S1 levels as well as discectomies at the L4/5 and L5/S1 levels. Metallic artifact from orthopedic hardware is identified along the disc spaces at the L4/5 and L5/S1 levels. There are surgical drainage catheter again noted along the laminectomy bed as well as more superficially along the surgical tract with the posterior paraspinal soft tissues. There is a minimal amount of rim enhancing fluid surrounding the surgical drainage catheter along laminectomy bed the sterility of which can not be ascertained on this MRI study.   There is again evidence of extensive infiltrative signal abnormality and enhancement within the soft tissues  along the laminectomy bed and surrounding posterior paraspinal soft tissues extending superficially along the surgical tract which may be postsurgical in etiology although a superimposed infectious/inflammatory process could give a similar MRI appearance and must be considered.   There is again evidence of abnormal signal along the L3/4 disc space as well as within the adjacent bone marrow of the L3 and L4 vertebrae most compatible with underlying discitis/osteomyelitis. There has been mild interval bony destruction/collapse of the inferior L3 and to a lesser degree superior L4 vertebrae when compared with the prior study dated 01/11/2024. There is again evidence of approximately 4 mm of retrolisthesis of L3 on L4.   There is mild circumferential abnormal epidural thickening and enhancement within the spinal canal best appreciated extending from the L3 through S1 levels which may be postsurgical and/or infectious/inflammatory in origin.   There is abnormal infiltrative signal and enhancement within the paraspinal soft tissues/medial psoas muscles bilaterally right greater than left extending from the L3 level caudally into the sacral region likely infectious/inflammatory in origin.   There is multilevel spondylosis. There are varying degrees of spinal canal and neural foraminal narrowing as described above.   MACRO: None.   Signed by: Yoav Davidson 1/28/2024 7:27 PM Dictation workstation:   [id number]    ECG 12 lead    Result Date: 1/27/2024  Normal sinus rhythm Left bundle branch block Abnormal ECG When compared with ECG of 02-JAN-2024 07:11, Questionable change in QRS duration See ED provider note for full interpretation and clinical correlation Confirmed by Bridger Erickson ([id number]) on 1/27/2024 12:29:08 PM    CT chest abdomen pelvis w IV contrast    Result Date: 1/27/2024  STUDY: CT Chest, Abdomen, and Pelvis with IV Contrast; 01/27/2024 6:37 AM INDICATION: Generalized abdominal pain.  Recent spine surgery and  IVC filter. Evaluate for free air/fluid. COMPARISON: XR abdomen 01/15/2024.  CT AP 12/31/2023, 12/19/2023. ACCESSION NUMBER(S): [id number] ORDERING CLINICIAN: Osei Swann TECHNIQUE: CT of the chest, abdomen, and pelvis was performed.  Contiguous axial images were obtained at 3 mm slice thickness through the chest, abdomen, and pelvis.  Coronal and sagittal reconstructions at 3 mm slice thickness were performed.  Omnipaque 350 75 mL was administered intravenously.  FINDINGS: CHEST: MEDIASTINUM: Right PICC appears satisfactorily positioned extending to the right atrium.  The heart is normal in size without pericardial effusion. Central vascular structures opacify normally.  No thyroid nodule.  No identifiable breast mass.  LUNGS/PLEURA: There is no pleural effusion, pleural thickening, or pneumothorax. Minimal dependent atelectasis at the lung bases. LYMPH NODES: Thoracic lymph nodes are not enlarged. ABDOMEN:  LIVER: No hepatomegaly.  Smooth surface contour.  Normal attenuation.  BILE DUCTS: No intrahepatic or extrahepatic biliary ductal dilatation.  GALLBLADDER: Gallbladder is surgically absent. STOMACH: No abnormalities identified.  PANCREAS: No masses or ductal dilatation.  SPLEEN: No splenomegaly or focal splenic lesion.  ADRENAL GLANDS: No thickening or nodules.  KIDNEYS AND URETERS: Kidneys are normal in size and location.  No renal or ureteral calculi.  PELVIS:  BLADDER: Stauffer catheter present.  Mild wall thickening of the urinary bladder may simply be related to incomplete distention.  Large quantity of stool in the rectum suggests constipation versus impaction.  REPRODUCTIVE ORGANS: No abnormalities identified.  BOWEL: No abnormalities identified.  Appendix appears normal.  VESSELS: No abnormalities identified.  IVC filter appears appropriately positioned.  Abdominal aorta is normal in caliber.  PERITONEUM/RETROPERITONEUM/LYMPH NODES: No free fluid.  No pneumoperitoneum. No lymphadenopathy.  ABDOMINAL  WALL: No abnormalities identified. SOFT TISSUES: No abnormalities identified.  BONES: Mildly exaggerated thoracic kyphosis with mild to moderate degenerative disease. There are postsurgical changes of the lumbar spine.  Prosthetic discs at L4-5 and L5-S1 are in similar position when compared to the prior CT.  Two opaque drains at the surgical site are again noted. Subcutaneous emphysema noted along the course of the drains.  Presumed phlegmon noted at the surgical location.  No drainable collection. Since the prior study, there has been erosive or destructive changes of the inferior endplate of L3 and to a lesser extent the superior endplate of L4.  The findings would be strongly suspicious for discitis osteomyelitis.  No acute fracture or aggressive osseous lesion.    CT CHEST: 1.  No CT evidence of pulmonary embolism. 2.  No pulmonary mass, nodule or consolidation.  No pleural effusion. No pneumothorax.  No thoracic adenopathy. CT ABDOMEN AND PELVIS: 1.  Postsurgical changes of the lumbar spine again noted.  There are two indwelling drains superficially unchanged from 12/31/2023.  The prosthetic discs at L4-5 and L5-S1 appears satisfactorily positioned, unchanged.  However, there is been interval destructive endplate changes along the inferior aspect of L3 and to a lesser extent the superior endplate of L4.  The findings would be suspicious for discitis/osteomyelitis. 2.  No ascites, free air or bowel obstruction. 3.  No urinary tract calculus or hydronephrosis. 4.  IVC filter appears satisfactorily positioned. Signed by Joshua Alfonso MD    CT lumbar spine wo IV contrast    Result Date: 1/27/2024  Interpreted By:  Joanne Cruz, STUDY: CT LUMBAR SPINE WO IV CONTRAST; CT THORACIC SPINE WO IV CONTRAST 1/27/2024 6:22 am   INDICATION: Signs/Symptoms:recent post op. 2 JOSE drains. the right paraspinal appears infected. eval for fluid collection   COMPARISON: MRI lumbar spine 01/11/2024   ACCESSION NUMBER(S):  [id number]; [id number]   ORDERING CLINICIAN: LATOYA GUEVARA   TECHNIQUE: Axial CT images of the lumbar spine are obtained. Axial, coronal and sagittal reconstructions are provided for review.   FINDINGS: CT THORACIC SPINE: Alignment: Within normal limits. There is degenerative change with mild-to-moderate anterior osteophytic spurring at all levels particularly the midthoracic spine.   Vertebrae/Disc Spaces:   The vertebral body heights are intact. The disc spaces are preserved.   There is a benign calcified right upper lobe granuloma.   CT LUMBAR SPINE: There are disc spacers at L4-5 and L5-S1 in good position. Status post decompression laminectomies L3-L5. There is bone grafting material along the right and left lateral aspects of the mid and lower lumbar spine. There are 2 drains. There is a drain along the inferior aspect of the back. This courses superiorly and the tip is in the soft tissues posterior to L1. There is a 2nd drain along the inferior aspect of the back. This courses superiorly and the tip is in the soft tissues posterior to L1. There is induration of the soft tissues along the back and induration of the paraspinal muscles. There is a 2.5 x 2.0 cm x 1.8 cm ill-defined fluid collection in the paraspinal muscles of the back at the level of L4-5 just to the right of midline. This could be a true fluid collection or induration of the paraspinal muscles. There is no discrete enhancing wall. Findings could represent a abscess, postoperative seroma or liquified hematoma. There is no air in this fluid. This is not along the course of one of the drains. There is ghost artifact in the pedicles of L4, L5 and S1 from prior hardware which has been removed.   Alignment: There is 5 mm retrolisthesis of L3 with respect to L4.   Vertebrae/Disc Spaces:  There is destruction and irregularity of the inferior endplate of L3 and superior endplate of L4 consistent with diskitis and adjacent osteomyelitis.   T12-L1:   "There is no significant central canal stenosis.   L1-2:  There is no significant central canal or neural foraminal stenosis.   L2-3:  There is no significant central canal or neural foraminal stenosis.   L3-4: There is destruction of the inferior endplate of L3 and superior endplate of L4. There is irregularity of the bone. There is \"widening\" of the disc space. Findings are consistent with discitis and adjacent osteomyelitis. There is a fracture of the right pedicle of L4.   L4-5:  There is no significant central canal or neural foraminal stenosis.   L5-S1:  There is no significant central canal or neural foraminal stenosis.   Prevertebral/Paraspinal Soft Tissues: The prevertebral and paraspinal soft tissues are unremarkable.   Status post cholecystectomy. There is an inferior vena cava filter inferior to the renal veins       1. Degenerative change thoracic spine. No central canal stenosis or neural foraminal compromise 2. Diskitis and adjacent osteomyelitis L3-4. 3. 2.5 x 2.0 x 1.8 cm fluid collection in the paraspinal muscles of the back just to the right of midline. This contains no air. This is ill-defined and of question of whether this is a true discrete round fluid collection or ill definition and induration of the paraspinal muscles. No discrete enhancing wall is seen. Findings could represent an abscess, postoperative seroma or liquified hematoma. This is not along the course of one of the drains. 4. Nondisplaced fracture right pedicle L4. 5. Disc spacers L4-5 and L5-S1. Status post decompression laminectomies L3-L5. There is bone grafting material along the right and left lateral aspects of the mid and lower lumbar spine. There is ghost artifact in the pedicles of L4, L5 and S1 which represents hardware which has been removed. There are 2 drains in the soft tissues of the back.   MACRO: None   Signed by: Joanne Cruz 1/27/2024 7:22 AM Dictation workstation:   [id number]    CT thoracic spine wo IV " contrast    Result Date: 1/27/2024  Interpreted By:  Joanne Cruz, STUDY: CT LUMBAR SPINE WO IV CONTRAST; CT THORACIC SPINE WO IV CONTRAST 1/27/2024 6:22 am   INDICATION: Signs/Symptoms:recent post op. 2 JOSE drains. the right paraspinal appears infected. eval for fluid collection   COMPARISON: MRI lumbar spine 01/11/2024   ACCESSION NUMBER(S): [id number]; [id number]   ORDERING CLINICIAN: LATOYA GUEVARA   TECHNIQUE: Axial CT images of the lumbar spine are obtained. Axial, coronal and sagittal reconstructions are provided for review.   FINDINGS: CT THORACIC SPINE: Alignment: Within normal limits. There is degenerative change with mild-to-moderate anterior osteophytic spurring at all levels particularly the midthoracic spine.   Vertebrae/Disc Spaces:   The vertebral body heights are intact. The disc spaces are preserved.   There is a benign calcified right upper lobe granuloma.   CT LUMBAR SPINE: There are disc spacers at L4-5 and L5-S1 in good position. Status post decompression laminectomies L3-L5. There is bone grafting material along the right and left lateral aspects of the mid and lower lumbar spine. There are 2 drains. There is a drain along the inferior aspect of the back. This courses superiorly and the tip is in the soft tissues posterior to L1. There is a 2nd drain along the inferior aspect of the back. This courses superiorly and the tip is in the soft tissues posterior to L1. There is induration of the soft tissues along the back and induration of the paraspinal muscles. There is a 2.5 x 2.0 cm x 1.8 cm ill-defined fluid collection in the paraspinal muscles of the back at the level of L4-5 just to the right of midline. This could be a true fluid collection or induration of the paraspinal muscles. There is no discrete enhancing wall. Findings could represent a abscess, postoperative seroma or liquified hematoma. There is no air in this fluid. This is not along the course of one of the drains. There is  "ghost artifact in the pedicles of L4, L5 and S1 from prior hardware which has been removed.   Alignment: There is 5 mm retrolisthesis of L3 with respect to L4.   Vertebrae/Disc Spaces:  There is destruction and irregularity of the inferior endplate of L3 and superior endplate of L4 consistent with diskitis and adjacent osteomyelitis.   T12-L1:  There is no significant central canal stenosis.   L1-2:  There is no significant central canal or neural foraminal stenosis.   L2-3:  There is no significant central canal or neural foraminal stenosis.   L3-4: There is destruction of the inferior endplate of L3 and superior endplate of L4. There is irregularity of the bone. There is \"widening\" of the disc space. Findings are consistent with discitis and adjacent osteomyelitis. There is a fracture of the right pedicle of L4.   L4-5:  There is no significant central canal or neural foraminal stenosis.   L5-S1:  There is no significant central canal or neural foraminal stenosis.   Prevertebral/Paraspinal Soft Tissues: The prevertebral and paraspinal soft tissues are unremarkable.   Status post cholecystectomy. There is an inferior vena cava filter inferior to the renal veins       1. Degenerative change thoracic spine. No central canal stenosis or neural foraminal compromise 2. Diskitis and adjacent osteomyelitis L3-4. 3. 2.5 x 2.0 x 1.8 cm fluid collection in the paraspinal muscles of the back just to the right of midline. This contains no air. This is ill-defined and of question of whether this is a true discrete round fluid collection or ill definition and induration of the paraspinal muscles. No discrete enhancing wall is seen. Findings could represent an abscess, postoperative seroma or liquified hematoma. This is not along the course of one of the drains. 4. Nondisplaced fracture right pedicle L4. 5. Disc spacers L4-5 and L5-S1. Status post decompression laminectomies L3-L5. There is bone grafting material along the right " and left lateral aspects of the mid and lower lumbar spine. There is ghost artifact in the pedicles of L4, L5 and S1 which represents hardware which has been removed. There are 2 drains in the soft tissues of the back.   MACRO: None   Signed by: Joanne Cruz 1/27/2024 7:22 AM Dictation workstation:   [id number]    CT head wo IV contrast    Result Date: 1/27/2024  Interpreted By:  Joanne Cruz, STUDY: CT HEAD WO IV CONTRAST;  1/27/2024 6:21 am   INDICATION: Signs/Symptoms:AMS. eval for acute hemorrhage.   COMPARISON: 08/04/2009   ACCESSION NUMBER(S): [id number]   ORDERING CLINICIAN: LATOYA GUEVARA   TECHNIQUE: Examination was performed in the axial plane using soft tissue and bone algorithm.   FINDINGS: INTRACRANIAL: There is prominence of the ventricular system and cerebral sulci consistent with cerebral atrophy. There are periventricular hypodensities consistent with  moderate small vessel disease. No mass or mass effect is identified. There is no hemorrhage or subdural fluid collection. There is no acute infarct. There is no fracture of the calvarium   EXTRACRANIAL: Visualized paranasal sinuses and mastoids are clear.       No acute intracranial pathology.   MACRO: None   Signed by: Joanne Cruz 1/27/2024 7:03 AM Dictation workstation:   [id number]      EMG     No EMG results found for the past 12 months        EEG    Result Date: 1/30/2024  IMPRESSION Impression This routine EEG is indicative of a moderate diffuse encephalopathy. No epileptiform discharges or lateralizing signs are recorded. A full report will be scanned into the patient's chart at a later time. This report has been interpreted and electronically signed by       Current Facility-Administered Medications:     acetaminophen (Tylenol) tablet 650 mg, 650 mg, oral, q4h PRN **OR** acetaminophen (Tylenol) oral liquid 650 mg, 650 mg, nasogastric tube, q4h PRN **OR** acetaminophen (Tylenol) suppository 650 mg, 650 mg, rectal, q4h PRN, Raul  MD Ariel    alteplase (Cathflo Activase) injection 1 mg, 1 mg, intra-catheter, PRN, Nitesh Ruiz MD, 1 mg at 02/01/24 1431    apixaban (Eliquis) tablet 5 mg, 5 mg, oral, BID, Raul George MD, 5 mg at 02/02/24 0850    aspirin EC tablet 81 mg, 81 mg, oral, Daily, Raul George MD, 81 mg at 02/02/24 0850    baclofen (Lioresal) tablet 5 mg, 5 mg, oral, TID, Nitesh Ruiz MD, 5 mg at 02/02/24 0851    cefTRIAXone (Rocephin) 2 g in dextrose 5 % 50 mL IV, 2 g, intravenous, q12h, Amy Celestin DO, Stopped at 02/02/24 1255    DAPTOmycin (Cubicin) 500 mg in sodium chloride 0.9% 50 mL IV, 8 mg/kg, intravenous, q24h EDE, Pete Echeverria MD, Stopped at 02/01/24 2121    hydrALAZINE (Apresoline) injection 10 mg, 10 mg, intravenous, q4h PRN, Sunshine Lange MD, 10 mg at 01/29/24 0025    hydrALAZINE (Apresoline) tablet 50 mg, 50 mg, oral, BID, Raul George MD, 50 mg at 02/02/24 0851    isosorbide mononitrate ER (Imdur) 24 hr tablet 60 mg, 60 mg, oral, Daily, Raul George MD, 60 mg at 02/02/24 0623    lactobacillus acidophilus tablet 1 tablet, 1 tablet, oral, Daily, Raul George MD, 1 tablet at 02/02/24 0851    menthol-zinc oxide (Calmoseptine - Risamine) 0.44-20.6 % ointment 1 Application, 1 Application, Topical, 4x daily PRN, Nitesh Ruiz MD, 1 Application at 01/31/24 1114    ondansetron (Zofran) injection 4 mg, 4 mg, intravenous, q6h PRN, Raul George MD, 4 mg at 02/01/24 1640    oxyCODONE (Roxicodone) immediate release tablet 10 mg, 10 mg, oral, q6h PRN, Nitesh Ruiz MD    oxyCODONE (Roxicodone) immediate release tablet 5 mg, 5 mg, oral, q6h PRN, Nitesh Ruiz MD, 5 mg at 02/02/24 1224    polyethylene glycol (Glycolax, Miralax) packet 17 g, 17 g, oral, Daily, Raul George MD, 17 g at 01/29/24 1546    psyllium (Metamucil) 1 packet, 1 packet, oral, Daily, Raul George MD    rifAMPin (Rifadin) capsule 300 mg, 300 mg, oral, BID, Amy Celestin DO, 300 mg at 02/02/24 0850    sennosides (Senokot)  tablet 17.2 mg, 2 tablet, oral, Nightly, Raul George MD, 17.2 mg at 02/01/24 2039    sennosides (Senokot) tablet 8.6 mg, 1 tablet, oral, Nightly, Raul George MD, 8.6 mg at 02/01/24 2100     Assessment:  Acute encephalopathy in the setting of MRSA wound infection after lumbar surgery.  Recent increase in the opiate medication which may have triggered the encephalopathy however there is leukocytosis and MRI of the lumbar spine showing L3-L4 discitis and epidural thickening and enhancement L3-S1, partially treated meningitis is a concern.      Recommendation:  -Family has raise concern for PTSD as a barrier for the patient's recovery, they worry that whenever she experiences brief pain that it triggers a panic response as she recalls what her initial presentation with the infection was.  Will consult Dr Baird in psychiatry for recommendations for initial supportive measures for the patient.  -We will restart baclofen slowly 2/2/24  -Continue empiric coverage for meningitis given her clinical improvement  -Accessing CSF at this point is a greater risk to the patient then would be clinically beneficial, superior levels (cervical tap suboccipital tap) are not available.  This was reviewed with the patient and family who expressed understanding and agreement.  -Will collect carotid Dopplers as the patient's surveillance has come due, Right 50-69% left less than 50%  -MRI of the brain with contrast to evaluate dural enhancement showed hydrocephalus, no leptomeningeal enhancement however there was a motion degraded component according to the radiologist report.  - ABG ultimately demonstrated significant alkalosis that appears to be mixed respiratory and metabolic the pH of 7.56.  -Ideally CSF testing would be beneficial however, one would of course not want to introduce infection by cannulizing again area (levels L3/4 and L4/5 show fluid collections) actively contaminated with MRSA and introducing that to the dura.    -Had  extensive discussion with infectious disease and the patient has shown clinical improvement after additional coverage for meningitis  -routine EEG 1/30 showed diffuse slowing  -Thyroid studies in fact had been collected and were normal.  -Advance diet and activity as able  -will follow while admitted

## 2024-02-02 NOTE — PROGRESS NOTES
Patient is doing better, eating as much as she can by mouth, so corpac will be removed today. Discussing wound vac with wound care nurse Young for home discharge, he is having insurance benefits being ran for home vac. Will wait for insurance response, as patient has to have wound vac for healing. Will continue to follow, if wound vac covered, plan may be discharge as early as tomorrow home with  Home care resumed for IV antibiotics and wound care.

## 2024-02-02 NOTE — PROGRESS NOTES
DAILY PROGRESS NOTE          Patient doing well  Visit Vitals  /58   Pulse 100   Temp 35.8 °C (96.4 °F)   Resp 18      Temp (24hrs), Av.6 °C (97.8 °F), Min:35.8 °C (96.4 °F), Max:37.4 °C (99.3 °F)       Pain Control good  No chest pain or shortness of breath.  No calf pain    Exam:   Extremity shows neuro vascular status intact. Flexion and extension intact on extremity.  Calves soft and non-tender without evidence of DVT.  Patient has good bilateral lower extremity sensation   Bilateral lower extremity strength 4-5/5. Decreased strength most likely to decreased mobility and poor caloric intake.         Labs reviewed:  Recent Results (from the past 24 hour(s))   Basic metabolic panel    Collection Time: 24 10:16 AM   Result Value Ref Range    Glucose 188 (H) 74 - 99 mg/dL    Sodium 142 136 - 145 mmol/L    Potassium 3.1 (L) 3.5 - 5.3 mmol/L    Chloride 108 (H) 98 - 107 mmol/L    Bicarbonate 25 21 - 32 mmol/L    Anion Gap 12 10 - 20 mmol/L    Urea Nitrogen 13 6 - 23 mg/dL    Creatinine 0.66 0.50 - 1.05 mg/dL    eGFR >90 >60 mL/min/1.73m*2    Calcium 8.7 8.6 - 10.3 mg/dL   Lavender Top    Collection Time: 24 10:16 AM   Result Value Ref Range    Extra Tube Hold for add-ons.    SST TOP    Collection Time: 24  5:27 AM   Result Value Ref Range    Extra Tube Hold for add-ons.    CBC    Collection Time: 24  5:28 AM   Result Value Ref Range    WBC 7.7 4.4 - 11.3 x10*3/uL    nRBC 0.0 0.0 - 0.0 /100 WBCs    RBC 3.43 (L) 4.00 - 5.20 x10*6/uL    Hemoglobin 10.2 (L) 12.0 - 16.0 g/dL    Hematocrit 31.8 (L) 36.0 - 46.0 %    MCV 93 80 - 100 fL    MCH 29.7 26.0 - 34.0 pg    MCHC 32.1 32.0 - 36.0 g/dL    RDW 15.9 (H) 11.5 - 14.5 %    Platelets 233 150 - 450 x10*3/uL       I&O  I/O last 3 completed shifts:  In: 2979 (43.1 mL/kg) [I.V.:1859 (26.9 mL/kg); NG/GT:1020; IV Piggyback:100]  Out: 3000 (43.4 mL/kg) [Urine:3000 (1.2 mL/kg/hr)]  Weight: 69.1 kg       Assessment:    Patient seen sitting up in  bed alert and oriented x 4 drinking a carton of milk. She is in good spirits and would like to return to home at discharge.     Wound vac to midline incision working appropriately.   Continue wound care per Dr. Reyes.   Continue IV antibiotics per ID.   PT/OT: Ok to ambulate with therapy as tolerated         Plan:    Ok to ambulate as tolerated with PT with back brace   Continue IV antibiotics per ID  Continue wound care per Dr. Reyes   Continue pain control  Discharge planning: patient would like to return to home at discharge. SW and TCC following for discharge planning.     Edi Olguin, DARRYL-CNP   2/2/2024 10:11 AM

## 2024-02-03 ENCOUNTER — DOCUMENTATION (OUTPATIENT)
Dept: ORTHOPEDICS | Facility: HOSPITAL | Age: 69
End: 2024-02-03
Payer: COMMERCIAL

## 2024-02-03 ENCOUNTER — OUTSIDE SERVICES (OUTPATIENT)
Dept: INFECTIOUS DISEASES | Age: 69
End: 2024-02-03
Payer: COMMERCIAL

## 2024-02-03 DIAGNOSIS — M46.26 INFECTION OF LUMBAR SPINE (HCC): Primary | ICD-10-CM

## 2024-02-03 DIAGNOSIS — M54.50 ACUTE MIDLINE LOW BACK PAIN, UNSPECIFIED WHETHER SCIATICA PRESENT: ICD-10-CM

## 2024-02-03 LAB
ANION GAP SERPL CALC-SCNC: 12 MMOL/L (ref 10–20)
BUN SERPL-MCNC: 6 MG/DL (ref 6–23)
CALCIUM SERPL-MCNC: 8.8 MG/DL (ref 8.6–10.3)
CHLORIDE SERPL-SCNC: 104 MMOL/L (ref 98–107)
CK SERPL-CCNC: 55 U/L (ref 0–215)
CO2 SERPL-SCNC: 23 MMOL/L (ref 21–32)
CREAT SERPL-MCNC: 0.59 MG/DL (ref 0.5–1.05)
CRP SERPL-MCNC: 8.95 MG/DL
EGFRCR SERPLBLD CKD-EPI 2021: >90 ML/MIN/1.73M*2
ERYTHROCYTE [DISTWIDTH] IN BLOOD BY AUTOMATED COUNT: 15.9 % (ref 11.5–14.5)
GLUCOSE SERPL-MCNC: 133 MG/DL (ref 74–99)
HCT VFR BLD AUTO: 31.9 % (ref 36–46)
HGB BLD-MCNC: 10.5 G/DL (ref 12–16)
HOLD SPECIMEN: NORMAL
MCH RBC QN AUTO: 29.8 PG (ref 26–34)
MCHC RBC AUTO-ENTMCNC: 32.9 G/DL (ref 32–36)
MCV RBC AUTO: 91 FL (ref 80–100)
NRBC BLD-RTO: 0 /100 WBCS (ref 0–0)
PLATELET # BLD AUTO: 229 X10*3/UL (ref 150–450)
POTASSIUM SERPL-SCNC: 3.2 MMOL/L (ref 3.5–5.3)
RBC # BLD AUTO: 3.52 X10*6/UL (ref 4–5.2)
SODIUM SERPL-SCNC: 136 MMOL/L (ref 136–145)
WBC # BLD AUTO: 7.4 X10*3/UL (ref 4.4–11.3)

## 2024-02-03 PROCEDURE — 1200000002 HC GENERAL ROOM WITH TELEMETRY DAILY

## 2024-02-03 PROCEDURE — 80048 BASIC METABOLIC PNL TOTAL CA: CPT | Performed by: STUDENT IN AN ORGANIZED HEALTH CARE EDUCATION/TRAINING PROGRAM

## 2024-02-03 PROCEDURE — 2500000001 HC RX 250 WO HCPCS SELF ADMINISTERED DRUGS (ALT 637 FOR MEDICARE OP): Performed by: REGISTERED NURSE

## 2024-02-03 PROCEDURE — 1090000002 HH PPS REVENUE DEBIT

## 2024-02-03 PROCEDURE — 82550 ASSAY OF CK (CPK): CPT | Performed by: INTERNAL MEDICINE

## 2024-02-03 PROCEDURE — 2500000004 HC RX 250 GENERAL PHARMACY W/ HCPCS (ALT 636 FOR OP/ED): Performed by: INTERNAL MEDICINE

## 2024-02-03 PROCEDURE — 2500000001 HC RX 250 WO HCPCS SELF ADMINISTERED DRUGS (ALT 637 FOR MEDICARE OP): Performed by: STUDENT IN AN ORGANIZED HEALTH CARE EDUCATION/TRAINING PROGRAM

## 2024-02-03 PROCEDURE — 85027 COMPLETE CBC AUTOMATED: CPT | Performed by: STUDENT IN AN ORGANIZED HEALTH CARE EDUCATION/TRAINING PROGRAM

## 2024-02-03 PROCEDURE — 99232 SBSQ HOSP IP/OBS MODERATE 35: CPT | Performed by: STUDENT IN AN ORGANIZED HEALTH CARE EDUCATION/TRAINING PROGRAM

## 2024-02-03 PROCEDURE — 1090000001 HH PPS REVENUE CREDIT

## 2024-02-03 PROCEDURE — 2500000004 HC RX 250 GENERAL PHARMACY W/ HCPCS (ALT 636 FOR OP/ED): Performed by: STUDENT IN AN ORGANIZED HEALTH CARE EDUCATION/TRAINING PROGRAM

## 2024-02-03 PROCEDURE — 2500000001 HC RX 250 WO HCPCS SELF ADMINISTERED DRUGS (ALT 637 FOR MEDICARE OP): Performed by: INTERNAL MEDICINE

## 2024-02-03 PROCEDURE — 86140 C-REACTIVE PROTEIN: CPT | Performed by: INTERNAL MEDICINE

## 2024-02-03 PROCEDURE — 99232 SBSQ HOSP IP/OBS MODERATE 35: CPT | Performed by: INTERNAL MEDICINE

## 2024-02-03 PROCEDURE — 99232 SBSQ HOSP IP/OBS MODERATE 35: CPT | Performed by: PSYCHIATRY & NEUROLOGY

## 2024-02-03 RX ORDER — POTASSIUM CHLORIDE 14.9 MG/ML
20 INJECTION INTRAVENOUS ONCE
Status: COMPLETED | OUTPATIENT
Start: 2024-02-03 | End: 2024-02-03

## 2024-02-03 RX ORDER — POTASSIUM CHLORIDE 20 MEQ/1
40 TABLET, EXTENDED RELEASE ORAL ONCE
Status: DISCONTINUED | OUTPATIENT
Start: 2024-02-03 | End: 2024-02-07 | Stop reason: HOSPADM

## 2024-02-03 RX ADMIN — DEXTROSE MONOHYDRATE 2 G: 5 INJECTION INTRAVENOUS at 12:27

## 2024-02-03 RX ADMIN — Medication 1 TABLET: at 09:04

## 2024-02-03 RX ADMIN — HYDRALAZINE HYDROCHLORIDE 50 MG: 50 TABLET ORAL at 09:04

## 2024-02-03 RX ADMIN — DAPTOMYCIN 500 MG: 500 INJECTION, POWDER, LYOPHILIZED, FOR SOLUTION INTRAVENOUS at 20:42

## 2024-02-03 RX ADMIN — BACLOFEN 5 MG: 10 TABLET ORAL at 14:57

## 2024-02-03 RX ADMIN — APIXABAN 5 MG: 5 TABLET, FILM COATED ORAL at 09:05

## 2024-02-03 RX ADMIN — APIXABAN 5 MG: 5 TABLET, FILM COATED ORAL at 20:42

## 2024-02-03 RX ADMIN — FLUOXETINE 10 MG: 10 CAPSULE ORAL at 09:05

## 2024-02-03 RX ADMIN — RIFAMPIN 300 MG: 300 CAPSULE ORAL at 09:05

## 2024-02-03 RX ADMIN — POTASSIUM CHLORIDE 20 MEQ: 14.9 INJECTION, SOLUTION INTRAVENOUS at 12:27

## 2024-02-03 RX ADMIN — OXYCODONE HYDROCHLORIDE 10 MG: 5 TABLET ORAL at 17:23

## 2024-02-03 RX ADMIN — HYDRALAZINE HYDROCHLORIDE 10 MG: 20 INJECTION INTRAMUSCULAR; INTRAVENOUS at 03:16

## 2024-02-03 RX ADMIN — BACLOFEN 5 MG: 10 TABLET ORAL at 20:43

## 2024-02-03 RX ADMIN — BACLOFEN 5 MG: 10 TABLET ORAL at 09:06

## 2024-02-03 RX ADMIN — DEXTROSE MONOHYDRATE 2 G: 5 INJECTION INTRAVENOUS at 00:30

## 2024-02-03 RX ADMIN — OXYCODONE HYDROCHLORIDE 5 MG: 5 TABLET ORAL at 11:19

## 2024-02-03 RX ADMIN — RIFAMPIN 300 MG: 300 CAPSULE ORAL at 20:42

## 2024-02-03 RX ADMIN — OXYCODONE HYDROCHLORIDE 5 MG: 5 TABLET ORAL at 00:00

## 2024-02-03 RX ADMIN — HYDRALAZINE HYDROCHLORIDE 50 MG: 50 TABLET ORAL at 20:43

## 2024-02-03 RX ADMIN — OXYCODONE HYDROCHLORIDE 10 MG: 5 TABLET ORAL at 23:23

## 2024-02-03 RX ADMIN — ASPIRIN 81 MG: 81 TABLET, COATED ORAL at 09:05

## 2024-02-03 RX ADMIN — ISOSORBIDE MONONITRATE 60 MG: 60 TABLET, EXTENDED RELEASE ORAL at 09:03

## 2024-02-03 ASSESSMENT — COGNITIVE AND FUNCTIONAL STATUS - GENERAL
STANDING UP FROM CHAIR USING ARMS: TOTAL
TOILETING: TOTAL
TURNING FROM BACK TO SIDE WHILE IN FLAT BAD: A LOT
EATING MEALS: A LITTLE
DRESSING REGULAR LOWER BODY CLOTHING: TOTAL
HELP NEEDED FOR BATHING: TOTAL
MOVING TO AND FROM BED TO CHAIR: A LOT
DRESSING REGULAR UPPER BODY CLOTHING: TOTAL
WALKING IN HOSPITAL ROOM: TOTAL
EATING MEALS: A LITTLE
TOILETING: TOTAL
DRESSING REGULAR LOWER BODY CLOTHING: TOTAL
CLIMB 3 TO 5 STEPS WITH RAILING: TOTAL
MOVING TO AND FROM BED TO CHAIR: A LOT
PERSONAL GROOMING: A LITTLE
WALKING IN HOSPITAL ROOM: TOTAL
STANDING UP FROM CHAIR USING ARMS: TOTAL
CLIMB 3 TO 5 STEPS WITH RAILING: TOTAL
MOVING FROM LYING ON BACK TO SITTING ON SIDE OF FLAT BED WITH BEDRAILS: A LOT
PERSONAL GROOMING: A LITTLE
DRESSING REGULAR UPPER BODY CLOTHING: TOTAL
MOBILITY SCORE: 9
HELP NEEDED FOR BATHING: TOTAL
MOBILITY SCORE: 9
DAILY ACTIVITIY SCORE: 10
TURNING FROM BACK TO SIDE WHILE IN FLAT BAD: A LOT
MOVING FROM LYING ON BACK TO SITTING ON SIDE OF FLAT BED WITH BEDRAILS: A LOT
DAILY ACTIVITIY SCORE: 10

## 2024-02-03 ASSESSMENT — PAIN SCALES - GENERAL
PAINLEVEL_OUTOF10: 8
PAINLEVEL_OUTOF10: 7
PAINLEVEL_OUTOF10: 9
PAINLEVEL_OUTOF10: 8
PAINLEVEL_OUTOF10: 5 - MODERATE PAIN

## 2024-02-03 ASSESSMENT — PAIN - FUNCTIONAL ASSESSMENT
PAIN_FUNCTIONAL_ASSESSMENT: 0-10

## 2024-02-03 ASSESSMENT — PAIN DESCRIPTION - LOCATION
LOCATION: LEG
LOCATION: LEG

## 2024-02-03 ASSESSMENT — PAIN DESCRIPTION - DESCRIPTORS
DESCRIPTORS: ACHING;CRAMPING
DESCRIPTORS: CRAMPING
DESCRIPTORS: CRAMPING
DESCRIPTORS: ACHING;CRAMPING
DESCRIPTORS: CRAMPING

## 2024-02-03 ASSESSMENT — PAIN DESCRIPTION - ORIENTATION: ORIENTATION: LEFT

## 2024-02-03 NOTE — PROGRESS NOTES
Tara Tierney is a 68 y.o. female on day 7 of admission was seen by psychiatry for adjustment disorder with mood and anxiety symptoms and PTSD.  She was offered Prozac which she tolerated well.  Considering her age and frailty Prozac titration would need to be gradual.  She was feeling quite empowered after doing physical therapy for the first time after several weeks.  She is agreeable with the idea of going to rehab before going home because 2 times that she went directly home led to readmission to the hospital.  Patient is tolerating medications well.  Her brother Ever was present and there agreed with the plan.  She considers her mayo as a strong protective factor along with her loving family against  Labs Reviewed.  Vitals Reviewed.   Nursing Notes Reviewed.   No EPS, TD, vitals stable.      MSE: Patient was alert, oriented to time, place, person and situation. Patient appears well groomed and clad in climate appropriate clothes. Patient is cooperative on approach. Recent and remote memory within normal limits. Memory registration and recall within normal limits. Attention and concentration within normal limits. Speech normal in rate, rhythm and volume. Good eye contact. Thought process Linear. Intact associations. Good fund of knowledge. Mood ok and affect full range. Patient did not endorse any delusions. Patient denied any auditory visual hallucinations. Patient has denied any active suicidal or homicidal ideations and is future oriented.  Patient has fair insight, fair judgment and good impulse control.     Musculoskeletal: Normal gait, no Parkinsonism, no Dystonia, no Akathisia, no TD. Psychomotor activity within normal limits.     Diagnosis: PTSD, adjustment disorder with mood and anxiety symptoms    Assessment and Plan:     Patient is not at imminent risk of harm to self or others and does not need inpatient psychiatric care.  Continue Prozac 10 mg daily and this may be titrated in few weeks based  "on tolerability and efficacy  Outpatient psychiatric care should preferably be set up before discharge locally  Continue with current treatment and medication adjustments. Patient is agreeable with continued medication management and adjustments discussed.         Last Recorded Vitals  /65 (BP Location: Left arm, Patient Position: Lying)   Pulse 110   Temp 36.4 °C (97.5 °F) (Temporal)   Resp 16   Ht 1.44 m (4' 8.69\")   Wt 69.1 kg (152 lb 5.4 oz)   SpO2 96%   BMI 33.32 kg/m²      Recent Results (from the past 24 hour(s))   CBC    Collection Time: 02/03/24  5:37 AM   Result Value Ref Range    WBC 7.4 4.4 - 11.3 x10*3/uL    nRBC 0.0 0.0 - 0.0 /100 WBCs    RBC 3.52 (L) 4.00 - 5.20 x10*6/uL    Hemoglobin 10.5 (L) 12.0 - 16.0 g/dL    Hematocrit 31.9 (L) 36.0 - 46.0 %    MCV 91 80 - 100 fL    MCH 29.8 26.0 - 34.0 pg    MCHC 32.9 32.0 - 36.0 g/dL    RDW 15.9 (H) 11.5 - 14.5 %    Platelets 229 150 - 450 x10*3/uL   Basic Metabolic Panel    Collection Time: 02/03/24  5:37 AM   Result Value Ref Range    Glucose 133 (H) 74 - 99 mg/dL    Sodium 136 136 - 145 mmol/L    Potassium 3.2 (L) 3.5 - 5.3 mmol/L    Chloride 104 98 - 107 mmol/L    Bicarbonate 23 21 - 32 mmol/L    Anion Gap 12 10 - 20 mmol/L    Urea Nitrogen 6 6 - 23 mg/dL    Creatinine 0.59 0.50 - 1.05 mg/dL    eGFR >90 >60 mL/min/1.73m*2    Calcium 8.8 8.6 - 10.3 mg/dL   SST TOP    Collection Time: 02/03/24  5:37 AM   Result Value Ref Range    Extra Tube Hold for add-ons.    C-reactive protein    Collection Time: 02/03/24  5:37 AM   Result Value Ref Range    C-Reactive Protein 8.95 (H) <1.00 mg/dL   Creatine Kinase    Collection Time: 02/03/24  5:37 AM   Result Value Ref Range    Creatine Kinase 55 0 - 215 U/L          Current Facility-Administered Medications:     acetaminophen (Tylenol) tablet 650 mg, 650 mg, oral, q4h PRN **OR** acetaminophen (Tylenol) oral liquid 650 mg, 650 mg, nasogastric tube, q4h PRN **OR** acetaminophen (Tylenol) suppository 650 mg, " 650 mg, rectal, q4h PRN, Raul George MD    alteplase (Cathflo Activase) injection 1 mg, 1 mg, intra-catheter, PRN, Nitesh Ruiz MD, 1 mg at 02/01/24 1431    apixaban (Eliquis) tablet 5 mg, 5 mg, oral, BID, Raul George MD, 5 mg at 02/03/24 0905    aspirin EC tablet 81 mg, 81 mg, oral, Daily, Raul George MD, 81 mg at 02/03/24 0905    baclofen (Lioresal) tablet 5 mg, 5 mg, oral, TID, Nitesh Ruiz MD, 5 mg at 02/03/24 0906    cefTRIAXone (Rocephin) 2 g in dextrose 5 % 50 mL IV, 2 g, intravenous, q12h, Amy Celestin DO, Stopped at 02/03/24 0100    DAPTOmycin (Cubicin) 500 mg in sodium chloride 0.9% 50 mL IV, 8 mg/kg, intravenous, q24h EDE, Pete Echeverria MD, Stopped at 02/02/24 2158    FLUoxetine (PROzac) capsule 10 mg, 10 mg, oral, Daily, Chivo Jennings, DARRYL-CNP, 10 mg at 02/03/24 0905    hydrALAZINE (Apresoline) injection 10 mg, 10 mg, intravenous, q4h PRN, Sunshine Lange MD, 10 mg at 02/03/24 0316    hydrALAZINE (Apresoline) tablet 50 mg, 50 mg, oral, BID, Rual George MD, 50 mg at 02/03/24 0904    isosorbide mononitrate ER (Imdur) 24 hr tablet 60 mg, 60 mg, oral, Daily, Raul George MD, 60 mg at 02/03/24 0903    lactobacillus acidophilus tablet 1 tablet, 1 tablet, oral, Daily, Raul George MD, 1 tablet at 02/03/24 0904    menthol-zinc oxide (Calmoseptine - Risamine) 0.44-20.6 % ointment 1 Application, 1 Application, Topical, 4x daily PRN, Nitesh Ruiz MD, 1 Application at 02/02/24 2128    ondansetron (Zofran) injection 4 mg, 4 mg, intravenous, q6h PRN, Raul George MD, 4 mg at 02/01/24 1640    oxyCODONE (Roxicodone) immediate release tablet 10 mg, 10 mg, oral, q6h PRN, Nitesh Ruiz MD    oxyCODONE (Roxicodone) immediate release tablet 5 mg, 5 mg, oral, q6h PRN, Nitesh Ruiz MD, 5 mg at 02/03/24 0000    polyethylene glycol (Glycolax, Miralax) packet 17 g, 17 g, oral, Daily, Raul George MD, 17 g at 01/29/24 1546    psyllium (Metamucil) 1 packet, 1 packet, oral, Daily,  Raul George MD    rifAMPin (Rifadin) capsule 300 mg, 300 mg, oral, BID, Amy Celestin DO, 300 mg at 02/03/24 0905    sennosides (Senokot) tablet 17.2 mg, 2 tablet, oral, Nightly, Raul George MD, 17.2 mg at 02/01/24 2039    sennosides (Senokot) tablet 8.6 mg, 1 tablet, oral, Nightly, Raul George MD, 8.6 mg at 02/01/24 2100       Dean Baird MD

## 2024-02-03 NOTE — PROGRESS NOTES
Working with physical therapy, going slowly given weakness   Psychiatry appreciated  Patient needs rehab  The patient continues to show a tremendous clinical improvement with current treatments.  MRI brain was collected 1/30/24 and did not identify any leptomeningeal enhancement or other pathology, there is however dilatation of the ventricular system.  Patient is not in any distress, alert conversational.  No photophobia, no headache.      Visit Vitals  /51 (BP Location: Left arm, Patient Position: Lying)   Pulse (!) 112   Temp 36.3 °C (97.3 °F) (Temporal)   Resp 17        Neurological Exam:  GENERAL APPEARANCE: No distress interactive and cooperative.     MENTAL STATE: Patient is drowsy and falls off to sleep during our conversation.    CRANIAL NERVES: normal  CN 2 Visual fields full to confrontation.   CN 3, 4, 6  Pupils round, equally reactive to light. No ptosis.EOMs normal alignment, full range with normal saccades, pursuit and convergence. No nystagmus.   CN 5 Facial sensation intact bilaterally.    CN 7 Normal and symmetric facial strength. Nasolabial folds symmetric.   CN 8 Hearing intact to finger rub bilaterally.   CN 9 Palate elevates symmetrically.    CN 11 Bilaterally normal strength of shoulder shrug and neck turning.   CN 12 Tongue midline, with normal bulk and strength; no fasciculations.     Motor examination reveals 5/5 strength in the uppers bilaterally, 4/5 hip flexion bilaterally.  Difficult to isolate knee flexion d/t pain, 5/5 bilaeral dorsiflexion and plantar flexion    Sensory: decreased sensation to light touch in the left lower, mild hyperstesia in the right lower.  Normal uppers bilaterally    Gait not testable due to pain and weakness      BMP:  Results from last 7 days   Lab Units 02/03/24  0537 02/01/24  1016 01/30/24  1138   SODIUM mmol/L 136 142 146*   POTASSIUM mmol/L 3.2* 3.1* 3.2*   CHLORIDE mmol/L 104 108* 111*   CO2 mmol/L 23 25 25   BUN mg/dL 6 13 27*   CREATININE mg/dL  "0.59 0.66 0.84         CBC:  Results from last 7 days   Lab Units 02/03/24  0537 02/02/24  0528 01/31/24  0526   WBC AUTO x10*3/uL 7.4 7.7 12.6*   RBC AUTO x10*6/uL 3.52* 3.43* 3.89*   HEMOGLOBIN g/dL 10.5* 10.2* 11.4*   HEMATOCRIT % 31.9* 31.8* 36.4   MCV fL 91 93 94   MCH pg 29.8 29.7 29.3   MCHC g/dL 32.9 32.1 31.3*   RDW % 15.9* 15.9* 15.9*   PLATELETS AUTO x10*3/uL 229 233 260         INR:        Lipid Profile:        No lab exists for component: \"LABVLDL\"    HgbA1C:        CMP:  Results from last 7 days   Lab Units 02/03/24  0537 02/01/24  1016 01/30/24  1138   SODIUM mmol/L 136 142 146*   POTASSIUM mmol/L 3.2* 3.1* 3.2*   CHLORIDE mmol/L 104 108* 111*   CO2 mmol/L 23 25 25   BUN mg/dL 6 13 27*   CREATININE mg/dL 0.59 0.66 0.84   GLUCOSE mg/dL 133* 188* 166*   CALCIUM mg/dL 8.8 8.7 9.4         ABG:        No lab exists for component: \"PO2\", \"PCO2\", \"HCO3\", \"BE\", \"O2SAT\"    TSH:  No results found for: \"TSH\"  Lab Results   Component Value Date    JRXAQLDD78 322 01/05/2024     Lab Results   Component Value Date    FOLATE 5.1 01/05/2024     Lab Results   Component Value Date    VITD25 33 01/05/2024         MR brain w and wo IV contrast    Result Date: 1/30/2024  Interpreted By:  Alexi Bass, STUDY: MR BRAIN W AND WO IV CONTRAST; ;  1/29/2024 7:34 pm   INDICATION: Signs/Symptoms:menengitis.   COMPARISON: CT head from 01/20/2024. MRI brain from 01/18/2016.   ACCESSION NUMBER(S): [id number]   ORDERING CLINICIAN: RENETTA TAYLOR   TECHNIQUE: MRI of the brain was performed with the acquisition of axial diffusion-weighted, axial T1, axial FLAIR, axial T2 gradient echo, axial T2 fat saturated, axial T1 fat saturated postcontrast sequence, and axial T1 volumetric post-contrast sequence with multiplanar reformats.   Contrast: 13.5 mL of Dotarem was injected intravenously.   FINDINGS: Evaluation is somewhat degraded due to patient motion.   There is no acute intracranial hemorrhage or infarct. There is no abnormal " extra-axial fluid collection or mass effect.   On the FLAIR sequence, there is increased signal within sulci in the bilateral cerebral hemispheres, primarily along the periphery with sparing of the central sulci which is likely artifactual. No signal abnormality seen within the sulci on the other sequences.   There is stable mild disproportionate enlargement of the supratentorial ventricles compared the adjacent sulci and widening of the sylvian fissures in a pattern that can be seen with normal pressure hydrocephalus. Ventriculomegaly related to potential meningitis is favored less likely and ventricles are unchanged in size since the prior CT head. Ventricles have increased in size since the prior MRI brain from 2016, favored to be related to parenchymal volume loss.   There is no abnormal intracranial enhancement or mass.   There are confluent and scattered regions of T2 hyperintensity within the cerebral hemispheric white matter and robin which are probably sequela of chronic small vessel ischemic changes. There are 2 small foci of T2 hyperintensity located within the right cerebellum which probably reflect old infarcts.   Within the left cerebellum, there is subtle T2 hyperintense signal (series 9, image 10 of 40) without abnormal enhancement or restricted diffusion which may be sequela of late subacute infarct. There is no volume loss.   Visualized paranasal sinuses and mastoid air cells are essentially clear.       Evaluation is somewhat degraded due to patient motion.   1. No acute intracranial abnormality or mass effect. No abnormal intracranial enhancement or mass.   2. Mild disproportionate supratentorial ventriculomegaly, unchanged since the recent CT head and in a pattern that is suggestive of normal pressure hydrocephalus. Appearance is not typical for an infectious process.   3. Chronic intracranial findings as above.   This study was interpreted at [facility].    MACRO: None   Signed by: Alexi Bass 1/30/2024 7:32 AM Dictation workstation:   [id number]      CT head wo IV contrast    Result Date: 1/27/2024  Interpreted By:  Joanne Cruz, STUDY: CT HEAD WO IV CONTRAST;  1/27/2024 6:21 am   INDICATION: Signs/Symptoms:AMS. eval for acute hemorrhage.   COMPARISON: 08/04/2009   ACCESSION NUMBER(S): [id number]   ORDERING CLINICIAN: LATOYA GUEVARA   TECHNIQUE: Examination was performed in the axial plane using soft tissue and bone algorithm.   FINDINGS: INTRACRANIAL: There is prominence of the ventricular system and cerebral sulci consistent with cerebral atrophy. There are periventricular hypodensities consistent with  moderate small vessel disease. No mass or mass effect is identified. There is no hemorrhage or subdural fluid collection. There is no acute infarct. There is no fracture of the calvarium   EXTRACRANIAL: Visualized paranasal sinuses and mastoids are clear.       No acute intracranial pathology.   MACRO: None   Signed by: Joanne Cruz 1/27/2024 7:03 AM Dictation workstation:   [id number]      MR lumbar spine w and wo IV contrast    Result Date: 1/28/2024  Interpreted By:  Yoav Davidson, STUDY: MR LUMBAR SPINE W AND WO IV CONTRAST;  1/28/2024 6:39 pm   INDICATION: Signs/Symptoms: Rule out abscess.   COMPARISON: 01/11/2024   ACCESSION NUMBER(S): [id number]   ORDERING CLINICIAN: FRANCESCA ORR   TECHNIQUE: Sagittal STIR, sagittal T2, sagittal T1, axial T2, axial T1, as well as post gadolinium sagittal axial T1 weighted MRI images of the lumbar spine were obtained. The patient received 13 mL of Dotarem gadolinium intravenously.   FINDINGS: Postoperative changes are again identified compatible with a previous posterior laminectomies at the L3 through S1 levels as well as discectomies at the L4/5 and L5/S1 levels. Metallic artifact from orthopedic hardware is identified along the disc spaces at the L4/5 and L5/S1 levels. There are surgical drainage catheter  again noted along the laminectomy bed as well as more superficially along the surgical tract with the posterior paraspinal soft tissues. There is a minimal amount of rim enhancing fluid surrounding the surgical drainage catheter along laminectomy bed the sterility of which can not be ascertained on this MRI study.   There is again evidence of extensive infiltrative signal abnormality and enhancement within the soft tissues along the laminectomy bed and surrounding posterior paraspinal soft tissues extending superficially along the surgical tract which may be postsurgical in etiology although a superimposed infectious/inflammatory process could give a similar MRI appearance and must be considered.   There is again evidence of abnormal signal along the L3/4 disc space as well as within the adjacent bone marrow of the L3 and L4 vertebrae most compatible with underlying discitis/osteomyelitis. There has been mild interval bony destruction/collapse of the inferior L3 and to a lesser degree superior L4 vertebrae when compared with the prior study dated 01/11/2024. There is again evidence of approximately 4 mm of retrolisthesis of L3 on L4.   There is mild circumferential abnormal epidural thickening and enhancement within the spinal canal best appreciated extending from the L3 through S1 levels which may be postsurgical and/or infectious/inflammatory in origin.   There is abnormal infiltrative signal and enhancement within the paraspinal soft tissues/medial psoas muscles bilaterally right greater than left extending from the L3 level caudally into the sacral region likely infectious/inflammatory in origin.   The visualized spinal cord demonstrates no signal abnormality or abnormal enhancement within it. The conus medullaris is normally positioned terminating at the L1 level.   At the L5/S1 level,  there are postoperative changes compatible with a previous posterior laminectomy as well as discectomy. There is enhancing  soft tissue noted along the laminectomy bed as well as enhancing epidural thickening and enhancement circumferentially surrounding the thecal sac within the spinal canal. There is no significant narrowing of the thecal sac within the spinal canal. There is infiltrative enhancing epidural soft tissue extending laterally into the right neural foramen. There is mild-to-moderate encroachment upon the left neural foramen.   At the L4/L5 level,  there are postoperative changes compatible with a previous posterior laminectomy as well as discectomy. There is enhancing soft tissue noted along the laminectomy bed as well as enhancing epidural thickening and enhancement circumferentially surrounding the thecal sac within the spinal canal. There is mild overall narrowing of the thecal sac within the spinal canal. There is infiltrative enhancing epidural soft tissue extending laterally into the right neural foramen. There is mild-to-moderate encroachment upon the left neural foramen.   At the L3/L4 level,  there are postoperative changes compatible with a previous L3 laminectomy. There is again evidence of approximately 4 mm of retrolisthesis of L3 on L4. There is enhancing soft tissue noted along the laminectomy bed as well as enhancing epidural thickening and enhancement circumferentially surrounding the thecal sac within the spinal canal. There is no significant narrowing of the thecal sac within the spinal canal. There is bilateral neural foraminal narrowing with infiltrative enhancing epidural soft tissue extending laterally into the region of the neural foramen bilaterally.   At the L2/L3 level,  there are degenerative facet changes and a minimal posterior disc bulge without significant spinal canal narrowing. There is mild encroachment upon the inferior recesses of the neural foramen bilaterally.   At the L1/L2 level,  there is a minimal posterior disc bulge and mild degenerative facet changes contributing to very mild  encroachment upon the spinal canal. There is mild encroachment upon the inferior recesses of the neural foramen left greater than right.   At the T12/L1 level,  there is no significant spinal canal stenosis or neuroforaminal stenosis.   At the T11/12 level, there is a minimal posterior disc bulge and mild degenerative facet changes without significant spinal canal or neural foraminal narrowing.         Postoperative changes are again identified compatible with a previous posterior laminectomies at the L3 through S1 levels as well as discectomies at the L4/5 and L5/S1 levels. Metallic artifact from orthopedic hardware is identified along the disc spaces at the L4/5 and L5/S1 levels. There are surgical drainage catheter again noted along the laminectomy bed as well as more superficially along the surgical tract with the posterior paraspinal soft tissues. There is a minimal amount of rim enhancing fluid surrounding the surgical drainage catheter along laminectomy bed the sterility of which can not be ascertained on this MRI study.   There is again evidence of extensive infiltrative signal abnormality and enhancement within the soft tissues along the laminectomy bed and surrounding posterior paraspinal soft tissues extending superficially along the surgical tract which may be postsurgical in etiology although a superimposed infectious/inflammatory process could give a similar MRI appearance and must be considered.   There is again evidence of abnormal signal along the L3/4 disc space as well as within the adjacent bone marrow of the L3 and L4 vertebrae most compatible with underlying discitis/osteomyelitis. There has been mild interval bony destruction/collapse of the inferior L3 and to a lesser degree superior L4 vertebrae when compared with the prior study dated 01/11/2024. There is again evidence of approximately 4 mm of retrolisthesis of L3 on L4.   There is mild circumferential abnormal epidural thickening and  enhancement within the spinal canal best appreciated extending from the L3 through S1 levels which may be postsurgical and/or infectious/inflammatory in origin.   There is abnormal infiltrative signal and enhancement within the paraspinal soft tissues/medial psoas muscles bilaterally right greater than left extending from the L3 level caudally into the sacral region likely infectious/inflammatory in origin.   There is multilevel spondylosis. There are varying degrees of spinal canal and neural foraminal narrowing as described above.   MACRO: None.   Signed by: Yoav Davidson 1/28/2024 7:27 PM Dictation workstation:   [id number]    ECG 12 lead    Result Date: 1/27/2024  Normal sinus rhythm Left bundle branch block Abnormal ECG When compared with ECG of 02-JAN-2024 07:11, Questionable change in QRS duration See ED provider note for full interpretation and clinical correlation Confirmed by Bridger Erickson ([id number]) on 1/27/2024 12:29:08 PM    CT chest abdomen pelvis w IV contrast    Result Date: 1/27/2024  STUDY: CT Chest, Abdomen, and Pelvis with IV Contrast; 01/27/2024 6:37 AM INDICATION: Generalized abdominal pain.  Recent spine surgery and IVC filter. Evaluate for free air/fluid. COMPARISON: XR abdomen 01/15/2024.  CT AP 12/31/2023, 12/19/2023. ACCESSION NUMBER(S): [id number] ORDERING CLINICIAN: Osei Swann TECHNIQUE: CT of the chest, abdomen, and pelvis was performed.  Contiguous axial images were obtained at 3 mm slice thickness through the chest, abdomen, and pelvis.  Coronal and sagittal reconstructions at 3 mm slice thickness were performed.  Omnipaque 350 75 mL was administered intravenously.  FINDINGS: CHEST: MEDIASTINUM: Right PICC appears satisfactorily positioned extending to the right atrium.  The heart is normal in size without pericardial effusion. Central vascular structures opacify normally.  No thyroid nodule.  No identifiable breast mass.  LUNGS/PLEURA: There is no pleural effusion, pleural  thickening, or pneumothorax. Minimal dependent atelectasis at the lung bases. LYMPH NODES: Thoracic lymph nodes are not enlarged. ABDOMEN:  LIVER: No hepatomegaly.  Smooth surface contour.  Normal attenuation.  BILE DUCTS: No intrahepatic or extrahepatic biliary ductal dilatation.  GALLBLADDER: Gallbladder is surgically absent. STOMACH: No abnormalities identified.  PANCREAS: No masses or ductal dilatation.  SPLEEN: No splenomegaly or focal splenic lesion.  ADRENAL GLANDS: No thickening or nodules.  KIDNEYS AND URETERS: Kidneys are normal in size and location.  No renal or ureteral calculi.  PELVIS:  BLADDER: Stauffer catheter present.  Mild wall thickening of the urinary bladder may simply be related to incomplete distention.  Large quantity of stool in the rectum suggests constipation versus impaction.  REPRODUCTIVE ORGANS: No abnormalities identified.  BOWEL: No abnormalities identified.  Appendix appears normal.  VESSELS: No abnormalities identified.  IVC filter appears appropriately positioned.  Abdominal aorta is normal in caliber.  PERITONEUM/RETROPERITONEUM/LYMPH NODES: No free fluid.  No pneumoperitoneum. No lymphadenopathy.  ABDOMINAL WALL: No abnormalities identified. SOFT TISSUES: No abnormalities identified.  BONES: Mildly exaggerated thoracic kyphosis with mild to moderate degenerative disease. There are postsurgical changes of the lumbar spine.  Prosthetic discs at L4-5 and L5-S1 are in similar position when compared to the prior CT.  Two opaque drains at the surgical site are again noted. Subcutaneous emphysema noted along the course of the drains.  Presumed phlegmon noted at the surgical location.  No drainable collection. Since the prior study, there has been erosive or destructive changes of the inferior endplate of L3 and to a lesser extent the superior endplate of L4.  The findings would be strongly suspicious for discitis osteomyelitis.  No acute fracture or aggressive osseous lesion.    CT CHEST:  1.  No CT evidence of pulmonary embolism. 2.  No pulmonary mass, nodule or consolidation.  No pleural effusion. No pneumothorax.  No thoracic adenopathy. CT ABDOMEN AND PELVIS: 1.  Postsurgical changes of the lumbar spine again noted.  There are two indwelling drains superficially unchanged from 12/31/2023.  The prosthetic discs at L4-5 and L5-S1 appears satisfactorily positioned, unchanged.  However, there is been interval destructive endplate changes along the inferior aspect of L3 and to a lesser extent the superior endplate of L4.  The findings would be suspicious for discitis/osteomyelitis. 2.  No ascites, free air or bowel obstruction. 3.  No urinary tract calculus or hydronephrosis. 4.  IVC filter appears satisfactorily positioned. Signed by Joshua Alfonso MD    CT lumbar spine wo IV contrast    Result Date: 1/27/2024  Interpreted By:  Joanne Cruz, STUDY: CT LUMBAR SPINE WO IV CONTRAST; CT THORACIC SPINE WO IV CONTRAST 1/27/2024 6:22 am   INDICATION: Signs/Symptoms:recent post op. 2 JOSE drains. the right paraspinal appears infected. eval for fluid collection   COMPARISON: MRI lumbar spine 01/11/2024   ACCESSION NUMBER(S): [id number]; [id number]   ORDERING CLINICIAN: LATOYA GUEVARA   TECHNIQUE: Axial CT images of the lumbar spine are obtained. Axial, coronal and sagittal reconstructions are provided for review.   FINDINGS: CT THORACIC SPINE: Alignment: Within normal limits. There is degenerative change with mild-to-moderate anterior osteophytic spurring at all levels particularly the midthoracic spine.   Vertebrae/Disc Spaces:   The vertebral body heights are intact. The disc spaces are preserved.   There is a benign calcified right upper lobe granuloma.   CT LUMBAR SPINE: There are disc spacers at L4-5 and L5-S1 in good position. Status post decompression laminectomies L3-L5. There is bone grafting material along the right and left lateral aspects of the mid and lower lumbar spine. There are 2 drains.  "There is a drain along the inferior aspect of the back. This courses superiorly and the tip is in the soft tissues posterior to L1. There is a 2nd drain along the inferior aspect of the back. This courses superiorly and the tip is in the soft tissues posterior to L1. There is induration of the soft tissues along the back and induration of the paraspinal muscles. There is a 2.5 x 2.0 cm x 1.8 cm ill-defined fluid collection in the paraspinal muscles of the back at the level of L4-5 just to the right of midline. This could be a true fluid collection or induration of the paraspinal muscles. There is no discrete enhancing wall. Findings could represent a abscess, postoperative seroma or liquified hematoma. There is no air in this fluid. This is not along the course of one of the drains. There is ghost artifact in the pedicles of L4, L5 and S1 from prior hardware which has been removed.   Alignment: There is 5 mm retrolisthesis of L3 with respect to L4.   Vertebrae/Disc Spaces:  There is destruction and irregularity of the inferior endplate of L3 and superior endplate of L4 consistent with diskitis and adjacent osteomyelitis.   T12-L1:  There is no significant central canal stenosis.   L1-2:  There is no significant central canal or neural foraminal stenosis.   L2-3:  There is no significant central canal or neural foraminal stenosis.   L3-4: There is destruction of the inferior endplate of L3 and superior endplate of L4. There is irregularity of the bone. There is \"widening\" of the disc space. Findings are consistent with discitis and adjacent osteomyelitis. There is a fracture of the right pedicle of L4.   L4-5:  There is no significant central canal or neural foraminal stenosis.   L5-S1:  There is no significant central canal or neural foraminal stenosis.   Prevertebral/Paraspinal Soft Tissues: The prevertebral and paraspinal soft tissues are unremarkable.   Status post cholecystectomy. There is an inferior vena cava " filter inferior to the renal veins       1. Degenerative change thoracic spine. No central canal stenosis or neural foraminal compromise 2. Diskitis and adjacent osteomyelitis L3-4. 3. 2.5 x 2.0 x 1.8 cm fluid collection in the paraspinal muscles of the back just to the right of midline. This contains no air. This is ill-defined and of question of whether this is a true discrete round fluid collection or ill definition and induration of the paraspinal muscles. No discrete enhancing wall is seen. Findings could represent an abscess, postoperative seroma or liquified hematoma. This is not along the course of one of the drains. 4. Nondisplaced fracture right pedicle L4. 5. Disc spacers L4-5 and L5-S1. Status post decompression laminectomies L3-L5. There is bone grafting material along the right and left lateral aspects of the mid and lower lumbar spine. There is ghost artifact in the pedicles of L4, L5 and S1 which represents hardware which has been removed. There are 2 drains in the soft tissues of the back.   MACRO: None   Signed by: Joanne Cruz 1/27/2024 7:22 AM Dictation workstation:   [id number]    CT thoracic spine wo IV contrast    Result Date: 1/27/2024  Interpreted By:  Joanne Cruz, STUDY: CT LUMBAR SPINE WO IV CONTRAST; CT THORACIC SPINE WO IV CONTRAST 1/27/2024 6:22 am   INDICATION: Signs/Symptoms:recent post op. 2 JOSE drains. the right paraspinal appears infected. eval for fluid collection   COMPARISON: MRI lumbar spine 01/11/2024   ACCESSION NUMBER(S): [id number]; [id number]   ORDERING CLINICIAN: LATOYA GUEVARA   TECHNIQUE: Axial CT images of the lumbar spine are obtained. Axial, coronal and sagittal reconstructions are provided for review.   FINDINGS: CT THORACIC SPINE: Alignment: Within normal limits. There is degenerative change with mild-to-moderate anterior osteophytic spurring at all levels particularly the midthoracic spine.   Vertebrae/Disc Spaces:   The vertebral body heights are intact.  "The disc spaces are preserved.   There is a benign calcified right upper lobe granuloma.   CT LUMBAR SPINE: There are disc spacers at L4-5 and L5-S1 in good position. Status post decompression laminectomies L3-L5. There is bone grafting material along the right and left lateral aspects of the mid and lower lumbar spine. There are 2 drains. There is a drain along the inferior aspect of the back. This courses superiorly and the tip is in the soft tissues posterior to L1. There is a 2nd drain along the inferior aspect of the back. This courses superiorly and the tip is in the soft tissues posterior to L1. There is induration of the soft tissues along the back and induration of the paraspinal muscles. There is a 2.5 x 2.0 cm x 1.8 cm ill-defined fluid collection in the paraspinal muscles of the back at the level of L4-5 just to the right of midline. This could be a true fluid collection or induration of the paraspinal muscles. There is no discrete enhancing wall. Findings could represent a abscess, postoperative seroma or liquified hematoma. There is no air in this fluid. This is not along the course of one of the drains. There is ghost artifact in the pedicles of L4, L5 and S1 from prior hardware which has been removed.   Alignment: There is 5 mm retrolisthesis of L3 with respect to L4.   Vertebrae/Disc Spaces:  There is destruction and irregularity of the inferior endplate of L3 and superior endplate of L4 consistent with diskitis and adjacent osteomyelitis.   T12-L1:  There is no significant central canal stenosis.   L1-2:  There is no significant central canal or neural foraminal stenosis.   L2-3:  There is no significant central canal or neural foraminal stenosis.   L3-4: There is destruction of the inferior endplate of L3 and superior endplate of L4. There is irregularity of the bone. There is \"widening\" of the disc space. Findings are consistent with discitis and adjacent osteomyelitis. There is a fracture of the " right pedicle of L4.   L4-5:  There is no significant central canal or neural foraminal stenosis.   L5-S1:  There is no significant central canal or neural foraminal stenosis.   Prevertebral/Paraspinal Soft Tissues: The prevertebral and paraspinal soft tissues are unremarkable.   Status post cholecystectomy. There is an inferior vena cava filter inferior to the renal veins       1. Degenerative change thoracic spine. No central canal stenosis or neural foraminal compromise 2. Diskitis and adjacent osteomyelitis L3-4. 3. 2.5 x 2.0 x 1.8 cm fluid collection in the paraspinal muscles of the back just to the right of midline. This contains no air. This is ill-defined and of question of whether this is a true discrete round fluid collection or ill definition and induration of the paraspinal muscles. No discrete enhancing wall is seen. Findings could represent an abscess, postoperative seroma or liquified hematoma. This is not along the course of one of the drains. 4. Nondisplaced fracture right pedicle L4. 5. Disc spacers L4-5 and L5-S1. Status post decompression laminectomies L3-L5. There is bone grafting material along the right and left lateral aspects of the mid and lower lumbar spine. There is ghost artifact in the pedicles of L4, L5 and S1 which represents hardware which has been removed. There are 2 drains in the soft tissues of the back.   MACRO: None   Signed by: Joanne Cruz 1/27/2024 7:22 AM Dictation workstation:   [id number]    CT head wo IV contrast    Result Date: 1/27/2024  Interpreted By:  Joanne Cruz, STUDY: CT HEAD WO IV CONTRAST;  1/27/2024 6:21 am   INDICATION: Signs/Symptoms:AMS. eval for acute hemorrhage.   COMPARISON: 08/04/2009   ACCESSION NUMBER(S): [id number]   ORDERING CLINICIAN: LATOYA GUEVARA   TECHNIQUE: Examination was performed in the axial plane using soft tissue and bone algorithm.   FINDINGS: INTRACRANIAL: There is prominence of the ventricular system and cerebral sulci  consistent with cerebral atrophy. There are periventricular hypodensities consistent with  moderate small vessel disease. No mass or mass effect is identified. There is no hemorrhage or subdural fluid collection. There is no acute infarct. There is no fracture of the calvarium   EXTRACRANIAL: Visualized paranasal sinuses and mastoids are clear.       No acute intracranial pathology.   MACRO: None   Signed by: Joanne Cruz 1/27/2024 7:03 AM Dictation workstation:   [id number]      EMG     No EMG results found for the past 12 months        EEG    Result Date: 1/30/2024  IMPRESSION Impression This routine EEG is indicative of a moderate diffuse encephalopathy. No epileptiform discharges or lateralizing signs are recorded. A full report will be scanned into the patient's chart at a later time. This report has been interpreted and electronically signed by       Current Facility-Administered Medications:     acetaminophen (Tylenol) tablet 650 mg, 650 mg, oral, q4h PRN **OR** acetaminophen (Tylenol) oral liquid 650 mg, 650 mg, nasogastric tube, q4h PRN **OR** acetaminophen (Tylenol) suppository 650 mg, 650 mg, rectal, q4h PRN, Raul George MD    alteplase (Cathflo Activase) injection 1 mg, 1 mg, intra-catheter, PRN, Nitesh Ruiz MD, 1 mg at 02/01/24 1431    apixaban (Eliquis) tablet 5 mg, 5 mg, oral, BID, Raul George MD, 5 mg at 02/03/24 0905    aspirin EC tablet 81 mg, 81 mg, oral, Daily, Raul George MD, 81 mg at 02/03/24 0905    baclofen (Lioresal) tablet 5 mg, 5 mg, oral, TID, Nitesh Ruiz MD, 5 mg at 02/03/24 1457    cefTRIAXone (Rocephin) 2 g in dextrose 5 % 50 mL IV, 2 g, intravenous, q12h, Amy Celestin DO, Stopped at 02/03/24 1257    DAPTOmycin (Cubicin) 500 mg in sodium chloride 0.9% 50 mL IV, 8 mg/kg, intravenous, q24h [facility], Pete Echeverria MD, Stopped at 02/02/24 2158    FLUoxetine (PROzac) capsule 10 mg, 10 mg, oral, Daily, Chivo Jennings, APRN-CNP, 10 mg at 02/03/24 0905     hydrALAZINE (Apresoline) injection 10 mg, 10 mg, intravenous, q4h PRN, Sunshine Lange MD, 10 mg at 02/03/24 0316    hydrALAZINE (Apresoline) tablet 50 mg, 50 mg, oral, BID, Raul George MD, 50 mg at 02/03/24 0904    isosorbide mononitrate ER (Imdur) 24 hr tablet 60 mg, 60 mg, oral, Daily, Raul George MD, 60 mg at 02/03/24 0903    lactobacillus acidophilus tablet 1 tablet, 1 tablet, oral, Daily, Raul George MD, 1 tablet at 02/03/24 0904    menthol-zinc oxide (Calmoseptine - Risamine) 0.44-20.6 % ointment 1 Application, 1 Application, Topical, 4x daily PRN, Nitesh Ruiz MD, 1 Application at 02/02/24 2128    ondansetron (Zofran) injection 4 mg, 4 mg, intravenous, q6h PRN, Raul George MD, 4 mg at 02/01/24 1640    oxyCODONE (Roxicodone) immediate release tablet 10 mg, 10 mg, oral, q6h PRN, Nitesh Ruiz MD    oxyCODONE (Roxicodone) immediate release tablet 5 mg, 5 mg, oral, q6h PRN, Nitesh Ruiz MD, 5 mg at 02/03/24 1119    polyethylene glycol (Glycolax, Miralax) packet 17 g, 17 g, oral, Daily, Raul George MD, 17 g at 01/29/24 1546    potassium chloride CR (Klor-Con M20) ER tablet 40 mEq, 40 mEq, oral, Once, Nitesh Ruiz MD    psyllium (Metamucil) 1 packet, 1 packet, oral, Daily, Raul George MD    rifAMPin (Rifadin) capsule 300 mg, 300 mg, oral, BID, Amy Celestin DO, 300 mg at 02/03/24 0905    sennosides (Senokot) tablet 17.2 mg, 2 tablet, oral, Nightly, Raul George MD, 17.2 mg at 02/01/24 2039    sennosides (Senokot) tablet 8.6 mg, 1 tablet, oral, Nightly, Raul George MD, 8.6 mg at 02/01/24 2100     Assessment:  Acute encephalopathy in the setting of MRSA wound infection after lumbar surgery.  Recent increase in the opiate medication which may have triggered the encephalopathy however there is leukocytosis and MRI of the lumbar spine showing L3-L4 discitis and epidural thickening and enhancement L3-S1, partially treated meningitis is a concern.      Recommendation:  -Psychiatry  appreciated  -will need acute rehab  -We will restart baclofen slowly 2/2/24  -Continue empiric coverage for meningitis given her clinical improvement  -Accessing CSF at this point is a greater risk to the patient then would be clinically beneficial, superior levels (cervical tap suboccipital tap) are not available.  This was reviewed with the patient and family who expressed understanding and agreement.  -Will collect carotid Dopplers as the patient's surveillance has come due, Right 50-69% left less than 50%  -MRI of the brain with contrast to evaluate dural enhancement showed hydrocephalus, no leptomeningeal enhancement however there was a motion degraded component according to the radiologist report.  - ABG ultimately demonstrated significant alkalosis that appears to be mixed respiratory and metabolic the pH of 7.56.  -Ideally CSF testing would be beneficial however, one would of course not want to introduce infection by cannulizing again area (levels L3/4 and L4/5 show fluid collections) actively contaminated with MRSA and introducing that to the dura.    -Had extensive discussion with infectious disease and the patient has shown clinical improvement after additional coverage for meningitis  -routine EEG 1/30 showed diffuse slowing  -Thyroid studies in fact had been collected and were normal.  -Advance diet and activity as able  -will follow while admitted

## 2024-02-03 NOTE — PROGRESS NOTES
Ortho spine:      Pt alert and oriented x 3, moving all extremities, neuro intact.  Vital signs stable afebrile.  Wound VAC in place.  Patient was able to stand with assistance yesterday and this morning and patient is eating well.    Physical exam: Well-nourished, well kept.  No lymphangitis or lymphadenopathy in the examined extremities.  Good perfusion to the lower extremities bilaterally.  Dorsalis pedis pulses 2+.  Capillary refill to the digits brisk.  No distal edema.  Patient able to stand with assistance but not ambulating well yet.  Decreased range of motion flexion extension and rotation secondary to pain and patient lying in bed.  There is some weakness in the right quadricep hip flexors about 4/5.  Otherwise examination of the lower extremities reveals no point tenderness, swelling, or deformity.  Range of motion of the hips, knees, and ankles are full without crepitance, instability, or exacerbation of pain.  Strength is 5/5 throughout..  Wound VAC in place so unable to actually visualize the wound.  Gross sensation intact to the lower extremity is bilaterally.  Affect normal.    Assessment: This a patient with a postop lumbar wound infection.  All hardware has been removed.  She was MRSA positive and is on antibiotic treatment for that.  We discussed with her and her brother today about the importance of rehab and getting out of bed and ambulating to get stronger.  We also reiterated the importance of the good diet high in protein for tissue healing wound healing etc.    Plan: Continue medical treatment and encouraging out of bed for ambulation with physical therapy.  Patient accepted at [facility] facility and arranging transfer there..  Also reemphasized the importance of follow-up appointments with us and infectious disease and with Dr. Quintero for wound care and wound VAC maintenance.

## 2024-02-03 NOTE — PROGRESS NOTES
Infectious Disease Progress Note       2/3/2024    Patient is a followup regarding severe back pain persistent bilateral lower extremity weakness altered mentation elevated CRP with significant history of MRSA bacteremia, now status post hardware removal with no residual hardware, bilateral JOSE drains which have been removed, open wound postop deep incision, and altered mental status which has been steadily improving over the past few days    Her MRI of the spine is showing evidence of previous laminectomies L3-S1 levels, there is a rim-enhancing fluid surrounding the surgical drainage catheter along laminectomy bed and extensive evidence of signal abnormality and enhancement within the soft tissues along the laminectomy bed surrounding posterior paraspinal soft tissues and superficially along the surgical tract -unclear if this is a superimposed infectious process.  There is also an abnormal infiltrative signal and enhancement within the paraspinal soft tissues medial psoas muscle bilaterally on the right greater than the left extending from L3 into sacrum which also looks to be possibly infectious    MRI of the Brain showing evidence of mild disproportionate supratentorial ventriculomegaly, which is unchanged since the past CT of the head.  Possible normal pressure hydrocephalus.  Neurology is concerned about possible meningitis due to some additional signal abnormalities and has asked me to temporarily broaden coverage.    Patient's coverage was broadened to daptomycin ceftriaxone and rifampin with help of pharmacy  I had spoken to radiology downtown and they confirmed increased signal enhancement and bony destruction which is worse than approximately 27 days ago    Since broadening her treatment, within 24 hours her mentation seem to have improved, she is much more talkative, able to carry on a full conversation.  Her pain continues.   Unsure if this was due to the medication or just timing of her situation.      Seen with family at bedside  She sat up at the edge of the bed today but was unable to get to the chair.  Still very weak    Lab Results   Component Value Date    WBC 7.4 02/03/2024    HGB 10.5 (L) 02/03/2024    HCT 31.9 (L) 02/03/2024    MCV 91 02/03/2024     02/03/2024     Lab Results   Component Value Date    GLUCOSE 133 (H) 02/03/2024    CALCIUM 8.8 02/03/2024     02/03/2024    K 3.2 (L) 02/03/2024    CO2 23 02/03/2024     02/03/2024    BUN 6 02/03/2024    CREATININE 0.59 02/03/2024   CK level today is 55  CRP trending down at 8.95  WBC trends are being monitored. Antibiotic doses are being adjusted per most recent renal labs.     Vitals:    02/03/24 1604   BP: 121/53   Pulse: 100   Resp: 17   Temp: 36.2 °C (97.2 °F)   SpO2: 96%     Patient is awake and alert, able to speak to me in complete sentences for the most part but goes to sleep during conversation with her family  Obese female no rashes, NAD  + NG tube for nutritional support  Neck supple  Heart S1S2  Chest: Equal expansion, bilaterally clear to auscultation  Abdomen: soft, ND, NTTP, no guarding  Extrem: no pain to palpation  Neuro: CNS intact  Affect appropriate and patient is interactive      Patient Active Problem List   Diagnosis    Abdominal aortic aneurysm (CMS/HCC)    Abnormal EKG    Bilateral carotid artery stenosis    Bruit of right carotid artery    CAD in native artery    Cardiomyopathy (CMS/HCC)    Chest pain    Chronic asthmatic bronchitis    Closed displaced fracture of fifth metatarsal bone    Current every day smoker    Difficulty breathing    Essential hypertension    History of total knee replacement    Hypokalemia    Intermittent claudication (CMS/HCC)    Knee osteoarthritis    Knee pain    Limb pain    Localized swelling, mass, or lump of lower extremity    Celiac artery stenosis (CMS/HCC)    Mesenteric artery stenosis (CMS/HCC)    Mixed hyperlipidemia    Obese    PVD (peripheral vascular disease) (CMS/HCC)     Right foot pain    Swelling    Trigger finger    Urinary tract infection without hematuria    Back pain of lumbar region with sciatica    Back pain with radiculopathy    Intractable back pain    Seroma, post-traumatic (CMS/HCC)    Lumbar surgical wound fluid collection    Generalized weakness    Postoperative surgical complication involving musculoskeletal system associated with musculoskeletal procedure, unspecified complication    Back pain at L4-L5 level    Deep vein thrombosis (DVT) of right lower extremity (CMS/HCC)    Anemia    Altered mental status, unspecified altered mental status type    Surgical wound infection     Hx MRSA Bacteremia, now s/p hardware removal from spine  Persistent back pain  Diskitis with possible meningitis?- likely also secondary to MRSA given her history.  Unable to get tissue specimen or bone specimen at this point.  Unable to get lumbar puncture as well.  There were too many enhancing tissues that may cause further seeding.  Risk outweighs the benefit.  At this time choosing to treat empirically.  Either way she is clinically improving.  Surgical wound infection -wound is open but appears to be clean at this time  Acute metabolic encephalopathy  Speech deficit -much better  Polypharmacy    PLAN:  Continue daptomycin, x 4 weeks with possible extension to 8 weeks   Monitor weekly CBC CMP CK CRP for the next 8 weeks  Regarding her rifampin and her ceftriaxone, will plan to discontinue her ceftriaxone after 2 weeks.  Will likely discontinue her rifampin after 4 weeks  Case discussed with wound care at bedside extensively with family member present      Amy Celestin,

## 2024-02-03 NOTE — PROGRESS NOTES
"Tara Tierney is a 68 y.o. female on day 7 of admission presenting with Altered mental status, unspecified altered mental status type.    Subjective   Patient seen and examined.  She is awake and alert, denies any nausea or vomiting, complaining of back pain.  She is afraid to get up.  No shortness of breath.       Objective     Physical Exam  Constitutional:       Appearance: Normal appearance. She is not toxic-appearing.   HENT:      Head: Normocephalic and atraumatic.   Cardiovascular:      Rate and Rhythm: Normal rate and regular rhythm.      Heart sounds: Murmur heard.   Pulmonary:      Effort: Pulmonary effort is normal. No respiratory distress.   Abdominal:      General: There is no distension.      Palpations: Abdomen is soft.   Musculoskeletal:         General: Normal range of motion.   Skin:     General: Skin is warm.   Neurological:      General: No focal deficit present.      Mental Status: She is alert.         Last Recorded Vitals  Blood pressure 150/65, pulse 110, temperature 36.4 °C (97.5 °F), temperature source Temporal, resp. rate 16, height 1.44 m (4' 8.69\"), weight 69.1 kg (152 lb 5.4 oz), SpO2 96 %, not currently breastfeeding.  Intake/Output last 3 Shifts:  I/O last 3 completed shifts:  In: - (0 mL/kg)   Out: 5000 (72.4 mL/kg) [Urine:5000 (2 mL/kg/hr)]  Weight: 69.1 kg     Relevant Results                             Assessment/Plan   Principal Problem:    Altered mental status, unspecified altered mental status type  Active Problems:    Surgical wound infection    this unfortunate 68-year-old female with past medical history of discitis/osteomyelitis of lumbar vertebrae post spinal fusion surgery, deep incisional MRSA wound infection of lumbar spine, COPD, hypertension, peripheral vascular disease, DVT who was admitted with acute toxic metabolic encephalopathy most likely secondary to meningitis        Patient continues to improve  We are waiting on precertification, she will need to be " placed  Encouraged p.o. intake  On empiric treatment for meningitis with rifampicin, ceftriaxone and Cubicin  Will need to be on all 4 for 4 more weeks  Wound VAC has been placed  ID following  Will need to be discharged on wound VAC  Not a candidate for LP  As per Ortho no plan of free expiration  Back pain is there but stable  IV pain meds discontinued  Plastic surgery is following appreciate input  Started on baclofen for leg cramping  Continue Eliquis and rest of her regular meds  Supportive care  PT OT  DVT prophylaxis  We will replete potassium  CRP is trending down  NG tube has been pulled out             Nitesh Ruiz MD

## 2024-02-03 NOTE — PROGRESS NOTES
Patient ID: Tara Tierney is a 68 y.o. female.    ProceduresSubjective    Patient ID: Tara Tierney is a 68 y.o. female.    Chief Complaint: No chief complaint on file.     Last Surgery: Irrigation And Debridement Lumbar Spine, Removal Hardware Lumbar Spine  Last Surgery Date: 12/31/2023    HPI    Objective   Ortho Exam    Image Results:  Carotid duplex bilateral  Narrative: Interpreted By:  Vel Tai,   STUDY:  [facility] US CAROTID ARTERY DUPLEX BILATERAL;  1/31/2024 5:23 pm      INDICATION:  Signs/Symptoms:Carotid stenosis.      COMPARISON:  01/05/2016      ACCESSION NUMBER(S):  [id number]      ORDERING CLINICIAN:  RENETTA TAYLOR      TECHNIQUE:  Vascular ultrasound of the extracranial carotid system was performed  bilaterally.  Gray scale, color Doppler and spectral Doppler waveform  analysis was performed.      FINDINGS:  There is atherosclerotic disease, most conspicuous at the carotid  bulbs and worse on the right.      RIGHT:      RIGHT SIDE PEAK SYSTOLIC VELOCITY TABLE:  CCA 68 cm/sec.  ICA to 24 cm/sec.   cm/sec.      The ratio of the peak systolic velocity of the right ICA/CCA is 3.2.      RIGHT VERTEBRAL ARTERY:  The right vertebral artery demonstrates proximal anterograde flow.      LEFT:      LEFT SIDE PEAK SYSTOLIC VELOCITY TABLE:   cm/sec.   cm/sec.   cm/sec.      The ratio of the peak systolic velocity of the left ICA/CCA is 0.9.      LEFT VERTEBRAL ARTERY:  The left vertebral artery demonstrates proximal anterograde flow.      Impression: Utilizing the peak systolic velocities, the estimated degree of  stenosis within the internal carotid arteries is 50-69% on the right  and less than 50% on the left.      MACRO:  None.          Signed by: Vel Tai 2/1/2024 7:20 AM  Dictation workstation:   [id number]      Assessment/Plan   Encounter Diagnoses:  No diagnosis found.    No orders of the defined types were placed in this encounter.    No follow-ups on file.

## 2024-02-03 NOTE — PROGRESS NOTES
Chief complaint: Mental status change after irrigation debridement lumbar spine    HPI: Patient much better now.  She feels very good.  She is alert and oriented.  She is hungry but eating a lot.  She says she has not been getting out of bed much because no one is coming to do that.  No fevers chills nausea vomiting.  She is having some back pain.  A little bit of leg pain.    Physical exam: Incision looks great.  Wound VAC is in place and looks very good.  There is no redness warmth swelling or puffiness at all around her incision.  Both drain sites are clean and dry and not draining.  I reviewed Hussein Kayden's note and I agree with his physical exam findings that there is just a little bit of weakness with hip flexion.  But all in all she has 5/5 motor bilateral lower extremities.  Normal sensation bilateral lower extremities.  She rolls around in bed with ease and not much pain.    Assessment/plan: Status post multiple washouts.  This admission was due to mental status changes.  There was some concern it was coming from her antibiotics, excessive narcotics, or perhaps meningitis.  As soon as the patient backed off on her narcotics, which she was taking too much of at home, her mentation improved.  I believe this was more of a overmedication issue than anything else.  There is going to be a tight balance between providing her with enough pain medicine for pain control versus too much pain medicine that would cause cognitive decline.  Discitis and osteomyelitis can be quite painful and will require appropriate pain management.  I did encourage her to use as little narcotics as possible.    She is having some slight increased leg pain which is how she presented in the past with some increased fluid collection in the lumbar area.  We will follow this and see how this evolves.  She says she does not want to get another MRI at this point because no matter what it shows she would not let me do another surgery on her.   Her last MRI did not show any significant fluid collection whatsoever but at that point the drains were still in.  I think is perfectly acceptable to follow her clinically and see how she evolves.  Her biggest issue now is mobilization.  It is unclear to me why physical therapy has not been working with her daily.  We will reach out to them for clarification.  We will rely on the medical team and infectious disease and neurology and psychiatry to manage all of her medical issues.  From a surgical standpoint there is really not much for us to do at this point other than follow this patient and provide any assistance that is needed to our nonsurgical colleagues that are managing this patient at this point.  It will be important for us to discharge her to an appropriate location as she has had multiple readmissions thus far.

## 2024-02-03 NOTE — CARE PLAN
The patient's goals for the shift include decreased pain.    The clinical goals for the shift include maintain potassium >4

## 2024-02-04 ENCOUNTER — DOCUMENTATION (OUTPATIENT)
Dept: ORTHOPEDICS | Facility: HOSPITAL | Age: 69
End: 2024-02-04
Payer: COMMERCIAL

## 2024-02-04 PROCEDURE — 2500000001 HC RX 250 WO HCPCS SELF ADMINISTERED DRUGS (ALT 637 FOR MEDICARE OP): Performed by: INTERNAL MEDICINE

## 2024-02-04 PROCEDURE — 1090000002 HH PPS REVENUE DEBIT

## 2024-02-04 PROCEDURE — 2500000001 HC RX 250 WO HCPCS SELF ADMINISTERED DRUGS (ALT 637 FOR MEDICARE OP): Performed by: STUDENT IN AN ORGANIZED HEALTH CARE EDUCATION/TRAINING PROGRAM

## 2024-02-04 PROCEDURE — 1200000002 HC GENERAL ROOM WITH TELEMETRY DAILY

## 2024-02-04 PROCEDURE — 97530 THERAPEUTIC ACTIVITIES: CPT | Mod: GP,CQ

## 2024-02-04 PROCEDURE — 99232 SBSQ HOSP IP/OBS MODERATE 35: CPT | Performed by: STUDENT IN AN ORGANIZED HEALTH CARE EDUCATION/TRAINING PROGRAM

## 2024-02-04 PROCEDURE — 2500000004 HC RX 250 GENERAL PHARMACY W/ HCPCS (ALT 636 FOR OP/ED): Performed by: STUDENT IN AN ORGANIZED HEALTH CARE EDUCATION/TRAINING PROGRAM

## 2024-02-04 PROCEDURE — 2500000001 HC RX 250 WO HCPCS SELF ADMINISTERED DRUGS (ALT 637 FOR MEDICARE OP): Performed by: REGISTERED NURSE

## 2024-02-04 PROCEDURE — 1090000001 HH PPS REVENUE CREDIT

## 2024-02-04 PROCEDURE — 2500000004 HC RX 250 GENERAL PHARMACY W/ HCPCS (ALT 636 FOR OP/ED): Mod: JZ | Performed by: INTERNAL MEDICINE

## 2024-02-04 PROCEDURE — 2500000004 HC RX 250 GENERAL PHARMACY W/ HCPCS (ALT 636 FOR OP/ED): Performed by: INTERNAL MEDICINE

## 2024-02-04 PROCEDURE — 2500000001 HC RX 250 WO HCPCS SELF ADMINISTERED DRUGS (ALT 637 FOR MEDICARE OP): Performed by: PSYCHIATRY & NEUROLOGY

## 2024-02-04 RX ORDER — POTASSIUM CHLORIDE 14.9 MG/ML
20 INJECTION INTRAVENOUS
Status: COMPLETED | OUTPATIENT
Start: 2024-02-04 | End: 2024-02-04

## 2024-02-04 RX ORDER — BACLOFEN 10 MG/1
10 TABLET ORAL NIGHTLY
Status: DISCONTINUED | OUTPATIENT
Start: 2024-02-04 | End: 2024-02-07 | Stop reason: HOSPADM

## 2024-02-04 RX ORDER — BACLOFEN 10 MG/1
5 TABLET ORAL
Status: DISCONTINUED | OUTPATIENT
Start: 2024-02-05 | End: 2024-02-07 | Stop reason: HOSPADM

## 2024-02-04 RX ORDER — AMLODIPINE BESYLATE 5 MG/1
5 TABLET ORAL DAILY
Status: DISCONTINUED | OUTPATIENT
Start: 2024-02-04 | End: 2024-02-07 | Stop reason: HOSPADM

## 2024-02-04 RX ADMIN — APIXABAN 5 MG: 5 TABLET, FILM COATED ORAL at 20:15

## 2024-02-04 RX ADMIN — DEXTROSE MONOHYDRATE 2 G: 5 INJECTION INTRAVENOUS at 12:14

## 2024-02-04 RX ADMIN — POTASSIUM CHLORIDE 20 MEQ: 14.9 INJECTION, SOLUTION INTRAVENOUS at 12:15

## 2024-02-04 RX ADMIN — OXYCODONE HYDROCHLORIDE 10 MG: 5 TABLET ORAL at 18:11

## 2024-02-04 RX ADMIN — DEXTROSE MONOHYDRATE 2 G: 5 INJECTION INTRAVENOUS at 23:47

## 2024-02-04 RX ADMIN — STANDARDIZED SENNA CONCENTRATE 17.2 MG: 8.6 TABLET ORAL at 20:16

## 2024-02-04 RX ADMIN — BACLOFEN 5 MG: 10 TABLET ORAL at 08:21

## 2024-02-04 RX ADMIN — OXYCODONE HYDROCHLORIDE 10 MG: 5 TABLET ORAL at 12:13

## 2024-02-04 RX ADMIN — HYDRALAZINE HYDROCHLORIDE 50 MG: 50 TABLET ORAL at 20:15

## 2024-02-04 RX ADMIN — BACLOFEN 10 MG: 10 TABLET ORAL at 20:15

## 2024-02-04 RX ADMIN — HYDRALAZINE HYDROCHLORIDE 50 MG: 50 TABLET ORAL at 08:22

## 2024-02-04 RX ADMIN — OXYCODONE HYDROCHLORIDE 10 MG: 5 TABLET ORAL at 06:16

## 2024-02-04 RX ADMIN — DEXTROSE MONOHYDRATE 2 G: 5 INJECTION INTRAVENOUS at 00:19

## 2024-02-04 RX ADMIN — Medication 1 TABLET: at 08:22

## 2024-02-04 RX ADMIN — FLUOXETINE 10 MG: 10 CAPSULE ORAL at 08:21

## 2024-02-04 RX ADMIN — POTASSIUM CHLORIDE 20 MEQ: 14.9 INJECTION, SOLUTION INTRAVENOUS at 14:02

## 2024-02-04 RX ADMIN — APIXABAN 5 MG: 5 TABLET, FILM COATED ORAL at 08:22

## 2024-02-04 RX ADMIN — RIFAMPIN 300 MG: 300 CAPSULE ORAL at 08:27

## 2024-02-04 RX ADMIN — DAPTOMYCIN 500 MG: 500 INJECTION, POWDER, LYOPHILIZED, FOR SOLUTION INTRAVENOUS at 19:53

## 2024-02-04 RX ADMIN — ISOSORBIDE MONONITRATE 60 MG: 60 TABLET, EXTENDED RELEASE ORAL at 08:20

## 2024-02-04 RX ADMIN — BACLOFEN 5 MG: 10 TABLET ORAL at 14:02

## 2024-02-04 RX ADMIN — ASPIRIN 81 MG: 81 TABLET, COATED ORAL at 08:20

## 2024-02-04 RX ADMIN — RIFAMPIN 300 MG: 300 CAPSULE ORAL at 20:15

## 2024-02-04 ASSESSMENT — PAIN - FUNCTIONAL ASSESSMENT
PAIN_FUNCTIONAL_ASSESSMENT: 0-10

## 2024-02-04 ASSESSMENT — PAIN DESCRIPTION - ORIENTATION: ORIENTATION: LEFT

## 2024-02-04 ASSESSMENT — COGNITIVE AND FUNCTIONAL STATUS - GENERAL
STANDING UP FROM CHAIR USING ARMS: TOTAL
DRESSING REGULAR LOWER BODY CLOTHING: TOTAL
PERSONAL GROOMING: A LITTLE
MOBILITY SCORE: 9
TOILETING: TOTAL
MOVING FROM LYING ON BACK TO SITTING ON SIDE OF FLAT BED WITH BEDRAILS: A LOT
WALKING IN HOSPITAL ROOM: TOTAL
TOILETING: TOTAL
EATING MEALS: A LITTLE
DRESSING REGULAR LOWER BODY CLOTHING: TOTAL
CLIMB 3 TO 5 STEPS WITH RAILING: TOTAL
HELP NEEDED FOR BATHING: TOTAL
TURNING FROM BACK TO SIDE WHILE IN FLAT BAD: A LOT
HELP NEEDED FOR BATHING: TOTAL
EATING MEALS: A LITTLE
MOVING FROM LYING ON BACK TO SITTING ON SIDE OF FLAT BED WITH BEDRAILS: A LOT
MOVING TO AND FROM BED TO CHAIR: A LOT
MOBILITY SCORE: 9
CLIMB 3 TO 5 STEPS WITH RAILING: TOTAL
DAILY ACTIVITIY SCORE: 10
DRESSING REGULAR UPPER BODY CLOTHING: TOTAL
WALKING IN HOSPITAL ROOM: TOTAL
WALKING IN HOSPITAL ROOM: TOTAL
MOBILITY SCORE: 9
STANDING UP FROM CHAIR USING ARMS: TOTAL
MOVING FROM LYING ON BACK TO SITTING ON SIDE OF FLAT BED WITH BEDRAILS: A LOT
MOVING TO AND FROM BED TO CHAIR: A LOT
TURNING FROM BACK TO SIDE WHILE IN FLAT BAD: A LOT
PERSONAL GROOMING: A LITTLE
CLIMB 3 TO 5 STEPS WITH RAILING: TOTAL
DAILY ACTIVITIY SCORE: 10
TURNING FROM BACK TO SIDE WHILE IN FLAT BAD: A LOT
DRESSING REGULAR UPPER BODY CLOTHING: TOTAL
MOVING TO AND FROM BED TO CHAIR: A LOT
STANDING UP FROM CHAIR USING ARMS: TOTAL

## 2024-02-04 ASSESSMENT — PAIN DESCRIPTION - DESCRIPTORS
DESCRIPTORS: CRAMPING
DESCRIPTORS: CRAMPING;DISCOMFORT
DESCRIPTORS: CRAMPING;SHARP
DESCRIPTORS: CRAMPING
DESCRIPTORS: CRAMPING;SHARP
DESCRIPTORS: CRAMPING

## 2024-02-04 ASSESSMENT — PAIN SCALES - GENERAL
PAINLEVEL_OUTOF10: 10 - WORST POSSIBLE PAIN
PAINLEVEL_OUTOF10: 7
PAINLEVEL_OUTOF10: 5 - MODERATE PAIN
PAINLEVEL_OUTOF10: 9
PAINLEVEL_OUTOF10: 10 - WORST POSSIBLE PAIN
PAINLEVEL_OUTOF10: 9
PAINLEVEL_OUTOF10: 7

## 2024-02-04 ASSESSMENT — PAIN DESCRIPTION - LOCATION: LOCATION: LEG

## 2024-02-04 NOTE — PROGRESS NOTES
"Tara Tierney is a 68 y.o. female on day 8 of admission presenting with Altered mental status, unspecified altered mental status type.    Subjective   Patient complains of back pain.       Objective     Physical Exam:  This is a female in no acute distress  Intact ankle dorsiflexion and plantarflexion bilaterally.  Calf soft, Homans negative.    Last Recorded Vitals  Blood pressure (!) 187/79, pulse 96, temperature 36.4 °C (97.5 °F), temperature source Temporal, resp. rate 16, height 1.44 m (4' 8.69\"), weight 69.1 kg (152 lb 5.4 oz), SpO2 98 %, not currently breastfeeding.  Intake/Output last 3 Shifts:  I/O last 3 completed shifts:  In: 2315 (33.5 mL/kg) [P.O.:1835; IV Piggyback:480]  Out: 3700 (53.5 mL/kg) [Urine:3700 (1.5 mL/kg/hr)]  Weight: 69.1 kg       Scheduled medications  amLODIPine, 5 mg, oral, Daily  apixaban, 5 mg, oral, BID  aspirin, 81 mg, oral, Daily  baclofen, 5 mg, oral, TID  cefTRIAXone, 2 g, intravenous, q12h  daptomycin, 8 mg/kg, intravenous, q24h EDE  FLUoxetine, 10 mg, oral, Daily  hydrALAZINE, 50 mg, oral, BID  isosorbide mononitrate ER, 60 mg, oral, Daily  lactobacillus acidophilus, 1 tablet, oral, Daily  polyethylene glycol, 17 g, oral, Daily  potassium chloride, 20 mEq, intravenous, q2h  potassium chloride CR, 40 mEq, oral, Once  psyllium, 1 packet, oral, Daily  rifAMPin, 300 mg, oral, BID  sennosides, 2 tablet, oral, Nightly  sennosides, 1 tablet, oral, Nightly      Continuous medications     PRN medications  PRN medications: acetaminophen **OR** acetaminophen **OR** acetaminophen, alteplase, hydrALAZINE, menthol-zinc oxide, ondansetron, oxyCODONE, oxyCODONE  No results found for this or any previous visit (from the past 24 hour(s)).          This patient has a central line   Reason for the central line remaining today? Parenteral medication                 Assessment/Plan   Principal Problem:    Altered mental status, unspecified altered mental status type  Active Problems:    " Surgical wound infection    Mobilize in therapy  Analgesics prior to mobilization  Questions answered         Petros Padilla MD

## 2024-02-04 NOTE — PROGRESS NOTES
"Tara Tierney is a 68 y.o. female on day 8 of admission presenting with Altered mental status, unspecified altered mental status type.    Subjective   Patient seen and examined.  No fevers, chills, nausea, vomiting.  Continues to complain of back pain and leg cramping.       Objective     Physical Exam  Constitutional:       Appearance: Normal appearance. She is not toxic-appearing.   HENT:      Head: Normocephalic and atraumatic.   Cardiovascular:      Rate and Rhythm: Normal rate and regular rhythm.      Heart sounds: Murmur heard.   Pulmonary:      Effort: Pulmonary effort is normal. No respiratory distress.   Abdominal:      General: There is no distension.      Palpations: Abdomen is soft.   Musculoskeletal:         General: Normal range of motion.   Skin:     General: Skin is warm.   Neurological:      General: No focal deficit present.   Last Recorded Vitals  Blood pressure (!) 187/79, pulse 96, temperature 36.4 °C (97.5 °F), temperature source Temporal, resp. rate 16, height 1.44 m (4' 8.69\"), weight 69.1 kg (152 lb 5.4 oz), SpO2 98 %, not currently breastfeeding.  Intake/Output last 3 Shifts:  I/O last 3 completed shifts:  In: 2315 (33.5 mL/kg) [P.O.:1835; IV Piggyback:480]  Out: 3700 (53.5 mL/kg) [Urine:3700 (1.5 mL/kg/hr)]  Weight: 69.1 kg     Relevant Results              This patient has a central line   Reason for the central line remaining today? 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  Encourage p.o. intake  Continue pain control, supportive care  Wound VAC in place  ID, plastic surgery and orthopedics following  No plan for surgical intervention  Not a candidate for LP mental status continues to be at baseline back pain has been stable  On oral pain meds  Continue Eliquis and rest of her regular meds  Supportive care  PT OT  Replete potassium   DVT prophylaxis  CRP is trending down slowly          Nitesh Ruiz MD

## 2024-02-04 NOTE — PROGRESS NOTES
Back pain and spasms continue, getting left lower extremity dysesthesia   Psychiatry appreciated  Patient needs rehab  The patient continues to show a tremendous clinical improvement with current treatments.  MRI brain was collected 1/30/24 and did not identify any leptomeningeal enhancement or other pathology, there is however dilatation of the ventricular system.  Patient is not in any distress, alert conversational.  No photophobia, no headache.      Visit Vitals  /63 (BP Location: Left arm, Patient Position: Lying)   Pulse 84   Temp 36.4 °C (97.5 °F) (Temporal)   Resp 16        Neurological Exam:  GENERAL APPEARANCE: No distress interactive and cooperative.     MENTAL STATE: Patient is drowsy and falls off to sleep during our conversation.    CRANIAL NERVES: normal  CN 2 Visual fields full to confrontation.   CN 3, 4, 6  Pupils round, equally reactive to light. No ptosis.EOMs normal alignment, full range with normal saccades, pursuit and convergence. No nystagmus.   CN 5 Facial sensation intact bilaterally.    CN 7 Normal and symmetric facial strength. Nasolabial folds symmetric.   CN 8 Hearing intact to finger rub bilaterally.   CN 9 Palate elevates symmetrically.    CN 11 Bilaterally normal strength of shoulder shrug and neck turning.   CN 12 Tongue midline, with normal bulk and strength; no fasciculations.     Motor examination reveals 5/5 strength in the uppers bilaterally, 4/5 hip flexion bilaterally.  Difficult to isolate knee flexion d/t pain, 5/5 bilaeral dorsiflexion and plantar flexion    Sensory: decreased sensation to light touch in the left lower, mild hyperstesia in the right lower.  Normal uppers bilaterally    Gait not testable due to pain and weakness      BMP:  Results from last 7 days   Lab Units 02/03/24  0537 02/01/24  1016 01/30/24  1138   SODIUM mmol/L 136 142 146*   POTASSIUM mmol/L 3.2* 3.1* 3.2*   CHLORIDE mmol/L 104 108* 111*   CO2 mmol/L 23 25 25   BUN mg/dL 6 13 27*  "  CREATININE mg/dL 0.59 0.66 0.84         CBC:  Results from last 7 days   Lab Units 02/03/24  0537 02/02/24  0528 01/31/24  0526   WBC AUTO x10*3/uL 7.4 7.7 12.6*   RBC AUTO x10*6/uL 3.52* 3.43* 3.89*   HEMOGLOBIN g/dL 10.5* 10.2* 11.4*   HEMATOCRIT % 31.9* 31.8* 36.4   MCV fL 91 93 94   MCH pg 29.8 29.7 29.3   MCHC g/dL 32.9 32.1 31.3*   RDW % 15.9* 15.9* 15.9*   PLATELETS AUTO x10*3/uL 229 233 260         INR:        Lipid Profile:        No lab exists for component: \"LABVLDL\"    HgbA1C:        CMP:  Results from last 7 days   Lab Units 02/03/24  0537 02/01/24  1016 01/30/24  1138   SODIUM mmol/L 136 142 146*   POTASSIUM mmol/L 3.2* 3.1* 3.2*   CHLORIDE mmol/L 104 108* 111*   CO2 mmol/L 23 25 25   BUN mg/dL 6 13 27*   CREATININE mg/dL 0.59 0.66 0.84   GLUCOSE mg/dL 133* 188* 166*   CALCIUM mg/dL 8.8 8.7 9.4         ABG:        No lab exists for component: \"PO2\", \"PCO2\", \"HCO3\", \"BE\", \"O2SAT\"    TSH:  No results found for: \"TSH\"  Lab Results   Component Value Date    SHEEVRHO72 322 01/05/2024     Lab Results   Component Value Date    FOLATE 5.1 01/05/2024     Lab Results   Component Value Date    VITD25 33 01/05/2024         MR brain w and wo IV contrast    Result Date: 1/30/2024  Interpreted By:  Alexi Bass, STUDY: MR BRAIN W AND WO IV CONTRAST; ;  1/29/2024 7:34 pm   INDICATION: Signs/Symptoms:menengitis.   COMPARISON: CT head from 01/20/2024. MRI brain from 01/18/2016.   ACCESSION NUMBER(S): [id number]   ORDERING CLINICIAN: RENETTA TAYLOR   TECHNIQUE: MRI of the brain was performed with the acquisition of axial diffusion-weighted, axial T1, axial FLAIR, axial T2 gradient echo, axial T2 fat saturated, axial T1 fat saturated postcontrast sequence, and axial T1 volumetric post-contrast sequence with multiplanar reformats.   Contrast: 13.5 mL of Dotarem was injected intravenously.   FINDINGS: Evaluation is somewhat degraded due to patient motion.   There is no acute intracranial hemorrhage or infarct. There " is no abnormal extra-axial fluid collection or mass effect.   On the FLAIR sequence, there is increased signal within sulci in the bilateral cerebral hemispheres, primarily along the periphery with sparing of the central sulci which is likely artifactual. No signal abnormality seen within the sulci on the other sequences.   There is stable mild disproportionate enlargement of the supratentorial ventricles compared the adjacent sulci and widening of the sylvian fissures in a pattern that can be seen with normal pressure hydrocephalus. Ventriculomegaly related to potential meningitis is favored less likely and ventricles are unchanged in size since the prior CT head. Ventricles have increased in size since the prior MRI brain from 2016, favored to be related to parenchymal volume loss.   There is no abnormal intracranial enhancement or mass.   There are confluent and scattered regions of T2 hyperintensity within the cerebral hemispheric white matter and robin which are probably sequela of chronic small vessel ischemic changes. There are 2 small foci of T2 hyperintensity located within the right cerebellum which probably reflect old infarcts.   Within the left cerebellum, there is subtle T2 hyperintense signal (series 9, image 10 of 40) without abnormal enhancement or restricted diffusion which may be sequela of late subacute infarct. There is no volume loss.   Visualized paranasal sinuses and mastoid air cells are essentially clear.       Evaluation is somewhat degraded due to patient motion.   1. No acute intracranial abnormality or mass effect. No abnormal intracranial enhancement or mass.   2. Mild disproportionate supratentorial ventriculomegaly, unchanged since the recent CT head and in a pattern that is suggestive of normal pressure hydrocephalus. Appearance is not typical for an infectious process.   3. Chronic intracranial findings as above.   This study was interpreted at [facility]  [place].   MACRO: None   Signed by: Alexi Bass 1/30/2024 7:32 AM Dictation workstation:   [id number]      CT head wo IV contrast    Result Date: 1/27/2024  Interpreted By:  Joanne Cruz, STUDY: CT HEAD WO IV CONTRAST;  1/27/2024 6:21 am   INDICATION: Signs/Symptoms:AMS. eval for acute hemorrhage.   COMPARISON: 08/04/2009   ACCESSION NUMBER(S): [id number]   ORDERING CLINICIAN: LATOYA GUEVARA   TECHNIQUE: Examination was performed in the axial plane using soft tissue and bone algorithm.   FINDINGS: INTRACRANIAL: There is prominence of the ventricular system and cerebral sulci consistent with cerebral atrophy. There are periventricular hypodensities consistent with  moderate small vessel disease. No mass or mass effect is identified. There is no hemorrhage or subdural fluid collection. There is no acute infarct. There is no fracture of the calvarium   EXTRACRANIAL: Visualized paranasal sinuses and mastoids are clear.       No acute intracranial pathology.   MACRO: None   Signed by: Joanne Cruz 1/27/2024 7:03 AM Dictation workstation:   [id number]      MR lumbar spine w and wo IV contrast    Result Date: 1/28/2024  Interpreted By:  Yoav Davidson, STUDY: MR LUMBAR SPINE W AND WO IV CONTRAST;  1/28/2024 6:39 pm   INDICATION: Signs/Symptoms: Rule out abscess.   COMPARISON: 01/11/2024   ACCESSION NUMBER(S): [id number]   ORDERING CLINICIAN: FRANCESCA ORR   TECHNIQUE: Sagittal STIR, sagittal T2, sagittal T1, axial T2, axial T1, as well as post gadolinium sagittal axial T1 weighted MRI images of the lumbar spine were obtained. The patient received 13 mL of Dotarem gadolinium intravenously.   FINDINGS: Postoperative changes are again identified compatible with a previous posterior laminectomies at the L3 through S1 levels as well as discectomies at the L4/5 and L5/S1 levels. Metallic artifact from orthopedic hardware is identified along the disc spaces at the L4/5 and L5/S1 levels. There are surgical drainage  catheter again noted along the laminectomy bed as well as more superficially along the surgical tract with the posterior paraspinal soft tissues. There is a minimal amount of rim enhancing fluid surrounding the surgical drainage catheter along laminectomy bed the sterility of which can not be ascertained on this MRI study.   There is again evidence of extensive infiltrative signal abnormality and enhancement within the soft tissues along the laminectomy bed and surrounding posterior paraspinal soft tissues extending superficially along the surgical tract which may be postsurgical in etiology although a superimposed infectious/inflammatory process could give a similar MRI appearance and must be considered.   There is again evidence of abnormal signal along the L3/4 disc space as well as within the adjacent bone marrow of the L3 and L4 vertebrae most compatible with underlying discitis/osteomyelitis. There has been mild interval bony destruction/collapse of the inferior L3 and to a lesser degree superior L4 vertebrae when compared with the prior study dated 01/11/2024. There is again evidence of approximately 4 mm of retrolisthesis of L3 on L4.   There is mild circumferential abnormal epidural thickening and enhancement within the spinal canal best appreciated extending from the L3 through S1 levels which may be postsurgical and/or infectious/inflammatory in origin.   There is abnormal infiltrative signal and enhancement within the paraspinal soft tissues/medial psoas muscles bilaterally right greater than left extending from the L3 level caudally into the sacral region likely infectious/inflammatory in origin.   The visualized spinal cord demonstrates no signal abnormality or abnormal enhancement within it. The conus medullaris is normally positioned terminating at the L1 level.   At the L5/S1 level,  there are postoperative changes compatible with a previous posterior laminectomy as well as discectomy. There is  enhancing soft tissue noted along the laminectomy bed as well as enhancing epidural thickening and enhancement circumferentially surrounding the thecal sac within the spinal canal. There is no significant narrowing of the thecal sac within the spinal canal. There is infiltrative enhancing epidural soft tissue extending laterally into the right neural foramen. There is mild-to-moderate encroachment upon the left neural foramen.   At the L4/L5 level,  there are postoperative changes compatible with a previous posterior laminectomy as well as discectomy. There is enhancing soft tissue noted along the laminectomy bed as well as enhancing epidural thickening and enhancement circumferentially surrounding the thecal sac within the spinal canal. There is mild overall narrowing of the thecal sac within the spinal canal. There is infiltrative enhancing epidural soft tissue extending laterally into the right neural foramen. There is mild-to-moderate encroachment upon the left neural foramen.   At the L3/L4 level,  there are postoperative changes compatible with a previous L3 laminectomy. There is again evidence of approximately 4 mm of retrolisthesis of L3 on L4. There is enhancing soft tissue noted along the laminectomy bed as well as enhancing epidural thickening and enhancement circumferentially surrounding the thecal sac within the spinal canal. There is no significant narrowing of the thecal sac within the spinal canal. There is bilateral neural foraminal narrowing with infiltrative enhancing epidural soft tissue extending laterally into the region of the neural foramen bilaterally.   At the L2/L3 level,  there are degenerative facet changes and a minimal posterior disc bulge without significant spinal canal narrowing. There is mild encroachment upon the inferior recesses of the neural foramen bilaterally.   At the L1/L2 level,  there is a minimal posterior disc bulge and mild degenerative facet changes contributing to  very mild encroachment upon the spinal canal. There is mild encroachment upon the inferior recesses of the neural foramen left greater than right.   At the T12/L1 level,  there is no significant spinal canal stenosis or neuroforaminal stenosis.   At the T11/12 level, there is a minimal posterior disc bulge and mild degenerative facet changes without significant spinal canal or neural foraminal narrowing.         Postoperative changes are again identified compatible with a previous posterior laminectomies at the L3 through S1 levels as well as discectomies at the L4/5 and L5/S1 levels. Metallic artifact from orthopedic hardware is identified along the disc spaces at the L4/5 and L5/S1 levels. There are surgical drainage catheter again noted along the laminectomy bed as well as more superficially along the surgical tract with the posterior paraspinal soft tissues. There is a minimal amount of rim enhancing fluid surrounding the surgical drainage catheter along laminectomy bed the sterility of which can not be ascertained on this MRI study.   There is again evidence of extensive infiltrative signal abnormality and enhancement within the soft tissues along the laminectomy bed and surrounding posterior paraspinal soft tissues extending superficially along the surgical tract which may be postsurgical in etiology although a superimposed infectious/inflammatory process could give a similar MRI appearance and must be considered.   There is again evidence of abnormal signal along the L3/4 disc space as well as within the adjacent bone marrow of the L3 and L4 vertebrae most compatible with underlying discitis/osteomyelitis. There has been mild interval bony destruction/collapse of the inferior L3 and to a lesser degree superior L4 vertebrae when compared with the prior study dated 01/11/2024. There is again evidence of approximately 4 mm of retrolisthesis of L3 on L4.   There is mild circumferential abnormal epidural  thickening and enhancement within the spinal canal best appreciated extending from the L3 through S1 levels which may be postsurgical and/or infectious/inflammatory in origin.   There is abnormal infiltrative signal and enhancement within the paraspinal soft tissues/medial psoas muscles bilaterally right greater than left extending from the L3 level caudally into the sacral region likely infectious/inflammatory in origin.   There is multilevel spondylosis. There are varying degrees of spinal canal and neural foraminal narrowing as described above.   MACRO: None.   Signed by: Yoav Davidson 1/28/2024 7:27 PM Dictation workstation:   [id number]    ECG 12 lead    Result Date: 1/27/2024  Normal sinus rhythm Left bundle branch block Abnormal ECG When compared with ECG of 02-JAN-2024 07:11, Questionable change in QRS duration See ED provider note for full interpretation and clinical correlation Confirmed by Bridger Erickson ([id number]) on 1/27/2024 12:29:08 PM    CT chest abdomen pelvis w IV contrast    Result Date: 1/27/2024  STUDY: CT Chest, Abdomen, and Pelvis with IV Contrast; 01/27/2024 6:37 AM INDICATION: Generalized abdominal pain.  Recent spine surgery and IVC filter. Evaluate for free air/fluid. COMPARISON: XR abdomen 01/15/2024.  CT AP 12/31/2023, 12/19/2023. ACCESSION NUMBER(S): [id number] ORDERING CLINICIAN: Osei Swann TECHNIQUE: CT of the chest, abdomen, and pelvis was performed.  Contiguous axial images were obtained at 3 mm slice thickness through the chest, abdomen, and pelvis.  Coronal and sagittal reconstructions at 3 mm slice thickness were performed.  Omnipaque 350 75 mL was administered intravenously.  FINDINGS: CHEST: MEDIASTINUM: Right PICC appears satisfactorily positioned extending to the right atrium.  The heart is normal in size without pericardial effusion. Central vascular structures opacify normally.  No thyroid nodule.  No identifiable breast mass.  LUNGS/PLEURA: There is no pleural effusion,  pleural thickening, or pneumothorax. Minimal dependent atelectasis at the lung bases. LYMPH NODES: Thoracic lymph nodes are not enlarged. ABDOMEN:  LIVER: No hepatomegaly.  Smooth surface contour.  Normal attenuation.  BILE DUCTS: No intrahepatic or extrahepatic biliary ductal dilatation.  GALLBLADDER: Gallbladder is surgically absent. STOMACH: No abnormalities identified.  PANCREAS: No masses or ductal dilatation.  SPLEEN: No splenomegaly or focal splenic lesion.  ADRENAL GLANDS: No thickening or nodules.  KIDNEYS AND URETERS: Kidneys are normal in size and location.  No renal or ureteral calculi.  PELVIS:  BLADDER: Stauffer catheter present.  Mild wall thickening of the urinary bladder may simply be related to incomplete distention.  Large quantity of stool in the rectum suggests constipation versus impaction.  REPRODUCTIVE ORGANS: No abnormalities identified.  BOWEL: No abnormalities identified.  Appendix appears normal.  VESSELS: No abnormalities identified.  IVC filter appears appropriately positioned.  Abdominal aorta is normal in caliber.  PERITONEUM/RETROPERITONEUM/LYMPH NODES: No free fluid.  No pneumoperitoneum. No lymphadenopathy.  ABDOMINAL WALL: No abnormalities identified. SOFT TISSUES: No abnormalities identified.  BONES: Mildly exaggerated thoracic kyphosis with mild to moderate degenerative disease. There are postsurgical changes of the lumbar spine.  Prosthetic discs at L4-5 and L5-S1 are in similar position when compared to the prior CT.  Two opaque drains at the surgical site are again noted. Subcutaneous emphysema noted along the course of the drains.  Presumed phlegmon noted at the surgical location.  No drainable collection. Since the prior study, there has been erosive or destructive changes of the inferior endplate of L3 and to a lesser extent the superior endplate of L4.  The findings would be strongly suspicious for discitis osteomyelitis.  No acute fracture or aggressive osseous  lesion.    CT CHEST: 1.  No CT evidence of pulmonary embolism. 2.  No pulmonary mass, nodule or consolidation.  No pleural effusion. No pneumothorax.  No thoracic adenopathy. CT ABDOMEN AND PELVIS: 1.  Postsurgical changes of the lumbar spine again noted.  There are two indwelling drains superficially unchanged from 12/31/2023.  The prosthetic discs at L4-5 and L5-S1 appears satisfactorily positioned, unchanged.  However, there is been interval destructive endplate changes along the inferior aspect of L3 and to a lesser extent the superior endplate of L4.  The findings would be suspicious for discitis/osteomyelitis. 2.  No ascites, free air or bowel obstruction. 3.  No urinary tract calculus or hydronephrosis. 4.  IVC filter appears satisfactorily positioned. Signed by Joshua Alfonso MD    CT lumbar spine wo IV contrast    Result Date: 1/27/2024  Interpreted By:  Joanne Cruz, STUDY: CT LUMBAR SPINE WO IV CONTRAST; CT THORACIC SPINE WO IV CONTRAST 1/27/2024 6:22 am   INDICATION: Signs/Symptoms:recent post op. 2 JOSE drains. the right paraspinal appears infected. eval for fluid collection   COMPARISON: MRI lumbar spine 01/11/2024   ACCESSION NUMBER(S): [id number]; [id number]   ORDERING CLINICIAN: LATOYA GUEVARA   TECHNIQUE: Axial CT images of the lumbar spine are obtained. Axial, coronal and sagittal reconstructions are provided for review.   FINDINGS: CT THORACIC SPINE: Alignment: Within normal limits. There is degenerative change with mild-to-moderate anterior osteophytic spurring at all levels particularly the midthoracic spine.   Vertebrae/Disc Spaces:   The vertebral body heights are intact. The disc spaces are preserved.   There is a benign calcified right upper lobe granuloma.   CT LUMBAR SPINE: There are disc spacers at L4-5 and L5-S1 in good position. Status post decompression laminectomies L3-L5. There is bone grafting material along the right and left lateral aspects of the mid and lower lumbar spine.  "There are 2 drains. There is a drain along the inferior aspect of the back. This courses superiorly and the tip is in the soft tissues posterior to L1. There is a 2nd drain along the inferior aspect of the back. This courses superiorly and the tip is in the soft tissues posterior to L1. There is induration of the soft tissues along the back and induration of the paraspinal muscles. There is a 2.5 x 2.0 cm x 1.8 cm ill-defined fluid collection in the paraspinal muscles of the back at the level of L4-5 just to the right of midline. This could be a true fluid collection or induration of the paraspinal muscles. There is no discrete enhancing wall. Findings could represent a abscess, postoperative seroma or liquified hematoma. There is no air in this fluid. This is not along the course of one of the drains. There is ghost artifact in the pedicles of L4, L5 and S1 from prior hardware which has been removed.   Alignment: There is 5 mm retrolisthesis of L3 with respect to L4.   Vertebrae/Disc Spaces:  There is destruction and irregularity of the inferior endplate of L3 and superior endplate of L4 consistent with diskitis and adjacent osteomyelitis.   T12-L1:  There is no significant central canal stenosis.   L1-2:  There is no significant central canal or neural foraminal stenosis.   L2-3:  There is no significant central canal or neural foraminal stenosis.   L3-4: There is destruction of the inferior endplate of L3 and superior endplate of L4. There is irregularity of the bone. There is \"widening\" of the disc space. Findings are consistent with discitis and adjacent osteomyelitis. There is a fracture of the right pedicle of L4.   L4-5:  There is no significant central canal or neural foraminal stenosis.   L5-S1:  There is no significant central canal or neural foraminal stenosis.   Prevertebral/Paraspinal Soft Tissues: The prevertebral and paraspinal soft tissues are unremarkable.   Status post cholecystectomy. There is an " inferior vena cava filter inferior to the renal veins       1. Degenerative change thoracic spine. No central canal stenosis or neural foraminal compromise 2. Diskitis and adjacent osteomyelitis L3-4. 3. 2.5 x 2.0 x 1.8 cm fluid collection in the paraspinal muscles of the back just to the right of midline. This contains no air. This is ill-defined and of question of whether this is a true discrete round fluid collection or ill definition and induration of the paraspinal muscles. No discrete enhancing wall is seen. Findings could represent an abscess, postoperative seroma or liquified hematoma. This is not along the course of one of the drains. 4. Nondisplaced fracture right pedicle L4. 5. Disc spacers L4-5 and L5-S1. Status post decompression laminectomies L3-L5. There is bone grafting material along the right and left lateral aspects of the mid and lower lumbar spine. There is ghost artifact in the pedicles of L4, L5 and S1 which represents hardware which has been removed. There are 2 drains in the soft tissues of the back.   MACRO: None   Signed by: Joanne Cruz 1/27/2024 7:22 AM Dictation workstation:   [id number]    CT thoracic spine wo IV contrast    Result Date: 1/27/2024  Interpreted By:  Joanne Cruz, STUDY: CT LUMBAR SPINE WO IV CONTRAST; CT THORACIC SPINE WO IV CONTRAST 1/27/2024 6:22 am   INDICATION: Signs/Symptoms:recent post op. 2 JOSE drains. the right paraspinal appears infected. eval for fluid collection   COMPARISON: MRI lumbar spine 01/11/2024   ACCESSION NUMBER(S): [id number]; [id number]   ORDERING CLINICIAN: LATOYA GUEVARA   TECHNIQUE: Axial CT images of the lumbar spine are obtained. Axial, coronal and sagittal reconstructions are provided for review.   FINDINGS: CT THORACIC SPINE: Alignment: Within normal limits. There is degenerative change with mild-to-moderate anterior osteophytic spurring at all levels particularly the midthoracic spine.   Vertebrae/Disc Spaces:   The vertebral body  "heights are intact. The disc spaces are preserved.   There is a benign calcified right upper lobe granuloma.   CT LUMBAR SPINE: There are disc spacers at L4-5 and L5-S1 in good position. Status post decompression laminectomies L3-L5. There is bone grafting material along the right and left lateral aspects of the mid and lower lumbar spine. There are 2 drains. There is a drain along the inferior aspect of the back. This courses superiorly and the tip is in the soft tissues posterior to L1. There is a 2nd drain along the inferior aspect of the back. This courses superiorly and the tip is in the soft tissues posterior to L1. There is induration of the soft tissues along the back and induration of the paraspinal muscles. There is a 2.5 x 2.0 cm x 1.8 cm ill-defined fluid collection in the paraspinal muscles of the back at the level of L4-5 just to the right of midline. This could be a true fluid collection or induration of the paraspinal muscles. There is no discrete enhancing wall. Findings could represent a abscess, postoperative seroma or liquified hematoma. There is no air in this fluid. This is not along the course of one of the drains. There is ghost artifact in the pedicles of L4, L5 and S1 from prior hardware which has been removed.   Alignment: There is 5 mm retrolisthesis of L3 with respect to L4.   Vertebrae/Disc Spaces:  There is destruction and irregularity of the inferior endplate of L3 and superior endplate of L4 consistent with diskitis and adjacent osteomyelitis.   T12-L1:  There is no significant central canal stenosis.   L1-2:  There is no significant central canal or neural foraminal stenosis.   L2-3:  There is no significant central canal or neural foraminal stenosis.   L3-4: There is destruction of the inferior endplate of L3 and superior endplate of L4. There is irregularity of the bone. There is \"widening\" of the disc space. Findings are consistent with discitis and adjacent osteomyelitis. There " is a fracture of the right pedicle of L4.   L4-5:  There is no significant central canal or neural foraminal stenosis.   L5-S1:  There is no significant central canal or neural foraminal stenosis.   Prevertebral/Paraspinal Soft Tissues: The prevertebral and paraspinal soft tissues are unremarkable.   Status post cholecystectomy. There is an inferior vena cava filter inferior to the renal veins       1. Degenerative change thoracic spine. No central canal stenosis or neural foraminal compromise 2. Diskitis and adjacent osteomyelitis L3-4. 3. 2.5 x 2.0 x 1.8 cm fluid collection in the paraspinal muscles of the back just to the right of midline. This contains no air. This is ill-defined and of question of whether this is a true discrete round fluid collection or ill definition and induration of the paraspinal muscles. No discrete enhancing wall is seen. Findings could represent an abscess, postoperative seroma or liquified hematoma. This is not along the course of one of the drains. 4. Nondisplaced fracture right pedicle L4. 5. Disc spacers L4-5 and L5-S1. Status post decompression laminectomies L3-L5. There is bone grafting material along the right and left lateral aspects of the mid and lower lumbar spine. There is ghost artifact in the pedicles of L4, L5 and S1 which represents hardware which has been removed. There are 2 drains in the soft tissues of the back.   MACRO: None   Signed by: Joanne Crzu 1/27/2024 7:22 AM Dictation workstation:   [id number]    CT head wo IV contrast    Result Date: 1/27/2024  Interpreted By:  Joanne Cruz, STUDY: CT HEAD WO IV CONTRAST;  1/27/2024 6:21 am   INDICATION: Signs/Symptoms:AMS. eval for acute hemorrhage.   COMPARISON: 08/04/2009   ACCESSION NUMBER(S): [id number]   ORDERING CLINICIAN: LATOYA GUEVARA   TECHNIQUE: Examination was performed in the axial plane using soft tissue and bone algorithm.   FINDINGS: INTRACRANIAL: There is prominence of the ventricular system and  cerebral sulci consistent with cerebral atrophy. There are periventricular hypodensities consistent with  moderate small vessel disease. No mass or mass effect is identified. There is no hemorrhage or subdural fluid collection. There is no acute infarct. There is no fracture of the calvarium   EXTRACRANIAL: Visualized paranasal sinuses and mastoids are clear.       No acute intracranial pathology.   MACRO: None   Signed by: Joanne Cruz 1/27/2024 7:03 AM Dictation workstation:   [id number]      EMG     No EMG results found for the past 12 months        EEG    Result Date: 1/30/2024  IMPRESSION Impression This routine EEG is indicative of a moderate diffuse encephalopathy. No epileptiform discharges or lateralizing signs are recorded. A full report will be scanned into the patient's chart at a later time. This report has been interpreted and electronically signed by       Current Facility-Administered Medications:     acetaminophen (Tylenol) tablet 650 mg, 650 mg, oral, q4h PRN **OR** acetaminophen (Tylenol) oral liquid 650 mg, 650 mg, nasogastric tube, q4h PRN **OR** acetaminophen (Tylenol) suppository 650 mg, 650 mg, rectal, q4h PRN, Raul George MD    alteplase (Cathflo Activase) injection 1 mg, 1 mg, intra-catheter, PRN, Nitesh Ruiz MD, 1 mg at 02/01/24 1431    amLODIPine (Norvasc) tablet 5 mg, 5 mg, oral, Daily, Nitesh Ruiz MD    apixaban (Eliquis) tablet 5 mg, 5 mg, oral, BID, Raul George MD, 5 mg at 02/04/24 0822    aspirin EC tablet 81 mg, 81 mg, oral, Daily, Raul George MD, 81 mg at 02/04/24 0820    baclofen (Lioresal) tablet 5 mg, 5 mg, oral, TID, Nitesh Ruiz MD, 5 mg at 02/04/24 1402    cefTRIAXone (Rocephin) 2 g in dextrose 5 % 50 mL IV, 2 g, intravenous, q12h, Amy Celestin DO, Stopped at 02/04/24 1244    DAPTOmycin (Cubicin) 500 mg in sodium chloride 0.9% 50 mL IV, 8 mg/kg, intravenous, q24h [facility], Pete Echeverria MD, Stopped at 02/03/24 2043    FLUoxetine (PROzac) capsule  10 mg, 10 mg, oral, Daily, Chivo Jennings, DARRYL-CNP, 10 mg at 02/04/24 0821    hydrALAZINE (Apresoline) injection 10 mg, 10 mg, intravenous, q4h PRN, Sunshine Lange MD, 10 mg at 02/03/24 0316    hydrALAZINE (Apresoline) tablet 50 mg, 50 mg, oral, BID, Raul George MD, 50 mg at 02/04/24 0822    isosorbide mononitrate ER (Imdur) 24 hr tablet 60 mg, 60 mg, oral, Daily, Raul George MD, 60 mg at 02/04/24 0820    lactobacillus acidophilus tablet 1 tablet, 1 tablet, oral, Daily, Raul George MD, 1 tablet at 02/04/24 0822    menthol-zinc oxide (Calmoseptine - Risamine) 0.44-20.6 % ointment 1 Application, 1 Application, Topical, 4x daily PRN, Nitesh Ruiz MD, 1 Application at 02/02/24 2128    ondansetron (Zofran) injection 4 mg, 4 mg, intravenous, q6h PRN, Raul George MD, 4 mg at 02/01/24 1640    oxyCODONE (Roxicodone) immediate release tablet 10 mg, 10 mg, oral, q6h PRN, Nitesh Ruiz MD, 10 mg at 02/04/24 1811    oxyCODONE (Roxicodone) immediate release tablet 5 mg, 5 mg, oral, q6h PRN, Nitesh Ruiz MD, 5 mg at 02/03/24 1119    polyethylene glycol (Glycolax, Miralax) packet 17 g, 17 g, oral, Daily, Raul George MD, 17 g at 01/29/24 1546    potassium chloride CR (Klor-Con M20) ER tablet 40 mEq, 40 mEq, oral, Once, Nitesh Ruiz MD    psyllium (Metamucil) 1 packet, 1 packet, oral, Daily, Raul George MD    rifAMPin (Rifadin) capsule 300 mg, 300 mg, oral, BID, Amy Celestin DO, 300 mg at 02/04/24 0827    sennosides (Senokot) tablet 17.2 mg, 2 tablet, oral, Nightly, Raul George MD, 17.2 mg at 02/01/24 2039    sennosides (Senokot) tablet 8.6 mg, 1 tablet, oral, Nightly, Raul George MD, 8.6 mg at 02/01/24 2100     Assessment:  Acute encephalopathy in the setting of MRSA wound infection after lumbar surgery.  Recent increase in the opiate medication which may have triggered the encephalopathy however there is leukocytosis and MRI of the lumbar spine showing L3-L4 discitis and epidural  thickening and enhancement L3-S1, partially treated meningitis is a concern.      Recommendation:  -Psychiatry appreciated  -will need acute rehab  -We will uptitrate baclofen slowly given the recent encephalopathy, 2/4/24 increased to 5 in AM 5 at midday 10 mg at night  -Continue empiric coverage for meningitis given her clinical improvement  -Accessing CSF at this point is a greater risk to the patient then would be clinically beneficial, superior levels (cervical tap suboccipital tap) are not available.  This was reviewed with the patient and family who expressed understanding and agreement.  -Will collect carotid Dopplers as the patient's surveillance has come due, Right 50-69% left less than 50%  -MRI of the brain with contrast to evaluate dural enhancement showed hydrocephalus, no leptomeningeal enhancement however there was a motion degraded component according to the radiologist report.  - ABG ultimately demonstrated significant alkalosis that appears to be mixed respiratory and metabolic the pH of 7.56.  -Ideally CSF testing would be beneficial however, one would of course not want to introduce infection by cannulizing again area (levels L3/4 and L4/5 show fluid collections) actively contaminated with MRSA and introducing that to the dura.    -Had extensive discussion with infectious disease and the patient has shown clinical improvement after additional coverage for meningitis  -routine EEG 1/30 showed diffuse slowing  -Thyroid studies in fact had been collected and were normal.  -Advance diet and activity as able  -will follow while admitted

## 2024-02-04 NOTE — CARE PLAN
The patient's goals for the shift include pain control    The clinical goals for the shift include pain control

## 2024-02-04 NOTE — PROGRESS NOTES
Physical Therapy    Physical Therapy Treatment    Patient Name: Tara Tierney  MRN: [id number]  Today's Date: 2/4/2024  Time Calculation  Start Time: 1048  Stop Time: 1112  Time Calculation (min): 24 min       Assessment/Plan   PT Assessment  PT Assessment Results: Decreased strength, Decreased endurance, Impaired balance, Decreased mobility, Decreased coordination, Pain, Orthopedic restrictions  Rehab Prognosis: Fair  End of Session Communication: Bedside nurse, PCT/NA/CTA  Assessment Comment: Pt participating but limited in movement due to being very pain focused. Pt will need continued skilled PT services to progress functional mobility and strength prior to discharge home.  End of Session Patient Position: Bed, 3 rail up, Alarm off, not on at start of session  PT Plan  Inpatient/Swing Bed or Outpatient: Inpatient  PT Plan  Treatment/Interventions: Bed mobility, Transfer training  PT Plan: Skilled PT  PT Frequency: 3 times per week  PT Discharge Recommendations: Moderate intensity level of continued care  PT Recommended Transfer Status: Assist x2, Assistive device    General Visit Information:   PT  Visit  PT Received On: 02/04/24  General  Reason for Referral: weakness / impaired mobility  Referred By: Joseph OT/PT 2/1  Past Medical History Relevant to Rehab: COPD, HTN ,  PVD, Arthritis  Anemia, CAD,  DVT, HLD, lumbar stenosis ,  s/p laminectomy fusion Nov/2023.  s/p infections with I&D and IV antibiotics pt has JOSE drains .  Prior to Session Communication: Bedside nurse, PCT/NA/CTA  Patient Position Received: Bed, 3 rail up, Alarm on  General Comment: Pt agreeable but needing encouragement.    Subjective   Precautions:  Precautions  Medical Precautions: Fall precautions, Spinal precautions  Post-Surgical Precautions: Spinal precautions  Vital Signs:       Objective   Pain:  Pain Assessment  Pain Assessment: 0-10  Pain Score: 10 - Worst possible pain  Pain Type: Acute pain  Pain Location: Back  Pain  Descriptors: Cramping, Sharp  Pain Interventions: Medication (See MAR)  Cognition:  Cognition  Orientation Level: Oriented X4  Postural Control:     Extremity/Trunk Assessments:        Activity Tolerance:  Activity Tolerance  Endurance: Decreased tolerance for upright activites  Treatments:  Bed Mobility  Bed Mobility: Yes (Sup<>sit trans with max A for BLE, trunk and for proper logroll technique. Pt needing mod A x 2 to return to bed and max A x 2 to boost up in bed. Sup>sit transfers pt pushing staff away and attempting to fall back onto the bed with total lack of safety.)    Transfers  Transfer: Yes (Sit<>stand x 3 attempts with mod A x 2, gait belt and WW but patient unable to maintain. BLE noted to buckle with one successful attempt.)    Outcome Measures:  [facility] Basic Mobility  Turning from your back to your side while in a flat bed without using bedrails: A lot  Moving from lying on your back to sitting on the side of a flat bed without using bedrails: A lot  Moving to and from bed to chair (including a wheelchair): A lot  Standing up from a chair using your arms (e.g. wheelchair or bedside chair): Total  To walk in hospital room: Total  Climbing 3-5 steps with railing: Total  Basic Mobility - Total Score: 9    Education Documentation  Precautions, taught by Chanel Ledesma PTA at 2/4/2024  1:30 PM.  Learner: Patient  Readiness: Acceptance  Method: Explanation  Response: Needs Reinforcement    Body Mechanics, taught by Chanel Ledesma PTA at 2/4/2024  1:30 PM.  Learner: Patient  Readiness: Acceptance  Method: Explanation  Response: Needs Reinforcement    Mobility Training, taught by Chanel Ledesma PTA at 2/4/2024  1:30 PM.  Learner: Patient  Readiness: Acceptance  Method: Explanation  Response: Needs Reinforcement    Education Comments  No comments found.        EDUCATION:  Outpatient Education  Individual(s) Educated: Patient  Education Provided: Body Mechanics, Fall Risk, Home Safety, Post-Op  Precautions  Education Comment: Pt educated in gait, transfers and safety awareness. Also spinal precautions.    Encounter Problems       Encounter Problems (Active)       PT Problem       Pt will demonstrate SBA with bed mobility to edge of bed.   (Progressing)       Start:  02/01/24    Expected End:  02/15/24            Pt will demonstrate SBA with sit to stand/chair transfers with FWW.   (Progressing)       Start:  02/01/24    Expected End:  02/15/24            Pt will ambulate 50 feet with FWW SBA.   (Progressing)       Start:  02/01/24    Expected End:  02/15/24            Pt to demo improved BLE strength by being able to complete supine/seated thera ex 2x20 BLEs with 4 or less rest breaks .   (Progressing)       Start:  02/01/24    Expected End:  02/15/24               Pain - Adult

## 2024-02-04 NOTE — PROGRESS NOTES
Ortho/Spine    Patient alert and oriented x 3, neuro intact, vital signs stable afebrile.  Patient moving all extremities patient still having a difficult time with pain control specially with getting out of bed with physical therapy.  Patient and  states the oxycodone really does not do much with for her for her pain.  And the MS Contin sedates her too much.  Patient got out of bed today slightly but there was too much pain so physical therapy was halted.  Still waiting on approval and transfer to [facility].    Physical exam: Well-nourished, well kept.  No lymphangitis or lymphadenopathy in the examined extremities.  Good perfusion to the lower extremities bilaterally.  Dorsalis pedis pulses 2+.  Capillary refill to the digits brisk.  No distal edema.  Patient able to stand with assistance but that is pretty much it due to pain not being under control.  Patient still has some weakness in the right quadricep hip flexors.  Otherwise examination of the lower extremities reveals no point tenderness, swelling, or deformity.  Range of motion of the hips, knees, and ankles are full without crepitance, instability, or exacerbation of pain.  Strength is 5/5 throughout..  Patient has a wound VAC in place.  Gross sensation intact to the lower extremity is bilaterally.  Affect normal.    Assessment: Spoke to the patient and the  about seeing what we can do to get better pain control may be getting something for breakthrough pain half hour or so before getting out of bed with physical therapy but told him we need to figure out a good regimen for pain control so when she gets to the rehab facility they can continue that.    Plan: Will discuss with pain management about a better pain management regimen for pain control and will follow-up tomorrow.

## 2024-02-05 ENCOUNTER — APPOINTMENT (OUTPATIENT)
Dept: RADIOLOGY | Facility: HOSPITAL | Age: 69
DRG: 856 | End: 2024-02-05
Payer: COMMERCIAL

## 2024-02-05 ENCOUNTER — OUTSIDE SERVICES (OUTPATIENT)
Dept: INFECTIOUS DISEASES | Age: 69
End: 2024-02-05
Payer: COMMERCIAL

## 2024-02-05 DIAGNOSIS — B95.62 MRSA BACTEREMIA: Primary | ICD-10-CM

## 2024-02-05 DIAGNOSIS — G93.41 ACUTE METABOLIC ENCEPHALOPATHY: ICD-10-CM

## 2024-02-05 DIAGNOSIS — M54.10 BACK PAIN WITH RADICULOPATHY: Primary | ICD-10-CM

## 2024-02-05 DIAGNOSIS — R78.81 MRSA BACTEREMIA: Primary | ICD-10-CM

## 2024-02-05 DIAGNOSIS — M46.49 DISCITIS OF MULTIPLE SITES OF SPINE: ICD-10-CM

## 2024-02-05 LAB
ANION GAP SERPL CALC-SCNC: 15 MMOL/L (ref 10–20)
BUN SERPL-MCNC: 6 MG/DL (ref 6–23)
CALCIUM SERPL-MCNC: 9.6 MG/DL (ref 8.6–10.3)
CHLORIDE SERPL-SCNC: 101 MMOL/L (ref 98–107)
CO2 SERPL-SCNC: 23 MMOL/L (ref 21–32)
CREAT SERPL-MCNC: 0.66 MG/DL (ref 0.5–1.05)
EGFRCR SERPLBLD CKD-EPI 2021: >90 ML/MIN/1.73M*2
ERYTHROCYTE [DISTWIDTH] IN BLOOD BY AUTOMATED COUNT: 15.7 % (ref 11.5–14.5)
GLUCOSE SERPL-MCNC: 176 MG/DL (ref 74–99)
HCT VFR BLD AUTO: 33.9 % (ref 36–46)
HGB BLD-MCNC: 10.8 G/DL (ref 12–16)
HOLD SPECIMEN: NORMAL
MCH RBC QN AUTO: 29.3 PG (ref 26–34)
MCHC RBC AUTO-ENTMCNC: 31.9 G/DL (ref 32–36)
MCV RBC AUTO: 92 FL (ref 80–100)
NRBC BLD-RTO: 0 /100 WBCS (ref 0–0)
PLATELET # BLD AUTO: 270 X10*3/UL (ref 150–450)
POTASSIUM SERPL-SCNC: 3.5 MMOL/L (ref 3.5–5.3)
RBC # BLD AUTO: 3.68 X10*6/UL (ref 4–5.2)
SODIUM SERPL-SCNC: 135 MMOL/L (ref 136–145)
WBC # BLD AUTO: 7.1 X10*3/UL (ref 4.4–11.3)

## 2024-02-05 PROCEDURE — A9575 INJ GADOTERATE MEGLUMI 0.1ML: HCPCS | Performed by: ORTHOPAEDIC SURGERY

## 2024-02-05 PROCEDURE — 99232 SBSQ HOSP IP/OBS MODERATE 35: CPT | Performed by: INTERNAL MEDICINE

## 2024-02-05 PROCEDURE — 99232 SBSQ HOSP IP/OBS MODERATE 35: CPT | Performed by: STUDENT IN AN ORGANIZED HEALTH CARE EDUCATION/TRAINING PROGRAM

## 2024-02-05 PROCEDURE — 2500000004 HC RX 250 GENERAL PHARMACY W/ HCPCS (ALT 636 FOR OP/ED): Performed by: STUDENT IN AN ORGANIZED HEALTH CARE EDUCATION/TRAINING PROGRAM

## 2024-02-05 PROCEDURE — 2500000001 HC RX 250 WO HCPCS SELF ADMINISTERED DRUGS (ALT 637 FOR MEDICARE OP): Performed by: INTERNAL MEDICINE

## 2024-02-05 PROCEDURE — 2500000001 HC RX 250 WO HCPCS SELF ADMINISTERED DRUGS (ALT 637 FOR MEDICARE OP): Performed by: REGISTERED NURSE

## 2024-02-05 PROCEDURE — 2500000004 HC RX 250 GENERAL PHARMACY W/ HCPCS (ALT 636 FOR OP/ED): Performed by: INTERNAL MEDICINE

## 2024-02-05 PROCEDURE — 72158 MRI LUMBAR SPINE W/O & W/DYE: CPT

## 2024-02-05 PROCEDURE — 72195 MRI PELVIS W/O DYE: CPT | Mod: LEFT SIDE | Performed by: RADIOLOGY

## 2024-02-05 PROCEDURE — 93970 EXTREMITY STUDY: CPT

## 2024-02-05 PROCEDURE — 72158 MRI LUMBAR SPINE W/O & W/DYE: CPT | Performed by: RADIOLOGY

## 2024-02-05 PROCEDURE — 2550000001 HC RX 255 CONTRASTS: Performed by: ORTHOPAEDIC SURGERY

## 2024-02-05 PROCEDURE — 2500000004 HC RX 250 GENERAL PHARMACY W/ HCPCS (ALT 636 FOR OP/ED): Mod: JZ | Performed by: INTERNAL MEDICINE

## 2024-02-05 PROCEDURE — 73721 MRI JNT OF LWR EXTRE W/O DYE: CPT | Mod: LT

## 2024-02-05 PROCEDURE — 1200000002 HC GENERAL ROOM WITH TELEMETRY DAILY

## 2024-02-05 PROCEDURE — 85027 COMPLETE CBC AUTOMATED: CPT | Performed by: STUDENT IN AN ORGANIZED HEALTH CARE EDUCATION/TRAINING PROGRAM

## 2024-02-05 PROCEDURE — 93970 EXTREMITY STUDY: CPT | Performed by: RADIOLOGY

## 2024-02-05 PROCEDURE — 92526 ORAL FUNCTION THERAPY: CPT | Mod: GN

## 2024-02-05 PROCEDURE — 73721 MRI JNT OF LWR EXTRE W/O DYE: CPT | Mod: LEFT SIDE | Performed by: RADIOLOGY

## 2024-02-05 PROCEDURE — 80048 BASIC METABOLIC PNL TOTAL CA: CPT | Performed by: STUDENT IN AN ORGANIZED HEALTH CARE EDUCATION/TRAINING PROGRAM

## 2024-02-05 PROCEDURE — 2500000001 HC RX 250 WO HCPCS SELF ADMINISTERED DRUGS (ALT 637 FOR MEDICARE OP): Performed by: STUDENT IN AN ORGANIZED HEALTH CARE EDUCATION/TRAINING PROGRAM

## 2024-02-05 PROCEDURE — 2500000001 HC RX 250 WO HCPCS SELF ADMINISTERED DRUGS (ALT 637 FOR MEDICARE OP): Performed by: PSYCHIATRY & NEUROLOGY

## 2024-02-05 PROCEDURE — 1090000001 HH PPS REVENUE CREDIT

## 2024-02-05 PROCEDURE — 1090000002 HH PPS REVENUE DEBIT

## 2024-02-05 PROCEDURE — 99231 SBSQ HOSP IP/OBS SF/LOW 25: CPT | Performed by: PLASTIC SURGERY

## 2024-02-05 PROCEDURE — 2500000004 HC RX 250 GENERAL PHARMACY W/ HCPCS (ALT 636 FOR OP/ED): Performed by: NURSE PRACTITIONER

## 2024-02-05 RX ORDER — LORAZEPAM 2 MG/ML
1 INJECTION INTRAMUSCULAR ONCE
Status: COMPLETED | OUTPATIENT
Start: 2024-02-05 | End: 2024-02-05

## 2024-02-05 RX ORDER — GADOTERATE MEGLUMINE 376.9 MG/ML
0.2 INJECTION INTRAVENOUS
Status: COMPLETED | OUTPATIENT
Start: 2024-02-05 | End: 2024-02-05

## 2024-02-05 RX ORDER — RIFAMPIN 300 MG/1
300 CAPSULE ORAL 2 TIMES DAILY
Qty: 56 CAPSULE | Refills: 0 | Status: SHIPPED | OUTPATIENT
Start: 2024-02-05 | End: 2024-03-04

## 2024-02-05 RX ORDER — MORPHINE SULFATE 2 MG/ML
2 INJECTION, SOLUTION INTRAMUSCULAR; INTRAVENOUS EVERY 6 HOURS PRN
Status: DISCONTINUED | OUTPATIENT
Start: 2024-02-05 | End: 2024-02-07 | Stop reason: HOSPADM

## 2024-02-05 RX ORDER — HYDROMORPHONE HYDROCHLORIDE 2 MG/1
2 TABLET ORAL EVERY 4 HOURS PRN
Status: DISCONTINUED | OUTPATIENT
Start: 2024-02-05 | End: 2024-02-05

## 2024-02-05 RX ORDER — OXYCODONE AND ACETAMINOPHEN 5; 325 MG/1; MG/1
1 TABLET ORAL EVERY 6 HOURS PRN
Qty: 28 TABLET | Refills: 0 | Status: SHIPPED | OUTPATIENT
Start: 2024-02-05 | End: 2024-02-07 | Stop reason: SDUPTHER

## 2024-02-05 RX ADMIN — OXYCODONE HYDROCHLORIDE 5 MG: 5 TABLET ORAL at 16:48

## 2024-02-05 RX ADMIN — OXYCODONE HYDROCHLORIDE 10 MG: 5 TABLET ORAL at 04:53

## 2024-02-05 RX ADMIN — OXYCODONE HYDROCHLORIDE 10 MG: 5 TABLET ORAL at 14:23

## 2024-02-05 RX ADMIN — LORAZEPAM 1 MG: 2 INJECTION, SOLUTION INTRAMUSCULAR; INTRAVENOUS at 14:45

## 2024-02-05 RX ADMIN — ASPIRIN 81 MG: 81 TABLET, COATED ORAL at 09:21

## 2024-02-05 RX ADMIN — Medication 1 TABLET: at 09:20

## 2024-02-05 RX ADMIN — DEXTROSE MONOHYDRATE 2 G: 5 INJECTION INTRAVENOUS at 23:39

## 2024-02-05 RX ADMIN — BACLOFEN 5 MG: 10 TABLET ORAL at 09:20

## 2024-02-05 RX ADMIN — MORPHINE SULFATE 2 MG: 2 INJECTION, SOLUTION INTRAMUSCULAR; INTRAVENOUS at 09:59

## 2024-02-05 RX ADMIN — RIFAMPIN 300 MG: 300 CAPSULE ORAL at 09:20

## 2024-02-05 RX ADMIN — AMLODIPINE BESYLATE 5 MG: 5 TABLET ORAL at 09:21

## 2024-02-05 RX ADMIN — BACLOFEN 5 MG: 10 TABLET ORAL at 14:22

## 2024-02-05 RX ADMIN — GADOTERATE MEGLUMINE 14 ML: 376.9 INJECTION INTRAVENOUS at 20:07

## 2024-02-05 RX ADMIN — RIFAMPIN 300 MG: 300 CAPSULE ORAL at 23:00

## 2024-02-05 RX ADMIN — DEXTROSE MONOHYDRATE 2 G: 5 INJECTION INTRAVENOUS at 11:39

## 2024-02-05 RX ADMIN — HYDRALAZINE HYDROCHLORIDE 50 MG: 50 TABLET ORAL at 09:20

## 2024-02-05 RX ADMIN — APIXABAN 5 MG: 5 TABLET, FILM COATED ORAL at 23:00

## 2024-02-05 RX ADMIN — HYDRALAZINE HYDROCHLORIDE 50 MG: 50 TABLET ORAL at 23:00

## 2024-02-05 RX ADMIN — APIXABAN 5 MG: 5 TABLET, FILM COATED ORAL at 09:21

## 2024-02-05 RX ADMIN — FLUOXETINE 10 MG: 10 CAPSULE ORAL at 09:20

## 2024-02-05 RX ADMIN — ISOSORBIDE MONONITRATE 60 MG: 60 TABLET, EXTENDED RELEASE ORAL at 09:21

## 2024-02-05 RX ADMIN — BACLOFEN 10 MG: 10 TABLET ORAL at 23:00

## 2024-02-05 RX ADMIN — DAPTOMYCIN 500 MG: 500 INJECTION, POWDER, LYOPHILIZED, FOR SOLUTION INTRAVENOUS at 22:59

## 2024-02-05 ASSESSMENT — PAIN - FUNCTIONAL ASSESSMENT
PAIN_FUNCTIONAL_ASSESSMENT: 0-10

## 2024-02-05 ASSESSMENT — COGNITIVE AND FUNCTIONAL STATUS - GENERAL
MOVING FROM LYING ON BACK TO SITTING ON SIDE OF FLAT BED WITH BEDRAILS: A LOT
MOBILITY SCORE: 9
WALKING IN HOSPITAL ROOM: TOTAL
DRESSING REGULAR LOWER BODY CLOTHING: TOTAL
EATING MEALS: A LITTLE
DRESSING REGULAR UPPER BODY CLOTHING: TOTAL
MOVING TO AND FROM BED TO CHAIR: A LOT
HELP NEEDED FOR BATHING: TOTAL
STANDING UP FROM CHAIR USING ARMS: TOTAL
DAILY ACTIVITIY SCORE: 10
TURNING FROM BACK TO SIDE WHILE IN FLAT BAD: A LOT
CLIMB 3 TO 5 STEPS WITH RAILING: TOTAL
PERSONAL GROOMING: A LITTLE
TOILETING: TOTAL

## 2024-02-05 ASSESSMENT — PAIN DESCRIPTION - LOCATION
LOCATION: OTHER (COMMENT)
LOCATION: GROIN

## 2024-02-05 ASSESSMENT — PAIN SCALES - GENERAL
PAINLEVEL_OUTOF10: 0 - NO PAIN
PAINLEVEL_OUTOF10: 4
PAINLEVEL_OUTOF10: 8
PAINLEVEL_OUTOF10: 8
PAINLEVEL_OUTOF10: 6
PAINLEVEL_OUTOF10: 9
PAINLEVEL_OUTOF10: 10 - WORST POSSIBLE PAIN
PAINLEVEL_OUTOF10: 0 - NO PAIN
PAINLEVEL_OUTOF10: 6

## 2024-02-05 ASSESSMENT — PAIN DESCRIPTION - DESCRIPTORS
DESCRIPTORS: ACHING;CRAMPING
DESCRIPTORS: ACHING

## 2024-02-05 NOTE — PROGRESS NOTES
"Tara Tierney is a 68 y.o. female on day 9 of admission presenting with Altered mental status, unspecified altered mental status type.    Subjective   Patient seen and examined.  Denies any fevers chills nausea vomiting.  She has not been working with physical therapy.  Does complain of back pain.  Patient and family state that they are not ready for discharge yet, Dr. Coombs told them that she can stay in the hospital for as long as possible.  She does not want to go to a rehab facility would rather prefer to go home.       Objective     Physical Exam  Constitutional:       Appearance: Normal appearance. She is not toxic-appearing.   HENT:      Head: Normocephalic and atraumatic.   Cardiovascular:      Rate and Rhythm: Normal rate and regular rhythm.      Heart sounds: Murmur heard.   Pulmonary:      Effort: Pulmonary effort is normal. No respiratory distress.   Abdominal:      General: There is no distension.      Palpations: Abdomen is soft.   Musculoskeletal:         General: Normal range of motion.   Skin:     General: Skin is warm.   Neurological:      General: No focal deficit present.   Last Recorded Vitals  Blood pressure 152/67, pulse 96, temperature 36.5 °C (97.7 °F), temperature source Temporal, resp. rate 18, height 1.44 m (4' 8.69\"), weight 69.1 kg (152 lb 5.4 oz), SpO2 100 %, not currently breastfeeding.  Intake/Output last 3 Shifts:  I/O last 3 completed shifts:  In: 3715 (53.8 mL/kg) [P.O.:3075; IV Piggyback:640]  Out: 3900 (56.4 mL/kg) [Urine:3900 (1.6 mL/kg/hr)]  Weight: 69.1 kg     Relevant Results                             Assessment/Plan   Principal Problem:    Altered mental status, unspecified altered mental status type  Active Problems:    Surgical wound infection    this unfortunate 68-year-old female with past medical history of discitis/osteomyelitis of lumbar vertebrae post spinal fusion surgery, deep incisional MRSA wound infection of lumbar spine, COPD, hypertension, " peripheral vascular disease, DVT who was admitted with acute toxic metabolic encephalopathy most likely secondary to meningitis          I believe that the patient is close to discharge  I was planning to discharge her today but there are some issues that need to be sorted out  First of all family and patient does not think they are ready, as per family they were told by orthopedics that she will be discharged once she is able to get up and move around  They do not want to go to a rehab facility despite myself trying to explain  They want her to go home, we still have to hear back from insurance whether she can go home with wound VAC on  At discharge she will need to be on Cubicin for 50 more days, ceftriaxone for 10 more days and p.o. rifampin for 28 days  Will need to follow-up with ID as an outpatient  Will order weekly CBC BMP and CRP  She will need to be discharged with wound VAC in place  Plastic surgery will follow her as an outpatient  There are no plans for surgical intervention  Continue pain meds  Continue Eliquis and rest of her regular meds  Supportive care  Symptomatic treatment  Hypokalemia has resolved, no leukocytosis  DVT prophylaxis             Nitesh Ruiz MD

## 2024-02-05 NOTE — PROGRESS NOTES
Occupational Therapy                 Therapy Communication Note    Patient Name: Tara Tierney  MRN: [id number]  Today's Date: 2/5/2024     Discipline: Occupational Therapy    Missed Visit Reason:  attempted to see pt x 3 seperate occurrences, 1st att @ 0932 pt and pt brother stating pt needs to be medicated 30 minutes prior to working with therapy, RN present and confirmed. 2nd att @ 1203 therapist told pt leaving and didnt need a new note  for precert. Pt still c/o increased pain.  3rd att @ 1450 pt entire family seated bedside pt now going down for MRI, RN present completing paperwork with pt. Pt shaking her head no when asked to work with therapist. Will att to see pt next date and see what the results of MRI are.     Missed Time: Attempt    Comment:

## 2024-02-05 NOTE — PROGRESS NOTES
Plastic Surgery Note    Afebrile, vital signs stable  Wound VAC in place in the lower back  No periwound erythema  Impression: Open wound of mid back from surgical dehiscence  Continue wound VAC  Okay for discharge from my standpoint with wound VAC  Follow-up with me in the wound clinic 1 to 2 weeks

## 2024-02-05 NOTE — CARE PLAN
Problem: Pain  Goal: My pain/discomfort is manageable  Outcome: Progressing     The patient's goals for the shift include pain control    The clinical goals for the shift include pain control and mobility

## 2024-02-05 NOTE — PROGRESS NOTES
Physical Therapy                 Therapy Communication Note    Patient Name: Tara Tierney  MRN: [id number]  Today's Date: 2/5/2024     Discipline: Physical Therapy    Comment: Met with Dr. Coombs and Dr. Ag this date s/p receiving message to contact Dr. Coombs in regards to PT frequency. Discussed current POC, PT 3x/week and OT 2x/week at this time. Alternating days of service to encourage more mobility. Discussed plan to notify nursing if therapy not on for patient in order for nursing to assist pt with OOB activity. Dr. Coombs requests LE strengthening as needed with PT. Discussed pt's limited tolerance due to pain and that pt is mildly self limiting requiring increased encouragement to participate. Discussed recommendation for 24 hour care and assist and continued therapy at time of discharge. Will continue with POC at this time. Awaiting MRI results, will reassess frequency if pt demonstrate increased tolerance and participation. Will collaborate with nurse manager to ensure increased mobilization.

## 2024-02-05 NOTE — PROGRESS NOTES
Subjective: Patient was much more comfortable this morning.  Now having some left groin and thigh pain.  Having some back pain as well but not that much.  No numbness tingling.  No fevers chills nausea vomiting.  She is very frustrated and upset because she was told by other caregivers that she was going home today and no one had discussed that with her surgeon.    Physical exam: Incision looks good.  Lower portion of the wound VAC appears to be peeling up and losing some pressure.  No redness warmth swelling puffiness.  Drain sites appear to be closing without any evidence of discharge or redness or infection.  Bilateral lower extremity shows 4+ to 5/5 strength except hip flexion on the right side is 4-/5 strength which has been that way for quite some time.  Range of motion of her left hip gives her some pain with extreme internal and external rotation but she has smooth range of motion of that hip.  She has a little bit of tenderness in the groin.    Inflammatory markers appear to be trending down over the past weeks.    Assessment/plan: Extremely complex case as this patient has a very resistant MRSA infection.  She was admitted for mental status changes which have resolved.  That was likely due to excessive pain medicine.  Now having groin and thigh pain which she has presented with in the past when she developed deep fluid collections.  The drains have been out.  Wound VAC has been putting quite a bit of fluid out but needs to be reinforced/changed as it is now leaking somewhat.  We will get her wound VAC changed.  We will get an MRI of her lumbar spine and left hip.  Will do a venous duplex ultrasound bowel lower extremities.  She is on Eliquis.  She has been extremely immobile however.  I had a discussion with physical therapy team and they will make sure that some form of therapy is done on her at least once a day even if that means bed physical therapy.  Will try and get her out of bed to a chair  regularly.  I spoke with the medical team and the patient will be transferred to my service per their request.  We will follow the results of her test.  We will continue to rely on the medical team and infectious disease to manage the patient's medical issues and antibiotics.  Her  her brother-in-law and mother were involved with the visit in conversation today.

## 2024-02-05 NOTE — PROGRESS NOTES
Back pain and spasms continue, getting left lower extremity dysesthesia   Psychiatry appreciated  Patient needs rehab  The patient continues to show a gradual clinical improvement with current treatments.  MRI brain was collected 1/30/24 and did not identify any leptomeningeal enhancement or other pathology, there is however dilatation of the ventricular system.  Patient is not in any distress, alert conversational.  No photophobia, no headache.  Speech has improved  He has a discomfort in the hip areas and groin area, being watched  With the reduction in the narcotics her mental status has improved      Visit Vitals  BP (!) 101/49 (BP Location: Left arm, Patient Position: Lying)   Pulse 102   Temp 36.5 °C (97.7 °F) (Temporal)   Resp 20        Neurological Exam:  GENERAL APPEARANCE: No distress interactive and cooperative.     MENTAL STATE: Patient is drowsy and falls off to sleep during our conversation.    CRANIAL NERVES: normal  CN 2 Visual fields full to confrontation.   CN 3, 4, 6  Pupils round, equally reactive to light. No ptosis.EOMs normal alignment, full range with normal saccades, pursuit and convergence. No nystagmus.   CN 5 Facial sensation intact bilaterally.    CN 7 Normal and symmetric facial strength. Nasolabial folds symmetric.   CN 8 Hearing intact to finger rub bilaterally.   CN 9 Palate elevates symmetrically.    CN 11 Bilaterally normal strength of shoulder shrug and neck turning.   CN 12 Tongue midline, with normal bulk and strength; no fasciculations.     Motor examination reveals 5/5 strength in the uppers bilaterally, 4/5 hip flexion bilaterally.  Difficult to isolate knee flexion d/t pain, 5/5 bilaeral dorsiflexion and plantar flexion    Sensory: decreased sensation to light touch in the left lower, mild hyperstesia in the right lower.  Normal uppers bilaterally    Gait not testable due to pain and weakness      BMP:  Results from last 7 days   Lab Units 02/05/24  0525 02/03/24  0537  "02/01/24  1016   SODIUM mmol/L 135* 136 142   POTASSIUM mmol/L 3.5 3.2* 3.1*   CHLORIDE mmol/L 101 104 108*   CO2 mmol/L 23 23 25   BUN mg/dL 6 6 13   CREATININE mg/dL 0.66 0.59 0.66         CBC:  Results from last 7 days   Lab Units 02/05/24  0525 02/03/24  0537 02/02/24  0528   WBC AUTO x10*3/uL 7.1 7.4 7.7   RBC AUTO x10*6/uL 3.68* 3.52* 3.43*   HEMOGLOBIN g/dL 10.8* 10.5* 10.2*   HEMATOCRIT % 33.9* 31.9* 31.8*   MCV fL 92 91 93   MCH pg 29.3 29.8 29.7   MCHC g/dL 31.9* 32.9 32.1   RDW % 15.7* 15.9* 15.9*   PLATELETS AUTO x10*3/uL 270 229 233         INR:        Lipid Profile:        No lab exists for component: \"LABVLDL\"    HgbA1C:        CMP:  Results from last 7 days   Lab Units 02/05/24  0525 02/03/24  0537 02/01/24  1016   SODIUM mmol/L 135* 136 142   POTASSIUM mmol/L 3.5 3.2* 3.1*   CHLORIDE mmol/L 101 104 108*   CO2 mmol/L 23 23 25   BUN mg/dL 6 6 13   CREATININE mg/dL 0.66 0.59 0.66   GLUCOSE mg/dL 176* 133* 188*   CALCIUM mg/dL 9.6 8.8 8.7         ABG:        No lab exists for component: \"PO2\", \"PCO2\", \"HCO3\", \"BE\", \"O2SAT\"    TSH:  No results found for: \"TSH\"  Lab Results   Component Value Date    IXSYSMXU34 322 01/05/2024     Lab Results   Component Value Date    FOLATE 5.1 01/05/2024     Lab Results   Component Value Date    VITD25 33 01/05/2024         MR brain w and wo IV contrast    Result Date: 1/30/2024  Interpreted By:  Alexi Bass, STUDY: MR BRAIN W AND WO IV CONTRAST; ;  1/29/2024 7:34 pm   INDICATION: Signs/Symptoms:menengitis.   COMPARISON: CT head from 01/20/2024. MRI brain from 01/18/2016.   ACCESSION NUMBER(S): [id number]   ORDERING CLINICIAN: RENETTA TAYLOR   TECHNIQUE: MRI of the brain was performed with the acquisition of axial diffusion-weighted, axial T1, axial FLAIR, axial T2 gradient echo, axial T2 fat saturated, axial T1 fat saturated postcontrast sequence, and axial T1 volumetric post-contrast sequence with multiplanar reformats.   Contrast: 13.5 mL of Dotarem was injected " intravenously.   FINDINGS: Evaluation is somewhat degraded due to patient motion.   There is no acute intracranial hemorrhage or infarct. There is no abnormal extra-axial fluid collection or mass effect.   On the FLAIR sequence, there is increased signal within sulci in the bilateral cerebral hemispheres, primarily along the periphery with sparing of the central sulci which is likely artifactual. No signal abnormality seen within the sulci on the other sequences.   There is stable mild disproportionate enlargement of the supratentorial ventricles compared the adjacent sulci and widening of the sylvian fissures in a pattern that can be seen with normal pressure hydrocephalus. Ventriculomegaly related to potential meningitis is favored less likely and ventricles are unchanged in size since the prior CT head. Ventricles have increased in size since the prior MRI brain from 2016, favored to be related to parenchymal volume loss.   There is no abnormal intracranial enhancement or mass.   There are confluent and scattered regions of T2 hyperintensity within the cerebral hemispheric white matter and robin which are probably sequela of chronic small vessel ischemic changes. There are 2 small foci of T2 hyperintensity located within the right cerebellum which probably reflect old infarcts.   Within the left cerebellum, there is subtle T2 hyperintense signal (series 9, image 10 of 40) without abnormal enhancement or restricted diffusion which may be sequela of late subacute infarct. There is no volume loss.   Visualized paranasal sinuses and mastoid air cells are essentially clear.       Evaluation is somewhat degraded due to patient motion.   1. No acute intracranial abnormality or mass effect. No abnormal intracranial enhancement or mass.   2. Mild disproportionate supratentorial ventriculomegaly, unchanged since the recent CT head and in a pattern that is suggestive of normal pressure hydrocephalus. Appearance is not  typical for an infectious process.   3. Chronic intracranial findings as above.   This study was interpreted at [facility].   MACRO: None   Signed by: Alexi Bass 1/30/2024 7:32 AM Dictation workstation:   [id number]      CT head wo IV contrast    Result Date: 1/27/2024  Interpreted By:  Joanne Cruz, STUDY: CT HEAD WO IV CONTRAST;  1/27/2024 6:21 am   INDICATION: Signs/Symptoms:AMS. eval for acute hemorrhage.   COMPARISON: 08/04/2009   ACCESSION NUMBER(S): [id number]   ORDERING CLINICIAN: LATOYA GUEVARA   TECHNIQUE: Examination was performed in the axial plane using soft tissue and bone algorithm.   FINDINGS: INTRACRANIAL: There is prominence of the ventricular system and cerebral sulci consistent with cerebral atrophy. There are periventricular hypodensities consistent with  moderate small vessel disease. No mass or mass effect is identified. There is no hemorrhage or subdural fluid collection. There is no acute infarct. There is no fracture of the calvarium   EXTRACRANIAL: Visualized paranasal sinuses and mastoids are clear.       No acute intracranial pathology.   MACRO: None   Signed by: Joanne Cruz 1/27/2024 7:03 AM Dictation workstation:   [id number]      MR lumbar spine w and wo IV contrast    Result Date: 1/28/2024  Interpreted By:  Yoav Davidson, STUDY: MR LUMBAR SPINE W AND WO IV CONTRAST;  1/28/2024 6:39 pm   INDICATION: Signs/Symptoms: Rule out abscess.   COMPARISON: 01/11/2024   ACCESSION NUMBER(S): [id number]   ORDERING CLINICIAN: FRANCESCA ORR   TECHNIQUE: Sagittal STIR, sagittal T2, sagittal T1, axial T2, axial T1, as well as post gadolinium sagittal axial T1 weighted MRI images of the lumbar spine were obtained. The patient received 13 mL of Dotarem gadolinium intravenously.   FINDINGS: Postoperative changes are again identified compatible with a previous posterior laminectomies at the L3 through S1 levels as well as discectomies at the L4/5 and  L5/S1 levels. Metallic artifact from orthopedic hardware is identified along the disc spaces at the L4/5 and L5/S1 levels. There are surgical drainage catheter again noted along the laminectomy bed as well as more superficially along the surgical tract with the posterior paraspinal soft tissues. There is a minimal amount of rim enhancing fluid surrounding the surgical drainage catheter along laminectomy bed the sterility of which can not be ascertained on this MRI study.   There is again evidence of extensive infiltrative signal abnormality and enhancement within the soft tissues along the laminectomy bed and surrounding posterior paraspinal soft tissues extending superficially along the surgical tract which may be postsurgical in etiology although a superimposed infectious/inflammatory process could give a similar MRI appearance and must be considered.   There is again evidence of abnormal signal along the L3/4 disc space as well as within the adjacent bone marrow of the L3 and L4 vertebrae most compatible with underlying discitis/osteomyelitis. There has been mild interval bony destruction/collapse of the inferior L3 and to a lesser degree superior L4 vertebrae when compared with the prior study dated 01/11/2024. There is again evidence of approximately 4 mm of retrolisthesis of L3 on L4.   There is mild circumferential abnormal epidural thickening and enhancement within the spinal canal best appreciated extending from the L3 through S1 levels which may be postsurgical and/or infectious/inflammatory in origin.   There is abnormal infiltrative signal and enhancement within the paraspinal soft tissues/medial psoas muscles bilaterally right greater than left extending from the L3 level caudally into the sacral region likely infectious/inflammatory in origin.   The visualized spinal cord demonstrates no signal abnormality or abnormal enhancement within it. The conus medullaris is normally positioned terminating at the  L1 level.   At the L5/S1 level,  there are postoperative changes compatible with a previous posterior laminectomy as well as discectomy. There is enhancing soft tissue noted along the laminectomy bed as well as enhancing epidural thickening and enhancement circumferentially surrounding the thecal sac within the spinal canal. There is no significant narrowing of the thecal sac within the spinal canal. There is infiltrative enhancing epidural soft tissue extending laterally into the right neural foramen. There is mild-to-moderate encroachment upon the left neural foramen.   At the L4/L5 level,  there are postoperative changes compatible with a previous posterior laminectomy as well as discectomy. There is enhancing soft tissue noted along the laminectomy bed as well as enhancing epidural thickening and enhancement circumferentially surrounding the thecal sac within the spinal canal. There is mild overall narrowing of the thecal sac within the spinal canal. There is infiltrative enhancing epidural soft tissue extending laterally into the right neural foramen. There is mild-to-moderate encroachment upon the left neural foramen.   At the L3/L4 level,  there are postoperative changes compatible with a previous L3 laminectomy. There is again evidence of approximately 4 mm of retrolisthesis of L3 on L4. There is enhancing soft tissue noted along the laminectomy bed as well as enhancing epidural thickening and enhancement circumferentially surrounding the thecal sac within the spinal canal. There is no significant narrowing of the thecal sac within the spinal canal. There is bilateral neural foraminal narrowing with infiltrative enhancing epidural soft tissue extending laterally into the region of the neural foramen bilaterally.   At the L2/L3 level,  there are degenerative facet changes and a minimal posterior disc bulge without significant spinal canal narrowing. There is mild encroachment upon the inferior recesses of the  neural foramen bilaterally.   At the L1/L2 level,  there is a minimal posterior disc bulge and mild degenerative facet changes contributing to very mild encroachment upon the spinal canal. There is mild encroachment upon the inferior recesses of the neural foramen left greater than right.   At the T12/L1 level,  there is no significant spinal canal stenosis or neuroforaminal stenosis.   At the T11/12 level, there is a minimal posterior disc bulge and mild degenerative facet changes without significant spinal canal or neural foraminal narrowing.         Postoperative changes are again identified compatible with a previous posterior laminectomies at the L3 through S1 levels as well as discectomies at the L4/5 and L5/S1 levels. Metallic artifact from orthopedic hardware is identified along the disc spaces at the L4/5 and L5/S1 levels. There are surgical drainage catheter again noted along the laminectomy bed as well as more superficially along the surgical tract with the posterior paraspinal soft tissues. There is a minimal amount of rim enhancing fluid surrounding the surgical drainage catheter along laminectomy bed the sterility of which can not be ascertained on this MRI study.   There is again evidence of extensive infiltrative signal abnormality and enhancement within the soft tissues along the laminectomy bed and surrounding posterior paraspinal soft tissues extending superficially along the surgical tract which may be postsurgical in etiology although a superimposed infectious/inflammatory process could give a similar MRI appearance and must be considered.   There is again evidence of abnormal signal along the L3/4 disc space as well as within the adjacent bone marrow of the L3 and L4 vertebrae most compatible with underlying discitis/osteomyelitis. There has been mild interval bony destruction/collapse of the inferior L3 and to a lesser degree superior L4 vertebrae when compared with the prior study dated  01/11/2024. There is again evidence of approximately 4 mm of retrolisthesis of L3 on L4.   There is mild circumferential abnormal epidural thickening and enhancement within the spinal canal best appreciated extending from the L3 through S1 levels which may be postsurgical and/or infectious/inflammatory in origin.   There is abnormal infiltrative signal and enhancement within the paraspinal soft tissues/medial psoas muscles bilaterally right greater than left extending from the L3 level caudally into the sacral region likely infectious/inflammatory in origin.   There is multilevel spondylosis. There are varying degrees of spinal canal and neural foraminal narrowing as described above.   MACRO: None.   Signed by: Yoav Davidson 1/28/2024 7:27 PM Dictation workstation:   [id number]    ECG 12 lead    Result Date: 1/27/2024  Normal sinus rhythm Left bundle branch block Abnormal ECG When compared with ECG of 02-JAN-2024 07:11, Questionable change in QRS duration See ED provider note for full interpretation and clinical correlation Confirmed by Bridger Erickson ([id number]) on 1/27/2024 12:29:08 PM    CT chest abdomen pelvis w IV contrast    Result Date: 1/27/2024  STUDY: CT Chest, Abdomen, and Pelvis with IV Contrast; 01/27/2024 6:37 AM INDICATION: Generalized abdominal pain.  Recent spine surgery and IVC filter. Evaluate for free air/fluid. COMPARISON: XR abdomen 01/15/2024.  CT AP 12/31/2023, 12/19/2023. ACCESSION NUMBER(S): [id number] ORDERING CLINICIAN: Osei Swann TECHNIQUE: CT of the chest, abdomen, and pelvis was performed.  Contiguous axial images were obtained at 3 mm slice thickness through the chest, abdomen, and pelvis.  Coronal and sagittal reconstructions at 3 mm slice thickness were performed.  Omnipaque 350 75 mL was administered intravenously.  FINDINGS: CHEST: MEDIASTINUM: Right PICC appears satisfactorily positioned extending to the right atrium.  The heart is normal in size without pericardial effusion.  Central vascular structures opacify normally.  No thyroid nodule.  No identifiable breast mass.  LUNGS/PLEURA: There is no pleural effusion, pleural thickening, or pneumothorax. Minimal dependent atelectasis at the lung bases. LYMPH NODES: Thoracic lymph nodes are not enlarged. ABDOMEN:  LIVER: No hepatomegaly.  Smooth surface contour.  Normal attenuation.  BILE DUCTS: No intrahepatic or extrahepatic biliary ductal dilatation.  GALLBLADDER: Gallbladder is surgically absent. STOMACH: No abnormalities identified.  PANCREAS: No masses or ductal dilatation.  SPLEEN: No splenomegaly or focal splenic lesion.  ADRENAL GLANDS: No thickening or nodules.  KIDNEYS AND URETERS: Kidneys are normal in size and location.  No renal or ureteral calculi.  PELVIS:  BLADDER: Stauffer catheter present.  Mild wall thickening of the urinary bladder may simply be related to incomplete distention.  Large quantity of stool in the rectum suggests constipation versus impaction.  REPRODUCTIVE ORGANS: No abnormalities identified.  BOWEL: No abnormalities identified.  Appendix appears normal.  VESSELS: No abnormalities identified.  IVC filter appears appropriately positioned.  Abdominal aorta is normal in caliber.  PERITONEUM/RETROPERITONEUM/LYMPH NODES: No free fluid.  No pneumoperitoneum. No lymphadenopathy.  ABDOMINAL WALL: No abnormalities identified. SOFT TISSUES: No abnormalities identified.  BONES: Mildly exaggerated thoracic kyphosis with mild to moderate degenerative disease. There are postsurgical changes of the lumbar spine.  Prosthetic discs at L4-5 and L5-S1 are in similar position when compared to the prior CT.  Two opaque drains at the surgical site are again noted. Subcutaneous emphysema noted along the course of the drains.  Presumed phlegmon noted at the surgical location.  No drainable collection. Since the prior study, there has been erosive or destructive changes of the inferior endplate of L3 and to a lesser extent the superior  endplate of L4.  The findings would be strongly suspicious for discitis osteomyelitis.  No acute fracture or aggressive osseous lesion.    CT CHEST: 1.  No CT evidence of pulmonary embolism. 2.  No pulmonary mass, nodule or consolidation.  No pleural effusion. No pneumothorax.  No thoracic adenopathy. CT ABDOMEN AND PELVIS: 1.  Postsurgical changes of the lumbar spine again noted.  There are two indwelling drains superficially unchanged from 12/31/2023.  The prosthetic discs at L4-5 and L5-S1 appears satisfactorily positioned, unchanged.  However, there is been interval destructive endplate changes along the inferior aspect of L3 and to a lesser extent the superior endplate of L4.  The findings would be suspicious for discitis/osteomyelitis. 2.  No ascites, free air or bowel obstruction. 3.  No urinary tract calculus or hydronephrosis. 4.  IVC filter appears satisfactorily positioned. Signed by Joshua Alfonso MD    CT lumbar spine wo IV contrast    Result Date: 1/27/2024  Interpreted By:  Joanne Cruz, STUDY: CT LUMBAR SPINE WO IV CONTRAST; CT THORACIC SPINE WO IV CONTRAST 1/27/2024 6:22 am   INDICATION: Signs/Symptoms:recent post op. 2 JOSE drains. the right paraspinal appears infected. eval for fluid collection   COMPARISON: MRI lumbar spine 01/11/2024   ACCESSION NUMBER(S): [id number]; [id number]   ORDERING CLINICIAN: LATOYA GUEVARA   TECHNIQUE: Axial CT images of the lumbar spine are obtained. Axial, coronal and sagittal reconstructions are provided for review.   FINDINGS: CT THORACIC SPINE: Alignment: Within normal limits. There is degenerative change with mild-to-moderate anterior osteophytic spurring at all levels particularly the midthoracic spine.   Vertebrae/Disc Spaces:   The vertebral body heights are intact. The disc spaces are preserved.   There is a benign calcified right upper lobe granuloma.   CT LUMBAR SPINE: There are disc spacers at L4-5 and L5-S1 in good position. Status post decompression  "laminectomies L3-L5. There is bone grafting material along the right and left lateral aspects of the mid and lower lumbar spine. There are 2 drains. There is a drain along the inferior aspect of the back. This courses superiorly and the tip is in the soft tissues posterior to L1. There is a 2nd drain along the inferior aspect of the back. This courses superiorly and the tip is in the soft tissues posterior to L1. There is induration of the soft tissues along the back and induration of the paraspinal muscles. There is a 2.5 x 2.0 cm x 1.8 cm ill-defined fluid collection in the paraspinal muscles of the back at the level of L4-5 just to the right of midline. This could be a true fluid collection or induration of the paraspinal muscles. There is no discrete enhancing wall. Findings could represent a abscess, postoperative seroma or liquified hematoma. There is no air in this fluid. This is not along the course of one of the drains. There is ghost artifact in the pedicles of L4, L5 and S1 from prior hardware which has been removed.   Alignment: There is 5 mm retrolisthesis of L3 with respect to L4.   Vertebrae/Disc Spaces:  There is destruction and irregularity of the inferior endplate of L3 and superior endplate of L4 consistent with diskitis and adjacent osteomyelitis.   T12-L1:  There is no significant central canal stenosis.   L1-2:  There is no significant central canal or neural foraminal stenosis.   L2-3:  There is no significant central canal or neural foraminal stenosis.   L3-4: There is destruction of the inferior endplate of L3 and superior endplate of L4. There is irregularity of the bone. There is \"widening\" of the disc space. Findings are consistent with discitis and adjacent osteomyelitis. There is a fracture of the right pedicle of L4.   L4-5:  There is no significant central canal or neural foraminal stenosis.   L5-S1:  There is no significant central canal or neural foraminal stenosis.   " Prevertebral/Paraspinal Soft Tissues: The prevertebral and paraspinal soft tissues are unremarkable.   Status post cholecystectomy. There is an inferior vena cava filter inferior to the renal veins       1. Degenerative change thoracic spine. No central canal stenosis or neural foraminal compromise 2. Diskitis and adjacent osteomyelitis L3-4. 3. 2.5 x 2.0 x 1.8 cm fluid collection in the paraspinal muscles of the back just to the right of midline. This contains no air. This is ill-defined and of question of whether this is a true discrete round fluid collection or ill definition and induration of the paraspinal muscles. No discrete enhancing wall is seen. Findings could represent an abscess, postoperative seroma or liquified hematoma. This is not along the course of one of the drains. 4. Nondisplaced fracture right pedicle L4. 5. Disc spacers L4-5 and L5-S1. Status post decompression laminectomies L3-L5. There is bone grafting material along the right and left lateral aspects of the mid and lower lumbar spine. There is ghost artifact in the pedicles of L4, L5 and S1 which represents hardware which has been removed. There are 2 drains in the soft tissues of the back.   MACRO: None   Signed by: Joanne Cruz 1/27/2024 7:22 AM Dictation workstation:   [id number]    CT thoracic spine wo IV contrast    Result Date: 1/27/2024  Interpreted By:  Joanne Cruz, STUDY: CT LUMBAR SPINE WO IV CONTRAST; CT THORACIC SPINE WO IV CONTRAST 1/27/2024 6:22 am   INDICATION: Signs/Symptoms:recent post op. 2 JOSE drains. the right paraspinal appears infected. eval for fluid collection   COMPARISON: MRI lumbar spine 01/11/2024   ACCESSION NUMBER(S): [id number]; [id number]   ORDERING CLINICIAN: LATOYA GUEVARA   TECHNIQUE: Axial CT images of the lumbar spine are obtained. Axial, coronal and sagittal reconstructions are provided for review.   FINDINGS: CT THORACIC SPINE: Alignment: Within normal limits. There is degenerative change with  mild-to-moderate anterior osteophytic spurring at all levels particularly the midthoracic spine.   Vertebrae/Disc Spaces:   The vertebral body heights are intact. The disc spaces are preserved.   There is a benign calcified right upper lobe granuloma.   CT LUMBAR SPINE: There are disc spacers at L4-5 and L5-S1 in good position. Status post decompression laminectomies L3-L5. There is bone grafting material along the right and left lateral aspects of the mid and lower lumbar spine. There are 2 drains. There is a drain along the inferior aspect of the back. This courses superiorly and the tip is in the soft tissues posterior to L1. There is a 2nd drain along the inferior aspect of the back. This courses superiorly and the tip is in the soft tissues posterior to L1. There is induration of the soft tissues along the back and induration of the paraspinal muscles. There is a 2.5 x 2.0 cm x 1.8 cm ill-defined fluid collection in the paraspinal muscles of the back at the level of L4-5 just to the right of midline. This could be a true fluid collection or induration of the paraspinal muscles. There is no discrete enhancing wall. Findings could represent a abscess, postoperative seroma or liquified hematoma. There is no air in this fluid. This is not along the course of one of the drains. There is ghost artifact in the pedicles of L4, L5 and S1 from prior hardware which has been removed.   Alignment: There is 5 mm retrolisthesis of L3 with respect to L4.   Vertebrae/Disc Spaces:  There is destruction and irregularity of the inferior endplate of L3 and superior endplate of L4 consistent with diskitis and adjacent osteomyelitis.   T12-L1:  There is no significant central canal stenosis.   L1-2:  There is no significant central canal or neural foraminal stenosis.   L2-3:  There is no significant central canal or neural foraminal stenosis.   L3-4: There is destruction of the inferior endplate of L3 and superior endplate of L4.  "There is irregularity of the bone. There is \"widening\" of the disc space. Findings are consistent with discitis and adjacent osteomyelitis. There is a fracture of the right pedicle of L4.   L4-5:  There is no significant central canal or neural foraminal stenosis.   L5-S1:  There is no significant central canal or neural foraminal stenosis.   Prevertebral/Paraspinal Soft Tissues: The prevertebral and paraspinal soft tissues are unremarkable.   Status post cholecystectomy. There is an inferior vena cava filter inferior to the renal veins       1. Degenerative change thoracic spine. No central canal stenosis or neural foraminal compromise 2. Diskitis and adjacent osteomyelitis L3-4. 3. 2.5 x 2.0 x 1.8 cm fluid collection in the paraspinal muscles of the back just to the right of midline. This contains no air. This is ill-defined and of question of whether this is a true discrete round fluid collection or ill definition and induration of the paraspinal muscles. No discrete enhancing wall is seen. Findings could represent an abscess, postoperative seroma or liquified hematoma. This is not along the course of one of the drains. 4. Nondisplaced fracture right pedicle L4. 5. Disc spacers L4-5 and L5-S1. Status post decompression laminectomies L3-L5. There is bone grafting material along the right and left lateral aspects of the mid and lower lumbar spine. There is ghost artifact in the pedicles of L4, L5 and S1 which represents hardware which has been removed. There are 2 drains in the soft tissues of the back.   MACRO: None   Signed by: Joanne Cruz 1/27/2024 7:22 AM Dictation workstation:   [id number]    CT head wo IV contrast    Result Date: 1/27/2024  Interpreted By:  Joanne Cruz, STUDY: CT HEAD WO IV CONTRAST;  1/27/2024 6:21 am   INDICATION: Signs/Symptoms:AMS. eval for acute hemorrhage.   COMPARISON: 08/04/2009   ACCESSION NUMBER(S): [id number]   ORDERING CLINICIAN: LATOYA GUEVARA   TECHNIQUE: Examination was " performed in the axial plane using soft tissue and bone algorithm.   FINDINGS: INTRACRANIAL: There is prominence of the ventricular system and cerebral sulci consistent with cerebral atrophy. There are periventricular hypodensities consistent with  moderate small vessel disease. No mass or mass effect is identified. There is no hemorrhage or subdural fluid collection. There is no acute infarct. There is no fracture of the calvarium   EXTRACRANIAL: Visualized paranasal sinuses and mastoids are clear.       No acute intracranial pathology.   MACRO: None   Signed by: Joanne Cruz 1/27/2024 7:03 AM Dictation workstation:   [id number]      EMG     No EMG results found for the past 12 months        EEG    Result Date: 1/30/2024  IMPRESSION Impression This routine EEG is indicative of a moderate diffuse encephalopathy. No epileptiform discharges or lateralizing signs are recorded. A full report will be scanned into the patient's chart at a later time. This report has been interpreted and electronically signed by       Current Facility-Administered Medications:     acetaminophen (Tylenol) tablet 650 mg, 650 mg, oral, q4h PRN **OR** acetaminophen (Tylenol) oral liquid 650 mg, 650 mg, nasogastric tube, q4h PRN **OR** acetaminophen (Tylenol) suppository 650 mg, 650 mg, rectal, q4h PRN, Raul George MD    alteplase (Cathflo Activase) injection 1 mg, 1 mg, intra-catheter, PRN, Nitesh Ruiz MD, 1 mg at 02/01/24 1431    amLODIPine (Norvasc) tablet 5 mg, 5 mg, oral, Daily, Nitesh Ruiz MD, 5 mg at 02/05/24 0921    apixaban (Eliquis) tablet 5 mg, 5 mg, oral, BID, Raul George MD, 5 mg at 02/05/24 0921    aspirin EC tablet 81 mg, 81 mg, oral, Daily, Raul George MD, 81 mg at 02/05/24 0921    baclofen (Lioresal) tablet 10 mg, 10 mg, oral, Nightly, Lata Perez MD, 10 mg at 02/04/24 2015    baclofen (Lioresal) tablet 5 mg, 5 mg, oral, BID, Lata Perez MD, 5 mg at 02/05/24 1422    cefTRIAXone (Rocephin) 2 g  in dextrose 5 % 50 mL IV, 2 g, intravenous, q12h, Amy Celestin DO, Stopped at 02/05/24 1209    DAPTOmycin (Cubicin) 500 mg in sodium chloride 0.9% 50 mL IV, 8 mg/kg, intravenous, q24h EDE, Pete Echeverria MD, Stopped at 02/04/24 2023    FLUoxetine (PROzac) capsule 10 mg, 10 mg, oral, Daily, DARRYL Corrales-CNP, 10 mg at 02/05/24 0920    hydrALAZINE (Apresoline) injection 10 mg, 10 mg, intravenous, q4h PRN, Sunshine Lange MD, 10 mg at 02/03/24 0316    hydrALAZINE (Apresoline) tablet 50 mg, 50 mg, oral, BID, Raul George MD, 50 mg at 02/05/24 0920    isosorbide mononitrate ER (Imdur) 24 hr tablet 60 mg, 60 mg, oral, Daily, Raul George MD, 60 mg at 02/05/24 0921    lactobacillus acidophilus tablet 1 tablet, 1 tablet, oral, Daily, Raul George MD, 1 tablet at 02/05/24 0920    LORazepam (Ativan) injection 1 mg, 1 mg, intravenous, Once, DARRYL Kline-CNP    menthol-zinc oxide (Calmoseptine - Risamine) 0.44-20.6 % ointment 1 Application, 1 Application, Topical, 4x daily PRN, Nitesh Ruiz MD, 1 Application at 02/02/24 2128    morphine injection 2 mg, 2 mg, intravenous, q6h PRN, Nitesh Ruiz MD, 2 mg at 02/05/24 0959    ondansetron (Zofran) injection 4 mg, 4 mg, intravenous, q6h PRN, Raul George MD, 4 mg at 02/01/24 1640    oxyCODONE (Roxicodone) immediate release tablet 10 mg, 10 mg, oral, q6h PRN, Nitesh Ruiz MD, 10 mg at 02/05/24 1423    oxyCODONE (Roxicodone) immediate release tablet 5 mg, 5 mg, oral, q6h PRN, Nitesh Ruiz MD, 5 mg at 02/03/24 1119    polyethylene glycol (Glycolax, Miralax) packet 17 g, 17 g, oral, Daily, Raul George MD, 17 g at 01/29/24 1546    potassium chloride CR (Klor-Con M20) ER tablet 40 mEq, 40 mEq, oral, Once, Nitesh Ruiz MD    psyllium (Metamucil) 1 packet, 1 packet, oral, Daily, Raul George MD    rifAMPin (Rifadin) capsule 300 mg, 300 mg, oral, BID, Amy Celestin DO, 300 mg at 02/05/24 0920    sennosides (Senokot) tablet 17.2 mg, 2  tablet, oral, Nightly, Raul George MD, 17.2 mg at 02/04/24 2016    sennosides (Senokot) tablet 8.6 mg, 1 tablet, oral, Nightly, Raul George MD, 8.6 mg at 02/01/24 2100     Assessment:  Acute encephalopathy in the setting of MRSA wound infection after lumbar surgery.  Recent increase in the opiate medication which may have triggered the encephalopathy however there is leukocytosis and MRI of the lumbar spine showing L3-L4 discitis and epidural thickening and enhancement L3-S1, partially treated meningitis is a concern.  Spinal tap cannot be done  Patient has improved  Swallowing has improved     Recommendation:  -Psychiatry appreciated  -will need acute rehab  -We will uptitrate baclofen slowly given the recent encephalopathy, 2/4/24 increased to 5 in AM 5 at midday 10 mg at night  -Continue empiric coverage for meningitis given her clinical improvement  -Accessing CSF at this point is a greater risk to the patient then would be clinically beneficial, superior levels (cervical tap suboccipital tap) are not available.  This was reviewed with the patient and family who expressed understanding and agreement.  -Will collect carotid Dopplers as the patient's surveillance has come due, Right 50-69% left less than 50%  -MRI of the brain with contrast to evaluate dural enhancement showed hydrocephalus, no leptomeningeal enhancement however there was a motion degraded component according to the radiologist report.  - ABG ultimately demonstrated significant alkalosis that appears to be mixed respiratory and metabolic the pH of 7.56.  -Ideally CSF testing would be beneficial however, one would of course not want to introduce infection by cannulizing again area (levels L3/4 and L4/5 show fluid collections) actively contaminated with MRSA and introducing that to the dura.    -Had extensive discussion with infectious disease and the patient has shown clinical improvement after additional coverage for meningitis  -routine EEG  1/30 showed diffuse slowing  -Thyroid studies in fact had been collected and were normal.  -Advance diet and activity as able  Patient is improving

## 2024-02-05 NOTE — PROGRESS NOTES
Speech-Language Pathology    Inpatient Speech Language Pathology Treatment Note     Patient Name: Tara Tierney  MRN: [id number]  : 1955  Today's Date: 24  Time Calculation  Start Time: 1125  Stop Time: 1145  Time Calculation (min): 20 min     SLP Assessment:  Patient was sitting upright in her bed feeding herself her lunch.  She stated that she has been swallowing food without difficulty.  Patient was able to tolerate fruit cut up into bite-size pieces without difficulty.  She had minimal oral delay and no overt signs symptoms aspiration with food or liquid during her lunch.  Poke with patient and her son regarding use of compensatory swallowing techniques i.e. small bites/sips and sitting upright while eating.  Also indicated to patient that nutrition is important due to her current wound situation and wound healing.  Transitional care coordinator was speaking with patient regarding her transfer to skilled nursing facility later today.  Informed patient and her son that there is a speech therapist at the skilled nursing facility that may also continue to assess her ability to safely tolerate regular diet and thin liquids.  Continue with plan.    PLAN:  Skilled speech therapy for dysphagia treatment continues to be warranted to provide training and instruction regarding the use of compensatory swallow strategies, for pt/caregiver education in order to reduce risk of aspiration, dehydration and malnutrition. , to assess tolerance of diet   SLP TX Plan: Continue Plan of Care  SLP Frequency: 2x per week  Discussed POC: Patient, Caregiver/family  Discussed Risks/Benefits: Patient, Caregiver/Family, Yes  Patient/Caregiver Agreeable: Yes  SLP - OK to Discharge: No    Subjective:  Pt. seen at bedside for skilled dysphagia treatment during her lunch today.  Her son was at bedside and the transitional care coordinator was speaking with patient regarding her discharge to skilled nursing facility later  today.  Prior to Session Communication: Bedside nurse    Pain:  Pain Assessment  Pain Assessment: 0-10  Pain Score: 0 - No pain         Recommendations:   Solid Diet Recommendations: Regular (IDDSI Level 7)  Liquid Diet Recommendations: Thin (IDDSI Level 0)  Compensatory strategies: small bites/sips, alternate bites/sips, upright 90 degrees for intake   Medication administration:     Goals:   Pt will trial diet upgrades with clinician to determine if safe for P.O. intake.  - Tolerated this date.   Start Date: 01/31/2024  Progress: New order received for reassessment due to patient gagging with food  Status: Continue goal    Education:  Pt. given skilled instruction on use of compensatory swallowing techniques and importance of nutrition due to wound healing.  Pt. gave verbal understanding

## 2024-02-05 NOTE — PROGRESS NOTES
Wound Vac dressing to lower back intact. Discharge held today, may possibly be discharged tomorrow. If patient is discharged tomorrow will remove wound vac dressing and apply NSS wet to dry. If patient is to stay then wound vac dressing will be changed. Cannister is 3/4 full of thin dark red drainage.  Wound Care Progress Note     Visit Date: 2/5/2024      Patient Name: Tara Tierney         MRN: [id number]                Reason for Visit: Wound Vac check/Dressing Change        Wound History: Chronic     Pertinent Labs:   Albumin   Date Value Ref Range Status   01/27/2024 3.8 3.4 - 5.0 g/dL Final           Wound Assessment:           NEW    External Urinary Catheter Female (Active)   Placement Date/Time: 01/29/24 1030   External Catheter Type: Female   Number of days: 7     External Urinary Catheter Female (Active)   Wall Suction (mmHg) 50 02/04/24 2220   External Catheter Status In place 02/04/24 2220   Securement Method Adhesive device 02/04/24 2220   Output (mL) 1500 mL 02/04/24 2132                   Wound 11/20/23 Incision Back Lower;Medial (Active)   Date First Assessed: 11/20/23   Present on Original Admission: No  Hand Hygiene Completed: Yes  Primary Wound Type: (c) Incision  Location: Back  Wound Location Orientation: Lower;Medial   Number of days: 77       Wound (Active)   No Date First Assessed or Time First Assessed found.     Number of days:      Wound 11/20/23 Incision Back Lower;Medial (Active)   Dressing Changed Changed 01/30/24 1437   Dressing Status Clean;Dry;Occlusive 02/05/24 0800       Wound (Active)                   Wound Team Plan: Follow up tomorrow.     Young Vargas LPN  2/5/2024  3:38 PM

## 2024-02-05 NOTE — DISCHARGE SUMMARY
Discharge Diagnosis  Altered mental status, unspecified altered mental status type    Issues Requiring Follow-Up    Test Results Pending At Discharge  Pending Labs       No current pending labs.            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  Her altered mental status has resolved, she was treated for meningitis with rifampicin, ceftriaxone, Cubicin was continued.  As per Ortho no plans for expiration, she has persistent back pain but has been stable.  Wound VAC has been placed.    At discharge she will need to be on Cubicin for 50 more days, ceftriaxone for 10 more days and p.o. rifampin for 28 days  Will need to follow-up with ID as an outpatient  Will order weekly CBC BMP and CRP  She will need to be discharged with wound VAC in place  Plastic surgery will follow her as an outpatient    Pertinent Physical Exam At Time of Discharge  Physical Exam  Constitutional:       Appearance: Normal appearance. She is not toxic-appearing.   HENT:      Head: Normocephalic and atraumatic.   Cardiovascular:      Rate and Rhythm: Normal rate and regular rhythm.      Heart sounds: Murmur heard.   Pulmonary:      Effort: Pulmonary effort is normal. No respiratory distress.   Abdominal:      General: There is no distension.      Palpations: Abdomen is soft.   Musculoskeletal:         General: Normal range of motion.   Skin:     General: Skin is warm.   Neurological:      General: No focal deficit present.   Home Medications     Medication List      START taking these medications     cefTRIAXone 2 g in dextrose 5 % 5 % 50 mL IV; Infuse 2 g at 100 mL/hr   over 30 minutes into a venous catheter every 12 hours for 10  days.   rifAMPin 300 mg capsule; Commonly known as: Rifadin; Take 1 capsule (300   mg) by mouth 2 times a day for 28 days.     CHANGE how you take these medications     cyclobenzaprine 5 mg tablet; Commonly known as: Flexeril; Take 1 tablet   (5 mg) by mouth 3 times a day as needed for muscle spasms.; What changed:   when to take this   * sodium chloride 0.9% parenteral solution 50 mL with DAPTOmycin 50   mg/mL recon soln 470 mg; Infuse 470 mg at 118.8 mL/hr over 30 minutes into   a venous catheter once every 24 hours.; What changed: Another medication   with the same name was added. Make sure you understand how and when to   take each.   * sodium chloride 0.9% parenteral solution 50 mL with DAPTOmycin 50   mg/mL recon soln 540 mg; Infuse 540 mg at 121.6 mL/hr over 30 minutes into   a venous catheter once every 24 hours.; What changed: You were already   taking a medication with the same name, and this prescription was added.   Make sure you understand how and when to take each.  * This list has 2 medication(s) that are the same as other medications   prescribed for you. Read the directions carefully, and ask your doctor or   other care provider to review them with you.     CONTINUE taking these medications     * apixaban 5 mg tablet; Commonly known as: Eliquis; Take 2 tablets (10   mg) by mouth 2 times a day for 2 days.   * apixaban 5 mg tablet; Commonly known as: Eliquis; Take 1 tablet (5 mg)   by mouth 2 times a day. Do not start before December 25, 2023.   aspirin 81 mg EC tablet   atenolol 50 mg tablet; Commonly known as: Tenormin   Atrovent HFA 17 mcg/actuation inhaler; Generic drug: ipratropium   baclofen 10 mg tablet; Commonly known as: Lioresal; Take half a tab 4   times daily as needed for muscle spasms for up to 10 days   brimonidine 0.2 % ophthalmic solution; Commonly known as: AlphaGAN   busPIRone 10 mg tablet; Commonly known as: Buspar   cholecalciferol 50 mcg (2,000 unit) capsule; Commonly known as:  Vitamin   D-3   Claritin 10 mg tablet; Generic drug: loratadine   ClearShield Sodium Chlor Flush flush; Generic drug: sodium chloride 0.9%   ezetimibe 10 mg tablet; Commonly known as: Zetia   furosemide 20 mg tablet; Commonly known as: Lasix   HEPARIN (PORCINE) LOCK FLUSH IV   hydrALAZINE 50 mg tablet; Commonly known as: Apresoline   ibandronate 150 mg tablet; Commonly known as: Boniva   isosorbide mononitrate ER 60 mg 24 hr tablet; Commonly known as: Imdur   LUTEIN ORAL   meclizine 25 mg tablet; Commonly known as: Antivert   morphine CR 15 mg 12 hr tablet; Commonly known as: MS Contin; Take 1   tablet (15 mg) by mouth every 12 hours. Do not crush, chew, or split.   nitroglycerin 0.4 mg SL tablet; Commonly known as: Nitrostat   oxyCODONE-acetaminophen 5-325 mg tablet; Commonly known as: Percocet;   Take 1 tablet by mouth every 6 hours if needed for severe pain (7 - 10).   pantoprazole 40 mg EC tablet; Commonly known as: ProtoNix   potassium chloride ER 10 mEq ER capsule; Commonly known as: Micro-K   pregabalin 75 mg capsule; Commonly known as: Lyrica   PreserVision AREDS-2 250-90-40-1 mg capsule; Generic drug: vit   C,U-Pn-prctz-lutein-zeaxan   ProAir HFA 90 mcg/actuation inhaler; Generic drug: albuterol   Remeron 15 mg tablet; Generic drug: mirtazapine   Salagen (pilocarpine) 5 mg tablet; Generic drug: pilocarpine   tolterodine 1 mg tablet; Commonly known as: Detrol  * This list has 2 medication(s) that are the same as other medications   prescribed for you. Read the directions carefully, and ask your doctor or   other care provider to review them with you.     STOP taking these medications     ceftaroline 600 mg injection; Commonly known as: Teflaro   lactobacillus acidophilus tablet tablet   lidocaine 4 % patch   mupirocin 2 % ointment; Commonly known as: Bactroban   vancomycin 1250 mg/250 mL IV; Commonly known as: Vancocin   vancomycin IVPB; Commonly known as: Vancocin       Outpatient Follow-Up  Future  Appointments   Date Time Provider Department [place]   2/8/2024  8:30 AM Roland J Reyes, MD [id number] [place]   3/14/2024  9:30 AM Hugo Barron MD [id number] [place]       Nitesh Ruiz MD

## 2024-02-05 NOTE — PROGRESS NOTES
Spoke with patient and her brother at bedside, patient is still in quite a bit of pain, however on oral pain medications so she can be transferred to [facility] for rehab and wound care. Patient can go hopefully today, will need to have wound vac at [place] put on so patient will go with wet to dry dressing change today to [place]. Per wound nurse Young, when patient is able to follow up at the wound center, they can discuss options of her going home with a wound vac if she is physically able to start moving around more. Plan is for discharge today to [facility].

## 2024-02-05 NOTE — NURSING NOTE
Manager rounds with Leonie WITT.  Brother at bedside.  Pt and brother expressed questions of plan and pain management.  Physical therapy here to work with pt.  Pt and family expressed concerns for mobility and pain.  Doctor Joseph contacted about which PRN to give since pt was not due until 1100.  Morphine IV PRN being given by Belén KELLY to help pt be at a more reasonable pain lvl to work with therapy. Plan discussed with pt and brother.  Waiting on physical therapy note and wound note from [facility] to see if pt will be discharged with wound vac.  Pt and family understand plan to go to [place] to get stronger with or without wound vac in place. Pt resting comfortably with brother at bedside at this time.

## 2024-02-06 ENCOUNTER — TELEPHONE (OUTPATIENT)
Dept: ORTHOPEDIC SURGERY | Facility: CLINIC | Age: 69
End: 2024-02-06
Payer: COMMERCIAL

## 2024-02-06 ENCOUNTER — DOCUMENTATION (OUTPATIENT)
Dept: OTHER | Age: 69
End: 2024-02-06
Payer: COMMERCIAL

## 2024-02-06 PROCEDURE — 2500000004 HC RX 250 GENERAL PHARMACY W/ HCPCS (ALT 636 FOR OP/ED): Performed by: INTERNAL MEDICINE

## 2024-02-06 PROCEDURE — 1090000001 HH PPS REVENUE CREDIT

## 2024-02-06 PROCEDURE — 97535 SELF CARE MNGMENT TRAINING: CPT | Mod: GO,CO

## 2024-02-06 PROCEDURE — 2500000001 HC RX 250 WO HCPCS SELF ADMINISTERED DRUGS (ALT 637 FOR MEDICARE OP): Performed by: REGISTERED NURSE

## 2024-02-06 PROCEDURE — 2500000001 HC RX 250 WO HCPCS SELF ADMINISTERED DRUGS (ALT 637 FOR MEDICARE OP): Performed by: STUDENT IN AN ORGANIZED HEALTH CARE EDUCATION/TRAINING PROGRAM

## 2024-02-06 PROCEDURE — 2500000004 HC RX 250 GENERAL PHARMACY W/ HCPCS (ALT 636 FOR OP/ED): Performed by: STUDENT IN AN ORGANIZED HEALTH CARE EDUCATION/TRAINING PROGRAM

## 2024-02-06 PROCEDURE — 97530 THERAPEUTIC ACTIVITIES: CPT | Mod: GP

## 2024-02-06 PROCEDURE — 99231 SBSQ HOSP IP/OBS SF/LOW 25: CPT | Performed by: PLASTIC SURGERY

## 2024-02-06 PROCEDURE — 2500000001 HC RX 250 WO HCPCS SELF ADMINISTERED DRUGS (ALT 637 FOR MEDICARE OP): Performed by: ANESTHESIOLOGY

## 2024-02-06 PROCEDURE — 2500000001 HC RX 250 WO HCPCS SELF ADMINISTERED DRUGS (ALT 637 FOR MEDICARE OP): Performed by: PSYCHIATRY & NEUROLOGY

## 2024-02-06 PROCEDURE — 1200000002 HC GENERAL ROOM WITH TELEMETRY DAILY

## 2024-02-06 PROCEDURE — 2500000005 HC RX 250 GENERAL PHARMACY W/O HCPCS: Performed by: SPECIALIST

## 2024-02-06 PROCEDURE — 1090000002 HH PPS REVENUE DEBIT

## 2024-02-06 PROCEDURE — 99233 SBSQ HOSP IP/OBS HIGH 50: CPT | Performed by: REGISTERED NURSE

## 2024-02-06 PROCEDURE — 2500000001 HC RX 250 WO HCPCS SELF ADMINISTERED DRUGS (ALT 637 FOR MEDICARE OP): Performed by: INTERNAL MEDICINE

## 2024-02-06 RX ORDER — HYDROMORPHONE HYDROCHLORIDE 2 MG/1
2 TABLET ORAL EVERY 4 HOURS PRN
Qty: 42 TABLET | Refills: 0 | Status: SHIPPED | OUTPATIENT
Start: 2024-02-06

## 2024-02-06 RX ORDER — LIDOCAINE 560 MG/1
1 PATCH PERCUTANEOUS; TOPICAL; TRANSDERMAL DAILY
Status: DISCONTINUED | OUTPATIENT
Start: 2024-02-06 | End: 2024-02-07 | Stop reason: HOSPADM

## 2024-02-06 RX ORDER — HYDROMORPHONE HYDROCHLORIDE 2 MG/1
2 TABLET ORAL EVERY 4 HOURS PRN
Status: DISCONTINUED | OUTPATIENT
Start: 2024-02-06 | End: 2024-02-07 | Stop reason: HOSPADM

## 2024-02-06 RX ADMIN — BACLOFEN 5 MG: 10 TABLET ORAL at 09:20

## 2024-02-06 RX ADMIN — DEXTROSE MONOHYDRATE 2 G: 5 INJECTION INTRAVENOUS at 23:27

## 2024-02-06 RX ADMIN — APIXABAN 5 MG: 5 TABLET, FILM COATED ORAL at 09:20

## 2024-02-06 RX ADMIN — DEXTROSE MONOHYDRATE 2 G: 5 INJECTION INTRAVENOUS at 11:50

## 2024-02-06 RX ADMIN — FLUOXETINE 10 MG: 10 CAPSULE ORAL at 09:20

## 2024-02-06 RX ADMIN — STANDARDIZED SENNA CONCENTRATE 17.2 MG: 8.6 TABLET ORAL at 20:50

## 2024-02-06 RX ADMIN — RIFAMPIN 300 MG: 300 CAPSULE ORAL at 09:20

## 2024-02-06 RX ADMIN — LIDOCAINE 1 PATCH: 4 PATCH TOPICAL at 15:52

## 2024-02-06 RX ADMIN — ISOSORBIDE MONONITRATE 60 MG: 60 TABLET, EXTENDED RELEASE ORAL at 06:53

## 2024-02-06 RX ADMIN — HYDROMORPHONE HYDROCHLORIDE 2 MG: 2 TABLET ORAL at 20:50

## 2024-02-06 RX ADMIN — HYDRALAZINE HYDROCHLORIDE 10 MG: 20 INJECTION INTRAMUSCULAR; INTRAVENOUS at 23:33

## 2024-02-06 RX ADMIN — MORPHINE SULFATE 2 MG: 2 INJECTION, SOLUTION INTRAMUSCULAR; INTRAVENOUS at 13:50

## 2024-02-06 RX ADMIN — HYDRALAZINE HYDROCHLORIDE 50 MG: 50 TABLET ORAL at 20:50

## 2024-02-06 RX ADMIN — BACLOFEN 5 MG: 10 TABLET ORAL at 13:51

## 2024-02-06 RX ADMIN — HYDRALAZINE HYDROCHLORIDE 50 MG: 50 TABLET ORAL at 09:20

## 2024-02-06 RX ADMIN — ASPIRIN 81 MG: 81 TABLET, COATED ORAL at 09:20

## 2024-02-06 RX ADMIN — HYDROMORPHONE HYDROCHLORIDE 2 MG: 2 TABLET ORAL at 15:51

## 2024-02-06 RX ADMIN — DAPTOMYCIN 500 MG: 500 INJECTION, POWDER, LYOPHILIZED, FOR SOLUTION INTRAVENOUS at 20:50

## 2024-02-06 RX ADMIN — RIFAMPIN 300 MG: 300 CAPSULE ORAL at 21:46

## 2024-02-06 RX ADMIN — APIXABAN 5 MG: 5 TABLET, FILM COATED ORAL at 20:50

## 2024-02-06 RX ADMIN — Medication 1 TABLET: at 09:19

## 2024-02-06 RX ADMIN — BACLOFEN 10 MG: 10 TABLET ORAL at 20:50

## 2024-02-06 RX ADMIN — AMLODIPINE BESYLATE 5 MG: 5 TABLET ORAL at 09:19

## 2024-02-06 RX ADMIN — OXYCODONE HYDROCHLORIDE 10 MG: 5 TABLET ORAL at 09:21

## 2024-02-06 RX ADMIN — STANDARDIZED SENNA CONCENTRATE 8.6 MG: 8.6 TABLET ORAL at 21:00

## 2024-02-06 ASSESSMENT — COGNITIVE AND FUNCTIONAL STATUS - GENERAL
STANDING UP FROM CHAIR USING ARMS: TOTAL
TOILETING: A LOT
DAILY ACTIVITIY SCORE: 15
MOVING FROM LYING ON BACK TO SITTING ON SIDE OF FLAT BED WITH BEDRAILS: A LITTLE
HELP NEEDED FOR BATHING: A LOT
MOVING TO AND FROM BED TO CHAIR: A LOT
MOVING TO AND FROM BED TO CHAIR: A LOT
DRESSING REGULAR UPPER BODY CLOTHING: TOTAL
STANDING UP FROM CHAIR USING ARMS: A LOT
PERSONAL GROOMING: A LITTLE
MOBILITY SCORE: 11
PERSONAL GROOMING: A LITTLE
MOVING TO AND FROM BED TO CHAIR: TOTAL
TURNING FROM BACK TO SIDE WHILE IN FLAT BAD: A LOT
DRESSING REGULAR LOWER BODY CLOTHING: A LOT
TOILETING: A LOT
EATING MEALS: A LITTLE
MOBILITY SCORE: 9
TURNING FROM BACK TO SIDE WHILE IN FLAT BAD: A LOT
MOVING FROM LYING ON BACK TO SITTING ON SIDE OF FLAT BED WITH BEDRAILS: A LOT
DRESSING REGULAR LOWER BODY CLOTHING: TOTAL
PERSONAL GROOMING: A LITTLE
DAILY ACTIVITIY SCORE: 10
HELP NEEDED FOR BATHING: TOTAL
WALKING IN HOSPITAL ROOM: TOTAL
TURNING FROM BACK TO SIDE WHILE IN FLAT BAD: A LOT
DRESSING REGULAR UPPER BODY CLOTHING: A LOT
CLIMB 3 TO 5 STEPS WITH RAILING: TOTAL
DRESSING REGULAR LOWER BODY CLOTHING: TOTAL
STANDING UP FROM CHAIR USING ARMS: A LOT
TOILETING: TOTAL
HELP NEEDED FOR BATHING: TOTAL
MOVING FROM LYING ON BACK TO SITTING ON SIDE OF FLAT BED WITH BEDRAILS: A LITTLE
EATING MEALS: A LITTLE
MOVING FROM LYING ON BACK TO SITTING ON SIDE OF FLAT BED WITH BEDRAILS: A LOT
WALKING IN HOSPITAL ROOM: A LOT
HELP NEEDED FOR BATHING: A LOT
WALKING IN HOSPITAL ROOM: A LOT
CLIMB 3 TO 5 STEPS WITH RAILING: TOTAL
DRESSING REGULAR LOWER BODY CLOTHING: A LOT
MOBILITY SCORE: 9
DRESSING REGULAR UPPER BODY CLOTHING: A LOT
TOILETING: TOTAL
DRESSING REGULAR UPPER BODY CLOTHING: TOTAL
MOBILITY SCORE: 11
DAILY ACTIVITIY SCORE: 10
PERSONAL GROOMING: A LITTLE
CLIMB 3 TO 5 STEPS WITH RAILING: TOTAL
STANDING UP FROM CHAIR USING ARMS: TOTAL
MOVING TO AND FROM BED TO CHAIR: TOTAL
TURNING FROM BACK TO SIDE WHILE IN FLAT BAD: A LOT
WALKING IN HOSPITAL ROOM: TOTAL
DAILY ACTIVITIY SCORE: 15
CLIMB 3 TO 5 STEPS WITH RAILING: TOTAL

## 2024-02-06 ASSESSMENT — PAIN DESCRIPTION - LOCATION: LOCATION: OTHER (COMMENT)

## 2024-02-06 ASSESSMENT — PAIN - FUNCTIONAL ASSESSMENT
PAIN_FUNCTIONAL_ASSESSMENT: 0-10

## 2024-02-06 ASSESSMENT — PAIN SCALES - GENERAL
PAINLEVEL_OUTOF10: 7
PAINLEVEL_OUTOF10: 9
PAINLEVEL_OUTOF10: 10 - WORST POSSIBLE PAIN
PAINLEVEL_OUTOF10: 7
PAINLEVEL_OUTOF10: 3
PAINLEVEL_OUTOF10: 8
PAINLEVEL_OUTOF10: 10 - WORST POSSIBLE PAIN
PAINLEVEL_OUTOF10: 10 - WORST POSSIBLE PAIN

## 2024-02-06 ASSESSMENT — ACTIVITIES OF DAILY LIVING (ADL): HOME_MANAGEMENT_TIME_ENTRY: 15

## 2024-02-06 NOTE — PROGRESS NOTES
Patient to be discharged today. Wound vac dressing removed from lower back. Wound is beefy red and there is scant pink tinged drainage. No eschar, no foul smell noted. Surrounding tissue intact. Applied Vashe wet to dry dressing and secured with Mepilex.  Wound Care Progress Note     Visit Date: 2/6/2024      Patient Name: Tara Tierney         MRN: [id number]                Reason for Visit: Removed wound vac and applied Vashe wet to dry dressing.        Wound History: Chronic     Pertinent Labs:   Albumin   Date Value Ref Range Status   01/27/2024 3.8 3.4 - 5.0 g/dL Final           Wound Assessment:           NEW    External Urinary Catheter Female (Active)   Placement Date/Time: 01/29/24 1030   External Catheter Type: Female   Number of days: 8     External Urinary Catheter Female (Active)   Wall Suction (mmHg) 50 02/04/24 2220   External Catheter Status In place 02/04/24 2220   Securement Method Adhesive device 02/04/24 2220   Output (mL) 400 mL 02/06/24 1337                   Wound 11/20/23 Incision Back Lower;Medial (Active)   Date First Assessed: 11/20/23   Present on Original Admission: No  Hand Hygiene Completed: Yes  Primary Wound Type: (c) Incision  Location: Back  Wound Location Orientation: Lower;Medial   Number of days: 78       Wound (Active)   No Date First Assessed or Time First Assessed found.     Number of days:      Wound 11/20/23 Incision Back Lower;Medial (Active)   Dressing Changed Changed 01/30/24 1437   Dressing Status Clean;Dry 02/06/24 0723       Wound (Active)                   Wound Team Plan:      Young Vargas LPN  2/6/2024  3:31 PM

## 2024-02-06 NOTE — PROGRESS NOTES
DAILY PROGRESS NOTE    Patient: Tara Tierney  Unit/Bed: 1001/1001-A  YOB: 1955  MRN: [id number]  Acct: [id number]   Admitting Diagnosis: Surgical wound infection [T81.49XA]  Altered mental status, unspecified altered mental status type [R41.82]  Date:  1/27/2024  Hospital Day: 10  Attending: Gerson Coombs MD    Complaint:  Chief Complaint   Patient presents with    Altered Mental Status     Pt had 4 recent back surgery. Pt currently has an MRSA infection.        Subjective  Patient seen and examined this morning. No acute events overnight.  Patient laying in bed, states she is doing okay but in pain.  Patient states she has left groin pain that is sharp.  She also says that she has back pain and rates it at 7/10.  Patient denies nausea vomiting.  Patient denies chest pain and shortness of breath.  Patient states last bowel movement was today.  Patient states she worked with physical therapy and Occupational Therapy and was able to sit on the edge of the bed, stand, take 4 steps and it caused her a lot of pain, but it was a win for her.     PHYSICAL EXAM:  Physical Exam  Vitals reviewed.   Constitutional:       Appearance: Normal appearance.   HENT:      Head: Normocephalic.      Nose: Nose normal.      Mouth/Throat:      Mouth: Mucous membranes are moist.   Cardiovascular:      Rate and Rhythm: Normal rate.   Pulmonary:      Effort: Pulmonary effort is normal.   Abdominal:      General: Abdomen is flat. Bowel sounds are normal.      Palpations: Abdomen is soft.   Musculoskeletal:      Cervical back: Normal range of motion.      Lumbar back: Tenderness present.      Comments: Wound VAC   Skin:     General: Skin is warm.      Capillary Refill: Capillary refill takes less than 2 seconds.   Neurological:      General: No focal deficit present.      Mental Status: She is alert and oriented to person, place, and time.   Psychiatric:         Mood and Affect: Mood normal.       Vital signs in  last 24 hours:  Vitals:    02/06/24 0723   BP: 133/61   Pulse: 109   Resp:    Temp: 36.6 °C (97.9 °F)   SpO2: 97%     Intake/Output this shift:    Intake/Output Summary (Last 24 hours) at 2/6/2024 1228  Last data filed at 2/6/2024 1030  Gross per 24 hour   Intake 200 ml   Output 2000 ml   Net -1800 ml      Allergies:  Allergies   Allergen Reactions    Neomycin Other     swelling, redness, burning post eye surgery    Neomycin-Polymyxin B-Dexameth Other and Rash     blisters, eye swelling, burning      Medications:  Scheduled medications  amLODIPine, 5 mg, oral, Daily  apixaban, 5 mg, oral, BID  aspirin, 81 mg, oral, Daily  baclofen, 10 mg, oral, Nightly  baclofen, 5 mg, oral, BID  cefTRIAXone, 2 g, intravenous, q12h  daptomycin, 8 mg/kg, intravenous, q24h EDE  FLUoxetine, 10 mg, oral, Daily  hydrALAZINE, 50 mg, oral, BID  isosorbide mononitrate ER, 60 mg, oral, Daily  lactobacillus acidophilus, 1 tablet, oral, Daily  polyethylene glycol, 17 g, oral, Daily  potassium chloride CR, 40 mEq, oral, Once  psyllium, 1 packet, oral, Daily  rifAMPin, 300 mg, oral, BID  sennosides, 2 tablet, oral, Nightly  sennosides, 1 tablet, oral, Nightly      Continuous medications     PRN medications  PRN medications: acetaminophen **OR** acetaminophen **OR** acetaminophen, alteplase, hydrALAZINE, menthol-zinc oxide, morphine, ondansetron, oxyCODONE, oxyCODONE  Labs:  No results found for this or any previous visit (from the past 24 hour(s)).   Imaging:  MR lumbar spine w and wo IV contrast    Result Date: 2/6/2024  Interpreted By:  Gerson España, STUDY: MR LUMBAR SPINE W AND WO IV CONTRAST;  2/5/2024 8:06 pm   INDICATION: Signs/Symptoms:evaluation of possible fluid collection intractable left groin pain.   COMPARISON: MRI lumbar spine dated 01/20/2024.   ACCESSION NUMBER(S): [id number]   ORDERING CLINICIAN: ERICK KEN   TECHNIQUE: Multiplanar multisequence MR imaging of the lumbar spine performed prior to and following administration  of 14 mL Dotarem intravenous contrast. Sagittal T1, T2, STIR, axial T1 and T2 weighted images of the lumbar spine were acquired. Postcontrast sagittal and axial T1 imaging obtained.   Imaging degraded by motion artifact.   FINDINGS: Segmentation: Normal.   Conus: The lower thoracic cord appears unremarkable. The conus terminates at the L1 vertebral body level. Cauda equina are unremarkable. No abnormal cord or leptomeningeal enhancement.   Epidural fluid: There is no discrete epidural fluid collection identified. As on prior examination there is nonspecific mild circumferential epidural thickening and enhancement as well as more focal ventral epidural STIR hyperintensity/enhancement at the L4 level that may reflect an element of epidural venous engorgement, postoperative change, or infectious seeding.   Alignment: Similar element of rotatory scoliosis of the thoracolumbar spine. Trace retrolisthesis of L3 on L4 and trace anterolisthesis of L4 on L5.   Marrow signal/Vertebral bodies: Abnormal STIR hyperintensity and enhancement involving the L3 and L4 vertebral bodies corresponding to known discitis/osteomyelitis. Cortical tracks are noted within the L4-S1 regions from previous posterior spinal fusion. There is unchanged erosive change, particularly along the rightward aspect of the L3-L4 endplates, with mild height loss. The remaining lumbar vertebral body heights are maintained.   Intervertebral discs: Discitis/osteomyelitis involving the L3-L4 level with erosive endplate change and signal abnormality centered at the disc. Interbody disc spacers remain at L4-L5 and L5-S1. Additional multilevel disc desiccation.     Degenerative change:   T12-L1: Unremarkable.   L1-2: Mild facet arthropathy. Minimal disc bulge and endplate spurring. No spinal canal stenosis. No significant right and mild left neural foraminal narrowing.   L2-3: Minimal/mild facet arthropathy. Mild disc bulge with endplate spurring. No substantial  spinal canal stenosis. Mild bilateral neural foraminal narrowing.   L3-4: Aforementioned discitis/osteomyelitis. Posterior decompression. Bulky bilateral facet arthropathy. Posterior disc osteophyte components. No significant spinal canal stenosis. Assessment of the neural foramina is significantly degraded by motion artifact and inflammatory components with potentially severe right and moderate left neural foraminal narrowing.   L4-5: Facet arthropathy. Previous laminectomy. No spinal canal stenosis. Mild-to-moderate left neural foraminal narrowing. Assessment of the right neural foramen is degraded with infiltrative inflammatory components extending to this region with concern moderate neural foraminal narrowing.   L5-S1: Bilateral facet arthropathy. Previous laminectomy. No spinal canal stenosis. Residual posterior disc osteophyte components. Moderate right-greater-than-left neural foraminal narrowing suggested.   Soft tissues: There is extensive STIR hyperintensity and enhancement surrounding the region of the laminectomy beds at L3-S1. Additionally, there is a residual nonspecific peripherally enhancing fluid collection overlying the dorsal epidural region of the laminectomy beds measuring approximately 4.6 cm in craniocaudad extent and up to 1.6 cm in anterior-posterior dimension (series 12, image 17) with trace internal gas components suggested. No significant compressive affect on the thecal sac. The surgical drain previously seen within this region has been removed in the interim. There is a soft tissue defect within the subcutaneous fascia overlying the surgical site.       1. Significant motion artifact degrading assessment. 2. Overall similar appearance of the lumbar spine apart from interval removal of surgical drainage catheter from the dorsal epidural region/laminectomy beds. There is a residual noncompressive peripherally enhancing fluid collection along the prior catheter tract with trace gas  component that is nonspecific, sterility indeterminate. Findings could reflect postoperative seroma, hematoma, with the possibility of infectious seeding not completely excluded in the setting of known discitis/osteomyelitis. 3. Similar extent/appearance of discitis/osteomyelitis centered at L3-L4 with mild endplate erosive change/height loss. 4. Sequela of previous lower lumbar spinal fusion and decompression. 5. Similar degenerative change as above.   MACRO: None   Signed by: Gerson España 2/6/2024 8:27 AM Dictation workstation:   [id number]    Lower extremity venous duplex bilateral    Result Date: 2/5/2024  Interpreted By:  Milena Richards, STUDY: [facility] LOWER EXTREMITY VENOUS DUPLEX BILATERAL  2/5/2024 6:22 pm   INDICATION: 69 y/o   F with  Signs/Symptoms:lower extremity pain. LMP:  Unknown.   COMPARISON: None.   ACCESSION NUMBER(S): [id number]   ORDERING CLINICIAN: ERICK KEN   TECHNIQUE: Routine ultrasound of the  bilateral lower extremity was performed with duplex Doppler (color and spectral) evaluation.   Static images were obtained for remote interpretation.   FINDINGS: THIGH VEINS:  The common femoral, femoral, popliteal, proximal medial saphenous, and deep femoral veins are patent and free of thrombus. The veins are normally compressible.  They demonstrate normal phasic flow and augmentation response.   CALF VEINS: Patent posterior tibial and peroneal veins.       No deep venous thrombosis of the bilateral lower extremity.   Signed by: Milena Richards 2/5/2024 6:57 PM Dictation workstation:   [id number]     Assessment    On exam extremities show neuro vascular status intact. Flexion and extension intact on extremities. Calves soft and non-tender without evidence of DVT.  Patient is unable to roll on her side unassisted.  Wound VAC applied over wound. Wound nurse notified wound vac is not working properly due to loss of seal.  Notified ALEXANDRA Daley who is working with Dr. Coombs today that  imagining was back, and she stated he would take a look at it.     Plan  -Continue diet as tolerated  -Pain control  -Nausea control  -DVT prophylaxis-Eliquis  -Pulmonary toileting   -Encourage ambulation      DARRYL Szymanski-CNP    Time spent  37  minutes obtaining labs, imaging, recommendations, interview, assessment, examination, medication review/ordering, and EMR review.    Plan of care was discussed extensively with patient. Patient verbalized understanding through teach back method. All questions and concerns addressed upon examination.     Of note, this documentation is completed using the Dragon Dictation system (voice recognition software). There may be spelling and/or grammatical errors that were not corrected prior to final submission.

## 2024-02-06 NOTE — CONSULTS
Consults    Reason For Consult  Back pain status postlaminectomy syndrome    History Of Present Illness  Tara Tierney is a 68 y.o. female presenting with change in her mental status.  There was ambiguity around the cause and the possibility of overdose of her MS Contin versus meningitis.  The patient does have possible discitis/osteomyelitis of the lumbar region.  She is currently taking oxycodone 10 mg every 6 hours with 5 mg for breakthrough pain.  The patient described her pain to be severe increased with movement to decrease by rest.  She denied radiculopathy.  She denied fever or GI symptoms.     Past Medical History  She has a past medical history of Arthritis, Back pain, COPD (chronic obstructive pulmonary disease) (CMS/formerly [facility]), COVID-19 vaccine administered, Eczema, History of COVID-19, Hyperlipidemia, Hypertension, Hypoesthesia of skin, Macular degeneration, Meniere's disease, Osteoporosis, Overactive bladder, Pain in unspecified finger(s), Pain in unspecified wrist, Peripheral vascular disease (CMS/formerly [facility]), Personal history of other diseases of the circulatory system, Personal history of other diseases of the musculoskeletal system and connective tissue, Personal history of other specified conditions, Personal history of other specified conditions, Personal history of other specified conditions, Postmenopausal, Seasonal allergies, and Unspecified visual loss.    Surgical History  She has a past surgical history that includes Tonsillectomy; Cardiac catheterization; Colonoscopy; [place] transluminal angio percutaneous venus; Total knee arthroplasty (Bilateral); Cholecystectomy; Aorta - bilateral femoral artery bypass graft; CT angio aorta and bilateral iliofemoral runoff w and or wo IV contrast (03/10/2020); CT angio neck (05/18/2022); CT angio aorta and bilateral iliofemoral runoff w and or wo IV contrast (07/21/2023); Adenoidectomy; Tubal ligation; Blepharoptosis repair; and Cardiac catheterization (N/A,  1/2/2024).     Social History  She reports that she quit smoking about 5 months ago. Her smoking use included cigarettes. She has a 22.50 pack-year smoking history. She does not have any smokeless tobacco history on file. She reports that she does not drink alcohol and does not use drugs.    Family History  Family History   Problem Relation Name Age of Onset    Breast cancer Mother      Other (arteriosclerotic cardiovascular disease) Father      Cancer Father          Allergies  Neomycin and Neomycin-polymyxin b-dexameth    Review of Systems  12 system symptoms have been inquired about and what was positive was placed in the history of present illness.     Physical Exam  HEENT within normal limit.  Cranial nerves II through XII within normal limit.  Motor and sensory of upper and lower extremity within normal limit.  Bilateral breath sounds  Heart S1-S2 no S3  Abdominal exam unremarkable for signs of peritonitis  Lower extremities no edema       Last Recorded Vitals  /61   Pulse 109   Temp 36.6 °C (97.9 °F)   Resp 20   Wt 69.1 kg (152 lb 5.4 oz)   SpO2 97%     Relevant Results  Narrative & Impression   Interpreted By:  Gerson España,   STUDY:  MR LUMBAR SPINE W AND WO IV CONTRAST;  2/5/2024 8:06 pm      INDICATION:  Signs/Symptoms:evaluation of possible fluid collection intractable  left groin pain.      COMPARISON:  MRI lumbar spine dated 01/20/2024.      ACCESSION NUMBER(S):  [id number]      ORDERING CLINICIAN:  ERICK KEN      TECHNIQUE:  Multiplanar multisequence MR imaging of the lumbar spine performed  prior to and following administration of 14 mL Dotarem intravenous  contrast. Sagittal T1, T2, STIR, axial T1 and T2 weighted images of  the lumbar spine were acquired. Postcontrast sagittal and axial T1  imaging obtained.      Imaging degraded by motion artifact.      FINDINGS:  Segmentation: Normal.      Conus: The lower thoracic cord appears unremarkable. The conus  terminates at the L1  vertebral body level. Cauda equina are  unremarkable. No abnormal cord or leptomeningeal enhancement.      Epidural fluid: There is no discrete epidural fluid collection  identified. As on prior examination there is nonspecific mild  circumferential epidural thickening and enhancement as well as more  focal ventral epidural STIR hyperintensity/enhancement at the L4  level that may reflect an element of epidural venous engorgement,  postoperative change, or infectious seeding.      Alignment: Similar element of rotatory scoliosis of the thoracolumbar  spine. Trace retrolisthesis of L3 on L4 and trace anterolisthesis of  L4 on L5.      Marrow signal/Vertebral bodies: Abnormal STIR hyperintensity and  enhancement involving the L3 and L4 vertebral bodies corresponding to  known discitis/osteomyelitis. Cortical tracks are noted within the  L4-S1 regions from previous posterior spinal fusion. There is  unchanged erosive change, particularly along the rightward aspect of  the L3-L4 endplates, with mild height loss. The remaining lumbar  vertebral body heights are maintained.      Intervertebral discs: Discitis/osteomyelitis involving the L3-L4  level with erosive endplate change and signal abnormality centered at  the disc. Interbody disc spacers remain at L4-L5 and L5-S1.  Additional multilevel disc desiccation.          Degenerative change:      T12-L1: Unremarkable.      L1-2: Mild facet arthropathy. Minimal disc bulge and endplate  spurring. No spinal canal stenosis. No significant right and mild  left neural foraminal narrowing.      L2-3: Minimal/mild facet arthropathy. Mild disc bulge with endplate  spurring. No substantial spinal canal stenosis. Mild bilateral neural  foraminal narrowing.      L3-4: Aforementioned discitis/osteomyelitis. Posterior decompression.  Bulky bilateral facet arthropathy. Posterior disc osteophyte  components. No significant spinal canal stenosis. Assessment of the  neural foramina is  significantly degraded by motion artifact and  inflammatory components with potentially severe right and moderate  left neural foraminal narrowing.      L4-5: Facet arthropathy. Previous laminectomy. No spinal canal  stenosis. Mild-to-moderate left neural foraminal narrowing.  Assessment of the right neural foramen is degraded with infiltrative  inflammatory components extending to this region with concern  moderate neural foraminal narrowing.      L5-S1: Bilateral facet arthropathy. Previous laminectomy. No spinal  canal stenosis. Residual posterior disc osteophyte components.  Moderate right-greater-than-left neural foraminal narrowing suggested.      Soft tissues: There is extensive STIR hyperintensity and enhancement  surrounding the region of the laminectomy beds at L3-S1.  Additionally, there is a residual nonspecific peripherally enhancing  fluid collection overlying the dorsal epidural region of the  laminectomy beds measuring approximately 4.6 cm in craniocaudad  extent and up to 1.6 cm in anterior-posterior dimension (series 12,  image 17) with trace internal gas components suggested. No  significant compressive affect on the thecal sac. The surgical drain  previously seen within this region has been removed in the interim.  There is a soft tissue defect within the subcutaneous fascia  overlying the surgical site.      IMPRESSION:  1. Significant motion artifact degrading assessment.  2. Overall similar appearance of the lumbar spine apart from interval  removal of surgical drainage catheter from the dorsal epidural  region/laminectomy beds. There is a residual noncompressive  peripherally enhancing fluid collection along the prior catheter  tract with trace gas component that is nonspecific, sterility  indeterminate. Findings could reflect postoperative seroma, hematoma,  with the possibility of infectious seeding not completely excluded in  the setting of known discitis/osteomyelitis.  3. Similar  extent/appearance of discitis/osteomyelitis centered at  L3-L4 with mild endplate erosive change/height loss.  4. Sequela of previous lower lumbar spinal fusion and decompression.  5. Similar degenerative change as above.      MACRO:  None       .     Assessment/Plan     68 years old lady was postlaminectomy syndrome status post removal of hardware status post possible discitis osteomyelitis.  She was on MS Contin and was giving her good pain relief.  There is a questionable of overdose that led to change in her mental status.  Therefore I would like to switch her to immediate release opioids.    Panchito Barnes MD

## 2024-02-06 NOTE — PROGRESS NOTES
"Nutrition Follow Up Assessment:   Nutrition Assessment         Patient is a 68 y.o. female presenting with: altered mental status. Corpak removed.       Nutrition History:  Energy Intake: Good > 75 %  Food and Nutrient History: Pt with improved appetite/intake - documented meal intakes have been 75-80% x 3 meals on 2/3-2/4. Currently eating lunch and confirms she has been eating much better. Not drinking mighty shakes - does not like them. Agreeable to trying magic cup once daily. Although appetite has improved, pt would benefit from a nutrition supplement as she had poor PO intake for a prolonged period of time. Denies N/V/D/C - appears diarrhea noted at last nutrition follow up has improved. Last documented BM was 2/6.  Food Allergies/Intolerances:  None  GI Symptoms: None   Oral Problems: None       Anthropometrics:  Height: 144 cm (4' 8.69\")   Weight: 69.1 kg (152 lb 5.4 oz)   BMI (Calculated): 33.32  IBW/kg (Dietitian Calculated): 38.6 kg          Weight History:     Weight Change %:  Weight History / % Weight Change: No new wt to assess    Nutrition Focused Physical Exam Findings:  defer: pt eating lunch  Subcutaneous Fat Loss:   Orbital Fat Pads: Defer  Muscle Wasting:  Temporalis: Defer  Edema:  Edema: none  Physical Findings:  Skin: Positive (lower back incision per nursing assessment)    Nutrition Significant Labs:    Reviewed   Nutrition Specific Medications:  Reviewed     I/O:   Last BM Date: 02/06/24; Stool Appearance: Loose (02/06/24 0723)        Dietary Orders (From admission, onward)       Start     Ordered    02/06/24 1315  Oral nutritional supplements  Until discontinued        Comments: Chocolate   Question Answer Comment   Deliver with Dinner    Select supplement: Magic Cup        02/06/24 1315    01/31/24 1301  Adult diet Regular  Diet effective now        Question:  Diet type  Answer:  Regular    01/31/24 1300                     Estimated Needs:   Total Energy Estimated Needs (kCal): 1683 " kCal  Method for Estimating Needs: 25 kcal/kg ABW  Total Protein Estimated Needs (g): 67 g  Method for Estimating Needs: 1 g/kg ABW  Total Fluid Estimated Needs (mL): 1683 mL  Method for Estimating Needs: 25 ml/kg ABW        Nutrition Diagnosis   Malnutrition Diagnosis  Patient has Malnutrition Diagnosis: Yes  Diagnosis Status: Ongoing  Malnutrition Diagnosis: Severe malnutrition related to chronic disease or condition  As Evidenced by: family reports of pt eating <75% of estimated needs x 2-3 months; 14% wt loss x 1 month            Nutrition Interventions/Recommendations         Nutrition Prescription:  Individualized Nutrition Prescription Provided for : Regular diet with ONS        Nutrition Interventions:   Food and/or Nutrient Delivery Interventions  Interventions: Meals and snacks, Medical food supplement  Meals and Snacks: General healthful diet  Goal: Consumes 3 meals per day  Medical Food Supplement: Commercial beverage  Goal: Magic cup daily (290 kcal, 9 g protein per serving) chocolate         Nutrition Education:   Patient with no diet related questions at this time.         Nutrition Monitoring and Evaluation   Food/Nutrient Related History Monitoring  Monitoring and Evaluation Plan: Energy intake, Amount of food, Fluid intake  Energy Intake: Estimated energy intake  Criteria: Pt meets >75% of estimated energy needs  Fluid Intake: Estimated fluid intake  Criteria: fluid intake to meet >75% of estimated need  Amount of Food: Estimated amout of food, Medical food intake  Criteria: Pt consumes >75% of meals and supplements    Body Composition/Growth/Weight History  Monitoring and Evaluation Plan: Weight  Weight: Measured weight  Criteria: Maintains stable weight    Biochemical Data, Medical Tests and Procedures  Monitoring and Evaluation Plan: Glucose/endocrine profile  Glucose/Endocrine Profile: Glucose, casual  Criteria: BG within desirable range            Time Spent/Follow-up Reminder:   Time Spent  (min): 30 minutes  Last Date of Nutrition Visit: 02/06/24  Nutrition Follow-Up Needed?: 3-8 days  Follow up Comment: Magic cup daily

## 2024-02-06 NOTE — PROGRESS NOTES
Plastic Surgery Note    Afebrile, vital signs stable  Wound VAC in place to mid lower back  Wound VAC apparently was not changed probably secondary to testing and imaging  Impression: Open wound of back  For wound VAC change today

## 2024-02-06 NOTE — PROGRESS NOTES
Back pain and spasms continue, getting left lower extremity dysesthesia   Psychiatry appreciated  Patient needs rehab  The patient continues to show a gradual clinical improvement with current treatments.  Pain, spasms have varied in severity  I shall add a lidocaine patch  MRI brain was collected 1/30/24 and did not identify any leptomeningeal enhancement or other pathology, there is however dilatation of the ventricular system.  Patient is not in any distress, alert conversational.  No photophobia, no headache.  Speech has improved  He has a discomfort in the hip areas and groin area, being watched  With the reduction in the narcotics her mental status has improved      Visit Vitals  /58   Pulse 108   Temp 37.4 °C (99.3 °F)   Resp 20        Neurological Exam:  GENERAL APPEARANCE: No distress interactive and cooperative.  Pain in the lower back, groin and fluctuates in severity with  Worrisome episodes  MENTAL STATE: Patient is drowsy and falls off to sleep during our conversation.    CRANIAL NERVES: normal  CN 2 Visual fields full to confrontation.   CN 3, 4, 6  Pupils round, equally reactive to light. No ptosis.EOMs normal alignment, full range with normal saccades, pursuit and convergence. No nystagmus.   CN 5 Facial sensation intact bilaterally.    CN 7 Normal and symmetric facial strength. Nasolabial folds symmetric.   CN 8 Hearing intact to finger rub bilaterally.   CN 9 Palate elevates symmetrically.    CN 11 Bilaterally normal strength of shoulder shrug and neck turning.   CN 12 Tongue midline, with normal bulk and strength; no fasciculations.     Motor examination reveals 5/5 strength in the uppers bilaterally, 4/5 hip flexion bilaterally.  Difficult to isolate knee flexion d/t pain, 5/5 bilaeral dorsiflexion and plantar flexion    Sensory: decreased sensation to light touch in the left lower, mild hyperstesia in the right lower.  Normal uppers bilaterally    Gait not testable due to pain and  "weakness      BMP:  Results from last 7 days   Lab Units 02/05/24  0525 02/03/24  0537 02/01/24  1016   SODIUM mmol/L 135* 136 142   POTASSIUM mmol/L 3.5 3.2* 3.1*   CHLORIDE mmol/L 101 104 108*   CO2 mmol/L 23 23 25   BUN mg/dL 6 6 13   CREATININE mg/dL 0.66 0.59 0.66         CBC:  Results from last 7 days   Lab Units 02/05/24  0525 02/03/24  0537 02/02/24  0528   WBC AUTO x10*3/uL 7.1 7.4 7.7   RBC AUTO x10*6/uL 3.68* 3.52* 3.43*   HEMOGLOBIN g/dL 10.8* 10.5* 10.2*   HEMATOCRIT % 33.9* 31.9* 31.8*   MCV fL 92 91 93   MCH pg 29.3 29.8 29.7   MCHC g/dL 31.9* 32.9 32.1   RDW % 15.7* 15.9* 15.9*   PLATELETS AUTO x10*3/uL 270 229 233         INR:        Lipid Profile:        No lab exists for component: \"LABVLDL\"    HgbA1C:        CMP:  Results from last 7 days   Lab Units 02/05/24 0525 02/03/24  0537 02/01/24  1016   SODIUM mmol/L 135* 136 142   POTASSIUM mmol/L 3.5 3.2* 3.1*   CHLORIDE mmol/L 101 104 108*   CO2 mmol/L 23 23 25   BUN mg/dL 6 6 13   CREATININE mg/dL 0.66 0.59 0.66   GLUCOSE mg/dL 176* 133* 188*   CALCIUM mg/dL 9.6 8.8 8.7         ABG:        No lab exists for component: \"PO2\", \"PCO2\", \"HCO3\", \"BE\", \"O2SAT\"    TSH:  No results found for: \"TSH\"  Lab Results   Component Value Date    AQSWFKHX27 322 01/05/2024     Lab Results   Component Value Date    FOLATE 5.1 01/05/2024     Lab Results   Component Value Date    VITD25 33 01/05/2024         MR brain w and wo IV contrast    Result Date: 1/30/2024  Interpreted By:  Alexi Bass, STUDY: MR BRAIN W AND WO IV CONTRAST; ;  1/29/2024 7:34 pm   INDICATION: Signs/Symptoms:menengitis.   COMPARISON: CT head from 01/20/2024. MRI brain from 01/18/2016.   ACCESSION NUMBER(S): [id number]   ORDERING CLINICIAN: RENETTA TAYLOR   TECHNIQUE: MRI of the brain was performed with the acquisition of axial diffusion-weighted, axial T1, axial FLAIR, axial T2 gradient echo, axial T2 fat saturated, axial T1 fat saturated postcontrast sequence, and axial T1 volumetric " post-contrast sequence with multiplanar reformats.   Contrast: 13.5 mL of Dotarem was injected intravenously.   FINDINGS: Evaluation is somewhat degraded due to patient motion.   There is no acute intracranial hemorrhage or infarct. There is no abnormal extra-axial fluid collection or mass effect.   On the FLAIR sequence, there is increased signal within sulci in the bilateral cerebral hemispheres, primarily along the periphery with sparing of the central sulci which is likely artifactual. No signal abnormality seen within the sulci on the other sequences.   There is stable mild disproportionate enlargement of the supratentorial ventricles compared the adjacent sulci and widening of the sylvian fissures in a pattern that can be seen with normal pressure hydrocephalus. Ventriculomegaly related to potential meningitis is favored less likely and ventricles are unchanged in size since the prior CT head. Ventricles have increased in size since the prior MRI brain from 2016, favored to be related to parenchymal volume loss.   There is no abnormal intracranial enhancement or mass.   There are confluent and scattered regions of T2 hyperintensity within the cerebral hemispheric white matter and robin which are probably sequela of chronic small vessel ischemic changes. There are 2 small foci of T2 hyperintensity located within the right cerebellum which probably reflect old infarcts.   Within the left cerebellum, there is subtle T2 hyperintense signal (series 9, image 10 of 40) without abnormal enhancement or restricted diffusion which may be sequela of late subacute infarct. There is no volume loss.   Visualized paranasal sinuses and mastoid air cells are essentially clear.       Evaluation is somewhat degraded due to patient motion.   1. No acute intracranial abnormality or mass effect. No abnormal intracranial enhancement or mass.   2. Mild disproportionate supratentorial ventriculomegaly, unchanged since the recent CT head  and in a pattern that is suggestive of normal pressure hydrocephalus. Appearance is not typical for an infectious process.   3. Chronic intracranial findings as above.   This study was interpreted at [facility].   MACRO: None   Signed by: Alexi Bass 1/30/2024 7:32 AM Dictation workstation:   [id number]      CT head wo IV contrast    Result Date: 1/27/2024  Interpreted By:  Joanne Cruz, STUDY: CT HEAD WO IV CONTRAST;  1/27/2024 6:21 am   INDICATION: Signs/Symptoms:AMS. eval for acute hemorrhage.   COMPARISON: 08/04/2009   ACCESSION NUMBER(S): [id number]   ORDERING CLINICIAN: LATOYA GUEVARA   TECHNIQUE: Examination was performed in the axial plane using soft tissue and bone algorithm.   FINDINGS: INTRACRANIAL: There is prominence of the ventricular system and cerebral sulci consistent with cerebral atrophy. There are periventricular hypodensities consistent with  moderate small vessel disease. No mass or mass effect is identified. There is no hemorrhage or subdural fluid collection. There is no acute infarct. There is no fracture of the calvarium   EXTRACRANIAL: Visualized paranasal sinuses and mastoids are clear.       No acute intracranial pathology.   MACRO: None   Signed by: Joanne Cruz 1/27/2024 7:03 AM Dictation workstation:   [id number]      MR lumbar spine w and wo IV contrast    Result Date: 1/28/2024  Interpreted By:  Yoav Davidson, STUDY: MR LUMBAR SPINE W AND WO IV CONTRAST;  1/28/2024 6:39 pm   INDICATION: Signs/Symptoms: Rule out abscess.   COMPARISON: 01/11/2024   ACCESSION NUMBER(S): [id number]   ORDERING CLINICIAN: FRANCESCA ORR   TECHNIQUE: Sagittal STIR, sagittal T2, sagittal T1, axial T2, axial T1, as well as post gadolinium sagittal axial T1 weighted MRI images of the lumbar spine were obtained. The patient received 13 mL of Dotarem gadolinium intravenously.   FINDINGS: Postoperative changes are again identified compatible with a previous  posterior laminectomies at the L3 through S1 levels as well as discectomies at the L4/5 and L5/S1 levels. Metallic artifact from orthopedic hardware is identified along the disc spaces at the L4/5 and L5/S1 levels. There are surgical drainage catheter again noted along the laminectomy bed as well as more superficially along the surgical tract with the posterior paraspinal soft tissues. There is a minimal amount of rim enhancing fluid surrounding the surgical drainage catheter along laminectomy bed the sterility of which can not be ascertained on this MRI study.   There is again evidence of extensive infiltrative signal abnormality and enhancement within the soft tissues along the laminectomy bed and surrounding posterior paraspinal soft tissues extending superficially along the surgical tract which may be postsurgical in etiology although a superimposed infectious/inflammatory process could give a similar MRI appearance and must be considered.   There is again evidence of abnormal signal along the L3/4 disc space as well as within the adjacent bone marrow of the L3 and L4 vertebrae most compatible with underlying discitis/osteomyelitis. There has been mild interval bony destruction/collapse of the inferior L3 and to a lesser degree superior L4 vertebrae when compared with the prior study dated 01/11/2024. There is again evidence of approximately 4 mm of retrolisthesis of L3 on L4.   There is mild circumferential abnormal epidural thickening and enhancement within the spinal canal best appreciated extending from the L3 through S1 levels which may be postsurgical and/or infectious/inflammatory in origin.   There is abnormal infiltrative signal and enhancement within the paraspinal soft tissues/medial psoas muscles bilaterally right greater than left extending from the L3 level caudally into the sacral region likely infectious/inflammatory in origin.   The visualized spinal cord demonstrates no signal abnormality or  abnormal enhancement within it. The conus medullaris is normally positioned terminating at the L1 level.   At the L5/S1 level,  there are postoperative changes compatible with a previous posterior laminectomy as well as discectomy. There is enhancing soft tissue noted along the laminectomy bed as well as enhancing epidural thickening and enhancement circumferentially surrounding the thecal sac within the spinal canal. There is no significant narrowing of the thecal sac within the spinal canal. There is infiltrative enhancing epidural soft tissue extending laterally into the right neural foramen. There is mild-to-moderate encroachment upon the left neural foramen.   At the L4/L5 level,  there are postoperative changes compatible with a previous posterior laminectomy as well as discectomy. There is enhancing soft tissue noted along the laminectomy bed as well as enhancing epidural thickening and enhancement circumferentially surrounding the thecal sac within the spinal canal. There is mild overall narrowing of the thecal sac within the spinal canal. There is infiltrative enhancing epidural soft tissue extending laterally into the right neural foramen. There is mild-to-moderate encroachment upon the left neural foramen.   At the L3/L4 level,  there are postoperative changes compatible with a previous L3 laminectomy. There is again evidence of approximately 4 mm of retrolisthesis of L3 on L4. There is enhancing soft tissue noted along the laminectomy bed as well as enhancing epidural thickening and enhancement circumferentially surrounding the thecal sac within the spinal canal. There is no significant narrowing of the thecal sac within the spinal canal. There is bilateral neural foraminal narrowing with infiltrative enhancing epidural soft tissue extending laterally into the region of the neural foramen bilaterally.   At the L2/L3 level,  there are degenerative facet changes and a minimal posterior disc bulge without  significant spinal canal narrowing. There is mild encroachment upon the inferior recesses of the neural foramen bilaterally.   At the L1/L2 level,  there is a minimal posterior disc bulge and mild degenerative facet changes contributing to very mild encroachment upon the spinal canal. There is mild encroachment upon the inferior recesses of the neural foramen left greater than right.   At the T12/L1 level,  there is no significant spinal canal stenosis or neuroforaminal stenosis.   At the T11/12 level, there is a minimal posterior disc bulge and mild degenerative facet changes without significant spinal canal or neural foraminal narrowing.         Postoperative changes are again identified compatible with a previous posterior laminectomies at the L3 through S1 levels as well as discectomies at the L4/5 and L5/S1 levels. Metallic artifact from orthopedic hardware is identified along the disc spaces at the L4/5 and L5/S1 levels. There are surgical drainage catheter again noted along the laminectomy bed as well as more superficially along the surgical tract with the posterior paraspinal soft tissues. There is a minimal amount of rim enhancing fluid surrounding the surgical drainage catheter along laminectomy bed the sterility of which can not be ascertained on this MRI study.   There is again evidence of extensive infiltrative signal abnormality and enhancement within the soft tissues along the laminectomy bed and surrounding posterior paraspinal soft tissues extending superficially along the surgical tract which may be postsurgical in etiology although a superimposed infectious/inflammatory process could give a similar MRI appearance and must be considered.   There is again evidence of abnormal signal along the L3/4 disc space as well as within the adjacent bone marrow of the L3 and L4 vertebrae most compatible with underlying discitis/osteomyelitis. There has been mild interval bony destruction/collapse of the  inferior L3 and to a lesser degree superior L4 vertebrae when compared with the prior study dated 01/11/2024. There is again evidence of approximately 4 mm of retrolisthesis of L3 on L4.   There is mild circumferential abnormal epidural thickening and enhancement within the spinal canal best appreciated extending from the L3 through S1 levels which may be postsurgical and/or infectious/inflammatory in origin.   There is abnormal infiltrative signal and enhancement within the paraspinal soft tissues/medial psoas muscles bilaterally right greater than left extending from the L3 level caudally into the sacral region likely infectious/inflammatory in origin.   There is multilevel spondylosis. There are varying degrees of spinal canal and neural foraminal narrowing as described above.   MACRO: None.   Signed by: Yoav Davidson 1/28/2024 7:27 PM Dictation workstation:   [id number]    ECG 12 lead    Result Date: 1/27/2024  Normal sinus rhythm Left bundle branch block Abnormal ECG When compared with ECG of 02-JAN-2024 07:11, Questionable change in QRS duration See ED provider note for full interpretation and clinical correlation Confirmed by Bridger Erickson ([id number]) on 1/27/2024 12:29:08 PM    CT chest abdomen pelvis w IV contrast    Result Date: 1/27/2024  STUDY: CT Chest, Abdomen, and Pelvis with IV Contrast; 01/27/2024 6:37 AM INDICATION: Generalized abdominal pain.  Recent spine surgery and IVC filter. Evaluate for free air/fluid. COMPARISON: XR abdomen 01/15/2024.  CT AP 12/31/2023, 12/19/2023. ACCESSION NUMBER(S): [id number] ORDERING CLINICIAN: Osei Swann TECHNIQUE: CT of the chest, abdomen, and pelvis was performed.  Contiguous axial images were obtained at 3 mm slice thickness through the chest, abdomen, and pelvis.  Coronal and sagittal reconstructions at 3 mm slice thickness were performed.  Omnipaque 350 75 mL was administered intravenously.  FINDINGS: CHEST: MEDIASTINUM: Right PICC appears satisfactorily  positioned extending to the right atrium.  The heart is normal in size without pericardial effusion. Central vascular structures opacify normally.  No thyroid nodule.  No identifiable breast mass.  LUNGS/PLEURA: There is no pleural effusion, pleural thickening, or pneumothorax. Minimal dependent atelectasis at the lung bases. LYMPH NODES: Thoracic lymph nodes are not enlarged. ABDOMEN:  LIVER: No hepatomegaly.  Smooth surface contour.  Normal attenuation.  BILE DUCTS: No intrahepatic or extrahepatic biliary ductal dilatation.  GALLBLADDER: Gallbladder is surgically absent. STOMACH: No abnormalities identified.  PANCREAS: No masses or ductal dilatation.  SPLEEN: No splenomegaly or focal splenic lesion.  ADRENAL GLANDS: No thickening or nodules.  KIDNEYS AND URETERS: Kidneys are normal in size and location.  No renal or ureteral calculi.  PELVIS:  BLADDER: Stauffer catheter present.  Mild wall thickening of the urinary bladder may simply be related to incomplete distention.  Large quantity of stool in the rectum suggests constipation versus impaction.  REPRODUCTIVE ORGANS: No abnormalities identified.  BOWEL: No abnormalities identified.  Appendix appears normal.  VESSELS: No abnormalities identified.  IVC filter appears appropriately positioned.  Abdominal aorta is normal in caliber.  PERITONEUM/RETROPERITONEUM/LYMPH NODES: No free fluid.  No pneumoperitoneum. No lymphadenopathy.  ABDOMINAL WALL: No abnormalities identified. SOFT TISSUES: No abnormalities identified.  BONES: Mildly exaggerated thoracic kyphosis with mild to moderate degenerative disease. There are postsurgical changes of the lumbar spine.  Prosthetic discs at L4-5 and L5-S1 are in similar position when compared to the prior CT.  Two opaque drains at the surgical site are again noted. Subcutaneous emphysema noted along the course of the drains.  Presumed phlegmon noted at the surgical location.  No drainable collection. Since the prior study, there has  been erosive or destructive changes of the inferior endplate of L3 and to a lesser extent the superior endplate of L4.  The findings would be strongly suspicious for discitis osteomyelitis.  No acute fracture or aggressive osseous lesion.    CT CHEST: 1.  No CT evidence of pulmonary embolism. 2.  No pulmonary mass, nodule or consolidation.  No pleural effusion. No pneumothorax.  No thoracic adenopathy. CT ABDOMEN AND PELVIS: 1.  Postsurgical changes of the lumbar spine again noted.  There are two indwelling drains superficially unchanged from 12/31/2023.  The prosthetic discs at L4-5 and L5-S1 appears satisfactorily positioned, unchanged.  However, there is been interval destructive endplate changes along the inferior aspect of L3 and to a lesser extent the superior endplate of L4.  The findings would be suspicious for discitis/osteomyelitis. 2.  No ascites, free air or bowel obstruction. 3.  No urinary tract calculus or hydronephrosis. 4.  IVC filter appears satisfactorily positioned. Signed by Joshua Alfonso MD    CT lumbar spine wo IV contrast    Result Date: 1/27/2024  Interpreted By:  Joanne Cruz, STUDY: CT LUMBAR SPINE WO IV CONTRAST; CT THORACIC SPINE WO IV CONTRAST 1/27/2024 6:22 am   INDICATION: Signs/Symptoms:recent post op. 2 JOSE drains. the right paraspinal appears infected. eval for fluid collection   COMPARISON: MRI lumbar spine 01/11/2024   ACCESSION NUMBER(S): [id number]; [id number]   ORDERING CLINICIAN: LATOYA GUEVARA   TECHNIQUE: Axial CT images of the lumbar spine are obtained. Axial, coronal and sagittal reconstructions are provided for review.   FINDINGS: CT THORACIC SPINE: Alignment: Within normal limits. There is degenerative change with mild-to-moderate anterior osteophytic spurring at all levels particularly the midthoracic spine.   Vertebrae/Disc Spaces:   The vertebral body heights are intact. The disc spaces are preserved.   There is a benign calcified right upper lobe granuloma.    "CT LUMBAR SPINE: There are disc spacers at L4-5 and L5-S1 in good position. Status post decompression laminectomies L3-L5. There is bone grafting material along the right and left lateral aspects of the mid and lower lumbar spine. There are 2 drains. There is a drain along the inferior aspect of the back. This courses superiorly and the tip is in the soft tissues posterior to L1. There is a 2nd drain along the inferior aspect of the back. This courses superiorly and the tip is in the soft tissues posterior to L1. There is induration of the soft tissues along the back and induration of the paraspinal muscles. There is a 2.5 x 2.0 cm x 1.8 cm ill-defined fluid collection in the paraspinal muscles of the back at the level of L4-5 just to the right of midline. This could be a true fluid collection or induration of the paraspinal muscles. There is no discrete enhancing wall. Findings could represent a abscess, postoperative seroma or liquified hematoma. There is no air in this fluid. This is not along the course of one of the drains. There is ghost artifact in the pedicles of L4, L5 and S1 from prior hardware which has been removed.   Alignment: There is 5 mm retrolisthesis of L3 with respect to L4.   Vertebrae/Disc Spaces:  There is destruction and irregularity of the inferior endplate of L3 and superior endplate of L4 consistent with diskitis and adjacent osteomyelitis.   T12-L1:  There is no significant central canal stenosis.   L1-2:  There is no significant central canal or neural foraminal stenosis.   L2-3:  There is no significant central canal or neural foraminal stenosis.   L3-4: There is destruction of the inferior endplate of L3 and superior endplate of L4. There is irregularity of the bone. There is \"widening\" of the disc space. Findings are consistent with discitis and adjacent osteomyelitis. There is a fracture of the right pedicle of L4.   L4-5:  There is no significant central canal or neural foraminal " stenosis.   L5-S1:  There is no significant central canal or neural foraminal stenosis.   Prevertebral/Paraspinal Soft Tissues: The prevertebral and paraspinal soft tissues are unremarkable.   Status post cholecystectomy. There is an inferior vena cava filter inferior to the renal veins       1. Degenerative change thoracic spine. No central canal stenosis or neural foraminal compromise 2. Diskitis and adjacent osteomyelitis L3-4. 3. 2.5 x 2.0 x 1.8 cm fluid collection in the paraspinal muscles of the back just to the right of midline. This contains no air. This is ill-defined and of question of whether this is a true discrete round fluid collection or ill definition and induration of the paraspinal muscles. No discrete enhancing wall is seen. Findings could represent an abscess, postoperative seroma or liquified hematoma. This is not along the course of one of the drains. 4. Nondisplaced fracture right pedicle L4. 5. Disc spacers L4-5 and L5-S1. Status post decompression laminectomies L3-L5. There is bone grafting material along the right and left lateral aspects of the mid and lower lumbar spine. There is ghost artifact in the pedicles of L4, L5 and S1 which represents hardware which has been removed. There are 2 drains in the soft tissues of the back.   MACRO: None   Signed by: Joanne Cruz 1/27/2024 7:22 AM Dictation workstation:   [id number]    CT thoracic spine wo IV contrast    Result Date: 1/27/2024  Interpreted By:  Joanne Cruz, STUDY: CT LUMBAR SPINE WO IV CONTRAST; CT THORACIC SPINE WO IV CONTRAST 1/27/2024 6:22 am   INDICATION: Signs/Symptoms:recent post op. 2 JOSE drains. the right paraspinal appears infected. eval for fluid collection   COMPARISON: MRI lumbar spine 01/11/2024   ACCESSION NUMBER(S): [id number]; [id number]   ORDERING CLINICIAN: LATOYA GUEVARA   TECHNIQUE: Axial CT images of the lumbar spine are obtained. Axial, coronal and sagittal reconstructions are provided for review.    FINDINGS: CT THORACIC SPINE: Alignment: Within normal limits. There is degenerative change with mild-to-moderate anterior osteophytic spurring at all levels particularly the midthoracic spine.   Vertebrae/Disc Spaces:   The vertebral body heights are intact. The disc spaces are preserved.   There is a benign calcified right upper lobe granuloma.   CT LUMBAR SPINE: There are disc spacers at L4-5 and L5-S1 in good position. Status post decompression laminectomies L3-L5. There is bone grafting material along the right and left lateral aspects of the mid and lower lumbar spine. There are 2 drains. There is a drain along the inferior aspect of the back. This courses superiorly and the tip is in the soft tissues posterior to L1. There is a 2nd drain along the inferior aspect of the back. This courses superiorly and the tip is in the soft tissues posterior to L1. There is induration of the soft tissues along the back and induration of the paraspinal muscles. There is a 2.5 x 2.0 cm x 1.8 cm ill-defined fluid collection in the paraspinal muscles of the back at the level of L4-5 just to the right of midline. This could be a true fluid collection or induration of the paraspinal muscles. There is no discrete enhancing wall. Findings could represent a abscess, postoperative seroma or liquified hematoma. There is no air in this fluid. This is not along the course of one of the drains. There is ghost artifact in the pedicles of L4, L5 and S1 from prior hardware which has been removed.   Alignment: There is 5 mm retrolisthesis of L3 with respect to L4.   Vertebrae/Disc Spaces:  There is destruction and irregularity of the inferior endplate of L3 and superior endplate of L4 consistent with diskitis and adjacent osteomyelitis.   T12-L1:  There is no significant central canal stenosis.   L1-2:  There is no significant central canal or neural foraminal stenosis.   L2-3:  There is no significant central canal or neural foraminal  "stenosis.   L3-4: There is destruction of the inferior endplate of L3 and superior endplate of L4. There is irregularity of the bone. There is \"widening\" of the disc space. Findings are consistent with discitis and adjacent osteomyelitis. There is a fracture of the right pedicle of L4.   L4-5:  There is no significant central canal or neural foraminal stenosis.   L5-S1:  There is no significant central canal or neural foraminal stenosis.   Prevertebral/Paraspinal Soft Tissues: The prevertebral and paraspinal soft tissues are unremarkable.   Status post cholecystectomy. There is an inferior vena cava filter inferior to the renal veins       1. Degenerative change thoracic spine. No central canal stenosis or neural foraminal compromise 2. Diskitis and adjacent osteomyelitis L3-4. 3. 2.5 x 2.0 x 1.8 cm fluid collection in the paraspinal muscles of the back just to the right of midline. This contains no air. This is ill-defined and of question of whether this is a true discrete round fluid collection or ill definition and induration of the paraspinal muscles. No discrete enhancing wall is seen. Findings could represent an abscess, postoperative seroma or liquified hematoma. This is not along the course of one of the drains. 4. Nondisplaced fracture right pedicle L4. 5. Disc spacers L4-5 and L5-S1. Status post decompression laminectomies L3-L5. There is bone grafting material along the right and left lateral aspects of the mid and lower lumbar spine. There is ghost artifact in the pedicles of L4, L5 and S1 which represents hardware which has been removed. There are 2 drains in the soft tissues of the back.   MACRO: None   Signed by: Joanne Cruz 1/27/2024 7:22 AM Dictation workstation:   [id number]    CT head wo IV contrast    Result Date: 1/27/2024  Interpreted By:  Joanne Cruz, STUDY: CT HEAD WO IV CONTRAST;  1/27/2024 6:21 am   INDICATION: Signs/Symptoms:AMS. eval for acute hemorrhage.   COMPARISON: 08/04/2009 "   ACCESSION NUMBER(S): [id number]   ORDERING CLINICIAN: LATOYA GUEVARA   TECHNIQUE: Examination was performed in the axial plane using soft tissue and bone algorithm.   FINDINGS: INTRACRANIAL: There is prominence of the ventricular system and cerebral sulci consistent with cerebral atrophy. There are periventricular hypodensities consistent with  moderate small vessel disease. No mass or mass effect is identified. There is no hemorrhage or subdural fluid collection. There is no acute infarct. There is no fracture of the calvarium   EXTRACRANIAL: Visualized paranasal sinuses and mastoids are clear.       No acute intracranial pathology.   MACRO: None   Signed by: Joanne Cruz 1/27/2024 7:03 AM Dictation workstation:   [id number]      EMG     No EMG results found for the past 12 months        EEG    Result Date: 1/30/2024  IMPRESSION Impression This routine EEG is indicative of a moderate diffuse encephalopathy. No epileptiform discharges or lateralizing signs are recorded. A full report will be scanned into the patient's chart at a later time. This report has been interpreted and electronically signed by       Current Facility-Administered Medications:     acetaminophen (Tylenol) tablet 650 mg, 650 mg, oral, q4h PRN **OR** acetaminophen (Tylenol) oral liquid 650 mg, 650 mg, nasogastric tube, q4h PRN **OR** acetaminophen (Tylenol) suppository 650 mg, 650 mg, rectal, q4h PRN, Raul George MD    alteplase (Cathflo Activase) injection 1 mg, 1 mg, intra-catheter, PRN, Nitesh Ruiz MD, 1 mg at 02/01/24 1431    amLODIPine (Norvasc) tablet 5 mg, 5 mg, oral, Daily, Nitesh Ruiz MD, 5 mg at 02/06/24 0919    apixaban (Eliquis) tablet 5 mg, 5 mg, oral, BID, Raul George MD, 5 mg at 02/06/24 0920    aspirin EC tablet 81 mg, 81 mg, oral, Daily, Raul Geogre MD, 81 mg at 02/06/24 0920    baclofen (Lioresal) tablet 10 mg, 10 mg, oral, Nightly, Lata Perez MD, 10 mg at 02/05/24 2300    baclofen (Lioresal)  tablet 5 mg, 5 mg, oral, BID, Lata Perez MD, 5 mg at 02/06/24 1351    cefTRIAXone (Rocephin) 2 g in dextrose 5 % 50 mL IV, 2 g, intravenous, q12h, Amy Celestin DO, Stopped at 02/06/24 1220    DAPTOmycin (Cubicin) 500 mg in sodium chloride 0.9% 50 mL IV, 8 mg/kg, intravenous, q24h EDE, Pete Echeverria MD, Stopped at 02/05/24 2329    FLUoxetine (PROzac) capsule 10 mg, 10 mg, oral, Daily, Chivo Jennings, APRN-CNP, 10 mg at 02/06/24 0920    hydrALAZINE (Apresoline) injection 10 mg, 10 mg, intravenous, q4h PRN, Sunshine Lange MD, 10 mg at 02/03/24 0316    hydrALAZINE (Apresoline) tablet 50 mg, 50 mg, oral, BID, Raul George MD, 50 mg at 02/06/24 0920    HYDROmorphone (Dilaudid) tablet 2 mg, 2 mg, oral, q4h PRN, Panchito Barnes MD, 2 mg at 02/06/24 1551    isosorbide mononitrate ER (Imdur) 24 hr tablet 60 mg, 60 mg, oral, Daily, Raul George MD, 60 mg at 02/06/24 0653    lactobacillus acidophilus tablet 1 tablet, 1 tablet, oral, Daily, Raul George MD, 1 tablet at 02/06/24 0919    lidocaine 4 % patch 1 patch, 1 patch, transdermal, Daily, Onur Perez MD, 1 patch at 02/06/24 1552    menthol-zinc oxide (Calmoseptine - Risamine) 0.44-20.6 % ointment 1 Application, 1 Application, Topical, 4x daily PRN, Nitesh Ruiz MD, 1 Application at 02/02/24 2128    morphine injection 2 mg, 2 mg, intravenous, q6h PRN, Nitesh Ruiz MD, 2 mg at 02/06/24 1350    ondansetron (Zofran) injection 4 mg, 4 mg, intravenous, q6h PRN, Raul George MD, 4 mg at 02/01/24 1640    oxyCODONE (Roxicodone) immediate release tablet 10 mg, 10 mg, oral, q6h PRN, Nitesh Ruiz MD, 10 mg at 02/06/24 0921    oxyCODONE (Roxicodone) immediate release tablet 5 mg, 5 mg, oral, q6h PRN, Nitesh Ruiz MD, 5 mg at 02/05/24 1648    polyethylene glycol (Glycolax, Miralax) packet 17 g, 17 g, oral, Daily, Raul George MD, 17 g at 01/29/24 1546    potassium chloride CR (Klor-Con M20) ER tablet 40 mEq, 40 mEq, oral, Once, Nitesh SOLOMON  MD Joseph    psyllium (Metamucil) 1 packet, 1 packet, oral, Daily, Raul George MD    rifAMPin (Rifadin) capsule 300 mg, 300 mg, oral, BID, Amy Celestin DO, 300 mg at 02/06/24 0920    sennosides (Senokot) tablet 17.2 mg, 2 tablet, oral, Nightly, Raul George MD, 17.2 mg at 02/04/24 2016    sennosides (Senokot) tablet 8.6 mg, 1 tablet, oral, Nightly, Raul George MD, 8.6 mg at 02/01/24 2100     Assessment:  Acute encephalopathy in the setting of MRSA wound infection after lumbar surgery.  Recent increase in the opiate medication which may have triggered the encephalopathy however there is leukocytosis and MRI of the lumbar spine showing L3-L4 discitis and epidural thickening and enhancement L3-S1, partially treated meningitis is a concern.  Spinal tap cannot be done  Patient has improved  Swallowing has improved     Recommendation:  -Psychiatry appreciated  -will need acute rehab  -We will uptitrate baclofen slowly given the recent encephalopathy, 2/4/24 increased to 5 in AM 5 at midday 10 mg at night  -Continue empiric coverage for meningitis given her clinical improvement  -Accessing CSF at this point is a greater risk to the patient then would be clinically beneficial, superior levels (cervical tap suboccipital tap) are not available.  This was reviewed with the patient and family who expressed understanding and agreement.  -Will collect carotid Dopplers as the patient's surveillance has come due, Right 50-69% left less than 50%  -MRI of the brain with contrast to evaluate dural enhancement showed hydrocephalus, no leptomeningeal enhancement however there was a motion degraded component according to the radiologist report.  - ABG ultimately demonstrated significant alkalosis that appears to be mixed respiratory and metabolic the pH of 7.56.  -Ideally CSF testing would be beneficial however, one would of course not want to introduce infection by cannulizing again area (levels L3/4 and L4/5 show fluid  collections) actively contaminated with MRSA and introducing that to the dura.    -Had extensive discussion with infectious disease and the patient has shown clinical improvement after additional coverage for meningitis  -routine EEG 1/30 showed diffuse slowing  -Thyroid studies in fact had been collected and were normal.  -Advance diet and activity as able  Patient is improving mentally  ! shall Add Lidoderm patch

## 2024-02-06 NOTE — PROGRESS NOTES
Messaged ortho YASMANY gillette his morning regarding patient's discharge to [facility]. Advised Hussein that we do have precert for patient until midnight tonight. Advised him to please let me know if patient will be ready, as I would need to keep facility updated and also wound care nurse Young RABAGO, with regards to wound vac. Hussein informed me he advised Dr. Coombs of the situation, will wait for further instructions from ortho.     Update:  At 2 pm meeting this afternoon, Dr. Bella held meeting between myself, PT, Bel Ren, Jacqueline Tsai, Rosa Isela, liaison from [facility] joined in and Dr. Coombs via telephone. Per Dr. Coombs he still has many concerns with patient transferring to SNF, however did discuss with him that [facility] is aware of the complex case patient is and have everything in place. Rosa Isela reassured him that they do have pain management and also Dr. Moeller to follow. Dr. Coombs was to call and speak with family to ensure transfer to [facility] was in agreement with them today. Received hard prescription for pain medication from NP Caitlin Li, will make sure it is in patient's chart at discharge, will wait for final discharge order from Dr. Coombs. Will continue to follow.

## 2024-02-06 NOTE — PROGRESS NOTES
Ortho spine    Patient alert and oriented x 3 neurologically intact moving all extremities.  Patient had venous ultrasounds bilateral lower extremities which were negative for DVT.  Patient had also an MRI of the lumbar spine which was reviewed which did not show much of a deep fluid collection.  Patient a little more comfortable today than she was last night.    Physical exam: Well-nourished, well kept.  No lymphangitis or lymphadenopathy in the examined extremities.  Good perfusion to the lower extremities bilaterally.  Dorsalis pedis pulses 2+.  Capillary refill to the digits brisk.  No distal edema.  Patient still not getting out of bed and ambulating secondary to pain when she stands.  Wound VAC in place.  Gross sensation intact to the lower extremity is bilaterally.  Affect normal.    Assessment: Patient feeling a little bit better today.  Will review films with Dr. Coombs also wound care to address her wound VAC.    Plan: We will follow-up    Patient was discussed with midlevel provider above in detail.  Spoke with patient on the phone as well.  This case was also discussed with radiologist Dr. Blake.  MRI of her lumbar spine does not show any change compared to the prior MRI that was done on admission, except for the fact that the fluid collection that she had ventral to the thecal sac has decreased somewhat in size.  There is no neural compromise or compression at all of her thecal sac.  Discitis and osteomyelitis looks similar on MRI as they would not be expected to change in such a short window of time.  The purpose of the MRI was to see if there was a new increasing fluid collection which there is not.    MRI of the left hip shows strain of the obturator and adductors consistent with a muscle strain.  Patient does not subscribe a history of that but this does not at all look like infection.  The edema is extremely symmetrical and consistent with more of a strain.  Additionally, there appears to  be some sort of trauma lateral to the greater trochanter which may or may not be related to her drain placement that shows some edema in the gluteal in that area on the left side.  There is no significant change of this MRI compared to her prior MRI.  There is no sign of fluid collection or infection that could be surgically treated or drained.  There is no osteomyelitis visualized on the hip MRI.    Assessment/plan: Patient with above described MRIs.  It appears as if she is responding to antibiotics as she is not reaccumulating fluid in her spine after drain removal.  The pain she is having appears to be muscular in nature and she also was likely having back pain coming from her discitis and osteomyelitis.  There does not appear to be any increase in collapse or any instability of her lumbar spine at this time.  Symptomatic treatment appears to be the best way to treat her as well as continuing to work regularly with her with physical therapy and getting adequate pain control.  I appreciate pain management input for changing her pain regimen to try to provide control without creating an overmedication situation.  We are awaiting for wound VAC to be placed today.  We will continue to see how she responds and evolves.

## 2024-02-06 NOTE — DISCHARGE SUMMARY
Discharge summary  This patient Tara Tierney was admitted to the hospital on 1/27/2024  after undergoing Procedure(s) (LRB):  Spine Lumbar I  and  D (N/A) without complications that morning.    During the postoperative period,while in hospital, patient was medically managed by the hospitalist. Please see medial notes and H&P for patients additional diagnoses.  Ortho agrees with all medical diagnoses and treatments while patient in hospital.  No significant or unexpected findings or abnormal ortho imaging were noted during the hospital stay    Hospital course      Patient tolerated surgical procedure well and there was no complications. Patient progressed adequately through their recovery during hospital stay including PT and rehabilitation.    Patient was then D/C on  to skilled nursing facility  in stable condition.  Patient was instructed on the use of pain medications, the signs and symptoms of infection, and was given our number to call should they have any questions or concerns following discharge.    Based on my clinical judgment, the patient was provided with a 7-day prescription for opioid medication at 30 MED, indicated for treatment of acute pain in the setting of recent surgery. OARRS report was run and has demonstrated an appropriate time course.  The patient has been provided with counseling pertaining to safe use of opioid medication.      May ambulate with brace in place. Does not have to wear brace when in chair or bed.   Wound vac instructions per Dr. Reyes.   No heavy lifting, push/pulling, exercising. Only ok to ambulate at this time until further directed by surgeon.

## 2024-02-06 NOTE — PROGRESS NOTES
Physical Therapy    Physical Therapy Treatment    Patient Name: Tara Tierney  MRN: [id number]  Today's Date: 2/6/2024  Time Calculation  Start Time: 1001  Stop Time: 1030  Time Calculation (min): 29 min       Assessment/Plan   PT Assessment  PT Assessment Results: Decreased strength, Decreased endurance, Impaired balance, Decreased mobility, Decreased coordination, Pain, Orthopedic restrictions  Rehab Prognosis: Fair  Evaluation/Treatment Tolerance: Patient limited by fatigue, Patient limited by pain  End of Session Communication: Bedside nurse  Assessment Comment: Pt requiring maximum encouragement to participate in therapy session 2/2 pain.  End of Session Patient Position: Bed, 3 rail up, Alarm on (Pt declines sitting in recliner chair.)  PT Plan  Inpatient/Swing Bed or Outpatient: Inpatient  PT Plan  Treatment/Interventions: Bed mobility, Transfer training  PT Plan: Skilled PT  PT Frequency: 3 times per week  PT Discharge Recommendations: Moderate intensity level of continued care  PT Recommended Transfer Status: Assist x2, Assistive device  PT - OK to Discharge: Yes (once medically ready for discharge to next level of care.)    Current Problem:  Patient Active Problem List   Diagnosis    Abdominal aortic aneurysm (CMS/HCC)    Abnormal EKG    Bilateral carotid artery stenosis    Bruit of right carotid artery    CAD in native artery    Cardiomyopathy (CMS/HCC)    Chest pain    Chronic asthmatic bronchitis    Closed displaced fracture of fifth metatarsal bone    Current every day smoker    Difficulty breathing    Essential hypertension    History of total knee replacement    Hypokalemia    Intermittent claudication (CMS/HCC)    Knee osteoarthritis    Knee pain    Limb pain    Localized swelling, mass, or lump of lower extremity    Celiac artery stenosis (CMS/HCC)    Mesenteric artery stenosis (CMS/HCC)    Mixed hyperlipidemia    Obese    PVD (peripheral vascular disease) (CMS/HCC)    Right foot pain     Swelling    Trigger finger    Urinary tract infection without hematuria    Back pain of lumbar region with sciatica    Back pain with radiculopathy    Intractable back pain    Seroma, post-traumatic (CMS/HCC)    Lumbar surgical wound fluid collection    Generalized weakness    Postoperative surgical complication involving musculoskeletal system associated with musculoskeletal procedure, unspecified complication    Back pain at L4-L5 level    Deep vein thrombosis (DVT) of right lower extremity (CMS/HCC)    Anemia    Altered mental status, unspecified altered mental status type    Surgical wound infection       General Visit Information:   PT  Visit  PT Received On: 02/06/24  General  Family/Caregiver Present: No  Co-Treatment: OT  Co-Treatment Reason: cotreat with OT to increase pt safety and indep with functional transfers/ADLs  Prior to Session Communication: Bedside nurse  Patient Position Received: Bed, 3 rail up, Alarm on  General Comment: Pt agreeable to PT/OT treatment.  Subjective     Precautions:  Precautions  LE Weight Bearing Status: Weight Bearing as Tolerated  Medical Precautions: Fall precautions  Post-Surgical Precautions: Spinal precautions  Braces Applied: TLSO donned for all OOB activity  Precautions Comment: pt required max VC for maintaining spinal precautions    Vital Signs:     Objective     Pain:  Pain Assessment  Pain Assessment: 0-10  Pain Score: 10 - Worst possible pain  Pain Location: Back    Cognition:  Cognition  Overall Cognitive Status: Within Functional Limits  Orientation Level: Oriented X4    Postural Control:  Postural Control  Posture Comment: Poor endurance sitting EOB    Extremity/Trunk Assessments:                Activity Tolerance:  Activity Tolerance  Endurance: Decreased tolerance for upright activites    Treatments:  Therapeutic Exercise  Therapeutic Exercise Performed: No        Bed Mobility  Bed Mobility: Yes  Bed Mobility 1  Bed Mobility 1: Log roll, Supine to sitting,  Sitting to supine  Level of Assistance 1: Moderate assistance (x2 person)  Bed Mobility Comments 1: initial attempt pt with difficuty maintaining spinal precautions and limited by pain; 2nd attempt with encouragement pt requiring mod A x2 to maintain precautions  Ambulation/Gait Training  Ambulation/Gait Training Performed: Yes  Ambulation/Gait Training 1  Surface 1: Level tile  Device 1: Rolling walker  Gait Support Devices:  (TLSO)  Assistance 1: Moderate assistance (x2 person)  Quality of Gait 1: Narrow base of support, Inconsistent stride length  Comments/Distance (ft) 1: Pt able to take ~5 steps at EOB with assist for ww management and to maintain balance; noted BLE's buckling and encouragement to complete steps  Transfers  Transfer: Yes  Transfer 1  Technique 1: Sit to stand, Stand to sit  Transfer Device 1: Walker (TLSO)  Transfer Level of Assistance 1: Moderate assistance, Maximum assistance (x2 person)  Trials/Comments 1: mod A for 1st rep, max A for 2nd rep. Pt cued for hand placement with ww, use of bed pad to assist with lift, BL feet blocked. Increased time to complete.          Outcome Measures:  [facility] Basic Mobility  Turning from your back to your side while in a flat bed without using bedrails: A lot  Moving from lying on your back to sitting on the side of a flat bed without using bedrails: A lot  Moving to and from bed to chair (including a wheelchair): A lot  Standing up from a chair using your arms (e.g. wheelchair or bedside chair): A lot  To walk in hospital room: A lot  Climbing 3-5 steps with railing: Total  Basic Mobility - Total Score: 11  Education Documentation  Precautions, taught by Rosanna Malone PT at 2/6/2024 12:05 PM.  Learner: Patient  Readiness: Acceptance  Method: Explanation  Response: Needs Reinforcement    Body Mechanics, taught by Rosanna Malone PT at 2/6/2024 12:05 PM.  Learner: Patient  Readiness: Acceptance  Method: Explanation  Response: Needs  Reinforcement    Mobility Training, taught by Rosanna Malone, PT at 2/6/2024 12:05 PM.  Learner: Patient  Readiness: Acceptance  Method: Explanation  Response: Needs Reinforcement    Education Comments  No comments found.        EDUCATION:  Outpatient Education  Individual(s) Educated: Patient  Education Provided: POC  Patient Response to Education: Patient/Caregiver Verbalized Understanding of Information  Education Comment: Pt educated on importance of mobility to prevent deconditioning.  Encounter Problems       Encounter Problems (Active)       PT Problem       Pt will demonstrate SBA with bed mobility to edge of bed.   (Progressing)       Start:  02/01/24    Expected End:  02/15/24            Pt will demonstrate SBA with sit to stand/chair transfers with FWW.   (Progressing)       Start:  02/01/24    Expected End:  02/15/24            Pt will ambulate 50 feet with FWW SBA.   (Progressing)       Start:  02/01/24    Expected End:  02/15/24            Pt to demo improved BLE strength by being able to complete supine/seated thera ex 2x20 BLEs with 4 or less rest breaks .   (Progressing)       Start:  02/01/24    Expected End:  02/15/24               Pain - Adult

## 2024-02-06 NOTE — PROGRESS NOTES
Infectious Disease Progress Note       Patient is a followup regarding severe back pain persistent bilateral lower extremity weakness altered mentation elevated CRP with significant history of MRSA bacteremia, now status post hardware removal with no residual hardware, bilateral JOSE drains which have been removed, open wound postop deep incision, and altered mental status which has been steadily improving over the past few days    Her MRI of the spine is showing evidence of previous laminectomies L3-S1 levels, there is a rim-enhancing fluid surrounding the surgical drainage catheter along laminectomy bed and extensive evidence of signal abnormality and enhancement within the soft tissues along the laminectomy bed surrounding posterior paraspinal soft tissues and superficially along the surgical tract -unclear if this is a superimposed infectious process.  There is also an abnormal infiltrative signal and enhancement within the paraspinal soft tissues medial psoas muscle bilaterally on the right greater than the left extending from L3 into sacrum which also looks to be possibly infectious    MRI of the Brain showing evidence of mild disproportionate supratentorial ventriculomegaly, which is unchanged since the past CT of the head.  Possible normal pressure hydrocephalus.  Neurology is concerned about possible meningitis due to some additional signal abnormalities and has asked me to temporarily broaden coverage.    Patient's coverage was broadened to daptomycin ceftriaxone and rifampin with help of pharmacy  I had spoken to radiology downtown and they confirmed increased signal enhancement and bony destruction which is worse than approximately 27 days ago    Since broadening her treatment, within 24 hours her mentation seem to have improved, she is much more talkative, able to carry on a full conversation.  Her pain continues.   Unsure if this was due to the medication or just timing of her situation.     Continues to  have weakness.  Going down for MRI of left hip and lumbar spine    Lab Results   Component Value Date    WBC 7.1 02/05/2024    HGB 10.8 (L) 02/05/2024    HCT 33.9 (L) 02/05/2024    MCV 92 02/05/2024     02/05/2024     Lab Results   Component Value Date    GLUCOSE 176 (H) 02/05/2024    CALCIUM 9.6 02/05/2024     (L) 02/05/2024    K 3.5 02/05/2024    CO2 23 02/05/2024     02/05/2024    BUN 6 02/05/2024    CREATININE 0.66 02/05/2024   CK level today is 55  CRP trending down at 8.95  WBC trends are being monitored. Antibiotic doses are being adjusted per most recent renal labs.     Vitals:    02/05/24 2017   BP: 114/56   Pulse: (!) 12   Resp:    Temp: 37.1 °C (98.8 °F)   SpO2: 97%   Better overall in conversation but still having a lot of back pain  Obese female no rashes, NAD  Neck supple  Heart S1S2  Chest: Equal expansion, bilaterally clear to auscultation  Abdomen: soft, ND, NTTP, no guarding  Extrem: no pain to palpation  Neuro: CNS intact  Affect appropriate and patient is interactive      Patient Active Problem List   Diagnosis    Abdominal aortic aneurysm (CMS/HCC)    Abnormal EKG    Bilateral carotid artery stenosis    Bruit of right carotid artery    CAD in native artery    Cardiomyopathy (CMS/HCC)    Chest pain    Chronic asthmatic bronchitis    Closed displaced fracture of fifth metatarsal bone    Current every day smoker    Difficulty breathing    Essential hypertension    History of total knee replacement    Hypokalemia    Intermittent claudication (CMS/HCC)    Knee osteoarthritis    Knee pain    Limb pain    Localized swelling, mass, or lump of lower extremity    Celiac artery stenosis (CMS/HCC)    Mesenteric artery stenosis (CMS/HCC)    Mixed hyperlipidemia    Obese    PVD (peripheral vascular disease) (CMS/HCC)    Right foot pain    Swelling    Trigger finger    Urinary tract infection without hematuria    Back pain of lumbar region with sciatica    Back pain with radiculopathy     Intractable back pain    Seroma, post-traumatic (CMS/HCC)    Lumbar surgical wound fluid collection    Generalized weakness    Postoperative surgical complication involving musculoskeletal system associated with musculoskeletal procedure, unspecified complication    Back pain at L4-L5 level    Deep vein thrombosis (DVT) of right lower extremity (CMS/HCC)    Anemia    Altered mental status, unspecified altered mental status type    Surgical wound infection     Hx MRSA Bacteremia, now s/p hardware removal from spine  Persistent back pain  Diskitis with possible meningitis?- likely also secondary to MRSA given her history.  Unable to get tissue specimen or bone specimen at this point.  Unable to get lumbar puncture as well.  There were too many enhancing tissues that may cause further seeding.  Risk outweighs the benefit.  At this time choosing to treat empirically.  Either way she is clinically improving.  Surgical wound infection -wound is open but appears to be clean at this time  Acute metabolic encephalopathy  Speech deficit -much better  Polypharmacy    PLAN:  Will watch for MRI results  Continue daptomycin x 8 weeks   Monitor weekly CBC CMP CK CRP for the next 8 weeks  Regarding her rifampin and her ceftriaxone, will plan to discontinue her ceftriaxone after 2 weeks.  Will likely discontinue her rifampin after 4 weeks  Case discussed with wound care at bedside extensively with family member present      Amy Celestin DO

## 2024-02-06 NOTE — PROGRESS NOTES
Occupational Therapy    OT Treatment    Patient Name: Tara Tierney  MRN: [id number]  Today's Date: 2/6/2024  Time Calculation  Start Time: 1000  Stop Time: 1030  Time Calculation (min): 30 min         Assessment:  Barriers to Discharge:  (pt very limited by pain and decreased endurance.)  End of Session Communication: Bedside nurse  End of Session Patient Position: Bed, 3 rail up, Alarm on  Barriers to Discharge:  (pt very limited by pain and decreased endurance.)    Subjective   Previous Visit Info:  OT Last Visit  OT Received On: 02/06/24  General:    Family/Caregiver Present: No  Co-Treatment:  (cotreat with PT to increase pt safety and indep with functional transfers/ADLs)  Prior to Session Communication:  (discussed with RN if pt appropriate for therapy, pt was given pain meds prior to session and RN ok pt to be seen)  Patient Position Received: Bed, 3 rail up, Alarm on  General Comment: pt agreeable to OT with increased encouragement. pt educated on wearing TLSO for all transfers d/t poor carry over of spinal precautions. pt tends to twist trunk d/t pain. poor carryover of log roll techs noted.  Precautions:  Medical Precautions: Fall precautions  Braces Applied: TLSO donned for all OOB activity  Precautions Comment: pt required max VC for maintaining spinal precautions  Vital Signs:     Pain:  Pain Assessment  Pain Assessment:  (pt c/o 10/10 pain in lower back.)    Objective    Cognition:  Cognition  Overall Cognitive Status: Within Functional Limits  Orientation Level: Oriented X4  Coordination:     Activities of Daily Living: LE Dressing  LE Dressing:  (pt required total A for LB dressing tasks, pt very limited by lower back pain and general weakness. pt educated on LH AE for maintaining spinal precautions.)  Functional Standing Tolerance:     Bed Mobility/Transfers: Bed Mobility  Bed Mobility:  (pt educated on log roll tech for increasing indep with bed mobility. pt required mod A x 2 for supine  < >  seated EOB. very poor body mechanics noted. pt screaming she cant do it, however educated on proper techs for log roll.)    Transfers  Transfer:  (pt required mod A x 2 for sit  < > stand transfer with VC for proper hand placement, pt tends to pull up from walker. pt required maxc A x 2 for 2nd sit < > stand transfers, pt able to perform x 2 side steps towards HOB. with mod A x 2)    Therapy/Activity: Therapeutic Activity  Therapeutic Activity Performed:  (pt demos poor supported standing balance, pt very limited by pain and decreased endurance. pt only able to tolerate standing ~ 1  min before requesting to return to seated)      Outcome Measures:[facility] Daily Activity  Putting on and taking off regular lower body clothing: A lot  Bathing (including washing, rinsing, drying): A lot  Putting on and taking off regular upper body clothing: A lot  Toileting, which includes using toilet, bedpan or urinal: A lot  Taking care of personal grooming such as brushing teeth: A little  Eating Meals: None  Daily Activity - Total Score: 15        Education Documentation  Body Mechanics, taught by CHRISTOPHER Mendes at 2/6/2024 11:31 AM.  Learner: Patient  Readiness: Acceptance  Method: Explanation  Response: Needs Reinforcement  Comment: pt educated on importance of maintaining spinal precautions, use of LH AE, and benefits of OOB activity. pt also educated on proper salvatore mechanics when performing OOB activity/ bed mobility. pt would benefit from continued ed for increasing carryover.    Precautions, taught by CHRISTOPHER Mendes at 2/6/2024 11:31 AM.  Learner: Patient  Readiness: Acceptance  Method: Explanation  Response: Needs Reinforcement  Comment: pt educated on importance of maintaining spinal precautions, use of LH AE, and benefits of OOB activity. pt also educated on proper salvatore mechanics when performing OOB activity/ bed mobility. pt would benefit from continued ed for increasing carryover.    ADL Training, taught by  CHRISTOPHER Mendes at 2/6/2024 11:31 AM.  Learner: Patient  Readiness: Acceptance  Method: Explanation  Response: Needs Reinforcement  Comment: pt educated on importance of maintaining spinal precautions, use of LH AE, and benefits of OOB activity. pt also educated on proper salvatore mechanics when performing OOB activity/ bed mobility. pt would benefit from continued ed for increasing carryover.    Education Comments  No comments found.        OP EDUCATION:       Goals:  Encounter Problems       Encounter Problems (Active)       OT Goals       Patient will complete functional transfers with a FWW at CGA level.  (Progressing)       Start:  02/01/24    Expected End:  02/15/24            Patient will complete toileting at a CGA level.  (Progressing)       Start:  02/01/24    Expected End:  02/15/24            Patient will complete lower body dressing at a CGA level.  (Progressing)       Start:  02/01/24    Expected End:  02/15/24            Patient will demonstrate fair/fair + standing balance during functional tasks. (Progressing)       Start:  02/01/24    Expected End:  02/15/24            Patient will tolerate standing greater than 3 minutes during functional tasks. (Progressing)       Start:  02/01/24    Expected End:  02/15/24

## 2024-02-07 ENCOUNTER — OUTSIDE SERVICES (OUTPATIENT)
Dept: INFECTIOUS DISEASES | Age: 69
End: 2024-02-07
Payer: COMMERCIAL

## 2024-02-07 VITALS
HEART RATE: 110 BPM | HEIGHT: 57 IN | SYSTOLIC BLOOD PRESSURE: 92 MMHG | DIASTOLIC BLOOD PRESSURE: 44 MMHG | RESPIRATION RATE: 22 BRPM | BODY MASS INDEX: 32.87 KG/M2 | OXYGEN SATURATION: 96 % | WEIGHT: 152.34 LBS | TEMPERATURE: 97.2 F

## 2024-02-07 DIAGNOSIS — B95.62 MRSA BACTEREMIA: Primary | ICD-10-CM

## 2024-02-07 DIAGNOSIS — R78.81 MRSA BACTEREMIA: Primary | ICD-10-CM

## 2024-02-07 LAB
BACTERIA SPEC CULT: ABNORMAL
GRAM STN SPEC: ABNORMAL
GRAM STN SPEC: ABNORMAL

## 2024-02-07 PROCEDURE — 2500000004 HC RX 250 GENERAL PHARMACY W/ HCPCS (ALT 636 FOR OP/ED): Performed by: STUDENT IN AN ORGANIZED HEALTH CARE EDUCATION/TRAINING PROGRAM

## 2024-02-07 PROCEDURE — 99232 SBSQ HOSP IP/OBS MODERATE 35: CPT | Performed by: REGISTERED NURSE

## 2024-02-07 PROCEDURE — 2500000001 HC RX 250 WO HCPCS SELF ADMINISTERED DRUGS (ALT 637 FOR MEDICARE OP): Performed by: INTERNAL MEDICINE

## 2024-02-07 PROCEDURE — 99231 SBSQ HOSP IP/OBS SF/LOW 25: CPT | Performed by: PLASTIC SURGERY

## 2024-02-07 PROCEDURE — 99232 SBSQ HOSP IP/OBS MODERATE 35: CPT | Performed by: INTERNAL MEDICINE

## 2024-02-07 PROCEDURE — 2500000001 HC RX 250 WO HCPCS SELF ADMINISTERED DRUGS (ALT 637 FOR MEDICARE OP): Performed by: PSYCHIATRY & NEUROLOGY

## 2024-02-07 PROCEDURE — 2500000004 HC RX 250 GENERAL PHARMACY W/ HCPCS (ALT 636 FOR OP/ED): Performed by: INTERNAL MEDICINE

## 2024-02-07 PROCEDURE — 1090000001 HH PPS REVENUE CREDIT

## 2024-02-07 PROCEDURE — 2500000001 HC RX 250 WO HCPCS SELF ADMINISTERED DRUGS (ALT 637 FOR MEDICARE OP): Performed by: STUDENT IN AN ORGANIZED HEALTH CARE EDUCATION/TRAINING PROGRAM

## 2024-02-07 PROCEDURE — 2500000005 HC RX 250 GENERAL PHARMACY W/O HCPCS: Performed by: SPECIALIST

## 2024-02-07 PROCEDURE — 1090000002 HH PPS REVENUE DEBIT

## 2024-02-07 PROCEDURE — 2500000001 HC RX 250 WO HCPCS SELF ADMINISTERED DRUGS (ALT 637 FOR MEDICARE OP): Performed by: ANESTHESIOLOGY

## 2024-02-07 PROCEDURE — 2500000001 HC RX 250 WO HCPCS SELF ADMINISTERED DRUGS (ALT 637 FOR MEDICARE OP): Performed by: REGISTERED NURSE

## 2024-02-07 RX ORDER — OXYCODONE AND ACETAMINOPHEN 5; 325 MG/1; MG/1
1 TABLET ORAL EVERY 6 HOURS PRN
Qty: 28 TABLET | Refills: 0 | Status: SHIPPED | OUTPATIENT
Start: 2024-02-07

## 2024-02-07 RX ADMIN — BACLOFEN 5 MG: 10 TABLET ORAL at 14:31

## 2024-02-07 RX ADMIN — BACLOFEN 5 MG: 10 TABLET ORAL at 08:54

## 2024-02-07 RX ADMIN — DEXTROSE MONOHYDRATE 2 G: 5 INJECTION INTRAVENOUS at 12:25

## 2024-02-07 RX ADMIN — AMLODIPINE BESYLATE 5 MG: 5 TABLET ORAL at 08:46

## 2024-02-07 RX ADMIN — LIDOCAINE 1 PATCH: 4 PATCH TOPICAL at 08:44

## 2024-02-07 RX ADMIN — HYDRALAZINE HYDROCHLORIDE 50 MG: 50 TABLET ORAL at 08:46

## 2024-02-07 RX ADMIN — RIFAMPIN 300 MG: 300 CAPSULE ORAL at 08:46

## 2024-02-07 RX ADMIN — FLUOXETINE 10 MG: 10 CAPSULE ORAL at 08:44

## 2024-02-07 RX ADMIN — APIXABAN 5 MG: 5 TABLET, FILM COATED ORAL at 08:46

## 2024-02-07 RX ADMIN — ACETAMINOPHEN 650 MG: 325 TABLET ORAL at 08:44

## 2024-02-07 RX ADMIN — HYDROMORPHONE HYDROCHLORIDE 2 MG: 2 TABLET ORAL at 07:47

## 2024-02-07 RX ADMIN — MORPHINE SULFATE 2 MG: 2 INJECTION, SOLUTION INTRAMUSCULAR; INTRAVENOUS at 06:01

## 2024-02-07 RX ADMIN — ISOSORBIDE MONONITRATE 60 MG: 60 TABLET, EXTENDED RELEASE ORAL at 06:01

## 2024-02-07 RX ADMIN — Medication 1 TABLET: at 08:46

## 2024-02-07 RX ADMIN — HYDROMORPHONE HYDROCHLORIDE 2 MG: 2 TABLET ORAL at 12:23

## 2024-02-07 RX ADMIN — ASPIRIN 81 MG: 81 TABLET, COATED ORAL at 08:46

## 2024-02-07 ASSESSMENT — PAIN - FUNCTIONAL ASSESSMENT: PAIN_FUNCTIONAL_ASSESSMENT: 0-10

## 2024-02-07 ASSESSMENT — COGNITIVE AND FUNCTIONAL STATUS - GENERAL
WALKING IN HOSPITAL ROOM: A LOT
MOVING FROM LYING ON BACK TO SITTING ON SIDE OF FLAT BED WITH BEDRAILS: A LOT
TURNING FROM BACK TO SIDE WHILE IN FLAT BAD: A LOT
DAILY ACTIVITIY SCORE: 14
STANDING UP FROM CHAIR USING ARMS: A LOT
CLIMB 3 TO 5 STEPS WITH RAILING: TOTAL
DRESSING REGULAR LOWER BODY CLOTHING: A LOT
EATING MEALS: A LITTLE
MOBILITY SCORE: 11
WALKING IN HOSPITAL ROOM: A LOT
HELP NEEDED FOR BATHING: A LOT
MOVING FROM LYING ON BACK TO SITTING ON SIDE OF FLAT BED WITH BEDRAILS: A LOT
MOBILITY SCORE: 11
PERSONAL GROOMING: A LITTLE
TURNING FROM BACK TO SIDE WHILE IN FLAT BAD: A LOT
STANDING UP FROM CHAIR USING ARMS: A LOT
MOVING TO AND FROM BED TO CHAIR: A LOT
CLIMB 3 TO 5 STEPS WITH RAILING: TOTAL
DRESSING REGULAR UPPER BODY CLOTHING: A LOT
TOILETING: A LOT
MOVING TO AND FROM BED TO CHAIR: A LOT

## 2024-02-07 ASSESSMENT — PAIN SCALES - PAIN ASSESSMENT IN ADVANCED DEMENTIA (PAINAD)
BODYLANGUAGE: TENSE, DISTRESSED PACING, FIDGETING
FACIALEXPRESSION: SAD, FRIGHTENED, FROWN
BREATHING: NOISY LABORED BREATHING, LONG PERIODS OF HYPERVENTILATION, CHEYNE-STOKES RESPIRATIONS

## 2024-02-07 ASSESSMENT — PAIN SCALES - GENERAL
PAINLEVEL_OUTOF10: 8
PAINLEVEL_OUTOF10: 10 - WORST POSSIBLE PAIN

## 2024-02-07 ASSESSMENT — PAIN DESCRIPTION - LOCATION: LOCATION: BACK

## 2024-02-07 NOTE — PROGRESS NOTES
"Physical Therapy                 Therapy Communication Note    Patient Name: Tara Tierney  MRN: [id number]  Today's Date: 2/7/2024       11:00 am Comment: Scheduled therapy session this date to ensure pain meds received prior to session. Received secure chat message from Nurse and TCC that pt is in too much pain and not able to participate. Nursing able to reposition patient to minimally improve pain. Introduced myself to patient and family (son and  present). Pt reports too much pain to do therapy this date. Discussed importance of mobilizing and increasing strength  and let her know sometimes moving with help to improve pain. Pt states, \"you dont know this pain, its my pain\" and reports sometimes it is just too much. Pt agreeable to therapy reattempting this afternoon. Will reattempt at 1pm with following LU. Notified nursing and TCC of plan. Will inform Spine PA via secure chat.     1:00 pm - attempted pt for therapy, per pt and nursing pt to discharge to facility with 1:30p  time. Will follow up if change in  time.   "

## 2024-02-07 NOTE — PROGRESS NOTES
"Tara Tierney is a 68 y.o. female on day 11 of admission presenting with Altered mental status, unspecified altered mental status type.    Subjective   Patient alert and oriented x 3 today moving all extremities still having some back pain but it is not bad while she is lying in bed when she stands up or sits up the pain increases into the back and into the left groin..  Patient plan is to go to [facility] so coordinating a pain regimen plan so we can get her on board when she is transferred there.  Patient denies chest pain shortness of breath calf pain.       Objective     Physical Exam well-nourished, well kept.  No lymphangitis or lymphadenopathy in the examined extremities.  Good perfusion to the lower extremities bilaterally.  Dorsalis pedis pulses 2+.  Capillary refill to the digits brisk.  No distal edema.  Patient able to stand with assistance but there is a lot of pain when she does.  Patient has wound VAC in place gross sensation intact to the lower extremity is bilaterally.  Affect normal.  Weakness in the right quadricep hip flexors 4/5 otherwise examination of the lower extremities reveals no point tenderness, swelling, or deformity.  Range of motion of the hips, knees, and ankles are full without crepitance, instability, or exacerbation of pain.  Strength is 5/5 throughout.    Last Recorded Vitals  Blood pressure 132/60, pulse (!) 112, temperature 36.1 °C (97 °F), resp. rate 22, height 1.44 m (4' 8.69\"), weight 69.1 kg (152 lb 5.4 oz), SpO2 95 %, not currently breastfeeding.  Intake/Output last 3 Shifts:  I/O last 3 completed shifts:  In: 200 (2.9 mL/kg) [P.O.:200]  Out: 4000 (57.9 mL/kg) [Urine:4000 (1.6 mL/kg/hr)]  Weight: 69.1 kg     Relevant Results      Scheduled medications  amLODIPine, 5 mg, oral, Daily  apixaban, 5 mg, oral, BID  aspirin, 81 mg, oral, Daily  baclofen, 10 mg, oral, Nightly  baclofen, 5 mg, oral, BID  cefTRIAXone, 2 g, intravenous, q12h  daptomycin, 8 mg/kg, " intravenous, q24h EDE  FLUoxetine, 10 mg, oral, Daily  hydrALAZINE, 50 mg, oral, BID  isosorbide mononitrate ER, 60 mg, oral, Daily  lactobacillus acidophilus, 1 tablet, oral, Daily  lidocaine, 1 patch, transdermal, Daily  polyethylene glycol, 17 g, oral, Daily  potassium chloride CR, 40 mEq, oral, Once  psyllium, 1 packet, oral, Daily  rifAMPin, 300 mg, oral, BID  sennosides, 2 tablet, oral, Nightly  sennosides, 1 tablet, oral, Nightly      Continuous medications     PRN medications  PRN medications: acetaminophen **OR** acetaminophen **OR** acetaminophen, alteplase, hydrALAZINE, HYDROmorphone, menthol-zinc oxide, morphine, ondansetron, oxyCODONE, oxyCODONE  No results found for this or any previous visit (from the past 24 hour(s)).          This patient has a central line   Reason for the central line remaining today?  Needed for IV medication                 Assessment/Plan   Principal Problem:    Altered mental status, unspecified altered mental status type  Active Problems:    Surgical wound infection    Spoke with nursing today and asked him to make sure they coordinate her Dilaudid pain medication given 30 minutes prior to physical therapy visit and getting out of bed.  There also was an order for IV Dilaudid 0.5 mg 30 minutes prior so they need to give her that if she had her pain medicine much earlier they can do that..  Patient CRP 8.95 and CBC white count is normal.  Potassium is up to 3.5.    Plan: Plan for pain control while she is here so we can see what works as far as a pain regimen schedule we will follow-up in the office after discharge and she needs also follow-up with Dr. Reyes for wound care and infectious disease.       I spent 20 minutes in the professional and overall care of this patient.      Hussein Monique PA-C

## 2024-02-07 NOTE — NURSING NOTE
Pt having intense groin pain and crying out, worse than previously.. PAC Gavlak notified. No new orders given bcs pt has already been worked up per PAC.

## 2024-02-07 NOTE — PROGRESS NOTES
Plastic Surgery Note    Afebrile, vital signs stable  Wound VAC in place in mid back wound  Impression: Open wound mid lower back  Continue wound VAC  Okay for discharge from my standpoint with wound VAC  Follow-up with me in the wound clinic 1 to 2 weeks

## 2024-02-07 NOTE — PROGRESS NOTES
Infectious Disease Progress Note       Patient is a followup regarding severe back pain persistent bilateral lower extremity weakness altered mentation elevated CRP with significant history of MRSA bacteremia, now status post hardware removal with no residual hardware, bilateral JOSE drains which have been removed, open wound postop deep incision, and altered mental status which has been steadily improving over the past few days    Her MRI of the spine is showing evidence of previous laminectomies L3-S1 levels, there is a rim-enhancing fluid surrounding the surgical drainage catheter along laminectomy bed and extensive evidence of signal abnormality and enhancement within the soft tissues along the laminectomy bed surrounding posterior paraspinal soft tissues and superficially along the surgical tract -unclear if this is a superimposed infectious process.  There is also an abnormal infiltrative signal and enhancement within the paraspinal soft tissues medial psoas muscle bilaterally on the right greater than the left extending from L3 into sacrum which also looks to be possibly infectious    MRI of the Brain showing evidence of mild disproportionate supratentorial ventriculomegaly, which is unchanged since the past CT of the head.  Possible normal pressure hydrocephalus.  Neurology is concerned about possible meningitis due to some additional signal abnormalities and has asked me to temporarily broaden coverage.    Patient's coverage was broadened to daptomycin ceftriaxone and rifampin with help of pharmacy  I had spoken to radiology downtown and they confirmed increased signal enhancement and bony destruction which is worse than approximately 27 days ago    Since broadening her treatment, within 24 hours her mentation seem to have improved, she is much more talkative, able to carry on a full conversation.  Her pain continues.   Unsure if this was due to the medication or just timing of her situation.     Continues to  have weakness and groin pain    Wound care notes reviewed :  Wound vac dressing removed from lower back. Wound is beefy red and there is scant pink tinged drainage. No eschar, no foul smell noted. Surrounding tissue intact. Applied Vashe wet to dry dressing and secured with Mepilex.     Lab Results   Component Value Date    WBC 7.1 02/05/2024    HGB 10.8 (L) 02/05/2024    HCT 33.9 (L) 02/05/2024    MCV 92 02/05/2024     02/05/2024     Lab Results   Component Value Date    GLUCOSE 176 (H) 02/05/2024    CALCIUM 9.6 02/05/2024     (L) 02/05/2024    K 3.5 02/05/2024    CO2 23 02/05/2024     02/05/2024    BUN 6 02/05/2024    CREATININE 0.66 02/05/2024     CRP trending down at 8.95  WBC trends are being monitored. Antibiotic doses are being adjusted per most recent renal labs.     Vitals:    02/07/24 1058   BP: (!) 92/44   Pulse: 110   Resp: 22   Temp: 36.2 °C (97.2 °F)   SpO2: 96%       MRI of the left hip with nonspecific findings; trace fluid and edema within the posterior pelvis.  Sigmoid diverticulosis is incidentally noted.  Soft tissue edema within obturator externus and adductor muscles.  Surgical signal traversing the lateral left gluteus medius    MRI of the lumbar spine residual noncompressive peripherally enhancing fluid collection along the prior catheter tract with trace gas component that is nonspecific.  Differential includes seroma hematoma versus infectious etiology.  Osteomyelitis centered at L3-L4 with mild endplate erosive changes    Much more awake alert oriented   obese female no rashes, NAD  Neck supple  Heart S1S2  Chest: Equal expansion, bilaterally clear to auscultation  Abdomen: soft, ND, NTTP, no guarding  Extrem: no pain to palpation  Neuro: CNS intact  Affect appropriate and patient is interactive      Patient Active Problem List   Diagnosis    Abdominal aortic aneurysm (CMS/HCC)    Abnormal EKG    Bilateral carotid artery stenosis    Bruit of right carotid artery    CAD  in native artery    Cardiomyopathy (CMS/HCC)    Chest pain    Chronic asthmatic bronchitis    Closed displaced fracture of fifth metatarsal bone    Current every day smoker    Difficulty breathing    Essential hypertension    History of total knee replacement    Hypokalemia    Intermittent claudication (CMS/HCC)    Knee osteoarthritis    Knee pain    Limb pain    Localized swelling, mass, or lump of lower extremity    Celiac artery stenosis (CMS/HCC)    Mesenteric artery stenosis (CMS/HCC)    Mixed hyperlipidemia    Obese    PVD (peripheral vascular disease) (CMS/HCC)    Right foot pain    Swelling    Trigger finger    Urinary tract infection without hematuria    Back pain of lumbar region with sciatica    Back pain with radiculopathy    Intractable back pain    Seroma, post-traumatic (CMS/HCC)    Lumbar surgical wound fluid collection    Generalized weakness    Postoperative surgical complication involving musculoskeletal system associated with musculoskeletal procedure, unspecified complication    Back pain at L4-L5 level    Deep vein thrombosis (DVT) of right lower extremity (CMS/HCC)    Anemia    Altered mental status, unspecified altered mental status type    Surgical wound infection     Hx MRSA Bacteremia, now s/p hardware removal from spine  Persistent back pain  Diskitis with possible meningitis?- likely also secondary to MRSA given her history.  Unable to get tissue specimen or bone specimen at this point.  Unable to get lumbar puncture as well.  There were too many enhancing tissues that may cause further seeding.  Risk outweighs the benefit.  At this time choosing to treat empirically.  Either way she is clinically improving.  Surgical wound infection -wound is open but appears to be clean at this time  Acute metabolic encephalopathy  Speech deficit -much better  Polypharmacy  MRI as described as above with soft tissue changes and concern for osteomyelitis.  There is a fluid collection that is present and  nonspecific with differential for hematoma versus seroma versus infectious etiology.    PLAN:  Continue daptomycin x 8 weeks   Monitor weekly CBC CMP CK CRP for the next 8 weeks  Regarding her rifampin and her ceftriaxone, will plan to discontinue her ceftriaxone after total 2 weeks.  Will likely discontinue her rifampin after total 4 weeks  Case discussed with wound care at bedside extensively with family member present    Continue wound care and ID follow-up in the office within 2 to 3 weeks  Amy Celestin, DO

## 2024-02-07 NOTE — PROGRESS NOTES
Spoke with patient and family,  and son at bedside. Patient has authorization again today for SNF. Patient however, now states she is in the worse pain ever and cannot move. Patient states she cannot do therapy, states she will end up right back in the ED. Patient had IV Morphine at 6 am, oral Dilaudid at 745, Flexeril at 845 and Tylenol. Spoke with Bel from PT advised her of what patient told me that she cannot move today. Patient has Dilaudid IV 0.5mg ordered while in  hospital to be given half  hour prior to therapy, however patient insists she cannot do anything today. Also spoke with Caitlin Li NP, she is trying to reconcile discharge med rec., there were several different narcotic orders, not sure what the regimen is, only have script for Dilaudid oral per Dr. Barnes. Caitlin was reaching out to Dr. Barnes to reconcile paid medication regimen to make sure we had correct meds and prescriptions for such. Will continue to follow, patient and family advised me they do not think patient is ready for discharge d/t pain.     UPDATE:  NP, Caitlin Li from [facility] was able to clarify pain medications with Dr. Barnes, reconciled the medication list for discharge. Caitlin MORRIS, myself and  Marlin DE LA TORRE, visited patient to advise her that everything was in place and her pain medication regimen was updated. Patient's son at bedside, encouraging patient that she really needed to go and start getting therapy. Patient in agreement. Patient transferred to [facility] approximately 3:30. Facility also needed to speak with ID regarding Dapto clarification. Spoke with Dr. Celestin, she did give permission for facility to call her on her personal cell. Per Rosa Isela, liaison for [facility] was who I provided Dr. Celestin's number with. Rosa Isela was actually with patient at [facility] and patient was doing well, resting comfortably.

## 2024-02-07 NOTE — PROGRESS NOTES
Per [facility] they have received ins. Authorization to admit.  Pt. Will discharge this date at 1:00 pm via ambulance.  Spoke with pt. And son at bedside who atr aware and in agreement to transfer.

## 2024-02-07 NOTE — PROGRESS NOTES
Tara Tierney is a 68 y.o. female on day 11 of admission presenting with Altered mental status, unspecified altered mental status type.    Consult: HTN    Subjective   Patient examined and seen. Alert and oriented x3, resting in bed with family at bedside eating a banana.  Patient denies chest pain, shortness of breath, palpitations, abdominal pain, fever or chills.  She does state that her pain has returned to the same amount it was on her arrival since wound vac was manipulated. Family at bedside.        Hospitalist team was asked to stay on the case for review of HTN and lab work as needed.     Objective     Last Recorded Vitals  BP (!) 92/44   Pulse 110   Temp 36.2 °C (97.2 °F)   Resp 22   Wt 69.1 kg (152 lb 5.4 oz)   SpO2 96%   Intake/Output last 3 Shifts:    Intake/Output Summary (Last 24 hours) at 2/7/2024 1157  Last data filed at 2/7/2024 0900  Gross per 24 hour   Intake 375 ml   Output 2000 ml   Net -1625 ml       Admission Weight  Weight: 78 kg (172 lb) (01/27/24 0431)    Daily Weight  01/30/24 : 69.1 kg (152 lb 5.4 oz)    Image Results  MR hip left wo IV contrast  Narrative: Interpreted By:  Yoav Blake,   STUDY:  MR HIP LEFT WO IV CONTRAST;  2/5/2024 8:06 pm      INDICATION:  Signs/Symptoms:left hip pain.      COMPARISON:  None.      ACCESSION NUMBER(S):  [id number]      ORDERING CLINICIAN:  ERICK KEN      TECHNIQUE:  Multiplanar and multisequential MR images of the left hip and pelvis  are performed.      FINDINGS:  Postoperative changes are partially visualized in the lower lumbar  spine with midline laminectomy. Postsurgical fluid and edema is  identified in the paraspinal musculature extending into the medial  facet as well as within the anterior paraspinal soft tissues at the  level of the L4-5 level. These findings are described in greater  detail on the lumbar spine MRI of the same day.      There is ill-defined edema within the obturator externus and adductor  muscles  bilaterally with some edema in the intermuscular fat planes.  There is bilateral iliacus muscle edema and trace surrounding fluid.  There is minimal edema within the gluteal musculature bilaterally.  Postoperative signal is identified traversing the lateral left  gluteus kennedi.      There is no evidence of acute osseous fracture, bone marrow edema, or  osseous mass within the pelvis or proximal femurs.      There are mild osteoarthritic changes of both hips with some  narrowing at the superior joint spaces. Small bilateral hip effusions  are evident.      The rectus femoris and hamstring tendon origins are intact  bilaterally. The iliopsoas tendon insertions are intact bilaterally.  The gluteus medius and minimus tendon insertions are intact  bilaterally. There is no mass or fluid collection in the piriformis  fossa bilaterally. The piriformis muscles are symmetric in size.      Multiple diverticula are incidentally noted within the distal colon.  There is trace fluid and edema posteriorly within the pelvis.      The sacroiliac joints are patent and symmetric in appearance. There  are mild bilateral osteoarthritic changes.      Impression: Postoperative signal in the lower lumbar spine surrounding the  laminectomy site and within the anterior paraspinal soft tissues.  These findings are described in detail on the lumbar spine MRI of the  same day. Please refer to this report.      Mild osteoarthritic changes of the hips and sacroiliac joints.      No sign of acute osseous abnormality.      Paramidline soft tissue edema within the obturator externus and  adductor muscles, presumably posttraumatic. There is surgical signal  traversing the lateral left gluteus medius.      Trace fluid and edema within the posterior pelvis. Sigmoid  diverticulosis is incidentally noted.      Signed by: Yoav Blake 2/6/2024 12:54 PM  Dictation workstation:   [id number]  MR lumbar spine w and wo IV contrast  Narrative:  Interpreted By:  Gerson España,   STUDY:  MR LUMBAR SPINE W AND WO IV CONTRAST;  2/5/2024 8:06 pm      INDICATION:  Signs/Symptoms:evaluation of possible fluid collection intractable  left groin pain.      COMPARISON:  MRI lumbar spine dated 01/20/2024.      ACCESSION NUMBER(S):  [id number]      ORDERING CLINICIAN:  ERICK KEN      TECHNIQUE:  Multiplanar multisequence MR imaging of the lumbar spine performed  prior to and following administration of 14 mL Dotarem intravenous  contrast. Sagittal T1, T2, STIR, axial T1 and T2 weighted images of  the lumbar spine were acquired. Postcontrast sagittal and axial T1  imaging obtained.      Imaging degraded by motion artifact.      FINDINGS:  Segmentation: Normal.      Conus: The lower thoracic cord appears unremarkable. The conus  terminates at the L1 vertebral body level. Cauda equina are  unremarkable. No abnormal cord or leptomeningeal enhancement.      Epidural fluid: There is no discrete epidural fluid collection  identified. As on prior examination there is nonspecific mild  circumferential epidural thickening and enhancement as well as more  focal ventral epidural STIR hyperintensity/enhancement at the L4  level that may reflect an element of epidural venous engorgement,  postoperative change, or infectious seeding.      Alignment: Similar element of rotatory scoliosis of the thoracolumbar  spine. Trace retrolisthesis of L3 on L4 and trace anterolisthesis of  L4 on L5.      Marrow signal/Vertebral bodies: Abnormal STIR hyperintensity and  enhancement involving the L3 and L4 vertebral bodies corresponding to  known discitis/osteomyelitis. Cortical tracks are noted within the  L4-S1 regions from previous posterior spinal fusion. There is  unchanged erosive change, particularly along the rightward aspect of  the L3-L4 endplates, with mild height loss. The remaining lumbar  vertebral body heights are maintained.      Intervertebral discs: Discitis/osteomyelitis  involving the L3-L4  level with erosive endplate change and signal abnormality centered at  the disc. Interbody disc spacers remain at L4-L5 and L5-S1.  Additional multilevel disc desiccation.          Degenerative change:      T12-L1: Unremarkable.      L1-2: Mild facet arthropathy. Minimal disc bulge and endplate  spurring. No spinal canal stenosis. No significant right and mild  left neural foraminal narrowing.      L2-3: Minimal/mild facet arthropathy. Mild disc bulge with endplate  spurring. No substantial spinal canal stenosis. Mild bilateral neural  foraminal narrowing.      L3-4: Aforementioned discitis/osteomyelitis. Posterior decompression.  Bulky bilateral facet arthropathy. Posterior disc osteophyte  components. No significant spinal canal stenosis. Assessment of the  neural foramina is significantly degraded by motion artifact and  inflammatory components with potentially severe right and moderate  left neural foraminal narrowing.      L4-5: Facet arthropathy. Previous laminectomy. No spinal canal  stenosis. Mild-to-moderate left neural foraminal narrowing.  Assessment of the right neural foramen is degraded with infiltrative  inflammatory components extending to this region with concern  moderate neural foraminal narrowing.      L5-S1: Bilateral facet arthropathy. Previous laminectomy. No spinal  canal stenosis. Residual posterior disc osteophyte components.  Moderate right-greater-than-left neural foraminal narrowing suggested.      Soft tissues: There is extensive STIR hyperintensity and enhancement  surrounding the region of the laminectomy beds at L3-S1.  Additionally, there is a residual nonspecific peripherally enhancing  fluid collection overlying the dorsal epidural region of the  laminectomy beds measuring approximately 4.6 cm in craniocaudad  extent and up to 1.6 cm in anterior-posterior dimension (series 12,  image 17) with trace internal gas components suggested. No  significant compressive  affect on the thecal sac. The surgical drain  previously seen within this region has been removed in the interim.  There is a soft tissue defect within the subcutaneous fascia  overlying the surgical site.      Impression: 1. Significant motion artifact degrading assessment.  2. Overall similar appearance of the lumbar spine apart from interval  removal of surgical drainage catheter from the dorsal epidural  region/laminectomy beds. There is a residual noncompressive  peripherally enhancing fluid collection along the prior catheter  tract with trace gas component that is nonspecific, sterility  indeterminate. Findings could reflect postoperative seroma, hematoma,  with the possibility of infectious seeding not completely excluded in  the setting of known discitis/osteomyelitis.  3. Similar extent/appearance of discitis/osteomyelitis centered at  L3-L4 with mild endplate erosive change/height loss.  4. Sequela of previous lower lumbar spinal fusion and decompression.  5. Similar degenerative change as above.      MACRO:  None      Signed by: Gerson España 2/6/2024 8:27 AM  Dictation workstation:   [id number]      Physical Exam  Constitutional: elderly appearing , awake/alert/oriented x3, cooperative  Eyes: PERRL,  clear sclera  ENMT: mucous membranes moist, no apparent injury,   Head/Neck: Neck supple, no apparent injury,  Respiratory/Thorax: Patent airways,  normal breath sounds   Cardiovascular: Regular, rate and rhythm, 1+ equal pulses of the extremities, normal S 1and S 2  Musculoskeletal: mild decrease range of motion to lumbar spine    good capillary refills bilaterally,   Extremities: normal extremities,  incision covered not viewable at this time WOUND vac in place   Neurological: alert/oriented x 3, speech clear  Psychiatric: appropriate mood and behavior    Relevant Results  Scheduled medications  amLODIPine, 5 mg, oral, Daily  apixaban, 5 mg, oral, BID  aspirin, 81 mg, oral, Daily  baclofen, 10 mg, oral,  Nightly  baclofen, 5 mg, oral, BID  cefTRIAXone, 2 g, intravenous, q12h  daptomycin, 8 mg/kg, intravenous, q24h EDE  FLUoxetine, 10 mg, oral, Daily  hydrALAZINE, 50 mg, oral, BID  isosorbide mononitrate ER, 60 mg, oral, Daily  lactobacillus acidophilus, 1 tablet, oral, Daily  lidocaine, 1 patch, transdermal, Daily  polyethylene glycol, 17 g, oral, Daily  potassium chloride CR, 40 mEq, oral, Once  psyllium, 1 packet, oral, Daily  rifAMPin, 300 mg, oral, BID  sennosides, 2 tablet, oral, Nightly  sennosides, 1 tablet, oral, Nightly      Continuous medications     PRN medications  PRN medications: acetaminophen **OR** acetaminophen **OR** acetaminophen, alteplase, hydrALAZINE, HYDROmorphone, menthol-zinc oxide, morphine, ondansetron, oxyCODONE, oxyCODONE    No results found for this or any previous visit (from the past 24 hour(s)).         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 / Deep MRSA wound infection   # COPD, not in exacerbation   # HTN , PVD, Hx of DVT   # Polypharmacy     Plan:     Admitted to Hospital Medicine - then transferred to Orthopedics Team as Primary 2/5  Altered Mental Status - resolved  Infectious Disease managing IV antibiotics and treatment  Repeat MRI - viewed and managed by Orthopedics Team    Clinically patient is improving - discharge later today to SNF with therapy   Pain Medications adjusted by Pain Management team due to polypharmacy and significant pain to spine due to infection   Dvtp deferred to Orthopedics/Spine Team   Plastics following for management of Wound Vac and Wound   HTN stable at 132/60 at this time   Reviewed labs from 2/5 - BMP stable   PT/OT following - patient refusing therapy 2/2 pain, pain management following     Thank you for consult  Hemodynamically stable   Voices no immediate concerns -  concern for pain management - reached out to team   Family at bedside     Time spent  36 minutes obtaining labs, imaging, recommendations, interview, assessment, examination, medication review/ordering, and EMR review.    Plan of care was discussed extensively with patient, RN, Ortho NP, TCC and family. Patient verbalized understanding through teach back method. All questions and concerns addressed upon examination.     Of note, this documentation is completed using the Dragon Dictation system (voice recognition software). There may be spelling and/or grammatical errors that were not corrected prior to final submission.        Stacey Bliss, APRN-CNP

## 2024-02-08 ENCOUNTER — NURSING HOME VISIT (OUTPATIENT)
Dept: POST ACUTE CARE | Facility: EXTERNAL LOCATION | Age: 69
End: 2024-02-08
Payer: COMMERCIAL

## 2024-02-08 ENCOUNTER — APPOINTMENT (OUTPATIENT)
Dept: WOUND CARE | Facility: CLINIC | Age: 69
DRG: 856 | End: 2024-02-08
Payer: COMMERCIAL

## 2024-02-08 DIAGNOSIS — T81.49XA SURGICAL WOUND INFECTION: ICD-10-CM

## 2024-02-08 DIAGNOSIS — M96.89 POSTOPERATIVE SURGICAL COMPLICATION INVOLVING MUSCULOSKELETAL SYSTEM ASSOCIATED WITH MUSCULOSKELETAL PROCEDURE, UNSPECIFIED COMPLICATION: Primary | ICD-10-CM

## 2024-02-08 DIAGNOSIS — I82.4Y1 ACUTE DEEP VEIN THROMBOSIS (DVT) OF PROXIMAL VEIN OF RIGHT LOWER EXTREMITY (MULTI): ICD-10-CM

## 2024-02-08 DIAGNOSIS — I10 ESSENTIAL HYPERTENSION: ICD-10-CM

## 2024-02-08 DIAGNOSIS — I25.10 CAD IN NATIVE ARTERY: ICD-10-CM

## 2024-02-08 DIAGNOSIS — A49.02 MRSA INFECTION: ICD-10-CM

## 2024-02-08 DIAGNOSIS — D64.9 ANEMIA, UNSPECIFIED TYPE: ICD-10-CM

## 2024-02-08 PROCEDURE — 99306 1ST NF CARE HIGH MDM 50: CPT | Performed by: INTERNAL MEDICINE

## 2024-02-08 PROCEDURE — 1090000001 HH PPS REVENUE CREDIT

## 2024-02-08 PROCEDURE — 1090000002 HH PPS REVENUE DEBIT
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
## 2024-02-08 NOTE — PROGRESS NOTES
Music Therapy Note    Tara Tierney was referred by Pain rounds          Pre-assessment  Unable to Assess Reason: Emotional distress  Pain Score: 9  Mood/Affect: Anxious  Verbalized Emotional State: Frustration         Treatment/Interventions  Music Therapy Interventions: Assessment    Post-assessment  Unable to Assess Reason: Did not provide expressive therapy intervention    Narrative  Assessment Detail: PT lying in bed with daughter and son by bedside. PT in pain at this time and expressed frustration due to current health condition. Family stated that PT is anxious in which PT declined to answer. PT declined services at this time.  Intervention: MT provided education and assessment of MT services to help increase relaxation and reduce pain perception.  Follow-up: MT to follow up if appropriate.    Education Documentation  Resources, taught by Gertrudis Barnes at 2/8/2024 12:02 PM.  Learner: Patient  Readiness: Acceptance  Method: Explanation  Response: Verbalizes Understanding    Coping Strategies, taught by Gertrudis Barnes at 2/8/2024 12:02 PM.  Learner: Patient  Readiness: Acceptance  Method: Explanation  Response: Verbalizes Understanding    Relaxation, taught by Gertrudis Barnes at 2/8/2024 12:02 PM.  Learner: Patient  Readiness: Acceptance  Method: Explanation  Response: Verbalizes Understanding    Regiments, taught by Gertrudis Barnes at 2/8/2024 12:02 PM.  Learner: Patient  Readiness: Acceptance  Method: Explanation  Response: Verbalizes Understanding    Integrative Health, taught by Gertrudis Barnes at 2/8/2024 12:02 PM.  Learner: Patient  Readiness: Acceptance  Method: Explanation  Response: Verbalizes Understanding    Focal Points, taught by Gertrudis Barnes at 2/8/2024 12:02 PM.  Learner: Patient  Readiness: Acceptance  Method: Explanation  Response: Verbalizes Understanding    Pain Management, taught by Gertrudis Barnes at 2/8/2024 12:02 PM.  Learner: Patient  Readiness:  Acceptance  Method: Explanation  Response: Verbalizes Understanding

## 2024-02-09 VITALS — DIASTOLIC BLOOD PRESSURE: 67 MMHG | HEART RATE: 76 BPM | SYSTOLIC BLOOD PRESSURE: 116 MMHG

## 2024-02-09 PROCEDURE — 1090000001 HH PPS REVENUE CREDIT

## 2024-02-09 PROCEDURE — 1090000002 HH PPS REVENUE DEBIT

## 2024-02-09 ASSESSMENT — ENCOUNTER SYMPTOMS
BACK PAIN: 1
COUGH: 0
ABDOMINAL DISTENTION: 0
SHORTNESS OF BREATH: 0
APNEA: 0
ACTIVITY CHANGE: 1
WOUND: 1
NERVOUS/ANXIOUS: 1
FEVER: 0
FATIGUE: 1
DIARRHEA: 0
ARTHRALGIAS: 1
WEAKNESS: 1

## 2024-02-10 PROCEDURE — 1090000001 HH PPS REVENUE CREDIT

## 2024-02-10 PROCEDURE — 1090000002 HH PPS REVENUE DEBIT

## 2024-02-10 NOTE — PROGRESS NOTES
Subjective   Patient ID: Tara Tierney is a 68 y.o. female who is acute skilled care and presents for initial visit for skilled nursing.    68-year-old female patient has been admitted after having lumbar laminectomy and fusion surgery, already general surgery was done in December, patient was in the hospital postoperatively for 3 weeks, surgery was complicated by postoperative wound, wound was infected, Staphylococcus aureus was identified.  Patient was having infected wound, patient required postoperative wound care, IV antibiotics, patient was treated with IV Cubicin, there was no need to do any wound debridement seems to be.  Patient went home and came back again because of significant pain and discomfort, it was a matter of her pain management as it was quite significant interfering with any sort of physical therapy and also on top of that patient has this wound infection and impaired mobility.  Now patient is admitted here with a wound vacuum and IV Cubicin for 14 days and ceftriaxone for 10 days, blood culture was showing Staphylococcus aureus negative, patient has history of hypertension, coronary artery disease of unknown severity, patient also has history of chronic smoking, patient has been seen by outside physician, patient has been treated with anxiolytics, patient has been back-and-forth to the hospital almost 1 month of hospitalization profoundly weak and dysfunctional.  Patient is admitted here for skilled nursing care and rehabilitation because of the wound, wound care, IV antibiotics and proper pain control.  When I saw this patient she just got up, wound was inspected, I have a message from orthopedic surgeon who operated this patient.  All other reports including MRI and CT scan were reviewed, there was a collection of the fluid which was drained with tube placement, there was osteomyelitis and discitis also, it is just the surgery and complicated infection and postoperative infection to the  point that patient's quality of life is hindered and rehabilitation and activity and mobility has been not improving as much as it could be.  Latest set of labs were reviewed.         Review of Systems   Constitutional:  Positive for activity change and fatigue. Negative for fever.   HENT:  Negative for congestion.    Respiratory:  Negative for apnea, cough and shortness of breath.    Cardiovascular:  Negative for chest pain.   Gastrointestinal:  Negative for abdominal distention and diarrhea.   Musculoskeletal:  Positive for arthralgias, back pain and gait problem.   Skin:  Positive for wound.   Neurological:  Positive for weakness.   Psychiatric/Behavioral:  The patient is nervous/anxious.        Objective   /67   Pulse 76     Physical Exam  Constitutional:       Appearance: Normal appearance. She is normal weight.      Comments: Tired appearing   HENT:      Head: Normocephalic.   Eyes:      Conjunctiva/sclera: Conjunctivae normal.   Cardiovascular:      Rate and Rhythm: Normal rate and regular rhythm.      Heart sounds: Normal heart sounds.   Pulmonary:      Effort: Pulmonary effort is normal.      Breath sounds: Normal breath sounds.   Abdominal:      General: Abdomen is flat.      Palpations: Abdomen is soft.   Musculoskeletal:         General: Tenderness present.      Cervical back: Neck supple.   Skin:     General: Skin is warm and dry.      Comments: Open surgical wound midline back with wound vacume.   Neurological:      General: No focal deficit present.      Mental Status: She is oriented to person, place, and time. Mental status is at baseline.   Psychiatric:         Mood and Affect: Mood normal.         Assessment/Plan   Problem List Items Addressed This Visit             ICD-10-CM    CAD in native artery I25.10    Essential hypertension I10    Postoperative surgical complication involving musculoskeletal system associated with musculoskeletal procedure, unspecified complication - Primary M96.89     Deep vein thrombosis (DVT) of right lower extremity (CMS/[facility]) I82.401    Anemia D64.9    Surgical wound infection T81.49XA     Other Visit Diagnoses         Codes    MRSA infection     A49.02        Hospitalization was reviewed, patient is listed to be on Eliquis 5 mg twice a day after ninth initially high-dose, patient has IVC filter.  Patient is on atenolol, ipratropium, baclofen, buspirone, ceftriaxone till 17 February, cyclobenzaprine, daptomycin till 4 March, Zetia, Lasix, hydralazine, hydromorphone 2 mg every 4 hours as needed, ibandronate once a month, isosorbide, meclizine, oxycodone for severe pain, pantoprazole, potassium chloride, pregabalin, PreserVision, mirtazapine 15 mg, rifampin 300 mg till 6 March, Salagen and tolterodine.  Wound vacuum will be kept and facility will monitor this wound and patient will be followed at the wound center.  IV line was examined and secure.  No angina, anemia is there because of prolonged hospitalization and postoperative wound infection, MRSA was found and hence patient is on Cubicin.  DVT was found has IVC filter.  We will make sure that patient has a proper pain control.  Physical therapy, Occupational Therapy evaluation are in process, other routine safety precautions has to be taken as per facility protocol.  Patient will require intense amount of skilled nursing and rehabilitation.  We will make sure that her wound care and wound infection has been dealt properly, assurance was supported by nursing staff.  Will talk with the patient's daughter-in-law who is a nurse here.  Periodic assessment and follow-up in the laboratories will be done as per our protocol.     Goals    None

## 2024-02-11 ENCOUNTER — NURSING HOME VISIT (OUTPATIENT)
Dept: POST ACUTE CARE | Facility: EXTERNAL LOCATION | Age: 69
End: 2024-02-11
Payer: COMMERCIAL

## 2024-02-11 VITALS — SYSTOLIC BLOOD PRESSURE: 124 MMHG | DIASTOLIC BLOOD PRESSURE: 79 MMHG | HEART RATE: 78 BPM

## 2024-02-11 DIAGNOSIS — T81.49XA SURGICAL WOUND INFECTION: ICD-10-CM

## 2024-02-11 DIAGNOSIS — M96.89 POSTOPERATIVE SURGICAL COMPLICATION INVOLVING MUSCULOSKELETAL SYSTEM ASSOCIATED WITH MUSCULOSKELETAL PROCEDURE, UNSPECIFIED COMPLICATION: Primary | ICD-10-CM

## 2024-02-11 DIAGNOSIS — I25.10 CAD IN NATIVE ARTERY: ICD-10-CM

## 2024-02-11 DIAGNOSIS — A49.02 MRSA INFECTION: ICD-10-CM

## 2024-02-11 DIAGNOSIS — I82.4Y1 ACUTE DEEP VEIN THROMBOSIS (DVT) OF PROXIMAL VEIN OF RIGHT LOWER EXTREMITY (MULTI): ICD-10-CM

## 2024-02-11 DIAGNOSIS — D64.9 ANEMIA, UNSPECIFIED TYPE: ICD-10-CM

## 2024-02-11 DIAGNOSIS — I10 ESSENTIAL HYPERTENSION: ICD-10-CM

## 2024-02-11 PROCEDURE — 1090000001 HH PPS REVENUE CREDIT

## 2024-02-11 PROCEDURE — 99308 SBSQ NF CARE LOW MDM 20: CPT | Performed by: INTERNAL MEDICINE

## 2024-02-11 PROCEDURE — 1090000002 HH PPS REVENUE DEBIT

## 2024-02-11 ASSESSMENT — ENCOUNTER SYMPTOMS
FATIGUE: 1
FEVER: 0
BACK PAIN: 1
WEAKNESS: 1
ABDOMINAL DISTENTION: 0
DIARRHEA: 0
APNEA: 0
CHILLS: 0
COUGH: 0
NAUSEA: 0
WOUND: 1
ARTHRALGIAS: 1
SHORTNESS OF BREATH: 0

## 2024-02-11 NOTE — LETTER
Patient: Tara Tierney  : 1955    Encounter Date: 2024    Subjective  Patient ID: Tara Tierney is a 68 y.o. female who is acute skilled care being seen and evaluated for multiple medical problems.    Patient was seen for follow-up today, so far 3 days here at the facility has been uneventful.  There is a wound vacuum, there is the external urinary catheter, nursing staff did not report any worsening of the wound.  Patient is going to be followed at the wound center.  Pain control is reasonable with Dilaudid and oxycodone.  She has a weakness of lower extremities, she was able to sit at the edge of the bed, walking or weightbearing has not happened.  Patient remains calm and comfortable, she is not having out of ordinary pain and discomfort.  Laboratories are awaited for next week.  Wound vacuum care will be done according to the nursing staff instructions and the wound care department.  The wound appears to be clean, it is a beefy red wound according to the photograph.         Review of Systems   Constitutional:  Positive for fatigue. Negative for chills and fever.   Respiratory:  Negative for apnea, cough and shortness of breath.    Cardiovascular:  Negative for chest pain.   Gastrointestinal:  Negative for abdominal distention, diarrhea and nausea.   Musculoskeletal:  Positive for arthralgias and back pain.   Skin:  Positive for wound.   Neurological:  Positive for weakness.       Objective  /79   Pulse 78     Physical Exam  Vitals reviewed.   Constitutional:       General: She is not in acute distress.     Appearance: Normal appearance. She is normal weight. She is not ill-appearing.   HENT:      Head: Normocephalic and atraumatic.   Eyes:      Conjunctiva/sclera: Conjunctivae normal.   Cardiovascular:      Rate and Rhythm: Normal rate and regular rhythm.      Pulses: Normal pulses.   Pulmonary:      Effort: Pulmonary effort is normal.      Breath sounds: Normal breath sounds.    Abdominal:      Palpations: Abdomen is soft.   Musculoskeletal:         General: Tenderness present.      Cervical back: Neck supple.   Skin:     General: Skin is warm and dry.      Findings: Wound present.   Neurological:      General: No focal deficit present.      Mental Status: She is oriented to person, place, and time.   Psychiatric:         Mood and Affect: Mood normal.         Assessment/Plan  Problem List Items Addressed This Visit             ICD-10-CM    CAD in native artery I25.10    Essential hypertension I10    Postoperative surgical complication involving musculoskeletal system associated with musculoskeletal procedure, unspecified complication - Primary M96.89    Deep vein thrombosis (DVT) of right lower extremity (CMS/[facility]) I82.401    Anemia D64.9    Surgical wound infection T81.49XA     Other Visit Diagnoses         Codes    MRSA infection     A49.02        Patient will be continued on Cubicin and ceftriaxone.  Patient is to be continued on Eliquis.  It was a MRSA infection, it was a postoperative wound infection, it was a significant amount of wound gapping and the size of the wound.  BP readings are stable, no angina, progressive care is being in the progress.  Pain control is significant and current therapeutics will be continued.  Eliquis will serve as the purpose of future prevention of DVT.  Patient is calm and comfortable, there was no concern from the nursing staff or family member.  Spine surgery follow-up as appropriately.  Continue medications as listed with the progressive care, rehabilitation, wound care management wound vacuum management.  Periodic assessment and follow-up will be continued.     Goals    None           Electronically Signed By: Paulino Moeller MD   2/11/24  6:12 PM

## 2024-02-11 NOTE — PROGRESS NOTES
Subjective   Patient ID: Tara Tierney is a 68 y.o. female who is acute skilled care being seen and evaluated for multiple medical problems.    Patient was seen for follow-up today, so far 3 days here at the facility has been uneventful.  There is a wound vacuum, there is the external urinary catheter, nursing staff did not report any worsening of the wound.  Patient is going to be followed at the wound center.  Pain control is reasonable with Dilaudid and oxycodone.  She has a weakness of lower extremities, she was able to sit at the edge of the bed, walking or weightbearing has not happened.  Patient remains calm and comfortable, she is not having out of ordinary pain and discomfort.  Laboratories are awaited for next week.  Wound vacuum care will be done according to the nursing staff instructions and the wound care department.  The wound appears to be clean, it is a beefy red wound according to the photograph.         Review of Systems   Constitutional:  Positive for fatigue. Negative for chills and fever.   Respiratory:  Negative for apnea, cough and shortness of breath.    Cardiovascular:  Negative for chest pain.   Gastrointestinal:  Negative for abdominal distention, diarrhea and nausea.   Musculoskeletal:  Positive for arthralgias and back pain.   Skin:  Positive for wound.   Neurological:  Positive for weakness.       Objective   /79   Pulse 78     Physical Exam  Vitals reviewed.   Constitutional:       General: She is not in acute distress.     Appearance: Normal appearance. She is normal weight. She is not ill-appearing.   HENT:      Head: Normocephalic and atraumatic.   Eyes:      Conjunctiva/sclera: Conjunctivae normal.   Cardiovascular:      Rate and Rhythm: Normal rate and regular rhythm.      Pulses: Normal pulses.   Pulmonary:      Effort: Pulmonary effort is normal.      Breath sounds: Normal breath sounds.   Abdominal:      Palpations: Abdomen is soft.   Musculoskeletal:          General: Tenderness present.      Cervical back: Neck supple.   Skin:     General: Skin is warm and dry.      Findings: Wound present.   Neurological:      General: No focal deficit present.      Mental Status: She is oriented to person, place, and time.   Psychiatric:         Mood and Affect: Mood normal.         Assessment/Plan   Problem List Items Addressed This Visit             ICD-10-CM    CAD in native artery I25.10    Essential hypertension I10    Postoperative surgical complication involving musculoskeletal system associated with musculoskeletal procedure, unspecified complication - Primary M96.89    Deep vein thrombosis (DVT) of right lower extremity (CMS/[facility]) I82.401    Anemia D64.9    Surgical wound infection T81.49XA     Other Visit Diagnoses         Codes    MRSA infection     A49.02        Patient will be continued on Cubicin and ceftriaxone.  Patient is to be continued on Eliquis.  It was a MRSA infection, it was a postoperative wound infection, it was a significant amount of wound gapping and the size of the wound.  BP readings are stable, no angina, progressive care is being in the progress.  Pain control is significant and current therapeutics will be continued.  Eliquis will serve as the purpose of future prevention of DVT.  Patient is calm and comfortable, there was no concern from the nursing staff or family member.  Spine surgery follow-up as appropriately.  Continue medications as listed with the progressive care, rehabilitation, wound care management wound vacuum management.  Periodic assessment and follow-up will be continued.     Goals    None

## 2024-02-12 ENCOUNTER — LAB REQUISITION (OUTPATIENT)
Dept: LAB | Facility: HOSPITAL | Age: 69
End: 2024-02-12
Payer: COMMERCIAL

## 2024-02-12 ENCOUNTER — OFFICE VISIT (OUTPATIENT)
Dept: WOUND CARE | Facility: CLINIC | Age: 69
End: 2024-02-12
Payer: COMMERCIAL

## 2024-02-12 DIAGNOSIS — S31.000A UNSPECIFIED OPEN WOUND OF LOWER BACK AND PELVIS WITHOUT PENETRATION INTO RETROPERITONEUM, INITIAL ENCOUNTER: ICD-10-CM

## 2024-02-12 DIAGNOSIS — L98.492 NON-PRESSURE CHRONIC ULCER OF SKIN OF OTHER SITES WITH FAT LAYER EXPOSED (MULTI): ICD-10-CM

## 2024-02-12 DIAGNOSIS — F17.210 NICOTINE DEPENDENCE, CIGARETTES, UNCOMPLICATED: ICD-10-CM

## 2024-02-12 DIAGNOSIS — T81.31XA DISRUPTION OF EXTERNAL OPERATION (SURGICAL) WOUND, NOT ELSEWHERE CLASSIFIED, INITIAL ENCOUNTER: ICD-10-CM

## 2024-02-12 PROCEDURE — 1125F AMNT PAIN NOTED PAIN PRSNT: CPT | Performed by: PLASTIC SURGERY

## 2024-02-12 PROCEDURE — 1090000002 HH PPS REVENUE DEBIT

## 2024-02-12 PROCEDURE — 1123F ACP DISCUSS/DSCN MKR DOCD: CPT | Performed by: PLASTIC SURGERY

## 2024-02-12 PROCEDURE — 11042 DBRDMT SUBQ TIS 1ST 20SQCM/<: CPT

## 2024-02-12 PROCEDURE — 11042 DBRDMT SUBQ TIS 1ST 20SQCM/<: CPT | Performed by: PLASTIC SURGERY

## 2024-02-12 PROCEDURE — 87070 CULTURE OTHR SPECIMN AEROBIC: CPT | Mod: OUT,ELYLAB | Performed by: PLASTIC SURGERY

## 2024-02-12 PROCEDURE — 99213 OFFICE O/P EST LOW 20 MIN: CPT | Performed by: PLASTIC SURGERY

## 2024-02-12 PROCEDURE — 11045 DBRDMT SUBQ TISS EACH ADDL: CPT | Performed by: PLASTIC SURGERY

## 2024-02-12 PROCEDURE — 1159F MED LIST DOCD IN RCRD: CPT | Performed by: PLASTIC SURGERY

## 2024-02-12 PROCEDURE — 11045 DBRDMT SUBQ TISS EACH ADDL: CPT

## 2024-02-12 PROCEDURE — 99213 OFFICE O/P EST LOW 20 MIN: CPT | Mod: 25

## 2024-02-12 PROCEDURE — 1090000001 HH PPS REVENUE CREDIT

## 2024-02-12 PROCEDURE — 1111F DSCHRG MED/CURRENT MED MERGE: CPT | Performed by: PLASTIC SURGERY

## 2024-02-13 ENCOUNTER — APPOINTMENT (OUTPATIENT)
Dept: ORTHOPEDIC SURGERY | Facility: CLINIC | Age: 69
End: 2024-02-13
Payer: COMMERCIAL

## 2024-02-13 ENCOUNTER — NURSING HOME VISIT (OUTPATIENT)
Dept: POST ACUTE CARE | Facility: EXTERNAL LOCATION | Age: 69
End: 2024-02-13
Payer: COMMERCIAL

## 2024-02-13 VITALS
RESPIRATION RATE: 18 BRPM | HEART RATE: 86 BPM | TEMPERATURE: 97.9 F | HEIGHT: 57 IN | DIASTOLIC BLOOD PRESSURE: 66 MMHG | BODY MASS INDEX: 31.71 KG/M2 | SYSTOLIC BLOOD PRESSURE: 128 MMHG | OXYGEN SATURATION: 97 % | WEIGHT: 147 LBS

## 2024-02-13 DIAGNOSIS — G03.9 MENINGITIS (HHS-HCC): ICD-10-CM

## 2024-02-13 DIAGNOSIS — M54.10 BACK PAIN WITH RADICULOPATHY: ICD-10-CM

## 2024-02-13 DIAGNOSIS — Z74.09 IMPAIRED FUNCTIONAL MOBILITY, BALANCE, GAIT, AND ENDURANCE: ICD-10-CM

## 2024-02-13 DIAGNOSIS — T81.89XD LUMBAR SURGICAL WOUND FLUID COLLECTION, SUBSEQUENT ENCOUNTER: ICD-10-CM

## 2024-02-13 DIAGNOSIS — T81.49XA SURGICAL WOUND INFECTION: Primary | ICD-10-CM

## 2024-02-13 PROCEDURE — 1090000001 HH PPS REVENUE CREDIT

## 2024-02-13 PROCEDURE — 99310 SBSQ NF CARE HIGH MDM 45: CPT | Performed by: PHYSICIAN ASSISTANT

## 2024-02-13 PROCEDURE — 1090000002 HH PPS REVENUE DEBIT

## 2024-02-13 NOTE — LETTER
"Patient: Tara Tierney  : 1955    Encounter Date: 2024  Name: Tara Tierney  YOB: 1955    Chief complaint: Osteomyelitis of lumbar spine.    HPI: This is a 68 year old  female who has a medical history remarkable for CAD, PVD, AAA, cardiomyopathy, hyperlipidemia, carotid artery stenosis, COPD, lumbago with sciatica, and DVT of R leg. Patient has Laminectomy with fusion of L 3-L 4 with Dr. Coombs on 2023. Patient had post operative infection involving Staphylococcus aureus (MRSA )and required wound care and course of Vancomycin.  Patient had I/D on 23 and repeat washout on 23. She had irrigation and debridement of lumbar spine with removal of hardware on 2023. IVC filter placed on 2024. Antibiotics were changed to Daptomycin and Ceftaroline with an end date of 2024. She was seen again in the ER on 2024 for acute toxic metabolic encephalopathy most likely secondary to meningitis. Her altered mental status has resolved, with treatment for meningitis with rifampicin, ceftriaxone, Cubicin was continued. Lumbar incision sutures were removed and wound VAC was place. Dr. Reyes was consulted to monitor wound. Patient was discharged to SNF for rehab, wound care and antibiotic treatment.    Wound Check  She was originally treated more than 14 days ago. Previous treatment included IV/IM antibiotics and wound cleansing or irrigation. There has been colored discharge from the wound. There is no redness present. There is no swelling present. The pain has improved. There is difficulty moving the extremity or digit due to weakness.     Review of systems:   ROS negative except were noted in HPI.    Code Status: full code    /66   Pulse 86   Temp 36.6 °C (97.9 °F)   Resp 18   Ht 1.448 m (4' 9\")   Wt 66.7 kg (147 lb)   SpO2 97%   BMI 31.81 kg/m²      Physical Exam  Constitutional:       General: She is not in acute " distress.  HENT:      Head: Normocephalic.      Nose: Nose normal. No congestion.      Mouth/Throat:      Mouth: Mucous membranes are moist.   Eyes:      Extraocular Movements: Extraocular movements intact.      Pupils: Pupils are equal, round, and reactive to light.   Cardiovascular:      Rate and Rhythm: Normal rate and regular rhythm.      Pulses: Normal pulses.   Pulmonary:      Effort: Pulmonary effort is normal.      Breath sounds: Normal breath sounds. No wheezing or rales.   Abdominal:      General: Bowel sounds are normal. There is no distension.      Palpations: Abdomen is soft.      Tenderness: There is no abdominal tenderness. There is no guarding.   Genitourinary:     Comments: Incontinent of urine    Musculoskeletal:         General: Normal range of motion.      Cervical back: Normal range of motion.   Lymphadenopathy:      Cervical: No cervical adenopathy.   Skin:     General: Skin is warm.      Capillary Refill: Capillary refill takes less than 2 seconds.      Comments: Lumbar wound VAC intact.   Neurological:      General: No focal deficit present.      Mental Status: She is alert and oriented to person, place, and time.   Psychiatric:         Mood and Affect: Mood normal.         Behavior: Behavior normal.        Medications reviewed during visit at facility.  Tylenol 650 mg po q 4 hours prn  MOM 30 ml po daily prn  Ezetimibe 10 mg po daily  Ceftriaxone 2 grams IV bid  Vitamin D 3 2000 UT po daily  Meclizine 25 mg po q 8 hours prn  Eliquis 5 mg po bid  Atrovent 17 mcg two puffs bid  Ibandronate 150 mg po daily  Aspirin 81 mg po daily  Claritin 10 mg po daily  Potassium Chloride 10 meq po daily  Brimonidine 0.2% one drop both eyes bid  Pro Air 108 two puffs q 6 hours prn  Lasix 20 mg po daily  Oxycodone/APAP 5/325 mg  one tablet po q 6 hours prn  HYDROmorphone HCl Oral Tablet 2 MG po q 4 hours prn  cleanse spinal incision with NS apply wound vac at 125mmHg suction with black foam to wound bed,  change 3x weekly and PRN if soiled or removed  Rifampin 300 mg po bid  Salagen 5 mg po daily  Daptomycin 420 mg IV daily  PreserVison AREDS one capsule po bid  Pregabalin 75 mg po daily  Tolterodine 1 mg po daily  Lutein 20 mg po daily  Cyclobenzaprine 5 mg po q 8 hours prn  Isosorbide ER 60 mg po daily  Pantoprazole 40 mg po daily  Remeron 15 mg po daily  Atenolol 50 mg po daily    Labs reviewed at facility:     Laboratory Service Report [id number]   Patient Name   KILEY LOJA  Patient ID   [id number]  Age   68 Y  Gender   F  Order #      Ordering Phys   NOMAN CASSIDY  Patient Telephone #   N/A     1955  AKA      Client Order #   [id number]   Collection Date and Time   2024 05:39   Print Date and Time   2024 21:40  Account Information   [facility]   [address] [place] [address]     Report Notes                        Test Results   Reference Perform  Unit  Value Site*             CBC and Differential  REPORTED 2024 09:20  White Blood Cell Count   5.95 k/uL 3.70-11.00    RBC L 3.73 m/uL 3.90-5.20    Hemoglobin L 10.8 g/dL 11.5-15.5    Hematocrit L 34.8 % 36.0-46.0    MCV   93.3 fL 80.0-100.0    MCH   29.0 pg 26.0-34.0    MCHC   31.0 g/dL 30.5-36.0    RDW-CV H 16.1 % 11.5-15.0    Platelet Count   318 k/uL 150-400    MPV   9.1 fL 9.0-12.7    Neut%   69.5 %    Abs Neut   4.14 k/uL 1.45-7.50    Lymph%   20.2 %    Abs Lymph   1.20 k/uL 1.00-4.00    Mono%   7.4 %    Abs Mono   0.44 k/uL <0.87    Eosin%   2.0 %    Abs Eosin   0.12 k/uL <0.46    Baso%   0.2 %    Abs Baso   <0.03 k/uL <0.11    Immature Gran %%   0.7 %    Abs Immature Gran   0.04 k/uL <0.10    NRBC   0.0 /100 WBC    Absolute nRBC   <0.01 k/uL <0.01    Diff Type   Auto               CK  REPORTED 2024 10:02  CK L 22 U/L            Comp Metabolic Panel  REPORTED 2024 10:02  Protein, Total   6.3 g/dL 6.3-8.0    Albumin L 3.2 g/dL 3.9-4.9    Calcium, Total   9.5 mg/dL 8.5-10.2     Bilirubin, Total   0.2 mg/dL 0.2-1.3    Alkaline Phosphatase H 131 U/L     AST H 62 U/L 13-35    ALT H 150 U/L 7-38    Glucose H 120 mg/dL 74-99  BUN L 6 mg/dL 7-21    Creatinine   0.65 mg/dL 0.58-0.96    Sodium   136 mmol/L 136-144    Potassium L 3.5 mmol/L 3.7-5.1    Chloride   100 mmol/L     CO2   24 mmol/L 22-30    Anion Gap   12 mmol/L 9-18    Estimated Glomerular Filtration Rate   95 mL/min/1.73 meters squared >=60    Assessment/Plan   Problem List Items Addressed This Visit       Back pain with radiculopathy     HYDROmorphone HCl Oral Tablet 2 MG po q 4 hours prn         Lumbar surgical wound fluid collection     Schedule follow up appointment with  Dr. Reyes- 02/19/24. Wound VAC cleanse spinal incision with NS apply wound vac at 125mmHg suction with black foam to wound bed, change 3x weekly and PRN if soiled or removed         Surgical wound infection - Primary     Daptomycin 420 mg IV daily. Tolterodine 1 mg po daily. Schedule follow up appointment with Dr. Reyna (ID) on 2/21/24, CBC w/diff, CMP, CK, and CRP weekly on Friday's         Meningitis     Rifampin 300 mg po bid, and Ceftriaxone 2 grams IV bid. Review labs.         Impaired functional mobility, balance, gait, and endurance     PT and OT to assess and treat.            Time:  I spent 45 minutes or greater with the patient. Greater than 50% of this time was spent in counseling and or coordination of care. The time includes prep time of reviewing vital signs, report from direct nursing staff and or therapists, hospital documentation, reviewing labs, radiographs, diagnostic tests and or consultations, time directly spent with the patient interviewing, examining, and education regarding diagnosis, treatments, and medications, as well as documentation in the electronic medical record, and reviewing the plan of care and any new orders with the patient, nursing staff and other staff directly related to the patients care.      Yoav YOON  ZACH Roman       Electronically Signed By: Yoav Roman PA-C   2/15/24 10:52 AM

## 2024-02-14 PROCEDURE — 1090000002 HH PPS REVENUE DEBIT

## 2024-02-14 PROCEDURE — 1090000001 HH PPS REVENUE CREDIT

## 2024-02-15 PROBLEM — Z74.09 IMPAIRED FUNCTIONAL MOBILITY, BALANCE, GAIT, AND ENDURANCE: Status: ACTIVE | Noted: 2024-02-15

## 2024-02-15 PROBLEM — G03.9 MENINGITIS (HHS-HCC): Status: ACTIVE | Noted: 2024-02-15

## 2024-02-15 LAB
BACTERIA SPEC CULT: ABNORMAL
GRAM STN SPEC: ABNORMAL
GRAM STN SPEC: ABNORMAL

## 2024-02-15 PROCEDURE — 1090000002 HH PPS REVENUE DEBIT

## 2024-02-15 PROCEDURE — 1090000001 HH PPS REVENUE CREDIT

## 2024-02-15 NOTE — ASSESSMENT & PLAN NOTE
Daptomycin 420 mg IV daily. Tolterodine 1 mg po daily. Schedule follow up appointment with Dr. Reyna (ID) on 2/21/24, CBC w/diff, CMP, CK, and CRP weekly on Friday's

## 2024-02-15 NOTE — ASSESSMENT & PLAN NOTE
Schedule follow up appointment with  Dr. Reyes- 02/19/24. Wound VAC cleanse spinal incision with NS apply wound vac at 125mmHg suction with black foam to wound bed, change 3x weekly and PRN if soiled or removed

## 2024-02-16 PROCEDURE — 1090000002 HH PPS REVENUE DEBIT

## 2024-02-16 PROCEDURE — 1090000001 HH PPS REVENUE CREDIT

## 2024-02-17 PROCEDURE — 1090000002 HH PPS REVENUE DEBIT

## 2024-02-17 PROCEDURE — 1090000001 HH PPS REVENUE CREDIT

## 2024-02-18 ENCOUNTER — NURSING HOME VISIT (OUTPATIENT)
Dept: POST ACUTE CARE | Facility: EXTERNAL LOCATION | Age: 69
End: 2024-02-18
Payer: COMMERCIAL

## 2024-02-18 VITALS — SYSTOLIC BLOOD PRESSURE: 143 MMHG | HEART RATE: 78 BPM | DIASTOLIC BLOOD PRESSURE: 78 MMHG

## 2024-02-18 DIAGNOSIS — M96.89 POSTOPERATIVE SURGICAL COMPLICATION INVOLVING MUSCULOSKELETAL SYSTEM ASSOCIATED WITH MUSCULOSKELETAL PROCEDURE, UNSPECIFIED COMPLICATION: ICD-10-CM

## 2024-02-18 DIAGNOSIS — I25.10 CAD IN NATIVE ARTERY: ICD-10-CM

## 2024-02-18 DIAGNOSIS — A49.02 MRSA INFECTION: ICD-10-CM

## 2024-02-18 DIAGNOSIS — D64.9 ANEMIA, UNSPECIFIED TYPE: ICD-10-CM

## 2024-02-18 DIAGNOSIS — I10 ESSENTIAL HYPERTENSION: ICD-10-CM

## 2024-02-18 DIAGNOSIS — T81.49XA SURGICAL WOUND INFECTION: Primary | ICD-10-CM

## 2024-02-18 DIAGNOSIS — Z74.09 IMPAIRED FUNCTIONAL MOBILITY, BALANCE, GAIT, AND ENDURANCE: ICD-10-CM

## 2024-02-18 PROCEDURE — 1090000001 HH PPS REVENUE CREDIT

## 2024-02-18 PROCEDURE — 99308 SBSQ NF CARE LOW MDM 20: CPT | Performed by: INTERNAL MEDICINE

## 2024-02-18 PROCEDURE — 1090000002 HH PPS REVENUE DEBIT

## 2024-02-18 ASSESSMENT — ENCOUNTER SYMPTOMS
COUGH: 0
FATIGUE: 0
DIARRHEA: 0
ARTHRALGIAS: 1
GASTROINTESTINAL NEGATIVE: 1
BACK PAIN: 1
APNEA: 0
ACTIVITY CHANGE: 0
WEAKNESS: 1
SHORTNESS OF BREATH: 0
CONSTIPATION: 0
WOUND: 1

## 2024-02-18 NOTE — LETTER
Patient: Tara Tierney  : 1955    Encounter Date: 2024    Subjective  Patient ID: Tara Tierney is a 68 y.o. female who is acute skilled care being seen and evaluated for multiple medical problems.    Patient was seen for follow-up again, patient actually did not have any improved meningitis.  Patient has been given full dose of ceftriaxone empirically, lumbar puncture cannot be done, would not see if the patient has meningitis, patient does not have any nuchal rigidity or patient does not have any altered sensorium.  It was too much pain medication which could have caused altered sensorium.  Patient continued to have weakness of right lower extremity, there is a wound vacuum, patient is tired frustrated, patient is depressed and says that she is not going to get better.  Patient continued to receive daptomycin and rifampin.  Last infectious disease consultation documented did not prove that this is meningitis.  MRI did not show any enhancement, slight ventriculomegaly was present.  Patient is sitting at the edge of the bed could stand with two-person assist.  It is going to be a long process after having this complicated laminectomy surgery.  No fever or chills, they did not report any further complications of the wound.  It was showing beefy appearance on the last wound evaluation.  It was MRSA bacteremia but not meningitis, is a surgical wound infection.       Review of Systems   Constitutional:  Negative for activity change and fatigue.   Respiratory:  Negative for apnea, cough and shortness of breath.    Cardiovascular:  Negative for chest pain.   Gastrointestinal: Negative.  Negative for constipation and diarrhea.   Musculoskeletal:  Positive for arthralgias, back pain and gait problem.   Skin:  Positive for wound.   Neurological:  Positive for weakness.       Objective  /78   Pulse 78     Physical Exam  Vitals reviewed.   Constitutional:       Appearance: Normal appearance. She  is normal weight.   HENT:      Head: Normocephalic and atraumatic.   Eyes:      Conjunctiva/sclera: Conjunctivae normal.   Cardiovascular:      Rate and Rhythm: Normal rate and regular rhythm.   Pulmonary:      Effort: Pulmonary effort is normal.      Breath sounds: Normal breath sounds.   Abdominal:      Palpations: Abdomen is soft.   Musculoskeletal:         General: Normal range of motion.      Cervical back: Neck supple.   Skin:     General: Skin is warm and dry.      Findings: Wound present.   Neurological:      General: No focal deficit present.   Psychiatric:         Mood and Affect: Mood is depressed.       Assessment/Plan  Problem List Items Addressed This Visit             ICD-10-CM    CAD in native artery I25.10    Essential hypertension I10    Postoperative surgical complication involving musculoskeletal system associated with musculoskeletal procedure, unspecified complication M96.89    Anemia D64.9    Surgical wound infection - Primary T81.49XA    Impaired functional mobility, balance, gait, and endurance Z74.09     Other Visit Diagnoses         Codes    MRSA infection     A49.02        It is a postoperative surgical complications causing this wound and bacterial infection.  Anemia remains, no angina, BP readings are stable, patient was reassured, patient is going to improve but it will take some time, wound vacuum is in place, there is no open wound.  Profound weakness of right lower extremity is concerning and that could reflect some nerve damage time will tell us.  Pain control is reasonable, patient's pain management has been carried out by physiatry services here at the facility.  Periodic assessment and follow-up will be done and other therapeutics will be continued wound care, skilled nursing, very aggressive rehabilitation and appropriate with medical care and follow-up and adjustment of therapy is what she needs.  She will need a reassurance also.     Goals    None           Electronically  Signed By: Paulino Moeller MD   2/18/24  8:43 PM

## 2024-02-19 ENCOUNTER — OFFICE VISIT (OUTPATIENT)
Dept: WOUND CARE | Facility: CLINIC | Age: 69
End: 2024-02-19
Payer: COMMERCIAL

## 2024-02-19 PROCEDURE — 1090000001 HH PPS REVENUE CREDIT

## 2024-02-19 PROCEDURE — 1090000002 HH PPS REVENUE DEBIT

## 2024-02-19 PROCEDURE — 11043 DBRDMT MUSC&/FSCA 1ST 20/<: CPT

## 2024-02-19 PROCEDURE — 11043 DBRDMT MUSC&/FSCA 1ST 20/<: CPT | Performed by: PLASTIC SURGERY

## 2024-02-19 NOTE — PROGRESS NOTES
Subjective   Patient ID: Tara Tierney is a 68 y.o. female who is acute skilled care being seen and evaluated for multiple medical problems.    Patient was seen for follow-up again, patient actually did not have any improved meningitis.  Patient has been given full dose of ceftriaxone empirically, lumbar puncture cannot be done, would not see if the patient has meningitis, patient does not have any nuchal rigidity or patient does not have any altered sensorium.  It was too much pain medication which could have caused altered sensorium.  Patient continued to have weakness of right lower extremity, there is a wound vacuum, patient is tired frustrated, patient is depressed and says that she is not going to get better.  Patient continued to receive daptomycin and rifampin.  Last infectious disease consultation documented did not prove that this is meningitis.  MRI did not show any enhancement, slight ventriculomegaly was present.  Patient is sitting at the edge of the bed could stand with two-person assist.  It is going to be a long process after having this complicated laminectomy surgery.  No fever or chills, they did not report any further complications of the wound.  It was showing beefy appearance on the last wound evaluation.  It was MRSA bacteremia but not meningitis, is a surgical wound infection.       Review of Systems   Constitutional:  Negative for activity change and fatigue.   Respiratory:  Negative for apnea, cough and shortness of breath.    Cardiovascular:  Negative for chest pain.   Gastrointestinal: Negative.  Negative for constipation and diarrhea.   Musculoskeletal:  Positive for arthralgias, back pain and gait problem.   Skin:  Positive for wound.   Neurological:  Positive for weakness.       Objective   /78   Pulse 78     Physical Exam  Vitals reviewed.   Constitutional:       Appearance: Normal appearance. She is normal weight.   HENT:      Head: Normocephalic and atraumatic.   Eyes:       Conjunctiva/sclera: Conjunctivae normal.   Cardiovascular:      Rate and Rhythm: Normal rate and regular rhythm.   Pulmonary:      Effort: Pulmonary effort is normal.      Breath sounds: Normal breath sounds.   Abdominal:      Palpations: Abdomen is soft.   Musculoskeletal:         General: Normal range of motion.      Cervical back: Neck supple.   Skin:     General: Skin is warm and dry.      Findings: Wound present.   Neurological:      General: No focal deficit present.   Psychiatric:         Mood and Affect: Mood is depressed.       Assessment/Plan   Problem List Items Addressed This Visit             ICD-10-CM    CAD in native artery I25.10    Essential hypertension I10    Postoperative surgical complication involving musculoskeletal system associated with musculoskeletal procedure, unspecified complication M96.89    Anemia D64.9    Surgical wound infection - Primary T81.49XA    Impaired functional mobility, balance, gait, and endurance Z74.09     Other Visit Diagnoses         Codes    MRSA infection     A49.02        It is a postoperative surgical complications causing this wound and bacterial infection.  Anemia remains, no angina, BP readings are stable, patient was reassured, patient is going to improve but it will take some time, wound vacuum is in place, there is no open wound.  Profound weakness of right lower extremity is concerning and that could reflect some nerve damage time will tell us.  Pain control is reasonable, patient's pain management has been carried out by physiatry services here at the facility.  Periodic assessment and follow-up will be done and other therapeutics will be continued wound care, skilled nursing, very aggressive rehabilitation and appropriate with medical care and follow-up and adjustment of therapy is what she needs.  She will need a reassurance also.     Goals    None

## 2024-02-20 PROCEDURE — 1090000002 HH PPS REVENUE DEBIT

## 2024-02-20 PROCEDURE — 1090000001 HH PPS REVENUE CREDIT

## 2024-02-21 ENCOUNTER — OFFICE VISIT (OUTPATIENT)
Dept: INFECTIOUS DISEASES | Age: 69
End: 2024-02-21

## 2024-02-21 VITALS
RESPIRATION RATE: 18 BRPM | DIASTOLIC BLOOD PRESSURE: 48 MMHG | TEMPERATURE: 97.5 F | SYSTOLIC BLOOD PRESSURE: 105 MMHG | HEART RATE: 84 BPM

## 2024-02-21 DIAGNOSIS — M46.46 LUMBAR DISCITIS: Primary | ICD-10-CM

## 2024-02-21 DIAGNOSIS — Z22.358 EXTENDED SPECTRUM BETA-LACTAMASE (ESBL) ESCHERICHIA COLI CARRIER: ICD-10-CM

## 2024-02-21 DIAGNOSIS — A49.02 MRSA (METHICILLIN RESISTANT STAPHYLOCOCCUS AUREUS) INFECTION: ICD-10-CM

## 2024-02-21 NOTE — PROGRESS NOTES
Carmencita Arce (:  1955) is a 68 y.o. female,Established patient, here for evaluation of the following chief complaint(s):  Wound Infection ( f/u- open spinal wound)         ASSESSMENT/PLAN:  Lumbar discitis with deep abscess  Status post laminectomy followed by hardware removal because of resistant infection and lack of response to multiple antibiotics  MRSA infection with ALISON to vancomycin of 2.0  ESBL E. coli colonization    Continue IV Cubicin and p.o. rifampin for 4 more weeks  Follow-up in 4 weeks  Follow-up CBC CMP  Follow-up sed rate and CRP  Follow-up with Dr. Reyes for local wound care  Follow-up weekly CPK  I discussed the case with Dr. Reyes who reported good wound healing, back wound was reported to be at a depth of 1-1/2 cm with 100% granulation.  I will not be treating the ESBL E. coli routine wound culture.  Contact isolation ordered for skilled nursing facility  I asked Dr. Reyes to call me if there is any concern of worsening wound status      Subjective   SUBJECTIVE/OBJECTIVE:  HPI  Follow-up [facility] hospitalization for lumbar discitis and deep abscess by MRI following lumbar laminectomy.  Patient required hardware removal because of lack of clinical improvement.  Worsening condition while on IV vancomycin with optimal dosing.  MRSA was found to have ALISON of 2.0.  Was switched to IV Cubicin and p.o. rifampin, well-tolerated.   Reports pain to be well-controlled  Patient came on a cot.  She sits in the chair at the nursing home  Denies any specific complaints  No fevers or chills  No GI symptoms  No shortness of breath or chest pain  Review of Systems   No specific complaints    Objective   Physical Exam     Vitals:    24 1234 24 1235   BP: (!) 105/48 (!) 105/48   Pulse: 84    Resp: 18    Temp: 97.5 °F (36.4 °C)    TempSrc: Temporal      General Appearance: alert and oriented to person, place and time, well-developed and well-nourished, in no acute

## 2024-02-23 ENCOUNTER — NURSING HOME VISIT (OUTPATIENT)
Dept: POST ACUTE CARE | Facility: EXTERNAL LOCATION | Age: 69
End: 2024-02-23
Payer: COMMERCIAL

## 2024-02-23 VITALS
HEIGHT: 57 IN | OXYGEN SATURATION: 97 % | HEART RATE: 82 BPM | RESPIRATION RATE: 18 BRPM | TEMPERATURE: 97.6 F | SYSTOLIC BLOOD PRESSURE: 120 MMHG | WEIGHT: 147 LBS | BODY MASS INDEX: 31.71 KG/M2 | DIASTOLIC BLOOD PRESSURE: 72 MMHG

## 2024-02-23 DIAGNOSIS — T81.49XA SURGICAL WOUND INFECTION: Primary | ICD-10-CM

## 2024-02-23 DIAGNOSIS — Z74.09 IMPAIRED FUNCTIONAL MOBILITY, BALANCE, GAIT, AND ENDURANCE: ICD-10-CM

## 2024-02-23 DIAGNOSIS — I82.4Y1 ACUTE DEEP VEIN THROMBOSIS (DVT) OF PROXIMAL VEIN OF RIGHT LOWER EXTREMITY (MULTI): ICD-10-CM

## 2024-02-23 DIAGNOSIS — M54.10 BACK PAIN WITH RADICULOPATHY: ICD-10-CM

## 2024-02-23 PROCEDURE — 99309 SBSQ NF CARE MODERATE MDM 30: CPT | Performed by: PHYSICIAN ASSISTANT

## 2024-02-23 NOTE — PROGRESS NOTES
"2/22/2024  Name: Tara Tierney  YOB: 1955    Chief complaint: Follow up for open spinal wound    HPI: Patient is being treated for MRSA infection of surgical lumbar wound. Hardware for previous laminectomy removed and antibiotic switched from Vancomycin to Daptomycin. Dr. Reyna of I/D service is following. Patient has wound vac in place. Dr. Reyes of wound care reports wound is improving. Wound shows 100 % granulation with a depth of 1-1/2 cm. Patient is afebrile. She is visiting with family who are the bedside. Physical therapy reports patient is standing with assistance and has taken a few steps.    Wound Check  She was originally treated more than 14 days ago. Previous treatment included IV/IM antibiotics and wound cleansing or irrigation. There has been clear discharge from the wound. There is no redness present. There is no swelling present. The pain has improved. She has no difficulty moving the affected extremity or digit.     Review of systems:   ROS negative except were noted in HPI.    Code Status: full code    /72   Pulse 82   Temp 36.4 °C (97.6 °F)   Resp 18   Ht 1.448 m (4' 9\")   Wt 66.7 kg (147 lb)   SpO2 97%   BMI 31.81 kg/m²      Physical Exam  Constitutional:       General: She is not in acute distress.  HENT:      Head: Normocephalic.      Nose: Nose normal. No congestion.      Mouth/Throat:      Mouth: Mucous membranes are moist.   Eyes:      Extraocular Movements: Extraocular movements intact.      Pupils: Pupils are equal, round, and reactive to light.   Cardiovascular:      Rate and Rhythm: Normal rate and regular rhythm.      Pulses: Normal pulses.   Pulmonary:      Effort: Pulmonary effort is normal.      Breath sounds: Normal breath sounds. No wheezing or rales.   Abdominal:      General: Bowel sounds are normal. There is no distension.      Palpations: Abdomen is soft.      Tenderness: There is no abdominal tenderness. There is no guarding. "   Genitourinary:     Comments: Incontinent of urine     Musculoskeletal:         General: Normal range of motion.      Cervical back: Normal range of motion.   Lymphadenopathy:      Cervical: No cervical adenopathy.   Skin:     General: Skin is warm.      Capillary Refill: Capillary refill takes less than 2 seconds.      Comments: Lumbar wound VAC intact.   Neurological:      General: No focal deficit present.      Mental Status: She is alert and oriented to person, place, and time.   Psychiatric:         Mood and Affect: Mood normal.         Behavior: Behavior normal.    Medications reviewed during visit at facility.  Tylenol 650 mg po q 4 hours prn  MOM 30 ml po daily prn  Ezetimibe 10 mg po daily  Hydralazine 50 mg po tid  Vitamin D 3 2000 UT po daily  Meclizine 25 mg po q 8 hours prn  Eliquis 5 mg po bid  Atrovent 17 mcg two puffs bid  Ibandronate 150 mg po daily  Aspirin 81 mg po daily  Buspirone 10 mg po daily  Claritin 10 mg po daily  Potassium Chloride 10 meq po daily  Nitroglycerin 0.4 mg po sl q 5 minutes x 3  Brimonidine 0.2% one drop both eyes bid  Pro Air 108 two puffs q 6 hours prn  Lasix 20 mg po daily  Oxycodone/APAP 5/325 mg  one tablet po q 6 hours prn  cleanse spinal incision with NS apply wound vac at 125mmHg suction with black foam to wound bed, change 3x weekly and PRN if soiled or removed  Rifampin 300 mg po bid  Salagen 5 mg po daily  Daptomycin 420 mg IV daily  PreserVison AREDS one capsule po bid  Pregabalin 75 mg po daily  Tolterodine 1 mg po daily  Lutein 20 mg po daily  Cyclobenzaprine 5 mg po q 8 hours prn  Isosorbide ER 60 mg po daily  Tolterodine 1 mg po daily  Pantoprazole 40 mg po daily  Remeron 15 mg po daily  Atenolol 50 mg po daily  oxyCODONE-Acetaminophen Oral Tablet  MG q 4 hours prn  MS Contin Oral Tablet Extended Release 15 MG (Morphine Sulfate) po bid  Labs reviewed at facility:   Laboratory Service Report [id number]   Patient Name   PURVIKILEY L  Patient  ID   [id number]  Age   68 Y  Gender   F  Order #      Ordering NOMAN Vyas  Patient Telephone #   [phone]     1955  AKA      Client Order #   [id number]   Collection Date and Time   2024 06:25   Print Date and Time   2024 18:28  Account Information   [facility] NR   [id number] [id number]     Report Notes                  Test Results   Reference Perform  Unit  Value Site*             CBC and Differential  REPORTED 2024 10:22  White Blood Cell Count   5.94 k/uL 3.70-11.00    RBC L 3.64 m/uL 3.90-5.20    Hemoglobin L 10.5 g/dL 11.5-15.5    Hematocrit L 33.9 % 36.0-46.0    MCV   93.1 fL 80.0-100.0    MCH   28.8 pg 26.0-34.0    MCHC   31.0 g/dL 30.5-36.0    RDW-CV H 17.2 % 11.5-15.0    Platelet Count   286 k/uL 150-400    MPV   9.4 fL 9.0-12.7    Neut%   54.2 %    Abs Neut   3.22 k/uL 1.45-7.50    Lymph%   28.1 %    Abs Lymph   1.67 k/uL 1.00-4.00    Mono%   12.0 %    Abs Mono   0.71 k/uL <0.87    Eosin%   4.9 %    Abs Eosin   0.29 k/uL <0.46    Baso%   0.3 %    Abs Baso   <0.03 k/uL <0.11    Immature Gran %%   0.5 %    Abs Immature Gran   0.03 k/uL <0.10    NRBC   0.0 /100 WBC    Absolute nRBC   <0.01 k/uL <0.01    Diff Type   Auto               CK  REPORTED 2024 10:50  CK L 18 U/L            Comp Metabolic Panel  REPORTED 2024 10:50  Protein, Total   6.3 g/dL 6.3-8.0    Albumin L 3.2 g/dL 3.9-4.9    Calcium, Total   9.5 mg/dL 8.5-10.2    Bilirubin, Total   0.3 mg/dL 0.2-1.3    Alkaline Phosphatase   115 U/L     AST   16 U/L 13-35    ALT   28 U/L 7-38    Glucose H 108 mg/dL 74-99  BUN   7 mg/dL 7-21    Creatinine   0.62 mg/dL 0.58-0.96    Sodium   137 mmol/L 136-144    Potassium   3.9 mmol/L 3.7-5.1    Chloride   99 mmol/L     CO2   26 mmol/L 22-30    Anion Gap   12 mmol/L 9-18    Estimated Glomerular Filtration Rate   96 mL/min/1.73 meters squared >=60    Assessment/Plan    Problem List Items Addressed This Visit        Back pain with radiculopathy     MS Contin Oral Tablet Extended Release 15 MG (Morphine Sulfate) po bid. Oxycodone-Acetaminophen Oral Tablet  MG q 4 hours prn         Deep vein thrombosis (DVT) of right lower extremity (CMS/HCC)     Eliquis 5 mg po bid. Monitor for signs of bleeding.         Surgical wound infection - Primary     Continue with Daptomycin. Monitor CK. Wound vac therapy. Schedule wound clinic appointment on 2/26/2024.         Impaired functional mobility, balance, gait, and endurance     Review physical and occupational therapy notes. Can stand with assistance.            Time:    Yoav Roman PA-C

## 2024-02-23 NOTE — LETTER
"Patient: Tara Tierney  : 1955    Encounter Date: 2024  Name: Tara Tierney  YOB: 1955    Chief complaint: Follow up for open spinal wound    HPI: Patient is being treated for MRSA infection of surgical lumbar wound. Hardware for previous laminectomy removed and antibiotic switched from Vancomycin to Daptomycin. Dr. Reyna of I/D service is following. Patient has wound vac in place. Dr. Reyes of wound care reports wound is improving. Wound shows 100 % granulation with a depth of 1-1/2 cm. Patient is afebrile. She is visiting with family who are the bedside. Physical therapy reports patient is standing with assistance and has taken a few steps.    Wound Check  She was originally treated more than 14 days ago. Previous treatment included IV/IM antibiotics and wound cleansing or irrigation. There has been clear discharge from the wound. There is no redness present. There is no swelling present. The pain has improved. She has no difficulty moving the affected extremity or digit.     Review of systems:   ROS negative except were noted in HPI.    Code Status: full code    /72   Pulse 82   Temp 36.4 °C (97.6 °F)   Resp 18   Ht 1.448 m (4' 9\")   Wt 66.7 kg (147 lb)   SpO2 97%   BMI 31.81 kg/m²      Physical Exam  Constitutional:       General: She is not in acute distress.  HENT:      Head: Normocephalic.      Nose: Nose normal. No congestion.      Mouth/Throat:      Mouth: Mucous membranes are moist.   Eyes:      Extraocular Movements: Extraocular movements intact.      Pupils: Pupils are equal, round, and reactive to light.   Cardiovascular:      Rate and Rhythm: Normal rate and regular rhythm.      Pulses: Normal pulses.   Pulmonary:      Effort: Pulmonary effort is normal.      Breath sounds: Normal breath sounds. No wheezing or rales.   Abdominal:      General: Bowel sounds are normal. There is no distension.      Palpations: Abdomen is soft.      " Tenderness: There is no abdominal tenderness. There is no guarding.   Genitourinary:     Comments: Incontinent of urine     Musculoskeletal:         General: Normal range of motion.      Cervical back: Normal range of motion.   Lymphadenopathy:      Cervical: No cervical adenopathy.   Skin:     General: Skin is warm.      Capillary Refill: Capillary refill takes less than 2 seconds.      Comments: Lumbar wound VAC intact.   Neurological:      General: No focal deficit present.      Mental Status: She is alert and oriented to person, place, and time.   Psychiatric:         Mood and Affect: Mood normal.         Behavior: Behavior normal.    Medications reviewed during visit at facility.  Tylenol 650 mg po q 4 hours prn  MOM 30 ml po daily prn  Ezetimibe 10 mg po daily  Hydralazine 50 mg po tid  Vitamin D 3 2000 UT po daily  Meclizine 25 mg po q 8 hours prn  Eliquis 5 mg po bid  Atrovent 17 mcg two puffs bid  Ibandronate 150 mg po daily  Aspirin 81 mg po daily  Buspirone 10 mg po daily  Claritin 10 mg po daily  Potassium Chloride 10 meq po daily  Nitroglycerin 0.4 mg po sl q 5 minutes x 3  Brimonidine 0.2% one drop both eyes bid  Pro Air 108 two puffs q 6 hours prn  Lasix 20 mg po daily  Oxycodone/APAP 5/325 mg  one tablet po q 6 hours prn  cleanse spinal incision with NS apply wound vac at 125mmHg suction with black foam to wound bed, change 3x weekly and PRN if soiled or removed  Rifampin 300 mg po bid  Salagen 5 mg po daily  Daptomycin 420 mg IV daily  PreserVison AREDS one capsule po bid  Pregabalin 75 mg po daily  Tolterodine 1 mg po daily  Lutein 20 mg po daily  Cyclobenzaprine 5 mg po q 8 hours prn  Isosorbide ER 60 mg po daily  Tolterodine 1 mg po daily  Pantoprazole 40 mg po daily  Remeron 15 mg po daily  Atenolol 50 mg po daily  oxyCODONE-Acetaminophen Oral Tablet  MG q 4 hours prn  MS Contin Oral Tablet Extended Release 15 MG (Morphine Sulfate) po bid  Labs reviewed at facility:   Laboratory Service  Report [id number]   Patient Name   KILEY LOJA  Patient ID   [id number]  Age   68 Y  Gender   F  Order #      Ordering NOMAN Vyas  Patient Telephone #   [phone]     1955  AKA      Client Order #   [id number]   Collection Date and Time   2024 06:25   Print Date and Time   2024 18:28  Account Information   [facility] NR   [address] [place] [address]     Report Notes                  Test Results   Reference Perform  Unit  Value Site*             CBC and Differential  REPORTED 2024 10:22  White Blood Cell Count   5.94 k/uL 3.70-11.00    RBC L 3.64 m/uL 3.90-5.20    Hemoglobin L 10.5 g/dL 11.5-15.5    Hematocrit L 33.9 % 36.0-46.0    MCV   93.1 fL 80.0-100.0    MCH   28.8 pg 26.0-34.0    MCHC   31.0 g/dL 30.5-36.0    RDW-CV H 17.2 % 11.5-15.0    Platelet Count   286 k/uL 150-400    MPV   9.4 fL 9.0-12.7    Neut%   54.2 %    Abs Neut   3.22 k/uL 1.45-7.50    Lymph%   28.1 %    Abs Lymph   1.67 k/uL 1.00-4.00    Mono%   12.0 %    Abs Mono   0.71 k/uL <0.87    Eosin%   4.9 %    Abs Eosin   0.29 k/uL <0.46    Baso%   0.3 %    Abs Baso   <0.03 k/uL <0.11    Immature Gran %%   0.5 %    Abs Immature Gran   0.03 k/uL <0.10    NRBC   0.0 /100 WBC    Absolute nRBC   <0.01 k/uL <0.01    Diff Type   Auto               CK  REPORTED 2024 10:50  CK L 18 U/L            Comp Metabolic Panel  REPORTED 2024 10:50  Protein, Total   6.3 g/dL 6.3-8.0    Albumin L 3.2 g/dL 3.9-4.9    Calcium, Total   9.5 mg/dL 8.5-10.2    Bilirubin, Total   0.3 mg/dL 0.2-1.3    Alkaline Phosphatase   115 U/L     AST   16 U/L 13-35    ALT   28 U/L 7-38    Glucose H 108 mg/dL 74-99  BUN   7 mg/dL 7-21    Creatinine   0.62 mg/dL 0.58-0.96    Sodium   137 mmol/L 136-144    Potassium   3.9 mmol/L 3.7-5.1    Chloride   99 mmol/L     CO2   26 mmol/L 22-30    Anion Gap   12 mmol/L 9-18    Estimated Glomerular Filtration Rate   96 mL/min/1.73 meters squared  >=60    Assessment/Plan   Problem List Items Addressed This Visit       Back pain with radiculopathy     MS Contin Oral Tablet Extended Release 15 MG (Morphine Sulfate) po bid. Oxycodone-Acetaminophen Oral Tablet  MG q 4 hours prn         Deep vein thrombosis (DVT) of right lower extremity (CMS/HCC)     Eliquis 5 mg po bid. Monitor for signs of bleeding.         Surgical wound infection - Primary     Continue with Daptomycin. Monitor CK. Wound vac therapy. Schedule wound clinic appointment on 2/26/2024.         Impaired functional mobility, balance, gait, and endurance     Review physical and occupational therapy notes. Can stand with assistance.            Time:    Yoav Roman PA-C       Electronically Signed By: Yoav Roman PA-C   2/25/24  4:04 PM

## 2024-02-25 ENCOUNTER — NURSING HOME VISIT (OUTPATIENT)
Dept: POST ACUTE CARE | Facility: EXTERNAL LOCATION | Age: 69
End: 2024-02-25
Payer: COMMERCIAL

## 2024-02-25 VITALS — SYSTOLIC BLOOD PRESSURE: 140 MMHG | HEART RATE: 79 BPM | DIASTOLIC BLOOD PRESSURE: 79 MMHG

## 2024-02-25 DIAGNOSIS — Z16.12 ESBL (EXTENDED SPECTRUM BETA-LACTAMASE) PRODUCING BACTERIA INFECTION: ICD-10-CM

## 2024-02-25 DIAGNOSIS — I82.4Y1 ACUTE DEEP VEIN THROMBOSIS (DVT) OF PROXIMAL VEIN OF RIGHT LOWER EXTREMITY (MULTI): ICD-10-CM

## 2024-02-25 DIAGNOSIS — I25.10 CAD IN NATIVE ARTERY: ICD-10-CM

## 2024-02-25 DIAGNOSIS — Z74.09 IMPAIRED FUNCTIONAL MOBILITY, BALANCE, GAIT, AND ENDURANCE: ICD-10-CM

## 2024-02-25 DIAGNOSIS — T81.49XA SURGICAL WOUND INFECTION: Primary | ICD-10-CM

## 2024-02-25 DIAGNOSIS — A49.9 ESBL (EXTENDED SPECTRUM BETA-LACTAMASE) PRODUCING BACTERIA INFECTION: ICD-10-CM

## 2024-02-25 DIAGNOSIS — A49.02 MRSA INFECTION: ICD-10-CM

## 2024-02-25 PROCEDURE — 99308 SBSQ NF CARE LOW MDM 20: CPT | Performed by: INTERNAL MEDICINE

## 2024-02-25 ASSESSMENT — ENCOUNTER SYMPTOMS
FATIGUE: 1
COUGH: 0
SHORTNESS OF BREATH: 0
ACTIVITY CHANGE: 0
APNEA: 0
ARTHRALGIAS: 1
BACK PAIN: 1
DIARRHEA: 0
WOUND: 1
ABDOMINAL DISTENTION: 0
DIAPHORESIS: 0
PSYCHIATRIC NEGATIVE: 1
WEAKNESS: 1

## 2024-02-25 NOTE — LETTER
Patient: Tara Tierney  : 1955    Encounter Date: 2024    Subjective  Patient ID: Tara Tierney is a 68 y.o. female who is acute skilled care being seen and evaluated for multiple medical problems.    Infectious disease follow-up was reviewed, he will be seen and rifampicin has been continued.  There was a ESBL E. coli, there was MRSA.  There was no meningitis.  There is a wound vacuum, patient can stand and do parallel bars now, the weakness of her right lower extremity is slowly improving.  Patient can have some confidence with graduated and progressive physical therapy.  Laboratories has been reviewed including CPK and CBC.  Patient is anxious to go home but it is taking time and it will take some time I told her.  No other events or concerns with her wound care, medical follow-up, infectious disease follow-up, careful and appropriate administration of antibiotics and follow-ups.  Nursing staff has not reported any abnormality or worsening of the wound.  Wound vacuum has been changed as per the protocol.  Patient does not have any fever chills malaise or lethargic.         Review of Systems   Constitutional:  Positive for fatigue. Negative for activity change and diaphoresis.   HENT:  Positive for congestion.    Respiratory:  Negative for apnea, cough and shortness of breath.    Cardiovascular:  Negative for chest pain.   Gastrointestinal:  Negative for abdominal distention and diarrhea.   Musculoskeletal:  Positive for arthralgias and back pain.   Skin:  Positive for wound.   Neurological:  Positive for weakness.   Psychiatric/Behavioral: Negative.         Objective  /79   Pulse 79     Physical Exam  Vitals reviewed.   Constitutional:       Appearance: Normal appearance. She is normal weight.   HENT:      Head: Normocephalic and atraumatic.   Eyes:      Conjunctiva/sclera: Conjunctivae normal.   Cardiovascular:      Rate and Rhythm: Normal rate and regular rhythm.   Pulmonary:       Effort: Pulmonary effort is normal.      Breath sounds: Normal breath sounds.   Abdominal:      Palpations: Abdomen is soft.   Musculoskeletal:         General: Normal range of motion.      Cervical back: Neck supple.   Skin:     General: Skin is warm and dry.      Findings: Wound present.   Neurological:      General: No focal deficit present. Weakness RLL.  Assessment/Plan  Problem List Items Addressed This Visit             ICD-10-CM    CAD in native artery I25.10    Deep vein thrombosis (DVT) of right lower extremity (CMS/HCC) I82.401    Surgical wound infection - Primary T81.49XA    Impaired functional mobility, balance, gait, and endurance Z74.09     Other Visit Diagnoses         Codes    MRSA infection     A49.02    ESBL (extended spectrum beta-lactamase) producing bacteria infection     A49.9, Z16.12        It is a surgical wound infection, it was a postoperative wound infection it was a complicated situation after having back surgery, MRSA was found ESBL has been found functional mobility SP progressively improving.  No angina, anticoagulation to be continued.  Patient is calm and comfortable, power grade 3 x 5 right lower extremity could be 4-5 left lower extremity.  No foot drop, mild sarcopenia on the lower extremities because of not using the legs for mobility and ambulation for several months now.  No new wound, no pressure sores, nursing staff has been very careful and monitoring patient's clinical condition and appropriate nursing care and medical follow-up has been provided.  Patient did not have any complaints or concerns.     Goals    None           Electronically Signed By: Paulino Moeller MD   2/25/24  6:48 PM

## 2024-02-25 NOTE — PROGRESS NOTES
Subjective   Patient ID: Tara Tierney is a 68 y.o. female who is acute skilled care being seen and evaluated for multiple medical problems.    Infectious disease follow-up was reviewed, he will be seen and rifampicin has been continued.  There was a ESBL E. coli, there was MRSA.  There was no meningitis.  There is a wound vacuum, patient can stand and do parallel bars now, the weakness of her right lower extremity is slowly improving.  Patient can have some confidence with graduated and progressive physical therapy.  Laboratories has been reviewed including CPK and CBC.  Patient is anxious to go home but it is taking time and it will take some time I told her.  No other events or concerns with her wound care, medical follow-up, infectious disease follow-up, careful and appropriate administration of antibiotics and follow-ups.  Nursing staff has not reported any abnormality or worsening of the wound.  Wound vacuum has been changed as per the protocol.  Patient does not have any fever chills malaise or lethargic.         Review of Systems   Constitutional:  Positive for fatigue. Negative for activity change and diaphoresis.   HENT:  Positive for congestion.    Respiratory:  Negative for apnea, cough and shortness of breath.    Cardiovascular:  Negative for chest pain.   Gastrointestinal:  Negative for abdominal distention and diarrhea.   Musculoskeletal:  Positive for arthralgias and back pain.   Skin:  Positive for wound.   Neurological:  Positive for weakness.   Psychiatric/Behavioral: Negative.         Objective   /79   Pulse 79     Physical Exam  Vitals reviewed.   Constitutional:       Appearance: Normal appearance. She is normal weight.   HENT:      Head: Normocephalic and atraumatic.   Eyes:      Conjunctiva/sclera: Conjunctivae normal.   Cardiovascular:      Rate and Rhythm: Normal rate and regular rhythm.   Pulmonary:      Effort: Pulmonary effort is normal.      Breath sounds: Normal breath  sounds.   Abdominal:      Palpations: Abdomen is soft.   Musculoskeletal:         General: Normal range of motion.      Cervical back: Neck supple.   Skin:     General: Skin is warm and dry.      Findings: Wound present.   Neurological:      General: No focal deficit present. Weakness RLL.  Assessment/Plan   Problem List Items Addressed This Visit             ICD-10-CM    CAD in native artery I25.10    Deep vein thrombosis (DVT) of right lower extremity (CMS/[facility]) I82.401    Surgical wound infection - Primary T81.49XA    Impaired functional mobility, balance, gait, and endurance Z74.09     Other Visit Diagnoses         Codes    MRSA infection     A49.02    ESBL (extended spectrum beta-lactamase) producing bacteria infection     A49.9, Z16.12        It is a surgical wound infection, it was a postoperative wound infection it was a complicated situation after having back surgery, MRSA was found ESBL has been found functional mobility SP progressively improving.  No angina, anticoagulation to be continued.  Patient is calm and comfortable, power grade 3 x 5 right lower extremity could be 4-5 left lower extremity.  No foot drop, mild sarcopenia on the lower extremities because of not using the legs for mobility and ambulation for several months now.  No new wound, no pressure sores, nursing staff has been very careful and monitoring patient's clinical condition and appropriate nursing care and medical follow-up has been provided.  Patient did not have any complaints or concerns.     Goals    None

## 2024-02-25 NOTE — ASSESSMENT & PLAN NOTE
MS Contin Oral Tablet Extended Release 15 MG (Morphine Sulfate) po bid. Oxycodone-Acetaminophen Oral Tablet  MG q 4 hours prn

## 2024-02-25 NOTE — ASSESSMENT & PLAN NOTE
Continue with Daptomycin. Monitor CK. Wound vac therapy. Schedule wound clinic appointment on 2/26/2024.

## 2024-02-26 ENCOUNTER — OFFICE VISIT (OUTPATIENT)
Dept: WOUND CARE | Facility: CLINIC | Age: 69
End: 2024-02-26
Payer: COMMERCIAL

## 2024-02-26 PROCEDURE — 11043 DBRDMT MUSC&/FSCA 1ST 20/<: CPT

## 2024-02-26 PROCEDURE — 11043 DBRDMT MUSC&/FSCA 1ST 20/<: CPT | Performed by: PLASTIC SURGERY

## 2024-03-04 ENCOUNTER — OFFICE VISIT (OUTPATIENT)
Dept: WOUND CARE | Facility: CLINIC | Age: 69
End: 2024-03-04
Payer: COMMERCIAL

## 2024-03-04 ENCOUNTER — LAB REQUISITION (OUTPATIENT)
Dept: LAB | Facility: HOSPITAL | Age: 69
End: 2024-03-04
Payer: COMMERCIAL

## 2024-03-04 DIAGNOSIS — T81.31XA DISRUPTION OF EXTERNAL OPERATION (SURGICAL) WOUND, NOT ELSEWHERE CLASSIFIED, INITIAL ENCOUNTER: ICD-10-CM

## 2024-03-04 DIAGNOSIS — F17.210 NICOTINE DEPENDENCE, CIGARETTES, UNCOMPLICATED: ICD-10-CM

## 2024-03-04 DIAGNOSIS — S31.000A UNSPECIFIED OPEN WOUND OF LOWER BACK AND PELVIS WITHOUT PENETRATION INTO RETROPERITONEUM, INITIAL ENCOUNTER: ICD-10-CM

## 2024-03-04 DIAGNOSIS — L98.492 NON-PRESSURE CHRONIC ULCER OF SKIN OF OTHER SITES WITH FAT LAYER EXPOSED (MULTI): ICD-10-CM

## 2024-03-04 PROCEDURE — 11043 DBRDMT MUSC&/FSCA 1ST 20/<: CPT | Performed by: PLASTIC SURGERY

## 2024-03-04 PROCEDURE — 87205 SMEAR GRAM STAIN: CPT | Mod: OUT,ELYLAB | Performed by: PLASTIC SURGERY

## 2024-03-04 PROCEDURE — 11046 DBRDMT MUSC&/FSCA EA ADDL: CPT | Performed by: PLASTIC SURGERY

## 2024-03-04 PROCEDURE — 11043 DBRDMT MUSC&/FSCA 1ST 20/<: CPT

## 2024-03-04 PROCEDURE — 11046 DBRDMT MUSC&/FSCA EA ADDL: CPT

## 2024-03-05 ENCOUNTER — NURSING HOME VISIT (OUTPATIENT)
Dept: POST ACUTE CARE | Facility: EXTERNAL LOCATION | Age: 69
End: 2024-03-05
Payer: COMMERCIAL

## 2024-03-05 VITALS
TEMPERATURE: 99.1 F | HEIGHT: 57 IN | SYSTOLIC BLOOD PRESSURE: 116 MMHG | RESPIRATION RATE: 18 BRPM | BODY MASS INDEX: 31.71 KG/M2 | DIASTOLIC BLOOD PRESSURE: 62 MMHG | WEIGHT: 147 LBS | HEART RATE: 86 BPM | OXYGEN SATURATION: 98 %

## 2024-03-05 DIAGNOSIS — Z74.09 IMPAIRED FUNCTIONAL MOBILITY, BALANCE, GAIT, AND ENDURANCE: Primary | ICD-10-CM

## 2024-03-05 DIAGNOSIS — I82.4Y1 ACUTE DEEP VEIN THROMBOSIS (DVT) OF PROXIMAL VEIN OF RIGHT LOWER EXTREMITY (MULTI): ICD-10-CM

## 2024-03-05 DIAGNOSIS — M54.50 BACK PAIN AT L4-L5 LEVEL: ICD-10-CM

## 2024-03-05 DIAGNOSIS — T81.49XA SURGICAL WOUND INFECTION: ICD-10-CM

## 2024-03-05 PROCEDURE — 99309 SBSQ NF CARE MODERATE MDM 30: CPT | Performed by: PHYSICIAN ASSISTANT

## 2024-03-05 NOTE — LETTER
"Patient: Tara Tierney  : 1955    Encounter Date: 2024    3/5/2024  Name: Tara Tierney  YOB: 1955    Chief complaint: Impaired mobility    HPI: Patient seen and examined in her room. She is visiting with family. Wound vac in place and working well. She has follow up appointment with  wound care clinic on 3/11/2024. Review of physical therapy notes shows she is walking > 35 feet with walker during supervised ambulation. She is able to climb 3 steps. Patient reports she is comfortable on current medications. She is afebrile. Sleeping at night. Planning possible discharge to home later this week.    Extremity Weakness  The current episode started more than 1 month ago. The problem occurs daily. The problem has been gradually improving. Pertinent negatives include no abdominal pain, anorexia, change in bowel habit, chest pain, coughing, fever, joint swelling, myalgias, nausea, numbness or urinary symptoms. Nothing aggravates the symptoms. She has tried relaxation, rest, walking and oral narcotics for the symptoms. The treatment provided moderate relief.     Review of systems:   ROS negative except were noted in HPI.    Code Status: full code    /62   Pulse 86   Temp 37.3 °C (99.1 °F)   Resp 18   Ht 1.448 m (4' 9\")   Wt 66.7 kg (147 lb)   SpO2 98%   BMI 31.81 kg/m²      Physical Exam  Constitutional:       General: She is not in acute distress.  HENT:      Head: Normocephalic.      Nose: Nose normal.      Mouth/Throat:      Mouth: Mucous membranes are moist.   Eyes:      Extraocular Movements: Extraocular movements intact.      Pupils: Pupils are equal, round, and reactive to light.   Cardiovascular:      Rate and Rhythm: Normal rate and regular rhythm.      Pulses: Normal pulses.   Pulmonary:      Effort: Pulmonary effort is normal.      Breath sounds: No wheezing or rales.   Abdominal:      General: Bowel sounds are normal.      Palpations: Abdomen is soft.      " Tenderness: There is no abdominal tenderness. There is no guarding.   Genitourinary:     Comments: voiding  Musculoskeletal:         General: Normal range of motion.      Cervical back: Normal range of motion.   Lymphadenopathy:      Cervical: No cervical adenopathy.   Skin:     General: Skin is warm and dry.      Capillary Refill: Capillary refill takes less than 2 seconds.      Comments: Wound vac intact.   Neurological:      General: No focal deficit present.      Mental Status: She is alert and oriented to person, place, and time.   Psychiatric:         Mood and Affect: Mood normal.         Behavior: Behavior normal.       Medications reviewed during visit at facility.  Tylenol 650 mg po q 4 hours prn  MOM 30 ml po daily prn  Ezetimibe 10 mg po daily  Hydralazine 50 mg po tid  Vitamin D 3 2000 UT po daily  Meclizine 25 mg po q 8 hours prn  Eliquis 5 mg po bid  Atrovent 17 mcg two puffs bid  Ibandronate 150 mg po daily  Aspirin 81 mg po daily  Buspirone 10 mg po daily  Claritin 10 mg po daily  Potassium Chloride 10 meq po daily  Nitroglycerin 0.4 mg po sl q 5 minutes x 3  Brimonidine 0.2% one drop both eyes bid  Pro Air 108 two puffs q 6 hours prn  Lasix 20 mg po daily  Oxycodone/APAP 5/325 mg  one tablet po q 6 hours prn  cleanse spinal incision with NS apply wound vac at 125mmHg suction with black foam to wound bed, change 3x weekly and PRN if soiled or removed  Rifampin 300 mg po bid  Salagen 5 mg po daily  Daptomycin 420 mg IV daily  PreserVison AREDS one capsule po bid  Pregabalin 75 mg po daily  Tolterodine 1 mg po daily  Lutein 20 mg po daily  Cyclobenzaprine 5 mg po q 8 hours prn  Isosorbide ER 60 mg po daily  Tolterodine 1 mg po daily  Pantoprazole 40 mg po daily  Remeron 15 mg po daily  Atenolol 50 mg po daily  oxyCODONE-Acetaminophen Oral Tablet  MG q 4 hours prn  MS Contin Oral Tablet Extended Release 15 MG (Morphine Sulfate) po bid  Labs reviewed at facility:     Laboratory Service  Report [id number]   Patient Name   KILEY LOJA  Patient ID   [id number]  Age   68 Y  Gender   F  Order #      Ordering NOMAN Vyas  Patient Telephone #   [phone]     1955  AKA      Client Order #   [id number]   Collection Date and Time   2024 05:50   Print Date and Time   2024 21:53  Account Information   Formerly named [facility]   [address] [place] [address]     Report Notes                  Test Results   Reference Perform  Unit  Value Site*             CBC and Differential  REPORTED 2024 08:30  White Blood Cell Count   5.95 k/uL 3.70-11.00    RBC L 3.57 m/uL 3.90-5.20    Hemoglobin L 10.4 g/dL 11.5-15.5    Hematocrit L 33.2 % 36.0-46.0    MCV   93.0 fL 80.0-100.0    MCH   29.1 pg 26.0-34.0    MCHC   31.3 g/dL 30.5-36.0    RDW-CV H 17.0 % 11.5-15.0    Platelet Count   340 k/uL 150-400    MPV L 8.9 fL 9.0-12.7    Neut%   54.1 %    Abs Neut   3.22 k/uL 1.45-7.50    Lymph%   29.9 %    Abs Lymph   1.78 k/uL 1.00-4.00    Mono%   12.3 %    Abs Mono   0.73 k/uL <0.87    Eosin%   3.0 %    Abs Eosin   0.18 k/uL <0.46    Baso%   0.2 %    Abs Baso   <0.03 k/uL <0.11    Immature Gran %%   0.5 %    Abs Immature Gran   0.03 k/uL <0.10    NRBC   0.0 /100 WBC    Absolute nRBC   <0.01 k/uL <0.01    Diff Type   Auto               CK  REPORTED 2024 09:47  CK L 22 U/L            Comp Metabolic Panel  REPORTED 2024 09:47  Protein, Total   6.5 g/dL 6.3-8.0    Albumin L 3.3 g/dL 3.9-4.9    Calcium, Total   9.8 mg/dL 8.5-10.2    Bilirubin, Total   0.3 mg/dL 0.2-1.3    Alkaline Phosphatase   108 U/L     AST L 12 U/L 13-35    ALT   13 U/L 7-38    Glucose H 107 mg/dL 74-99  BUN L 6 mg/dL 7-21    Creatinine   0.61 mg/dL 0.58-0.96    Sodium   139 mmol/L 136-144    Potassium   4.0 mmol/L 3.7-5.1    Chloride   100 mmol/L     CO2   25 mmol/L 22-30    Anion Gap   14 mmol/L 9-18    Estimated Glomerular Filtration Rate   96 mL/min/1.73 meters squared  >=60    Assessment/Plan   Problem List Items Addressed This Visit       Back pain at L4-L5 level     Oxycodone-Acetaminophen Oral Tablet  MG q 4 hours prn  MS Contin Oral Tablet Extended Release 15 MG (Morphine Sulfate) po bid         Deep vein thrombosis (DVT) of right lower extremity (CMS/HCC)     Eliquis 5 mg po bid. Monitor for signs of bleeding.         Surgical wound infection     Continue Daptomycin. Monitor labs. Schedule appointment with wound clinic on 3/11/2024.         Impaired functional mobility, balance, gait, and endurance - Primary     Review physical and occupational therapy notes. Now walking > 35 feet. Discuss home going needs with social service.            Time:    Yoav Roman PA-C       Electronically Signed By: Yoav Roman PA-C   3/7/24  9:09 AM

## 2024-03-06 ENCOUNTER — TELEPHONE (OUTPATIENT)
Dept: ORTHOPEDIC SURGERY | Facility: CLINIC | Age: 69
End: 2024-03-06
Payer: COMMERCIAL

## 2024-03-06 ENCOUNTER — NURSING HOME VISIT (OUTPATIENT)
Dept: POST ACUTE CARE | Facility: EXTERNAL LOCATION | Age: 69
End: 2024-03-06
Payer: COMMERCIAL

## 2024-03-06 VITALS
HEART RATE: 85 BPM | WEIGHT: 147 LBS | DIASTOLIC BLOOD PRESSURE: 74 MMHG | BODY MASS INDEX: 31.81 KG/M2 | SYSTOLIC BLOOD PRESSURE: 124 MMHG

## 2024-03-06 DIAGNOSIS — I82.4Y1 ACUTE DEEP VEIN THROMBOSIS (DVT) OF PROXIMAL VEIN OF RIGHT LOWER EXTREMITY (MULTI): ICD-10-CM

## 2024-03-06 DIAGNOSIS — A49.9 ESBL (EXTENDED SPECTRUM BETA-LACTAMASE) PRODUCING BACTERIA INFECTION: ICD-10-CM

## 2024-03-06 DIAGNOSIS — M96.89 POSTOPERATIVE SURGICAL COMPLICATION INVOLVING MUSCULOSKELETAL SYSTEM ASSOCIATED WITH MUSCULOSKELETAL PROCEDURE, UNSPECIFIED COMPLICATION: ICD-10-CM

## 2024-03-06 DIAGNOSIS — A49.02 MRSA INFECTION: Primary | ICD-10-CM

## 2024-03-06 DIAGNOSIS — I10 ESSENTIAL HYPERTENSION: ICD-10-CM

## 2024-03-06 DIAGNOSIS — T81.89XD LUMBAR SURGICAL WOUND FLUID COLLECTION, SUBSEQUENT ENCOUNTER: ICD-10-CM

## 2024-03-06 DIAGNOSIS — D64.9 ANEMIA, UNSPECIFIED TYPE: ICD-10-CM

## 2024-03-06 DIAGNOSIS — Z16.12 ESBL (EXTENDED SPECTRUM BETA-LACTAMASE) PRODUCING BACTERIA INFECTION: ICD-10-CM

## 2024-03-06 PROCEDURE — 99315 NF DSCHRG MGMT 30 MIN/LESS: CPT | Performed by: INTERNAL MEDICINE

## 2024-03-06 ASSESSMENT — ENCOUNTER SYMPTOMS
FATIGUE: 0
DIAPHORESIS: 0
ABDOMINAL PAIN: 0
PALPITATIONS: 0
NAUSEA: 0
ACTIVITY CHANGE: 0
ABDOMINAL DISTENTION: 0
WEAKNESS: 0
CHOKING: 0
COUGH: 0
BACK PAIN: 1
SHORTNESS OF BREATH: 0
ARTHRALGIAS: 1
WOUND: 1

## 2024-03-06 NOTE — PROGRESS NOTES
"3/5/2024  Name: Tara Tierney  YOB: 1955    Chief complaint: Impaired mobility    HPI: Patient seen and examined in her room. She is visiting with family. Wound vac in place and working well. She has follow up appointment with  wound care clinic on 3/11/2024. Review of physical therapy notes shows she is walking > 35 feet with walker during supervised ambulation. She is able to climb 3 steps. Patient reports she is comfortable on current medications. She is afebrile. Sleeping at night. Planning possible discharge to home later this week.    Extremity Weakness  The current episode started more than 1 month ago. The problem occurs daily. The problem has been gradually improving. Pertinent negatives include no abdominal pain, anorexia, change in bowel habit, chest pain, coughing, fever, joint swelling, myalgias, nausea, numbness or urinary symptoms. Nothing aggravates the symptoms. She has tried relaxation, rest, walking and oral narcotics for the symptoms. The treatment provided moderate relief.     Review of systems:   ROS negative except were noted in HPI.    Code Status: full code    /62   Pulse 86   Temp 37.3 °C (99.1 °F)   Resp 18   Ht 1.448 m (4' 9\")   Wt 66.7 kg (147 lb)   SpO2 98%   BMI 31.81 kg/m²      Physical Exam  Constitutional:       General: She is not in acute distress.  HENT:      Head: Normocephalic.      Nose: Nose normal.      Mouth/Throat:      Mouth: Mucous membranes are moist.   Eyes:      Extraocular Movements: Extraocular movements intact.      Pupils: Pupils are equal, round, and reactive to light.   Cardiovascular:      Rate and Rhythm: Normal rate and regular rhythm.      Pulses: Normal pulses.   Pulmonary:      Effort: Pulmonary effort is normal.      Breath sounds: No wheezing or rales.   Abdominal:      General: Bowel sounds are normal.      Palpations: Abdomen is soft.      Tenderness: There is no abdominal tenderness. There is no guarding. "   Genitourinary:     Comments: voiding  Musculoskeletal:         General: Normal range of motion.      Cervical back: Normal range of motion.   Lymphadenopathy:      Cervical: No cervical adenopathy.   Skin:     General: Skin is warm and dry.      Capillary Refill: Capillary refill takes less than 2 seconds.      Comments: Wound vac intact.   Neurological:      General: No focal deficit present.      Mental Status: She is alert and oriented to person, place, and time.   Psychiatric:         Mood and Affect: Mood normal.         Behavior: Behavior normal.       Medications reviewed during visit at facility.  Tylenol 650 mg po q 4 hours prn  MOM 30 ml po daily prn  Ezetimibe 10 mg po daily  Hydralazine 50 mg po tid  Vitamin D 3 2000 UT po daily  Meclizine 25 mg po q 8 hours prn  Eliquis 5 mg po bid  Atrovent 17 mcg two puffs bid  Ibandronate 150 mg po daily  Aspirin 81 mg po daily  Buspirone 10 mg po daily  Claritin 10 mg po daily  Potassium Chloride 10 meq po daily  Nitroglycerin 0.4 mg po sl q 5 minutes x 3  Brimonidine 0.2% one drop both eyes bid  Pro Air 108 two puffs q 6 hours prn  Lasix 20 mg po daily  Oxycodone/APAP 5/325 mg  one tablet po q 6 hours prn  cleanse spinal incision with NS apply wound vac at 125mmHg suction with black foam to wound bed, change 3x weekly and PRN if soiled or removed  Rifampin 300 mg po bid  Salagen 5 mg po daily  Daptomycin 420 mg IV daily  PreserVison AREDS one capsule po bid  Pregabalin 75 mg po daily  Tolterodine 1 mg po daily  Lutein 20 mg po daily  Cyclobenzaprine 5 mg po q 8 hours prn  Isosorbide ER 60 mg po daily  Tolterodine 1 mg po daily  Pantoprazole 40 mg po daily  Remeron 15 mg po daily  Atenolol 50 mg po daily  oxyCODONE-Acetaminophen Oral Tablet  MG q 4 hours prn  MS Contin Oral Tablet Extended Release 15 MG (Morphine Sulfate) po bid  Labs reviewed at facility:     Laboratory Service Report [id number]   Patient Name   KILEY LOJA  Patient ID   [id number]   Age   68 Y  Gender   F  Order #      Ordering NOMAN Vyas  Patient Telephone #   [phone]     1955  AKA      Client Order #   [id number]   Collection Date and Time   2024 05:50   Print Date and Time   2024 21:53  Account Information   [facility]   [address] [place] [address]     Report Notes                  Test Results   Reference Perform  Unit  Value Site*             CBC and Differential  REPORTED 2024 08:30  White Blood Cell Count   5.95 k/uL 3.70-11.00    RBC L 3.57 m/uL 3.90-5.20    Hemoglobin L 10.4 g/dL 11.5-15.5    Hematocrit L 33.2 % 36.0-46.0    MCV   93.0 fL 80.0-100.0    MCH   29.1 pg 26.0-34.0    MCHC   31.3 g/dL 30.5-36.0    RDW-CV H 17.0 % 11.5-15.0    Platelet Count   340 k/uL 150-400    MPV L 8.9 fL 9.0-12.7    Neut%   54.1 %    Abs Neut   3.22 k/uL 1.45-7.50    Lymph%   29.9 %    Abs Lymph   1.78 k/uL 1.00-4.00    Mono%   12.3 %    Abs Mono   0.73 k/uL <0.87    Eosin%   3.0 %    Abs Eosin   0.18 k/uL <0.46    Baso%   0.2 %    Abs Baso   <0.03 k/uL <0.11    Immature Gran %%   0.5 %    Abs Immature Gran   0.03 k/uL <0.10    NRBC   0.0 /100 WBC    Absolute nRBC   <0.01 k/uL <0.01    Diff Type   Auto               CK  REPORTED 2024 09:47  CK L 22 U/L            Comp Metabolic Panel  REPORTED 2024 09:47  Protein, Total   6.5 g/dL 6.3-8.0    Albumin L 3.3 g/dL 3.9-4.9    Calcium, Total   9.8 mg/dL 8.5-10.2    Bilirubin, Total   0.3 mg/dL 0.2-1.3    Alkaline Phosphatase   108 U/L     AST L 12 U/L 13-35    ALT   13 U/L 7-38    Glucose H 107 mg/dL 74-99  BUN L 6 mg/dL 7-21    Creatinine   0.61 mg/dL 0.58-0.96    Sodium   139 mmol/L 136-144    Potassium   4.0 mmol/L 3.7-5.1    Chloride   100 mmol/L     CO2   25 mmol/L 22-30    Anion Gap   14 mmol/L 9-18    Estimated Glomerular Filtration Rate   96 mL/min/1.73 meters squared >=60    Assessment/Plan    Problem List Items Addressed This Visit       Back  pain at L4-L5 level     Oxycodone-Acetaminophen Oral Tablet  MG q 4 hours prn  MS Contin Oral Tablet Extended Release 15 MG (Morphine Sulfate) po bid         Deep vein thrombosis (DVT) of right lower extremity (CMS/HCC)     Eliquis 5 mg po bid. Monitor for signs of bleeding.         Surgical wound infection     Continue Daptomycin. Monitor labs. Schedule appointment with wound clinic on 3/11/2024.         Impaired functional mobility, balance, gait, and endurance - Primary     Review physical and occupational therapy notes. Now walking > 35 feet. Discuss home going needs with social service.            Time:    Yoav Roman PA-C

## 2024-03-06 NOTE — TELEPHONE ENCOUNTER
Patient spouse called stating if his fmla can be extended from march 6th to June 1st due to patient being in [facility] home for 3 weeks and will have to take care of her until she is better.

## 2024-03-06 NOTE — LETTER
Patient: Tara Tierney  : 1955    Encounter Date: 2024    Subjective  Patient ID: Tara Tierney is a 68 y.o. female who is acute skilled care being seen and evaluated for multiple medical problems.    This patient is doing much better now, she has been on morning which I thought can be on hold at least here at the facility.  She has been on Imdur, amiodarone which can be reduced at 7.5 mg, she is on Lyrica, QTc and CPK has been checked, hydralazine, furosemide, Detrol but then she is on Salagen and they told will cause dry mouth, Eliquis, aspirin and buspirone.  Her strength and stamina of the right lower extremity has improved, she can stand at the parallel bar.  Antibiotic infusion has been going well, wound vacuum is in place.  They state that wound is showing clean and beefy appearance.  No major events, no fever or chills, no confusion, no nosocomial infection, no leukocytosis, laboratories has been done consistently and it has been showing stable pattern.  After having the spinal surgery with the complications and collection of the fluid with the postoperative wound infection patient finally is getting back to the normal that she could be discharged although she will require IV antibiotics till beginning of April.         Review of Systems   Constitutional:  Negative for activity change, diaphoresis and fatigue.   Respiratory:  Negative for cough, choking and shortness of breath.    Cardiovascular:  Negative for chest pain and palpitations.   Gastrointestinal:  Negative for abdominal distention, abdominal pain and nausea.   Musculoskeletal:  Positive for arthralgias, back pain and gait problem.   Skin:  Positive for wound.   Neurological:  Negative for weakness.       Objective  /74   Pulse 85   Wt 66.7 kg (147 lb)   BMI 31.81 kg/m²     Physical Exam  Constitutional:       Appearance: Normal appearance. She is normal weight.   HENT:      Head: Normocephalic and atraumatic.    Eyes:      Conjunctiva/sclera: Conjunctivae normal.   Cardiovascular:      Rate and Rhythm: Normal rate and regular rhythm.   Pulmonary:      Effort: Pulmonary effort is normal.      Breath sounds: Normal breath sounds.   Abdominal:      Palpations: Abdomen is soft.   Musculoskeletal:         General: limited ROM     Cervical back: Neck supple.   Skin:     General: Skin is warm and dry.      Findings: Wound present. Wound vacume in place.  Neurological:      General: No focal deficit present. Weakness RLL.improved  Assessment/Plan  Problem List Items Addressed This Visit             ICD-10-CM    Essential hypertension I10    Lumbar surgical wound fluid collection T81.89XA    Postoperative surgical complication involving musculoskeletal system associated with musculoskeletal procedure, unspecified complication M96.89    Deep vein thrombosis (DVT) of right lower extremity (CMS/[facility]) I82.401    Anemia D64.9     Other Visit Diagnoses         Codes    MRSA infection    -  Primary A49.02    ESBL (extended spectrum beta-lactamase) producing bacteria infection     A49.9, Z16.12        Patient was very much comfortable, MRSA has been dealt with the to be seen, ESBL has been dealt with previous antibiotic usage, Eliquis to be continued, pain is reasonably under control, fabián can be on hold but as she is going home whenever can be continued at home.  It was a postoperative surgical complication with significant amount of morbidity she has experienced but at the end of the day she is feeling much better, BP readings are stable, anemia is improved, surgical wound collection has been resolved.  Home health care and IV infusion at home needs to be arranged, IV infusion and surgical follow-up is to be done subsequently and later on, PICC line was inspected, power grade 3 x 5 right lower extremity.  Wound vacuum will be managed by home health care, patient can be discharged as planned, necessary DME can be arranged and paperwork  is signed.     Goals    None           Electronically Signed By: Paulino Moeller MD   3/6/24  8:56 PM

## 2024-03-07 ENCOUNTER — HOME HEALTH ADMISSION (OUTPATIENT)
Dept: HOME HEALTH SERVICES | Facility: HOME HEALTH | Age: 69
End: 2024-03-07
Payer: COMMERCIAL

## 2024-03-07 LAB
BACTERIA SPEC CULT: NORMAL
GRAM STN SPEC: NORMAL
GRAM STN SPEC: NORMAL

## 2024-03-07 ASSESSMENT — ENCOUNTER SYMPTOMS
NUMBNESS: 0
EXTREMITY WEAKNESS: 1
CHANGE IN BOWEL HABIT: 0
ABDOMINAL PAIN: 0
ANOREXIA: 0
COUGH: 0
MYALGIAS: 0
FEVER: 0
JOINT SWELLING: 0
NAUSEA: 0

## 2024-03-07 NOTE — ASSESSMENT & PLAN NOTE
Oxycodone-Acetaminophen Oral Tablet  MG q 4 hours prn  MS Contin Oral Tablet Extended Release 15 MG (Morphine Sulfate) po bid

## 2024-03-07 NOTE — PROGRESS NOTES
Subjective   Patient ID: Tara Tierney is a 68 y.o. female who is acute skilled care being seen and evaluated for multiple medical problems.    This patient is doing much better now, she has been on morning which I thought can be on hold at least here at the facility.  She has been on Imdur, amiodarone which can be reduced at 7.5 mg, she is on Lyrica, QTc and CPK has been checked, hydralazine, furosemide, Detrol but then she is on Salagen and they told will cause dry mouth, Eliquis, aspirin and buspirone.  Her strength and stamina of the right lower extremity has improved, she can stand at the parallel bar.  Antibiotic infusion has been going well, wound vacuum is in place.  They state that wound is showing clean and beefy appearance.  No major events, no fever or chills, no confusion, no nosocomial infection, no leukocytosis, laboratories has been done consistently and it has been showing stable pattern.  After having the spinal surgery with the complications and collection of the fluid with the postoperative wound infection patient finally is getting back to the normal that she could be discharged although she will require IV antibiotics till beginning of April.         Review of Systems   Constitutional:  Negative for activity change, diaphoresis and fatigue.   Respiratory:  Negative for cough, choking and shortness of breath.    Cardiovascular:  Negative for chest pain and palpitations.   Gastrointestinal:  Negative for abdominal distention, abdominal pain and nausea.   Musculoskeletal:  Positive for arthralgias, back pain and gait problem.   Skin:  Positive for wound.   Neurological:  Negative for weakness.       Objective   /74   Pulse 85   Wt 66.7 kg (147 lb)   BMI 31.81 kg/m²     Physical Exam  Constitutional:       Appearance: Normal appearance. She is normal weight.   HENT:      Head: Normocephalic and atraumatic.   Eyes:      Conjunctiva/sclera: Conjunctivae normal.   Cardiovascular:       Rate and Rhythm: Normal rate and regular rhythm.   Pulmonary:      Effort: Pulmonary effort is normal.      Breath sounds: Normal breath sounds.   Abdominal:      Palpations: Abdomen is soft.   Musculoskeletal:         General: limited ROM     Cervical back: Neck supple.   Skin:     General: Skin is warm and dry.      Findings: Wound present. Wound vacume in place.  Neurological:      General: No focal deficit present. Weakness RLL.improved  Assessment/Plan   Problem List Items Addressed This Visit             ICD-10-CM    Essential hypertension I10    Lumbar surgical wound fluid collection T81.89XA    Postoperative surgical complication involving musculoskeletal system associated with musculoskeletal procedure, unspecified complication M96.89    Deep vein thrombosis (DVT) of right lower extremity (CMS/[facility]) I82.401    Anemia D64.9     Other Visit Diagnoses         Codes    MRSA infection    -  Primary A49.02    ESBL (extended spectrum beta-lactamase) producing bacteria infection     A49.9, Z16.12        Patient was very much comfortable, MRSA has been dealt with the to be seen, ESBL has been dealt with previous antibiotic usage, Eliquis to be continued, pain is reasonably under control, fabián can be on hold but as she is going home whenever can be continued at home.  It was a postoperative surgical complication with significant amount of morbidity she has experienced but at the end of the day she is feeling much better, BP readings are stable, anemia is improved, surgical wound collection has been resolved.  Home health care and IV infusion at home needs to be arranged, IV infusion and surgical follow-up is to be done subsequently and later on, PICC line was inspected, power grade 3 x 5 right lower extremity.  Wound vacuum will be managed by home health care, patient can be discharged as planned, necessary DME can be arranged and paperwork is signed.     Goals    None

## 2024-03-07 NOTE — ASSESSMENT & PLAN NOTE
Review physical and occupational therapy notes. Now walking > 35 feet. Discuss home going needs with social service.

## 2024-03-08 ENCOUNTER — HOME INFUSION (OUTPATIENT)
Dept: INFUSION THERAPY | Age: 69
End: 2024-03-08
Payer: COMMERCIAL

## 2024-03-08 ENCOUNTER — DOCUMENTATION (OUTPATIENT)
Dept: HOME HEALTH SERVICES | Facility: HOME HEALTH | Age: 69
End: 2024-03-08
Payer: COMMERCIAL

## 2024-03-08 ENCOUNTER — DOCUMENTATION (OUTPATIENT)
Dept: PHARMACY | Facility: CLINIC | Age: 69
End: 2024-03-08

## 2024-03-08 NOTE — HH CARE COORDINATION
Home Care received a Referral for Infusion, Nursing, Physical Therapy, Occupational Therapy, and Home Health Aide. We have processed the referral for a Start of Care on 3/10/2024.     If you have any questions or concerns, please feel free to contact us at [phone]. Follow the prompts, enter your five digit zip code, and you will be directed to your care team on WE[facility] 1.

## 2024-03-08 NOTE — PROGRESS NOTES
PATIENT IS BEING DISCHARGED FROM SNF ON SAT 3/9 .  SHE WILL BEGIN IV DAPTOMYCIN AT HOME ON EARL 3/10.  PATIENT WAS ON SERVICE WITH US PRIOR TO SNF ADMISSION.    SHE IS BEING TREATED FOR A SPINAL WOUND AND BEING FOLLOWED BY DR. MARQUEZ.  GOAL OF THERAPY IS TO RESOLVE INFECTION WHILE MONITORING FOR SIDE EFFECTS AS LISTED IN THE CAREPLAN.  PROCESSED FILL FOR 7 DAPTOMYCIN HP FOR MIX AND DEL. TODAY.   DOS ARE 3/10 THRU 3/16.  NF FOR 3/15. DSL

## 2024-03-08 NOTE — PROGRESS NOTES
SPOKE W/ PT - DELIVERY IS SCHEDULED FOR TODAY BY 6-9 PM.  ASKED PT IF THERE ARE ANY QUESTIONS TO A PHARMACIST. PT SAID: NO QUESTIONS.   will try to make it the last delivery since the pt wants it exactly at 9pm

## 2024-03-10 ENCOUNTER — HOME CARE VISIT (OUTPATIENT)
Dept: HOME HEALTH SERVICES | Facility: HOME HEALTH | Age: 69
End: 2024-03-10
Payer: COMMERCIAL

## 2024-03-10 VITALS
WEIGHT: 147 LBS | HEIGHT: 57 IN | HEART RATE: 73 BPM | TEMPERATURE: 97.1 F | DIASTOLIC BLOOD PRESSURE: 62 MMHG | OXYGEN SATURATION: 96 % | BODY MASS INDEX: 31.71 KG/M2 | RESPIRATION RATE: 20 BRPM | SYSTOLIC BLOOD PRESSURE: 142 MMHG

## 2024-03-10 PROCEDURE — 0023 HH SOC

## 2024-03-10 PROCEDURE — 1090000001 HH PPS REVENUE CREDIT

## 2024-03-10 PROCEDURE — 169592 NO-PAY CLAIM PROCEDURE

## 2024-03-10 PROCEDURE — 1090000002 HH PPS REVENUE DEBIT

## 2024-03-10 PROCEDURE — G0299 HHS/HOSPICE OF RN EA 15 MIN: HCPCS | Mod: HHH

## 2024-03-10 ASSESSMENT — ENCOUNTER SYMPTOMS
PERSON REPORTING PAIN: PATIENT
HIGHEST PAIN SEVERITY IN PAST 24 HOURS: 10/10
PAIN SEVERITY GOAL: 0/10
LIMITED RANGE OF MOTION: 1
SUBJECTIVE PAIN PROGRESSION: WAXING AND WANING
PAIN: 1
LOWEST PAIN SEVERITY IN PAST 24 HOURS: 0/10
PAIN LOCATION: BACK
MUSCLE WEAKNESS: 1
APPETITE LEVEL: FAIR

## 2024-03-10 ASSESSMENT — ACTIVITIES OF DAILY LIVING (ADL)
ENTERING_EXITING_HOME: ONE PERSON
TOILETING: 1
TOILETING EQUIPMENT USED: WALKER
OASIS_M1830: 03
PHYSICAL TRANSFERS ASSESSED: 1
AMBULATION ASSISTANCE: 1
AMBULATION ASSISTANCE: ONE PERSON
TOILETING: ONE PERSON

## 2024-03-11 ENCOUNTER — HOME CARE VISIT (OUTPATIENT)
Dept: HOME HEALTH SERVICES | Facility: HOME HEALTH | Age: 69
End: 2024-03-11
Payer: COMMERCIAL

## 2024-03-11 ENCOUNTER — APPOINTMENT (OUTPATIENT)
Dept: WOUND CARE | Facility: CLINIC | Age: 69
End: 2024-03-11
Payer: COMMERCIAL

## 2024-03-11 VITALS
OXYGEN SATURATION: 97 % | SYSTOLIC BLOOD PRESSURE: 96 MMHG | HEART RATE: 78 BPM | DIASTOLIC BLOOD PRESSURE: 48 MMHG | TEMPERATURE: 97.5 F | RESPIRATION RATE: 18 BRPM

## 2024-03-11 PROCEDURE — G0300 HHS/HOSPICE OF LPN EA 15 MIN: HCPCS | Mod: HHH

## 2024-03-11 PROCEDURE — 1090000002 HH PPS REVENUE DEBIT

## 2024-03-11 PROCEDURE — 1090000001 HH PPS REVENUE CREDIT

## 2024-03-11 PROCEDURE — G0151 HHCP-SERV OF PT,EA 15 MIN: HCPCS | Mod: HHH

## 2024-03-11 ASSESSMENT — ENCOUNTER SYMPTOMS
PAIN LOCATION - EXACERBATING FACTORS: WALKING
APPETITE LEVEL: GOOD
LOWEST PAIN SEVERITY IN PAST 24 HOURS: 5/10
PAIN: 1
PAIN LOCATION - PAIN SEVERITY: 8/10
HIGHEST PAIN SEVERITY IN PAST 24 HOURS: 8/10
HIGHEST PAIN SEVERITY IN PAST 24 HOURS: 8/10
PAIN LOCATION - PAIN DURATION: 20
CHANGE IN APPETITE: UNCHANGED
PAIN SEVERITY GOAL: 0/10
PERSON REPORTING PAIN: PATIENT
PAIN LOCATION - RELIEVING FACTORS: REST
PAIN: 1
PAIN LOCATION: BACK
PERSON REPORTING PAIN: PATIENT
PAIN LOCATION - PAIN SEVERITY: 0/10
PAIN LOCATION - PAIN FREQUENCY: INTERMITTENT
PAIN LOCATION - PAIN QUALITY: SHARP
SUBJECTIVE PAIN PROGRESSION: WAXING AND WANING
PAIN LOCATION: BACK

## 2024-03-11 ASSESSMENT — ACTIVITIES OF DAILY LIVING (ADL)
AMBULATION_DISTANCE/DURATION_TOLERATED: 8'
AMBULATION ASSISTANCE ON FLAT SURFACES: 1

## 2024-03-12 ENCOUNTER — HOME CARE VISIT (OUTPATIENT)
Dept: HOME HEALTH SERVICES | Facility: HOME HEALTH | Age: 69
End: 2024-03-12
Payer: COMMERCIAL

## 2024-03-12 PROCEDURE — G0152 HHCP-SERV OF OT,EA 15 MIN: HCPCS | Mod: HHH

## 2024-03-12 PROCEDURE — 1090000002 HH PPS REVENUE DEBIT

## 2024-03-12 PROCEDURE — 1090000001 HH PPS REVENUE CREDIT

## 2024-03-13 ENCOUNTER — HOME CARE VISIT (OUTPATIENT)
Dept: HOME HEALTH SERVICES | Facility: HOME HEALTH | Age: 69
End: 2024-03-13
Payer: COMMERCIAL

## 2024-03-13 ENCOUNTER — OFFICE VISIT (OUTPATIENT)
Dept: WOUND CARE | Facility: CLINIC | Age: 69
End: 2024-03-13
Payer: COMMERCIAL

## 2024-03-13 ENCOUNTER — LAB REQUISITION (OUTPATIENT)
Dept: LAB | Facility: LAB | Age: 69
End: 2024-03-13
Payer: COMMERCIAL

## 2024-03-13 DIAGNOSIS — T81.49XD INFECTION FOLLOWING A PROCEDURE, OTHER SURGICAL SITE, SUBSEQUENT ENCOUNTER: ICD-10-CM

## 2024-03-13 LAB
ALBUMIN SERPL BCP-MCNC: 3.5 G/DL (ref 3.4–5)
ALP SERPL-CCNC: 104 U/L (ref 33–136)
ALT SERPL W P-5'-P-CCNC: 20 U/L (ref 7–45)
ANION GAP SERPL CALC-SCNC: 14 MMOL/L (ref 10–20)
AST SERPL W P-5'-P-CCNC: 15 U/L (ref 9–39)
BASOPHILS # BLD AUTO: 0.02 X10*3/UL (ref 0–0.1)
BASOPHILS NFR BLD AUTO: 0.2 %
BILIRUB SERPL-MCNC: 0.4 MG/DL (ref 0–1.2)
BUN SERPL-MCNC: 17 MG/DL (ref 6–23)
CALCIUM SERPL-MCNC: 9.3 MG/DL (ref 8.6–10.3)
CHLORIDE SERPL-SCNC: 95 MMOL/L (ref 98–107)
CK SERPL-CCNC: 38 U/L (ref 0–215)
CO2 SERPL-SCNC: 28 MMOL/L (ref 21–32)
CREAT SERPL-MCNC: 0.89 MG/DL (ref 0.5–1.05)
CRP SERPL-MCNC: 5.27 MG/DL
EGFRCR SERPLBLD CKD-EPI 2021: 70 ML/MIN/1.73M*2
EOSINOPHIL # BLD AUTO: 0.16 X10*3/UL (ref 0–0.7)
EOSINOPHIL NFR BLD AUTO: 1.9 %
ERYTHROCYTE [DISTWIDTH] IN BLOOD BY AUTOMATED COUNT: 17 % (ref 11.5–14.5)
ERYTHROCYTE [SEDIMENTATION RATE] IN BLOOD BY WESTERGREN METHOD: 60 MM/H (ref 0–30)
GLUCOSE SERPL-MCNC: 104 MG/DL (ref 74–99)
HCT VFR BLD AUTO: 35.1 % (ref 36–46)
HGB BLD-MCNC: 11 G/DL (ref 12–16)
IMM GRANULOCYTES # BLD AUTO: 0.02 X10*3/UL (ref 0–0.7)
IMM GRANULOCYTES NFR BLD AUTO: 0.2 % (ref 0–0.9)
LYMPHOCYTES # BLD AUTO: 2.3 X10*3/UL (ref 1.2–4.8)
LYMPHOCYTES NFR BLD AUTO: 26.6 %
MCH RBC QN AUTO: 29.1 PG (ref 26–34)
MCHC RBC AUTO-ENTMCNC: 31.3 G/DL (ref 32–36)
MCV RBC AUTO: 93 FL (ref 80–100)
MONOCYTES # BLD AUTO: 0.75 X10*3/UL (ref 0.1–1)
MONOCYTES NFR BLD AUTO: 8.7 %
NEUTROPHILS # BLD AUTO: 5.39 X10*3/UL (ref 1.2–7.7)
NEUTROPHILS NFR BLD AUTO: 62.4 %
NRBC BLD-RTO: 0 /100 WBCS (ref 0–0)
PLATELET # BLD AUTO: 311 X10*3/UL (ref 150–450)
POTASSIUM SERPL-SCNC: 3.6 MMOL/L (ref 3.5–5.3)
PROT SERPL-MCNC: 6.5 G/DL (ref 6.4–8.2)
RBC # BLD AUTO: 3.78 X10*6/UL (ref 4–5.2)
SODIUM SERPL-SCNC: 133 MMOL/L (ref 136–145)
WBC # BLD AUTO: 8.6 X10*3/UL (ref 4.4–11.3)

## 2024-03-13 PROCEDURE — 11042 DBRDMT SUBQ TIS 1ST 20SQCM/<: CPT

## 2024-03-13 PROCEDURE — 85652 RBC SED RATE AUTOMATED: CPT

## 2024-03-13 PROCEDURE — 1090000002 HH PPS REVENUE DEBIT

## 2024-03-13 PROCEDURE — 1090000001 HH PPS REVENUE CREDIT

## 2024-03-13 PROCEDURE — 80053 COMPREHEN METABOLIC PANEL: CPT

## 2024-03-13 PROCEDURE — 85025 COMPLETE CBC W/AUTO DIFF WBC: CPT

## 2024-03-13 PROCEDURE — 86140 C-REACTIVE PROTEIN: CPT

## 2024-03-13 PROCEDURE — 82550 ASSAY OF CK (CPK): CPT

## 2024-03-13 PROCEDURE — G0299 HHS/HOSPICE OF RN EA 15 MIN: HCPCS | Mod: HHH

## 2024-03-13 ASSESSMENT — ENCOUNTER SYMPTOMS
PAIN: 1
PERSON REPORTING PAIN: PATIENT
PAIN LOCATION: BACK
SUBJECTIVE PAIN PROGRESSION: UNCHANGED
PAIN: 1
MUSCLE WEAKNESS: 1
PAIN LOCATION - PAIN SEVERITY: 8/10
LIMITED RANGE OF MOTION: 1
PERSON REPORTING PAIN: PATIENT
APPETITE LEVEL: GOOD

## 2024-03-13 ASSESSMENT — ACTIVITIES OF DAILY LIVING (ADL)
LIGHT HOUSEKEEPING: DEPENDENT
DRESSING_LB_CURRENT_FUNCTION: MODERATE ASSIST
GROOMING_CURRENT_FUNCTION: SUPERVISION
LAUNDRY: DEPENDENT
HOUSEKEEPING ASSESSED: 1
PREPARING MEALS: DEPENDENT
AMBULATION ASSISTANCE: 1
DRESSING_UB_CURRENT_FUNCTION: SUPERVISION
BATHING ASSESSED: 1
TOILETING: MODERATE ASSIST
GROOMING ASSESSED: 1
AMBULATION ASSISTANCE: MINIMUM ASSIST
AMBULATION ASSISTANCE: ONE PERSON
LAUNDRY ASSESSED: 1
BATHING_CURRENT_FUNCTION: MINIMUM ASSIST
TOILETING: 1
CURRENT_FUNCTION: ONE PERSON

## 2024-03-14 PROCEDURE — 1090000001 HH PPS REVENUE CREDIT

## 2024-03-14 PROCEDURE — 1090000002 HH PPS REVENUE DEBIT

## 2024-03-15 ENCOUNTER — HOME CARE VISIT (OUTPATIENT)
Dept: HOME HEALTH SERVICES | Facility: HOME HEALTH | Age: 69
End: 2024-03-15
Payer: COMMERCIAL

## 2024-03-15 ENCOUNTER — HOME INFUSION (OUTPATIENT)
Dept: INFUSION THERAPY | Age: 69
End: 2024-03-15
Payer: COMMERCIAL

## 2024-03-15 ENCOUNTER — DOCUMENTATION (OUTPATIENT)
Dept: PHARMACY | Facility: CLINIC | Age: 69
End: 2024-03-15

## 2024-03-15 VITALS — OXYGEN SATURATION: 96 % | HEART RATE: 76 BPM | RESPIRATION RATE: 20 BRPM | TEMPERATURE: 97.4 F

## 2024-03-15 PROCEDURE — G0299 HHS/HOSPICE OF RN EA 15 MIN: HCPCS | Mod: HHH

## 2024-03-15 PROCEDURE — 1090000001 HH PPS REVENUE CREDIT

## 2024-03-15 PROCEDURE — 1090000002 HH PPS REVENUE DEBIT

## 2024-03-15 PROCEDURE — G0157 HHC PT ASSISTANT EA 15: HCPCS | Mod: HHH

## 2024-03-15 NOTE — PROGRESS NOTES
Tara Tierney is receiving dapto thru 4/4 for MRSA surgical lumbar wound     Followed by Dr Ellison    Labs from 3/13 show ESR 60, CRP 5.27     RX contacted pt's , confirmed doses on hand thru 3/16. He is agreeable to delivery today for further doses and supplies/flushes to match.    Dispensing 3/15 with supplies to match   6x dapto HP  DOS 3/17-3/22    Follow up 3/22 check labs, send straight

## 2024-03-16 PROCEDURE — 1090000001 HH PPS REVENUE CREDIT

## 2024-03-16 PROCEDURE — 1090000002 HH PPS REVENUE DEBIT

## 2024-03-16 SDOH — HEALTH STABILITY: PHYSICAL HEALTH: EXERCISE ACTIVITIES SETS: 2

## 2024-03-16 SDOH — HEALTH STABILITY: PHYSICAL HEALTH: EXERCISE ACTIVITY: QUAD SETS

## 2024-03-16 SDOH — HEALTH STABILITY: PHYSICAL HEALTH: EXERCISE ACTIVITY: HRTR, MARCHING, LAQ

## 2024-03-16 SDOH — HEALTH STABILITY: PHYSICAL HEALTH: PHYSICAL EXERCISE: STANDING

## 2024-03-16 SDOH — HEALTH STABILITY: PHYSICAL HEALTH: PHYSICAL EXERCISE: 5

## 2024-03-16 SDOH — HEALTH STABILITY: PHYSICAL HEALTH: RESISTANCE: AAROM AS NEEDED

## 2024-03-16 SDOH — HEALTH STABILITY: PHYSICAL HEALTH: PHYSICAL EXERCISE: SEATED

## 2024-03-16 SDOH — HEALTH STABILITY: PHYSICAL HEALTH: EXERCISE TYPE: SEATED LE STRENGTHENING

## 2024-03-16 ASSESSMENT — ENCOUNTER SYMPTOMS
PAIN: 1
PAIN LOCATION: BACK
PAIN LOCATION - PAIN SEVERITY: 8/10
PERSON REPORTING PAIN: PATIENT
HIGHEST PAIN SEVERITY IN PAST 24 HOURS: 8/10
LOWEST PAIN SEVERITY IN PAST 24 HOURS: 0/10

## 2024-03-16 ASSESSMENT — ACTIVITIES OF DAILY LIVING (ADL): AMBULATION ASSISTANCE ON FLAT SURFACES: 1

## 2024-03-17 PROCEDURE — 1090000001 HH PPS REVENUE CREDIT

## 2024-03-17 PROCEDURE — 1090000002 HH PPS REVENUE DEBIT

## 2024-03-18 ENCOUNTER — APPOINTMENT (OUTPATIENT)
Dept: RADIOLOGY | Facility: HOSPITAL | Age: 69
End: 2024-03-18
Payer: COMMERCIAL

## 2024-03-18 ENCOUNTER — HOSPITAL ENCOUNTER (EMERGENCY)
Facility: HOSPITAL | Age: 69
Discharge: HOME | End: 2024-03-18
Attending: EMERGENCY MEDICINE
Payer: COMMERCIAL

## 2024-03-18 ENCOUNTER — APPOINTMENT (OUTPATIENT)
Dept: CARDIOLOGY | Facility: HOSPITAL | Age: 69
End: 2024-03-18
Payer: COMMERCIAL

## 2024-03-18 ENCOUNTER — APPOINTMENT (OUTPATIENT)
Dept: WOUND CARE | Facility: CLINIC | Age: 69
End: 2024-03-18
Payer: COMMERCIAL

## 2024-03-18 VITALS
OXYGEN SATURATION: 98 % | RESPIRATION RATE: 18 BRPM | DIASTOLIC BLOOD PRESSURE: 76 MMHG | HEIGHT: 57 IN | TEMPERATURE: 97.9 F | WEIGHT: 145 LBS | BODY MASS INDEX: 31.28 KG/M2 | SYSTOLIC BLOOD PRESSURE: 135 MMHG | HEART RATE: 89 BPM

## 2024-03-18 DIAGNOSIS — R10.84 ABDOMINAL PAIN, GENERALIZED: Primary | ICD-10-CM

## 2024-03-18 LAB
ALBUMIN SERPL BCP-MCNC: 3.8 G/DL (ref 3.4–5)
ALP SERPL-CCNC: 93 U/L (ref 33–136)
ALT SERPL W P-5'-P-CCNC: 15 U/L (ref 7–45)
ANION GAP SERPL CALC-SCNC: 17 MMOL/L (ref 10–20)
APPEARANCE UR: CLEAR
AST SERPL W P-5'-P-CCNC: 13 U/L (ref 9–39)
BASOPHILS # BLD AUTO: 0.02 X10*3/UL (ref 0–0.1)
BASOPHILS NFR BLD AUTO: 0.2 %
BILIRUB DIRECT SERPL-MCNC: 0.1 MG/DL (ref 0–0.3)
BILIRUB SERPL-MCNC: 0.4 MG/DL (ref 0–1.2)
BILIRUB UR STRIP.AUTO-MCNC: NEGATIVE MG/DL
BNP SERPL-MCNC: 162 PG/ML (ref 0–99)
BUN SERPL-MCNC: 11 MG/DL (ref 6–23)
CALCIUM SERPL-MCNC: 9.8 MG/DL (ref 8.6–10.3)
CARDIAC TROPONIN I PNL SERPL HS: 10 NG/L (ref 0–13)
CHLORIDE SERPL-SCNC: 94 MMOL/L (ref 98–107)
CO2 SERPL-SCNC: 26 MMOL/L (ref 21–32)
COLOR UR: YELLOW
CREAT SERPL-MCNC: 0.76 MG/DL (ref 0.5–1.05)
EGFRCR SERPLBLD CKD-EPI 2021: 84 ML/MIN/1.73M*2
EOSINOPHIL # BLD AUTO: 0.05 X10*3/UL (ref 0–0.7)
EOSINOPHIL NFR BLD AUTO: 0.5 %
ERYTHROCYTE [DISTWIDTH] IN BLOOD BY AUTOMATED COUNT: 16.4 % (ref 11.5–14.5)
FLUAV RNA RESP QL NAA+PROBE: NOT DETECTED
FLUBV RNA RESP QL NAA+PROBE: NOT DETECTED
GLUCOSE SERPL-MCNC: 167 MG/DL (ref 74–99)
GLUCOSE UR STRIP.AUTO-MCNC: NEGATIVE MG/DL
HCT VFR BLD AUTO: 38.3 % (ref 36–46)
HGB BLD-MCNC: 12.4 G/DL (ref 12–16)
IMM GRANULOCYTES # BLD AUTO: 0.03 X10*3/UL (ref 0–0.7)
IMM GRANULOCYTES NFR BLD AUTO: 0.3 % (ref 0–0.9)
INR PPP: 1.4 (ref 0.9–1.1)
KETONES UR STRIP.AUTO-MCNC: NEGATIVE MG/DL
LACTATE SERPL-SCNC: 3.3 MMOL/L (ref 0.4–2)
LEUKOCYTE ESTERASE UR QL STRIP.AUTO: NEGATIVE
LIPASE SERPL-CCNC: 61 U/L (ref 9–82)
LYMPHOCYTES # BLD AUTO: 1.34 X10*3/UL (ref 1.2–4.8)
LYMPHOCYTES NFR BLD AUTO: 13 %
MCH RBC QN AUTO: 29.1 PG (ref 26–34)
MCHC RBC AUTO-ENTMCNC: 32.4 G/DL (ref 32–36)
MCV RBC AUTO: 90 FL (ref 80–100)
MONOCYTES # BLD AUTO: 0.47 X10*3/UL (ref 0.1–1)
MONOCYTES NFR BLD AUTO: 4.5 %
NEUTROPHILS # BLD AUTO: 8.42 X10*3/UL (ref 1.2–7.7)
NEUTROPHILS NFR BLD AUTO: 81.5 %
NITRITE UR QL STRIP.AUTO: NEGATIVE
NRBC BLD-RTO: 0 /100 WBCS (ref 0–0)
PH UR STRIP.AUTO: 8 [PH]
PLATELET # BLD AUTO: 296 X10*3/UL (ref 150–450)
POTASSIUM SERPL-SCNC: 3.5 MMOL/L (ref 3.5–5.3)
PROT SERPL-MCNC: 7.6 G/DL (ref 6.4–8.2)
PROT UR STRIP.AUTO-MCNC: ABNORMAL MG/DL
PROTHROMBIN TIME: 15.7 SECONDS (ref 9.8–12.8)
RBC # BLD AUTO: 4.26 X10*6/UL (ref 4–5.2)
RBC # UR STRIP.AUTO: NEGATIVE /UL
RBC #/AREA URNS AUTO: NORMAL /HPF
SARS-COV-2 RNA RESP QL NAA+PROBE: NOT DETECTED
SODIUM SERPL-SCNC: 133 MMOL/L (ref 136–145)
SP GR UR STRIP.AUTO: 1.06
SQUAMOUS #/AREA URNS AUTO: NORMAL /HPF
UROBILINOGEN UR STRIP.AUTO-MCNC: <2 MG/DL
WBC # BLD AUTO: 10.3 X10*3/UL (ref 4.4–11.3)
WBC #/AREA URNS AUTO: NORMAL /HPF

## 2024-03-18 PROCEDURE — 74177 CT ABD & PELVIS W/CONTRAST: CPT

## 2024-03-18 PROCEDURE — 87636 SARSCOV2 & INF A&B AMP PRB: CPT | Performed by: NURSE PRACTITIONER

## 2024-03-18 PROCEDURE — 71275 CT ANGIOGRAPHY CHEST: CPT

## 2024-03-18 PROCEDURE — 71045 X-RAY EXAM CHEST 1 VIEW: CPT | Mod: FOREIGN READ | Performed by: RADIOLOGY

## 2024-03-18 PROCEDURE — 82248 BILIRUBIN DIRECT: CPT | Performed by: NURSE PRACTITIONER

## 2024-03-18 PROCEDURE — 83880 ASSAY OF NATRIURETIC PEPTIDE: CPT | Performed by: NURSE PRACTITIONER

## 2024-03-18 PROCEDURE — 83690 ASSAY OF LIPASE: CPT | Performed by: NURSE PRACTITIONER

## 2024-03-18 PROCEDURE — 85610 PROTHROMBIN TIME: CPT | Performed by: NURSE PRACTITIONER

## 2024-03-18 PROCEDURE — 74177 CT ABD & PELVIS W/CONTRAST: CPT | Mod: FOREIGN READ | Performed by: RADIOLOGY

## 2024-03-18 PROCEDURE — 36415 COLL VENOUS BLD VENIPUNCTURE: CPT | Performed by: NURSE PRACTITIONER

## 2024-03-18 PROCEDURE — 71275 CT ANGIOGRAPHY CHEST: CPT | Mod: FOREIGN READ | Performed by: RADIOLOGY

## 2024-03-18 PROCEDURE — 96375 TX/PRO/DX INJ NEW DRUG ADDON: CPT | Mod: 59

## 2024-03-18 PROCEDURE — 87040 BLOOD CULTURE FOR BACTERIA: CPT | Mod: 59,ELYLAB,91 | Performed by: NURSE PRACTITIONER

## 2024-03-18 PROCEDURE — 2500000004 HC RX 250 GENERAL PHARMACY W/ HCPCS (ALT 636 FOR OP/ED): Performed by: NURSE PRACTITIONER

## 2024-03-18 PROCEDURE — 81001 URINALYSIS AUTO W/SCOPE: CPT | Performed by: NURSE PRACTITIONER

## 2024-03-18 PROCEDURE — 80053 COMPREHEN METABOLIC PANEL: CPT | Performed by: NURSE PRACTITIONER

## 2024-03-18 PROCEDURE — 96374 THER/PROPH/DIAG INJ IV PUSH: CPT | Mod: 59

## 2024-03-18 PROCEDURE — 71045 X-RAY EXAM CHEST 1 VIEW: CPT

## 2024-03-18 PROCEDURE — 93005 ELECTROCARDIOGRAM TRACING: CPT

## 2024-03-18 PROCEDURE — 2550000001 HC RX 255 CONTRASTS: Performed by: NURSE PRACTITIONER

## 2024-03-18 PROCEDURE — 96361 HYDRATE IV INFUSION ADD-ON: CPT

## 2024-03-18 PROCEDURE — 99285 EMERGENCY DEPT VISIT HI MDM: CPT | Mod: 25

## 2024-03-18 PROCEDURE — 83605 ASSAY OF LACTIC ACID: CPT | Performed by: NURSE PRACTITIONER

## 2024-03-18 PROCEDURE — 84484 ASSAY OF TROPONIN QUANT: CPT | Performed by: NURSE PRACTITIONER

## 2024-03-18 PROCEDURE — 85025 COMPLETE CBC W/AUTO DIFF WBC: CPT | Performed by: NURSE PRACTITIONER

## 2024-03-18 PROCEDURE — 1090000002 HH PPS REVENUE DEBIT

## 2024-03-18 PROCEDURE — 1090000001 HH PPS REVENUE CREDIT

## 2024-03-18 RX ORDER — ONDANSETRON HYDROCHLORIDE 2 MG/ML
4 INJECTION, SOLUTION INTRAVENOUS ONCE
Status: COMPLETED | OUTPATIENT
Start: 2024-03-18 | End: 2024-03-18

## 2024-03-18 RX ORDER — HYDROMORPHONE HYDROCHLORIDE 1 MG/ML
1 INJECTION, SOLUTION INTRAMUSCULAR; INTRAVENOUS; SUBCUTANEOUS ONCE
Status: DISCONTINUED | OUTPATIENT
Start: 2024-03-18 | End: 2024-03-18

## 2024-03-18 RX ORDER — MORPHINE SULFATE 4 MG/ML
4 INJECTION, SOLUTION INTRAMUSCULAR; INTRAVENOUS ONCE
Status: COMPLETED | OUTPATIENT
Start: 2024-03-18 | End: 2024-03-18

## 2024-03-18 RX ORDER — OXYCODONE HYDROCHLORIDE 5 MG/1
5 TABLET ORAL EVERY 6 HOURS PRN
Qty: 12 TABLET | Refills: 0 | Status: SHIPPED | OUTPATIENT
Start: 2024-03-18 | End: 2024-03-21

## 2024-03-18 RX ORDER — HYDROMORPHONE HYDROCHLORIDE 1 MG/ML
1 INJECTION, SOLUTION INTRAMUSCULAR; INTRAVENOUS; SUBCUTANEOUS ONCE
Status: COMPLETED | OUTPATIENT
Start: 2024-03-18 | End: 2024-03-18

## 2024-03-18 RX ADMIN — IOHEXOL 75 ML: 350 INJECTION, SOLUTION INTRAVENOUS at 11:55

## 2024-03-18 RX ADMIN — ONDANSETRON 4 MG: 2 INJECTION, SOLUTION INTRAMUSCULAR; INTRAVENOUS at 11:05

## 2024-03-18 RX ADMIN — MORPHINE SULFATE 4 MG: 4 INJECTION, SOLUTION INTRAMUSCULAR; INTRAVENOUS at 11:05

## 2024-03-18 RX ADMIN — HYDROMORPHONE HYDROCHLORIDE 1 MG: 1 INJECTION, SOLUTION INTRAMUSCULAR; INTRAVENOUS; SUBCUTANEOUS at 11:35

## 2024-03-18 RX ADMIN — SODIUM CHLORIDE 500 ML: 9 INJECTION, SOLUTION INTRAVENOUS at 11:05

## 2024-03-18 ASSESSMENT — ENCOUNTER SYMPTOMS
SUBJECTIVE PAIN PROGRESSION: WAXING AND WANING
HIGHEST PAIN SEVERITY IN PAST 24 HOURS: 8/10
APPETITE LEVEL: FAIR
PERSON REPORTING PAIN: PATIENT
PAIN SEVERITY GOAL: 0/10
MUSCLE WEAKNESS: 1
LOWEST PAIN SEVERITY IN PAST 24 HOURS: 5/10
PAIN: 1
LIMITED RANGE OF MOTION: 1

## 2024-03-18 ASSESSMENT — ACTIVITIES OF DAILY LIVING (ADL)
CURRENT_FUNCTION: ONE PERSON
AMBULATION ASSISTANCE: ONE PERSON
AMBULATION ASSISTANCE: 1
PHYSICAL_TRANSFERS_DEVICES: WALKER
CURRENT_FUNCTION: MODERATE ASSIST
AMBULATION ASSISTANCE: MODERATE ASSIST
PHYSICAL TRANSFERS ASSESSED: 1

## 2024-03-18 ASSESSMENT — PAIN - FUNCTIONAL ASSESSMENT
PAIN_FUNCTIONAL_ASSESSMENT: 0-10
PAIN_FUNCTIONAL_ASSESSMENT: 0-10

## 2024-03-18 ASSESSMENT — COLUMBIA-SUICIDE SEVERITY RATING SCALE - C-SSRS
6. HAVE YOU EVER DONE ANYTHING, STARTED TO DO ANYTHING, OR PREPARED TO DO ANYTHING TO END YOUR LIFE?: NO
2. HAVE YOU ACTUALLY HAD ANY THOUGHTS OF KILLING YOURSELF?: NO
1. IN THE PAST MONTH, HAVE YOU WISHED YOU WERE DEAD OR WISHED YOU COULD GO TO SLEEP AND NOT WAKE UP?: NO

## 2024-03-18 ASSESSMENT — LIFESTYLE VARIABLES
EVER HAD A DRINK FIRST THING IN THE MORNING TO STEADY YOUR NERVES TO GET RID OF A HANGOVER: NO
HAVE YOU EVER FELT YOU SHOULD CUT DOWN ON YOUR DRINKING: NO
EVER FELT BAD OR GUILTY ABOUT YOUR DRINKING: NO
HAVE PEOPLE ANNOYED YOU BY CRITICIZING YOUR DRINKING: NO

## 2024-03-18 ASSESSMENT — PAIN DESCRIPTION - PAIN TYPE
TYPE: CHRONIC PAIN
TYPE: ACUTE PAIN

## 2024-03-18 ASSESSMENT — PAIN SCALES - GENERAL
PAINLEVEL_OUTOF10: 10 - WORST POSSIBLE PAIN
PAINLEVEL_OUTOF10: 2

## 2024-03-18 ASSESSMENT — PAIN DESCRIPTION - LOCATION: LOCATION: ABDOMEN

## 2024-03-18 NOTE — ED PROVIDER NOTES
HPI   Chief Complaint   Patient presents with    Abdominal Pain     Pt presents to the ED with abd pain, sob, and vomiting x2 days. Pt has MRSA as well per son       68-year-old female presents emergency department, complains of shortness of breath and abdominal pain for the last 2 to 3 days.      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    States feeling very unwell the last few days, describes epigastric abdominal pain, shortness of breath, feeling chills, body aches.    She is on IV daptomycin, did have her dose this morning.  Follows up with ID      History provided by:  Patient and spouse   used: No                        Sharona Coma Scale Score: 15                     Patient History   Past Medical History:   Diagnosis Date    Arthritis     Back pain     COPD (chronic obstructive pulmonary disease) (CMS/HCC)     COVID-19 vaccine administered     Eczema     History of COVID-19     2020    Hyperlipidemia     Hypertension     Hypoesthesia of skin     Hypesthesia    Macular degeneration     Meniere's disease     Osteoporosis     Overactive bladder     Pain in unspecified finger(s)     Finger pain    Pain in unspecified wrist     Pain in wrist joint    Peripheral vascular disease (CMS/HCC)     Personal history of other diseases of the circulatory system     History of coronary artery disease    Personal history of other diseases of the musculoskeletal system and connective tissue     History of arthritis    Personal history of other specified conditions     History of chest pain    Personal history of other specified conditions     History of balance disorder    Personal history of other specified conditions     History of numbness    Postmenopausal     Seasonal allergies     Unspecified visual loss     Vision problems     Past Surgical  History:   Procedure Laterality Date    ADENOIDECTOMY      AORTA - BILATERAL FEMORAL ARTERY BYPASS GRAFT      BLEPHAROPTOSIS REPAIR      CARDIAC CATHETERIZATION      with stent and balloon    CARDIAC CATHETERIZATION N/A 2024    Procedure: IVC Filter Insertion;  Surgeon: Star Negro MD;  Location: [facility];  Service: Cardiovascular;  Laterality: N/A;    CHOLECYSTECTOMY      COLONOSCOPY      CT ANGIO NECK  2022    CT NECK ANGIO W AND WO IV CONTRAST 2022 [facility]    CT AORTA AND BILATERAL ILIOFEMORAL RUNOFF ANGIOGRAM W AND/OR WO IV CONTRAST  03/10/2020    CT AORTA AND BILATERAL ILIOFEMORAL RUNOFF ANGIOGRAM W AND/OR WO IV CONTRAST 3/10/2020 [facility]    CT AORTA AND BILATERAL ILIOFEMORAL RUNOFF ANGIOGRAM W AND/OR WO IV CONTRAST  2023    CT AORTA AND BILATERAL ILIOFEMORAL RUNOFF ANGIOGRAM W AND/OR WO IV CONTRAST 2023 [facility]    FL TRANSLUMINAL ANGIO PERCUTANEOUS BHARAT      TONSILLECTOMY      Tonsillectomy    TOTAL KNEE ARTHROPLASTY Bilateral     TUBAL LIGATION       Family History   Problem Relation Name Age of Onset    Breast cancer Mother      Other (arteriosclerotic cardiovascular disease) Father      Cancer Father       Social History     Tobacco Use    Smoking status: Former     Packs/day: 0.50     Years: 45.00     Additional pack years: 0.00     Total pack years: 22.50     Types: Cigarettes     Quit date: 2023     Years since quittin.5    Smokeless tobacco: Not on file   Vaping Use    Vaping Use: Never used   Substance Use Topics    Alcohol use: Never    Drug use: Never       Physical Exam   ED Triage Vitals [24 1035]   Temperature Heart Rate Respirations BP   36.7 °C (98.1 °F) 86 17 165/87      Pulse Ox Temp Source Heart Rate Source Patient Position   98 % Temporal Monitor Sitting      BP Location FiO2 (%)     Right arm --       Physical Exam  Physical Exam:  Constitutional: Vitals noted, no distress. Afebrile.   Cardiovascular:  Regular, rate, rhythm, no murmur.   Pulmonary: Lungs clear bilaterally with good aeration. No adventitious breath sounds.   Gastrointestinal: Soft, nonsurgical.  Discomfort epigastrium. No peritoneal signs. Normoactive bowel sounds.   Musculoskeletal: No peripheral edema. Negative Homans bilaterally, no cords.   Skin: No rash.   Neuro: No focal neurologic deficits, NIH score of 0.    ED Course & MDM   Diagnoses as of 03/18/24 1433   Abdominal pain, generalized       Medical Decision Making  EKG obtained 11:29 AM with ventricular of 82, as interpreted me, showed normal sinus rhythm with left bundle branch block.  Compared with previous EKGs, appears relatively on changed.  No evidence of acute ischemia or other acute findings.    Laboratory workup initiated, patient was initially medicated for pain with 4 mg of IV Zofran, 4 mg of IV morphine and given 500 cc IV fluid bolus.    Laboratory workup patient CBC unremarkable, white cell count 10.3.  Lactate level mildly elevated to 3.3.  Metabolic panel relatively unremarkable, troponin 10, .    Was sent for imaging of the chest, abdomen and pelvis.  Ultimately imaging unremarkable for any acute findings.    She still continued to have pain so was additionally given 1 mg of IV Dilaudid.    Viral swab for COVID-19 and influenza negative and urinalysis unremarkable as well other than a high specific gravity indicating dehydration.    After the second dose of IV pain medication the patient remained pain and symptoms free.  She felt very well.    The wound care nurse came down and evaluated the patient's wound VAC that was due to be changed and had an air leak.  He was able to easily replace it in the department, wound appeared unremarkable.    We did obtain blood cultures at the initial part of her stay.    Ultimately the patient continued to feel well.  Discussed results with the patient, provided reassurance.  Discussed that she should check in with her  doctors.    Discussed pain control with the patient, patient states that she was discharged from the nursing facility on Saturday, was given a prescription for pain medication but when she took it to the pharmacy they told her it was a copy and they would not be able to fill the medication for her.    Has been taking an occasional Percocet that she had leftover since then but in the nursing home she was on high doses of morphine long-acting tablets.    Feel that most of her symptoms may be related to withdrawal, especially with pain medication they have completely resolved.    Reviewed her OARRS report, most recent prescription was for 15 mg morphine ER tablets twice daily.  Discussed continuing with oxycodone until she can follow-up with her primary care physician in the next few days.    Discussed strict return precautions, again otherwise follow-up with her physicians as scheduled.        Shared JALYN Attestation:    This patient was seen by the advanced practice provider.  I personally saw the patient and made/approved the management plan and take responsibility for the patient management.    History: 68-year-old female presents with shortness of breath and abdominal pain.    Exam: Regular rate and rhythm cardiac exam with clear breath sounds bilaterally.  Abdomen is soft with mild tenderness to palpation to the epigastrium.  Neurological exam is grossly intact.  Negative Homans' sign bilaterally.    MDM: Gastritis, cholecystitis, ACS, arrhythmia, colitis    I have seen and examined the patient, agree with the workup, evaluation, medical decision making, management and diagnosis.  The care plan has been discussed.    Chris Serna MD      Procedure  Procedures     Malika Smith, APRN-CNP  03/18/24 9152

## 2024-03-19 ENCOUNTER — HOSPITAL ENCOUNTER (EMERGENCY)
Facility: HOSPITAL | Age: 69
Discharge: HOME | End: 2024-03-19
Attending: STUDENT IN AN ORGANIZED HEALTH CARE EDUCATION/TRAINING PROGRAM
Payer: COMMERCIAL

## 2024-03-19 ENCOUNTER — APPOINTMENT (OUTPATIENT)
Dept: CARDIOLOGY | Facility: HOSPITAL | Age: 69
End: 2024-03-19
Payer: COMMERCIAL

## 2024-03-19 ENCOUNTER — APPOINTMENT (OUTPATIENT)
Dept: RADIOLOGY | Facility: HOSPITAL | Age: 69
End: 2024-03-19
Payer: COMMERCIAL

## 2024-03-19 VITALS
HEART RATE: 80 BPM | RESPIRATION RATE: 20 BRPM | OXYGEN SATURATION: 95 % | WEIGHT: 145 LBS | DIASTOLIC BLOOD PRESSURE: 50 MMHG | SYSTOLIC BLOOD PRESSURE: 135 MMHG | TEMPERATURE: 97.7 F | BODY MASS INDEX: 31.38 KG/M2

## 2024-03-19 DIAGNOSIS — R11.0 NAUSEA: ICD-10-CM

## 2024-03-19 DIAGNOSIS — R10.84 GENERALIZED ABDOMINAL PAIN: Primary | ICD-10-CM

## 2024-03-19 DIAGNOSIS — R11.2 NAUSEA AND VOMITING, UNSPECIFIED VOMITING TYPE: ICD-10-CM

## 2024-03-19 LAB
ALBUMIN SERPL BCP-MCNC: 3.8 G/DL (ref 3.4–5)
ALP SERPL-CCNC: 102 U/L (ref 33–136)
ALT SERPL W P-5'-P-CCNC: 14 U/L (ref 7–45)
ANION GAP SERPL CALC-SCNC: 16 MMOL/L (ref 10–20)
AST SERPL W P-5'-P-CCNC: 14 U/L (ref 9–39)
ATRIAL RATE: 82 BPM
ATRIAL RATE: 88 BPM
BASOPHILS # BLD AUTO: 0.01 X10*3/UL (ref 0–0.1)
BASOPHILS NFR BLD AUTO: 0.1 %
BILIRUB SERPL-MCNC: 0.5 MG/DL (ref 0–1.2)
BNP SERPL-MCNC: 250 PG/ML (ref 0–99)
BUN SERPL-MCNC: 9 MG/DL (ref 6–23)
CALCIUM SERPL-MCNC: 9.8 MG/DL (ref 8.6–10.3)
CARDIAC TROPONIN I PNL SERPL HS: 11 NG/L (ref 0–13)
CARDIAC TROPONIN I PNL SERPL HS: 13 NG/L (ref 0–13)
CHLORIDE SERPL-SCNC: 94 MMOL/L (ref 98–107)
CO2 SERPL-SCNC: 26 MMOL/L (ref 21–32)
CREAT SERPL-MCNC: 0.68 MG/DL (ref 0.5–1.05)
EGFRCR SERPLBLD CKD-EPI 2021: >90 ML/MIN/1.73M*2
EOSINOPHIL # BLD AUTO: 0.01 X10*3/UL (ref 0–0.7)
EOSINOPHIL NFR BLD AUTO: 0.1 %
ERYTHROCYTE [DISTWIDTH] IN BLOOD BY AUTOMATED COUNT: 16.1 % (ref 11.5–14.5)
FLUAV RNA RESP QL NAA+PROBE: NOT DETECTED
FLUBV RNA RESP QL NAA+PROBE: NOT DETECTED
GLUCOSE SERPL-MCNC: 159 MG/DL (ref 74–99)
HCT VFR BLD AUTO: 38.6 % (ref 36–46)
HGB BLD-MCNC: 12.5 G/DL (ref 12–16)
HOLD SPECIMEN: NORMAL
IMM GRANULOCYTES # BLD AUTO: 0.05 X10*3/UL (ref 0–0.7)
IMM GRANULOCYTES NFR BLD AUTO: 0.5 % (ref 0–0.9)
LACTATE SERPL-SCNC: 2.3 MMOL/L (ref 0.4–2)
LACTATE SERPL-SCNC: 3.1 MMOL/L (ref 0.4–2)
LIPASE SERPL-CCNC: 55 U/L (ref 9–82)
LYMPHOCYTES # BLD AUTO: 1.37 X10*3/UL (ref 1.2–4.8)
LYMPHOCYTES NFR BLD AUTO: 13.6 %
MCH RBC QN AUTO: 29.2 PG (ref 26–34)
MCHC RBC AUTO-ENTMCNC: 32.4 G/DL (ref 32–36)
MCV RBC AUTO: 90 FL (ref 80–100)
MONOCYTES # BLD AUTO: 0.43 X10*3/UL (ref 0.1–1)
MONOCYTES NFR BLD AUTO: 4.3 %
NEUTROPHILS # BLD AUTO: 8.24 X10*3/UL (ref 1.2–7.7)
NEUTROPHILS NFR BLD AUTO: 81.4 %
NRBC BLD-RTO: 0 /100 WBCS (ref 0–0)
P AXIS: 53 DEGREES
P AXIS: 62 DEGREES
P OFFSET: 193 MS
P OFFSET: 197 MS
P ONSET: 145 MS
P ONSET: 145 MS
PLATELET # BLD AUTO: 266 X10*3/UL (ref 150–450)
POTASSIUM SERPL-SCNC: 3.4 MMOL/L (ref 3.5–5.3)
PR INTERVAL: 134 MS
PR INTERVAL: 134 MS
PROT SERPL-MCNC: 7.5 G/DL (ref 6.4–8.2)
Q ONSET: 212 MS
Q ONSET: 212 MS
QRS COUNT: 14 BEATS
QRS COUNT: 14 BEATS
QRS DURATION: 134 MS
QRS DURATION: 136 MS
QT INTERVAL: 420 MS
QT INTERVAL: 426 MS
QTC CALCULATION(BAZETT): 490 MS
QTC CALCULATION(BAZETT): 515 MS
QTC FREDERICIA: 466 MS
QTC FREDERICIA: 484 MS
R AXIS: 11 DEGREES
R AXIS: 36 DEGREES
RBC # BLD AUTO: 4.28 X10*6/UL (ref 4–5.2)
SARS-COV-2 RNA RESP QL NAA+PROBE: NOT DETECTED
SODIUM SERPL-SCNC: 133 MMOL/L (ref 136–145)
T AXIS: 106 DEGREES
T AXIS: 57 DEGREES
T OFFSET: 422 MS
T OFFSET: 425 MS
VENTRICULAR RATE: 82 BPM
VENTRICULAR RATE: 88 BPM
WBC # BLD AUTO: 10.1 X10*3/UL (ref 4.4–11.3)

## 2024-03-19 PROCEDURE — 83690 ASSAY OF LIPASE: CPT | Performed by: STUDENT IN AN ORGANIZED HEALTH CARE EDUCATION/TRAINING PROGRAM

## 2024-03-19 PROCEDURE — 84484 ASSAY OF TROPONIN QUANT: CPT | Performed by: STUDENT IN AN ORGANIZED HEALTH CARE EDUCATION/TRAINING PROGRAM

## 2024-03-19 PROCEDURE — 1090000002 HH PPS REVENUE DEBIT

## 2024-03-19 PROCEDURE — 96376 TX/PRO/DX INJ SAME DRUG ADON: CPT

## 2024-03-19 PROCEDURE — 2500000004 HC RX 250 GENERAL PHARMACY W/ HCPCS (ALT 636 FOR OP/ED): Performed by: STUDENT IN AN ORGANIZED HEALTH CARE EDUCATION/TRAINING PROGRAM

## 2024-03-19 PROCEDURE — 84075 ASSAY ALKALINE PHOSPHATASE: CPT | Performed by: STUDENT IN AN ORGANIZED HEALTH CARE EDUCATION/TRAINING PROGRAM

## 2024-03-19 PROCEDURE — 2500000001 HC RX 250 WO HCPCS SELF ADMINISTERED DRUGS (ALT 637 FOR MEDICARE OP): Performed by: STUDENT IN AN ORGANIZED HEALTH CARE EDUCATION/TRAINING PROGRAM

## 2024-03-19 PROCEDURE — 71045 X-RAY EXAM CHEST 1 VIEW: CPT

## 2024-03-19 PROCEDURE — 71045 X-RAY EXAM CHEST 1 VIEW: CPT | Performed by: RADIOLOGY

## 2024-03-19 PROCEDURE — 93005 ELECTROCARDIOGRAM TRACING: CPT

## 2024-03-19 PROCEDURE — 96375 TX/PRO/DX INJ NEW DRUG ADDON: CPT

## 2024-03-19 PROCEDURE — 83605 ASSAY OF LACTIC ACID: CPT | Performed by: STUDENT IN AN ORGANIZED HEALTH CARE EDUCATION/TRAINING PROGRAM

## 2024-03-19 PROCEDURE — 99284 EMERGENCY DEPT VISIT MOD MDM: CPT | Mod: 25

## 2024-03-19 PROCEDURE — 1090000001 HH PPS REVENUE CREDIT

## 2024-03-19 PROCEDURE — 87636 SARSCOV2 & INF A&B AMP PRB: CPT | Performed by: STUDENT IN AN ORGANIZED HEALTH CARE EDUCATION/TRAINING PROGRAM

## 2024-03-19 PROCEDURE — 85025 COMPLETE CBC W/AUTO DIFF WBC: CPT | Performed by: STUDENT IN AN ORGANIZED HEALTH CARE EDUCATION/TRAINING PROGRAM

## 2024-03-19 PROCEDURE — 83880 ASSAY OF NATRIURETIC PEPTIDE: CPT | Performed by: STUDENT IN AN ORGANIZED HEALTH CARE EDUCATION/TRAINING PROGRAM

## 2024-03-19 PROCEDURE — 96374 THER/PROPH/DIAG INJ IV PUSH: CPT

## 2024-03-19 RX ORDER — ONDANSETRON 4 MG/1
4 TABLET, ORALLY DISINTEGRATING ORAL EVERY 8 HOURS PRN
Qty: 20 TABLET | Refills: 0 | Status: SHIPPED | OUTPATIENT
Start: 2024-03-19 | End: 2024-03-26

## 2024-03-19 RX ORDER — HYDROMORPHONE HYDROCHLORIDE 1 MG/ML
1 INJECTION, SOLUTION INTRAMUSCULAR; INTRAVENOUS; SUBCUTANEOUS ONCE
Status: COMPLETED | OUTPATIENT
Start: 2024-03-19 | End: 2024-03-19

## 2024-03-19 RX ORDER — POTASSIUM CHLORIDE 1.5 G/1.58G
40 POWDER, FOR SOLUTION ORAL ONCE
Status: COMPLETED | OUTPATIENT
Start: 2024-03-19 | End: 2024-03-19

## 2024-03-19 RX ORDER — ONDANSETRON HYDROCHLORIDE 2 MG/ML
4 INJECTION, SOLUTION INTRAVENOUS ONCE
Status: COMPLETED | OUTPATIENT
Start: 2024-03-19 | End: 2024-03-19

## 2024-03-19 RX ORDER — OXYCODONE HYDROCHLORIDE 5 MG/1
5 TABLET ORAL ONCE
Status: COMPLETED | OUTPATIENT
Start: 2024-03-19 | End: 2024-03-19

## 2024-03-19 RX ADMIN — HYDROMORPHONE HYDROCHLORIDE 0.5 MG: 1 INJECTION, SOLUTION INTRAMUSCULAR; INTRAVENOUS; SUBCUTANEOUS at 11:06

## 2024-03-19 RX ADMIN — POTASSIUM CHLORIDE 40 MEQ: 1.5 POWDER, FOR SOLUTION ORAL at 13:04

## 2024-03-19 RX ADMIN — HYDROMORPHONE HYDROCHLORIDE 1 MG: 1 INJECTION, SOLUTION INTRAMUSCULAR; INTRAVENOUS; SUBCUTANEOUS at 12:18

## 2024-03-19 RX ADMIN — ONDANSETRON 4 MG: 2 INJECTION INTRAMUSCULAR; INTRAVENOUS at 11:06

## 2024-03-19 RX ADMIN — SODIUM CHLORIDE, POTASSIUM CHLORIDE, SODIUM LACTATE AND CALCIUM CHLORIDE 500 ML: 600; 310; 30; 20 INJECTION, SOLUTION INTRAVENOUS at 12:18

## 2024-03-19 RX ADMIN — OXYCODONE HYDROCHLORIDE 5 MG: 5 TABLET ORAL at 13:05

## 2024-03-19 ASSESSMENT — LIFESTYLE VARIABLES
EVER HAD A DRINK FIRST THING IN THE MORNING TO STEADY YOUR NERVES TO GET RID OF A HANGOVER: NO
HAVE PEOPLE ANNOYED YOU BY CRITICIZING YOUR DRINKING: NO
EVER FELT BAD OR GUILTY ABOUT YOUR DRINKING: NO
HAVE YOU EVER FELT YOU SHOULD CUT DOWN ON YOUR DRINKING: NO

## 2024-03-19 ASSESSMENT — PAIN - FUNCTIONAL ASSESSMENT: PAIN_FUNCTIONAL_ASSESSMENT: 0-10

## 2024-03-19 ASSESSMENT — PAIN DESCRIPTION - PAIN TYPE: TYPE: ACUTE PAIN

## 2024-03-19 ASSESSMENT — PAIN DESCRIPTION - LOCATION: LOCATION: BACK

## 2024-03-19 ASSESSMENT — PAIN SCALES - GENERAL: PAINLEVEL_OUTOF10: 10 - WORST POSSIBLE PAIN

## 2024-03-19 NOTE — ED PROVIDER NOTES
HPI   Chief Complaint   Patient presents with    Vomiting     Patient states vomiting for the past two days, recently discharged from rehab for back surgery. Patient was receiving morphine for pain at the rehab center. Patient seen yesterday for similar complaint of vomiting and back pain.       Patient is a 68-year-old female with history of hypertension, peripheral vascular disease, hyperlipidemia, bronchitis, CAD, cardiomyopathy who presents for multiple complaints.  Patient states that she is having abdominal pain and center abdomen, vomiting that is nonbloody, chills.  States she is having some chest discomfort and shortness of breath.  States her symptoms are similar to when she presented yesterday.  Patient was seen at that time, evaluated and given pain medications.  Patient was recently on morphine for approximate 1 month due to a spine surgery that required multiple revisions and currently has a wound VAC.  Patient is on IV daptomycin at home. Patient woke up at 5 AM this morning with abdominal pain and vomiting as she has last 3 days.  Patient took her oxycodone shortly after, though threw it up very quickly.                          Throckmorton Coma Scale Score: 15                     Patient History   Past Medical History:   Diagnosis Date    Arthritis     Back pain     COPD (chronic obstructive pulmonary disease) (CMS/HCC)     COVID-19 vaccine administered     Eczema     History of COVID-19     2020    Hyperlipidemia     Hypertension     Hypoesthesia of skin     Hypesthesia    Macular degeneration     Meniere's disease     Osteoporosis     Overactive bladder     Pain in unspecified finger(s)     Finger pain    Pain in unspecified wrist     Pain in wrist joint    Peripheral vascular disease (CMS/HCC)     Personal history of other diseases of the circulatory system     History of coronary artery disease    Personal history of other diseases of the musculoskeletal system and connective tissue     History of  arthritis    Personal history of other specified conditions     History of chest pain    Personal history of other specified conditions     History of balance disorder    Personal history of other specified conditions     History of numbness    Postmenopausal     Seasonal allergies     Unspecified visual loss     Vision problems     Past Surgical History:   Procedure Laterality Date    ADENOIDECTOMY      AORTA - BILATERAL FEMORAL ARTERY BYPASS GRAFT      BLEPHAROPTOSIS REPAIR      CARDIAC CATHETERIZATION      with stent and balloon    CARDIAC CATHETERIZATION N/A 2024    Procedure: IVC Filter Insertion;  Surgeon: Star Negro MD;  Location: [facility];  Service: Cardiovascular;  Laterality: N/A;    CHOLECYSTECTOMY      COLONOSCOPY      CT ANGIO NECK  2022    CT NECK ANGIO W AND WO IV CONTRAST 2022 [facility]    CT AORTA AND BILATERAL ILIOFEMORAL RUNOFF ANGIOGRAM W AND/OR WO IV CONTRAST  03/10/2020    CT AORTA AND BILATERAL ILIOFEMORAL RUNOFF ANGIOGRAM W AND/OR WO IV CONTRAST 3/10/2020 [facility]    CT AORTA AND BILATERAL ILIOFEMORAL RUNOFF ANGIOGRAM W AND/OR WO IV CONTRAST  2023    CT AORTA AND BILATERAL ILIOFEMORAL RUNOFF ANGIOGRAM W AND/OR WO IV CONTRAST 2023 [facility]    FL TRANSLUMINAL ANGIO PERCUTANEOUS BHARAT      TONSILLECTOMY      Tonsillectomy    TOTAL KNEE ARTHROPLASTY Bilateral     TUBAL LIGATION       Family History   Problem Relation Name Age of Onset    Breast cancer Mother      Other (arteriosclerotic cardiovascular disease) Father      Cancer Father       Social History     Tobacco Use    Smoking status: Former     Packs/day: 0.50     Years: 45.00     Additional pack years: 0.00     Total pack years: 22.50     Types: Cigarettes     Quit date: 2023     Years since quittin.5    Smokeless tobacco: Not on file   Vaping Use    Vaping Use: Never used   Substance Use Topics    Alcohol use: Never    Drug use: Never       Physical Exam    ED Triage Vitals [03/19/24 1051]   Temp Heart Rate Respirations BP   -- 92 (!) 22 (!) 193/106      Pulse Ox Temp src Heart Rate Source Patient Position   99 % -- Monitor --      BP Location FiO2 (%)     -- --       Physical Exam  Constitutional:       General: She is not in acute distress.  HENT:      Head: Normocephalic.   Eyes:      Extraocular Movements: Extraocular movements intact.      Conjunctiva/sclera: Conjunctivae normal.      Pupils: Pupils are equal, round, and reactive to light.   Cardiovascular:      Rate and Rhythm: Normal rate and regular rhythm.      Pulses: Normal pulses.      Heart sounds: Normal heart sounds.   Pulmonary:      Effort: Pulmonary effort is normal.      Breath sounds: Normal breath sounds.   Abdominal:      General: There is no distension.      Palpations: Abdomen is soft. There is no mass.      Tenderness: There is abdominal tenderness (epigastric). There is guarding.   Musculoskeletal:         General: No deformity.      Cervical back: Normal range of motion and neck supple.      Right lower leg: No edema.      Left lower leg: No edema.   Skin:     General: Skin is warm and dry.      Findings: No lesion or rash.   Neurological:      General: No focal deficit present.      Mental Status: She is alert and oriented to person, place, and time. Mental status is at baseline.      Cranial Nerves: No cranial nerve deficit.      Sensory: No sensory deficit.      Motor: No weakness.   Psychiatric:         Mood and Affect: Mood normal.         ED Course & MDM   Diagnoses as of 03/19/24 1425   Generalized abdominal pain   Nausea   Nausea and vomiting, unspecified vomiting type       Medical Decision Making  EKG on my interpretation: Rate 88, rhythm regular, axis normal, , , QTc 515, T waves: Unremarkable, ST segments: Mild depressions in lead I/2/aVF, V4 through V6, mild elevations in V1/V2, interpretation: Normal sinus rhythm, left bundle branch block, similar to EKGs from  January 2024.,  No STEMI     EKG on my interpretation: Rate 86, rhythm irregular, axis normal, , , QTc 495, T waves: Unremarkable, ST segments: Depressions in lead I/2/aVF/V4 through V6, mild elevations in V1/V2, interpretation: Normal sinus rhythm with sinus arrhythmia, left bundle branch block, similar to previous EKG, no STEMI    Patient is a 68-year-old female with above-stated past medical history presents for nausea, vomiting, abdominal pain.  Patient's EKGs are nonischemic and similar to previous EKGs, she is 2 negative troponins, low suspicion for ACS.  Patient's lipase is not consistent pancreatitis.  Patient has a mildly elevated lactate which is improved versus her value yesterday and is downtrending after a small fluid bolus.  Patient has a mildly elevated BNP, but no signs of fluid overload on her chest x-ray and she does not appear to be fluid overloaded on my exam.  Potassium was mildly decreased, this was repleted in the emergency department.  CBC shows no leukocytosis.  Influenza and COVID are negative.  Patient's urinalysis from yesterday was reviewed and showed no signs of urinary tract infection given the location of her pain, low suspicion for this as a cause for symptoms.  Chest x-ray on my review did not show signs of pneumonia or pneumothorax, this was confirmed by radiology.  After pain medications and nausea medications, patient is much improved clinically, her blood pressure improved and she states she feels well.  I believe the patient symptoms likely due to withdrawal from opioids and given her vomiting this morning she was able to keep her oral dose down as well as her antinausea medication.  I did prescribe her a disintegrating Zofran.  She has a an opioid prescription from the provider yesterday.  Patient was comfortable turning home.  I did advise her to follow-up with her surgeon to obtain additional pain medications and to discuss a taper.  She stated understanding  agreement.  I advised her to follow-up with her cardiologist regarding her BNP value.  I have low clinical suspicion for an acute cardiopulmonary cause to her symptoms.  Patient stated understanding agreement.  Patient was discharged home with return precautions and follow-up instructions.    Disclaimer: This note was dictated using speech recognition software. Minor errors in transcription may be present. Please call if questions.     Sandeep Garcia MD  [facility]  Contact on [employer]        Problems Addressed:  Generalized abdominal pain: acute illness or injury  Nausea and vomiting, unspecified vomiting type: acute illness or injury    Amount and/or Complexity of Data Reviewed  Labs: ordered.  Radiology: ordered and independent interpretation performed.  ECG/medicine tests: ordered and independent interpretation performed.        Procedure  Procedures     Sandeep Garcia MD  03/19/24 7660

## 2024-03-20 ENCOUNTER — HOME CARE VISIT (OUTPATIENT)
Dept: HOME HEALTH SERVICES | Facility: HOME HEALTH | Age: 69
End: 2024-03-20
Payer: COMMERCIAL

## 2024-03-20 ENCOUNTER — LAB REQUISITION (OUTPATIENT)
Dept: LAB | Facility: LAB | Age: 69
End: 2024-03-20
Payer: COMMERCIAL

## 2024-03-20 VITALS
DIASTOLIC BLOOD PRESSURE: 88 MMHG | HEART RATE: 88 BPM | TEMPERATURE: 98.1 F | RESPIRATION RATE: 18 BRPM | OXYGEN SATURATION: 98 % | SYSTOLIC BLOOD PRESSURE: 150 MMHG

## 2024-03-20 DIAGNOSIS — T81.49XD INFECTION FOLLOWING A PROCEDURE, OTHER SURGICAL SITE, SUBSEQUENT ENCOUNTER: ICD-10-CM

## 2024-03-20 LAB
ALBUMIN SERPL BCP-MCNC: 3.7 G/DL (ref 3.4–5)
ALP SERPL-CCNC: 101 U/L (ref 33–136)
ALT SERPL W P-5'-P-CCNC: 19 U/L (ref 7–45)
ANION GAP SERPL CALC-SCNC: 14 MMOL/L (ref 10–20)
AST SERPL W P-5'-P-CCNC: 21 U/L (ref 9–39)
BASOPHILS # BLD AUTO: 0.02 X10*3/UL (ref 0–0.1)
BASOPHILS NFR BLD AUTO: 0.3 %
BILIRUB SERPL-MCNC: 0.6 MG/DL (ref 0–1.2)
BUN SERPL-MCNC: 8 MG/DL (ref 6–23)
CALCIUM SERPL-MCNC: 9.5 MG/DL (ref 8.6–10.3)
CHLORIDE SERPL-SCNC: 96 MMOL/L (ref 98–107)
CK SERPL-CCNC: 48 U/L (ref 0–215)
CO2 SERPL-SCNC: 27 MMOL/L (ref 21–32)
CREAT SERPL-MCNC: 0.67 MG/DL (ref 0.5–1.05)
CRP SERPL-MCNC: 1.34 MG/DL
EGFRCR SERPLBLD CKD-EPI 2021: >90 ML/MIN/1.73M*2
EOSINOPHIL # BLD AUTO: 0.12 X10*3/UL (ref 0–0.7)
EOSINOPHIL NFR BLD AUTO: 1.5 %
ERYTHROCYTE [DISTWIDTH] IN BLOOD BY AUTOMATED COUNT: 16.4 % (ref 11.5–14.5)
GLUCOSE SERPL-MCNC: 154 MG/DL (ref 74–99)
HCT VFR BLD AUTO: 43.1 % (ref 36–46)
HGB BLD-MCNC: 13.7 G/DL (ref 12–16)
IMM GRANULOCYTES # BLD AUTO: 0.02 X10*3/UL (ref 0–0.7)
IMM GRANULOCYTES NFR BLD AUTO: 0.3 % (ref 0–0.9)
LYMPHOCYTES # BLD AUTO: 1.42 X10*3/UL (ref 1.2–4.8)
LYMPHOCYTES NFR BLD AUTO: 17.9 %
MCH RBC QN AUTO: 29 PG (ref 26–34)
MCHC RBC AUTO-ENTMCNC: 31.8 G/DL (ref 32–36)
MCV RBC AUTO: 91 FL (ref 80–100)
MONOCYTES # BLD AUTO: 0.52 X10*3/UL (ref 0.1–1)
MONOCYTES NFR BLD AUTO: 6.6 %
NEUTROPHILS # BLD AUTO: 5.82 X10*3/UL (ref 1.2–7.7)
NEUTROPHILS NFR BLD AUTO: 73.4 %
NRBC BLD-RTO: 0 /100 WBCS (ref 0–0)
PLATELET # BLD AUTO: 250 X10*3/UL (ref 150–450)
POTASSIUM SERPL-SCNC: 4.1 MMOL/L (ref 3.5–5.3)
PROT SERPL-MCNC: 7.1 G/DL (ref 6.4–8.2)
RBC # BLD AUTO: 4.73 X10*6/UL (ref 4–5.2)
SODIUM SERPL-SCNC: 133 MMOL/L (ref 136–145)
WBC # BLD AUTO: 7.9 X10*3/UL (ref 4.4–11.3)

## 2024-03-20 PROCEDURE — 86140 C-REACTIVE PROTEIN: CPT

## 2024-03-20 PROCEDURE — 1090000001 HH PPS REVENUE CREDIT

## 2024-03-20 PROCEDURE — 80053 COMPREHEN METABOLIC PANEL: CPT

## 2024-03-20 PROCEDURE — G0299 HHS/HOSPICE OF RN EA 15 MIN: HCPCS | Mod: HHH

## 2024-03-20 PROCEDURE — 85025 COMPLETE CBC W/AUTO DIFF WBC: CPT

## 2024-03-20 PROCEDURE — 1090000002 HH PPS REVENUE DEBIT

## 2024-03-20 PROCEDURE — 82550 ASSAY OF CK (CPK): CPT

## 2024-03-20 ASSESSMENT — ENCOUNTER SYMPTOMS
NAUSEA: 1
PAIN LOCATION - PAIN SEVERITY: 8/10
PAIN: 1
PERSON REPORTING PAIN: PATIENT
CHANGE IN APPETITE: UNCHANGED
APPETITE LEVEL: FAIR
PAIN LOCATION: BACK
PAIN LOCATION - PAIN QUALITY: ACHING
MUSCLE WEAKNESS: 1
PAIN LOCATION - PAIN FREQUENCY: CONSTANT
VOMITING: 1

## 2024-03-20 NOTE — CASE COMMUNICATION
Patient declined Physical Therapy visit 3/20/24 via RN due to not feeling well.  Will attempt next visit 3/22/24

## 2024-03-21 ENCOUNTER — OFFICE VISIT (OUTPATIENT)
Dept: ORTHOPEDIC SURGERY | Facility: CLINIC | Age: 69
End: 2024-03-21
Payer: COMMERCIAL

## 2024-03-21 ENCOUNTER — HOSPITAL ENCOUNTER (OUTPATIENT)
Dept: RADIOLOGY | Facility: CLINIC | Age: 69
Discharge: HOME | End: 2024-03-21
Payer: COMMERCIAL

## 2024-03-21 ENCOUNTER — HOME CARE VISIT (OUTPATIENT)
Dept: HOME HEALTH SERVICES | Facility: HOME HEALTH | Age: 69
End: 2024-03-21
Payer: COMMERCIAL

## 2024-03-21 DIAGNOSIS — M54.50 LOW BACK PAIN, UNSPECIFIED BACK PAIN LATERALITY, UNSPECIFIED CHRONICITY, UNSPECIFIED WHETHER SCIATICA PRESENT: ICD-10-CM

## 2024-03-21 DIAGNOSIS — R10.84 GENERALIZED ABDOMINAL PAIN: ICD-10-CM

## 2024-03-21 PROCEDURE — 1090000002 HH PPS REVENUE DEBIT

## 2024-03-21 PROCEDURE — 1123F ACP DISCUSS/DSCN MKR DOCD: CPT | Performed by: ORTHOPAEDIC SURGERY

## 2024-03-21 PROCEDURE — G0299 HHS/HOSPICE OF RN EA 15 MIN: HCPCS | Mod: HHH

## 2024-03-21 PROCEDURE — 1090000001 HH PPS REVENUE CREDIT

## 2024-03-21 PROCEDURE — 99024 POSTOP FOLLOW-UP VISIT: CPT | Performed by: ORTHOPAEDIC SURGERY

## 2024-03-21 PROCEDURE — 72100 X-RAY EXAM L-S SPINE 2/3 VWS: CPT

## 2024-03-21 PROCEDURE — 1159F MED LIST DOCD IN RCRD: CPT | Performed by: ORTHOPAEDIC SURGERY

## 2024-03-21 PROCEDURE — 72100 X-RAY EXAM L-S SPINE 2/3 VWS: CPT | Performed by: RADIOLOGY

## 2024-03-21 NOTE — PROGRESS NOTES
Chief complaint: Follow-up    HPI: Patient here for a follow-up for the first time in the office since surgery.  She had a laminectomy with interbody cages at L4-5 and L5-S1 with instrumentation done on November 20, 2023.  We then washed her out on December 6, 2023.  She had a second washout on December 11, 2023.  She had her final washout on December 31, 2023 and had hardware removal done at that time.  She had a MRSA infection which was significantly resistant to all antibiotics other than the last ones that infectious disease use.  Therefore she was not improving.  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  Multiple repeat postoperative MRIs did not show anything surgical after her last washout.  She eventually was discharged to a rehab facility.  I regularly was having conversations with her , Jomar, about how the patient was doing.  They did not have any interest in getting her transferred ported to see me in the office as she was getting adequate care in the rehab facility and they were concerned it was too much on the patient to come see me.  She eventually was discharged home from the rehab facility and she was able to get into see me which is today.  Compared to how the patient was doing in the postoperative period when she was requiring irrigation debridement surgeries she says she is significantly better.  She essentially has no pain in her back other than a little bit of an ache.  She has absolutely no thigh and groin pain or leg pain that she was having postoperatively that was extremely severe.  She is very happy how she is doing.  She can stand and walk with her walker but not for excessively long distances.  Her main issue, that started lately, is nausea and vomiting and gastric distress.  She went to the ER and they diagnosed her with narcotic withdrawal.  She is now working with Dr. Barnes on that.  However, I  do have some concerns that this may be a gastritis or some other GI issue and I think this should be worked up by our GI team as she has epigastric pain.  I personally discussed this situation with Dr. Barnes on the phone.  She is also working with the wound care team, Dr. Reyes, for an open wound in her posterior lumbar spine.  Her fascia is closed but there is a superficial wound that is slowly healing with a wound VAC.  The patient has absolutely no interest in having any plastic surgery type procedures to close that wound as she says she will not have another surgery.  I spoke with infectious disease, Dr. Leonard, and the plan is to keep the patient on long-term chronic antibiotics for the remainder of her life.    Physical exam: Posterior wound is still open but looks very clean with a clean wound bed and closed fascia with no exposed hardware or deep muscle or spinal elements.  Her wound VAC is getting changed today as it does get changed regularly by a home wound care nurse.  Leg strength is 4 to 4+/5 strength bilaterally with slightly weaker hip flexion and knee extension on the right compared to the left but significant improvement in her overall strength and how she is doing with that.  She has normal sensation bilateral lower extremities.  She can stand with ease from her wheelchair today but she did not want to walk as she does not have her walker with her.    X-rays were taken and reviewed today and it shows that she has had some advanced collapse at the L3-4 level with a segmental scoliosis with some collapse on the right side at that level.  This is due to the discitis/osteomyelitis that she has had.  This appears as if it is consolidating in.  Her posterior lateral fusion mass is visualized.  Her cages at the 2 caudal levels are in good position and there does not appear to be any significant spondylolisthesis.    Assessment/plan: Very complex situation and the patient still has a severe threat to  inhibition of bodily function.  At this point she is significantly improved compared to how she was doing in the perioperative period and when she was discharged to the rehab facility.  All in all I think she is heading in the right direction.  The remaining issues that need to be addressed are her gastritis and we will refer her to GI for that.  She needs to continue to have wound care team by the wound team to see if we can get that posterior wound to heal.  I will need to continue to follow her x-rays of her lumbar spine to make sure she does not continue to fall into collapse of the L3 inferior endplate and L4 superior endplate as well as the scoliosis.  However, if she were to have continued collapse she says she would not have any further surgery.  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  She will continue to use her back brace anytime she is standing and walking.  I like to see her back in 6 weeks for AP lateral x-rays to monitor her alignment.  Will have her continue with infectious disease wound care team and GI care as well.  The patient and her  know they can call and get into see us at any time if they have any questions or concerns.

## 2024-03-22 ENCOUNTER — HOME CARE VISIT (OUTPATIENT)
Dept: HOME HEALTH SERVICES | Facility: HOME HEALTH | Age: 69
End: 2024-03-22
Payer: COMMERCIAL

## 2024-03-22 ENCOUNTER — TELEPHONE (OUTPATIENT)
Dept: ORTHOPEDIC SURGERY | Facility: CLINIC | Age: 69
End: 2024-03-22
Payer: COMMERCIAL

## 2024-03-22 ENCOUNTER — HOME INFUSION (OUTPATIENT)
Dept: INFUSION THERAPY | Age: 69
End: 2024-03-22
Payer: COMMERCIAL

## 2024-03-22 ENCOUNTER — TELEPHONE (OUTPATIENT)
Dept: GASTROENTEROLOGY | Facility: CLINIC | Age: 69
End: 2024-03-22
Payer: COMMERCIAL

## 2024-03-22 VITALS — HEART RATE: 82 BPM | RESPIRATION RATE: 18 BRPM

## 2024-03-22 LAB
BACTERIA BLD CULT: NORMAL
BACTERIA BLD CULT: NORMAL

## 2024-03-22 PROCEDURE — 1090000002 HH PPS REVENUE DEBIT

## 2024-03-22 PROCEDURE — G0299 HHS/HOSPICE OF RN EA 15 MIN: HCPCS | Mod: HHH

## 2024-03-22 PROCEDURE — G0157 HHC PT ASSISTANT EA 15: HCPCS | Mod: HHH

## 2024-03-22 PROCEDURE — 1090000001 HH PPS REVENUE CREDIT

## 2024-03-22 SDOH — HEALTH STABILITY: PHYSICAL HEALTH: PHYSICAL EXERCISE: STANDING

## 2024-03-22 SDOH — HEALTH STABILITY: PHYSICAL HEALTH

## 2024-03-22 SDOH — HEALTH STABILITY: PHYSICAL HEALTH: EXERCISE ACTIVITY: STANDING MARCHING

## 2024-03-22 SDOH — HEALTH STABILITY: PHYSICAL HEALTH: PHYSICAL EXERCISE: 10

## 2024-03-22 SDOH — HEALTH STABILITY: PHYSICAL HEALTH: EXERCISE ACTIVITY: COMPONENTS OF GAIT

## 2024-03-22 SDOH — HEALTH STABILITY: PHYSICAL HEALTH: EXERCISE ACTIVITIES SETS: 1

## 2024-03-22 SDOH — HEALTH STABILITY: PHYSICAL HEALTH: EXERCISE TYPE: STANDING LE STRENGTHENING/BAL

## 2024-03-22 ASSESSMENT — ENCOUNTER SYMPTOMS
DENIES PAIN: 1
PERSON REPORTING PAIN: PATIENT
PAIN LOCATION: BACK
PAIN: 1
LOWEST PAIN SEVERITY IN PAST 24 HOURS: 1/10

## 2024-03-22 ASSESSMENT — ACTIVITIES OF DAILY LIVING (ADL)
AMBULATION_DISTANCE/DURATION_TOLERATED: 75 FT
AMBULATION ASSISTANCE ON FLAT SURFACES: 1

## 2024-03-22 NOTE — TELEPHONE ENCOUNTER
Spoke with Dr. Hinson's MA and she is going to reach out to the doctor to see where he wants to add the patient at. Their office will reach out to the patient to schedule an appointment.     ----- Message from Cassidy Angulo CMA sent at 3/21/2024 10:41 AM EDT -----  Dr. Hinson for Gastro STAT

## 2024-03-22 NOTE — PROGRESS NOTES
Tara Tierney is receiving dapto thru 4/4 for MRSA surgical lumbar wound     Followed by Dr Ellison     Labs from 3/20 reviewed - CRP improving 1.34    Rx left voicemail for pt notifying them of delivery tonight of a week of medication and supplies. Left pharmacy phone  # for any questions, concerns, or needs.       Dispensing 3/22 with supplies to match   7x dapto HP  DOS 3/23-3/29     Follow up 3/28 check labs, send ovn remainder

## 2024-03-22 NOTE — TELEPHONE ENCOUNTER
Dr. Coombs'  called to get this patient ASAP for nausea, vomiting and gastric distress. She also mentioned Constipation. She has had 4 back surgeries since November. Your first available appt is late May. Please tell me where I can put her.

## 2024-03-23 PROCEDURE — 1090000002 HH PPS REVENUE DEBIT

## 2024-03-23 PROCEDURE — 1090000001 HH PPS REVENUE CREDIT

## 2024-03-24 PROCEDURE — 1090000002 HH PPS REVENUE DEBIT

## 2024-03-24 PROCEDURE — 1090000001 HH PPS REVENUE CREDIT

## 2024-03-25 ENCOUNTER — HOME CARE VISIT (OUTPATIENT)
Dept: HOME HEALTH SERVICES | Facility: HOME HEALTH | Age: 69
End: 2024-03-25
Payer: COMMERCIAL

## 2024-03-25 ENCOUNTER — OFFICE VISIT (OUTPATIENT)
Dept: WOUND CARE | Facility: CLINIC | Age: 69
End: 2024-03-25
Payer: COMMERCIAL

## 2024-03-25 VITALS
RESPIRATION RATE: 18 BRPM | TEMPERATURE: 97.2 F | DIASTOLIC BLOOD PRESSURE: 58 MMHG | OXYGEN SATURATION: 96 % | HEART RATE: 70 BPM | SYSTOLIC BLOOD PRESSURE: 100 MMHG

## 2024-03-25 PROCEDURE — 1090000002 HH PPS REVENUE DEBIT

## 2024-03-25 PROCEDURE — G0300 HHS/HOSPICE OF LPN EA 15 MIN: HCPCS | Mod: HHH

## 2024-03-25 PROCEDURE — 1090000001 HH PPS REVENUE CREDIT

## 2024-03-25 PROCEDURE — 11043 DBRDMT MUSC&/FSCA 1ST 20/<: CPT

## 2024-03-25 PROCEDURE — 11043 DBRDMT MUSC&/FSCA 1ST 20/<: CPT | Performed by: PLASTIC SURGERY

## 2024-03-26 ENCOUNTER — OFFICE VISIT (OUTPATIENT)
Dept: GASTROENTEROLOGY | Facility: CLINIC | Age: 69
End: 2024-03-26
Payer: COMMERCIAL

## 2024-03-26 ENCOUNTER — OFFICE VISIT (OUTPATIENT)
Dept: INFECTIOUS DISEASES | Age: 69
End: 2024-03-26
Payer: COMMERCIAL

## 2024-03-26 ENCOUNTER — APPOINTMENT (OUTPATIENT)
Dept: ORTHOPEDIC SURGERY | Facility: CLINIC | Age: 69
End: 2024-03-26
Payer: COMMERCIAL

## 2024-03-26 VITALS
TEMPERATURE: 97.7 F | RESPIRATION RATE: 16 BRPM | HEART RATE: 80 BPM | SYSTOLIC BLOOD PRESSURE: 116 MMHG | DIASTOLIC BLOOD PRESSURE: 61 MMHG

## 2024-03-26 VITALS
SYSTOLIC BLOOD PRESSURE: 115 MMHG | RESPIRATION RATE: 20 BRPM | OXYGEN SATURATION: 96 % | HEART RATE: 74 BPM | TEMPERATURE: 97.8 F | DIASTOLIC BLOOD PRESSURE: 60 MMHG

## 2024-03-26 VITALS
BODY MASS INDEX: 32.36 KG/M2 | WEIGHT: 150 LBS | DIASTOLIC BLOOD PRESSURE: 63 MMHG | HEART RATE: 78 BPM | SYSTOLIC BLOOD PRESSURE: 111 MMHG | HEIGHT: 57 IN

## 2024-03-26 DIAGNOSIS — A49.02 MRSA (METHICILLIN RESISTANT STAPHYLOCOCCUS AUREUS) INFECTION: ICD-10-CM

## 2024-03-26 DIAGNOSIS — T81.42XA DEEP INCISIONAL SURGICAL SITE INFECTION: ICD-10-CM

## 2024-03-26 DIAGNOSIS — R10.84 GENERALIZED ABDOMINAL PAIN: ICD-10-CM

## 2024-03-26 DIAGNOSIS — R10.13 EPIGASTRIC PAIN: Primary | ICD-10-CM

## 2024-03-26 DIAGNOSIS — M46.46 LUMBAR DISCITIS: Primary | ICD-10-CM

## 2024-03-26 DIAGNOSIS — R11.14 BILIOUS VOMITING WITH NAUSEA: ICD-10-CM

## 2024-03-26 PROCEDURE — 1036F TOBACCO NON-USER: CPT | Performed by: INTERNAL MEDICINE

## 2024-03-26 PROCEDURE — G8421 BMI NOT CALCULATED: HCPCS | Performed by: INTERNAL MEDICINE

## 2024-03-26 PROCEDURE — 1123F ACP DISCUSS/DSCN MKR DOCD: CPT | Performed by: INTERNAL MEDICINE

## 2024-03-26 PROCEDURE — 1090000001 HH PPS REVENUE CREDIT

## 2024-03-26 PROCEDURE — 3074F SYST BP LT 130 MM HG: CPT | Performed by: INTERNAL MEDICINE

## 2024-03-26 PROCEDURE — 3078F DIAST BP <80 MM HG: CPT | Performed by: INTERNAL MEDICINE

## 2024-03-26 PROCEDURE — 1159F MED LIST DOCD IN RCRD: CPT | Performed by: INTERNAL MEDICINE

## 2024-03-26 PROCEDURE — 1090F PRES/ABSN URINE INCON ASSESS: CPT | Performed by: INTERNAL MEDICINE

## 2024-03-26 PROCEDURE — 1090000002 HH PPS REVENUE DEBIT

## 2024-03-26 PROCEDURE — 99214 OFFICE O/P EST MOD 30 MIN: CPT | Performed by: INTERNAL MEDICINE

## 2024-03-26 PROCEDURE — 99203 OFFICE O/P NEW LOW 30 MIN: CPT | Performed by: INTERNAL MEDICINE

## 2024-03-26 PROCEDURE — 3017F COLORECTAL CA SCREEN DOC REV: CPT | Performed by: INTERNAL MEDICINE

## 2024-03-26 PROCEDURE — G8427 DOCREV CUR MEDS BY ELIG CLIN: HCPCS | Performed by: INTERNAL MEDICINE

## 2024-03-26 PROCEDURE — G8399 PT W/DXA RESULTS DOCUMENT: HCPCS | Performed by: INTERNAL MEDICINE

## 2024-03-26 PROCEDURE — G8484 FLU IMMUNIZE NO ADMIN: HCPCS | Performed by: INTERNAL MEDICINE

## 2024-03-26 RX ORDER — MORPHINE SULFATE 15 MG/1
1 TABLET, FILM COATED, EXTENDED RELEASE ORAL EVERY 12 HOURS
COMMUNITY

## 2024-03-26 RX ORDER — DAPTOMYCIN 50 MG/ML
INJECTION, POWDER, LYOPHILIZED, FOR SOLUTION INTRAVENOUS
COMMUNITY
Start: 2024-03-22

## 2024-03-26 ASSESSMENT — ENCOUNTER SYMPTOMS
HEMATURIA: 0
UNEXPECTED WEIGHT CHANGE: 0
FATIGUE: 0
HALLUCINATIONS: 0
DYSURIA: 0
DENIES PAIN: 1
SORE THROAT: 0
APPETITE LEVEL: GOOD
COUGH: 0
BRUISES/BLEEDS EASILY: 0
EYE DISCHARGE: 0
WHEEZING: 0
NERVOUS/ANXIOUS: 0
CONFUSION: 0
ROS GI COMMENTS: AS IN HPI
ADENOPATHY: 0
HEADACHES: 0
EYE ITCHING: 0
SEIZURES: 0
OCCASIONAL FEELINGS OF UNSTEADINESS: 0
PALPITATIONS: 0
SHORTNESS OF BREATH: 0
FEVER: 0
CHANGE IN APPETITE: UNCHANGED
PERSON REPORTING PAIN: PATIENT
FLANK PAIN: 0
EYE REDNESS: 0
BACK PAIN: 0
CHILLS: 0
COLOR CHANGE: 0
ARTHRALGIAS: 0

## 2024-03-26 ASSESSMENT — PAIN SCALES - PAIN ASSESSMENT IN ADVANCED DEMENTIA (PAINAD)
CONSOLABILITY: 0
NEGVOCALIZATION: 0
NEGVOCALIZATION: 0 - NONE.
CONSOLABILITY: 0 - NO NEED TO CONSOLE.
BODYLANGUAGE: 0 - RELAXED.
BREATHING: 0
TOTALSCORE: 0
FACIALEXPRESSION: 0 - SMILING OR INEXPRESSIVE.
BODYLANGUAGE: 0
FACIALEXPRESSION: 0

## 2024-03-26 NOTE — PROGRESS NOTES
Gastroenterology Office Visit     History of Present Illness:   Tara Tierney is a 68 y.o. female who presents to GI clinic for vomiting. Was in rehab after back surgeries as below.  Was discharged to rehab on MS Contin 15 mg every 12 hrs so she could tolerate physical therapy.  Was discharged from rehab earlier this month w/o adequate supply of MS Contin.  Three days later she woke with epigastric pain and bilious vomiting.  Went to ER, rec'd dilaudid, sx's subsided.  Went home, woke up next day at 530 with pain and bilious vomiting.  Again given dilaudid in the ER with resolution of sx's.  ER felt this was due to narcotic withdrawal.  Restarted MS Contin as outpatient with plan to wean.  Sx's have not recurred on MS Contin.  Denies associated wt loss, bleeding and change in bowel habits.  Has remote lap prabha.    Has had 2-3 remote EGDs with dilation with Dr. Springer for dysphagia.  He found a web, and dilated.  Dysphagia has not recurred after last dilation.      Takes ASA 81 mg daily, but no other NSAIDs.  Takes Eliquis.      She does not want to proceed with endoscopy at this time.       From ortho note:  She had a laminectomy with interbody cages at L4-5 and L5-S1 with instrumentation done on November 20, 2023. We then washed her out on December 6, 2023. She had a second washout on December 11, 2023. She had her final washout on December 31, 2023 and had hardware removal done at that time. She had a MRSA infection which was significantly resistant to all antibiotics other than the last ones that infectious disease use. Therefore she was not improving. 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 Patient was receiving morphine for pain at the rehab center. Patient seen yesterday for similar complaint of vomiting  and back pain.     Review of Systems  Review of Systems   Constitutional:  Negative for chills, fatigue, fever and unexpected weight change.   HENT:  Negative for ear pain, nosebleeds and sore throat.    Eyes:  Negative for discharge, redness and itching.   Respiratory:  Negative for cough, shortness of breath and wheezing.    Cardiovascular:  Negative for chest pain, palpitations and leg swelling.   Gastrointestinal:         As in HPI   Endocrine: Negative for cold intolerance and heat intolerance.   Genitourinary:  Negative for dysuria, flank pain and hematuria.   Musculoskeletal:  Negative for arthralgias, back pain and gait problem.   Skin:  Negative for color change and rash.   Neurological:  Negative for seizures, syncope and headaches.   Hematological:  Negative for adenopathy. Does not bruise/bleed easily.   Psychiatric/Behavioral:  Negative for confusion and hallucinations. The patient is not nervous/anxious.        Past Medical History   has a past medical history of Arthritis, Back pain, COPD (chronic obstructive pulmonary disease) (CMS/HCC), COVID-19 vaccine administered, Eczema, History of COVID-19, Hyperlipidemia, Hypertension, Hypoesthesia of skin, Macular degeneration, Meniere's disease, Osteoporosis, Overactive bladder, Pain in unspecified finger(s), Pain in unspecified wrist, Peripheral vascular disease (CMS/HCC), Personal history of other diseases of the circulatory system, Personal history of other diseases of the musculoskeletal system and connective tissue, Personal history of other specified conditions, Personal history of other specified conditions, Personal history of other specified conditions, Postmenopausal, Seasonal allergies, and Unspecified visual loss.     Problem List  Patient Active Problem List   Diagnosis    Abdominal aortic aneurysm (CMS/HCC)    Abnormal EKG    Bilateral carotid artery stenosis    Bruit of right carotid artery    CAD in native artery    Cardiomyopathy (CMS/HCC)     Chest pain    Chronic asthmatic bronchitis    Closed displaced fracture of fifth metatarsal bone    Current every day smoker    Difficulty breathing    Essential hypertension    History of total knee replacement    Hypokalemia    Intermittent claudication (CMS/HCC)    Knee osteoarthritis    Knee pain    Limb pain    Localized swelling, mass, or lump of lower extremity    Celiac artery stenosis (CMS/HCC)    Mesenteric artery stenosis (CMS/HCC)    Mixed hyperlipidemia    Obese    PVD (peripheral vascular disease) (CMS/HCC)    Right foot pain    Swelling    Trigger finger    Urinary tract infection without hematuria    Back pain of lumbar region with sciatica    Back pain with radiculopathy    Intractable back pain    Seroma, post-traumatic (CMS/HCC)    Lumbar surgical wound fluid collection    Generalized weakness    Postoperative surgical complication involving musculoskeletal system associated with musculoskeletal procedure, unspecified complication    Back pain at L4-L5 level    Deep vein thrombosis (DVT) of right lower extremity (CMS/HCC)    Anemia    Altered mental status, unspecified altered mental status type    Surgical wound infection    Meningitis    Impaired functional mobility, balance, gait, and endurance    Epigastric pain    Bilious vomiting with nausea       Past Surgical History  Past Surgical History:   Procedure Laterality Date    ADENOIDECTOMY      AORTA - BILATERAL FEMORAL ARTERY BYPASS GRAFT      BLEPHAROPTOSIS REPAIR      CARDIAC CATHETERIZATION      with stent and balloon    CARDIAC CATHETERIZATION N/A 1/2/2024    Procedure: IVC Filter Insertion;  Surgeon: Star Negro MD;  Location: [facility];  Service: Cardiovascular;  Laterality: N/A;    CHOLECYSTECTOMY      COLONOSCOPY      CT ANGIO NECK  05/18/2022    CT NECK ANGIO W AND WO IV CONTRAST 5/18/2022 [facility]    CT AORTA AND BILATERAL ILIOFEMORAL RUNOFF ANGIOGRAM W AND/OR WO IV CONTRAST  03/10/2020    CT  AORTA AND BILATERAL ILIOFEMORAL RUNOFF ANGIOGRAM W AND/OR WO IV CONTRAST 3/10/2020 [facility]    CT AORTA AND BILATERAL ILIOFEMORAL RUNOFF ANGIOGRAM W AND/OR WO IV CONTRAST  07/21/2023    CT AORTA AND BILATERAL ILIOFEMORAL RUNOFF ANGIOGRAM W AND/OR WO IV CONTRAST 7/21/2023 [facility]    FL TRANSLUMINAL ANGIO PERCUTANEOUS BHARAT      TONSILLECTOMY      Tonsillectomy    TOTAL KNEE ARTHROPLASTY Bilateral     TUBAL LIGATION         Social History   reports that she quit smoking about 6 months ago. Her smoking use included cigarettes. She has a 22.50 pack-year smoking history. She does not have any smokeless tobacco history on file. She reports that she does not drink alcohol and does not use drugs.     Family History  family history includes Breast cancer in her mother; Cancer in her father; arteriosclerotic cardiovascular disease in her father.       Allergies  Allergies   Allergen Reactions    Neomycin Other     swelling, redness, burning post eye surgery    Neomycin-Polymyxin B-Dexameth Other and Rash     blisters, eye swelling, burning       Medications  Current Outpatient Medications   Medication Instructions    0.9 % sodium chloride (sodium chloride, PF, 0.9%) injection 10 mL, intravenous, Daily    albuterol (ProAir HFA) 90 mcg/actuation inhaler 2 puffs, inhalation    apixaban (ELIQUIS) 10 mg, oral, 2 times daily    apixaban (ELIQUIS) 5 mg, oral, 2 times daily    aspirin 81 mg, oral, Daily    atenolol (TENORMIN) 50 mg, oral, Every morning    baclofen (Lioresal) 10 mg tablet Take half a tab 4 times daily as needed for muscle spasms for up to 10 days    brimonidine (AlphaGAN) 0.2 % ophthalmic solution 1 drop, Both Eyes, 2 times daily    busPIRone (BUSPAR) 10 mg, oral, Daily    cholecalciferol (Vitamin D-3) 50 mcg (2,000 unit) capsule 1 capsule, oral, Daily    cyclobenzaprine (FLEXERIL) 5 mg, oral, 3 times daily PRN    ezetimibe (ZETIA) 10 mg, oral, Daily    furosemide (LASIX) 20 mg, oral, Daily    heparin  "sodium,porcine/PF (HEPARIN, PORCINE, LOCK FLUSH IV) 5 mL, intravenous, Daily    hydrALAZINE (APRESOLINE) 50 mg, oral, 2 times daily    HYDROmorphone (DILAUDID) 2 mg, oral, Every 4 hours PRN    ibandronate (BONIVA) 150 mg, oral, Every 30 days    ipratropium (Atrovent HFA) 17 mcg/actuation inhaler 2 puffs, inhalation, 2 times daily RT    isosorbide mononitrate ER (IMDUR) 60 mg, oral, Daily RT    loratadine (Claritin) 10 mg tablet 1 tablet, oral, Daily    LUTEIN ORAL 20 mg, oral, Daily RT    meclizine (ANTIVERT) 25 mg, oral, Every 8 hours PRN    mirtazapine (Remeron) 15 mg tablet 1 tablet, oral, Nightly    morphine CR (MS CONTIN) 15 mg, oral, Every 12 hours, Do not crush, chew, or split.    nitroglycerin (NITROSTAT) 0.4 mg, sublingual, Every 5 min PRN,  PLACE 1 TABLET UNDER THE TONGUE EVERY 5 MINUTES UP TO 3 DOSES AS NEEDED FOR CHEST PAIN.<BR>    ondansetron ODT (ZOFRAN-ODT) 4 mg, oral, Every 8 hours PRN    oxyCODONE-acetaminophen (Percocet) 5-325 mg tablet 1 tablet, oral, Every 6 hours PRN    pantoprazole (PROTONIX) 40 mg, oral, Daily before breakfast, Do not crush, chew, or split.    pilocarpine (SALAGEN (PILOCARPINE)) 5 mg, oral, Daily RT    potassium chloride ER (Micro-K) 10 mEq ER capsule 10 mEq, oral, Daily    pregabalin (LYRICA) 75 mg, oral, 2 times daily    sodium chloride 0.9% (ClearShield Sodium Chlor Flush) flush 10 mL, intravenous, Daily    sodium chloride 0.9% parenteral solution 50 mL with DAPTOmycin 50 mg/mL recon soln 540 mg 8 mg/kg, intravenous, Every 24 hours scheduled    tolterodine (Detrol) 1 mg tablet 1 tablet, oral, Nightly    vit C,L-Hw-wjgre-lutein-zeaxan (PreserVision AREDS-2) 250-90-40-1 mg capsule 1 capsule, oral, 2 times daily        Objective   Blood pressure 111/63, pulse 78, height 1.448 m (4' 9\"), weight 68 kg (150 lb), not currently breastfeeding.     Physical Exam  General appearance: No acute distress, cooperative.  Eyes: Nonicteric, no redness or proptosis  Ears, nose, mouth, and " throat: Moist mucous membranes, tongue normal; no oral lesions; Mallampati 2  Neck: Supple, no lymphadenopathy or thyromegaly  Lungs: Clear to auscultation bilaterally  CV: Regular rate and rhythm, no murmur; no pitting edema in the lower extremities  Abd: soft, non-tender; non-distended; normal active bowel sounds; +lap prabha scars  Skin: No rashes or lesions; no liver stigmata  MSK: No deformities or joint edema/redness/tenderness  Neuro: Alert and oriented ×3      LABS  Component      Latest Ref Rng 3/20/2024   WBC      4.4 - 11.3 x10*3/uL 7.9    nRBC      0.0 - 0.0 /100 WBCs 0.0    RBC      4.00 - 5.20 x10*6/uL 4.73    HEMOGLOBIN      12.0 - 16.0 g/dL 13.7    HEMATOCRIT      36.0 - 46.0 % 43.1    MCV      80 - 100 fL 91    MCH      26.0 - 34.0 pg 29.0    MCHC      32.0 - 36.0 g/dL 31.8 (L)    RED CELL DISTRIBUTION WIDTH      11.5 - 14.5 % 16.4 (H)    Platelets      150 - 450 x10*3/uL 250       Component      Latest Ref Rng 3/19/2024 3/20/2024   Albumin      3.4 - 5.0 g/dL  3.7    Alkaline Phosphatase      33 - 136 U/L  101    Total Protein      6.4 - 8.2 g/dL  7.1    AST      9 - 39 U/L  21    Bilirubin Total      0.0 - 1.2 mg/dL  0.6    ALT      7 - 45 U/L  19    Lactate      0.4 - 2.0 mmol/L 2.3 (H)     Creatine Kinase      0 - 215 U/L  48    C-Reactive Protein      <1.00 mg/dL  1.34 (H)       Lipase 61, then 55  LFTs normal x2    Radiology  CT A/P 3/19/24: s/p prabha, tics without diverticulitis, fatty liver; no biliary dilation or SBO  CT chest: no PE    Endoscopy    As above    Assessment/Plan   Tara Tierney is a 68 y.o. female who presents to GI clinic for vomiting.    Bilious vomiting with nausea  Possible opioid withdrawal.  Sx's have not recurred since restarting MS Contin.  EGD discussed with patient and son, who want to hold off on EGD.  Patient to call if sx's recur while weaning opioids.        Samuel Hinson MD

## 2024-03-26 NOTE — ASSESSMENT & PLAN NOTE
Possible opioid withdrawal.  Sx's have not recurred since restarting MS Contin.  EGD discussed with patient and son, who want to hold off on EGD.  Patient to call if sx's recur while weaning opioids.

## 2024-03-26 NOTE — PROGRESS NOTES
Carmencita Arce (:  1955) is a 68 y.o. female,Established patient, here for evaluation of the following chief complaint(s):  No chief complaint on file.         ASSESSMENT/PLAN:  Postop deep incisional wound infection with discitis requiring hardware removal, with slow improvement and slow wound healing  MRSA infection with high ALISON to vancomycin  Recent severe withdrawal symptoms to morphine    Patient cannot be switched to p.o. Zyvox because of drug interaction with MS Contin and BuSpar  Patient had multiple drug resistance to MRSA  Continue IV Cubicin for 4 more weeks hoping that the incision would be healed by then.  Patient continues to have a wound VAC with follow-up in the wound care center  Repeat sed rate in 1 week and in 3 weeks.  Last sed rate done 10 days ago was 60  Follow-up with Dr. Cummings for pain management  Follow-up with Dr. Garza  Follow-up CBC BMP and CPK weekly  Case was discussed with patient and her     Subjective   SUBJECTIVE/OBJECTIVE:  HPI  Follow-up [facility] hospitalization for deep incisional wound infection with MRSA, status post lumbar laminectomy followed by multiple surgeries for debridement and hardware removal.   Currently on IV Cubicin, well-tolerated.   Has a wound VAC with progressive wound healing  Decreased lower extremity weakness  Review of Systems   Low back slowly healing incision  Right thigh soreness   decreasing weakness of bilateral legs  Had severe GI symptoms related to morphine withdrawal for which she went to the emergency room recently  Objective   Physical Exam  Vitals:    24 1052   BP: 116/61   Pulse: 80   Resp: 16   Temp: 97.7 °F (36.5 °C)   TempSrc: Temporal     General Appearance: alert and oriented to person, place and time, well-developed and well-nourished, in no acute distress  Skin: warm and dry, no rash.   Head: normocephalic and atraumatic  Eyes: anicteric sclerae  ENT: oropharynx clear and moist with

## 2024-03-27 ENCOUNTER — LAB REQUISITION (OUTPATIENT)
Dept: LAB | Facility: LAB | Age: 69
End: 2024-03-27
Payer: COMMERCIAL

## 2024-03-27 ENCOUNTER — HOME CARE VISIT (OUTPATIENT)
Dept: HOME HEALTH SERVICES | Facility: HOME HEALTH | Age: 69
End: 2024-03-27
Payer: COMMERCIAL

## 2024-03-27 ENCOUNTER — HOME INFUSION (OUTPATIENT)
Dept: INFUSION THERAPY | Age: 69
End: 2024-03-27
Payer: COMMERCIAL

## 2024-03-27 VITALS
SYSTOLIC BLOOD PRESSURE: 105 MMHG | OXYGEN SATURATION: 98 % | RESPIRATION RATE: 20 BRPM | TEMPERATURE: 97.6 F | HEART RATE: 71 BPM | DIASTOLIC BLOOD PRESSURE: 60 MMHG

## 2024-03-27 DIAGNOSIS — T81.49XD INFECTION FOLLOWING A PROCEDURE, OTHER SURGICAL SITE, SUBSEQUENT ENCOUNTER: ICD-10-CM

## 2024-03-27 DIAGNOSIS — T81.89XD LUMBAR SURGICAL WOUND FLUID COLLECTION, SUBSEQUENT ENCOUNTER: Primary | ICD-10-CM

## 2024-03-27 DIAGNOSIS — M54.50 BACK PAIN AT L4-L5 LEVEL: Primary | ICD-10-CM

## 2024-03-27 LAB
ALBUMIN SERPL BCP-MCNC: 3.5 G/DL (ref 3.4–5)
ALP SERPL-CCNC: 76 U/L (ref 33–136)
ALT SERPL W P-5'-P-CCNC: 11 U/L (ref 7–45)
ANION GAP SERPL CALC-SCNC: 12 MMOL/L (ref 10–20)
AST SERPL W P-5'-P-CCNC: 11 U/L (ref 9–39)
BASOPHILS # BLD AUTO: 0.02 X10*3/UL (ref 0–0.1)
BASOPHILS NFR BLD AUTO: 0.2 %
BILIRUB SERPL-MCNC: 0.4 MG/DL (ref 0–1.2)
BUN SERPL-MCNC: 16 MG/DL (ref 6–23)
CALCIUM SERPL-MCNC: 9.2 MG/DL (ref 8.6–10.3)
CHLORIDE SERPL-SCNC: 94 MMOL/L (ref 98–107)
CK SERPL-CCNC: 23 U/L (ref 0–215)
CO2 SERPL-SCNC: 32 MMOL/L (ref 21–32)
CREAT SERPL-MCNC: 0.7 MG/DL (ref 0.5–1.05)
CRP SERPL-MCNC: 3.21 MG/DL
EGFRCR SERPLBLD CKD-EPI 2021: >90 ML/MIN/1.73M*2
EOSINOPHIL # BLD AUTO: 0.6 X10*3/UL (ref 0–0.7)
EOSINOPHIL NFR BLD AUTO: 6 %
ERYTHROCYTE [DISTWIDTH] IN BLOOD BY AUTOMATED COUNT: 16.5 % (ref 11.5–14.5)
GLUCOSE SERPL-MCNC: 116 MG/DL (ref 74–99)
HCT VFR BLD AUTO: 35 % (ref 36–46)
HGB BLD-MCNC: 11 G/DL (ref 12–16)
IMM GRANULOCYTES # BLD AUTO: 0.04 X10*3/UL (ref 0–0.7)
IMM GRANULOCYTES NFR BLD AUTO: 0.4 % (ref 0–0.9)
LYMPHOCYTES # BLD AUTO: 1.71 X10*3/UL (ref 1.2–4.8)
LYMPHOCYTES NFR BLD AUTO: 17.2 %
MCH RBC QN AUTO: 29 PG (ref 26–34)
MCHC RBC AUTO-ENTMCNC: 31.4 G/DL (ref 32–36)
MCV RBC AUTO: 92 FL (ref 80–100)
MONOCYTES # BLD AUTO: 0.83 X10*3/UL (ref 0.1–1)
MONOCYTES NFR BLD AUTO: 8.3 %
NEUTROPHILS # BLD AUTO: 6.75 X10*3/UL (ref 1.2–7.7)
NEUTROPHILS NFR BLD AUTO: 67.9 %
NRBC BLD-RTO: 0 /100 WBCS (ref 0–0)
PLATELET # BLD AUTO: 279 X10*3/UL (ref 150–450)
POTASSIUM SERPL-SCNC: 3.7 MMOL/L (ref 3.5–5.3)
PROT SERPL-MCNC: 5.9 G/DL (ref 6.4–8.2)
RBC # BLD AUTO: 3.79 X10*6/UL (ref 4–5.2)
SODIUM SERPL-SCNC: 134 MMOL/L (ref 136–145)
WBC # BLD AUTO: 10 X10*3/UL (ref 4.4–11.3)

## 2024-03-27 PROCEDURE — G0299 HHS/HOSPICE OF RN EA 15 MIN: HCPCS | Mod: HHH

## 2024-03-27 PROCEDURE — 1090000002 HH PPS REVENUE DEBIT

## 2024-03-27 PROCEDURE — 85025 COMPLETE CBC W/AUTO DIFF WBC: CPT

## 2024-03-27 PROCEDURE — 86140 C-REACTIVE PROTEIN: CPT

## 2024-03-27 PROCEDURE — G0157 HHC PT ASSISTANT EA 15: HCPCS | Mod: HHH

## 2024-03-27 PROCEDURE — 80053 COMPREHEN METABOLIC PANEL: CPT

## 2024-03-27 PROCEDURE — 1090000001 HH PPS REVENUE CREDIT

## 2024-03-27 PROCEDURE — 82550 ASSAY OF CK (CPK): CPT

## 2024-03-27 RX ORDER — DAPTOMYCIN 50 MG/ML
470 INJECTION, POWDER, LYOPHILIZED, FOR SOLUTION INTRAVENOUS DAILY
Qty: 1 EACH | Refills: 99 | Status: SHIPPED
Start: 2024-03-27 | End: 2024-04-18

## 2024-03-27 SDOH — HEALTH STABILITY: PHYSICAL HEALTH: PHYSICAL EXERCISE: STANDING

## 2024-03-27 SDOH — HEALTH STABILITY: PHYSICAL HEALTH: EXERCISE TYPE: STANDING LE STRENGTHENING

## 2024-03-27 SDOH — HEALTH STABILITY: PHYSICAL HEALTH: PHYSICAL EXERCISE: 10

## 2024-03-27 SDOH — HEALTH STABILITY: PHYSICAL HEALTH: EXERCISE ACTIVITY: MARCHING, HIP ABD, EXT, HAM CURLS

## 2024-03-27 SDOH — HEALTH STABILITY: PHYSICAL HEALTH: EXERCISE ACTIVITIES SETS: 1

## 2024-03-27 ASSESSMENT — ENCOUNTER SYMPTOMS
PERSON REPORTING PAIN: PATIENT
SUBJECTIVE PAIN PROGRESSION: GRADUALLY IMPROVING
PAIN LOCATION - PAIN QUALITY: SORENESS
PAIN SEVERITY GOAL: 0/10
PAIN: 1
PAIN LOCATION: BACK
MUSCLE WEAKNESS: 1
PAIN: 1
HIGHEST PAIN SEVERITY IN PAST 24 HOURS: 0/10
APPETITE LEVEL: GOOD
LOWEST PAIN SEVERITY IN PAST 24 HOURS: 0/10
LOWEST PAIN SEVERITY IN PAST 24 HOURS: 1/10

## 2024-03-27 ASSESSMENT — ACTIVITIES OF DAILY LIVING (ADL)
AMBULATION ASSISTANCE: ONE PERSON
AMBULATION_DISTANCE/DURATION_TOLERATED: 30 FT X2
CURRENT_FUNCTION: TWO PERSON
AMBULATION ASSISTANCE ON FLAT SURFACES: 1

## 2024-03-27 NOTE — PROGRESS NOTES
Patient seen 3/26 by Dr Reyna - incisional wound still open, dapto 470mg IV daily to continue for another 4 weeks - through approx 4/23    ESR to be drawn x1 in 3 weeks - week of 4/15    Orders entered into EPIC     Dispense x7 doses for cmpd on 3/28 and delivery on 3/29  Infusions 3/30 through 4/5    NF 4/4 OVN - check labs

## 2024-03-28 DIAGNOSIS — E87.6 HYPOKALEMIA: ICD-10-CM

## 2024-03-28 PROCEDURE — 1090000002 HH PPS REVENUE DEBIT

## 2024-03-28 PROCEDURE — 1090000001 HH PPS REVENUE CREDIT

## 2024-03-29 ENCOUNTER — DOCUMENTATION (OUTPATIENT)
Dept: PHARMACY | Facility: CLINIC | Age: 69
End: 2024-03-29

## 2024-03-29 ENCOUNTER — HOME CARE VISIT (OUTPATIENT)
Dept: HOME HEALTH SERVICES | Facility: HOME HEALTH | Age: 69
End: 2024-03-29
Payer: COMMERCIAL

## 2024-03-29 VITALS
HEART RATE: 82 BPM | RESPIRATION RATE: 18 BRPM | DIASTOLIC BLOOD PRESSURE: 60 MMHG | SYSTOLIC BLOOD PRESSURE: 104 MMHG | OXYGEN SATURATION: 97 % | TEMPERATURE: 97.4 F

## 2024-03-29 PROCEDURE — 1090000002 HH PPS REVENUE DEBIT

## 2024-03-29 PROCEDURE — 1090000001 HH PPS REVENUE CREDIT

## 2024-03-29 PROCEDURE — G0300 HHS/HOSPICE OF LPN EA 15 MIN: HCPCS | Mod: HHH

## 2024-03-29 PROCEDURE — G0157 HHC PT ASSISTANT EA 15: HCPCS | Mod: HHH

## 2024-03-29 RX ORDER — POTASSIUM CHLORIDE 750 MG/1
10 CAPSULE, EXTENDED RELEASE ORAL DAILY
Qty: 90 CAPSULE | Refills: 0 | Status: SHIPPED | OUTPATIENT
Start: 2024-03-29

## 2024-03-29 SDOH — HEALTH STABILITY: PHYSICAL HEALTH: EXERCISE TYPE: STANDING LE STRENGTHENING

## 2024-03-29 SDOH — HEALTH STABILITY: PHYSICAL HEALTH: EXERCISE ACTIVITIES SETS: 1

## 2024-03-29 SDOH — HEALTH STABILITY: PHYSICAL HEALTH: PHYSICAL EXERCISE: 15

## 2024-03-29 SDOH — HEALTH STABILITY: PHYSICAL HEALTH: EXERCISE ACTIVITY: MARCHING, HIP ABD, EXT, HAM CURLS

## 2024-03-29 SDOH — HEALTH STABILITY: PHYSICAL HEALTH: PHYSICAL EXERCISE: STANDING

## 2024-03-29 ASSESSMENT — ENCOUNTER SYMPTOMS
PAIN: 1
PERSON REPORTING PAIN: PATIENT
PAIN LOCATION: BACK
LOWEST PAIN SEVERITY IN PAST 24 HOURS: 3/10
HIGHEST PAIN SEVERITY IN PAST 24 HOURS: 10/10
SUBJECTIVE PAIN PROGRESSION: WAXING AND WANING

## 2024-03-29 ASSESSMENT — ACTIVITIES OF DAILY LIVING (ADL): AMBULATION ASSISTANCE ON FLAT SURFACES: 1

## 2024-03-30 PROCEDURE — 1090000001 HH PPS REVENUE CREDIT

## 2024-03-30 PROCEDURE — 1090000002 HH PPS REVENUE DEBIT

## 2024-03-31 PROCEDURE — 1090000001 HH PPS REVENUE CREDIT

## 2024-03-31 PROCEDURE — 1090000002 HH PPS REVENUE DEBIT

## 2024-03-31 ASSESSMENT — PAIN SCALES - PAIN ASSESSMENT IN ADVANCED DEMENTIA (PAINAD)
BREATHING: 0
BODYLANGUAGE: 0
CONSOLABILITY: 0 - NO NEED TO CONSOLE.
FACIALEXPRESSION: 0
CONSOLABILITY: 0
NEGVOCALIZATION: 0
NEGVOCALIZATION: 0 - NONE.
TOTALSCORE: 0
BODYLANGUAGE: 0 - RELAXED.
FACIALEXPRESSION: 0 - SMILING OR INEXPRESSIVE.

## 2024-03-31 ASSESSMENT — ENCOUNTER SYMPTOMS
DENIES PAIN: 1
OCCASIONAL FEELINGS OF UNSTEADINESS: 0
CHANGE IN APPETITE: UNCHANGED
APPETITE LEVEL: GOOD
PERSON REPORTING PAIN: PATIENT

## 2024-04-01 ENCOUNTER — OFFICE VISIT (OUTPATIENT)
Dept: WOUND CARE | Facility: CLINIC | Age: 69
End: 2024-04-01
Payer: COMMERCIAL

## 2024-04-01 ENCOUNTER — LAB REQUISITION (OUTPATIENT)
Dept: LAB | Facility: HOSPITAL | Age: 69
End: 2024-04-01
Payer: COMMERCIAL

## 2024-04-01 DIAGNOSIS — L98.493 NON-PRESSURE CHRONIC ULCER OF SKIN OF OTHER SITES WITH NECROSIS OF MUSCLE (MULTI): ICD-10-CM

## 2024-04-01 DIAGNOSIS — M86.68 OTHER CHRONIC OSTEOMYELITIS, OTHER SITE (MULTI): ICD-10-CM

## 2024-04-01 DIAGNOSIS — F17.210 NICOTINE DEPENDENCE, CIGARETTES, UNCOMPLICATED: ICD-10-CM

## 2024-04-01 DIAGNOSIS — S31.000A UNSPECIFIED OPEN WOUND OF LOWER BACK AND PELVIS WITHOUT PENETRATION INTO RETROPERITONEUM, INITIAL ENCOUNTER: ICD-10-CM

## 2024-04-01 DIAGNOSIS — T81.31XA DISRUPTION OF EXTERNAL OPERATION (SURGICAL) WOUND, NOT ELSEWHERE CLASSIFIED, INITIAL ENCOUNTER: ICD-10-CM

## 2024-04-01 PROCEDURE — 1090000002 HH PPS REVENUE DEBIT

## 2024-04-01 PROCEDURE — 1090000001 HH PPS REVENUE CREDIT

## 2024-04-01 PROCEDURE — 11043 DBRDMT MUSC&/FSCA 1ST 20/<: CPT

## 2024-04-01 PROCEDURE — 87070 CULTURE OTHR SPECIMN AEROBIC: CPT | Mod: OUT,ELYLAB | Performed by: PLASTIC SURGERY

## 2024-04-01 PROCEDURE — 97605 NEG PRS WND THER DME<=50SQCM: CPT | Performed by: PLASTIC SURGERY

## 2024-04-01 PROCEDURE — 97605 NEG PRS WND THER DME<=50SQCM: CPT

## 2024-04-01 PROCEDURE — 11043 DBRDMT MUSC&/FSCA 1ST 20/<: CPT | Performed by: PLASTIC SURGERY

## 2024-04-02 PROCEDURE — 1090000002 HH PPS REVENUE DEBIT

## 2024-04-02 PROCEDURE — 1090000001 HH PPS REVENUE CREDIT

## 2024-04-03 ENCOUNTER — HOME CARE VISIT (OUTPATIENT)
Dept: HOME HEALTH SERVICES | Facility: HOME HEALTH | Age: 69
End: 2024-04-03
Payer: COMMERCIAL

## 2024-04-03 ENCOUNTER — HOME INFUSION (OUTPATIENT)
Dept: INFUSION THERAPY | Age: 69
End: 2024-04-03

## 2024-04-03 ENCOUNTER — LAB REQUISITION (OUTPATIENT)
Dept: LAB | Facility: LAB | Age: 69
End: 2024-04-03
Payer: COMMERCIAL

## 2024-04-03 DIAGNOSIS — T81.49XD INFECTION FOLLOWING A PROCEDURE, OTHER SURGICAL SITE, SUBSEQUENT ENCOUNTER: ICD-10-CM

## 2024-04-03 LAB
ALBUMIN SERPL BCP-MCNC: 3.5 G/DL (ref 3.4–5)
ALP SERPL-CCNC: 73 U/L (ref 33–136)
ALT SERPL W P-5'-P-CCNC: 10 U/L (ref 7–45)
ANION GAP SERPL CALC-SCNC: 14 MMOL/L (ref 10–20)
AST SERPL W P-5'-P-CCNC: 13 U/L (ref 9–39)
BASOPHILS # BLD AUTO: 0.02 X10*3/UL (ref 0–0.1)
BASOPHILS NFR BLD AUTO: 0.2 %
BILIRUB SERPL-MCNC: 0.3 MG/DL (ref 0–1.2)
BUN SERPL-MCNC: 13 MG/DL (ref 6–23)
CALCIUM SERPL-MCNC: 9.6 MG/DL (ref 8.6–10.3)
CHLORIDE SERPL-SCNC: 96 MMOL/L (ref 98–107)
CK SERPL-CCNC: 100 U/L (ref 0–215)
CO2 SERPL-SCNC: 30 MMOL/L (ref 21–32)
CREAT SERPL-MCNC: 0.71 MG/DL (ref 0.5–1.05)
CRP SERPL-MCNC: 6.18 MG/DL
EGFRCR SERPLBLD CKD-EPI 2021: >90 ML/MIN/1.73M*2
EOSINOPHIL # BLD AUTO: 0.47 X10*3/UL (ref 0–0.7)
EOSINOPHIL NFR BLD AUTO: 4.3 %
ERYTHROCYTE [DISTWIDTH] IN BLOOD BY AUTOMATED COUNT: 16.1 % (ref 11.5–14.5)
GLUCOSE SERPL-MCNC: 139 MG/DL (ref 74–99)
HCT VFR BLD AUTO: 34.9 % (ref 36–46)
HGB BLD-MCNC: 10.8 G/DL (ref 12–16)
IMM GRANULOCYTES # BLD AUTO: 0.04 X10*3/UL (ref 0–0.7)
IMM GRANULOCYTES NFR BLD AUTO: 0.4 % (ref 0–0.9)
LYMPHOCYTES # BLD AUTO: 1.91 X10*3/UL (ref 1.2–4.8)
LYMPHOCYTES NFR BLD AUTO: 17.4 %
MCH RBC QN AUTO: 28.8 PG (ref 26–34)
MCHC RBC AUTO-ENTMCNC: 30.9 G/DL (ref 32–36)
MCV RBC AUTO: 93 FL (ref 80–100)
MONOCYTES # BLD AUTO: 0.73 X10*3/UL (ref 0.1–1)
MONOCYTES NFR BLD AUTO: 6.7 %
NEUTROPHILS # BLD AUTO: 7.78 X10*3/UL (ref 1.2–7.7)
NEUTROPHILS NFR BLD AUTO: 71 %
NRBC BLD-RTO: 0 /100 WBCS (ref 0–0)
PLATELET # BLD AUTO: 285 X10*3/UL (ref 150–450)
POTASSIUM SERPL-SCNC: 4.1 MMOL/L (ref 3.5–5.3)
PROT SERPL-MCNC: 6.3 G/DL (ref 6.4–8.2)
RBC # BLD AUTO: 3.75 X10*6/UL (ref 4–5.2)
SODIUM SERPL-SCNC: 136 MMOL/L (ref 136–145)
WBC # BLD AUTO: 11 X10*3/UL (ref 4.4–11.3)

## 2024-04-03 PROCEDURE — G0299 HHS/HOSPICE OF RN EA 15 MIN: HCPCS | Mod: HHH

## 2024-04-03 PROCEDURE — 1090000001 HH PPS REVENUE CREDIT

## 2024-04-03 PROCEDURE — 1090000002 HH PPS REVENUE DEBIT

## 2024-04-03 PROCEDURE — 80053 COMPREHEN METABOLIC PANEL: CPT

## 2024-04-03 PROCEDURE — 82550 ASSAY OF CK (CPK): CPT

## 2024-04-03 PROCEDURE — 85025 COMPLETE CBC W/AUTO DIFF WBC: CPT

## 2024-04-03 PROCEDURE — 86140 C-REACTIVE PROTEIN: CPT

## 2024-04-03 PROCEDURE — G0157 HHC PT ASSISTANT EA 15: HCPCS | Mod: HHH

## 2024-04-03 NOTE — PROGRESS NOTES
Reviewed chart and patient ordered dapto 470mg IV daily to continue through approx 4/23     Labs Reviewed and CRP has increased, other labs unremarkable, will continue to monitor,. ESR will be drawn week of 04/15  RN notes unremarkable    Rph spoke with patient. Infusions going well and inventory correct. Agrees to delivery anytime on Friday with regular supplies and flushes.  to call before arrival.      Dispense x7 doses for cmpd on 4/4 and delivery on 4/05/24. Fill is a 7 day supply  Infusions 4/06 through 4/12/24     NF 4/11 OVN - check labs

## 2024-04-04 ENCOUNTER — OFFICE VISIT (OUTPATIENT)
Dept: CARDIOLOGY | Facility: CLINIC | Age: 69
End: 2024-04-04
Payer: COMMERCIAL

## 2024-04-04 VITALS
OXYGEN SATURATION: 98 % | TEMPERATURE: 98.1 F | RESPIRATION RATE: 18 BRPM | HEART RATE: 78 BPM | SYSTOLIC BLOOD PRESSURE: 118 MMHG | DIASTOLIC BLOOD PRESSURE: 68 MMHG

## 2024-04-04 VITALS
HEART RATE: 80 BPM | HEIGHT: 57 IN | WEIGHT: 152 LBS | SYSTOLIC BLOOD PRESSURE: 100 MMHG | DIASTOLIC BLOOD PRESSURE: 80 MMHG | BODY MASS INDEX: 32.79 KG/M2

## 2024-04-04 DIAGNOSIS — I82.4Y1 ACUTE DEEP VEIN THROMBOSIS (DVT) OF PROXIMAL VEIN OF RIGHT LOWER EXTREMITY (MULTI): ICD-10-CM

## 2024-04-04 DIAGNOSIS — I42.9 CARDIOMYOPATHY, UNSPECIFIED TYPE (MULTI): ICD-10-CM

## 2024-04-04 DIAGNOSIS — F17.200 CURRENT EVERY DAY SMOKER: ICD-10-CM

## 2024-04-04 DIAGNOSIS — I25.10 CAD IN NATIVE ARTERY: ICD-10-CM

## 2024-04-04 DIAGNOSIS — Z95.828 PRESENCE OF IVC FILTER: ICD-10-CM

## 2024-04-04 DIAGNOSIS — I10 ESSENTIAL HYPERTENSION: ICD-10-CM

## 2024-04-04 DIAGNOSIS — Z86.711 HISTORY OF PULMONARY EMBOLISM: ICD-10-CM

## 2024-04-04 LAB
BACTERIA SPEC CULT: ABNORMAL
BACTERIA SPEC CULT: ABNORMAL
GRAM STN SPEC: ABNORMAL
GRAM STN SPEC: ABNORMAL

## 2024-04-04 PROCEDURE — 1123F ACP DISCUSS/DSCN MKR DOCD: CPT | Performed by: INTERNAL MEDICINE

## 2024-04-04 PROCEDURE — 1090000002 HH PPS REVENUE DEBIT

## 2024-04-04 PROCEDURE — 1090000001 HH PPS REVENUE CREDIT

## 2024-04-04 PROCEDURE — 3074F SYST BP LT 130 MM HG: CPT | Performed by: INTERNAL MEDICINE

## 2024-04-04 PROCEDURE — 99214 OFFICE O/P EST MOD 30 MIN: CPT | Performed by: INTERNAL MEDICINE

## 2024-04-04 PROCEDURE — 1159F MED LIST DOCD IN RCRD: CPT | Performed by: INTERNAL MEDICINE

## 2024-04-04 PROCEDURE — 3079F DIAST BP 80-89 MM HG: CPT | Performed by: INTERNAL MEDICINE

## 2024-04-04 PROCEDURE — 3008F BODY MASS INDEX DOCD: CPT | Performed by: INTERNAL MEDICINE

## 2024-04-04 RX ORDER — TIZANIDINE HYDROCHLORIDE 4 MG/1
4 CAPSULE, GELATIN COATED ORAL DAILY
COMMUNITY

## 2024-04-04 RX ORDER — TRIAMTERENE AND HYDROCHLOROTHIAZIDE 37.5; 25 MG/1; MG/1
1 CAPSULE ORAL EVERY MORNING
COMMUNITY

## 2024-04-04 SDOH — HEALTH STABILITY: PHYSICAL HEALTH: EXERCISE TYPE: STANDING LE STRENGTHENING

## 2024-04-04 SDOH — HEALTH STABILITY: PHYSICAL HEALTH: EXERCISE ACTIVITIES SETS: 1

## 2024-04-04 SDOH — HEALTH STABILITY: PHYSICAL HEALTH: PHYSICAL EXERCISE: 15

## 2024-04-04 SDOH — HEALTH STABILITY: PHYSICAL HEALTH: EXERCISE ACTIVITY: STANDING LE STRENGTHENING

## 2024-04-04 ASSESSMENT — ENCOUNTER SYMPTOMS
APPETITE LEVEL: GOOD
PAIN LOCATION: BACK
DENIES PAIN: 1
PAIN LOCATION - PAIN SEVERITY: 2/10
PAIN: 1
DEPRESSION: 0
PAIN LOCATION - EXACERBATING FACTORS: MOVEMENT
MUSCLE WEAKNESS: 1
PAIN LOCATION - PAIN QUALITY: LOW BACK
CHANGE IN APPETITE: UNCHANGED
PERSON REPORTING PAIN: PATIENT

## 2024-04-04 ASSESSMENT — PATIENT HEALTH QUESTIONNAIRE - PHQ9
1. LITTLE INTEREST OR PLEASURE IN DOING THINGS: NOT AT ALL
SUM OF ALL RESPONSES TO PHQ9 QUESTIONS 1 AND 2: 0
2. FEELING DOWN, DEPRESSED OR HOPELESS: NOT AT ALL

## 2024-04-04 ASSESSMENT — ACTIVITIES OF DAILY LIVING (ADL)
AMBULATION_DISTANCE/DURATION_TOLERATED: 75 FT X2
AMBULATION ASSISTANCE ON FLAT SURFACES: 1

## 2024-04-04 NOTE — PROGRESS NOTES
Referred by Dr. Terry ref. provider found provider found for   Chief Complaint   Patient presents with    Med Refill    Follow-up        History of Present Illness  Tara Tierney is a 68 y.o. year old female patient is here for follow-up.  Cardiac wise been stable.  Unfortunately she has been having infection refractory and she is on long-term antibiotics.  She had back surgery and then had to have her plate removed because of infection.  She also has IVC filter in place that needs to be removed at some point when she is more stable.  I discussed with the patient in length and I will see her back in about 2 to 3 months we will decide at that point about removing IVC filter.  The patient understood.  Will call for any problem and follow-up as scheduled    Past Medical History  Past Medical History:   Diagnosis Date    Arthritis     Back pain     COPD (chronic obstructive pulmonary disease) (CMS/HCC)     COVID-19 vaccine administered     Eczema     History of COVID-19     2020    Hyperlipidemia     Hypertension     Hypoesthesia of skin     Hypesthesia    Macular degeneration     Meniere's disease     Osteoporosis     Overactive bladder     Pain in unspecified finger(s)     Finger pain    Pain in unspecified wrist     Pain in wrist joint    Peripheral vascular disease (CMS/HCC)     Personal history of other diseases of the circulatory system     History of coronary artery disease    Personal history of other diseases of the musculoskeletal system and connective tissue     History of arthritis    Personal history of other specified conditions     History of chest pain    Personal history of other specified conditions     History of balance disorder    Personal history of other specified conditions     History of numbness    Postmenopausal     Seasonal allergies     Unspecified visual loss     Vision problems       Social History  Social History     Tobacco Use    Smoking status: Former     Packs/day: 0.50     Years:  45.00     Additional pack years: 0.00     Total pack years: 22.50     Types: Cigarettes     Quit date: 2023     Years since quittin.5    Smokeless tobacco: Not on file   Vaping Use    Vaping Use: Never used   Substance Use Topics    Alcohol use: Never    Drug use: Never       Family History     Family History   Problem Relation Name Age of Onset    Breast cancer Mother      Other (arteriosclerotic cardiovascular disease) Father      Cancer Father         Review of Systems  As per HPI, all other systems reviewed and negative.    Allergies:  Allergies   Allergen Reactions    Neomycin Other     swelling, redness, burning post eye surgery    Neomycin-Polymyxin B-Dexameth Other and Rash     blisters, eye swelling, burning        Outpatient Medications:  Current Outpatient Medications   Medication Instructions    0.9 % sodium chloride (sodium chloride, PF, 0.9%) injection 10 mL, intravenous, Daily    albuterol (ProAir HFA) 90 mcg/actuation inhaler 2 puffs, inhalation    apixaban (ELIQUIS) 10 mg, oral, 2 times daily    apixaban (ELIQUIS) 5 mg, oral, 2 times daily    aspirin 81 mg, oral, Daily    atenolol (TENORMIN) 50 mg, oral, Every morning    baclofen (Lioresal) 10 mg tablet Take half a tab 4 times daily as needed for muscle spasms for up to 10 days    brimonidine (AlphaGAN) 0.2 % ophthalmic solution 1 drop, Both Eyes, 2 times daily    busPIRone (BUSPAR) 10 mg, oral, Daily    cholecalciferol (Vitamin D-3) 50 mcg (2,000 unit) capsule 1 capsule, oral, Daily    cyclobenzaprine (FLEXERIL) 5 mg, oral, 3 times daily PRN    DAPTOmycin (CUBICIN) 470 mg, intravenous, Daily, Infuse 470mg IV over 30 minutes once daily through 24    ezetimibe (ZETIA) 10 mg, oral, Daily    furosemide (LASIX) 20 mg, oral, Daily    heparin sodium,porcine/PF (HEPARIN, PORCINE, LOCK FLUSH IV) 5 mL, intravenous, Daily    hydrALAZINE (APRESOLINE) 50 mg, oral, 2 times daily    HYDROmorphone (DILAUDID) 2 mg, oral, Every 4 hours PRN     ibandronate (BONIVA) 150 mg, oral, Every 30 days    ipratropium (Atrovent HFA) 17 mcg/actuation inhaler 2 puffs, inhalation, 2 times daily RT    isosorbide mononitrate ER (IMDUR) 60 mg, oral, Daily RT    loratadine (Claritin) 10 mg tablet 1 tablet, oral, Daily    LUTEIN ORAL 20 mg, oral, Daily RT    meclizine (ANTIVERT) 25 mg, oral, Every 8 hours PRN    mirtazapine (Remeron) 15 mg tablet 1 tablet, oral, Nightly    morphine CR (MS CONTIN) 15 mg, oral, Every 12 hours, Do not crush, chew, or split.    nitroglycerin (NITROSTAT) 0.4 mg, sublingual, Every 5 min PRN,  PLACE 1 TABLET UNDER THE TONGUE EVERY 5 MINUTES UP TO 3 DOSES AS NEEDED FOR CHEST PAIN.<BR>    oxyCODONE-acetaminophen (Percocet) 5-325 mg tablet 1 tablet, oral, Every 6 hours PRN    pantoprazole (PROTONIX) 40 mg, oral, Daily before breakfast, Do not crush, chew, or split.    pilocarpine (SALAGEN (PILOCARPINE)) 5 mg, oral, Daily RT    potassium chloride ER (Micro-K) 10 mEq ER capsule 10 mEq, oral, Daily    pregabalin (LYRICA) 75 mg, oral, 2 times daily    sodium chloride 0.9% (ClearShield Sodium Chlor Flush) flush 10 mL, intravenous, Daily    tiZANidine (ZANAFLEX) 4 mg, oral, Daily    tolterodine (Detrol) 1 mg tablet 1 tablet, oral, Nightly    triamterene-hydrochlorothiazid (Dyazide) 37.5-25 mg capsule 1 capsule, oral, Every morning    vit C,X-Lj-bjgbw-lutein-zeaxan (PreserVision AREDS-2) 250-90-40-1 mg capsule 1 capsule, oral, 2 times daily         Vitals:  Vitals:    04/04/24 1059   BP: 100/80   Pulse: 80       Physical Exam:  Physical Exam  Vitals and nursing note reviewed.   Constitutional:       Appearance: Normal appearance.   HENT:      Head: Normocephalic.   Eyes:      Pupils: Pupils are equal, round, and reactive to light.   Cardiovascular:      Rate and Rhythm: Normal rate and regular rhythm.      Pulses: Normal pulses.      Heart sounds: Normal heart sounds.   Pulmonary:      Effort: Pulmonary effort is normal.      Breath sounds: Normal breath  sounds.   Musculoskeletal:         General: Normal range of motion.      Cervical back: Normal range of motion.   Skin:     General: Skin is dry.   Neurological:      General: No focal deficit present.      Mental Status: She is alert and oriented to person, place, and time.   Psychiatric:         Behavior: Behavior is cooperative.             Assessment/Plan   Diagnoses and all orders for this visit:  CAD in native artery  Cardiomyopathy, unspecified type (CMS/HCC)  Essential hypertension  Acute deep vein thrombosis (DVT) of proximal vein of right lower extremity (CMS/HCC)  Presence of IVC filter  History of pulmonary embolism  BMI 32.0-32.9,adult  Current every day smoker          Hugo Barron MD [facility]  Interventional Cardiology   [facility]     Thank you for allowing me to participate in the care of this patient. Please do not hesitate to contact me with any further questions or concerns.

## 2024-04-04 NOTE — PATIENT INSTRUCTIONS
Follow up office visit in 3 months to follow up on IVC filter    Continue same medications/treatment.  Patient educated on proper medication use.  Patient educated on risk factor modification.  Please bring any lab results from other providers / physicians to your next appointment.    Please bring all medicines, vitamins and herbal supplements with you when you come to the office.    Prescriptions will not be filled unless you are compliant with your follow up appointments or have a follow up  appointment scheduled as per instruction of your physician.  Refills should be requested at the time of  Your visit.    Renee ESPINAL LPN, am scribing for and in the presence of  Dr. Hugo Barron MD, FACC

## 2024-04-05 ENCOUNTER — HOME CARE VISIT (OUTPATIENT)
Dept: HOME HEALTH SERVICES | Facility: HOME HEALTH | Age: 69
End: 2024-04-05
Payer: COMMERCIAL

## 2024-04-05 ENCOUNTER — DOCUMENTATION (OUTPATIENT)
Dept: PHARMACY | Facility: CLINIC | Age: 69
End: 2024-04-05

## 2024-04-05 VITALS
HEART RATE: 79 BPM | RESPIRATION RATE: 20 BRPM | SYSTOLIC BLOOD PRESSURE: 115 MMHG | DIASTOLIC BLOOD PRESSURE: 60 MMHG | TEMPERATURE: 97.8 F | OXYGEN SATURATION: 98 %

## 2024-04-05 PROCEDURE — G0299 HHS/HOSPICE OF RN EA 15 MIN: HCPCS | Mod: HHH

## 2024-04-05 PROCEDURE — 1090000002 HH PPS REVENUE DEBIT

## 2024-04-05 PROCEDURE — 1090000001 HH PPS REVENUE CREDIT

## 2024-04-05 PROCEDURE — G0157 HHC PT ASSISTANT EA 15: HCPCS | Mod: HHH

## 2024-04-05 SDOH — HEALTH STABILITY: PHYSICAL HEALTH: EXERCISE TYPE: FINALIZED HEP FOR STRENGTHENING

## 2024-04-05 ASSESSMENT — ENCOUNTER SYMPTOMS
SUBJECTIVE PAIN PROGRESSION: UNCHANGED
PERSON REPORTING PAIN: PATIENT
PAIN SEVERITY GOAL: 2/10
PAIN LOCATION: RIGHT HIP
LOWEST PAIN SEVERITY IN PAST 24 HOURS: 4/10
HIGHEST PAIN SEVERITY IN PAST 24 HOURS: 6/10
APPETITE LEVEL: GOOD
MUSCLE WEAKNESS: 1
PAIN: 1
PAIN: 1

## 2024-04-05 ASSESSMENT — ACTIVITIES OF DAILY LIVING (ADL)
AMBULATION ASSISTANCE: 1
PHYSICAL TRANSFERS ASSESSED: 1
AMBULATION ASSISTANCE ON FLAT SURFACES: 1
AMBULATION_DISTANCE/DURATION_TOLERATED: 75 FT
AMBULATION ASSISTANCE: ONE PERSON
PHYSICAL_TRANSFERS_DEVICES: WALKER
CURRENT_FUNCTION: ONE PERSON

## 2024-04-05 NOTE — PROGRESS NOTES
SPOKE W/ PT - DELIVERY IS SCHEDULED FOR FRIDAY BY 9 PM.  ASKED PT IF THERE ARE ANY QUESTIONS TO A PHARMACIST. PT SAID: NO QUESTIONS.

## 2024-04-06 PROCEDURE — 1090000002 HH PPS REVENUE DEBIT

## 2024-04-06 PROCEDURE — 1090000001 HH PPS REVENUE CREDIT

## 2024-04-07 PROCEDURE — 1090000002 HH PPS REVENUE DEBIT

## 2024-04-07 PROCEDURE — 1090000001 HH PPS REVENUE CREDIT

## 2024-04-08 ENCOUNTER — HOME CARE VISIT (OUTPATIENT)
Dept: HOME HEALTH SERVICES | Facility: HOME HEALTH | Age: 69
End: 2024-04-08
Payer: COMMERCIAL

## 2024-04-08 VITALS
OXYGEN SATURATION: 98 % | TEMPERATURE: 97.5 F | DIASTOLIC BLOOD PRESSURE: 58 MMHG | SYSTOLIC BLOOD PRESSURE: 118 MMHG | RESPIRATION RATE: 18 BRPM | HEART RATE: 83 BPM

## 2024-04-08 PROCEDURE — 1090000003 HH PPS REVENUE ADJ

## 2024-04-08 PROCEDURE — 1090000001 HH PPS REVENUE CREDIT

## 2024-04-08 PROCEDURE — G0299 HHS/HOSPICE OF RN EA 15 MIN: HCPCS | Mod: HHH

## 2024-04-08 PROCEDURE — 1090000002 HH PPS REVENUE DEBIT

## 2024-04-08 RX ADMIN — SODIUM CHLORIDE 10 ML: 9 INJECTION, SOLUTION INTRAMUSCULAR; INTRAVENOUS; SUBCUTANEOUS at 09:16

## 2024-04-08 ASSESSMENT — ENCOUNTER SYMPTOMS
CHANGE IN APPETITE: UNCHANGED
DENIES PAIN: 1
APPETITE LEVEL: GOOD
LOSS OF SENSATION IN FEET: 0
PERSON REPORTING PAIN: PATIENT
DEPRESSION: 0
OCCASIONAL FEELINGS OF UNSTEADINESS: 0

## 2024-04-08 ASSESSMENT — PAIN SCALES - PAIN ASSESSMENT IN ADVANCED DEMENTIA (PAINAD)
FACIALEXPRESSION: 0 - SMILING OR INEXPRESSIVE.
BODYLANGUAGE: 0
BODYLANGUAGE: 0 - RELAXED.
CONSOLABILITY: 0
CONSOLABILITY: 0 - NO NEED TO CONSOLE.
FACIALEXPRESSION: 0
TOTALSCORE: 0
BREATHING: 0
NEGVOCALIZATION: 0 - NONE.
NEGVOCALIZATION: 0

## 2024-04-09 ENCOUNTER — HOME CARE VISIT (OUTPATIENT)
Dept: HOME HEALTH SERVICES | Facility: HOME HEALTH | Age: 69
End: 2024-04-09
Payer: COMMERCIAL

## 2024-04-09 PROCEDURE — 169592 NO-PAY CLAIM PROCEDURE

## 2024-04-09 PROCEDURE — 0023 HH SOC

## 2024-04-09 PROCEDURE — 1090000002 HH PPS REVENUE DEBIT

## 2024-04-09 PROCEDURE — 1090000001 HH PPS REVENUE CREDIT

## 2024-04-09 PROCEDURE — G0151 HHCP-SERV OF PT,EA 15 MIN: HCPCS | Mod: HHH

## 2024-04-09 ASSESSMENT — ENCOUNTER SYMPTOMS
HIGHEST PAIN SEVERITY IN PAST 24 HOURS: 5/10
PAIN LOCATION: BACK
PAIN LOCATION - PAIN SEVERITY: 2/10
LOWEST PAIN SEVERITY IN PAST 24 HOURS: 0/10
PERSON REPORTING PAIN: PATIENT
SUBJECTIVE PAIN PROGRESSION: GRADUALLY IMPROVING
PAIN: 1

## 2024-04-09 ASSESSMENT — ACTIVITIES OF DAILY LIVING (ADL)
AMBULATION_DISTANCE/DURATION_TOLERATED: 150'
AMBULATION ASSISTANCE ON FLAT SURFACES: 1

## 2024-04-10 ENCOUNTER — LAB REQUISITION (OUTPATIENT)
Dept: LAB | Facility: LAB | Age: 69
End: 2024-04-10
Payer: COMMERCIAL

## 2024-04-10 ENCOUNTER — HOME CARE VISIT (OUTPATIENT)
Dept: HOME HEALTH SERVICES | Facility: HOME HEALTH | Age: 69
End: 2024-04-10
Payer: COMMERCIAL

## 2024-04-10 DIAGNOSIS — T81.89XD OTHER COMPLICATIONS OF PROCEDURES, NOT ELSEWHERE CLASSIFIED, SUBSEQUENT ENCOUNTER: ICD-10-CM

## 2024-04-10 LAB
ALBUMIN SERPL BCP-MCNC: 3.5 G/DL (ref 3.4–5)
ALP SERPL-CCNC: 80 U/L (ref 33–136)
ALT SERPL W P-5'-P-CCNC: 10 U/L (ref 7–45)
ANION GAP SERPL CALC-SCNC: 17 MMOL/L (ref 10–20)
AST SERPL W P-5'-P-CCNC: 14 U/L (ref 9–39)
BASOPHILS # BLD AUTO: 0.02 X10*3/UL (ref 0–0.1)
BASOPHILS NFR BLD AUTO: 0.2 %
BILIRUB SERPL-MCNC: 0.4 MG/DL (ref 0–1.2)
BUN SERPL-MCNC: 13 MG/DL (ref 6–23)
CALCIUM SERPL-MCNC: 9.1 MG/DL (ref 8.6–10.3)
CHLORIDE SERPL-SCNC: 98 MMOL/L (ref 98–107)
CK SERPL-CCNC: 31 U/L (ref 0–215)
CO2 SERPL-SCNC: 26 MMOL/L (ref 21–32)
CREAT SERPL-MCNC: 0.71 MG/DL (ref 0.5–1.05)
CRP SERPL-MCNC: 4.45 MG/DL
EGFRCR SERPLBLD CKD-EPI 2021: >90 ML/MIN/1.73M*2
EOSINOPHIL # BLD AUTO: 0.31 X10*3/UL (ref 0–0.7)
EOSINOPHIL NFR BLD AUTO: 3.1 %
ERYTHROCYTE [DISTWIDTH] IN BLOOD BY AUTOMATED COUNT: 16.2 % (ref 11.5–14.5)
GLUCOSE SERPL-MCNC: 136 MG/DL (ref 74–99)
HCT VFR BLD AUTO: 36.6 % (ref 36–46)
HGB BLD-MCNC: 11.4 G/DL (ref 12–16)
IMM GRANULOCYTES # BLD AUTO: 0.03 X10*3/UL (ref 0–0.7)
IMM GRANULOCYTES NFR BLD AUTO: 0.3 % (ref 0–0.9)
LYMPHOCYTES # BLD AUTO: 1.64 X10*3/UL (ref 1.2–4.8)
LYMPHOCYTES NFR BLD AUTO: 16.3 %
MCH RBC QN AUTO: 29.2 PG (ref 26–34)
MCHC RBC AUTO-ENTMCNC: 31.1 G/DL (ref 32–36)
MCV RBC AUTO: 94 FL (ref 80–100)
MONOCYTES # BLD AUTO: 0.77 X10*3/UL (ref 0.1–1)
MONOCYTES NFR BLD AUTO: 7.6 %
NEUTROPHILS # BLD AUTO: 7.31 X10*3/UL (ref 1.2–7.7)
NEUTROPHILS NFR BLD AUTO: 72.5 %
NRBC BLD-RTO: 0 /100 WBCS (ref 0–0)
PLATELET # BLD AUTO: 341 X10*3/UL (ref 150–450)
POTASSIUM SERPL-SCNC: 3.9 MMOL/L (ref 3.5–5.3)
PROT SERPL-MCNC: 6.3 G/DL (ref 6.4–8.2)
RBC # BLD AUTO: 3.91 X10*6/UL (ref 4–5.2)
SODIUM SERPL-SCNC: 137 MMOL/L (ref 136–145)
WBC # BLD AUTO: 10.1 X10*3/UL (ref 4.4–11.3)

## 2024-04-10 PROCEDURE — 82550 ASSAY OF CK (CPK): CPT

## 2024-04-10 PROCEDURE — 1090000001 HH PPS REVENUE CREDIT

## 2024-04-10 PROCEDURE — G0299 HHS/HOSPICE OF RN EA 15 MIN: HCPCS | Mod: HHH

## 2024-04-10 PROCEDURE — 85025 COMPLETE CBC W/AUTO DIFF WBC: CPT

## 2024-04-10 PROCEDURE — 1090000002 HH PPS REVENUE DEBIT

## 2024-04-10 PROCEDURE — 86140 C-REACTIVE PROTEIN: CPT

## 2024-04-10 PROCEDURE — 80053 COMPREHEN METABOLIC PANEL: CPT

## 2024-04-11 ENCOUNTER — HOME INFUSION (OUTPATIENT)
Dept: INFUSION THERAPY | Age: 69
End: 2024-04-11
Payer: COMMERCIAL

## 2024-04-11 ENCOUNTER — DOCUMENTATION (OUTPATIENT)
Dept: PHARMACY | Facility: CLINIC | Age: 69
End: 2024-04-11

## 2024-04-11 VITALS
SYSTOLIC BLOOD PRESSURE: 132 MMHG | RESPIRATION RATE: 18 BRPM | TEMPERATURE: 97.8 F | HEART RATE: 88 BPM | OXYGEN SATURATION: 98 % | DIASTOLIC BLOOD PRESSURE: 70 MMHG

## 2024-04-11 PROCEDURE — 1090000002 HH PPS REVENUE DEBIT

## 2024-04-11 PROCEDURE — 1090000001 HH PPS REVENUE CREDIT

## 2024-04-11 ASSESSMENT — ACTIVITIES OF DAILY LIVING (ADL)
CURRENT_FUNCTION: ONE PERSON
AMBULATION ASSISTANCE: STAND BY ASSIST

## 2024-04-11 ASSESSMENT — ENCOUNTER SYMPTOMS
PAIN LOCATION: BACK
APPETITE LEVEL: GOOD
PAIN: 1
CHANGE IN APPETITE: UNCHANGED
MUSCLE WEAKNESS: 1
PAIN LOCATION - PAIN SEVERITY: 4/10

## 2024-04-11 NOTE — PROGRESS NOTES
Reviewed chart and patient ordered dapto 470mg IV daily to continue through approx 4/23     Labs Reviewed and CRP remains elevated but trending down at 4.45, CK down to 31    Rph call to patient, left message informing of delivery of another week of medication and supplies on 4/12. Left pharmacy number to contact if needed.     Pharmacy to mix 4/11 and deliver 4/12 with supplies to match:  7x daptomycin 470 mg HP   DOS: 4/13 - 4/19    Follow up 4/18 - check labs, check progress, deliver remainder OVN

## 2024-04-11 NOTE — PROGRESS NOTES
CALLED PT AND LEFT VM - DELIVERY IS SCHEDULED FOR FRIDAY BY  8  PM.  LET PT KNOW WE'RE SENDING STND SUPPLIES AND ASKED TO CALL W/ ANY QUESTIONS.

## 2024-04-12 ENCOUNTER — HOME CARE VISIT (OUTPATIENT)
Dept: HOME HEALTH SERVICES | Facility: HOME HEALTH | Age: 69
End: 2024-04-12
Payer: COMMERCIAL

## 2024-04-12 VITALS
TEMPERATURE: 98 F | DIASTOLIC BLOOD PRESSURE: 72 MMHG | RESPIRATION RATE: 18 BRPM | HEART RATE: 88 BPM | OXYGEN SATURATION: 95 % | SYSTOLIC BLOOD PRESSURE: 110 MMHG

## 2024-04-12 PROCEDURE — 1090000001 HH PPS REVENUE CREDIT

## 2024-04-12 PROCEDURE — G0300 HHS/HOSPICE OF LPN EA 15 MIN: HCPCS | Mod: HHH

## 2024-04-12 PROCEDURE — 1090000002 HH PPS REVENUE DEBIT

## 2024-04-13 PROCEDURE — 1090000001 HH PPS REVENUE CREDIT

## 2024-04-13 PROCEDURE — 1090000002 HH PPS REVENUE DEBIT

## 2024-04-14 PROCEDURE — 1090000001 HH PPS REVENUE CREDIT

## 2024-04-14 PROCEDURE — 1090000002 HH PPS REVENUE DEBIT

## 2024-04-14 ASSESSMENT — PAIN SCALES - PAIN ASSESSMENT IN ADVANCED DEMENTIA (PAINAD)
BREATHING: 0
CONSOLABILITY: 0 - NO NEED TO CONSOLE.
NEGVOCALIZATION: 0
NEGVOCALIZATION: 0 - NONE.
BODYLANGUAGE: 0 - RELAXED.
FACIALEXPRESSION: 0
BODYLANGUAGE: 0
FACIALEXPRESSION: 0 - SMILING OR INEXPRESSIVE.
TOTALSCORE: 0
CONSOLABILITY: 0

## 2024-04-14 ASSESSMENT — ENCOUNTER SYMPTOMS
APPETITE LEVEL: GOOD
CHANGE IN APPETITE: UNCHANGED
DENIES PAIN: 1
PERSON REPORTING PAIN: PATIENT
OCCASIONAL FEELINGS OF UNSTEADINESS: 0

## 2024-04-15 ENCOUNTER — OFFICE VISIT (OUTPATIENT)
Dept: WOUND CARE | Facility: CLINIC | Age: 69
End: 2024-04-15
Payer: COMMERCIAL

## 2024-04-15 PROCEDURE — 11043 DBRDMT MUSC&/FSCA 1ST 20/<: CPT | Performed by: PLASTIC SURGERY

## 2024-04-15 PROCEDURE — 97605 NEG PRS WND THER DME<=50SQCM: CPT

## 2024-04-15 PROCEDURE — 11043 DBRDMT MUSC&/FSCA 1ST 20/<: CPT

## 2024-04-15 PROCEDURE — 1090000001 HH PPS REVENUE CREDIT

## 2024-04-15 PROCEDURE — 97605 NEG PRS WND THER DME<=50SQCM: CPT | Performed by: PLASTIC SURGERY

## 2024-04-15 PROCEDURE — 1090000002 HH PPS REVENUE DEBIT

## 2024-04-16 PROCEDURE — 1090000002 HH PPS REVENUE DEBIT

## 2024-04-16 PROCEDURE — 1090000001 HH PPS REVENUE CREDIT

## 2024-04-17 ENCOUNTER — HOME INFUSION (OUTPATIENT)
Dept: INFUSION THERAPY | Age: 69
End: 2024-04-17
Payer: COMMERCIAL

## 2024-04-17 ENCOUNTER — HOME CARE VISIT (OUTPATIENT)
Dept: HOME HEALTH SERVICES | Facility: HOME HEALTH | Age: 69
End: 2024-04-17
Payer: COMMERCIAL

## 2024-04-17 ENCOUNTER — LAB REQUISITION (OUTPATIENT)
Dept: LAB | Facility: LAB | Age: 69
End: 2024-04-17
Payer: COMMERCIAL

## 2024-04-17 VITALS
DIASTOLIC BLOOD PRESSURE: 58 MMHG | TEMPERATURE: 98.1 F | RESPIRATION RATE: 18 BRPM | HEART RATE: 76 BPM | OXYGEN SATURATION: 98 % | SYSTOLIC BLOOD PRESSURE: 128 MMHG

## 2024-04-17 DIAGNOSIS — T82.898A OCCLUSION OF PERIPHERALLY INSERTED CENTRAL CATHETER (PICC) LINE, INITIAL ENCOUNTER (CMS-HCC): Primary | ICD-10-CM

## 2024-04-17 DIAGNOSIS — T81.49XD INFECTION FOLLOWING A PROCEDURE, OTHER SURGICAL SITE, SUBSEQUENT ENCOUNTER: ICD-10-CM

## 2024-04-17 LAB
ALBUMIN SERPL BCP-MCNC: 3.7 G/DL (ref 3.4–5)
ALP SERPL-CCNC: 74 U/L (ref 33–136)
ALT SERPL W P-5'-P-CCNC: 10 U/L (ref 7–45)
ANION GAP SERPL CALC-SCNC: 14 MMOL/L (ref 10–20)
AST SERPL W P-5'-P-CCNC: 13 U/L (ref 9–39)
BILIRUB SERPL-MCNC: 0.3 MG/DL (ref 0–1.2)
BUN SERPL-MCNC: 15 MG/DL (ref 6–23)
CALCIUM SERPL-MCNC: 9.5 MG/DL (ref 8.6–10.3)
CHLORIDE SERPL-SCNC: 100 MMOL/L (ref 98–107)
CK SERPL-CCNC: 34 U/L (ref 0–215)
CO2 SERPL-SCNC: 29 MMOL/L (ref 21–32)
CREAT SERPL-MCNC: 0.78 MG/DL (ref 0.5–1.05)
CRP SERPL-MCNC: 2.81 MG/DL
EGFRCR SERPLBLD CKD-EPI 2021: 82 ML/MIN/1.73M*2
GLUCOSE SERPL-MCNC: 101 MG/DL (ref 74–99)
POTASSIUM SERPL-SCNC: 4 MMOL/L (ref 3.5–5.3)
PROT SERPL-MCNC: 6.6 G/DL (ref 6.4–8.2)
SODIUM SERPL-SCNC: 139 MMOL/L (ref 136–145)

## 2024-04-17 PROCEDURE — 82550 ASSAY OF CK (CPK): CPT

## 2024-04-17 PROCEDURE — 86140 C-REACTIVE PROTEIN: CPT

## 2024-04-17 PROCEDURE — G0299 HHS/HOSPICE OF RN EA 15 MIN: HCPCS | Mod: HHH

## 2024-04-17 PROCEDURE — 1090000001 HH PPS REVENUE CREDIT

## 2024-04-17 PROCEDURE — 80053 COMPREHEN METABOLIC PANEL: CPT

## 2024-04-17 PROCEDURE — 1090000002 HH PPS REVENUE DEBIT

## 2024-04-17 RX ORDER — WATER 1 ML/ML
10 INJECTION INTRAMUSCULAR; INTRAVENOUS; SUBCUTANEOUS AS NEEDED
Qty: 10 ML | Refills: 99 | Status: SHIPPED
Start: 2024-04-17

## 2024-04-17 ASSESSMENT — ENCOUNTER SYMPTOMS
CHANGE IN APPETITE: UNCHANGED
DENIES PAIN: 1
APPETITE LEVEL: GOOD
MUSCLE WEAKNESS: 1

## 2024-04-17 NOTE — PROGRESS NOTES
Received tel call from ROBIN Brown, pt's line has no blood return. [facility]flo requested. RX entered into Epic, white copy to intake. [facility] 100% covered. RN team notified. Visit scheduled currently for 4/20 for cathflo admin.    Spoke to patient, unable to get blood return but still flushing and infusing fine. Has doses of daptomycin through 4/19. Has follow up appt 4/18 with [facility]. Agreeable to delivery tonight for Cathflo and medication on Friday if pt will remain on it after appt.    Pharmacy to dispense 4/17 for straight delivery:  1x cathflo  1x SWFI PFS  DOS 4/18    New followup 4/19 POC / check labs, progress, deliver remainder straight

## 2024-04-18 ENCOUNTER — OFFICE VISIT (OUTPATIENT)
Dept: INFECTIOUS DISEASES | Age: 69
End: 2024-04-18

## 2024-04-18 ENCOUNTER — HOME INFUSION (OUTPATIENT)
Dept: INFUSION THERAPY | Age: 69
End: 2024-04-18
Payer: COMMERCIAL

## 2024-04-18 ENCOUNTER — TELEPHONE (OUTPATIENT)
Dept: INFECTIOUS DISEASES | Age: 69
End: 2024-04-18

## 2024-04-18 VITALS
TEMPERATURE: 97.5 F | HEART RATE: 82 BPM | DIASTOLIC BLOOD PRESSURE: 50 MMHG | SYSTOLIC BLOOD PRESSURE: 85 MMHG | RESPIRATION RATE: 16 BRPM

## 2024-04-18 DIAGNOSIS — A49.02 MRSA (METHICILLIN RESISTANT STAPHYLOCOCCUS AUREUS) INFECTION: ICD-10-CM

## 2024-04-18 DIAGNOSIS — T81.49XA SURGICAL WOUND INFECTION: Primary | ICD-10-CM

## 2024-04-18 DIAGNOSIS — M46.46 LUMBAR DISCITIS: Primary | ICD-10-CM

## 2024-04-18 DIAGNOSIS — T81.42XA DEEP INCISIONAL SURGICAL SITE INFECTION: ICD-10-CM

## 2024-04-18 PROCEDURE — 1090000002 HH PPS REVENUE DEBIT

## 2024-04-18 PROCEDURE — 1090000001 HH PPS REVENUE CREDIT

## 2024-04-18 RX ORDER — BUSPIRONE HYDROCHLORIDE 10 MG/1
10 TABLET ORAL 3 TIMES DAILY
COMMUNITY

## 2024-04-18 RX ORDER — MIRTAZAPINE 15 MG/1
15 TABLET, FILM COATED ORAL NIGHTLY
COMMUNITY

## 2024-04-18 RX ORDER — BRIMONIDINE TARTRATE 2 MG/ML
1 SOLUTION/ DROPS OPHTHALMIC 3 TIMES DAILY
COMMUNITY

## 2024-04-18 RX ORDER — NITROGLYCERIN 0.4 MG/1
0.4 TABLET SUBLINGUAL EVERY 5 MIN PRN
COMMUNITY

## 2024-04-18 RX ORDER — PANTOPRAZOLE SODIUM 40 MG/1
40 TABLET, DELAYED RELEASE ORAL DAILY
COMMUNITY

## 2024-04-18 RX ORDER — IBANDRONATE SODIUM 150 MG/1
150 TABLET, FILM COATED ORAL
COMMUNITY

## 2024-04-18 RX ORDER — PREGABALIN 75 MG/1
75 CAPSULE ORAL 2 TIMES DAILY
COMMUNITY

## 2024-04-18 RX ORDER — POTASSIUM CHLORIDE 750 MG/1
10 CAPSULE, EXTENDED RELEASE ORAL DAILY
COMMUNITY

## 2024-04-18 NOTE — TELEPHONE ENCOUNTER
Lab Dept Reports the last set of Labs sent in by [facility] were Cancelled due to Clotting.  No CBC or ESR available. Patient on weekly labs due to IV Abx treatment. Will update ID Physician.  Found patient is scheduled to be seen today. Obtained recent lab results via [facility].

## 2024-04-18 NOTE — PROGRESS NOTES
RECEIVED AN ORDER FROM DR. CLEVELAND [facility]IN[facility] AND THEN [facility] RN CAN REMOVE PICC LINE.  ORDERS ENTERED IN EPIC.  T/C TO PATIENT TO SET UP DLEIVERY OF REMAINING 4 DOSES.  NO ANSWER.  L/M THAT WE WOULD BE DELIVERING FRIDAY WITH STANDARD SUPPLIES.   TO CALL WHEN ON WAY.  PROCESSED FILL FOR 4 DAPTO FOR MIX AND DEL. FRIDAY.  DOS  4/20 THRU 4/23.  RP TO CONFIRM LINE OUT AND DC CHART 4/24. DSL

## 2024-04-18 NOTE — PROGRESS NOTES
Carmencita Arce (:  1955) is a 68 y.o. female,Established patient, here for evaluation of the following chief complaint(s):  Wound Infection (4 week f/u- spinal wound infection )      Assessment & Plan   Postop deep incisional wound infection with discitis requiring hardware removal, with slow improvement and slow wound healing  MRSA infection with high ALISON to vancomycin  ESBL gram-negative colonization of the incisional wound and urine    Continue IV Cubicin until   DC PICC line on   Bactrim double strength 1 tablet p.o. twice daily for chronic suppressive therapy  Follow-up CBC BMP  Sed rate with next blood work  Follow-up with wound care center for wound care  Follow-up in 4 weeks  May resume rosuvastatin after stopping Cubicin   I do not see any indication to do routine wound cultures unless if there is evidence of ongoing infection    Subjective   HPI  Follow-up postop deep incisional wound infection with discitis and MRSA infection with slow wound healing and slow response to IV antibiotics  Currently on IV Cubicin, well-tolerated.   Last wound culture with ESBL E. Coli  Current wound culture grew ESBL Klebsiella  Review of Systems   Incisional wound with progressive healing according to the patient and her   Patient continues to have a wound VAC dressing  No increased drainage reported  Pain is well-controlled with MS Contin twice daily  Patient reports increasing strength in the legs  Patient is able to walk with a walker and stand for longer periods of time  No numbness  No fevers or chills  No GI symptoms  No rash  Decreased leg swelling    Objective   Physical Exam  Vitals:    24 1127   BP: (!) 85/50   Pulse: 82   Resp: 16   Temp: 97.5 °F (36.4 °C)   TempSrc: Temporal     General Appearance: alert and oriented to person, place and time, well-developed and well-nourished, in no acute distress  Skin: warm and dry, no rash.   Head: normocephalic and

## 2024-04-19 ENCOUNTER — DOCUMENTATION (OUTPATIENT)
Dept: PHARMACY | Facility: CLINIC | Age: 69
End: 2024-04-19

## 2024-04-19 PROCEDURE — 1090000002 HH PPS REVENUE DEBIT

## 2024-04-19 PROCEDURE — 1090000001 HH PPS REVENUE CREDIT

## 2024-04-20 ENCOUNTER — HOME CARE VISIT (OUTPATIENT)
Dept: HOME HEALTH SERVICES | Facility: HOME HEALTH | Age: 69
End: 2024-04-20
Payer: COMMERCIAL

## 2024-04-20 ENCOUNTER — LAB REQUISITION (OUTPATIENT)
Dept: LAB | Facility: HOSPITAL | Age: 69
End: 2024-04-20
Payer: COMMERCIAL

## 2024-04-20 VITALS
RESPIRATION RATE: 20 BRPM | TEMPERATURE: 97.9 F | OXYGEN SATURATION: 97 % | HEART RATE: 82 BPM | DIASTOLIC BLOOD PRESSURE: 78 MMHG | SYSTOLIC BLOOD PRESSURE: 160 MMHG

## 2024-04-20 VITALS
SYSTOLIC BLOOD PRESSURE: 158 MMHG | DIASTOLIC BLOOD PRESSURE: 70 MMHG | RESPIRATION RATE: 20 BRPM | OXYGEN SATURATION: 100 % | HEART RATE: 82 BPM | TEMPERATURE: 98.3 F

## 2024-04-20 DIAGNOSIS — T81.49XD INFECTION FOLLOWING A PROCEDURE, OTHER SURGICAL SITE, SUBSEQUENT ENCOUNTER: ICD-10-CM

## 2024-04-20 LAB
ERYTHROCYTE [DISTWIDTH] IN BLOOD BY AUTOMATED COUNT: 15.8 % (ref 11.5–14.5)
ERYTHROCYTE [SEDIMENTATION RATE] IN BLOOD BY WESTERGREN METHOD: 41 MM/H (ref 0–30)
HCT VFR BLD AUTO: 35.7 % (ref 36–46)
HGB BLD-MCNC: 11.3 G/DL (ref 12–16)
MCH RBC QN AUTO: 28.3 PG (ref 26–34)
MCHC RBC AUTO-ENTMCNC: 31.7 G/DL (ref 32–36)
MCV RBC AUTO: 90 FL (ref 80–100)
NRBC BLD-RTO: 0 /100 WBCS (ref 0–0)
PLATELET # BLD AUTO: 338 X10*3/UL (ref 150–450)
RBC # BLD AUTO: 3.99 X10*6/UL (ref 4–5.2)
WBC # BLD AUTO: 10.2 X10*3/UL (ref 4.4–11.3)

## 2024-04-20 PROCEDURE — 1090000002 HH PPS REVENUE DEBIT

## 2024-04-20 PROCEDURE — G0299 HHS/HOSPICE OF RN EA 15 MIN: HCPCS | Mod: HHH

## 2024-04-20 PROCEDURE — 1090000001 HH PPS REVENUE CREDIT

## 2024-04-20 PROCEDURE — 85652 RBC SED RATE AUTOMATED: CPT

## 2024-04-20 PROCEDURE — 85027 COMPLETE CBC AUTOMATED: CPT

## 2024-04-20 ASSESSMENT — ENCOUNTER SYMPTOMS
HIGHEST PAIN SEVERITY IN PAST 24 HOURS: 5/10
SUBJECTIVE PAIN PROGRESSION: UNCHANGED
MUSCLE WEAKNESS: 1
PAIN SEVERITY GOAL: 3/10
LOWEST PAIN SEVERITY IN PAST 24 HOURS: 3/10
PAIN: 1
APPETITE LEVEL: GOOD
PAIN SEVERITY GOAL: 3/10
SUBJECTIVE PAIN PROGRESSION: WAXING AND WANING
LOWEST PAIN SEVERITY IN PAST 24 HOURS: 3/10
APPETITE LEVEL: GOOD
HIGHEST PAIN SEVERITY IN PAST 24 HOURS: 5/10

## 2024-04-20 ASSESSMENT — ACTIVITIES OF DAILY LIVING (ADL)
CURRENT_FUNCTION: TWO PERSON
AMBULATION ASSISTANCE: ONE PERSON

## 2024-04-21 PROCEDURE — 1090000002 HH PPS REVENUE DEBIT

## 2024-04-21 PROCEDURE — 1090000001 HH PPS REVENUE CREDIT

## 2024-04-22 ENCOUNTER — OFFICE VISIT (OUTPATIENT)
Dept: WOUND CARE | Facility: CLINIC | Age: 69
End: 2024-04-22
Payer: COMMERCIAL

## 2024-04-22 PROCEDURE — 1090000002 HH PPS REVENUE DEBIT

## 2024-04-22 PROCEDURE — 1090000001 HH PPS REVENUE CREDIT

## 2024-04-22 PROCEDURE — 97605 NEG PRS WND THER DME<=50SQCM: CPT | Performed by: PLASTIC SURGERY

## 2024-04-22 PROCEDURE — 11043 DBRDMT MUSC&/FSCA 1ST 20/<: CPT | Performed by: PLASTIC SURGERY

## 2024-04-22 PROCEDURE — 11043 DBRDMT MUSC&/FSCA 1ST 20/<: CPT

## 2024-04-22 PROCEDURE — 97605 NEG PRS WND THER DME<=50SQCM: CPT

## 2024-04-23 ENCOUNTER — HOME INFUSION (OUTPATIENT)
Dept: INFUSION THERAPY | Age: 69
End: 2024-04-23
Payer: COMMERCIAL

## 2024-04-23 PROCEDURE — 1090000001 HH PPS REVENUE CREDIT

## 2024-04-23 PROCEDURE — 1090000002 HH PPS REVENUE DEBIT

## 2024-04-23 NOTE — PROGRESS NOTES
Confirmed with [facility] RN - picc line scheduled to be pulled on 4/24    Patient to be discharged from Infusion pharmacy service

## 2024-04-24 ENCOUNTER — TELEPHONE (OUTPATIENT)
Dept: INFECTIOUS DISEASES | Age: 69
End: 2024-04-24

## 2024-04-24 ENCOUNTER — HOME CARE VISIT (OUTPATIENT)
Dept: HOME HEALTH SERVICES | Facility: HOME HEALTH | Age: 69
End: 2024-04-24
Payer: COMMERCIAL

## 2024-04-24 VITALS
SYSTOLIC BLOOD PRESSURE: 110 MMHG | HEART RATE: 87 BPM | TEMPERATURE: 98.2 F | DIASTOLIC BLOOD PRESSURE: 58 MMHG | OXYGEN SATURATION: 98 % | RESPIRATION RATE: 18 BRPM

## 2024-04-24 PROCEDURE — 1090000001 HH PPS REVENUE CREDIT

## 2024-04-24 PROCEDURE — 1090000002 HH PPS REVENUE DEBIT

## 2024-04-24 PROCEDURE — G0299 HHS/HOSPICE OF RN EA 15 MIN: HCPCS | Mod: HHH

## 2024-04-24 RX ORDER — SULFAMETHOXAZOLE AND TRIMETHOPRIM 800; 160 MG/1; MG/1
1 TABLET ORAL 2 TIMES DAILY
Qty: 60 TABLET | Refills: 3 | Status: SHIPPED | OUTPATIENT
Start: 2024-04-24 | End: 2024-08-22

## 2024-04-24 ASSESSMENT — ENCOUNTER SYMPTOMS
MUSCLE WEAKNESS: 1
APPETITE LEVEL: GOOD
CHANGE IN APPETITE: UNCHANGED
DENIES PAIN: 1

## 2024-04-24 NOTE — TELEPHONE ENCOUNTER
Per [facility] Nurse Cheryl, last set of Labs were drawn on 4/20/24, Picc Line D/c'd as ordered. On 4/23/24.  Advised her F/u with ID is 5/16/24, and the Bactrim is to be started and sent to her Pharmacy. [facility] verbalized understanding

## 2024-04-25 PROCEDURE — 1090000002 HH PPS REVENUE DEBIT

## 2024-04-25 PROCEDURE — 1090000001 HH PPS REVENUE CREDIT

## 2024-04-26 ENCOUNTER — HOME CARE VISIT (OUTPATIENT)
Dept: HOME HEALTH SERVICES | Facility: HOME HEALTH | Age: 69
End: 2024-04-26
Payer: COMMERCIAL

## 2024-04-26 VITALS
RESPIRATION RATE: 20 BRPM | OXYGEN SATURATION: 98 % | DIASTOLIC BLOOD PRESSURE: 66 MMHG | SYSTOLIC BLOOD PRESSURE: 138 MMHG | TEMPERATURE: 97.3 F | HEART RATE: 78 BPM

## 2024-04-26 PROCEDURE — 1090000002 HH PPS REVENUE DEBIT

## 2024-04-26 PROCEDURE — G0299 HHS/HOSPICE OF RN EA 15 MIN: HCPCS | Mod: HHH

## 2024-04-26 PROCEDURE — 1090000001 HH PPS REVENUE CREDIT

## 2024-04-26 SDOH — ECONOMIC STABILITY: GENERAL

## 2024-04-26 ASSESSMENT — ACTIVITIES OF DAILY LIVING (ADL): MONEY MANAGEMENT (EXPENSES/BILLS): INDEPENDENT

## 2024-04-27 PROCEDURE — 1090000002 HH PPS REVENUE DEBIT

## 2024-04-27 PROCEDURE — 1090000001 HH PPS REVENUE CREDIT

## 2024-04-28 PROCEDURE — 1090000002 HH PPS REVENUE DEBIT

## 2024-04-28 PROCEDURE — 1090000001 HH PPS REVENUE CREDIT

## 2024-04-29 ENCOUNTER — OFFICE VISIT (OUTPATIENT)
Dept: WOUND CARE | Facility: CLINIC | Age: 69
End: 2024-04-29
Payer: COMMERCIAL

## 2024-04-29 PROCEDURE — 1090000001 HH PPS REVENUE CREDIT

## 2024-04-29 PROCEDURE — 11043 DBRDMT MUSC&/FSCA 1ST 20/<: CPT | Performed by: PLASTIC SURGERY

## 2024-04-29 PROCEDURE — 97605 NEG PRS WND THER DME<=50SQCM: CPT

## 2024-04-29 PROCEDURE — 1090000002 HH PPS REVENUE DEBIT

## 2024-04-29 PROCEDURE — 97605 NEG PRS WND THER DME<=50SQCM: CPT | Performed by: PLASTIC SURGERY

## 2024-04-29 PROCEDURE — 11043 DBRDMT MUSC&/FSCA 1ST 20/<: CPT

## 2024-04-29 NOTE — NURSING NOTE
Pt c/o 8/10 pain at 2000.  RN medicated pt.  RN changed dressing to back incision per orders.  See Bactroban order on MAR.  Pt tolerated with minimal discomfort.  Pain reassessed an hour later.  Pt states 7/10.  Pt able to sleep through the night.  Continue to monitor.   Pt currently denies need for pain medication at 0630.  Pt sleeping.  Call light within reach.  Bed alarm on.  Bed in low position.   none

## 2024-04-30 PROCEDURE — 1090000002 HH PPS REVENUE DEBIT

## 2024-04-30 PROCEDURE — 1090000001 HH PPS REVENUE CREDIT

## 2024-05-01 ENCOUNTER — HOME CARE VISIT (OUTPATIENT)
Dept: HOME HEALTH SERVICES | Facility: HOME HEALTH | Age: 69
End: 2024-05-01
Payer: COMMERCIAL

## 2024-05-01 VITALS
RESPIRATION RATE: 18 BRPM | TEMPERATURE: 98.1 F | HEART RATE: 77 BPM | SYSTOLIC BLOOD PRESSURE: 110 MMHG | OXYGEN SATURATION: 97 % | DIASTOLIC BLOOD PRESSURE: 68 MMHG

## 2024-05-01 PROCEDURE — 1090000001 HH PPS REVENUE CREDIT

## 2024-05-01 PROCEDURE — G0299 HHS/HOSPICE OF RN EA 15 MIN: HCPCS | Mod: HHH

## 2024-05-01 PROCEDURE — 1090000002 HH PPS REVENUE DEBIT

## 2024-05-01 ASSESSMENT — ENCOUNTER SYMPTOMS
CHANGE IN APPETITE: UNCHANGED
MUSCLE WEAKNESS: 1
APPETITE LEVEL: GOOD
DENIES PAIN: 1

## 2024-05-02 PROCEDURE — 1090000001 HH PPS REVENUE CREDIT

## 2024-05-02 PROCEDURE — 1090000002 HH PPS REVENUE DEBIT

## 2024-05-03 ENCOUNTER — HOME CARE VISIT (OUTPATIENT)
Dept: HOME HEALTH SERVICES | Facility: HOME HEALTH | Age: 69
End: 2024-05-03
Payer: COMMERCIAL

## 2024-05-03 VITALS
TEMPERATURE: 97.5 F | HEART RATE: 90 BPM | SYSTOLIC BLOOD PRESSURE: 120 MMHG | OXYGEN SATURATION: 97 % | DIASTOLIC BLOOD PRESSURE: 60 MMHG | RESPIRATION RATE: 18 BRPM

## 2024-05-03 PROCEDURE — 1090000002 HH PPS REVENUE DEBIT

## 2024-05-03 PROCEDURE — G0300 HHS/HOSPICE OF LPN EA 15 MIN: HCPCS | Mod: HHH

## 2024-05-03 PROCEDURE — 1090000001 HH PPS REVENUE CREDIT

## 2024-05-03 ASSESSMENT — PAIN SCALES - PAIN ASSESSMENT IN ADVANCED DEMENTIA (PAINAD)
NEGVOCALIZATION: 0
NEGVOCALIZATION: 0 - NONE.
CONSOLABILITY: 0 - NO NEED TO CONSOLE.
BODYLANGUAGE: 0
FACIALEXPRESSION: 0
BODYLANGUAGE: 0 - RELAXED.
BREATHING: 0
CONSOLABILITY: 0
TOTALSCORE: 0
FACIALEXPRESSION: 0 - SMILING OR INEXPRESSIVE.

## 2024-05-03 ASSESSMENT — ENCOUNTER SYMPTOMS
APPETITE LEVEL: GOOD
BLURRED VISION: 1
PERSON REPORTING PAIN: PATIENT
DENIES PAIN: 1
OCCASIONAL FEELINGS OF UNSTEADINESS: 1
CHANGE IN APPETITE: UNCHANGED

## 2024-05-04 PROCEDURE — 1090000001 HH PPS REVENUE CREDIT

## 2024-05-04 PROCEDURE — 1090000002 HH PPS REVENUE DEBIT

## 2024-05-05 PROCEDURE — 1090000002 HH PPS REVENUE DEBIT

## 2024-05-05 PROCEDURE — 1090000001 HH PPS REVENUE CREDIT

## 2024-05-06 ENCOUNTER — OFFICE VISIT (OUTPATIENT)
Dept: WOUND CARE | Facility: CLINIC | Age: 69
End: 2024-05-06
Payer: COMMERCIAL

## 2024-05-06 ENCOUNTER — LAB REQUISITION (OUTPATIENT)
Dept: LAB | Facility: HOSPITAL | Age: 69
End: 2024-05-06
Payer: COMMERCIAL

## 2024-05-06 DIAGNOSIS — T81.31XA DISRUPTION OF EXTERNAL OPERATION (SURGICAL) WOUND, NOT ELSEWHERE CLASSIFIED, INITIAL ENCOUNTER: ICD-10-CM

## 2024-05-06 DIAGNOSIS — F17.210 NICOTINE DEPENDENCE, CIGARETTES, UNCOMPLICATED: ICD-10-CM

## 2024-05-06 DIAGNOSIS — L98.493 NON-PRESSURE CHRONIC ULCER OF SKIN OF OTHER SITES WITH NECROSIS OF MUSCLE (MULTI): ICD-10-CM

## 2024-05-06 DIAGNOSIS — S31.000A UNSPECIFIED OPEN WOUND OF LOWER BACK AND PELVIS WITHOUT PENETRATION INTO RETROPERITONEUM, INITIAL ENCOUNTER: ICD-10-CM

## 2024-05-06 DIAGNOSIS — M86.68 OTHER CHRONIC OSTEOMYELITIS, OTHER SITE (MULTI): ICD-10-CM

## 2024-05-06 PROCEDURE — 97605 NEG PRS WND THER DME<=50SQCM: CPT | Performed by: PLASTIC SURGERY

## 2024-05-06 PROCEDURE — 97605 NEG PRS WND THER DME<=50SQCM: CPT

## 2024-05-06 PROCEDURE — 11043 DBRDMT MUSC&/FSCA 1ST 20/<: CPT | Performed by: PLASTIC SURGERY

## 2024-05-06 PROCEDURE — 1090000002 HH PPS REVENUE DEBIT

## 2024-05-06 PROCEDURE — 1090000001 HH PPS REVENUE CREDIT

## 2024-05-06 PROCEDURE — 87070 CULTURE OTHR SPECIMN AEROBIC: CPT | Mod: OUT,ELYLAB | Performed by: PLASTIC SURGERY

## 2024-05-06 PROCEDURE — 11043 DBRDMT MUSC&/FSCA 1ST 20/<: CPT

## 2024-05-07 ENCOUNTER — HOSPITAL ENCOUNTER (OUTPATIENT)
Dept: RADIOLOGY | Facility: CLINIC | Age: 69
Discharge: HOME | End: 2024-05-07
Payer: COMMERCIAL

## 2024-05-07 ENCOUNTER — OFFICE VISIT (OUTPATIENT)
Dept: ORTHOPEDIC SURGERY | Facility: CLINIC | Age: 69
End: 2024-05-07
Payer: COMMERCIAL

## 2024-05-07 DIAGNOSIS — M54.50 LOW BACK PAIN, UNSPECIFIED BACK PAIN LATERALITY, UNSPECIFIED CHRONICITY, UNSPECIFIED WHETHER SCIATICA PRESENT: ICD-10-CM

## 2024-05-07 PROCEDURE — 1090000001 HH PPS REVENUE CREDIT

## 2024-05-07 PROCEDURE — 72100 X-RAY EXAM L-S SPINE 2/3 VWS: CPT | Performed by: ORTHOPAEDIC SURGERY

## 2024-05-07 PROCEDURE — 99213 OFFICE O/P EST LOW 20 MIN: CPT | Performed by: ORTHOPAEDIC SURGERY

## 2024-05-07 PROCEDURE — 1123F ACP DISCUSS/DSCN MKR DOCD: CPT | Performed by: ORTHOPAEDIC SURGERY

## 2024-05-07 PROCEDURE — 72100 X-RAY EXAM L-S SPINE 2/3 VWS: CPT

## 2024-05-07 PROCEDURE — 1159F MED LIST DOCD IN RCRD: CPT | Performed by: ORTHOPAEDIC SURGERY

## 2024-05-07 PROCEDURE — 3008F BODY MASS INDEX DOCD: CPT | Performed by: ORTHOPAEDIC SURGERY

## 2024-05-07 PROCEDURE — 1090000002 HH PPS REVENUE DEBIT

## 2024-05-07 NOTE — PROGRESS NOTES
Chief complaint status post laminectomy fusion with 3 irrigation and debridements    HPI: See previous note for details.  Patient here for follow-up.  She says she is 99% better compared to before surgery.  She essentially has no pain.  She is ambulating with a walker outside and without a walker in the house.  As soon as she stopped the IV antibiotics her pain pretty much went away.  She is on oral antibiotics chronically.  She still has a wound VAC on but that wound is almost closed.  She is eating and has normal appetite.  All in all she feels very good.  No bowel or bladder changes.  No fevers chills.  Infectious diseases monitoring her antibiotics.    Physical exam: Wound VAC is in place.  5/5 motor bilateral lower extremities except right hip flexion shows 4/5 strength.  Normal sensation bilateral lower extremities.  She walks normally with a walker.    X-rays show good posterolateral fusion mass and hardware is in good position.  She does have a collapse at the L3-4 level which is stable compared to prior x-rays.    Assessment/plan: Patient significantly improved compared to prior visits.  She had a very long complicated postoperative course with multiple hospital readmissions and washouts.  Hardware has been removed except the cages and she appears to be holding.  We will let her continue to engage in activities as tolerated and try to continue to increase her activity level.  I will see her back in 2 months for AP lateral x-rays of the lumbar spine.

## 2024-05-08 ENCOUNTER — HOME CARE VISIT (OUTPATIENT)
Dept: HOME HEALTH SERVICES | Facility: HOME HEALTH | Age: 69
End: 2024-05-08
Payer: COMMERCIAL

## 2024-05-08 VITALS
TEMPERATURE: 97.2 F | OXYGEN SATURATION: 95 % | DIASTOLIC BLOOD PRESSURE: 62 MMHG | SYSTOLIC BLOOD PRESSURE: 106 MMHG | RESPIRATION RATE: 18 BRPM | HEART RATE: 74 BPM

## 2024-05-08 LAB
BACTERIA SPEC CULT: NORMAL
GRAM STN SPEC: NORMAL
GRAM STN SPEC: NORMAL

## 2024-05-08 PROCEDURE — G0299 HHS/HOSPICE OF RN EA 15 MIN: HCPCS | Mod: HHH

## 2024-05-08 PROCEDURE — 1090000002 HH PPS REVENUE DEBIT

## 2024-05-08 PROCEDURE — 1090000001 HH PPS REVENUE CREDIT

## 2024-05-08 ASSESSMENT — ENCOUNTER SYMPTOMS
APPETITE LEVEL: GOOD
PERSON REPORTING PAIN: PATIENT
FATIGUES EASILY: 1
OCCASIONAL FEELINGS OF UNSTEADINESS: 0
PAIN LOCATION: BACK
LOSS OF SENSATION IN FEET: 0
DEPRESSION: 0
LAST BOWEL MOVEMENT: 66968
STOOL FREQUENCY: DAILY
BOWEL PATTERN NORMAL: 1
DENIES PAIN: 1

## 2024-05-08 ASSESSMENT — ACTIVITIES OF DAILY LIVING (ADL)
CURRENT_FUNCTION: INDEPENDENT
PHYSICAL TRANSFERS ASSESSED: 1
ENTERING_EXITING_HOME: NEEDS ASSISTANCE
AMBULATION ASSISTANCE: 1
AMBULATION ASSISTANCE: INDEPENDENT

## 2024-05-08 ASSESSMENT — PAIN SCALES - PAIN ASSESSMENT IN ADVANCED DEMENTIA (PAINAD)
BODYLANGUAGE: 0 - RELAXED.
TOTALSCORE: 0
BODYLANGUAGE: 0
NEGVOCALIZATION: 0
CONSOLABILITY: 0
FACIALEXPRESSION: 0
BREATHING: 0
FACIALEXPRESSION: 0 - SMILING OR INEXPRESSIVE.
CONSOLABILITY: 0 - NO NEED TO CONSOLE.
NEGVOCALIZATION: 0 - NONE.

## 2024-05-09 PROCEDURE — 1090000001 HH PPS REVENUE CREDIT

## 2024-05-09 PROCEDURE — 1090000002 HH PPS REVENUE DEBIT

## 2024-05-10 ENCOUNTER — HOME CARE VISIT (OUTPATIENT)
Dept: HOME HEALTH SERVICES | Facility: HOME HEALTH | Age: 69
End: 2024-05-10
Payer: COMMERCIAL

## 2024-05-10 VITALS
OXYGEN SATURATION: 95 % | HEART RATE: 78 BPM | TEMPERATURE: 97.2 F | DIASTOLIC BLOOD PRESSURE: 60 MMHG | SYSTOLIC BLOOD PRESSURE: 102 MMHG | RESPIRATION RATE: 18 BRPM

## 2024-05-10 PROCEDURE — G0300 HHS/HOSPICE OF LPN EA 15 MIN: HCPCS | Mod: HHH

## 2024-05-10 PROCEDURE — 1090000001 HH PPS REVENUE CREDIT

## 2024-05-10 PROCEDURE — 1090000002 HH PPS REVENUE DEBIT

## 2024-05-10 PROCEDURE — 400014 HH F/U

## 2024-05-11 PROCEDURE — 1090000001 HH PPS REVENUE CREDIT

## 2024-05-11 PROCEDURE — 1090000002 HH PPS REVENUE DEBIT

## 2024-05-12 ENCOUNTER — HOME CARE VISIT (OUTPATIENT)
Dept: HOME HEALTH SERVICES | Facility: HOME HEALTH | Age: 69
End: 2024-05-12
Payer: COMMERCIAL

## 2024-05-12 VITALS
HEART RATE: 78 BPM | OXYGEN SATURATION: 98 % | RESPIRATION RATE: 18 BRPM | TEMPERATURE: 97.7 F | DIASTOLIC BLOOD PRESSURE: 62 MMHG | SYSTOLIC BLOOD PRESSURE: 112 MMHG

## 2024-05-12 PROCEDURE — G0299 HHS/HOSPICE OF RN EA 15 MIN: HCPCS | Mod: HHH

## 2024-05-12 PROCEDURE — 1090000001 HH PPS REVENUE CREDIT

## 2024-05-12 PROCEDURE — 1090000002 HH PPS REVENUE DEBIT

## 2024-05-12 SDOH — ECONOMIC STABILITY: GENERAL

## 2024-05-12 ASSESSMENT — ACTIVITIES OF DAILY LIVING (ADL)
CURRENT_FUNCTION: INDEPENDENT
MONEY MANAGEMENT (EXPENSES/BILLS): INDEPENDENT
AMBULATION ASSISTANCE: 1
PHYSICAL TRANSFERS ASSESSED: 1
AMBULATION ASSISTANCE: INDEPENDENT

## 2024-05-12 ASSESSMENT — PAIN SCALES - PAIN ASSESSMENT IN ADVANCED DEMENTIA (PAINAD)
CONSOLABILITY: 0
BREATHING: 0
BODYLANGUAGE: 0
BODYLANGUAGE: 0 - RELAXED.
CONSOLABILITY: 0
TOTALSCORE: 0
NEGVOCALIZATION: 0 - NONE.
FACIALEXPRESSION: 0 - SMILING OR INEXPRESSIVE.
TOTALSCORE: 0
NEGVOCALIZATION: 0
FACIALEXPRESSION: 0
BREATHING: 0
BODYLANGUAGE: 0 - RELAXED.
FACIALEXPRESSION: 0
FACIALEXPRESSION: 0 - SMILING OR INEXPRESSIVE.
NEGVOCALIZATION: 0
NEGVOCALIZATION: 0 - NONE.
BODYLANGUAGE: 0
CONSOLABILITY: 0 - NO NEED TO CONSOLE.
CONSOLABILITY: 0 - NO NEED TO CONSOLE.

## 2024-05-12 ASSESSMENT — ENCOUNTER SYMPTOMS
DENIES PAIN: 1
DEPRESSION: 0
CHANGE IN APPETITE: UNCHANGED
PERSON REPORTING PAIN: PATIENT
APPETITE LEVEL: GOOD
APPETITE LEVEL: GOOD
LOSS OF SENSATION IN FEET: 0
OCCASIONAL FEELINGS OF UNSTEADINESS: 0
PERSON REPORTING PAIN: PATIENT
DENIES PAIN: 1
OCCASIONAL FEELINGS OF UNSTEADINESS: 0

## 2024-05-13 ENCOUNTER — OFFICE VISIT (OUTPATIENT)
Dept: WOUND CARE | Facility: CLINIC | Age: 69
End: 2024-05-13
Payer: COMMERCIAL

## 2024-05-13 ENCOUNTER — HOME CARE VISIT (OUTPATIENT)
Dept: HOME HEALTH SERVICES | Facility: HOME HEALTH | Age: 69
End: 2024-05-13
Payer: COMMERCIAL

## 2024-05-13 PROCEDURE — 1090000001 HH PPS REVENUE CREDIT

## 2024-05-13 PROCEDURE — 11042 DBRDMT SUBQ TIS 1ST 20SQCM/<: CPT

## 2024-05-13 PROCEDURE — 97605 NEG PRS WND THER DME<=50SQCM: CPT | Performed by: PLASTIC SURGERY

## 2024-05-13 PROCEDURE — 1090000002 HH PPS REVENUE DEBIT

## 2024-05-13 PROCEDURE — 97605 NEG PRS WND THER DME<=50SQCM: CPT

## 2024-05-13 PROCEDURE — 11042 DBRDMT SUBQ TIS 1ST 20SQCM/<: CPT | Performed by: PLASTIC SURGERY

## 2024-05-14 PROCEDURE — 1090000001 HH PPS REVENUE CREDIT

## 2024-05-14 PROCEDURE — 1090000002 HH PPS REVENUE DEBIT

## 2024-05-15 ENCOUNTER — HOME CARE VISIT (OUTPATIENT)
Dept: HOME HEALTH SERVICES | Facility: HOME HEALTH | Age: 69
End: 2024-05-15
Payer: COMMERCIAL

## 2024-05-15 VITALS
RESPIRATION RATE: 18 BRPM | TEMPERATURE: 97.6 F | DIASTOLIC BLOOD PRESSURE: 50 MMHG | OXYGEN SATURATION: 95 % | HEART RATE: 80 BPM | SYSTOLIC BLOOD PRESSURE: 110 MMHG

## 2024-05-15 PROCEDURE — G0300 HHS/HOSPICE OF LPN EA 15 MIN: HCPCS | Mod: HHH

## 2024-05-15 PROCEDURE — 1090000001 HH PPS REVENUE CREDIT

## 2024-05-15 PROCEDURE — 1090000002 HH PPS REVENUE DEBIT

## 2024-05-15 ASSESSMENT — ACTIVITIES OF DAILY LIVING (ADL): OASIS_M1830: 03

## 2024-05-16 ENCOUNTER — OFFICE VISIT (OUTPATIENT)
Dept: INFECTIOUS DISEASES | Age: 69
End: 2024-05-16

## 2024-05-16 VITALS
DIASTOLIC BLOOD PRESSURE: 50 MMHG | HEART RATE: 69 BPM | HEIGHT: 57 IN | TEMPERATURE: 98 F | SYSTOLIC BLOOD PRESSURE: 95 MMHG | BODY MASS INDEX: 36.46 KG/M2 | OXYGEN SATURATION: 94 % | WEIGHT: 169 LBS

## 2024-05-16 DIAGNOSIS — T81.42XA DEEP INCISIONAL SURGICAL SITE INFECTION: ICD-10-CM

## 2024-05-16 DIAGNOSIS — T14.8XXA INFECTED WOUND: ICD-10-CM

## 2024-05-16 DIAGNOSIS — M46.46 LUMBAR DISCITIS: ICD-10-CM

## 2024-05-16 DIAGNOSIS — A49.02 MRSA (METHICILLIN RESISTANT STAPHYLOCOCCUS AUREUS) INFECTION: Primary | ICD-10-CM

## 2024-05-16 DIAGNOSIS — M46.20 SPINAL ABSCESS (HCC): ICD-10-CM

## 2024-05-16 DIAGNOSIS — L08.9 INFECTED WOUND: ICD-10-CM

## 2024-05-16 PROCEDURE — 1090000002 HH PPS REVENUE DEBIT

## 2024-05-16 PROCEDURE — 1090000001 HH PPS REVENUE CREDIT

## 2024-05-16 ASSESSMENT — PATIENT HEALTH QUESTIONNAIRE - PHQ9
SUM OF ALL RESPONSES TO PHQ QUESTIONS 1-9: 0
2. FEELING DOWN, DEPRESSED OR HOPELESS: NOT AT ALL
SUM OF ALL RESPONSES TO PHQ9 QUESTIONS 1 & 2: 0
SUM OF ALL RESPONSES TO PHQ QUESTIONS 1-9: 0
1. LITTLE INTEREST OR PLEASURE IN DOING THINGS: NOT AT ALL

## 2024-05-16 NOTE — PROGRESS NOTES
Infectious Disease Progress Note       5/16/2024      ASSESSMENT:  Postop deep incisional wound infection with discitis requiring hardware removal, with slow improvement and slow wound healing  MRSA infection with high ALISON to vancomycin  ESBL gram-negative colonization of the incisional wound and urine      Patient on chronic Bactrim  Per family at bedside patient is walking.  She has a lot of determination and her spirits are doing well.  No fevers chills nausea vomiting diarrhea.  No acute pain.  Unable to see the wound today due to wound VAC    Patient is awake and alert, NAD  Ears look ok.  No thrush in oropharynx  Neck supple  Chest equal expansion, clear  Heart S1S2  Abdomen ND, NTTP  Extrem no new changes, no pain  Expressions symmetrical  No obvious rashes.   Mood and affect appropriate.       Lab Results   Component Value Date    WBC 6.5 04/25/2023    HGB 16.7 (H) 04/25/2023    HCT 49.1 (H) 04/25/2023    MCV 98.8 (H) 04/25/2023     04/25/2023     Lab Results   Component Value Date/Time     01/02/2024 05:12 AM    K 3.7 01/02/2024 05:12 AM     01/02/2024 05:12 AM    CO2 25 04/25/2023 09:38 AM    BUN 18 04/25/2023 09:38 AM    CREATININE 0.85 01/02/2024 05:12 AM    GLUCOSE 101 01/02/2024 05:12 AM    CALCIUM 8.3 01/02/2024 05:12 AM    LABGLOM 74 01/02/2024 05:12 AM        WBC trends are being monitored. Antibiotic doses are being adjusted per most recent renal labs.   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  Patient started on Bactrim DS twice daily and is tolerating without any problems.  Planning on 6-month

## 2024-05-17 ENCOUNTER — HOME CARE VISIT (OUTPATIENT)
Dept: HOME HEALTH SERVICES | Facility: HOME HEALTH | Age: 69
End: 2024-05-17
Payer: COMMERCIAL

## 2024-05-17 VITALS
DIASTOLIC BLOOD PRESSURE: 60 MMHG | OXYGEN SATURATION: 96 % | HEART RATE: 72 BPM | RESPIRATION RATE: 18 BRPM | TEMPERATURE: 97.6 F | SYSTOLIC BLOOD PRESSURE: 100 MMHG

## 2024-05-17 PROCEDURE — G0300 HHS/HOSPICE OF LPN EA 15 MIN: HCPCS | Mod: HHH

## 2024-05-17 PROCEDURE — 1090000001 HH PPS REVENUE CREDIT

## 2024-05-17 PROCEDURE — 1090000002 HH PPS REVENUE DEBIT

## 2024-05-18 PROCEDURE — 1090000002 HH PPS REVENUE DEBIT

## 2024-05-18 PROCEDURE — 1090000001 HH PPS REVENUE CREDIT

## 2024-05-18 ASSESSMENT — ENCOUNTER SYMPTOMS
PERSON REPORTING PAIN: PATIENT
APPETITE LEVEL: GOOD
BLURRED VISION: 1
DENIES PAIN: 1
APPETITE LEVEL: GOOD
OCCASIONAL FEELINGS OF UNSTEADINESS: 0
DENIES PAIN: 1
CHANGE IN APPETITE: UNCHANGED
CHANGE IN APPETITE: UNCHANGED
PERSON REPORTING PAIN: PATIENT

## 2024-05-18 ASSESSMENT — PAIN SCALES - PAIN ASSESSMENT IN ADVANCED DEMENTIA (PAINAD)
FACIALEXPRESSION: 0 - SMILING OR INEXPRESSIVE.
BREATHING: 0
CONSOLABILITY: 0 - NO NEED TO CONSOLE.
BODYLANGUAGE: 0
BODYLANGUAGE: 0 - RELAXED.
BODYLANGUAGE: 0 - RELAXED.
CONSOLABILITY: 0
BODYLANGUAGE: 0
NEGVOCALIZATION: 0
NEGVOCALIZATION: 0 - NONE.
NEGVOCALIZATION: 0
CONSOLABILITY: 0 - NO NEED TO CONSOLE.
FACIALEXPRESSION: 0
BREATHING: 0
NEGVOCALIZATION: 0 - NONE.
CONSOLABILITY: 0
FACIALEXPRESSION: 0
TOTALSCORE: 0
TOTALSCORE: 0
FACIALEXPRESSION: 0 - SMILING OR INEXPRESSIVE.

## 2024-05-19 PROCEDURE — 1090000002 HH PPS REVENUE DEBIT

## 2024-05-19 PROCEDURE — 1090000001 HH PPS REVENUE CREDIT

## 2024-05-20 ENCOUNTER — OFFICE VISIT (OUTPATIENT)
Dept: WOUND CARE | Facility: CLINIC | Age: 69
End: 2024-05-20
Payer: COMMERCIAL

## 2024-05-20 ENCOUNTER — HOME CARE VISIT (OUTPATIENT)
Dept: HOME HEALTH SERVICES | Facility: HOME HEALTH | Age: 69
End: 2024-05-20
Payer: COMMERCIAL

## 2024-05-20 PROCEDURE — 1090000002 HH PPS REVENUE DEBIT

## 2024-05-20 PROCEDURE — 11043 DBRDMT MUSC&/FSCA 1ST 20/<: CPT

## 2024-05-20 PROCEDURE — 97605 NEG PRS WND THER DME<=50SQCM: CPT

## 2024-05-20 PROCEDURE — 11043 DBRDMT MUSC&/FSCA 1ST 20/<: CPT | Performed by: PLASTIC SURGERY

## 2024-05-20 PROCEDURE — 1090000001 HH PPS REVENUE CREDIT

## 2024-05-20 PROCEDURE — 97605 NEG PRS WND THER DME<=50SQCM: CPT | Performed by: PLASTIC SURGERY

## 2024-05-21 PROCEDURE — 1090000001 HH PPS REVENUE CREDIT

## 2024-05-21 PROCEDURE — 1090000002 HH PPS REVENUE DEBIT

## 2024-05-22 ENCOUNTER — HOME CARE VISIT (OUTPATIENT)
Dept: HOME HEALTH SERVICES | Facility: HOME HEALTH | Age: 69
End: 2024-05-22
Payer: COMMERCIAL

## 2024-05-22 ENCOUNTER — LAB (OUTPATIENT)
Dept: LAB | Facility: LAB | Age: 69
End: 2024-05-22
Payer: COMMERCIAL

## 2024-05-22 VITALS
SYSTOLIC BLOOD PRESSURE: 100 MMHG | DIASTOLIC BLOOD PRESSURE: 62 MMHG | HEART RATE: 74 BPM | OXYGEN SATURATION: 96 % | TEMPERATURE: 97.3 F | RESPIRATION RATE: 18 BRPM

## 2024-05-22 DIAGNOSIS — I10 ESSENTIAL (PRIMARY) HYPERTENSION: Primary | ICD-10-CM

## 2024-05-22 DIAGNOSIS — E55.9 VITAMIN D DEFICIENCY, UNSPECIFIED: ICD-10-CM

## 2024-05-22 DIAGNOSIS — N95.0 POSTMENOPAUSAL BLEEDING: ICD-10-CM

## 2024-05-22 LAB
25(OH)D3 SERPL-MCNC: 34 NG/ML (ref 30–100)
ALBUMIN SERPL BCP-MCNC: 3.8 G/DL (ref 3.4–5)
ALP SERPL-CCNC: 79 U/L (ref 33–136)
ALT SERPL W P-5'-P-CCNC: 8 U/L (ref 7–45)
ANION GAP SERPL CALC-SCNC: 12 MMOL/L (ref 10–20)
AST SERPL W P-5'-P-CCNC: 11 U/L (ref 9–39)
BILIRUB SERPL-MCNC: 0.3 MG/DL (ref 0–1.2)
BUN SERPL-MCNC: 16 MG/DL (ref 6–23)
CALCIUM SERPL-MCNC: 9.5 MG/DL (ref 8.6–10.3)
CHLORIDE SERPL-SCNC: 103 MMOL/L (ref 98–107)
CHOLEST SERPL-MCNC: 203 MG/DL (ref 0–199)
CHOLESTEROL/HDL RATIO: 5.3
CO2 SERPL-SCNC: 29 MMOL/L (ref 21–32)
CREAT SERPL-MCNC: 1.12 MG/DL (ref 0.5–1.05)
CRP SERPL-MCNC: 0.91 MG/DL
EGFRCR SERPLBLD CKD-EPI 2021: 53 ML/MIN/1.73M*2
ERYTHROCYTE [DISTWIDTH] IN BLOOD BY AUTOMATED COUNT: 16.2 % (ref 11.5–14.5)
GLUCOSE SERPL-MCNC: 95 MG/DL (ref 74–99)
HCT VFR BLD AUTO: 40 % (ref 36–46)
HDLC SERPL-MCNC: 38.5 MG/DL
HGB BLD-MCNC: 12.1 G/DL (ref 12–16)
LDLC SERPL CALC-MCNC: 115 MG/DL
MCH RBC QN AUTO: 27.6 PG (ref 26–34)
MCHC RBC AUTO-ENTMCNC: 30.3 G/DL (ref 32–36)
MCV RBC AUTO: 91 FL (ref 80–100)
NON HDL CHOLESTEROL: 165 MG/DL (ref 0–149)
NRBC BLD-RTO: 0 /100 WBCS (ref 0–0)
PLATELET # BLD AUTO: 301 X10*3/UL (ref 150–450)
POTASSIUM SERPL-SCNC: 3.9 MMOL/L (ref 3.5–5.3)
PROT SERPL-MCNC: 6.7 G/DL (ref 6.4–8.2)
RBC # BLD AUTO: 4.39 X10*6/UL (ref 4–5.2)
SODIUM SERPL-SCNC: 140 MMOL/L (ref 136–145)
TRIGL SERPL-MCNC: 249 MG/DL (ref 0–149)
VLDL: 50 MG/DL (ref 0–40)
WBC # BLD AUTO: 5.7 X10*3/UL (ref 4.4–11.3)

## 2024-05-22 PROCEDURE — 82306 VITAMIN D 25 HYDROXY: CPT

## 2024-05-22 PROCEDURE — 1090000001 HH PPS REVENUE CREDIT

## 2024-05-22 PROCEDURE — 86140 C-REACTIVE PROTEIN: CPT

## 2024-05-22 PROCEDURE — 36415 COLL VENOUS BLD VENIPUNCTURE: CPT

## 2024-05-22 PROCEDURE — 85027 COMPLETE CBC AUTOMATED: CPT

## 2024-05-22 PROCEDURE — 80053 COMPREHEN METABOLIC PANEL: CPT

## 2024-05-22 PROCEDURE — 1090000002 HH PPS REVENUE DEBIT

## 2024-05-22 PROCEDURE — 80061 LIPID PANEL: CPT

## 2024-05-22 PROCEDURE — G0300 HHS/HOSPICE OF LPN EA 15 MIN: HCPCS | Mod: HHH

## 2024-05-22 NOTE — HOME HEALTH
Wound care provided per orders. Tolerated well. VSS. Denies pain. No concerns. Picture and measurements taken.

## 2024-05-23 PROCEDURE — 1090000002 HH PPS REVENUE DEBIT

## 2024-05-23 PROCEDURE — 1090000001 HH PPS REVENUE CREDIT

## 2024-05-24 ENCOUNTER — HOME CARE VISIT (OUTPATIENT)
Dept: HOME HEALTH SERVICES | Facility: HOME HEALTH | Age: 69
End: 2024-05-24
Payer: COMMERCIAL

## 2024-05-24 VITALS
TEMPERATURE: 98.3 F | SYSTOLIC BLOOD PRESSURE: 130 MMHG | HEART RATE: 74 BPM | RESPIRATION RATE: 20 BRPM | OXYGEN SATURATION: 94 % | DIASTOLIC BLOOD PRESSURE: 68 MMHG

## 2024-05-24 PROCEDURE — 1090000001 HH PPS REVENUE CREDIT

## 2024-05-24 PROCEDURE — G0299 HHS/HOSPICE OF RN EA 15 MIN: HCPCS | Mod: HHH

## 2024-05-24 PROCEDURE — 1090000002 HH PPS REVENUE DEBIT

## 2024-05-24 ASSESSMENT — ACTIVITIES OF DAILY LIVING (ADL)
AMBULATION ASSISTANCE: STAND BY ASSIST
AMBULATION ASSISTANCE: 1
CURRENT_FUNCTION: CONTACT GUARD ASSIST
PHYSICAL TRANSFERS ASSESSED: 1
PHYSICAL_TRANSFERS_DEVICES: WALKER
AMBULATION ASSISTANCE: CONTACT GUARD ASSIST
CURRENT_FUNCTION: STAND BY ASSIST

## 2024-05-24 ASSESSMENT — ENCOUNTER SYMPTOMS
DENIES PAIN: 1
LIMITED RANGE OF MOTION: 1
APPETITE LEVEL: GOOD

## 2024-05-25 ENCOUNTER — HOME CARE VISIT (OUTPATIENT)
Dept: HOME HEALTH SERVICES | Facility: HOME HEALTH | Age: 69
End: 2024-05-25
Payer: COMMERCIAL

## 2024-05-25 VITALS
RESPIRATION RATE: 18 BRPM | OXYGEN SATURATION: 97 % | HEART RATE: 78 BPM | DIASTOLIC BLOOD PRESSURE: 63 MMHG | TEMPERATURE: 97.9 F | SYSTOLIC BLOOD PRESSURE: 111 MMHG

## 2024-05-25 PROCEDURE — G0299 HHS/HOSPICE OF RN EA 15 MIN: HCPCS | Mod: HHH

## 2024-05-25 PROCEDURE — 1090000002 HH PPS REVENUE DEBIT

## 2024-05-25 PROCEDURE — 1090000001 HH PPS REVENUE CREDIT

## 2024-05-25 SDOH — ECONOMIC STABILITY: GENERAL

## 2024-05-25 ASSESSMENT — ENCOUNTER SYMPTOMS
OCCASIONAL FEELINGS OF UNSTEADINESS: 0
PERSON REPORTING PAIN: PATIENT
APPETITE LEVEL: GOOD
CHANGE IN APPETITE: UNCHANGED
DENIES PAIN: 1
APPETITE LEVEL: GOOD
DENIES PAIN: 1
CHANGE IN APPETITE: UNCHANGED
MUSCLE WEAKNESS: 1
PERSON REPORTING PAIN: PATIENT

## 2024-05-25 ASSESSMENT — PAIN SCALES - PAIN ASSESSMENT IN ADVANCED DEMENTIA (PAINAD)
FACIALEXPRESSION: 0 - SMILING OR INEXPRESSIVE.
BODYLANGUAGE: 0 - RELAXED.
NEGVOCALIZATION: 0 - NONE.
BREATHING: 0
BODYLANGUAGE: 0
CONSOLABILITY: 0
TOTALSCORE: 0
CONSOLABILITY: 0 - NO NEED TO CONSOLE.
FACIALEXPRESSION: 0
NEGVOCALIZATION: 0

## 2024-05-25 ASSESSMENT — ACTIVITIES OF DAILY LIVING (ADL): MONEY MANAGEMENT (EXPENSES/BILLS): INDEPENDENT

## 2024-05-26 PROCEDURE — 1090000001 HH PPS REVENUE CREDIT

## 2024-05-26 PROCEDURE — 1090000002 HH PPS REVENUE DEBIT

## 2024-05-27 ENCOUNTER — HOME CARE VISIT (OUTPATIENT)
Dept: HOME HEALTH SERVICES | Facility: HOME HEALTH | Age: 69
End: 2024-05-27
Payer: COMMERCIAL

## 2024-05-27 PROCEDURE — 1090000001 HH PPS REVENUE CREDIT

## 2024-05-27 PROCEDURE — G0300 HHS/HOSPICE OF LPN EA 15 MIN: HCPCS | Mod: HHH

## 2024-05-27 PROCEDURE — 1090000002 HH PPS REVENUE DEBIT

## 2024-05-28 PROCEDURE — 1090000002 HH PPS REVENUE DEBIT

## 2024-05-28 PROCEDURE — 1090000001 HH PPS REVENUE CREDIT

## 2024-05-28 ASSESSMENT — PAIN SCALES - PAIN ASSESSMENT IN ADVANCED DEMENTIA (PAINAD)
FACIALEXPRESSION: 0 - SMILING OR INEXPRESSIVE.
CONSOLABILITY: 0 - NO NEED TO CONSOLE.
BODYLANGUAGE: 0
NEGVOCALIZATION: 0 - NONE.
BREATHING: 0
BODYLANGUAGE: 0 - RELAXED.
CONSOLABILITY: 0
TOTALSCORE: 0
FACIALEXPRESSION: 0
NEGVOCALIZATION: 0

## 2024-05-28 ASSESSMENT — ENCOUNTER SYMPTOMS
BLURRED VISION: 1
APPETITE LEVEL: GOOD
OCCASIONAL FEELINGS OF UNSTEADINESS: 0
DENIES PAIN: 1
PERSON REPORTING PAIN: PATIENT
CHANGE IN APPETITE: UNCHANGED

## 2024-05-29 ENCOUNTER — HOME CARE VISIT (OUTPATIENT)
Dept: HOME HEALTH SERVICES | Facility: HOME HEALTH | Age: 69
End: 2024-05-29
Payer: COMMERCIAL

## 2024-05-29 VITALS
TEMPERATURE: 97.4 F | SYSTOLIC BLOOD PRESSURE: 110 MMHG | HEART RATE: 74 BPM | OXYGEN SATURATION: 97 % | RESPIRATION RATE: 18 BRPM | DIASTOLIC BLOOD PRESSURE: 60 MMHG

## 2024-05-29 PROCEDURE — 1090000002 HH PPS REVENUE DEBIT

## 2024-05-29 PROCEDURE — 1090000001 HH PPS REVENUE CREDIT

## 2024-05-29 PROCEDURE — G0300 HHS/HOSPICE OF LPN EA 15 MIN: HCPCS | Mod: HHH

## 2024-05-30 PROCEDURE — 1090000001 HH PPS REVENUE CREDIT

## 2024-05-30 PROCEDURE — 1090000002 HH PPS REVENUE DEBIT

## 2024-05-31 ENCOUNTER — HOME CARE VISIT (OUTPATIENT)
Dept: HOME HEALTH SERVICES | Facility: HOME HEALTH | Age: 69
End: 2024-05-31
Payer: COMMERCIAL

## 2024-05-31 VITALS
HEART RATE: 76 BPM | SYSTOLIC BLOOD PRESSURE: 112 MMHG | RESPIRATION RATE: 18 BRPM | OXYGEN SATURATION: 96 % | DIASTOLIC BLOOD PRESSURE: 58 MMHG | TEMPERATURE: 97.6 F

## 2024-05-31 PROCEDURE — G0299 HHS/HOSPICE OF RN EA 15 MIN: HCPCS | Mod: HHH

## 2024-05-31 PROCEDURE — 1090000002 HH PPS REVENUE DEBIT

## 2024-05-31 PROCEDURE — 1090000001 HH PPS REVENUE CREDIT

## 2024-05-31 ASSESSMENT — ENCOUNTER SYMPTOMS
SUBJECTIVE PAIN PROGRESSION: RESOLVED
CHANGE IN APPETITE: UNCHANGED
CHANGE IN APPETITE: UNCHANGED
STOOL FREQUENCY: DAILY
LOWER EXTREMITY EDEMA: 1
PERSON REPORTING PAIN: PATIENT
PAIN LOCATION - PAIN SEVERITY: 0/10
LOWEST PAIN SEVERITY IN PAST 24 HOURS: 0/10
BOWEL PATTERN NORMAL: 1
PAIN SEVERITY GOAL: 0/10
HIGHEST PAIN SEVERITY IN PAST 24 HOURS: 0/10
OCCASIONAL FEELINGS OF UNSTEADINESS: 0
APPETITE LEVEL: GOOD
PAIN LOCATION: BACK
MUSCLE WEAKNESS: 1
APPETITE LEVEL: GOOD
PERSON REPORTING PAIN: PATIENT
PAIN: 1
DENIES PAIN: 1
BLURRED VISION: 1
LAST BOWEL MOVEMENT: 66990

## 2024-05-31 ASSESSMENT — ACTIVITIES OF DAILY LIVING (ADL)
LIGHT HOUSEKEEPING: NEEDS ASSISTANCE
TRANSPORTATION: DEPENDENT
HOUSEKEEPING ASSESSED: 1
PREPARING MEALS: NEEDS ASSISTANCE
TRANSPORTATION ASSESSED: 1

## 2024-05-31 ASSESSMENT — PAIN SCALES - PAIN ASSESSMENT IN ADVANCED DEMENTIA (PAINAD)
NEGVOCALIZATION: 0
TOTALSCORE: 0
BODYLANGUAGE: 0 - RELAXED.
NEGVOCALIZATION: 0 - NONE.
FACIALEXPRESSION: 0 - SMILING OR INEXPRESSIVE.
CONSOLABILITY: 0
BODYLANGUAGE: 0
BREATHING: 0
CONSOLABILITY: 0 - NO NEED TO CONSOLE.
FACIALEXPRESSION: 0

## 2024-06-01 PROCEDURE — 1090000001 HH PPS REVENUE CREDIT

## 2024-06-01 PROCEDURE — 1090000002 HH PPS REVENUE DEBIT

## 2024-06-02 PROCEDURE — 1090000001 HH PPS REVENUE CREDIT

## 2024-06-02 PROCEDURE — 1090000002 HH PPS REVENUE DEBIT

## 2024-06-03 ENCOUNTER — HOME CARE VISIT (OUTPATIENT)
Dept: HOME HEALTH SERVICES | Facility: HOME HEALTH | Age: 69
End: 2024-06-03
Payer: COMMERCIAL

## 2024-06-03 VITALS
TEMPERATURE: 97.4 F | OXYGEN SATURATION: 97 % | HEART RATE: 78 BPM | SYSTOLIC BLOOD PRESSURE: 120 MMHG | DIASTOLIC BLOOD PRESSURE: 54 MMHG | RESPIRATION RATE: 18 BRPM

## 2024-06-03 PROCEDURE — 1090000002 HH PPS REVENUE DEBIT

## 2024-06-03 PROCEDURE — G0300 HHS/HOSPICE OF LPN EA 15 MIN: HCPCS | Mod: HHH

## 2024-06-03 PROCEDURE — 1090000001 HH PPS REVENUE CREDIT

## 2024-06-03 NOTE — HOME HEALTH
Wound care provided per orders. Tolerated well. VSS. Denies pain. Picture and measurements taken. No concerns.

## 2024-06-04 PROCEDURE — 1090000001 HH PPS REVENUE CREDIT

## 2024-06-04 PROCEDURE — 1090000002 HH PPS REVENUE DEBIT

## 2024-06-04 ASSESSMENT — PAIN SCALES - PAIN ASSESSMENT IN ADVANCED DEMENTIA (PAINAD)
NEGVOCALIZATION: 0 - NONE.
NEGVOCALIZATION: 0
CONSOLABILITY: 0 - NO NEED TO CONSOLE.
FACIALEXPRESSION: 0
BODYLANGUAGE: 0 - RELAXED.
CONSOLABILITY: 0
BODYLANGUAGE: 0
TOTALSCORE: 0
BREATHING: 0
FACIALEXPRESSION: 0 - SMILING OR INEXPRESSIVE.

## 2024-06-04 ASSESSMENT — ENCOUNTER SYMPTOMS
PERSON REPORTING PAIN: PATIENT
DENIES PAIN: 1
APPETITE LEVEL: GOOD
OCCASIONAL FEELINGS OF UNSTEADINESS: 0
CHANGE IN APPETITE: UNCHANGED
BLURRED VISION: 1

## 2024-06-05 ENCOUNTER — HOME CARE VISIT (OUTPATIENT)
Dept: HOME HEALTH SERVICES | Facility: HOME HEALTH | Age: 69
End: 2024-06-05
Payer: COMMERCIAL

## 2024-06-05 VITALS
RESPIRATION RATE: 18 BRPM | TEMPERATURE: 97.4 F | HEART RATE: 78 BPM | DIASTOLIC BLOOD PRESSURE: 50 MMHG | OXYGEN SATURATION: 96 % | SYSTOLIC BLOOD PRESSURE: 120 MMHG

## 2024-06-05 PROCEDURE — 1090000002 HH PPS REVENUE DEBIT

## 2024-06-05 PROCEDURE — G0300 HHS/HOSPICE OF LPN EA 15 MIN: HCPCS | Mod: HHH

## 2024-06-05 PROCEDURE — 1090000001 HH PPS REVENUE CREDIT

## 2024-06-06 ENCOUNTER — OFFICE VISIT (OUTPATIENT)
Dept: WOUND CARE | Facility: CLINIC | Age: 69
End: 2024-06-06
Payer: COMMERCIAL

## 2024-06-06 ENCOUNTER — LAB REQUISITION (OUTPATIENT)
Dept: LAB | Facility: HOSPITAL | Age: 69
End: 2024-06-06
Payer: COMMERCIAL

## 2024-06-06 DIAGNOSIS — T81.31XA DISRUPTION OF EXTERNAL OPERATION (SURGICAL) WOUND, NOT ELSEWHERE CLASSIFIED, INITIAL ENCOUNTER: ICD-10-CM

## 2024-06-06 DIAGNOSIS — M86.68 OTHER CHRONIC OSTEOMYELITIS, OTHER SITE (MULTI): ICD-10-CM

## 2024-06-06 DIAGNOSIS — F17.210 NICOTINE DEPENDENCE, CIGARETTES, UNCOMPLICATED: ICD-10-CM

## 2024-06-06 DIAGNOSIS — L98.493 NON-PRESSURE CHRONIC ULCER OF SKIN OF OTHER SITES WITH NECROSIS OF MUSCLE (MULTI): ICD-10-CM

## 2024-06-06 DIAGNOSIS — S31.000A UNSPECIFIED OPEN WOUND OF LOWER BACK AND PELVIS WITHOUT PENETRATION INTO RETROPERITONEUM, INITIAL ENCOUNTER: ICD-10-CM

## 2024-06-06 PROCEDURE — 87070 CULTURE OTHR SPECIMN AEROBIC: CPT | Mod: OUT,ELYLAB | Performed by: PLASTIC SURGERY

## 2024-06-06 PROCEDURE — 1090000001 HH PPS REVENUE CREDIT

## 2024-06-06 PROCEDURE — 11043 DBRDMT MUSC&/FSCA 1ST 20/<: CPT | Performed by: PLASTIC SURGERY

## 2024-06-06 PROCEDURE — 11043 DBRDMT MUSC&/FSCA 1ST 20/<: CPT

## 2024-06-06 PROCEDURE — 1090000002 HH PPS REVENUE DEBIT

## 2024-06-06 ASSESSMENT — ENCOUNTER SYMPTOMS
PERSON REPORTING PAIN: PATIENT
DENIES PAIN: 1
CHANGE IN APPETITE: UNCHANGED
OCCASIONAL FEELINGS OF UNSTEADINESS: 0
APPETITE LEVEL: GOOD

## 2024-06-06 ASSESSMENT — PAIN SCALES - PAIN ASSESSMENT IN ADVANCED DEMENTIA (PAINAD)
NEGVOCALIZATION: 0
CONSOLABILITY: 0 - NO NEED TO CONSOLE.
TOTALSCORE: 0
CONSOLABILITY: 0
NEGVOCALIZATION: 0 - NONE.
BODYLANGUAGE: 0 - RELAXED.
FACIALEXPRESSION: 0 - SMILING OR INEXPRESSIVE.
FACIALEXPRESSION: 0
BODYLANGUAGE: 0
BREATHING: 0

## 2024-06-07 ENCOUNTER — HOME CARE VISIT (OUTPATIENT)
Dept: HOME HEALTH SERVICES | Facility: HOME HEALTH | Age: 69
End: 2024-06-07
Payer: COMMERCIAL

## 2024-06-07 PROCEDURE — 1090000002 HH PPS REVENUE DEBIT

## 2024-06-07 PROCEDURE — G0300 HHS/HOSPICE OF LPN EA 15 MIN: HCPCS | Mod: HHH

## 2024-06-07 PROCEDURE — 1090000001 HH PPS REVENUE CREDIT

## 2024-06-08 LAB
BACTERIA SPEC CULT: ABNORMAL
GRAM STN SPEC: ABNORMAL
GRAM STN SPEC: ABNORMAL

## 2024-06-08 PROCEDURE — 1090000002 HH PPS REVENUE DEBIT

## 2024-06-08 PROCEDURE — 1090000001 HH PPS REVENUE CREDIT

## 2024-06-09 PROCEDURE — 1090000001 HH PPS REVENUE CREDIT

## 2024-06-09 PROCEDURE — 1090000002 HH PPS REVENUE DEBIT

## 2024-06-10 ENCOUNTER — HOME CARE VISIT (OUTPATIENT)
Dept: HOME HEALTH SERVICES | Facility: HOME HEALTH | Age: 69
End: 2024-06-10
Payer: COMMERCIAL

## 2024-06-10 VITALS
SYSTOLIC BLOOD PRESSURE: 126 MMHG | RESPIRATION RATE: 18 BRPM | TEMPERATURE: 97.9 F | OXYGEN SATURATION: 97 % | DIASTOLIC BLOOD PRESSURE: 62 MMHG | HEART RATE: 72 BPM

## 2024-06-10 PROCEDURE — 1090000001 HH PPS REVENUE CREDIT

## 2024-06-10 PROCEDURE — 1090000002 HH PPS REVENUE DEBIT

## 2024-06-10 PROCEDURE — G0300 HHS/HOSPICE OF LPN EA 15 MIN: HCPCS | Mod: HHH

## 2024-06-10 PROCEDURE — 400014 HH F/U

## 2024-06-10 ASSESSMENT — PAIN SCALES - PAIN ASSESSMENT IN ADVANCED DEMENTIA (PAINAD)
NEGVOCALIZATION: 0
FACIALEXPRESSION: 0
BREATHING: 0
BODYLANGUAGE: 0
TOTALSCORE: 0
BODYLANGUAGE: 0 - RELAXED.
CONSOLABILITY: 0
NEGVOCALIZATION: 0 - NONE.
FACIALEXPRESSION: 0 - SMILING OR INEXPRESSIVE.
CONSOLABILITY: 0 - NO NEED TO CONSOLE.

## 2024-06-10 ASSESSMENT — ENCOUNTER SYMPTOMS
CHANGE IN APPETITE: UNCHANGED
OCCASIONAL FEELINGS OF UNSTEADINESS: 1
PERSON REPORTING PAIN: PATIENT
APPETITE LEVEL: GOOD
DENIES PAIN: 1

## 2024-06-12 ENCOUNTER — HOME CARE VISIT (OUTPATIENT)
Dept: HOME HEALTH SERVICES | Facility: HOME HEALTH | Age: 69
End: 2024-06-12
Payer: COMMERCIAL

## 2024-06-12 PROCEDURE — G0300 HHS/HOSPICE OF LPN EA 15 MIN: HCPCS | Mod: HHH

## 2024-06-12 ASSESSMENT — ENCOUNTER SYMPTOMS
CHANGE IN APPETITE: UNCHANGED
APPETITE LEVEL: GOOD
DENIES PAIN: 1
PERSON REPORTING PAIN: PATIENT
OCCASIONAL FEELINGS OF UNSTEADINESS: 0

## 2024-06-12 ASSESSMENT — PAIN SCALES - PAIN ASSESSMENT IN ADVANCED DEMENTIA (PAINAD)
NEGVOCALIZATION: 0
BODYLANGUAGE: 0
BREATHING: 0
CONSOLABILITY: 0
FACIALEXPRESSION: 0
NEGVOCALIZATION: 0 - NONE.
BODYLANGUAGE: 0 - RELAXED.
CONSOLABILITY: 0 - NO NEED TO CONSOLE.
FACIALEXPRESSION: 0 - SMILING OR INEXPRESSIVE.
TOTALSCORE: 0

## 2024-06-13 ENCOUNTER — OFFICE VISIT (OUTPATIENT)
Dept: WOUND CARE | Facility: CLINIC | Age: 69
End: 2024-06-13
Payer: COMMERCIAL

## 2024-06-13 PROCEDURE — 11043 DBRDMT MUSC&/FSCA 1ST 20/<: CPT

## 2024-06-13 PROCEDURE — 11043 DBRDMT MUSC&/FSCA 1ST 20/<: CPT | Performed by: PLASTIC SURGERY

## 2024-06-14 ENCOUNTER — HOME CARE VISIT (OUTPATIENT)
Dept: HOME HEALTH SERVICES | Facility: HOME HEALTH | Age: 69
End: 2024-06-14
Payer: COMMERCIAL

## 2024-06-14 VITALS
DIASTOLIC BLOOD PRESSURE: 54 MMHG | RESPIRATION RATE: 18 BRPM | OXYGEN SATURATION: 97 % | SYSTOLIC BLOOD PRESSURE: 110 MMHG | TEMPERATURE: 97.6 F | HEART RATE: 78 BPM

## 2024-06-14 PROCEDURE — G0300 HHS/HOSPICE OF LPN EA 15 MIN: HCPCS | Mod: HHH

## 2024-06-14 ASSESSMENT — PAIN SCALES - PAIN ASSESSMENT IN ADVANCED DEMENTIA (PAINAD)
FACIALEXPRESSION: 0
BODYLANGUAGE: 0 - RELAXED.
CONSOLABILITY: 0
FACIALEXPRESSION: 0 - SMILING OR INEXPRESSIVE.
NEGVOCALIZATION: 0
BODYLANGUAGE: 0
TOTALSCORE: 0
CONSOLABILITY: 0 - NO NEED TO CONSOLE.
NEGVOCALIZATION: 0 - NONE.
BREATHING: 0

## 2024-06-14 ASSESSMENT — ENCOUNTER SYMPTOMS
CHANGE IN APPETITE: UNCHANGED
PERSON REPORTING PAIN: PATIENT
OCCASIONAL FEELINGS OF UNSTEADINESS: 0
BLURRED VISION: 1
DENIES PAIN: 1
APPETITE LEVEL: GOOD

## 2024-06-14 NOTE — HOME HEALTH
Wound care provided per orders. Tolerated well. VSS. Denies pain. No concerns.  cont HD  renal follow up

## 2024-06-15 ASSESSMENT — ENCOUNTER SYMPTOMS
PERSON REPORTING PAIN: PATIENT
OCCASIONAL FEELINGS OF UNSTEADINESS: 0
APPETITE LEVEL: GOOD
CHANGE IN APPETITE: UNCHANGED
DENIES PAIN: 1
BLURRED VISION: 1

## 2024-06-15 ASSESSMENT — PAIN SCALES - PAIN ASSESSMENT IN ADVANCED DEMENTIA (PAINAD)
BODYLANGUAGE: 0
NEGVOCALIZATION: 0
BODYLANGUAGE: 0 - RELAXED.
FACIALEXPRESSION: 0
FACIALEXPRESSION: 0 - SMILING OR INEXPRESSIVE.
TOTALSCORE: 0
BREATHING: 0
CONSOLABILITY: 0 - NO NEED TO CONSOLE.
CONSOLABILITY: 0
NEGVOCALIZATION: 0 - NONE.

## 2024-06-17 ENCOUNTER — HOME CARE VISIT (OUTPATIENT)
Dept: HOME HEALTH SERVICES | Facility: HOME HEALTH | Age: 69
End: 2024-06-17
Payer: COMMERCIAL

## 2024-06-17 VITALS
TEMPERATURE: 97.6 F | HEART RATE: 80 BPM | RESPIRATION RATE: 20 BRPM | OXYGEN SATURATION: 97 % | SYSTOLIC BLOOD PRESSURE: 127 MMHG | DIASTOLIC BLOOD PRESSURE: 71 MMHG

## 2024-06-17 PROCEDURE — G0299 HHS/HOSPICE OF RN EA 15 MIN: HCPCS | Mod: HHH

## 2024-06-17 ASSESSMENT — ENCOUNTER SYMPTOMS
APPETITE LEVEL: GOOD
CHANGE IN APPETITE: UNCHANGED

## 2024-06-17 ASSESSMENT — PAIN DESCRIPTION - PAIN TYPE: TYPE: ACUTE PAIN

## 2024-06-19 ENCOUNTER — HOME CARE VISIT (OUTPATIENT)
Dept: HOME HEALTH SERVICES | Facility: HOME HEALTH | Age: 69
End: 2024-06-19
Payer: COMMERCIAL

## 2024-06-19 VITALS
DIASTOLIC BLOOD PRESSURE: 58 MMHG | RESPIRATION RATE: 18 BRPM | SYSTOLIC BLOOD PRESSURE: 102 MMHG | TEMPERATURE: 97.7 F | OXYGEN SATURATION: 96 %

## 2024-06-19 PROCEDURE — G0299 HHS/HOSPICE OF RN EA 15 MIN: HCPCS | Mod: HHH

## 2024-06-19 ASSESSMENT — ENCOUNTER SYMPTOMS
CHANGE IN APPETITE: UNCHANGED
PAIN LOCATION - PAIN SEVERITY: 6/10
PAIN LOCATION: BACK
APPETITE LEVEL: GOOD
PAIN LOCATION - PAIN FREQUENCY: CONSTANT
PAIN: 1
PAIN LOCATION - PAIN QUALITY: SORE
MUSCLE WEAKNESS: 1

## 2024-06-20 ENCOUNTER — OFFICE VISIT (OUTPATIENT)
Dept: WOUND CARE | Facility: CLINIC | Age: 69
End: 2024-06-20
Payer: COMMERCIAL

## 2024-06-20 PROCEDURE — 11043 DBRDMT MUSC&/FSCA 1ST 20/<: CPT

## 2024-06-20 PROCEDURE — 11043 DBRDMT MUSC&/FSCA 1ST 20/<: CPT | Performed by: PLASTIC SURGERY

## 2024-06-20 PROCEDURE — 97605 NEG PRS WND THER DME<=50SQCM: CPT | Performed by: PLASTIC SURGERY

## 2024-06-20 PROCEDURE — 97605 NEG PRS WND THER DME<=50SQCM: CPT

## 2024-06-22 ENCOUNTER — HOME CARE VISIT (OUTPATIENT)
Dept: HOME HEALTH SERVICES | Facility: HOME HEALTH | Age: 69
End: 2024-06-22
Payer: COMMERCIAL

## 2024-06-22 VITALS
DIASTOLIC BLOOD PRESSURE: 62 MMHG | OXYGEN SATURATION: 98 % | HEART RATE: 72 BPM | SYSTOLIC BLOOD PRESSURE: 98 MMHG | RESPIRATION RATE: 18 BRPM

## 2024-06-22 PROCEDURE — G0300 HHS/HOSPICE OF LPN EA 15 MIN: HCPCS | Mod: HHH

## 2024-06-22 SDOH — ECONOMIC STABILITY: GENERAL

## 2024-06-22 ASSESSMENT — ENCOUNTER SYMPTOMS
DEPRESSION: 0
OCCASIONAL FEELINGS OF UNSTEADINESS: 0
PERSON REPORTING PAIN: PATIENT
CHANGE IN APPETITE: UNCHANGED
PAIN: 1
LOSS OF SENSATION IN FEET: 0
APPETITE LEVEL: GOOD
LIMITED RANGE OF MOTION: 1
MUSCLE WEAKNESS: 1

## 2024-06-22 ASSESSMENT — ACTIVITIES OF DAILY LIVING (ADL)
CURRENT_FUNCTION: STAND BY ASSIST
AMBULATION ASSISTANCE: NON-AMBULATORY
MONEY MANAGEMENT (EXPENSES/BILLS): INDEPENDENT

## 2024-06-22 NOTE — HOME HEALTH
Patient was seen for routine nursing visit with wound vac dressing change. Patient tolerated well. No further concerns at this time.

## 2024-06-24 ENCOUNTER — HOME CARE VISIT (OUTPATIENT)
Dept: HOME HEALTH SERVICES | Facility: HOME HEALTH | Age: 69
End: 2024-06-24
Payer: COMMERCIAL

## 2024-06-24 PROCEDURE — G0300 HHS/HOSPICE OF LPN EA 15 MIN: HCPCS | Mod: HHH

## 2024-06-26 ENCOUNTER — HOME CARE VISIT (OUTPATIENT)
Dept: HOME HEALTH SERVICES | Facility: HOME HEALTH | Age: 69
End: 2024-06-26
Payer: COMMERCIAL

## 2024-06-26 VITALS
SYSTOLIC BLOOD PRESSURE: 118 MMHG | DIASTOLIC BLOOD PRESSURE: 60 MMHG | HEART RATE: 74 BPM | OXYGEN SATURATION: 97 % | TEMPERATURE: 98 F

## 2024-06-26 PROCEDURE — G0300 HHS/HOSPICE OF LPN EA 15 MIN: HCPCS | Mod: HHH

## 2024-06-26 ASSESSMENT — PAIN SCALES - PAIN ASSESSMENT IN ADVANCED DEMENTIA (PAINAD)
BODYLANGUAGE: 0
FACIALEXPRESSION: 0
NEGVOCALIZATION: 0 - NONE.
BREATHING: 0
BODYLANGUAGE: 0 - RELAXED.
FACIALEXPRESSION: 0 - SMILING OR INEXPRESSIVE.
TOTALSCORE: 0
CONSOLABILITY: 0 - NO NEED TO CONSOLE.
NEGVOCALIZATION: 0
CONSOLABILITY: 0

## 2024-06-26 ASSESSMENT — ENCOUNTER SYMPTOMS
CHANGE IN APPETITE: UNCHANGED
APPETITE LEVEL: GOOD
DENIES PAIN: 1
BLURRED VISION: 1
PERSON REPORTING PAIN: PATIENT
OCCASIONAL FEELINGS OF UNSTEADINESS: 0

## 2024-06-27 ENCOUNTER — OFFICE VISIT (OUTPATIENT)
Dept: WOUND CARE | Facility: CLINIC | Age: 69
End: 2024-06-27
Payer: COMMERCIAL

## 2024-06-27 DIAGNOSIS — E87.6 HYPOKALEMIA: ICD-10-CM

## 2024-06-27 PROCEDURE — 11043 DBRDMT MUSC&/FSCA 1ST 20/<: CPT | Performed by: PLASTIC SURGERY

## 2024-06-27 PROCEDURE — 11043 DBRDMT MUSC&/FSCA 1ST 20/<: CPT

## 2024-06-27 RX ORDER — POTASSIUM CHLORIDE 750 MG/1
10 CAPSULE, EXTENDED RELEASE ORAL DAILY
Qty: 90 CAPSULE | Refills: 3 | Status: SHIPPED | OUTPATIENT
Start: 2024-06-27

## 2024-06-27 NOTE — TELEPHONE ENCOUNTER
Received request for prescription refills for patient.   Patient follows with Dr. Hugo Barron M.D.      Request is for refill  Is patient currently on medication yes    Last OV 4/4/24  Next OV 7/18/24    Pended for signing and sent to provider

## 2024-06-28 ENCOUNTER — HOME CARE VISIT (OUTPATIENT)
Dept: HOME HEALTH SERVICES | Facility: HOME HEALTH | Age: 69
End: 2024-06-28
Payer: COMMERCIAL

## 2024-06-28 VITALS
HEART RATE: 70 BPM | RESPIRATION RATE: 18 BRPM | DIASTOLIC BLOOD PRESSURE: 70 MMHG | TEMPERATURE: 97.5 F | SYSTOLIC BLOOD PRESSURE: 110 MMHG | OXYGEN SATURATION: 97 %

## 2024-06-28 PROCEDURE — G0300 HHS/HOSPICE OF LPN EA 15 MIN: HCPCS | Mod: HHH

## 2024-06-29 ASSESSMENT — PAIN SCALES - PAIN ASSESSMENT IN ADVANCED DEMENTIA (PAINAD)
TOTALSCORE: 0
NEGVOCALIZATION: 0
NEGVOCALIZATION: 0 - NONE.
NEGVOCALIZATION: 0
BREATHING: 0
CONSOLABILITY: 0 - NO NEED TO CONSOLE.
BODYLANGUAGE: 0 - RELAXED.
BODYLANGUAGE: 0 - RELAXED.
FACIALEXPRESSION: 0 - SMILING OR INEXPRESSIVE.
TOTALSCORE: 0
BREATHING: 0
FACIALEXPRESSION: 0
NEGVOCALIZATION: 0 - NONE.
CONSOLABILITY: 0 - NO NEED TO CONSOLE.
CONSOLABILITY: 0
BODYLANGUAGE: 0
FACIALEXPRESSION: 0 - SMILING OR INEXPRESSIVE.
BODYLANGUAGE: 0
CONSOLABILITY: 0
FACIALEXPRESSION: 0

## 2024-06-29 ASSESSMENT — ENCOUNTER SYMPTOMS
CHANGE IN APPETITE: UNCHANGED
OCCASIONAL FEELINGS OF UNSTEADINESS: 0
APPETITE LEVEL: GOOD
PERSON REPORTING PAIN: PATIENT
OCCASIONAL FEELINGS OF UNSTEADINESS: 0
DENIES PAIN: 1
PERSON REPORTING PAIN: PATIENT
CHANGE IN APPETITE: UNCHANGED
APPETITE LEVEL: GOOD
DENIES PAIN: 1

## 2024-07-01 ENCOUNTER — HOME CARE VISIT (OUTPATIENT)
Dept: HOME HEALTH SERVICES | Facility: HOME HEALTH | Age: 69
End: 2024-07-01
Payer: COMMERCIAL

## 2024-07-01 PROCEDURE — G0300 HHS/HOSPICE OF LPN EA 15 MIN: HCPCS | Mod: HHH

## 2024-07-03 ENCOUNTER — HOME CARE VISIT (OUTPATIENT)
Dept: HOME HEALTH SERVICES | Facility: HOME HEALTH | Age: 69
End: 2024-07-03
Payer: COMMERCIAL

## 2024-07-03 VITALS
OXYGEN SATURATION: 97 % | SYSTOLIC BLOOD PRESSURE: 102 MMHG | HEART RATE: 72 BPM | DIASTOLIC BLOOD PRESSURE: 56 MMHG | TEMPERATURE: 97.2 F | RESPIRATION RATE: 18 BRPM

## 2024-07-03 PROCEDURE — G0300 HHS/HOSPICE OF LPN EA 15 MIN: HCPCS | Mod: HHH

## 2024-07-03 ASSESSMENT — PAIN SCALES - PAIN ASSESSMENT IN ADVANCED DEMENTIA (PAINAD)
NEGVOCALIZATION: 0 - NONE.
FACIALEXPRESSION: 0 - SMILING OR INEXPRESSIVE.
BODYLANGUAGE: 0 - RELAXED.
NEGVOCALIZATION: 0
BREATHING: 0
FACIALEXPRESSION: 0
BODYLANGUAGE: 0
CONSOLABILITY: 0
TOTALSCORE: 0
CONSOLABILITY: 0 - NO NEED TO CONSOLE.

## 2024-07-03 ASSESSMENT — ENCOUNTER SYMPTOMS
PERSON REPORTING PAIN: PATIENT
BLURRED VISION: 1
CHANGE IN APPETITE: UNCHANGED
OCCASIONAL FEELINGS OF UNSTEADINESS: 0
APPETITE LEVEL: GOOD
DENIES PAIN: 1

## 2024-07-05 ENCOUNTER — HOME CARE VISIT (OUTPATIENT)
Dept: HOME HEALTH SERVICES | Facility: HOME HEALTH | Age: 69
End: 2024-07-05
Payer: COMMERCIAL

## 2024-07-05 VITALS
SYSTOLIC BLOOD PRESSURE: 130 MMHG | RESPIRATION RATE: 20 BRPM | OXYGEN SATURATION: 98 % | TEMPERATURE: 97.8 F | DIASTOLIC BLOOD PRESSURE: 68 MMHG

## 2024-07-05 PROCEDURE — G0299 HHS/HOSPICE OF RN EA 15 MIN: HCPCS | Mod: HHH

## 2024-07-05 PROCEDURE — G0300 HHS/HOSPICE OF LPN EA 15 MIN: HCPCS | Mod: HHH

## 2024-07-05 ASSESSMENT — ENCOUNTER SYMPTOMS
LOWEST PAIN SEVERITY IN PAST 24 HOURS: 0/10
PAIN SEVERITY GOAL: 0/10
HIGHEST PAIN SEVERITY IN PAST 24 HOURS: 0/10
APPETITE LEVEL: GOOD
LAST BOWEL MOVEMENT: 67026

## 2024-07-05 ASSESSMENT — ACTIVITIES OF DAILY LIVING (ADL)
TOILETING: STAND BY ASSIST
ENTERING_EXITING_HOME: MINIMUM ASSIST
TOILETING: 1
AMBULATION ASSISTANCE: 1
PHYSICAL TRANSFERS ASSESSED: 1
AMBULATION ASSISTANCE: STAND BY ASSIST
OASIS_M1830: 03

## 2024-07-08 ENCOUNTER — HOME CARE VISIT (OUTPATIENT)
Dept: HOME HEALTH SERVICES | Facility: HOME HEALTH | Age: 69
End: 2024-07-08
Payer: COMMERCIAL

## 2024-07-08 VITALS
HEART RATE: 70 BPM | SYSTOLIC BLOOD PRESSURE: 106 MMHG | TEMPERATURE: 97.6 F | DIASTOLIC BLOOD PRESSURE: 60 MMHG | RESPIRATION RATE: 18 BRPM | OXYGEN SATURATION: 97 %

## 2024-07-08 PROCEDURE — G0300 HHS/HOSPICE OF LPN EA 15 MIN: HCPCS | Mod: HHH

## 2024-07-08 ASSESSMENT — PAIN SCALES - PAIN ASSESSMENT IN ADVANCED DEMENTIA (PAINAD)
NEGVOCALIZATION: 0 - NONE.
NEGVOCALIZATION: 0
BODYLANGUAGE: 0 - RELAXED.
FACIALEXPRESSION: 0
CONSOLABILITY: 0
BODYLANGUAGE: 0
TOTALSCORE: 0
BREATHING: 0
CONSOLABILITY: 0 - NO NEED TO CONSOLE.
FACIALEXPRESSION: 0 - SMILING OR INEXPRESSIVE.

## 2024-07-08 ASSESSMENT — ENCOUNTER SYMPTOMS
APPETITE LEVEL: GOOD
BLURRED VISION: 1
PERSON REPORTING PAIN: PATIENT
CHANGE IN APPETITE: UNCHANGED
OCCASIONAL FEELINGS OF UNSTEADINESS: 0
DENIES PAIN: 1

## 2024-07-09 ENCOUNTER — APPOINTMENT (OUTPATIENT)
Dept: CARDIOLOGY | Facility: CLINIC | Age: 69
End: 2024-07-09
Payer: COMMERCIAL

## 2024-07-09 ENCOUNTER — OFFICE VISIT (OUTPATIENT)
Dept: ORTHOPEDIC SURGERY | Facility: CLINIC | Age: 69
End: 2024-07-09
Payer: COMMERCIAL

## 2024-07-09 ENCOUNTER — HOSPITAL ENCOUNTER (OUTPATIENT)
Dept: RADIOLOGY | Facility: CLINIC | Age: 69
Discharge: HOME | End: 2024-07-09
Payer: COMMERCIAL

## 2024-07-09 DIAGNOSIS — M54.50 LOW BACK PAIN, UNSPECIFIED BACK PAIN LATERALITY, UNSPECIFIED CHRONICITY, UNSPECIFIED WHETHER SCIATICA PRESENT: ICD-10-CM

## 2024-07-09 PROCEDURE — 99213 OFFICE O/P EST LOW 20 MIN: CPT | Performed by: ORTHOPAEDIC SURGERY

## 2024-07-09 PROCEDURE — 1123F ACP DISCUSS/DSCN MKR DOCD: CPT | Performed by: ORTHOPAEDIC SURGERY

## 2024-07-09 PROCEDURE — 3008F BODY MASS INDEX DOCD: CPT | Performed by: ORTHOPAEDIC SURGERY

## 2024-07-09 PROCEDURE — 99214 OFFICE O/P EST MOD 30 MIN: CPT | Performed by: ORTHOPAEDIC SURGERY

## 2024-07-09 PROCEDURE — 72100 X-RAY EXAM L-S SPINE 2/3 VWS: CPT

## 2024-07-09 PROCEDURE — 72100 X-RAY EXAM L-S SPINE 2/3 VWS: CPT | Performed by: ORTHOPAEDIC SURGERY

## 2024-07-09 NOTE — PROGRESS NOTES
Chief complaint: Status post L4-5 L5-S1 laminectomy with instrumented interbody fusion November 20, 2023    HPI: Patient had the above described surgery with resultant MRSA infection and multiple washouts.  Her pedicle screws were removed.  She is continuing to be followed by infectious disease.  Those notes were reviewed and it looks like they have stopped all IV medicines and she is on Bactrim at this point.  They are going to continue to follow her.  She says that she is 95+ percent better compared to before her very first surgery.  She is walking and performing all ADLs with ease.  Occasionally her legs get a little heavy.  And sometimes her backs a little achy and sore.  All in all she is very happy with the results of her surgery despite the long course it took for us to get here.  There is no fevers chills.  There is no bowel or bladder changes.    Physical exam: The incision is almost completely perfectly healed except 1 small part in the middle that does still have a wound VAC on.  She has appropriate back range of motion.  She has 4+/5 strength globally bilateral lower extremities with hip flexion extension knee flexion extension and ankle dorsiflexion plantarflexion without any pain with resistance and strength testing.    X-rays look like she is fused and at the operative level as there is a robust posterior lateral fusion mass present.  The L3-4 disc space where she had discitis looks quite degenerative.  There is an acute scoliosis at that level but all in all the spine looks stable.    Assessment/plan: Approximately 8 months out from her primary surgery with 3 subsequent surgeries for an MRSA infection.  All in all the patient is doing exceptionally well given the circumstances and how sick she was with this infection.  She is calm through this significantly better than she thought she would.  We are going to let her continue to engage in activities as tolerated.  She will continue with the wound VAC  per the wound VAC team.  She will continue with infectious disease for antibiotics.  I would like to see her back in 4 months for AP lateral x-rays of her lumbar spine for final x-rays.  She has 2 chronic stable problems of back pain and some leg pain.  We have ordered and reviewed x-rays today.

## 2024-07-10 ENCOUNTER — HOME CARE VISIT (OUTPATIENT)
Dept: HOME HEALTH SERVICES | Facility: HOME HEALTH | Age: 69
End: 2024-07-10
Payer: COMMERCIAL

## 2024-07-10 PROCEDURE — G0300 HHS/HOSPICE OF LPN EA 15 MIN: HCPCS | Mod: HHH

## 2024-07-11 ENCOUNTER — OFFICE VISIT (OUTPATIENT)
Dept: WOUND CARE | Facility: CLINIC | Age: 69
End: 2024-07-11
Payer: COMMERCIAL

## 2024-07-11 ENCOUNTER — LAB REQUISITION (OUTPATIENT)
Dept: LAB | Facility: HOSPITAL | Age: 69
End: 2024-07-11
Payer: COMMERCIAL

## 2024-07-11 DIAGNOSIS — S31.000A UNSPECIFIED OPEN WOUND OF LOWER BACK AND PELVIS WITHOUT PENETRATION INTO RETROPERITONEUM, INITIAL ENCOUNTER: ICD-10-CM

## 2024-07-11 DIAGNOSIS — F17.210 NICOTINE DEPENDENCE, CIGARETTES, UNCOMPLICATED: ICD-10-CM

## 2024-07-11 DIAGNOSIS — L98.493 NON-PRESSURE CHRONIC ULCER OF SKIN OF OTHER SITES WITH NECROSIS OF MUSCLE (MULTI): ICD-10-CM

## 2024-07-11 DIAGNOSIS — M86.68 OTHER CHRONIC OSTEOMYELITIS, OTHER SITE (MULTI): ICD-10-CM

## 2024-07-11 DIAGNOSIS — T81.31XA DISRUPTION OF EXTERNAL OPERATION (SURGICAL) WOUND, NOT ELSEWHERE CLASSIFIED, INITIAL ENCOUNTER: ICD-10-CM

## 2024-07-11 PROCEDURE — 11043 DBRDMT MUSC&/FSCA 1ST 20/<: CPT | Performed by: PLASTIC SURGERY

## 2024-07-11 PROCEDURE — 11043 DBRDMT MUSC&/FSCA 1ST 20/<: CPT

## 2024-07-11 PROCEDURE — 87075 CULTR BACTERIA EXCEPT BLOOD: CPT | Mod: OUT,ELYLAB | Performed by: PLASTIC SURGERY

## 2024-07-12 ENCOUNTER — HOME CARE VISIT (OUTPATIENT)
Dept: HOME HEALTH SERVICES | Facility: HOME HEALTH | Age: 69
End: 2024-07-12
Payer: COMMERCIAL

## 2024-07-12 VITALS
TEMPERATURE: 97.6 F | SYSTOLIC BLOOD PRESSURE: 120 MMHG | DIASTOLIC BLOOD PRESSURE: 62 MMHG | OXYGEN SATURATION: 97 % | RESPIRATION RATE: 18 BRPM | HEART RATE: 64 BPM

## 2024-07-12 PROCEDURE — G0300 HHS/HOSPICE OF LPN EA 15 MIN: HCPCS | Mod: HHH

## 2024-07-13 LAB
BACTERIA SPEC CULT: NORMAL
GRAM STN SPEC: NORMAL
GRAM STN SPEC: NORMAL

## 2024-07-13 ASSESSMENT — ENCOUNTER SYMPTOMS
PERSON REPORTING PAIN: PATIENT
PERSON REPORTING PAIN: PATIENT
DENIES PAIN: 1
CHANGE IN APPETITE: UNCHANGED
APPETITE LEVEL: GOOD
OCCASIONAL FEELINGS OF UNSTEADINESS: 0
APPETITE LEVEL: GOOD
DENIES PAIN: 1
CHANGE IN APPETITE: UNCHANGED
OCCASIONAL FEELINGS OF UNSTEADINESS: 0
DENIES PAIN: 1
APPETITE LEVEL: GOOD
PERSON REPORTING PAIN: PATIENT
OCCASIONAL FEELINGS OF UNSTEADINESS: 0
CHANGE IN APPETITE: UNCHANGED

## 2024-07-13 ASSESSMENT — PAIN SCALES - PAIN ASSESSMENT IN ADVANCED DEMENTIA (PAINAD)
BREATHING: 0
FACIALEXPRESSION: 0 - SMILING OR INEXPRESSIVE.
BODYLANGUAGE: 0 - RELAXED.
FACIALEXPRESSION: 0
BODYLANGUAGE: 0 - RELAXED.
FACIALEXPRESSION: 0
FACIALEXPRESSION: 0 - SMILING OR INEXPRESSIVE.
TOTALSCORE: 0
BODYLANGUAGE: 0 - RELAXED.
NEGVOCALIZATION: 0
NEGVOCALIZATION: 0
CONSOLABILITY: 0 - NO NEED TO CONSOLE.
TOTALSCORE: 0
NEGVOCALIZATION: 0 - NONE.
CONSOLABILITY: 0
BODYLANGUAGE: 0
BREATHING: 0
NEGVOCALIZATION: 0 - NONE.
BREATHING: 0
CONSOLABILITY: 0
FACIALEXPRESSION: 0
BODYLANGUAGE: 0
NEGVOCALIZATION: 0
CONSOLABILITY: 0 - NO NEED TO CONSOLE.
CONSOLABILITY: 0 - NO NEED TO CONSOLE.
TOTALSCORE: 0
NEGVOCALIZATION: 0 - NONE.
CONSOLABILITY: 0
FACIALEXPRESSION: 0 - SMILING OR INEXPRESSIVE.
BODYLANGUAGE: 0

## 2024-07-15 ENCOUNTER — HOME CARE VISIT (OUTPATIENT)
Dept: HOME HEALTH SERVICES | Facility: HOME HEALTH | Age: 69
End: 2024-07-15
Payer: COMMERCIAL

## 2024-07-15 VITALS
OXYGEN SATURATION: 94 % | DIASTOLIC BLOOD PRESSURE: 50 MMHG | RESPIRATION RATE: 18 BRPM | SYSTOLIC BLOOD PRESSURE: 100 MMHG | TEMPERATURE: 97.4 F | HEART RATE: 98 BPM

## 2024-07-15 PROCEDURE — G0300 HHS/HOSPICE OF LPN EA 15 MIN: HCPCS | Mod: HHH

## 2024-07-16 ASSESSMENT — PAIN SCALES - PAIN ASSESSMENT IN ADVANCED DEMENTIA (PAINAD)
BODYLANGUAGE: 0
NEGVOCALIZATION: 0 - NONE.
CONSOLABILITY: 0 - NO NEED TO CONSOLE.
BREATHING: 0
NEGVOCALIZATION: 0
FACIALEXPRESSION: 0
FACIALEXPRESSION: 0 - SMILING OR INEXPRESSIVE.
BODYLANGUAGE: 0 - RELAXED.
TOTALSCORE: 0
CONSOLABILITY: 0

## 2024-07-16 ASSESSMENT — ENCOUNTER SYMPTOMS
DENIES PAIN: 1
APPETITE LEVEL: GOOD
PERSON REPORTING PAIN: PATIENT
OCCASIONAL FEELINGS OF UNSTEADINESS: 0
CHANGE IN APPETITE: UNCHANGED
BLURRED VISION: 1

## 2024-07-17 ENCOUNTER — HOME CARE VISIT (OUTPATIENT)
Dept: HOME HEALTH SERVICES | Facility: HOME HEALTH | Age: 69
End: 2024-07-17
Payer: COMMERCIAL

## 2024-07-17 PROCEDURE — G0300 HHS/HOSPICE OF LPN EA 15 MIN: HCPCS | Mod: HHH

## 2024-07-18 ENCOUNTER — OFFICE VISIT (OUTPATIENT)
Dept: WOUND CARE | Facility: CLINIC | Age: 69
End: 2024-07-18
Payer: COMMERCIAL

## 2024-07-18 ENCOUNTER — APPOINTMENT (OUTPATIENT)
Dept: CARDIOLOGY | Facility: CLINIC | Age: 69
End: 2024-07-18
Payer: COMMERCIAL

## 2024-07-18 VITALS
WEIGHT: 150.9 LBS | DIASTOLIC BLOOD PRESSURE: 60 MMHG | BODY MASS INDEX: 32.65 KG/M2 | HEART RATE: 68 BPM | SYSTOLIC BLOOD PRESSURE: 138 MMHG

## 2024-07-18 DIAGNOSIS — I82.4Y1 ACUTE DEEP VEIN THROMBOSIS (DVT) OF PROXIMAL VEIN OF RIGHT LOWER EXTREMITY (MULTI): ICD-10-CM

## 2024-07-18 DIAGNOSIS — I42.9 CARDIOMYOPATHY, UNSPECIFIED TYPE (MULTI): ICD-10-CM

## 2024-07-18 DIAGNOSIS — I10 ESSENTIAL HYPERTENSION: ICD-10-CM

## 2024-07-18 DIAGNOSIS — Z86.711 HISTORY OF PULMONARY EMBOLISM: ICD-10-CM

## 2024-07-18 DIAGNOSIS — F17.200 CURRENT EVERY DAY SMOKER: ICD-10-CM

## 2024-07-18 DIAGNOSIS — Z95.828 PRESENCE OF IVC FILTER: ICD-10-CM

## 2024-07-18 DIAGNOSIS — I25.10 CAD IN NATIVE ARTERY: ICD-10-CM

## 2024-07-18 PROCEDURE — 3075F SYST BP GE 130 - 139MM HG: CPT | Performed by: INTERNAL MEDICINE

## 2024-07-18 PROCEDURE — 11043 DBRDMT MUSC&/FSCA 1ST 20/<: CPT

## 2024-07-18 PROCEDURE — 11043 DBRDMT MUSC&/FSCA 1ST 20/<: CPT | Performed by: PLASTIC SURGERY

## 2024-07-18 PROCEDURE — 1159F MED LIST DOCD IN RCRD: CPT | Performed by: INTERNAL MEDICINE

## 2024-07-18 PROCEDURE — 99214 OFFICE O/P EST MOD 30 MIN: CPT | Performed by: INTERNAL MEDICINE

## 2024-07-18 PROCEDURE — 1123F ACP DISCUSS/DSCN MKR DOCD: CPT | Performed by: INTERNAL MEDICINE

## 2024-07-18 PROCEDURE — 3078F DIAST BP <80 MM HG: CPT | Performed by: INTERNAL MEDICINE

## 2024-07-18 PROCEDURE — 1036F TOBACCO NON-USER: CPT | Performed by: INTERNAL MEDICINE

## 2024-07-18 RX ORDER — SIMVASTATIN 40 MG/1
40 TABLET, FILM COATED ORAL NIGHTLY
COMMUNITY
Start: 2024-04-12

## 2024-07-18 RX ORDER — CLOTRIMAZOLE AND BETAMETHASONE DIPROPIONATE 10; .64 MG/G; MG/G
CREAM TOPICAL
COMMUNITY
Start: 2024-05-10

## 2024-07-18 RX ORDER — GENTAMICIN SULFATE 1 MG/G
CREAM TOPICAL
COMMUNITY
Start: 2024-07-10

## 2024-07-18 NOTE — PROGRESS NOTES
Referred by Dr. Terry ref. provider found provider found for   Chief Complaint   Patient presents with    Follow-up     3 month follow up        History of Present Illness  Tara Tiereny is a 68 y.o. year old female patient is here for follow-up.  She does have IVC filter in place but she also has chronic infection in the back and she does have drainage.  The wound is being cared for by a plastic surgeon.  From a cardiac standpoint told the patient we need to take the filter out once she is stable.  She will continue her wound management for now.  I will see her back in 3 months to decide about the time of removing the IVC filter    Past Medical History  Past Medical History:   Diagnosis Date    Arthritis     Back pain     COPD (chronic obstructive pulmonary disease) (Multi)     COVID-19 vaccine administered     Eczema     History of COVID-19     2020    Hyperlipidemia     Hypertension     Hypoesthesia of skin     Hypesthesia    Macular degeneration     Meniere's disease     Osteoporosis     Overactive bladder     Pain in unspecified finger(s)     Finger pain    Pain in unspecified wrist     Pain in wrist joint    Peripheral vascular disease (CMS-HCC)     Personal history of other diseases of the circulatory system     History of coronary artery disease    Personal history of other diseases of the musculoskeletal system and connective tissue     History of arthritis    Personal history of other specified conditions     History of chest pain    Personal history of other specified conditions     History of balance disorder    Personal history of other specified conditions     History of numbness    Postmenopausal     Seasonal allergies     Unspecified visual loss     Vision problems       Social History  Social History     Tobacco Use    Smoking status: Former     Current packs/day: 0.00     Average packs/day: 0.5 packs/day for 45.0 years (22.5 ttl pk-yrs)     Types: Cigarettes     Start date: 9/1/1978     Quit  date: 2023     Years since quittin.8    Smokeless tobacco: Never   Vaping Use    Vaping status: Never Used   Substance Use Topics    Alcohol use: Never    Drug use: Never       Family History     Family History   Problem Relation Name Age of Onset    Breast cancer Mother      Other (arteriosclerotic cardiovascular disease) Father      Cancer Father         Review of Systems  As per HPI, all other systems reviewed and negative.    Allergies:  Allergies   Allergen Reactions    Neomycin Other     swelling, redness, burning post eye surgery    Neomycin-Polymyxin B-Dexameth Other and Rash     blisters, eye swelling, burning        Outpatient Medications:  Current Outpatient Medications   Medication Instructions    albuterol (ProAir HFA) 90 mcg/actuation inhaler 2 puffs, inhalation, Every 4 hours PRN    alteplase (CATHFLO ACTIVASE) 2 mg, intra-catheter, As needed    apixaban (ELIQUIS) 10 mg, oral, 2 times daily    apixaban (ELIQUIS) 5 mg, oral, 2 times daily    aspirin 81 mg EC tablet 1 tablet, oral, Daily    atenolol (Tenormin) 50 mg tablet 1 tablet, oral, Every morning    baclofen (Lioresal) 10 mg tablet Take half a tab 4 times daily as needed for muscle spasms for up to 10 days    brimonidine (AlphaGAN) 0.2 % ophthalmic solution 1 drop, Both Eyes, 2 times daily    busPIRone (Buspar) 10 mg tablet 1 tablet, oral, Daily    cholecalciferol (Vitamin D-3) 50 mcg (2,000 unit) capsule 1 capsule, oral, Daily    clotrimazole-betamethasone (Lotrisone) cream prn    cyclobenzaprine (FLEXERIL) 5 mg, oral, 3 times daily PRN    ezetimibe (Zetia) 10 mg tablet 1 tablet, oral, Daily    furosemide (Lasix) 20 mg tablet 1 tablet, oral, Daily    gentamicin (Garamycin) 0.1 % cream As directed    hydrALAZINE (Apresoline) 50 mg tablet 1 tablet, oral, 2 times daily    HYDROmorphone (DILAUDID) 2 mg, oral, Every 4 hours PRN    ibandronate (Boniva) 150 mg tablet 1 tablet, oral, Every 30 days    ipratropium (Atrovent HFA) 17 mcg/actuation  inhaler 2 puffs, inhalation, 2 times daily RT    isosorbide mononitrate ER (IMDUR) 90 mg, oral, Daily    loratadine (Claritin) 10 mg tablet 1 tablet, oral, Daily    meclizine (Antivert) 25 mg tablet 1 tablet, oral, Every 8 hours PRN    mirtazapine (Remeron) 15 mg tablet 1 tablet, oral, Nightly    morphine CR (MS Contin) 15 mg 12 hr tablet 1 tablet, oral, Daily, Do not crush, chew, or split.    nitroglycerin (Nitrostat) 0.4 mg SL tablet 1 tablet, sublingual, Every 5 min PRN,  PLACE 1 TABLET UNDER THE TONGUE EVERY 5 MINUTES UP TO 3 DOSES AS NEEDED FOR CHEST PAIN.<BR>    oxyCODONE-acetaminophen (Percocet) 5-325 mg tablet 1 tablet, oral, Every 6 hours PRN    pantoprazole (ProtoNix) 40 mg EC tablet 1 tablet, oral, Daily before breakfast, Do not crush, chew, or split.    pilocarpine (Salagen, pilocarpine,) 5 mg tablet 1 tablet, oral, Daily RT    potassium chloride ER (Micro-K) 10 mEq ER capsule 10 mEq, oral, Daily    pregabalin (Lyrica) 75 mg capsule 1 capsule, oral, 2 times daily    simvastatin (ZOCOR) 40 mg, oral, Nightly    sterile water (Sterile Water for Injection) injection 10 mL, intravenous, As needed    sulfamethoxazole-trimethoprim (Bactrim DS) 800-160 mg tablet 1 tablet, oral, 2 times daily    tiZANidine (ZANAFLEX) 4 mg, oral, Daily    triamterene-hydrochlorothiazid (Dyazide) 37.5-25 mg capsule 1 capsule, oral, Every morning    triamterene-hydrochlorothiazid (Dyazide) 37.5-25 mg capsule 1 capsule, oral, Daily    vit C,V-Se-whijk-lutein-zeaxan (PreserVision AREDS-2) 250-90-40-1 mg capsule 1 capsule, oral, 2 times daily         Vitals:  Vitals:    07/18/24 0830   BP: 138/60   Pulse: 68       Physical Exam:  Physical Exam  Vitals and nursing note reviewed.   Constitutional:       Appearance: Normal appearance.   HENT:      Head: Normocephalic.   Eyes:      Pupils: Pupils are equal, round, and reactive to light.   Cardiovascular:      Rate and Rhythm: Normal rate and regular rhythm.      Pulses: Normal pulses.       Heart sounds: Normal heart sounds.   Pulmonary:      Effort: Pulmonary effort is normal.      Breath sounds: Normal breath sounds.   Musculoskeletal:         General: Normal range of motion.      Cervical back: Normal range of motion.   Skin:     General: Skin is dry.   Neurological:      General: No focal deficit present.      Mental Status: She is alert and oriented to person, place, and time.   Psychiatric:         Behavior: Behavior is cooperative.             Assessment/Plan   Diagnoses and all orders for this visit:  CAD in native artery  Cardiomyopathy, unspecified type (Multi)  Essential hypertension  Presence of IVC filter  Acute deep vein thrombosis (DVT) of proximal vein of right lower extremity (Multi)  History of pulmonary embolism  BMI 32.0-32.9,adult  Current every day smoker          Hugo Barron MD [facility]  Interventional Cardiology   of [facility]     Thank you for allowing me to participate in the care of this patient. Please do not hesitate to contact me with any further questions or concerns.

## 2024-07-18 NOTE — PATIENT INSTRUCTIONS
Follow up office visit in 3 months.  At that time will discuss scheduling for removal of IVC filter that was placed 1/2/24  Continue same medications/treatment.  Patient educated on proper medication use.  Patient educated on risk factor modification.  Please bring any lab results from other providers / physicians to your next appointment.    Please bring all medicines, vitamins and herbal supplements with you when you come to the office.    Prescriptions will not be filled unless you are compliant with your follow up appointments or have a follow up  appointment scheduled as per instruction of your physician.  Refills should be requested at the time of  Your visit.    Renee ESPINAL LPN, am scribing for and in the presence of  Dr. Hugo Barron MD, FACC

## 2024-07-19 ENCOUNTER — HOME CARE VISIT (OUTPATIENT)
Dept: HOME HEALTH SERVICES | Facility: HOME HEALTH | Age: 69
End: 2024-07-19
Payer: COMMERCIAL

## 2024-07-19 VITALS
OXYGEN SATURATION: 96 % | HEART RATE: 80 BPM | TEMPERATURE: 97.6 F | RESPIRATION RATE: 18 BRPM | DIASTOLIC BLOOD PRESSURE: 60 MMHG | SYSTOLIC BLOOD PRESSURE: 118 MMHG

## 2024-07-19 PROCEDURE — G0300 HHS/HOSPICE OF LPN EA 15 MIN: HCPCS | Mod: HHH

## 2024-07-22 ENCOUNTER — HOME CARE VISIT (OUTPATIENT)
Dept: HOME HEALTH SERVICES | Facility: HOME HEALTH | Age: 69
End: 2024-07-22
Payer: COMMERCIAL

## 2024-07-22 VITALS
TEMPERATURE: 97.4 F | DIASTOLIC BLOOD PRESSURE: 60 MMHG | HEART RATE: 80 BPM | RESPIRATION RATE: 18 BRPM | SYSTOLIC BLOOD PRESSURE: 108 MMHG | OXYGEN SATURATION: 96 %

## 2024-07-22 PROCEDURE — G0300 HHS/HOSPICE OF LPN EA 15 MIN: HCPCS | Mod: HHH

## 2024-07-22 ASSESSMENT — ENCOUNTER SYMPTOMS
PERSON REPORTING PAIN: PATIENT
CHANGE IN APPETITE: UNCHANGED
APPETITE LEVEL: GOOD
DENIES PAIN: 1
OCCASIONAL FEELINGS OF UNSTEADINESS: 0

## 2024-07-22 ASSESSMENT — PAIN SCALES - PAIN ASSESSMENT IN ADVANCED DEMENTIA (PAINAD)
CONSOLABILITY: 0 - NO NEED TO CONSOLE.
NEGVOCALIZATION: 0 - NONE.
BREATHING: 0
TOTALSCORE: 0
FACIALEXPRESSION: 0
BODYLANGUAGE: 0 - RELAXED.
FACIALEXPRESSION: 0 - SMILING OR INEXPRESSIVE.
CONSOLABILITY: 0
BODYLANGUAGE: 0
NEGVOCALIZATION: 0

## 2024-07-23 ASSESSMENT — PAIN SCALES - PAIN ASSESSMENT IN ADVANCED DEMENTIA (PAINAD)
FACIALEXPRESSION: 0 - SMILING OR INEXPRESSIVE.
NEGVOCALIZATION: 0 - NONE.
FACIALEXPRESSION: 0
BODYLANGUAGE: 0
TOTALSCORE: 0
CONSOLABILITY: 0 - NO NEED TO CONSOLE.
BREATHING: 0
CONSOLABILITY: 0
NEGVOCALIZATION: 0
BODYLANGUAGE: 0 - RELAXED.

## 2024-07-23 ASSESSMENT — ENCOUNTER SYMPTOMS
OCCASIONAL FEELINGS OF UNSTEADINESS: 0
PERSON REPORTING PAIN: PATIENT
BLURRED VISION: 1
APPETITE LEVEL: GOOD
DENIES PAIN: 1
CHANGE IN APPETITE: UNCHANGED

## 2024-07-24 ENCOUNTER — HOME CARE VISIT (OUTPATIENT)
Dept: HOME HEALTH SERVICES | Facility: HOME HEALTH | Age: 69
End: 2024-07-24
Payer: COMMERCIAL

## 2024-07-24 VITALS
DIASTOLIC BLOOD PRESSURE: 60 MMHG | RESPIRATION RATE: 18 BRPM | TEMPERATURE: 97.3 F | SYSTOLIC BLOOD PRESSURE: 90 MMHG | HEART RATE: 76 BPM | OXYGEN SATURATION: 97 %

## 2024-07-24 PROCEDURE — G0300 HHS/HOSPICE OF LPN EA 15 MIN: HCPCS | Mod: HHH

## 2024-07-25 ENCOUNTER — OFFICE VISIT (OUTPATIENT)
Dept: WOUND CARE | Facility: CLINIC | Age: 69
End: 2024-07-25
Payer: COMMERCIAL

## 2024-07-25 PROCEDURE — 11043 DBRDMT MUSC&/FSCA 1ST 20/<: CPT | Performed by: PLASTIC SURGERY

## 2024-07-25 PROCEDURE — 11043 DBRDMT MUSC&/FSCA 1ST 20/<: CPT

## 2024-07-25 ASSESSMENT — ENCOUNTER SYMPTOMS
PERSON REPORTING PAIN: PATIENT
APPETITE LEVEL: GOOD
OCCASIONAL FEELINGS OF UNSTEADINESS: 0
DENIES PAIN: 1
CHANGE IN APPETITE: UNCHANGED
BLURRED VISION: 1

## 2024-07-25 ASSESSMENT — PAIN SCALES - PAIN ASSESSMENT IN ADVANCED DEMENTIA (PAINAD)
TOTALSCORE: 0
FACIALEXPRESSION: 0 - SMILING OR INEXPRESSIVE.
BODYLANGUAGE: 0 - RELAXED.
CONSOLABILITY: 0 - NO NEED TO CONSOLE.
BREATHING: 0
NEGVOCALIZATION: 0 - NONE.
CONSOLABILITY: 0
BODYLANGUAGE: 0
NEGVOCALIZATION: 0
FACIALEXPRESSION: 0

## 2024-07-26 ENCOUNTER — HOME CARE VISIT (OUTPATIENT)
Dept: HOME HEALTH SERVICES | Facility: HOME HEALTH | Age: 69
End: 2024-07-26
Payer: COMMERCIAL

## 2024-07-26 VITALS
SYSTOLIC BLOOD PRESSURE: 125 MMHG | RESPIRATION RATE: 18 BRPM | HEART RATE: 94 BPM | DIASTOLIC BLOOD PRESSURE: 72 MMHG | TEMPERATURE: 97.3 F | OXYGEN SATURATION: 95 %

## 2024-07-26 PROCEDURE — G0300 HHS/HOSPICE OF LPN EA 15 MIN: HCPCS | Mod: HHH

## 2024-07-27 ASSESSMENT — PAIN SCALES - PAIN ASSESSMENT IN ADVANCED DEMENTIA (PAINAD)
FACIALEXPRESSION: 0
BODYLANGUAGE: 0 - RELAXED.
CONSOLABILITY: 0 - NO NEED TO CONSOLE.
FACIALEXPRESSION: 0 - SMILING OR INEXPRESSIVE.
TOTALSCORE: 0
NEGVOCALIZATION: 0
BREATHING: 0
CONSOLABILITY: 0
NEGVOCALIZATION: 0 - NONE.
BODYLANGUAGE: 0

## 2024-07-27 ASSESSMENT — ENCOUNTER SYMPTOMS
PERSON REPORTING PAIN: PATIENT
DENIES PAIN: 1

## 2024-07-29 ENCOUNTER — HOME CARE VISIT (OUTPATIENT)
Dept: HOME HEALTH SERVICES | Facility: HOME HEALTH | Age: 69
End: 2024-07-29
Payer: COMMERCIAL

## 2024-07-29 ENCOUNTER — HOSPITAL ENCOUNTER (EMERGENCY)
Facility: HOSPITAL | Age: 69
Discharge: HOME | End: 2024-07-29
Payer: COMMERCIAL

## 2024-07-29 VITALS
TEMPERATURE: 97.5 F | WEIGHT: 147 LBS | DIASTOLIC BLOOD PRESSURE: 72 MMHG | RESPIRATION RATE: 16 BRPM | HEIGHT: 57 IN | SYSTOLIC BLOOD PRESSURE: 148 MMHG | OXYGEN SATURATION: 98 % | HEART RATE: 77 BPM | BODY MASS INDEX: 31.71 KG/M2

## 2024-07-29 VITALS
TEMPERATURE: 98 F | HEART RATE: 76 BPM | SYSTOLIC BLOOD PRESSURE: 122 MMHG | DIASTOLIC BLOOD PRESSURE: 54 MMHG | OXYGEN SATURATION: 97 % | RESPIRATION RATE: 18 BRPM

## 2024-07-29 DIAGNOSIS — Z51.89 VISIT FOR WOUND CHECK: Primary | ICD-10-CM

## 2024-07-29 PROCEDURE — 99281 EMR DPT VST MAYX REQ PHY/QHP: CPT

## 2024-07-29 PROCEDURE — G0300 HHS/HOSPICE OF LPN EA 15 MIN: HCPCS | Mod: HHH

## 2024-07-29 ASSESSMENT — ENCOUNTER SYMPTOMS
BLURRED VISION: 1
PERSON REPORTING PAIN: PATIENT
OCCASIONAL FEELINGS OF UNSTEADINESS: 0
DENIES PAIN: 1

## 2024-07-29 ASSESSMENT — PAIN SCALES - PAIN ASSESSMENT IN ADVANCED DEMENTIA (PAINAD)
BODYLANGUAGE: 0
FACIALEXPRESSION: 0 - SMILING OR INEXPRESSIVE.
FACIALEXPRESSION: 0
NEGVOCALIZATION: 0 - NONE.
CONSOLABILITY: 0 - NO NEED TO CONSOLE.
CONSOLABILITY: 0
BODYLANGUAGE: 0 - RELAXED.
BREATHING: 0
NEGVOCALIZATION: 0
TOTALSCORE: 0

## 2024-07-29 ASSESSMENT — LIFESTYLE VARIABLES
TOTAL SCORE: 0
HAVE PEOPLE ANNOYED YOU BY CRITICIZING YOUR DRINKING: NO
HAVE YOU EVER FELT YOU SHOULD CUT DOWN ON YOUR DRINKING: NO
EVER HAD A DRINK FIRST THING IN THE MORNING TO STEADY YOUR NERVES TO GET RID OF A HANGOVER: NO
EVER FELT BAD OR GUILTY ABOUT YOUR DRINKING: NO

## 2024-07-29 ASSESSMENT — PAIN SCALES - GENERAL: PAINLEVEL_OUTOF10: 0 - NO PAIN

## 2024-07-29 ASSESSMENT — PAIN - FUNCTIONAL ASSESSMENT: PAIN_FUNCTIONAL_ASSESSMENT: 0-10

## 2024-07-29 NOTE — ED PROVIDER NOTES
HPI   Chief Complaint   Patient presents with    Other     Wound Vac Issue       History provided by: Patient    Limitations to history: None    CC: Wound check    HPI: 68-year-old female with a history of osteoarthritis, COPD, hypertension, hyperlipidemia, osteoporosis, Ménière's disease presents the emergency department to be evaluated for wound check.  Patient has had a chronic wound VAC on her lower back for years due to multiple infections after she has had multiple low back surgeries.  She is on chronic antibiotics for this.  She states that about an hour prior to arrival the wound VAC came off while she was sleeping.  She states otherwise she is doing well.  At this time she does not want the wound VAC itself replaced or the wound to be repacked, her wound care nurse is coming out to see her in the morning and would feel more comfortable with her doing this.  She just wants a wet-to-dry dressing applied on the area to prevent infection denies fever and chills.  Denies increased pain, redness warmth or swelling at the wound site.  Denies back pain.  Denies numbness tingling or weakness in the extremities.  Denies saddle anesthesia, urinary tension, stool incontinence.    ROS: Negative unless mentioned in HPI    Social Hx: Denies tobacco, alcohol, drug use.  Former smoker.    Medical Hx: Allergy to neomycin.  Immunizations up-to-date.    Physical exam:    Constitutional: Patient is well-nourished and well-developed.  Sitting comfortably in the room and in no distress.  Oriented to person, place, time, and situation.    HEENT: Head is normocephalic, atraumatic. Patient's airway is patent.  Tympanic membranes are clear bilaterally.  Nasal mucosa clear.  Mouth with normal mucosa.  Throat is not erythematous and there are no oropharyngeal exudates, uvula is midline.  No obvious facial deformities.    Eyes: Clear bilaterally.  Pupils are equal round and reactive to light and accommodation.  Extraocular movements  intact.      Cardiac: Regular rate, regular rhythm.  Heart sounds S1, S2.  No murmurs, rubs, or gallops.  PMI nondisplaced.  No JVD.    Respiratory: Regular respiratory rate and effort.  Breath sounds are clear and equal bilaterally, no adventitious lung sounds.  Patient is speaking in full sentences and is in no apparent respiratory distress. No use of accessory muscles.      Gastrointestinal: Abdomen is soft, nondistended, and nontender.  There are no obvious deformities.  No rebound tenderness or guarding.  Bowel sounds are normal active.    Genitourinary: No CVA or flank tenderness.    Musculoskeletal: No reproducible tenderness.  The patient's dressing and sponge are dry.  The dressing was removed to clean the area.  Wound site reveals no sign of infection.  There is no redness warmth swelling assistance or discharge.  No areas of fluctuance.  No foul smell.  The patient's sponge packing within the wound is dry, she states that she is scheduled to have this repacked in the morning.  Remaining wound VAC appears to be functioning properly.  Patient has equal range of motion in all extremities and no strength deficiencies.  No muscle or joint tenderness. No back or neck tenderness.  Capillary refill less than 3 seconds.  Strong peripheral pulses.  No sensory deficits.    Neurological: Patient is alert and oriented.  No focal deficits.  5/5 strength in all extremities.  Cranial nerves II through XII intact. GCS15.     Skin: Skin is normal color for race and is warm, dry, and intact.  No evidence of trauma.  No lesions, rashes, bruising, jaundice, or masses.    Psych: Appropriate mood and affect.  No apparent risk to self or others.    Heme/lymph: No adenopathy, lymphadenopathy, or splenomegaly    Physical exam is otherwise negative unless stated above or in history of present illness.                  Patient History   Past Medical History:   Diagnosis Date    Arthritis     Back pain     COPD (chronic obstructive  pulmonary disease) (Multi)     COVID-19 vaccine administered     Eczema     History of COVID-19     2020    Hyperlipidemia     Hypertension     Hypoesthesia of skin     Hypesthesia    Macular degeneration     Meniere's disease     Osteoporosis     Overactive bladder     Pain in unspecified finger(s)     Finger pain    Pain in unspecified wrist     Pain in wrist joint    Peripheral vascular disease (CMS-HCC)     Personal history of other diseases of the circulatory system     History of coronary artery disease    Personal history of other diseases of the musculoskeletal system and connective tissue     History of arthritis    Personal history of other specified conditions     History of chest pain    Personal history of other specified conditions     History of balance disorder    Personal history of other specified conditions     History of numbness    Postmenopausal     Seasonal allergies     Unspecified visual loss     Vision problems     Past Surgical History:   Procedure Laterality Date    ADENOIDECTOMY      AORTA - BILATERAL FEMORAL ARTERY BYPASS GRAFT      BLEPHAROPTOSIS REPAIR      CARDIAC CATHETERIZATION      with stent and balloon    CARDIAC CATHETERIZATION N/A 1/2/2024    Procedure: IVC Filter Insertion;  Surgeon: Star Negro MD;  Location: [facility];  Service: Cardiovascular;  Laterality: N/A;    CHOLECYSTECTOMY      COLONOSCOPY      CT ANGIO NECK  05/18/2022    CT NECK ANGIO W AND WO IV CONTRAST 5/18/2022 [facility]    CT AORTA AND BILATERAL ILIOFEMORAL RUNOFF ANGIOGRAM W AND/OR WO IV CONTRAST  03/10/2020    CT AORTA AND BILATERAL ILIOFEMORAL RUNOFF ANGIOGRAM W AND/OR WO IV CONTRAST 3/10/2020 [facility]    CT AORTA AND BILATERAL ILIOFEMORAL RUNOFF ANGIOGRAM W AND/OR WO IV CONTRAST  07/21/2023    CT AORTA AND BILATERAL ILIOFEMORAL RUNOFF ANGIOGRAM W AND/OR WO IV CONTRAST 7/21/2023 [facility]    FL TRANSLUMINAL ANGIO PERCUTANEOUS BHARAT      TONSILLECTOMY       Tonsillectomy    TOTAL KNEE ARTHROPLASTY Bilateral     TUBAL LIGATION       Family History   Problem Relation Name Age of Onset    Breast cancer Mother      Other (arteriosclerotic cardiovascular disease) Father      Cancer Father       Social History     Tobacco Use    Smoking status: Former     Current packs/day: 0.00     Average packs/day: 0.5 packs/day for 45.0 years (22.5 ttl pk-yrs)     Types: Cigarettes     Start date: 1978     Quit date: 2023     Years since quittin.9    Smokeless tobacco: Never   Vaping Use    Vaping status: Never Used   Substance Use Topics    Alcohol use: Never    Drug use: Never       Physical Exam   ED Triage Vitals   Temp Pulse Resp BP   -- -- -- --      SpO2 Temp src Heart Rate Source Patient Position   -- -- -- --      BP Location FiO2 (%)     -- --       Physical Exam      ED Course & MDM   Diagnoses as of 24 0318   Visit for wound check        Patient updated on plan for lab testing, IV insertion, radiology imaging, and medications to be administered while in the ER (if indicated). Patient updated on expected wait times for testing and results. Patient provided my name and told to ask any staff member for questions or concerns if they should arise. Electronic medical record reviewed.     MDM    Upon initial assessment, patient was healthy non-toxic appearing and in no apparent distress.     Patient presented to the emergency department with the chief complaint wound check. No reproducible tenderness.  The patient's dressing and sponge are dry.  The dressing was removed to clean the area.  Wound site reveals no sign of infection.  There is no redness warmth swelling assistance or discharge.  No areas of fluctuance.  No foul smell.  The patient's sponge packing within the wound is dry, she states that she is scheduled to have this repacked in the morning.  Remaining wound VAC appears to be functioning properly.  Patient has equal range of motion in all extremities  and no strength deficiencies.  No muscle or joint tenderness. No back or neck tenderness.  Capillary refill less than 3 seconds.  Strong peripheral pulses.  No sensory deficits. On arrival to the emergency department, vital signs were within normal limits    Wet-to-dry dressing was applied after the area was cleaned by the nursing staff.  We did offer to provide a new dressing and reattach the wound VAC however patient declined.  She would feel more comfortable if her wound care nurse did this in the morning, she just wants a new wet-to-dry dressing in order to prevent infection.  Patient will follow-up with her PCP.  All questions and concerns addressed.  Reasons to return to ER discussed.  Patient verbalized understanding and agreement with the treatment plan and they remained hemodynamically stable in the ER.    This note was dictated using a speech recognition program.  While an attempt was made at proof-reading to minimize errors, minor errors in transcription may be present               [place] Coma Scale Score: 15                        Medical Decision Making      Procedure  Procedures     Eyal Dominguez PA-C  07/29/24 0319

## 2024-07-31 ENCOUNTER — HOME CARE VISIT (OUTPATIENT)
Dept: HOME HEALTH SERVICES | Facility: HOME HEALTH | Age: 69
End: 2024-07-31
Payer: COMMERCIAL

## 2024-07-31 VITALS
TEMPERATURE: 97.4 F | DIASTOLIC BLOOD PRESSURE: 50 MMHG | HEART RATE: 74 BPM | RESPIRATION RATE: 18 BRPM | SYSTOLIC BLOOD PRESSURE: 122 MMHG | OXYGEN SATURATION: 98 %

## 2024-07-31 PROCEDURE — G0300 HHS/HOSPICE OF LPN EA 15 MIN: HCPCS | Mod: HHH

## 2024-07-31 ASSESSMENT — ENCOUNTER SYMPTOMS
PERSON REPORTING PAIN: PATIENT
OCCASIONAL FEELINGS OF UNSTEADINESS: 0
BLURRED VISION: 1
DENIES PAIN: 1

## 2024-07-31 ASSESSMENT — PAIN SCALES - PAIN ASSESSMENT IN ADVANCED DEMENTIA (PAINAD)
FACIALEXPRESSION: 0
CONSOLABILITY: 0
TOTALSCORE: 0
NEGVOCALIZATION: 0
CONSOLABILITY: 0 - NO NEED TO CONSOLE.
BREATHING: 0
FACIALEXPRESSION: 0 - SMILING OR INEXPRESSIVE.
BODYLANGUAGE: 0
NEGVOCALIZATION: 0 - NONE.
BODYLANGUAGE: 0 - RELAXED.

## 2024-08-01 ENCOUNTER — OFFICE VISIT (OUTPATIENT)
Dept: WOUND CARE | Facility: CLINIC | Age: 69
End: 2024-08-01
Payer: COMMERCIAL

## 2024-08-01 DIAGNOSIS — I25.10 CAD IN NATIVE ARTERY: ICD-10-CM

## 2024-08-01 PROCEDURE — 11043 DBRDMT MUSC&/FSCA 1ST 20/<: CPT | Performed by: PLASTIC SURGERY

## 2024-08-01 PROCEDURE — 11043 DBRDMT MUSC&/FSCA 1ST 20/<: CPT

## 2024-08-02 ENCOUNTER — HOME CARE VISIT (OUTPATIENT)
Dept: HOME HEALTH SERVICES | Facility: HOME HEALTH | Age: 69
End: 2024-08-02
Payer: COMMERCIAL

## 2024-08-02 VITALS
DIASTOLIC BLOOD PRESSURE: 66 MMHG | HEART RATE: 74 BPM | SYSTOLIC BLOOD PRESSURE: 128 MMHG | OXYGEN SATURATION: 95 % | TEMPERATURE: 97.5 F | RESPIRATION RATE: 18 BRPM

## 2024-08-02 PROCEDURE — G0300 HHS/HOSPICE OF LPN EA 15 MIN: HCPCS | Mod: HHH

## 2024-08-02 RX ORDER — ISOSORBIDE MONONITRATE 60 MG/1
TABLET, EXTENDED RELEASE ORAL
Qty: 90 TABLET | Refills: 3 | Status: SHIPPED | OUTPATIENT
Start: 2024-08-02

## 2024-08-02 RX ORDER — ISOSORBIDE MONONITRATE 30 MG/1
30 TABLET, EXTENDED RELEASE ORAL DAILY
Qty: 90 TABLET | Refills: 3 | Status: SHIPPED | OUTPATIENT
Start: 2024-08-02

## 2024-08-02 RX ORDER — ISOSORBIDE MONONITRATE 30 MG/1
30 TABLET, EXTENDED RELEASE ORAL DAILY
COMMUNITY
End: 2024-08-02 | Stop reason: SDUPTHER

## 2024-08-02 NOTE — TELEPHONE ENCOUNTER
Received request for prescription refills for patient.   Patient follows with Dr. Barron    Request is for Imdur  Is patient currently on medication yes, pt takes 90 mg a day and is states this in the sig, only 60 mg is requested, called and spoke to pt she requested and 60 mg and 30 mg to be sent in for refills    Last OV 7/18/2024  Next OV 10/17/2024    Pended for signing and sent to provider

## 2024-08-05 ENCOUNTER — HOME CARE VISIT (OUTPATIENT)
Dept: HOME HEALTH SERVICES | Facility: HOME HEALTH | Age: 69
End: 2024-08-05
Payer: COMMERCIAL

## 2024-08-05 VITALS
TEMPERATURE: 97.4 F | SYSTOLIC BLOOD PRESSURE: 132 MMHG | DIASTOLIC BLOOD PRESSURE: 64 MMHG | OXYGEN SATURATION: 97 % | RESPIRATION RATE: 18 BRPM | HEART RATE: 74 BPM

## 2024-08-05 PROCEDURE — G0300 HHS/HOSPICE OF LPN EA 15 MIN: HCPCS | Mod: HHH

## 2024-08-05 ASSESSMENT — PAIN SCALES - PAIN ASSESSMENT IN ADVANCED DEMENTIA (PAINAD)
NEGVOCALIZATION: 0 - NONE.
CONSOLABILITY: 0
BREATHING: 0
TOTALSCORE: 0
FACIALEXPRESSION: 0 - SMILING OR INEXPRESSIVE.
BODYLANGUAGE: 0 - RELAXED.
FACIALEXPRESSION: 0
BREATHING: 0
NEGVOCALIZATION: 0
CONSOLABILITY: 0
BODYLANGUAGE: 0
FACIALEXPRESSION: 0
CONSOLABILITY: 0 - NO NEED TO CONSOLE.
TOTALSCORE: 0
NEGVOCALIZATION: 0 - NONE.
NEGVOCALIZATION: 0
BODYLANGUAGE: 0
FACIALEXPRESSION: 0 - SMILING OR INEXPRESSIVE.
BODYLANGUAGE: 0 - RELAXED.
CONSOLABILITY: 0 - NO NEED TO CONSOLE.

## 2024-08-05 ASSESSMENT — ENCOUNTER SYMPTOMS
DENIES PAIN: 1
OCCASIONAL FEELINGS OF UNSTEADINESS: 0
DENIES PAIN: 1
PERSON REPORTING PAIN: PATIENT
OCCASIONAL FEELINGS OF UNSTEADINESS: 0
BLURRED VISION: 1
PERSON REPORTING PAIN: PATIENT

## 2024-08-06 ASSESSMENT — PAIN SCALES - PAIN ASSESSMENT IN ADVANCED DEMENTIA (PAINAD)
BODYLANGUAGE: 0 - RELAXED.
NEGVOCALIZATION: 0 - NONE.
FACIALEXPRESSION: 0 - SMILING OR INEXPRESSIVE.
BODYLANGUAGE: 0
BREATHING: 0
FACIALEXPRESSION: 0
CONSOLABILITY: 0 - NO NEED TO CONSOLE.
NEGVOCALIZATION: 0
TOTALSCORE: 0
CONSOLABILITY: 0

## 2024-08-06 ASSESSMENT — ENCOUNTER SYMPTOMS
OCCASIONAL FEELINGS OF UNSTEADINESS: 0
PERSON REPORTING PAIN: PATIENT
BLURRED VISION: 1
DENIES PAIN: 1

## 2024-08-07 ENCOUNTER — HOME CARE VISIT (OUTPATIENT)
Dept: HOME HEALTH SERVICES | Facility: HOME HEALTH | Age: 69
End: 2024-08-07
Payer: COMMERCIAL

## 2024-08-07 VITALS
HEART RATE: 74 BPM | TEMPERATURE: 97.4 F | SYSTOLIC BLOOD PRESSURE: 120 MMHG | OXYGEN SATURATION: 94 % | DIASTOLIC BLOOD PRESSURE: 60 MMHG

## 2024-08-07 PROCEDURE — G0300 HHS/HOSPICE OF LPN EA 15 MIN: HCPCS | Mod: HHH

## 2024-08-07 ASSESSMENT — ENCOUNTER SYMPTOMS
DENIES PAIN: 1
PERSON REPORTING PAIN: PATIENT
OCCASIONAL FEELINGS OF UNSTEADINESS: 0

## 2024-08-07 ASSESSMENT — PAIN SCALES - PAIN ASSESSMENT IN ADVANCED DEMENTIA (PAINAD)
CONSOLABILITY: 0
TOTALSCORE: 0
FACIALEXPRESSION: 0 - SMILING OR INEXPRESSIVE.
BODYLANGUAGE: 0
NEGVOCALIZATION: 0 - NONE.
BREATHING: 0
NEGVOCALIZATION: 0
FACIALEXPRESSION: 0
CONSOLABILITY: 0 - NO NEED TO CONSOLE.
BODYLANGUAGE: 0 - RELAXED.

## 2024-08-08 ENCOUNTER — OFFICE VISIT (OUTPATIENT)
Dept: WOUND CARE | Facility: CLINIC | Age: 69
End: 2024-08-08
Payer: COMMERCIAL

## 2024-08-08 ENCOUNTER — LAB REQUISITION (OUTPATIENT)
Dept: LAB | Facility: HOSPITAL | Age: 69
End: 2024-08-08
Payer: COMMERCIAL

## 2024-08-08 DIAGNOSIS — S31.000A UNSPECIFIED OPEN WOUND OF LOWER BACK AND PELVIS WITHOUT PENETRATION INTO RETROPERITONEUM, INITIAL ENCOUNTER: ICD-10-CM

## 2024-08-08 DIAGNOSIS — F17.210 NICOTINE DEPENDENCE, CIGARETTES, UNCOMPLICATED: ICD-10-CM

## 2024-08-08 DIAGNOSIS — T81.31XA DISRUPTION OF EXTERNAL OPERATION (SURGICAL) WOUND, NOT ELSEWHERE CLASSIFIED, INITIAL ENCOUNTER: ICD-10-CM

## 2024-08-08 DIAGNOSIS — M86.68 OTHER CHRONIC OSTEOMYELITIS, OTHER SITE (MULTI): ICD-10-CM

## 2024-08-08 DIAGNOSIS — L98.493 NON-PRESSURE CHRONIC ULCER OF SKIN OF OTHER SITES WITH NECROSIS OF MUSCLE (MULTI): ICD-10-CM

## 2024-08-08 PROCEDURE — 11042 DBRDMT SUBQ TIS 1ST 20SQCM/<: CPT

## 2024-08-08 PROCEDURE — 87181 SC STD AGAR DILUTION PER AGT: CPT | Mod: OUT,ELYLAB | Performed by: PLASTIC SURGERY

## 2024-08-08 PROCEDURE — 11042 DBRDMT SUBQ TIS 1ST 20SQCM/<: CPT | Performed by: PLASTIC SURGERY

## 2024-08-09 ENCOUNTER — HOME CARE VISIT (OUTPATIENT)
Dept: HOME HEALTH SERVICES | Facility: HOME HEALTH | Age: 69
End: 2024-08-09
Payer: COMMERCIAL

## 2024-08-09 VITALS
OXYGEN SATURATION: 97 % | DIASTOLIC BLOOD PRESSURE: 64 MMHG | SYSTOLIC BLOOD PRESSURE: 124 MMHG | HEART RATE: 78 BPM | TEMPERATURE: 97.6 F | RESPIRATION RATE: 18 BRPM

## 2024-08-09 PROCEDURE — G0300 HHS/HOSPICE OF LPN EA 15 MIN: HCPCS | Mod: HHH

## 2024-08-09 ASSESSMENT — PAIN SCALES - PAIN ASSESSMENT IN ADVANCED DEMENTIA (PAINAD)
TOTALSCORE: 0
CONSOLABILITY: 0
NEGVOCALIZATION: 0 - NONE.
BODYLANGUAGE: 0 - RELAXED.
BODYLANGUAGE: 0
BREATHING: 0
CONSOLABILITY: 0 - NO NEED TO CONSOLE.
FACIALEXPRESSION: 0 - SMILING OR INEXPRESSIVE.
FACIALEXPRESSION: 0
NEGVOCALIZATION: 0

## 2024-08-09 ASSESSMENT — ENCOUNTER SYMPTOMS
OCCASIONAL FEELINGS OF UNSTEADINESS: 0
PERSON REPORTING PAIN: PATIENT
DENIES PAIN: 1

## 2024-08-11 LAB
BACTERIA SPEC CULT: ABNORMAL
GRAM STN SPEC: ABNORMAL
GRAM STN SPEC: ABNORMAL

## 2024-08-12 ENCOUNTER — HOME CARE VISIT (OUTPATIENT)
Dept: HOME HEALTH SERVICES | Facility: HOME HEALTH | Age: 69
End: 2024-08-12
Payer: COMMERCIAL

## 2024-08-12 VITALS
HEART RATE: 74 BPM | OXYGEN SATURATION: 97 % | DIASTOLIC BLOOD PRESSURE: 52 MMHG | RESPIRATION RATE: 18 BRPM | TEMPERATURE: 98.2 F | SYSTOLIC BLOOD PRESSURE: 102 MMHG

## 2024-08-12 PROCEDURE — G0300 HHS/HOSPICE OF LPN EA 15 MIN: HCPCS | Mod: HHH

## 2024-08-12 ASSESSMENT — PAIN SCALES - PAIN ASSESSMENT IN ADVANCED DEMENTIA (PAINAD)
NEGVOCALIZATION: 0 - NONE.
NEGVOCALIZATION: 0
CONSOLABILITY: 0
TOTALSCORE: 0
BREATHING: 0
CONSOLABILITY: 0 - NO NEED TO CONSOLE.
BODYLANGUAGE: 0
FACIALEXPRESSION: 0 - SMILING OR INEXPRESSIVE.
BODYLANGUAGE: 0 - RELAXED.
FACIALEXPRESSION: 0

## 2024-08-12 ASSESSMENT — ENCOUNTER SYMPTOMS
PERSON REPORTING PAIN: PATIENT
OCCASIONAL FEELINGS OF UNSTEADINESS: 0
APPETITE LEVEL: GOOD
DENIES PAIN: 1
BLURRED VISION: 1

## 2024-08-14 ENCOUNTER — HOME CARE VISIT (OUTPATIENT)
Dept: HOME HEALTH SERVICES | Facility: HOME HEALTH | Age: 69
End: 2024-08-14
Payer: COMMERCIAL

## 2024-08-14 VITALS
TEMPERATURE: 97.3 F | DIASTOLIC BLOOD PRESSURE: 52 MMHG | HEART RATE: 70 BPM | SYSTOLIC BLOOD PRESSURE: 110 MMHG | OXYGEN SATURATION: 97 % | RESPIRATION RATE: 18 BRPM

## 2024-08-14 PROCEDURE — G0300 HHS/HOSPICE OF LPN EA 15 MIN: HCPCS | Mod: HHH

## 2024-08-14 ASSESSMENT — PAIN SCALES - PAIN ASSESSMENT IN ADVANCED DEMENTIA (PAINAD)
FACIALEXPRESSION: 0
BODYLANGUAGE: 0 - RELAXED.
NEGVOCALIZATION: 0
FACIALEXPRESSION: 0 - SMILING OR INEXPRESSIVE.
CONSOLABILITY: 0
CONSOLABILITY: 0 - NO NEED TO CONSOLE.
NEGVOCALIZATION: 0 - NONE.
TOTALSCORE: 0
BODYLANGUAGE: 0
BREATHING: 0

## 2024-08-14 ASSESSMENT — ENCOUNTER SYMPTOMS
CHANGE IN APPETITE: UNCHANGED
DENIES PAIN: 1
BLURRED VISION: 1
OCCASIONAL FEELINGS OF UNSTEADINESS: 0
APPETITE LEVEL: GOOD
PERSON REPORTING PAIN: PATIENT

## 2024-08-15 ENCOUNTER — OFFICE VISIT (OUTPATIENT)
Dept: WOUND CARE | Facility: CLINIC | Age: 69
End: 2024-08-15
Payer: COMMERCIAL

## 2024-08-15 PROCEDURE — 11043 DBRDMT MUSC&/FSCA 1ST 20/<: CPT

## 2024-08-15 PROCEDURE — 11043 DBRDMT MUSC&/FSCA 1ST 20/<: CPT | Performed by: PLASTIC SURGERY

## 2024-08-16 ENCOUNTER — HOME CARE VISIT (OUTPATIENT)
Dept: HOME HEALTH SERVICES | Facility: HOME HEALTH | Age: 69
End: 2024-08-16
Payer: COMMERCIAL

## 2024-08-16 VITALS
RESPIRATION RATE: 18 BRPM | OXYGEN SATURATION: 94 % | DIASTOLIC BLOOD PRESSURE: 70 MMHG | TEMPERATURE: 97.2 F | SYSTOLIC BLOOD PRESSURE: 136 MMHG | HEART RATE: 84 BPM

## 2024-08-16 PROCEDURE — G0300 HHS/HOSPICE OF LPN EA 15 MIN: HCPCS | Mod: HHH

## 2024-08-16 NOTE — HOME HEALTH
Wound care provided per orders. Tolerated well. VSS. Denies pain. No concerns. Wound almost healed. No concerns.

## 2024-08-19 ENCOUNTER — HOME CARE VISIT (OUTPATIENT)
Dept: HOME HEALTH SERVICES | Facility: HOME HEALTH | Age: 69
End: 2024-08-19
Payer: COMMERCIAL

## 2024-08-19 VITALS
SYSTOLIC BLOOD PRESSURE: 102 MMHG | DIASTOLIC BLOOD PRESSURE: 52 MMHG | RESPIRATION RATE: 18 BRPM | TEMPERATURE: 97.6 F | OXYGEN SATURATION: 95 % | HEART RATE: 76 BPM

## 2024-08-19 PROCEDURE — G0300 HHS/HOSPICE OF LPN EA 15 MIN: HCPCS | Mod: HHH

## 2024-08-20 ENCOUNTER — TELEPHONE (OUTPATIENT)
Dept: INFECTIOUS DISEASES | Age: 69
End: 2024-08-20

## 2024-08-20 RX ORDER — SULFAMETHOXAZOLE AND TRIMETHOPRIM 800; 160 MG/1; MG/1
1 TABLET ORAL 2 TIMES DAILY
Qty: 60 TABLET | Refills: 2 | Status: SHIPPED | OUTPATIENT
Start: 2024-08-20

## 2024-08-20 ASSESSMENT — PAIN SCALES - PAIN ASSESSMENT IN ADVANCED DEMENTIA (PAINAD)
BODYLANGUAGE: 0 - RELAXED.
CONSOLABILITY: 0
NEGVOCALIZATION: 0
FACIALEXPRESSION: 0
CONSOLABILITY: 0 - NO NEED TO CONSOLE.
NEGVOCALIZATION: 0 - NONE.
FACIALEXPRESSION: 0 - SMILING OR INEXPRESSIVE.
BODYLANGUAGE: 0
TOTALSCORE: 0
BREATHING: 0

## 2024-08-20 NOTE — TELEPHONE ENCOUNTER
Called pt to inform her that per Dr. Mcmanus's request, her scheduled appointment moved up to a different date.    Original appointment scheduled for 11/21/24 @ 0930 with Dr. Mcmanus.  Per Dr. Mcmanus; \"Change to 2 months and schedule with me.\" Scheduled appointment for 10/21/24 @ 0930 with Dr. Mcmanus.    Called pt, no answer, vm left.  Instructed pt to call office if new scheduled appointment does not work for her.

## 2024-08-21 ENCOUNTER — HOME CARE VISIT (OUTPATIENT)
Dept: HOME HEALTH SERVICES | Facility: HOME HEALTH | Age: 69
End: 2024-08-21
Payer: COMMERCIAL

## 2024-08-21 VITALS
HEART RATE: 90 BPM | RESPIRATION RATE: 18 BRPM | TEMPERATURE: 97.2 F | OXYGEN SATURATION: 93 % | SYSTOLIC BLOOD PRESSURE: 90 MMHG | DIASTOLIC BLOOD PRESSURE: 50 MMHG

## 2024-08-21 PROCEDURE — G0300 HHS/HOSPICE OF LPN EA 15 MIN: HCPCS | Mod: HHH

## 2024-08-22 ENCOUNTER — OFFICE VISIT (OUTPATIENT)
Dept: WOUND CARE | Facility: CLINIC | Age: 69
End: 2024-08-22
Payer: COMMERCIAL

## 2024-08-22 PROCEDURE — 11043 DBRDMT MUSC&/FSCA 1ST 20/<: CPT | Performed by: PLASTIC SURGERY

## 2024-08-22 PROCEDURE — 11043 DBRDMT MUSC&/FSCA 1ST 20/<: CPT

## 2024-08-22 ASSESSMENT — ENCOUNTER SYMPTOMS
OCCASIONAL FEELINGS OF UNSTEADINESS: 0
PERSON REPORTING PAIN: PATIENT
DENIES PAIN: 1

## 2024-08-22 ASSESSMENT — PAIN SCALES - PAIN ASSESSMENT IN ADVANCED DEMENTIA (PAINAD)
TOTALSCORE: 0
BREATHING: 0
BODYLANGUAGE: 0 - RELAXED.
BODYLANGUAGE: 0
NEGVOCALIZATION: 0 - NONE.
FACIALEXPRESSION: 0
CONSOLABILITY: 0 - NO NEED TO CONSOLE.
FACIALEXPRESSION: 0 - SMILING OR INEXPRESSIVE.
CONSOLABILITY: 0
NEGVOCALIZATION: 0

## 2024-08-23 ENCOUNTER — HOME CARE VISIT (OUTPATIENT)
Dept: HOME HEALTH SERVICES | Facility: HOME HEALTH | Age: 69
End: 2024-08-23
Payer: COMMERCIAL

## 2024-08-23 VITALS
RESPIRATION RATE: 18 BRPM | TEMPERATURE: 98.1 F | DIASTOLIC BLOOD PRESSURE: 70 MMHG | OXYGEN SATURATION: 94 % | SYSTOLIC BLOOD PRESSURE: 136 MMHG | HEART RATE: 80 BPM

## 2024-08-23 PROCEDURE — G0300 HHS/HOSPICE OF LPN EA 15 MIN: HCPCS | Mod: HHH

## 2024-08-23 ASSESSMENT — PAIN SCALES - PAIN ASSESSMENT IN ADVANCED DEMENTIA (PAINAD)
CONSOLABILITY: 0
CONSOLABILITY: 0 - NO NEED TO CONSOLE.
NEGVOCALIZATION: 0 - NONE.
NEGVOCALIZATION: 0
TOTALSCORE: 0
FACIALEXPRESSION: 0 - SMILING OR INEXPRESSIVE.
BODYLANGUAGE: 0 - RELAXED.
BODYLANGUAGE: 0 - RELAXED.
CONSOLABILITY: 0
BODYLANGUAGE: 0
CONSOLABILITY: 0 - NO NEED TO CONSOLE.
FACIALEXPRESSION: 0
BREATHING: 0
FACIALEXPRESSION: 0
FACIALEXPRESSION: 0 - SMILING OR INEXPRESSIVE.
BREATHING: 0
BODYLANGUAGE: 0
TOTALSCORE: 0
NEGVOCALIZATION: 0
NEGVOCALIZATION: 0 - NONE.

## 2024-08-23 ASSESSMENT — ENCOUNTER SYMPTOMS
BLURRED VISION: 1
OCCASIONAL FEELINGS OF UNSTEADINESS: 0
BLURRED VISION: 1
PERSON REPORTING PAIN: PATIENT
PERSON REPORTING PAIN: PATIENT
OCCASIONAL FEELINGS OF UNSTEADINESS: 0
DENIES PAIN: 1
DENIES PAIN: 1

## 2024-08-24 DIAGNOSIS — I10 ESSENTIAL HYPERTENSION: ICD-10-CM

## 2024-08-26 ENCOUNTER — HOME CARE VISIT (OUTPATIENT)
Dept: HOME HEALTH SERVICES | Facility: HOME HEALTH | Age: 69
End: 2024-08-26
Payer: COMMERCIAL

## 2024-08-26 VITALS
TEMPERATURE: 97.3 F | HEART RATE: 78 BPM | RESPIRATION RATE: 18 BRPM | DIASTOLIC BLOOD PRESSURE: 62 MMHG | SYSTOLIC BLOOD PRESSURE: 114 MMHG | OXYGEN SATURATION: 96 %

## 2024-08-26 PROCEDURE — G0300 HHS/HOSPICE OF LPN EA 15 MIN: HCPCS | Mod: HHH

## 2024-08-26 RX ORDER — HYDRALAZINE HYDROCHLORIDE 50 MG/1
50 TABLET, FILM COATED ORAL 2 TIMES DAILY
Qty: 180 TABLET | Refills: 3 | Status: SHIPPED | OUTPATIENT
Start: 2024-08-26 | End: 2025-08-26

## 2024-08-28 ENCOUNTER — HOME CARE VISIT (OUTPATIENT)
Dept: HOME HEALTH SERVICES | Facility: HOME HEALTH | Age: 69
End: 2024-08-28
Payer: COMMERCIAL

## 2024-08-28 ENCOUNTER — LAB (OUTPATIENT)
Dept: LAB | Facility: LAB | Age: 69
End: 2024-08-28
Payer: COMMERCIAL

## 2024-08-28 VITALS
DIASTOLIC BLOOD PRESSURE: 60 MMHG | OXYGEN SATURATION: 96 % | RESPIRATION RATE: 18 BRPM | TEMPERATURE: 97.6 F | HEART RATE: 74 BPM | SYSTOLIC BLOOD PRESSURE: 124 MMHG

## 2024-08-28 DIAGNOSIS — E78.2 MIXED HYPERLIPIDEMIA: Primary | ICD-10-CM

## 2024-08-28 LAB
CHOLEST SERPL-MCNC: 230 MG/DL (ref 0–199)
CHOLESTEROL/HDL RATIO: 5.6
HDLC SERPL-MCNC: 41.2 MG/DL
LDLC SERPL CALC-MCNC: 125 MG/DL
NON HDL CHOLESTEROL: 189 MG/DL (ref 0–149)
TRIGL SERPL-MCNC: 320 MG/DL (ref 0–149)
VLDL: 64 MG/DL (ref 0–40)

## 2024-08-28 PROCEDURE — G0300 HHS/HOSPICE OF LPN EA 15 MIN: HCPCS | Mod: HHH

## 2024-08-28 PROCEDURE — 36415 COLL VENOUS BLD VENIPUNCTURE: CPT

## 2024-08-28 PROCEDURE — 80061 LIPID PANEL: CPT

## 2024-08-28 ASSESSMENT — PAIN SCALES - PAIN ASSESSMENT IN ADVANCED DEMENTIA (PAINAD)
TOTALSCORE: 0
CONSOLABILITY: 0 - NO NEED TO CONSOLE.
BREATHING: 0
NEGVOCALIZATION: 0 - NONE.
FACIALEXPRESSION: 0
BODYLANGUAGE: 0 - RELAXED.
FACIALEXPRESSION: 0 - SMILING OR INEXPRESSIVE.
BODYLANGUAGE: 0
CONSOLABILITY: 0
NEGVOCALIZATION: 0

## 2024-08-28 ASSESSMENT — ENCOUNTER SYMPTOMS
PERSON REPORTING PAIN: PATIENT
OCCASIONAL FEELINGS OF UNSTEADINESS: 0
DENIES PAIN: 1
BLURRED VISION: 1

## 2024-08-29 ENCOUNTER — OFFICE VISIT (OUTPATIENT)
Dept: WOUND CARE | Facility: CLINIC | Age: 69
End: 2024-08-29
Payer: COMMERCIAL

## 2024-08-29 PROCEDURE — 11042 DBRDMT SUBQ TIS 1ST 20SQCM/<: CPT

## 2024-08-29 PROCEDURE — 11042 DBRDMT SUBQ TIS 1ST 20SQCM/<: CPT | Performed by: PLASTIC SURGERY

## 2024-08-30 ENCOUNTER — HOME CARE VISIT (OUTPATIENT)
Dept: HOME HEALTH SERVICES | Facility: HOME HEALTH | Age: 69
End: 2024-08-30
Payer: COMMERCIAL

## 2024-08-30 ENCOUNTER — TELEPHONE (OUTPATIENT)
Dept: INFECTIOUS DISEASES | Age: 69
End: 2024-08-30

## 2024-08-30 VITALS
DIASTOLIC BLOOD PRESSURE: 62 MMHG | OXYGEN SATURATION: 96 % | RESPIRATION RATE: 18 BRPM | SYSTOLIC BLOOD PRESSURE: 130 MMHG | TEMPERATURE: 97.3 F | HEART RATE: 76 BPM

## 2024-08-30 PROCEDURE — G0300 HHS/HOSPICE OF LPN EA 15 MIN: HCPCS | Mod: HHH

## 2024-08-30 NOTE — TELEPHONE ENCOUNTER
Called patient LMOVM to return my call in hte office to Reschedule an Appt. ( 10/21/24) Reschedule an Appt.10/22/24

## 2024-09-01 ASSESSMENT — ENCOUNTER SYMPTOMS
BLURRED VISION: 1
PERSON REPORTING PAIN: PATIENT
DENIES PAIN: 1

## 2024-09-01 ASSESSMENT — PAIN SCALES - PAIN ASSESSMENT IN ADVANCED DEMENTIA (PAINAD)
CONSOLABILITY: 0
BODYLANGUAGE: 0 - RELAXED.
FACIALEXPRESSION: 0 - SMILING OR INEXPRESSIVE.
FACIALEXPRESSION: 0
TOTALSCORE: 0
NEGVOCALIZATION: 0 - NONE.
NEGVOCALIZATION: 0
BREATHING: 0
CONSOLABILITY: 0 - NO NEED TO CONSOLE.
BODYLANGUAGE: 0

## 2024-09-03 ENCOUNTER — HOME CARE VISIT (OUTPATIENT)
Dept: HOME HEALTH SERVICES | Facility: HOME HEALTH | Age: 69
End: 2024-09-03
Payer: COMMERCIAL

## 2024-09-03 VITALS
SYSTOLIC BLOOD PRESSURE: 117 MMHG | HEART RATE: 76 BPM | DIASTOLIC BLOOD PRESSURE: 73 MMHG | TEMPERATURE: 98.2 F | RESPIRATION RATE: 18 BRPM | OXYGEN SATURATION: 98 %

## 2024-09-03 PROCEDURE — G0300 HHS/HOSPICE OF LPN EA 15 MIN: HCPCS | Mod: HHH

## 2024-09-03 ASSESSMENT — ENCOUNTER SYMPTOMS
APPETITE LEVEL: GOOD
SUBJECTIVE PAIN PROGRESSION: UNCHANGED
PAIN SEVERITY GOAL: 0/10
DENIES PAIN: 1
CHANGE IN APPETITE: UNCHANGED
PERSON REPORTING PAIN: PATIENT
HIGHEST PAIN SEVERITY IN PAST 24 HOURS: 0/10
LOWEST PAIN SEVERITY IN PAST 24 HOURS: 0/10

## 2024-09-04 ENCOUNTER — HOME CARE VISIT (OUTPATIENT)
Dept: HOME HEALTH SERVICES | Facility: HOME HEALTH | Age: 69
End: 2024-09-04
Payer: COMMERCIAL

## 2024-09-04 VITALS
TEMPERATURE: 97.9 F | RESPIRATION RATE: 18 BRPM | HEART RATE: 80 BPM | SYSTOLIC BLOOD PRESSURE: 110 MMHG | DIASTOLIC BLOOD PRESSURE: 60 MMHG | OXYGEN SATURATION: 97 %

## 2024-09-04 PROCEDURE — G0300 HHS/HOSPICE OF LPN EA 15 MIN: HCPCS | Mod: HHH

## 2024-09-05 ENCOUNTER — OFFICE VISIT (OUTPATIENT)
Dept: WOUND CARE | Facility: CLINIC | Age: 69
End: 2024-09-05
Payer: COMMERCIAL

## 2024-09-05 ENCOUNTER — HOME CARE VISIT (OUTPATIENT)
Dept: HOME HEALTH SERVICES | Facility: HOME HEALTH | Age: 69
End: 2024-09-05
Payer: COMMERCIAL

## 2024-09-05 VITALS
OXYGEN SATURATION: 99 % | HEART RATE: 75 BPM | TEMPERATURE: 98.4 F | DIASTOLIC BLOOD PRESSURE: 60 MMHG | SYSTOLIC BLOOD PRESSURE: 137 MMHG

## 2024-09-05 PROCEDURE — 11042 DBRDMT SUBQ TIS 1ST 20SQCM/<: CPT | Performed by: PLASTIC SURGERY

## 2024-09-05 PROCEDURE — G0299 HHS/HOSPICE OF RN EA 15 MIN: HCPCS | Mod: HHH

## 2024-09-05 PROCEDURE — 11042 DBRDMT SUBQ TIS 1ST 20SQCM/<: CPT

## 2024-09-05 ASSESSMENT — PAIN SCALES - PAIN ASSESSMENT IN ADVANCED DEMENTIA (PAINAD)
BODYLANGUAGE: 0 - RELAXED.
FACIALEXPRESSION: 0
TOTALSCORE: 0
TOTALSCORE: 0
FACIALEXPRESSION: 0 - SMILING OR INEXPRESSIVE.
BODYLANGUAGE: 0 - RELAXED.
FACIALEXPRESSION: 0 - SMILING OR INEXPRESSIVE.
BODYLANGUAGE: 0
BREATHING: 0
CONSOLABILITY: 0
FACIALEXPRESSION: 0
BODYLANGUAGE: 0
NEGVOCALIZATION: 0
BREATHING: 0
CONSOLABILITY: 0
CONSOLABILITY: 0 - NO NEED TO CONSOLE.
NEGVOCALIZATION: 0 - NONE.
CONSOLABILITY: 0 - NO NEED TO CONSOLE.
NEGVOCALIZATION: 0
NEGVOCALIZATION: 0 - NONE.

## 2024-09-05 ASSESSMENT — ENCOUNTER SYMPTOMS
DENIES PAIN: 1
OCCASIONAL FEELINGS OF UNSTEADINESS: 0
PAIN: 1
SUBJECTIVE PAIN PROGRESSION: WAXING AND WANING
BLURRED VISION: 1
PERSON REPORTING PAIN: PATIENT
LOWEST PAIN SEVERITY IN PAST 24 HOURS: 0/10
PERSON REPORTING PAIN: PATIENT
DENIES PAIN: 1
HIGHEST PAIN SEVERITY IN PAST 24 HOURS: 8/10
BLURRED VISION: 1
PAIN SEVERITY GOAL: 3/10

## 2024-09-06 ASSESSMENT — ENCOUNTER SYMPTOMS
MUSCLE WEAKNESS: 1
LIMITED RANGE OF MOTION: 1
APPETITE LEVEL: GOOD
LAST BOWEL MOVEMENT: 67088

## 2024-09-06 ASSESSMENT — ACTIVITIES OF DAILY LIVING (ADL)
AMBULATION ASSISTANCE: 1
AMBULATION ASSISTANCE: STAND BY ASSIST
CURRENT_FUNCTION: STAND BY ASSIST
OASIS_M1830: 03
ENTERING_EXITING_HOME: SUPERVISION
PHYSICAL TRANSFERS ASSESSED: 1

## 2024-09-09 ENCOUNTER — HOME CARE VISIT (OUTPATIENT)
Dept: HOME HEALTH SERVICES | Facility: HOME HEALTH | Age: 69
End: 2024-09-09
Payer: COMMERCIAL

## 2024-09-11 ENCOUNTER — HOME CARE VISIT (OUTPATIENT)
Dept: HOME HEALTH SERVICES | Facility: HOME HEALTH | Age: 69
End: 2024-09-11
Payer: COMMERCIAL

## 2024-09-11 VITALS
SYSTOLIC BLOOD PRESSURE: 100 MMHG | DIASTOLIC BLOOD PRESSURE: 58 MMHG | RESPIRATION RATE: 18 BRPM | TEMPERATURE: 97.2 F | OXYGEN SATURATION: 96 % | HEART RATE: 82 BPM

## 2024-09-11 PROCEDURE — G0300 HHS/HOSPICE OF LPN EA 15 MIN: HCPCS | Mod: HHH

## 2024-09-12 ENCOUNTER — OFFICE VISIT (OUTPATIENT)
Dept: WOUND CARE | Facility: CLINIC | Age: 69
End: 2024-09-12
Payer: COMMERCIAL

## 2024-09-12 ENCOUNTER — LAB REQUISITION (OUTPATIENT)
Dept: LAB | Facility: HOSPITAL | Age: 69
End: 2024-09-12
Payer: COMMERCIAL

## 2024-09-12 DIAGNOSIS — L98.493 NON-PRESSURE CHRONIC ULCER OF SKIN OF OTHER SITES WITH NECROSIS OF MUSCLE: ICD-10-CM

## 2024-09-12 DIAGNOSIS — T81.31XA DISRUPTION OF EXTERNAL OPERATION (SURGICAL) WOUND, NOT ELSEWHERE CLASSIFIED, INITIAL ENCOUNTER: ICD-10-CM

## 2024-09-12 DIAGNOSIS — F17.210 NICOTINE DEPENDENCE, CIGARETTES, UNCOMPLICATED: ICD-10-CM

## 2024-09-12 DIAGNOSIS — S31.000A UNSPECIFIED OPEN WOUND OF LOWER BACK AND PELVIS WITHOUT PENETRATION INTO RETROPERITONEUM, INITIAL ENCOUNTER: ICD-10-CM

## 2024-09-12 DIAGNOSIS — M86.68 OTHER CHRONIC OSTEOMYELITIS, OTHER SITE: ICD-10-CM

## 2024-09-12 PROCEDURE — 87077 CULTURE AEROBIC IDENTIFY: CPT | Mod: OUT,ELYLAB | Performed by: PLASTIC SURGERY

## 2024-09-12 PROCEDURE — 11043 DBRDMT MUSC&/FSCA 1ST 20/<: CPT

## 2024-09-12 PROCEDURE — 11043 DBRDMT MUSC&/FSCA 1ST 20/<: CPT | Performed by: PLASTIC SURGERY

## 2024-09-13 ENCOUNTER — HOME CARE VISIT (OUTPATIENT)
Dept: HOME HEALTH SERVICES | Facility: HOME HEALTH | Age: 69
End: 2024-09-13
Payer: COMMERCIAL

## 2024-09-13 VITALS
DIASTOLIC BLOOD PRESSURE: 62 MMHG | RESPIRATION RATE: 18 BRPM | OXYGEN SATURATION: 96 % | SYSTOLIC BLOOD PRESSURE: 130 MMHG | HEART RATE: 76 BPM

## 2024-09-13 PROCEDURE — G0300 HHS/HOSPICE OF LPN EA 15 MIN: HCPCS | Mod: HHH

## 2024-09-13 ASSESSMENT — PAIN SCALES - PAIN ASSESSMENT IN ADVANCED DEMENTIA (PAINAD)
FACIALEXPRESSION: 0 - SMILING OR INEXPRESSIVE.
NEGVOCALIZATION: 0 - NONE.
BREATHING: 0
TOTALSCORE: 0
CONSOLABILITY: 0
BODYLANGUAGE: 0
BODYLANGUAGE: 0 - RELAXED.
FACIALEXPRESSION: 0
CONSOLABILITY: 0 - NO NEED TO CONSOLE.
NEGVOCALIZATION: 0

## 2024-09-13 ASSESSMENT — ENCOUNTER SYMPTOMS
DENIES PAIN: 1
BLURRED VISION: 1
PERSON REPORTING PAIN: PATIENT
OCCASIONAL FEELINGS OF UNSTEADINESS: 0

## 2024-09-16 ENCOUNTER — HOME CARE VISIT (OUTPATIENT)
Dept: HOME HEALTH SERVICES | Facility: HOME HEALTH | Age: 69
End: 2024-09-16
Payer: COMMERCIAL

## 2024-09-16 VITALS
HEART RATE: 76 BPM | OXYGEN SATURATION: 96 % | DIASTOLIC BLOOD PRESSURE: 58 MMHG | RESPIRATION RATE: 18 BRPM | SYSTOLIC BLOOD PRESSURE: 100 MMHG | TEMPERATURE: 97.4 F

## 2024-09-16 PROCEDURE — G0300 HHS/HOSPICE OF LPN EA 15 MIN: HCPCS | Mod: HHH

## 2024-09-16 ASSESSMENT — PAIN SCALES - PAIN ASSESSMENT IN ADVANCED DEMENTIA (PAINAD)
FACIALEXPRESSION: 0
BODYLANGUAGE: 0 - RELAXED.
TOTALSCORE: 0
FACIALEXPRESSION: 0 - SMILING OR INEXPRESSIVE.
NEGVOCALIZATION: 0
BREATHING: 0
BODYLANGUAGE: 0
NEGVOCALIZATION: 0 - NONE.
CONSOLABILITY: 0
CONSOLABILITY: 0 - NO NEED TO CONSOLE.

## 2024-09-16 ASSESSMENT — ENCOUNTER SYMPTOMS
PERSON REPORTING PAIN: PATIENT
BLURRED VISION: 1
OCCASIONAL FEELINGS OF UNSTEADINESS: 0
DENIES PAIN: 1

## 2024-09-17 ASSESSMENT — PAIN SCALES - PAIN ASSESSMENT IN ADVANCED DEMENTIA (PAINAD)
TOTALSCORE: 0
BREATHING: 0
CONSOLABILITY: 0 - NO NEED TO CONSOLE.
BODYLANGUAGE: 0 - RELAXED.
NEGVOCALIZATION: 0 - NONE.
BODYLANGUAGE: 0
FACIALEXPRESSION: 0 - SMILING OR INEXPRESSIVE.
FACIALEXPRESSION: 0
CONSOLABILITY: 0
NEGVOCALIZATION: 0

## 2024-09-17 ASSESSMENT — ENCOUNTER SYMPTOMS
DENIES PAIN: 1
OCCASIONAL FEELINGS OF UNSTEADINESS: 0
BLURRED VISION: 1
PERSON REPORTING PAIN: PATIENT

## 2024-09-18 ENCOUNTER — HOME CARE VISIT (OUTPATIENT)
Dept: HOME HEALTH SERVICES | Facility: HOME HEALTH | Age: 69
End: 2024-09-18
Payer: COMMERCIAL

## 2024-09-18 VITALS
RESPIRATION RATE: 18 BRPM | DIASTOLIC BLOOD PRESSURE: 60 MMHG | TEMPERATURE: 97.7 F | HEART RATE: 76 BPM | SYSTOLIC BLOOD PRESSURE: 100 MMHG | OXYGEN SATURATION: 96 %

## 2024-09-18 PROCEDURE — G0300 HHS/HOSPICE OF LPN EA 15 MIN: HCPCS | Mod: HHH

## 2024-09-19 ENCOUNTER — OFFICE VISIT (OUTPATIENT)
Dept: WOUND CARE | Facility: CLINIC | Age: 69
End: 2024-09-19
Payer: COMMERCIAL

## 2024-09-19 LAB
BACTERIA SPEC CULT: ABNORMAL
GRAM STN SPEC: ABNORMAL
GRAM STN SPEC: ABNORMAL

## 2024-09-19 PROCEDURE — 11043 DBRDMT MUSC&/FSCA 1ST 20/<: CPT

## 2024-09-20 ENCOUNTER — HOME CARE VISIT (OUTPATIENT)
Dept: HOME HEALTH SERVICES | Facility: HOME HEALTH | Age: 69
End: 2024-09-20
Payer: COMMERCIAL

## 2024-09-20 ENCOUNTER — LAB (OUTPATIENT)
Dept: LAB | Facility: LAB | Age: 69
End: 2024-09-20
Payer: COMMERCIAL

## 2024-09-20 VITALS
HEART RATE: 76 BPM | DIASTOLIC BLOOD PRESSURE: 62 MMHG | OXYGEN SATURATION: 95 % | TEMPERATURE: 97.5 F | SYSTOLIC BLOOD PRESSURE: 104 MMHG | RESPIRATION RATE: 18 BRPM

## 2024-09-20 DIAGNOSIS — R69 ILLNESS, UNSPECIFIED: ICD-10-CM

## 2024-09-20 DIAGNOSIS — R82.1 MYOGLOBINURIA: Primary | ICD-10-CM

## 2024-09-20 DIAGNOSIS — R53.83 OTHER FATIGUE: ICD-10-CM

## 2024-09-20 LAB
ALBUMIN SERPL BCP-MCNC: 4.1 G/DL (ref 3.4–5)
ALP SERPL-CCNC: 68 U/L (ref 33–136)
ALT SERPL W P-5'-P-CCNC: 10 U/L (ref 7–45)
ANION GAP SERPL CALC-SCNC: 10 MMOL/L (ref 10–20)
AST SERPL W P-5'-P-CCNC: 12 U/L (ref 9–39)
BILIRUB SERPL-MCNC: 0.4 MG/DL (ref 0–1.2)
BUN SERPL-MCNC: 23 MG/DL (ref 6–23)
CALCIUM SERPL-MCNC: 9.5 MG/DL (ref 8.6–10.3)
CHLORIDE SERPL-SCNC: 102 MMOL/L (ref 98–107)
CHOLEST SERPL-MCNC: 141 MG/DL (ref 0–199)
CHOLESTEROL/HDL RATIO: 3.5
CO2 SERPL-SCNC: 31 MMOL/L (ref 21–32)
CREAT SERPL-MCNC: 1.09 MG/DL (ref 0.5–1.05)
EGFRCR SERPLBLD CKD-EPI 2021: 55 ML/MIN/1.73M*2
ERYTHROCYTE [DISTWIDTH] IN BLOOD BY AUTOMATED COUNT: 17.2 % (ref 11.5–14.5)
FOLATE SERPL-MCNC: 6.4 NG/ML
GLUCOSE SERPL-MCNC: 95 MG/DL (ref 74–99)
HCT VFR BLD AUTO: 40.4 % (ref 36–46)
HDLC SERPL-MCNC: 40.1 MG/DL
HGB BLD-MCNC: 12.7 G/DL (ref 12–16)
LDLC SERPL CALC-MCNC: 58 MG/DL
MCH RBC QN AUTO: 28.2 PG (ref 26–34)
MCHC RBC AUTO-ENTMCNC: 31.4 G/DL (ref 32–36)
MCV RBC AUTO: 90 FL (ref 80–100)
NON HDL CHOLESTEROL: 101 MG/DL (ref 0–149)
NRBC BLD-RTO: 0 /100 WBCS (ref 0–0)
PLATELET # BLD AUTO: 209 X10*3/UL (ref 150–450)
POTASSIUM SERPL-SCNC: 4 MMOL/L (ref 3.5–5.3)
PROT SERPL-MCNC: 6.7 G/DL (ref 6.4–8.2)
RBC # BLD AUTO: 4.5 X10*6/UL (ref 4–5.2)
SODIUM SERPL-SCNC: 139 MMOL/L (ref 136–145)
TRIGL SERPL-MCNC: 215 MG/DL (ref 0–149)
TSH SERPL-ACNC: 1.9 MIU/L (ref 0.44–3.98)
VIT B12 SERPL-MCNC: 223 PG/ML (ref 211–911)
VLDL: 43 MG/DL (ref 0–40)
WBC # BLD AUTO: 6.5 X10*3/UL (ref 4.4–11.3)

## 2024-09-20 PROCEDURE — 80061 LIPID PANEL: CPT | Mod: WAIVER OF LIABILITY ON FILE

## 2024-09-20 PROCEDURE — 80053 COMPREHEN METABOLIC PANEL: CPT

## 2024-09-20 PROCEDURE — 84443 ASSAY THYROID STIM HORMONE: CPT

## 2024-09-20 PROCEDURE — 85027 COMPLETE CBC AUTOMATED: CPT

## 2024-09-20 PROCEDURE — G0300 HHS/HOSPICE OF LPN EA 15 MIN: HCPCS | Mod: HHH

## 2024-09-20 PROCEDURE — 82607 VITAMIN B-12: CPT

## 2024-09-20 PROCEDURE — 82746 ASSAY OF FOLIC ACID SERUM: CPT

## 2024-09-20 ASSESSMENT — PAIN SCALES - PAIN ASSESSMENT IN ADVANCED DEMENTIA (PAINAD)
FACIALEXPRESSION: 0
BREATHING: 0
NEGVOCALIZATION: 0 - NONE.
CONSOLABILITY: 0
FACIALEXPRESSION: 0 - SMILING OR INEXPRESSIVE.
CONSOLABILITY: 0 - NO NEED TO CONSOLE.
BODYLANGUAGE: 0
TOTALSCORE: 0
BODYLANGUAGE: 0 - RELAXED.
NEGVOCALIZATION: 0

## 2024-09-20 ASSESSMENT — ENCOUNTER SYMPTOMS
BLURRED VISION: 1
OCCASIONAL FEELINGS OF UNSTEADINESS: 0
DENIES PAIN: 1
PERSON REPORTING PAIN: PATIENT

## 2024-09-20 NOTE — HOME HEALTH
Wound care provided per orders. Tolerated well. VSS. Denies pain. No concerns. Wound almost healed.

## 2024-09-21 ASSESSMENT — PAIN SCALES - PAIN ASSESSMENT IN ADVANCED DEMENTIA (PAINAD)
FACIALEXPRESSION: 0 - SMILING OR INEXPRESSIVE.
NEGVOCALIZATION: 0 - NONE.
BODYLANGUAGE: 0 - RELAXED.
CONSOLABILITY: 0
BREATHING: 0
BODYLANGUAGE: 0
NEGVOCALIZATION: 0
TOTALSCORE: 0
FACIALEXPRESSION: 0
CONSOLABILITY: 0 - NO NEED TO CONSOLE.

## 2024-09-21 ASSESSMENT — ENCOUNTER SYMPTOMS
OCCASIONAL FEELINGS OF UNSTEADINESS: 0
BLURRED VISION: 1
DENIES PAIN: 1
PERSON REPORTING PAIN: PATIENT

## 2024-09-23 ENCOUNTER — HOME CARE VISIT (OUTPATIENT)
Dept: HOME HEALTH SERVICES | Facility: HOME HEALTH | Age: 69
End: 2024-09-23
Payer: COMMERCIAL

## 2024-09-23 VITALS
DIASTOLIC BLOOD PRESSURE: 62 MMHG | SYSTOLIC BLOOD PRESSURE: 108 MMHG | TEMPERATURE: 97.3 F | OXYGEN SATURATION: 94 % | HEART RATE: 72 BPM | RESPIRATION RATE: 18 BRPM

## 2024-09-23 PROCEDURE — G0300 HHS/HOSPICE OF LPN EA 15 MIN: HCPCS | Mod: HHH

## 2024-09-24 ASSESSMENT — PAIN SCALES - PAIN ASSESSMENT IN ADVANCED DEMENTIA (PAINAD)
BODYLANGUAGE: 0
CONSOLABILITY: 0
TOTALSCORE: 0
CONSOLABILITY: 0 - NO NEED TO CONSOLE.
NEGVOCALIZATION: 0
NEGVOCALIZATION: 0 - NONE.
FACIALEXPRESSION: 0 - SMILING OR INEXPRESSIVE.
FACIALEXPRESSION: 0
BREATHING: 0
BODYLANGUAGE: 0 - RELAXED.

## 2024-09-24 ASSESSMENT — ENCOUNTER SYMPTOMS
DENIES PAIN: 1
BLURRED VISION: 1
OCCASIONAL FEELINGS OF UNSTEADINESS: 0
PERSON REPORTING PAIN: PATIENT

## 2024-09-25 ENCOUNTER — HOME CARE VISIT (OUTPATIENT)
Dept: HOME HEALTH SERVICES | Facility: HOME HEALTH | Age: 69
End: 2024-09-25
Payer: COMMERCIAL

## 2024-09-25 VITALS
SYSTOLIC BLOOD PRESSURE: 120 MMHG | OXYGEN SATURATION: 93 % | TEMPERATURE: 97.3 F | DIASTOLIC BLOOD PRESSURE: 60 MMHG | HEART RATE: 76 BPM | RESPIRATION RATE: 18 BRPM

## 2024-09-25 PROCEDURE — G0300 HHS/HOSPICE OF LPN EA 15 MIN: HCPCS | Mod: HHH

## 2024-09-26 ENCOUNTER — OFFICE VISIT (OUTPATIENT)
Dept: WOUND CARE | Facility: CLINIC | Age: 69
End: 2024-09-26
Payer: COMMERCIAL

## 2024-09-26 PROCEDURE — 11042 DBRDMT SUBQ TIS 1ST 20SQCM/<: CPT

## 2024-09-27 ENCOUNTER — HOME CARE VISIT (OUTPATIENT)
Dept: HOME HEALTH SERVICES | Facility: HOME HEALTH | Age: 69
End: 2024-09-27
Payer: COMMERCIAL

## 2024-09-27 VITALS
OXYGEN SATURATION: 97 % | HEART RATE: 76 BPM | DIASTOLIC BLOOD PRESSURE: 60 MMHG | SYSTOLIC BLOOD PRESSURE: 110 MMHG | RESPIRATION RATE: 18 BRPM | TEMPERATURE: 97.5 F

## 2024-09-27 PROCEDURE — G0300 HHS/HOSPICE OF LPN EA 15 MIN: HCPCS | Mod: HHH

## 2024-09-28 ASSESSMENT — PAIN SCALES - PAIN ASSESSMENT IN ADVANCED DEMENTIA (PAINAD)
FACIALEXPRESSION: 0 - SMILING OR INEXPRESSIVE.
FACIALEXPRESSION: 0
CONSOLABILITY: 0
BODYLANGUAGE: 0
BREATHING: 0
BREATHING: 0
FACIALEXPRESSION: 0
CONSOLABILITY: 0 - NO NEED TO CONSOLE.
NEGVOCALIZATION: 0
CONSOLABILITY: 0
BODYLANGUAGE: 0 - RELAXED.
BODYLANGUAGE: 0 - RELAXED.
BODYLANGUAGE: 0
TOTALSCORE: 0
FACIALEXPRESSION: 0 - SMILING OR INEXPRESSIVE.
TOTALSCORE: 0
NEGVOCALIZATION: 0 - NONE.
CONSOLABILITY: 0 - NO NEED TO CONSOLE.
NEGVOCALIZATION: 0
NEGVOCALIZATION: 0 - NONE.

## 2024-09-28 ASSESSMENT — ENCOUNTER SYMPTOMS
BLURRED VISION: 1
PERSON REPORTING PAIN: PATIENT
DENIES PAIN: 1
OCCASIONAL FEELINGS OF UNSTEADINESS: 0
BLURRED VISION: 1
OCCASIONAL FEELINGS OF UNSTEADINESS: 0
PERSON REPORTING PAIN: PATIENT
DENIES PAIN: 1

## 2024-09-30 ENCOUNTER — HOME CARE VISIT (OUTPATIENT)
Dept: HOME HEALTH SERVICES | Facility: HOME HEALTH | Age: 69
End: 2024-09-30
Payer: COMMERCIAL

## 2024-09-30 VITALS
SYSTOLIC BLOOD PRESSURE: 120 MMHG | TEMPERATURE: 97.4 F | DIASTOLIC BLOOD PRESSURE: 64 MMHG | HEART RATE: 78 BPM | RESPIRATION RATE: 18 BRPM | OXYGEN SATURATION: 93 %

## 2024-09-30 PROCEDURE — G0300 HHS/HOSPICE OF LPN EA 15 MIN: HCPCS | Mod: HHH

## 2024-10-02 ENCOUNTER — HOME CARE VISIT (OUTPATIENT)
Dept: HOME HEALTH SERVICES | Facility: HOME HEALTH | Age: 69
End: 2024-10-02
Payer: COMMERCIAL

## 2024-10-02 VITALS
TEMPERATURE: 97.5 F | DIASTOLIC BLOOD PRESSURE: 56 MMHG | RESPIRATION RATE: 18 BRPM | SYSTOLIC BLOOD PRESSURE: 124 MMHG | OXYGEN SATURATION: 96 % | HEART RATE: 84 BPM

## 2024-10-02 PROCEDURE — G0300 HHS/HOSPICE OF LPN EA 15 MIN: HCPCS | Mod: HHH

## 2024-10-02 ASSESSMENT — PAIN SCALES - PAIN ASSESSMENT IN ADVANCED DEMENTIA (PAINAD)
TOTALSCORE: 0
FACIALEXPRESSION: 0 - SMILING OR INEXPRESSIVE.
NEGVOCALIZATION: 0 - NONE.
BODYLANGUAGE: 0 - RELAXED.
FACIALEXPRESSION: 0
BODYLANGUAGE: 0
NEGVOCALIZATION: 0
CONSOLABILITY: 0
CONSOLABILITY: 0 - NO NEED TO CONSOLE.
BREATHING: 0

## 2024-10-02 ASSESSMENT — ENCOUNTER SYMPTOMS
PERSON REPORTING PAIN: PATIENT
DENIES PAIN: 1
BLURRED VISION: 1
OCCASIONAL FEELINGS OF UNSTEADINESS: 0

## 2024-10-02 NOTE — HOME HEALTH
Wound care provided per orders. Tolerated well. VSS. Denies pain. Wound almost healed. No concerns.

## 2024-10-03 ENCOUNTER — OFFICE VISIT (OUTPATIENT)
Dept: WOUND CARE | Facility: CLINIC | Age: 69
End: 2024-10-03
Payer: COMMERCIAL

## 2024-10-03 ENCOUNTER — LAB REQUISITION (OUTPATIENT)
Dept: LAB | Facility: HOSPITAL | Age: 69
End: 2024-10-03
Payer: COMMERCIAL

## 2024-10-03 DIAGNOSIS — M86.68 OTHER CHRONIC OSTEOMYELITIS, OTHER SITE: ICD-10-CM

## 2024-10-03 DIAGNOSIS — L98.493 NON-PRESSURE CHRONIC ULCER OF SKIN OF OTHER SITES WITH NECROSIS OF MUSCLE: ICD-10-CM

## 2024-10-03 DIAGNOSIS — F17.210 NICOTINE DEPENDENCE, CIGARETTES, UNCOMPLICATED: ICD-10-CM

## 2024-10-03 DIAGNOSIS — S31.000A UNSPECIFIED OPEN WOUND OF LOWER BACK AND PELVIS WITHOUT PENETRATION INTO RETROPERITONEUM, INITIAL ENCOUNTER: ICD-10-CM

## 2024-10-03 DIAGNOSIS — T81.31XA DISRUPTION OF EXTERNAL OPERATION (SURGICAL) WOUND, NOT ELSEWHERE CLASSIFIED, INITIAL ENCOUNTER: ICD-10-CM

## 2024-10-03 PROCEDURE — 11043 DBRDMT MUSC&/FSCA 1ST 20/<: CPT

## 2024-10-03 PROCEDURE — 87070 CULTURE OTHR SPECIMN AEROBIC: CPT | Mod: OUT,ELYLAB | Performed by: PLASTIC SURGERY

## 2024-10-04 ENCOUNTER — HOME CARE VISIT (OUTPATIENT)
Dept: HOME HEALTH SERVICES | Facility: HOME HEALTH | Age: 69
End: 2024-10-04
Payer: COMMERCIAL

## 2024-10-04 ENCOUNTER — APPOINTMENT (OUTPATIENT)
Dept: HOME HEALTH SERVICES | Facility: HOME HEALTH | Age: 69
End: 2024-10-04
Payer: COMMERCIAL

## 2024-10-04 ASSESSMENT — PAIN SCALES - PAIN ASSESSMENT IN ADVANCED DEMENTIA (PAINAD)
BREATHING: 0
FACIALEXPRESSION: 0
NEGVOCALIZATION: 0
NEGVOCALIZATION: 0 - NONE.
FACIALEXPRESSION: 0 - SMILING OR INEXPRESSIVE.
TOTALSCORE: 0
CONSOLABILITY: 0
BODYLANGUAGE: 0
CONSOLABILITY: 0 - NO NEED TO CONSOLE.
BODYLANGUAGE: 0 - RELAXED.

## 2024-10-04 ASSESSMENT — ENCOUNTER SYMPTOMS
BLURRED VISION: 1
DENIES PAIN: 1
OCCASIONAL FEELINGS OF UNSTEADINESS: 0
PERSON REPORTING PAIN: PATIENT

## 2024-10-07 ENCOUNTER — HOME CARE VISIT (OUTPATIENT)
Dept: HOME HEALTH SERVICES | Facility: HOME HEALTH | Age: 69
End: 2024-10-07
Payer: COMMERCIAL

## 2024-10-07 VITALS
RESPIRATION RATE: 18 BRPM | TEMPERATURE: 98.4 F | SYSTOLIC BLOOD PRESSURE: 128 MMHG | DIASTOLIC BLOOD PRESSURE: 78 MMHG | OXYGEN SATURATION: 98 % | HEART RATE: 73 BPM

## 2024-10-07 PROCEDURE — G0300 HHS/HOSPICE OF LPN EA 15 MIN: HCPCS | Mod: HHH

## 2024-10-08 ASSESSMENT — ENCOUNTER SYMPTOMS
SUBJECTIVE PAIN PROGRESSION: UNCHANGED
PERSON REPORTING PAIN: PATIENT
CHANGE IN APPETITE: UNCHANGED
LOWEST PAIN SEVERITY IN PAST 24 HOURS: 0/10
DENIES PAIN: 1
PAIN SEVERITY GOAL: 0/10
APPETITE LEVEL: GOOD
HIGHEST PAIN SEVERITY IN PAST 24 HOURS: 0/10

## 2024-10-08 NOTE — HOME HEALTH
Wound care provided per orders. Tolerated well. VSS. Denies pain. No concerns. 
denies pain/discomfort (Rating = 0)

## 2024-10-09 ENCOUNTER — HOME CARE VISIT (OUTPATIENT)
Dept: HOME HEALTH SERVICES | Facility: HOME HEALTH | Age: 69
End: 2024-10-09
Payer: COMMERCIAL

## 2024-10-10 ENCOUNTER — OFFICE VISIT (OUTPATIENT)
Dept: WOUND CARE | Facility: CLINIC | Age: 69
End: 2024-10-10
Payer: COMMERCIAL

## 2024-10-10 PROCEDURE — 11042 DBRDMT SUBQ TIS 1ST 20SQCM/<: CPT

## 2024-10-11 ENCOUNTER — HOME CARE VISIT (OUTPATIENT)
Dept: HOME HEALTH SERVICES | Facility: HOME HEALTH | Age: 69
End: 2024-10-11
Payer: COMMERCIAL

## 2024-10-14 ENCOUNTER — HOME CARE VISIT (OUTPATIENT)
Dept: HOME HEALTH SERVICES | Facility: HOME HEALTH | Age: 69
End: 2024-10-14
Payer: COMMERCIAL

## 2024-10-14 PROCEDURE — G0300 HHS/HOSPICE OF LPN EA 15 MIN: HCPCS | Mod: HHH

## 2024-10-15 ASSESSMENT — PAIN SCALES - PAIN ASSESSMENT IN ADVANCED DEMENTIA (PAINAD)
BODYLANGUAGE: 0 - RELAXED.
BREATHING: 0
NEGVOCALIZATION: 0
NEGVOCALIZATION: 0 - NONE.
CONSOLABILITY: 0
FACIALEXPRESSION: 0 - SMILING OR INEXPRESSIVE.
FACIALEXPRESSION: 0
BODYLANGUAGE: 0
CONSOLABILITY: 0 - NO NEED TO CONSOLE.
TOTALSCORE: 0

## 2024-10-15 ASSESSMENT — ENCOUNTER SYMPTOMS
OCCASIONAL FEELINGS OF UNSTEADINESS: 0
PERSON REPORTING PAIN: PATIENT
DENIES PAIN: 1

## 2024-10-16 ENCOUNTER — HOME CARE VISIT (OUTPATIENT)
Dept: HOME HEALTH SERVICES | Facility: HOME HEALTH | Age: 69
End: 2024-10-16
Payer: COMMERCIAL

## 2024-10-16 VITALS
HEART RATE: 74 BPM | OXYGEN SATURATION: 97 % | RESPIRATION RATE: 18 BRPM | DIASTOLIC BLOOD PRESSURE: 60 MMHG | SYSTOLIC BLOOD PRESSURE: 126 MMHG | TEMPERATURE: 97.4 F

## 2024-10-16 PROCEDURE — G0300 HHS/HOSPICE OF LPN EA 15 MIN: HCPCS | Mod: HHH

## 2024-10-17 ENCOUNTER — APPOINTMENT (OUTPATIENT)
Dept: CARDIOLOGY | Facility: CLINIC | Age: 69
End: 2024-10-17
Payer: COMMERCIAL

## 2024-10-17 ENCOUNTER — OFFICE VISIT (OUTPATIENT)
Dept: WOUND CARE | Facility: CLINIC | Age: 69
End: 2024-10-17
Payer: COMMERCIAL

## 2024-10-17 ENCOUNTER — LAB (OUTPATIENT)
Dept: LAB | Facility: LAB | Age: 69
End: 2024-10-17
Payer: COMMERCIAL

## 2024-10-17 VITALS
SYSTOLIC BLOOD PRESSURE: 138 MMHG | DIASTOLIC BLOOD PRESSURE: 64 MMHG | BODY MASS INDEX: 32.41 KG/M2 | HEART RATE: 72 BPM | HEIGHT: 58 IN | WEIGHT: 154.4 LBS

## 2024-10-17 DIAGNOSIS — A49.02 METHICILLIN RESISTANT STAPHYLOCOCCUS AUREUS INFECTION, UNSPECIFIED SITE: ICD-10-CM

## 2024-10-17 DIAGNOSIS — Z86.711 HISTORY OF PULMONARY EMBOLISM: ICD-10-CM

## 2024-10-17 DIAGNOSIS — I71.40 ABDOMINAL AORTIC ANEURYSM (AAA) WITHOUT RUPTURE, UNSPECIFIED PART (CMS-HCC): ICD-10-CM

## 2024-10-17 DIAGNOSIS — I10 ESSENTIAL HYPERTENSION: ICD-10-CM

## 2024-10-17 DIAGNOSIS — Z95.828 PRESENCE OF IVC FILTER: ICD-10-CM

## 2024-10-17 DIAGNOSIS — I25.10 CAD IN NATIVE ARTERY: ICD-10-CM

## 2024-10-17 DIAGNOSIS — R11.14 BILIOUS VOMITING WITH NAUSEA: ICD-10-CM

## 2024-10-17 DIAGNOSIS — M46.46 DISCITIS, UNSPECIFIED, LUMBAR REGION: Primary | ICD-10-CM

## 2024-10-17 DIAGNOSIS — I42.9 CARDIOMYOPATHY, UNSPECIFIED TYPE (MULTI): ICD-10-CM

## 2024-10-17 LAB
ANION GAP SERPL CALC-SCNC: 12 MMOL/L (ref 10–20)
BUN SERPL-MCNC: 25 MG/DL (ref 6–23)
CALCIUM SERPL-MCNC: 9.7 MG/DL (ref 8.6–10.3)
CHLORIDE SERPL-SCNC: 101 MMOL/L (ref 98–107)
CO2 SERPL-SCNC: 30 MMOL/L (ref 21–32)
CREAT SERPL-MCNC: 1.08 MG/DL (ref 0.5–1.05)
EGFRCR SERPLBLD CKD-EPI 2021: 55 ML/MIN/1.73M*2
GLUCOSE SERPL-MCNC: 97 MG/DL (ref 74–99)
POTASSIUM SERPL-SCNC: 4.4 MMOL/L (ref 3.5–5.3)
SODIUM SERPL-SCNC: 139 MMOL/L (ref 136–145)

## 2024-10-17 PROCEDURE — 1036F TOBACCO NON-USER: CPT | Performed by: INTERNAL MEDICINE

## 2024-10-17 PROCEDURE — 36415 COLL VENOUS BLD VENIPUNCTURE: CPT

## 2024-10-17 PROCEDURE — 3078F DIAST BP <80 MM HG: CPT | Performed by: INTERNAL MEDICINE

## 2024-10-17 PROCEDURE — 1123F ACP DISCUSS/DSCN MKR DOCD: CPT | Performed by: INTERNAL MEDICINE

## 2024-10-17 PROCEDURE — 3008F BODY MASS INDEX DOCD: CPT | Performed by: INTERNAL MEDICINE

## 2024-10-17 PROCEDURE — 1159F MED LIST DOCD IN RCRD: CPT | Performed by: INTERNAL MEDICINE

## 2024-10-17 PROCEDURE — 3075F SYST BP GE 130 - 139MM HG: CPT | Performed by: INTERNAL MEDICINE

## 2024-10-17 PROCEDURE — 11042 DBRDMT SUBQ TIS 1ST 20SQCM/<: CPT

## 2024-10-17 PROCEDURE — 80048 BASIC METABOLIC PNL TOTAL CA: CPT

## 2024-10-17 PROCEDURE — 99214 OFFICE O/P EST MOD 30 MIN: CPT | Performed by: INTERNAL MEDICINE

## 2024-10-17 NOTE — PATIENT INSTRUCTIONS
Continue same medications/treatment.  Patient educated on proper medication use.  Patient educated on risk factor modification.  Please bring any lab results from other providers/physicians to your next appointment.    Please bring all medicines, vitamins, and herbal supplements with you when you come to the office.    Prescriptions will not be filled unless you are compliant with your follow up appointments or have a follow up appointment scheduled as per instruction of your physician. Refills should be requested at the time of your visit.    Follow up after procedure  HOLD Eliquis 2 days prior procedure  IVC filter removal    HILARIO ESPINAL RN, AM SCRIBING FOR AND IN THE PRESENCE OF DR. DAMIÁN HODGSON MD, FACC

## 2024-10-17 NOTE — H&P (VIEW-ONLY)
Referred by Dr. Terry ref. provider found provider found for   Chief Complaint   Patient presents with    Follow-up     3 month follow up        History of Present Illness  Tara Tierney is a 68 y.o. year old female patient is here for follow-up infection is more controlled and apparently is better although she still taking antibiotics and will be taken antibiotic for about a year.  I discussed with the patient Kneifl continue medication we will go ahead and remove the IVC filter.  Will schedule the procedure.  Will follow-up as scheduled    Past Medical History  Past Medical History:   Diagnosis Date    Arthritis     Back pain     COPD (chronic obstructive pulmonary disease) (Multi)     COVID-19 vaccine administered     Eczema     History of COVID-19         Hyperlipidemia     Hypertension     Hypoesthesia of skin     Hypesthesia    Macular degeneration     Meniere's disease     Osteoporosis     Overactive bladder     Pain in unspecified finger(s)     Finger pain    Pain in unspecified wrist     Pain in wrist joint    Peripheral vascular disease (CMS-HCC)     Personal history of other diseases of the circulatory system     History of coronary artery disease    Personal history of other diseases of the musculoskeletal system and connective tissue     History of arthritis    Personal history of other specified conditions     History of chest pain    Personal history of other specified conditions     History of balance disorder    Personal history of other specified conditions     History of numbness    Postmenopausal     Seasonal allergies     Unspecified visual loss     Vision problems       Social History  Social History     Tobacco Use    Smoking status: Former     Current packs/day: 0.00     Average packs/day: 0.5 packs/day for 45.0 years (22.5 ttl pk-yrs)     Types: Cigarettes     Start date: 1978     Quit date: 2023     Years since quittin.1    Smokeless tobacco: Never   Vaping Use    Vaping  status: Never Used   Substance Use Topics    Alcohol use: Never    Drug use: Never       Family History     Family History   Problem Relation Name Age of Onset    Breast cancer Mother      Other (arteriosclerotic cardiovascular disease) Father      Cancer Father         Review of Systems  As per HPI, all other systems reviewed and negative.    Allergies:  Allergies   Allergen Reactions    Neomycin Other     swelling, redness, burning post eye surgery    Neomycin-Polymyxin B-Dexameth Other and Rash     blisters, eye swelling, burning        Outpatient Medications:  Current Outpatient Medications   Medication Instructions    albuterol (ProAir HFA) 90 mcg/actuation inhaler 2 puffs, Every 4 hours PRN    alteplase (CATHFLO ACTIVASE) 2 mg, intra-catheter, As needed    apixaban (ELIQUIS) 10 mg, oral, 2 times daily    apixaban (ELIQUIS) 5 mg, oral, 2 times daily    aspirin 81 mg EC tablet 1 tablet, Daily    atenolol (Tenormin) 50 mg tablet 1 tablet, Every morning    baclofen (Lioresal) 10 mg tablet Take half a tab 4 times daily as needed for muscle spasms for up to 10 days    brimonidine (AlphaGAN) 0.2 % ophthalmic solution 1 drop, 2 times daily    busPIRone (Buspar) 10 mg tablet 1 tablet, Daily    cholecalciferol (Vitamin D-3) 50 mcg (2,000 unit) capsule 1 capsule, Daily    clotrimazole-betamethasone (Lotrisone) cream prn    cyclobenzaprine (FLEXERIL) 5 mg, oral, 3 times daily PRN    ezetimibe (Zetia) 10 mg tablet 1 tablet, Daily    furosemide (Lasix) 20 mg tablet 1 tablet, Daily    gentamicin (Garamycin) 0.1 % cream As directed    hydrALAZINE (APRESOLINE) 50 mg, oral, 2 times daily    HYDROmorphone (DILAUDID) 2 mg, oral, Every 4 hours PRN    ibandronate (Boniva) 150 mg tablet 1 tablet, Every 30 days    ipratropium (Atrovent HFA) 17 mcg/actuation inhaler 2 puffs, 2 times daily RT    isosorbide mononitrate ER (Imdur) 60 mg 24 hr tablet TAKE ONE TABLET BY MOUTH EVERY DAY in addition to 30mg tablet    isosorbide mononitrate  ER (IMDUR) 30 mg, oral, Daily, 1 tab daily in addition to 60 mg= 90 mg once daily    loratadine (Claritin) 10 mg tablet 1 tablet, Daily    meclizine (Antivert) 25 mg tablet 1 tablet, Every 8 hours PRN    mirtazapine (Remeron) 15 mg tablet 1 tablet, Nightly    morphine CR (MS Contin) 15 mg 12 hr tablet 1 tablet, Daily    nitroglycerin (Nitrostat) 0.4 mg SL tablet 1 tablet, Every 5 min PRN    oxyCODONE-acetaminophen (Percocet) 5-325 mg tablet 1 tablet, oral, Every 6 hours PRN    pantoprazole (ProtoNix) 40 mg EC tablet 1 tablet, Daily before breakfast    pilocarpine (Salagen, pilocarpine,) 5 mg tablet 1 tablet, Daily RT    potassium chloride ER (Micro-K) 10 mEq ER capsule 10 mEq, oral, Daily    pregabalin (Lyrica) 75 mg capsule 1 capsule, 2 times daily    simvastatin (ZOCOR) 40 mg, Nightly    sterile water (Sterile Water for Injection) injection 10 mL, intravenous, As needed    sulfamethoxazole-trimethoprim (Bactrim DS) 800-160 mg tablet 1 tablet, 2 times daily    tiZANidine (ZANAFLEX) 4 mg, Daily    triamterene-hydrochlorothiazid (Dyazide) 37.5-25 mg capsule 1 capsule, Every morning    vit C,K-Fy-rzscw-lutein-zeaxan (PreserVision AREDS-2) 250-90-40-1 mg capsule 1 capsule, 2 times daily         Vitals:  Vitals:    10/17/24 0835   BP: 138/64   Pulse: 72       Physical Exam:  Constitutional:       Appearance: Healthy appearance. Not in distress.   Neck:      Vascular: No JVR. JVD normal.   Pulmonary:      Effort: Pulmonary effort is normal.      Breath sounds: Normal breath sounds. No wheezing. No rhonchi. No rales.   Chest:      Chest wall: Not tender to palpatation.   Cardiovascular:      PMI at left midclavicular line. Normal rate. Regular rhythm. Normal S1. Normal S2.       Murmurs: There is no murmur.      No gallop.  No click. No rub.   Pulses:     Intact distal pulses.   Edema:     Peripheral edema absent.   Abdominal:      General: Bowel sounds are normal.      Palpations: Abdomen is soft.      Tenderness: There  is no abdominal tenderness.   Musculoskeletal: Normal range of motion.         General: No tenderness. Skin:     General: Skin is warm and dry.   Neurological:      General: No focal deficit present.      Mental Status: Alert and oriented to person, place and time.              Assessment/Plan           Hugo Barron MD [facility]  Interventional Cardiology   [facility]     Thank you for allowing me to participate in the care of this patient. Please do not hesitate to contact me with any further questions or concerns.

## 2024-10-17 NOTE — PROGRESS NOTES
Referred by Dr. Terry ref. provider found provider found for   Chief Complaint   Patient presents with    Follow-up     3 month follow up        History of Present Illness  Tara Tierney is a 68 y.o. year old female patient is here for follow-up infection is more controlled and apparently is better although she still taking antibiotics and will be taken antibiotic for about a year.  I discussed with the patient Kneifl continue medication we will go ahead and remove the IVC filter.  Will schedule the procedure.  Will follow-up as scheduled    Past Medical History  Past Medical History:   Diagnosis Date    Arthritis     Back pain     COPD (chronic obstructive pulmonary disease) (Multi)     COVID-19 vaccine administered     Eczema     History of COVID-19         Hyperlipidemia     Hypertension     Hypoesthesia of skin     Hypesthesia    Macular degeneration     Meniere's disease     Osteoporosis     Overactive bladder     Pain in unspecified finger(s)     Finger pain    Pain in unspecified wrist     Pain in wrist joint    Peripheral vascular disease (CMS-HCC)     Personal history of other diseases of the circulatory system     History of coronary artery disease    Personal history of other diseases of the musculoskeletal system and connective tissue     History of arthritis    Personal history of other specified conditions     History of chest pain    Personal history of other specified conditions     History of balance disorder    Personal history of other specified conditions     History of numbness    Postmenopausal     Seasonal allergies     Unspecified visual loss     Vision problems       Social History  Social History     Tobacco Use    Smoking status: Former     Current packs/day: 0.00     Average packs/day: 0.5 packs/day for 45.0 years (22.5 ttl pk-yrs)     Types: Cigarettes     Start date: 1978     Quit date: 2023     Years since quittin.1    Smokeless tobacco: Never   Vaping Use    Vaping  status: Never Used   Substance Use Topics    Alcohol use: Never    Drug use: Never       Family History     Family History   Problem Relation Name Age of Onset    Breast cancer Mother      Other (arteriosclerotic cardiovascular disease) Father      Cancer Father         Review of Systems  As per HPI, all other systems reviewed and negative.    Allergies:  Allergies   Allergen Reactions    Neomycin Other     swelling, redness, burning post eye surgery    Neomycin-Polymyxin B-Dexameth Other and Rash     blisters, eye swelling, burning        Outpatient Medications:  Current Outpatient Medications   Medication Instructions    albuterol (ProAir HFA) 90 mcg/actuation inhaler 2 puffs, Every 4 hours PRN    alteplase (CATHFLO ACTIVASE) 2 mg, intra-catheter, As needed    apixaban (ELIQUIS) 10 mg, oral, 2 times daily    apixaban (ELIQUIS) 5 mg, oral, 2 times daily    aspirin 81 mg EC tablet 1 tablet, Daily    atenolol (Tenormin) 50 mg tablet 1 tablet, Every morning    baclofen (Lioresal) 10 mg tablet Take half a tab 4 times daily as needed for muscle spasms for up to 10 days    brimonidine (AlphaGAN) 0.2 % ophthalmic solution 1 drop, 2 times daily    busPIRone (Buspar) 10 mg tablet 1 tablet, Daily    cholecalciferol (Vitamin D-3) 50 mcg (2,000 unit) capsule 1 capsule, Daily    clotrimazole-betamethasone (Lotrisone) cream prn    cyclobenzaprine (FLEXERIL) 5 mg, oral, 3 times daily PRN    ezetimibe (Zetia) 10 mg tablet 1 tablet, Daily    furosemide (Lasix) 20 mg tablet 1 tablet, Daily    gentamicin (Garamycin) 0.1 % cream As directed    hydrALAZINE (APRESOLINE) 50 mg, oral, 2 times daily    HYDROmorphone (DILAUDID) 2 mg, oral, Every 4 hours PRN    ibandronate (Boniva) 150 mg tablet 1 tablet, Every 30 days    ipratropium (Atrovent HFA) 17 mcg/actuation inhaler 2 puffs, 2 times daily RT    isosorbide mononitrate ER (Imdur) 60 mg 24 hr tablet TAKE ONE TABLET BY MOUTH EVERY DAY in addition to 30mg tablet    isosorbide mononitrate  ER (IMDUR) 30 mg, oral, Daily, 1 tab daily in addition to 60 mg= 90 mg once daily    loratadine (Claritin) 10 mg tablet 1 tablet, Daily    meclizine (Antivert) 25 mg tablet 1 tablet, Every 8 hours PRN    mirtazapine (Remeron) 15 mg tablet 1 tablet, Nightly    morphine CR (MS Contin) 15 mg 12 hr tablet 1 tablet, Daily    nitroglycerin (Nitrostat) 0.4 mg SL tablet 1 tablet, Every 5 min PRN    oxyCODONE-acetaminophen (Percocet) 5-325 mg tablet 1 tablet, oral, Every 6 hours PRN    pantoprazole (ProtoNix) 40 mg EC tablet 1 tablet, Daily before breakfast    pilocarpine (Salagen, pilocarpine,) 5 mg tablet 1 tablet, Daily RT    potassium chloride ER (Micro-K) 10 mEq ER capsule 10 mEq, oral, Daily    pregabalin (Lyrica) 75 mg capsule 1 capsule, 2 times daily    simvastatin (ZOCOR) 40 mg, Nightly    sterile water (Sterile Water for Injection) injection 10 mL, intravenous, As needed    sulfamethoxazole-trimethoprim (Bactrim DS) 800-160 mg tablet 1 tablet, 2 times daily    tiZANidine (ZANAFLEX) 4 mg, Daily    triamterene-hydrochlorothiazid (Dyazide) 37.5-25 mg capsule 1 capsule, Every morning    vit C,X-Sg-uyarq-lutein-zeaxan (PreserVision AREDS-2) 250-90-40-1 mg capsule 1 capsule, 2 times daily         Vitals:  Vitals:    10/17/24 0835   BP: 138/64   Pulse: 72       Physical Exam:  Constitutional:       Appearance: Healthy appearance. Not in distress.   Neck:      Vascular: No JVR. JVD normal.   Pulmonary:      Effort: Pulmonary effort is normal.      Breath sounds: Normal breath sounds. No wheezing. No rhonchi. No rales.   Chest:      Chest wall: Not tender to palpatation.   Cardiovascular:      PMI at left midclavicular line. Normal rate. Regular rhythm. Normal S1. Normal S2.       Murmurs: There is no murmur.      No gallop.  No click. No rub.   Pulses:     Intact distal pulses.   Edema:     Peripheral edema absent.   Abdominal:      General: Bowel sounds are normal.      Palpations: Abdomen is soft.      Tenderness: There  is no abdominal tenderness.   Musculoskeletal: Normal range of motion.         General: No tenderness. Skin:     General: Skin is warm and dry.   Neurological:      General: No focal deficit present.      Mental Status: Alert and oriented to person, place and time.              Assessment/Plan           Hugo Barron MD [facility]  Interventional Cardiology   of [facility]     Thank you for allowing me to participate in the care of this patient. Please do not hesitate to contact me with any further questions or concerns.

## 2024-10-18 ENCOUNTER — HOME CARE VISIT (OUTPATIENT)
Dept: HOME HEALTH SERVICES | Facility: HOME HEALTH | Age: 69
End: 2024-10-18
Payer: COMMERCIAL

## 2024-10-18 VITALS
OXYGEN SATURATION: 98 % | DIASTOLIC BLOOD PRESSURE: 78 MMHG | RESPIRATION RATE: 18 BRPM | TEMPERATURE: 97.9 F | SYSTOLIC BLOOD PRESSURE: 130 MMHG | HEART RATE: 80 BPM

## 2024-10-18 PROCEDURE — G0300 HHS/HOSPICE OF LPN EA 15 MIN: HCPCS | Mod: HHH

## 2024-10-19 ASSESSMENT — PAIN SCALES - PAIN ASSESSMENT IN ADVANCED DEMENTIA (PAINAD)
FACIALEXPRESSION: 0
TOTALSCORE: 0
FACIALEXPRESSION: 0 - SMILING OR INEXPRESSIVE.
NEGVOCALIZATION: 0
FACIALEXPRESSION: 0 - SMILING OR INEXPRESSIVE.
CONSOLABILITY: 0
NEGVOCALIZATION: 0 - NONE.
BODYLANGUAGE: 0
TOTALSCORE: 0
BODYLANGUAGE: 0
NEGVOCALIZATION: 0 - NONE.
BODYLANGUAGE: 0 - RELAXED.
CONSOLABILITY: 0 - NO NEED TO CONSOLE.
BREATHING: 0
NEGVOCALIZATION: 0
CONSOLABILITY: 0
FACIALEXPRESSION: 0
BREATHING: 0
CONSOLABILITY: 0 - NO NEED TO CONSOLE.
BODYLANGUAGE: 0 - RELAXED.

## 2024-10-19 ASSESSMENT — ENCOUNTER SYMPTOMS
BLURRED VISION: 1
OCCASIONAL FEELINGS OF UNSTEADINESS: 0
BLURRED VISION: 1
OCCASIONAL FEELINGS OF UNSTEADINESS: 0
PERSON REPORTING PAIN: PATIENT
DENIES PAIN: 1
PERSON REPORTING PAIN: PATIENT
DENIES PAIN: 1

## 2024-10-21 ENCOUNTER — HOME CARE VISIT (OUTPATIENT)
Dept: HOME HEALTH SERVICES | Facility: HOME HEALTH | Age: 69
End: 2024-10-21
Payer: COMMERCIAL

## 2024-10-21 ENCOUNTER — OFFICE VISIT (OUTPATIENT)
Dept: WOUND CARE | Facility: CLINIC | Age: 69
End: 2024-10-21
Payer: COMMERCIAL

## 2024-10-21 PROCEDURE — 11043 DBRDMT MUSC&/FSCA 1ST 20/<: CPT

## 2024-10-23 ENCOUNTER — HOME CARE VISIT (OUTPATIENT)
Dept: HOME HEALTH SERVICES | Facility: HOME HEALTH | Age: 69
End: 2024-10-23
Payer: COMMERCIAL

## 2024-10-23 VITALS
SYSTOLIC BLOOD PRESSURE: 124 MMHG | DIASTOLIC BLOOD PRESSURE: 70 MMHG | RESPIRATION RATE: 18 BRPM | HEART RATE: 76 BPM | TEMPERATURE: 97.6 F | OXYGEN SATURATION: 96 %

## 2024-10-23 PROCEDURE — G0300 HHS/HOSPICE OF LPN EA 15 MIN: HCPCS | Mod: HHH

## 2024-10-24 ENCOUNTER — APPOINTMENT (OUTPATIENT)
Dept: WOUND CARE | Facility: CLINIC | Age: 69
End: 2024-10-24
Payer: COMMERCIAL

## 2024-10-24 ENCOUNTER — OFFICE VISIT (OUTPATIENT)
Dept: INFECTIOUS DISEASES | Age: 69
End: 2024-10-24
Payer: COMMERCIAL

## 2024-10-24 VITALS
SYSTOLIC BLOOD PRESSURE: 138 MMHG | TEMPERATURE: 97.4 F | OXYGEN SATURATION: 98 % | BODY MASS INDEX: 33.53 KG/M2 | DIASTOLIC BLOOD PRESSURE: 68 MMHG | WEIGHT: 155.4 LBS | RESPIRATION RATE: 18 BRPM | HEIGHT: 57 IN | HEART RATE: 72 BPM

## 2024-10-24 DIAGNOSIS — A49.02 MRSA (METHICILLIN RESISTANT STAPHYLOCOCCUS AUREUS) INFECTION: ICD-10-CM

## 2024-10-24 DIAGNOSIS — M46.46 LUMBAR DISCITIS: Primary | ICD-10-CM

## 2024-10-24 PROCEDURE — G8417 CALC BMI ABV UP PARAM F/U: HCPCS | Performed by: INTERNAL MEDICINE

## 2024-10-24 PROCEDURE — 1123F ACP DISCUSS/DSCN MKR DOCD: CPT | Performed by: INTERNAL MEDICINE

## 2024-10-24 PROCEDURE — G8399 PT W/DXA RESULTS DOCUMENT: HCPCS | Performed by: INTERNAL MEDICINE

## 2024-10-24 PROCEDURE — G8427 DOCREV CUR MEDS BY ELIG CLIN: HCPCS | Performed by: INTERNAL MEDICINE

## 2024-10-24 PROCEDURE — 1090F PRES/ABSN URINE INCON ASSESS: CPT | Performed by: INTERNAL MEDICINE

## 2024-10-24 PROCEDURE — 99213 OFFICE O/P EST LOW 20 MIN: CPT | Performed by: INTERNAL MEDICINE

## 2024-10-24 PROCEDURE — 1159F MED LIST DOCD IN RCRD: CPT | Performed by: INTERNAL MEDICINE

## 2024-10-24 PROCEDURE — 3017F COLORECTAL CA SCREEN DOC REV: CPT | Performed by: INTERNAL MEDICINE

## 2024-10-24 PROCEDURE — 1036F TOBACCO NON-USER: CPT | Performed by: INTERNAL MEDICINE

## 2024-10-24 PROCEDURE — G8484 FLU IMMUNIZE NO ADMIN: HCPCS | Performed by: INTERNAL MEDICINE

## 2024-10-24 RX ORDER — SULFAMETHOXAZOLE AND TRIMETHOPRIM 800; 160 MG/1; MG/1
1 TABLET ORAL 2 TIMES DAILY
Qty: 60 TABLET | Refills: 5 | Status: SHIPPED | OUTPATIENT
Start: 2024-10-24 | End: 2024-11-23

## 2024-10-24 ASSESSMENT — PATIENT HEALTH QUESTIONNAIRE - PHQ9
SUM OF ALL RESPONSES TO PHQ QUESTIONS 1-9: 0
SUM OF ALL RESPONSES TO PHQ QUESTIONS 1-9: 0
1. LITTLE INTEREST OR PLEASURE IN DOING THINGS: NOT AT ALL
SUM OF ALL RESPONSES TO PHQ QUESTIONS 1-9: 0
SUM OF ALL RESPONSES TO PHQ QUESTIONS 1-9: 0
SUM OF ALL RESPONSES TO PHQ9 QUESTIONS 1 & 2: 0
2. FEELING DOWN, DEPRESSED OR HOPELESS: NOT AT ALL

## 2024-10-24 NOTE — PROGRESS NOTES
effort  Abdomen: No distention  No leg edema  No erythema, no tenderness  Intact back wound much smaller, width and depth of less than 3 mm, no drainage  Labs collected yesterday were reviewed and discussed with the patient  ollected Specimen Info Organism Amoxicillin/Clavulanate Ampicillin Ampicillin/Sulbactam Aztreonam Cefazolin Cefazolin (uncomplicated UTIs only) Cefepime Cefotaxime Ceftazidime Ceftriaxone Ciprofloxacin Ertapenem   12/06/23 Swab from SPINE Staphylococcus aureus                           12/06/23 Tissue from SPINE Staphylococcus aureus                           12/06/23 Tissue from SPINE Staphylococcus aureus                           12/05/23 Urine from Clean Catch/Voided Escherichia coli S R S R R R R R R R R S   11/03/23 Swab from Nares/Axilla/Groin Methicillin Resistant Staphylococcus aureus (MRSA)                              Collected Specimen Info Organism Gentamicin Meropenem Nitrofurantoin Piperacillin/Tazobactam Tobramycin Trimethoprim/Sulfamethoxazole   12/06/23 Swab from SPINE Staphylococcus aureus               12/06/23 Tissue from SPINE Staphylococcus aureus               12/06/23 Tissue from SPINE Staphylococcus aureus               12/05/23 Urine from Clean Catch/Voided Escherichia coli R S S S I S   11/03/23 Swab from Nares/Axilla/Groin Methicillin Resistant Staphylococcus aureus (MRSA)                   Last blood work done on October 17 with a creatinine of 1.08  Last CBC was of September 2024 was within normal  An electronic signature was used to authenticate this note.    --Callie Mcmanus MD

## 2024-10-25 ENCOUNTER — HOME CARE VISIT (OUTPATIENT)
Dept: HOME HEALTH SERVICES | Facility: HOME HEALTH | Age: 69
End: 2024-10-25
Payer: COMMERCIAL

## 2024-10-25 VITALS
HEART RATE: 88 BPM | SYSTOLIC BLOOD PRESSURE: 118 MMHG | RESPIRATION RATE: 18 BRPM | OXYGEN SATURATION: 95 % | DIASTOLIC BLOOD PRESSURE: 54 MMHG | TEMPERATURE: 97.6 F

## 2024-10-25 PROCEDURE — G0300 HHS/HOSPICE OF LPN EA 15 MIN: HCPCS | Mod: HHH

## 2024-10-25 ASSESSMENT — PAIN SCALES - PAIN ASSESSMENT IN ADVANCED DEMENTIA (PAINAD)
BODYLANGUAGE: 0 - RELAXED.
FACIALEXPRESSION: 0 - SMILING OR INEXPRESSIVE.
BODYLANGUAGE: 0
CONSOLABILITY: 0 - NO NEED TO CONSOLE.
TOTALSCORE: 0
BREATHING: 0
CONSOLABILITY: 0
NEGVOCALIZATION: 0
NEGVOCALIZATION: 0 - NONE.
FACIALEXPRESSION: 0

## 2024-10-25 ASSESSMENT — ENCOUNTER SYMPTOMS
PERSON REPORTING PAIN: PATIENT
DENIES PAIN: 1
OCCASIONAL FEELINGS OF UNSTEADINESS: 0
BLURRED VISION: 1

## 2024-10-28 ENCOUNTER — LAB (OUTPATIENT)
Dept: LAB | Facility: LAB | Age: 69
End: 2024-10-28
Payer: COMMERCIAL

## 2024-10-28 ENCOUNTER — HOME CARE VISIT (OUTPATIENT)
Dept: HOME HEALTH SERVICES | Facility: HOME HEALTH | Age: 69
End: 2024-10-28
Payer: COMMERCIAL

## 2024-10-28 VITALS
SYSTOLIC BLOOD PRESSURE: 118 MMHG | RESPIRATION RATE: 18 BRPM | TEMPERATURE: 97.7 F | DIASTOLIC BLOOD PRESSURE: 50 MMHG | HEART RATE: 76 BPM | OXYGEN SATURATION: 96 %

## 2024-10-28 DIAGNOSIS — Z86.711 HISTORY OF PULMONARY EMBOLISM: ICD-10-CM

## 2024-10-28 DIAGNOSIS — Z95.828 PRESENCE OF IVC FILTER: ICD-10-CM

## 2024-10-28 LAB
ANION GAP SERPL CALC-SCNC: 12 MMOL/L (ref 10–20)
BUN SERPL-MCNC: 23 MG/DL (ref 6–23)
CALCIUM SERPL-MCNC: 9.4 MG/DL (ref 8.6–10.3)
CHLORIDE SERPL-SCNC: 100 MMOL/L (ref 98–107)
CO2 SERPL-SCNC: 31 MMOL/L (ref 21–32)
CREAT SERPL-MCNC: 1.17 MG/DL (ref 0.5–1.05)
EGFRCR SERPLBLD CKD-EPI 2021: 50 ML/MIN/1.73M*2
ERYTHROCYTE [DISTWIDTH] IN BLOOD BY AUTOMATED COUNT: 17.1 % (ref 11.5–14.5)
GLUCOSE SERPL-MCNC: 100 MG/DL (ref 74–99)
HCT VFR BLD AUTO: 41.7 % (ref 36–46)
HGB BLD-MCNC: 12.7 G/DL (ref 12–16)
INR PPP: 1.1 (ref 0.9–1.1)
MCH RBC QN AUTO: 28 PG (ref 26–34)
MCHC RBC AUTO-ENTMCNC: 30.5 G/DL (ref 32–36)
MCV RBC AUTO: 92 FL (ref 80–100)
NRBC BLD-RTO: 0 /100 WBCS (ref 0–0)
PLATELET # BLD AUTO: 201 X10*3/UL (ref 150–450)
POTASSIUM SERPL-SCNC: 4.1 MMOL/L (ref 3.5–5.3)
PROTHROMBIN TIME: 12.2 SECONDS (ref 9.8–12.8)
RBC # BLD AUTO: 4.54 X10*6/UL (ref 4–5.2)
SODIUM SERPL-SCNC: 139 MMOL/L (ref 136–145)
WBC # BLD AUTO: 6.4 X10*3/UL (ref 4.4–11.3)

## 2024-10-28 PROCEDURE — 85027 COMPLETE CBC AUTOMATED: CPT

## 2024-10-28 PROCEDURE — G0300 HHS/HOSPICE OF LPN EA 15 MIN: HCPCS | Mod: HHH

## 2024-10-28 PROCEDURE — 85610 PROTHROMBIN TIME: CPT

## 2024-10-28 PROCEDURE — 80048 BASIC METABOLIC PNL TOTAL CA: CPT

## 2024-10-28 PROCEDURE — 36415 COLL VENOUS BLD VENIPUNCTURE: CPT

## 2024-10-28 ASSESSMENT — PAIN SCALES - PAIN ASSESSMENT IN ADVANCED DEMENTIA (PAINAD)
FACIALEXPRESSION: 0 - SMILING OR INEXPRESSIVE.
CONSOLABILITY: 0
TOTALSCORE: 0
BREATHING: 0
NEGVOCALIZATION: 0 - NONE.
BODYLANGUAGE: 0
CONSOLABILITY: 0 - NO NEED TO CONSOLE.
FACIALEXPRESSION: 0
NEGVOCALIZATION: 0
BODYLANGUAGE: 0 - RELAXED.

## 2024-10-28 ASSESSMENT — ENCOUNTER SYMPTOMS
DENIES PAIN: 1
PERSON REPORTING PAIN: PATIENT
BLURRED VISION: 1
OCCASIONAL FEELINGS OF UNSTEADINESS: 0

## 2024-10-30 ENCOUNTER — HOME CARE VISIT (OUTPATIENT)
Dept: HOME HEALTH SERVICES | Facility: HOME HEALTH | Age: 69
End: 2024-10-30
Payer: COMMERCIAL

## 2024-10-30 VITALS
HEART RATE: 77 BPM | OXYGEN SATURATION: 97 % | RESPIRATION RATE: 18 BRPM | DIASTOLIC BLOOD PRESSURE: 70 MMHG | SYSTOLIC BLOOD PRESSURE: 130 MMHG | TEMPERATURE: 98 F

## 2024-10-30 PROCEDURE — G0300 HHS/HOSPICE OF LPN EA 15 MIN: HCPCS | Mod: HHH

## 2024-10-31 ENCOUNTER — OFFICE VISIT (OUTPATIENT)
Dept: WOUND CARE | Facility: CLINIC | Age: 69
End: 2024-10-31
Payer: COMMERCIAL

## 2024-10-31 PROCEDURE — 11042 DBRDMT SUBQ TIS 1ST 20SQCM/<: CPT

## 2024-10-31 ASSESSMENT — PAIN SCALES - PAIN ASSESSMENT IN ADVANCED DEMENTIA (PAINAD)
BODYLANGUAGE: 0
FACIALEXPRESSION: 0 - SMILING OR INEXPRESSIVE.
BREATHING: 0
CONSOLABILITY: 0
NEGVOCALIZATION: 0 - NONE.
BODYLANGUAGE: 0 - RELAXED.
TOTALSCORE: 0
FACIALEXPRESSION: 0
CONSOLABILITY: 0 - NO NEED TO CONSOLE.
NEGVOCALIZATION: 0

## 2024-10-31 ASSESSMENT — ENCOUNTER SYMPTOMS
OCCASIONAL FEELINGS OF UNSTEADINESS: 0
BLURRED VISION: 1
PERSON REPORTING PAIN: PATIENT
DENIES PAIN: 1

## 2024-11-01 ENCOUNTER — HOME CARE VISIT (OUTPATIENT)
Dept: HOME HEALTH SERVICES | Facility: HOME HEALTH | Age: 69
End: 2024-11-01
Payer: COMMERCIAL

## 2024-11-01 ENCOUNTER — APPOINTMENT (OUTPATIENT)
Dept: CARDIOLOGY | Facility: HOSPITAL | Age: 69
End: 2024-11-01
Payer: COMMERCIAL

## 2024-11-01 ENCOUNTER — HOSPITAL ENCOUNTER (OUTPATIENT)
Facility: HOSPITAL | Age: 69
Setting detail: OUTPATIENT SURGERY
Discharge: HOME | End: 2024-11-01
Attending: INTERNAL MEDICINE | Admitting: INTERNAL MEDICINE
Payer: COMMERCIAL

## 2024-11-01 VITALS
SYSTOLIC BLOOD PRESSURE: 176 MMHG | OXYGEN SATURATION: 100 % | HEART RATE: 71 BPM | HEIGHT: 57 IN | WEIGHT: 155.42 LBS | RESPIRATION RATE: 18 BRPM | BODY MASS INDEX: 33.53 KG/M2 | TEMPERATURE: 97.9 F | DIASTOLIC BLOOD PRESSURE: 72 MMHG

## 2024-11-01 DIAGNOSIS — Z86.711 HISTORY OF PULMONARY EMBOLISM: Primary | ICD-10-CM

## 2024-11-01 DIAGNOSIS — I26.99 ACUTE PULMONARY EMBOLISM WITHOUT ACUTE COR PULMONALE, UNSPECIFIED PULMONARY EMBOLISM TYPE (MULTI): ICD-10-CM

## 2024-11-01 DIAGNOSIS — I82.412 ACUTE EMBOLISM AND THROMBOSIS OF LEFT FEMORAL VEIN (MULTI): ICD-10-CM

## 2024-11-01 DIAGNOSIS — Z95.828 PRESENCE OF IVC FILTER: ICD-10-CM

## 2024-11-01 PROCEDURE — C1773 RET DEV, INSERTABLE: HCPCS | Performed by: INTERNAL MEDICINE

## 2024-11-01 PROCEDURE — 2500000004 HC RX 250 GENERAL PHARMACY W/ HCPCS (ALT 636 FOR OP/ED): Performed by: INTERNAL MEDICINE

## 2024-11-01 PROCEDURE — 99153 MOD SED SAME PHYS/QHP EA: CPT | Performed by: INTERNAL MEDICINE

## 2024-11-01 PROCEDURE — 99152 MOD SED SAME PHYS/QHP 5/>YRS: CPT | Performed by: INTERNAL MEDICINE

## 2024-11-01 PROCEDURE — 7100000001 HC RECOVERY ROOM TIME - INITIAL BASE CHARGE: Performed by: INTERNAL MEDICINE

## 2024-11-01 PROCEDURE — 7100000009 HC PHASE TWO TIME - INITIAL BASE CHARGE: Performed by: INTERNAL MEDICINE

## 2024-11-01 PROCEDURE — 2720000007 HC OR 272 NO HCPCS: Performed by: INTERNAL MEDICINE

## 2024-11-01 PROCEDURE — C1894 INTRO/SHEATH, NON-LASER: HCPCS | Performed by: INTERNAL MEDICINE

## 2024-11-01 PROCEDURE — 93005 ELECTROCARDIOGRAM TRACING: CPT

## 2024-11-01 PROCEDURE — 2500000005 HC RX 250 GENERAL PHARMACY W/O HCPCS: Performed by: INTERNAL MEDICINE

## 2024-11-01 PROCEDURE — 7100000010 HC PHASE TWO TIME - EACH INCREMENTAL 1 MINUTE: Performed by: INTERNAL MEDICINE

## 2024-11-01 PROCEDURE — 93010 ELECTROCARDIOGRAM REPORT: CPT | Performed by: INTERNAL MEDICINE

## 2024-11-01 PROCEDURE — 37193 REM ENDOVAS VENA CAVA FILTER: CPT | Performed by: INTERNAL MEDICINE

## 2024-11-01 PROCEDURE — 7100000002 HC RECOVERY ROOM TIME - EACH INCREMENTAL 1 MINUTE: Performed by: INTERNAL MEDICINE

## 2024-11-01 RX ORDER — MIDAZOLAM HYDROCHLORIDE 1 MG/ML
INJECTION, SOLUTION INTRAMUSCULAR; INTRAVENOUS AS NEEDED
Status: DISCONTINUED | OUTPATIENT
Start: 2024-11-01 | End: 2024-11-01 | Stop reason: HOSPADM

## 2024-11-01 RX ORDER — FENTANYL CITRATE 50 UG/ML
INJECTION, SOLUTION INTRAMUSCULAR; INTRAVENOUS AS NEEDED
Status: DISCONTINUED | OUTPATIENT
Start: 2024-11-01 | End: 2024-11-01 | Stop reason: HOSPADM

## 2024-11-01 RX ORDER — LIDOCAINE HYDROCHLORIDE 20 MG/ML
INJECTION, SOLUTION INFILTRATION; PERINEURAL AS NEEDED
Status: DISCONTINUED | OUTPATIENT
Start: 2024-11-01 | End: 2024-11-01 | Stop reason: HOSPADM

## 2024-11-01 ASSESSMENT — PAIN SCALES - GENERAL
PAINLEVEL_OUTOF10: 0 - NO PAIN

## 2024-11-01 ASSESSMENT — COLUMBIA-SUICIDE SEVERITY RATING SCALE - C-SSRS
1. IN THE PAST MONTH, HAVE YOU WISHED YOU WERE DEAD OR WISHED YOU COULD GO TO SLEEP AND NOT WAKE UP?: NO
2. HAVE YOU ACTUALLY HAD ANY THOUGHTS OF KILLING YOURSELF?: NO
6. HAVE YOU EVER DONE ANYTHING, STARTED TO DO ANYTHING, OR PREPARED TO DO ANYTHING TO END YOUR LIFE?: NO

## 2024-11-01 ASSESSMENT — PAIN - FUNCTIONAL ASSESSMENT: PAIN_FUNCTIONAL_ASSESSMENT: 0-10

## 2024-11-01 NOTE — SIGNIFICANT EVENT
Discharge instructions reviewed with pt including wound care, medications and follow up, verbalized understanding

## 2024-11-01 NOTE — DISCHARGE INSTRUCTIONS
DISCHARGE INSTRUCTIONS     FOR SUDDEN AND SEVERE CHEST PAIN, SHORTNESS OF BREATH, EXCESSIVE BLEEDING, SIGNS OF STROKE, OR CHANGES IN MENTAL STATUS YOU SHOULD CALL 911 IMMEDIATELY.       FOR NEXT 24 HOURS  - Upon discharge, you should return home and rest for the remainder of the day and evening. You do not have to stay on bed rest but should not be very active.  It is recommended a responsible adult be with you for the first 24 hours after the procedure.    - No driving for 24 hours after procedure. Please arrange for someone to drive you home from the hospital today.     - Do not drive, operate machinery, or use power tools for 24 hours after your procedure.     - Do not make any legal decisions for 24 hours after your procedure.     - Do not drink alcoholic beverages for 24 hours after your procedure.    WOUND CARE     - The transparent dressing should be removed from the site 24 hours after the procedure.  Wash the site gently with soap and water. Rinse well and pat dry. Keep the area clean and dry. You may apply a Band-Aid to the site. Avoid lotions, ointments, or powders until fully healed.     - You may shower the day after your procedure.      - It is normal to notice a small bruise around the puncture site and/or a small grape sized or smaller lump. Any large bruising or large lump warrants a call to the office.     - If bleeding should occur apply pressure to the affected area for 10 minutes.  If the bleeding stops notify your physician.  If there is a large amount of bleeding or spurting of blood CALL 911 immediately.  DO NOT drive yourself to the hospital.    - You may experience some tenderness, bruising or minimal inflammation.  If you have any concerns, you may contact the Cath Lab or if any of these symptoms become excessive, contact your cardiologist or go to the emergency room.     OTHER INSTRUCTIONS  - You may take acetaminophen (Tylenol) as directed for discomfort.  If pain is not relieved  with acetaminophen (Tylenol), contact your doctor.    - If you notice or experience any of the following, you should notify your doctor or seek medical attention    Change in the site where the procedure was performed, such as bleeding or an increased area of bruising or swelling.  Signs of infection (i.e. shaking chills, temperature > 100 degrees Fahrenheit, warmth, redness) in the leg/arm area where the procedure was performed.  Changes in urination   Bloody or black stools  Vomiting blood  Severe nose bleeds  Any excessive bleeding

## 2024-11-01 NOTE — SIGNIFICANT EVENT
Pt back from cath lab, awake and alert, right neck dressing dry and intact, no bleeding or hematoma noted, no complaints of pain, family at bedside

## 2024-11-01 NOTE — POST-PROCEDURE NOTE
Physician Transition of Care Summary  Invasive Cardiovascular Lab    Procedure Date: 11/1/2024  Attending:    * Hugo Barron - Primary  Resident/Fellow/Other Assistant: Surgeons and Role:  * No surgeons found with a matching role *    Indications:   Pre-op Diagnosis      * History of pulmonary embolism [Z86.711]     * Presence of IVC filter [Z95.828]    Post-procedure diagnosis:   Post-op Diagnosis     * History of pulmonary embolism [Z86.711]     * Presence of IVC filter [Z95.828]    Procedure(s):   IVC Filter Removal  47550 - [place] RTRVL INTRVAS VC FILTR W/WO ACS VSL SELXN RS&I        Procedure Findings:   IVC filter was removed via the right internal jugular vein under fluoroscopy guidance.  No complication    Description of the Procedure:   Removal of IVC filter    Complications:   None    Stents/Implants:   Implants       No implant documentation for this case.            Anticoagulation/Antiplatelet Plan:   None    Estimated Blood Loss:   * No values recorded between 11/1/2024 10:40 AM and 11/1/2024 11:27 AM *    Anesthesia: Moderate Sedation Anesthesia Staff: No anesthesia staff entered.    Any Specimen(s) Removed:   No specimens collected during this procedure.    Disposition:   Okay to discharge home today      Electronically signed by: Hugo Barron MD, 11/1/2024 11:27 AM

## 2024-11-02 LAB
ATRIAL RATE: 68 BPM
P AXIS: 66 DEGREES
P OFFSET: 193 MS
P ONSET: 140 MS
PR INTERVAL: 142 MS
Q ONSET: 211 MS
QRS COUNT: 11 BEATS
QRS DURATION: 142 MS
QT INTERVAL: 440 MS
QTC CALCULATION(BAZETT): 467 MS
QTC FREDERICIA: 459 MS
R AXIS: 14 DEGREES
T AXIS: 89 DEGREES
T OFFSET: 431 MS
VENTRICULAR RATE: 68 BPM

## 2024-11-02 ASSESSMENT — PAIN SCALES - PAIN ASSESSMENT IN ADVANCED DEMENTIA (PAINAD)
BODYLANGUAGE: 0
BREATHING: 0
TOTALSCORE: 0
BODYLANGUAGE: 0 - RELAXED.
CONSOLABILITY: 0
FACIALEXPRESSION: 0 - SMILING OR INEXPRESSIVE.
CONSOLABILITY: 0 - NO NEED TO CONSOLE.
NEGVOCALIZATION: 0 - NONE.
NEGVOCALIZATION: 0
FACIALEXPRESSION: 0

## 2024-11-02 ASSESSMENT — ENCOUNTER SYMPTOMS
OCCASIONAL FEELINGS OF UNSTEADINESS: 0
BLURRED VISION: 1

## 2024-11-04 ENCOUNTER — HOME CARE VISIT (OUTPATIENT)
Dept: HOME HEALTH SERVICES | Facility: HOME HEALTH | Age: 69
End: 2024-11-04
Payer: COMMERCIAL

## 2024-11-04 VITALS
TEMPERATURE: 97.3 F | HEART RATE: 74 BPM | OXYGEN SATURATION: 96 % | SYSTOLIC BLOOD PRESSURE: 126 MMHG | RESPIRATION RATE: 18 BRPM | DIASTOLIC BLOOD PRESSURE: 72 MMHG

## 2024-11-04 PROCEDURE — G0299 HHS/HOSPICE OF RN EA 15 MIN: HCPCS | Mod: HHH

## 2024-11-04 ASSESSMENT — ENCOUNTER SYMPTOMS
PAIN LOCATION - PAIN DURATION: INTERMITTENT
PAIN: 1
PAIN SEVERITY GOAL: 0/10
PAIN LOCATION - RELIEVING FACTORS: REST/RX
PAIN LOCATION - PAIN QUALITY: ACHE
PAIN LOCATION - PAIN SEVERITY: 4/10
PAIN LOCATION: BACK
LOWEST PAIN SEVERITY IN PAST 24 HOURS: 2/10
PAIN LOCATION - PAIN FREQUENCY: INTERMITTENT
HIGHEST PAIN SEVERITY IN PAST 24 HOURS: 4/10
SUBJECTIVE PAIN PROGRESSION: UNCHANGED
PERSON REPORTING PAIN: PATIENT

## 2024-11-04 ASSESSMENT — PAIN SCALES - PAIN ASSESSMENT IN ADVANCED DEMENTIA (PAINAD)
FACIALEXPRESSION: 0
TOTALSCORE: 0
BREATHING: 0
CONSOLABILITY: 0
CONSOLABILITY: 0 - NO NEED TO CONSOLE.
NEGVOCALIZATION: 0
BODYLANGUAGE: 0 - RELAXED.
BODYLANGUAGE: 0
NEGVOCALIZATION: 0 - NONE.
FACIALEXPRESSION: 0 - SMILING OR INEXPRESSIVE.

## 2024-11-04 ASSESSMENT — ACTIVITIES OF DAILY LIVING (ADL)
AMBULATION ASSISTANCE: 1
OASIS_M1830: 00
HOME_HEALTH_OASIS: 00
AMBULATION ASSISTANCE: INDEPENDENT
PHYSICAL TRANSFERS ASSESSED: 1
CURRENT_FUNCTION: INDEPENDENT

## 2024-11-07 ENCOUNTER — OFFICE VISIT (OUTPATIENT)
Dept: WOUND CARE | Facility: CLINIC | Age: 69
End: 2024-11-07
Payer: COMMERCIAL

## 2024-11-07 PROCEDURE — 87205 SMEAR GRAM STAIN: CPT | Mod: OUT

## 2024-11-07 PROCEDURE — 11043 DBRDMT MUSC&/FSCA 1ST 20/<: CPT | Performed by: PLASTIC SURGERY

## 2024-11-07 PROCEDURE — 87070 CULTURE OTHR SPECIMN AEROBIC: CPT | Mod: OUT | Performed by: PLASTIC SURGERY

## 2024-11-07 PROCEDURE — 87075 CULTR BACTERIA EXCEPT BLOOD: CPT | Mod: OUT

## 2024-11-07 PROCEDURE — 11043 DBRDMT MUSC&/FSCA 1ST 20/<: CPT

## 2024-11-07 PROCEDURE — 1123F ACP DISCUSS/DSCN MKR DOCD: CPT | Performed by: PLASTIC SURGERY

## 2024-11-08 ENCOUNTER — LAB REQUISITION (OUTPATIENT)
Dept: LAB | Facility: HOSPITAL | Age: 69
End: 2024-11-08
Payer: COMMERCIAL

## 2024-11-08 DIAGNOSIS — F17.210 NICOTINE DEPENDENCE, CIGARETTES, UNCOMPLICATED: ICD-10-CM

## 2024-11-08 DIAGNOSIS — L98.493 NON-PRESSURE CHRONIC ULCER OF SKIN OF OTHER SITES WITH NECROSIS OF MUSCLE: ICD-10-CM

## 2024-11-08 DIAGNOSIS — T81.31XA DISRUPTION OF EXTERNAL OPERATION (SURGICAL) WOUND, NOT ELSEWHERE CLASSIFIED, INITIAL ENCOUNTER: ICD-10-CM

## 2024-11-08 DIAGNOSIS — M86.68 OTHER CHRONIC OSTEOMYELITIS, OTHER SITE: ICD-10-CM

## 2024-11-08 DIAGNOSIS — S31.000A UNSPECIFIED OPEN WOUND OF LOWER BACK AND PELVIS WITHOUT PENETRATION INTO RETROPERITONEUM, INITIAL ENCOUNTER: ICD-10-CM

## 2024-11-10 LAB
BACTERIA SPEC CULT: NORMAL
GRAM STN SPEC: NORMAL
GRAM STN SPEC: NORMAL

## 2024-11-12 ENCOUNTER — OFFICE VISIT (OUTPATIENT)
Dept: ORTHOPEDIC SURGERY | Facility: CLINIC | Age: 69
End: 2024-11-12
Payer: COMMERCIAL

## 2024-11-12 ENCOUNTER — HOSPITAL ENCOUNTER (OUTPATIENT)
Dept: RADIOLOGY | Facility: CLINIC | Age: 69
Discharge: HOME | End: 2024-11-12
Payer: COMMERCIAL

## 2024-11-12 DIAGNOSIS — M54.50 LOW BACK PAIN, UNSPECIFIED BACK PAIN LATERALITY, UNSPECIFIED CHRONICITY, UNSPECIFIED WHETHER SCIATICA PRESENT: ICD-10-CM

## 2024-11-12 PROCEDURE — 99214 OFFICE O/P EST MOD 30 MIN: CPT | Performed by: ORTHOPAEDIC SURGERY

## 2024-11-12 PROCEDURE — 1123F ACP DISCUSS/DSCN MKR DOCD: CPT | Performed by: ORTHOPAEDIC SURGERY

## 2024-11-12 PROCEDURE — 72100 X-RAY EXAM L-S SPINE 2/3 VWS: CPT | Performed by: ORTHOPAEDIC SURGERY

## 2024-11-12 PROCEDURE — 72100 X-RAY EXAM L-S SPINE 2/3 VWS: CPT

## 2024-11-12 NOTE — PROGRESS NOTES
Chief complaint: 1 year out from primary surgery and 11 months out from irrigation debridement.    HPI: Patient says she is doing quite well.  Dr. Reyes is still managing her wound which is almost completely healed.  She has some back pain.  However she is significantly better compared to before surgery and functioning normally.  As the day goes by her pain increases but all in all she is happy with how she is doing.  No fevers chills nausea vomiting.    Physical exam: Wound is almost completely healed.  It is open a few centimeters.  No redness warmth swelling or puffiness.  That is being handled by plastic surgery.  She has good strength in her legs.  She walks normally.  She can get up on her heels and toes.    X-rays were ordered and reviewed today.  It shows fusion mass in the lateral gutters.  Alignment is unchanged from May.  Looks like she stabilized in.    Assessment/plan: All in all patient doing well after multiple washouts for an MRSA infection.  Seems to be stable at this point.  Nothing more to do from a surgical standpoint.  We will let her engage in activities as tolerated.  She can follow-up with me on a as needed basis.  She has 2 chronic stable problems of back pain and some leg pain.  Most recent wound tissue smear was reviewed and it shows no growth aerobically or anaerobically.

## 2024-11-14 ENCOUNTER — OFFICE VISIT (OUTPATIENT)
Dept: WOUND CARE | Facility: CLINIC | Age: 69
End: 2024-11-14
Payer: COMMERCIAL

## 2024-11-14 ENCOUNTER — APPOINTMENT (OUTPATIENT)
Dept: CARDIOLOGY | Facility: CLINIC | Age: 69
End: 2024-11-14
Payer: COMMERCIAL

## 2024-11-14 VITALS
SYSTOLIC BLOOD PRESSURE: 122 MMHG | WEIGHT: 154.6 LBS | BODY MASS INDEX: 33.36 KG/M2 | HEART RATE: 68 BPM | DIASTOLIC BLOOD PRESSURE: 70 MMHG | HEIGHT: 57 IN

## 2024-11-14 DIAGNOSIS — I71.40 ABDOMINAL AORTIC ANEURYSM (AAA) WITHOUT RUPTURE, UNSPECIFIED PART (CMS-HCC): ICD-10-CM

## 2024-11-14 DIAGNOSIS — I42.9 CARDIOMYOPATHY, UNSPECIFIED TYPE (MULTI): ICD-10-CM

## 2024-11-14 DIAGNOSIS — Z86.711 HISTORY OF PULMONARY EMBOLISM: ICD-10-CM

## 2024-11-14 DIAGNOSIS — I25.10 CAD IN NATIVE ARTERY: ICD-10-CM

## 2024-11-14 DIAGNOSIS — F17.200 CURRENT EVERY DAY SMOKER: ICD-10-CM

## 2024-11-14 DIAGNOSIS — E87.6 HYPOKALEMIA: ICD-10-CM

## 2024-11-14 DIAGNOSIS — I10 ESSENTIAL HYPERTENSION: ICD-10-CM

## 2024-11-14 PROCEDURE — 1123F ACP DISCUSS/DSCN MKR DOCD: CPT | Performed by: INTERNAL MEDICINE

## 2024-11-14 PROCEDURE — 3008F BODY MASS INDEX DOCD: CPT | Performed by: INTERNAL MEDICINE

## 2024-11-14 PROCEDURE — 3078F DIAST BP <80 MM HG: CPT | Performed by: INTERNAL MEDICINE

## 2024-11-14 PROCEDURE — 1159F MED LIST DOCD IN RCRD: CPT | Performed by: INTERNAL MEDICINE

## 2024-11-14 PROCEDURE — 1036F TOBACCO NON-USER: CPT | Performed by: INTERNAL MEDICINE

## 2024-11-14 PROCEDURE — 11043 DBRDMT MUSC&/FSCA 1ST 20/<: CPT | Performed by: PLASTIC SURGERY

## 2024-11-14 PROCEDURE — 3074F SYST BP LT 130 MM HG: CPT | Performed by: INTERNAL MEDICINE

## 2024-11-14 PROCEDURE — 99213 OFFICE O/P EST LOW 20 MIN: CPT | Performed by: INTERNAL MEDICINE

## 2024-11-14 PROCEDURE — 11043 DBRDMT MUSC&/FSCA 1ST 20/<: CPT

## 2024-11-14 RX ORDER — POTASSIUM CHLORIDE 750 MG/1
10 CAPSULE, EXTENDED RELEASE ORAL DAILY
Qty: 90 CAPSULE | Refills: 3 | Status: SHIPPED | OUTPATIENT
Start: 2024-11-14

## 2024-11-14 RX ORDER — BENZONATATE 200 MG/1
CAPSULE ORAL
COMMUNITY
Start: 2024-10-28

## 2024-11-14 NOTE — PATIENT INSTRUCTIONS
Follow up office visit in 9 months.  Continue same medications/treatment.  Patient educated on proper medication use.  Patient educated on risk factor modification.  Please bring any lab results from other providers / physicians to your next appointment.    Please bring all medicines, vitamins and herbal supplements with you when you come to the office.    Prescriptions will not be filled unless you are compliant with your follow up appointments or have a follow up  appointment scheduled as per instruction of your physician.  Refills should be requested at the time of  Your visit.    Renee ESPINAL LPN, am scribing for and in the presence of  Dr. Hugo Barron MD, FACC

## 2024-11-14 NOTE — PROGRESS NOTES
Referred by Dr. Terry ref. provider found provider found for   Chief Complaint   Patient presents with    Post-Cath     FOLLOW UP S/P IVC FILTER REMOVAL        History of Present Illness  Tara Tierney is a 69 y.o. year old female patient here for follow-up following removal of IVC filter procedure was uncomplicated.  Doing well she is back on her Eliquis.  No complaint no symptoms of chest pain or shortness of breath.  The puncture site in the right intraventricular area appears to be healing well.  I discussed with the patient that we will continue medication will call for any problem and follow-up as scheduled    Past Medical History  Past Medical History:   Diagnosis Date    Arthritis     Asthma     Back pain     COPD (chronic obstructive pulmonary disease) (Multi)     COVID-19 vaccine administered     Eczema     History of COVID-19     2020    Hyperlipidemia     Hypertension     Hypoesthesia of skin     Hypesthesia    Macular degeneration     Meniere's disease     Osteoporosis     Overactive bladder     Pain in unspecified finger(s)     Finger pain    Pain in unspecified wrist     Pain in wrist joint    Peripheral vascular disease (CMS-HCC)     Personal history of other diseases of the circulatory system     History of coronary artery disease    Personal history of other diseases of the musculoskeletal system and connective tissue     History of arthritis    Personal history of other specified conditions     History of chest pain    Personal history of other specified conditions     History of balance disorder    Personal history of other specified conditions     History of numbness    Postmenopausal     Seasonal allergies     Unspecified visual loss     Vision problems       Social History  Social History     Tobacco Use    Smoking status: Former     Current packs/day: 0.00     Average packs/day: 0.5 packs/day for 45.0 years (22.5 ttl pk-yrs)     Types: Cigarettes     Start date: 9/1/1978     Quit date:  2023     Years since quittin.2    Smokeless tobacco: Never   Vaping Use    Vaping status: Never Used   Substance Use Topics    Alcohol use: Never    Drug use: Never       Family History     Family History   Problem Relation Name Age of Onset    Breast cancer Mother      Other (arteriosclerotic cardiovascular disease) Father      Cancer Father         Review of Systems  As per HPI, all other systems reviewed and negative.    Allergies:  Allergies   Allergen Reactions    Neomycin Other     swelling, redness, burning post eye surgery    Neomycin-Polymyxin B-Dexameth Other and Rash     blisters, eye swelling, burning        Outpatient Medications:  Current Outpatient Medications   Medication Instructions    acetaminophen (TYLENOL ARTHRITIS PAIN) 650 mg, Every 8 hours PRN    albuterol (ProAir HFA) 90 mcg/actuation inhaler 2 puffs, Every 4 hours PRN    apixaban (ELIQUIS) 5 mg, oral, 2 times daily, Resume 2024    aspirin 81 mg EC tablet 1 tablet, Daily    atenolol (Tenormin) 50 mg tablet 1 tablet, Every morning    baclofen (Lioresal) 10 mg tablet Take half a tab 4 times daily as needed for muscle spasms for up to 10 days    benzonatate (Tessalon) 200 mg capsule TAKE ONE CAPSULE BY MOUTH EVERY 12 HOURS AS NEEDED FOR COUGH    brimonidine (AlphaGAN) 0.2 % ophthalmic solution 1 drop, 2 times daily    busPIRone (Buspar) 10 mg tablet 1 tablet, Daily    cholecalciferol (Vitamin D-3) 50 mcg (2,000 unit) capsule 1 capsule, Daily    clotrimazole-betamethasone (Lotrisone) cream prn    cyclobenzaprine (FLEXERIL) 5 mg, oral, 3 times daily PRN    ezetimibe (Zetia) 10 mg tablet 1 tablet, Daily    furosemide (Lasix) 20 mg tablet 1 tablet, Daily    gentamicin (Garamycin) 0.1 % cream As directed    hydrALAZINE (APRESOLINE) 50 mg, oral, 2 times daily    HYDROmorphone (DILAUDID) 2 mg, oral, Every 4 hours PRN    ibandronate (Boniva) 150 mg tablet 1 tablet, Every 30 days    ipratropium (Atrovent HFA) 17 mcg/actuation inhaler 2  puffs, 2 times daily RT    isosorbide mononitrate ER (Imdur) 60 mg 24 hr tablet TAKE ONE TABLET BY MOUTH EVERY DAY in addition to 30mg tablet    isosorbide mononitrate ER (IMDUR) 30 mg, oral, Daily, 1 tab daily in addition to 60 mg= 90 mg once daily    loratadine (Claritin) 10 mg tablet 1 tablet, Daily    meclizine (Antivert) 25 mg tablet 1 tablet, Every 8 hours PRN    mirtazapine (Remeron) 15 mg tablet 1 tablet, Nightly    morphine CR (MS Contin) 15 mg 12 hr tablet 1 tablet, Daily    nitroglycerin (Nitrostat) 0.4 mg SL tablet 1 tablet, Every 5 min PRN    oxyCODONE-acetaminophen (Percocet) 5-325 mg tablet 1 tablet, oral, Every 6 hours PRN    pantoprazole (ProtoNix) 40 mg EC tablet 1 tablet, Daily before breakfast    pilocarpine (Salagen, pilocarpine,) 5 mg tablet 1 tablet, Daily RT    potassium chloride ER (Micro-K) 10 mEq ER capsule 10 mEq, oral, Daily    pregabalin (Lyrica) 75 mg capsule 1 capsule, 2 times daily    rosuvastatin (CRESTOR) 20 mg, Daily    sterile water (Sterile Water for Injection) injection 10 mL, intravenous, As needed    sulfamethoxazole-trimethoprim (Bactrim DS) 800-160 mg tablet 1 tablet, 2 times daily    tiZANidine (ZANAFLEX) 4 mg, Daily    triamterene-hydrochlorothiazid (Dyazide) 37.5-25 mg capsule 1 capsule, Every morning    vit C,S-Nw-tuxah-lutein-zeaxan (PreserVision AREDS-2) 250-90-40-1 mg capsule 1 capsule, 2 times daily         Vitals:  Vitals:    11/14/24 0924   BP: 122/70   Pulse: 68       Physical Exam:  Physical Exam  Vitals and nursing note reviewed.   Constitutional:       Appearance: Normal appearance.   HENT:      Head: Normocephalic.   Eyes:      Pupils: Pupils are equal, round, and reactive to light.   Cardiovascular:      Rate and Rhythm: Normal rate and regular rhythm.      Pulses: Normal pulses.      Heart sounds: Normal heart sounds.   Pulmonary:      Effort: Pulmonary effort is normal.      Breath sounds: Normal breath sounds.   Musculoskeletal:         General: Normal  range of motion.      Cervical back: Normal range of motion.   Skin:     General: Skin is dry.   Neurological:      General: No focal deficit present.      Mental Status: She is alert and oriented to person, place, and time.   Psychiatric:         Behavior: Behavior is cooperative.             Assessment/Plan   Diagnoses and all orders for this visit:  Abdominal aortic aneurysm (AAA) without rupture, unspecified part (CMS-HCC)  CAD in native artery  Cardiomyopathy, unspecified type (Multi)  Essential hypertension  History of pulmonary embolism  Hypokalemia  BMI 33.0-33.9,adult  Current every day smoker          Hugo Barron MD [facility]  Interventional Cardiology   of [facility]     Thank you for allowing me to participate in the care of this patient. Please do not hesitate to contact me with any further questions or concerns.

## 2024-11-21 ENCOUNTER — OFFICE VISIT (OUTPATIENT)
Dept: WOUND CARE | Facility: CLINIC | Age: 69
End: 2024-11-21
Payer: COMMERCIAL

## 2024-11-21 PROCEDURE — 99212 OFFICE O/P EST SF 10 MIN: CPT | Performed by: PLASTIC SURGERY

## 2024-11-21 PROCEDURE — 99212 OFFICE O/P EST SF 10 MIN: CPT

## 2024-12-26 ENCOUNTER — HOSPITAL ENCOUNTER (OUTPATIENT)
Dept: RADIOLOGY | Facility: HOSPITAL | Age: 69
Discharge: HOME | End: 2024-12-26
Payer: COMMERCIAL

## 2024-12-26 DIAGNOSIS — I65.23 OCCLUSION AND STENOSIS OF BILATERAL CAROTID ARTERIES: ICD-10-CM

## 2024-12-26 PROCEDURE — 93880 EXTRACRANIAL BILAT STUDY: CPT

## 2025-01-17 DIAGNOSIS — I26.99 ACUTE PULMONARY EMBOLISM WITHOUT ACUTE COR PULMONALE, UNSPECIFIED PULMONARY EMBOLISM TYPE (MULTI): ICD-10-CM

## 2025-01-17 NOTE — TELEPHONE ENCOUNTER
Patient called nursing line and left message to call her back concerning a medication. I called patient back and  she stated would Dr Barron refill Eliquis 5 mg BID for me. Review of chart shows Dr Barron did IVC filter for patient and sees her regularly. Will send to him  to refill. Patient uses [facility] - ASO Dr. Waldrop.

## 2025-01-23 ENCOUNTER — LAB (OUTPATIENT)
Dept: LAB | Facility: LAB | Age: 70
End: 2025-01-23
Payer: COMMERCIAL

## 2025-01-23 DIAGNOSIS — T81.49XA SURGICAL WOUND INFECTION: ICD-10-CM

## 2025-01-23 DIAGNOSIS — M46.46 DISCITIS, UNSPECIFIED, LUMBAR REGION: ICD-10-CM

## 2025-01-23 DIAGNOSIS — A49.02 METHICILLIN RESISTANT STAPHYLOCOCCUS AUREUS INFECTION, UNSPECIFIED SITE: Primary | ICD-10-CM

## 2025-01-23 LAB
ANION GAP SERPL CALC-SCNC: 9 MMOL/L (ref 10–20)
BUN SERPL-MCNC: 23 MG/DL (ref 6–23)
CALCIUM SERPL-MCNC: 9.4 MG/DL (ref 8.6–10.3)
CHLORIDE SERPL-SCNC: 101 MMOL/L (ref 98–107)
CO2 SERPL-SCNC: 32 MMOL/L (ref 21–32)
CREAT SERPL-MCNC: 1.15 MG/DL (ref 0.5–1.05)
EGFRCR SERPLBLD CKD-EPI 2021: 51 ML/MIN/1.73M*2
ERYTHROCYTE [DISTWIDTH] IN BLOOD BY AUTOMATED COUNT: 16.3 % (ref 11.5–14.5)
ERYTHROCYTE [SEDIMENTATION RATE] IN BLOOD BY WESTERGREN METHOD: 5 MM/H (ref 0–30)
GLUCOSE SERPL-MCNC: 108 MG/DL (ref 74–99)
HCT VFR BLD AUTO: 39.9 % (ref 36–46)
HGB BLD-MCNC: 12.7 G/DL (ref 12–16)
MCH RBC QN AUTO: 28.8 PG (ref 26–34)
MCHC RBC AUTO-ENTMCNC: 31.8 G/DL (ref 32–36)
MCV RBC AUTO: 91 FL (ref 80–100)
NRBC BLD-RTO: 0 /100 WBCS (ref 0–0)
PLATELET # BLD AUTO: 191 X10*3/UL (ref 150–450)
POTASSIUM SERPL-SCNC: 3.6 MMOL/L (ref 3.5–5.3)
RBC # BLD AUTO: 4.41 X10*6/UL (ref 4–5.2)
SODIUM SERPL-SCNC: 138 MMOL/L (ref 136–145)
WBC # BLD AUTO: 6.4 X10*3/UL (ref 4.4–11.3)

## 2025-01-23 PROCEDURE — 85652 RBC SED RATE AUTOMATED: CPT

## 2025-01-23 PROCEDURE — 85027 COMPLETE CBC AUTOMATED: CPT

## 2025-01-23 PROCEDURE — 80048 BASIC METABOLIC PNL TOTAL CA: CPT

## 2025-04-02 NOTE — PROGRESS NOTES
Post-operative Discharge Instructions for Laparoscopic Surgery  Dr. Zacarias Tamayo, Pediatric Urology    Please read these instructions carefully before leaving the hospital. If you have any questions, please ask your physician or nurse.    General Activity  - Children may generally return to school or  after 2-5 days.  - Swimming may resume in 2 weeks.  - Gym class, running, and other vigorous activity should be avoided for 1 month.    Diet: All children may resume their regular diet on the day of surgery. It is a good idea to start \"slow\" with low-fat, non-greasy items like apple juice, crackers, and toast.    Bathing: Sponge bathe for 2 days after surgery to keep the incisions dry. Tub bathing and/or showering may resume in 3 days.      Pain Medications  Older than 6 months: Alternate Tylenol and Motrin (ibuprofen) every 3 hours (see dosing guide below). If this does not seem adequate, please contact Dr. Tamayo at the number below.  Under 6 months: Give Tylenol every 6 hours (see dosing guide below). If this does not seem adequate, please contact Dr. Tamayo at the number below.    Dressing and Stitches  All stitches are internal and will dissolve on their own. A clear or purplish skin glue is used to cover the incisions; this will peel off slowly. Please discourage your child from picking at the skin glue. If this is an issue, consider covering the incisions with a Bandaid.    What is Abnormal?  Temperature greater than 101.5°F  Bleeding from the incision sites  Drainage or odor from the incision site  Consistently worsening pain  Persistent nausea or vomiting    Contact Information & Follow-up Appointments  For urgent questions or concerns, please call Dr. Tamayo's answering service at [phone] at any time.    Please schedule an in-person follow-up appointment for 6-8 weeks after surgery. Call [phone] to schedule this appointment.    ---    Instructions for Ibuprofen (Motrin, Advil) and  Physical Therapy    Physical Therapy Treatment    Patient Name: Tara Tierney  MRN: [id number]  Today's Date: 11/21/2023  Time Calculation  Start Time: 1356  Stop Time: 1421  Time Calculation (min): 25 min       Assessment/Plan   PT Assessment  PT Assessment Results: Impaired balance, Decreased mobility, Decreased safety awareness  Rehab Prognosis: Good  Evaluation/Treatment Tolerance: Patient tolerated treatment well  End of Session Communication: Bedside nurse  Assessment Comment: Patient will benfit from additional PT to reveiw spinal precautions, use of ww and TLSO  End of Session Patient Position: Up in chair, Alarm on     Treatment/Interventions: Transfer training, Gait training     PT Frequency: BID    PT Recommended Transfer Status: Assist x1, Assistive device    General Visit Information:   PT  Visit  PT Received On: 11/21/23  General  Reason for Referral: s/p L3,4, L4,5, L5S1 laminectomy; L3,4, PLIF, L3,4, posterior lateral fusion 11/20//23 [place]  Referred By: PT/OT 11/2/0/23 [facility]  Patient Position Received: Bed, 2 rail up, Alarm off, not on at start of session (TLSO brace in place upon arrival , pt stating she thought she was suppose to remove it prior to getting in bed)  General Comment: pt agreeable to participate , states she just returned to bed for transport from other floor , agreeable to sitting up in chair    General Observations:   General Observation: Patient lying in bed. Telemetry, Purewick exernal catheter discontinued. Agreeable to PT/OT. (TLSO brace in place upon arrival)    Subjective     Precautions:  Precautions  Medical Precautions: Fall precautions  Post-Surgical Precautions: Spinal precautions  Prosthesis/Orthosis Used:  (TLSO when up ambulating)  Precautions Comment: pt able to recall precautions , states  that she needs to have  TLSO on for gait but off when in bed or sitting, pt able to maintain precautions, needs asssitance to ahnnah /doff brace    Vital Signs:      Objective     Pain:  Pain Assessment  Pain Assessment: 0-10  Pain Score: 9  Pain Type: Surgical pain, Acute pain  Pain Location: Back  Pain Orientation: Mid, Lower  Pain Descriptors: Burning  Pain Frequency: Constant/continuous  Pain Interventions: Cold applied    Cognition:  Cognition  Overall Cognitive Status: Within Functional Limits  Orientation Level: Oriented X4    Postural Control:       Extremity/Trunk Assessments:  RUE   RUE : Within Functional Limits             Activity Tolerance:       Treatments:           Bed Mobility  Bed Mobility:  (transfers supine to sit with mod A and vc to maintain log rolling technique, pt limited by pain , nursing notified)  Ambulation/Gait Training  Ambulation/Gait Training Performed:  (pt ambulating 20ft and 10ft with WW and Min A using slow short steps and guarded posture)  Transfers  Transfer:  (transfers sit <--> stand with WW and Min A from commode with use of grab bars , CGA  and vc for hand placement from bed level and from chair with arms)          Outcome Measures:  [facility] Basic Mobility  Turning from your back to your side while in a flat bed without using bedrails: A little  Moving from lying on your back to sitting on the side of a flat bed without using bedrails: A little  Moving to and from bed to chair (including a wheelchair): A little  Standing up from a chair using your arms (e.g. wheelchair or bedside chair): A little  To walk in hospital room: A little  Climbing 3-5 steps with railing: A lot  Basic Mobility - Total Score: 17  Education Documentation  Precautions, taught by Tessa Trevizo PTA at 11/21/2023  3:05 PM.  Learner: Patient  Readiness: Acceptance  Method: Explanation  Response: Verbalizes Understanding, Needs Reinforcement    Body Mechanics, taught by Tessa Trevizo PTA at 11/21/2023  3:05 PM.  Learner: Patient  Readiness: Acceptance  Method: Explanation  Response: Verbalizes Understanding, Needs Reinforcement    Mobility Training, taught by  Acetaminophen (Tylenol) After Surgery    Patient's Weight: 26.8 kg    Dosing:  For liquid ibuprofen/acetaminophen: 12 mL per dose  For solid or chewable tablets:   -- 200 mg of ibuprofen   -- 325 mg of acetaminophen (please do NOT use the Extended-Release form)    Instructions:   1. For liquid medication, confirm that the concentration of your ibuprofen is 100 mg/5 mL and your acetaminophen is 160 mg/5 mL.   2. Unless directed otherwise, you may use/give full doses of ibuprofen and acetaminophen as often as every 6 hours.  3. Alternating ibuprofen and acetaminophen every 3 hours is recommended.    RN: Please complete this section when the patient is readying to go home    Ibuprofen or other NSAID's (ketorolac, etc.)  Last dose was given at: 11am  Next dose can be given at (6h later): 5pm    Acetaminophen  Last dose was given at: 11:30am  Next dose can be given at (6h later): 5:30pm    Recovery After Anesthesia (Child)  Your child was given medicine to get ready for a procedure. This may have included both a pain medicine and a sleeping medicine. Most of the effects will wear off before your child goes home. But drowsiness may continue for the first 6 to 8 hours after the procedure.  Home care  Follow these guidelines after your child returns home:  Watch your child closely for the first 12 to 24 hours after the procedure. Don’t leave your child alone in the bath or near water. Don't let your child skateboard, skate, or ride a bicycle until he or she is fully alert and has normal balance. This is to help prevent injuries.  It’s OK to let your child sleep. But always ask your child's healthcare provider how often you should wake your child. When you wake your child, check for the signs in When to seek medical advice (below).  Follow-up care  Follow up with your child's healthcare provider, or as advised. Call your child's healthcare provider if you have any concerns about how your child is breathing. Also call your  Tessa Trevizo, PTA at 11/21/2023  3:05 PM.  Learner: Patient  Readiness: Acceptance  Method: Explanation  Response: Verbalizes Understanding, Needs Reinforcement    Education Comments  No comments found.        EDUCATION:  Outpatient Education  Individual(s) Educated: Patient  Education Provided: Fall Risk, Body Mechanics, Post-Op Precautions  Patient Response to Education: Patient/Caregiver Verbalized Understanding of Information    Encounter Problems       Encounter Problems (Active)       PT Problem       Bed mobility supine <> side lying <> sit modified independent  (Progressing)       Start:  11/21/23    Expected End:  12/05/23            Transfers sit <.> stand with ww modified independent  (Progressing)       Start:  11/21/23    Expected End:  12/05/23            Patient to ambulate with ww 50' modified independent  (Progressing)       Start:  11/21/23    Expected End:  12/05/23                   child's healthcare provider if you are concerned about your child's reaction to the procedure or medicine.  When to seek medical advice  Call your child's healthcare provider right away if any of these occur:  Drowsiness that gets worse  Unable to wake your child as usual  Weakness or dizziness  Cough  Fast breathing. One breath is counted each time your child breathes in and out.  For  to 6 weeks old, more than 60 breaths per minute  For a child 6 weeks to 2 years, more than 45 breaths per minute  For a child 3 to 6 years old, more than 35 breaths per minute  For a child 7 to 10 years old, more than 30 breaths per minute  For a child older than 10, more than 25 breaths per minute  Slow breathing:  For  to 6 weeks old, fewer than 25 breaths per minute  For a child 6 weeks to 1 year, fewer than 20 breaths per minute  For a child 1 to 3 years old, fewer than 18 breaths per minute  For a child 4 to 6 years old, fewer than 16 breaths per minute  For a child 7 to 9 years old, fewer than 14 breaths per minute  For a child 10 to 14 years old, fewer than 12 breaths per minute  For a child older than 14, fewer than 10 breaths per minute  Date Last Reviewed: 10/1/2016  © 2612-3662 [employer]. [address] [place] [address]. All rights reserved. This information is not intended as a substitute for professional medical care. Always follow your healthcare professional's instructions.    Step-by-Step  Washing Your Hands    Date Last Reviewed: 2017  © 6466-6006 [employer]. [address] [place] [address]. All rights reserved. This information is not intended as a substitute for professional medical care. Always follow your healthcare professional's instructions.

## 2025-04-17 ENCOUNTER — OFFICE VISIT (OUTPATIENT)
Age: 70
End: 2025-04-17
Payer: COMMERCIAL

## 2025-04-17 VITALS
HEIGHT: 57 IN | TEMPERATURE: 98.4 F | HEART RATE: 79 BPM | SYSTOLIC BLOOD PRESSURE: 124 MMHG | OXYGEN SATURATION: 99 % | BODY MASS INDEX: 35.81 KG/M2 | DIASTOLIC BLOOD PRESSURE: 60 MMHG | RESPIRATION RATE: 18 BRPM | WEIGHT: 166 LBS

## 2025-04-17 DIAGNOSIS — M86.68 CHRONIC OSTEOMYELITIS OF LUMBAR SPINE (HCC): Primary | ICD-10-CM

## 2025-04-17 DIAGNOSIS — A49.02 MRSA (METHICILLIN RESISTANT STAPHYLOCOCCUS AUREUS) INFECTION: ICD-10-CM

## 2025-04-17 PROCEDURE — 1036F TOBACCO NON-USER: CPT | Performed by: INTERNAL MEDICINE

## 2025-04-17 PROCEDURE — G8399 PT W/DXA RESULTS DOCUMENT: HCPCS | Performed by: INTERNAL MEDICINE

## 2025-04-17 PROCEDURE — G8427 DOCREV CUR MEDS BY ELIG CLIN: HCPCS | Performed by: INTERNAL MEDICINE

## 2025-04-17 PROCEDURE — 1090F PRES/ABSN URINE INCON ASSESS: CPT | Performed by: INTERNAL MEDICINE

## 2025-04-17 PROCEDURE — 3017F COLORECTAL CA SCREEN DOC REV: CPT | Performed by: INTERNAL MEDICINE

## 2025-04-17 PROCEDURE — G8417 CALC BMI ABV UP PARAM F/U: HCPCS | Performed by: INTERNAL MEDICINE

## 2025-04-17 PROCEDURE — 1159F MED LIST DOCD IN RCRD: CPT | Performed by: INTERNAL MEDICINE

## 2025-04-17 PROCEDURE — 99213 OFFICE O/P EST LOW 20 MIN: CPT | Performed by: INTERNAL MEDICINE

## 2025-04-17 PROCEDURE — 1123F ACP DISCUSS/DSCN MKR DOCD: CPT | Performed by: INTERNAL MEDICINE

## 2025-04-17 RX ORDER — SULFAMETHOXAZOLE AND TRIMETHOPRIM 800; 160 MG/1; MG/1
1 TABLET ORAL 2 TIMES DAILY
Qty: 60 TABLET | Refills: 0 | Status: SHIPPED | OUTPATIENT
Start: 2025-04-17 | End: 2025-05-17

## 2025-04-17 ASSESSMENT — PATIENT HEALTH QUESTIONNAIRE - PHQ9
SUM OF ALL RESPONSES TO PHQ QUESTIONS 1-9: 0
2. FEELING DOWN, DEPRESSED OR HOPELESS: NOT AT ALL
1. LITTLE INTEREST OR PLEASURE IN DOING THINGS: NOT AT ALL
SUM OF ALL RESPONSES TO PHQ QUESTIONS 1-9: 0

## 2025-04-17 ASSESSMENT — ENCOUNTER SYMPTOMS: GASTROINTESTINAL NEGATIVE: 1

## 2025-04-17 NOTE — PROGRESS NOTES
Carmencita Arce (:  1955) is a 69 y.o. female,Established patient, here for evaluation of the following chief complaint(s):  spinal wound (6 month f/u)         Assessment & Plan  Chronic osteomyelitis of lumbar spine (HCC)   Chronic, at goal (stable), continue Bactrim double strength 1 tablet p.o. twice daily till next visit.  Referred back to Dr. Reyes for shark fan buildup on the incisional back wound.   Patient is asking about stopping antibiotics.  This may be a consideration since hardware is removed from the back and once incision is completely healed without any need for additional debridement.  This is to be determined during the next visit.   Patient has knee prosthetic joints and is concerned about MRSA recurring  Orders:    CBC; Future    Basic Metabolic Panel; Future    MRSA (methicillin resistant Staphylococcus aureus) infection   Chronic, at goal (stable), I explained that there is increased risk of MRSA infection occurring since patient is probably carrier.  Emphasized handwashing     Orders:    CBC; Future    Basic Metabolic Panel; Future  Bilateral legs weakness with occasional left hip pain   I emphasized continue with physical therapy  CBC BMP magnesium level today since patient is on Lasix and has symptoms related to her legs    Subjective   HPI  Follow-up chronic osteomyelitis of lumbar spine in a patient who received prolonged course of IV antibiotics, currently on chronic suppressive therapy with Bactrim double strength 1 tablet p.o. twice daily.  Patient reports bilateral legs weakness.  She is concerned that her symptoms could be related to the antibiotics and is asking about stopping them.  Reports that she has dry thickened skin at the old back incision site.  No drainage.   Low back pain when she walks.  She takes 2 tablets of Tylenol twice daily.   She feels left hip popping out on occasion.   Concerned about history of MRSA and discomfort going to her prosthetic joint.

## 2025-04-17 NOTE — ASSESSMENT & PLAN NOTE
Chronic, at goal (stable), I explained that there is increased risk of MRSA infection occurring since patient is probably carrier.  Emphasized handwashing     Orders:    CBC; Future    Basic Metabolic Panel; Future  Bilateral legs weakness with occasional left hip pain

## 2025-04-18 ENCOUNTER — TELEPHONE (OUTPATIENT)
Age: 70
End: 2025-04-18

## 2025-04-18 NOTE — TELEPHONE ENCOUNTER
Follow up call to patient to remind her of Lab Orders per Dr Mcmanus. Request to have a set of labs now, within a week, and then repeat in 2 months. Also, to f/u with Wound Care, Dr Reyes. Patient has an appt next week on 4/24/25.   Patient verbalized understanding and will most likely go to [facility] Lab dept.

## 2025-04-20 ENCOUNTER — PREP FOR PROCEDURE (OUTPATIENT)
Dept: SURGERY | Facility: HOSPITAL | Age: 70
End: 2025-04-20
Payer: COMMERCIAL

## 2025-04-24 ENCOUNTER — OFFICE VISIT (OUTPATIENT)
Dept: WOUND CARE | Facility: CLINIC | Age: 70
End: 2025-04-24
Payer: COMMERCIAL

## 2025-04-24 DIAGNOSIS — L03.90 WOUND CELLULITIS: Primary | ICD-10-CM

## 2025-04-24 PROCEDURE — 99213 OFFICE O/P EST LOW 20 MIN: CPT | Performed by: PLASTIC SURGERY

## 2025-04-24 PROCEDURE — 1123F ACP DISCUSS/DSCN MKR DOCD: CPT | Performed by: PLASTIC SURGERY

## 2025-04-24 PROCEDURE — 99213 OFFICE O/P EST LOW 20 MIN: CPT

## 2025-05-12 RX ORDER — SULFAMETHOXAZOLE AND TRIMETHOPRIM 800; 160 MG/1; MG/1
1 TABLET ORAL 2 TIMES DAILY
Qty: 60 TABLET | Refills: 0 | Status: SHIPPED | OUTPATIENT
Start: 2025-05-12

## 2025-06-12 ENCOUNTER — OFFICE VISIT (OUTPATIENT)
Age: 70
End: 2025-06-12
Payer: COMMERCIAL

## 2025-06-12 VITALS
HEIGHT: 57 IN | OXYGEN SATURATION: 96 % | SYSTOLIC BLOOD PRESSURE: 166 MMHG | RESPIRATION RATE: 16 BRPM | TEMPERATURE: 97.1 F | WEIGHT: 165.6 LBS | HEART RATE: 75 BPM | BODY MASS INDEX: 35.72 KG/M2 | DIASTOLIC BLOOD PRESSURE: 69 MMHG

## 2025-06-12 DIAGNOSIS — A49.01 STAPHYLOCOCCUS AUREUS INFECTION: Primary | ICD-10-CM

## 2025-06-12 DIAGNOSIS — M46.26 INFECTION OF LUMBAR SPINE (HCC): ICD-10-CM

## 2025-06-12 DIAGNOSIS — M46.49 DISCITIS OF MULTIPLE SITES OF SPINE: ICD-10-CM

## 2025-06-12 PROCEDURE — G8428 CUR MEDS NOT DOCUMENT: HCPCS | Performed by: INTERNAL MEDICINE

## 2025-06-12 PROCEDURE — 99213 OFFICE O/P EST LOW 20 MIN: CPT | Performed by: INTERNAL MEDICINE

## 2025-06-12 PROCEDURE — 1123F ACP DISCUSS/DSCN MKR DOCD: CPT | Performed by: INTERNAL MEDICINE

## 2025-06-12 PROCEDURE — 1090F PRES/ABSN URINE INCON ASSESS: CPT | Performed by: INTERNAL MEDICINE

## 2025-06-12 PROCEDURE — 1036F TOBACCO NON-USER: CPT | Performed by: INTERNAL MEDICINE

## 2025-06-12 PROCEDURE — G8399 PT W/DXA RESULTS DOCUMENT: HCPCS | Performed by: INTERNAL MEDICINE

## 2025-06-12 PROCEDURE — G8417 CALC BMI ABV UP PARAM F/U: HCPCS | Performed by: INTERNAL MEDICINE

## 2025-06-12 PROCEDURE — 3017F COLORECTAL CA SCREEN DOC REV: CPT | Performed by: INTERNAL MEDICINE

## 2025-06-12 ASSESSMENT — PATIENT HEALTH QUESTIONNAIRE - PHQ9
SUM OF ALL RESPONSES TO PHQ QUESTIONS 1-9: 0
SUM OF ALL RESPONSES TO PHQ QUESTIONS 1-9: 0
1. LITTLE INTEREST OR PLEASURE IN DOING THINGS: NOT AT ALL
SUM OF ALL RESPONSES TO PHQ QUESTIONS 1-9: 0
SUM OF ALL RESPONSES TO PHQ QUESTIONS 1-9: 0
2. FEELING DOWN, DEPRESSED OR HOPELESS: NOT AT ALL

## 2025-06-12 ASSESSMENT — ENCOUNTER SYMPTOMS
GASTROINTESTINAL NEGATIVE: 1
RESPIRATORY NEGATIVE: 1
BACK PAIN: 1

## 2025-06-12 NOTE — PROGRESS NOTES
heart sounds. No murmur heard.  Pulmonary:      Effort: No respiratory distress.      Breath sounds: Normal breath sounds. No wheezing, rhonchi or rales.   Abdominal:      General: Abdomen is flat. Bowel sounds are normal. There is no distension.      Palpations: Abdomen is soft. There is no mass.      Tenderness: There is no abdominal tenderness. There is no guarding.   Musculoskeletal:      Cervical back: No tenderness.         not currently breastfeeding.      .   Lab Results   Component Value Date    WBC 6.5 04/25/2023    HGB 16.7 (H) 04/25/2023    HCT 49.1 (H) 04/25/2023    MCV 98.8 (H) 04/25/2023     04/25/2023     Lab Results   Component Value Date/Time     01/02/2024 05:12 AM    K 3.7 01/02/2024 05:12 AM     01/02/2024 05:12 AM    CO2 25 04/25/2023 09:38 AM    BUN 18 04/25/2023 09:38 AM    CREATININE 0.85 01/02/2024 05:12 AM    GLUCOSE 101 01/02/2024 05:12 AM    CALCIUM 8.3 01/02/2024 05:12 AM    LABGLOM 74 01/02/2024 05:12 AM                ASSESSMENT:  Patient Active Problem List   Diagnosis    DDD (degenerative disc disease), lumbar    Lumbar discitis    MRSA (methicillin resistant Staphylococcus aureus) infection    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

## 2025-07-03 DIAGNOSIS — E87.6 HYPOKALEMIA: ICD-10-CM

## 2025-07-03 RX ORDER — POTASSIUM CHLORIDE 750 MG/1
10 CAPSULE, EXTENDED RELEASE ORAL DAILY
Qty: 90 CAPSULE | Refills: 3 | Status: SHIPPED | OUTPATIENT
Start: 2025-07-03 | End: 2026-07-03

## 2025-07-03 NOTE — TELEPHONE ENCOUNTER
Received request for prescription refills for patient.   Patient follows with Dr. Barron    Request is for potassium chloride  Is patient currently on medication yes    Last OV 11/14/2024  Next OV 8/14/2025    Pended for signing and sent to provider

## 2025-07-29 DIAGNOSIS — I25.10 CAD IN NATIVE ARTERY: ICD-10-CM

## 2025-07-29 RX ORDER — ISOSORBIDE MONONITRATE 60 MG/1
60 TABLET, EXTENDED RELEASE ORAL DAILY
Qty: 90 TABLET | Refills: 3 | Status: SHIPPED | OUTPATIENT
Start: 2025-07-29 | End: 2025-07-31 | Stop reason: SDUPTHER

## 2025-07-29 NOTE — TELEPHONE ENCOUNTER
Received request for prescription refills for patient.   Patient follows with Dr. Barron    Request is for isosorbide mononitrate  Is patient currently on medication yes    Last OV 11/14/2024  Next OV 8/14/2025    Pended for signing and sent to provider

## 2025-07-31 DIAGNOSIS — I25.10 CAD IN NATIVE ARTERY: ICD-10-CM

## 2025-07-31 RX ORDER — ISOSORBIDE MONONITRATE 30 MG/1
TABLET, EXTENDED RELEASE ORAL
Qty: 90 TABLET | Refills: 3 | Status: SHIPPED | OUTPATIENT
Start: 2025-07-31

## 2025-07-31 RX ORDER — ISOSORBIDE MONONITRATE 60 MG/1
60 TABLET, EXTENDED RELEASE ORAL DAILY
Qty: 90 TABLET | Refills: 3 | Status: SHIPPED | OUTPATIENT
Start: 2025-07-31 | End: 2026-07-31

## 2025-07-31 NOTE — TELEPHONE ENCOUNTER
Rec'd rx refill request for Isosorbide as listed to [facility].  E-scribed as requested.     Last seen 11/14/24  Pending appt with Dr. Barron 8/14/25

## 2025-08-11 ENCOUNTER — TELEPHONE (OUTPATIENT)
Dept: CARDIOLOGY | Facility: CLINIC | Age: 70
End: 2025-08-11
Payer: COMMERCIAL

## 2025-08-14 ENCOUNTER — APPOINTMENT (OUTPATIENT)
Dept: CARDIOLOGY | Facility: CLINIC | Age: 70
End: 2025-08-14
Payer: COMMERCIAL

## 2025-08-15 DIAGNOSIS — I10 ESSENTIAL HYPERTENSION: ICD-10-CM

## 2025-08-15 RX ORDER — HYDRALAZINE HYDROCHLORIDE 50 MG/1
50 TABLET, FILM COATED ORAL 2 TIMES DAILY
Qty: 180 TABLET | Refills: 3 | Status: SHIPPED | OUTPATIENT
Start: 2025-08-15

## 2025-08-21 ENCOUNTER — TELEPHONE (OUTPATIENT)
Dept: CARDIOLOGY | Facility: CLINIC | Age: 70
End: 2025-08-21
Payer: COMMERCIAL

## 2025-09-04 ENCOUNTER — APPOINTMENT (OUTPATIENT)
Dept: CARDIOLOGY | Facility: CLINIC | Age: 70
End: 2025-09-04
Payer: COMMERCIAL

## 2025-10-10 ENCOUNTER — APPOINTMENT (OUTPATIENT)
Dept: CARDIOLOGY | Facility: CLINIC | Age: 70
End: 2025-10-10
Payer: COMMERCIAL

## (undated) DEVICE — SUTURE, ETHILON, 3-0, 18 IN, PS1, BLACK

## (undated) DEVICE — PKIT, PROAXIS PRONEVIEW, NO ACP TUBING

## (undated) DEVICE — DRESSING, MEPILEX BORDER, POST-OP AG, 4 X 14 IN

## (undated) DEVICE — SYRINGE, 50 CC, LUER LOCK

## (undated) DEVICE — CUP, MEDICINE, GRADUATED, 2 OZ, PLASTIC, DISP, LF

## (undated) DEVICE — ELECTRODE, ELECTROSURGICAL, BLADE, EXTENDED

## (undated) DEVICE — TUBING, SUCTION, 6MM X 10, CLEAN N-COND

## (undated) DEVICE — SPONGE, NEURO, 3/4 X 3/4IN, STERILE, LF

## (undated) DEVICE — GLOVE, SURGICAL, PROTEXIS PI , 8.0, PF, LF

## (undated) DEVICE — ADHESIVE, SKIN, MASTISOL, 2/3 CC VIAL

## (undated) DEVICE — TUBING, IRRIGATION, HIGH FLOW, HAND PIECE SET

## (undated) DEVICE — GLOVE, SURGICAL, PROTEXIS PI BLUE W/NEUTHERA, 8.0, PF, LF

## (undated) DEVICE — STAPLER, SKIN, PLUS, WIDE, 35

## (undated) DEVICE — GLOVE, SURGICAL, PROTEXIS PI , 7.5, PF, LF

## (undated) DEVICE — Device

## (undated) DEVICE — CATHETER, CATHLON IV, 14GA, NON- RADIOPAQUE, DISP

## (undated) DEVICE — DRAIN, WOUND, FLAT, HUBLESS, FULL LENGTH PERFORATION, 10 MM X 20 CM, SILICONE

## (undated) DEVICE — RESERVOIR, DRAINAGE, WOUND, JACKSON-PRATT, 100 CC, SILICONE

## (undated) DEVICE — SUTURE, VICRYL, 2-0, 27 IN, CP-2, UNDYED

## (undated) DEVICE — DRAPE, INCISE, ANTIMICROBIAL, IOBAN 2, STERI DRAPE, 23 X 33 IN, DISPOSABLE, STERILE

## (undated) DEVICE — SOLUTION, INJECTION, USP, SODIUM CHLORIDE 0.9%, .9, NACL, 1000 ML, BAG

## (undated) DEVICE — TUBING, MANIFOLD, LOW PRESSURE

## (undated) DEVICE — NEEDLE, SPINAL, QUINCKE, 18 G X 3.5 IN, PINK HUB

## (undated) DEVICE — ATS SUCTION LINE

## (undated) DEVICE — ELECTRODE, ELECTROSURGICAL, BLADE EXT 4 INCH, INSULATED

## (undated) DEVICE — SUTURE, STRATAFIX, 1 SYMMETRIC, PDS PLUS, 60CM, CTX, VIOLET

## (undated) DEVICE — STRAP, VELCRO, BODY, 4 X 60IN, NS

## (undated) DEVICE — SOLUTION, IRRIGATION, SODIUM CHLORIDE 0.9%, 1000 ML, POUR BOTTLE

## (undated) DEVICE — SUTURE, VICRYL, 2-0, 18 IN CP-2, UNDYED

## (undated) DEVICE — TUBE, FRAZIER SUCTION, 12 FR.

## (undated) DEVICE — TRAY, MINOR, SINGLE BASIN, STERILE

## (undated) DEVICE — PROTECTOR, NERVE, ULNAR, PINK

## (undated) DEVICE — DRESSING, GAUZE, SPONGE, VERSALON, 4 PLY, 4 X 4 IN, BULK, NS

## (undated) DEVICE — SUTURE, PROLENE, 1, 30 IN, CT-1, BLUE

## (undated) DEVICE — DRESSING, ISLAND, TELFA, 4 X 5 IN

## (undated) DEVICE — TIP, SUCTION, YANKAUER, FLEXIBLE

## (undated) DEVICE — DRAIN, WOUND, ROUND, W/TROCAR, HOLE PATTERN, 10 IN, MEDIUM, 1/8 X 49 IN

## (undated) DEVICE — SUTURE, VICRYL, 1, 27 IN, CP, VIOLET

## (undated) DEVICE — DRAPE, SMARTDRAPE, FOR TIVATO MICROSCOPE

## (undated) DEVICE — BUR, 3MM X 3.8MM, PRECISION, NEURO DRILL

## (undated) DEVICE — TUBING SET, HIGH PRESSURE, 3M, DISP

## (undated) DEVICE — STRAP, ARM BOARD, 32 X 1.5

## (undated) DEVICE — SYRINGE, CONTROL, ANGIOGRAPHIC, FIXED MALE LUER, 10 CC

## (undated) DEVICE — BANDAGE, QUIKCLOT, INTERVENTIONAL HEMO, W/O SLIT

## (undated) DEVICE — APPLICATOR, CHLORAPREP, W/ORANGE TINT, 26ML

## (undated) DEVICE — GLOVE, SURGICAL, PROTEXIS PI , 9.0, PF, LF

## (undated) DEVICE — DRESSING, GAUZE, DRAIN SPONGE, 6 PLY, EXCILON, 4 X 4 IN, STERILE

## (undated) DEVICE — STRIP, SKIN CLOSURE, STERI STRIP, REINFORCED, 0.5 X 4 IN

## (undated) DEVICE — SEALANT, HEMOSTATIC, FLOSEAL, 10 ML

## (undated) DEVICE — SUTURE, SILK, 2-0, 18 IN, BR PS, BLACK

## (undated) DEVICE — COVER, BACK TABLE

## (undated) DEVICE — ACCESS KIT, S-MAK MINI, 5FR 10CM 0.018IN 40CM, SS/SS, ECHO ENHANCE NEEDLE

## (undated) DEVICE — DRAPE, SHEET, 17 X 23 IN

## (undated) DEVICE — BALLTIP STIMULATING PROBE

## (undated) DEVICE — BOWL, BASIN, 32 OZ, STERILE

## (undated) DEVICE — SEALER, BIPOLAR, AQUA MANTYS 6.0

## (undated) DEVICE — DRESSING, MEPILEX BORDER, POST-OP AG, 4 X 10 IN

## (undated) DEVICE — SPONGE, DISSECTOR, PEANUT, 3/8, STERILE 5 FOAM HOLDER"

## (undated) DEVICE — DRESSING, GAUZE, SPONGE, 12 PLY, 4 X 4 IN, PLASTIC POUCH, STRL 10PK

## (undated) DEVICE — NEEDLE, SPINAL, 20 G X 3.5 IN, YELLOW HUB

## (undated) DEVICE — SUTURE, MONOCRYL, 3-0, PS-1 27IN, UNDYED

## (undated) DEVICE — BLADE, MILL BONE, MEDIUM, DISP

## (undated) DEVICE — DRESSING, ADHESIVE, ISLAND, TELFA, 4 X 10 IN

## (undated) DEVICE — SOLUTION, TOPICAL, ALCOHOL, ISOPROPYL 70%, 16 OZ

## (undated) DEVICE — TAPE, SURGICAL, FOAM, MICROFOAM, HYPOALLERGENIC, 4 IN X 5.5 YD

## (undated) DEVICE — SHEATH, PINNACLE, W/.038 GW 10CM, 5FR INTRODUCER, 2.5 CM DIALATOR

## (undated) DEVICE — SEALER, BIPOLAR, AQUA MANTYS 2.3

## (undated) DEVICE — DRESSING, ISLAND, ADHESIVE, TELFA, 4 X 8 IN

## (undated) DEVICE — DRESSING, GAUZE, SPONGE, 8 PLY, CURITY, 4 X 4, LF

## (undated) DEVICE — SHEATH, PINNACLE, W/.038 GUIDEWIRE, 10 CM,  6FR INTRODUCER, 6FR DIA, 2.5 CM DIALATOR

## (undated) DEVICE — TIP,  ELECTRODE COATED INSULATED, EXTENDED, LF

## (undated) DEVICE — GOWN, SURGICAL, ROYAL SILK, LG, STERILE

## (undated) DEVICE — BUR, ROUND 5MM OSTEON, ELITE, SOFT TOUCH

## (undated) DEVICE — DILATOR, VESSEL, COONS, 12 FR X 20 CM

## (undated) DEVICE — DRAPE, SHEET, THREE QUARTER, FAN FOLD, 57 X 77 IN

## (undated) DEVICE — APPLICATOR, PREP, CHLORAPREP, W/ORANGE TINT, 3ML

## (undated) DEVICE — BLADE, BLUNT, 25MM, WITH TUBESET

## (undated) DEVICE — GOWN, SURGICAL, ROYAL SILK, XL, STERILE

## (undated) DEVICE — WAX, BONE, 2.5 GM